# Patient Record
Sex: FEMALE | Race: WHITE | NOT HISPANIC OR LATINO | Employment: UNEMPLOYED | URBAN - METROPOLITAN AREA
[De-identification: names, ages, dates, MRNs, and addresses within clinical notes are randomized per-mention and may not be internally consistent; named-entity substitution may affect disease eponyms.]

---

## 2018-05-29 ENCOUNTER — HOSPITAL ENCOUNTER (EMERGENCY)
Facility: HOSPITAL | Age: 38
Discharge: HOME | End: 2018-05-29
Payer: MEDICARE

## 2018-05-29 VITALS
HEIGHT: 66 IN | OXYGEN SATURATION: 91 % | DIASTOLIC BLOOD PRESSURE: 62 MMHG | BODY MASS INDEX: 19.61 KG/M2 | RESPIRATION RATE: 20 BRPM | HEART RATE: 68 BPM | WEIGHT: 122 LBS | SYSTOLIC BLOOD PRESSURE: 91 MMHG | TEMPERATURE: 95.9 F

## 2018-05-29 DIAGNOSIS — R63.0 ANOREXIA: Primary | ICD-10-CM

## 2018-05-29 LAB
ALBUMIN SERPL BCP-MCNC: 3.7 G/DL (ref 3.5–5)
ALP SERPL-CCNC: 306 U/L (ref 46–116)
ALT SERPL W P-5'-P-CCNC: 16 U/L (ref 12–78)
AMPHETAMINES SERPL QL SCN: NEGATIVE
ANION GAP SERPL CALCULATED.3IONS-SCNC: 14 MMOL/L (ref 4–13)
AST SERPL W P-5'-P-CCNC: 17 U/L (ref 5–45)
BARBITURATES UR QL: NEGATIVE
BASOPHILS # BLD AUTO: 0.01 THOUSANDS/ΜL (ref 0–0.1)
BASOPHILS NFR BLD AUTO: 0 % (ref 0–1)
BENZODIAZ UR QL: NEGATIVE
BILIRUB SERPL-MCNC: 0.9 MG/DL (ref 0.2–1)
BUN SERPL-MCNC: 14 MG/DL (ref 5–25)
CALCIUM SERPL-MCNC: 8.7 MG/DL (ref 8.3–10.1)
CHLORIDE SERPL-SCNC: 106 MMOL/L (ref 100–108)
CO2 SERPL-SCNC: 19 MMOL/L (ref 21–32)
COCAINE UR QL: NEGATIVE
CREAT SERPL-MCNC: 0.65 MG/DL (ref 0.6–1.3)
EOSINOPHIL # BLD AUTO: 0.16 THOUSAND/ΜL (ref 0–0.61)
EOSINOPHIL NFR BLD AUTO: 3 % (ref 0–6)
ERYTHROCYTE [DISTWIDTH] IN BLOOD BY AUTOMATED COUNT: 19 % (ref 11.6–15.1)
ETHANOL EXG-MCNC: 0 MG/DL
GFR SERPL CREATININE-BSD FRML MDRD: 115 ML/MIN/1.73SQ M
GLUCOSE SERPL-MCNC: 84 MG/DL (ref 65–140)
HCT VFR BLD AUTO: 36.7 % (ref 34.8–46.1)
HGB BLD-MCNC: 12 G/DL (ref 11.5–15.4)
LYMPHOCYTES # BLD AUTO: 1.17 THOUSANDS/ΜL (ref 0.6–4.47)
LYMPHOCYTES NFR BLD AUTO: 22 % (ref 14–44)
MAGNESIUM SERPL-MCNC: 2 MG/DL (ref 1.6–2.6)
MCH RBC QN AUTO: 37.4 PG (ref 26.8–34.3)
MCHC RBC AUTO-ENTMCNC: 32.7 G/DL (ref 31.4–37.4)
MCV RBC AUTO: 114 FL (ref 82–98)
METHADONE UR QL: NEGATIVE
MONOCYTES # BLD AUTO: 0.25 THOUSAND/ΜL (ref 0.17–1.22)
MONOCYTES NFR BLD AUTO: 5 % (ref 4–12)
NEUTROPHILS # BLD AUTO: 3.8 THOUSANDS/ΜL (ref 1.85–7.62)
NEUTS SEG NFR BLD AUTO: 70 % (ref 43–75)
OPIATES UR QL SCN: NEGATIVE
PCP UR QL: NEGATIVE
PLATELET # BLD AUTO: 233 THOUSANDS/UL (ref 149–390)
PMV BLD AUTO: 11.2 FL (ref 8.9–12.7)
POTASSIUM SERPL-SCNC: 3.8 MMOL/L (ref 3.5–5.3)
PROT SERPL-MCNC: 6.5 G/DL (ref 6.4–8.2)
RBC # BLD AUTO: 3.21 MILLION/UL (ref 3.81–5.12)
SODIUM SERPL-SCNC: 139 MMOL/L (ref 136–145)
THC UR QL: NEGATIVE
VIT B12 SERPL-MCNC: 353 PG/ML (ref 100–900)
WBC # BLD AUTO: 5.39 THOUSAND/UL (ref 4.31–10.16)

## 2018-05-29 PROCEDURE — 80053 COMPREHEN METABOLIC PANEL: CPT | Performed by: PHYSICIAN ASSISTANT

## 2018-05-29 PROCEDURE — 36415 COLL VENOUS BLD VENIPUNCTURE: CPT | Performed by: PHYSICIAN ASSISTANT

## 2018-05-29 PROCEDURE — 99284 EMERGENCY DEPT VISIT MOD MDM: CPT

## 2018-05-29 PROCEDURE — 93005 ELECTROCARDIOGRAM TRACING: CPT

## 2018-05-29 PROCEDURE — 82607 VITAMIN B-12: CPT | Performed by: PHYSICIAN ASSISTANT

## 2018-05-29 PROCEDURE — 83735 ASSAY OF MAGNESIUM: CPT | Performed by: PHYSICIAN ASSISTANT

## 2018-05-29 PROCEDURE — 80307 DRUG TEST PRSMV CHEM ANLYZR: CPT | Performed by: PHYSICIAN ASSISTANT

## 2018-05-29 PROCEDURE — 85025 COMPLETE CBC W/AUTO DIFF WBC: CPT | Performed by: PHYSICIAN ASSISTANT

## 2018-05-29 PROCEDURE — 82075 ASSAY OF BREATH ETHANOL: CPT | Performed by: PHYSICIAN ASSISTANT

## 2018-05-29 RX ORDER — DOCUSATE SODIUM 100 MG/1
100 CAPSULE, LIQUID FILLED ORAL 2 TIMES DAILY
COMMUNITY
End: 2021-09-30 | Stop reason: HOSPADM

## 2018-05-29 RX ORDER — ATOMOXETINE 40 MG/1
40 CAPSULE ORAL DAILY
COMMUNITY
End: 2018-09-04 | Stop reason: DRUGHIGH

## 2018-05-29 RX ORDER — VARENICLINE TARTRATE 1 MG/1
1 TABLET, FILM COATED ORAL 2 TIMES DAILY
COMMUNITY
End: 2021-04-26 | Stop reason: HOSPADM

## 2018-05-29 RX ORDER — DULOXETIN HYDROCHLORIDE 60 MG/1
120 CAPSULE, DELAYED RELEASE ORAL DAILY
COMMUNITY
End: 2020-12-17

## 2018-05-29 RX ORDER — BUPROPION HYDROCHLORIDE 100 MG/1
100 TABLET, EXTENDED RELEASE ORAL 3 TIMES DAILY
COMMUNITY
End: 2021-04-26 | Stop reason: HOSPADM

## 2018-05-29 NOTE — ED NOTES
Called [facility] spoke with the nursing supervisor Demetrio Blackwell, faxed over consent for to [phone]  05/29/18 4809

## 2018-05-29 NOTE — ED PROVIDER NOTES
History  Chief Complaint   Patient presents with    Eating Disorder     Pt presents with a history of a eating disorder has not had an appetitie for over a month  Pt has lost sixteen pounds over the last month  Pt denies vomiting  Chelseanelsyamaury just seen at [facility] for same  They medically cleared her and there were no beds available for a psych admission  Staff from home state they want her admitted     57-year-old female presents the ER for evaluation due to an eating disorder  Patient states that she lives at housing facility through [facility] and has had decreased appetite for over a month  Patient states that she has had decreased food intake and has lost approximately 16 lb over this last month that she has been living at this new housing  Patient denies nausea and vomiting and is currently waiting for a bed to open up at a [facility] psych facility that specializes in eating disorders  Patient was seen yesterday at [facility] for similar symptoms and caregiver states that she was medically cleared and sent home without any further evaluation by psych  Caregiver also states that they were not told any lab result except that she has decreased potassium  Caregiver also states that nothing was given to patient while she was there and no recommendations were given  Caregiver states that she thinks patient should be admitted for psych and they would like her to be admitted at this time if necessary  Patient has history of gastric bypass surgery and states that she stopped eating after her surgery where she lost the majority of her weight eventually she had stomach issues due to lack of eating and states that she had a 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 and patient was told that this would be the last time she could get any kind of surgery on her stomach  Patient denies any abdominal pain, nausea, vomiting, headaches, chest pain, chest tightness, shortness of breath    Patient does admit to some dizziness and lightheadedness intermittently  Patient also states that she has not been eating much or drinking much fluids and states that in 1 weeks time she can eat 1 yogurt  Caregiver states that at the housing where she is located they are not supposed to keep track of food intake or manage any food needs  Caregiver states that concern was noted because of drastic weight loss during this month but she has been there  Prior to Admission Medications   Prescriptions Last Dose Informant Patient Reported? Taking? DULoxetine (CYMBALTA) 60 mg delayed release capsule   Yes Yes   Sig: Take 120 mg by mouth daily   atoMOXetine (STRATTERA) 40 mg capsule   Yes Yes   Sig: Take 40 mg by mouth daily   buPROPion (WELLBUTRIN SR) 150 mg 12 hr tablet   Yes Yes   Sig: Take 150 mg by mouth daily   docusate sodium (COLACE) 100 mg capsule   Yes Yes   Sig: Take 100 mg by mouth 2 (two) times a day   topiramate (TOPAMAX) 200 MG tablet   Yes Yes   Sig: Take 400 mg by mouth 2 (two) times a day   varenicline (CHANTIX) 1 mg tablet   Yes Yes   Sig: Take 1 mg by mouth 2 (two) times a day      Facility-Administered Medications: None       Past Medical History:   Diagnosis Date    Psychiatric disorder     depression anxiety    Seizures ([address] )        Past Surgical History:   Procedure Laterality Date    GASTRIC BYPASS         History reviewed  No pertinent family history  I have reviewed and agree with the history as documented  Social History   Substance Use Topics    Smoking status: Former Smoker    Smokeless tobacco: Never Used    Alcohol use No        Review of Systems   Constitutional: Positive for appetite change  Negative for chills and fever  HENT: Negative for congestion, rhinorrhea, sore throat and trouble swallowing  Eyes: Negative for photophobia, pain and visual disturbance  Respiratory: Negative for chest tightness, shortness of breath and wheezing      Cardiovascular: Negative for chest pain and palpitations  Gastrointestinal: Negative for abdominal pain, constipation, diarrhea, nausea and vomiting  Musculoskeletal: Negative for arthralgias, back pain, myalgias, neck pain and neck stiffness  Neurological: Positive for dizziness and light-headedness  Negative for syncope, weakness, numbness and headaches  All other systems reviewed and are negative  Physical Exam  Physical Exam   Constitutional: She is oriented to person, place, and time  Vital signs are normal  She appears cachectic  HENT:   Head: Normocephalic and atraumatic  Right Ear: Tympanic membrane normal    Left Ear: Tympanic membrane normal    Nose: Nose normal    Mouth/Throat: Uvula is midline, oropharynx is clear and moist and mucous membranes are normal    Eyes: Conjunctivae, EOM and lids are normal  Pupils are equal, round, and reactive to light  Neck: Normal range of motion and full passive range of motion without pain  Neck supple  Cardiovascular: Normal rate, regular rhythm, normal heart sounds and normal pulses  Pulmonary/Chest: Effort normal and breath sounds normal    Abdominal: Soft  Bowel sounds are normal  There is no tenderness  There is no CVA tenderness  Musculoskeletal: Normal range of motion  Neurological: She is alert and oriented to person, place, and time  She has normal strength  No cranial nerve deficit or sensory deficit  GCS eye subscore is 4  GCS verbal subscore is 5  GCS motor subscore is 6  Skin: Skin is warm, dry and intact  Capillary refill takes less than 2 seconds  Psychiatric: She has a normal mood and affect  Her speech is normal and behavior is normal  Judgment and thought content normal  Cognition and memory are normal    Nursing note and vitals reviewed        Vital Signs  ED Triage Vitals [05/29/18 1729]   Temperature Pulse Respirations Blood Pressure SpO2   (!) 95 9 °F (35 5 °C) 85 18 95/55 100 %      Temp Source Heart Rate Source Patient Position - Orthostatic VS BP Location FiO2 (%)   Tympanic Monitor Lying Right arm --      Pain Score       No Pain           Vitals:    05/29/18 2015 05/29/18 2030 05/29/18 2045 05/29/18 2100   BP:  90/61 91/61 91/62   Pulse: 68 70 69 68   Patient Position - Orthostatic VS:           Visual Acuity  Visual Acuity      Most Recent Value   L Pupil Size (mm)  3   R Pupil Size (mm)  3   L Pupil Shape  Round   R Pupil Shape  Round          ED Medications  Medications - No data to display    Diagnostic Studies  Results Reviewed     Procedure Component Value Units Date/Time    Vitamin B12 [[id number]]  (Normal) Collected:  05/29/18 1828    Lab Status:  Final result Specimen:  Blood from Arm, Right Updated:  05/29/18 2107     Vitamin B-12 353 pg/mL     Rapid drug screen, urine [[id number]]  (Normal) Collected:  05/29/18 1941    Lab Status:  Final result Specimen:  Urine from Urine, Clean Catch Updated:  05/29/18 2003     Amph/Meth UR Negative     Barbiturate Ur Negative     Benzodiazepine Urine Negative     Cocaine Urine Negative     Methadone Urine Negative     Opiate Urine Negative     PCP Ur Negative     THC Urine Negative    Narrative:         FOR MEDICAL PURPOSES ONLY  IF CONFIRMATION NEEDED PLEASE CONTACT THE LAB WITHIN 5 DAYS      Drug Screen Cutoff Levels:  AMPHETAMINE/METHAMPHETAMINES  1000 ng/mL  BARBITURATES     200 ng/mL  BENZODIAZEPINES     200 ng/mL  COCAINE      300 ng/mL  METHADONE      300 ng/mL  OPIATES      300 ng/mL  PHENCYCLIDINE     25 ng/mL  THC       50 ng/mL    Comprehensive metabolic panel [[id number]]  (Abnormal) Collected:  05/29/18 1827    Lab Status:  Final result Specimen:  Blood from Arm, Right Updated:  05/29/18 1908     Sodium 139 mmol/L      Potassium 3 8 mmol/L      Chloride 106 mmol/L      CO2 19 (L) mmol/L      Anion Gap 14 (H) mmol/L      BUN 14 mg/dL      Creatinine 0 65 mg/dL      Glucose 84 mg/dL      Calcium 8 7 mg/dL      AST 17 U/L      ALT 16 U/L      Alkaline Phosphatase 306 (H) U/L      Total Protein 6 5 g/dL      Albumin 3 7 g/dL      Total Bilirubin 0 90 mg/dL      eGFR 115 ml/min/1 73sq m     Narrative:         National Kidney Disease Education Program recommendations are as follows:  GFR calculation is accurate only with a steady state creatinine  Chronic Kidney disease less than 60 ml/min/1 73 sq  meters  Kidney failure less than 15 ml/min/1 73 sq  meters  Magnesium [[id number]]  (Normal) Collected:  05/29/18 1827    Lab Status:  Final result Specimen:  Blood from Arm, Right Updated:  05/29/18 1908     Magnesium 2 0 mg/dL     POCT alcohol breath test [[id number]]  (Normal) Resulted:  05/29/18 1800    Lab Status:  Final result Updated:  05/29/18 1903     EXTBreath Alcohol 0 00    CBC and differential [[id number]]  (Abnormal) Collected:  05/29/18 1827    Lab Status:  Final result Specimen:  Blood from Arm, Right Updated:  05/29/18 1845     WBC 5 39 Thousand/uL      RBC 3 21 (L) Million/uL      Hemoglobin 12 0 g/dL      Hematocrit 36 7 %       (H) fL      MCH 37 4 (H) pg      MCHC 32 7 g/dL      RDW 19 0 (H) %      MPV 11 2 fL      Platelets 802 Thousands/uL      Neutrophils Relative 70 %      Lymphocytes Relative 22 %      Monocytes Relative 5 %      Eosinophils Relative 3 %      Basophils Relative 0 %      Neutrophils Absolute 3 80 Thousands/µL      Lymphocytes Absolute 1 17 Thousands/µL      Monocytes Absolute 0 25 Thousand/µL      Eosinophils Absolute 0 16 Thousand/µL      Basophils Absolute 0 01 Thousands/µL                  No orders to display              Procedures  Procedures       Phone Contacts  ED Phone Contact    ED Course  ED Course as of Laz 09 0401   Tue May 29, 2018   2032 Spoke with Shree Aquino from Crisis and he states that there is no criteria for admission and pt should call her insurance company to find a place that specializes in eating disorders                                  MDM  Number of Diagnoses or Management Options  Anorexia: established and worsening     Amount and/or Complexity of Data Reviewed  Clinical lab tests: ordered and reviewed    Patient Progress  Patient progress: stable    CritCare Time    Disposition  Final diagnoses:   Anorexia     Time reflects when diagnosis was documented in both MDM as applicable and the Disposition within this note     Time User Action Codes Description Comment    5/29/2018  9:09 PM Rebecca Ladd Add [R63 0] Anorexia       ED Disposition     ED Disposition Condition Comment    Discharge  Tadeo Murillo discharge to home/self care  Condition at discharge: Stable        Follow-up Information     Follow up With Specialties Details Why Contact Info    PCP  Call For Recheck, If symptoms worsen           Discharge Medication List as of 5/29/2018  9:14 PM      CONTINUE these medications which have NOT CHANGED    Details   atoMOXetine (STRATTERA) 40 mg capsule Take 40 mg by mouth daily, Historical Med      buPROPion (WELLBUTRIN SR) 150 mg 12 hr tablet Take 150 mg by mouth daily, Historical Med      docusate sodium (COLACE) 100 mg capsule Take 100 mg by mouth 2 (two) times a day, Historical Med      DULoxetine (CYMBALTA) 60 mg delayed release capsule Take 120 mg by mouth daily, Historical Med      topiramate (TOPAMAX) 200 MG tablet Take 400 mg by mouth 2 (two) times a day, Historical Med      varenicline (CHANTIX) 1 mg tablet Take 1 mg by mouth 2 (two) times a day, Historical Med           No discharge procedures on file      ED Provider  Electronically Signed by           Girish Stokes PA-C  06/09/18 5622

## 2018-05-30 LAB
ATRIAL RATE: 76 BPM
P AXIS: 78 DEGREES
PR INTERVAL: 126 MS
QRS AXIS: 46 DEGREES
QRSD INTERVAL: 84 MS
QT INTERVAL: 380 MS
QTC INTERVAL: 427 MS
T WAVE AXIS: 108 DEGREES
VENTRICULAR RATE: 76 BPM

## 2018-05-30 PROCEDURE — 93010 ELECTROCARDIOGRAM REPORT: CPT | Performed by: INTERNAL MEDICINE

## 2018-05-30 NOTE — DISCHARGE INSTRUCTIONS
Anorexia Nervosa   WHAT YOU NEED TO KNOW:   Anorexia nervosa is an eating disorder  You weigh much less than your normal body weight should be  You lose weight by eating very little food, or by bingeing and purging  This means eating large amounts quickly and then vomiting or using laxatives to prevent weight gain  You worry about weight gain, and you  your weight and shape  The weight loss is not related to another medical condition  DISCHARGE INSTRUCTIONS:   Call 911 for any of the following:   · You want to harm or kill yourself  · You have pain when you swallow, or severe pain in your chest or abdomen  · Your heart is beating fast or fluttering, or you feel dizzy or faint  Return to the emergency department if:   · Your muscles feel weak, and you have pain and stiffness  Contact your healthcare provider if:   · You have tingling in your hands or feet  · Your monthly period is light or has stopped completely (females)  · You are planning to get pregnant and need to develop a safe eating plan  · You have questions or concerns about your condition or care  Medicines: You may need any of the following:  · Antianxiety medicine  decreases anxiety and helps you feel calm and relaxed  · Anticonvulsants  control seizures and decrease violent behavior, aggression, or irritability  This medicine may help control your mood swings  · Antidepressants  help decrease or prevent the symptoms of depression  · Mood stabilizers  help control mood changes  · Vitamin and mineral supplements  may be needed if you are malnourished  You may need to take a mineral supplement, such as potassium  You may also need to take multivitamins to replace what your body has lost      · Take your medicine as directed  Contact your healthcare provider if you think your medicine is not helping or if you have side effects  Tell him of her if you are allergic to any medicine   Keep a list of the medicines, vitamins, and herbs you take  Include the amounts, and when and why you take them  Bring the list or the pill bottles to follow-up visits  Carry your medicine list with you in case of an emergency  Therapy:  After you leave the hospital, it is important that you continue with therapy to help control anorexia:  · Cognitive behavioral therapy (CBT)  is used to help you learn the reasons you are unhappy with your body  A therapist will work with you to change your behaviors and decrease your negative feelings about food and your weight  · Group or family therapy  is a meeting you have with other people who also have anorexia nervosa  Family therapy is a meeting you have with healthcare providers and your family members  Group and family meetings are a time when you talk with others about ways to cope with anorexia nervosa  · Nutritional therapy  means healthcare providers will help you create a plan to reach a healthy weight for your height  The plan includes appropriate exercise and nutrition  You may also need extra fluids if you are dehydrated  What can I do to help myself? · Be patient  Recovery from anorexia is a process that takes time  You may have times when you go back to not eating, or eating few calories, especially during stressful times  This is common  Work with family members and healthcare providers to get back on track with healthy eating and healthy exercise  Try not to be angry with yourself for the episode  It might help to talk about your feelings with someone you trust      · Focus on a healthy self-esteem  Think about everything you like about yourself  For example, you may be a talented artist, or you may write well  Focus on those skills or talents instead of on appearance  Ask others not to comment on your weight or shape  Your healthcare provider can tell you healthy weight ranges for your age and height   It may take time before you are comfortable knowing your weight or seeing your weight as healthy  Remember your goals to build a healthy self-esteem  Be patient with yourself as you change your thinking  For support and more information:   · [employer]  [address] , [address] [facility] [address]  Phone: 7- 196 - 844-4524  Phone: 8- 964 - 044-6250  Web Address: http://www  [employer]  org  · [address] [facility], Public Information & Communication Branch  [address], [address], [address]  [place] [address]   Phone: 8- [phone]  Phone: 8- 226 - [phone]  Web Address: Shaye smith  Follow up with your healthcare provider as directed: You may need blood tests once you start taking medicine for anorexia  These tests are used to check how much medicine is in your blood  Your healthcare provider will use the results of these tests to decide the right amount of medicine for you  You may need to have these blood tests more than once  Write down your questions so you remember to ask them during your visits  © 2017 [facility] Information is for End User's use only and may not be sold, redistributed or otherwise used for commercial purposes  All illustrations and images included in CareNotes® are the copyrighted property of A D A M , Inc  or Dileep Gomez  The above information is an  only  It is not intended as medical advice for individual conditions or treatments  Talk to your doctor, nurse or pharmacist before following any medical regimen to see if it is safe and effective for you

## 2018-09-04 ENCOUNTER — OFFICE VISIT (OUTPATIENT)
Dept: ENDOCRINOLOGY | Facility: HOSPITAL | Age: 38
End: 2018-09-04
Payer: MEDICARE

## 2018-09-04 VITALS
WEIGHT: 151.8 LBS | HEIGHT: 66 IN | BODY MASS INDEX: 24.4 KG/M2 | DIASTOLIC BLOOD PRESSURE: 64 MMHG | HEART RATE: 87 BPM | SYSTOLIC BLOOD PRESSURE: 106 MMHG

## 2018-09-04 DIAGNOSIS — Z98.84 HISTORY OF GASTRIC BYPASS: ICD-10-CM

## 2018-09-04 DIAGNOSIS — E03.9 HYPOTHYROIDISM, UNSPECIFIED TYPE: Primary | ICD-10-CM

## 2018-09-04 DIAGNOSIS — R74.8 ELEVATED ALKALINE PHOSPHATASE LEVEL: ICD-10-CM

## 2018-09-04 DIAGNOSIS — Z86.018 HISTORY OF PITUITARY ADENOMA: ICD-10-CM

## 2018-09-04 DIAGNOSIS — E16.2 HYPOGLYCEMIA: ICD-10-CM

## 2018-09-04 PROCEDURE — 99204 OFFICE O/P NEW MOD 45 MIN: CPT | Performed by: INTERNAL MEDICINE

## 2018-09-04 RX ORDER — LEVOTHYROXINE SODIUM 0.03 MG/1
25 TABLET ORAL DAILY
COMMUNITY
End: 2019-05-07 | Stop reason: SDUPTHER

## 2018-09-04 RX ORDER — THIAMINE MONONITRATE (VIT B1) 100 MG
100 TABLET ORAL DAILY
Status: ON HOLD | COMMUNITY
End: 2021-09-30 | Stop reason: SDUPTHER

## 2018-09-04 RX ORDER — DIPHENOXYLATE HYDROCHLORIDE AND ATROPINE SULFATE 2.5; .025 MG/1; MG/1
1 TABLET ORAL DAILY
COMMUNITY
End: 2021-04-26 | Stop reason: HOSPADM

## 2018-09-04 RX ORDER — FOLIC ACID 1 MG/1
TABLET ORAL DAILY
Status: ON HOLD | COMMUNITY
End: 2021-09-30 | Stop reason: SDUPTHER

## 2018-09-04 RX ORDER — ACARBOSE 25 MG/1
25 TABLET ORAL
Refills: 0
Start: 2018-09-04 | End: 2018-10-03 | Stop reason: SDUPTHER

## 2018-09-04 RX ORDER — DOXEPIN HYDROCHLORIDE 25 MG/1
CAPSULE ORAL AS NEEDED
COMMUNITY
End: 2020-12-17

## 2018-09-04 RX ORDER — FAMOTIDINE 20 MG/1
20 TABLET, FILM COATED ORAL DAILY
COMMUNITY
End: 2021-04-26 | Stop reason: HOSPADM

## 2018-09-04 RX ORDER — POLYETHYLENE GLYCOL 3350 17 G/17G
17 POWDER, FOR SOLUTION ORAL DAILY
COMMUNITY
End: 2021-05-13 | Stop reason: HOSPADM

## 2018-09-04 RX ORDER — ATOMOXETINE 100 MG/1
100 CAPSULE ORAL DAILY
COMMUNITY
End: 2020-12-17

## 2018-09-04 RX ORDER — MIRTAZAPINE 15 MG/1
7.5 TABLET, FILM COATED ORAL
COMMUNITY
End: 2021-04-26 | Stop reason: HOSPADM

## 2018-09-04 RX ORDER — PRAZOSIN HYDROCHLORIDE 5 MG/1
CAPSULE ORAL
COMMUNITY
End: 2021-04-26 | Stop reason: HOSPADM

## 2018-09-04 RX ORDER — POTASSIUM CHLORIDE 750 MG/1
10 TABLET, EXTENDED RELEASE ORAL DAILY
COMMUNITY
End: 2021-04-26 | Stop reason: HOSPADM

## 2018-09-04 NOTE — PROGRESS NOTES
9/4/2018    Assessment/Plan      Diagnoses and all orders for this visit:    Hypothyroidism, unspecified type  -     TSH, 3rd generation Lab Collect; Future  -     T4, free Lab Collect; Future    History of gastric bypass  -     Ambulatory referral to medical nutrition therapy for diabetes; Future    Elevated alkaline phosphatase level  -     Comprehensive metabolic panel Lab Collect; Future  -     Gamma GT; Future    Hypoglycemia  -     Ambulatory referral to medical nutrition therapy for diabetes; Future  -     Cortisol Level, AM Specimen Lab Collect; Future    History of pituitary adenoma  -     MRI brain pituitary wo and w contrast; Future    Other orders  -     atomoxetine (STRATTERA) 100 MG capsule; Take 100 mg by mouth daily  -     topiramate (TOPAMAX) 50 MG tablet; Take 50 mg by mouth every 12 (twelve) hours 3 times daily  -     folic acid (FOLVITE) 1 mg tablet; Take by mouth daily  -     thiamine (VITAMIN B1) 100 mg tablet; Take 100 mg by mouth daily  -     famotidine (PEPCID) 20 mg tablet; Take 20 mg by mouth daily  -     polyethylene glycol (MIRALAX) 17 g packet; Take 17 g by mouth daily  -     Cholecalciferol (VITAMIN D3) 61417 units TABS; Take 50,000 Units by mouth once a week  -     potassium chloride (K-DUR,KLOR-CON) 10 mEq tablet; Take 10 mEq by mouth daily  -     multivitamin (THERAGRAN) TABS; Take 1 tablet by mouth daily  -     levothyroxine 25 mcg tablet; Take 25 mcg by mouth daily  -     prazosin (MINIPRESS) 2 mg capsule; Take 2 mg by mouth daily at bedtime  -     mirtazapine (REMERON) 15 mg tablet; Take 15 mg by mouth daily at bedtime  -     doxepin (SINEquan) 50 mg capsule; Take 10 mg by mouth daily at bedtime        Assessment/Plan:  1  Hypoglycemia:  Suspected due to post gastric bypass hypoglycemia given timing after her 2nd bypass as well as timing related to carb rich foods  She is on acarbose 25 mg daily at this time    I told her that there is room to increase this dose, however the most important management in the setting of her cause of hypoglycemia is dietary  I have placed a referral for a dietitian  Discussed briefly the most important aspects of diet including low carb, high-protein, and fat diet  Discussed as best she can in maintaining a consistent meal pattern on day-to-day basis  Discussed the same composition of snacks should hold true in terms of low carb, etc   Discussed that when her blood sugar is low, unlike the patient 1 diabetes, her low blood sugar should be treated with sugar plus a protein or fat source to maintain the blood sugar higher  Asked her to send in blood sugar logs the 2 weeks after she meets with the dietitian to monitor progress  Discussed the other options in terms of other medications  Will see her back in about 5 months  Will also check an a m  cortisol to rule out adrenal insufficiency  2   Hypothyroidism:  Will check a TSH and free T4 now  Suspect we will need to increase her levothyroxine dose  We will call her and recheck as needed  3   Elevated alk-phos:  Check CMP in [facility]  Patient had her gallbladder removed  4   History of pituitary adenoma:  I do not have any records of this at this time  I suggested we do an MRI of the pituitary  If an adenoma is present, we will need to do an appropriate hormonal workup  CC: Hypoglycemia    History of Present Illness     HPI: Ronda Abdi is a 41y o  year old female with history of anorexia, seizures, pituitary adenoma, gastric bypass surgery presents for consultation for a number of reasons  She states that in the hospital in June she was found have hypoglycemia  It was thought to be due to gastric bypass and she is referred here for evaluation  She states she had her 1st Pebbles-en-Y gastric bypass in 2007  She then developed ulcerations and needed a revision and in new pouch around 20 16-20 17    She reports her hypoglycemia is occurring after high carb meals or snacks as well as carb rich beverages  She was started acarbose 25 mg daily at the hospital   She has not met with a dietitian yet  She has not had any neuroglycopenia hypoglycemia  Her symptoms are more autonomic in resolved with treatment  Reviewing her blood sugars provided by the home she lives at, she does have occasional blood sugars in the 50s  There was 1 episode in the 30s in the patient states this was after a lot today  She reports having inconsistent meals and is look if she has 2 meals a day  She states 1 meal may be peanut butter toast   She does drink some [employer] Jolley which is sweetened  She notes chronic abdominal discomfort and no changes in that on acarbose  She does report some weight loss recently  She does report chronic lightheadedness and dizziness as well  She was also found out hypothyroidism was started on levothyroxine 25 mcg daily  She is not have repeat blood work either  Alk-phos was found to be elevated as well  She states she had her gallbladder removed years ago  She also notes a history of pituitary adenoma discovered on MRI imaging when she was 16  She does not recall if she has a hormonal workup done for this  I do not have these records at this time  Review of Systems   Constitutional: Positive for appetite change (poor appetite)  HENT: Negative for trouble swallowing and voice change  Eyes: Negative for visual disturbance  Respiratory: Negative for shortness of breath  Cardiovascular: Positive for leg swelling  Negative for palpitations  Gastrointestinal: Positive for abdominal pain and nausea  Endocrine: Negative for polydipsia and polyuria  Musculoskeletal: Negative for arthralgias and myalgias  Skin: Negative for rash  Neurological: Positive for dizziness, seizures and light-headedness  Hematological: Negative for adenopathy  Psychiatric/Behavioral: Negative for agitation and confusion         Historical Information   Past Medical History:   Diagnosis Date  Psychiatric disorder     depression anxiety    Seizures ([facility] [address] )      Past Surgical History:   Procedure Laterality Date    GASTRIC BYPASS       Social History   History   Alcohol Use No     History   Drug Use No     Comment: in revovery for benzos     History   Smoking Status    Former Smoker   Smokeless Tobacco    Never Used     Family History:   Family History   Problem Relation Age of Onset    Hypertension Mother     Heart attack Mother     No Known Problems Father     Lung cancer Maternal Grandmother     Hypothyroidism Maternal Grandmother     Diabetes type II Paternal Grandmother     Diabetes type II Paternal Grandfather        Meds/Allergies   Current Outpatient Prescriptions   Medication Sig Dispense Refill    atomoxetine (STRATTERA) 100 MG capsule Take 100 mg by mouth daily      buPROPion (WELLBUTRIN SR) 150 mg 12 hr tablet Take 150 mg by mouth 2 (two) times a day        Cholecalciferol (VITAMIN D3) 33421 units TABS Take 50,000 Units by mouth once a week      docusate sodium (COLACE) 100 mg capsule Take 100 mg by mouth 2 (two) times a day      doxepin (SINEquan) 50 mg capsule Take 10 mg by mouth daily at bedtime      DULoxetine (CYMBALTA) 60 mg delayed release capsule Take 120 mg by mouth daily      famotidine (PEPCID) 20 mg tablet Take 20 mg by mouth daily      folic acid (FOLVITE) 1 mg tablet Take by mouth daily      levothyroxine 25 mcg tablet Take 25 mcg by mouth daily      mirtazapine (REMERON) 15 mg tablet Take 15 mg by mouth daily at bedtime      multivitamin (THERAGRAN) TABS Take 1 tablet by mouth daily      polyethylene glycol (MIRALAX) 17 g packet Take 17 g by mouth daily      potassium chloride (K-DUR,KLOR-CON) 10 mEq tablet Take 10 mEq by mouth daily      prazosin (MINIPRESS) 2 mg capsule Take 2 mg by mouth daily at bedtime      thiamine (VITAMIN B1) 100 mg tablet Take 100 mg by mouth daily      topiramate (TOPAMAX) 50 MG tablet Take 50 mg by mouth every 12 (twelve) hours 3 times daily      varenicline (CHANTIX) 1 mg tablet Take 1 mg by mouth 2 (two) times a day       No current facility-administered medications for this visit  Allergies   Allergen Reactions    Pennsaid [Diclofenac Sodium]        Objective   Vitals: Blood pressure 106/64, pulse 87, height 5' 6" (1 676 m), weight 68 9 kg (151 lb 12 8 oz)  Invasive Devices          No matching active lines, drains, or airways          Physical Exam   Constitutional: She is oriented to person, place, and time  She appears well-developed and well-nourished  No distress  HENT:   Head: Normocephalic and atraumatic  Mouth/Throat: No oropharyngeal exudate  Eyes: Conjunctivae and EOM are normal  Pupils are equal, round, and reactive to light  No scleral icterus  Neck: Normal range of motion  Neck supple  No thyromegaly present  Cardiovascular: Normal rate and regular rhythm  No murmur heard  Pulmonary/Chest: Effort normal and breath sounds normal  No respiratory distress  Abdominal: Soft  Bowel sounds are normal    Musculoskeletal: Normal range of motion  She exhibits no edema  Neurological: She is alert and oriented to person, place, and time  She exhibits normal muscle tone  Skin: Skin is warm and dry  No rash noted  She is not diaphoretic  Psychiatric: She has a normal mood and affect  Her behavior is normal    Vitals reviewed  The history was obtained from the review of the chart and from the patient      Lab Results:      Recent Results (from the past 72593 hour(s))   ECG 12 lead    Collection Time: 05/29/18  5:39 PM   Result Value Ref Range    Ventricular Rate 76 BPM    Atrial Rate 76 BPM    SC Interval 126 ms    QRSD Interval 84 ms    QT Interval 380 ms    QTC Interval 427 ms    P Axis 78 degrees    QRS Axis 46 degrees    T Wave Axis 108 degrees   POCT alcohol breath test    Collection Time: 05/29/18  6:00 PM   Result Value Ref Range    EXTBreath Alcohol 0 00    CBC and differential    Collection Time: 05/29/18  6:27 PM   Result Value Ref Range    WBC 5 39 4 31 - 10 16 Thousand/uL    RBC 3 21 (L) 3 81 - 5 12 Million/uL    Hemoglobin 12 0 11 5 - 15 4 g/dL    Hematocrit 36 7 34 8 - 46 1 %     (H) 82 - 98 fL    MCH 37 4 (H) 26 8 - 34 3 pg    MCHC 32 7 31 4 - 37 4 g/dL    RDW 19 0 (H) 11 6 - 15 1 %    MPV 11 2 8 9 - 12 7 fL    Platelets 182 120 - 521 Thousands/uL    Neutrophils Relative 70 43 - 75 %    Lymphocytes Relative 22 14 - 44 %    Monocytes Relative 5 4 - 12 %    Eosinophils Relative 3 0 - 6 %    Basophils Relative 0 0 - 1 %    Neutrophils Absolute 3 80 1 85 - 7 62 Thousands/µL    Lymphocytes Absolute 1 17 0 60 - 4 47 Thousands/µL    Monocytes Absolute 0 25 0 17 - 1 22 Thousand/µL    Eosinophils Absolute 0 16 0 00 - 0 61 Thousand/µL    Basophils Absolute 0 01 0 00 - 0 10 Thousands/µL   Comprehensive metabolic panel    Collection Time: 05/29/18  6:27 PM   Result Value Ref Range    Sodium 139 136 - 145 mmol/L    Potassium 3 8 3 5 - 5 3 mmol/L    Chloride 106 100 - 108 mmol/L    CO2 19 (L) 21 - 32 mmol/L    ANION GAP 14 (H) 4 - 13 mmol/L    BUN 14 5 - 25 mg/dL    Creatinine 0 65 0 60 - 1 30 mg/dL    Glucose 84 65 - 140 mg/dL    Calcium 8 7 8 3 - 10 1 mg/dL    AST 17 5 - 45 U/L    ALT 16 12 - 78 U/L    Alkaline Phosphatase 306 (H) 46 - 116 U/L    Total Protein 6 5 6 4 - 8 2 g/dL    Albumin 3 7 3 5 - 5 0 g/dL    Total Bilirubin 0 90 0 20 - 1 00 mg/dL    eGFR 115 ml/min/1 73sq m   Magnesium    Collection Time: 05/29/18  6:27 PM   Result Value Ref Range    Magnesium 2 0 1 6 - 2 6 mg/dL   Vitamin B12    Collection Time: 05/29/18  6:28 PM   Result Value Ref Range    Vitamin B-12 353 100 - 900 pg/mL   Rapid drug screen, urine    Collection Time: 05/29/18  7:41 PM   Result Value Ref Range    Amph/Meth UR Negative Negative    Barbiturate Ur Negative Negative    Benzodiazepine Urine Negative Negative    Cocaine Urine Negative Negative    Methadone Urine Negative Negative    Opiate Urine Negative Negative PCP Ur Negative Negative    THC Urine Negative Negative         No future appointments

## 2018-09-04 NOTE — LETTER
September 4, 2018     Hima Torres MD  [address] [facility] [address]  [address] [place] [address]    Patient: Pat Rico   YOB: 1980   Date of Visit: 9/4/2018       Dear Dr Mere Jeffery: Thank you for referring Pat Rico to me for evaluation  Below are my notes for this consultation  If you have questions, please do not hesitate to call me  I look forward to following your patient along with you  Sincerely,        John Thomas DO        CC: No Recipients  John Thomas DO  9/4/2018  1:51 PM  Sign at close encounter  9/4/2018    Assessment/Plan      Diagnoses and all orders for this visit:    Hypothyroidism, unspecified type  -     TSH, 3rd generation Lab Collect; Future  -     T4, free Lab Collect; Future    History of gastric bypass  -     Ambulatory referral to medical nutrition therapy for diabetes; Future    Elevated alkaline phosphatase level  -     Comprehensive metabolic panel Lab Collect; Future  -     Gamma GT; Future    Hypoglycemia  -     Ambulatory referral to medical nutrition therapy for diabetes; Future  -     Cortisol Level, AM Specimen Lab Collect; Future    History of pituitary adenoma  -     MRI brain pituitary wo and w contrast; Future    Other orders  -     atomoxetine (STRATTERA) 100 MG capsule; Take 100 mg by mouth daily  -     topiramate (TOPAMAX) 50 MG tablet; Take 50 mg by mouth every 12 (twelve) hours 3 times daily  -     folic acid (FOLVITE) 1 mg tablet; Take by mouth daily  -     thiamine (VITAMIN B1) 100 mg tablet; Take 100 mg by mouth daily  -     famotidine (PEPCID) 20 mg tablet; Take 20 mg by mouth daily  -     polyethylene glycol (MIRALAX) 17 g packet; Take 17 g by mouth daily  -     Cholecalciferol (VITAMIN D3) 52155 units TABS; Take 50,000 Units by mouth once a week  -     potassium chloride (K-DUR,KLOR-CON) 10 mEq tablet;  Take 10 mEq by mouth daily  -     multivitamin (THERAGRAN) TABS; Take 1 tablet by mouth daily  -     levothyroxine 25 mcg tablet; Take 25 mcg by mouth daily  -     prazosin (MINIPRESS) 2 mg capsule; Take 2 mg by mouth daily at bedtime  -     mirtazapine (REMERON) 15 mg tablet; Take 15 mg by mouth daily at bedtime  -     doxepin (SINEquan) 50 mg capsule; Take 10 mg by mouth daily at bedtime        Assessment/Plan:  1  Hypoglycemia:  Suspected due to post gastric bypass hypoglycemia given timing after her 2nd bypass as well as timing related to carb rich foods  She is on acarbose 25 mg daily at this time  I told her that there is room to increase this dose, however the most important management in the setting of her cause of hypoglycemia is dietary  I have placed a referral for a dietitian  Discussed briefly the most important aspects of diet including low carb, high-protein, and fat diet  Discussed as best she can in maintaining a consistent meal pattern on day-to-day basis  Discussed the same composition of snacks should hold true in terms of low carb, etc   Discussed that when her blood sugar is low, unlike the patient 1 diabetes, her low blood sugar should be treated with sugar plus a protein or fat source to maintain the blood sugar higher  Asked her to send in blood sugar logs the 2 weeks after she meets with the dietitian to monitor progress  Discussed the other options in terms of other medications  Will see her back in about 5 months  Will also check an a m  cortisol to rule out adrenal insufficiency  2   Hypothyroidism:  Will check a TSH and free T4 now  Suspect we will need to increase her levothyroxine dose  We will call her and recheck as needed  3   Elevated alk-phos:  Check CMP in [facility]  Patient had her gallbladder removed  4   History of pituitary adenoma:  I do not have any records of this at this time  I suggested we do an MRI of the pituitary  If an adenoma is present, we will need to do an appropriate hormonal workup        CC: Hypoglycemia    History of Present Illness     HPI: Ofelia Min is a 41 y o  year old female with history of anorexia, seizures, pituitary adenoma, gastric bypass surgery presents for consultation for a number of reasons  She states that in the hospital in June she was found have hypoglycemia  It was thought to be due to gastric bypass and she is referred here for evaluation  She states she had her 1st Pebbles-en-Y gastric bypass in 2007  She then developed ulcerations and needed a revision and in new pouch around 20 16-20 17  She reports her hypoglycemia is occurring after high carb meals or snacks as well as carb rich beverages  She was started acarbose 25 mg daily at the hospital   She has not met with a dietitian yet  She has not had any neuroglycopenia hypoglycemia  Her symptoms are more autonomic in resolved with treatment  Reviewing her blood sugars provided by the home she lives at, she does have occasional blood sugars in the 50s  There was 1 episode in the 30s in the patient states this was after a lot today  She reports having inconsistent meals and is look if she has 2 meals a day  She states 1 meal may be peanut butter toast   She does drink some Marlee Jacy which is sweetened  She notes chronic abdominal discomfort and no changes in that on acarbose  She does report some weight loss recently  She does report chronic lightheadedness and dizziness as well  She was also found out hypothyroidism was started on levothyroxine 25 mcg daily  She is not have repeat blood work either  Alk-phos was found to be elevated as well  She states she had her gallbladder removed years ago  She also notes a history of pituitary adenoma discovered on MRI imaging when she was 16  She does not recall if she has a hormonal workup done for this  I do not have these records at this time  Review of Systems   Constitutional: Positive for appetite change (poor appetite)  HENT: Negative for trouble swallowing and voice change  Eyes: Negative for visual disturbance  Respiratory: Negative for shortness of breath  Cardiovascular: Positive for leg swelling  Negative for palpitations  Gastrointestinal: Positive for abdominal pain and nausea  Endocrine: Negative for polydipsia and polyuria  Musculoskeletal: Negative for arthralgias and myalgias  Skin: Negative for rash  Neurological: Positive for dizziness, seizures and light-headedness  Hematological: Negative for adenopathy  Psychiatric/Behavioral: Negative for agitation and confusion         Historical Information   Past Medical History:   Diagnosis Date    Psychiatric disorder     depression anxiety    Seizures ([address] )      Past Surgical History:   Procedure Laterality Date    GASTRIC BYPASS       Social History   History   Alcohol Use No     History   Drug Use No     Comment: in revovery for benzos     History   Smoking Status    Former Smoker   Smokeless Tobacco    Never Used     Family History:   Family History   Problem Relation Age of Onset    Hypertension Mother     Heart attack Mother     No Known Problems Father     Lung cancer Maternal Grandmother     Hypothyroidism Maternal Grandmother     Diabetes type II Paternal Grandmother     Diabetes type II Paternal Grandfather        Meds/Allergies   Current Outpatient Prescriptions   Medication Sig Dispense Refill    atomoxetine (STRATTERA) 100 MG capsule Take 100 mg by mouth daily      buPROPion (WELLBUTRIN SR) 150 mg 12 hr tablet Take 150 mg by mouth 2 (two) times a day        Cholecalciferol (VITAMIN D3) 16009 units TABS Take 50,000 Units by mouth once a week      docusate sodium (COLACE) 100 mg capsule Take 100 mg by mouth 2 (two) times a day      doxepin (SINEquan) 50 mg capsule Take 10 mg by mouth daily at bedtime      DULoxetine (CYMBALTA) 60 mg delayed release capsule Take 120 mg by mouth daily      famotidine (PEPCID) 20 mg tablet Take 20 mg by mouth daily      folic acid (FOLVITE) 1 mg tablet Take by mouth daily      levothyroxine 25 mcg tablet Take 25 mcg by mouth daily      mirtazapine (REMERON) 15 mg tablet Take 15 mg by mouth daily at bedtime      multivitamin (THERAGRAN) TABS Take 1 tablet by mouth daily      polyethylene glycol (MIRALAX) 17 g packet Take 17 g by mouth daily      potassium chloride (K-DUR,KLOR-CON) 10 mEq tablet Take 10 mEq by mouth daily      prazosin (MINIPRESS) 2 mg capsule Take 2 mg by mouth daily at bedtime      thiamine (VITAMIN B1) 100 mg tablet Take 100 mg by mouth daily      topiramate (TOPAMAX) 50 MG tablet Take 50 mg by mouth every 12 (twelve) hours 3 times daily      varenicline (CHANTIX) 1 mg tablet Take 1 mg by mouth 2 (two) times a day       No current facility-administered medications for this visit  Allergies   Allergen Reactions    Pennsaid [Diclofenac Sodium]        Objective   Vitals: Blood pressure 106/64, pulse 87, height 5' 6" (1 676 m), weight 68 9 kg (151 lb 12 8 oz)  Invasive Devices          No matching active lines, drains, or airways          Physical Exam   Constitutional: She is oriented to person, place, and time  She appears well-developed and well-nourished  No distress  HENT:   Head: Normocephalic and atraumatic  Mouth/Throat: No oropharyngeal exudate  Eyes: Conjunctivae and EOM are normal  Pupils are equal, round, and reactive to light  No scleral icterus  Neck: Normal range of motion  Neck supple  No thyromegaly present  Cardiovascular: Normal rate and regular rhythm  No murmur heard  Pulmonary/Chest: Effort normal and breath sounds normal  No respiratory distress  Abdominal: Soft  Bowel sounds are normal    Musculoskeletal: Normal range of motion  She exhibits no edema  Neurological: She is alert and oriented to person, place, and time  She exhibits normal muscle tone  Skin: Skin is warm and dry  No rash noted  She is not diaphoretic  Psychiatric: She has a normal mood and affect  Her behavior is normal    Vitals reviewed        The history was obtained from the review of the chart and from the patient      Lab Results:      Recent Results (from the past 56379 hour(s))   ECG 12 lead    Collection Time: 05/29/18  5:39 PM   Result Value Ref Range    Ventricular Rate 76 BPM    Atrial Rate 76 BPM    WI Interval 126 ms    QRSD Interval 84 ms    QT Interval 380 ms    QTC Interval 427 ms    P Axis 78 degrees    QRS Axis 46 degrees    T Wave Axis 108 degrees   POCT alcohol breath test    Collection Time: 05/29/18  6:00 PM   Result Value Ref Range    EXTBreath Alcohol 0 00    CBC and differential    Collection Time: 05/29/18  6:27 PM   Result Value Ref Range    WBC 5 39 4 31 - 10 16 Thousand/uL    RBC 3 21 (L) 3 81 - 5 12 Million/uL    Hemoglobin 12 0 11 5 - 15 4 g/dL    Hematocrit 36 7 34 8 - 46 1 %     (H) 82 - 98 fL    MCH 37 4 (H) 26 8 - 34 3 pg    MCHC 32 7 31 4 - 37 4 g/dL    RDW 19 0 (H) 11 6 - 15 1 %    MPV 11 2 8 9 - 12 7 fL    Platelets 988 095 - 747 Thousands/uL    Neutrophils Relative 70 43 - 75 %    Lymphocytes Relative 22 14 - 44 %    Monocytes Relative 5 4 - 12 %    Eosinophils Relative 3 0 - 6 %    Basophils Relative 0 0 - 1 %    Neutrophils Absolute 3 80 1 85 - 7 62 Thousands/µL    Lymphocytes Absolute 1 17 0 60 - 4 47 Thousands/µL    Monocytes Absolute 0 25 0 17 - 1 22 Thousand/µL    Eosinophils Absolute 0 16 0 00 - 0 61 Thousand/µL    Basophils Absolute 0 01 0 00 - 0 10 Thousands/µL   Comprehensive metabolic panel    Collection Time: 05/29/18  6:27 PM   Result Value Ref Range    Sodium 139 136 - 145 mmol/L    Potassium 3 8 3 5 - 5 3 mmol/L    Chloride 106 100 - 108 mmol/L    CO2 19 (L) 21 - 32 mmol/L    ANION GAP 14 (H) 4 - 13 mmol/L    BUN 14 5 - 25 mg/dL    Creatinine 0 65 0 60 - 1 30 mg/dL    Glucose 84 65 - 140 mg/dL    Calcium 8 7 8 3 - 10 1 mg/dL    AST 17 5 - 45 U/L    ALT 16 12 - 78 U/L    Alkaline Phosphatase 306 (H) 46 - 116 U/L    Total Protein 6 5 6 4 - 8 2 g/dL    Albumin 3 7 3 5 - 5 0 g/dL    Total Bilirubin 0 90 0 20 - 1 00 mg/dL    eGFR 115 ml/min/1 73sq m   Magnesium    Collection Time: 05/29/18  6:27 PM   Result Value Ref Range    Magnesium 2 0 1 6 - 2 6 mg/dL   Vitamin B12    Collection Time: 05/29/18  6:28 PM   Result Value Ref Range    Vitamin B-12 353 100 - 900 pg/mL   Rapid drug screen, urine    Collection Time: 05/29/18  7:41 PM   Result Value Ref Range    Amph/Meth UR Negative Negative    Barbiturate Ur Negative Negative    Benzodiazepine Urine Negative Negative    Cocaine Urine Negative Negative    Methadone Urine Negative Negative    Opiate Urine Negative Negative    PCP Ur Negative Negative    THC Urine Negative Negative         No future appointments

## 2018-09-17 ENCOUNTER — TELEPHONE (OUTPATIENT)
Dept: ENDOCRINOLOGY | Facility: HOSPITAL | Age: 38
End: 2018-09-17

## 2018-09-17 NOTE — TELEPHONE ENCOUNTER
I am waiting on the morning cortisol level I ordered as well  Did they draw this? Received labs from 09/14/2018 from [facility]:  Glucose 79, BUN 12, creatinine 0 71, , sodium 141, potassium 4 7, calcium 8 9, albumin 4 3, bilirubin 0 3, alk-phos 190 (), AST 19, ALT 24, GGT 29 (3-50), TSH 3 38, free T4 0 7 (0 8-1 8)

## 2018-09-17 NOTE — TELEPHONE ENCOUNTER
When pt calls back I will ask her if she plans on going another day or if she didn't realize she never gave them the slip

## 2018-09-17 NOTE — TELEPHONE ENCOUNTER
I talked to [facility] and they did not run the test  They said they never got the script for it  Also that has to be drawn between a specific time and she came after the time it needed to be done

## 2018-09-18 NOTE — TELEPHONE ENCOUNTER
Pt called back  She said she is not sure why it never got ran  She took all of the paperwork with her  I'm going to fax the lab slip to Jose in [facility] and she is going to get it done this week

## 2018-10-02 ENCOUNTER — OFFICE VISIT (OUTPATIENT)
Dept: DIABETES SERVICES | Facility: HOSPITAL | Age: 38
End: 2018-10-02
Payer: MEDICARE

## 2018-10-02 VITALS — BODY MASS INDEX: 27.28 KG/M2 | WEIGHT: 169 LBS

## 2018-10-02 DIAGNOSIS — E16.2 HYPOGLYCEMIA, UNSPECIFIED: Primary | ICD-10-CM

## 2018-10-02 PROCEDURE — 97802 MEDICAL NUTRITION INDIV IN: CPT | Performed by: DIETITIAN, REGISTERED

## 2018-10-02 NOTE — PROGRESS NOTES
Medical Nutrition Therapy        Assessment    Visit Type: Initial visit    Chief complaint E16 2 (ICD-10-CM) - Hypoglycemia    HPI: Marcelo diet history reveals excessive intake of sugar sweetened beverages, excessive caffeine intake, meal skipping, and inconsistent carbohydrate intake  Currently meals range from 0 to 70 grams of carbohydrate  Explained basic pathophysiology of diabetes and impact of diet on blood glucose levels  Together we discussed what foods contain CHO, reading a food label, and serving sizes  Used the portion booklet to teach Montez more about food groups and basic carbohydrate counting  Created an individualized meal plan for Montez with 3 meals and 3 snacks providing 30 g carb per meal and 15-30 g carb per snack  Put together sample meals for Tri's reference  Montez demonstrated good understanding, Montez will call if she has questions before follow up in four weeks  Ht Readings from Last 1 Encounters:   09/04/18 5' 6" (1 676 m)     Wt Readings from Last 2 Encounters:   10/02/18 76 7 kg (169 lb)   09/04/18 68 9 kg (151 lb 12 8 oz)     Weight Change: Yes 18 lb gain over 1 month    Medical Diagnosis/ICD10: E16 2 (ICD-10-CM) - Hypoglycemia    Barriers to Learning: no barriers    Do you follow any special diet presently?: No  Who shops: patient  Who cooks: patient    Food Log: Completed via the method of food recall    Breakfast:7 am  2 24 oz Lattes  No whipped cream   Morning Snack:n/a  Lunch:11:30 latte  Afternoon Snack: 1pop tart or hot pocket or banana or candy bar  Dinner:5 pm may or may not eat it   Meatloaf or   Evening Snack:2 peanut butter cups, 1 pop tart  Beverages: lattes   Eating out/Take out: not often except for lunchables and lattes from [employer] likes to walk in the community    Calorie needs 1700 kcals/day Carbs: 30 g/meal, 15-30 g/snack         Nutrition Diagnosis:  Disordered eating pattern  related to Weight regulation/preoccupation significantly influences self-esteem as evidenced by Fear of foods or dysfunctional thoughts regarding food or food experiences    Intervention: carbohydrate counting, meal timing and individualized meal plan     Treatment Goals: Patient will consume 3 meals a day and Patient will count carbohydrates    Monitoring and evaluation:    Term code indicator  FH 4 4 Mealtime Behavior Criteria: patient will eat 3 meals and 3 snacks per day  Term code indicator  FH 1 6 3 Carbohydrate Intake Criteria: patient willl consume 30 grams of cabohydrate at meals and 15 -30 grams of carbohydrates at snacks    Patients Response to Instruction:  Comprehensiongood  Motivationgood  Expected Compliancefair    Thank you for coming to the [facility] for education today  Please feel free to call with any questions or concerns      Seda Manzanares  [address] [facility] [phone]

## 2018-10-03 ENCOUNTER — TELEPHONE (OUTPATIENT)
Dept: ENDOCRINOLOGY | Facility: HOSPITAL | Age: 38
End: 2018-10-03

## 2018-10-03 DIAGNOSIS — E11.8 TYPE 2 DIABETES MELLITUS WITH COMPLICATION, UNSPECIFIED WHETHER LONG TERM INSULIN USE: Primary | ICD-10-CM

## 2018-10-03 DIAGNOSIS — E16.2 HYPOGLYCEMIA: ICD-10-CM

## 2018-10-03 RX ORDER — ACARBOSE 50 MG/1
TABLET ORAL
Start: 2018-10-03 | End: 2018-10-03 | Stop reason: SDUPTHER

## 2018-10-03 RX ORDER — ACARBOSE 50 MG/1
TABLET ORAL
Qty: 90 TABLET | Refills: 4
Start: 2018-10-03 | End: 2020-11-17

## 2018-10-03 NOTE — TELEPHONE ENCOUNTER
Sent in glucagon injection kit  This is to be used only in severe low blood sugars where patient is unable to take anything by mouth  If it is used outside of emergency situations, there can be rebound hypoglycemia/low blood sugars in her specific case  Please let patient know and let me know if she has any questions and I can talk to her

## 2018-10-03 NOTE — TELEPHONE ENCOUNTER
Reviewed blood sugars from 9/17 through 10/2 provided  There is still hypoglycemia in the 20s and 30s at times  Most important aspect will be continue working with dietary intervention as she is doing  In the meantime I would increase her acarbose to 50 mg 3 times a day with meals

## 2018-11-14 ENCOUNTER — OFFICE VISIT (OUTPATIENT)
Dept: DIABETES SERVICES | Facility: HOSPITAL | Age: 38
End: 2018-11-14
Payer: MEDICARE

## 2018-11-14 DIAGNOSIS — E16.2 HYPOGLYCEMIA: Primary | ICD-10-CM

## 2018-11-14 PROCEDURE — 97803 MED NUTRITION INDIV SUBSEQ: CPT | Performed by: DIETITIAN, REGISTERED

## 2018-11-14 NOTE — PROGRESS NOTES
Medical Nutrition Therapy      Assessment    Chief complaint Hypoglycemia    Visit Type: Follow-up visit    HPI: [facility] returned for follow-up today  She did not bring blood sugar log, however, reports still having hypoglycemic episodes, with readings in the 30's at times  Tris food record reveals meal skipping, intake of sugar sweetened beverages, and inadequate intake of protein, plant based foods, and whole grains   Overall, Tris meals provide 25 - 50 grams carbohydrate  Based on meal plan review and discussion with [facility] and her nurse who accompanied her today, reviewed education regarding post Pebbles -en-Y nutrition recommendations for patients experiencing hypoglycemia  Instructions provided for 6 small meals, ideally 3 hours apart with the following composition: 1/2 cup lean protein, 1/4 cup fruit/vegetable, 1/4 cup whole grain  Additional instruction that fluid intake be separate from food intake  Discussed sample meals that fit this composition for different times throughout the day based on Tri's food preferences and schedule  She reports recently transitioning out of a group home to living independently and will now be responsible for food shopping with some support  Lamontjohnathan Cleaves that continued intake of sugar sweetened beverages including juice, sweetened iced tea, and lattes could be contributing to low blood sugars  Although she reports that she has reduced intake of these beverages since last visit  [facility] and her nurse wish to follow up again and will return in approximately 6 weeks      Ht Readings from Last 1 Encounters:   09/04/18 5' 6" (1 676 m)     Wt Readings from Last 2 Encounters:   10/02/18 76 7 kg (169 lb)   09/04/18 68 9 kg (151 lb 12 8 oz)     Weight Change: No    Medical Diagnosis/reason for visit E16 2 Hypoglycemia    Food Log: Completed via the method of food recall                Breakfast: wakes around 6  skips most of the time, twice a week has a banana around 7am   Morning Snack:8:30-9 am sometimes will have a 24 oz  latte or 16 oz orange juice around   Lunch:varies between 1-4 pm: banana 3 x/ wk, sometimes hot pocket  Afternoon Snack: not usually  Dinner: 5 pm: maybe a jello pudding (chocolate or vanilla) OR banana with peanut butter OR Stouffers stuffed pepper from grocery store  Evening Snack:10 pm sometimes a banana and peanut butter Or a small bowl of cereal  Beverages: sweetened iced tea, latte, juice, water  Eating out/Take out:bought 3 times but only ate once  cheesesteak pizza  Exercise Walks and does circuit training at the [facility] 3x/week    Calorie needs 1500 kcals/day Carbs: 15-30 g/meal, 15 -30 g/snack         Nutrition Diagnosis:  Altered gastrointestinal function  related to Alteration in gastrointestinal tract structure and/or function as evidenced by  Surgical procedures (i e  gastrectomy, gastric bypass)    Intervention: increased protein intake, increased plant based foods and meal timing     Treatment Goals: Patient understands education and recommendations, Patient will monitor portion control and Patient will increase their intake of plant based foods    Monitoring and evaluation:    Term code indicator  FH 4 4 Mealtime Behavior Criteria: Patient will follow prescribed diet: 1/2 cup lean protein, 1/4 cup fruit/vegetables, 1/4 cup whole grains: 6 meals/snacks per day, with meals/snacks 3 hours apart and fluid intake separate from food intake    Patients Response to Instruction:  Gabriel Waddell  Expected Compliancefair    Thank you for coming to the [facility] for education today  Please feel free to call with any questions or concerns      Rubina Villafuerte, [address] [place] [phone]

## 2018-11-14 NOTE — PATIENT INSTRUCTIONS
1) 1 - 1 25 cup servings of food 6 times per day     1/2 cup lean protein   1/4 cup fruit/vegetable   1/4 cup whole grain  2) Reduce intake of sugar sweetened beverages  3) Keep intake of fluids and food separate

## 2019-05-07 ENCOUNTER — OFFICE VISIT (OUTPATIENT)
Dept: ENDOCRINOLOGY | Facility: HOSPITAL | Age: 39
End: 2019-05-07
Payer: MEDICARE

## 2019-05-07 VITALS
HEIGHT: 66 IN | DIASTOLIC BLOOD PRESSURE: 76 MMHG | BODY MASS INDEX: 35.84 KG/M2 | WEIGHT: 223 LBS | HEART RATE: 85 BPM | SYSTOLIC BLOOD PRESSURE: 100 MMHG

## 2019-05-07 DIAGNOSIS — E03.9 HYPOTHYROIDISM, UNSPECIFIED TYPE: Primary | ICD-10-CM

## 2019-05-07 DIAGNOSIS — Z98.84 HISTORY OF GASTRIC BYPASS: ICD-10-CM

## 2019-05-07 DIAGNOSIS — Z86.018 HISTORY OF PITUITARY ADENOMA: ICD-10-CM

## 2019-05-07 DIAGNOSIS — E55.9 VITAMIN D DEFICIENCY: ICD-10-CM

## 2019-05-07 DIAGNOSIS — R74.8 ELEVATED ALKALINE PHOSPHATASE LEVEL: ICD-10-CM

## 2019-05-07 DIAGNOSIS — E16.2 HYPOGLYCEMIA: ICD-10-CM

## 2019-05-07 PROCEDURE — 99214 OFFICE O/P EST MOD 30 MIN: CPT | Performed by: NURSE PRACTITIONER

## 2019-05-07 RX ORDER — FLASH GLUCOSE SCANNING READER
1 EACH MISCELLANEOUS
Qty: 1 DEVICE | Refills: 0 | Status: SHIPPED | OUTPATIENT
Start: 2019-05-07 | End: 2021-05-06 | Stop reason: ALTCHOICE

## 2019-05-07 RX ORDER — FLASH GLUCOSE SENSOR
1 KIT MISCELLANEOUS
Qty: 1 EACH | Refills: 6 | Status: SHIPPED | OUTPATIENT
Start: 2019-05-07 | End: 2022-08-02 | Stop reason: SDUPTHER

## 2019-05-07 RX ORDER — LEVOTHYROXINE SODIUM 0.03 MG/1
TABLET ORAL
Qty: 34 TABLET | Refills: 6 | Status: SHIPPED | OUTPATIENT
Start: 2019-05-07 | End: 2019-11-12 | Stop reason: SDUPTHER

## 2019-05-09 ENCOUNTER — TELEPHONE (OUTPATIENT)
Dept: ENDOCRINOLOGY | Facility: HOSPITAL | Age: 39
End: 2019-05-09

## 2019-06-20 ENCOUNTER — TELEPHONE (OUTPATIENT)
Dept: ENDOCRINOLOGY | Facility: HOSPITAL | Age: 39
End: 2019-06-20

## 2019-10-25 ENCOUNTER — HOSPITAL ENCOUNTER (OUTPATIENT)
Dept: MRI IMAGING | Facility: HOSPITAL | Age: 39
Discharge: HOME | End: 2019-10-25
Payer: MEDICARE

## 2019-10-25 DIAGNOSIS — Z86.018 HISTORY OF PITUITARY ADENOMA: ICD-10-CM

## 2019-10-25 PROCEDURE — 70553 MRI BRAIN STEM W/O & W/DYE: CPT

## 2019-10-25 PROCEDURE — A9585 GADOBUTROL INJECTION: HCPCS | Performed by: NURSE PRACTITIONER

## 2019-10-25 RX ADMIN — GADOBUTROL 10 ML: 604.72 INJECTION INTRAVENOUS at 12:40

## 2019-10-26 LAB
25(OH)D3 SERPL-MCNC: 8 NG/ML (ref 30–100)
ALBUMIN SERPL-MCNC: 4.1 G/DL (ref 3.6–5.1)
ALBUMIN/GLOB SERPL: 2.3 (CALC) (ref 1–2.5)
ALP SERPL-CCNC: 260 U/L (ref 33–115)
ALT SERPL-CCNC: 11 U/L (ref 6–29)
AST SERPL-CCNC: 11 U/L (ref 10–30)
BILIRUB SERPL-MCNC: 0.3 MG/DL (ref 0.2–1.2)
BUN SERPL-MCNC: 13 MG/DL (ref 7–25)
BUN/CREAT SERPL: ABNORMAL (CALC) (ref 6–22)
CALCIUM SERPL-MCNC: 8.8 MG/DL (ref 8.6–10.2)
CHLORIDE SERPL-SCNC: 115 MMOL/L (ref 98–110)
CO2 SERPL-SCNC: 20 MMOL/L (ref 20–32)
CREAT SERPL-MCNC: 0.8 MG/DL (ref 0.5–1.1)
EST. AVERAGE GLUCOSE BLD GHB EST-MCNC: 94 (CALC)
EST. AVERAGE GLUCOSE BLD GHB EST-SCNC: 5.2 (CALC)
GLOBULIN SER CALC-MCNC: 1.8 G/DL (CALC) (ref 1.9–3.7)
GLUCOSE SERPL-MCNC: 81 MG/DL (ref 65–99)
HBA1C MFR BLD: 4.9 % OF TOTAL HGB
POTASSIUM SERPL-SCNC: 4 MMOL/L (ref 3.5–5.3)
PROT SERPL-MCNC: 5.9 G/DL (ref 6.1–8.1)
SL AMB EGFR AFRICAN AMERICAN: 109 ML/MIN/1.73M2
SL AMB EGFR NON AFRICAN AMERICAN: 94 ML/MIN/1.73M2
SODIUM SERPL-SCNC: 141 MMOL/L (ref 135–146)
T4 FREE SERPL-MCNC: 0.9 NG/DL (ref 0.8–1.8)
TSH SERPL-ACNC: 2.63 MIU/L

## 2019-11-12 ENCOUNTER — OFFICE VISIT (OUTPATIENT)
Dept: ENDOCRINOLOGY | Facility: HOSPITAL | Age: 39
End: 2019-11-12
Payer: MEDICARE

## 2019-11-12 VITALS
HEIGHT: 66 IN | WEIGHT: 220.6 LBS | DIASTOLIC BLOOD PRESSURE: 70 MMHG | HEART RATE: 98 BPM | SYSTOLIC BLOOD PRESSURE: 100 MMHG | BODY MASS INDEX: 35.45 KG/M2

## 2019-11-12 DIAGNOSIS — Z98.84 HISTORY OF GASTRIC BYPASS: ICD-10-CM

## 2019-11-12 DIAGNOSIS — E16.2 HYPOGLYCEMIA: ICD-10-CM

## 2019-11-12 DIAGNOSIS — Z86.018 HISTORY OF PITUITARY ADENOMA: ICD-10-CM

## 2019-11-12 DIAGNOSIS — E55.9 VITAMIN D DEFICIENCY: ICD-10-CM

## 2019-11-12 DIAGNOSIS — E03.9 HYPOTHYROIDISM, UNSPECIFIED TYPE: Primary | ICD-10-CM

## 2019-11-12 PROCEDURE — 99214 OFFICE O/P EST MOD 30 MIN: CPT | Performed by: NURSE PRACTITIONER

## 2019-11-12 RX ORDER — CLONIDINE HYDROCHLORIDE 0.2 MG/1
0.2 TABLET ORAL DAILY PRN
COMMUNITY
End: 2021-04-26 | Stop reason: HOSPADM

## 2019-11-12 RX ORDER — ERGOCALCIFEROL (VITAMIN D2) 1250 MCG
CAPSULE ORAL
Qty: 36 CAPSULE | Refills: 0 | Status: SHIPPED | OUTPATIENT
Start: 2019-11-12 | End: 2020-11-17

## 2019-11-12 RX ORDER — ESZOPICLONE 3 MG/1
3 TABLET, FILM COATED ORAL
COMMUNITY
End: 2020-12-17

## 2019-11-12 RX ORDER — LEVOTHYROXINE SODIUM 0.03 MG/1
TABLET ORAL
Qty: 34 TABLET | Refills: 6 | Status: SHIPPED | OUTPATIENT
Start: 2019-11-12 | End: 2020-05-13 | Stop reason: SDUPTHER

## 2019-11-12 RX ORDER — CYCLOBENZAPRINE HCL 10 MG
10 TABLET ORAL 3 TIMES DAILY PRN
COMMUNITY
End: 2021-05-13 | Stop reason: HOSPADM

## 2019-11-12 NOTE — PATIENT INSTRUCTIONS
Be mindful of diet      Stay active and stay hydrated  Check your blood sugars whenever you have hypoglycemic symptoms and write them down on the papers provided  Please send them in for review  Continue to follow up with the nutritionist and Ophthalmology      For your hypothyroidism, please continue levothyroxine 25 mcg daily Monday through Saturday and take 2 pills on Sunday      Take ergocalciferol 73716 units 3 times weekly for a period of 12 weeks then supplement with over-the-counter vitamin D3 5000 units daily  Obtain pituitary lab work when possible  We will contact you with results      Obtain lab work prior to next visit for your vitamin-D level and thyroid

## 2019-11-12 NOTE — PROGRESS NOTES
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  Plan:  1    Hypoglycemia: Acarbose discontinued for the past several weeks by her primary care physician  Discussed the importance of proper dietary management in the setting of her cause of hypoglycemia  Encouraged her to follow up with medical nutrition therapy as discussed at her last office visit    Discussed aspects of diet including low carb, high-protein, and fat diet   Discussed as best she can in maintaining a consistent meal pattern on day-to-day basis  Discussed that when her blood sugar is low, unlike type 1 diabetes, her low blood sugar should be treated with sugar and a protein or fat source to maintain the blood sugar higher  I have asked her to check her blood sugars regularly and send in blood sugar logs the 2 weeks to gain some insight into her glycemic control throughout the day  Check hemoglobin A1c and comprehensive metabolic panel prior to next office visit      2   Hypothyroidism:   Her most recent TSH was slightly elevated with a low-normal free T4  I have asked her to continue levothyroxine 25 mcg Monday through Saturday and take 2 pills on Sunday  Check TSH and free T4 prior to next office visit        3   History of pituitary adenoma:  Recent MRI was negative for adenoma  Will check for hormonal lab work for completeness       4   Vitamin-D deficiency:  Her most recent 25 hydroxy vitamin-D level was low at 8 0  I have asked her to take ergocalciferol 89871 units 3 times weekly times for 12 weeks then supplement with 5000 units of vitamin D3 daily  Check vitamin-D level prior to next office visit      CC:  Hypothyroidism/pituitary adenoma follow-up    History of Present Illness     HPI:  41y o  year old female with history of anorexia, seizures, pituitary adenoma, gastric bypass surgery presents for consultation for a number of reasons  Vilma Keenan states that in the hospital in June she was found have hypoglycemia   It was thought to be due to gastric bypass and she is referred here for evaluation  Vilma Keenan states she had her 1st Pebbles-en-Y gastric bypass in 2007   She then developed ulcerations and needed a revision and in new pouch around 1874-1904   She reports her hypoglycemia is occurring after high carb meals or snacks as well as carb rich beverages   She was started acarbose 25 mg daily at the hospital  Vilma Keenan has not met with a dietitian yet  Vilma Keenan has not had any neuroglycopenia hypoglycemia   Her symptoms are more autonomic in resolved with treatment   Reviewing her blood sugars provided by the home she lives at, she does have occasional blood sugars in the 50s   There was 1 episode in the 30s in the patient states this was after a lot today  Acarbose was discontinued approximately 2 weeks ago by PCP  She does report some weight loss recently  Vilma Keenan does report chronic lightheadedness and dizziness as well      She was also found out hypothyroidism was started on levothyroxine 25 mcg daily  Her most recent TSH from October 25, 2019 is 2 63 with a free T4 of 0 90   She also notes a history of pituitary adenoma discovered on MRI imaging when she was 16  Most recent MRI brain/pituitary from October 25, 2019 is without a discrete focus of differential enhancement due to find and adenoma      Her most recent 25 hydroxy vitamin-D level was exceptionally low at 8 0    she was prescribed ergocalciferol at her last office visit  Review of Systems   Constitutional: Negative  Negative for chills, fatigue and fever  HENT: Negative  Negative for trouble swallowing and voice change  Eyes: Negative  Negative for photophobia, pain, discharge, redness, itching and visual disturbance  Respiratory: Negative  Negative for chest tightness and shortness of breath  Cardiovascular: Negative  Negative for chest pain  Gastrointestinal: Negative  Negative for abdominal pain, constipation, diarrhea and vomiting  Endocrine: Negative for cold intolerance, heat intolerance, polydipsia, polyphagia and polyuria  Genitourinary: Negative  Musculoskeletal: Negative  Skin: Negative  Allergic/Immunologic: Negative  Neurological: Negative  Negative for dizziness, syncope, light-headedness and headaches  Hematological: Negative  Psychiatric/Behavioral: Negative  All other systems reviewed and are negative        Historical Information   Past Medical History:   Diagnosis Date    Psychiatric disorder     depression anxiety    Seizures ([address] )      Past Surgical History:   Procedure Laterality Date    GASTRIC BYPASS       Social History   Social History     Substance and Sexual Activity   Alcohol Use No     Social History     Substance and Sexual Activity   Drug Use No    Comment: in revovery for benzos     Social History     Tobacco Use   Smoking Status Former Smoker   Smokeless Tobacco Never Used     Family History:   Family History   Problem Relation Age of Onset    Hypertension Mother     Heart attack Mother     No Known Problems Father     Lung cancer Maternal Grandmother     Hypothyroidism Maternal Grandmother     Diabetes type II Paternal Grandmother     Diabetes type II Paternal Grandfather        Meds/Allergies   Current Outpatient Medications   Medication Sig Dispense Refill    acarbose (PRECOSE) 50 mg tablet 50 mg tid ac (Patient not taking: Reported on 5/7/2019) 90 tablet 4    atomoxetine (STRATTERA) 100 MG capsule Take 100 mg by mouth daily      buPROPion (WELLBUTRIN SR) 150 mg 12 hr tablet Take 150 mg by mouth 2 (two) times a day        Cholecalciferol (VITAMIN D3) 34852 units TABS Take 1 tablet 3 times a week until completed 12 tablet 0    Continuous Blood Gluc  ([employer] LtdYLE CAITY 14 DAY READER) DINESH 1 Device by Does not apply route 4 (four) times a day (before meals and at bedtime) 1 Device 0    Continuous Blood Gluc Sensor (FREESTYLE CAITY 14 DAY SENSOR) MISC 1 application by Does not apply route every 14 (fourteen) days 1 each 6    docusate sodium (COLACE) 100 mg capsule Take 100 mg by mouth 2 (two) times a day      doxepin (SINEquan) 50 mg capsule Take 50 mg by mouth 3 (three) times a day       DULoxetine (CYMBALTA) 60 mg delayed release capsule Take 120 mg by mouth daily      famotidine (PEPCID) 20 mg tablet Take 20 mg by mouth daily      folic acid (FOLVITE) 1 mg tablet Take by mouth daily      glucagon (GLUCAGON EMERGENCY) 1 MG injection Inject 1 mg under the skin once as needed for low blood sugar for up to 1 dose 1 each 0    levothyroxine 25 mcg tablet Please take 1 tablet daily Monday through Saturday and 2 tablets on Sunday  34 tablet 6    mirtazapine (REMERON) 15 mg tablet Take 15 mg by mouth daily at bedtime      multivitamin (THERAGRAN) TABS Take 1 tablet by mouth daily      polyethylene glycol (MIRALAX) 17 g packet Take 17 g by mouth daily      potassium chloride (K-DUR,KLOR-CON) 10 mEq tablet Take 10 mEq by mouth daily      prazosin (MINIPRESS) 5 mg capsule Take by mouth daily at bedtime       thiamine (VITAMIN B1) 100 mg tablet Take 100 mg by mouth daily      topiramate (TOPAMAX) 50 MG tablet Take 50 mg by mouth every 12 (twelve) hours 3 times daily      varenicline (CHANTIX) 1 mg tablet Take 1 mg by mouth 2 (two) times a day       No current facility-administered medications for this visit  Allergies   Allergen Reactions    Pennsaid [Diclofenac Sodium]        Objective     Physical Exam   Constitutional: She is oriented to person, place, and time  She appears well-developed and well-nourished  Obese   HENT:   Head: Normocephalic and atraumatic  Mouth/Throat: Oropharynx is clear and moist    Eyes: Pupils are equal, round, and reactive to light   Conjunctivae and EOM are normal    Neck: Normal range of motion  Neck supple  Cardiovascular: Normal rate, regular rhythm, normal heart sounds and intact distal pulses  Pulmonary/Chest: Effort normal and breath sounds normal    Abdominal: Soft  Bowel sounds are normal    Musculoskeletal: Normal range of motion  Neurological: She is alert and oriented to person, place, and time  Skin: Skin is warm and dry  Dry skin   Psychiatric: She has a normal mood and affect  Her behavior is normal  Judgment and thought content normal    Vitals reviewed  Lab Results:   Lab Results   Component Value Date/Time    Potassium 4 0 10/25/2019 09:38 AM    Chloride 115 (H) 10/25/2019 09:38 AM    CO2 20 10/25/2019 09:38 AM    BUN 13 10/25/2019 09:38 AM    Creatinine 0 80 10/25/2019 09:38 AM    SL AMB CALCIUM 8 8 10/25/2019 09:38 AM    Free t4 0 9 10/25/2019 09:38 AM       Imaging Studies:  Results for orders placed during the hospital encounter of 10/25/19   MRI brain pituitary wo and w contrast    Impression Enlarged pituitary gland without a discrete focus of differential enhancement to define an adenoma  Correlation with pituitary endocrine function studies and comparison with pituitary imaging studies is recommended to ensure stability  Workstation performed: [id number]          Portions of the record may have been created with voice recognition software  Occasional wrong word or "sound a like" substitutions may have occurred due to the inherent limitations of voice recognition software  Read the chart carefully and recognize, using context, where substitutions have occurred

## 2019-12-01 LAB
FSH SERPL-ACNC: 4.1 MIU/ML
IGF-I SERPL-MCNC: 92 NG/ML (ref 53–331)
IGF-I Z-SCORE SERPL: -0.9 SD
LH SERPL-ACNC: 7.4 MIU/ML
PROLACTIN SERPL-MCNC: 7.7 NG/ML
T4 FREE SERPL-MCNC: 1 NG/DL (ref 0.8–1.8)
TSH SERPL-ACNC: 3.01 MIU/L

## 2019-12-03 NOTE — RESULT ENCOUNTER NOTE
Please call the patient regarding result    Thyroid function is normal   Prolactin, IGF-1 and FSH with LH are also normal

## 2020-01-16 ENCOUNTER — DOCUMENTATION (OUTPATIENT)
Dept: ENDOCRINOLOGY | Facility: HOSPITAL | Age: 40
End: 2020-01-16

## 2020-03-03 ENCOUNTER — DOCUMENTATION (OUTPATIENT)
Dept: ENDOCRINOLOGY | Facility: HOSPITAL | Age: 40
End: 2020-03-03

## 2020-04-30 LAB
25(OH)D3+25(OH)D2 SERPL-MCNC: 13.6 NG/ML (ref 30–100)
ALBUMIN SERPL-MCNC: 4.4 G/DL (ref 3.8–4.8)
ALBUMIN/GLOB SERPL: 2.4 {RATIO} (ref 1.2–2.2)
ALP SERPL-CCNC: 227 IU/L (ref 39–117)
ALT SERPL-CCNC: 10 IU/L (ref 0–32)
AST SERPL-CCNC: 11 IU/L (ref 0–40)
BILIRUB SERPL-MCNC: <0.2 MG/DL (ref 0–1.2)
BUN SERPL-MCNC: 11 MG/DL (ref 6–20)
BUN/CREAT SERPL: 13 (ref 9–23)
CALCIUM SERPL-MCNC: 8.8 MG/DL (ref 8.7–10.2)
CHLORIDE SERPL-SCNC: 108 MMOL/L (ref 96–106)
CO2 SERPL-SCNC: 17 MMOL/L (ref 20–29)
CREAT SERPL-MCNC: 0.82 MG/DL (ref 0.57–1)
EST. AVERAGE GLUCOSE BLD GHB EST-MCNC: 100 MG/DL
GLOBULIN SER-MCNC: 1.8 G/DL (ref 1.5–4.5)
GLUCOSE SERPL-MCNC: 85 MG/DL (ref 65–99)
HBA1C MFR BLD: 5.1 % (ref 4.8–5.6)
POTASSIUM SERPL-SCNC: 4.2 MMOL/L (ref 3.5–5.2)
PROT SERPL-MCNC: 6.2 G/DL (ref 6–8.5)
SL AMB EGFR AFRICAN AMERICAN: 106 ML/MIN/1.73
SL AMB EGFR NON AFRICAN AMERICAN: 92 ML/MIN/1.73
SODIUM SERPL-SCNC: 138 MMOL/L (ref 134–144)
T4 FREE SERPL-MCNC: 0.94 NG/DL (ref 0.82–1.77)
TSH SERPL DL<=0.005 MIU/L-ACNC: 4.08 UIU/ML (ref 0.45–4.5)

## 2020-05-13 ENCOUNTER — TELEMEDICINE (OUTPATIENT)
Dept: ENDOCRINOLOGY | Facility: HOSPITAL | Age: 40
End: 2020-05-13
Payer: COMMERCIAL

## 2020-05-13 DIAGNOSIS — E03.9 HYPOTHYROIDISM, UNSPECIFIED TYPE: Primary | ICD-10-CM

## 2020-05-13 DIAGNOSIS — Z86.018 HISTORY OF PITUITARY ADENOMA: ICD-10-CM

## 2020-05-13 DIAGNOSIS — Z98.84 HISTORY OF GASTRIC BYPASS: ICD-10-CM

## 2020-05-13 DIAGNOSIS — E55.9 VITAMIN D DEFICIENCY: ICD-10-CM

## 2020-05-13 DIAGNOSIS — E16.2 HYPOGLYCEMIA: ICD-10-CM

## 2020-05-13 PROCEDURE — 99443 PR PHYS/QHP TELEPHONE EVALUATION 21-30 MIN: CPT | Performed by: NURSE PRACTITIONER

## 2020-05-13 RX ORDER — FLUOXETINE 20 MG/1
20 TABLET, FILM COATED ORAL DAILY
COMMUNITY
End: 2020-12-17

## 2020-05-13 RX ORDER — ZOLPIDEM TARTRATE 10 MG/1
10 TABLET ORAL
Status: ON HOLD | COMMUNITY
End: 2021-04-26 | Stop reason: SDUPTHER

## 2020-05-13 RX ORDER — LEVOTHYROXINE SODIUM 0.03 MG/1
TABLET ORAL
Qty: 38 TABLET | Refills: 6 | Status: SHIPPED | OUTPATIENT
Start: 2020-05-13 | End: 2020-09-01 | Stop reason: SDUPTHER

## 2020-06-12 NOTE — PATIENT INSTRUCTIONS
1) Eat 3 meals containing 30 grams of carbohydrates and 3 snacks containing 15 grams at first 2 snacks and 30 grams at evening snack  2) Continue to monitor blood sugars and follow rule of 15 if blood sugar is below 70  3) Fill prescription for glucagon kit
Home

## 2020-09-01 DIAGNOSIS — E03.9 HYPOTHYROIDISM, UNSPECIFIED TYPE: Primary | ICD-10-CM

## 2020-09-01 RX ORDER — LEVOTHYROXINE SODIUM 0.03 MG/1
TABLET ORAL
Qty: 38 TABLET | Refills: 6 | Status: SHIPPED | OUTPATIENT
Start: 2020-09-01 | End: 2020-11-17 | Stop reason: SDUPTHER

## 2020-09-01 NOTE — TELEPHONE ENCOUNTER
Pt has switched pharmacies so she needs her Levothyroxine sent to a different pharmacy please  She saw you in May and has a follow up in November

## 2020-10-08 ENCOUNTER — TELEPHONE (OUTPATIENT)
Dept: ENDOCRINOLOGY | Facility: HOSPITAL | Age: 40
End: 2020-10-08

## 2020-11-17 ENCOUNTER — OFFICE VISIT (OUTPATIENT)
Dept: ENDOCRINOLOGY | Facility: HOSPITAL | Age: 40
End: 2020-11-17
Payer: COMMERCIAL

## 2020-11-17 VITALS
WEIGHT: 195.6 LBS | BODY MASS INDEX: 32.59 KG/M2 | HEART RATE: 72 BPM | DIASTOLIC BLOOD PRESSURE: 60 MMHG | TEMPERATURE: 97.6 F | SYSTOLIC BLOOD PRESSURE: 94 MMHG | HEIGHT: 65 IN

## 2020-11-17 DIAGNOSIS — E55.9 VITAMIN D DEFICIENCY: ICD-10-CM

## 2020-11-17 DIAGNOSIS — E16.2 HYPOGLYCEMIA: ICD-10-CM

## 2020-11-17 DIAGNOSIS — Z86.018 HISTORY OF PITUITARY ADENOMA: ICD-10-CM

## 2020-11-17 DIAGNOSIS — E03.9 HYPOTHYROIDISM, UNSPECIFIED TYPE: Primary | ICD-10-CM

## 2020-11-17 LAB
25(OH)D3 SERPL-MCNC: 16 NG/ML (ref 30–100)
ALBUMIN SERPL-MCNC: 4.1 G/DL (ref 3.6–5.1)
ALBUMIN/GLOB SERPL: 2.3 (CALC) (ref 1–2.5)
ALP SERPL-CCNC: 130 U/L (ref 31–125)
ALT SERPL-CCNC: 9 U/L (ref 6–29)
AST SERPL-CCNC: 11 U/L (ref 10–30)
BILIRUB SERPL-MCNC: 0.2 MG/DL (ref 0.2–1.2)
BUN SERPL-MCNC: 9 MG/DL (ref 7–25)
BUN/CREAT SERPL: ABNORMAL (CALC) (ref 6–22)
CALCIUM SERPL-MCNC: 9.1 MG/DL (ref 8.6–10.2)
CHLORIDE SERPL-SCNC: 111 MMOL/L (ref 98–110)
CO2 SERPL-SCNC: 23 MMOL/L (ref 20–32)
CREAT SERPL-MCNC: 0.77 MG/DL (ref 0.5–1.1)
EST. AVERAGE GLUCOSE BLD GHB EST-MCNC: 88 (CALC)
EST. AVERAGE GLUCOSE BLD GHB EST-SCNC: 4.9 (CALC)
GLOBULIN SER CALC-MCNC: 1.8 G/DL (CALC) (ref 1.9–3.7)
GLUCOSE SERPL-MCNC: 89 MG/DL (ref 65–99)
HBA1C MFR BLD: 4.7 % OF TOTAL HGB
IGF-I SERPL-MCNC: 58 NG/ML (ref 53–331)
IGF-I Z-SCORE SERPL: -1.8 SD
POTASSIUM SERPL-SCNC: 4 MMOL/L (ref 3.5–5.3)
PROLACTIN SERPL-MCNC: 5.4 NG/ML
PROT SERPL-MCNC: 5.9 G/DL (ref 6.1–8.1)
SL AMB EGFR AFRICAN AMERICAN: 114 ML/MIN/1.73M2
SL AMB EGFR NON AFRICAN AMERICAN: 98 ML/MIN/1.73M2
SODIUM SERPL-SCNC: 141 MMOL/L (ref 135–146)
T4 FREE SERPL-MCNC: 0.8 NG/DL (ref 0.8–1.8)
TSH SERPL-ACNC: 2.94 MIU/L

## 2020-11-17 PROCEDURE — 99215 OFFICE O/P EST HI 40 MIN: CPT | Performed by: INTERNAL MEDICINE

## 2020-11-17 RX ORDER — CHLORPROMAZINE HYDROCHLORIDE 25 MG/1
TABLET, FILM COATED ORAL
COMMUNITY
Start: 2020-11-10 | End: 2020-12-17

## 2020-11-17 RX ORDER — LEVOTHYROXINE SODIUM 0.03 MG/1
TABLET ORAL
Qty: 38 TABLET | Refills: 6 | Status: SHIPPED | OUTPATIENT
Start: 2020-11-17 | End: 2021-04-26 | Stop reason: HOSPADM

## 2020-11-17 RX ORDER — BLOOD SUGAR DIAGNOSTIC
STRIP MISCELLANEOUS
COMMUNITY
Start: 2020-10-29 | End: 2021-04-26 | Stop reason: HOSPADM

## 2020-11-17 RX ORDER — CYANOCOBALAMIN 1000 UG/ML
INJECTION INTRAMUSCULAR; SUBCUTANEOUS
COMMUNITY
Start: 2020-10-18

## 2020-11-17 RX ORDER — CLONIDINE HYDROCHLORIDE 0.1 MG/1
TABLET ORAL
COMMUNITY
Start: 2020-11-10 | End: 2020-12-17

## 2020-11-17 RX ORDER — VITAMIN B COMPLEX
5000 TABLET ORAL DAILY
COMMUNITY
End: 2020-11-17

## 2020-11-17 RX ORDER — LANCETS 30 GAUGE
EACH MISCELLANEOUS
COMMUNITY
Start: 2020-10-29

## 2020-11-17 RX ORDER — VENLAFAXINE HYDROCHLORIDE 75 MG/1
CAPSULE, EXTENDED RELEASE ORAL
COMMUNITY
Start: 2020-11-06 | End: 2021-09-30 | Stop reason: HOSPADM

## 2020-11-24 ENCOUNTER — CLINICAL SUPPORT (OUTPATIENT)
Dept: ENDOCRINOLOGY | Facility: HOSPITAL | Age: 40
End: 2020-11-24
Payer: COMMERCIAL

## 2020-11-24 ENCOUNTER — OFFICE VISIT (OUTPATIENT)
Dept: DIABETES SERVICES | Facility: HOSPITAL | Age: 40
End: 2020-11-24

## 2020-11-24 VITALS — BODY MASS INDEX: 32.49 KG/M2 | HEIGHT: 65 IN | WEIGHT: 195 LBS

## 2020-11-24 DIAGNOSIS — E16.2 HYPOGLYCEMIA: ICD-10-CM

## 2020-11-24 PROCEDURE — 95250 CONT GLUC MNTR PHYS/QHP EQP: CPT | Performed by: INTERNAL MEDICINE

## 2020-12-09 ENCOUNTER — CLINICAL SUPPORT (OUTPATIENT)
Dept: ENDOCRINOLOGY | Facility: HOSPITAL | Age: 40
End: 2020-12-09
Payer: COMMERCIAL

## 2020-12-09 ENCOUNTER — OFFICE VISIT (OUTPATIENT)
Dept: DIABETES SERVICES | Facility: HOSPITAL | Age: 40
End: 2020-12-09

## 2020-12-09 DIAGNOSIS — E16.2 HYPOGLYCEMIA: Primary | ICD-10-CM

## 2020-12-09 DIAGNOSIS — E16.2 HYPOGLYCEMIA: ICD-10-CM

## 2020-12-09 PROCEDURE — 95251 CONT GLUC MNTR ANALYSIS I&R: CPT | Performed by: INTERNAL MEDICINE

## 2020-12-10 ENCOUNTER — TELEPHONE (OUTPATIENT)
Dept: ENDOCRINOLOGY | Facility: HOSPITAL | Age: 40
End: 2020-12-10

## 2020-12-10 DIAGNOSIS — E16.2 HYPOGLYCEMIA: ICD-10-CM

## 2020-12-10 DIAGNOSIS — E16.2 HYPOGLYCEMIA: Primary | ICD-10-CM

## 2020-12-10 RX ORDER — ACARBOSE 25 MG/1
TABLET ORAL
Start: 2020-12-10 | End: 2020-12-10 | Stop reason: SDUPTHER

## 2020-12-11 RX ORDER — ACARBOSE 25 MG/1
TABLET ORAL
Qty: 30 TABLET | Refills: 5 | Status: SHIPPED | OUTPATIENT
Start: 2020-12-11 | End: 2021-04-26 | Stop reason: HOSPADM

## 2020-12-14 ENCOUNTER — DOCUMENTATION (OUTPATIENT)
Dept: ENDOCRINOLOGY | Facility: HOSPITAL | Age: 40
End: 2020-12-14

## 2020-12-14 ENCOUNTER — TELEPHONE (OUTPATIENT)
Dept: ENDOCRINOLOGY | Facility: HOSPITAL | Age: 40
End: 2020-12-14

## 2020-12-16 ENCOUNTER — DOCUMENTATION (OUTPATIENT)
Dept: PSYCHOLOGY | Facility: CLINIC | Age: 40
End: 2020-12-16

## 2020-12-17 ENCOUNTER — OFFICE VISIT (OUTPATIENT)
Dept: PSYCHOLOGY | Facility: CLINIC | Age: 40
End: 2020-12-17
Payer: COMMERCIAL

## 2020-12-17 DIAGNOSIS — F50.01 ANOREXIA NERVOSA, RESTRICTING TYPE: Primary | ICD-10-CM

## 2020-12-17 DIAGNOSIS — F43.10 PTSD (POST-TRAUMATIC STRESS DISORDER): ICD-10-CM

## 2020-12-17 PROBLEM — F33.1 MODERATE EPISODE OF RECURRENT MAJOR DEPRESSIVE DISORDER (HCC): Status: ACTIVE | Noted: 2020-12-17

## 2020-12-17 PROCEDURE — 90791 PSYCH DIAGNOSTIC EVALUATION: CPT

## 2020-12-18 ENCOUNTER — OFFICE VISIT (OUTPATIENT)
Dept: PSYCHOLOGY | Facility: CLINIC | Age: 40
End: 2020-12-18
Payer: COMMERCIAL

## 2020-12-18 DIAGNOSIS — F33.1 MODERATE EPISODE OF RECURRENT MAJOR DEPRESSIVE DISORDER (HCC): Primary | ICD-10-CM

## 2020-12-18 DIAGNOSIS — F43.10 PTSD (POST-TRAUMATIC STRESS DISORDER): ICD-10-CM

## 2020-12-18 PROCEDURE — G0177 OPPS/PHP; TRAIN & EDUC SERV: HCPCS

## 2020-12-18 PROCEDURE — 90832 PSYTX W PT 30 MINUTES: CPT

## 2020-12-18 PROCEDURE — G0176 OPPS/PHP;ACTIVITY THERAPY: HCPCS

## 2020-12-18 PROCEDURE — G0410 GRP PSYCH PARTIAL HOSP 45-50: HCPCS

## 2020-12-21 ENCOUNTER — OFFICE VISIT (OUTPATIENT)
Dept: PSYCHOLOGY | Facility: CLINIC | Age: 40
End: 2020-12-21
Payer: COMMERCIAL

## 2020-12-21 ENCOUNTER — TELEPHONE (OUTPATIENT)
Dept: PSYCHIATRY | Facility: CLINIC | Age: 40
End: 2020-12-21

## 2020-12-21 DIAGNOSIS — F43.10 PTSD (POST-TRAUMATIC STRESS DISORDER): ICD-10-CM

## 2020-12-21 DIAGNOSIS — F33.1 MODERATE EPISODE OF RECURRENT MAJOR DEPRESSIVE DISORDER (HCC): Primary | ICD-10-CM

## 2020-12-21 PROCEDURE — G0177 OPPS/PHP; TRAIN & EDUC SERV: HCPCS

## 2020-12-21 PROCEDURE — G0410 GRP PSYCH PARTIAL HOSP 45-50: HCPCS

## 2020-12-22 ENCOUNTER — OFFICE VISIT (OUTPATIENT)
Dept: PSYCHOLOGY | Facility: CLINIC | Age: 40
End: 2020-12-22
Payer: COMMERCIAL

## 2020-12-22 DIAGNOSIS — F43.10 PTSD (POST-TRAUMATIC STRESS DISORDER): ICD-10-CM

## 2020-12-22 DIAGNOSIS — F50.01 ANOREXIA NERVOSA, RESTRICTING TYPE: Primary | ICD-10-CM

## 2020-12-22 PROCEDURE — G0410 GRP PSYCH PARTIAL HOSP 45-50: HCPCS

## 2020-12-22 PROCEDURE — 90832 PSYTX W PT 30 MINUTES: CPT

## 2020-12-22 PROCEDURE — G0177 OPPS/PHP; TRAIN & EDUC SERV: HCPCS

## 2020-12-23 ENCOUNTER — OFFICE VISIT (OUTPATIENT)
Dept: PSYCHOLOGY | Facility: CLINIC | Age: 40
End: 2020-12-23
Payer: COMMERCIAL

## 2020-12-23 ENCOUNTER — TELEMEDICINE (OUTPATIENT)
Dept: PSYCHIATRY | Facility: CLINIC | Age: 40
End: 2020-12-23
Payer: COMMERCIAL

## 2020-12-23 DIAGNOSIS — F50.01 ANOREXIA NERVOSA, RESTRICTING TYPE: ICD-10-CM

## 2020-12-23 DIAGNOSIS — F43.10 PTSD (POST-TRAUMATIC STRESS DISORDER): Primary | ICD-10-CM

## 2020-12-23 DIAGNOSIS — F55.2 LAXATIVE ABUSE: ICD-10-CM

## 2020-12-23 DIAGNOSIS — F43.10 PTSD (POST-TRAUMATIC STRESS DISORDER): ICD-10-CM

## 2020-12-23 DIAGNOSIS — Z79.899 ENCOUNTER FOR LONG-TERM (CURRENT) USE OF OTHER MEDICATIONS: ICD-10-CM

## 2020-12-23 DIAGNOSIS — F33.1 MODERATE EPISODE OF RECURRENT MAJOR DEPRESSIVE DISORDER (HCC): Primary | ICD-10-CM

## 2020-12-23 PROCEDURE — G0177 OPPS/PHP; TRAIN & EDUC SERV: HCPCS

## 2020-12-23 PROCEDURE — G0410 GRP PSYCH PARTIAL HOSP 45-50: HCPCS

## 2020-12-23 PROCEDURE — 99213 OFFICE O/P EST LOW 20 MIN: CPT | Performed by: PHYSICIAN ASSISTANT

## 2020-12-24 ENCOUNTER — OFFICE VISIT (OUTPATIENT)
Dept: PSYCHOLOGY | Facility: CLINIC | Age: 40
End: 2020-12-24
Payer: COMMERCIAL

## 2020-12-24 DIAGNOSIS — F33.1 MODERATE EPISODE OF RECURRENT MAJOR DEPRESSIVE DISORDER (HCC): ICD-10-CM

## 2020-12-24 DIAGNOSIS — F43.10 PTSD (POST-TRAUMATIC STRESS DISORDER): Primary | ICD-10-CM

## 2020-12-24 PROCEDURE — G0176 OPPS/PHP;ACTIVITY THERAPY: HCPCS

## 2020-12-24 PROCEDURE — G0410 GRP PSYCH PARTIAL HOSP 45-50: HCPCS

## 2020-12-24 PROCEDURE — G0177 OPPS/PHP; TRAIN & EDUC SERV: HCPCS

## 2020-12-28 ENCOUNTER — TELEMEDICINE (OUTPATIENT)
Dept: PSYCHIATRY | Facility: CLINIC | Age: 40
End: 2020-12-28
Payer: COMMERCIAL

## 2020-12-28 ENCOUNTER — OFFICE VISIT (OUTPATIENT)
Dept: PSYCHOLOGY | Facility: CLINIC | Age: 40
End: 2020-12-28
Payer: COMMERCIAL

## 2020-12-28 DIAGNOSIS — F33.1 MODERATE EPISODE OF RECURRENT MAJOR DEPRESSIVE DISORDER (HCC): Primary | ICD-10-CM

## 2020-12-28 DIAGNOSIS — F55.2 LAXATIVE ABUSE: ICD-10-CM

## 2020-12-28 DIAGNOSIS — F50.01 ANOREXIA NERVOSA, RESTRICTING TYPE: ICD-10-CM

## 2020-12-28 DIAGNOSIS — F43.10 PTSD (POST-TRAUMATIC STRESS DISORDER): ICD-10-CM

## 2020-12-28 PROCEDURE — 99213 OFFICE O/P EST LOW 20 MIN: CPT | Performed by: PHYSICIAN ASSISTANT

## 2020-12-28 PROCEDURE — G0177 OPPS/PHP; TRAIN & EDUC SERV: HCPCS

## 2020-12-28 PROCEDURE — G0410 GRP PSYCH PARTIAL HOSP 45-50: HCPCS

## 2020-12-28 PROCEDURE — G0176 OPPS/PHP;ACTIVITY THERAPY: HCPCS

## 2020-12-29 ENCOUNTER — OFFICE VISIT (OUTPATIENT)
Dept: PSYCHOLOGY | Facility: CLINIC | Age: 40
End: 2020-12-29
Payer: COMMERCIAL

## 2020-12-29 DIAGNOSIS — F43.10 PTSD (POST-TRAUMATIC STRESS DISORDER): ICD-10-CM

## 2020-12-29 DIAGNOSIS — F33.1 MODERATE EPISODE OF RECURRENT MAJOR DEPRESSIVE DISORDER (HCC): Primary | ICD-10-CM

## 2020-12-29 DIAGNOSIS — F50.01 ANOREXIA NERVOSA, RESTRICTING TYPE: ICD-10-CM

## 2020-12-29 PROCEDURE — G0177 OPPS/PHP; TRAIN & EDUC SERV: HCPCS

## 2020-12-29 PROCEDURE — G0410 GRP PSYCH PARTIAL HOSP 45-50: HCPCS

## 2020-12-29 PROCEDURE — 90832 PSYTX W PT 30 MINUTES: CPT

## 2020-12-30 ENCOUNTER — OFFICE VISIT (OUTPATIENT)
Dept: PSYCHOLOGY | Facility: CLINIC | Age: 40
End: 2020-12-30
Payer: COMMERCIAL

## 2020-12-30 DIAGNOSIS — F50.01 ANOREXIA NERVOSA, RESTRICTING TYPE: ICD-10-CM

## 2020-12-30 DIAGNOSIS — F33.1 MODERATE EPISODE OF RECURRENT MAJOR DEPRESSIVE DISORDER (HCC): Primary | ICD-10-CM

## 2020-12-30 DIAGNOSIS — F43.10 PTSD (POST-TRAUMATIC STRESS DISORDER): ICD-10-CM

## 2020-12-30 PROCEDURE — G0410 GRP PSYCH PARTIAL HOSP 45-50: HCPCS

## 2020-12-30 PROCEDURE — G0177 OPPS/PHP; TRAIN & EDUC SERV: HCPCS

## 2020-12-31 ENCOUNTER — TRANSCRIBE ORDERS (OUTPATIENT)
Dept: ADMINISTRATIVE | Facility: HOSPITAL | Age: 40
End: 2020-12-31

## 2020-12-31 ENCOUNTER — OFFICE VISIT (OUTPATIENT)
Dept: LAB | Facility: HOSPITAL | Age: 40
End: 2020-12-31
Payer: COMMERCIAL

## 2020-12-31 ENCOUNTER — DOCUMENTATION (OUTPATIENT)
Dept: PSYCHOLOGY | Facility: CLINIC | Age: 40
End: 2020-12-31

## 2020-12-31 ENCOUNTER — APPOINTMENT (OUTPATIENT)
Dept: PSYCHOLOGY | Facility: CLINIC | Age: 40
End: 2020-12-31
Payer: COMMERCIAL

## 2020-12-31 ENCOUNTER — LAB (OUTPATIENT)
Dept: LAB | Facility: HOSPITAL | Age: 40
End: 2020-12-31
Payer: COMMERCIAL

## 2020-12-31 DIAGNOSIS — E55.9 VITAMIN D DEFICIENCY: ICD-10-CM

## 2020-12-31 DIAGNOSIS — Z86.018 HISTORY OF PITUITARY ADENOMA: ICD-10-CM

## 2020-12-31 DIAGNOSIS — F55.2 LAXATIVE ABUSE: Primary | ICD-10-CM

## 2020-12-31 DIAGNOSIS — F55.2 LAXATIVE ABUSE: ICD-10-CM

## 2020-12-31 DIAGNOSIS — E03.9 HYPOTHYROIDISM, UNSPECIFIED TYPE: ICD-10-CM

## 2020-12-31 DIAGNOSIS — Z98.84 HISTORY OF GASTRIC BYPASS: ICD-10-CM

## 2020-12-31 DIAGNOSIS — E16.2 HYPOGLYCEMIA: ICD-10-CM

## 2020-12-31 DIAGNOSIS — F50.01 ANOREXIA NERVOSA, RESTRICTING TYPE: ICD-10-CM

## 2020-12-31 DIAGNOSIS — Z79.899 ENCOUNTER FOR LONG-TERM (CURRENT) USE OF OTHER MEDICATIONS: ICD-10-CM

## 2020-12-31 LAB
25(OH)D3 SERPL-MCNC: 9.6 NG/ML (ref 30–100)
ALBUMIN SERPL BCP-MCNC: 3.7 G/DL (ref 3.5–5)
ALP SERPL-CCNC: 147 U/L (ref 46–116)
ALT SERPL W P-5'-P-CCNC: 16 U/L (ref 12–78)
ANION GAP SERPL CALCULATED.3IONS-SCNC: 7 MMOL/L (ref 4–13)
AST SERPL W P-5'-P-CCNC: 9 U/L (ref 5–45)
BASOPHILS # BLD AUTO: 0.03 THOUSANDS/ΜL (ref 0–0.1)
BASOPHILS NFR BLD AUTO: 0 % (ref 0–1)
BILIRUB SERPL-MCNC: 0.42 MG/DL (ref 0.2–1)
BUN SERPL-MCNC: 8 MG/DL (ref 5–25)
CALCIUM SERPL-MCNC: 9.7 MG/DL (ref 8.3–10.1)
CHLORIDE SERPL-SCNC: 113 MMOL/L (ref 100–108)
CO2 SERPL-SCNC: 21 MMOL/L (ref 21–32)
CREAT SERPL-MCNC: 0.84 MG/DL (ref 0.6–1.3)
EOSINOPHIL # BLD AUTO: 0.2 THOUSAND/ΜL (ref 0–0.61)
EOSINOPHIL NFR BLD AUTO: 3 % (ref 0–6)
ERYTHROCYTE [DISTWIDTH] IN BLOOD BY AUTOMATED COUNT: 15.2 % (ref 11.6–15.1)
EST. AVERAGE GLUCOSE BLD GHB EST-MCNC: 103 MG/DL
GFR SERPL CREATININE-BSD FRML MDRD: 88 ML/MIN/1.73SQ M
GLUCOSE P FAST SERPL-MCNC: 80 MG/DL (ref 65–99)
HBA1C MFR BLD: 5.2 %
HCT VFR BLD AUTO: 35.7 % (ref 34.8–46.1)
HGB BLD-MCNC: 11 G/DL (ref 11.5–15.4)
IMM GRANULOCYTES # BLD AUTO: 0.02 THOUSAND/UL (ref 0–0.2)
IMM GRANULOCYTES NFR BLD AUTO: 0 % (ref 0–2)
LYMPHOCYTES # BLD AUTO: 1.14 THOUSANDS/ΜL (ref 0.6–4.47)
LYMPHOCYTES NFR BLD AUTO: 16 % (ref 14–44)
MCH RBC QN AUTO: 29.7 PG (ref 26.8–34.3)
MCHC RBC AUTO-ENTMCNC: 30.8 G/DL (ref 31.4–37.4)
MCV RBC AUTO: 97 FL (ref 82–98)
MONOCYTES # BLD AUTO: 0.51 THOUSAND/ΜL (ref 0.17–1.22)
MONOCYTES NFR BLD AUTO: 7 % (ref 4–12)
NEUTROPHILS # BLD AUTO: 5.43 THOUSANDS/ΜL (ref 1.85–7.62)
NEUTS SEG NFR BLD AUTO: 74 % (ref 43–75)
NRBC BLD AUTO-RTO: 0 /100 WBCS
PLATELET # BLD AUTO: 285 THOUSANDS/UL (ref 149–390)
PMV BLD AUTO: 11.6 FL (ref 8.9–12.7)
POTASSIUM SERPL-SCNC: 3.8 MMOL/L (ref 3.5–5.3)
PROLACTIN SERPL-MCNC: 4.3 NG/ML
PROT SERPL-MCNC: 6.9 G/DL (ref 6.4–8.2)
PTH-INTACT SERPL-MCNC: 106.7 PG/ML (ref 18.4–80.1)
RBC # BLD AUTO: 3.7 MILLION/UL (ref 3.81–5.12)
SODIUM SERPL-SCNC: 141 MMOL/L (ref 136–145)
T4 FREE SERPL-MCNC: 1.16 NG/DL (ref 0.76–1.46)
TSH SERPL DL<=0.05 MIU/L-ACNC: 0.68 UIU/ML (ref 0.36–3.74)
WBC # BLD AUTO: 7.33 THOUSAND/UL (ref 4.31–10.16)

## 2020-12-31 PROCEDURE — 93005 ELECTROCARDIOGRAM TRACING: CPT

## 2020-12-31 PROCEDURE — 84439 ASSAY OF FREE THYROXINE: CPT

## 2020-12-31 PROCEDURE — 84443 ASSAY THYROID STIM HORMONE: CPT

## 2020-12-31 PROCEDURE — 84305 ASSAY OF SOMATOMEDIN: CPT

## 2020-12-31 PROCEDURE — 83036 HEMOGLOBIN GLYCOSYLATED A1C: CPT

## 2020-12-31 PROCEDURE — 82306 VITAMIN D 25 HYDROXY: CPT

## 2020-12-31 PROCEDURE — 83970 ASSAY OF PARATHORMONE: CPT

## 2020-12-31 PROCEDURE — 36415 COLL VENOUS BLD VENIPUNCTURE: CPT

## 2020-12-31 PROCEDURE — 80053 COMPREHEN METABOLIC PANEL: CPT

## 2020-12-31 PROCEDURE — 85025 COMPLETE CBC W/AUTO DIFF WBC: CPT

## 2020-12-31 PROCEDURE — 84146 ASSAY OF PROLACTIN: CPT

## 2021-01-02 LAB — IGF-I SERPL-MCNC: 41 NG/ML (ref 79–259)

## 2021-01-04 ENCOUNTER — OFFICE VISIT (OUTPATIENT)
Dept: PSYCHOLOGY | Facility: CLINIC | Age: 41
End: 2021-01-04
Payer: COMMERCIAL

## 2021-01-04 ENCOUNTER — TELEMEDICINE (OUTPATIENT)
Dept: PSYCHIATRY | Facility: CLINIC | Age: 41
End: 2021-01-04
Payer: COMMERCIAL

## 2021-01-04 ENCOUNTER — TELEPHONE (OUTPATIENT)
Dept: ENDOCRINOLOGY | Facility: HOSPITAL | Age: 41
End: 2021-01-04

## 2021-01-04 DIAGNOSIS — F55.2 LAXATIVE ABUSE: ICD-10-CM

## 2021-01-04 DIAGNOSIS — F43.10 PTSD (POST-TRAUMATIC STRESS DISORDER): ICD-10-CM

## 2021-01-04 DIAGNOSIS — F33.1 MODERATE EPISODE OF RECURRENT MAJOR DEPRESSIVE DISORDER (HCC): ICD-10-CM

## 2021-01-04 DIAGNOSIS — F33.1 MODERATE EPISODE OF RECURRENT MAJOR DEPRESSIVE DISORDER (HCC): Primary | ICD-10-CM

## 2021-01-04 DIAGNOSIS — E21.3 HYPERPARATHYROIDISM (HCC): Primary | ICD-10-CM

## 2021-01-04 DIAGNOSIS — F43.10 PTSD (POST-TRAUMATIC STRESS DISORDER): Primary | ICD-10-CM

## 2021-01-04 PROCEDURE — G0177 OPPS/PHP; TRAIN & EDUC SERV: HCPCS

## 2021-01-04 PROCEDURE — G0410 GRP PSYCH PARTIAL HOSP 45-50: HCPCS

## 2021-01-04 PROCEDURE — 99214 OFFICE O/P EST MOD 30 MIN: CPT | Performed by: PHYSICIAN ASSISTANT

## 2021-01-04 PROCEDURE — 90832 PSYTX W PT 30 MINUTES: CPT

## 2021-01-04 RX ORDER — ERGOCALCIFEROL (VITAMIN D2) 1250 MCG
CAPSULE ORAL
Qty: 12 CAPSULE | Refills: 1 | Status: SHIPPED | OUTPATIENT
Start: 2021-01-04 | End: 2021-04-26 | Stop reason: HOSPADM

## 2021-01-04 NOTE — PSYCH
Virtual Regular Visit         Encounter provider Deb Smith PA-C    Provider located at [address] [address] [phone]      Recent Visits  Date Type Provider Dept   12/28/20 Telemedicine Deb Smith PA-C [facility]   Showing recent visits within past 7 days and meeting all other requirements     Future Appointments  No visits were found meeting these conditions  Showing future appointments within next 150 days and meeting all other requirements        The patient was identified by name and date of birth  Jonetta Buerger was informed that this is a telemedicine visit and that the visit is being conducted through [employer] and patient was informed that this is a secure, HIPAA-compliant platform  She agrees to proceed     My office door was closed  No one else was in the room  She acknowledged consent and understanding of privacy and security of the video platform  The patient has agreed to participate and understands they can discontinue the visit at any time  Patient is aware this is a billable service  VIRTUAL VISIT DISCLAIMER    Jonetta Buerger acknowledges that she has consented to an online visit or consultation  She understands that the online visit is based solely on information provided by her, and that, in the absence of a face-to-face physical evaluation by the physician, the diagnosis she receives is both limited and provisional in terms of accuracy and completeness  This is not intended to replace a full medical face-to-face evaluation by the physician  Jonetta Buerger understands and accepts these terms  Progress Note - Behavioral Health   [facility]  Jonetta Buerger 47 y o  female MRN: [id number]     Progress at partial program: some improvement        Sulema Hatchet seen by Lilliam last seek at which time Clementina Patel was encouraged to get labs and EKG done  We reviewed lab results and Lilliam encouraged Duane Sandoval to f/u with endo for some abnormal results in the labs ordered by them  EKG is not resulted in Epic  Duane Sandoval reports no change in her eating /laxative use  Identifies some things which precipitate eating d/o behavior: says she has a smaller than normal stomach secondary to 2 gastric surgeries; needs a sense of control; avoidance of feelings; "nothing to do with my time"; and feelings/thoughts about her body which she would not elaborate on  Denies SI    Says she has appt scheduled with OP psychiatrist          ROS:   As above otherwise negative    Active Ambulatory Problems     Diagnosis Date Noted    History of pituitary adenoma 09/04/2018    Elevated alkaline phosphatase level 09/04/2018    Hypothyroidism 09/04/2018    Hypoglycemia 09/04/2018    History of gastric bypass 09/04/2018    Vitamin D deficiency 11/17/2020    Anorexia nervosa, restricting type 12/17/2020    PTSD (post-traumatic stress disorder) 12/17/2020    Moderate episode of recurrent major depressive disorder ([facility] [address] ) 12/17/2020    Laxative abuse 12/23/2020     Resolved Ambulatory Problems     Diagnosis Date Noted    No Resolved Ambulatory Problems     Past Medical History:   Diagnosis Date    Psychiatric disorder     Seizures (HCC)      Allergies   Allergen Reactions    Anti-Hist [Diphenhydramine]     Buspar [Buspirone]     Haldol [Haloperidol]     Penicillins Throat Swelling    Pennsaid [Diclofenac Sodium]     Zoloft [Sertraline]           Mental Status Evaluation:    Appearance:  age appropriate, casually dressed, adequate grooming   Behavior:  cooperative   Speech:  normal rate and volume   Mood:  anxious   Affect:  constricted   Thought Process:  goal directed   Associations: intact associations   Thought Content:  no overt delusions   Perceptual Disturbances: none   Risk Potential: Suicidal ideation - None  Homicidal ideation - None   Sensorium:  oriented to person, place and time/date   Memory:  recent and remote memory grossly intact   Consciousness:  alert and awake   Attention: attention span and concentration are age appropriate   Insight:  fair   Judgment: fair   Gait/Station: unable to assess   Motor Activity: unable to assess today due to virtual visit       Laboratory results: I have personally reviewed all pertinent laboratory/tests results        Assessment   Diagnoses and all orders for this visit:    Moderate episode of recurrent major depressive disorder (HCC)    PTSD (post-traumatic stress disorder)    Laxative abuse       Current Outpatient Medications   Medication Sig Dispense Refill    acarbose (PRECOSE) 25 mg tablet 1 tab daily with dinner 30 tablet 5    AquaLance Lancets 30G MISC USE TO TEST BLOOD GLUCOSE FOUR TIMES A DAY      buPROPion (WELLBUTRIN SR) 100 mg 12 hr tablet Take 100 mg by mouth 3 (three) times a day       Cholecalciferol 125 MCG (5000 UT) TABS 2 tabs daily      cloNIDine (CATAPRES) 0 2 mg tablet Take 0 2 mg by mouth daily as needed for high blood pressure      Continuous Blood Gluc  (FREESTYLE CAITY 14 DAY READER) DINESH 1 Device by Does not apply route 4 (four) times a day (before meals and at bedtime) (Patient not taking: Reported on 11/17/2020) 1 Device 0    Continuous Blood Gluc Sensor (FREESTYLE CAITY 14 DAY SENSOR) MISC 1 application by Does not apply route every 14 (fourteen) days (Patient not taking: Reported on 11/12/2019) 1 each 6    cyanocobalamin 1,000 mcg/mL inject 1 milliliter ( 1000 MCG ) intramuscularly Every Month      cyclobenzaprine (FLEXERIL) 10 mg tablet Take 10 mg by mouth 3 (three) times a day as needed for muscle spasms      docusate sodium (COLACE) 100 mg capsule Take 100 mg by mouth 2 (two) times a day      famotidine (PEPCID) 20 mg tablet Take 20 mg by mouth daily      folic acid (FOLVITE) 1 mg tablet Take by mouth daily      glucagon (GLUCAGON EMERGENCY) 1 MG injection Inject 1 mg under the skin once as needed for low blood sugar for up to 1 dose 1 each 0    levothyroxine 25 mcg tablet Please take 1 tablet daily Monday through Friday and 2 tablets on Saturday and Sunday  38 tablet 6    mirtazapine (REMERON) 15 mg tablet Take 7 5 mg by mouth daily at bedtime       multivitamin (THERAGRAN) TABS Take 1 tablet by mouth daily      OneTouch Ultra test strip USE TO TEST BLOOD GLUCOSE FOUR TIMES A DAY      polyethylene glycol (MIRALAX) 17 g packet Take 17 g by mouth daily      potassium chloride (K-DUR,KLOR-CON) 10 mEq tablet Take 10 mEq by mouth daily      prazosin (MINIPRESS) 5 mg capsule Take by mouth daily at bedtime       thiamine (VITAMIN B1) 100 mg tablet Take 100 mg by mouth daily      topiramate (TOPAMAX) 200 MG tablet Take 400 mg by mouth 3 times daily       varenicline (CHANTIX) 1 mg tablet Take 1 mg by mouth 2 (two) times a day      venlafaxine (EFFEXOR-XR) 75 mg 24 hr capsule       zolpidem (AMBIEN) 10 mg tablet Take 10 mg by mouth daily at bedtime as needed for sleep       No current facility-administered medications for this visit  Plan    Planned medication and treatment changes: Continue present meds  No change to OP meds was made during program   F/u endocrinology for abnormal labs  All current active medications have been reviewed  Continue treatment with group therapy and partial program  Discharge planning      Risks / Benefits of Treatment:    Risks, benefits, and possible side effects of medications explained to patient and patient verbalizes understanding and agrees to medications  Counseling / Coordination of Care:    Patient's progress discussed with staff in treatment team meeting  Medications, treatment progress and treatment plan reviewed with patient      Alexa Coughlin PA-C 01/04/21

## 2021-01-04 NOTE — PSYCH
This note was not shared with the patient due to this is a psychotherapy note    Subjective:     Patient ID: Ryanne Woodard is a 47 y o  female  [facility] Clinical Progress Notes      Specialized Services Documentation  Therapist must complete separate progress note for each specific clinical activity in which the individual participated during the day  Group Psychotherapy     ([id number]) Ryanne Woodard was present in psychoeducational group which focused on exercise to improve physical and mental wellbeing  Topics included the benefits of exercise to improve physical and mental wellbeing  The group then participated in 20 minutes of low intensity chair yoga  They then discussed ideas on how to improve stamina and ways to incorporate exercise into their lives  Good progress toward goal noted  Continue psychoeducation to educate on the importance of making physical fitness a priority       Tx Plan Objective:  1 3 Therapist:  Jf Llanes RN

## 2021-01-04 NOTE — PSYCH
This note was not shared with the patient due to this is a psychotherapy note    Subjective:     Patient ID: Jonetta Buerger is a 47 y o  female  [facility] Clinical Progress Notes      Specialized Services Documentation  Therapist must complete separate progress note for each specific clinical activity in which the individual participated during the day  Group Psychotherapy [id number]    This group was facilitated virtually in a private office using [employer] ([employer]) and Approved [employer] Teams  Jonetta Buerger consented to the use of tele-video modality of treatment  Administrative issue resulting in group not occurring as planned  Group psychotherapy was not facilitated during this time  Group members were informed of the administrative issue        Tx Plan Objective: na Therapist:  CARMEN Sharma

## 2021-01-04 NOTE — PSYCH
Subjective:     Patient ID: Julius Younger is a 47 y o  female  [facility] Clinical Progress Notes      Specialized Services Documentation  Therapist must complete separate progress note for each specific clinical activity in which the individual participated during the day  This was not shared due to this is a psychotherapy note  Group Psychotherapy ([id number]) Group was facilitated virtually in a private office using HIPAA Compliant and Approved [employer] Teams  Montez consented to the use of tele-video modality of treatment and was virtually present for group psychotherapy today  The group was educated on the different types of communication, which include passive, aggressive, and assertive  The group shared one example of how they have engaged in either passive or aggressive communication and then discussed one way to reframe the example into an assertive statement  Montez was excused from this group, as she was scheduled to meet with Choco Garcia PA-C for medication review  Tx plan objective: 1 1, 1 2   Therapist: Alissa Khan MA    Education Therapy   [id number] Julius Pro with prompts shared in morning assessment and goal review  Presented as Receptive related to readiness to learn  Julius Younger did complete goal from last treatment day identifying gaining a sense of relaxation  did not present with any barriers to learning  [address] engaged throughout the treatment day  Was engaged in learning related to Aftercare and Wellness Tools  Staff utilized Verbal and A/V teaching methods  Julius Pro shared area of learning and set a goal for outside of program to make phone calls for doctors' appointments  Tx Plan Objective: 1 4, Therapist:  Alissa Khan MA        Case Management Note    Alissa Khan MA    Current suicide risk : Low     ([id number]) Spoke with Montez today, she reported things were going well   Per Madhu Jacobson (assigned ), [facility] was to schedule an appointment with Dr Rosalinda Brandon and she reported completing this  No concerns were reported at this time and [facility] was instructed to follow up with [facility] regarding referrals and insurance questions  She met with Choco Garcia PA-C this morning and reported having no concerns or questions regarding medications at this time  Medications changes/added/denied? No    Treatment session number: 10    Individual Case Management Visit provided today? Yes     [facility] follow up physician's orders: see Choco Garcia PA-C's note

## 2021-01-04 NOTE — PSYCH
Virtual Regular Visit      Assessment/Plan:    Problem List Items Addressed This Visit        Other    PTSD (post-traumatic stress disorder) - Primary    Moderate episode of recurrent major depressive disorder ([facility] [address] )               Reason for visit is No chief complaint on file  Encounter provider [address] PARTIAL PSYCH PROGRAM    Provider located at [address] [place] [address]  [address] [facility] [address]      Recent Visits  Date Type Provider Dept   12/30/20 Office Visit [address] PARTIAL PSYCH GROUP THERAPY [facility]   12/29/20 Office Visit [address] PARTIAL PSYCH GROUP THERAPY [facility]   12/28/20 Office Visit [address] PARTIAL PSYCH GROUP THERAPY [facility]   Showing recent visits within past 7 days and meeting all other requirements     Today's Visits  Date Type Provider Dept   01/04/21 Office Visit [address] PARTIAL PSYCH GROUP THERAPY [facility]   Showing today's visits and meeting all other requirements     Future Appointments  No visits were found meeting these conditions  Showing future appointments within next 150 days and meeting all other requirements        The patient was identified by name and date of birth  Jonetta Buerger was informed that this is a telemedicine visit and that the visit is being conducted through [employer] and patient was informed that this is a secure, HIPAA-compliant platform  She agrees to proceed     My office door was closed  No one else was in the room  She acknowledged consent and understanding of privacy and security of the video platform  The patient has agreed to participate and understands they can discontinue the visit at any time  Patient is aware this is a billable service  Subjective  Jonetta Buerger is a 47 y o  female          HPI     Past Medical History:   Diagnosis Date    Psychiatric disorder     depression anxiety    Seizures ([facility] [address] )        Past Surgical History:   Procedure Laterality Date    GASTRIC BYPASS         Current Outpatient Medications   Medication Sig Dispense Refill    acarbose (PRECOSE) 25 mg tablet 1 tab daily with dinner 30 tablet 5    AquaLance Lancets 30G MISC USE TO TEST BLOOD GLUCOSE FOUR TIMES A DAY      buPROPion (WELLBUTRIN SR) 100 mg 12 hr tablet Take 100 mg by mouth 3 (three) times a day       Cholecalciferol 125 MCG (5000 UT) TABS 2 tabs daily      cloNIDine (CATAPRES) 0 2 mg tablet Take 0 2 mg by mouth daily as needed for high blood pressure      Continuous Blood Gluc  (FREESTYLE CAITY 14 DAY READER) DINESH 1 Device by Does not apply route 4 (four) times a day (before meals and at bedtime) (Patient not taking: Reported on 11/17/2020) 1 Device 0    Continuous Blood Gluc Sensor (FREESTYLE CAITY 14 DAY SENSOR) MISC 1 application by Does not apply route every 14 (fourteen) days (Patient not taking: Reported on 11/12/2019) 1 each 6    cyanocobalamin 1,000 mcg/mL inject 1 milliliter ( 1000 MCG ) intramuscularly Every Month      cyclobenzaprine (FLEXERIL) 10 mg tablet Take 10 mg by mouth 3 (three) times a day as needed for muscle spasms      docusate sodium (COLACE) 100 mg capsule Take 100 mg by mouth 2 (two) times a day      famotidine (PEPCID) 20 mg tablet Take 20 mg by mouth daily      folic acid (FOLVITE) 1 mg tablet Take by mouth daily      glucagon (GLUCAGON EMERGENCY) 1 MG injection Inject 1 mg under the skin once as needed for low blood sugar for up to 1 dose 1 each 0    levothyroxine 25 mcg tablet Please take 1 tablet daily Monday through Friday and 2 tablets on Saturday and Sunday   38 tablet 6    mirtazapine (REMERON) 15 mg tablet Take 7 5 mg by mouth daily at bedtime       multivitamin (THERAGRAN) TABS Take 1 tablet by mouth daily      OneTouch Ultra test strip USE TO TEST BLOOD GLUCOSE FOUR TIMES A DAY      polyethylene glycol (MIRALAX) 17 g packet Take 17 g by mouth daily      potassium chloride (K-DUR,KLOR-CON) 10 mEq tablet Take 10 mEq by mouth daily      prazosin (MINIPRESS) 5 mg capsule Take by mouth daily at bedtime       thiamine (VITAMIN B1) 100 mg tablet Take 100 mg by mouth daily      topiramate (TOPAMAX) 200 MG tablet Take 400 mg by mouth 3 times daily       varenicline (CHANTIX) 1 mg tablet Take 1 mg by mouth 2 (two) times a day      venlafaxine (EFFEXOR-XR) 75 mg 24 hr capsule       zolpidem (AMBIEN) 10 mg tablet Take 10 mg by mouth daily at bedtime as needed for sleep       No current facility-administered medications for this visit  Allergies   Allergen Reactions    Anti-Hist [Diphenhydramine]     Buspar [Buspirone]     Haldol [Haloperidol]     Penicillins Throat Swelling    Pennsaid [Diclofenac Sodium]     Zoloft [Sertraline]        Review of Systems    Video Exam    There were no vitals filed for this visit  Physical Exam     I spent FOUR GROUP HOURS PLUS CASE MANAGEMENT minutes with patient today in which greater than 50% of the time was spent in counseling/coordination of care regarding PHP - SEE NOTES  VIRTUAL VISIT DISCLAIMER    Chon Caglereyna acknowledges that she has consented to an online visit or consultation  She understands that the online visit is based solely on information provided by her, and that, in the absence of a face-to-face physical evaluation by the physician, the diagnosis she receives is both limited and provisional in terms of accuracy and completeness  This is not intended to replace a full medical face-to-face evaluation by the physician  Chon Espinal understands and accepts these terms

## 2021-01-04 NOTE — TELEPHONE ENCOUNTER
Pt was told by another doctor that her PTH and Vitamin D level were off  Pt is concerned  Can you review since Clark Gill is off?

## 2021-01-05 ENCOUNTER — OFFICE VISIT (OUTPATIENT)
Dept: PSYCHOLOGY | Facility: CLINIC | Age: 41
End: 2021-01-05
Payer: COMMERCIAL

## 2021-01-05 DIAGNOSIS — F43.10 PTSD (POST-TRAUMATIC STRESS DISORDER): Primary | ICD-10-CM

## 2021-01-05 DIAGNOSIS — F33.1 MODERATE EPISODE OF RECURRENT MAJOR DEPRESSIVE DISORDER (HCC): ICD-10-CM

## 2021-01-05 PROCEDURE — G0177 OPPS/PHP; TRAIN & EDUC SERV: HCPCS

## 2021-01-05 PROCEDURE — 90832 PSYTX W PT 30 MINUTES: CPT

## 2021-01-05 PROCEDURE — G0410 GRP PSYCH PARTIAL HOSP 45-50: HCPCS
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
## 2021-01-05 NOTE — PSYCH
Virtual Regular Visit      Assessment/Plan:    Problem List Items Addressed This Visit        Other    PTSD (post-traumatic stress disorder) - Primary    Moderate episode of recurrent major depressive disorder ([facility] [address] [address] )               Reason for visit is No chief complaint on file  Encounter provider [address] PARTIAL PSYCH PROGRAM    Provider located at [address] [place] [address]   [address] [facility] [id number]      Recent Visits  Date Type Provider Dept   01/04/21 Office Visit [address] PARTIAL PSYCH GROUP THERAPY [facility]   12/30/20 Office Visit [address] PARTIAL PSYCH GROUP THERAPY [facility]   12/29/20 Office Visit [address] PARTIAL PSYCH GROUP THERAPY [facility]   Showing recent visits within past 7 days and meeting all other requirements     Today's Visits  Date Type Provider Dept   01/05/21 Office Visit [address] PARTIAL PSYCH GROUP THERAPY [facility]   Showing today's visits and meeting all other requirements     Future Appointments  No visits were found meeting these conditions  Showing future appointments within next 150 days and meeting all other requirements        The patient was identified by name and date of birth  Jayme Tomas was informed that this is a telemedicine visit and that the visit is being conducted through [employer] and patient was informed that this is a secure, HIPAA-compliant platform  She agrees to proceed     My office door was closed  No one else was in the room  She acknowledged consent and understanding of privacy and security of the video platform  The patient has agreed to participate and understands they can discontinue the visit at any time  Patient is aware this is a billable service  Subjective  Jayme Tomas is a 47 y o  female         HPI     Past Medical History:   Diagnosis Date    Psychiatric disorder     depression anxiety    Seizures ([facility] [address] )        Past Surgical History:   Procedure Laterality Date    GASTRIC BYPASS         Current Outpatient Medications   Medication Sig Dispense Refill    acarbose (PRECOSE) 25 mg tablet 1 tab daily with dinner 30 tablet 5    AquaLance Lancets 30G MISC USE TO TEST BLOOD GLUCOSE FOUR TIMES A DAY      buPROPion (WELLBUTRIN SR) 100 mg 12 hr tablet Take 100 mg by mouth 3 (three) times a day       Cholecalciferol 125 MCG (5000 UT) TABS 2 tabs daily      cloNIDine (CATAPRES) 0 2 mg tablet Take 0 2 mg by mouth daily as needed for high blood pressure      Continuous Blood Gluc  (FREESTYLE CAITY 14 DAY READER) DINESH 1 Device by Does not apply route 4 (four) times a day (before meals and at bedtime) (Patient not taking: Reported on 11/17/2020) 1 Device 0    Continuous Blood Gluc Sensor (FREESTYLE CAITY 14 DAY SENSOR) MISC 1 application by Does not apply route every 14 (fourteen) days (Patient not taking: Reported on 11/12/2019) 1 each 6    cyanocobalamin 1,000 mcg/mL inject 1 milliliter ( 1000 MCG ) intramuscularly Every Month      cyclobenzaprine (FLEXERIL) 10 mg tablet Take 10 mg by mouth 3 (three) times a day as needed for muscle spasms      docusate sodium (COLACE) 100 mg capsule Take 100 mg by mouth 2 (two) times a day      ergocalciferol (ERGOCALCIFEROL) 1 25 MG (12710 UT) capsule 1 cap weekly 12 capsule 1    famotidine (PEPCID) 20 mg tablet Take 20 mg by mouth daily      folic acid (FOLVITE) 1 mg tablet Take by mouth daily      glucagon (GLUCAGON EMERGENCY) 1 MG injection Inject 1 mg under the skin once as needed for low blood sugar for up to 1 dose 1 each 0    levothyroxine 25 mcg tablet Please take 1 tablet daily Monday through Friday and 2 tablets on Saturday and Sunday   38 tablet 6    mirtazapine (REMERON) 15 mg tablet Take 7 5 mg by mouth daily at bedtime       multivitamin (THERAGRAN) TABS Take 1 tablet by mouth daily      OneTouch Ultra test strip USE TO TEST BLOOD GLUCOSE FOUR TIMES A DAY      polyethylene glycol (MIRALAX) 17 g packet Take 17 g by mouth daily      potassium chloride (K-DUR,KLOR-CON) 10 mEq tablet Take 10 mEq by mouth daily      prazosin (MINIPRESS) 5 mg capsule Take by mouth daily at bedtime       thiamine (VITAMIN B1) 100 mg tablet Take 100 mg by mouth daily      topiramate (TOPAMAX) 200 MG tablet Take 400 mg by mouth 3 times daily       varenicline (CHANTIX) 1 mg tablet Take 1 mg by mouth 2 (two) times a day      venlafaxine (EFFEXOR-XR) 75 mg 24 hr capsule       zolpidem (AMBIEN) 10 mg tablet Take 10 mg by mouth daily at bedtime as needed for sleep       No current facility-administered medications for this visit  Allergies   Allergen Reactions    Anti-Hist [Diphenhydramine]     Buspar [Buspirone]     Haldol [Haloperidol]     Penicillins Throat Swelling    Pennsaid [Diclofenac Sodium]     Zoloft [Sertraline]        Review of Systems    Video Exam    There were no vitals filed for this visit  Physical Exam     I spent FOUR GROUP HOURS PLUS CASE MANAGEMENT minutes with patient today in which greater than 50% of the time was spent in counseling/coordination of care regarding PHP - SEE NOTES  VIRTUAL VISIT DISCLAIMER    Nickistacey Dial acknowledges that she has consented to an online visit or consultation  She understands that the online visit is based solely on information provided by her, and that, in the absence of a face-to-face physical evaluation by the physician, the diagnosis she receives is both limited and provisional in terms of accuracy and completeness  This is not intended to replace a full medical face-to-face evaluation by the physician  Rosa Dial understands and accepts these terms

## 2021-01-05 NOTE — PSYCH
This note was not shared with the patient due to this is a psychotherapy note  Subjective:     Patient ID: Ryanne Woodard is a 47 y o  female  [facility] Clinical Progress Notes      Specialized Services Documentation  Therapist must complete separate progress note for each specific clinical activity in which the individual participated during the day  Group Psychotherapy ([id number])   This group was facilitated virtually in a private office using [employer] and Approved [employer] Teams  Ryanne Woodard consented to the use of tele-video modality of treatment  Ryanne Woodard actively participated in nurse education group today which focused on increasing awareness of facts related to mental illness and dispelling the myths  Group discussed how stigma, social climate, and fear factor in how myths of mental illness are perpetuated  Group members were presented with fifteen statements and challenged to determine if each statement was a myth or a fact as well as to explain why  Good progress toward goals observed  Discharge at the end of treatment day  Tx Plan Objective: 1 3, 1 4, 1 5 Therapist:  Juliane Hdz RN    Education Therapy   This group was facilitated virtually in a private office using [employer] and Approved [employer] Teams  Ryanne Woodard consented to the use of tele-video modality of treatment  [facility] Susan actively shared in morning assessment and goal review  Presented as Receptive related to readiness to learn  Ryanne Woodard did complete goal from last treatment day identifying gaining responsibility  did not present with any barriers to learning  [address] engaged throughout the treatment day  Was engaged in learning related to Illness, Medication, Aftercare and Wellness Tools  Staff utilized Verbal, Written, A/V and Demonstration teaching methods  Ryanne Woodard shared thanks and encouragement on her last treatment day  Tx Plan Objective: 1 5 Therapist:  Rosa Herrmann RN    Other ([phone]) Left discharge clinical information on Tessie MANE 's voicemail (from [facility]) [phone]  [phone]  Rosa Herrmann RN    Case Management Note  This case management session was facilitated virtually in a private office using HIPPA Compliant and Approved [employer] Teams  Amado Milner consented to the use of tele-video modality of treatment  Rosa Herrmann RN    Current suicide risk : Low     ([id number]) Spoke with Amado De Jesusmine this afternoon  Reviewed readiness for discharge  She completed relapse prevention plan, aftercare plan, and medication list   Montez shared that she is feeling calmer, more hopeful about the future, and understanding her needs better as signs of improvement  OP providers to receive discharge summary  Montez denied SI, HI, and psychosis  She denied issues with medication  Medications changes/added/denied? No    Treatment session number: 10    Individual Case Management Visit provided today?  Yes     [facility] follow up physician's orders: See discharge note
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
## 2021-01-05 NOTE — PSYCH
Subjective:     Patient ID: Rosy Dawson is a 47 y o  female  [facility] Clinical Progress Notes      Specialized Services Documentation  Therapist must complete separate progress note for each specific clinical activity in which the individual participated during the day  This was not shared due to this is a psychotherapy note  GROUP PSYCHOTHERAPY ([id number]) Group was facilitated virtually in a private office using HIPAA Compliant and Approved [employer] Teams  Hany De La Rosa consented to the use of tele-video modality of treatment and was virtually present for group psychotherapy today  The group received engaged in [address] and received education on the REST (relax, evaluate, set an intention, take action) strategy  We specifically focused on the Relax part of the strategy  The group practiced this strategy by answering the following questions:  1  What happened in the distressing situation? 2  How did you feel? 3  What did you do? 4  Did you engage in any self-destructive behaviors? What were they? 5  How could you have relaxed (R) in this situation? Hany De La Rosa was not present for this group  Hany De La Rosa will be discharged at the end of treatment day      Tx plan objective: 1 1, 1 2   Therapist: Binta Oglesby MA

## 2021-01-05 NOTE — PSYCH
This note was not shared with the patient due to this is a psychotherapy note    Subjective:     Patient ID: Azael Wang is a 47 y o  female  [facility] Clinical Progress Notes      Specialized Services Documentation  Therapist must complete separate progress note for each specific clinical activity in which the individual participated during the day  Group Psychotherapy [id number]    This group was facilitated virtually in a private office using [employer] and Approved [employer] Teams  Azael Wang consented to the use of tele-video modality of treatment  Psychotherapy focused on effective communication strategies and using I statements  Discussion topics included       -importance of being mindful with our words when speaking to others and avoiding blame to reduce defensiveness     -Taking responsibility by owning feelings and emotions to better communicate them to others     Practice scenarios were provided and members helped to identify appropriate I statements  Azael Wang  Appeared to be unengaged at times and also shared innappropriate comments, one of which indicated that some of the other members were not being grateful for what they have, when they shared their scenarios and responses  Azael Wang needed to be redirected at that time  Minimum progress toward goals  Azael Wang  is encouraged to make progress towards goals and objectives through outpatient therapy following discharge         Tx Plan Objective:1 1, 1 4, Therapist:  CARMEN Moore

## 2021-01-05 NOTE — PSYCH
[facility] Discharge Instructions:     Disposition: Home  [facility] [address]    Diagnosis:  Anorexia nervosa, restricting type  PTSD (post-traumatic stress disorder)    Allergies (Drug/Food): Allergies   Allergen Reactions    Anti-Hist [Diphenhydramine]     Buspar [Buspirone]     Haldol [Haloperidol]     Penicillins Throat Swelling    Pennsaid [Diclofenac Sodium]     Zoloft [Sertraline]      Activity: No changes in activity  Diet: No recommendations  Smoking Cessation: The best thing you can do to improve your health is to stop using tobacco  Diagnostic/Laboratory Orders: None ordered - please follow up with your endocrinologist regarding abnormal lab results  Vaccines: If you received a vaccine, please notify your family physician on your next visit  Follow-up appointments/Referrals:   Dr Kim Nguyen - medication management - [phone] (Office) - Tuesday January 12th, 2021 at 9 am  [address] [place] [address] Ave [phone] (Fax)    Mark Hernández - OP therapy - [phone] (Office) - Thursday January 7th, 2021 at 2:30 pm   [address] [place] [address] Ave [phone] (Fax)    SMITHA Black - PCP - [phone] (Office) - Appointment as determined by patient   Javier Jung  [facility], [address] [place] [address] Road [phone] (Fax)    Continue receiving services and support from [facility] F/ACT team [facility]) - [phone] (Office)    Utilize [employer], support groups, keep appointments, take medications as prescribed    [facility]: [facility] [phone] and [address] [place] [address] [phone]    Crisis Intervention (Emergency) [address]: [address]: [phone]  [place] Crisis Intervention Hotline: 0-706.665.9413  National Suicide Crisis Hotline: 9-130.986.2315    I, the undersigned, have received and understand the above instructions  Patient/Rep Signature: __________________________________       Date/Time: ______________         Physician Signature: ____________________________________      Date/Time: ______________               Signature: ________________________________       Date/Time: ______________

## 2021-01-06 ENCOUNTER — APPOINTMENT (OUTPATIENT)
Dept: PSYCHOLOGY | Facility: CLINIC | Age: 41
End: 2021-01-06
Payer: COMMERCIAL

## 2021-01-06 ENCOUNTER — DOCUMENTATION (OUTPATIENT)
Dept: PSYCHOLOGY | Facility: CLINIC | Age: 41
End: 2021-01-06

## 2021-01-07 ENCOUNTER — APPOINTMENT (OUTPATIENT)
Dept: PSYCHOLOGY | Facility: CLINIC | Age: 41
End: 2021-01-07
Payer: COMMERCIAL

## 2021-01-07 LAB
ATRIAL RATE: 76 BPM
ATRIAL RATE: 77 BPM
P AXIS: 46 DEGREES
PR INTERVAL: 142 MS
QRS AXIS: 156 DEGREES
QRS AXIS: 7 DEGREES
QRSD INTERVAL: 72 MS
QRSD INTERVAL: 74 MS
QT INTERVAL: 354 MS
QT INTERVAL: 360 MS
QTC INTERVAL: 398 MS
QTC INTERVAL: 407 MS
T WAVE AXIS: 155 DEGREES
T WAVE AXIS: 39 DEGREES
VENTRICULAR RATE: 76 BPM
VENTRICULAR RATE: 77 BPM

## 2021-01-07 PROCEDURE — 93010 ELECTROCARDIOGRAM REPORT: CPT | Performed by: INTERNAL MEDICINE

## 2021-01-08 ENCOUNTER — APPOINTMENT (OUTPATIENT)
Dept: PSYCHOLOGY | Facility: CLINIC | Age: 41
End: 2021-01-08
Payer: COMMERCIAL

## 2021-01-12 ENCOUNTER — TELEPHONE (OUTPATIENT)
Dept: ENDOCRINOLOGY | Facility: HOSPITAL | Age: 41
End: 2021-01-12

## 2021-01-12 NOTE — TELEPHONE ENCOUNTER
Spoke with Marielena Boogie  There is no letter of medical necessity needed  Will call patient's insurance, will need to ask for for authorization   Specifically ask for them to "review for medical necessity and a pre determination request"

## 2021-01-12 NOTE — TELEPHONE ENCOUNTER
Patient called  She notes that the Dexcom was denied because she is not injecting insulin  She spoke to her insurance and they are requesting a letter of medical necessity for this

## 2021-01-13 NOTE — TELEPHONE ENCOUNTER
Spoke with patient's insurance  In order to submit a pre determination and review for medical necessity; this can only be done on the plan's portal, [email or web address]      I cannot create an account to submit this information without  approval

## 2021-01-20 NOTE — TELEPHONE ENCOUNTER
The review for medical necessity and pre determination could not be done through the [employer] portal     Called provider services at [phone]  Request was submitted  Reference [phone]    Patient informed we will be in contact once I have further information

## 2021-01-26 NOTE — TELEPHONE ENCOUNTER
Received request from insurance to provide documentation to complete the clinical review    Letter and chart notes faxed to [phone]

## 2021-02-08 NOTE — TELEPHONE ENCOUNTER
Received approval valid 1/20/21-1/19/22  Left message for [place] to find out if anything additional is needed to fill the order

## 2021-02-09 ENCOUNTER — TELEPHONE (OUTPATIENT)
Dept: ENDOCRINOLOGY | Facility: CLINIC | Age: 41
End: 2021-02-09

## 2021-02-09 NOTE — TELEPHONE ENCOUNTER
Patient left a message  She said she spoke with someone yesterday  She needs a new prescription in order for them to send something out? I'm not sure who she spoke with since there is no note

## 2021-03-10 DIAGNOSIS — Z23 ENCOUNTER FOR IMMUNIZATION: ICD-10-CM

## 2021-03-12 ENCOUNTER — DOCUMENTATION (OUTPATIENT)
Dept: ENDOCRINOLOGY | Facility: HOSPITAL | Age: 41
End: 2021-03-12

## 2021-03-26 LAB — EXT SARS-COV-2: NEGATIVE

## 2021-04-05 ENCOUNTER — HOSPITAL ENCOUNTER (INPATIENT)
Facility: HOSPITAL | Age: 41
LOS: 21 days | Discharge: NURSING, REHAB OR LONG-TERM CARE | DRG: 883 | End: 2021-04-26
Attending: EMERGENCY MEDICINE | Admitting: INTERNAL MEDICINE
Payer: COMMERCIAL

## 2021-04-05 ENCOUNTER — APPOINTMENT (EMERGENCY)
Dept: CT IMAGING | Facility: HOSPITAL | Age: 41
DRG: 883 | End: 2021-04-05
Payer: COMMERCIAL

## 2021-04-05 DIAGNOSIS — R33.9 URINARY RETENTION: ICD-10-CM

## 2021-04-05 DIAGNOSIS — Z86.018 HISTORY OF PITUITARY ADENOMA: ICD-10-CM

## 2021-04-05 DIAGNOSIS — F33.1 MODERATE EPISODE OF RECURRENT MAJOR DEPRESSIVE DISORDER (HCC): ICD-10-CM

## 2021-04-05 DIAGNOSIS — F50.01 ANOREXIA NERVOSA, RESTRICTING TYPE: ICD-10-CM

## 2021-04-05 DIAGNOSIS — Z98.84 HISTORY OF GASTRIC BYPASS: ICD-10-CM

## 2021-04-05 DIAGNOSIS — F34.1 DYSTHYMIA: ICD-10-CM

## 2021-04-05 DIAGNOSIS — E43 SEVERE PROTEIN-CALORIE MALNUTRITION (HCC): ICD-10-CM

## 2021-04-05 DIAGNOSIS — R29.898 UPPER EXTREMITY WEAKNESS: ICD-10-CM

## 2021-04-05 DIAGNOSIS — E03.9 ACQUIRED HYPOTHYROIDISM: ICD-10-CM

## 2021-04-05 DIAGNOSIS — R53.1 GENERALIZED WEAKNESS: ICD-10-CM

## 2021-04-05 DIAGNOSIS — E55.9 VITAMIN D DEFICIENCY: ICD-10-CM

## 2021-04-05 DIAGNOSIS — F43.10 PTSD (POST-TRAUMATIC STRESS DISORDER): ICD-10-CM

## 2021-04-05 DIAGNOSIS — F55.2 LAXATIVE ABUSE: ICD-10-CM

## 2021-04-05 DIAGNOSIS — N17.9 ACUTE KIDNEY INJURY (HCC): Primary | ICD-10-CM

## 2021-04-05 DIAGNOSIS — R29.898 WEAKNESS OF BOTH LOWER EXTREMITIES: ICD-10-CM

## 2021-04-05 PROBLEM — F32.A DEPRESSION: Status: ACTIVE | Noted: 2021-04-05

## 2021-04-05 PROBLEM — E87.1 HYPONATREMIA: Status: ACTIVE | Noted: 2021-04-05

## 2021-04-05 PROBLEM — E87.5 HYPERKALEMIA: Status: ACTIVE | Noted: 2021-04-05

## 2021-04-05 PROBLEM — D64.9 ANEMIA: Status: ACTIVE | Noted: 2021-04-05

## 2021-04-05 LAB
ALBUMIN SERPL BCP-MCNC: 3.3 G/DL (ref 3.5–5)
ALP SERPL-CCNC: 194 U/L (ref 46–116)
ALT SERPL W P-5'-P-CCNC: 22 U/L (ref 12–78)
ANION GAP SERPL CALCULATED.3IONS-SCNC: 15 MMOL/L (ref 4–13)
APPEARANCE CSF: CLEAR
AST SERPL W P-5'-P-CCNC: 18 U/L (ref 5–45)
BACTERIA UR QL AUTO: NORMAL /HPF
BASE EXCESS BLDA CALC-SCNC: -8 MMOL/L (ref -2–3)
BASOPHILS # BLD AUTO: 0.03 THOUSANDS/ΜL (ref 0–0.1)
BASOPHILS NFR BLD AUTO: 0 % (ref 0–1)
BILIRUB SERPL-MCNC: 0.4 MG/DL (ref 0.2–1)
BILIRUB UR QL STRIP: NEGATIVE
BUN SERPL-MCNC: 47 MG/DL (ref 5–25)
CA-I BLD-SCNC: 1.2 MMOL/L (ref 1.12–1.32)
CALCIUM ALBUM COR SERPL-MCNC: 9.8 MG/DL (ref 8.3–10.1)
CALCIUM SERPL-MCNC: 9.2 MG/DL (ref 8.3–10.1)
CHLORIDE SERPL-SCNC: 101 MMOL/L (ref 100–108)
CK SERPL-CCNC: 47 U/L (ref 26–192)
CLARITY UR: CLEAR
CO2 SERPL-SCNC: 18 MMOL/L (ref 21–32)
COLOR UR: YELLOW
CREAT SERPL-MCNC: 5.15 MG/DL (ref 0.6–1.3)
EOSINOPHIL # BLD AUTO: 0.31 THOUSAND/ΜL (ref 0–0.61)
EOSINOPHIL NFR BLD AUTO: 4 % (ref 0–6)
ERYTHROCYTE [DISTWIDTH] IN BLOOD BY AUTOMATED COUNT: 20.2 % (ref 11.6–15.1)
GFR SERPL CREATININE-BSD FRML MDRD: 10 ML/MIN/1.73SQ M
GLUCOSE CSF-MCNC: 57 MG/DL (ref 50–80)
GLUCOSE SERPL-MCNC: 80 MG/DL (ref 65–140)
GLUCOSE SERPL-MCNC: 88 MG/DL (ref 65–140)
GLUCOSE UR STRIP-MCNC: NEGATIVE MG/DL
GRAM STN SPEC: NORMAL
GRAM STN SPEC: NORMAL
HCO3 BLDA-SCNC: 18.6 MMOL/L (ref 24–30)
HCT VFR BLD AUTO: 31.5 % (ref 34.8–46.1)
HCT VFR BLD CALC: 31 % (ref 34.8–46.1)
HGB BLD-MCNC: 10 G/DL (ref 11.5–15.4)
HGB BLDA-MCNC: 10.5 G/DL (ref 11.5–15.4)
HGB UR QL STRIP.AUTO: ABNORMAL
IMM GRANULOCYTES # BLD AUTO: 0.03 THOUSAND/UL (ref 0–0.2)
IMM GRANULOCYTES NFR BLD AUTO: 0 % (ref 0–2)
KETONES UR STRIP-MCNC: NEGATIVE MG/DL
LACTATE SERPL-SCNC: 0.4 MMOL/L (ref 0.5–2)
LEUKOCYTE ESTERASE UR QL STRIP: NEGATIVE
LYMPHOCYTES # BLD AUTO: 1.21 THOUSANDS/ΜL (ref 0.6–4.47)
LYMPHOCYTES NFR BLD AUTO: 16 % (ref 14–44)
MAGNESIUM SERPL-MCNC: 3.4 MG/DL (ref 1.6–2.6)
MCH RBC QN AUTO: 32.8 PG (ref 26.8–34.3)
MCHC RBC AUTO-ENTMCNC: 31.7 G/DL (ref 31.4–37.4)
MCV RBC AUTO: 103 FL (ref 82–98)
MONOCYTES # BLD AUTO: 0.56 THOUSAND/ΜL (ref 0.17–1.22)
MONOCYTES NFR BLD AUTO: 7 % (ref 4–12)
NEUTROPHILS # BLD AUTO: 5.43 THOUSANDS/ΜL (ref 1.85–7.62)
NEUTS SEG NFR BLD AUTO: 73 % (ref 43–75)
NITRITE UR QL STRIP: NEGATIVE
NON-SQ EPI CELLS URNS QL MICRO: NORMAL /HPF
NRBC BLD AUTO-RTO: 0 /100 WBCS
PCO2 BLD: 20 MMOL/L (ref 21–32)
PCO2 BLD: 42.6 MM HG (ref 42–50)
PH BLD: 7.25 [PH] (ref 7.3–7.4)
PH UR STRIP.AUTO: 6.5 [PH]
PLATELET # BLD AUTO: 293 THOUSANDS/UL (ref 149–390)
PMV BLD AUTO: 11.9 FL (ref 8.9–12.7)
PO2 BLD: 32 MM HG (ref 35–45)
POTASSIUM BLD-SCNC: 5.9 MMOL/L (ref 3.5–5.3)
POTASSIUM SERPL-SCNC: 5.6 MMOL/L (ref 3.5–5.3)
PROT CSF-MCNC: 49 MG/DL (ref 15–45)
PROT SERPL-MCNC: 6.7 G/DL (ref 6.4–8.2)
PROT UR STRIP-MCNC: NEGATIVE MG/DL
RBC # BLD AUTO: 3.05 MILLION/UL (ref 3.81–5.12)
RBC # CSF MANUAL: 31 UL (ref 0–10)
RBC #/AREA URNS AUTO: NORMAL /HPF
SALICYLATES SERPL-MCNC: 3.2 MG/DL (ref 3–20)
SAO2 % BLD FROM PO2: 53 % (ref 60–85)
SODIUM BLD-SCNC: 132 MMOL/L (ref 136–145)
SODIUM SERPL-SCNC: 134 MMOL/L (ref 136–145)
SP GR UR STRIP.AUTO: <=1.005 (ref 1–1.03)
SPECIMEN SOURCE: ABNORMAL
TOTAL CELLS COUNTED BLD: NO
TUBE # CSF: 4
UROBILINOGEN UR QL STRIP.AUTO: 0.2 E.U./DL
WBC # BLD AUTO: 7.57 THOUSAND/UL (ref 4.31–10.16)
WBC # CSF AUTO: 2 /UL (ref 0–5)
WBC #/AREA URNS AUTO: NORMAL /HPF

## 2021-04-05 PROCEDURE — 80053 COMPREHEN METABOLIC PANEL: CPT | Performed by: PHYSICIAN ASSISTANT

## 2021-04-05 PROCEDURE — 84132 ASSAY OF SERUM POTASSIUM: CPT

## 2021-04-05 PROCEDURE — 86592 SYPHILIS TEST NON-TREP QUAL: CPT | Performed by: PHYSICIAN ASSISTANT

## 2021-04-05 PROCEDURE — 99223 1ST HOSP IP/OBS HIGH 75: CPT | Performed by: INTERNAL MEDICINE

## 2021-04-05 PROCEDURE — 82040 ASSAY OF SERUM ALBUMIN: CPT | Performed by: PHYSICIAN ASSISTANT

## 2021-04-05 PROCEDURE — 009U3ZX DRAINAGE OF SPINAL CANAL, PERCUTANEOUS APPROACH, DIAGNOSTIC: ICD-10-PCS | Performed by: EMERGENCY MEDICINE

## 2021-04-05 PROCEDURE — 81001 URINALYSIS AUTO W/SCOPE: CPT | Performed by: PHYSICIAN ASSISTANT

## 2021-04-05 PROCEDURE — 85014 HEMATOCRIT: CPT

## 2021-04-05 PROCEDURE — 82164 ANGIOTENSIN I ENZYME TEST: CPT | Performed by: PHYSICIAN ASSISTANT

## 2021-04-05 PROCEDURE — 82784 ASSAY IGA/IGD/IGG/IGM EACH: CPT | Performed by: PHYSICIAN ASSISTANT

## 2021-04-05 PROCEDURE — 93005 ELECTROCARDIOGRAM TRACING: CPT

## 2021-04-05 PROCEDURE — 62270 DX LMBR SPI PNXR: CPT | Performed by: PHYSICIAN ASSISTANT

## 2021-04-05 PROCEDURE — 89050 BODY FLUID CELL COUNT: CPT | Performed by: PHYSICIAN ASSISTANT

## 2021-04-05 PROCEDURE — 70450 CT HEAD/BRAIN W/O DYE: CPT

## 2021-04-05 PROCEDURE — 74176 CT ABD & PELVIS W/O CONTRAST: CPT

## 2021-04-05 PROCEDURE — 0T9B70Z DRAINAGE OF BLADDER WITH DRAINAGE DEVICE, VIA NATURAL OR ARTIFICIAL OPENING: ICD-10-PCS | Performed by: EMERGENCY MEDICINE

## 2021-04-05 PROCEDURE — 88184 FLOWCYTOMETRY/ TC 1 MARKER: CPT | Performed by: PHYSICIAN ASSISTANT

## 2021-04-05 PROCEDURE — 86618 LYME DISEASE ANTIBODY: CPT | Performed by: PHYSICIAN ASSISTANT

## 2021-04-05 PROCEDURE — G1004 CDSM NDSC: HCPCS

## 2021-04-05 PROCEDURE — 82306 VITAMIN D 25 HYDROXY: CPT | Performed by: PHYSICIAN ASSISTANT

## 2021-04-05 PROCEDURE — 36415 COLL VENOUS BLD VENIPUNCTURE: CPT | Performed by: PHYSICIAN ASSISTANT

## 2021-04-05 PROCEDURE — 84157 ASSAY OF PROTEIN OTHER: CPT | Performed by: PHYSICIAN ASSISTANT

## 2021-04-05 PROCEDURE — 82803 BLOOD GASES ANY COMBINATION: CPT

## 2021-04-05 PROCEDURE — 83916 OLIGOCLONAL BANDS: CPT | Performed by: PHYSICIAN ASSISTANT

## 2021-04-05 PROCEDURE — 82330 ASSAY OF CALCIUM: CPT

## 2021-04-05 PROCEDURE — 83605 ASSAY OF LACTIC ACID: CPT | Performed by: PHYSICIAN ASSISTANT

## 2021-04-05 PROCEDURE — 82947 ASSAY GLUCOSE BLOOD QUANT: CPT

## 2021-04-05 PROCEDURE — 82945 GLUCOSE OTHER FLUID: CPT | Performed by: PHYSICIAN ASSISTANT

## 2021-04-05 PROCEDURE — 82042 OTHER SOURCE ALBUMIN QUAN EA: CPT | Performed by: PHYSICIAN ASSISTANT

## 2021-04-05 PROCEDURE — 84295 ASSAY OF SERUM SODIUM: CPT

## 2021-04-05 PROCEDURE — 80179 DRUG ASSAY SALICYLATE: CPT | Performed by: PHYSICIAN ASSISTANT

## 2021-04-05 PROCEDURE — 83873 ASSAY OF CSF PROTEIN: CPT | Performed by: PHYSICIAN ASSISTANT

## 2021-04-05 PROCEDURE — 87070 CULTURE OTHR SPECIMN AEROBIC: CPT | Performed by: PHYSICIAN ASSISTANT

## 2021-04-05 PROCEDURE — 88185 FLOWCYTOMETRY/TC ADD-ON: CPT

## 2021-04-05 PROCEDURE — 82550 ASSAY OF CK (CPK): CPT | Performed by: PHYSICIAN ASSISTANT

## 2021-04-05 PROCEDURE — 83735 ASSAY OF MAGNESIUM: CPT | Performed by: PHYSICIAN ASSISTANT

## 2021-04-05 PROCEDURE — 99285 EMERGENCY DEPT VISIT HI MDM: CPT

## 2021-04-05 PROCEDURE — 89051 BODY FLUID CELL COUNT: CPT | Performed by: PHYSICIAN ASSISTANT

## 2021-04-05 PROCEDURE — 96365 THER/PROPH/DIAG IV INF INIT: CPT

## 2021-04-05 PROCEDURE — 99285 EMERGENCY DEPT VISIT HI MDM: CPT | Performed by: PHYSICIAN ASSISTANT

## 2021-04-05 PROCEDURE — 96366 THER/PROPH/DIAG IV INF ADDON: CPT

## 2021-04-05 PROCEDURE — 85025 COMPLETE CBC W/AUTO DIFF WBC: CPT | Performed by: PHYSICIAN ASSISTANT

## 2021-04-05 RX ORDER — FOLIC ACID 1 MG/1
1 TABLET ORAL DAILY
Status: DISCONTINUED | OUTPATIENT
Start: 2021-04-06 | End: 2021-04-26 | Stop reason: HOSPADM

## 2021-04-05 RX ORDER — FAMOTIDINE 20 MG/1
20 TABLET, FILM COATED ORAL DAILY
Status: DISCONTINUED | OUTPATIENT
Start: 2021-04-06 | End: 2021-04-05 | Stop reason: DRUGHIGH

## 2021-04-05 RX ORDER — LEVOTHYROXINE SODIUM 0.03 MG/1
25 TABLET ORAL
Status: DISCONTINUED | OUTPATIENT
Start: 2021-04-06 | End: 2021-04-10

## 2021-04-05 RX ORDER — DOCUSATE SODIUM 100 MG/1
100 CAPSULE, LIQUID FILLED ORAL 2 TIMES DAILY
Status: DISCONTINUED | OUTPATIENT
Start: 2021-04-06 | End: 2021-04-26 | Stop reason: HOSPADM

## 2021-04-05 RX ORDER — SODIUM CHLORIDE, SODIUM GLUCONATE, SODIUM ACETATE, POTASSIUM CHLORIDE, MAGNESIUM CHLORIDE, SODIUM PHOSPHATE, DIBASIC, AND POTASSIUM PHOSPHATE .53; .5; .37; .037; .03; .012; .00082 G/100ML; G/100ML; G/100ML; G/100ML; G/100ML; G/100ML; G/100ML
1000 INJECTION, SOLUTION INTRAVENOUS ONCE
Status: COMPLETED | OUTPATIENT
Start: 2021-04-05 | End: 2021-04-05

## 2021-04-05 RX ORDER — ZOLPIDEM TARTRATE 5 MG/1
10 TABLET ORAL
Status: DISCONTINUED | OUTPATIENT
Start: 2021-04-05 | End: 2021-04-10

## 2021-04-05 RX ORDER — TOPIRAMATE 100 MG/1
200 TABLET, FILM COATED ORAL 3 TIMES DAILY
Status: DISCONTINUED | OUTPATIENT
Start: 2021-04-05 | End: 2021-04-06

## 2021-04-05 RX ORDER — BUPROPION HYDROCHLORIDE 300 MG/1
300 TABLET ORAL DAILY
Status: DISCONTINUED | OUTPATIENT
Start: 2021-04-06 | End: 2021-04-06

## 2021-04-05 RX ORDER — MIRTAZAPINE 15 MG/1
7.5 TABLET, FILM COATED ORAL
Status: DISCONTINUED | OUTPATIENT
Start: 2021-04-05 | End: 2021-04-13

## 2021-04-05 RX ORDER — POLYETHYLENE GLYCOL 3350 17 G/17G
17 POWDER, FOR SOLUTION ORAL DAILY
Status: DISCONTINUED | OUTPATIENT
Start: 2021-04-06 | End: 2021-04-26 | Stop reason: HOSPADM

## 2021-04-05 RX ORDER — ACETAMINOPHEN 325 MG/1
650 TABLET ORAL EVERY 6 HOURS PRN
Status: DISCONTINUED | OUTPATIENT
Start: 2021-04-05 | End: 2021-04-26 | Stop reason: HOSPADM

## 2021-04-05 RX ORDER — CYCLOBENZAPRINE HCL 10 MG
10 TABLET ORAL 3 TIMES DAILY PRN
Status: DISCONTINUED | OUTPATIENT
Start: 2021-04-05 | End: 2021-04-26 | Stop reason: HOSPADM

## 2021-04-05 RX ORDER — LANOLIN ALCOHOL/MO/W.PET/CERES
100 CREAM (GRAM) TOPICAL DAILY
Status: DISCONTINUED | OUTPATIENT
Start: 2021-04-06 | End: 2021-04-06

## 2021-04-05 RX ORDER — LEVOTHYROXINE SODIUM 0.03 MG/1
25 TABLET ORAL 2 TIMES WEEKLY
Status: DISCONTINUED | OUTPATIENT
Start: 2021-04-10 | End: 2021-04-10

## 2021-04-05 RX ORDER — HEPARIN SODIUM 5000 [USP'U]/ML
5000 INJECTION, SOLUTION INTRAVENOUS; SUBCUTANEOUS EVERY 8 HOURS SCHEDULED
Status: DISCONTINUED | OUTPATIENT
Start: 2021-04-05 | End: 2021-04-26 | Stop reason: HOSPADM

## 2021-04-05 RX ADMIN — MIRTAZAPINE 7.5 MG: 15 TABLET, FILM COATED ORAL at 23:39

## 2021-04-05 RX ADMIN — CYCLOBENZAPRINE HYDROCHLORIDE 10 MG: 10 TABLET, FILM COATED ORAL at 23:47

## 2021-04-05 RX ADMIN — HEPARIN SODIUM 5000 UNITS: 5000 INJECTION INTRAVENOUS; SUBCUTANEOUS at 23:46

## 2021-04-05 RX ADMIN — TOPIRAMATE 200 MG: 100 TABLET, FILM COATED ORAL at 23:39

## 2021-04-05 RX ADMIN — SODIUM CHLORIDE 1000 ML: 0.9 INJECTION, SOLUTION INTRAVENOUS at 21:00

## 2021-04-05 RX ADMIN — ZOLPIDEM TARTRATE 10 MG: 5 TABLET, COATED ORAL at 23:34

## 2021-04-05 RX ADMIN — SODIUM CHLORIDE, SODIUM GLUCONATE, SODIUM ACETATE, POTASSIUM CHLORIDE, MAGNESIUM CHLORIDE, SODIUM PHOSPHATE, DIBASIC, AND POTASSIUM PHOSPHATE 1000 ML: .53; .5; .37; .037; .03; .012; .00082 INJECTION, SOLUTION INTRAVENOUS at 18:27

## 2021-04-05 NOTE — ED PROVIDER NOTES
History  Chief Complaint   Patient presents with    Abnormal Lab     EMS staes that patietn's creat and BUN are elevated  Patient complaining of L calf pain     46 yo female w/ hx of seizure disorder, depression, anxiety and anorexia presents to the Emergency Department for evaluation of abnormal labs  Pt is currently a resident at [facility], had routine outpatient labs sent today revealing a Creat >4 0  Baseline is <1 0  She reports decreased urination  She does not eat or drink much  Apparently is placed [facility] due to extremity weakness, this has been ongoing x 1 month  States she was admitted to [facility] 3/11-3/12 for this and ultimately d/c to SNF due to persistent weakness  She reports having onset of BLE weakness 1 month ago, now involving the arms  She is quite clumsy and has difficulty with fine motor activities  Prior to Admission Medications   Prescriptions Last Dose Informant Patient Reported? Taking?    AquaLance Lancets 30G MISC  Self Yes No   Sig: USE TO TEST BLOOD GLUCOSE FOUR TIMES A DAY   Cholecalciferol 125 MCG (5000 UT) TABS   No No   Si tabs daily   Continuous Blood Gluc  (FREESTYLE CAITY 14 DAY READER) DINESH  Self No No   Si Device by Does not apply route 4 (four) times a day (before meals and at bedtime)   Patient not taking: Reported on 2020   Continuous Blood Gluc Sensor (FREESTYLE CAITY 14 DAY SENSOR) MIS  Self No No   Si application by Does not apply route every 14 (fourteen) days   Patient not taking: Reported on 2019   OneTouch Ultra test strip  Self Yes No   Sig: USE TO TEST BLOOD GLUCOSE FOUR TIMES A DAY   acarbose (PRECOSE) 25 mg tablet   No No   Si tab daily with dinner   buPROPion (WELLBUTRIN SR) 100 mg 12 hr tablet  Self Yes No   Sig: Take 100 mg by mouth 3 (three) times a day    cloNIDine (CATAPRES) 0 2 mg tablet  Self Yes No   Sig: Take 0 2 mg by mouth daily as needed for high blood pressure   cyanocobalamin 1,000 mcg/mL  Self Yes No   Sig: inject 1 milliliter ( 1000 MCG ) intramuscularly Every Month   cyclobenzaprine (FLEXERIL) 10 mg tablet  Self Yes No   Sig: Take 10 mg by mouth 3 (three) times a day as needed for muscle spasms   docusate sodium (COLACE) 100 mg capsule  Self Yes No   Sig: Take 100 mg by mouth 2 (two) times a day   ergocalciferol (ERGOCALCIFEROL) 1 25 MG (19901 UT) capsule   No No   Si cap weekly   famotidine (PEPCID) 20 mg tablet  Self Yes No   Sig: Take 20 mg by mouth daily   folic acid (FOLVITE) 1 mg tablet  Self Yes No   Sig: Take by mouth daily   glucagon (GLUCAGON EMERGENCY) 1 MG injection  Self No No   Sig: Inject 1 mg under the skin once as needed for low blood sugar for up to 1 dose   levothyroxine 25 mcg tablet   No No   Sig: Please take 1 tablet daily Monday through Friday and 2 tablets on Saturday and      mirtazapine (REMERON) 15 mg tablet  Self Yes No   Sig: Take 7 5 mg by mouth daily at bedtime    multivitamin (THERAGRAN) TABS  Self Yes No   Sig: Take 1 tablet by mouth daily   polyethylene glycol (MIRALAX) 17 g packet  Self Yes No   Sig: Take 17 g by mouth daily   potassium chloride (K-DUR,KLOR-CON) 10 mEq tablet  Self Yes No   Sig: Take 10 mEq by mouth daily   prazosin (MINIPRESS) 5 mg capsule  Self Yes No   Sig: Take by mouth daily at bedtime    thiamine (VITAMIN B1) 100 mg tablet  Self Yes No   Sig: Take 100 mg by mouth daily   topiramate (TOPAMAX) 200 MG tablet  Self Yes No   Sig: Take 400 mg by mouth 3 times daily    varenicline (CHANTIX) 1 mg tablet  Self Yes No   Sig: Take 1 mg by mouth 2 (two) times a day   venlafaxine (EFFEXOR-XR) 75 mg 24 hr capsule  Self Yes No   zolpidem (AMBIEN) 10 mg tablet  Self Yes No   Sig: Take 10 mg by mouth daily at bedtime as needed for sleep      Facility-Administered Medications: None       Past Medical History:   Diagnosis Date    Anemia     Psychiatric disorder     depression anxiety    Seizures (HCC)        Past Surgical History:   Procedure Laterality Date    GASTRIC BYPASS         Family History   Problem Relation Age of Onset    Hypertension Mother     Heart attack Mother     No Known Problems Father     Lung cancer Maternal Grandmother     Hypothyroidism Maternal Grandmother     Diabetes type II Paternal Grandmother     Diabetes type II Paternal Grandfather      I have reviewed and agree with the history as documented  E-Cigarette/Vaping     E-Cigarette/Vaping Substances     Social History     Tobacco Use    Smoking status: Current Every Day Smoker    Smokeless tobacco: Never Used   Substance Use Topics    Alcohol use: No    Drug use: No     Comment: in revovery for benzos       Review of Systems   Constitutional: Positive for fatigue  Negative for chills, diaphoresis and fever  Eyes: Negative for visual disturbance  Respiratory: Negative for cough and shortness of breath  Cardiovascular: Negative for chest pain and palpitations  Gastrointestinal: Negative for abdominal pain, diarrhea, nausea and vomiting  Genitourinary: Positive for difficulty urinating and flank pain  Negative for dysuria and frequency  Musculoskeletal: Negative for arthralgias and myalgias  Skin: Negative for color change, rash and wound  Allergic/Immunologic: Negative for immunocompromised state  Neurological: Positive for weakness  Negative for dizziness, light-headedness and numbness  Hematological: Does not bruise/bleed easily  Psychiatric/Behavioral: Negative for confusion  The patient is not nervous/anxious  Physical Exam  Physical Exam  Vitals signs reviewed  Constitutional:       General: She is not in acute distress  Appearance: She is well-developed  She is not diaphoretic  HENT:      Head: Normocephalic and atraumatic  Mouth/Throat:      Mouth: Mucous membranes are dry  Eyes:      General: No visual field deficit or scleral icterus  Pupils: Pupils are equal, round, and reactive to light     Neck:      Vascular: No JVD    Cardiovascular:      Rate and Rhythm: Normal rate and regular rhythm  Heart sounds: No murmur  No friction rub  No gallop  Pulmonary:      Effort: No respiratory distress  Breath sounds: No wheezing or rales  Abdominal:      General: Bowel sounds are normal  There is distension  Palpations: Abdomen is soft  There is no mass  Tenderness: There is no abdominal tenderness  There is no guarding or rebound  Skin:     General: Skin is warm and dry  Capillary Refill: Capillary refill takes less than 2 seconds  Coloration: Skin is not pale  Neurological:      Mental Status: She is alert and oriented to person, place, and time  GCS: GCS eye subscore is 4  GCS verbal subscore is 5  GCS motor subscore is 6  Cranial Nerves: No dysarthria  Motor: Weakness present  Coordination: Coordination abnormal  Heel to Shin Test abnormal (unable to perform due to weakness)  Finger-Nose-Finger Test normal       Deep Tendon Reflexes: Babinski sign absent on the right side  Babinski sign absent on the left side  Reflex Scores:       Bicep reflexes are 2+ on the right side and 2+ on the left side  Brachioradialis reflexes are 2+ on the right side and 2+ on the left side  Patellar reflexes are 0 on the right side and 0 on the left side  Achilles reflexes are 0 on the right side and 0 on the left side       Comments: BLE strength 2/5 bilaterally  BUE strength 4/5 bilaterally  + dysdiadochokinesia bilat   Psychiatric:         Behavior: Behavior normal          Vital Signs  ED Triage Vitals   Temp Pulse Respirations Blood Pressure SpO2   -- 04/05/21 1800 04/05/21 1800 04/05/21 1800 04/05/21 1800    82 22 125/73 98 %      Temp src Heart Rate Source Patient Position - Orthostatic VS BP Location FiO2 (%)   -- 04/05/21 1930 04/05/21 1930 04/05/21 1930 --    Monitor Lying Right arm       Pain Score       --                  Vitals:    04/05/21 1800 04/05/21 1900 04/05/21 1930 04/05/21 2000   BP: 125/73 127/83 128/84 131/86   Pulse: 82 77 80    Patient Position - Orthostatic VS:   Lying          Visual Acuity      ED Medications  Medications   sodium chloride 0 9 % bolus 1,000 mL (has no administration in time range)   multi-electrolyte (PLASMALYTE-A/ISOLYTE-S PH 7 4) IV solution 1,000 mL (0 mL Intravenous Stopped 4/5/21 2025)       Diagnostic Studies  Results Reviewed     Procedure Component Value Units Date/Time    CSF, RBC count (Tube 1) [[id number]]     Lab Status: No result Specimen: Cerebrospinal Fluid from Lumbar Puncture     CSF, Glucose (Tube 2) [[id number]]     Lab Status: No result Specimen: Cerebrospinal Fluid from Lumbar Puncture     CSF, Total Protein (Tube 2) [[id number]]     Lab Status: No result Specimen: Cerebrospinal Fluid from Lumbar Puncture     CSF, Gram Stain (Tube 3) [[id number]]     Lab Status: No result Specimen: Cerebrospinal Fluid from Lumbar Puncture     CSF, Culture and Gram stain (Tube 3) [[id number]]     Lab Status: No result Specimen: Cerebrospinal Fluid from Lumbar Puncture     CSF, White cell count with differential (Tube 4) [[id number]]     Lab Status: No result Specimen: Cerebrospinal Fluid from Lumbar Puncture     CSF VDRL [[id number]]     Lab Status: No result Specimen: Cerebrospinal Fluid     Angiotensin converting enzyme, CSF [[id number]]     Lab Status: No result Specimen: Cerebrospinal Fluid     LYME INDEX IGG/IGM, CSF [[id number]]     Lab Status: No result Specimen: Cerebrospinal Fluid     MS Panel, CSF [[id number]]     Lab Status: No result Specimen: Cerebrospinal Fluid     Leukemia/Lymphoma flow cytometry [[id number]]     Lab Status: No result Specimen: Cerebrospinal Fluid from Spinal Cord     Urine Microscopic [[id number]]  (Normal) Collected: 04/05/21 2007    Lab Status: Final result Specimen: Urine, Indwelling Zhong Catheter Updated: 04/05/21 2050     RBC, UA 0-1 /hpf      WBC, UA 0-1 /hpf      Epithelial Cells Occasional /hpf Bacteria, UA Occasional /hpf     Salicylate level [[id number]]  (Normal) Collected: 04/05/21 2006    Lab Status: Final result Specimen: Blood from Arm, Right Updated: 40/29/19 6926     Salicylate Lvl 3 2 mg/dL     UA w Reflex to Microscopic w Reflex to Culture [[id number]]  (Abnormal) Collected: 04/05/21 2007    Lab Status: Final result Specimen: Urine, Indwelling Zhong Catheter Updated: 04/05/21 2017     Color, UA Yellow     Clarity, UA Clear     Specific Gravity, UA <=1 005     pH, UA 6 5     Leukocytes, UA Negative     Nitrite, UA Negative     Protein, UA Negative mg/dl      Glucose, UA Negative mg/dl      Ketones, UA Negative mg/dl      Urobilinogen, UA 0 2 E U /dl      Bilirubin, UA Negative     Blood, UA Small    POCT Blood Gas (CG8+) [[id number]]  (Abnormal) Collected: 04/05/21 1912    Lab Status: Final result Specimen: Venous Updated: 04/05/21 1921     ph, Monster ISTAT 7 247     pCO2, Monster i-STAT 42 6 mm HG      pO2, Monster i-STAT 32 0 mm HG      BE, i-STAT -8 mmol/L      HCO3, Monster i-STAT 18 6 mmol/L      CO2, i-STAT 20 mmol/L      O2 Sat, i-STAT 53 %      SODIUM, I-STAT 132 mmol/l      Potassium, i-STAT 5 9 mmol/L      Calcium, Ionized i-STAT 1 20 mmol/L      Hct, i-STAT 31 %      Hgb, i-STAT 10 5 g/dl      Glucose, i-STAT 80 mg/dl      Specimen Type VENOUS    CK Total with Reflex CKMB [[id number]]  (Normal) Collected: 04/05/21 1824    Lab Status: Final result Specimen: Blood from Arm, Right Updated: 04/05/21 1854     Total CK 47 U/L     Comprehensive metabolic panel [[id number]]  (Abnormal) Collected: 04/05/21 1824    Lab Status: Final result Specimen: Blood from Arm, Right Updated: 04/05/21 1854     Sodium 134 mmol/L      Potassium 5 6 mmol/L      Chloride 101 mmol/L      CO2 18 mmol/L      ANION GAP 15 mmol/L      BUN 47 mg/dL      Creatinine 5 15 mg/dL      Glucose 88 mg/dL      Calcium 9 2 mg/dL      Corrected Calcium 9 8 mg/dL      AST 18 U/L      ALT 22 U/L      Alkaline Phosphatase 194 U/L      Total Protein 6 7 g/dL      Albumin 3 3 g/dL      Total Bilirubin 0 40 mg/dL      eGFR 10 ml/min/1 73sq m     Narrative:      [place] guidelines for Chronic Kidney Disease (CKD):     Stage 1 with normal or high GFR (GFR > 90 mL/min/1 73 square meters)    Stage 2 Mild CKD (GFR = 60-89 mL/min/1 73 square meters)    Stage 3A Moderate CKD (GFR = 45-59 mL/min/1 73 square meters)    Stage 3B Moderate CKD (GFR = 30-44 mL/min/1 73 square meters)    Stage 4 Severe CKD (GFR = 15-29 mL/min/1 73 square meters)    Stage 5 End Stage CKD (GFR <15 mL/min/1 73 square meters)  Note: GFR calculation is accurate only with a steady state creatinine    Magnesium [[id number]]  (Abnormal) Collected: 04/05/21 1824    Lab Status: Final result Specimen: Blood from Arm, Right Updated: 04/05/21 1854     Magnesium 3 4 mg/dL     Lactic acid [[id number]]  (Abnormal) Collected: 04/05/21 1824    Lab Status: Final result Specimen: Blood from Arm, Right Updated: 04/05/21 1852     LACTIC ACID 0 4 mmol/L     Narrative:      Result may be elevated if tourniquet was used during collection      CBC and differential [[id number]]  (Abnormal) Collected: 04/05/21 1824    Lab Status: Final result Specimen: Blood from Arm, Right Updated: 04/05/21 1833     WBC 7 57 Thousand/uL      RBC 3 05 Million/uL      Hemoglobin 10 0 g/dL      Hematocrit 31 5 %       fL      MCH 32 8 pg      MCHC 31 7 g/dL      RDW 20 2 %      MPV 11 9 fL      Platelets 813 Thousands/uL      nRBC 0 /100 WBCs      Neutrophils Relative 73 %      Immat GRANS % 0 %      Lymphocytes Relative 16 %      Monocytes Relative 7 %      Eosinophils Relative 4 %      Basophils Relative 0 %      Neutrophils Absolute 5 43 Thousands/µL      Immature Grans Absolute 0 03 Thousand/uL      Lymphocytes Absolute 1 21 Thousands/µL      Monocytes Absolute 0 56 Thousand/µL      Eosinophils Absolute 0 31 Thousand/µL      Basophils Absolute 0 03 Thousands/µL                  CT abdomen pelvis wo contrast   Final Result by Makayla Engel MD (04/05 2000)      1  Severely distended bladder and mild bilateral obstructive uropathy, suggesting distal outlet obstruction or neurogenic bladder  No evidence of cystitis, bladder mass or hematoma  2   Findings compatible with constipation  Workstation performed: [id number]         CT head without contrast   Final Result by Jesus Sargent MD (04/05 1920)      No acute intracranial abnormality  Workstation performed: [id number]                    Procedures  ECG 12 Lead Documentation Only    Date/Time: 4/5/2021 6:00 PM  Performed by: Mayda Hewitt PA-C  Authorized by: Mayda Hewitt PA-C     Indications / Diagnosis:  Hyperk  ECG reviewed by me, the ED Provider: yes    Patient location:  ED  Previous ECG:     Previous ECG:  Compared to current    Similarity:  No change  Interpretation:     Interpretation: normal    Rate:     ECG rate:  78    ECG rate assessment: normal    Rhythm:     Rhythm: sinus rhythm    Ectopy:     Ectopy: none    QRS:     QRS axis:  Normal    QRS intervals:  Normal  Conduction:     Conduction: normal    ST segments:     ST segments:  Normal  T waves:     T waves: normal    Comments:      QTc 399    Lumbar puncture    Date/Time: 4/5/2021 8:55 PM  Performed by: Mayda Hewitt PA-C  Authorized by: Mayda Hewitt PA-C   Universal Protocol:  Consent: Written consent obtained  Risks and benefits: risks, benefits and alternatives were discussed  Consent given by: patient  Time out: Immediately prior to procedure a "time out" was called to verify the correct patient, procedure, equipment, support staff and site/side marked as required    Timeout called at: 4/5/2021 8:55 AM   Patient understanding: patient states understanding of the procedure being performed  Patient consent: the patient's understanding of the procedure matches consent given  Procedure consent: procedure consent matches procedure scheduled  Relevant documents: relevant documents present and verified  Test results: test results available and properly labeled  Site marked: the operative site was marked  Required items: required blood products, implants, devices, and special equipment available  Patient identity confirmed: verbally with patient      Patient location:  ED  Pre-procedure details:     Preparation: Patient was prepped and draped in usual sterile fashion    Indications:     Indications: diagnostic intervention    Anesthesia (see MAR for exact dosages): Anesthesia method:  Local infiltration    Local anesthetic:  Lidocaine 1% w/o epi  Procedure details:     Lumbar space:  L4-L5 interspace    Patient position:  Sitting    Equipment: Lumbar puncture kit used      Needle gauge:  20G x 3 5in    Needle type:  Spinal needle - Quincke tip    Ultrasound guidance: no      Number of attempts:  2    Fluid appearance:  Clear    Tubes of fluid:  4    Total volume (ml):  8  Post-procedure:     Puncture site:  Adhesive bandage applied and direct pressure applied    Patient tolerance of procedure: Tolerated well, no immediate complications    Patient instructed to lie flat for one(1) hour post lumbar puncture : Yes               ED Course  ED Course as of Apr 05 2121   Mon Apr 05, 2021   1802 BUN 6 Cr 0 57 at [facility] 3/12      2004 D/W nephro, give 1L NS bolus and recheck labs in the AM                                SBIRT 20yo+      Most Recent Value   SBIRT (25 yo +)   In order to provide better care to our patients, we are screening all of our patients for alcohol and drug use  Would it be okay to ask you these screening questions? Yes Filed at: 04/05/2021 1919   Initial Alcohol Screen: US AUDIT-C    1  How often do you have a drink containing alcohol?  0 Filed at: 04/05/2021 1919   2  How many drinks containing alcohol do you have on a typical day you are drinking? 0 Filed at: 04/05/2021 1919   3a  Male UNDER 65: How often do you have five or more drinks on one occasion? 0 Filed at: 04/05/2021 1919   3b  FEMALE Any Age, or MALE 65+: How often do you have 4 or more drinks on one occassion? 0 Filed at: 04/05/2021 1919   Audit-C Score  0 Filed at: 04/05/2021 1919   JOAVN: How many times in the past year have you    Used an illegal drug or used a prescription medication for non-medical reasons? Never Filed at: 04/05/2021 1919                    MDM  Number of Diagnoses or Management Options  Acute kidney injury [facility]): new and requires workup  Upper extremity weakness: new and requires workup  Urinary retention: new and requires workup  Weakness of both lower extremities: new and requires workup  Diagnosis management comments: Progressive, ascending extremity weakness with urinary retention resulting in marked RENE  LP performed in the ED for diagnostic purposes upon neurology recommendation   Will admit to medicine pending further workup       Amount and/or Complexity of Data Reviewed  Clinical lab tests: ordered and reviewed  Tests in the radiology section of CPT®: ordered and reviewed  Tests in the medicine section of CPT®: ordered and reviewed  Decide to obtain previous medical records or to obtain history from someone other than the patient: yes  Obtain history from someone other than the patient: yes  Review and summarize past medical records: yes  Discuss the patient with other providers: yes  Independent visualization of images, tracings, or specimens: yes        Disposition  Final diagnoses:   Acute kidney injury ([address] )   Weakness of both lower extremities   Upper extremity weakness   Urinary retention     Time reflects when diagnosis was documented in both MDM as applicable and the Disposition within this note     Time User Action Codes Description Comment    4/5/2021  9:18 PM Jimenez Lien Add [N17 9] Acute kidney injury ([address] )     4/5/2021  9:18 PM Jimenez Lien Add [R29 898] Weakness of both lower extremities     4/5/2021  9:19 PM Jimenez Line Add [R29 898] Upper extremity weakness     4/5/2021  9:19 PM Pastor Bower Add [R33 9] Urinary retention       ED Disposition     ED Disposition Condition Date/Time Comment    Admit Stable Mon Apr 5, 2021  9:18 PM Case was discussed with HERNÁN Sal and the patient's admission status was agreed to be Admission Status: inpatient status to the service of Dr Srinivas Cuevas   Follow-up Information    None         Patient's Medications   Discharge Prescriptions    No medications on file     No discharge procedures on file      PDMP Review     None          ED Provider  Electronically Signed by           Adi Mcdermott PA-C  04/05/21 2121

## 2021-04-06 ENCOUNTER — APPOINTMENT (INPATIENT)
Dept: MRI IMAGING | Facility: HOSPITAL | Age: 41
DRG: 883 | End: 2021-04-06
Payer: COMMERCIAL

## 2021-04-06 PROBLEM — Z74.09 DECREASED AMBULATION STATUS: Status: ACTIVE | Noted: 2021-04-06

## 2021-04-06 PROBLEM — M79.9 SOFT TISSUE LESION OF PELVIC REGION: Status: ACTIVE | Noted: 2021-04-06

## 2021-04-06 LAB
25(OH)D3 SERPL-MCNC: 17.9 NG/ML (ref 30–100)
ANION GAP SERPL CALCULATED.3IONS-SCNC: 13 MMOL/L (ref 4–13)
ANION GAP SERPL CALCULATED.3IONS-SCNC: 16 MMOL/L (ref 4–13)
ANION GAP SERPL CALCULATED.3IONS-SCNC: 17 MMOL/L (ref 4–13)
ATRIAL RATE: 78 BPM
BASOPHILS # BLD AUTO: 0.04 THOUSANDS/ΜL (ref 0–0.1)
BASOPHILS NFR BLD AUTO: 1 % (ref 0–1)
BUN SERPL-MCNC: 26 MG/DL (ref 5–25)
BUN SERPL-MCNC: 33 MG/DL (ref 5–25)
BUN SERPL-MCNC: 39 MG/DL (ref 5–25)
C3 SERPL-MCNC: 120 MG/DL (ref 90–180)
C4 SERPL-MCNC: 37 MG/DL (ref 10–40)
CALCIUM SERPL-MCNC: 8.4 MG/DL (ref 8.3–10.1)
CALCIUM SERPL-MCNC: 8.6 MG/DL (ref 8.3–10.1)
CALCIUM SERPL-MCNC: 9.2 MG/DL (ref 8.3–10.1)
CHLORIDE SERPL-SCNC: 110 MMOL/L (ref 100–108)
CHLORIDE SERPL-SCNC: 110 MMOL/L (ref 100–108)
CHLORIDE SERPL-SCNC: 112 MMOL/L (ref 100–108)
CO2 SERPL-SCNC: 17 MMOL/L (ref 21–32)
CO2 SERPL-SCNC: 19 MMOL/L (ref 21–32)
CO2 SERPL-SCNC: 22 MMOL/L (ref 21–32)
CREAT SERPL-MCNC: 3.09 MG/DL (ref 0.6–1.3)
CREAT SERPL-MCNC: 3.56 MG/DL (ref 0.6–1.3)
CREAT SERPL-MCNC: 4.53 MG/DL (ref 0.6–1.3)
CRP SERPL QL: 15.2 MG/L
EOSINOPHIL # BLD AUTO: 0.2 THOUSAND/ΜL (ref 0–0.61)
EOSINOPHIL NFR BLD AUTO: 3 % (ref 0–6)
ERYTHROCYTE [DISTWIDTH] IN BLOOD BY AUTOMATED COUNT: 20.5 % (ref 11.6–15.1)
ERYTHROCYTE [SEDIMENTATION RATE] IN BLOOD: 17 MM/HOUR (ref 0–19)
FERRITIN SERPL-MCNC: 183 NG/ML (ref 8–388)
FOLATE SERPL-MCNC: 18.3 NG/ML (ref 3.1–17.5)
GFR SERPL CREATININE-BSD FRML MDRD: 12 ML/MIN/1.73SQ M
GFR SERPL CREATININE-BSD FRML MDRD: 15 ML/MIN/1.73SQ M
GFR SERPL CREATININE-BSD FRML MDRD: 18 ML/MIN/1.73SQ M
GLUCOSE SERPL-MCNC: 104 MG/DL (ref 65–140)
GLUCOSE SERPL-MCNC: 105 MG/DL (ref 65–140)
GLUCOSE SERPL-MCNC: 88 MG/DL (ref 65–140)
GLUCOSE SERPL-MCNC: 89 MG/DL (ref 65–140)
HCT VFR BLD AUTO: 33.2 % (ref 34.8–46.1)
HGB BLD-MCNC: 10.5 G/DL (ref 11.5–15.4)
IMM GRANULOCYTES # BLD AUTO: 0.04 THOUSAND/UL (ref 0–0.2)
IMM GRANULOCYTES NFR BLD AUTO: 1 % (ref 0–2)
IRON SATN MFR SERPL: 23 %
IRON SERPL-MCNC: 59 UG/DL (ref 50–170)
LYMPHOCYTES # BLD AUTO: 0.58 THOUSANDS/ΜL (ref 0.6–4.47)
LYMPHOCYTES NFR BLD AUTO: 9 % (ref 14–44)
LYMPHOCYTES NFR CSF MANUAL: 60 %
MCH RBC QN AUTO: 32.9 PG (ref 26.8–34.3)
MCHC RBC AUTO-ENTMCNC: 31.6 G/DL (ref 31.4–37.4)
MCV RBC AUTO: 104 FL (ref 82–98)
MONOCYTES # BLD AUTO: 0.43 THOUSAND/ΜL (ref 0.17–1.22)
MONOCYTES NFR BLD AUTO: 7 % (ref 4–12)
MONOS+MACROS CSF MANUAL: 40 %
NEUTROPHILS # BLD AUTO: 5.37 THOUSANDS/ΜL (ref 1.85–7.62)
NEUTS SEG NFR BLD AUTO: 79 % (ref 43–75)
NRBC BLD AUTO-RTO: 0 /100 WBCS
P AXIS: 45 DEGREES
PLATELET # BLD AUTO: 260 THOUSANDS/UL (ref 149–390)
PMV BLD AUTO: 11.6 FL (ref 8.9–12.7)
POTASSIUM SERPL-SCNC: 4.2 MMOL/L (ref 3.5–5.3)
POTASSIUM SERPL-SCNC: 4.4 MMOL/L (ref 3.5–5.3)
POTASSIUM SERPL-SCNC: 4.6 MMOL/L (ref 3.5–5.3)
PR INTERVAL: 138 MS
QRS AXIS: 39 DEGREES
QRSD INTERVAL: 70 MS
QT INTERVAL: 350 MS
QTC INTERVAL: 399 MS
RBC # BLD AUTO: 3.19 MILLION/UL (ref 3.81–5.12)
RPR SER QL: NORMAL
SODIUM SERPL-SCNC: 143 MMOL/L (ref 136–145)
SODIUM SERPL-SCNC: 145 MMOL/L (ref 136–145)
SODIUM SERPL-SCNC: 148 MMOL/L (ref 136–145)
T WAVE AXIS: 73 DEGREES
T4 FREE SERPL-MCNC: 0.91 NG/DL (ref 0.76–1.46)
TIBC SERPL-MCNC: 259 UG/DL (ref 250–450)
TOTAL CELLS COUNTED SPEC: 47
TSH SERPL DL<=0.05 MIU/L-ACNC: 3.77 UIU/ML (ref 0.36–3.74)
VENTRICULAR RATE: 78 BPM
VIT B12 SERPL-MCNC: 511 PG/ML (ref 100–900)
WBC # BLD AUTO: 6.66 THOUSAND/UL (ref 4.31–10.16)

## 2021-04-06 PROCEDURE — 99223 1ST HOSP IP/OBS HIGH 75: CPT | Performed by: INTERNAL MEDICINE

## 2021-04-06 PROCEDURE — 87389 HIV-1 AG W/HIV-1&-2 AB AG IA: CPT | Performed by: PHYSICIAN ASSISTANT

## 2021-04-06 PROCEDURE — 82175 ASSAY OF ARSENIC: CPT | Performed by: PHYSICIAN ASSISTANT

## 2021-04-06 PROCEDURE — 83825 ASSAY OF MERCURY: CPT | Performed by: PHYSICIAN ASSISTANT

## 2021-04-06 PROCEDURE — 82948 REAGENT STRIP/BLOOD GLUCOSE: CPT

## 2021-04-06 PROCEDURE — 70551 MRI BRAIN STEM W/O DYE: CPT

## 2021-04-06 PROCEDURE — 82595 ASSAY OF CRYOGLOBULIN: CPT | Performed by: PHYSICIAN ASSISTANT

## 2021-04-06 PROCEDURE — 84443 ASSAY THYROID STIM HORMONE: CPT | Performed by: PHYSICIAN ASSISTANT

## 2021-04-06 PROCEDURE — 80048 BASIC METABOLIC PNL TOTAL CA: CPT | Performed by: INTERNAL MEDICINE

## 2021-04-06 PROCEDURE — 88108 CYTOPATH CONCENTRATE TECH: CPT | Performed by: PATHOLOGY

## 2021-04-06 PROCEDURE — 86235 NUCLEAR ANTIGEN ANTIBODY: CPT | Performed by: PHYSICIAN ASSISTANT

## 2021-04-06 PROCEDURE — 84166 PROTEIN E-PHORESIS/URINE/CSF: CPT | Performed by: PATHOLOGY

## 2021-04-06 PROCEDURE — 84439 ASSAY OF FREE THYROXINE: CPT | Performed by: PHYSICIAN ASSISTANT

## 2021-04-06 PROCEDURE — 72148 MRI LUMBAR SPINE W/O DYE: CPT

## 2021-04-06 PROCEDURE — 83550 IRON BINDING TEST: CPT | Performed by: INTERNAL MEDICINE

## 2021-04-06 PROCEDURE — 86160 COMPLEMENT ANTIGEN: CPT | Performed by: PHYSICIAN ASSISTANT

## 2021-04-06 PROCEDURE — 86430 RHEUMATOID FACTOR TEST QUAL: CPT | Performed by: PHYSICIAN ASSISTANT

## 2021-04-06 PROCEDURE — 80201 ASSAY OF TOPIRAMATE: CPT | Performed by: PHYSICIAN ASSISTANT

## 2021-04-06 PROCEDURE — 86255 FLUORESCENT ANTIBODY SCREEN: CPT | Performed by: PHYSICIAN ASSISTANT

## 2021-04-06 PROCEDURE — 86140 C-REACTIVE PROTEIN: CPT | Performed by: PHYSICIAN ASSISTANT

## 2021-04-06 PROCEDURE — 85025 COMPLETE CBC W/AUTO DIFF WBC: CPT | Performed by: INTERNAL MEDICINE

## 2021-04-06 PROCEDURE — 99223 1ST HOSP IP/OBS HIGH 75: CPT | Performed by: PSYCHIATRY & NEUROLOGY

## 2021-04-06 PROCEDURE — 84166 PROTEIN E-PHORESIS/URINE/CSF: CPT | Performed by: PHYSICIAN ASSISTANT

## 2021-04-06 PROCEDURE — 83918 ORGANIC ACIDS TOTAL QUANT: CPT | Performed by: PHYSICIAN ASSISTANT

## 2021-04-06 PROCEDURE — 84165 PROTEIN E-PHORESIS SERUM: CPT | Performed by: PATHOLOGY

## 2021-04-06 PROCEDURE — 86592 SYPHILIS TEST NON-TREP QUAL: CPT | Performed by: PHYSICIAN ASSISTANT

## 2021-04-06 PROCEDURE — 84425 ASSAY OF VITAMIN B-1: CPT | Performed by: PHYSICIAN ASSISTANT

## 2021-04-06 PROCEDURE — 86038 ANTINUCLEAR ANTIBODIES: CPT | Performed by: PHYSICIAN ASSISTANT

## 2021-04-06 PROCEDURE — 83655 ASSAY OF LEAD: CPT | Performed by: PHYSICIAN ASSISTANT

## 2021-04-06 PROCEDURE — 84165 PROTEIN E-PHORESIS SERUM: CPT | Performed by: PHYSICIAN ASSISTANT

## 2021-04-06 PROCEDURE — 82607 VITAMIN B-12: CPT | Performed by: PHYSICIAN ASSISTANT

## 2021-04-06 PROCEDURE — 83540 ASSAY OF IRON: CPT | Performed by: INTERNAL MEDICINE

## 2021-04-06 PROCEDURE — 82728 ASSAY OF FERRITIN: CPT | Performed by: INTERNAL MEDICINE

## 2021-04-06 PROCEDURE — 82164 ANGIOTENSIN I ENZYME TEST: CPT | Performed by: PHYSICIAN ASSISTANT

## 2021-04-06 PROCEDURE — 85652 RBC SED RATE AUTOMATED: CPT | Performed by: PHYSICIAN ASSISTANT

## 2021-04-06 PROCEDURE — 36415 COLL VENOUS BLD VENIPUNCTURE: CPT | Performed by: INTERNAL MEDICINE

## 2021-04-06 PROCEDURE — G1004 CDSM NDSC: HCPCS

## 2021-04-06 PROCEDURE — 93010 ELECTROCARDIOGRAM REPORT: CPT | Performed by: INTERNAL MEDICINE

## 2021-04-06 PROCEDURE — 99233 SBSQ HOSP IP/OBS HIGH 50: CPT | Performed by: INTERNAL MEDICINE

## 2021-04-06 PROCEDURE — 86225 DNA ANTIBODY NATIVE: CPT | Performed by: PHYSICIAN ASSISTANT

## 2021-04-06 PROCEDURE — 82746 ASSAY OF FOLIC ACID SERUM: CPT | Performed by: PHYSICIAN ASSISTANT

## 2021-04-06 PROCEDURE — 84207 ASSAY OF VITAMIN B-6: CPT | Performed by: PHYSICIAN ASSISTANT

## 2021-04-06 RX ORDER — LEVETIRACETAM 500 MG/1
500 TABLET ORAL EVERY 12 HOURS SCHEDULED
Status: DISCONTINUED | OUTPATIENT
Start: 2021-04-06 | End: 2021-04-26 | Stop reason: HOSPADM

## 2021-04-06 RX ORDER — GABAPENTIN 100 MG/1
100 CAPSULE ORAL 3 TIMES DAILY PRN
Status: DISCONTINUED | OUTPATIENT
Start: 2021-04-06 | End: 2021-04-26 | Stop reason: HOSPADM

## 2021-04-06 RX ORDER — LORAZEPAM 2 MG/ML
1 INJECTION INTRAMUSCULAR ONCE
Status: COMPLETED | OUTPATIENT
Start: 2021-04-06 | End: 2021-04-06

## 2021-04-06 RX ORDER — DEXTROSE MONOHYDRATE 50 MG/ML
100 INJECTION, SOLUTION INTRAVENOUS CONTINUOUS
Status: DISCONTINUED | OUTPATIENT
Start: 2021-04-06 | End: 2021-04-06

## 2021-04-06 RX ORDER — BUTALBITAL, ACETAMINOPHEN AND CAFFEINE 50; 325; 40 MG/1; MG/1; MG/1
1 TABLET ORAL EVERY 6 HOURS PRN
Status: DISCONTINUED | OUTPATIENT
Start: 2021-04-06 | End: 2021-04-26 | Stop reason: HOSPADM

## 2021-04-06 RX ADMIN — LORAZEPAM 1 MG: 2 INJECTION INTRAMUSCULAR; INTRAVENOUS at 15:47

## 2021-04-06 RX ADMIN — LEVETIRACETAM 500 MG: 500 TABLET, FILM COATED ORAL at 08:47

## 2021-04-06 RX ADMIN — DOCUSATE SODIUM 100 MG: 100 CAPSULE, LIQUID FILLED ORAL at 08:46

## 2021-04-06 RX ADMIN — ZOLPIDEM TARTRATE 10 MG: 5 TABLET, COATED ORAL at 21:39

## 2021-04-06 RX ADMIN — FOLIC ACID 1 MG: 1 TABLET ORAL at 08:46

## 2021-04-06 RX ADMIN — SODIUM BICARBONATE 125 ML/HR: 84 INJECTION INTRAVENOUS at 14:08

## 2021-04-06 RX ADMIN — BUTALBITAL, ACETAMINOPHEN AND CAFFEINE 1 TABLET: 50; 325; 40 TABLET ORAL at 08:57

## 2021-04-06 RX ADMIN — THIAMINE HYDROCHLORIDE 500 MG: 100 INJECTION, SOLUTION INTRAMUSCULAR; INTRAVENOUS at 17:19

## 2021-04-06 RX ADMIN — SODIUM BICARBONATE: 84 INJECTION, SOLUTION INTRAVENOUS at 17:21

## 2021-04-06 RX ADMIN — GABAPENTIN 100 MG: 100 CAPSULE ORAL at 03:30

## 2021-04-06 RX ADMIN — POLYETHYLENE GLYCOL 3350 17 G: 17 POWDER, FOR SOLUTION ORAL at 08:48

## 2021-04-06 RX ADMIN — CYCLOBENZAPRINE HYDROCHLORIDE 10 MG: 10 TABLET, FILM COATED ORAL at 08:47

## 2021-04-06 RX ADMIN — LEVOTHYROXINE SODIUM 25 MCG: 25 TABLET ORAL at 06:14

## 2021-04-06 RX ADMIN — BUTALBITAL, ACETAMINOPHEN AND CAFFEINE 1 TABLET: 50; 325; 40 TABLET ORAL at 17:32

## 2021-04-06 RX ADMIN — THIAMINE HYDROCHLORIDE 500 MG: 100 INJECTION, SOLUTION INTRAMUSCULAR; INTRAVENOUS at 10:12

## 2021-04-06 RX ADMIN — DEXTROSE 100 ML/HR: 5 SOLUTION INTRAVENOUS at 10:12

## 2021-04-06 RX ADMIN — HEPARIN SODIUM 5000 UNITS: 5000 INJECTION INTRAVENOUS; SUBCUTANEOUS at 21:39

## 2021-04-06 RX ADMIN — CYCLOBENZAPRINE HYDROCHLORIDE 10 MG: 10 TABLET, FILM COATED ORAL at 23:37

## 2021-04-06 RX ADMIN — MIRTAZAPINE 7.5 MG: 15 TABLET, FILM COATED ORAL at 21:39

## 2021-04-06 RX ADMIN — HEPARIN SODIUM 5000 UNITS: 5000 INJECTION INTRAVENOUS; SUBCUTANEOUS at 14:13

## 2021-04-06 RX ADMIN — THIAMINE HYDROCHLORIDE 500 MG: 100 INJECTION, SOLUTION INTRAMUSCULAR; INTRAVENOUS at 21:35

## 2021-04-06 RX ADMIN — LEVETIRACETAM 500 MG: 500 TABLET, FILM COATED ORAL at 21:39

## 2021-04-06 NOTE — ASSESSMENT & PLAN NOTE
· Potassium 5 6 on admission with creatinine at 5 15  · Patient received 2 L bolus of fluids in the ED   · Continue to monitor on BMP   · Patient placed on telemetry

## 2021-04-06 NOTE — CONSULTS
Consultation - Neurology   Isaias Hoffman 47 y o  female MRN: [id number]  Unit/Bed#: OVR 03 Encounter: [id number]      Assessment/Plan     * Generalized weakness  Assessment & Plan  61-year-old female with restrictive eating disorder (anorexia nervosa), laxative abuse, seizure disorder, pituitary enlargement, history of benzodiazepine abuse, vitamin-D deficiency, depression, history of gastric bypass, migraine headaches, presents with progressive bilateral upper and lower extremity weakness and sensory abnormality  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, possibly related to nutritional deficiency; cannot rule out vasculitic neuropathy  Lumbar puncture performed in the ED thus far with relatively bland results  Protein 49, WBC 2, glucose 57, RBC 31, no xanthochromia, gram stain negative  Laboratory studies consistent with hyperchloremic acidosis, likely related to significant doses of topiramate  Plan:  -will check MRI brain and L-spine  Consideration for additional neuro imaging pending results   -check B12, folate, B1, B6, TSH, RPR, HIV, heavy metals screen, Lyme serology, UPEP, SPEP, ACE, MMA, ESR, CRP, RF, MAYI, ANCA, cryoglobulins, C3 and C4, ds DNA, Sjogren's antibodies, ENS-2, vitamin-D, admission topiramate level   -will start IV thiamine repletion 500 mg t i d  after drawing thiamine level   -will stop topiramate and instead start Keppra 500 mg b i d  as topiramate is likely causing her hyperchloremic acidosis and also can cause appetite suppression  Seizures are described as staring episodes and patient is currently nonambulatory; if patient has breakthrough seizures, can adjust medications accordingly   -will stop Wellbutrin as it can lower seizure threshold and is also not recommended for use in patients with eating disorders  Recommend Psychiatry consult for medication adjustment for patient's depression    -patient will likely require outpatient EMG/NCS for further evaluation of polyneuropathy  -PT/OT  Anorexia nervosa, restricting type  Assessment & Plan  Patient admits she has not been eating much at all, particularly since her admission to [facility]  She has been drinking fluids  She also has documented history of laxative abuse  Pituitary enlargement  Assessment & Plan  MRI brain from October 2019 demonstrated pituitary enlargement without evidence of adenoma  Endocrine evaluation as deemed necessary by primary team         Recommendations for outpatient neurological follow up have yet to be determined  History of Present Illness     Reason for Consult / Principal Problem:  Bilateral upper and lower extremity weakness  Hx and PE limited by:  N/A  HPI: Jamaica Kidd is a 47 y o  right handed female with restrictive eating disorder (anorexia nervosa), laxative abuse, seizure disorder, pituitary enlargement, history of benzodiazepine abuse, depression, history of gastric bypass, vitamin-D deficiency, migraine headaches, presents with progressive bilateral upper and lower extremity weakness and sensory abnormality  The patient states that approximately 1 month ago, she started having difficulty ambulating as her lower extremities felt weak  She presented to the emergency department at [facility] on 03/09/2021 and was given potassium infusion and discharged  She continued to have increasing lower extremity weakness and then noticed the weakness progressing to her upper extremities, causing difficulty with coordination and holding onto things  She then became so weak that she was unable to ambulate and was basically bed bound and again presented to the emergency department at [facility] and was admitted  She was given blood transfusions and vitamin supplementation and discharged to [facility] for rehab  Per review of [facility] notes, patient was reportedly deficient in B12, folate and was anemic      Patient presented to the emergency department at [address] [place] [address] [place] for evaluation of abnormal labs  She was found to have RENE  Neurology was consulted due to patient's progressive weakness and a lumbar puncture was performed while in the emergency department  She also had CT head which was unremarkable  CT abdomen and pelvis demonstrated severely distended bladder with bilateral obstructive uropathy and evidence of constipation  Patient also complains of numbness throughout the 4 extremities, shooting pains in her distal upper extremities, pain in the dorsum of her feet and feeling that there is a "cusion" on the soles of her feet, urinary retention, and saddle anesthesia  She has had no incontinence  She reports blurred vision but no diplopia or dysarthria  No difficulty swallowing  She admits she has been drinking fluids but has not had significant food intake in quite some time (unclear duration)  As for her seizure disorder, she says she had was diagnosed with seizures in childhood and has been well controlled as long she takes her topiramate 200 mg t i d , which also helps with her migraines  She describes her seizures as staring episodes and losing track of time, but no generalized tonic-clonic movements  She says she follows with a neurologist at [facility]  Neurologic exam significant for weakness as detailed below, hypo/areflexia, and length-dependent sensory abnormality  Inpatient consult to Neurology  Consult performed by: Phuong Ryan PA-C  Consult ordered by: Walter Gibson DO          Review of Systems   Constitutional: Negative  HENT: Negative  Eyes: Negative  Respiratory: Negative  Cardiovascular: Negative  Gastrointestinal: Positive for constipation  Endocrine: Negative  Genitourinary:        Urinary retention   Musculoskeletal: Positive for neck pain (Chronic left-sided)  Skin: Negative for rash  Allergic/Immunologic: Negative  Neurological: Positive for weakness and numbness  As above  Hematological: Negative  Psychiatric/Behavioral: Negative  Historical Information   Past Medical History:   Diagnosis Date    Anemia     Psychiatric disorder     depression anxiety    Seizures (HCC)      Past Surgical History:   Procedure Laterality Date    GASTRIC BYPASS       Social History   Social History     Substance and Sexual Activity   Alcohol Use No     Social History     Substance and Sexual Activity   Drug Use No    Comment: in revovery for benzos     E-Cigarette/Vaping     E-Cigarette/Vaping Substances     Social History     Tobacco Use   Smoking Status Current Every Day Smoker   Smokeless Tobacco Never Used     Family History:   Family History   Problem Relation Age of Onset    Hypertension Mother     Heart attack Mother     No Known Problems Father     Lung cancer Maternal Grandmother     Hypothyroidism Maternal Grandmother     Diabetes type II Paternal Grandmother     Diabetes type II Paternal Grandfather        Review of previous medical records was completed  Meds/Allergies   PTA meds:   Prior to Admission Medications   Prescriptions Last Dose Informant Patient Reported? Taking?    AquaLance Lancets 30G MISC  Self Yes No   Sig: USE TO TEST BLOOD GLUCOSE FOUR TIMES A DAY   Cholecalciferol 125 MCG (5000 UT) TABS   No No   Si tabs daily   Continuous Blood Gluc  (FREESTYLE CAITY 14 DAY READER) DINESH  Self No No   Si Device by Does not apply route 4 (four) times a day (before meals and at bedtime)   Patient not taking: Reported on 2020   Continuous Blood Gluc Sensor (FREESTYLE CAITY 14 DAY SENSOR) MISC  Self No No   Si application by Does not apply route every 14 (fourteen) days   Patient not taking: Reported on 2019   OneTouch Ultra test strip  Self Yes No   Sig: USE TO TEST BLOOD GLUCOSE FOUR TIMES A DAY   acarbose (PRECOSE) 25 mg tablet Not Taking at Unknown time  No No   Si tab daily with dinner   Patient not taking: Reported on 2021 buPROPion (WELLBUTRIN SR) 100 mg 12 hr tablet  Self Yes No   Sig: Take 100 mg by mouth 3 (three) times a day    cloNIDine (CATAPRES) 0 2 mg tablet  Self Yes No   Sig: Take 0 2 mg by mouth daily as needed for high blood pressure   cyanocobalamin 1,000 mcg/mL  Self Yes No   Sig: inject 1 milliliter ( 1000 MCG ) intramuscularly Every Month   cyclobenzaprine (FLEXERIL) 10 mg tablet  Self Yes No   Sig: Take 10 mg by mouth 3 (three) times a day as needed for muscle spasms   docusate sodium (COLACE) 100 mg capsule  Self Yes No   Sig: Take 100 mg by mouth 2 (two) times a day   ergocalciferol (ERGOCALCIFEROL) 1 25 MG (77155 UT) capsule   No No   Si cap weekly   famotidine (PEPCID) 20 mg tablet  Self Yes No   Sig: Take 20 mg by mouth daily   folic acid (FOLVITE) 1 mg tablet  Self Yes No   Sig: Take by mouth daily   glucagon (GLUCAGON EMERGENCY) 1 MG injection  Self No No   Sig: Inject 1 mg under the skin once as needed for low blood sugar for up to 1 dose   levothyroxine 25 mcg tablet   No No   Sig: Please take 1 tablet daily Monday through Friday and 2 tablets on Saturday and      mirtazapine (REMERON) 15 mg tablet  Self Yes No   Sig: Take 7 5 mg by mouth daily at bedtime    multivitamin (THERAGRAN) TABS  Self Yes No   Sig: Take 1 tablet by mouth daily   polyethylene glycol (MIRALAX) 17 g packet  Self Yes No   Sig: Take 17 g by mouth daily   potassium chloride (K-DUR,KLOR-CON) 10 mEq tablet  Self Yes No   Sig: Take 10 mEq by mouth daily   prazosin (MINIPRESS) 5 mg capsule  Self Yes No   Sig: Take by mouth daily at bedtime    thiamine (VITAMIN B1) 100 mg tablet  Self Yes No   Sig: Take 100 mg by mouth daily   topiramate (TOPAMAX) 200 MG tablet  Self Yes No   Sig: Take 200 mg by mouth 3 (three) times a day 3 times daily    varenicline (CHANTIX) 1 mg tablet  Self Yes No   Sig: Take 1 mg by mouth 2 (two) times a day   venlafaxine (EFFEXOR-XR) 75 mg 24 hr capsule Not Taking at Unknown time Self Yes No   zolpidem (AMBIEN) 10 mg tablet  Self Yes No   Sig: Take 10 mg by mouth daily at bedtime as needed for sleep      Facility-Administered Medications: None       Allergies   Allergen Reactions    Anti-Hist [Diphenhydramine]     Buspar [Buspirone]     Haldol [Haloperidol]     Penicillins Throat Swelling    Pennsaid [Diclofenac Sodium]     Zoloft [Sertraline]        Objective   Vitals:Blood pressure 127/84, pulse 92, resp  rate 22, weight 90 7 kg (200 lb), SpO2 98 %  ,Body mass index is 33 28 kg/m²  Intake/Output Summary (Last 24 hours) at 4/6/2021 1412  Last data filed at 4/6/2021 1406  Gross per 24 hour   Intake 4790 ml   Output 9500 ml   Net -4710 ml       Invasive Devices: Invasive Devices     Peripheral Intravenous Line            Peripheral IV 04/05/21 Right Antecubital less than 1 day    Peripheral IV 04/05/21 Right Antecubital less than 1 day          Drain            Urethral Catheter less than 1 day                Physical Exam   General:  Patient is well-developed, obese BMI 33 28, and in no acute distress  Appears older than stated age  HEENT:  Head normocephalic  Eyes anicteric  Dry mucous membranes  Edentate  Cardiovascular:  With regular rhythm  Lungs:  Normal effort  Nonlabored breathing  Extremities:  With no significant edema  Skin: No rashes  Neurologic Exam  Mental Status:  Patient is alert, pleasantly interactive, and appropriately conversational   No obvious symbolic language difficulty or dysarthria, and the patient is fully oriented  Gait deferred for safety  Cranial Nerves:   II: Visual fields full to confrontation  Pupils equal, round, reactive to light with normal accomodation  Cannot visualize optic fundi  III,IV,VI: extraocular movements intact with end gaze nystagmus noted with leftward gaze  V: Sensation in the V1 through V3 distributions intact to light touch and pin bilaterally  VII: Face is symmetric with no weakness noted     VIII: Audition intact to finger rub bilaterally  IX/X: Uvula midline  Soft palate elevation symmetric  XII: Tongue midline with no atrophy or fasciculations with appropriate movement  Coordination:  Accurate with finger-to-nose maneuvers bilaterally within the confines of her weakness  Motor testing: At rest, hands held in a claw-like position with extended metacarpals and flexed fingers bilaterally  RUE:  4-/5 shoulder abduction, 4-/5 triceps extension, 4/5 biceps flexion, 1/5 wrist extension, 2+/5 wrist flexion, 2/5 finger abduction, 2+/5  strength  LUE:  4/5 shoulder abduction, 4/5 triceps extension, 4+/5 biceps flexion, 3/5 wrist extension, 3/5 wrist flexion, 2+/5 finger abduction, 3/5  strength  RLE:  2/5 hip flexion, 4-/5 hip abduction, 3/5 hip adduction, 2+/5 knee flexion and extension, 0/5 ankle dorsiflexion, 3/5 great toe flexion, 4-/5 ankle plantar flexion  LLE:  2+/5 hip flexion, 4/5 hip abduction, 4-/5 hip adduction, 4-/5 knee flexion and extension, 5/5 ankle dorsiflexion, great toe flexion, and ankle plantar flexion  Sensory testing with length dependent temperature and pin loss in the bilateral upper and lower extremities in a glove/stocking distribution (greater appreciation proximally than distally) but preserved vibratory sense throughout  Significantly impaired proprioception incongruent with initial exam with accurate finger-to-nose testing (patient has some degree of preserved proprioception given ability to perform finger-to-nose)  Diffusely hyporeflexic throughout the upper extremities but does have a 1+ left brachioradialis  Absent bilateral knees and ankles  Jeanette sign bilaterally negative  No ankle clonus  No exaggerated jaw jerk  Toe responses were mute bilaterally      Lab Results:   CBC:   Results from last 7 days   Lab Units 04/06/21  0613 04/05/21  1912 04/05/21  1824   WBC Thousand/uL 6 66  --  7 57   RBC Million/uL 3 19*  --  3 05*   HEMOGLOBIN g/dL 10 5*  --  10 0*   I STAT HEMOGLOBIN g/dl  --  10 5*  --    HEMATOCRIT % 33 2*  --  31 5*   HEMATOCRIT, ISTAT %  --  31*  --    MCV fL 104*  --  103*   PLATELETS Thousands/uL 260  --  293   , BMP/CMP:   Results from last 7 days   Lab Units 04/06/21  1215 04/06/21  0613 04/05/21  1912 04/05/21  1824   SODIUM mmol/L 143 148*  --  134*   POTASSIUM mmol/L 4 6 4 4  --  5 6*   CHLORIDE mmol/L 110* 112*  --  101   CO2 mmol/L 17* 19*  --  18*   CO2, I-STAT mmol/L  --   --  20*  --    BUN mg/dL 33* 39*  --  47*   CREATININE mg/dL 3 56* 4 53*  --  5 15*   GLUCOSE, ISTAT mg/dl  --   --  80  --    CALCIUM mg/dL 8 4 9 2  --  9 2   AST U/L  --   --   --  18   ALT U/L  --   --   --  22   ALK PHOS U/L  --   --   --  194*   EGFR ml/min/1 73sq m 15 12  --  10     Imaging Studies: I have personally reviewed pertinent reports  and I have personally reviewed pertinent films in PACS MRI brain, CT head  EKG, Pathology, and Other Studies: I have personally reviewed pertinent reports      VTE Prophylaxis: Sequential compression device Chito Cummins     Code Status: Level 1 - Full Code  Advance Directive and Living Will:      Power of :    POLST:

## 2021-04-06 NOTE — ASSESSMENT & PLAN NOTE
· Glucose 88 on admission   · Patient with history of hypoglycemia due to gastric bypass surgery and anorexia  · Patient prescribed acarbose 25 mg daily  Patient reports she is not taking   · Monitor blood sugar every 6 hours

## 2021-04-06 NOTE — ASSESSMENT & PLAN NOTE
· Patient with sodium of 134 on admission   · In the ED given 1 L bolus normal saline and 1 L bolus isolyte  · Patient reports poor p o  intake   · Continue to monitor

## 2021-04-06 NOTE — ASSESSMENT & PLAN NOTE
Incidental finding on MRI of the lumbar spine with right cul-de-sac lesion-  will consult gyn and order pelvic ultrasound

## 2021-04-06 NOTE — H&P
Jair Jensen 89 1980, 47 y o  female MRN: [id number]  Unit/Bed#: OVR 03 Encounter: [id number]  Primary Care Provider: Savana Farley DO   Date and time admitted to hospital: 4/5/2021  5:54 PM    * Generalized weakness  Assessment & Plan  · Patient presents from [facility] due to elevated creatinine on routine outpatient labs  · Patient reports that over the last month she has been having ongoing extremity weakness that is ascending  Due to weakness that started 1 month ago patient was admitted at [facility] from 03/11-3/12 in was discharged to SNF due to persistent weakness  · Patient reports that weakness has become worse since getting to [facility] now involving the arms and has become quite clumsy  · ED spoke to Neurology who recommended LP, successfully performed in the ED  · CSF labs ordered  · Order MRI of thoracic and lumbar spine  · Consult neurology    RENE (acute kidney injury) ([facility] [address] )  Assessment & Plan  · Creatinine 5 15 on admission and potassium of 5 6  · Baseline creatinine around 0 6-0 8  · Patient reports decreased urine output  · ED spoke to Nephrology who recommended a 1 L normal saline bolus after receiving isolyte bolus  · Continue to monitor BMP  · Consult nephrology    Hyperkalemia  Assessment & Plan  · Potassium 5 6 on admission with creatinine at 5 15  · Patient received 2 L bolus of fluids in the ED   · Continue to monitor on BMP   · Patient placed on telemetry    Hyponatremia  Assessment & Plan  · Patient with sodium of 134 on admission   · In the ED given 1 L bolus normal saline and 1 L bolus isolyte  · Patient reports poor p o  intake   · Continue to monitor    Hypoglycemia  Assessment & Plan  · Glucose 88 on admission   · Patient with history of hypoglycemia due to gastric bypass surgery and anorexia  · Patient prescribed acarbose 25 mg daily  Patient reports she is not taking   · Monitor blood sugar every 6 hours      Anemia  Assessment & Plan  · Hemoglobin 10 0 on admission  · No signs of active bleeding  · Continue to monitor on CBC    Depression  Assessment & Plan  · Continue home medications Wellbutrin, Remeron, and Ambien    Anorexia nervosa, restricting type  Assessment & Plan  · Patient with history of anorexia  · Based off previous notes from endocrinology patient reports only about 1 meal per day   · History of hypoglycemia and gastric bypass surgery  · Consult nutrition    History of gastric bypass  Assessment & Plan  · Bypass surgery performed in 2007 and 2010         VTE Prophylaxis: Heparin  / sequential compression device   Code Status: Level 1 Full Code   POLST: There is no POLST form on file for this patient (pre-hospital)  Discussion with family: No    Anticipated Length of Stay:  Patient will be admitted on an Inpatient basis with an anticipated length of stay of  > 2 midnights  Justification for Hospital Stay: Generalized weakness    Total Time for Visit, including Counseling / Coordination of Care: 1 hour  Greater than 50% of this total time spent on direct patient counseling and coordination of care  Chief Complaint:   Abnormal outpatient labs     History of Present Illness:    Yevgeniy Apodaca is a 47 y o  female with PMH of gastric bypass, anorexia, hypoglycemia, depression,  who presents due to an elevated creatinine of over 5 with a baseline of 0 6 through 0 8 found on routine outpatient labs  Patient reports that she has had decreased urine output over the last couple of days  Patient denies fevers or chills, discoloration in urine or burning  Patient also reports that she has been having generalized weakness over the past month  Weakness started in her feet and has ascended now affecting her hands  Patient was seen at [facility] in beginning of March  Patient was sent to [facility] due to continued weakness to become stronger  Patient reports that she has become weaker while there       Patient also with history of hypoglycemia and anorexia  Patient reports that she had a gastric bypass surgery done in 2007 and 2010  Since these procedures she has had issues with her blood sugar  According to patient when she it causes her blood sugar to drop  Patient denies lack of appetite  Patient reports that she likes to limit what she eats to help control her weight  Patient reports that she typically only eats 1 meal per day and sometimes does not eat at all  Due to this she has had periods of her blood sugar being in the 50s sometimes lower  Patient is not concerned about her low blood sugar or not eating  Review of Systems:    Review of Systems   Constitutional: Positive for fatigue  Negative for fever  HENT: Negative for sore throat  Respiratory: Negative for cough, chest tightness and shortness of breath  Cardiovascular: Negative for chest pain  Gastrointestinal: Negative for abdominal distention, abdominal pain, diarrhea, nausea and vomiting  Genitourinary: Positive for frequency (Decreased)  Negative for difficulty urinating  Musculoskeletal: Negative for arthralgias  Neurological: Positive for weakness (Ascending )  Negative for speech difficulty and headaches  Psychiatric/Behavioral: Negative for agitation and behavioral problems  All other systems reviewed and are negative  Past Medical and Surgical History:     Past Medical History:   Diagnosis Date    Anemia     Psychiatric disorder     depression anxiety    Seizures ([facility] [address] )        Past Surgical History:   Procedure Laterality Date    GASTRIC BYPASS         Meds/Allergies:    Prior to Admission medications    Medication Sig Start Date End Date Taking?  Authorizing Provider   acarbose (PRECOSE) 25 mg tablet 1 tab daily with dinner 12/11/20   DO Jorje TayloraLance Lancets 30G MISC USE TO TEST BLOOD GLUCOSE FOUR TIMES A DAY 10/29/20   Historical Provider, MD   buPROPion [facility]) 100 mg 12 hr tablet Take 100 mg by mouth 3 (three) times a day     Historical Provider, MD   Cholecalciferol 125 MCG (5000 UT) TABS 2 tabs daily 11/17/20   Raymond Troncoso, DO   cloNIDine (CATAPRES) 0 2 mg tablet Take 0 2 mg by mouth daily as needed for high blood pressure    Historical Provider, MD   Continuous Blood Gluc  (FREESTYLE CAITY 14 DAY READER) DINESH 1 Device by Does not apply route 4 (four) times a day (before meals and at bedtime)  Patient not taking: Reported on 11/17/2020 5/7/19   SMITHA Moon   Continuous Blood Gluc Sensor (FREESTYLE CAITY 14 DAY SENSOR) MISC 1 application by Does not apply route every 14 (fourteen) days  Patient not taking: Reported on 11/12/2019 5/7/19   SMITHA Moon   cyanocobalamin 1,000 mcg/mL inject 1 milliliter ( 1000 MCG ) intramuscularly Every Month 10/18/20   Historical Provider, MD   cyclobenzaprine (FLEXERIL) 10 mg tablet Take 10 mg by mouth 3 (three) times a day as needed for muscle spasms    Historical Provider, MD   docusate sodium (COLACE) 100 mg capsule Take 100 mg by mouth 2 (two) times a day    Historical Provider, MD   ergocalciferol (ERGOCALCIFEROL) 1 25 MG (06478 UT) capsule 1 cap weekly 1/4/21   Raymond Troncoso, DO   famotidine (PEPCID) 20 mg tablet Take 20 mg by mouth daily    Historical Provider, MD   folic acid (FOLVITE) 1 mg tablet Take by mouth daily    Historical Provider, MD   glucagon (GLUCAGON EMERGENCY) 1 MG injection Inject 1 mg under the skin once as needed for low blood sugar for up to 1 dose 10/3/18   Andrade Aba, DO   levothyroxine 25 mcg tablet Please take 1 tablet daily Monday through Friday and 2 tablets on Saturday and Sunday 11/17/20   Andrade Aba, DO   mirtazapine (REMERON) 15 mg tablet Take 7 5 mg by mouth daily at bedtime     Historical Provider, MD   multivitamin (THERAGRAN) TABS Take 1 tablet by mouth daily    Historical Provider, MD   OneTouch Ultra test strip USE TO TEST BLOOD GLUCOSE FOUR TIMES A DAY 10/29/20   Historical Provider, MD   polyethylene glycol (MIRALAX) 17 g packet Take 17 g by mouth daily    Historical Provider, MD   potassium chloride (K-DUR,KLOR-CON) 10 mEq tablet Take 10 mEq by mouth daily    Historical Provider, MD   prazosin (MINIPRESS) 5 mg capsule Take by mouth daily at bedtime     Historical Provider, MD   thiamine (VITAMIN B1) 100 mg tablet Take 100 mg by mouth daily    Historical Provider, MD   topiramate (TOPAMAX) 200 MG tablet Take 400 mg by mouth 3 times daily     Historical Provider, MD   varenicline (CHANTIX) 1 mg tablet Take 1 mg by mouth 2 (two) times a day    Historical Provider, MD   venlafaxine (EFFEXOR-XR) 75 mg 24 hr capsule  11/6/20   Historical Provider, MD   zolpidem (AMBIEN) 10 mg tablet Take 10 mg by mouth daily at bedtime as needed for sleep    Historical Provider, MD     I have reviewed home medications with patient personally  Allergies:    Allergies   Allergen Reactions    Anti-Hist [Diphenhydramine]     Buspar [Buspirone]     Haldol [Haloperidol]     Penicillins Throat Swelling    Pennsaid [Diclofenac Sodium]     Zoloft [Sertraline]        Social History:     Marital Status: Single   Occupation:  Unknown  Patient Pre-hospital Living Situation:  [facility]  Patient Pre-hospital Level of Mobility:  Limited unable to walk  Patient Pre-hospital Diet Restrictions:  Regular  Substance Use History:   Social History     Substance and Sexual Activity   Alcohol Use No     Social History     Tobacco Use   Smoking Status Current Every Day Smoker   Smokeless Tobacco Never Used     Social History     Substance and Sexual Activity   Drug Use No    Comment: in revovery for benzos       Family History:    Family History   Problem Relation Age of Onset    Hypertension Mother     Heart attack Mother     No Known Problems Father     Lung cancer Maternal Grandmother     Hypothyroidism Maternal Grandmother     Diabetes type II Paternal Grandmother     Diabetes type II Paternal Grandfather Physical Exam:     Vitals:   Blood Pressure: 152/95 (04/05/21 2200)  Pulse: 101 (04/05/21 2200)  Respirations: 16 (04/05/21 2200)  Weight - Scale: 90 7 kg (200 lb) (04/05/21 1752)  SpO2: 93 % (04/05/21 2200)    Physical Exam  Vitals signs and nursing note reviewed  Constitutional:       Appearance: Normal appearance  She is obese  HENT:      Head: Normocephalic  Eyes:      Extraocular Movements: Extraocular movements intact  Pupils: Pupils are equal, round, and reactive to light  Neck:      Musculoskeletal: Normal range of motion  Cardiovascular:      Rate and Rhythm: Normal rate and regular rhythm  Heart sounds: No murmur  Pulmonary:      Effort: Pulmonary effort is normal  No respiratory distress  Breath sounds: Normal breath sounds  No wheezing  Abdominal:      General: Bowel sounds are normal  There is no distension  Tenderness: There is no abdominal tenderness  There is no guarding  Musculoskeletal: Normal range of motion  Right lower leg: No edema  Left lower leg: No edema  Comments: 1/5 lower extremity strength   3/5 upper extremity strength    Skin:     General: Skin is warm  Neurological:      General: No focal deficit present  Mental Status: She is alert and oriented to person, place, and time  Cranial Nerves: No dysarthria or facial asymmetry  Motor: Weakness and abnormal muscle tone present  No seizure activity  Psychiatric:         Mood and Affect: Mood is anxious and depressed  Affect is tearful  Behavior: Behavior normal          Thought Content: Thought content normal          Additional Data:     Lab Results: I have personally reviewed pertinent reports        Results from last 7 days   Lab Units 04/05/21 1912 04/05/21  1824   WBC Thousand/uL  --  7 57   HEMOGLOBIN g/dL  --  10 0*   I STAT HEMOGLOBIN g/dl 10 5*  --    HEMATOCRIT %  --  31 5*   HEMATOCRIT, ISTAT % 31*  --    PLATELETS Thousands/uL  --  293   NEUTROS PCT %  --  73   LYMPHS PCT %  --  16   MONOS PCT %  --  7   EOS PCT %  --  4     Results from last 7 days   Lab Units 04/05/21  1912 04/05/21  1824   SODIUM mmol/L  --  134*   POTASSIUM mmol/L  --  5 6*   CHLORIDE mmol/L  --  101   CO2 mmol/L  --  18*   CO2, I-STAT mmol/L 20*  --    BUN mg/dL  --  47*   CREATININE mg/dL  --  5 15*   ANION GAP mmol/L  --  15*   CALCIUM mg/dL  --  9 2   ALBUMIN g/dL  --  3 3*   TOTAL BILIRUBIN mg/dL  --  0 40   ALK PHOS U/L  --  194*   ALT U/L  --  22   AST U/L  --  18   GLUCOSE RANDOM mg/dL  --  88                 Results from last 7 days   Lab Units 04/05/21  1824   LACTIC ACID mmol/L 0 4*       Imaging: I have personally reviewed pertinent reports  CT abdomen pelvis wo contrast   Final Result by Nils Agrawal MD (04/05 2000)      1  Severely distended bladder and mild bilateral obstructive uropathy, suggesting distal outlet obstruction or neurogenic bladder  No evidence of cystitis, bladder mass or hematoma  2   Findings compatible with constipation  Workstation performed: [id number]         CT head without contrast   Final Result by Lindsay Montoya MD (04/05 1920)      No acute intracranial abnormality  Workstation performed: [id number]         MRI inpatient order    (Results Pending)       [facility] Reviewed: Yes     ** Please Note: This note has been constructed using a voice recognition system   **

## 2021-04-06 NOTE — PROGRESS NOTES
[place]  Progress Note - Umesh Fam 1980, 47 y o  female MRN: [id number]  Unit/Bed#: OVR 03 Encounter: [id number]  Primary Care Provider: Carmen Lackey DO   Date and time admitted to hospital: 4/5/2021  5:54 PM    Decreased ambulation status  Assessment & Plan  PT/Ot to see patient-? Of neuro muscular issue with progression ? Guillain -Bareein differential    Hyponatremia  Assessment & Plan  · Patient with sodium of 134 on admission now 148- will change to d5w  · In the ED given 1 L bolus normal saline and 1 L bolus isolyte  · Patient reports poor p o  intake   · Continue to monitor    Hyperkalemia  Assessment & Plan  · Potassium 5 6 on admission  Patient received 2 L bolus of fluids in the ED down to 4 4 this am  · Continue to monitor on BMP   · Patient placed on telemetry    RENE (acute kidney injury) ([facility] [address] )  Assessment & Plan  · Creatinine 5 15 on admission and potassium of 5 6- now 4 53 after iv bolus- continue close monitoring  · Baseline creatinine around 0 6-0 8  · Patient reports decreased urine output  · ED spoke to Nephrology who recommended a 1 L normal saline bolus after receiving isolyte bolus  · Continue to monitor BMP  · Consult nephrology    * Generalized weakness  Assessment & Plan  · Patient presents from [facility] due to elevated creatinine on routine outpatient labs  · Patient reports that over the last month she has been having ongoing extremity weakness that is ascending  Due to weakness that started 1 month ago patient was admitted at [facility] from 03/11-3/12 in was discharged to SNF due to persistent weakness  · Patient reports that weakness has become worse since getting to [facility] now involving the arms and has become quite clumsy  · ED spoke to Neurology who recommended LP, successfully performed in the ED  CSF  With protein Results for Blanca Peck (MRN [id number]) as of 4/6/2021 08:43   Ref   Range 4/5/2021 21:20   APPEARANCE CSF Unknown clear   TUBE NUMBER CSF Unknown 4   XANTHOCHROMIA Latest Ref Range: No  No   WBC CSF Latest Ref Range: 0 - 5 /uL 2   Lymphs % CSF Latest Units: % 60   Monocytes % (CSF) Latest Units: % 40   GLUCOSE CSF Latest Ref Range: 50 - 80 mg/dL 57   PROTEIN CSF Latest Ref Range: 15 - 45 mg/dL 49 (H)   RBC CSF Latest Ref Range: 0 - 10 uL 31 (H)   ·   · Order MRI of thoracic and lumbar spine  ·  neurology consulted    Anemia  Assessment & Plan  · Hemoglobin 10 0 on admission- check nutritional studies due to hx of gastric bypass  · No signs of active bleeding  · Continue to monitor on CBC    Depression  Assessment & Plan  · Continue home medications Wellbutrin, Remeron, and Ambien    Hypoglycemia  Assessment & Plan  · Glucose 88 on admission   · Patient with history of hypoglycemia due to gastric bypass surgery and anorexia  · Patient prescribed acarbose 25 mg daily  Patient reports she is not taking   · Stable glucose readings    Hypothyroidism  Assessment & Plan  tsh in dec 2020 was low normal    History of pituitary adenoma  Assessment & Plan  Mri ordered  Last study in 2019        VTE Prophylaxis: in place    Patient Centered Rounds: I rounded with patient's nurse    Current Length of Stay: 1 day(s)    Current Patient Status: Inpatient    Certification Statement: Pt requires additional inpatient hospital stay due to: see assessment and plan        Subjective:   Patient reports feeling weak in her legs and has difficulty lifting them today  Still has clumsiness in her hands which has developed recently  No complaints of chest pain or shortness of breath no lightheadedness or dizziness    All other ROS are negative    Objective:     Vitals:   No data recorded  HR:  [] 100  Resp:  [12-69] 16  BP: (125-152)/(71-95) 144/88  SpO2:  [93 %-100 %] 99 %  Body mass index is 33 28 kg/m²  Input and Output Summary (last 24 hours):        Intake/Output Summary (Last 24 hours) at 4/6/2021 0853  Last data filed at 4/6/2021 0744  Gross per 24 hour   Intake 3440 ml   Output 7500 ml   Net -4060 ml       Physical Exam:     Physical Exam  Vitals signs and nursing note reviewed  Constitutional:       General: She is not in acute distress  HENT:      Head: Normocephalic and atraumatic  Eyes:      General: No scleral icterus  Right eye: No discharge  Left eye: No discharge  Extraocular Movements: Extraocular movements intact  Conjunctiva/sclera: Conjunctivae normal    Neck:      Musculoskeletal: No neck rigidity  Cardiovascular:      Rate and Rhythm: Normal rate and regular rhythm  Heart sounds: No murmur  No gallop  Pulmonary:      Effort: Pulmonary effort is normal  No respiratory distress  Breath sounds: Normal breath sounds  No wheezing  Abdominal:      General: Abdomen is flat  Palpations: Abdomen is soft  Tenderness: There is no abdominal tenderness  Hernia: No hernia is present  Musculoskeletal:         General: No swelling or tenderness  Lymphadenopathy:      Cervical: No cervical adenopathy  Neurological:      Mental Status: She is alert  Comments: Weakness of the lower extremities quadriceps slightly right 2+ than left 3+  Mild weakness in the hands no sensory loss distally  Psychiatric:         Behavior: Behavior normal          Thought Content: Thought content normal       Comments: Mildly blunted             I personally reviewed labs and imaging reports for today        Last 24 Hours Medication List:   Current Facility-Administered Medications   Medication Dose Route Frequency Provider Last Rate    acetaminophen  650 mg Oral Q6H PRN Tika Pope PA-C      butalbital-acetaminophen-caffeine  1 tablet Oral Q6H PRN Deborah Potter PA-C      cyclobenzaprine  10 mg Oral TID PRN Tika Pope PA-C      docusate sodium  100 mg Oral BID Tika Pope PA-C      folic acid  1 mg Oral Daily Tika Pope PA-C      gabapentin  100 mg Oral TID PRN Lois Lunsford LUIS Us      heparin (porcine)  5,000 Units Subcutaneous [facility] Summer Gramajo PA-C      levETIRAcetam  500 mg Oral Q12H [facility] Heydi Enoc Cedeño PA-C      levothyroxine  25 mcg Oral Once per day on Mon Tue Wed Thu Fri Summer Gramajo PA-C      [START ON 4/10/2021] levothyroxine  25 mcg Oral Once per day on Sun Sat Summer Gramajo PA-C      mirtazapine  7 5 mg Oral HS Summer Gramajo PA-C      polyethylene glycol  17 g Oral Daily Summer Gramajo PA-C      thiamine  500 mg Intravenous BID Deb Miller PA-C      zolpidem  10 mg Oral HS Summer Gramajo PA-C            Today, Patient Was Seen By: Cheyenne Wills DO    ** Please Note: Dictation voice to text software may have been used in the creation of this document   **

## 2021-04-06 NOTE — ASSESSMENT & PLAN NOTE
· Creatinine 5 15 on admission and potassium of 5 6- now 4 53 after iv bolus- continue close monitoring  · Baseline creatinine around 0 6-0 8  · Patient reports decreased urine output  · ED spoke to Nephrology who recommended a 1 L normal saline bolus after receiving isolyte bolus  · Continue to monitor BMP  · Consult nephrology

## 2021-04-06 NOTE — ASSESSMENT & PLAN NOTE
· Patient with sodium of 134 on admission now 148- will change to d5w  · In the ED given 1 L bolus normal saline and 1 L bolus isolyte  · Patient reports poor p o  intake   · Continue to monitor

## 2021-04-06 NOTE — ASSESSMENT & PLAN NOTE
· Glucose 88 on admission   · Patient with history of hypoglycemia due to gastric bypass surgery and anorexia  · Patient prescribed acarbose 25 mg daily   Patient reports she is not taking   · Stable glucose readings

## 2021-04-06 NOTE — ASSESSMENT & PLAN NOTE
· Patient presents from [facility] due to elevated creatinine on routine outpatient labs  · Patient reports that over the last month she has been having ongoing extremity weakness that is ascending  Due to weakness that started 1 month ago patient was admitted at [facility] from 03/11-3/12 in was discharged to SNF due to persistent weakness  · Patient reports that weakness has become worse since getting to [facility] now involving the arms and has become quite clumsy  · ED spoke to Neurology who recommended LP, successfully performed in the ED  CSF  With protein Results for Doris Wood (MRN [id number]) as of 4/6/2021 08:43   Ref   Range 4/5/2021 21:20   APPEARANCE CSF Unknown clear   TUBE NUMBER CSF Unknown 4   XANTHOCHROMIA Latest Ref Range: No  No   WBC CSF Latest Ref Range: 0 - 5 /uL 2   Lymphs % CSF Latest Units: % 60   Monocytes % (CSF) Latest Units: % 40   GLUCOSE CSF Latest Ref Range: 50 - 80 mg/dL 57   PROTEIN CSF Latest Ref Range: 15 - 45 mg/dL 49 (H)   RBC CSF Latest Ref Range: 0 - 10 uL 31 (H)   ·   · Order MRI of thoracic and lumbar spine  ·  neurology consulted

## 2021-04-06 NOTE — ASSESSMENT & PLAN NOTE
Patient admits she has not been eating much at all, particularly since her admission to [facility]  She has been drinking fluids  She also has documented history of laxative abuse

## 2021-04-06 NOTE — ASSESSMENT & PLAN NOTE
· Patient presents from [facility] due to elevated creatinine on routine outpatient labs  · Patient reports that over the last month she has been having ongoing extremity weakness that is ascending    Due to weakness that started 1 month ago patient was admitted at [facility] from 03/11-3/12 in was discharged to SNF due to persistent weakness  · Patient reports that weakness has become worse since getting to [facility] now involving the arms and has become quite clumsy  · ED spoke to Neurology who recommended LP, successfully performed in the ED  · CSF labs ordered  · Order MRI of thoracic and lumbar spine  · Consult neurology

## 2021-04-06 NOTE — ASSESSMENT & PLAN NOTE
· Creatinine 5 15 on admission and potassium of 5 6  · Baseline creatinine around 0 6-0 8  · Patient reports decreased urine output  · ED spoke to Nephrology who recommended a 1 L normal saline bolus after receiving isolyte bolus  · Continue to monitor BMP  · Consult nephrology

## 2021-04-06 NOTE — UTILIZATION REVIEW
Initial Clinical Review    Admission: Date/Time/Statement:   Admission Orders (From admission, onward)     Ordered        04/05/21 2118  Inpatient Admission  Once                   Orders Placed This Encounter   Procedures    Inpatient Admission     Standing Status:   Standing     Number of Occurrences:   1     Order Specific Question:   Level of Care     Answer:   Med Surg [16]     Order Specific Question:   Estimated length of stay     Answer:   More than 2 Midnights     Order Specific Question:   Certification     Answer:   I certify that inpatient services are medically necessary for this patient for a duration of greater than two midnights  See H&P and MD Progress Notes for additional information about the patient's course of treatment  ED Arrival Information     Expected Arrival Acuity Means of Arrival Escorted By Service Admission Type    - 4/5/2021 17:51 [facility] [address] [place] [address] Hospitalist Urgent    Arrival Complaint    abnormal labs        Chief Complaint   Patient presents with    Abnormal Lab     EMS staes that patietn's creat and BUN are elevated  Patient complaining of L calf pain     Assessment/Plan: this is a 47year old female from SNF to ED via ems admitted inpatient due to Weakness/RENE/Hypoglycemia  History of anorexia nervosa, gastric bypass, seizures  Presented due to abnormal labs and admits to not eating or drinking much starting month prior to arrival   For last month increasing weakness, initially BLE and now involves upper extremities  On exam mucous membranes dry  Abdominal distention  Weakness  Unable to do heel to shin test due to weakness  BLE 2/5, BUE strength 4/5   + dysdiadochokinesia bilaterally  Na 134  K 5 6  Anion gap 15  Bun 47  Creatinine 5 15 with baseline of 0 6 - 0 8   Magnesium 3 4   H&H 10 0/31 5  Glucose 88  Ct abdomen showed constipation and severely distended bladder  In the ED bates placed and drained 2000 ml  Lumbar puncture done  Given 2 liters of IVF  Neurology and nephrology consulted  To monitor BMP and continue IVF  Telemetry in progress  Monitor intake    4/6/2021- weakness with differential of Krystal Jones  Has poor intake  Creatinine improved to 4 53 and IVF to continue  Bicarb 17 and trending down , IVF with Bicarb added  Urology consulted  4/6/2021 per neurology - patient has generalized weakness  And  Differential includes nutritional deficiency vs vasculitic neuropathy  Recommend MRI brain and L spine  check B12, folate, B1, B6, TSH, RPR, HIV, heavy metals screen, Lyme serology, UPEP, SPEP, ACE, MMA, ESR, CRP, RF, MAYI, ANCA, cryoglobulins, C3 and C4, ds DNA, Sjogren's antibodies, ENS-2, vitamin-D, admission topiramate level  Start  IV thiamine  Stop topiramate and start [address]  Stop wellbutrin  4/6/2021 per nephrology Patient has RENE/low bicarbonate level  On exam looks hypovolemic  Recommend to continue IVF, bates, change bicarb drip and trend BMP    4/7/2021 per urology - Patient  Has urinary retention with potential neurologic cause,  To maintain bates about 7 - 10 days       ED Triage Vitals   Temperature Pulse Respirations Blood Pressure SpO2   04/06/21 1115 04/05/21 1800 04/05/21 1800 04/05/21 1800 04/05/21 1800   97 9 °F (36 6 °C) 82 22 125/73 98 %      Temp Source Heart Rate Source Patient Position - Orthostatic VS BP Location FiO2 (%)   04/06/21 1115 04/05/21 1930 04/05/21 1930 04/05/21 1930 --   Temporal Monitor Lying Right arm       Pain Score       04/06/21 0742       8          Wt Readings from Last 1 Encounters:   04/05/21 90 7 kg (200 lb)     Additional Vital Signs:   04/06/21 1115  97 9 °F (36 6 °C)  --  --  --  --  --  --  --   04/06/21 0938  --  92  22  127/84  --  98 %  None (Room air)  Lying   04/06/21 0200  --  100  16  144/88  --  99 %  --  Lying   04/05/21 2200  --  101  16  152/95  110  93 %  --  --   04/05/21 2130  --  85  12  129/71  94  98 %  --  --   04/05/21 2100  --  98 69Abnormal   125/76  84  96 %  --  --   04/05/21 2000  --  --  14  131/86  104  100 %  --  --   04/05/21 1930  --  80  14  128/84  102  100 %  None (Room air)         Pertinent Labs/Diagnostic Test Results:   4/5/2021 ct abdomen 1  Severely distended bladder and mild bilateral obstructive uropathy, suggesting distal outlet obstruction or neurogenic bladder  No evidence of cystitis, bladder mass or hematoma  2   Findings compatible with constipation  4/5/2021 ct head No acute intracranial abnormality  4/5/2021 ECG Normal sinus rhythm  Low voltage QRS  Borderline ECG  When compared with ECG of 31-DEC-2020 11:04,  No significant change was found    4/6/2021 MRI brain No acute intracranial pathology  4/6/2021 Mri lumbar spine Mild spondylosis on this motion degraded study  2   Indeterminant 4 cm pelvic lesion posteriorly in the cul-de-sac on the right, possibly related to the ovary  Differential considerations would include hemorrhagic cyst versus perhaps endometrioma  Other mass lesions not excluded     Results from last 7 days   Lab Units 04/06/21  0613 04/05/21 1912 04/05/21  1824   WBC Thousand/uL 6 66  --  7 57   HEMOGLOBIN g/dL 10 5*  --  10 0*   I STAT HEMOGLOBIN g/dl  --  10 5*  --    HEMATOCRIT % 33 2*  --  31 5*   HEMATOCRIT, ISTAT %  --  31*  --    PLATELETS Thousands/uL 260  --  293   NEUTROS ABS Thousands/µL 5 37  --  5 43     Results from last 7 days   Lab Units 04/06/21  1215 04/06/21  0613 04/05/21 1912 04/05/21  1824   SODIUM mmol/L 143 148*  --  134*   POTASSIUM mmol/L 4 6 4 4  --  5 6*   CHLORIDE mmol/L 110* 112*  --  101   CO2 mmol/L 17* 19*  --  18*   CO2, I-STAT mmol/L  --   --  20*  --    ANION GAP mmol/L 16* 17*  --  15*   BUN mg/dL 33* 39*  --  47*   CREATININE mg/dL 3 56* 4 53*  --  5 15*   EGFR ml/min/1 73sq m 15 12  --  10   CALCIUM mg/dL 8 4 9 2  --  9 2   CALCIUM, IONIZED, ISTAT mmol/L  --   --  1 20  --    MAGNESIUM mg/dL  --   --   --  3 4*     Results from last 7 days Lab Units 04/05/21  1824   AST U/L 18   ALT U/L 22   ALK PHOS U/L 194*   TOTAL PROTEIN g/dL 6 7   ALBUMIN g/dL 3 3*   TOTAL BILIRUBIN mg/dL 0 40     Results from last 7 days   Lab Units 04/06/21  0344   POC GLUCOSE mg/dl 104     Results from last 7 days   Lab Units 04/06/21  1215 04/06/21  0613 04/05/21  1824   GLUCOSE RANDOM mg/dL 89 105 88     Results from last 7 days   Lab Units 04/05/21  1912   PH, VICKY I-STAT  7 247*   PCO2, VICKY ISTAT mm HG 42 6   PO2, VICKY ISTAT mm HG 32 0*   HCO3, VICKY ISTAT mmol/L 18 6*   I STAT BASE EXC mmol/L -8*   I STAT O2 SAT % 53*     Results from last 7 days   Lab Units 04/05/21  1824   CK TOTAL U/L 47     Results from last 7 days   Lab Units 04/06/21  0938   TSH 3RD GENERATON uIU/mL 3 774*     Results from last 7 days   Lab Units 04/05/21  1824   LACTIC ACID mmol/L 0 4*     Results from last 7 days   Lab Units 04/06/21  0938   FERRITIN ng/mL 183     Results from last 7 days   Lab Units 04/06/21  1216   CRP mg/L 15 2*   SED RATE mm/hour 17     Results from last 7 days   Lab Units 04/05/21  2007   CLARITY UA  Clear   COLOR UA  Yellow   SPEC GRAV UA  <=1 005   PH UA  6 5   GLUCOSE UA mg/dl Negative   KETONES UA mg/dl Negative   BLOOD UA  Small*   PROTEIN UA mg/dl Negative   NITRITE UA  Negative   BILIRUBIN UA  Negative   UROBILINOGEN UA E U /dl 0 2   LEUKOCYTES UA  Negative   WBC UA /hpf 0-1   RBC UA /hpf 0-1   BACTERIA UA /hpf Occasional   EPITHELIAL CELLS WET PREP /hpf Occasional     Results from last 7 days   Lab Units 48/04/59  3064   SALICYLATE LVL mg/dL 3 2     Results from last 7 days   Lab Units 04/05/21 2120   GRAM STAIN RESULT  No Polys or Bacteria seen  Rare Mononuclear Cells     Results from last 7 days   Lab Units 04/05/21 2120   TOTAL COUNTED  47     Results from last 7 days   Lab Units 04/05/21 2120   APPEARANCE CSF  clear   TUBE NUM CSF  4   WBC CSF /uL 2   XANTHOCHROMIA  No   LYMPHS % (CSF) % 60   MONOCYTES % (CSF) % 40   GLUCOSE CSF mg/dL 57   PROTEIN CSF mg/dL 49* RBC CSF uL 31*       ED Treatment:   Medication Administration from 04/05/2021 1751 to 04/06/2021 1513       Date/Time Order Dose Route Action Comments     04/05/2021 1827 multi-electrolyte (PLASMALYTE-A/ISOLYTE-S PH 7 4) IV solution 1,000 mL 1,000 mL Intravenous New Bag      04/05/2021 2100 sodium chloride 0 9 % bolus 1,000 mL 1,000 mL Intravenous New Bag      04/06/2021 0847 cyclobenzaprine (FLEXERIL) tablet 10 mg 10 mg Oral Given      04/05/2021 2347 cyclobenzaprine (FLEXERIL) tablet 10 mg 10 mg Oral Given      04/06/2021 0846 docusate sodium (COLACE) capsule 100 mg 100 mg Oral Given      34/82/1190 0241 folic acid (FOLVITE) tablet 1 mg 1 mg Oral Given      04/06/2021 2148 levothyroxine tablet 25 mcg 25 mcg Oral Given      04/05/2021 2339 mirtazapine (REMERON) tablet 7 5 mg 7 5 mg Oral Given      04/06/2021 0848 polyethylene glycol (MIRALAX) packet 17 g 17 g Oral Given      04/05/2021 2339 topiramate (TOPAMAX) tablet 200 mg 200 mg Oral Given      04/05/2021 2334 zolpidem (AMBIEN) tablet 10 mg 10 mg Oral Given      04/06/2021 1413 heparin (porcine) subcutaneous injection 5,000 Units 5,000 Units Subcutaneous Given      04/05/2021 2346 heparin (porcine) subcutaneous injection 5,000 Units 5,000 Units Subcutaneous Given      04/06/2021 0330 gabapentin (NEURONTIN) capsule 100 mg 100 mg Oral Given      04/06/2021 1012 thiamine (VITAMIN B1) 500 mg in sodium chloride 0 9 % 50 mL IVPB 500 mg Intravenous New Bag      04/06/2021 0847 levETIRAcetam (KEPPRA) tablet 500 mg 500 mg Oral Given      04/06/2021 0857 butalbital-acetaminophen-caffeine (FIORICET,ESGIC) -40 mg per tablet 1 tablet 1 tablet Oral Given      04/06/2021 1012 dextrose 5 % infusion 100 mL/hr Intravenous New Bag      04/06/2021 1408 sodium bicarbonate 150 mEq in dextrose 5 % 1,000 mL infusion 125 mL/hr Intravenous New Bag         Past Medical History:   Diagnosis Date    Anemia     Psychiatric disorder     depression anxiety    Seizures ([facility] [address] ) Present on Admission:   Anorexia nervosa, restricting type   Depression   Hypoglycemia   Generalized weakness   RENE (acute kidney injury) ([facility] [address] )   Hyperkalemia   Hyponatremia   Anemia   Pituitary enlargement   Hypothyroidism   Decreased ambulation status      Admitting Diagnosis: Abnormal laboratory test [R89 9]  Age/Sex: 47 y o  female  Admission Orders:  Scheduled Medications:  docusate sodium, 100 mg, Oral, BID  folic acid, 1 mg, Oral, Daily  heparin (porcine), 5,000 Units, Subcutaneous, Q8H ISH  levETIRAcetam, 500 mg, Oral, Q12H ISH  levothyroxine, 25 mcg, Oral, Once per day on Mon Tue Wed Thu Fri  [START ON 4/10/2021] levothyroxine, 25 mcg, Oral, Once per day on Sun Sat    mirtazapine, 7 5 mg, Oral, HS  polyethylene glycol, 17 g, Oral, Daily  thiamine, 500 mg, Intravenous, TID  zolpidem, 10 mg, Oral, HS      Continuous IV Infusions:  IV infusion builder, , Intravenous, Continuous (sodium bicarbonate 75 mEq in sodium chloride 0 45 % 1,000 mL infusion   Rate: 100 mL/hr Freq: Continuous Route: IV  Start: 04/06/21 1515)    dextrose 5 % infusion   Rate: 100 mL/hr Dose: 100 mL/hr  Freq: Continuous Route: IV  Indications of Use: IV Hydration  Last Dose: Stopped (04/06/21 1406)  Start: 04/06/21 0900 End: 04/06/21 1308    sodium bicarbonate 150 mEq in dextrose 5 % 1,000 mL infusion   Rate: 125 mL/hr Dose: 125 mL/hr  Freq: Continuous Route: IV  Last Dose: 125 mL/hr (04/06/21 1408)  Start: 04/06/21 1330 End: 04/06/21 1500      PRN Meds:  acetaminophen, 650 mg, Oral, Q6H PRN  butalbital-acetaminophen-caffeine, 1 tablet, Oral, Q6H PRN  cyclobenzaprine, 10 mg, Oral, TID PRN  gabapentin, 100 mg, Oral, TID PRN    Telemetry    IP CONSULT TO NEUROLOGY  IP CONSULT TO NEPHROLOGY  IP CONSULT TO UROLOGY    Network Utilization Review Department  ATTENTION: Please call with any questions or concerns to [phone] and carefully listen to the prompts so that you are directed to the right person   All voicemails are babs Vaughan all requests for admission clinical reviews, approved or denied determinations and any other requests to dedicated fax number below belonging to the campus where the patient is receiving treatment   List of dedicated fax numbers for the Facilities:  [address] DENIALS (Administrative/Medical Necessity) [phone]   [address] (Maternity/NICU/Pediatrics) [phone]   [address] Sy[facility] [place] Spainel Brittany 0770 ([facility]) [address] [place] [address] Raúl Carroll 1481 [address] [place] [address] 951 [phone]

## 2021-04-06 NOTE — ASSESSMENT & PLAN NOTE
48 yo female with restrictive eating disorder (anorexia nervosa), laxative abuse, seizure disorder, pituitary enlargement, history of benzodiazepine abuse, vitamin-D deficiency, depression, history of gastric bypass, migraine headaches, presented to ED on 4/5/21  with progressive bilateral upper and lower extremity weakness and sensory abnormality  On  exam patient  has distal weakness in B/L UE extremities and more proximal weakness in B/L LE  She has atrophy in thenar and hypothenar in B/L hands  She has diffusely diminished and length-dependent pattern with light touch, PP, and vibratory sense along with loss of proprioception  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, possibly related to nutritional deficiency  · Lumbar puncture performed in the ED thus far with relatively bland results  Protein 49, WBC 2, glucose 57, RBC 31, no xanthochromia, gram stain negative  · Vitamin D level 17 9  · Laboratory studies consistent with hyperchloremic acidosis, likely related to significant doses of topiramate  Topiramate also can cause appetite suppression, therefore topiramate was discontinued and Keppra initiated  · MRI brain with no acute intracranial pathology     Plan:  - labs pending:  · CSF labs: culture, non Gyn cytology, leukemia lymphoma flow cytometry, MS panel, Ace, VDRL  · serum labs: topiramate, B1, heavy metals,  HIV, ENS-2 [facility], Ace, Anca, Sjogren's, MAYI, anti DNA antibody, RF, methylmalonic acid, B6, cryoglobulin, SPEP, UPEP  - continue IV thiamine repletion 500 mg TID  - continue Keppra 500 mg BID  Seizures are described as staring episodes and patient is currently nonambulatory; if patient has breakthrough seizures, can adjust medications accordingly    - recommend Vitamin D repletion, level 17 9  - recommend consult to Nutrition  - Recommend Psychiatry consult for medication adjustment for patient's depression   - discontinued Wellbutrin as it can lower seizure threshold and is also not recommended for use in patients with eating disorders  - patient will likely require outpatient EMG/NCS for further evaluation of polyneuropathy   - PT/OT  - monitor neuro exam and notify with changes    Results reviewed:    - CSF labs:  Protein 49, WBC 2, glucose 57, RBC 31, no xanthochromia, Gram stain negative   - serum labs:  Vitamin D 17 9, B12 511, folate 18 3, TSH 3 774, T4 0 91, RPR nonreactive, CR 3 56, 3 09, 2 5 BUN 33, 26,22, ESR 17, CRP 15 2, C3-1 20, C4 37,  - 4/6/21 MRI Brain wo contrast:    · no acute intracranial pathology  · Minimally decreased prominence of the pituitary gland that still has a convex superior border  Size is upper limits of normal  There is midline T1 hyperintense structure in the posterior sella measuring 5 mm  Although this may represent increased conspicuity of the posterior pituitary gland, in retrospect, there is a similar sized T1 hypointense hypoenhancing structure at this location and 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   - 4/6/21 MRI Lumbar spine:   · Mild spondylosis on this motion degraded study  · Indeterminant 4 cm pelvic lesion posteriorly in the cul-de-sac on the right, possibly related to the ovary  Differential considerations would include hemorrhagic cyst versus perhaps endometrioma  Other mass lesions not excluded  Nonemergent pelvic sonography is recommended for further assessment  - 4/5/21 CT abdomen/pelvis:   · Severely distended bladder and mild bilateral obstructive uropathy, suggesting distal outlet obstruction or neurogenic bladder  No evidence of cystitis, bladder mass or hematoma  · Findings compatible with constipation    - 4/5/21 CT head:No acute intracranial abnormality

## 2021-04-06 NOTE — CONSULTS
Consultation - Nephrology   Dilan Mohan 47 y o  female MRN: [id number]  Unit/Bed#: OVR 03 Encounter: [id number]    PLAN:  -acute kidney injury, pre and post renal etiology, status post hydration and Zhong catheter insertion  Creatinine continues to improve   -acute urinary retention, status post Zhong catheter insertion  Monitoring for postobstructive diuresis  Consider Urology consult  -hyperkalemia, resolved with hydration  On potassium supplements per med rec at home daily  Would hold   -acidosis, will place on D5 with bicarb  Continue to monitor  Rate increased to 125 cc/hour   -hyponatremia, corrected rapidly, placed on D5 to slow rate of correction  Repeat BMP later tonight with calling parameters   -generalized weakness, neurology consulted, concern for muscular disorder verses GBS  MRI pending  Complements/serologies pending  Status post lumbar puncture  ASSESSMENT:  RENE (POA):  Suspected prerenal etiology as well as post renal with acute urinary retention in the setting of obstructive uropathy   -creatinine 5 15 on admission   -baseline creatinine 0 6-0 8 per record review    -not following outpatient with Nephrology   -creatinine improving with hydration and inserting Zhong catheter  -CT abdomen pelvis 4/5/21: distention of the bilateral renal collecting system consistent with mild obstructive uropathy  Severe bladder distention, extending well into the mid abdomen   -UA: small amount of blood, otherwise bland   -checking compliments and serologies  -status post Zhong catheter placement with output of 7500 mL recorded in last 24 h   -currently on D5 infusion at 100 mL/hr   -no urgent indication for RRT  -not on Ace/Arb/diuretic   -continue to avoid nephrotoxins, hypotension, IV contrast   -I/O, daily weights  Acute urinary retention:  Reports difficulty with urination and inability to urinate   -status post Zhong catheter placement with 7 5 L of urinary output    -CT scan shows distention of the bilateral renal collected system consistent with mild obstructive uropathy   -monitoring for postobstructive diuresis  -urine output yesterday greater than 7 L    -consider Urology consult  Hyperkalemia: Potassium on admission 5 6 suspected secondary to dehydration  Repeat K 4 4 this AM  DId not receive medical management  Resolved with hydration  -OP medication list shows potassium supplements 10 mEq's daily  -will hold home potassium  Hyponatremia:   -Sodium on admission 134   -Received 1 L bolus of NS and 1 L bolus of isolyte in ED  Repeat sodium is 148  -corrected rapidly  Goal to correct 6-8 mEQ's in 24 hours  -placed on D5 infusion with bicarb, rate increased to 125 cc/hr  -will repeat BMP at 1800 tonight with calling parameters    -noted ployuria with UOP > 7 L  Anion gap acidosis  -bicarb 17, trending down   -will change fluids from D5W to D5 W with bicarbonate added  Generalized weakness: presents to ER with 1 month of progressive bilateral upper and lower extremity weakness   -Neurology/PT/OT consulted  Concern for muscular issue versus GBS  -checking complements and serologies  -status post lumbar puncture, awaiting cytology  Anemia  -Hemoglobin 10 0 on admission  -Iron panel pending   -Continue to monitor and transfuse as needed for hemoglobin <7  Hypothyroidism:  Maintained on Levothyroxine 25 mcg daily  History of pituitary enlargement  -MRI brain pituitary 10/25/19: enlarged pituitary gland without a discrete focus of differential enhancement to define an adenoma  -repeat MRI pending  History of gastric bypass:  in 2007 and 2010  Anorexia Nervosa, restricting type:   -reports decreased PO intake over the last month   -patient states after her last hospitalization, it was recommended she follow with [facility] for a nutritional plan  Other: depression, history of benzodiazepine abuse, migraines, seizure disorder      HISTORY OF PRESENT ILLNESS:  Requesting Physician: Walter Gibson DO  Reason for Consult: RENE    Keeley Momin is a 47y o  year old female who was admitted to [facility] after presenting with generalized ascending weakness  A renal consultation is requested today for assistance in the management of RENE, hyperkalemia, and hyponatremia  Patient reporting progressive ascending weakness over the last month  She currently resides at [facility] after prior hospitalization for lower extremity weakness and ambulatory dysfunction  Her creatinine of admission was 5 15  She received 1 L isolyte bolus and 1 L NS bolus  Her creatinine came down to 4 53  Her baseline creatinine is 0 6-0 8 per chart review  She currently denies chest pain, SOB, N/V/D, and abdominal pain  Endorses weakness in upper and lower extremities with numbness/tingling in all 4 extremities  Reports feeling of "pads" on her feet  Patient reports no history of kidney problems that she is aware of but does report history of multiple UTIs  She is unsure when her urinary retention started but reports having to void and not being able too  She states she assumed she had another UTI  The patient states her normal weight is around 160-165 lbs and that she weighs herself daily at home  Her weight on admission is 200 lb  When asked about NSAID use, the patient reports taking Ibuprofen/Advil 400 mg three times a day for "years " In reviewing family history, the patient states her mother had a nephrectomy when she was 14years old from an infection  She states she  4 years ago and never required dialysis         PAST MEDICAL HISTORY:  Past Medical History:   Diagnosis Date    Anemia     Psychiatric disorder     depression anxiety    Seizures ([facility] [address] )        PAST SURGICAL HISTORY:  Past Surgical History:   Procedure Laterality Date    GASTRIC BYPASS         ALLERGIES:  Allergies   Allergen Reactions    Anti-Hist [Diphenhydramine]     Buspar [Buspirone]     Haldol [Haloperidol]     Penicillins Throat Swelling    Pennsaid [Diclofenac Sodium]     Zoloft [Sertraline]        SOCIAL HISTORY:  Social History     Substance and Sexual Activity   Alcohol Use No     Social History     Substance and Sexual Activity   Drug Use No    Comment: in revovery for benzos     Social History     Tobacco Use   Smoking Status Current Every Day Smoker   Smokeless Tobacco Never Used       FAMILY HISTORY:  Family History   Problem Relation Age of Onset    Hypertension Mother     Heart attack Mother     No Known Problems Father     Lung cancer Maternal Grandmother     Hypothyroidism Maternal Grandmother     Diabetes type II Paternal Grandmother     Diabetes type II Paternal Grandfather        MEDICATIONS:    Current Facility-Administered Medications:     acetaminophen (TYLENOL) tablet 650 mg, 650 mg, Oral, Q6H PRN, HERNÁN Ruff-VERONICA    butalbital-acetaminophen-caffeine (FIORICET,ESGIC) -40 mg per tablet 1 tablet, 1 tablet, Oral, Q6H PRN, HERNÁN Jensen-C, 1 tablet at 04/06/21 0857    cyclobenzaprine (FLEXERIL) tablet 10 mg, 10 mg, Oral, TID PRN, HERNÁN Ruff-C, 10 mg at 04/06/21 0847    dextrose 5 % infusion, 100 mL/hr, Intravenous, Continuous, Elizabeth Johnson,     docusate sodium (COLACE) capsule 100 mg, 100 mg, Oral, BID, Joellen Us PA-C, 100 mg at 47/00/02 6976    folic acid (FOLVITE) tablet 1 mg, 1 mg, Oral, Daily, Joellen Us PA-C, 1 mg at 04/06/21 0846    gabapentin (NEURONTIN) capsule 100 mg, 100 mg, Oral, TID PRN, HERNÁN Ruff-VERONICA, 100 mg at 04/06/21 0330    heparin (porcine) subcutaneous injection 5,000 Units, 5,000 Units, Subcutaneous, Q8H [address], 5,000 Units at 04/05/21 2346 **AND** [CANCELED] Platelet count, , , Once, Scott Farrell PA-C    levETIRAcetam (KEPPRA) tablet 500 mg, 500 mg, Oral, Q12H [address], Heydi Y Shravan, PA-C, 500 mg at 04/06/21 0847    levothyroxine tablet 25 mcg, 25 mcg, Oral, Once per day on Mon Tue Wed Thu Fri, Joellen Us PA-C, 25 mcg at 04/06/21 4356    [START ON 4/10/2021] levothyroxine tablet 25 mcg, 25 mcg, Oral, Once per day on Sun Sat, Joellen Us PA-C    LORazepam (ATIVAN) injection 1 mg, 1 mg, Intravenous, Once, [employer], DO    mirtazapine (REMERON) tablet 7 5 mg, 7 5 mg, Oral, HS, Joellen Us PA-C, 7 5 mg at 04/05/21 2339    polyethylene glycol (MIRALAX) packet 17 g, 17 g, Oral, Daily, Joellen Us PA-C, 17 g at 04/06/21 0848    thiamine (VITAMIN B1) 500 mg in sodium chloride 0 9 % 50 mL IVPB, 500 mg, Intravenous, TID, Heydi Cantrell PA-C    zolpidem (AMBIEN) tablet 10 mg, 10 mg, Oral, HS, Joellen Us PA-C, 10 mg at 04/05/21 2334    Current Outpatient Medications:     acarbose (PRECOSE) 25 mg tablet, 1 tab daily with dinner (Patient not taking: Reported on 4/5/2021), Disp: 30 tablet, Rfl: 5    AquaLance Lancets 30G MISC, USE TO TEST BLOOD GLUCOSE FOUR TIMES A DAY, Disp: , Rfl:     buPROPion (WELLBUTRIN SR) 100 mg 12 hr tablet, Take 100 mg by mouth 3 (three) times a day , Disp: , Rfl:     Cholecalciferol 125 MCG (5000 UT) TABS, 2 tabs daily, Disp:  , Rfl:     cloNIDine (CATAPRES) 0 2 mg tablet, Take 0 2 mg by mouth daily as needed for high blood pressure, Disp: , Rfl:     Continuous Blood Gluc  (FREESTYLE CAITY 14 DAY READER) DINESH, 1 Device by Does not apply route 4 (four) times a day (before meals and at bedtime) (Patient not taking: Reported on 11/17/2020), Disp: 1 Device, Rfl: 0    Continuous Blood Gluc Sensor (FREESTYLE CAITY 14 DAY SENSOR) MISC, 1 application by Does not apply route every 14 (fourteen) days (Patient not taking: Reported on 11/12/2019), Disp: 1 each, Rfl: 6    cyanocobalamin 1,000 mcg/mL, inject 1 milliliter ( 1000 MCG ) intramuscularly Every Month, Disp: , Rfl:     cyclobenzaprine (FLEXERIL) 10 mg tablet, Take 10 mg by mouth 3 (three) times a day as needed for muscle spasms, Disp: , Rfl:     docusate sodium (COLACE) 100 mg capsule, Take 100 mg by mouth 2 (two) times a day, Disp: , Rfl:    ergocalciferol (ERGOCALCIFEROL) 1 25 MG (47105 UT) capsule, 1 cap weekly, Disp: 12 capsule, Rfl: 1    famotidine (PEPCID) 20 mg tablet, Take 20 mg by mouth daily, Disp: , Rfl:     folic acid (FOLVITE) 1 mg tablet, Take by mouth daily, Disp: , Rfl:     glucagon (GLUCAGON EMERGENCY) 1 MG injection, Inject 1 mg under the skin once as needed for low blood sugar for up to 1 dose, Disp: 1 each, Rfl: 0    levothyroxine 25 mcg tablet, Please take 1 tablet daily Monday through Friday and 2 tablets on Saturday and Sunday  , Disp: 38 tablet, Rfl: 6    mirtazapine (REMERON) 15 mg tablet, Take 7 5 mg by mouth daily at bedtime , Disp: , Rfl:     multivitamin (THERAGRAN) TABS, Take 1 tablet by mouth daily, Disp: , Rfl:     OneTouch Ultra test strip, USE TO TEST BLOOD GLUCOSE FOUR TIMES A DAY, Disp: , Rfl:     polyethylene glycol (MIRALAX) 17 g packet, Take 17 g by mouth daily, Disp: , Rfl:     potassium chloride (K-DUR,KLOR-CON) 10 mEq tablet, Take 10 mEq by mouth daily, Disp: , Rfl:     prazosin (MINIPRESS) 5 mg capsule, Take by mouth daily at bedtime , Disp: , Rfl:     thiamine (VITAMIN B1) 100 mg tablet, Take 100 mg by mouth daily, Disp: , Rfl:     topiramate (TOPAMAX) 200 MG tablet, Take 200 mg by mouth 3 (three) times a day 3 times daily , Disp: , Rfl:     varenicline (CHANTIX) 1 mg tablet, Take 1 mg by mouth 2 (two) times a day, Disp: , Rfl:     venlafaxine (EFFEXOR-XR) 75 mg 24 hr capsule, , Disp: , Rfl:     zolpidem (AMBIEN) 10 mg tablet, Take 10 mg by mouth daily at bedtime as needed for sleep, Disp: , Rfl:     REVIEW OF SYSTEMS:  A complete review of systems was performed and found to be negative unless otherwise noted in the history of present illness  General: No fevers, chills  Cardiovascular:  - chest pain, - leg edema  Respiratory: No cough, sputum production,  - shortness of breath  Gastrointestinal:  - nausea/vomiting,  - diarrhea,  - abdominal pain  Genitourinary: No hematuria   No foamy urine  No dysuria    PHYSICAL EXAM:  Current Weight: Weight - Scale: 90 7 kg (200 lb)  First Weight: Weight - Scale: 90 7 kg (200 lb)  Vitals:    04/05/21 2130 04/05/21 2200 04/06/21 0200 04/06/21 0938   BP: 129/71 152/95 144/88 127/84   BP Location:   Right arm Left arm   Pulse: 85 101 100 92   Resp: 12 16 16 22   SpO2: 98% 93% 99% 98%   Weight:           Intake/Output Summary (Last 24 hours) at 4/6/2021 0958  Last data filed at 4/6/2021 0744  Gross per 24 hour   Intake 3440 ml   Output 7500 ml   Net -4060 ml     General: NAD; appears older than stated age  Skin: warm, dry, intact, pale  HEENT: Moist mucous membranes, sclera anicteric, normocephalic, atraumatic  Neck: No apparent JVD appreciated  Chest: lung sounds clear B/L, on RA   CVS: Regular rate and rhythm, no murmer   Abdomen: Soft, round, non-tender, +BS  Extremities: No B/L LE edema present; weakness present BL UE/LE  Neuro: alert and oriented x4  Psych: appropriate mood and affect, conversational    Invasive Devices:      Lab Results:   Results from last 7 days   Lab Units 04/06/21  0613 04/05/21  1912 04/05/21  1824   WBC Thousand/uL 6 66  --  7 57   HEMOGLOBIN g/dL 10 5*  --  10 0*   I STAT HEMOGLOBIN g/dl  --  10 5*  --    HEMATOCRIT % 33 2*  --  31 5*   HEMATOCRIT, ISTAT %  --  31*  --    PLATELETS Thousands/uL 260  --  293   SODIUM mmol/L 148*  --  134*   POTASSIUM mmol/L 4 4  --  5 6*   CHLORIDE mmol/L 112*  --  101   CO2 mmol/L 19*  --  18*   CO2, I-STAT mmol/L  --  20*  --    BUN mg/dL 39*  --  47*   CREATININE mg/dL 4 53*  --  5 15*   CALCIUM mg/dL 9 2  --  9 2   MAGNESIUM mg/dL  --   --  3 4*   ALK PHOS U/L  --   --  194*   ALT U/L  --   --  22   AST U/L  --   --  18   GLUCOSE, ISTAT mg/dl  --  80  --

## 2021-04-06 NOTE — ASSESSMENT & PLAN NOTE
MRI brain from October 2019 demonstrated pituitary enlargement without evidence of adenoma    Endocrine evaluation as deemed necessary by primary team   - please see MRI brain result above

## 2021-04-06 NOTE — ASSESSMENT & PLAN NOTE
· Hemoglobin 10 0 on admission- check nutritional studies due to hx of gastric bypass  · No signs of active bleeding  · Continue to monitor on CBC

## 2021-04-06 NOTE — ASSESSMENT & PLAN NOTE
· Patient with history of anorexia  · Based off previous notes from endocrinology patient reports only about 1 meal per day   · History of hypoglycemia and gastric bypass surgery  · Consult nutrition

## 2021-04-06 NOTE — ASSESSMENT & PLAN NOTE
· Potassium 5 6 on admission  Patient received 2 L bolus of fluids in the ED down to 4 4 this am  · Continue to monitor on BMP   · Patient placed on telemetry

## 2021-04-07 ENCOUNTER — TELEPHONE (OUTPATIENT)
Dept: OTHER | Facility: HOSPITAL | Age: 41
End: 2021-04-07

## 2021-04-07 ENCOUNTER — APPOINTMENT (INPATIENT)
Dept: ULTRASOUND IMAGING | Facility: HOSPITAL | Age: 41
DRG: 883 | End: 2021-04-07
Payer: COMMERCIAL

## 2021-04-07 LAB
ACE SERPL-CCNC: 25 U/L (ref 14–82)
ALBUMIN SERPL BCP-MCNC: 2.8 G/DL (ref 3.5–5)
ALBUMIN UR ELPH-MCNC: 57.4 %
ALP SERPL-CCNC: 152 U/L (ref 46–116)
ALPHA1 GLOB MFR UR ELPH: 7.1 %
ALPHA2 GLOB MFR UR ELPH: 12.1 %
ALT SERPL W P-5'-P-CCNC: 21 U/L (ref 12–78)
ANION GAP SERPL CALCULATED.3IONS-SCNC: 13 MMOL/L (ref 4–13)
AST SERPL W P-5'-P-CCNC: 20 U/L (ref 5–45)
B-GLOBULIN MFR UR ELPH: 8.9 %
BILIRUB SERPL-MCNC: 0.2 MG/DL (ref 0.2–1)
BUN SERPL-MCNC: 22 MG/DL (ref 5–25)
CALCIUM ALBUM COR SERPL-MCNC: 9.3 MG/DL (ref 8.3–10.1)
CALCIUM SERPL-MCNC: 8.3 MG/DL (ref 8.3–10.1)
CHLORIDE SERPL-SCNC: 110 MMOL/L (ref 100–108)
CO2 SERPL-SCNC: 21 MMOL/L (ref 21–32)
CREAT SERPL-MCNC: 2.5 MG/DL (ref 0.6–1.3)
DSDNA AB SER-ACNC: <1 IU/ML (ref 0–9)
ENA SS-A AB SER-ACNC: <0.2 AI (ref 0–0.9)
ENA SS-B AB SER-ACNC: <0.2 AI (ref 0–0.9)
ERYTHROCYTE [DISTWIDTH] IN BLOOD BY AUTOMATED COUNT: 20 % (ref 11.6–15.1)
GAMMA GLOB MFR UR ELPH: 14.5 %
GFR SERPL CREATININE-BSD FRML MDRD: 24 ML/MIN/1.73SQ M
GLUCOSE SERPL-MCNC: 89 MG/DL (ref 65–140)
HCT VFR BLD AUTO: 27.7 % (ref 34.8–46.1)
HGB BLD-MCNC: 8.7 G/DL (ref 11.5–15.4)
HIV 1+2 AB+HIV1 P24 AG SERPL QL IA: NORMAL
MCH RBC QN AUTO: 32 PG (ref 26.8–34.3)
MCHC RBC AUTO-ENTMCNC: 31.4 G/DL (ref 31.4–37.4)
MCV RBC AUTO: 102 FL (ref 82–98)
PLATELET # BLD AUTO: 244 THOUSANDS/UL (ref 149–390)
PMV BLD AUTO: 12.3 FL (ref 8.9–12.7)
POTASSIUM SERPL-SCNC: 3.8 MMOL/L (ref 3.5–5.3)
PROT PATTERN UR ELPH-IMP: ABNORMAL
PROT SERPL-MCNC: 5.5 G/DL (ref 6.4–8.2)
PROT UR-MCNC: 122 MG/DL
RBC # BLD AUTO: 2.72 MILLION/UL (ref 3.81–5.12)
RHEUMATOID FACT SER QL LA: NEGATIVE
RYE IGE QN: NEGATIVE
SODIUM SERPL-SCNC: 144 MMOL/L (ref 136–145)
WBC # BLD AUTO: 7.51 THOUSAND/UL (ref 4.31–10.16)

## 2021-04-07 PROCEDURE — 84132 ASSAY OF SERUM POTASSIUM: CPT

## 2021-04-07 PROCEDURE — 99233 SBSQ HOSP IP/OBS HIGH 50: CPT | Performed by: INTERNAL MEDICINE

## 2021-04-07 PROCEDURE — 99222 1ST HOSP IP/OBS MODERATE 55: CPT | Performed by: PHYSICIAN ASSISTANT

## 2021-04-07 PROCEDURE — 80053 COMPREHEN METABOLIC PANEL: CPT | Performed by: INTERNAL MEDICINE

## 2021-04-07 PROCEDURE — 76830 TRANSVAGINAL US NON-OB: CPT

## 2021-04-07 PROCEDURE — 82947 ASSAY GLUCOSE BLOOD QUANT: CPT

## 2021-04-07 PROCEDURE — 97110 THERAPEUTIC EXERCISES: CPT

## 2021-04-07 PROCEDURE — 76856 US EXAM PELVIC COMPLETE: CPT

## 2021-04-07 PROCEDURE — 82803 BLOOD GASES ANY COMBINATION: CPT

## 2021-04-07 PROCEDURE — 85027 COMPLETE CBC AUTOMATED: CPT | Performed by: INTERNAL MEDICINE

## 2021-04-07 PROCEDURE — 85014 HEMATOCRIT: CPT

## 2021-04-07 PROCEDURE — 97163 PT EVAL HIGH COMPLEX 45 MIN: CPT

## 2021-04-07 PROCEDURE — 97167 OT EVAL HIGH COMPLEX 60 MIN: CPT

## 2021-04-07 PROCEDURE — 86255 FLUORESCENT ANTIBODY SCREEN: CPT

## 2021-04-07 PROCEDURE — 84295 ASSAY OF SERUM SODIUM: CPT

## 2021-04-07 PROCEDURE — 86341 ISLET CELL ANTIBODY: CPT

## 2021-04-07 PROCEDURE — 99233 SBSQ HOSP IP/OBS HIGH 50: CPT | Performed by: PSYCHIATRY & NEUROLOGY

## 2021-04-07 PROCEDURE — 83519 RIA NONANTIBODY: CPT | Performed by: NURSE PRACTITIONER

## 2021-04-07 RX ORDER — SODIUM CHLORIDE, SODIUM GLUCONATE, SODIUM ACETATE, POTASSIUM CHLORIDE, MAGNESIUM CHLORIDE, SODIUM PHOSPHATE, DIBASIC, AND POTASSIUM PHOSPHATE .53; .5; .37; .037; .03; .012; .00082 G/100ML; G/100ML; G/100ML; G/100ML; G/100ML; G/100ML; G/100ML
75 INJECTION, SOLUTION INTRAVENOUS CONTINUOUS
Status: DISCONTINUED | OUTPATIENT
Start: 2021-04-07 | End: 2021-04-10

## 2021-04-07 RX ORDER — HYDROXYZINE HYDROCHLORIDE 25 MG/1
25 TABLET, FILM COATED ORAL ONCE
Status: COMPLETED | OUTPATIENT
Start: 2021-04-07 | End: 2021-04-07

## 2021-04-07 RX ORDER — LORAZEPAM 1 MG/1
1 TABLET ORAL EVERY 8 HOURS PRN
Status: DISCONTINUED | OUTPATIENT
Start: 2021-04-07 | End: 2021-04-10

## 2021-04-07 RX ORDER — SODIUM CHLORIDE 450 MG/100ML
100 INJECTION, SOLUTION INTRAVENOUS CONTINUOUS
Status: DISCONTINUED | OUTPATIENT
Start: 2021-04-07 | End: 2021-04-07

## 2021-04-07 RX ADMIN — LEVOTHYROXINE SODIUM 25 MCG: 25 TABLET ORAL at 05:25

## 2021-04-07 RX ADMIN — LEVETIRACETAM 500 MG: 500 TABLET, FILM COATED ORAL at 09:06

## 2021-04-07 RX ADMIN — BUTALBITAL, ACETAMINOPHEN AND CAFFEINE 1 TABLET: 50; 325; 40 TABLET ORAL at 13:55

## 2021-04-07 RX ADMIN — SODIUM BICARBONATE: 84 INJECTION, SOLUTION INTRAVENOUS at 05:25

## 2021-04-07 RX ADMIN — LORAZEPAM 1 MG: 1 TABLET ORAL at 21:16

## 2021-04-07 RX ADMIN — LEVETIRACETAM 500 MG: 500 TABLET, FILM COATED ORAL at 20:23

## 2021-04-07 RX ADMIN — HYDROXYZINE HYDROCHLORIDE 25 MG: 25 TABLET ORAL at 00:52

## 2021-04-07 RX ADMIN — THIAMINE HYDROCHLORIDE 500 MG: 100 INJECTION, SOLUTION INTRAMUSCULAR; INTRAVENOUS at 20:23

## 2021-04-07 RX ADMIN — ZOLPIDEM TARTRATE 10 MG: 5 TABLET, COATED ORAL at 21:16

## 2021-04-07 RX ADMIN — CYCLOBENZAPRINE HYDROCHLORIDE 10 MG: 10 TABLET, FILM COATED ORAL at 18:02

## 2021-04-07 RX ADMIN — HEPARIN SODIUM 5000 UNITS: 5000 INJECTION INTRAVENOUS; SUBCUTANEOUS at 13:00

## 2021-04-07 RX ADMIN — BUTALBITAL, ACETAMINOPHEN AND CAFFEINE 1 TABLET: 50; 325; 40 TABLET ORAL at 04:24

## 2021-04-07 RX ADMIN — CYCLOBENZAPRINE HYDROCHLORIDE 10 MG: 10 TABLET, FILM COATED ORAL at 05:25

## 2021-04-07 RX ADMIN — HEPARIN SODIUM 5000 UNITS: 5000 INJECTION INTRAVENOUS; SUBCUTANEOUS at 21:16

## 2021-04-07 RX ADMIN — DOCUSATE SODIUM 100 MG: 100 CAPSULE, LIQUID FILLED ORAL at 17:55

## 2021-04-07 RX ADMIN — SODIUM CHLORIDE, SODIUM GLUCONATE, SODIUM ACETATE, POTASSIUM CHLORIDE, MAGNESIUM CHLORIDE, SODIUM PHOSPHATE, DIBASIC, AND POTASSIUM PHOSPHATE 100 ML/HR: .53; .5; .37; .037; .03; .012; .00082 INJECTION, SOLUTION INTRAVENOUS at 13:56

## 2021-04-07 RX ADMIN — BUTALBITAL, ACETAMINOPHEN AND CAFFEINE 1 TABLET: 50; 325; 40 TABLET ORAL at 20:22

## 2021-04-07 RX ADMIN — DOCUSATE SODIUM 100 MG: 100 CAPSULE, LIQUID FILLED ORAL at 09:06

## 2021-04-07 RX ADMIN — MIRTAZAPINE 7.5 MG: 15 TABLET, FILM COATED ORAL at 21:16

## 2021-04-07 RX ADMIN — THIAMINE HYDROCHLORIDE 500 MG: 100 INJECTION, SOLUTION INTRAMUSCULAR; INTRAVENOUS at 17:56

## 2021-04-07 RX ADMIN — HEPARIN SODIUM 5000 UNITS: 5000 INJECTION INTRAVENOUS; SUBCUTANEOUS at 05:25

## 2021-04-07 RX ADMIN — ACETAMINOPHEN 650 MG: 325 TABLET, FILM COATED ORAL at 05:34

## 2021-04-07 RX ADMIN — LORAZEPAM 1 MG: 1 TABLET ORAL at 12:58

## 2021-04-07 RX ADMIN — THIAMINE HYDROCHLORIDE 500 MG: 100 INJECTION, SOLUTION INTRAMUSCULAR; INTRAVENOUS at 09:09

## 2021-04-07 RX ADMIN — FOLIC ACID 1 MG: 1 TABLET ORAL at 09:06

## 2021-04-07 RX ADMIN — POLYETHYLENE GLYCOL 3350 17 G: 17 POWDER, FOR SOLUTION ORAL at 09:06

## 2021-04-07 NOTE — PROGRESS NOTES
Progress Note - Rosita Chavez 47 y o  female MRN: [id number]    Unit/Bed#: -01 Encounter: [id number]      Assessment:  Principal Problem:    Generalized weakness  Active Problems:    RENE (acute kidney injury) ([facility] [address] )    Hyperkalemia    Hyponatremia    Decreased ambulation status    Pituitary enlargement    Hypothyroidism    Hypoglycemia    Depression    Anemia    Soft tissue lesion of pelvic region    History of gastric bypass    Vitamin D deficiency    Anorexia nervosa, restricting type  Resolved Problems:    * No resolved hospital problems  *        Plan:  · Weakness-neurology is following and feels it may have a nutritional or psychiatric component given her anorexia nervosa-will have nutrition consult for additional supplements-I have placed a consult to Psychiatry-PT and OT to continue assessment  · Acute kidney injury-creatinine 4 53 on arrival-down to 2 50 today nephrology is following-repeat in the a m   · Incidental finding of right pelvic lesion on MRI-pelvic ultrasound was performed and I have been in contact with gyn oncology Dr Coralie Spatz who did review the study-he reported that he repeat pelvic ultrasound should be performed in 2-3 months to reassess  · Hyperkalemia-improved down 3 8 today  · Hypothyroidism-TSH is elevated at 3 774-previous reading in December 2020 was 0 68-concerned about compliance issues  · Vitamin deficiency-has listed history of vitamin-D deficiency-was 9 6 in December now at 17 9-will increase supplement    Subjective:   Patient complains of continued weakness in her hands and difficulty lifting cup to drink  PT and OT are evaluating her  Complains of inability to walk due to leg weakness  ROS  Comprehensive system review negative other than noted above    Objective:     Vitals: Blood pressure 129/86, pulse 83, temperature 98 3 °F (36 8 °C), resp  rate 18, weight 80 2 kg (176 lb 12 9 oz), SpO2 99 %  ,Body mass index is 30 35 kg/m²    Current Facility-Administered Medications Medication Dose Route Frequency Provider Last Rate Last Admin    acetaminophen (TYLENOL) tablet 650 mg  650 mg Oral Q6H PRN Armand Collet, PA-C   650 mg at 04/07/21 0534    butalbital-acetaminophen-caffeine (FIORICET,ESGIC) -40 mg per tablet 1 tablet  1 tablet Oral Q6H PRN Glori Libman, PA-C   1 tablet at 04/07/21 1355    cyclobenzaprine (FLEXERIL) tablet 10 mg  10 mg Oral TID PRN Armand Collet, PA-C   10 mg at 04/07/21 0525    docusate sodium (COLACE) capsule 100 mg  100 mg Oral BID Armand Collet, PA-C   100 mg at 27/11/77 2125    folic acid (FOLVITE) tablet 1 mg  1 mg Oral Daily Joellen Us PA-C   1 mg at 04/07/21 0906    gabapentin (NEURONTIN) capsule 100 mg  100 mg Oral TID PRN Armand Collet, PA-C   100 mg at 04/06/21 0330    heparin (porcine) subcutaneous injection 5,000 Units  5,000 Units Subcutaneous Q8H [facility] Joellen Us PA-C   5,000 Units at 04/07/21 1300    levETIRAcetam (KEPPRA) tablet 500 mg  500 mg Oral Q12H [facility] Heydi Cheney PA-C   500 mg at 04/07/21 5282    levothyroxine tablet 25 mcg  25 mcg Oral Once per day on Mon Tue Wed Thu Fri Armand Collet, PA-C   25 mcg at 04/07/21 0525    [START ON 4/10/2021] levothyroxine tablet 25 mcg  25 mcg Oral Once per day on Sun Sat Armand Collet, PA-C        LORazepam (ATIVAN) tablet 1 mg  1 mg Oral Q8H PRN [place] Clear, DO   1 mg at 04/07/21 1258    mirtazapine (REMERON) tablet 7 5 mg  7 5 mg Oral HS Joellen Us PA-C   7 5 mg at 04/06/21 2139    multi-electrolyte (PLASMALYTE-A/ISOLYTE-S PH 7 4) IV solution  100 mL/hr Intravenous Continuous Mary Ann Romero  mL/hr at 04/07/21 1356 100 mL/hr at 04/07/21 1356    polyethylene glycol (MIRALAX) packet 17 g  17 g Oral Daily Joellen Us PA-C   17 g at 04/07/21 0906    thiamine (VITAMIN B1) 500 mg in sodium chloride 0 9 % 50 mL IVPB  500 mg Intravenous TID Glori Libman, PA-C 100 mL/hr at 04/07/21 0909 500 mg at 04/07/21 0909    zolpidem (AMBIEN) tablet 10 mg  10 mg Oral HS Joellen Us PA-C   10 mg at 04/06/21 2139     Medications Prior to Admission   Medication    acarbose (PRECOSE) 25 mg tablet    AquaLance Lancets 30G MISC    buPROPion (WELLBUTRIN SR) 100 mg 12 hr tablet    Cholecalciferol 125 MCG (5000 UT) TABS    cloNIDine (CATAPRES) 0 2 mg tablet    Continuous Blood Gluc  (FREESTYLE CAITY 14 DAY READER) DINESH    Continuous Blood Gluc Sensor (FREESTYLE CAITY 14 DAY SENSOR) MISC    cyanocobalamin 1,000 mcg/mL    cyclobenzaprine (FLEXERIL) 10 mg tablet    docusate sodium (COLACE) 100 mg capsule    ergocalciferol (ERGOCALCIFEROL) 1 25 MG (25117 UT) capsule    famotidine (PEPCID) 20 mg tablet    folic acid (FOLVITE) 1 mg tablet    glucagon (GLUCAGON EMERGENCY) 1 MG injection    levothyroxine 25 mcg tablet    mirtazapine (REMERON) 15 mg tablet    multivitamin (THERAGRAN) TABS    OneTouch Ultra test strip    polyethylene glycol (MIRALAX) 17 g packet    potassium chloride (K-DUR,KLOR-CON) 10 mEq tablet    prazosin (MINIPRESS) 5 mg capsule    thiamine (VITAMIN B1) 100 mg tablet    topiramate (TOPAMAX) 200 MG tablet    varenicline (CHANTIX) 1 mg tablet    venlafaxine (EFFEXOR-XR) 75 mg 24 hr capsule    zolpidem (AMBIEN) 10 mg tablet         Intake/Output Summary (Last 24 hours) at 4/7/2021 1543  Last data filed at 4/7/2021 1001  Gross per 24 hour   Intake 204 17 ml   Output 2400 ml   Net -2195 83 ml       Physical Exam:  General appearance: alert and cooperative  Neck: no adenopathy, no JVD and thyroid not enlarged, symmetric, no tenderness/mass/nodules  Lungs: clear to auscultation bilaterally  Heart: regular rate and rhythm, S1, S2 normal, no murmur, click, rub or gallop  Abdomen: soft, non-tender; bowel sounds normal; no masses,  no organomegaly  Extremities:  No rashes or eruptions  Skin: Skin color, texture, turgor normal  No rashes or lesions  Neurologic:  Weakness in lfiting legs- hands with some weakness interosseous muscles      Lab, Imaging and other studies: I have personally reviewed pertinent reports  Results from last 7 days   Lab Units 04/07/21 0457 04/06/21  0613 04/05/21  1912 04/05/21  1824   WBC Thousand/uL 7 51 6 66  --  7 57   HEMOGLOBIN g/dL 8 7* 10 5*  --  10 0*   I STAT HEMOGLOBIN g/dl  --   --  10 5*  --    HEMATOCRIT % 27 7* 33 2*  --  31 5*   HEMATOCRIT, ISTAT %  --   --  31*  --    PLATELETS Thousands/uL 244 260  --  293   NEUTROS PCT %  --  79*  --  73   LYMPHS PCT %  --  9*  --  16   MONOS PCT %  --  7  --  7   EOS PCT %  --  3  --  4     Results from last 7 days   Lab Units 04/07/21 0457 04/06/21  2105 04/06/21  1215  04/05/21  1912 04/05/21  1824   POTASSIUM mmol/L 3 8 4 2 4 6   < >  --  5 6*   CHLORIDE mmol/L 110* 110* 110*   < >  --  101   CO2 mmol/L 21 22 17*   < >  --  18*   CO2, I-STAT mmol/L  --   --   --   --  20*  --    BUN mg/dL 22 26* 33*   < >  --  47*   CREATININE mg/dL 2 50* 3 09* 3 56*   < >  --  5 15*   CALCIUM mg/dL 8 3 8 6 8 4   < >  --  9 2   ALK PHOS U/L 152*  --   --   --   --  194*   ALT U/L 21  --   --   --   --  22   AST U/L 20  --   --   --   --  18   GLUCOSE, ISTAT mg/dl  --   --   --   --  80  --     < > = values in this interval not displayed  Lab Results   Component Value Date    CKTOTAL 47 04/05/2021         No results found for: Boby Pitts, SPUTUMCULTUR    Imaging:  No results found for this or any previous visit  No results found for this or any previous visit  PATIENT CENTERED ROUNDS: I have performed rounds with the nursing staff            Shira Andrade DO

## 2021-04-07 NOTE — OCCUPATIONAL THERAPY NOTE
Occupational Therapy Evaluation     Patient Name: [facility]  SAQJW'A Date: 4/7/2021  Problem List  Principal Problem:    Generalized weakness  Active Problems:    Pituitary enlargement    Hypothyroidism    Hypoglycemia    History of gastric bypass    Vitamin D deficiency    Anorexia nervosa, restricting type    Depression    RENE (acute kidney injury) ([address] )    Hyperkalemia    Hyponatremia    Anemia    Decreased ambulation status    Soft tissue lesion of pelvic region    Past Medical History  Past Medical History:   Diagnosis Date    Anemia     Psychiatric disorder     depression anxiety    Seizures ([address] )      Past Surgical History  Past Surgical History:   Procedure Laterality Date    GASTRIC BYPASS               04/07/21 1156   OT Last Visit   OT Visit Date 04/07/21   Note Type   Note type Evaluation   Restrictions/Precautions   Weight Bearing Precautions Per Order No   Other Precautions Pain; Fall Risk;Multiple lines; Seizure; Bed Alarm   Pain Assessment   Pain Assessment Tool 0-10   Pain Location/Orientation Location: Back  (Not rated )   Patient's Stated Pain Goal No pain   Hospital Pain Intervention(s) Repositioned; Emotional support; Ambulation/increased activity   Home Living   Type of Home House  (House divided in 4 apts; 6 PRIYANKA w/ HR + 2 PRIYANKA without HR)   Home Layout One level   Bathroom Shower/Tub Tub/shower unit   Bathroom Toilet Standard   Bathroom Equipment   (None per pt )   [address]   (None per pt )   Additional Comments Pt has been at [facility] since March 2021 due to weakness and inability to care for herself  Long term goal is to return to her own apt  Prior Function   Lives With Alone   Receives Help From   (Limited support; 1 friend )   ADL Assistance Needs assistance   IADLs Needs assistance   Falls in the last 6 months   (None reported)   Comments Prior to March 2021 pt was (I) w/ all tasks   Since being in [place], (D) for all tasks including feeding  Hoyered in/out of bed  Pt has not walked in a month  Lifestyle   Autonomy Pt states prior to March 2021 she was (I)  Most recently she's been at SNF and total assist for all care and joseph OOB due to UE/LE weakness  Reciprocal Relationships 1 friend    Intrinsic Gratification None stated    Psychosocial   Psychosocial (WDL) X   Patient Behaviors/Mood Anxious   Subjective   Subjective "I'll do anything to walk again"    ADL   Eating Assistance 2  Maximal Assistance   Grooming Assistance 2  Maximal Assistance   UB Bathing Assistance 2  Maximal Assistance   LB Bathing Assistance 1  Total Assistance   UB Dressing Assistance 2  Maximal [address]  [address]  1  Total Assistance   Additional Comments Pt w/ decreased shld ROM, decreased coordination, and weakness  Bed Mobility   Rolling R 3  Moderate assistance   Additional items Assist x 1;Bedrails; Increased time required   Rolling L 4  Minimal assistance   Additional items Assist x 1; Increased time required; Bedrails;Verbal cues   Supine to Sit 2  Maximal assistance   Additional items Assist x 2; Increased time required;LE management;Verbal cues   Sit to Supine 2  Maximal assistance   Additional items Assist x 2; Increased time required;LE management;Verbal cues   Additional Comments Sat EOB w/ P- balance and heavy posterior lean  Transfers   Sit to Stand Unable to assess   Additional Comments Pt not appropriate      Balance   Static Sitting Poor   Dynamic Sitting Poor -   Activity Tolerance   Activity Tolerance Patient tolerated treatment well   Medical Staff Made Aware PT, Rosalynd Schwab   Nurse Made Aware RNEden Assessment   RUE Assessment WFL  (Shld 90*; Distal 4-/5; Fingers in claw formation )   LUE Assessment   LUE Assessment WFL  (Shld 75*; Distal 3+/5; Finger in claw formation )   Hand Function   Gross Motor Coordination Impaired   Fine Motor Coordination Impaired   Sensation Light Touch Partial deficits in the RUE;Partial deficits in the LUE   Vision-Basic Assessment   Current Vision Wears glasses all the time   Cognition   Overall Cognitive Status [facility]   Arousal/Participation Cooperative   Attention Attends with cues to redirect   Orientation Level Oriented X4   Memory Within functional limits   Following Commands Follows all commands and directions without difficulty   Assessment   Limitation Decreased ADL status; Decreased UE ROM; Decreased UE strength;Decreased endurance;Decreased self-care trans;Decreased high-level ADLs   Prognosis Fair   Assessment Pt admit 4/5/21 with generalized weakness  Elevated creatinine on routine lab work  Recent DC from [facility] 3/11-3/12  Transferred to SNF for rehab  OT completed extensive review of pt's medical and social history  Pt with h/o restrictive eating disorder (anorexia nervosa), laxative abuse, seizure disorder, pituitary enlargement, history of benzodiazepine abuse, vitamin-D deficiency, depression, history of gastric bypass, and migraine headaches  Prior to admit was living at [facility] and required assist w/ all ADLS and IADLS  Pt was hoyered OOB  However, prior to March 2021, pt was fully (I) and living alone in an apt  Reports having assist and support from 1 friend  Pt presents to OT below baseline due to the following performance deficits: ROM; strength; Coordination; sensation; edema; pain; balance; functional transfers; coping; self care; and IADLs  Therefore, pt would benefit from OT services to achieve optimal level of performance  Occupational performance areas to be addressed include: eating, grooming, bathing, toileting, dressing, activity tolerance, functional mobility, and clothing management  Pt w/ B/L UE and LE weakness  Decreased coordination, which affects self care tasks  Currently (D)/Max for all ADLS including feeding  Required Max x 2 for bed mobility and unable to sit on EOB without full support from therapy; P- balance   Based on findings, pt is of high complexity  The patient's raw score on the AM-PAC Daily Activity inpatient short form is 9 , less than 39 4  Patients at this level are likely to benefit from discharge to post-acute rehabilitation services  Pt's goal is to walk again and return home alone to her apt  Therefore, would benefit from acute rehab to achieve optimal performance  Remains in bed w/ all needs and alarm engaged at end of session  Goals   Patient Goals To walk again    Plan   Treatment Interventions ADL retraining;Functional transfer training;UE strengthening/ROM; Endurance training;Patient/family training;Equipment evaluation/education; Neuromuscular reeducation; Activityengagement; Energy conservation   Goal Expiration Date 04/21/21   OT Treatment Day 0   OT Frequency 3-5x/wk   Recommendation   OT Discharge Recommendation Post-Acute Rehabilitation Services  (Acute Rehab )   [facility] Daily Activity Inpatient   Lower Body Dressing 1   Bathing 1   Toileting 1   Upper Body Dressing 2   Grooming 2   Eating 2   Daily Activity Raw Score 9   Turning Head Towards Sound 4   Follow Simple Instructions 3   Low Function Daily Activity Raw Score 16   Low Function Daily Activity Standardized Score 27 65   AM-PAC Applied Cognition Inpatient   Following a Speech/Presentation 4   Understanding Ordinary Conversation 4   Taking Medications 4   Remembering Where Things Are Placed or Put Away 4   Remembering List of 4-5   Errands 3   Taking Care of Complicated Tasks 3   Applied Cognition Raw Score 22   Applied Cognition Standardized Score 47 83      GOALS:     · Pt will feed self w/ Min and AE PRN     · Pt will complete grooming tasks w/ Min after s/u     Pt will complete UB ADL's w/ Min     Pt will complete LB ADL's w/ Min using AE PRN     Pt will complete toileting w/ Min     Pt will complete functional transfers w/ Mod for ADL tasks      Pt will sit on EOB x 10-15 mins w/ F/F+ balance for ADL tasks      OT to assess functional standing and mobility and set appropriate goals     Pt will improve activity tolerance to F for 20- 25 min tx session for increased engagement in functional tasks      Pt will participate in UE ROM/strengthening with less then 4/10 pain to increase independence w/ ADL tasks and feeding    Yoel Manuel MS, OTR/L

## 2021-04-07 NOTE — CASE MANAGEMENT
LOS: 2 days  Pt is not a documented bundle  Pt is not a 30 day readmission  Unplanned readmission score is 20 and green  Acknowledge Pt is from [facility] since 3/19/2021  Per Colonel Medellin in admissions at [facility], [address] is 30 day exceptional admission, still requiring joseph lift due to bi-lateral weakness  OT working with Pt for adls  Colonel Medellin in admissions informed CM that facility can accept Pt back pending bed availability and will need new insurance auth  Met with Pt  Pt presents AA&Ox3  Discussed role of  and discharge planning  Pt reports that she has been at [facility] for the past 2 weeks  Pt reports she still needs joseph lift to get out of bed to chair due to her weakness  Pt reports prior to [facility], she was living alone in 1st floor apartment, 6 steps to enter into building and 2 steps down to her apartment  Pt reports she was independent with adls and ambulation at that time  Pt reports she does not drive and her friend provides transportation for grocery shopping  Pt reports she has [facility] through [facility] who delivers her meds once a week and her  is Esteban Fountain  Pt reports [facility] and [facility] delivers her meds  Pt reports she has prescription plan and is able to afford medications  Pt reports she uses [facility] for her doctor appointments and her PCP is Dr Nehal Galvez  Pt reports her friend(Maria Del Rosario) takes her to the grocery store  Pt reports she does not have family support  Pt denies hx of VNA and drug and alcohol treatment  Pt reports she has had multiple mental tara hospitalizations and can recall her last mental health hospitalization was at [facility] in 2018 or 2019  Pt reports she plans to return to [facility] upon discharge  Will need to confirm bed availability at [facility] upon discharge and will need to obtain insurance auth for SNF prior to discharge  CM to follow

## 2021-04-07 NOTE — PLAN OF CARE
Problem: Potential for Falls  Goal: Patient will remain free of falls  Description: INTERVENTIONS:  - Assess patient frequently for physical needs  -  Identify cognitive and physical deficits and behaviors that affect risk of falls    -  [place] fall precautions as indicated by assessment   - Educate patient/family on patient safety including physical limitations  - Instruct patient to call for assistance with activity based on assessment  - Modify environment to reduce risk of injury  - Consider OT/PT consult to assist with strengthening/mobility  4/7/2021 0738 by Jacques Matos RN  Outcome: Progressing  4/6/2021 1834 by Jacques Matos RN  Outcome: Progressing     Problem: Prexisting or High Potential for Compromised Skin Integrity  Goal: Skin integrity is maintained or improved  Description: INTERVENTIONS:  - Identify patients at risk for skin breakdown  - Assess and monitor skin integrity  - Assess and monitor nutrition and hydration status  - Monitor labs   - Assess for incontinence   - Turn and reposition patient  - Assist with mobility/ambulation  - Relieve pressure over bony prominences  - Avoid friction and shearing  - Provide appropriate hygiene as needed including keeping skin clean and dry  - Evaluate need for skin moisturizer/barrier cream  - Collaborate with interdisciplinary team   - Patient/family teaching  - Consider wound care consult   4/7/2021 0738 by Jacques Matos RN  Outcome: Progressing  4/6/2021 1834 by Jacques Matos RN  Outcome: Progressing

## 2021-04-07 NOTE — PLAN OF CARE
Problem: OCCUPATIONAL THERAPY ADULT  Goal: Performs self-care activities at highest level of function for planned discharge setting  See evaluation for individualized goals  Description: Treatment Interventions: ADL retraining, Functional transfer training, UE strengthening/ROM, Endurance training, Patient/family training, Equipment evaluation/education, Neuromuscular reeducation, Activityengagement, Energy conservation          See flowsheet documentation for full assessment, interventions and recommendations  Note: Limitation: Decreased ADL status, Decreased UE ROM, Decreased UE strength, Decreased endurance, Decreased self-care trans, Decreased high-level ADLs  Prognosis: Fair  Assessment: Pt admit 4/5/21 with generalized weakness  Elevated creatinine on routine lab work  Recent DC from [facility] 3/11-3/12  Transferred to SNF for rehab  OT completed extensive review of pt's medical and social history  Pt with h/o restrictive eating disorder (anorexia nervosa), laxative abuse, seizure disorder, pituitary enlargement, history of benzodiazepine abuse, vitamin-D deficiency, depression, history of gastric bypass, and migraine headaches  Prior to admit was living at [facility] and required assist w/ all ADLS and IADLS  Pt was hoyered OOB  However, prior to March 2021, pt was fully (I) and living alone in an apt  Reports having assist and support from 1 friend  Pt presents to OT below baseline due to the following performance deficits: ROM; strength; Coordination; sensation; edema; pain; balance; functional transfers; coping; self care; and IADLs  Therefore, pt would benefit from OT services to achieve optimal level of performance  Occupational performance areas to be addressed include: eating, grooming, bathing, toileting, dressing, activity tolerance, functional mobility, and clothing management  Pt w/ B/L UE and LE weakness  Decreased coordination, which affects self care tasks   Currently (D)/Max for all ADLS including feeding  Required Max x 2 for bed mobility and unable to sit on EOB without full support from therapy; P- balance  Based on findings, pt is of high complexity  The patient's raw score on the AM-PAC Daily Activity inpatient short form is 9 , less than 39 4  Patients at this level are likely to benefit from discharge to post-acute rehabilitation services  Pt's goal is to walk again and return home alone to her apt  Therefore, would benefit from acute rehab to achieve optimal performance  Remains in bed w/ all needs and alarm engaged at end of session        OT Discharge Recommendation: Post-Acute Rehabilitation Services(Acute Rehab )

## 2021-04-07 NOTE — TELEPHONE ENCOUNTER
Earl Salcido is a 62-year-old female seen in consultation at [facility] regarding high value urinary retention and acute kidney injury  She will be discharged by the primary service when ready with Zhong catheter in place, transfer to rehab and proceed with trial of void at rehab facility  Please contact patient for non urgent hospital follow-up in approximately 8 weeks with PVR  Thank you
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
## 2021-04-07 NOTE — PLAN OF CARE
Problem: PHYSICAL THERAPY ADULT  Goal: Performs mobility at highest level of function for planned discharge setting  See evaluation for individualized goals  Description: Treatment/Interventions: Functional transfer training, LE strengthening/ROM, Therapeutic exercise, Endurance training, Patient/family training, Equipment eval/education, Bed mobility, Compensatory technique education, Spoke to nursing, OT  Equipment Recommended: (TBD in rehab)       See flowsheet documentation for full assessment, interventions and recommendations  Outcome: Progressing  Note: Prognosis: Fair  Problem List: Decreased strength, Decreased endurance, Impaired balance, Decreased mobility, Decreased coordination, Impaired judgement, Decreased safety awareness, Obesity, Pain  Assessment: Pt is 47 y o  female seen for PT evaluation s/p admit to [facility] on 4/5/2021 w/ Generalized weakness  Per EMR patient with restrictive eating disorder (anorexia nervosa), laxative abuse, seizure disorder, pituitary enlargement, history of benzodiazepine abuse, vitamin-D deficiency, depression, history of gastric bypass, migraine headaches, presents with progressive bilateral upper and lower extremity weakness and sensory abnormality  Pt also presents due to an elevated creatinine of over 5 with a baseline of 0 6 through 0 8 found on routine outpatient labs  Patient reports that she has had decreased urine output over the last couple of days  Patient also reports that she has been having generalized weakness over the past month  Weakness started in her feet and has ascended now affecting her hands  PT consulted to assess Pt's functional mobility and d/c needs  Please see above for past medical history and comorbidities  On evaluation patient in bed on arrival, IV infusing and bates catheter in place   Pt presents with quadriparesis, decreased sensation, decreased bed mobility function, poor sitting balance with VC to right to midline position and inability to maintain for more than few seconds  Pt reports sensation of lightheadedness however stable with systolic /-  Pt will benefit from continued PT during hospital stay to facilitate increase in strength, functional mobility and balance  Prior to recent hospitalization in March patient reports she was fully independent without AD  Standing not appropriate at this time due to weakness and poor sitting balance  Recommend joseph lift for OOB to chair at this time  Barriers to Discharge: Decreased caregiver support, Inaccessible home environment     PT Discharge Recommendation: [id number]     PT - OK to Discharge: (when medically cleared and going to rehab)    See flowsheet documentation for full assessment

## 2021-04-07 NOTE — PROGRESS NOTES
NEPHROLOGY PROGRESS NOTE   Millie Hughes 47 y o  female MRN: [id number]  Unit/Bed#: -01 Encounter: [id number]  Reason for Consult:  Acute kidney injury    Plan:  -acute kidney injury, improving with Zhong insertion and IV hydration  -currently on half-normal with bicarbonate  Likely change fluids later today to 0 45% NSS  Will defer to Dr Jack Garcia    -no urgent indication for RRT  -acute urinary retention, consider Urology consult  Continues with Zhong catheter   -hyperkalemia, resolved  -low bicarbonate level:  Continues on bicarbonate containing fluids  -hypo/hypernatremia:  Resolved  Sodium level currently stable   -polyneuropathy, unclear etiology  Workup pending  Neurology following  Receiving supplements due to concern for nutritional deficiency  Work up so far negative  ASSESSMENT/PLAN:  RENE (POA):  Suspected prerenal etiology as well as post renal with acute urinary retention in the setting of obstructive uropathy   -creatinine 5 15 on admission   -baseline creatinine 0 6-0 8 per record review    -not following outpatient with Nephrology   -creatinine improving with hydration and inserting Zhong catheter  -CT abdomen pelvis 4/5/21: distention of the bilateral renal collecting system consistent with mild obstructive uropathy  Severe bladder distention, extending well into the mid abdomen   -UA: small amount of blood, otherwise bland   -checking compliments and serologies  -status post Zhong catheter placement with output of 7500 mL recorded in last 24 h   -continues on IV fluids   -no urgent indication for RRT  -not on Ace/Arb/diuretic   -continue to avoid nephrotoxins, hypotension, IV contrast   -I/O, daily weights      Acute urinary retention:  Reports difficulty with urination and inability to urinate   -status post Zhong catheter placement with initially 7 5 L of urinary output    -CT scan shows distention of the bilateral renal collected system consistent with mild obstructive uropathy   -monitoring for postobstructive diuresis  -continues on IV hydration   -consider Urology consult      Hyperkalemia: Potassium on admission 5 6 suspected secondary to dehydration  Repeat K 4 4 this AM  DId not receive medical management  Resolved with hydration  -OP medication list shows potassium supplements 10 mEq's daily  -will hold home potassium       Hyponatremia: (resovled)  -Sodium on admission 134   -Received 1 L bolus of NS and 1 L bolus of isolyte in ED  Repeat sodium is 148  -corrected rapidly  Goal to correct 6-8 mEQ's in 24 hours  -placed on 0 45& NSS with bicarbonate       Anion gap acidosis  -bicarb 17, trending down   -continues on bicarbonate containing fluids       Generalized weakness: presents to ER with 1 month of progressive bilateral upper and lower extremity weakness   -Neurology/PT/OT consulted  Concern for muscular issue versus GBS   -C3/C4/sed rate/RF normal    -c-reactive protein elevated  -other work up pending    -status post lumbar puncture, awaiting cytology      Anemia  -Hemoglobin 10 0 on admission  -Iron panel show low iron sat, normal TIBC, normal iron    -Continue to monitor and transfuse as needed for hemoglobin <7      Hypothyroidism:  Maintained on Levothyroxine 25 mcg daily      History of pituitary enlargement  -MRI brain pituitary 10/25/19: enlarged pituitary gland without a discrete focus of differential enhancement to define an adenoma  -repeat MRI pending      History of gastric bypass:  in 2007 and 2010      Anorexia Nervosa, restricting type:   -reports decreased PO intake over the last month   -patient states after her last hospitalization, it was recommended she follow with [facility] for a nutritional plan        Disposition:  Requiring additional stay due to medical needs  SUBJECTIVE:  The patient is resting in bed  She appears to be comfortable  She is answering yes or no to my questions  She is not engaging in conversation today    She denies chest pain or shortness of breath  She denies nausea, vomiting, diarrhea  She reports not eating but drinking juice  She currently has Zhong catheter      OBJECTIVE:  Current Weight: Weight - Scale: 80 2 kg (176 lb 12 9 oz)  Vitals:    04/07/21 0501 04/07/21 0647 04/07/21 0730 04/07/21 0900   BP:  128/85 128/85    BP Location:       Pulse:  85 83    Resp:  18 19    Temp:  (!) 96 5 °F (35 8 °C) (!) 97 4 °F (36 3 °C)    TempSrc:       SpO2:  100% 99% 99%   Weight: 80 2 kg (176 lb 12 9 oz)          Intake/Output Summary (Last 24 hours) at 4/7/2021 1135  Last data filed at 4/7/2021 1001  Gross per 24 hour   Intake 1074 17 ml   Output 3250 ml   Net -2175 83 ml     General: NAD  Skin: warm, dry, intact, no rash  HEENT: Moist mucous membranes, sclera anicteric, normocephalic, atraumatic  Neck: No apparent JVD appreciated  Chest: lung sounds clear B/L, on RA   CVS:Regular rate and rhythm, no murmer   Abdomen: Soft, round, non-tender, +BS  Extremities: No B/L LE edema present  Neuro: alert and oriented  Psych: appropriate mood and affect     Medications:    Current Facility-Administered Medications:     acetaminophen (TYLENOL) tablet 650 mg, 650 mg, Oral, Q6H PRN, Chastity Sandoval PA-C, 650 mg at 04/07/21 0534    butalbital-acetaminophen-caffeine (FIORICET,ESGIC) -40 mg per tablet 1 tablet, 1 tablet, Oral, Q6H PRN, Jamar Khan PA-C, 1 tablet at 04/07/21 0424    cyclobenzaprine (FLEXERIL) tablet 10 mg, 10 mg, Oral, TID PRN, Chastity Sandoval PA-C, 10 mg at 04/07/21 0525    docusate sodium (COLACE) capsule 100 mg, 100 mg, Oral, BID, Joellen Us PA-C, 100 mg at 17/00/67 1911    folic acid (FOLVITE) tablet 1 mg, 1 mg, Oral, Daily, Joellen Us PA-C, 1 mg at 04/07/21 0906    gabapentin (NEURONTIN) capsule 100 mg, 100 mg, Oral, TID PRN, Chastity Sandoval PA-C, 100 mg at 04/06/21 0330    heparin (porcine) subcutaneous injection 5,000 Units, 5,000 Units, Subcutaneous, Q8H [facility], 5,000 Units at 04/07/21 0525 **AND** [CANCELED] Platelet count, , , Once, Maykel Shanks PA-C    levETIRAcetam (KEPPRA) tablet 500 mg, 500 mg, Oral, Q12H [facility], Heydi Cheney PA-C, 500 mg at 04/07/21 3082    levothyroxine tablet 25 mcg, 25 mcg, Oral, Once per day on Mon Tue Wed Thu Fri, Joellen Us PA-C, 25 mcg at 04/07/21 4612    [START ON 4/10/2021] levothyroxine tablet 25 mcg, 25 mcg, Oral, Once per day on Sun Sat, Joellen Us PA-C    mirtazapine (REMERON) tablet 7 5 mg, 7 5 mg, Oral, HS, Joellen Us PA-C, 7 5 mg at 04/06/21 2139    polyethylene glycol (MIRALAX) packet 17 g, 17 g, Oral, Daily, Joellen Us PA-C, 17 g at 04/07/21 0906    sodium bicarbonate 75 mEq in sodium chloride 0 45 % 1,000 mL infusion, , Intravenous, Continuous, Bill Dominguez MD, Last Rate: 100 mL/hr at 04/07/21 0525, New Bag at 04/07/21 0525    thiamine (VITAMIN B1) 500 mg in sodium chloride 0 9 % 50 mL IVPB, 500 mg, Intravenous, TID, Huong Wood PA-C, Last Rate: 100 mL/hr at 04/07/21 0909, 500 mg at 04/07/21 0909    zolpidem (AMBIEN) tablet 10 mg, 10 mg, Oral, HS, Joellen Us PA-C, 10 mg at 04/06/21 2139    Laboratory Results:  Results from last 7 days   Lab Units 04/07/21  0457 04/06/21  2105 04/06/21  1215 04/06/21  0613 04/05/21  1912 04/05/21  1824   WBC Thousand/uL 7 51  --   --  6 66  --  7 57   HEMOGLOBIN g/dL 8 7*  --   --  10 5*  --  10 0*   I STAT HEMOGLOBIN g/dl  --   --   --   --  10 5*  --    HEMATOCRIT % 27 7*  --   --  33 2*  --  31 5*   HEMATOCRIT, ISTAT %  --   --   --   --  31*  --    PLATELETS Thousands/uL 244  --   --  260  --  293   SODIUM mmol/L 144 145 143 148*  --  134*   POTASSIUM mmol/L 3 8 4 2 4 6 4 4  --  5 6*   CHLORIDE mmol/L 110* 110* 110* 112*  --  101   CO2 mmol/L 21 22 17* 19*  --  18*   CO2, I-STAT mmol/L  --   --   --   --  20*  --    BUN mg/dL 22 26* 33* 39*  --  47*   CREATININE mg/dL 2 50* 3 09* 3 56* 4 53*  --  5 15*   CALCIUM mg/dL 8 3 8 6 8 4 9 2  --  9 2   MAGNESIUM mg/dL  --   --   --   --   --  3 4*   ALK PHOS U/L 152* --   --   --   --  194*   ALT U/L 21  --   --   --   --  22   AST U/L 20  --   --   --   --  18   GLUCOSE, ISTAT mg/dl  --   --   --   --  80  --

## 2021-04-07 NOTE — ASSESSMENT & PLAN NOTE
- Patient ordered cholecalciferol 125 mcg (5000UT), 2 tabs daily  - current Vitamin D level 17 9  - management per Primary team

## 2021-04-07 NOTE — CONSULTS
Consultation - Urological  Surgery   Harriet Murphy 47 y o  female MRN: [id number]  Unit/Bed#: -01 Encounter: [id number]      Assessment/Plan      Urinary Retention with Acute Kidney Injury  - Pt with urinary retention with elevated creatinine  UA neg  Bates was inserted yesterday and so far, she has had 7 5 UOP since admit  Roseann Pathak Her Cr has improved to 2 5  Nephrology following  There is evidence of bladder distention with reflux hydronephrosis  There is not any obvious obstruction noted on the CT  There is evidence of fecal retention  Recommend bates insertion and maintenance for at least ten days  Pt came from rehab and most likely, will be discharged back to rehab  At the rehab, they may do a voiding trial  If not, pt needs to follow up in clinic   -Cont IVF  -She would also benefit form a bowel regimen   -She has significant weakness from her eating disorders and would benefit from optimizing her nutrition status and possibly a psych consult as well  Neurology is following her from her weakness  Will follow closely since certain neurologic etiologies can give neurogenic bladder as well  History of Present Illness   Physician Requesting Consult: Bill Treviño, DO    History, ROS and PFSH limited due to patient's weakness  She does provide history but gets tired and will not answer appropriately  HPI: Harriet Murphy is a 47y o  year old female who presents with generalized weakness and urinary retention  Patient has pmhx significant for eating disorders, gastric bypass surgeries*2, pituitary adenoma, depression came into the ER from rehab facility for decreased urine output  Patient reports she has been having generalized weakness all over for the past month  The weakness started early in March and see was seen at [facility]  She was admitted there and after her second ER visit and after treating her with blood transfusions, vitamin supplementations, she was discharged to [facility]   She presented to [facility] and was noted to have RENE and retention  Zhong was placed and she had had 7 5L urine output  Ct of abdomen and pelvis noted distended bladder without evidence of any bladder wall thickening or mass  She does seem to have some evidence of obstructive uropathy either-distal outlet obstruction or neurogenic bladder  +constipation  She also complains of numbness throughout her 4 extremities  Last BM was 3 days ago  No abdominal pain, no back/flank pain, no fevers or chills  Inpatient consult to Urology  Consult performed by: Pierce Gatica PA-C  Consult ordered by: Corie Ohara MD         Neurology  Nephrology      Review of Systems   Constitutional: Positive for fatigue  Negative for fever  HENT: Negative for sore throat  Respiratory: Negative for cough, chest tightness and shortness of breath  Cardiovascular: Negative for chest pain  Gastrointestinal: Negative for abdominal distention, abdominal pain, diarrhea, nausea and vomiting  Genitourinary: Positive for frequency (Decreased)  Negative for difficulty urinating  Musculoskeletal: Negative for arthralgias  Neurological: Positive for weakness (Ascending )  Negative for speech difficulty and headaches  Psychiatric/Behavioral: Negative for agitation and behavioral problems     All other systems reviewed and are negative        Historical Information   Past Medical History:   Diagnosis Date    Anemia     Psychiatric disorder     depression anxiety    Seizures (HCC)      Past Surgical History:   Procedure Laterality Date    GASTRIC BYPASS       Social History   Social History     Substance and Sexual Activity   Alcohol Use Never    Frequency: Never     Social History     Substance and Sexual Activity   Drug Use No    Comment: in revovery for benzos     E-Cigarette/Vaping     E-Cigarette/Vaping Substances     Social History     Tobacco Use   Smoking Status Current Every Day Smoker   Smokeless Tobacco Never Used     Family History: Family History   Problem Relation Age of Onset    Hypertension Mother     Heart attack Mother     No Known Problems Father     Lung cancer Maternal Grandmother     Hypothyroidism Maternal Grandmother     Diabetes type II Paternal Grandmother     Diabetes type II Paternal Grandfather    }    Meds/Allergies   current meds:   Current Facility-Administered Medications   Medication Dose Route Frequency    acetaminophen (TYLENOL) tablet 650 mg  650 mg Oral Q6H PRN    butalbital-acetaminophen-caffeine (FIORICET,ESGIC) -40 mg per tablet 1 tablet  1 tablet Oral Q6H PRN    cyclobenzaprine (FLEXERIL) tablet 10 mg  10 mg Oral TID PRN    docusate sodium (COLACE) capsule 100 mg  100 mg Oral BID    folic acid (FOLVITE) tablet 1 mg  1 mg Oral Daily    gabapentin (NEURONTIN) capsule 100 mg  100 mg Oral TID PRN    heparin (porcine) subcutaneous injection 5,000 Units  5,000 Units Subcutaneous Q8H [address]    levETIRAcetam (KEPPRA) tablet 500 mg  500 mg Oral Q12H [address]    levothyroxine tablet 25 mcg  25 mcg Oral Once per day on Mon Tue Wed Thu Fri    [START ON 4/10/2021] levothyroxine tablet 25 mcg  25 mcg Oral Once per day on Sun Sat    LORazepam (ATIVAN) tablet 1 mg  1 mg Oral Q8H PRN    mirtazapine (REMERON) tablet 7 5 mg  7 5 mg Oral HS    polyethylene glycol (MIRALAX) packet 17 g  17 g Oral Daily    sodium bicarbonate 75 mEq in sodium chloride 0 45 % 1,000 mL infusion   Intravenous Continuous    thiamine (VITAMIN B1) 500 mg in sodium chloride 0 9 % 50 mL IVPB  500 mg Intravenous TID    zolpidem (AMBIEN) tablet 10 mg  10 mg Oral HS     Allergies   Allergen Reactions    Anti-Hist [Diphenhydramine]     Buspar [Buspirone]     Haldol [Haloperidol]     Penicillins Throat Swelling    Pennsaid [Diclofenac Sodium]     Zoloft [Sertraline]        Objective   Vitals: Blood pressure 128/85, pulse 83, temperature (!) 97 4 °F (36 3 °C), resp  rate 19, weight 80 2 kg (176 lb 12 9 oz), SpO2 99 %  ,Body mass index is 30 35 kg/m²  General Appearance: NAD, cooperative, alert  Appears older than stated age  Eyes: Anicteric, PERRLA, EOMI  ENT:  Normocephalic, atraumatic, normal mucosa     Neck:    Supple, symmetrical, trachea midline  Resp:  Clear to auscultation bilaterally; no rales, rhonchi or wheezing; respirations unlabored   CV:  S1 S2, Regular rate and rhythm; no murmur, rub, or gallop  GI:  Soft, non-tender, non-distended; normal bowel sounds; no masses, no organomegaly,  Ileostomy in right lower quadrant  +surgical scars noted  : bates in place  No pelvic or flank tenderness  Musculoskeletal: No cyanosis, clubbing or edema  Flaccid muscle tone  Skin:     No jaundice, rashes, or lesions   Psych: flat affect, avoids eye contact  Neuro: awake,and oriented but at rest, face symmetric, speech nl  her hands are are contracted in claw position and strength is weak- varies from 2/5-4/5  See attached neuro note      Intake/Output Summary (Last 24 hours) at 4/7/2021 1202  Last data filed at 4/7/2021 1001  Gross per 24 hour   Intake 1074 17 ml   Output 3250 ml   Net -2175 83 ml     Invasive Devices     Peripheral Intravenous Line            Peripheral IV 04/07/21 Right Antecubital less than 1 day          Drain            Urethral Catheter 1 day                Counseling / Coordination of Care  Counseling/Coordination of Care: Total floor / unit time spent today 15 minutes  Greater than 50% of total time was spent with the patient and / or family counseling and / or coordination of care   A description of the counseling / coordination of care: 30 mins

## 2021-04-07 NOTE — PROGRESS NOTES
I was presented with a blood sample in a green/black topped vacutainer to run on the [employer] as a VBG  The color and consistency of the sample made me suspect that the sample was actually arterial  This was confirmed by the results when I ran the sample  Results are as follows: pH 7 407/ PCO2 34 5/ P02 154/ BE -3/ HC03 21 7/ TCO2 23/ sO2 99%  Na 141/ k 3 7/ Glu 89/ Hct 26/Hb8 8

## 2021-04-07 NOTE — PHYSICAL THERAPY NOTE
Physical Therapy Evaluation and Treatment Note    Patient's Name: Vlad George    Admitting Diagnosis  Anorexia nervosa, restricting type [F50 01]  Urinary retention [R33 9]  Abnormal laboratory test [R89 9]  Upper extremity weakness [R29 898]  Acute kidney injury ([address] ) [N17 9]  Generalized weakness [R53 1]  Weakness of both lower extremities [R29 898]    Problem List  Patient Active Problem List   Diagnosis    Pituitary enlargement    Elevated alkaline phosphatase level    Hypothyroidism    Hypoglycemia    History of gastric bypass    Vitamin D deficiency    Anorexia nervosa, restricting type    PTSD (post-traumatic stress disorder)    Moderate episode of recurrent major depressive disorder (HCC)    Laxative abuse    Depression    Generalized weakness    RENE (acute kidney injury) ([address] )    Hyperkalemia    Hyponatremia    Anemia    Decreased ambulation status    Soft tissue lesion of pelvic region       Past Medical History  Past Medical History:   Diagnosis Date    Anemia     Psychiatric disorder     depression anxiety    Seizures (HCC)        Past Surgical History  Past Surgical History:   Procedure Laterality Date    GASTRIC BYPASS          04/07/21 1155   PT Last Visit   PT Visit Date 04/07/21   Note Type   Note type Evaluation  (treatment)   Pain Assessment   Pain Location/Orientation Location: Back  (not rated)   Home Living   Type of Home House  (divided into 4 apt  with 6 with rail +2 without rail PRIYANKA)   Home Equipment   (none)   Additional Comments -    Prior Function   Level of [place] Independent with ADLs and functional mobility   Lives With Alone   Receives Help From   (reports limited support- one friend)   Falls in the last 6 months   (none reported)   Restrictions/Precautions   Weight Bearing Precautions Per Order No   Braces or Orthoses   (none reported)   Other Precautions Pain; Fall Risk;Multiple lines; Seizure; Bed Alarm   Cognition   Overall Cognitive Status [facility] Arousal/Participation Cooperative   Orientation Level Oriented X4   Following Commands Follows all commands and directions without difficulty   RUE Assessment   RUE Assessment   (see OT)   LUE Assessment   LUE Assessment   (see OT)   RLE Assessment   RLE Assessment WFL  (hip flex 2-/5, abd 2-/5, add 1+/5, knee ext 2-/5, ankle df3-)   LLE Assessment   LLE Assessment WFL  (hipflex2-/5, abd 2/5, add 2-/5,knee ext 3-/5, hip ext2-/5)   Light Touch   RLE Light Touch Impaired   LLE Light Touch Impaired   Bed Mobility   Rolling R 3  Moderate assistance   Additional items Assist x 1;Verbal cues; Increased time required; Other;Bedrails   Rolling L 4  Minimal assistance   Additional items Assist x 1; Increased time required;Verbal cues;LE management; Bedrails   Supine to Sit 2  Maximal assistance   Additional items Assist x 2; Increased time required;Verbal cues;LE management; Bedrails   Sit to Supine 2  Maximal assistance   Additional items Assist x 2; Increased time required;Verbal cues;LE management   Transfers   Sit to Stand Unable to assess   Balance   Static Sitting Poor   Dynamic Sitting Poor   Activity Tolerance   Activity Tolerance Patient tolerated treatment well   Medical Staff Made Aware OT [facility]   Nurse Made Aware RN clears to see   Assessment   Prognosis Fair   Problem List Decreased strength;Decreased endurance; Impaired balance;Decreased mobility; Decreased coordination; Impaired judgement;Decreased safety awareness; Obesity;Pain   Assessment Pt is 47 y o  female seen for PT evaluation s/p admit to [address] on 4/5/2021 w/ Generalized weakness  Per EMR patient with restrictive eating disorder (anorexia nervosa), laxative abuse, seizure disorder, pituitary enlargement, history of benzodiazepine abuse, vitamin-D deficiency, depression, history of gastric bypass, migraine headaches, presents with progressive bilateral upper and lower extremity weakness and sensory abnormality  Pt also presents due to an elevated creatinine of over 5 with a baseline of 0 6 through 0 8 found on routine outpatient labs  Patient reports that she has had decreased urine output over the last couple of days  Patient also reports that she has been having generalized weakness over the past month  Weakness started in her feet and has ascended now affecting her hands  PT consulted to assess Pt's functional mobility and d/c needs  Please see above for past medical history and comorbidities  On evaluation patient in bed on arrival, IV infusing and bates catheter in place  Pt presents with quadriparesis, decreased sensation, decreased bed mobility function, poor sitting balance with VC to right to midline position and inability to maintain for more than few seconds  Pt reports sensation of lightheadedness however stable with systolic /-  Pt will benefit from continued PT during hospital stay to facilitate increase in strength, functional mobility and balance  Prior to recent hospitalization in March patient reports she was fully independent without AD  Standing not appropriate at this time due to weakness and poor sitting balance  Recommend joseph lift for OOB to chair at this time  Barriers to Discharge Decreased caregiver support; Inaccessible home environment   Goals   Patient Goals " i would do anything to walk"   [facility] Expiration Date 04/17/21   Short Term Goal #1 1  Pt will roll to right and left with CS /VC of 1 to decrease risk for skin breakdown  2  Pt will Perform supine<-> sitting with mod assist of 1  3  Pt will demonstrate increase in BLE strength to increase ease with functional mobility  4  Pt will demonstrate fair balance in sitting  5  Assess and establish transfer goal when appropriate  PT Treatment Day 1   Plan   Treatment/Interventions Functional transfer training;LE strengthening/ROM; Therapeutic exercise; Endurance training;Patient/family training;Equipment eval/education; Bed mobility; Compensatory technique education;Spoke to nursing;OT PT Frequency 5x/wk   Recommendation   PT Discharge Recommendation Post-Acute Rehabilitation Services   Equipment Recommended   (TBD in rehab)   PT - OK to Discharge   (when medically cleared and going to rehab)   Isid[place] 8 in Bed Without Bedrails 2   Lying on Back to Sitting on Edge of Flat Bed 2   Moving Bed to Chair 1   Standing Up From Chair 1   Walk in Room 1   Climb 3-5 Stairs 1   Basic Mobility Inpatient Raw Score 8   Turning Head Towards Sound 4   Follow Simple Instructions 4   Low Function Basic Mobility Raw Score 16   Low Function Basic Mobility Standardized Score 25 72       ________________________________________________________    PT Treatment Note    Time In/Out: 15 minutes  Pt treatment consisted of bed mobility training, instruction in self initiated TE BLE consisting of ankle DF/PF, ankle circles due to RLE with tendency to posture in inversion, QS/GS, hip ab/add within partial range, hip IR/ER  Pt performs 5 reps of above during session  Education in [address] during hospital stay  Education in food choices/drink choices  Education in importance of nutrition for healing/strengthening  Instruction in use of call bell for needs  Pt in bed at end of session with all needs in reach      Jeanna Palacios, PT

## 2021-04-08 ENCOUNTER — APPOINTMENT (INPATIENT)
Dept: INTERVENTIONAL RADIOLOGY/VASCULAR | Facility: HOSPITAL | Age: 41
DRG: 883 | End: 2021-04-08
Payer: COMMERCIAL

## 2021-04-08 PROBLEM — E43 SEVERE PROTEIN-CALORIE MALNUTRITION (HCC): Status: ACTIVE | Noted: 2021-04-08

## 2021-04-08 LAB
ACE CSF-CCNC: <1.5 U/L (ref 0–2.5)
ALBUMIN SERPL ELPH-MCNC: 3.68 G/DL (ref 3.5–5)
ALBUMIN SERPL ELPH-MCNC: 59.3 % (ref 52–65)
ALPHA1 GLOB SERPL ELPH-MCNC: 0.53 G/DL (ref 0.1–0.4)
ALPHA1 GLOB SERPL ELPH-MCNC: 8.5 % (ref 2.5–5)
ALPHA2 GLOB SERPL ELPH-MCNC: 1.03 G/DL (ref 0.4–1.2)
ALPHA2 GLOB SERPL ELPH-MCNC: 16.6 % (ref 7–13)
ANION GAP SERPL CALCULATED.3IONS-SCNC: 13 MMOL/L (ref 4–13)
BETA GLOB ABNORMAL SERPL ELPH-MCNC: 0.38 G/DL (ref 0.4–0.8)
BETA1 GLOB SERPL ELPH-MCNC: 6.2 % (ref 5–13)
BETA2 GLOB SERPL ELPH-MCNC: 4.9 % (ref 2–8)
BETA2+GAMMA GLOB SERPL ELPH-MCNC: 0.3 G/DL (ref 0.2–0.5)
BUN SERPL-MCNC: 9 MG/DL (ref 5–25)
C-ANCA TITR SER IF: NORMAL TITER
CALCIUM SERPL-MCNC: 8.7 MG/DL (ref 8.3–10.1)
CHLORIDE SERPL-SCNC: 109 MMOL/L (ref 100–108)
CO2 SERPL-SCNC: 24 MMOL/L (ref 21–32)
CREAT SERPL-MCNC: 1.43 MG/DL (ref 0.6–1.3)
GAMMA GLOB ABNORMAL SERPL ELPH-MCNC: 0.28 G/DL (ref 0.5–1.6)
GAMMA GLOB SERPL ELPH-MCNC: 4.5 % (ref 12–22)
GFR SERPL CREATININE-BSD FRML MDRD: 46 ML/MIN/1.73SQ M
GLUCOSE SERPL-MCNC: 105 MG/DL (ref 65–140)
IGG/ALB SER: 1.46 {RATIO} (ref 1.1–1.8)
MYELOPEROXIDASE AB SER IA-ACNC: <9 U/ML (ref 0–9)
P-ANCA ATYPICAL TITR SER IF: NORMAL TITER
P-ANCA TITR SER IF: NORMAL TITER
POTASSIUM SERPL-SCNC: 3.4 MMOL/L (ref 3.5–5.3)
PROT PATTERN SERPL ELPH-IMP: ABNORMAL
PROT SERPL-MCNC: 6.2 G/DL (ref 6.4–8.2)
PROTEINASE3 AB SER IA-ACNC: <3.5 U/ML (ref 0–3.5)
REAGIN AB CSF QL: NON REACTIVE
SODIUM SERPL-SCNC: 146 MMOL/L (ref 136–145)
TOPIRAMATE SERPL-MCNC: 19.1 UG/ML (ref 2–25)

## 2021-04-08 PROCEDURE — 36556 INSERT NON-TUNNEL CV CATH: CPT

## 2021-04-08 PROCEDURE — 05HM33Z INSERTION OF INFUSION DEVICE INTO RIGHT INTERNAL JUGULAR VEIN, PERCUTANEOUS APPROACH: ICD-10-PCS | Performed by: RADIOLOGY

## 2021-04-08 PROCEDURE — 77001 FLUOROGUIDE FOR VEIN DEVICE: CPT

## 2021-04-08 PROCEDURE — 99232 SBSQ HOSP IP/OBS MODERATE 35: CPT | Performed by: INTERNAL MEDICINE

## 2021-04-08 PROCEDURE — C1894 INTRO/SHEATH, NON-LASER: HCPCS

## 2021-04-08 PROCEDURE — 80048 BASIC METABOLIC PNL TOTAL CA: CPT | Performed by: INTERNAL MEDICINE

## 2021-04-08 PROCEDURE — 76937 US GUIDE VASCULAR ACCESS: CPT

## 2021-04-08 PROCEDURE — 99232 SBSQ HOSP IP/OBS MODERATE 35: CPT | Performed by: PSYCHIATRY & NEUROLOGY

## 2021-04-08 RX ORDER — HEPARIN SODIUM,PORCINE 10 UNIT/ML
10 VIAL (ML) INTRAVENOUS EVERY OTHER DAY
Status: DISCONTINUED | OUTPATIENT
Start: 2021-04-09 | End: 2021-04-13 | Stop reason: SDUPTHER

## 2021-04-08 RX ORDER — ALBUMIN, HUMAN INJ 5% 5 %
150 SOLUTION INTRAVENOUS EVERY OTHER DAY
Status: DISPENSED | OUTPATIENT
Start: 2021-04-09 | End: 2021-04-19

## 2021-04-08 RX ORDER — ERGOCALCIFEROL 1.25 MG/1
50000 CAPSULE ORAL ONCE
Status: COMPLETED | OUTPATIENT
Start: 2021-04-08 | End: 2021-04-08

## 2021-04-08 RX ORDER — CALCIUM GLUCONATE 20 MG/ML
1 INJECTION, SOLUTION INTRAVENOUS EVERY OTHER DAY
Status: COMPLETED | OUTPATIENT
Start: 2021-04-09 | End: 2021-04-15

## 2021-04-08 RX ORDER — GABAPENTIN 300 MG/1
300 CAPSULE ORAL
Status: DISCONTINUED | OUTPATIENT
Start: 2021-04-08 | End: 2021-04-26 | Stop reason: HOSPADM

## 2021-04-08 RX ORDER — HEPARIN SODIUM,PORCINE 10 UNIT/ML
10 VIAL (ML) INTRAVENOUS EVERY OTHER DAY
Status: DISCONTINUED | OUTPATIENT
Start: 2021-04-08 | End: 2021-04-08 | Stop reason: SDUPTHER

## 2021-04-08 RX ADMIN — LORAZEPAM 1 MG: 1 TABLET ORAL at 15:14

## 2021-04-08 RX ADMIN — GABAPENTIN 300 MG: 300 CAPSULE ORAL at 22:32

## 2021-04-08 RX ADMIN — MIRTAZAPINE 7.5 MG: 15 TABLET, FILM COATED ORAL at 22:33

## 2021-04-08 RX ADMIN — ZOLPIDEM TARTRATE 10 MG: 5 TABLET, COATED ORAL at 22:32

## 2021-04-08 RX ADMIN — GABAPENTIN 100 MG: 100 CAPSULE ORAL at 17:57

## 2021-04-08 RX ADMIN — BUTALBITAL, ACETAMINOPHEN AND CAFFEINE 1 TABLET: 50; 325; 40 TABLET ORAL at 18:19

## 2021-04-08 RX ADMIN — HEPARIN SODIUM 5000 UNITS: 5000 INJECTION INTRAVENOUS; SUBCUTANEOUS at 13:13

## 2021-04-08 RX ADMIN — BUTALBITAL, ACETAMINOPHEN AND CAFFEINE 1 TABLET: 50; 325; 40 TABLET ORAL at 05:45

## 2021-04-08 RX ADMIN — HEPARIN SODIUM 5000 UNITS: 5000 INJECTION INTRAVENOUS; SUBCUTANEOUS at 05:41

## 2021-04-08 RX ADMIN — LORAZEPAM 1 MG: 1 TABLET ORAL at 05:45

## 2021-04-08 RX ADMIN — HEPARIN SODIUM 5000 UNITS: 5000 INJECTION INTRAVENOUS; SUBCUTANEOUS at 22:39

## 2021-04-08 RX ADMIN — DOCUSATE SODIUM 100 MG: 100 CAPSULE, LIQUID FILLED ORAL at 09:48

## 2021-04-08 RX ADMIN — LEVETIRACETAM 500 MG: 500 TABLET, FILM COATED ORAL at 22:32

## 2021-04-08 RX ADMIN — LORAZEPAM 1 MG: 1 TABLET ORAL at 23:24

## 2021-04-08 RX ADMIN — BUTALBITAL, ACETAMINOPHEN AND CAFFEINE 1 TABLET: 50; 325; 40 TABLET ORAL at 12:50

## 2021-04-08 RX ADMIN — SODIUM CHLORIDE, SODIUM GLUCONATE, SODIUM ACETATE, POTASSIUM CHLORIDE, MAGNESIUM CHLORIDE, SODIUM PHOSPHATE, DIBASIC, AND POTASSIUM PHOSPHATE 100 ML/HR: .53; .5; .37; .037; .03; .012; .00082 INJECTION, SOLUTION INTRAVENOUS at 13:13

## 2021-04-08 RX ADMIN — THIAMINE HYDROCHLORIDE 500 MG: 100 INJECTION, SOLUTION INTRAMUSCULAR; INTRAVENOUS at 09:48

## 2021-04-08 RX ADMIN — DOCUSATE SODIUM 100 MG: 100 CAPSULE, LIQUID FILLED ORAL at 17:57

## 2021-04-08 RX ADMIN — ERGOCALCIFEROL 50000 UNITS: 1.25 CAPSULE ORAL at 15:13

## 2021-04-08 RX ADMIN — LEVOTHYROXINE SODIUM 25 MCG: 25 TABLET ORAL at 05:41

## 2021-04-08 RX ADMIN — SODIUM CHLORIDE, SODIUM GLUCONATE, SODIUM ACETATE, POTASSIUM CHLORIDE, MAGNESIUM CHLORIDE, SODIUM PHOSPHATE, DIBASIC, AND POTASSIUM PHOSPHATE 100 ML/HR: .53; .5; .37; .037; .03; .012; .00082 INJECTION, SOLUTION INTRAVENOUS at 01:36

## 2021-04-08 RX ADMIN — LEVETIRACETAM 500 MG: 500 TABLET, FILM COATED ORAL at 09:48

## 2021-04-08 RX ADMIN — FOLIC ACID 1 MG: 1 TABLET ORAL at 09:48

## 2021-04-08 NOTE — PROGRESS NOTES
[facility]  Neurology Progress Note    Malena Julien 1980, 47 y o  female MRN: [id number]  Unit/Bed#: -01 Encounter: [id number]  Primary Care Provider: Rosy Ewing DO   Date and time admitted to hospital: 4/5/2021  5:54 PM    * Generalized weakness  Assessment & Plan  46 yo female with restrictive eating disorder (anorexia nervosa), laxative abuse, seizure disorder, pituitary enlargement, history of benzodiazepine abuse, vitamin-D deficiency, depression, history of gastric bypass, migraine headaches, presented to ED on 4/5/21  with progressive bilateral upper and lower extremity weakness and sensory abnormality  On  exam patient  has distal weakness in B/L UE extremities and more proximal weakness in B/L LE  She has atrophy in thenar and hypothenar in B/L hands  She has diffusely diminished and length-dependent pattern with light touch, PP, and vibratory sense along with loss of proprioception  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, possibly related to nutritional deficiency  · Lumbar puncture performed in the ED thus far with relatively bland results  Protein 49, WBC 2, glucose 57, RBC 31, no xanthochromia, gram stain negative  · Vitamin D level 17 9  · Laboratory studies consistent with hyperchloremic acidosis, likely related to significant doses of topiramate  Topiramate also can cause appetite suppression, therefore topiramate was discontinued and Keppra initiated  · MRI brain with no acute intracranial pathology     Plan:  - recommend Plasmapheresis x 5 doses   Will need catheter placement with IR    - labs pending:  · CSF labs: culture, non Gyn cytology, leukemia lymphoma flow cytometry, MS panel, Ace, VDRL  · serum labs: topiramate, B1, heavy metals,  HIV, ENS-2 [facility], Ace, Anca, Sjogren's, MAYI, anti DNA antibody, RF, methylmalonic acid, B6, cryoglobulin, SPEP, UPEP  - continue IV thiamine repletion 500 mg TID completed, then continue 250 mg Daily x 5 days  - continue Keppra 500 mg BID  Seizures are described as staring episodes and patient is currently nonambulatory; if patient has breakthrough seizures, can adjust medications accordingly  - recommend Vitamin D repletion, level 17 9  - recommend consult to Nutrition  - Recommend Psychiatry consult for medication adjustment for patient's depression   - discontinued Wellbutrin as it can lower seizure threshold and is also not recommended for use in patients with eating disorders  - patient will likely require outpatient EMG/NCS for further evaluation of polyneuropathy   - PT/OT  - monitor neuro exam and notify with changes    Results reviewed:    - CSF labs:  Protein 49, WBC 2, glucose 57, RBC 31, no xanthochromia, Gram stain negative   - serum labs:  Vitamin D 17 9, B12 511, folate 18 3, TSH 3 774, T4 0 91, RPR nonreactive, CR 3 56, 3 09, 2 5 BUN 33, 26,22, ESR 17, CRP 15 2, C3-1 20, C4 37,  - 4/6/21 MRI Brain wo contrast:    · no acute intracranial pathology  · Minimally decreased prominence of the pituitary gland that still has a convex superior border  Size is upper limits of normal  There is midline T1 hyperintense structure in the posterior sella measuring 5 mm  Although this may represent increased conspicuity of the posterior pituitary gland, in retrospect, there is a similar sized T1 hypointense hypoenhancing structure at this location and 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   - 4/6/21 MRI Lumbar spine:   · Mild spondylosis on this motion degraded study  · Indeterminant 4 cm pelvic lesion posteriorly in the cul-de-sac on the right, possibly related to the ovary  Differential considerations would include hemorrhagic cyst versus perhaps endometrioma  Other mass lesions not excluded  Nonemergent pelvic sonography is recommended for further assessment    - 4/5/21 CT abdomen/pelvis:   · Severely distended bladder and mild bilateral obstructive uropathy, suggesting distal outlet obstruction or neurogenic bladder  No evidence of cystitis, bladder mass or hematoma  · Findings compatible with constipation  - 4/5/21 CT head:No acute intracranial abnormality    Vitamin D deficiency  Assessment & Plan  - Patient prescribed cholecalciferol 125 mcg (5000UT), 2 tabs daily at home, states she doesn't take it  - current Vitamin D level 17 9  - ordered one time dose     Pituitary enlargement  Assessment & Plan  MRI brain from October 2019 demonstrated pituitary enlargement without evidence of adenoma  Endocrine evaluation as deemed necessary by primary team   - please see MRI brain result above     Recommendations for outpatient neurological follow up have yet to be determined  Subjective:   Patient continues with burning in her hands, and pain in her head, back and neck  She also notes some abdominal pain and has no appetite    Review of Systems   Constitutional: Positive for fatigue  No appetite   HENT: Negative  Eyes: Negative  Respiratory: Negative  Cardiovascular: Negative  Gastrointestinal: Negative  Genitourinary: Negative  Musculoskeletal:        Head, neck and back pain, burning in hands   Neurological: Positive for weakness  Vitals: Blood pressure 129/80, pulse 83, temperature 97 5 °F (36 4 °C), resp  rate 20, weight 76 3 kg (168 lb 3 4 oz), SpO2 100 %  ,Body mass index is 28 87 kg/m²      MEDS:  Current Facility-Administered Medications   Medication Dose Route Frequency Provider Last Rate    acetaminophen  650 mg Oral Q6H PRN Cliffton Ped, PA-C      butalbital-acetaminophen-caffeine  1 tablet Oral Q6H PRN Lendon Litten, PA-C      cyclobenzaprine  10 mg Oral TID PRN Cliffton Ped, PA-C      docusate sodium  100 mg Oral BID Cliffton Ped, PA-C      ergocalciferol  50,000 Units Oral Once SMITHA Shah      folic acid  1 mg Oral Daily Cliffton Ped, PA-C      gabapentin  100 mg Oral TID PRN Cliffton Ped, PA-C     [facility] heparin (porcine)  5,000 Units Subcutaneous [facility] Rob Land PA-C      levETIRAcetam  500 mg Oral Q12H [facility] Heydi Dajuan Garrett PA-C      levothyroxine  25 mcg Oral Once per day on Mon Tue Wed Thu Fri Rob Land PA-C      [START ON 4/10/2021] levothyroxine  25 mcg Oral Once per day on Sun Sat Rob Land PA-C      LORazepam  1 mg Oral Q8H PRN Jimmie Bedoya DO      mirtazapine  7 5 mg Oral HS Rob Land PA-C      multi-electrolyte  100 mL/hr Intravenous Continuous May Velazquez  mL/hr (04/08/21 1313)    polyethylene glycol  17 g Oral Daily Rob Land PA-C      [START ON 4/9/2021] thiamine  250 mg Intravenous Daily Reillyas July, DO      zolpidem  10 mg Oral HS Rob Land PA-C     :      Physical Exam  Vitals signs reviewed  Constitutional:       Appearance: She is ill-appearing  HENT:      Head: Normocephalic and atraumatic  Mouth/Throat:      Mouth: Mucous membranes are dry  Eyes:      Extraocular Movements: Extraocular movements intact  Conjunctiva/sclera: Conjunctivae normal       Pupils: Pupils are equal, round, and reactive to light  Cardiovascular:      Rate and Rhythm: Normal rate and regular rhythm  Heart sounds: Normal heart sounds  No murmur  Pulmonary:      Effort: Pulmonary effort is normal  No respiratory distress  Breath sounds: Normal breath sounds  Abdominal:      General: There is no distension  Palpations: Abdomen is soft  Comments: hypoactive   Skin:     General: Skin is warm and dry  Coloration: Skin is pale  Neurological:      Mental Status: She is oriented to person, place, and time  Psychiatric:         Speech: Speech normal          Neurologic Exam     Mental Status   Oriented to person, place, and time  Speech: speech is normal   Level of consciousness: alert    Cranial Nerves     CN III, IV, VI   Pupils are equal, round, and reactive to light    Face symmetric at rest and with movement     Motor Exam Bilateral upper extremities with distal weakness: deltoids 4+/4+, biceps 4+/4+, triceps 4/4,  2/3  Bilateral lower extremities with more proximal weakness:  Hip flexor 2/2, knee flex 3/3, knee extend 4/4, plantar/dorsi 5/5     Sensory Exam   Bilateral upper extremities decreased sensation to touch       Lab Results:   I have personally reviewed pertinent reports  CBC:   Results from last 7 days   Lab Units 04/07/21  0457 04/06/21  0613 04/05/21 1912 04/05/21  1824   WBC Thousand/uL 7 51 6 66  --  7 57   RBC Million/uL 2 72* 3 19*  --  3 05*   HEMOGLOBIN g/dL 8 7* 10 5*  --  10 0*   I STAT HEMOGLOBIN g/dl  --   --  10 5*  --    HEMATOCRIT % 27 7* 33 2*  --  31 5*   HEMATOCRIT, ISTAT %  --   --  31*  --    MCV fL 102* 104*  --  103*   PLATELETS Thousands/uL 244 260  --  293   BMP/CMP:   Results from last 7 days   Lab Units 04/07/21  0457 04/06/21  2105 04/06/21  1215  04/05/21 1912 04/05/21  1824   SODIUM mmol/L 144 145 143   < >  --  134*   POTASSIUM mmol/L 3 8 4 2 4 6   < >  --  5 6*   CHLORIDE mmol/L 110* 110* 110*   < >  --  101   CO2 mmol/L 21 22 17*   < >  --  18*   CO2, I-STAT mmol/L  --   --   --   --  20*  --    BUN mg/dL 22 26* 33*   < >  --  47*   CREATININE mg/dL 2 50* 3 09* 3 56*   < >  --  5 15*   GLUCOSE, ISTAT mg/dl  --   --   --   --  80  --    CALCIUM mg/dL 8 3 8 6 8 4   < >  --  9 2   AST U/L 20  --   --   --   --  18   ALT U/L 21  --   --   --   --  22   ALK PHOS U/L 152*  --   --   --   --  194*   EGFR ml/min/1 73sq m 24 18 15   < >  --  10    < > = values in this interval not displayed     Vitamin B12:   Results from last 7 days   Lab Units 04/06/21  0938   VITAMIN B 12 pg/mL 511    TSH:   Results from last 7 days   Lab Units 04/06/21  0938   TSH 3RD GENERATON uIU/mL 3 774*   Urinalysis:   Results from last 7 days   Lab Units 04/05/21 2007   COLOR UA  Yellow   CLARITY UA  Clear   SPEC GRAV UA  <=1 005   PH UA  6 5   LEUKOCYTES UA  Negative   NITRITE UA  Negative   GLUCOSE UA mg/dl Negative   KETONES UA mg/dl Negative   BILIRUBIN UA  Negative   BLOOD UA  Small*     Imaging Studies: I have personally reviewed pertinent reports  and I have personally reviewed pertinent films in PACS     EEG, Pathology, and Other Studies: I have personally reviewed pertinent reports  and I have personally reviewed pertinent films in PACS    VTE Prophylaxis: Sequential compression device (Venodyne)      Counseling / Coordination of Care  Total time spent today 25 minutes  Greater than 50% of total time was spent with the patient and / or family counseling and / or coordination of care  A description of the counseling / coordination of care: Coordination of care with SLIM Attending and RN  Discussion of plan of care with patient including plan for catheter placement and plasmaphoresis

## 2021-04-08 NOTE — BRIEF OP NOTE (RAD/CATH)
INTERVENTIONAL RADIOLOGY PROCEDURE NOTE    Date: 4/8/2021    Procedure:  Temporary dialysis catheter placement  Preoperative diagnosis:   1  Acute kidney injury ([address] )    2  Weakness of both lower extremities    3  Upper extremity weakness    4  Urinary retention    5  Generalized weakness    6  Anorexia nervosa, restricting type    7  PTSD (post-traumatic stress disorder)         Postoperative diagnosis: Same  Surgeon: Chau Singh MD     Assistant: None  No qualified resident was available  Blood loss:  None    Specimens:  None     Findings:  Right internal jugular 15 cm temporary dialysis catheter placed  Complications: None immediate      Anesthesia: local

## 2021-04-08 NOTE — ASSESSMENT & PLAN NOTE
Had been taking 89831 units weekly previously but ran out of her meds at least several months ago-will restart 51226 units twice weekly for now

## 2021-04-08 NOTE — ASSESSMENT & PLAN NOTE
· Creatinine 5 15 on admission and potassium of 5 6- now gradually improving in down to 2 50 today-continue on IV fluids  ·  Baseline creatinine around 0 6-0 8  · Patient reports decreased urine output  · ED spoke to Nephrology who recommended a 1 L normal saline bolus after receiving isolyte bolus  · Continue to monitor BMP  · Nephrology following

## 2021-04-08 NOTE — PROGRESS NOTES
NEPHROLOGY PROGRESS NOTE   Kg Ochoa 47 y o  female MRN: [id number]  Unit/Bed#: -01 Encounter: [id number]  Reason for Consult:  RENE        ASSESSMENT and PLAN:    Acute kidney injury  --present on admission, severe stage III, admission creatinine 5 1 mg/dL  --renal function continues to dramatically improve with Zhong catheter placement and intravenous fluids  --presumed to be secondary to primarily obstructive uropathy/urinary retention along with some component of prerenal azotemia  --noncontrast CT scan showed a severely distended bladder suggesting neurogenic bladder or distal outlet obstruction  --urology on board  --maintain Zhong catheter  --baseline creatinine 0 6-0 8 mg/dL  --urinalysis shows a specific gravity less than 1 005 small blood, 0-1 RBC 0-1 WBC, negative protein  --she is having a postobstructive diuresis  --no lab work today to comment on  --I will order BMP for today and tomorrow  --her oral intake remains poor will continue intravenous fluid     Low bicarbonate level  --improved  --currently on isolate     Blood pressure/volume status  --looks closer to euvolemic at this point, with postobstructive diuresis which can easily lead to hypovolemia status  --will change intravenous fluids but maintain at this time her oral intake remains poor  --currently normotensive      SUBJECTIVE / INTERVAL HISTORY:    No overnight events    OBJECTIVE:  Current Weight: Weight - Scale: 76 3 kg (168 lb 3 4 oz)  Vitals:    04/08/21 0550 04/08/21 0659 04/08/21 0713 04/08/21 0900   BP:  128/80 129/80    BP Location:  Left arm     Pulse:  86 83    Resp:  18 20    Temp:  97 5 °F (36 4 °C) 97 5 °F (36 4 °C)    TempSrc:  Axillary     SpO2:  100% 100% 100%   Weight: 76 3 kg (168 lb 3 4 oz)          Intake/Output Summary (Last 24 hours) at 4/8/2021 1448  Last data filed at 4/8/2021 1413  Gross per 24 hour   Intake --   Output 4250 ml   Net -4250 ml       Review of Systems:    12 point ROS has been reviewed  Physical Exam  Vitals signs and nursing note reviewed  Constitutional:       General: She is not in acute distress  Appearance: She is well-developed  HENT:      Head: Normocephalic and atraumatic  Eyes:      General: No scleral icterus  Conjunctiva/sclera: Conjunctivae normal       Pupils: Pupils are equal, round, and reactive to light  Neck:      Musculoskeletal: Normal range of motion and neck supple  Cardiovascular:      Rate and Rhythm: Normal rate and regular rhythm  Heart sounds: S1 normal and S2 normal  No murmur  No friction rub  No gallop  Pulmonary:      Effort: Pulmonary effort is normal  No respiratory distress  Breath sounds: Normal breath sounds  No wheezing or rales  Abdominal:      General: Bowel sounds are normal       Palpations: Abdomen is soft  Tenderness: There is no abdominal tenderness  There is no rebound  Musculoskeletal: Normal range of motion  Skin:     Findings: No rash  Neurological:      Mental Status: She is alert and oriented to person, place, and time     Psychiatric:         Behavior: Behavior normal          Medications:    Current Facility-Administered Medications:     acetaminophen (TYLENOL) tablet 650 mg, 650 mg, Oral, Q6H PRN, Storm Oliveira PA-C, 650 mg at 04/07/21 0534    butalbital-acetaminophen-caffeine (FIORICET,ESGIC) -40 mg per tablet 1 tablet, 1 tablet, Oral, Q6H PRN, Dago García PA-C, 1 tablet at 04/08/21 1250    cyclobenzaprine (FLEXERIL) tablet 10 mg, 10 mg, Oral, TID PRN, Storm Oliveira PA-C, 10 mg at 04/07/21 1802    docusate sodium (COLACE) capsule 100 mg, 100 mg, Oral, BID, Joellen Us PA-C, 100 mg at 04/08/21 8306    ergocalciferol (VITAMIN D2) capsule 50,000 Units, 50,000 Units, Oral, Once, SMITHA Suarez    folic acid (FOLVITE) tablet 1 mg, 1 mg, Oral, Daily, Joellen Us PA-C, 1 mg at 04/08/21 0948    gabapentin (NEURONTIN) capsule 100 mg, 100 mg, Oral, TID PRN, Storm Oliveira PA-C 100 mg at 04/06/21 0330    gabapentin (NEURONTIN) capsule 300 mg, 300 mg, Oral, HS, SMITHA Bach    heparin (porcine) subcutaneous injection 5,000 Units, 5,000 Units, Subcutaneous, Q8H [facility], 5,000 Units at 04/08/21 1313 **AND** [CANCELED] Platelet count, , , Once, Neha Odell PA-C    levETIRAcetam (KEPPRA) tablet 500 mg, 500 mg, Oral, Q12H [facility], Heydi Cheney PA-C, 500 mg at 04/08/21 8615    levothyroxine tablet 25 mcg, 25 mcg, Oral, Once per day on Mon Tue Wed Thu Fri, Joellen Us PA-C, 25 mcg at 04/08/21 0541    [START ON 4/10/2021] levothyroxine tablet 25 mcg, 25 mcg, Oral, Once per day on Sun Sat, Joellen Us PA-C    LORazepam (ATIVAN) tablet 1 mg, 1 mg, Oral, Q8H PRN, Ladan Orozco DO, 1 mg at 04/08/21 0545    mirtazapine (REMERON) tablet 7 5 mg, 7 5 mg, Oral, HS, Joellen Us PA-C, 7 5 mg at 04/07/21 2116    multi-electrolyte (PLASMALYTE-A/ISOLYTE-S PH 7 4) IV solution, 100 mL/hr, Intravenous, Continuous, Chaim Velasquez MD, Last Rate: 100 mL/hr at 04/08/21 1313, 100 mL/hr at 04/08/21 1313    polyethylene glycol (MIRALAX) packet 17 g, 17 g, Oral, Daily, Joellen Us PA-C, 17 g at 04/07/21 0906    [START ON 4/9/2021] thiamine (VITAMIN B1) 250 mg in sodium chloride 0 9 % 50 mL IVPB, 250 mg, Intravenous, Daily, Mirlande Garcia DO    zolpidem (AMBIEN) tablet 10 mg, 10 mg, Oral, HS, Joellen Us PA-C, 10 mg at 04/07/21 2116    Laboratory Results:  Results from last 7 days   Lab Units 04/07/21  0457 04/06/21  2105 04/06/21  1215 04/06/21  0613 04/05/21  1912 04/05/21  1824   WBC Thousand/uL 7 51  --   --  6 66  --  7 57   HEMOGLOBIN g/dL 8 7*  --   --  10 5*  --  10 0*   I STAT HEMOGLOBIN g/dl  --   --   --   --  10 5*  --    HEMATOCRIT % 27 7*  --   --  33 2*  --  31 5*   HEMATOCRIT, ISTAT %  --   --   --   --  31*  --    PLATELETS Thousands/uL 244  --   --  260  --  293   POTASSIUM mmol/L 3 8 4 2 4 6 4 4  --  5 6*   CHLORIDE mmol/L 110* 110* 110* 112*  --  101   CO2 mmol/L 21 22 17* 19*  --  18* CO2, I-STAT mmol/L  --   --   --   --  20*  --    BUN mg/dL 22 26* 33* 39*  --  47*   CREATININE mg/dL 2 50* 3 09* 3 56* 4 53*  --  5 15*   CALCIUM mg/dL 8 3 8 6 8 4 9 2  --  9 2   MAGNESIUM mg/dL  --   --   --   --   --  3 4*   GLUCOSE, ISTAT mg/dl  --   --   --   --  80  --

## 2021-04-08 NOTE — ASSESSMENT & PLAN NOTE
· Potassium 5 6 on admission  Patient received 2 L bolus of fluids i would IV fluids-now resolved  · Continue to monitor on BMP   · Patient placed on telemetry

## 2021-04-08 NOTE — ASSESSMENT & PLAN NOTE
· Patient presents from [facility] due to elevated creatinine on routine outpatient labs  · Patient reports that over the last month she has been having ongoing extremity weakness that is having the hands were she reports that had just been more in legs  Due to weakness that started 1 month ago patient was admitted at [facility] from 03/11-3/12 in was discharged to SNF due to persistent weakness  · Patient reports that weakness has become worse since getting to [facility] now involving the arms and has become quite clumsy  · ED spoke to Neurology who recommended LP, successfully performed in the ED  CSF  With protein Results for Jesus Manuel Pat (MRN [id number]) as of 4/6/2021 08:43   Ref   Range 4/5/2021 21:20   APPEARANCE CSF Unknown clear   TUBE NUMBER CSF Unknown 4   XANTHOCHROMIA Latest Ref Range: No  No   WBC CSF Latest Ref Range: 0 - 5 /uL 2   Lymphs % CSF Latest Units: % 60   Monocytes % (CSF) Latest Units: % 40   GLUCOSE CSF Latest Ref Range: 50 - 80 mg/dL 57   PROTEIN CSF Latest Ref Range: 15 - 45 mg/dL 49 (H)   RBC CSF Latest Ref Range: 0 - 10 uL 31 (H)   ·   · Neurology input appreciated-now recommending plasmapheresis with IR catheter placement-to assess for possible autoimmune related issues

## 2021-04-08 NOTE — ASSESSMENT & PLAN NOTE
Malnutrition Findings:   Adult Malnutrition type: Chronic illness  Adult Degree of Malnutrition: Other severe protein calorie malnutrition    BMI Findings: Body mass index is 28 87 kg/m²     Nutrition team is following-complicated with history of previous gastric bypass surgery and sensitivities-had been at [facility] program several years ago but would need to be able to care for herself to be in a similar program in the future

## 2021-04-08 NOTE — PROGRESS NOTES
Patient had a flat affect and was despondent  When asked if she would like a chaplaincy visit and/or a prayer, she said "I don't know "  She shared that she had been at different health facilities in an effort to regain strength   offered to say a prayer a second time and patient accepted         04/08/21 1400   Clinical Encounter Type   Visited With Patient   Routine Visit Introduction   Referral From Other (Comment)  (daily patient census)   [facility] Encounters   [facility] Needs Prayer

## 2021-04-08 NOTE — CONSULTS
Interventional Radiology  Consultation 4/8/2021     Consults  Annabelle Means   1980   [id number]      Assessment/Plan:  Acute renal failure    Problem List     * (Principal) Generalized weakness    Pituitary enlargement    Elevated alkaline phosphatase level    Hypothyroidism    Hypoglycemia    History of gastric bypass    Vitamin D deficiency    Anorexia nervosa, restricting type    PTSD (post-traumatic stress disorder)    Moderate episode of recurrent major depressive disorder (HCC)    Laxative abuse    Depression    RENE (acute kidney injury) ([facility] [address] )    Hyperkalemia    Hyponatremia    Anemia    Decreased ambulation status    Soft tissue lesion of pelvic region    Severe protein-calorie malnutrition (HCC)    Malnutrition Findings:   Adult Malnutrition type: Chronic illness  Adult Degree of Malnutrition: Other severe protein calorie malnutrition    BMI Findings: Body mass index is 28 87 kg/m²  Subjective:     Patient ID: Annabelle Means is a 47 y o  female  History of Present Illness  Acute renal failure for plasmapheresis  Temporary dialysis catheter requested      Review of Systems      Past Medical History:   Diagnosis Date    Anemia     Psychiatric disorder     depression anxiety    Seizures ([facility] )         Past Surgical History:   Procedure Laterality Date    GASTRIC BYPASS          Social History     Tobacco Use   Smoking Status Current Every Day Smoker   Smokeless Tobacco Never Used        Social History     Substance and Sexual Activity   Alcohol Use Never    Frequency: Never        Social History     Substance and Sexual Activity   Drug Use No    Comment: in revovery for benzos        Allergies   Allergen Reactions    Anti-Hist [Diphenhydramine]     Buspar [Buspirone]     Haldol [Haloperidol]     Penicillins Throat Swelling    Pennsaid [Diclofenac Sodium]     Zoloft [Sertraline]        Current Facility-Administered Medications   Medication Dose Route Frequency Provider Last Rate Last Admin    acetaminophen (TYLENOL) tablet 650 mg  650 mg Oral Q6H PRN Stephan Hall PA-C   650 mg at 04/07/21 0534    butalbital-acetaminophen-caffeine (FIORICET,ESGIC) -40 mg per tablet 1 tablet  1 tablet Oral Q6H PRN Susan Minor PA-C   1 tablet at 04/08/21 1250    cyclobenzaprine (FLEXERIL) tablet 10 mg  10 mg Oral TID PRN Stephan Hall PA-C   10 mg at 04/07/21 1802    docusate sodium (COLACE) capsule 100 mg  100 mg Oral BID Stephan Hall PA-C   100 mg at 84/29/27 9522    folic acid (FOLVITE) tablet 1 mg  1 mg Oral Daily Joellen Us PA-C   1 mg at 04/08/21 0948    gabapentin (NEURONTIN) capsule 100 mg  100 mg Oral TID PRN Stephan Hall PA-C   100 mg at 04/06/21 0330    gabapentin (NEURONTIN) capsule 300 mg  300 mg Oral HS SMITHA Pitts        heparin (porcine) subcutaneous injection 5,000 Units  5,000 Units Subcutaneous Q8H [facility] & long-term Joellen Us PA-C   5,000 Units at 04/08/21 1313    levETIRAcetam (KEPPRA) tablet 500 mg  500 mg Oral Q12H [facility] Susan Minor PA-C   500 mg at 04/08/21 7370    levothyroxine tablet 25 mcg  25 mcg Oral Once per day on Mon Tue Wed Thu Fri Stephan Hall PA-C   25 mcg at 04/08/21 0541    [START ON 4/10/2021] levothyroxine tablet 25 mcg  25 mcg Oral Once per day on Sun Sat Stephan Hall PA-C        LORazepam (ATIVAN) tablet 1 mg  1 mg Oral Q8H PRN Frias Sand, DO   1 mg at 04/08/21 1514    mirtazapine (REMERON) tablet 7 5 mg  7 5 mg Oral HS Joellen Us PA-C   7 5 mg at 04/07/21 2116    multi-electrolyte (PLASMALYTE-A/ISOLYTE-S PH 7 4) IV solution  100 mL/hr Intravenous Continuous Ari Cristina  mL/hr at 04/08/21 1313 100 mL/hr at 04/08/21 1313    polyethylene glycol (MIRALAX) packet 17 g  17 g Oral Daily Stephan Hall PA-C   17 g at 04/07/21 0906    [START ON 4/9/2021] thiamine (VITAMIN B1) 250 mg in sodium chloride 0 9 % 50 mL IVPB  250 mg Intravenous Daily Nicolas Freitas,         zolpidem (AMBIEN) tablet 10 mg  10 mg Oral HS Stephan Hall PA-C 10 mg at 04/07/21 2116          Objective:    Vitals:    04/08/21 0659 04/08/21 0713 04/08/21 0900 04/08/21 1512   BP: 128/80 129/80  129/84   BP Location: Left arm      Pulse: 86 83  81   Resp: 18 20  16   Temp: 97 5 °F (36 4 °C) 97 5 °F (36 4 °C)  97 6 °F (36 4 °C)   TempSrc: Axillary      SpO2: 100% 100% 100% 92%   Weight:            Physical Exam      No results found for: BNP   Lab Results   Component Value Date    WBC 7 51 04/07/2021    HGB 8 7 (L) 04/07/2021    HCT 27 7 (L) 04/07/2021     (H) 04/07/2021     04/07/2021     No results found for: INR, PROTIME  No results found for: PTT      I have personally reviewed pertinent imaging and laboratory results  Code Status: Level 1 - Full Code  Advance Directive and Living Will:      Power of :    POLST:      IR has been consulted to evaluate the patient, determine the appropriate procedure, and whether or not a procedure can and should be performed  Thank you for allowing me to participate in the care of Prakash Robb  Please don't hesitate to call, text, email, or TigerText with any questions  This text is generated with voice recognition software  There may be translation, syntax,  or grammatical errors  If you have any questions, please contact the dictating provider

## 2021-04-08 NOTE — ASSESSMENT & PLAN NOTE
- Patient prescribed cholecalciferol 125 mcg (5000UT), 2 tabs daily at home, states she doesn't take it  - current Vitamin D level 17 9  - ordered one time dose ergocalciferol 50,000 units

## 2021-04-08 NOTE — PLAN OF CARE
Problem: Potential for Falls  Goal: Patient will remain free of falls  Description: INTERVENTIONS:  - Assess patient frequently for physical needs  -  Identify cognitive and physical deficits and behaviors that affect risk of falls  -  [place] fall precautions as indicated by assessment   - Educate patient/family on patient safety including physical limitations  - Instruct patient to call for assistance with activity based on assessment  - Modify environment to reduce risk of injury  - Consider OT/PT consult to assist with strengthening/mobility  Outcome: Progressing     Problem: Prexisting or High Potential for Compromised Skin Integrity  Goal: Skin integrity is maintained or improved  Description: INTERVENTIONS:  - Identify patients at risk for skin breakdown  - Assess and monitor skin integrity  - Assess and monitor nutrition and hydration status  - Monitor labs   - Assess for incontinence   - Turn and reposition patient  - Assist with mobility/ambulation  - Relieve pressure over bony prominences  - Avoid friction and shearing  - Provide appropriate hygiene as needed including keeping skin clean and dry  - Evaluate need for skin moisturizer/barrier cream  - Collaborate with interdisciplinary team   - Patient/family teaching  - Consider wound care consult   Outcome: Progressing     Problem: Nutrition/Hydration-ADULT  Goal: Nutrient/Hydration intake appropriate for improving, restoring or maintaining nutritional needs  Description: Monitor and assess patient's nutrition/hydration status for malnutrition  Collaborate with interdisciplinary team and initiate plan and interventions as ordered  Monitor patient's weight and dietary intake as ordered or per policy  Utilize nutrition screening tool and intervene as necessary  Determine patient's food preferences and provide high-protein, high-caloric foods as appropriate       INTERVENTIONS:  - Monitor oral intake, urinary output, labs, and treatment plans  - Assess nutrition and hydration status and recommend course of action  - Evaluate amount of meals eaten  - Assist patient with eating if necessary   - Allow adequate time for meals  - Recommend/ encourage appropriate diets, oral nutritional supplements, and vitamin/mineral supplements  - Order, calculate, and assess calorie counts as needed  - Recommend, monitor, and adjust tube feedings and TPN/PPN based on assessed needs  - Assess need for intravenous fluids  - Provide specific nutrition/hydration education as appropriate  - Include patient/family/caregiver in decisions related to nutrition  Outcome: Progressing

## 2021-04-08 NOTE — CASE MANAGEMENT
Continue to follow  Met with Pt re: past inpt treatment for eating disorder  Pt reports that she was not at [facility] but was at [facility] [place] eating [facility]  Pt reports she was ambulatory at that time and was able to participate in treatment sessions  Pt inquired about [facility] and if facility would be able to accept her  Call placed to [facility], spoke with Sedrick Davis) in admissions, reviewed Pt's current status here at hospital  Leyla also informed CM that Pt would need to be able to do adls and get in and out of a chair on her own  CM informed Pt of CM conversation with Leyla at [facility]  CM to follow

## 2021-04-08 NOTE — NUTRITION
04/07/21 0907   Assessment   Timepoint Initial  (conuslt anorexia)   Labs & Meds   Labs/Meds Review Lab values reviewed; Meds reviewed  (4/7/21 C-Raactive Protein 15 2, Creat 2 5 Meds: colace, pepcid, folvite, ativan, remeron, miralax, ativan, 100 ml/hr IVF)   Feeding Route   PO With assistance   Adequacy of Intake   Nutrition Modality PO   Current Meal Intake Inadequate;25-50%   Estimated Calorie Intake 25-50%   Estimated Protein Intake  25-50%   Estimated Fluid Intake 50-75%   Estimated calorie intake compared to estimated need Appears to be taking alot of liquids currently  Dinner 4/6/21 pepsi only  Brk 4/7/21: OJ and milk  Nutrition Prognosis   Nutrition Concerns RN skin assessment reviewed; No edema documented   Comorbid Concerns   (per chart review: decreased ambulation status, hyponatremia resolved, hyperkalemia resolved, RENE, generalized weakness, anemia, depression)   Nutrition Precautions & Barriers to Adequate Nutrition   (PMH: anorexia nervosa restricting type, gastric bypass 2007 and revision in 2016/2017  h/o pituitary adenoma, UTI, PTSD, depression, recent trial of Dex Com with Dr Aiyana Renteria, laxative abuse,)   Nutrition Considerations Nutrition Educ not indicated at this time   PES Statement   Problem Intake   Energy Balance (1) Inadequate energy intake NI-1 2   Related to Decreased appetite   As evidenced by: Intake < estimated needs   Patient Nutrition Goals   Goal Meet PO needs; Improve to healthful diet   Goal Status initiated   Timeframe to complete goal by next f/u   Recommendations/Interventions   Summary PT agreeable to Ensure Clears supplement  Pt appears to be only taking liquids at meals  Will be difficult to optimize nutrition if lack of po intake is due to pt's h/o and/or current anorexia nervosa, typically requires intensive behavioral and nutritional therapy at an inpatient eating disorder unit     Malnutrition/BMI Present Yes   Adult Malnutrition type Chronic illness   Adult Degree of Malnutrition Other severe protein calorie malnutrition   Malnutrition Characteristics Inadequate energy;Weight loss  (Pt presents with sever protein calorie malnutriton as evidenced by 10% wt loss (19 lbs) in 5 months (11/17/20 88 7 kg, 4/7/20 80 2 kg ) and < 75% po intake > 1 month )   Interventions Supplement continue;Diet: continued as ordered   Nutrition Recommendations Continue diet as ordered   Nutrition Complexity Risk   Nutrition complexity level Moderate risk   Nutrition review: 04/08/21  (po intake,  diet hx, po supplement intake)   Follow up date 04/14/21

## 2021-04-08 NOTE — ASSESSMENT & PLAN NOTE
46 yo female with restrictive eating disorder (anorexia nervosa), laxative abuse, seizure disorder, pituitary enlargement, history of benzodiazepine abuse, vitamin-D deficiency, depression, history of gastric bypass, migraine headaches, presented to ED on 4/5/21  with progressive bilateral upper and lower extremity weakness and sensory abnormality  On  exam patient  has distal weakness in B/L UE extremities and more proximal weakness in B/L LE  She has atrophy in thenar and hypothenar in B/L hands  She has diffusely diminished and length-dependent pattern with light touch, PP, and vibratory sense along with loss of proprioception  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, possibly related to nutritional deficiency  · Lumbar puncture performed in the ED thus far with relatively bland results  Protein 49, WBC 2, glucose 57, RBC 31, no xanthochromia, gram stain negative  · Vitamin D level 17 9  · Laboratory studies consistent with hyperchloremic acidosis, likely related to significant doses of topiramate  Topiramate also can cause appetite suppression, therefore topiramate was discontinued and Keppra initiated  · MRI brain with no acute intracranial pathology     Plan:  - recommend Plasmapheresis x 5 doses  Will need catheter placement with IR    - labs pending:  · CSF labs: culture, non Gyn cytology, leukemia lymphoma flow cytometry, MS panel, Ace, VDRL  · serum labs: topiramate, B1, heavy metals,  HIV, ENS-2 [facility], Ace, Anca, Sjogren's, MAYI, anti DNA antibody, RF, methylmalonic acid, B6, cryoglobulin, SPEP, UPEP  - continue IV thiamine repletion 500 mg TID completed, then continue 250 mg Daily x 5 days  - continue Keppra 500 mg BID  Seizures are described as staring episodes and patient is currently nonambulatory; if patient has breakthrough seizures, can adjust medications accordingly    - recommend Vitamin D repletion, level 17 9  - recommend consult to Nutrition  - Recommend Psychiatry consult for medication adjustment for patient's depression   - discontinued Wellbutrin as it can lower seizure threshold and is also not recommended for use in patients with eating disorders  - patient will likely require outpatient EMG/NCS for further evaluation of polyneuropathy   - PT/OT  - monitor neuro exam and notify with changes    Results reviewed:    - CSF labs:  Protein 49, WBC 2, glucose 57, RBC 31, no xanthochromia, Gram stain negative   - serum labs:  Vitamin D 17 9, B12 511, folate 18 3, TSH 3 774, T4 0 91, RPR nonreactive, CR 3 56, 3 09, 2 5 BUN 33, 26,22, ESR 17, CRP 15 2, C3-1 20, C4 37,  - 4/6/21 MRI Brain wo contrast:    · no acute intracranial pathology  · Minimally decreased prominence of the pituitary gland that still has a convex superior border  Size is upper limits of normal  There is midline T1 hyperintense structure in the posterior sella measuring 5 mm  Although this may represent increased conspicuity of the posterior pituitary gland, in retrospect, there is a similar sized T1 hypointense hypoenhancing structure at this location and 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   - 4/6/21 MRI Lumbar spine:   · Mild spondylosis on this motion degraded study  · Indeterminant 4 cm pelvic lesion posteriorly in the cul-de-sac on the right, possibly related to the ovary  Differential considerations would include hemorrhagic cyst versus perhaps endometrioma  Other mass lesions not excluded  Nonemergent pelvic sonography is recommended for further assessment  - 4/5/21 CT abdomen/pelvis:   · Severely distended bladder and mild bilateral obstructive uropathy, suggesting distal outlet obstruction or neurogenic bladder  No evidence of cystitis, bladder mass or hematoma  · Findings compatible with constipation    - 4/5/21 CT head:No acute intracranial abnormality

## 2021-04-08 NOTE — PROGRESS NOTES
[place]  Progress Note - Yevgeniy Apodaca 1980, 47 y o  female MRN: [id number]  Unit/Bed#: -01 Encounter: [id number]  Primary Care Provider: Vy Strong DO   Date and time admitted to hospital: 4/5/2021  5:54 PM    Decreased ambulation status  Assessment & Plan  PT/Ot to see patient-will need to be in continued rehab after discharge        Hyperkalemia  Assessment & Plan  · Potassium 5 6 on admission  Patient received 2 L bolus of fluids i would IV fluids-now resolved  · Continue to monitor on BMP   · Patient placed on telemetry    RENE (acute kidney injury) ([facility] [address] )  Assessment & Plan  · Creatinine 5 15 on admission and potassium of 5 6- now gradually improving in down to 2 50 today-continue on IV fluids  ·  Baseline creatinine around 0 6-0 8  · Patient reports decreased urine output  · ED spoke to Nephrology who recommended a 1 L normal saline bolus after receiving isolyte bolus  · Continue to monitor BMP  · Nephrology following    * Generalized weakness  Assessment & Plan  · Patient presents from [facility] SN[facility] due to elevated creatinine on routine outpatient labs  · Patient reports that over the last month she has been having ongoing extremity weakness that is having the hands were she reports that had just been more in legs  Due to weakness that started 1 month ago patient was admitted at [facility] from 03/11-3/12 in was discharged to SNF due to persistent weakness  · Patient reports that weakness has become worse since getting to [facility] now involving the arms and has become quite clumsy  · ED spoke to Neurology who recommended LP, successfully performed in the ED  CSF  With protein Results for Quintin Huang (MRN [id number]) as of 4/6/2021 08:43   Ref   Range 4/5/2021 21:20   APPEARANCE CSF Unknown clear   TUBE NUMBER CSF Unknown 4   XANTHOCHROMIA Latest Ref Range: No  No   WBC CSF Latest Ref Range: 0 - 5 /uL 2   Lymphs % CSF Latest Units: % 60   Monocytes % (CSF) Latest Units: % 40   GLUCOSE CSF Latest Ref Range: 50 - 80 mg/dL 57   PROTEIN CSF Latest Ref Range: 15 - 45 mg/dL 49 (H)   RBC CSF Latest Ref Range: 0 - 10 uL 31 (H)   ·   · Neurology input appreciated-now recommending plasmapheresis with IR catheter placement-to assess for possible autoimmune related issues    Severe protein-calorie malnutrition (HCC)  Assessment & Plan  Malnutrition Findings:   Adult Malnutrition type: Chronic illness  Adult Degree of Malnutrition: Other severe protein calorie malnutrition    BMI Findings: Body mass index is 28 87 kg/m²  Nutrition team is following-complicated with history of previous gastric bypass surgery and sensitivities-had been at [facility] program several years ago but would need to be able to care for herself to be in a similar program in the future    Soft tissue lesion of pelvic region  Assessment & Plan   Incidental finding on MRI of the lumbar spine with right cul-de-sac lesion-  will consult gyn and order pelvic ultrasound    Vitamin D deficiency  Assessment & Plan  Had been taking 16456 units weekly previously but ran out of her meds at least several months ago-will restart 95529 units twice weekly for now      VTE Prophylaxis: in place    Patient Centered Rounds: I rounded with patient's nurse    Current Length of Stay: 3 day(s)    Current Patient Status: Inpatient    Certification Statement: Pt requires additional inpatient hospital stay due to: see assessment and plan        Subjective:   Patient complains of continued weakness in her hands  She also is concerned about the александр system causing irritation on her right great toe  She reports that Neurology was in to talk to her about and intravenous treatment-on review of their notes appears they wish to start her on plasmapheresis    Patient reports that she was at [facility] program several years ago but she would not qualify to be there now as she would need to be able to but care for herself for ADLs and currently is nonambulatory    All other ROS are negative    Objective:     Vitals:   Temp (24hrs), Av 8 °F (36 6 °C), Min:97 5 °F (36 4 °C), Max:98 5 °F (36 9 °C)    Temp:  [97 5 °F (36 4 °C)-98 5 °F (36 9 °C)] 97 6 °F (36 4 °C)  HR:  [81-87] 81  Resp:  [16-20] 16  BP: (128-131)/(80-84) 129/84  SpO2:  [92 %-100 %] 92 %  Body mass index is 28 87 kg/m²  Input and Output Summary (last 24 hours): Intake/Output Summary (Last 24 hours) at 2021 1518  Last data filed at 2021 1413  Gross per 24 hour   Intake --   Output 4250 ml   Net -4250 ml       Physical Exam:     Physical Exam  Vitals signs and nursing note reviewed  Constitutional:       General: She is not in acute distress  Eyes:      General: No scleral icterus  Right eye: No discharge  Left eye: No discharge  Conjunctiva/sclera: Conjunctivae normal    Cardiovascular:      Rate and Rhythm: Normal rate and regular rhythm  Heart sounds: No murmur  No gallop  Pulmonary:      Effort: Pulmonary effort is normal  No respiratory distress  Breath sounds: No stridor  No rhonchi  Abdominal:      General: Abdomen is flat  Palpations: Abdomen is soft  Tenderness: There is no abdominal tenderness  Hernia: No hernia is present  Musculoskeletal:         General: Tenderness present  Comments: Weakness of the hands and interosseous muscles  Weakness of the legs and greater distally in the feet   Neurological:      Mental Status: She is alert  Motor: Weakness present  I personally reviewed labs and imaging reports for today        Last 24 Hours Medication List:   Current Facility-Administered Medications   Medication Dose Route Frequency Provider Last Rate    acetaminophen  650 mg Oral Q6H PRN Jeffersonbhavna Mcintyre PARONDA      butalbital-acetaminophen-caffeine  1 tablet Oral Q6H PRN Lula Neal PA-C      cyclobenzaprine  10 mg Oral TID PRN Jefferson Mcintyre PA-C      docusate sodium 100 mg Oral BID Scott Farrell PA-C      folic acid  1 mg Oral Daily Scott Farrell PA-C      gabapentin  100 mg Oral TID PRN Scott Farrell PA-C      gabapentin  300 mg Oral HS SMITHA Valencia      heparin (porcine)  5,000 Units Subcutaneous [facility] Scott Farrell PA-C      levETIRAcetam  500 mg Oral Q12H [address] Heydi Lu PA-C      levothyroxine  25 mcg Oral Once per day on Mon Tue Wed Thu Fri Scott Farrell PA-C      [START ON 4/10/2021] levothyroxine  25 mcg Oral Once per day on Sun Sat Scott Farrell PA-C      LORazepam  1 mg Oral Q8H PRN Shantelle Torres DO      mirtazapine  7 5 mg Oral HS Scott Farrell PA-C      multi-electrolyte  100 mL/hr Intravenous Continuous Nino Rossi  mL/hr (04/08/21 1313)    polyethylene glycol  17 g Oral Daily Scott Farrell PA-C      [START ON 4/9/2021] thiamine  250 mg Intravenous Daily Juanito Sutton DO      zolpidem  10 mg Oral HS Scott Farrell PA-C            Today, Patient Was Seen By: Shantelle Torres DO    ** Please Note: Dictation voice to text software may have been used in the creation of this document   **

## 2021-04-08 NOTE — NUTRITION
04/08/21 1258   Assessment   Timepoint Reassess   Labs & Meds   Labs/Meds Review Lab values reviewed; Meds reviewed  (no new labs 4/8/21 meds: 100 ml/hr IVF, ativan, ambien, remeron, thiamine, moralax (refused), VIt B12, folvite )   Feeding Route   PO With assistance   Adequacy of Intake   Nutrition Modality PO   Current Meal Intake 0-25%; Inadequate   Estimated Calorie Intake 0-25%   Estimated Protein Intake  0-25%   Estimated Fluid Intake %   Estimated calorie intake compared to estimated need Pt took no breakfast today  Brk tray consisted of 2 milks, 1 Ensure Berry Clears, 1 OJ, 1 Pepsi, 1 sausage gideon and 1 pancake  ( ml/hr )   Nutrition Prognosis   Nutrition Concerns RN skin assessment reviewed; No skin issues documented   Comorbid Concerns   (per chart review: decreased ambulation status, RENE, genralized weakness, anemia, depression, gastric bypass 2007 and revision 2016/2017, anorexia nervosa restricting type )   Nutrition Considerations   (diet education: discussed at length current eating issues )   PES Statement   Problem Continue previous diagnosis   Patient Nutrition Goals   Goal Meet PO needs; Improve to healthful diet   Goal Status not met;extended   Timeframe to complete goal by next f/u   Recommendations/Interventions   Summary Pt appears very weak and lethargic lying in bed  Pt says she tried a sip of Ensure Geller Clears last night  Admits to eating disorder history and says she has control issues where she does not like to eat or use bathroom with other people around, Does not like regular Ensure of Boost products as she has bad memories of that being used at [facility] via tube for her if she didn't eat enough  Difficult to get a diet recall from pt, appears to be only taking liquids really   Pt reports can't eat sweets because too high in sugar, can not eats solids that require chewing because of lack of denition, salty fried foods cause her to have diarrhea, can't eat some meats due to gastric bypass  Says she will eats soft solids, but can not name me one item in that category she has eaten recently  Likes Pepsi and Iced Coffee  Admits prior to Count includes the [facility] placement would have Iced Coffee and then self restrict calorie for rest of the day  Encouraged pt to contract with me to eat any soft solid item or liquid of her choosing today, reports can not force herself to eat  Encouraged her to reflect on what she learned at [facility] in the past that would help move her in a more healthy nutritional direction  Pt is Level 1 Code, minimal po intake, h/o anorexia nervosa restricting type superimposed on gastric bypass history  Nutritional improvement will likely require intensive inpatient eating disorder placement and/or use of TF to help optinmize pt's nutritional status in light of current medical conditions  Discussed at multidisciplanry rounds  Psych consult pending     Interventions Supplement continue   Nutrition Recommendations Continue diet as ordered   Nutrition Complexity Risk   Nutrition complexity level High risk   Nutrition review: 04/09/21  (po intake, pysch consult)   Follow up date 04/12/21

## 2021-04-09 ENCOUNTER — APPOINTMENT (INPATIENT)
Dept: CT IMAGING | Facility: HOSPITAL | Age: 41
DRG: 883 | End: 2021-04-09
Payer: COMMERCIAL

## 2021-04-09 LAB
ALB CSF/SERPL: 9 {RATIO} (ref 0–8)
ALBUMIN CSF-MCNC: 34 MG/DL (ref 11–48)
ALBUMIN SERPL BCP-MCNC: 2.7 G/DL (ref 3.5–5)
ALBUMIN SERPL-MCNC: 3.9 G/DL (ref 3.8–4.8)
ALP SERPL-CCNC: 145 U/L (ref 46–116)
ALT SERPL W P-5'-P-CCNC: 22 U/L (ref 12–78)
ANION GAP SERPL CALCULATED.3IONS-SCNC: 10 MMOL/L (ref 4–13)
ANION GAP SERPL CALCULATED.3IONS-SCNC: 9 MMOL/L (ref 4–13)
ARSENIC BLD-MCNC: 7 UG/L (ref 2–23)
AST SERPL W P-5'-P-CCNC: 25 U/L (ref 5–45)
BACTERIA CSF CULT: NO GROWTH
BASOPHILS # BLD AUTO: 0.03 THOUSANDS/ΜL (ref 0–0.1)
BASOPHILS NFR BLD AUTO: 0 % (ref 0–1)
BILIRUB SERPL-MCNC: 0.3 MG/DL (ref 0.2–1)
BUN SERPL-MCNC: 7 MG/DL (ref 5–25)
BUN SERPL-MCNC: 9 MG/DL (ref 5–25)
CA-I BLD-SCNC: 1.17 MMOL/L (ref 1.12–1.32)
CALCIUM ALBUM COR SERPL-MCNC: 9.6 MG/DL (ref 8.3–10.1)
CALCIUM SERPL-MCNC: 8.6 MG/DL (ref 8.3–10.1)
CALCIUM SERPL-MCNC: 9.2 MG/DL (ref 8.3–10.1)
CHLORIDE SERPL-SCNC: 108 MMOL/L (ref 100–108)
CHLORIDE SERPL-SCNC: 109 MMOL/L (ref 100–108)
CO2 SERPL-SCNC: 25 MMOL/L (ref 21–32)
CO2 SERPL-SCNC: 27 MMOL/L (ref 21–32)
CREAT SERPL-MCNC: 1.21 MG/DL (ref 0.6–1.3)
CREAT SERPL-MCNC: 1.49 MG/DL (ref 0.6–1.3)
EOSINOPHIL # BLD AUTO: 0.24 THOUSAND/ΜL (ref 0–0.61)
EOSINOPHIL NFR BLD AUTO: 3 % (ref 0–6)
ERYTHROCYTE [DISTWIDTH] IN BLOOD BY AUTOMATED COUNT: 19.1 % (ref 11.6–15.1)
FIBRINOGEN PPP-MCNC: 363 MG/DL (ref 227–495)
GFR SERPL CREATININE-BSD FRML MDRD: 44 ML/MIN/1.73SQ M
GFR SERPL CREATININE-BSD FRML MDRD: 57 ML/MIN/1.73SQ M
GLUCOSE SERPL-MCNC: 87 MG/DL (ref 65–140)
GLUCOSE SERPL-MCNC: 94 MG/DL (ref 65–140)
GLUCOSE SERPL-MCNC: 95 MG/DL (ref 65–140)
HCT VFR BLD AUTO: 27 % (ref 34.8–46.1)
HGB BLD-MCNC: 8.5 G/DL (ref 11.5–15.4)
IGG CSF-MCNC: 0.8 MG/DL (ref 0–8.6)
IGG SERPL-MCNC: 263 MG/DL (ref 586–1602)
IGG SYNTH RATE SER+CSF CALC-MRATE: -2 MG/DAY
IGG/ALB CLEAR SER+CSF-RTO: 0.3 (ref 0–0.7)
IGG/ALB CSF: 0.02 {RATIO} (ref 0–0.25)
IMM GRANULOCYTES # BLD AUTO: 0.08 THOUSAND/UL (ref 0–0.2)
IMM GRANULOCYTES NFR BLD AUTO: 1 % (ref 0–2)
INR PPP: 1.01 (ref 0.84–1.19)
LEAD BLD-MCNC: <1 UG/DL (ref 0–4)
LYMPHOCYTES # BLD AUTO: 1.37 THOUSANDS/ΜL (ref 0.6–4.47)
LYMPHOCYTES NFR BLD AUTO: 19 % (ref 14–44)
MAGNESIUM SERPL-MCNC: 1.9 MG/DL (ref 1.6–2.6)
MBP CSF-MCNC: 6.7 NG/ML (ref 0–3.7)
MCH RBC QN AUTO: 32.3 PG (ref 26.8–34.3)
MCHC RBC AUTO-ENTMCNC: 31.5 G/DL (ref 31.4–37.4)
MCV RBC AUTO: 103 FL (ref 82–98)
MERCURY BLD-MCNC: <1 UG/L (ref 0–14.9)
MONOCYTES # BLD AUTO: 0.46 THOUSAND/ΜL (ref 0.17–1.22)
MONOCYTES NFR BLD AUTO: 7 % (ref 4–12)
NEUTROPHILS # BLD AUTO: 4.91 THOUSANDS/ΜL (ref 1.85–7.62)
NEUTS SEG NFR BLD AUTO: 70 % (ref 43–75)
NRBC BLD AUTO-RTO: 0 /100 WBCS
OLIGOCLONAL BANDS.IT SER+CSF QL: ABNORMAL
PLATELET # BLD AUTO: 258 THOUSANDS/UL (ref 149–390)
PMV BLD AUTO: 11.3 FL (ref 8.9–12.7)
POTASSIUM SERPL-SCNC: 3.5 MMOL/L (ref 3.5–5.3)
POTASSIUM SERPL-SCNC: 3.5 MMOL/L (ref 3.5–5.3)
PROT SERPL-MCNC: 5.2 G/DL (ref 6.4–8.2)
PROTHROMBIN TIME: 13.3 SECONDS (ref 11.6–14.5)
RBC # BLD AUTO: 2.63 MILLION/UL (ref 3.81–5.12)
SODIUM SERPL-SCNC: 143 MMOL/L (ref 136–145)
SODIUM SERPL-SCNC: 145 MMOL/L (ref 136–145)
VIT B12 SERPL-MCNC: 491 PG/ML (ref 100–900)
WBC # BLD AUTO: 7.09 THOUSAND/UL (ref 4.31–10.16)

## 2021-04-09 PROCEDURE — 70450 CT HEAD/BRAIN W/O DYE: CPT

## 2021-04-09 PROCEDURE — 83735 ASSAY OF MAGNESIUM: CPT | Performed by: PHYSICIAN ASSISTANT

## 2021-04-09 PROCEDURE — 85610 PROTHROMBIN TIME: CPT | Performed by: NURSE PRACTITIONER

## 2021-04-09 PROCEDURE — 99222 1ST HOSP IP/OBS MODERATE 55: CPT | Performed by: PHYSICIAN ASSISTANT

## 2021-04-09 PROCEDURE — 80053 COMPREHEN METABOLIC PANEL: CPT | Performed by: INTERNAL MEDICINE

## 2021-04-09 PROCEDURE — 99232 SBSQ HOSP IP/OBS MODERATE 35: CPT | Performed by: INTERNAL MEDICINE

## 2021-04-09 PROCEDURE — 85025 COMPLETE CBC W/AUTO DIFF WBC: CPT | Performed by: NURSE PRACTITIONER

## 2021-04-09 PROCEDURE — 99232 SBSQ HOSP IP/OBS MODERATE 35: CPT | Performed by: PHYSICIAN ASSISTANT

## 2021-04-09 PROCEDURE — 72125 CT NECK SPINE W/O DYE: CPT

## 2021-04-09 PROCEDURE — 82607 VITAMIN B-12: CPT | Performed by: INTERNAL MEDICINE

## 2021-04-09 PROCEDURE — 82330 ASSAY OF CALCIUM: CPT | Performed by: NURSE PRACTITIONER

## 2021-04-09 PROCEDURE — 6A551Z3 PHERESIS OF PLASMA, MULTIPLE: ICD-10-PCS | Performed by: PSYCHIATRY & NEUROLOGY

## 2021-04-09 PROCEDURE — 80048 BASIC METABOLIC PNL TOTAL CA: CPT | Performed by: PHYSICIAN ASSISTANT

## 2021-04-09 PROCEDURE — 85384 FIBRINOGEN ACTIVITY: CPT | Performed by: NURSE PRACTITIONER

## 2021-04-09 PROCEDURE — 82948 REAGENT STRIP/BLOOD GLUCOSE: CPT

## 2021-04-09 RX ORDER — POTASSIUM CHLORIDE 20 MEQ/1
20 TABLET, EXTENDED RELEASE ORAL ONCE
Status: COMPLETED | OUTPATIENT
Start: 2021-04-09 | End: 2021-04-09

## 2021-04-09 RX ORDER — MAGNESIUM SULFATE HEPTAHYDRATE 40 MG/ML
2 INJECTION, SOLUTION INTRAVENOUS ONCE
Status: COMPLETED | OUTPATIENT
Start: 2021-04-09 | End: 2021-04-09

## 2021-04-09 RX ADMIN — POLYETHYLENE GLYCOL 3350 17 G: 17 POWDER, FOR SOLUTION ORAL at 09:54

## 2021-04-09 RX ADMIN — DOCUSATE SODIUM 100 MG: 100 CAPSULE, LIQUID FILLED ORAL at 09:54

## 2021-04-09 RX ADMIN — FOLIC ACID 1 MG: 1 TABLET ORAL at 09:54

## 2021-04-09 RX ADMIN — MIRTAZAPINE 7.5 MG: 15 TABLET, FILM COATED ORAL at 22:17

## 2021-04-09 RX ADMIN — DOCUSATE SODIUM 100 MG: 100 CAPSULE, LIQUID FILLED ORAL at 17:44

## 2021-04-09 RX ADMIN — GABAPENTIN 300 MG: 300 CAPSULE ORAL at 22:19

## 2021-04-09 RX ADMIN — CALCIUM GLUCONATE 1 G: 20 INJECTION, SOLUTION INTRAVENOUS at 14:46

## 2021-04-09 RX ADMIN — BUTALBITAL, ACETAMINOPHEN AND CAFFEINE 1 TABLET: 50; 325; 40 TABLET ORAL at 16:29

## 2021-04-09 RX ADMIN — POTASSIUM CHLORIDE 20 MEQ: 1500 TABLET, EXTENDED RELEASE ORAL at 02:47

## 2021-04-09 RX ADMIN — SODIUM CHLORIDE, PRESERVATIVE FREE 100 UNITS: 5 INJECTION INTRAVENOUS at 14:48

## 2021-04-09 RX ADMIN — LORAZEPAM 1 MG: 1 TABLET ORAL at 17:44

## 2021-04-09 RX ADMIN — ZOLPIDEM TARTRATE 10 MG: 5 TABLET, COATED ORAL at 22:17

## 2021-04-09 RX ADMIN — BUTALBITAL, ACETAMINOPHEN AND CAFFEINE 1 TABLET: 50; 325; 40 TABLET ORAL at 10:15

## 2021-04-09 RX ADMIN — ACETAMINOPHEN 650 MG: 325 TABLET, FILM COATED ORAL at 14:17

## 2021-04-09 RX ADMIN — SODIUM CHLORIDE, SODIUM GLUCONATE, SODIUM ACETATE, POTASSIUM CHLORIDE, MAGNESIUM CHLORIDE, SODIUM PHOSPHATE, DIBASIC, AND POTASSIUM PHOSPHATE 100 ML/HR: .53; .5; .37; .037; .03; .012; .00082 INJECTION, SOLUTION INTRAVENOUS at 01:37

## 2021-04-09 RX ADMIN — MAGNESIUM SULFATE HEPTAHYDRATE 2 G: 40 INJECTION, SOLUTION INTRAVENOUS at 02:44

## 2021-04-09 RX ADMIN — THIAMINE HYDROCHLORIDE 250 MG: 100 INJECTION, SOLUTION INTRAMUSCULAR; INTRAVENOUS at 09:59

## 2021-04-09 RX ADMIN — LEVETIRACETAM 500 MG: 500 TABLET, FILM COATED ORAL at 09:54

## 2021-04-09 RX ADMIN — LEVETIRACETAM 500 MG: 500 TABLET, FILM COATED ORAL at 22:17

## 2021-04-09 RX ADMIN — HEPARIN SODIUM 5000 UNITS: 5000 INJECTION INTRAVENOUS; SUBCUTANEOUS at 14:14

## 2021-04-09 RX ADMIN — HEPARIN SODIUM 5000 UNITS: 5000 INJECTION INTRAVENOUS; SUBCUTANEOUS at 22:16

## 2021-04-09 RX ADMIN — LORAZEPAM 1 MG: 1 TABLET ORAL at 10:06

## 2021-04-09 RX ADMIN — ALBUMIN (HUMAN) 150 G: 12.5 INJECTION, SOLUTION INTRAVENOUS at 14:46

## 2021-04-09 NOTE — NUTRITION
04/09/21 1243   Assessment   Timepoint Nutrition Review   Feeding Route   PO With assistance   Adequacy of Intake   Nutrition Modality PO  (Regular, Ensure Clears TID)   Current Meal Intake 0-25%; Inadequate   Intake Supplements 0-25%   Estimated Calorie Intake 0-25%   Estimated Protein Intake  0-25%   Estimated Fluid Intake %  (IVF)   Estimated calorie intake compared to estimated need Brk today: pt took 100% cranberry juice with miralax, 2 bites jello, 25% of Ensure Clears (100 kcal/1 gm protein)  Lunch: 25% of grill cheese, no crust and asked for 24 oz pepsi  PES Statement   Problem Continue previous diagnosis   Patient Nutrition Goals   Goal Meet PO needs   Goal Status not met;extended   Timeframe to complete goal by next f/u   Recommendations/Interventions   Summary Pt lethargic at am, minimal po intake at brk  Pt to start plasmaphoresis for possible autoimmune source  Pt is at high nutritional risk with anorexia nervosa restricting type, h/o of gatric bypass with revisions and currently with inadequate intake since admit and from h/o at least 2 weeks PTA  Pt is not meeting estimated nutritional need or unwilling to meet nutritional needs currently  Recommend GI consult and start tube feeds via dobbhoff to meet estimated nutritonal needs while getting plasmaphoresis over next 10 days  Noted pt will not be accepted for inpatient eating diorder placement unless she is independent with ADL's  Pysch consult pending  IF TF started use Jevity 1 2 at trickle feed 20 ml /hr and monitor electrolytes daily before increasing towards goal rate with reconsult to RD over the weekend  Calorie Count posted in room to assess actural po intake, RN and CNA aware  Discussed at interdiscplinary rounds     Nutrition Recommendations Continue diet as ordered   Nutrition Complexity Risk   Nutrition complexity level High risk   Nutrition review: 04/11/21   Follow up date 04/11/21  (calorie count results, Gi consult, ? TF, Psych consult)

## 2021-04-09 NOTE — RAPID RESPONSE
Rapid Response Note  Marshall Garza 47 y o  female MRN: [id number]  Unit/Bed#: -01 Encounter: [id number]    Rapid Response Notification(s):   Response called date/time:  4/9/2021 12:56 PM  Response team arrival date/time:  4/9/2021 1:09 AM  Response end date/time:  4/9/2021 1:09 AM  Rapid response location:  [facility]  Primary reason for rapid response call: Other (comment)    Rapid Response Intervention(s):   Airway:  None  Breathing:  None  Circulation:  None  Fluids administered:  None  Medications administered:  None       Background/Situation:   Marshall Garza is a 47 y o  female has been at [facility] for 4 days on with decreased ambulate patient status Adi I generalized weakness being worked up by Neurology, patient complains of a regular back pain but denies any new injuries is awake alert and oriented appears a little bit lethargic  Patient stated she just rolled out of bed    Review of Systems   Constitutional: Negative for chills and fatigue  Neurological: Positive for dizziness, syncope, weakness and light-headedness  Negative for numbness  Objective:   Vitals:    04/08/21 0900 04/08/21 1512 04/08/21 2329 04/09/21 0012   BP:  129/84 128/82    BP Location:       Pulse:  81 84    Resp:  16 18    Temp:  97 6 °F (36 4 °C) 98 3 °F (36 8 °C)    TempSrc:       SpO2: 100% 92% 96% 98%   Weight:         Physical Exam  Vitals signs and nursing note reviewed  Constitutional:       General: She is not in acute distress  Appearance: She is normal weight  She is ill-appearing  She is not diaphoretic  HENT:      Head: Normocephalic and atraumatic  Mouth/Throat:      Pharynx: No oropharyngeal exudate  Eyes:      Conjunctiva/sclera: Conjunctivae normal       Pupils: Pupils are equal, round, and reactive to light  Neck:      Musculoskeletal: Normal range of motion and neck supple  Vascular: No JVD  Trachea: No tracheal deviation     Cardiovascular:      Rate and Rhythm: Normal rate and regular rhythm  Heart sounds: Normal heart sounds  No murmur  No friction rub  No gallop  Pulmonary:      Effort: Pulmonary effort is normal  No respiratory distress  Breath sounds: Normal breath sounds  No wheezing or rales  Chest:      Chest wall: No tenderness  Abdominal:      General: Bowel sounds are normal  There is no distension  Palpations: Abdomen is soft  Tenderness: There is no abdominal tenderness  There is no guarding  Musculoskeletal: Normal range of motion  General: No tenderness or deformity  Skin:     General: Skin is warm and dry  Capillary Refill: Capillary refill takes less than 2 seconds  Coloration: Skin is pale  Findings: No erythema  Neurological:      Mental Status: She is oriented to person, place, and time  She is lethargic  Assessment:   · Generalized weakness being worked up by Neurology  · Status post fall out of bed  · Normal mental status    Plan:   CT head and neck  Repeat labs to check for electrolyte abnormality  Remains in meds     Rapid Response Outcome:   Condition:  In stable condition  Transfer:  Remain on floor  Primary service notified of transfer: No    Code Status: Level 1 (Full Code)      Family notified of transfer: no  Family member contacted: per slim       Portions of the record may have been created with voice recognition software  Occasional wrong word or "sound a like" substitutions may have occurred due to the inherent limitations of voice recognition software  Read the chart carefully and recognize, using context, where substitutions have occurred      Jack Bunch PA-C

## 2021-04-09 NOTE — ASSESSMENT & PLAN NOTE
- Patient admits she has not been eating much at all, particularly since her admission to [facility]  She has been drinking fluids  She also has documented history of laxative abuse  - Discussed with nutrition team today  Patient continues to take minimal PO    - May need to consider alternative methods of nutrition if patient continues to take minimal PO but would defer to medicine team regarding this

## 2021-04-09 NOTE — OCCUPATIONAL THERAPY NOTE
OCCUPATIONAL THERAPY CANCELLATION     OT reviewed chart  Pt receiving HD  Will attempt OT tx when able and appropriate       Juwan Luciano MS, OTR/L

## 2021-04-09 NOTE — PROGRESS NOTES
Progress Note - Annabelle Means 47 y o  female MRN: [id number]    Unit/Bed#: -01 Encounter: [id number]      Assessment:  Principal Problem:    Generalized weakness  Active Problems:    RENE (acute kidney injury) ([address] )    Hyperkalemia    Hyponatremia    Decreased ambulation status    Pituitary enlargement    Hypothyroidism    Hypoglycemia    Depression    Anemia    Soft tissue lesion of pelvic region    Severe protein-calorie malnutrition (HCC)    History of gastric bypass    Vitamin D deficiency    Anorexia nervosa, restricting type  Resolved Problems:    * No resolved hospital problems  *        Plan:  · Progressive weakness- in legs and now involving hands- concern over possible  Autoimmune mediated process- to start plasmaphoresis as per neurology recommendations  · Depression/ anorexia issues- trying clear supplements and encouraging soft diet - has hx of gastric bypass with revisions  · rene- continued improvement- crt now stable at 1 49- was 3 56 on admit   ·  anemia- with macrocytic indices- will check b12 and folate  · [place] - [place] [address] unti will give twice weekly for now-   ·  fall- notes from night team reviewed- no apparent l injury    Subjective:    encouraged to try drinking fluids and supplements for breakfast  Falls back to sleep quickly  No complaints of leg pain  ROS  Comprehensive system review negative other than noted above    Objective:     Vitals: Blood pressure 130/82, pulse 87, temperature 97 6 °F (36 4 °C), resp  rate 20, weight 75 5 kg (166 lb 7 2 oz), SpO2 98 %  ,Body mass index is 28 57 kg/m²    Current Facility-Administered Medications   Medication Dose Route Frequency Provider Last Rate Last Admin    acetaminophen (TYLENOL) tablet 650 mg  650 mg Oral Q6H PRN Armand Collet, PA-C   650 mg at 04/07/21 0534    albumin human (FLEXBUMIN) 5 % injection 150 g  150 g Intravenous Every Other Day SMITHA Felipe        butalbital-acetaminophen-caffeine ([facility]) -40 mg per tablet 1 tablet  1 tablet Oral Q6H PRN Tanja Alcantara PA-C   1 tablet at 04/09/21 1015    calcium gluconate 1 g in sodium chloride 0 9% 50 mL (premix)  1 g Intravenous Every Other Day SMITHA Palacios        cyclobenzaprine (FLEXERIL) tablet 10 mg  10 mg Oral TID PRN Bernardino Sheriff PA-C   10 mg at 04/07/21 1802    docusate sodium (COLACE) capsule 100 mg  100 mg Oral BID Bernardino Sheriff PA-C   100 mg at 60/07/83 6493    folic acid (FOLVITE) tablet 1 mg  1 mg Oral Daily Joellen Us PA-C   1 mg at 04/09/21 0954    gabapentin (NEURONTIN) capsule 100 mg  100 mg Oral TID PRN Bernardino Sheriff PA-C   100 mg at 04/08/21 1757    gabapentin (NEURONTIN) capsule 300 mg  300 mg Oral HS SMITHA Palacios   300 mg at 04/08/21 2232    heparin (porcine) subcutaneous injection 5,000 Units  5,000 Units Subcutaneous [facility] Joellen Us PA-C   5,000 Units at 04/08/21 2239    heparin lock flush injection 100 Units  10 mL Intracatheter Every Other Day SMITHA Palacios        levETIRAcetam (KEPPRA) tablet 500 mg  500 mg Oral Q12H [facility] Tanja Alcantara PA-C   500 mg at 04/09/21 3147    levothyroxine tablet 25 mcg  25 mcg Oral Once per day on Mon Tue Wed Thu Fri Bernardino Sheriff PA-C   25 mcg at 04/08/21 0541    [START ON 4/10/2021] levothyroxine tablet 25 mcg  25 mcg Oral Once per day on Sun Sat Bernardino Sheriff PA-C        LORazepam (ATIVAN) tablet 1 mg  1 mg Oral Q8H PRN [place], DO   1 mg at 04/09/21 1006    mirtazapine (REMERON) tablet 7 5 mg  7 5 mg Oral HS Joellen Us PA-C   7 5 mg at 04/08/21 2233    multi-electrolyte (PLASMALYTE-A/ISOLYTE-S PH 7 4) IV solution  100 mL/hr Intravenous Continuous Corie Ohara  mL/hr at 04/09/21 0137 100 mL/hr at 04/09/21 0137    polyethylene glycol (MIRALAX) packet 17 g  17 g Oral Daily Bernardino Sheriff PA-C   17 g at 04/09/21 2287    thiamine (VITAMIN B1) 250 mg in sodium chloride 0 9 % 50 mL IVPB  250 mg Intravenous Daily Blair Hernandez,  mL/hr at 04/09/21 0959 250 mg at 04/09/21 0959    zolpidem (AMBIEN) tablet 10 mg  10 mg Oral HS Morgan Gilbert PA-C   10 mg at 04/08/21 2232     Medications Prior to Admission   Medication    acarbose (PRECOSE) 25 mg tablet    AquaLance Lancets 30G MISC    buPROPion (WELLBUTRIN SR) 100 mg 12 hr tablet    Cholecalciferol 125 MCG (5000 UT) TABS    cloNIDine (CATAPRES) 0 2 mg tablet    Continuous Blood Gluc  (FREESTYLE CAITY 14 DAY READER) DINESH    Continuous Blood Gluc Sensor (FREESTYLE CAITY 14 DAY SENSOR) MISC    cyanocobalamin 1,000 mcg/mL    cyclobenzaprine (FLEXERIL) 10 mg tablet    docusate sodium (COLACE) 100 mg capsule    ergocalciferol (ERGOCALCIFEROL) 1 25 MG (63829 UT) capsule    famotidine (PEPCID) 20 mg tablet    folic acid (FOLVITE) 1 mg tablet    glucagon (GLUCAGON EMERGENCY) 1 MG injection    levothyroxine 25 mcg tablet    mirtazapine (REMERON) 15 mg tablet    multivitamin (THERAGRAN) TABS    OneTouch Ultra test strip    polyethylene glycol (MIRALAX) 17 g packet    potassium chloride (K-DUR,KLOR-CON) 10 mEq tablet    prazosin (MINIPRESS) 5 mg capsule    thiamine (VITAMIN B1) 100 mg tablet    topiramate (TOPAMAX) 200 MG tablet    varenicline (CHANTIX) 1 mg tablet    venlafaxine (EFFEXOR-XR) 75 mg 24 hr capsule    zolpidem (AMBIEN) 10 mg tablet         Intake/Output Summary (Last 24 hours) at 4/9/2021 1058  Last data filed at 4/9/2021 0541  Gross per 24 hour   Intake 1155 ml   Output 3125 ml   Net -1970 ml       Physical Exam:  General appearance: no distress  Neck: no adenopathy and no JVD  Lungs: clear to auscultation bilaterally  Heart: regular rate and rhythm, S1, S2 normal, no murmur, click, rub or gallop  Abdomen: soft, non-tender; bowel sounds normal; no masses,  no organomegaly  Extremities:  Weakness in hands and legs- difficulty raising leg  from bed  Skin: no rashes noted         Lab, Imaging and other studies: I have personally reviewed pertinent reports            Results from last 7 days   Lab Units 04/07/21  0457 04/06/21  0613 04/05/21 1912 04/05/21  1824   WBC Thousand/uL 7 51 6 66  --  7 57   HEMOGLOBIN g/dL 8 7* 10 5*  --  10 0*   I STAT HEMOGLOBIN g/dl  --   --  10 5*  --    HEMATOCRIT % 27 7* 33 2*  --  31 5*   HEMATOCRIT, ISTAT %  --   --  31*  --    PLATELETS Thousands/uL 244 260  --  293   NEUTROS PCT %  --  79*  --  73   LYMPHS PCT %  --  9*  --  16   MONOS PCT %  --  7  --  7   EOS PCT %  --  3  --  4     Results from last 7 days   Lab Units 04/09/21  0112 04/08/21  1534 04/07/21 0457  04/05/21 1912 04/05/21  1824   POTASSIUM mmol/L 3 5 3 4* 3 8   < >  --  5 6*   CHLORIDE mmol/L 108 109* 110*   < >  --  101   CO2 mmol/L 25 24 21   < >  --  18*   CO2, I-STAT mmol/L  --   --   --   --  20*  --    BUN mg/dL 9 9 22   < >  --  47*   CREATININE mg/dL 1 49* 1 43* 2 50*   < >  --  5 15*   CALCIUM mg/dL 9 2 8 7 8 3   < >  --  9 2   ALK PHOS U/L  --   --  152*  --   --  194*   ALT U/L  --   --  21  --   --  22   AST U/L  --   --  20  --   --  18   GLUCOSE, ISTAT mg/dl  --   --   --   --  80  --     < > = values in this interval not displayed  Lab Results   Component Value Date    CKTOTAL 47 04/05/2021         No results found for: Lorene Mena SPUTUMCULTUR    Imaging:  No results found for this or any previous visit  No results found for this or any previous visit  PATIENT CENTERED ROUNDS: I have performed rounds with the nursing staff            Carolyn Reyes DO

## 2021-04-09 NOTE — NURSING NOTE
Pt found on the floor by PCA and nurse notified  Quick assessment done and rapid response initiated  Pt stated she rolled out of bed  Reports chronic neck pain  Minor Boyd and Marcell JIM at bedside  CTA of neck and bmp ordered  Patient continues to be alert and oriented but confused at times   Dl Marin RN

## 2021-04-09 NOTE — PROGRESS NOTES
NEPHROLOGY PROGRESS NOTE   Vlad George 47 y o  female MRN: [id number]  Unit/Bed#: -01 Encounter: [id number]  Reason for Consult: RENE (POA)    PLAN:   -acute kidney injury, suspected pre and post renal etiology     -Creatinine improved with hydration and Bates insertion  Improved to 1 4 in the last 48 hours, appears to have plateaued  -continue with isolyte at 100 mL/hr due to poor oral intake   -maintain bates catheter per urology recommendations   -blood pressure remains acceptable  -neurology recommending PLEX x 5 doses, temp cath placed by IR   -vitamin-D deficiency, started on vitamin-D supplementation  -hyperkalemia resolved  Holding further supplements  ASSESSMENT/PLAN:  RENE (POA):  Suspected prerenal etiology as well as post renal with acute urinary retention in the setting of obstructive uropathy   -creatinine 5 15 on admission   -baseline creatinine 0 6-0 8 per record review    -not following outpatient with Nephrology   -creatinine greatly improved with hydration and inserting Bates catheter  -CT abdomen pelvis 4/5/21: distention of the bilateral renal collecting system consistent with mild obstructive uropathy  Severe bladder distention, extending well into the mid abdomen   -UA: small amount of blood, otherwise bland   -compliments normal  UPEP with proteinuria at 122 0  UPEP/SPEP negative for monoclonal banding   -continue bates catheter    -continues with poor oral intake   -continues on isolyte at 100 mL/hr   -no urgent indication for RRT  -not on Ace/Arb/diuretic   -continue to avoid nephrotoxins, hypotension, IV contrast   -I/O, daily weights      Acute urinary retention:  Reports difficulty with urination and inability to urinate   -status post Bates catheter placement with initially 7 5 L of urinary output  -CT scan shows distention of the bilateral renal collected system consistent with mild obstructive uropathy   -monitoring for postobstructive diuresis    -continues on isolyte at 100 mL/hr   -Urology following  Recommend maintaining bates catheter for 7-10 days      Hyperkalemia: Potassium on admission 5 6 suspected secondary to dehydration  Did not receive medical management  Resolved with hydration  -OP medication list shows potassium supplements 10 mEqs daily  -will hold home potassium       Hyponatremia: (resolved)  -Sodium on admission 134    -Received 1 L bolus of NS and 1 L bolus of isolyte in ED  Repeat sodium is 148   -initially corrected rapidly  Goal to correct 6-8 mEQ's in 24 hours    -continues on isolate  Monitoring for postobstructive diuresis which may contribute to hypovolemia  Vitamin-D deficiency:   -started on a vitamin-D 08346 units 2 times per week  -vitamin-D level 17  9      Anion gap acidosis: (resolved)    Generalized weakness: presents to ER with 1 month of progressive bilateral upper and lower extremity weakness   -Neurology/PT/OT consulted  -concern for polyneuropathy, possibly related to nutritional deficiency  -Autoimmune component remains in differential   -LP showed slightly elevated protein and RBC's    -C-reactive protein elevated    -Neurology recommending Plasmapheresis x 5 doses  Cathter placed in IR      Anemia  -Hemoglobin 10 0 on admission  -Iron panel show low iron sat, normal TIBC, normal iron    -Continue to monitor and transfuse as needed for hemoglobin <7      Hypothyroidism:  Maintained on Levothyroxine 25 mcg daily      History of pituitary enlargement  -MRI brain pituitary 10/25/19: enlarged pituitary gland without a discrete focus of differential enhancement to define an adenoma  -repeat MRI on 4/6/21 showing minimally decreased prominence of the pituitary gland that still has a convex superior border   Hyperintense structure representing possible increased conspicuity of the posterior pituitary gland or small proteinaceous Rathke's cleft cyst      History of gastric bypass: in 2007 and 2010      Anorexia Nervosa, restricting type:   -reports decreased PO intake over the last month   -continues with low PO intake, remains on intravenous fluids  -patient states after her last hospitalization, it was recommended she follow with [facility] for a nutritional plan  Disposition: Requiring additional stay due to medical needs  SUBJECTIVE:  Patient seen Jason Flores in bed  States she rolled out of bed overnight and is in a lot of pain  States she has no desire to eat   Minimally conversational     OBJECTIVE:  Current Weight: Weight - Scale: 75 5 kg (166 lb 7 2 oz)  Vitals:    04/08/21 2329 04/09/21 0012 04/09/21 0600 04/09/21 0744   BP: 128/82   130/82   BP Location:       Pulse: 84   87   Resp: 18   20   Temp: 98 3 °F (36 8 °C)   97 6 °F (36 4 °C)   TempSrc:       SpO2: 96% 98%  98%   Weight:   75 5 kg (166 lb 7 2 oz)        Intake/Output Summary (Last 24 hours) at 4/9/2021 0912  Last data filed at 4/9/2021 0541  Gross per 24 hour   Intake 1155 ml   Output 3125 ml   Net -1970 ml     General: NAD, appears older than stated age  Skin: warm, dry, intact, no rash  HEENT: Moist mucous membranes, sclera anicteric, normocephalic, atraumatic  Neck: No apparent JVD appreciated  Chest: lung sounds clear B/L, on RA   CVS:Regular rate and rhythm, no murmer   Abdomen: Soft, round, non-tender, hypoactive BS  Extremities: No B/L LE edema present  Neuro: alert and oriented, upper and lower extremity weakness  Psych: minimally conversational/interactive    Medications:    Current Facility-Administered Medications:     acetaminophen (TYLENOL) tablet 650 mg, 650 mg, Oral, Q6H PRN, Joellen Us PA-C, 650 mg at 04/07/21 0534    albumin human (FLEXBUMIN) 5 % injection 150 g, 150 g, Intravenous, Every Other Day, SMITHA Escudero    butalbital-acetaminophen-caffeine (FIORICET,ESGIC) -40 mg per tablet 1 tablet, 1 tablet, Oral, Q6H PRN, Darryl Palomino PA-C, 1 tablet at 04/08/21 1819    calcium gluconate 1 g in sodium chloride 0 9% 50 mL (premix), 1 g, Intravenous, Every Other Day, SMITHA Shaffer    cyclobenzaprine (FLEXERIL) tablet 10 mg, 10 mg, Oral, TID PRN, Rosalino Sebastian PA-C, 10 mg at 04/07/21 1802    docusate sodium (COLACE) capsule 100 mg, 100 mg, Oral, BID, Joellen Us PA-C, 100 mg at 87/40/71 3028    folic acid (FOLVITE) tablet 1 mg, 1 mg, Oral, Daily, Joellen Us PA-C, 1 mg at 04/08/21 0948    gabapentin (NEURONTIN) capsule 100 mg, 100 mg, Oral, TID PRN, Rosalino Sebastian PA-C, 100 mg at 04/08/21 1757    gabapentin (NEURONTIN) capsule 300 mg, 300 mg, Oral, HS, SMITHA Shaffer, 300 mg at 04/08/21 2232    heparin (porcine) subcutaneous injection 5,000 Units, 5,000 Units, Subcutaneous, Q8H [address], 5,000 Units at 04/08/21 2239 **AND** [CANCELED] Platelet count, , , Once, Rosalino Sebastian PA-C    heparin lock flush injection 100 Units, 10 mL, Intracatheter, Every Other Day, SMITHA Shaffer    levETIRAcetam (KEPPRA) tablet 500 mg, 500 mg, Oral, Q12H [address], Heydi Y HERNÁN Cheney-VERONICA, 500 mg at 04/08/21 2232    levothyroxine tablet 25 mcg, 25 mcg, Oral, Once per day on Mon Tue Wed Thu Fri, Joellen Us PA-C, 25 mcg at 04/08/21 0541    [START ON 4/10/2021] levothyroxine tablet 25 mcg, 25 mcg, Oral, Once per day on Sun Sat, Joellen Us PA-C    LORazepam (ATIVAN) tablet 1 mg, 1 mg, Oral, Q8H PRN, Elizabeth Fly, DO, 1 mg at 04/08/21 2324    mirtazapine (REMERON) tablet 7 5 mg, 7 5 mg, Oral, HS, Joellen Us PA-C, 7 5 mg at 04/08/21 2233    multi-electrolyte (PLASMALYTE-A/ISOLYTE-S PH 7 4) IV solution, 100 mL/hr, Intravenous, Continuous, Ninfa Cruz MD, Last Rate: 100 mL/hr at 04/09/21 0137, 100 mL/hr at 04/09/21 0137    polyethylene glycol (MIRALAX) packet 17 g, 17 g, Oral, Daily, Joellen Us PA-C, 17 g at 04/07/21 0906    thiamine (VITAMIN B1) 250 mg in sodium chloride 0 9 % 50 mL IVPB, 250 mg, Intravenous, Daily, Kun Khan DO    zolpidem (AMBIEN) tablet 10 mg, 10 mg, Oral, HS, Joellen Us PA-C, 10 mg at 04/08/21 2233    Laboratory Results:  Results from last 7 days   Lab Units 04/09/21  0112 04/08/21  1534 04/07/21  0457  04/06/21  0613 04/05/21  1912 04/05/21  1824   WBC Thousand/uL  --   --  7 51  --  6 66  --  7 57   HEMOGLOBIN g/dL  --   --  8 7*  --  10 5*  --  10 0*   I STAT HEMOGLOBIN g/dl  --   --   --   --   --  10 5*  --    HEMATOCRIT %  --   --  27 7*  --  33 2*  --  31 5*   HEMATOCRIT, ISTAT %  --   --   --   --   --  31*  --    PLATELETS Thousands/uL  --   --  244  --  260  --  293   SODIUM mmol/L 143 146* 144   < > 148*  --  134*   POTASSIUM mmol/L 3 5 3 4* 3 8   < > 4 4  --  5 6*   CHLORIDE mmol/L 108 109* 110*   < > 112*  --  101   CO2 mmol/L 25 24 21   < > 19*  --  18*   CO2, I-STAT mmol/L  --   --   --   --   --  20*  --    BUN mg/dL 9 9 22   < > 39*  --  47*   CREATININE mg/dL 1 49* 1 43* 2 50*   < > 4 53*  --  5 15*   CALCIUM mg/dL 9 2 8 7 8 3   < > 9 2  --  9 2   MAGNESIUM mg/dL 1 9  --   --   --   --   --  3 4*   ALK PHOS U/L  --   --  152*  --   --   --  194*   ALT U/L  --   --  21  --   --   --  22   AST U/L  --   --  20  --   --   --  18   GLUCOSE, ISTAT mg/dl  --   --   --   --   --  80  --     < > = values in this interval not displayed

## 2021-04-09 NOTE — ASSESSMENT & PLAN NOTE
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, possibly related to nutritional deficiency  · Lumbar puncture performed in the ED thus far with relatively bland results  Protein 49, WBC 2, glucose 57, RBC 31, no xanthochromia, gram stain negative  · Laboratory studies consistent with hyperchloremic acidosis, likely related to significant doses of topiramate  Topiramate also can cause appetite suppression, therefore topiramate was discontinued and Keppra initiated  · Recommended Wellbutrin also be discontinued, as it can lower seizure threshold and is also not recommended for use in patients with eating disorders  · Completed IV thiamine repletion  500 mg TID followed by 250 mg QD x 5 days on 4/12/21   · Vitamin D level 17 9  Given ergocalciferol 50,000 units on 4/8/21  · MRI brain with no acute intracranial pathology     Plan:  - plasma exchange ongoing, session 3/5 today (monitoring blood counts, fibrinogen, ionized calcium, platelets)  - Labs pending (resulted serum and CSF labs are listed below): · Serum labs: B1, ENS-2 [facility], methylmalonic acid, B6, cryoglobulin, Copper level (as copper deficiency has been noted in patients with history of gastric bypass)  · CSF labs: Flow cytometry  - Continue Keppra 500 mg BID   Seizures are described as staring episodes and patient is currently nonambulatory; if patient has breakthrough seizures, can adjust medications accordingly   - Patient will likely require outpatient EMG/NCS for further evaluation of polyneuropathy   - Nutrition following, May need to consider alternative method of nutrition due to patient not taking much PO   - PT/OT  - Monitor neuro exam and notify with changes    Results reviewed:    - CSF labs:  Protein 49, WBC 2, glucose 57, RBC 31, no xanthochromia, Gram stain negative, culture with no growth, cytology negative for malignancy, ACE <1 5, VDRL non-reactive, MS panel negative for oligoclonal bands  - Serum labs: Vitamin D 17 9, B12 511, folate 18 3, TSH 3 774, T4 0 91, RPR nonreactive, CR 3 56, 3 09, 2 5 BUN 33, 26,22, ESR 17, CRP 15 2, C3-1 20, C4 37, topiramate level 19 1, heavy metals screen negative, HIV non-reactive, ACE 25, ANCA negative, Sjogrens <0 2, MAYI negative, anti-DNA double stranded <1, RF negative, SPEP no monoclonal bands, UPEP nonselective proteinuria  - 4/9/21 CTH- No acute intracranial abnormality  - 4/9/21 CT cervical spine without contrast- No cervical spine fracture or traumatic malalignment  - 4/6/21 MRI Brain wo contrast:    · no acute intracranial pathology  · Minimally decreased prominence of the pituitary gland that still has a convex superior border  Size is upper limits of normal  There is midline T1 hyperintense structure in the posterior sella measuring 5 mm  Although this may represent increased conspicuity of the posterior pituitary gland, in retrospect, there is a similar sized T1 hypointense hypoenhancing structure at this location and 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   - 4/6/21 MRI Lumbar spine:   · Mild spondylosis on this motion degraded study  · Indeterminant 4 cm pelvic lesion posteriorly in the cul-de-sac on the right, possibly related to the ovary  Differential considerations would include hemorrhagic cyst versus perhaps endometrioma  Other mass lesions not excluded  Nonemergent pelvic sonography is recommended for further assessment  - 4/5/21 CT abdomen/pelvis:   · Severely distended bladder and mild bilateral obstructive uropathy, suggesting distal outlet obstruction or neurogenic bladder  No evidence of cystitis, bladder mass or hematoma  · Findings compatible with constipation

## 2021-04-09 NOTE — ASSESSMENT & PLAN NOTE
- Patient prescribed cholecalciferol 125 mcg (5000UT), 2 tabs daily at home, states she doesn't take it  - current Vitamin D level 17 9  - received ergocalciferol 50,000 units on 4/8/21  - Management as per medicine team

## 2021-04-09 NOTE — NURSING NOTE
Pt adamantly refused morning meds despite being educated on the importance of both medications   Lin Combs, RN

## 2021-04-09 NOTE — PROGRESS NOTES
Progress Note - Neurology   Vlad George 47 y o  female MRN: [id number]  Unit/Bed#: -01 Encounter: [id number]      Assessment/Plan     * Generalized weakness  Assessment & Plan  48 yo female with restrictive eating disorder (anorexia nervosa), laxative abuse, seizure disorder, pituitary enlargement, history of benzodiazepine abuse, vitamin-D deficiency, depression, history of gastric bypass, migraine headaches, presented to ED on 4/5/21  with progressive bilateral upper and lower extremity weakness and sensory abnormality  On  exam patient  has distal weakness in B/L UE extremities and more proximal weakness in B/L LE  She has atrophy in thenar and hypothenar in B/L hands  She has diffusely diminished sensation and length-dependent pattern with light touch, PP, and vibratory sense along with loss of proprioception  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, possibly related to nutritional deficiency  · Lumbar puncture performed in the ED thus far with relatively bland results  Protein 49, WBC 2, glucose 57, RBC 31, no xanthochromia, gram stain negative  · Vitamin D level 17 9  · Laboratory studies consistent with hyperchloremic acidosis, likely related to significant doses of topiramate  Topiramate also can cause appetite suppression, therefore topiramate was discontinued and Keppra initiated  · MRI brain with no acute intracranial pathology     Plan:  - Recommend Plasmapheresis x 5 doses  Catheter placed in IR yesterday and plan to begin treatment today  Patient notes she is nervous about treatment but expressed understanding of reason for treatment and is agreeable  - Labs pending:  · Serum labs: B1, ENS-2 [facility], methylmalonic acid, B6, cryoglobulin  · CSF labs: Flow cytometry, MS panel  - Continue IV thiamine repletion  500 mg TID completed, now on 250 mg QD x 5 days  - Continue Keppra 500 mg BID   Seizures are described as staring episodes and patient is currently nonambulatory; if patient has breakthrough seizures, can adjust medications accordingly   - Recommend Vitamin D repletion, level 17 9  - Recommend consult to Nutrition  May need to consider alternative method of nutrition due to patient not taking much PO   - Recommend Psychiatry consult for medication adjustment for patient's depression   - Discontinued Wellbutrin as it can lower seizure threshold and is also not recommended for use in patients with eating disorders     - Patient will likely require outpatient EMG/NCS for further evaluation of polyneuropathy   - PT/OT  - Monitor neuro exam and notify with changes    Results reviewed:    - CSF labs:  Protein 49, WBC 2, glucose 57, RBC 31, no xanthochromia, Gram stain negative, culture with no growth, cytology negative for malignancy, ACE <1 5, VDRL non-reactive  - serum labs:  Vitamin D 17 9, B12 511, folate 18 3, TSH 3 774, T4 0 91, RPR nonreactive, CR 3 56, 3 09, 2 5 BUN 33, 26,22, ESR 17, CRP 15 2, C3-1 20, C4 37, topiramate level 19 1, heavy metals screen negative, HIV non-reactive, ACE 25, ANCA negative, Sjogrens <0 2, MAYI negative, anti-DNA double stranded <1, RF negative, SPEP no monoclonal bands, UPEP nonselective proteinuria  - 4/9/21 CTH- No acute intracranial abnormality  - 4/9/21 CT cervical spine without contrast- No cervical spine fracture or traumatic malalignment  - 4/6/21 MRI Brain wo contrast:    · no acute intracranial pathology  · Minimally decreased prominence of the pituitary gland that still has a convex superior border  Size is upper limits of normal  There is midline T1 hyperintense structure in the posterior sella measuring 5 mm   Although this may represent increased conspicuity of the posterior pituitary gland, in retrospect, there is a similar sized T1 hypointense hypoenhancing structure at this location and 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   - 4/6/21 MRI Lumbar spine:   · Mild spondylosis on this motion degraded study  · Indeterminant 4 cm pelvic lesion posteriorly in the cul-de-sac on the right, possibly related to the ovary  Differential considerations would include hemorrhagic cyst versus perhaps endometrioma  Other mass lesions not excluded  Nonemergent pelvic sonography is recommended for further assessment  - 4/5/21 CT abdomen/pelvis:   · Severely distended bladder and mild bilateral obstructive uropathy, suggesting distal outlet obstruction or neurogenic bladder  No evidence of cystitis, bladder mass or hematoma  · Findings compatible with constipation  - 4/5/21 CT head:No acute intracranial abnormality    Anorexia nervosa, restricting type  Assessment & Plan  - Patient admits she has not been eating much at all, particularly since her admission to [facility]  She has been drinking fluids  She also has documented history of laxative abuse  - Discussed with nutrition team today  Patient continues to take minimal PO    - May need to consider alternative methods of nutrition if patient continues to take minimal PO but would defer to medicine team regarding this  Vitamin D deficiency  Assessment & Plan  - Patient prescribed cholecalciferol 125 mcg (5000UT), 2 tabs daily at home, states she doesn't take it  - current Vitamin D level 17 9  - Management as per medicine team        Pituitary enlargement  Assessment & Plan  MRI brain from October 2019 demonstrated pituitary enlargement without evidence of adenoma  Endocrine evaluation as deemed necessary by primary team   - please see MRI brain result above     RENE (acute kidney injury) [facility])  Assessment & Plan  - Nephrology following    - Creatinine improved since admission  Recommendations for outpatient neurological follow up have yet to be determined  Subjective:   Patient notes she was having bad dreams overnight due to fear of plasmapheresis   She notes that she is afraid she is going to have pain and she notes that she already has significant pain      ROS:  History obtained from the patient  General ROS: positive for  - fatigue  Ophthalmic ROS: negative for - blurry vision, decreased vision or double vision  Respiratory ROS: negative for - shortness of breath  Cardiovascular ROS: negative for - chest pain  Gastrointestinal ROS: positive for - appetite loss  negative for - nausea/vomiting  Musculoskeletal ROS: positive for - gait disturbance, muscle pain and muscular weakness  Neurological ROS: positive for - gait disturbance, headaches, numbness/tingling and weakness  negative for - confusion, dizziness or speech problems    Medications  Scheduled Meds:  Current Facility-Administered Medications   Medication Dose Route Frequency Provider Last Rate    acetaminophen  650 mg Oral Q6H PRN Tami Bean PA-C      albumin human  150 g Intravenous Every Other Day SMITHA Patterson      butalbital-acetaminophen-caffeine  1 tablet Oral Q6H PRN Michell Nye PA-C      calcium gluconate  1 g Intravenous Every Other Day SMITHA Patterson      cyclobenzaprine  10 mg Oral TID PRN Tami Bean PA-C      docusate sodium  100 mg Oral BID Tami Bean PA-C      folic acid  1 mg Oral Daily Tami Bean PA-C      gabapentin  100 mg Oral TID PRN Tami Bean PA-C      gabapentin  300 mg Oral HS SMITHA Patterson      heparin (porcine)  5,000 Units Subcutaneous [facility] Tami Bean PA-C      heparin lock flush  10 mL Intracatheter Every Other Day SMITHA Patterson      levETIRAcetam  500 mg Oral Q12H [facility] Heydi Kemp PA-C      levothyroxine  25 mcg Oral Once per day on Mon Tue Wed Thu Fri Tami Bean PA-C      [START ON 4/10/2021] levothyroxine  25 mcg Oral Once per day on Sun Sat Tami Bean PA-C      LORazepam  1 mg Oral Q8H PRN DO Mara      mirtazapine  7 5 mg Oral HS Tami Bean PA-C      multi-electrolyte  100 mL/hr Intravenous Continuous [place] MD Familia 100 mL/hr (04/09/21 0137)    polyethylene glycol 17 g Oral Daily Luma Raza PA-C      thiamine  250 mg Intravenous Daily Ton Liu,       zolpidem  10 mg Oral HS Joellen Us PA-C       Continuous Infusions:multi-electrolyte, 100 mL/hr, Last Rate: 100 mL/hr (04/09/21 0137)      PRN Meds:   acetaminophen    butalbital-acetaminophen-caffeine    cyclobenzaprine    gabapentin    LORazepam    Vitals: Blood pressure 130/82, pulse 87, temperature 97 6 °F (36 4 °C), resp  rate 20, weight 75 5 kg (166 lb 7 2 oz), SpO2 98 %  ,Body mass index is 28 57 kg/m²  Physical Exam: /82   Pulse 87   Temp 97 6 °F (36 4 °C)   Resp 20   Wt 75 5 kg (166 lb 7 2 oz)   SpO2 98%   BMI 28 57 kg/m²   General appearance: Ill-appearing  Head: Normocephalic, without obvious abnormality, atraumatic  Eyes: negative findings: lids and lashes normal, conjunctivae and sclerae normal and pupils equal, round, reactive to light and accomodation  Lungs: clear to auscultation bilaterally  Heart: regular rate and rhythm  Abdomen: Soft, not distended  Extremities: extremities normal, warm and well-perfused; no cyanosis, clubbing, or edema  Skin: Skin is pale, dry  Neurologic: Mental status: Awake and alert, oriented to person and place  Not oriented to year but able to state correct month  Speech is hypophonic  Cranial nerves: II: pupils equal, round, reactive to light and accommodation, III,VII: ptosis no ptosis noted, III,IV,VI: extraocular muscles extra-ocular motions intact, VII: upper facial muscle function normal bilaterally, VII: lower facial muscle function normal bilaterally, XII: tongue strength normal  Sensory: Diminished sensation to crude touch B/L UE  Motor: Motor strength B/L UE 4/5 except  and intrinsics 2/5   B/L LE HF 2/5, KF/KF 3/5, PF/DF 4/5    Labs  Recent Results (from the past 24 hour(s))   Basic metabolic panel    Collection Time: 04/08/21  3:34 PM   Result Value Ref Range    Sodium 146 (H) 136 - 145 mmol/L    Potassium 3 4 (L) 3 5 - 5 3 mmol/L Chloride 109 (H) 100 - 108 mmol/L    CO2 24 21 - 32 mmol/L    ANION GAP 13 4 - 13 mmol/L    BUN 9 5 - 25 mg/dL    Creatinine 1 43 (H) 0 60 - 1 30 mg/dL    Glucose 105 65 - 140 mg/dL    Calcium 8 7 8 3 - 10 1 mg/dL    eGFR 46 ml/min/1 73sq m   Fingerstick Glucose (POCT)    Collection Time: 04/09/21  1:03 AM   Result Value Ref Range    POC Glucose 95 65 - 140 mg/dl   Basic metabolic panel    Collection Time: 04/09/21  1:12 AM   Result Value Ref Range    Sodium 143 136 - 145 mmol/L    Potassium 3 5 3 5 - 5 3 mmol/L    Chloride 108 100 - 108 mmol/L    CO2 25 21 - 32 mmol/L    ANION GAP 10 4 - 13 mmol/L    BUN 9 5 - 25 mg/dL    Creatinine 1 49 (H) 0 60 - 1 30 mg/dL    Glucose 94 65 - 140 mg/dL    Calcium 9 2 8 3 - 10 1 mg/dL    eGFR 44 ml/min/1 73sq m   Magnesium    Collection Time: 04/09/21  1:12 AM   Result Value Ref Range    Magnesium 1 9 1 6 - 2 6 mg/dL     Imaging   Personally reviewed images and reports in PACs  CTH- No acute intracranial abnormality  CT cervical spine without contrast- No cervical spine fracture of traumatic malalignment  VTE Prophylaxis: Heparin    Counseling / Coordination of Care  Total time spent today 26 minutes  Greater than 50% of total time was spent with the patient and / or family counseling and / or coordination of care   A description of the counseling / coordination of care: Time spent reviewing case, labs completed thus far and also discussed with nutrition team

## 2021-04-09 NOTE — CONSULTS
Consultation - [address] 47 y o  female MRN: [id number]  Unit/Bed#: -01 Encounter: [id number]    Assessment/Plan     Assessment:  Eating disorder  Generalized weakness    Plan:   1  At this time I would not recommend adding or increasing psychiatric medications due to current medical condition  Medical conditions may be underlying cause of current weakness in addition to chronic eating disorder  2   If anything consider increasing Remeron to 15mg HS when medically cleared for appetite stimulation and depression  Medications were appropriate adjusted by Neurology  3   Recommend eating disorder clinic for current issue  Not appropriate for [facility]  4  Would not recommend inpatient psychiatric hospitalization at this time  5  Psychiatric signing off or follow up PRN    Risks, benefits and possible side effects of Medications:   Risks, benefits, and possible side effects of medications explained to patient and patient verbalizes understanding  Chief Complaint:     History of Present Illness   Physician Requesting Consult: Chantal Juarez DO  Reason for Consult / Principal Problem: PTSD and eating disorder    Patient sedated and unable to be awakened  Are no reports of suicidal, psychotic, manic, or aggressive behavior while here in the hospital   Has underlying eating disorder and numerous medical comorbidities  Does not display any behavior that warrants an involuntary hospitalization  Psychosocial Stressors: health      Inpatient consult to Psychiatry  Consult performed by: Meera Graff PA-C  Consult ordered by: Chantal Juarez DO          Psychiatric Review Of Systems:  Would not answer    ROS:  Unable to perform, all others negative    Historical Information   Past Psychiatric History:   Previous inpatient admissions  Currently in treatment with ACT team   Past Suicide attempts: unsure  Past Violent behavior: unsure  Past Psychiatric medication trial: multiple    Substance Abuse History:  Unsure    Family Psychiatric History:   Unsure    Social History  Unsure    Traumatic History:   Abuse: unsure    Past Medical History:   Diagnosis Date    Anemia     Psychiatric disorder     depression anxiety    Seizures ([facility] [address] )        Medical Review Of Systems:  Review of Systems    Meds/Allergies   all current active meds have been reviewed  Allergies   Allergen Reactions    Anti-Hist [Diphenhydramine]     Buspar [Buspirone]     Haldol [Haloperidol]     Penicillins Throat Swelling    Pennsaid [Diclofenac Sodium]     Zoloft [Sertraline]        Objective   Vital signs in last 24 hours:  Temp:  [97 6 °F (36 4 °C)-98 3 °F (36 8 °C)] 97 6 °F (36 4 °C)  HR:  [81-87] 87  Resp:  [16-20] 20  BP: (128-130)/(82-84) 130/82      Intake/Output Summary (Last 24 hours) at 4/9/2021 1303  Last data filed at 4/9/2021 0541  Gross per 24 hour   Intake 1155 ml   Output 3125 ml   Net -1970 ml       Mental Status Evaluation:  Unable to perform    Imaging Studies: reviewed  EKG, Pathology, and Other Studies: reviewed    Code Status: Level 1 - Full Code

## 2021-04-10 PROBLEM — E87.1 HYPONATREMIA: Status: RESOLVED | Noted: 2021-04-05 | Resolved: 2021-04-10

## 2021-04-10 PROBLEM — Z74.09 DECREASED AMBULATION STATUS: Status: RESOLVED | Noted: 2021-04-06 | Resolved: 2021-04-10

## 2021-04-10 LAB
ALBUMIN SERPL BCP-MCNC: 3.7 G/DL (ref 3.5–5)
ALP SERPL-CCNC: 83 U/L (ref 46–116)
ALT SERPL W P-5'-P-CCNC: 19 U/L (ref 12–78)
ANION GAP SERPL CALCULATED.3IONS-SCNC: 17 MMOL/L (ref 4–13)
AST SERPL W P-5'-P-CCNC: 20 U/L (ref 5–45)
BACTERIA UR QL AUTO: ABNORMAL /HPF
BILIRUB SERPL-MCNC: 0.5 MG/DL (ref 0.2–1)
BILIRUB UR QL STRIP: NEGATIVE
BUN SERPL-MCNC: 5 MG/DL (ref 5–25)
CALCIUM SERPL-MCNC: 8.5 MG/DL (ref 8.3–10.1)
CHLORIDE SERPL-SCNC: 111 MMOL/L (ref 100–108)
CLARITY UR: ABNORMAL
CO2 SERPL-SCNC: 19 MMOL/L (ref 21–32)
COLOR UR: YELLOW
CREAT SERPL-MCNC: 0.88 MG/DL (ref 0.6–1.3)
ERYTHROCYTE [DISTWIDTH] IN BLOOD BY AUTOMATED COUNT: 18.8 % (ref 11.6–15.1)
FOLATE SERPL-MCNC: 19.3 NG/ML (ref 3.1–17.5)
GFR SERPL CREATININE-BSD FRML MDRD: 84 ML/MIN/1.73SQ M
GLUCOSE SERPL-MCNC: 79 MG/DL (ref 65–140)
GLUCOSE UR STRIP-MCNC: NEGATIVE MG/DL
HCT VFR BLD AUTO: 29 % (ref 34.8–46.1)
HGB BLD-MCNC: 9 G/DL (ref 11.5–15.4)
HGB UR QL STRIP.AUTO: ABNORMAL
KETONES UR STRIP-MCNC: NEGATIVE MG/DL
LACTATE SERPL-SCNC: 0.4 MMOL/L (ref 0.5–2)
LEUKOCYTE ESTERASE UR QL STRIP: ABNORMAL
MCH RBC QN AUTO: 32 PG (ref 26.8–34.3)
MCHC RBC AUTO-ENTMCNC: 31 G/DL (ref 31.4–37.4)
MCV RBC AUTO: 103 FL (ref 82–98)
MUCOUS THREADS UR QL AUTO: ABNORMAL
NITRITE UR QL STRIP: NEGATIVE
NON-SQ EPI CELLS URNS QL MICRO: ABNORMAL /HPF
PH UR STRIP.AUTO: 5.5 [PH]
PLATELET # BLD AUTO: 242 THOUSANDS/UL (ref 149–390)
PMV BLD AUTO: 11.6 FL (ref 8.9–12.7)
POTASSIUM SERPL-SCNC: 3.3 MMOL/L (ref 3.5–5.3)
PROT SERPL-MCNC: 5.1 G/DL (ref 6.4–8.2)
PROT UR STRIP-MCNC: ABNORMAL MG/DL
RBC # BLD AUTO: 2.81 MILLION/UL (ref 3.81–5.12)
RBC #/AREA URNS AUTO: ABNORMAL /HPF
SODIUM SERPL-SCNC: 147 MMOL/L (ref 136–145)
SP GR UR STRIP.AUTO: 1.01 (ref 1–1.03)
UROBILINOGEN UR QL STRIP.AUTO: 1 E.U./DL
WBC # BLD AUTO: 7.82 THOUSAND/UL (ref 4.31–10.16)
WBC #/AREA URNS AUTO: ABNORMAL /HPF

## 2021-04-10 PROCEDURE — 85027 COMPLETE CBC AUTOMATED: CPT | Performed by: INTERNAL MEDICINE

## 2021-04-10 PROCEDURE — 87086 URINE CULTURE/COLONY COUNT: CPT | Performed by: INTERNAL MEDICINE

## 2021-04-10 PROCEDURE — 99233 SBSQ HOSP IP/OBS HIGH 50: CPT | Performed by: INTERNAL MEDICINE

## 2021-04-10 PROCEDURE — 87186 SC STD MICRODIL/AGAR DIL: CPT | Performed by: INTERNAL MEDICINE

## 2021-04-10 PROCEDURE — 99222 1ST HOSP IP/OBS MODERATE 55: CPT | Performed by: INTERNAL MEDICINE

## 2021-04-10 PROCEDURE — 99232 SBSQ HOSP IP/OBS MODERATE 35: CPT | Performed by: INTERNAL MEDICINE

## 2021-04-10 PROCEDURE — 87077 CULTURE AEROBIC IDENTIFY: CPT | Performed by: INTERNAL MEDICINE

## 2021-04-10 PROCEDURE — 80053 COMPREHEN METABOLIC PANEL: CPT | Performed by: INTERNAL MEDICINE

## 2021-04-10 PROCEDURE — 82746 ASSAY OF FOLIC ACID SERUM: CPT | Performed by: INTERNAL MEDICINE

## 2021-04-10 PROCEDURE — 81001 URINALYSIS AUTO W/SCOPE: CPT | Performed by: INTERNAL MEDICINE

## 2021-04-10 PROCEDURE — 83605 ASSAY OF LACTIC ACID: CPT | Performed by: INTERNAL MEDICINE

## 2021-04-10 RX ORDER — SODIUM CHLORIDE 450 MG/100ML
50 INJECTION, SOLUTION INTRAVENOUS CONTINUOUS
Status: DISCONTINUED | OUTPATIENT
Start: 2021-04-10 | End: 2021-04-12

## 2021-04-10 RX ORDER — POTASSIUM CHLORIDE 20 MEQ/1
40 TABLET, EXTENDED RELEASE ORAL ONCE
Status: COMPLETED | OUTPATIENT
Start: 2021-04-10 | End: 2021-04-10

## 2021-04-10 RX ORDER — ERGOCALCIFEROL 1.25 MG/1
50000 CAPSULE ORAL 2 TIMES WEEKLY
Status: DISCONTINUED | OUTPATIENT
Start: 2021-04-12 | End: 2021-04-26 | Stop reason: HOSPADM

## 2021-04-10 RX ORDER — LORAZEPAM 0.5 MG/1
0.5 TABLET ORAL EVERY 8 HOURS PRN
Status: DISCONTINUED | OUTPATIENT
Start: 2021-04-10 | End: 2021-04-12

## 2021-04-10 RX ORDER — LEVOTHYROXINE SODIUM 0.03 MG/1
50 TABLET ORAL
Status: DISCONTINUED | OUTPATIENT
Start: 2021-04-10 | End: 2021-04-26 | Stop reason: HOSPADM

## 2021-04-10 RX ORDER — BUPROPION HYDROCHLORIDE 100 MG/1
100 TABLET ORAL 2 TIMES DAILY
Status: DISCONTINUED | OUTPATIENT
Start: 2021-04-10 | End: 2021-04-13

## 2021-04-10 RX ADMIN — FOLIC ACID 1 MG: 1 TABLET ORAL at 09:50

## 2021-04-10 RX ADMIN — BUTALBITAL, ACETAMINOPHEN AND CAFFEINE 1 TABLET: 50; 325; 40 TABLET ORAL at 12:45

## 2021-04-10 RX ADMIN — MIRTAZAPINE 7.5 MG: 15 TABLET, FILM COATED ORAL at 22:39

## 2021-04-10 RX ADMIN — LORAZEPAM 0.5 MG: 0.5 TABLET ORAL at 11:21

## 2021-04-10 RX ADMIN — HEPARIN SODIUM 5000 UNITS: 5000 INJECTION INTRAVENOUS; SUBCUTANEOUS at 14:06

## 2021-04-10 RX ADMIN — HEPARIN SODIUM 5000 UNITS: 5000 INJECTION INTRAVENOUS; SUBCUTANEOUS at 22:44

## 2021-04-10 RX ADMIN — BUTALBITAL, ACETAMINOPHEN AND CAFFEINE 1 TABLET: 50; 325; 40 TABLET ORAL at 18:53

## 2021-04-10 RX ADMIN — DOCUSATE SODIUM 100 MG: 100 CAPSULE, LIQUID FILLED ORAL at 09:51

## 2021-04-10 RX ADMIN — HEPARIN SODIUM 5000 UNITS: 5000 INJECTION INTRAVENOUS; SUBCUTANEOUS at 05:26

## 2021-04-10 RX ADMIN — BUPROPION HYDROCHLORIDE 100 MG: 100 TABLET, FILM COATED ORAL at 18:53

## 2021-04-10 RX ADMIN — THIAMINE HYDROCHLORIDE 250 MG: 100 INJECTION, SOLUTION INTRAMUSCULAR; INTRAVENOUS at 09:51

## 2021-04-10 RX ADMIN — BUTALBITAL, ACETAMINOPHEN AND CAFFEINE 1 TABLET: 50; 325; 40 TABLET ORAL at 00:37

## 2021-04-10 RX ADMIN — POTASSIUM CHLORIDE 40 MEQ: 1500 TABLET, EXTENDED RELEASE ORAL at 09:50

## 2021-04-10 RX ADMIN — SODIUM CHLORIDE 50 ML/HR: 0.45 INJECTION, SOLUTION INTRAVENOUS at 08:50

## 2021-04-10 RX ADMIN — LORAZEPAM 1 MG: 1 TABLET ORAL at 02:25

## 2021-04-10 RX ADMIN — LEVOTHYROXINE SODIUM 50 MCG: 25 TABLET ORAL at 09:50

## 2021-04-10 RX ADMIN — LORAZEPAM 0.5 MG: 0.5 TABLET ORAL at 22:39

## 2021-04-10 RX ADMIN — CYCLOBENZAPRINE HYDROCHLORIDE 10 MG: 10 TABLET, FILM COATED ORAL at 22:39

## 2021-04-10 RX ADMIN — POLYETHYLENE GLYCOL 3350 17 G: 17 POWDER, FOR SOLUTION ORAL at 09:51

## 2021-04-10 RX ADMIN — LEVETIRACETAM 500 MG: 500 TABLET, FILM COATED ORAL at 22:44

## 2021-04-10 RX ADMIN — GABAPENTIN 300 MG: 300 CAPSULE ORAL at 22:39

## 2021-04-10 NOTE — PROGRESS NOTES
Pt  Was difficult to arouse even with sternal rub in AM  Dr Nurys Mckinney aware and came to assess at bedside  Vital signs stable with B/P of 124/84 HR-78  Once pt  Became alert to take oral medications she refused to take her Keppra

## 2021-04-10 NOTE — ASSESSMENT & PLAN NOTE
Malnutrition Findings:   Adult Malnutrition type: Chronic illness  Adult Degree of Malnutrition: Other severe protein calorie malnutrition    BMI Findings: Body mass index is 28 65 kg/m²     Nutrition team is following-complicated with history of previous gastric bypass surgery and sensitivities-had been at [facility] program several years ago but would need to be able to care for herself to be in a similar program in the future   wll ask gi to evaluate-? dobhoff tube short term to increase nutritonal intake

## 2021-04-10 NOTE — ASSESSMENT & PLAN NOTE
· Continue home medications Wellbutrin, Remeron,   · Was difficult to arouse this am- had ativan at 2 am- will cut doses on lorazepam andhold on the [employer]  ·  psych consulted

## 2021-04-10 NOTE — ASSESSMENT & PLAN NOTE
· Hemoglobin 10 0 on admission- then dropped to 8 5 - now 9 0   · hx of gastric bypass  · No signs of active bleeding  · Continue to monitor on CBC

## 2021-04-10 NOTE — PROGRESS NOTES
[address] [facility] NOTE   Gregoria Duenas 47 y o  female MRN: [id number]  Unit/Bed#: -01 Encounter: [id number]  Reason for Consult: RENE    ASSESSMENT and PLAN:    46 yo female with prior mult urinary tract infections, at rehab due to progression of LE weakness, NSAID use, with presentation for generalized weakness and fatigue  Nephrology on board for RENE  Creat 5 1 on admission with baseline 0 6-0 8 mg/dL  1) RENE    - baseline creat 0 6-0 8 mg/dL  - etiology - obstructive uropathy, urinary retention with possible prerenal etiology  - CT scan with distended bladder  Concern for neurogenic bladder or outlet obstruction  - UA with 0-1 RBC, 0-1 WBC, small blood, spec gravity 1 005  - 4/9 - Creat 1 2  -4/10-creatinine improving to 0 9 mg/dL  Sodium rising  Bicarbonate lower with rising anion gap  Plan:  - bates per Urology team  - monitor for post obstructive diuresis  - IVF - changed to half-normal saline  - BMP in AM  - recheck UA  - check lactic acid    2) urinary obstruction    - per Primary team    3) progressive weakness with concern for autoimmune process    - Neurology on board  - started on plasma exchange    4) acid/base    -serum bicarbonate lower and anion gap rising for unclear etiology   -change fluids to half-normal saline for now due to hypernatremia  -renal function is improving and therefore would expect or anticipate acid-base level to improve but need to monitor anion gap closely  Blood sugars are normal   -repeat BMP in a m   -check lactic acid  - check UA    5) electrolytes-hypokalemia-replete  Hyponatremia-changed to half-normal saline    6) SBP - stable        SUBJECTIVE / 24H INTERVAL HISTORY:    Blood pressure is 553-825 systolic  Afebrile  Patient is arousable only to sternal rub this morning      OBJECTIVE:  Current Weight: Weight - Scale: 75 7 kg (166 lb 14 2 oz)  Vitals:    04/09/21 1642 04/09/21 2310 04/10/21 0341 04/10/21 0553   BP: 117/80 125/83     Pulse: 86 87     Resp:       Temp: 98 2 °F (36 8 °C) 98 8 °F (37 1 °C)     TempSrc:       SpO2: 100% 98% 95%    Weight:    75 7 kg (166 lb 14 2 oz)       Intake/Output Summary (Last 24 hours) at 4/10/2021 5026  Last data filed at 4/9/2021 2111  Gross per 24 hour   Intake 180 ml   Output 2350 ml   Net -2170 ml     General: NAD  Skin: no rash  Eyes:  Could not properly assess  ENT:  Could not properly assess  Neck: supple  Chest: CTA b/l, no ronchii, no wheeze, no rubs, no significant rales but limited exam  CVS: s1s2, no murmur, no gallop, no rub  Abdomen: soft, nontender, nl sounds  Extremities: no edema LE b/l  : no bates  Neuro:  Difficult to assess  Psych:  Difficult to assess    Medications:    Current Facility-Administered Medications:     acetaminophen (TYLENOL) tablet 650 mg, 650 mg, Oral, Q6H PRN, Joellen Us PA-C, 650 mg at 04/09/21 1417    albumin human (FLEXBUMIN) 5 % injection 150 g, 150 g, Intravenous, Every Other Day, SMITHA Rutledge, 150 g at 04/09/21 1446    butalbital-acetaminophen-caffeine (FIORICET,ESGIC) -40 mg per tablet 1 tablet, 1 tablet, Oral, Q6H PRN, Henna Kelley PA-C, 1 tablet at 04/10/21 0037    calcium gluconate 1 g in sodium chloride 0 9% 50 mL (premix), 1 g, Intravenous, Every Other Day, SMITHA Rutledge, Last Rate: 100 mL/hr at 04/09/21 1446, 1 g at 04/09/21 1446    cyclobenzaprine (FLEXERIL) tablet 10 mg, 10 mg, Oral, TID PRN, Christal Fountain PA-C, 10 mg at 04/07/21 1802    docusate sodium (COLACE) capsule 100 mg, 100 mg, Oral, BID, Joellen Us PA-C, 100 mg at 23/24/17 5047    folic acid (FOLVITE) tablet 1 mg, 1 mg, Oral, Daily, Joellen Us PA-C, 1 mg at 04/09/21 0954    gabapentin (NEURONTIN) capsule 100 mg, 100 mg, Oral, TID PRN, Christal Fountain PA-C, 100 mg at 04/08/21 1757    gabapentin (NEURONTIN) capsule 300 mg, 300 mg, Oral, HS, SMITHA Rutledge, 300 mg at 04/09/21 2219    heparin (porcine) subcutaneous injection 5,000 Units, 5,000 Units, Subcutaneous, Q8H [address], 5,000 Units at 04/10/21 0526 **AND** [CANCELED] Platelet count, , , Once, Rosalino Sebastian PA-C    heparin lock flush injection 100 Units, 10 mL, Intracatheter, Every Other Day, SMITHA Shaffer, 100 Units at 04/09/21 1448    levETIRAcetam (KEPPRA) tablet 500 mg, 500 mg, Oral, Q12H [address], Heydi Y Shravan, LUIS, 500 mg at 04/09/21 2217    levothyroxine tablet 25 mcg, 25 mcg, Oral, Once per day on Mon Tue Wed Thu Fri, Joellen Us PA-C, 25 mcg at 04/08/21 0541    levothyroxine tablet 25 mcg, 25 mcg, Oral, Once per day on Sun Sat, Joellen Us PA-C    LORazepam (ATIVAN) tablet 1 mg, 1 mg, Oral, Q8H PRN, Skip Shelton DO, 1 mg at 04/10/21 0225    mirtazapine (REMERON) tablet 7 5 mg, 7 5 mg, Oral, HS, Joellen Us PA-C, 7 5 mg at 04/09/21 2217    multi-electrolyte (PLASMALYTE-A/ISOLYTE-S PH 7 4) IV solution, 100 mL/hr, Intravenous, Continuous, Ninfa Cruz MD, Last Rate: 100 mL/hr at 04/09/21 0137, 100 mL/hr at 04/09/21 0137    polyethylene glycol (MIRALAX) packet 17 g, 17 g, Oral, Daily, Joellen Us PA-C, 17 g at 04/09/21 0954    thiamine (VITAMIN B1) 250 mg in sodium chloride 0 9 % 50 mL IVPB, 250 mg, Intravenous, Daily, Kun Khan DO, Last Rate: 100 mL/hr at 04/09/21 0959, 250 mg at 04/09/21 0959    zolpidem (AMBIEN) tablet 10 mg, 10 mg, Oral, HS, Joellen Us PA-C, 10 mg at 04/09/21 2217    Laboratory Results:  Results from last 7 days   Lab Units 04/10/21  0527 04/09/21  1449 04/09/21  0112 04/08/21  1534 04/07/21  0457 04/06/21  2105 04/06/21  1215 04/06/21  0613 04/05/21  1912 04/05/21  1824   WBC Thousand/uL 7 82 7 09  --   --  7 51  --   --  6 66  --  7 57   HEMOGLOBIN g/dL 9 0* 8 5*  --   --  8 7*  --   --  10 5*  --  10 0*   I STAT HEMOGLOBIN g/dl  --   --   --   --   --   --   --   --  10 5*  --    HEMATOCRIT % 29 0* 27 0*  --   --  27 7*  --   --  33 2*  --  31 5*   HEMATOCRIT, ISTAT %  --   --   --   --   --   --   --   --  31*  --    PLATELETS Thousands/uL 242 258  --   -- 244  --   --  260  --  293   POTASSIUM mmol/L  --  3 5 3 5 3 4* 3 8 4 2 4 6 4 4  --  5 6*   CHLORIDE mmol/L  --  109* 108 109* 110* 110* 110* 112*  --  101   CO2 mmol/L  --  27 25 24 21 22 17* 19*  --  18*   CO2, I-STAT mmol/L  --   --   --   --   --   --   --   --  20*  --    BUN mg/dL  --  7 9 9 22 26* 33* 39*  --  47*   CREATININE mg/dL  --  1 21 1 49* 1 43* 2 50* 3 09* 3 56* 4 53*  --  5 15*   CALCIUM mg/dL  --  8 6 9 2 8 7 8 3 8 6 8 4 9 2  --  9 2   MAGNESIUM mg/dL  --   --  1 9  --   --   --   --   --   --  3 4*   GLUCOSE, ISTAT mg/dl  --   --   --   --   --   --   --   --  80  --

## 2021-04-10 NOTE — ASSESSMENT & PLAN NOTE
· Creatinine 5 15 on admission and potassium of 5 6- now gradually improving in down to 0,88 this a m    ·  Patient reports decreased urine output prior to admission with decreased oral intake  · Continue to follow serially  · Continue IV fluids as well as encouraging oral intake which is a challenge  ·  Nephrology following

## 2021-04-10 NOTE — ASSESSMENT & PLAN NOTE
· Patient presents from [facility] due to elevated creatinine on routine outpatient labs  · Patient reports that over the last month she has been having ongoing extremity weakness that is having the hands were she reports that had just been more in legs  Due to weakness that started 1 month ago patient was admitted at [facility] from 03/11-3/12 in was discharged to SNF due to persistent weakness  · Patient reports that weakness has become worse since getting to [facility] now involving the arms and has become quite clumsy  LP, successfully performed in the ED  CSF  With protein Results for Nancy Waldrop (MRN [id number]) as of 4/6/2021 08:43   Ref   Range 4/5/2021 21:20   APPEARANCE CSF Unknown clear   TUBE NUMBER CSF Unknown 4   XANTHOCHROMIA Latest Ref Range: No  No   WBC CSF Latest Ref Range: 0 - 5 /uL 2   Lymphs % CSF Latest Units: % 60   Monocytes % (CSF) Latest Units: % 40   GLUCOSE CSF Latest Ref Range: 50 - 80 mg/dL 57   PROTEIN CSF Latest Ref Range: 15 - 45 mg/dL 49 (H)   RBC CSF Latest Ref Range: 0 - 10 uL 31 (H)   ·   · Neurology input appreciated-  · Had 1st of 5 plasmapheresis treatments yesterday-for every other day treatment -presumed autoimmune related issues

## 2021-04-10 NOTE — PROGRESS NOTES
[place]  Progress Note - [facility] 1980, 47 y o  female MRN: [id number]  Unit/Bed#: -01 Encounter: [id number]  Primary Care Provider: Alta Dallas DO   Date and time admitted to hospital: 4/5/2021  5:54 PM    RENE (acute kidney injury) [facility])  Assessment & Plan  · Creatinine 5 15 on admission and potassium of 5 6- now gradually improving in down to 0,88 this a m  ·  Patient reports decreased urine output prior to admission with decreased oral intake  · Continue to follow serially  · Continue IV fluids as well as encouraging oral intake which is a challenge  ·  Nephrology following    * Generalized weakness  Assessment & Plan  · Patient presents from [facility] due to elevated creatinine on routine outpatient labs  · Patient reports that over the last month she has been having ongoing extremity weakness that is having the hands were she reports that had just been more in legs  Due to weakness that started 1 month ago patient was admitted at [facility] from 03/11-3/12 in was discharged to SNF due to persistent weakness  · Patient reports that weakness has become worse since getting to [facility] now involving the arms and has become quite clumsy  LP, successfully performed in the ED  CSF  With protein Results for Kameron Treviño (MRN [id number]) as of 4/6/2021 08:43   Ref   Range 4/5/2021 21:20   APPEARANCE CSF Unknown clear   TUBE NUMBER CSF Unknown 4   XANTHOCHROMIA Latest Ref Range: No  No   WBC CSF Latest Ref Range: 0 - 5 /uL 2   Lymphs % CSF Latest Units: % 60   Monocytes % (CSF) Latest Units: % 40   GLUCOSE CSF Latest Ref Range: 50 - 80 mg/dL 57   PROTEIN CSF Latest Ref Range: 15 - 45 mg/dL 49 (H)   RBC CSF Latest Ref Range: 0 - 10 uL 31 (H)   ·   · Neurology input appreciated-  · Had 1st of 5 plasmapheresis treatments yesterday-for every other day treatment -presumed autoimmune related issues    Severe protein-calorie malnutrition (HCC)  Assessment & Plan  Malnutrition Findings:   Adult Malnutrition type: Chronic illness  Adult Degree of Malnutrition: Other severe protein calorie malnutrition    BMI Findings: Body mass index is 28 65 kg/m²     Nutrition team is following-complicated with history of previous gastric bypass surgery and sensitivities-had been at [facility] program several years ago but would need to be able to care for herself to be in a similar program in the future   wll ask gi to evaluate-? dobhoff tube short term to increase nutritonal intake    Anemia  Assessment & Plan  · Hemoglobin 10 0 on admission- then dropped to 8 5 - now 9 0   · hx of gastric bypass  · No signs of active bleeding  · Continue to monitor on CBC      Depression  Assessment & Plan  · Continue home medications Wellbutrin, Remeron,   · Was difficult to arouse this am- had ativan at 2 am- will cut doses on lorazepam andhold on the Cathlyn Newcomer  ·  psych consulted    Hypothyroidism  Assessment & Plan  tsh was slighlty elevated on presentation- was on 25 mcg m-f and 50 mcg on sat and Sunday- will increase to 50 mcg daily    Hyperkalemia  Assessment & Plan  · Potassium 5 6 on admission  Patient received 2 L bolus of fluids i would IV fluids-now  Low this am 3 3  · Fluids adjusted by Nephrology team  · Continue to monitor on BMP       Anorexia nervosa, restricting type  Assessment & Plan  · Patient with history of anorexia  · Based off previous notes from endocrinology patient reports only about 1 meal per day prior to entering into [facility]  · History of hypoglycemia and gastric bypass surgery  ·      Vitamin D deficiency  Assessment & Plan  Had been taking 94671 units weekly previously but ran out of her meds at least several months ago-will restart 14391 units twice weekly for now      VTE Prophylaxis: in place    Patient Centered Rounds: I rounded with patient's nurse    Current Length of Stay: 5 day(s)    Current Patient Status: Inpatient    Certification Statement: Pt requires additional inpatient hospital stay due to: see assessment and plan        Subjective:   Nursing staff had difficulty arousing patient this a m  After having Ambien 10 mg at bedtime in addition to her usual Remeron and then lorazepam 1 mg at 2:25 a m -on my arrival with stroking the bottom of her feet she did awaken  She denies any complaints of pain  She reports that she did not sleep well earlier in the evening  With encouragement to ox several sips of clear Ensure then was falling back to sleep  All other ROS are negative    Objective:     Vitals:   Temp (24hrs), Av 2 °F (36 8 °C), Min:98 °F (36 7 °C), Max:98 8 °F (37 1 °C)    Temp:  [98 °F (36 7 °C)-98 8 °F (37 1 °C)] 98 °F (36 7 °C)  HR:  [77-88] 77  Resp:  [16] 16  BP: (117-125)/(80-84) 125/81  SpO2:  [94 %-100 %] 98 %  Body mass index is 28 65 kg/m²  Input and Output Summary (last 24 hours): Intake/Output Summary (Last 24 hours) at 4/10/2021 0916  Last data filed at 4/10/2021 0850  Gross per 24 hour   Intake 1180 ml   Output 2350 ml   Net -1170 ml       Physical Exam:     Physical Exam  Vitals signs and nursing note reviewed  Constitutional:       Comments: Lethargic   Eyes:      General: No scleral icterus  Right eye: No discharge  Left eye: No discharge  Conjunctiva/sclera: Conjunctivae normal    Neck:      Musculoskeletal: No neck rigidity or muscular tenderness  Cardiovascular:      Rate and Rhythm: Normal rate and regular rhythm  Pulmonary:      Effort: Pulmonary effort is normal       Breath sounds: Normal breath sounds  No wheezing or rhonchi  Abdominal:      General: There is no distension  Palpations: Abdomen is soft  Tenderness: There is no abdominal tenderness  Musculoskeletal:         General: No swelling  Right lower leg: No edema  Left lower leg: No edema  Lymphadenopathy:      Cervical: No cervical adenopathy     Neurological:      Comments: Generalized weakness of the legs and hands             I personally reviewed labs and imaging reports for today  Last 24 Hours Medication List:   Current Facility-Administered Medications   Medication Dose Route Frequency Provider Last Rate    acetaminophen  650 mg Oral Q6H PRN Rob Land PA-C      albumin human  150 g Intravenous Every Other Day SMITHA Billingsley      butalbital-acetaminophen-caffeine  1 tablet Oral Q6H PRN Andrea Mcneill PA-C      calcium gluconate  1 g Intravenous Every Other Day SMITHA Billingsley 1 g (04/09/21 1446)    cyclobenzaprine  10 mg Oral TID PRN Rob Land PA-C      docusate sodium  100 mg Oral BID Rob Land PA-C      folic acid  1 mg Oral Daily Rob Land PA-C      gabapentin  100 mg Oral TID PRN Rob Land PA-C      gabapentin  300 mg Oral HS SMITHA Billingsley      heparin (porcine)  5,000 Units Subcutaneous [facility] Rob Land PA-C      heparin lock flush  10 mL Intracatheter Every Other Day SMITHA Billingsley      levETIRAcetam  500 mg Oral Q12H [facility] Heydi Garrett PA-C      levothyroxine  50 mcg Oral Early Morning Elizabeth FlyDO      LORazepam  0 5 mg Oral Q8H PRN Jimmie Bedoya DO      mirtazapine  7 5 mg Oral HS Rob Land PA-C      polyethylene glycol  17 g Oral Daily Rob Land PA-C      potassium chloride  40 mEq Oral Once Yanelis Freedman MD      sodium chloride  50 mL/hr Intravenous Continuous Yanelis Freedman MD 50 mL/hr (04/10/21 0850)    thiamine  250 mg Intravenous Daily Ganesh July  mg (04/09/21 0434)          Today, Patient Was Seen By: Jimmie Bedoya DO    ** Please Note: Dictation voice to text software may have been used in the creation of this document   **

## 2021-04-10 NOTE — ASSESSMENT & PLAN NOTE
Had been taking 17261 units weekly previously but ran out of her meds at least several months ago-will restart 17147 units twice weekly for now

## 2021-04-10 NOTE — CONSULTS
Consultation - [address] [facility]     Alonzo Pinzon 47 y o  female MRN: [id number]  Unit/Bed#: -01 Encounter: [id number]    Consults    ASSESSMENT and PLAN    Malnutrition/poor p o  Intake - patient denies any specific GI symptoms  She has had issues with what sounds like either peptic ulcer or anastomotic ulcers in the past but is having no postprandial type pains or symptoms presently  She denies any self induce vomiting  Discussed the importance of of hydration and nutrition to help her recover with respect to her kidneys in her weakness  Explained the option of placing a nasogastric tube for enteral tube feeds and she states that she does not want to have that done  Discussed that she had element taking 25% of her meals through yesterday, she did state and was confirmed by the aide that she did eat more for lunch today including the whole protein shake and half of her macaroni and cheese  · Continue to encourage p o  Intake  · Continue with nutritional shakes  · If inadequate p o  Intake persists can place Dobbhoff tube but will hold for present as the patient does seem to be taking in a bit more today and does not want to have the tube placed  Chief Complaint   Patient presents with    Abnormal Lab     EMS staes that patietn's creat and BUN are elevated  Patient complaining of L calf pain       Physician Requesting Consult: DO ZOILA Solis  Alonzo Pinzon is a 47y o  year old female with a history of anorexia and gastric bypass surgery who presents with generalized weakness acute kidney injury  For consulted to evaluate the patient because she is not eating  Patient denies any dysphagia or odynophagia  She has no abdominal pain nausea or vomiting  She states she just does not feel like eating  She is having regular bowel movements  There are no particular foods that she likes or dislikes  There are no specific foods that trigger any symptoms      Historical Information   Past Medical History:   Diagnosis Date    Anemia     Psychiatric disorder     depression anxiety    Seizures (HCC)      Past Surgical History:   Procedure Laterality Date    GASTRIC BYPASS      IR TEMPORARY DIALYSIS CATHETER PLACEMENT  4/8/2021     Social History   Social History     Substance and Sexual Activity   Alcohol Use Never    Frequency: Never     Social History     Substance and Sexual Activity   Drug Use No    Comment: in revovery for benzos     Social History     Tobacco Use   Smoking Status Current Every Day Smoker   Smokeless Tobacco Never Used     Family History   Problem Relation Age of Onset    Hypertension Mother     Heart attack Mother     No Known Problems Father     Lung cancer Maternal Grandmother     Hypothyroidism Maternal Grandmother     Diabetes type II Paternal Grandmother     Diabetes type II Paternal Grandfather        Meds/Allergies     Current Facility-Administered Medications   Medication Dose Route Frequency    acetaminophen (TYLENOL) tablet 650 mg  650 mg Oral Q6H PRN    albumin human (FLEXBUMIN) 5 % injection 150 g  150 g Intravenous Every Other Day    butalbital-acetaminophen-caffeine (FIORICET,ESGIC) -40 mg per tablet 1 tablet  1 tablet Oral Q6H PRN    calcium gluconate 1 g in sodium chloride 0 9% 50 mL (premix)  1 g Intravenous Every Other Day    cyclobenzaprine (FLEXERIL) tablet 10 mg  10 mg Oral TID PRN    docusate sodium (COLACE) capsule 100 mg  100 mg Oral BID    [START ON 4/12/2021] ergocalciferol (VITAMIN D2) capsule 50,000 Units  50,000 Units Oral Once per day on Mon Thu    folic acid (FOLVITE) tablet 1 mg  1 mg Oral Daily    gabapentin (NEURONTIN) capsule 100 mg  100 mg Oral TID PRN    gabapentin (NEURONTIN) capsule 300 mg  300 mg Oral HS    heparin (porcine) subcutaneous injection 5,000 Units  5,000 Units Subcutaneous Q8H [address]    heparin lock flush injection 100 Units  10 mL Intracatheter Every Other Day    levETIRAcetam (KEPPRA) tablet 500 mg  500 mg Oral Q12H [facility]    levothyroxine tablet 50 mcg  50 mcg Oral Early Morning    LORazepam (ATIVAN) tablet 0 5 mg  0 5 mg Oral Q8H PRN    mirtazapine (REMERON) tablet 7 5 mg  7 5 mg Oral HS    polyethylene glycol (MIRALAX) packet 17 g  17 g Oral Daily    sodium chloride infusion 0 45 %  50 mL/hr Intravenous Continuous    thiamine (VITAMIN B1) 250 mg in sodium chloride 0 9 % 50 mL IVPB  250 mg Intravenous Daily     Medications Prior to Admission   Medication    acarbose (PRECOSE) 25 mg tablet    AquaLance Lancets 30G MISC    buPROPion (WELLBUTRIN SR) 100 mg 12 hr tablet    Cholecalciferol 125 MCG (5000 UT) TABS    cloNIDine (CATAPRES) 0 2 mg tablet    Continuous Blood Gluc  (FREESTYLE CAITY 14 DAY READER) DINESH    Continuous Blood Gluc Sensor ([employer] GroupSTYLE CAITY 14 DAY SENSOR) MISC    cyanocobalamin 1,000 mcg/mL    cyclobenzaprine (FLEXERIL) 10 mg tablet    docusate sodium (COLACE) 100 mg capsule    ergocalciferol (ERGOCALCIFEROL) 1 25 MG (54914 UT) capsule    famotidine (PEPCID) 20 mg tablet    folic acid (FOLVITE) 1 mg tablet    glucagon (GLUCAGON EMERGENCY) 1 MG injection    levothyroxine 25 mcg tablet    mirtazapine (REMERON) 15 mg tablet    multivitamin (THERAGRAN) TABS    OneTouch Ultra test strip    polyethylene glycol (MIRALAX) 17 g packet    potassium chloride (K-DUR,KLOR-CON) 10 mEq tablet    prazosin (MINIPRESS) 5 mg capsule    thiamine (VITAMIN B1) 100 mg tablet    topiramate (TOPAMAX) 200 MG tablet    varenicline (CHANTIX) 1 mg tablet    venlafaxine (EFFEXOR-XR) 75 mg 24 hr capsule    zolpidem (AMBIEN) 10 mg tablet       Allergies   Allergen Reactions    Anti-Hist [Diphenhydramine]     Buspar [Buspirone]     Haldol [Haloperidol]     Penicillins Throat Swelling    Pennsaid [Diclofenac Sodium]     Zoloft [Sertraline]        PHYSICAL EXAM    Blood pressure 125/81, pulse 77, temperature 98 °F (36 7 °C), temperature source Oral, resp   rate 16, weight 75 7 kg (166 lb 14 2 oz), SpO2 98 %  Body mass index is 28 65 kg/m²  General Appearance: NAD, cooperative, lethargic but easily aroused and answering questions appropriately  Eyes: Anicteric, PERRLA, EOMI  ENT:  Normocephalic, atraumatic, normal mucosa  Neck:  Supple, symmetrical, trachea midline  Resp:  Clear to auscultation bilaterally; no rales, rhonchi or wheezing; respirations unlabored   CV:  S1 S2, Regular rate and rhythm; no murmur, rub, or gallop  GI:  Soft, non-tender, non-distended; normal bowel sounds; no masses, no organomegaly   Rectal: Deferred  Musculoskeletal: No cyanosis, clubbing or edema  Normal ROM  Skin:  No jaundice, rashes, or lesions   Heme/Lymph: No palpable cervical lymphadenopathy  Psych:  Sad affect  Neuro: No gross deficits, AAOx3    Lab Results   Component Value Date    GLUCOSE 80 04/05/2021    CALCIUM 8 5 04/10/2021    K 3 3 (L) 04/10/2021    CO2 19 (L) 04/10/2021     (H) 04/10/2021    BUN 5 04/10/2021    CREATININE 0 88 04/10/2021     Lab Results   Component Value Date    WBC 7 82 04/10/2021    HGB 9 0 (L) 04/10/2021    HCT 29 0 (L) 04/10/2021     (H) 04/10/2021     04/10/2021     Lab Results   Component Value Date    ALT 19 04/10/2021    AST 20 04/10/2021    ALKPHOS 83 04/10/2021     No results found for: AMYLASE  No results found for: LIPASE  Lab Results   Component Value Date    IRON 59 04/06/2021    TIBC 259 04/06/2021    FERRITIN 183 04/06/2021     Lab Results   Component Value Date    INR 1 01 04/09/2021       Imaging Studies: I have personally reviewed pertinent reports  EKG, Pathology, and Other Studies: I have personally reviewed pertinent reports  REVIEW OF SYSTEMS:    CONSTITUTIONAL: Denies any fever, chills, rigors, and weight loss  HEENT: No earache or tinnitus  Denies hearing loss or visual disturbances  CARDIOVASCULAR: No chest pain or palpitations     RESPIRATORY: Denies any cough, hemoptysis, shortness of breath or dyspnea on exertion  GASTROINTESTINAL: As noted in the History of Present Illness  GENITOURINARY: No problems with urination  Denies any hematuria or dysuria  NEUROLOGIC: No dizziness or vertigo, denies headaches  MUSCULOSKELETAL: Denies any muscle or joint pain  SKIN: Denies skin rashes or itching  ENDOCRINE: Denies excessive thirst  Denies intolerance to heat or cold  PSYCHOSOCIAL: Denies depression or anxiety  Denies any recent memory loss

## 2021-04-10 NOTE — ASSESSMENT & PLAN NOTE
· Patient with history of anorexia  · Based off previous notes from endocrinology patient reports only about 1 meal per day prior to entering into [facility]  · History of hypoglycemia and gastric bypass surgery  ·

## 2021-04-10 NOTE — ASSESSMENT & PLAN NOTE
tsh was slighlty elevated on presentation- was on 25 mcg m-f and 50 mcg on sat and Sunday- will increase to 50 mcg daily

## 2021-04-10 NOTE — ASSESSMENT & PLAN NOTE
· Potassium 5 6 on admission  Patient received 2 L bolus of fluids i would IV fluids-now  Low this am 3 3  · Fluids adjusted by Nephrology team  · Continue to monitor on BMP

## 2021-04-11 PROBLEM — E87.5 HYPERKALEMIA: Status: RESOLVED | Noted: 2021-04-05 | Resolved: 2021-04-11

## 2021-04-11 LAB
ANION GAP SERPL CALCULATED.3IONS-SCNC: 11 MMOL/L (ref 4–13)
BASE EXCESS BLDA CALC-SCNC: -3 MMOL/L (ref -2–3)
BUN SERPL-MCNC: 3 MG/DL (ref 5–25)
CA-I BLD-SCNC: 1.21 MMOL/L (ref 1.12–1.32)
CALCIUM SERPL-MCNC: 8.7 MG/DL (ref 8.3–10.1)
CHLORIDE SERPL-SCNC: 110 MMOL/L (ref 100–108)
CO2 SERPL-SCNC: 23 MMOL/L (ref 21–32)
CREAT SERPL-MCNC: 0.98 MG/DL (ref 0.6–1.3)
DS:DELIVERY SYSTEM: ABNORMAL
ERYTHROCYTE [DISTWIDTH] IN BLOOD BY AUTOMATED COUNT: 19 % (ref 11.6–15.1)
FIBRINOGEN PPP-MCNC: 244 MG/DL (ref 227–495)
FIO2 GAS DIL.REBREATH: 21 L
GFR SERPL CREATININE-BSD FRML MDRD: 73 ML/MIN/1.73SQ M
GLUCOSE SERPL-MCNC: 89 MG/DL (ref 65–140)
GLUCOSE SERPL-MCNC: 92 MG/DL (ref 65–140)
HCO3 BLDA-SCNC: 21.7 MMOL/L (ref 22–28)
HCT VFR BLD AUTO: 30 % (ref 34.8–46.1)
HCT VFR BLD CALC: 26 % (ref 34.8–46.1)
HGB BLD-MCNC: 9.5 G/DL (ref 11.5–15.4)
HGB BLDA-MCNC: 8.8 G/DL (ref 11.5–15.4)
MCH RBC QN AUTO: 32.8 PG (ref 26.8–34.3)
MCHC RBC AUTO-ENTMCNC: 31.7 G/DL (ref 31.4–37.4)
MCV RBC AUTO: 103 FL (ref 82–98)
PCO2 BLD: 23 MMOL/L (ref 21–32)
PCO2 BLD: 34.5 MM HG (ref 36–44)
PH BLD: 7.41 [PH] (ref 7.35–7.45)
PLATELET # BLD AUTO: 253 THOUSANDS/UL (ref 149–390)
PMV BLD AUTO: 11.3 FL (ref 8.9–12.7)
PO2 BLD: 154 MM HG (ref 75–129)
POTASSIUM BLD-SCNC: 3.7 MMOL/L (ref 3.5–5.3)
POTASSIUM SERPL-SCNC: 3.3 MMOL/L (ref 3.5–5.3)
RBC # BLD AUTO: 2.9 MILLION/UL (ref 3.81–5.12)
SAO2 % BLD FROM PO2: 99 % (ref 60–85)
SODIUM BLD-SCNC: 141 MMOL/L (ref 136–145)
SODIUM SERPL-SCNC: 144 MMOL/L (ref 136–145)
SPECIMEN SOURCE: ABNORMAL
WBC # BLD AUTO: 11.12 THOUSAND/UL (ref 4.31–10.16)

## 2021-04-11 PROCEDURE — 99232 SBSQ HOSP IP/OBS MODERATE 35: CPT | Performed by: INTERNAL MEDICINE

## 2021-04-11 PROCEDURE — 99231 SBSQ HOSP IP/OBS SF/LOW 25: CPT | Performed by: INTERNAL MEDICINE

## 2021-04-11 PROCEDURE — 82330 ASSAY OF CALCIUM: CPT | Performed by: NURSE PRACTITIONER

## 2021-04-11 PROCEDURE — 85027 COMPLETE CBC AUTOMATED: CPT | Performed by: INTERNAL MEDICINE

## 2021-04-11 PROCEDURE — 80048 BASIC METABOLIC PNL TOTAL CA: CPT | Performed by: INTERNAL MEDICINE

## 2021-04-11 PROCEDURE — 85384 FIBRINOGEN ACTIVITY: CPT | Performed by: NURSE PRACTITIONER

## 2021-04-11 PROCEDURE — 80201 ASSAY OF TOPIRAMATE: CPT | Performed by: INTERNAL MEDICINE

## 2021-04-11 RX ORDER — ALBUMIN, HUMAN INJ 5% 5 %
12.5 SOLUTION INTRAVENOUS ONCE
Status: DISCONTINUED | OUTPATIENT
Start: 2021-04-11 | End: 2021-04-11 | Stop reason: SDUPTHER

## 2021-04-11 RX ORDER — PRAZOSIN HYDROCHLORIDE 1 MG/1
2 CAPSULE ORAL
Status: DISCONTINUED | OUTPATIENT
Start: 2021-04-11 | End: 2021-04-26 | Stop reason: HOSPADM

## 2021-04-11 RX ORDER — POTASSIUM CHLORIDE 20 MEQ/1
40 TABLET, EXTENDED RELEASE ORAL ONCE
Status: COMPLETED | OUTPATIENT
Start: 2021-04-11 | End: 2021-04-11

## 2021-04-11 RX ORDER — VENLAFAXINE HYDROCHLORIDE 37.5 MG/1
37.5 CAPSULE, EXTENDED RELEASE ORAL DAILY
Status: DISCONTINUED | OUTPATIENT
Start: 2021-04-11 | End: 2021-04-26 | Stop reason: HOSPADM

## 2021-04-11 RX ORDER — TOPIRAMATE 100 MG/1
100 TABLET, FILM COATED ORAL 2 TIMES DAILY
Status: DISCONTINUED | OUTPATIENT
Start: 2021-04-11 | End: 2021-04-13

## 2021-04-11 RX ADMIN — LEVETIRACETAM 500 MG: 500 TABLET, FILM COATED ORAL at 22:25

## 2021-04-11 RX ADMIN — BUPROPION HYDROCHLORIDE 100 MG: 100 TABLET, FILM COATED ORAL at 09:11

## 2021-04-11 RX ADMIN — LORAZEPAM 0.5 MG: 0.5 TABLET ORAL at 09:11

## 2021-04-11 RX ADMIN — CALCIUM GLUCONATE 1 G: 20 INJECTION, SOLUTION INTRAVENOUS at 10:57

## 2021-04-11 RX ADMIN — DOCUSATE SODIUM 100 MG: 100 CAPSULE, LIQUID FILLED ORAL at 18:12

## 2021-04-11 RX ADMIN — PRAZOSIN HYDROCHLORIDE 2 MG: 1 CAPSULE ORAL at 22:23

## 2021-04-11 RX ADMIN — POLYETHYLENE GLYCOL 3350 17 G: 17 POWDER, FOR SOLUTION ORAL at 09:10

## 2021-04-11 RX ADMIN — BUTALBITAL, ACETAMINOPHEN AND CAFFEINE 1 TABLET: 50; 325; 40 TABLET ORAL at 13:45

## 2021-04-11 RX ADMIN — BUPROPION HYDROCHLORIDE 100 MG: 100 TABLET, FILM COATED ORAL at 18:12

## 2021-04-11 RX ADMIN — HEPARIN SODIUM 5000 UNITS: 5000 INJECTION INTRAVENOUS; SUBCUTANEOUS at 13:45

## 2021-04-11 RX ADMIN — BUTALBITAL, ACETAMINOPHEN AND CAFFEINE 1 TABLET: 50; 325; 40 TABLET ORAL at 19:57

## 2021-04-11 RX ADMIN — HEPARIN SODIUM 5000 UNITS: 5000 INJECTION INTRAVENOUS; SUBCUTANEOUS at 06:45

## 2021-04-11 RX ADMIN — LORAZEPAM 0.5 MG: 0.5 TABLET ORAL at 22:26

## 2021-04-11 RX ADMIN — DOCUSATE SODIUM 100 MG: 100 CAPSULE, LIQUID FILLED ORAL at 09:11

## 2021-04-11 RX ADMIN — TOPIRAMATE 100 MG: 100 TABLET, FILM COATED ORAL at 18:12

## 2021-04-11 RX ADMIN — LEVOTHYROXINE SODIUM 50 MCG: 25 TABLET ORAL at 06:43

## 2021-04-11 RX ADMIN — ALBUMIN (HUMAN) 150 G: 12.5 INJECTION, SOLUTION INTRAVENOUS at 10:56

## 2021-04-11 RX ADMIN — GABAPENTIN 300 MG: 300 CAPSULE ORAL at 22:26

## 2021-04-11 RX ADMIN — HEPARIN SODIUM 5000 UNITS: 5000 INJECTION INTRAVENOUS; SUBCUTANEOUS at 22:23

## 2021-04-11 RX ADMIN — LEVETIRACETAM 500 MG: 500 TABLET, FILM COATED ORAL at 09:11

## 2021-04-11 RX ADMIN — CYCLOBENZAPRINE HYDROCHLORIDE 10 MG: 10 TABLET, FILM COATED ORAL at 22:53

## 2021-04-11 RX ADMIN — POTASSIUM CHLORIDE 40 MEQ: 1500 TABLET, EXTENDED RELEASE ORAL at 09:35

## 2021-04-11 RX ADMIN — THIAMINE HYDROCHLORIDE 250 MG: 100 INJECTION, SOLUTION INTRAMUSCULAR; INTRAVENOUS at 09:11

## 2021-04-11 RX ADMIN — SODIUM CHLORIDE, PRESERVATIVE FREE 100 UNITS: 5 INJECTION INTRAVENOUS at 10:57

## 2021-04-11 RX ADMIN — FOLIC ACID 1 MG: 1 TABLET ORAL at 09:10

## 2021-04-11 RX ADMIN — MIRTAZAPINE 7.5 MG: 15 TABLET, FILM COATED ORAL at 22:25

## 2021-04-11 RX ADMIN — VENLAFAXINE HYDROCHLORIDE 37.5 MG: 37.5 CAPSULE, EXTENDED RELEASE ORAL at 18:12

## 2021-04-11 NOTE — PROGRESS NOTES
[address] [facility] NOTE   Keeley Momin 47 y o  female MRN: [id number]  Unit/Bed#: -01 Encounter: [id number]  Reason for Consult: RENE    ASSESSMENT and PLAN:    46 yo female with prior mult urinary tract infections, at rehab due to progression of LE weakness, NSAID use, with presentation for generalized weakness and fatigue  Nephrology on board for RENE  Creat 5 1 on admission with baseline 0 6-0 8 mg/dL     1) RENE     - baseline creat 0 6-0 8 mg/dL  - etiology - obstructive uropathy, urinary retention with possible prerenal etiology  - CT scan with distended bladder  Concern for neurogenic bladder or outlet obstruction  - UA with 0-1 RBC, 0-1 WBC, small blood, spec gravity 1 005  -repeat urinalysis with significant WBC, 4-10 RBC but Bates specimen  - 4/9 - Creat 1 2  -4/10-creatinine improving to 0 9 mg/dL  Sodium rising  Bicarbonate lower with rising anion gap      Plan:  - bates per Urology team-urinalysis with Bates specimen  Follow-up urine culture from 04/10   - monitor for post obstructive diuresis  - IVF - changed to half-normal saline-continue for today  -replete potassium  - BMP in AM  -check magnesium in a m      2) urinary obstruction     - per Primary team     3) progressive weakness with concern for autoimmune process     - Neurology on board  - LP performed in ER  - started on plasma exchange     4) acid/base     -serum bicarbonate lower and anion gap rising for unclear etiology on 04/10  Lactic acid unrevealing  Urine with ketone negative   -anion gap has improved on 04/11      5) electrolytes-hypokalemia-replete 4/11  Hyponatremia-changed to half-normal saline on 04/10 in sodium level is improving on 04/11     6) SBP - stable       SUBJECTIVE / 24H INTERVAL HISTORY:    Blood pressure is 982-804 systolic  Afebrile  Urine output 2 6 L  Patient is slightly more arousable today  Easily falls back asleep  Does not answer all the questions being asked      OBJECTIVE:  Current Weight: Weight - Scale: 75 4 kg (166 lb 3 6 oz)  Vitals:    04/10/21 1506 04/10/21 2000 04/10/21 2335 04/11/21 0600   BP: 130/82  126/81    Pulse: 88  92    Resp: 20  18    Temp: 98 2 °F (36 8 °C)  98 4 °F (36 9 °C)    TempSrc:       SpO2: 100% 99% 99%    Weight:    75 4 kg (166 lb 3 6 oz)       Intake/Output Summary (Last 24 hours) at 4/11/2021 0701  Last data filed at 4/11/2021 7765  Gross per 24 hour   Intake 1000 ml   Output 2600 ml   Net -1600 ml     General: NAD  Skin: no rash  Eyes: anicteric sclera  ENT: moist mucous membrane  Neck: supple  Chest: CTA b/l, no ronchii, no wheeze, no rubs, no rales  CVS: s1s2, no murmur, no gallop, no rub  Abdomen: soft, nontender, nl sounds  Extremities: no edema LE b/l  : no bates  Neuro: AAOX2  Psych: normal affect    Medications:    Current Facility-Administered Medications:     acetaminophen (TYLENOL) tablet 650 mg, 650 mg, Oral, Q6H PRN, Joellen Us PA-C, 650 mg at 04/09/21 1417    albumin human (FLEXBUMIN) 5 % injection 150 g, 150 g, Intravenous, Every Other Day, Teofilo Foots, CRNP, 150 g at 04/09/21 1446    buPROPion (WELLBUTRIN) tablet 100 mg, 100 mg, Oral, BID, Elizabeth Fly, DO, 100 mg at 04/10/21 1853    butalbital-acetaminophen-caffeine (FIORICET,ESGIC) -40 mg per tablet 1 tablet, 1 tablet, Oral, Q6H PRN, Shirlene Guillen PA-C, 1 tablet at 04/10/21 1853    calcium gluconate 1 g in sodium chloride 0 9% 50 mL (premix), 1 g, Intravenous, Every Other Day, Teofilo Foots, CRNP, Last Rate: 100 mL/hr at 04/09/21 1446, 1 g at 04/09/21 1446    cyclobenzaprine (FLEXERIL) tablet 10 mg, 10 mg, Oral, TID PRN, Amna Cazares PA-C, 10 mg at 04/10/21 2239    docusate sodium (COLACE) capsule 100 mg, 100 mg, Oral, BID, Joellen Us PA-C, 100 mg at 04/10/21 0951    [START ON 4/12/2021] ergocalciferol (VITAMIN D2) capsule 50,000 Units, 50,000 Units, Oral, Once per day on Mon Thu, Elizabeth Johnson DO    folic acid (FOLVITE) tablet 1 mg, 1 mg, Oral, Daily, Amna Cazares, LUIS, 1 mg at 04/10/21 0950    gabapentin (NEURONTIN) capsule 100 mg, 100 mg, Oral, TID PRN, Solomon Walter PA-C, 100 mg at 04/08/21 1757    gabapentin (NEURONTIN) capsule 300 mg, 300 mg, Oral, HS, SMITHA Oviedo, 300 mg at 04/10/21 2239    heparin (porcine) subcutaneous injection 5,000 Units, 5,000 Units, Subcutaneous, Q8H [address], 5,000 Units at 04/11/21 0645 **AND** [CANCELED] Platelet count, , , Once, Solomon Walter PA-C    heparin lock flush injection 100 Units, 10 mL, Intracatheter, Every Other Day, SMITHA Oviedo, 100 Units at 04/09/21 1448    levETIRAcetam (KEPPRA) tablet 500 mg, 500 mg, Oral, Q12H [address], Heydi Y Shravan, LUIS, 500 mg at 04/10/21 2244    levothyroxine tablet 50 mcg, 50 mcg, Oral, Early Morning, Elizabeth Fly, DO, 50 mcg at 04/11/21 0643    LORazepam (ATIVAN) tablet 0 5 mg, 0 5 mg, Oral, Q8H PRN, Elizabeth Fly, DO, 0 5 mg at 04/10/21 2239    mirtazapine (REMERON) tablet 7 5 mg, 7 5 mg, Oral, HS, Joellen Us PA-C, 7 5 mg at 04/10/21 2239    polyethylene glycol (MIRALAX) packet 17 g, 17 g, Oral, Daily, Joellen Us PA-C, 17 g at 04/10/21 0951    potassium chloride (K-DUR,KLOR-CON) CR tablet 40 mEq, 40 mEq, Oral, Once, Stew Kiser MD    sodium chloride infusion 0 45 %, 50 mL/hr, Intravenous, Continuous, Stew Kiser MD, Last Rate: 50 mL/hr at 04/10/21 0850, 50 mL/hr at 04/10/21 0850    thiamine (VITAMIN B1) 250 mg in sodium chloride 0 9 % 50 mL IVPB, 250 mg, Intravenous, Daily, Trinh Rede, DO, Last Rate: 100 mL/hr at 04/10/21 0951, 250 mg at 04/10/21 0951    Laboratory Results:  Results from last 7 days   Lab Units 04/11/21  0600 04/10/21  0527 04/09/21  1449 04/09/21  0112 04/08/21  1534 04/07/21  0457 04/06/21  2105  04/06/21  0613 04/05/21  1912 04/05/21  1824   WBC Thousand/uL 11 12* 7 82 7 09  --   --  7 51  --   --  6 66  --  7 57   HEMOGLOBIN g/dL 9 5* 9 0* 8 5*  --   --  8 7*  --   --  10 5*  --  10 0*   I STAT HEMOGLOBIN g/dl  --   --   --   --   --   -- --   --   --  10 5*  --    HEMATOCRIT % 30 0* 29 0* 27 0*  --   --  27 7*  --   --  33 2*  --  31 5*   HEMATOCRIT, ISTAT %  --   --   --   --   --   --   --   --   --  31*  --    PLATELETS Thousands/uL 253 242 258  --   --  244  --   --  260  --  293   POTASSIUM mmol/L 3 3* 3 3* 3 5 3 5 3 4* 3 8 4 2   < > 4 4  --  5 6*   CHLORIDE mmol/L 110* 111* 109* 108 109* 110* 110*   < > 112*  --  101   CO2 mmol/L 23 19* 27 25 24 21 22   < > 19*  --  18*   CO2, I-STAT mmol/L  --   --   --   --   --   --   --   --   --  20*  --    BUN mg/dL 3* 5 7 9 9 22 26*   < > 39*  --  47*   CREATININE mg/dL 0 98 0 88 1 21 1 49* 1 43* 2 50* 3 09*   < > 4 53*  --  5 15*   CALCIUM mg/dL 8 7 8 5 8 6 9 2 8 7 8 3 8 6   < > 9 2  --  9 2   MAGNESIUM mg/dL  --   --   --  1 9  --   --   --   --   --   --  3 4*   GLUCOSE, ISTAT mg/dl  --   --   --   --   --   --   --   --   --  80  --     < > = values in this interval not displayed

## 2021-04-11 NOTE — ASSESSMENT & PLAN NOTE
· Patient presents from [facility] due to elevated creatinine on routine outpatient labs  · Patient reports that over the last month she has been having ongoing extremity weakness that is having the hands were she reports that had just been more in legs  Due to weakness that started 1 month ago patient was admitted at [facility] from 03/11-3/12 in was discharged to SNF due to persistent weakness  · Patient reports that weakness has become worse since getting to [facility] now involving the arms and has become quite clumsy  LP, successfully performed in the ED  CSF  With protein Results for Rizwana Garvin (MRN [id number]) as of 4/6/2021 08:43   Ref   Range 4/5/2021 21:20   APPEARANCE CSF Unknown clear   TUBE NUMBER CSF Unknown 4   XANTHOCHROMIA Latest Ref Range: No  No   WBC CSF Latest Ref Range: 0 - 5 /uL 2   Lymphs % CSF Latest Units: % 60   Monocytes % (CSF) Latest Units: % 40   GLUCOSE CSF Latest Ref Range: 50 - 80 mg/dL 57   PROTEIN CSF Latest Ref Range: 15 - 45 mg/dL 49 (H)   RBC CSF Latest Ref Range: 0 - 10 uL 31 (H)   ·   · Neurology input appreciated-  · Had 2nd of 5 plasmapheresis treatments today -for every other day treatment -presumed autoimmune related issues

## 2021-04-11 NOTE — PROGRESS NOTES
[address]  Progress Note - Terrill Severin 1980, 47 y o  female MRN: [id number]  Unit/Bed#: -01 Encounter: [id number]  Primary Care Provider: Jovon Sanchez DO   Date and time admitted to hospital: 4/5/2021  5:54 PM    RENE (acute kidney injury) ([facility] [address] )  Assessment & Plan  · Creatinine 5 15 on admission and potassium of 5 6- now gradually I- back to baseline  ·  Patient reports decreased urine output prior to admission with decreased oral intake  · Continue to follow serially  ·   encouraging oral intake which is a challenge  ·  Nephrology following    * Generalized weakness  Assessment & Plan  · Patient presents from [facility] SNF due to elevated creatinine on routine outpatient labs  · Patient reports that over the last month she has been having ongoing extremity weakness that is having the hands were she reports that had just been more in legs  Due to weakness that started 1 month ago patient was admitted at [facility] from 03/11-3/12 in was discharged to SNF due to persistent weakness  · Patient reports that weakness has become worse since getting to [facility] now involving the arms and has become quite clumsy  LP, successfully performed in the ED  CSF  With protein Results for Doris Wood (MRN [id number]) as of 4/6/2021 08:43   Ref   Range 4/5/2021 21:20   APPEARANCE CSF Unknown clear   TUBE NUMBER CSF Unknown 4   XANTHOCHROMIA Latest Ref Range: No  No   WBC CSF Latest Ref Range: 0 - 5 /uL 2   Lymphs % CSF Latest Units: % 60   Monocytes % (CSF) Latest Units: % 40   GLUCOSE CSF Latest Ref Range: 50 - 80 mg/dL 57   PROTEIN CSF Latest Ref Range: 15 - 45 mg/dL 49 (H)   RBC CSF Latest Ref Range: 0 - 10 uL 31 (H)   ·   · Neurology input appreciated-  · Had 2nd of 5 plasmapheresis treatments today -for every other day treatment -presumed autoimmune related issues    Depression  Assessment & Plan  · Continue home medications at decreased doses of Wellbutrin, Remeron, and Venelex if and-  · Was difficult to arouse this am- had ativan at 2 am- will cut doses on lorazepam andhold on the Kye Bustle  · Patient also reports PTSD and nightmares for which she has been on Minipress-will restart that  · Patient reports poor sleep without using admitted present Ambien-will restart the Ambien at 5 mg  ·  psych consulted    Hypothyroidism  Assessment & Plan  tsh was slighlty elevated on presentation- was on 25 mcg - and 50 mcg on sat and - will increase to 50 mcg daily    Anorexia nervosa, restricting type  Assessment & Plan  · Patient with history of anorexia-had very poor intake but now is drinking at least the supplements presented with meals-calorie counts are being checked but she seems more   motivated to eat-GI is following if this is not improving may need a short-term Dobbhoff tube placed  · Based off previous notes from endocrinology patient reports only about 1 meal per day prior to entering into [facility]  · History of hypoglycemia and gastric bypass surgery  ·      History of gastric bypass  Assessment & Plan  · Bypass surgery performed in  and              VTE Prophylaxis: in place    Patient Centered Rounds: I rounded with patient's nurse    Current Length of Stay: 6 day(s)    Current Patient Status: Inpatient    Certification Statement: Pt requires additional inpatient hospital stay due to: see assessment and plan        Subjective:   More alert cooperative with exam today  Has better eye contact and denies having any abdominal pain at present    She does complain of some headache which she relates to difficulty with sleeping since we cut back on a lot of her meds-she does follow closely with [address] [place] [address] with a counselor regarding her PTSD    All other ROS are negative    Objective:     Vitals:   Temp (24hrs), Av 3 °F (36 8 °C), Min:98 2 °F (36 8 °C), Max:98 4 °F (36 9 °C)    Temp:  [98 2 °F (36 8 °C)-98 4 °F (36 9 °C)] 98 2 °F (36 8 °C)  HR: [90-92] 90  Resp:  [18-20] 20  BP: (125-126)/(81-83) 125/83  SpO2:  [94 %-99 %] 94 %  Body mass index is 28 53 kg/m²  Input and Output Summary (last 24 hours): Intake/Output Summary (Last 24 hours) at 4/11/2021 1542  Last data filed at 4/11/2021 4919  Gross per 24 hour   Intake --   Output 1600 ml   Net -1600 ml       Physical Exam:     Physical Exam  Vitals signs and nursing note reviewed  Constitutional:       General: She is not in acute distress  HENT:      Mouth/Throat:      Pharynx: No oropharyngeal exudate or posterior oropharyngeal erythema  Eyes:      General:         Right eye: No discharge  Left eye: No discharge  Cardiovascular:      Rate and Rhythm: Normal rate and regular rhythm  Heart sounds: No murmur  Abdominal:      Tenderness: There is no abdominal tenderness  Comments: No guarding or rigidity   Neurological:      Mental Status: She is alert  Comments:   slightly improved  on the right hand today  Lower extremities a +2 strength bilaterally   Psychiatric:         Thought Content: Thought content normal              I personally reviewed labs and imaging reports for today        Last 24 Hours Medication List:   Current Facility-Administered Medications   Medication Dose Route Frequency Provider Last Rate    acetaminophen  650 mg Oral Q6H PRN Orestes Miranda PA-C      albumin human  150 g Intravenous Every Other Day SMITHA Shelby      buPROPion  100 mg Oral BID Bill Treviño DO      butalbital-acetaminophen-caffeine  1 tablet Oral Q6H PRN Gildardo Dejesus PA-C      calcium gluconate  1 g Intravenous Every Other Day SMITHA Shelby 1 g (04/11/21 1057)    cyclobenzaprine  10 mg Oral TID PRN Orestes Miranda PA-C      docusate sodium  100 mg Oral BID Orestes Miranda PA-C      [START ON 4/12/2021] ergocalciferol  50,000 Units Oral Once per day on Mon Thu Elizabeth Johnson DO      folic acid  1 mg Oral Daily Orestes Miranda PA-C      gabapentin  100 mg Oral TID PRN Lilliam Fischer PA-C      gabapentin  300 mg Oral HS Harold Carrel, CRNP      heparin (porcine)  5,000 Units Subcutaneous [facility] Lilliam Fischer PA-C      heparin lock flush  10 mL Intracatheter Every Other Day Harold Carrel, CRNP      levETIRAcetam  500 mg Oral Q12H [facility] Heydi Lawana Gottron, PA-C      levothyroxine  50 mcg Oral Early Morning Elizabeth Fly, DO      LORazepam  0 5 mg Oral Q8H PRN David Huizar DO      mirtazapine  7 5 mg Oral HS Lilliam Fischer PA-C      polyethylene glycol  17 g Oral Daily Lilliam Fischer PA-C      prazosin  2 mg Oral HS Elizabeth Fly, DO      sodium chloride  50 mL/hr Intravenous Continuous Stanrodolfo Doll MD 50 mL/hr (04/10/21 0850)    thiamine  250 mg Intravenous Daily Mary Ann Early,  mg (04/11/21 0911)    topiramate  100 mg Oral BID Elizabeth Fly, DO      venlafaxine  37 5 mg Oral Daily David Huizar DO            Today, Patient Was Seen By: David Huizar DO    ** Please Note: Dictation voice to text software may have been used in the creation of this document   **

## 2021-04-11 NOTE — PROGRESS NOTES
Joe Antony  [id number]    47 y o   female      ASSESSMENT and PLAN    Malnutrition/poor p o  Intake - patient denies specific GI symptoms  She has had issues with what sounds like either peptic ulcer or anastomotic ulcers in the past but is having no postprandial type pains or symptoms presently  She denies any self induce vomiting  Discussed the importance of of hydration and nutrition to help her recover with respect to her kidneys in her weakness  Explained the option of placing a nasogastric tube for enteral tube feeds and she states that she does not want to have that done  · Continue to encourage p o  Intake  · Continue with nutritional shakes  · If inadequate p o  Intake persists can place Dobbhoff tube but will hold for present as the patient does not want to have the tube and will continue to try to eat more  Chief Complaint   Patient presents with    Abnormal Lab     EMS staes that patietn's creat and BUN are elevated  Patient complaining of L calf pain       SUBJECTIVE/HPI   denies abdominal pain nausea or vomiting  States she ate about half of her breakfast   Drinking protein shakes  Discussed importance of p o  Intake      /83   Pulse 90   Temp 98 2 °F (36 8 °C)   Resp 20   Wt 75 4 kg (166 lb 3 6 oz)   SpO2 94%   BMI 28 53 kg/m²     PHYSICALEXAM  General appearance: alert, appears stated age and cooperative, sad affect  Head: Normocephalic, without obvious abnormality, atraumatic  Lungs: clear to auscultation bilaterally  Heart: regular rate and rhythm, S1, S2 normal, no murmur, click, rub or gallop  Abdomen: soft, non-tender; bowel sounds normal; no masses,  no organomegaly  Extremities: extremities normal, atraumatic, no cyanosis or edema  Neurologic: Grossly normal    Lab Results   Component Value Date    GLUCOSE 89 04/07/2021    CALCIUM 8 7 04/11/2021    K 3 3 (L) 04/11/2021    CO2 23 04/11/2021     (H) 04/11/2021    BUN 3 (L) 04/11/2021    CREATININE 0 98 04/11/2021     Lab Results   Component Value Date    WBC 11 12 (H) 04/11/2021    HGB 9 5 (L) 04/11/2021    HCT 30 0 (L) 04/11/2021     (H) 04/11/2021     04/11/2021     Lab Results   Component Value Date    ALT 19 04/10/2021    AST 20 04/10/2021    ALKPHOS 83 04/10/2021     No results found for: AMYLASE  No results found for: LIPASE  Lab Results   Component Value Date    IRON 59 04/06/2021    TIBC 259 04/06/2021    FERRITIN 183 04/06/2021     Lab Results   Component Value Date    INR 1 01 04/09/2021       Counseling / Coordination of Care  Total floor / unit time spent today 20 minutes

## 2021-04-11 NOTE — ASSESSMENT & PLAN NOTE
· Creatinine 5 15 on admission and potassium of 5 6- now gradually I- back to baseline  ·  Patient reports decreased urine output prior to admission with decreased oral intake  · Continue to follow serially  ·   encouraging oral intake which is a challenge  ·  Nephrology following

## 2021-04-11 NOTE — ASSESSMENT & PLAN NOTE
· Patient with history of anorexia-had very poor intake but now is drinking at least the supplements presented with meals-calorie counts are being checked but she seems more   motivated to eat-GI is following if this is not improving may need a short-term Dobbhoff tube placed  · Based off previous notes from endocrinology patient reports only about 1 meal per day prior to entering into [facility]  · History of hypoglycemia and gastric bypass surgery  ·

## 2021-04-11 NOTE — ASSESSMENT & PLAN NOTE
· Continue home medications at decreased doses of Wellbutrin, Remeron, and Venelex if and-  · Was difficult to arouse this am- had ativan at 2 am- will cut doses on lorazepam andhold on the Kye Bustle  · Patient also reports PTSD and nightmares for which she has been on Minipress-will restart that  · Patient reports poor sleep without using admitted present Ambien-will restart the Ambien at 5 mg  ·  psych consulted

## 2021-04-12 PROBLEM — N30.00 ACUTE CYSTITIS WITHOUT HEMATURIA: Status: ACTIVE | Noted: 2021-04-12

## 2021-04-12 PROBLEM — E16.2 HYPOGLYCEMIA: Status: RESOLVED | Noted: 2018-09-04 | Resolved: 2021-04-12

## 2021-04-12 LAB
ANION GAP SERPL CALCULATED.3IONS-SCNC: 13 MMOL/L (ref 4–13)
BACTERIA UR CULT: ABNORMAL
BACTERIA UR CULT: ABNORMAL
BUN SERPL-MCNC: 5 MG/DL (ref 5–25)
CALCIUM SERPL-MCNC: 8.5 MG/DL (ref 8.3–10.1)
CHLORIDE SERPL-SCNC: 110 MMOL/L (ref 100–108)
CO2 SERPL-SCNC: 19 MMOL/L (ref 21–32)
CREAT SERPL-MCNC: 0.73 MG/DL (ref 0.6–1.3)
GFR SERPL CREATININE-BSD FRML MDRD: 105 ML/MIN/1.73SQ M
GLUCOSE SERPL-MCNC: 99 MG/DL (ref 65–140)
MAGNESIUM SERPL-MCNC: 1.7 MG/DL (ref 1.6–2.6)
PHOSPHATE SERPL-MCNC: 3.8 MG/DL (ref 2.7–4.5)
POTASSIUM SERPL-SCNC: 3.6 MMOL/L (ref 3.5–5.3)
SODIUM SERPL-SCNC: 142 MMOL/L (ref 136–145)

## 2021-04-12 PROCEDURE — 99231 SBSQ HOSP IP/OBS SF/LOW 25: CPT | Performed by: INTERNAL MEDICINE

## 2021-04-12 PROCEDURE — 99232 SBSQ HOSP IP/OBS MODERATE 35: CPT | Performed by: INTERNAL MEDICINE

## 2021-04-12 PROCEDURE — 80048 BASIC METABOLIC PNL TOTAL CA: CPT | Performed by: INTERNAL MEDICINE

## 2021-04-12 PROCEDURE — 84100 ASSAY OF PHOSPHORUS: CPT | Performed by: INTERNAL MEDICINE

## 2021-04-12 PROCEDURE — 99232 SBSQ HOSP IP/OBS MODERATE 35: CPT | Performed by: PSYCHIATRY & NEUROLOGY

## 2021-04-12 PROCEDURE — 83735 ASSAY OF MAGNESIUM: CPT | Performed by: INTERNAL MEDICINE

## 2021-04-12 RX ORDER — AZTREONAM 1 G/1
1000 INJECTION, POWDER, LYOPHILIZED, FOR SOLUTION INTRAMUSCULAR; INTRAVENOUS EVERY 8 HOURS
Status: DISCONTINUED | OUTPATIENT
Start: 2021-04-12 | End: 2021-04-12

## 2021-04-12 RX ORDER — ZOLPIDEM TARTRATE 5 MG/1
10 TABLET ORAL
Status: DISCONTINUED | OUTPATIENT
Start: 2021-04-12 | End: 2021-04-13

## 2021-04-12 RX ADMIN — TOPIRAMATE 100 MG: 100 TABLET, FILM COATED ORAL at 17:36

## 2021-04-12 RX ADMIN — ZOLPIDEM TARTRATE 10 MG: 5 TABLET ORAL at 21:47

## 2021-04-12 RX ADMIN — HEPARIN SODIUM 5000 UNITS: 5000 INJECTION INTRAVENOUS; SUBCUTANEOUS at 06:23

## 2021-04-12 RX ADMIN — LEVETIRACETAM 500 MG: 500 TABLET, FILM COATED ORAL at 20:37

## 2021-04-12 RX ADMIN — CYCLOBENZAPRINE HYDROCHLORIDE 10 MG: 10 TABLET, FILM COATED ORAL at 20:37

## 2021-04-12 RX ADMIN — TOPIRAMATE 100 MG: 100 TABLET, FILM COATED ORAL at 10:22

## 2021-04-12 RX ADMIN — GABAPENTIN 300 MG: 300 CAPSULE ORAL at 21:46

## 2021-04-12 RX ADMIN — VENLAFAXINE HYDROCHLORIDE 37.5 MG: 37.5 CAPSULE, EXTENDED RELEASE ORAL at 10:15

## 2021-04-12 RX ADMIN — POLYETHYLENE GLYCOL 3350 17 G: 17 POWDER, FOR SOLUTION ORAL at 10:22

## 2021-04-12 RX ADMIN — LEVOTHYROXINE SODIUM 50 MCG: 25 TABLET ORAL at 06:23

## 2021-04-12 RX ADMIN — FOLIC ACID 1 MG: 1 TABLET ORAL at 10:16

## 2021-04-12 RX ADMIN — DOCUSATE SODIUM 100 MG: 100 CAPSULE, LIQUID FILLED ORAL at 17:36

## 2021-04-12 RX ADMIN — PRAZOSIN HYDROCHLORIDE 2 MG: 1 CAPSULE ORAL at 21:46

## 2021-04-12 RX ADMIN — ERGOCALCIFEROL 50000 UNITS: 1.25 CAPSULE ORAL at 10:21

## 2021-04-12 RX ADMIN — MIRTAZAPINE 7.5 MG: 15 TABLET, FILM COATED ORAL at 21:47

## 2021-04-12 RX ADMIN — HEPARIN SODIUM 5000 UNITS: 5000 INJECTION INTRAVENOUS; SUBCUTANEOUS at 15:19

## 2021-04-12 RX ADMIN — DOCUSATE SODIUM 100 MG: 100 CAPSULE, LIQUID FILLED ORAL at 10:16

## 2021-04-12 RX ADMIN — LEVETIRACETAM 500 MG: 500 TABLET, FILM COATED ORAL at 10:21

## 2021-04-12 RX ADMIN — BUTALBITAL, ACETAMINOPHEN AND CAFFEINE 1 TABLET: 50; 325; 40 TABLET ORAL at 15:17

## 2021-04-12 RX ADMIN — LORAZEPAM 0.5 MG: 0.5 TABLET ORAL at 10:16

## 2021-04-12 RX ADMIN — AZTREONAM 1000 MG: 1 INJECTION, POWDER, LYOPHILIZED, FOR SOLUTION INTRAMUSCULAR; INTRAVENOUS at 23:07

## 2021-04-12 RX ADMIN — BUPROPION HYDROCHLORIDE 100 MG: 100 TABLET, FILM COATED ORAL at 17:36

## 2021-04-12 RX ADMIN — THIAMINE HYDROCHLORIDE 250 MG: 100 INJECTION, SOLUTION INTRAMUSCULAR; INTRAVENOUS at 10:22

## 2021-04-12 RX ADMIN — ACETAMINOPHEN 650 MG: 325 TABLET, FILM COATED ORAL at 10:15

## 2021-04-12 RX ADMIN — HEPARIN SODIUM 5000 UNITS: 5000 INJECTION INTRAVENOUS; SUBCUTANEOUS at 21:48

## 2021-04-12 RX ADMIN — BUPROPION HYDROCHLORIDE 100 MG: 100 TABLET, FILM COATED ORAL at 10:16

## 2021-04-12 NOTE — PROGRESS NOTES
NEPHROLOGY PROGRESS NOTE   Prakash Robb 47 y o  female MRN: [id number]  Unit/Bed#: -Shira Encounter: [id number]      ASSESSMENT/PLAN:  1  RENE, POA:  Due to obstructive uropathy with urinary retention and possible pre renal component as well  · Baseline creatinine 0 6-0 8  · Admitted with a creatinine of 5 1 which improved with Zhong catheter placement and IV fluids  · Creatinine today down to 0 7  · CT:  Distended bladder  · UA:  0-1 rbc's, 0-1 WBCs, small blood, specific gravity 1 005  · Will stop IV fluids since creatinine and electrolytes are acceptable and monitor with oral intake  2  Urinary retention:  Possible neurogenic bladder and Zhong catheter in place  3  Progressive weakness:  Concern for autoimmune process versus nutritional deficiency  Neurology on board  · Status post LP in the ER  · Currently getting plasma exchange x5 treatments  4  Hypokalemia: resolved  Encourage po intake and replace as needed   · Mag 1 7  5  Hypernatremia: resolved with hypotonic fluids  Will d/c IVF today and encourage oral fluid intake  6  Low bicarb: bicarb down to 19 today  Gap now normal but did have high gap a few days ago  Trend off IVF for now   · Lactic acid 0 4  · Repeat UA without ketones     Plan Summary:    Stop 1/2 NS and encourage oral intake    Check am BMP     SUBJECTIVE:  Complains of constant pain in R hip and flank   Reports drinking well but still with poor food intake      OBJECTIVE:  Current Weight: Weight - Scale: 72 9 kg (160 lb 11 5 oz)  Vitals:    04/12/21 0750   BP: 115/70   Pulse: 85   Resp: 18   Temp: 97 6 °F (36 4 °C)   SpO2: 99%       Intake/Output Summary (Last 24 hours) at 4/12/2021 0947  Last data filed at 4/12/2021 0556  Gross per 24 hour   Intake 332 ml   Output 1225 ml   Net -893 ml       General:  No acute distress, Zhong in place   Skin:  No rash  Eyes:  Sclerae anicteric  ENT:  Moist mucous membranes  Neck:  Trachea midline with no JVD  Chest:  Clear to auscultation bilaterally  CVS:  Regular rate and rhythm  Abdomen:  Soft, nontender, nondistended  Extremities:  No edema  Neuro:  Awake and alert  Psych:  Appropriate affect      Medications:  Scheduled Meds:  Current Facility-Administered Medications   Medication Dose Route Frequency Provider Last Rate    acetaminophen  650 mg Oral Q6H PRN Maykel Shanks PA-C      albumin human  150 g Intravenous Every Other Day SMITHA Garcia      buPROPion  100 mg Oral BID Adeel Norcross, DO      butalbital-acetaminophen-caffeine  1 tablet Oral Q6H PRN Huong Wood PA-C      calcium gluconate  1 g Intravenous Every Other Day SMITHA Garcia 1 g (04/11/21 1057)    cyclobenzaprine  10 mg Oral TID PRN Maykel Shanks PA-C      docusate sodium  100 mg Oral BID Maykel Shanks PA-C      ergocalciferol  50,000 Units Oral Once per day on Mon Thu Elizabeth Fly, DO      folic acid  1 mg Oral Daily Maykel Shanks PA-C      gabapentin  100 mg Oral TID PRN Maykel Shanks PA-C      gabapentin  300 mg Oral HS SMITHA Garcia      heparin (porcine)  5,000 Units Subcutaneous [facility] Maykel Shanks PA-C      heparin lock flush  10 mL Intracatheter Every Other Day SMITHA Garcia      levETIRAcetam  500 mg Oral Q12H [address] Heydi Strickland PA-C      levothyroxine  50 mcg Oral Early Morning Elizabeth Fly, DO      LORazepam  0 5 mg Oral Q8H PRN Adeel Norcross, DO      mirtazapine  7 5 mg Oral HS Maykel Shanks PA-C      polyethylene glycol  17 g Oral Daily Maykel Shanks PA-C      prazosin  2 mg Oral HS Elizabeth Fly, DO      sodium chloride  50 mL/hr Intravenous Continuous Sosa Strong MD 50 mL/hr (04/10/21 0850)    thiamine  250 mg Intravenous Daily Marisela Bulla,  mg (04/11/21 0911)    topiramate  100 mg Oral BID Adeel Tyler, DO      venlafaxine  37 5 mg Oral Daily Elizabeth Fly, DO         PRN Meds:   acetaminophen    butalbital-acetaminophen-caffeine    cyclobenzaprine    gabapentin    LORazepam    Continuous Infusions:sodium chloride, 50 mL/hr, Last Rate: 50 mL/hr (04/10/21 0850)        Laboratory Results:  Results from last 7 days   Lab Units 04/12/21  0549 04/11/21  0600 04/10/21  0527 04/09/21  1449 04/09/21  0112 04/08/21  1534 04/07/21  0520 04/07/21  0457  04/06/21  0613 04/05/21  1912  04/05/21  1824   WBC Thousand/uL  --  11 12* 7 82 7 09  --   --   --  7 51  --  6 66  --   --  7 57   HEMOGLOBIN g/dL  --  9 5* 9 0* 8 5*  --   --   --  8 7*  --  10 5*  --   --  10 0*   I STAT HEMOGLOBIN g/dl  --   --   --   --   --   --  8 8*  --   --   --  10 5*  --   --    HEMATOCRIT %  --  30 0* 29 0* 27 0*  --   --   --  27 7*  --  33 2*  --   --  31 5*   HEMATOCRIT, ISTAT %  --   --   --   --   --   --  26*  --   --   --  31*  --   --    PLATELETS Thousands/uL  --  253 242 258  --   --   --  244  --  260  --   --  293   SODIUM mmol/L 142 144 147* 145 143 146*  --  144   < > 148*  --   --  134*   POTASSIUM mmol/L 3 6 3 3* 3 3* 3 5 3 5 3 4*  --  3 8   < > 4 4  --   --  5 6*   CHLORIDE mmol/L 110* 110* 111* 109* 108 109*  --  110*   < > 112*  --   --  101   CO2 mmol/L 19* 23 19* 27 25 24  --  21   < > 19*  --   --  18*   CO2, I-STAT mmol/L  --   --   --   --   --   --  23  --   --   --  20*   < >  --    BUN mg/dL 5 3* 5 7 9 9  --  22   < > 39*  --   --  47*   CREATININE mg/dL 0 73 0 98 0 88 1 21 1 49* 1 43*  --  2 50*   < > 4 53*  --   --  5 15*   CALCIUM mg/dL 8 5 8 7 8 5 8 6 9 2 8 7  --  8 3   < > 9 2  --   --  9 2   MAGNESIUM mg/dL 1 7  --   --   --  1 9  --   --   --   --   --   --   --  3 4*   PHOSPHORUS mg/dL 3 8  --   --   --   --   --   --   --   --   --   --   --   --    GLUCOSE, ISTAT mg/dl  --   --   --   --   --   --  89  --   --   --  80  --   --     < > = values in this interval not displayed

## 2021-04-12 NOTE — PLAN OF CARE
Problem: Potential for Falls  Goal: Patient will remain free of falls  Description: INTERVENTIONS:  - Assess patient frequently for physical needs  -  Identify cognitive and physical deficits and behaviors that affect risk of falls    -  [place] fall precautions as indicated by assessment   - Educate patient/family on patient safety including physical limitations  - Instruct patient to call for assistance with activity based on assessment  - Modify environment to reduce risk of injury  - Consider OT/PT consult to assist with strengthening/mobility  Outcome: Progressing

## 2021-04-12 NOTE — PROGRESS NOTES
Progress Note - Neurology   Phillip Prost 47 y o  female MRN: [id number]  Unit/Bed#: -01 Encounter: [id number]      Assessment/Plan   47year old female with history of restrictive eating disorder (anorexia nervosa), laxative abuse, seizure disorder, pituitary enlargement, history of benzodiazepine abuse, vitamin-D deficiency, depression, history of gastric bypass, migraine headaches, presented to ED on 4/5/21 with progressive bilateral upper and lower extremity weakness and sensory abnormality  On exam patient has distal weakness in B/L UE extremities and more proximal weakness in B/L LE  She has atrophy in thenar and hypothenar in B/L hands  She has diffusely diminished sensation and length-dependent pattern with light touch, PP, and vibratory sense along with loss of proprioception  * Generalized weakness  Assessment & Plan    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, possibly related to nutritional deficiency  · Lumbar puncture performed in the ED thus far with relatively bland results  Protein 49, WBC 2, glucose 57, RBC 31, no xanthochromia, gram stain negative  · Vitamin D level 17 9  · Laboratory studies consistent with hyperchloremic acidosis, likely related to significant doses of topiramate  Topiramate also can cause appetite suppression, therefore topiramate was discontinued and Keppra initiated  · MRI brain with no acute intracranial pathology     Plan:  - plasma exchange ongoing, session 2/5 performed yesterday (monitoring blood counts, fibrinogen, ionized calcium, platelets)  - Labs pending (resulted serum and CSF labs are listed below): · Serum labs: B1, ENS-2 [facility], methylmalonic acid, B6, cryoglobulin  · CSF labs: Flow cytometry  · Will check serum copper level as well (as copper deficiency has been noted in patients with history of gastric bypass)  - Continue IV thiamine repletion   500 mg TID completed, now on 250 mg QD x 5 days (to be completed tomorrow)  - Continue Keppra 500 mg BID  Seizures are described as staring episodes and patient is currently nonambulatory; if patient has breakthrough seizures, can adjust medications accordingly   - Recommend Vitamin D repletion, level 17 9  - Recommend consult to Nutrition  May need to consider alternative method of nutrition due to patient not taking much PO   - Patient will likely require outpatient EMG/NCS for further evaluation of polyneuropathy   - PT/OT  - Monitor neuro exam and notify with changes    Results reviewed:    - CSF labs:  Protein 49, WBC 2, glucose 57, RBC 31, no xanthochromia, Gram stain negative, culture with no growth, cytology negative for malignancy, ACE <1 5, VDRL non-reactive, MS panel negative for oligoclonal bands  - serum labs:  Vitamin D 17 9, B12 511, folate 18 3, TSH 3 774, T4 0 91, RPR nonreactive, CR 3 56, 3 09, 2 5 BUN 33, 26,22, ESR 17, CRP 15 2, C3-1 20, C4 37, topiramate level 19 1, heavy metals screen negative, HIV non-reactive, ACE 25, ANCA negative, Sjogrens <0 2, MAYI negative, anti-DNA double stranded <1, RF negative, SPEP no monoclonal bands, UPEP nonselective proteinuria  - 4/9/21 CTH- No acute intracranial abnormality  - 4/9/21 CT cervical spine without contrast- No cervical spine fracture or traumatic malalignment  - 4/6/21 MRI Brain wo contrast:    · no acute intracranial pathology  · Minimally decreased prominence of the pituitary gland that still has a convex superior border  Size is upper limits of normal  There is midline T1 hyperintense structure in the posterior sella measuring 5 mm  Although this may represent increased conspicuity of the posterior pituitary gland, in retrospect, there is a similar sized T1 hypointense hypoenhancing structure at this location and 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   - 4/6/21 MRI Lumbar spine:   · Mild spondylosis on this motion degraded study     · Indeterminant 4 cm pelvic lesion posteriorly in the cul-de-sac on the right, possibly related to the ovary  Differential considerations would include hemorrhagic cyst versus perhaps endometrioma  Other mass lesions not excluded  Nonemergent pelvic sonography is recommended for further assessment  - 4/5/21 CT abdomen/pelvis:   · Severely distended bladder and mild bilateral obstructive uropathy, suggesting distal outlet obstruction or neurogenic bladder  No evidence of cystitis, bladder mass or hematoma  · Findings compatible with constipation  RENE (acute kidney injury) [facility])  Assessment & Plan  - Nephrology following    - Creatinine improved since admission  Anorexia nervosa, restricting type  Assessment & Plan  - Patient admits she has not been eating much at all, particularly since her admission to [facility]  She has been drinking fluids  She also has documented history of laxative abuse  - Discussed with nutrition team today  Patient continues to take minimal PO    - May need to consider alternative methods of nutrition if patient continues to take minimal PO but would defer to medicine team regarding this  Vitamin D deficiency  Assessment & Plan  - Patient prescribed cholecalciferol 125 mcg (5000UT), 2 tabs daily at home, states she doesn't take it  - current Vitamin D level 17 9  - Management as per medicine team        Pituitary enlargement  Assessment & Plan  MRI brain from October 2019 demonstrated pituitary enlargement without evidence of adenoma  Endocrine evaluation as deemed necessary by primary team   - please see MRI brain result above     Will further discuss plan of care with attending neurologist      Ema Martino will need follow up in in 6 weeks with neuromuscular attending or advance practitioner  She will require a EMG/NCS within 4-5 weeks  Subjective:   Patient resting in bed, doing okay today    Notes significant right hip/groin pain, notes ongoing bilateral hand and foot burning as well, which has been several days in duration  Overall since initiation of plasma exchange several days ago, she notes no significant change to the degree of strength/weakness in her extremities  Has continued to be essentially on bed rest   Notes in ongoing headache, but this is typical and not out of proportion to her usual headache severity  Denies any other acute issue such as speech or vision abnormalities  Quite diminished appetite remains unchanged  Vitals: Blood pressure 115/70, pulse 85, temperature 97 6 °F (36 4 °C), resp  rate 18, weight 72 9 kg (160 lb 11 5 oz), SpO2 99 %  ,Body mass index is 27 59 kg/m²  Physical Exam:   Physical Exam  Constitutional:       Appearance: Normal appearance  HENT:      Head: Normocephalic and atraumatic  Mouth/Throat:      Mouth: Mucous membranes are dry  Eyes:      Extraocular Movements: Extraocular movements intact and EOM normal       Conjunctiva/sclera: Conjunctivae normal       Pupils: Pupils are equal, round, and reactive to light  Neck:      Musculoskeletal: Normal range of motion and neck supple  Cardiovascular:      Rate and Rhythm: Normal rate and regular rhythm  Pulmonary:      Effort: Pulmonary effort is normal       Breath sounds: Normal breath sounds  Musculoskeletal:      Comments: Posterior right hip tenderness to palpation   Neurological:      Mental Status: She is alert  Neurologic Exam     Mental Status     Awake and alert, oriented, flat affect persists, poor eye contact at times  Following commands  Not dysarthric nor aphasic  Cranial Nerves     CN II   Visual fields full to confrontation  CN III, IV, VI   Pupils are equal, round, and reactive to light  Extraocular motions are normal      CN V   Facial sensation intact  CN VII   Facial expression full, symmetric       CN VIII   CN VIII normal      CN IX, X   CN IX normal    CN X normal      CN XI   CN XI normal      CN XII   CN XII normal      Motor Exam   Still approximately 4/5 throughout deltoids and biceps/triceps, more notably weak with  strength as well as intrinsic/interosseous hand and finger muscles (approximately 2/5)    2-3/5 or so with bilateral hip flexion and knee range of motion (slightly stronger with hip flexion than knees today) 4/5 or so strength with dorsi and plantar flexion  Sensory Exam     Length-dependent sensory loss to multiple modalities in the lower extremities, including light touch/tactile stimulus, vibratory loss, proprioception also clearly diminished  Gait, Coordination, and Reflexes   Appears areflexic in the uppers and lowers with DTRs  Deferred plantar response nor ankle clonus given hypersensitivity to the bottoms of her feet  Clumsy and ataxic with finger-to-nose and coordination testing  Observed moderate difficulty attempting to hold her phone when she received phone call mid-exam         Lab, Imaging and other studies:   No new neuroimaging at time of evaluation     CBC:   Results from last 7 days   Lab Units 04/11/21  0600 04/10/21  0527 04/09/21  1449   WBC Thousand/uL 11 12* 7 82 7 09   RBC Million/uL 2 90* 2 81* 2 63*   HEMOGLOBIN g/dL 9 5* 9 0* 8 5*   HEMATOCRIT % 30 0* 29 0* 27 0*   MCV fL 103* 103* 103*   PLATELETS Thousands/uL 253 242 258   , BMP/CMP:   Results from last 7 days   Lab Units 04/12/21  0549 04/11/21  0600 04/10/21  0527 04/09/21  1449  04/07/21  0520 04/07/21  0457  04/05/21  1912   SODIUM mmol/L 142 144 147* 145   < >  --  144   < >  --    POTASSIUM mmol/L 3 6 3 3* 3 3* 3 5   < >  --  3 8   < >  --    CHLORIDE mmol/L 110* 110* 111* 109*   < >  --  110*   < >  --    CO2 mmol/L 19* 23 19* 27   < >  --  21   < >  --    CO2, I-STAT mmol/L  --   --   --   --   --  23  --   --  20*   BUN mg/dL 5 3* 5 7   < >  --  22   < >  --    CREATININE mg/dL 0 73 0 98 0 88 1 21   < >  --  2 50*   < >  --    GLUCOSE, ISTAT mg/dl  --   --   --   --   --  89  --   --  80   CALCIUM mg/dL 8 5 8 7 8 5 8 6   < >  --  8 3   < >  --    AST U/L  --   --  20 25  --   --  20  --   --    ALT U/L  --   --  19 22  --   --  21  --   --    ALK PHOS U/L  --   --  83 145*  --   --  152*  --   --    EGFR ml/min/1 73sq m 105 73 84 57   < >  --  24   < >  --     < > = values in this interval not displayed  , Vitamin B12:   Results from last 7 days   Lab Units 04/09/21  0112 04/06/21  0938   VITAMIN B 12 pg/mL 491 511   , HgBA1C:   , TSH:   Results from last 7 days   Lab Units 04/06/21  0938   TSH 3RD GENERATON uIU/mL 3 774*   , Coagulation:   Results from last 7 days   Lab Units 04/09/21  1449   INR  1 01   , Lipid Profile:   , Ammonia:   , Urinalysis:   Results from last 7 days   Lab Units 04/10/21  1111 04/05/21 2007   COLOR UA  Yellow Yellow   CLARITY UA  Cloudy Clear   SPEC GRAV UA  1 015 <=1 005   PH UA  5 5 6 5   LEUKOCYTES UA  Large* Negative   NITRITE UA  Negative Negative   GLUCOSE UA mg/dl Negative Negative   KETONES UA mg/dl Negative Negative   BILIRUBIN UA  Negative Negative   BLOOD UA  Moderate* Small*   , Drug Screen:   , Medication Drug Levels:   Results from last 7 days   Lab Units 04/06/21  0750   TOPIRAMATE LVL ug/mL 19 1     VTE Prophylaxis: Sequential compression device (Venodyne)  and Heparin    Total time spent today 25 minutes  Discussed plan of care with patient and primary team:  Continued exam monitoring, PLEX ongoing, will follow pending serum and CSF results as they come back, outpatient EMG with neuromuscular team follow-up, therapies encouraged

## 2021-04-12 NOTE — NUTRITION
04/12/21 1435   Assessment   Timepoint Nutrition Review  (Visited at [facility] )   Feeding Route   PO Independent   Adequacy of Intake   Nutrition Modality PO  (Regular, Ensure Clears TID)   Current Meal Intake %; Inadequate   Estimated Calorie Intake 50-75%   Estimated Protein Intake  25-50%   Estimated Fluid Intake %   Estimated calorie intake compared to estimated need Brk today: 1 pancake, 1 Ensure Clears 255 kcal, 8 gm protein  PES Statement   Problem Continue previous diagnosis   Patient Nutrition Goals   Goal Increase kcal/PRO intake;Meet PO needs   Goal Status not met;extended   Timeframe to complete goal by next f/u   Recommendations/Interventions   Summary Pt watching [employer] via IPAD, sseems more alert and oriented  Reports eating brk, but + abd pain  Posted 2 more days of Calorie counts  Noted pt refused NGT for nutrition via TF  Plasmaphoresis continues     Interventions Supplement continue   Nutrition Recommendations Continue diet as ordered   Nutrition Complexity Risk   Nutrition complexity level High risk   Nutrition review: 04/19/21   Follow up date 04/15/21

## 2021-04-12 NOTE — PROGRESS NOTES
[facility]  [id number]    47 y o   female      ASSESSMENT and PLAN    Malnutrition/poor p o  Intake - patient denies specific GI symptoms  Carrie Nix has had issues with what sounds like either peptic ulcer or anastomotic ulcers in the past but is having no postprandial type pains or symptoms presently  Carrie Nix denies any self induce vomiting   Discussed the importance of of hydration and nutrition to help her recover with respect to her kidneys in her weakness   Explained the option of placing a nasogastric tube for enteral tube feeds and she states that she does not want to have that done     · Continue to encourage p o  Intake  · Continue with nutritional shakes  · If inadequate p o  Intake persists can place Dobbhoff tube but will hold for present as the patient does not want to have the tube and will continue to try to eat more  Will sign off  Please call if GI is needed for assistance  Chief Complaint   Patient presents with    Abnormal Lab     EMS staes that patietn's creat and BUN are elevated  Patient complaining of L calf pain       SUBJECTIVE/HPI   patient self reports that she is trying to eat more  Drinking all nutritional shakes  Trying to eat at least half the meal severe  Denies any GI symptoms presently      /70   Pulse 85   Temp 97 6 °F (36 4 °C)   Resp 18   Wt 72 9 kg (160 lb 11 5 oz)   SpO2 99%   BMI 27 59 kg/m²     PHYSICALEXAM  General appearance: alert, appears stated age and cooperative  Head: Normocephalic, without obvious abnormality, atraumatic  Abdomen: soft, non-tender; bowel sounds normal; no masses,  no organomegaly  Neurologic: Grossly normal    Lab Results   Component Value Date    GLUCOSE 89 04/07/2021    CALCIUM 8 5 04/12/2021    K 3 6 04/12/2021    CO2 19 (L) 04/12/2021     (H) 04/12/2021    BUN 5 04/12/2021    CREATININE 0 73 04/12/2021     Lab Results   Component Value Date    WBC 11 12 (H) 04/11/2021    HGB 9 5 (L) 04/11/2021    HCT 30 0 (L) 04/11/2021  (H) 04/11/2021     04/11/2021     Lab Results   Component Value Date    ALT 19 04/10/2021    AST 20 04/10/2021    ALKPHOS 83 04/10/2021     No results found for: AMYLASE  No results found for: LIPASE  Lab Results   Component Value Date    IRON 59 04/06/2021    TIBC 259 04/06/2021    FERRITIN 183 04/06/2021     Lab Results   Component Value Date    INR 1 01 04/09/2021       Counseling / Coordination of Care  Total floor / unit time spent today 15 minutes

## 2021-04-13 PROBLEM — R33.9 URINARY RETENTION: Status: ACTIVE | Noted: 2021-04-13

## 2021-04-13 LAB
ANION GAP SERPL CALCULATED.3IONS-SCNC: 11 MMOL/L (ref 4–13)
B BURGDOR IGG CSF IA-ACNC: <0.08 INDEX (ref 0–0.09)
B BURGDOR IGM CSF IA-ACNC: <0.06 INDEX (ref 0–0.06)
BASOPHILS # BLD AUTO: 0.04 THOUSANDS/ΜL (ref 0–0.1)
BASOPHILS NFR BLD AUTO: 0 % (ref 0–1)
BUN SERPL-MCNC: 3 MG/DL (ref 5–25)
CA-I BLD-SCNC: 1.27 MMOL/L (ref 1.12–1.32)
CALCIUM SERPL-MCNC: 8.8 MG/DL (ref 8.3–10.1)
CHLORIDE SERPL-SCNC: 109 MMOL/L (ref 100–108)
CO2 SERPL-SCNC: 24 MMOL/L (ref 21–32)
CREAT SERPL-MCNC: 0.8 MG/DL (ref 0.6–1.3)
CRYOGLOB SER QL 1D COLD INC: NORMAL
EOSINOPHIL # BLD AUTO: 0.38 THOUSAND/ΜL (ref 0–0.61)
EOSINOPHIL NFR BLD AUTO: 4 % (ref 0–6)
ERYTHROCYTE [DISTWIDTH] IN BLOOD BY AUTOMATED COUNT: 18.6 % (ref 11.6–15.1)
FIBRINOGEN PPP-MCNC: 187 MG/DL (ref 227–495)
GFR SERPL CREATININE-BSD FRML MDRD: 94 ML/MIN/1.73SQ M
GLUCOSE SERPL-MCNC: 110 MG/DL (ref 65–140)
HCT VFR BLD AUTO: 30.9 % (ref 34.8–46.1)
HGB BLD-MCNC: 9.7 G/DL (ref 11.5–15.4)
IMM GRANULOCYTES # BLD AUTO: 0.07 THOUSAND/UL (ref 0–0.2)
IMM GRANULOCYTES NFR BLD AUTO: 1 % (ref 0–2)
LYMPHOCYTES # BLD AUTO: 1.78 THOUSANDS/ΜL (ref 0.6–4.47)
LYMPHOCYTES NFR BLD AUTO: 18 % (ref 14–44)
MCH RBC QN AUTO: 33 PG (ref 26.8–34.3)
MCHC RBC AUTO-ENTMCNC: 31.4 G/DL (ref 31.4–37.4)
MCV RBC AUTO: 105 FL (ref 82–98)
MONOCYTES # BLD AUTO: 0.48 THOUSAND/ΜL (ref 0.17–1.22)
MONOCYTES NFR BLD AUTO: 5 % (ref 4–12)
NEUTROPHILS # BLD AUTO: 7.32 THOUSANDS/ΜL (ref 1.85–7.62)
NEUTS SEG NFR BLD AUTO: 72 % (ref 43–75)
NRBC BLD AUTO-RTO: 0 /100 WBCS
PLATELET # BLD AUTO: 212 THOUSANDS/UL (ref 149–390)
PMV BLD AUTO: 11.4 FL (ref 8.9–12.7)
POTASSIUM SERPL-SCNC: 3.6 MMOL/L (ref 3.5–5.3)
RBC # BLD AUTO: 2.94 MILLION/UL (ref 3.81–5.12)
SCAN RESULT: NORMAL
SODIUM SERPL-SCNC: 144 MMOL/L (ref 136–145)
WBC # BLD AUTO: 10.07 THOUSAND/UL (ref 4.31–10.16)

## 2021-04-13 PROCEDURE — 82525 ASSAY OF COPPER: CPT | Performed by: PHYSICIAN ASSISTANT

## 2021-04-13 PROCEDURE — 99232 SBSQ HOSP IP/OBS MODERATE 35: CPT | Performed by: NURSE PRACTITIONER

## 2021-04-13 PROCEDURE — 99232 SBSQ HOSP IP/OBS MODERATE 35: CPT | Performed by: INTERNAL MEDICINE

## 2021-04-13 PROCEDURE — 85384 FIBRINOGEN ACTIVITY: CPT | Performed by: NURSE PRACTITIONER

## 2021-04-13 PROCEDURE — 85025 COMPLETE CBC W/AUTO DIFF WBC: CPT | Performed by: INTERNAL MEDICINE

## 2021-04-13 PROCEDURE — 82330 ASSAY OF CALCIUM: CPT | Performed by: NURSE PRACTITIONER

## 2021-04-13 PROCEDURE — 80048 BASIC METABOLIC PNL TOTAL CA: CPT | Performed by: PHYSICIAN ASSISTANT

## 2021-04-13 RX ORDER — MIRTAZAPINE 15 MG/1
15 TABLET, FILM COATED ORAL
Status: DISCONTINUED | OUTPATIENT
Start: 2021-04-13 | End: 2021-04-26 | Stop reason: HOSPADM

## 2021-04-13 RX ORDER — LORAZEPAM 0.5 MG/1
0.5 TABLET ORAL EVERY 8 HOURS PRN
Status: DISCONTINUED | OUTPATIENT
Start: 2021-04-13 | End: 2021-04-26 | Stop reason: HOSPADM

## 2021-04-13 RX ORDER — HEPARIN SODIUM (PORCINE) LOCK FLUSH IV SOLN 100 UNIT/ML 100 UNIT/ML
300 SOLUTION INTRAVENOUS ONCE
Status: DISCONTINUED | OUTPATIENT
Start: 2021-04-13 | End: 2021-04-13

## 2021-04-13 RX ORDER — ZOLPIDEM TARTRATE 5 MG/1
5 TABLET ORAL
Status: DISCONTINUED | OUTPATIENT
Start: 2021-04-13 | End: 2021-04-19

## 2021-04-13 RX ADMIN — MIRTAZAPINE 7.5 MG: 15 TABLET, FILM COATED ORAL at 21:31

## 2021-04-13 RX ADMIN — GABAPENTIN 300 MG: 300 CAPSULE ORAL at 21:32

## 2021-04-13 RX ADMIN — HEPARIN SODIUM 5000 UNITS: 5000 INJECTION INTRAVENOUS; SUBCUTANEOUS at 21:31

## 2021-04-13 RX ADMIN — THIAMINE HYDROCHLORIDE 250 MG: 100 INJECTION, SOLUTION INTRAMUSCULAR; INTRAVENOUS at 15:00

## 2021-04-13 RX ADMIN — CALCIUM GLUCONATE 1 G: 20 INJECTION, SOLUTION INTRAVENOUS at 15:59

## 2021-04-13 RX ADMIN — CYCLOBENZAPRINE HYDROCHLORIDE 10 MG: 10 TABLET, FILM COATED ORAL at 06:31

## 2021-04-13 RX ADMIN — DOCUSATE SODIUM 100 MG: 100 CAPSULE, LIQUID FILLED ORAL at 10:24

## 2021-04-13 RX ADMIN — HEPARIN SODIUM 5000 UNITS: 5000 INJECTION INTRAVENOUS; SUBCUTANEOUS at 14:14

## 2021-04-13 RX ADMIN — POLYETHYLENE GLYCOL 3350 17 G: 17 POWDER, FOR SOLUTION ORAL at 10:23

## 2021-04-13 RX ADMIN — AZTREONAM 1000 MG: 1 INJECTION, POWDER, LYOPHILIZED, FOR SOLUTION INTRAMUSCULAR; INTRAVENOUS at 17:28

## 2021-04-13 RX ADMIN — PRAZOSIN HYDROCHLORIDE 2 MG: 1 CAPSULE ORAL at 21:31

## 2021-04-13 RX ADMIN — VENLAFAXINE HYDROCHLORIDE 37.5 MG: 37.5 CAPSULE, EXTENDED RELEASE ORAL at 10:24

## 2021-04-13 RX ADMIN — TOPIRAMATE 100 MG: 100 TABLET, FILM COATED ORAL at 10:24

## 2021-04-13 RX ADMIN — AZTREONAM 1000 MG: 1 INJECTION, POWDER, LYOPHILIZED, FOR SOLUTION INTRAMUSCULAR; INTRAVENOUS at 21:31

## 2021-04-13 RX ADMIN — ACETAMINOPHEN 650 MG: 325 TABLET, FILM COATED ORAL at 10:32

## 2021-04-13 RX ADMIN — FOLIC ACID 1 MG: 1 TABLET ORAL at 10:24

## 2021-04-13 RX ADMIN — AZTREONAM 1000 MG: 1 INJECTION, POWDER, LYOPHILIZED, FOR SOLUTION INTRAMUSCULAR; INTRAVENOUS at 06:35

## 2021-04-13 RX ADMIN — LORAZEPAM 0.5 MG: 0.5 TABLET ORAL at 22:59

## 2021-04-13 RX ADMIN — CYCLOBENZAPRINE HYDROCHLORIDE 10 MG: 10 TABLET, FILM COATED ORAL at 18:19

## 2021-04-13 RX ADMIN — LEVOTHYROXINE SODIUM 50 MCG: 25 TABLET ORAL at 06:16

## 2021-04-13 RX ADMIN — BUTALBITAL, ACETAMINOPHEN AND CAFFEINE 1 TABLET: 50; 325; 40 TABLET ORAL at 20:19

## 2021-04-13 RX ADMIN — HEPARIN SODIUM 5000 UNITS: 5000 INJECTION INTRAVENOUS; SUBCUTANEOUS at 06:16

## 2021-04-13 RX ADMIN — LEVETIRACETAM 500 MG: 500 TABLET, FILM COATED ORAL at 10:24

## 2021-04-13 RX ADMIN — ALBUMIN (HUMAN) 150 G: 12.5 INJECTION, SOLUTION INTRAVENOUS at 15:49

## 2021-04-13 RX ADMIN — BUPROPION HYDROCHLORIDE 100 MG: 100 TABLET, FILM COATED ORAL at 10:24

## 2021-04-13 RX ADMIN — ACETAMINOPHEN 650 MG: 325 TABLET, FILM COATED ORAL at 18:11

## 2021-04-13 RX ADMIN — LEVETIRACETAM 500 MG: 500 TABLET, FILM COATED ORAL at 21:32

## 2021-04-13 RX ADMIN — BUTALBITAL, ACETAMINOPHEN AND CAFFEINE 1 TABLET: 50; 325; 40 TABLET ORAL at 14:14

## 2021-04-13 RX ADMIN — CALCIUM GLUCONATE 1 G: 20 INJECTION, SOLUTION INTRAVENOUS at 14:14

## 2021-04-13 RX ADMIN — ZOLPIDEM TARTRATE 5 MG: 5 TABLET, COATED ORAL at 21:31

## 2021-04-13 RX ADMIN — LORAZEPAM 0.5 MG: 0.5 TABLET ORAL at 14:26

## 2021-04-13 NOTE — ASSESSMENT & PLAN NOTE
Likely due to nutritional deficiencies, polyneuropathy   LP bland  Neurology input appreciated c/w Plasmapharesis #3/5 on 04/13  Will f/u pending labs  Continue thiamine repletion, Keppra 500 mg bid     outpatient EMG/NCS for further evaluation of polyneuropathy on d/c   PT/OT

## 2021-04-13 NOTE — PROGRESS NOTES
[place]  Progress Note - Estela Sorto 1980, 47 y o  female MRN: [id number]  Unit/Bed#: -Shira Encounter: [id number]  Primary Care Provider: Lenny Cook DO   Date and time admitted to hospital: 4/5/2021  5:54 PM    * Generalized weakness  Assessment & Plan  Likely due to nutritional deficiencies, polyneuropathy   LP bland  Neurology input appreciated c/w Plasmapharesis #3/5 on 04/13  Will f/u pending labs  Continue thiamine repletion, Keppra 500 mg bid  outpatient EMG/NCS for further evaluation of polyneuropathy on d/c   PT/OT      Severe protein-calorie malnutrition ([facility] )  Assessment & Plan  Malnutrition Findings:   Adult Malnutrition type: Chronic illness  Adult Degree of Malnutrition: Other severe protein calorie malnutrition    BMI Findings: Body mass index is 27 32 kg/m²  Nutrition team is following, patient refusing to eat but drinks  Continue with nutrition supplementation   Patient not interested in a feeding tube  RENE (acute kidney injury) ([facility] )  Assessment & Plan  · RENE POA a/e/b creatinine of 5 15 on admission, due to obstructive uropathy with possible prerenal component, treated with IVF, Zhong catheter and nephrology consult and resolved  · Creatinine is within baseline today    Acute cystitis without hematuria  Assessment & Plan  With urinary retention, abnormal urinalysis, urine cx with Ecoli  Treat with Aztreonam IV given penicillin allergy #2  F/u suseptibilities    Urinary retention  Assessment & Plan  ? Neurogenic bladder, UTI  S/p Zhong placement with clear urine   Appreciate urology recommendations, trial of void tomorrow, if > 250 cc residual, will replace Zhong      Soft tissue lesion of pelvic region  Assessment & Plan  Incidental finding on MRI of the lumbar spine with right cul-de-sac lesion  US Pelvis:uterus is anteverted in position, measuring 6 3 x 2 8 x 4 6 cm   Normal cervix, endometrium, normal left ovary, R ovary with uniform low-level internal echoes is identified, No suspicious adnexal mass or loculated collections              Left ovary:  2 0 x 1 3 x 1 0 cm      follow-up US in 8-12 weeks is recommended  Needs Gyn f/u outpatient     Anemia  Assessment & Plan  · Hemoglobin 10 0 on admission  · Stable at 9 7  · No signs of active bleeding  · Continue to monitor on CBC      Depression  Assessment & Plan  Home medication Bupropion 100 mg bid, Rameron 7 5 mg HS, Effexor 37 5 mg daily, Topamax and Ambien 10 mg HS  Hold Bupropion and Topamax given propensity to worsen anorexia  Increase Rameron dose to 15 mg HS  Will continue to encourage PO intake       Anorexia nervosa, restricting type  Assessment & Plan  · Patient with history of anorexia  · Nutrition being addressed as above  · Medication changes as above     Vitamin D deficiency  Assessment & Plan  Continue 04713 units twice weekly for now    History of gastric bypass  Assessment & Plan  · Bypass surgery performed in 2007 and 2010 with nutritional deficiency         Hypothyroidism  Assessment & Plan  increased to 50 mcg daily, continue    Pituitary enlargement  Assessment & Plan  Noted on MRI reji: Size is upper limits of normal  There is midline T1 hyperintense structure in the posterior sella measuring 5 mm        Decreased ambulation status-resolved as of 4/10/2021  Assessment & Plan  PT/Ot to see patient-will need to be in continued rehab after discharge        Hyponatremia-resolved as of 4/10/2021  Assessment & Plan  · Patient with sodium of 134 on admission now 148- will change to d5w  · In the ED given 1 L bolus normal saline and 1 L bolus isolyte  · Patient reports poor p o  intake   · Continue to monitor    Hyperkalemia-resolved as of 4/11/2021  Assessment & Plan  · Potassium 5 6 on admission  Patient received 2 L bolus of fluids i would IV fluids-now  Low this am 3 3  · Fluids adjusted by Nephrology team  · Continue to monitor on BMP       Hypoglycemia-resolved as of 2021  Assessment & Plan  · Glucose 88 on admission   · Patient with history of hypoglycemia due to gastric bypass surgery and anorexia  · Patient prescribed acarbose 25 mg daily  Patient reports she is not taking   · Stable glucose readings      VTE Pharmacologic Prophylaxis:   Pharmacologic: Heparin  Mechanical VTE Prophylaxis in Place: Yes    Patient Centered Rounds: I have performed bedside rounds with nursing staff today  Discussions with Specialists or Other Care Team Provider: Neurology    Education and Discussions with Family / Patient: patient    Time Spent for Care: 30 minutes  More than 50% of total time spent on counseling and coordination of care as described above  Current Length of Stay: 8 day(s)    Current Patient Status: Inpatient   Certification Statement: The patient will continue to require additional inpatient hospital stay due to   Discharge Plan:     Code Status: Level 1 - Full Code      Subjective:   Seen this morning  Sleepy  Not answering questions stating she just wanted to sleep  Denies complaints    Objective:     Vitals:   Temp (24hrs), Av °F (36 7 °C), Min:97 5 °F (36 4 °C), Max:98 6 °F (37 °C)    Temp:  [97 5 °F (36 4 °C)-98 6 °F (37 °C)] 97 5 °F (36 4 °C)  HR:  [84-87] 84  Resp:  [18-19] 18  BP: (116-133)/(73-81) 116/73  SpO2:  [97 %-99 %] 97 %  Body mass index is 27 32 kg/m²  Input and Output Summary (last 24 hours): Intake/Output Summary (Last 24 hours) at 2021 1208  Last data filed at 2021 1047  Gross per 24 hour   Intake 1010 ml   Output 1600 ml   Net -590 ml       Physical Exam:     Physical Exam  Vitals signs reviewed  Constitutional:       General: She is not in acute distress  Appearance: Normal appearance  She is not ill-appearing, toxic-appearing or diaphoretic  HENT:      Head: Normocephalic  Eyes:      Extraocular Movements: Extraocular movements intact  Pupils: Pupils are equal, round, and reactive to light  Neck:      Musculoskeletal: Normal range of motion and neck supple  No neck rigidity or muscular tenderness  Vascular: No carotid bruit  Cardiovascular:      Rate and Rhythm: Normal rate and regular rhythm  Pulses: Normal pulses  Heart sounds: Normal heart sounds  No murmur  No friction rub  Pulmonary:      Effort: Pulmonary effort is normal       Breath sounds: Normal breath sounds  No stridor  No wheezing, rhonchi or rales  Chest:      Chest wall: No tenderness  Abdominal:      General: Abdomen is flat  Bowel sounds are normal  There is no distension  Palpations: Abdomen is soft  Tenderness: There is no abdominal tenderness  There is no guarding or rebound  Musculoskeletal: Normal range of motion  General: No swelling or tenderness  Right lower leg: No edema  Left lower leg: No edema  Skin:     General: Skin is warm and dry  Coloration: Skin is not jaundiced  Neurological:      General: No focal deficit present  Mental Status: She is alert and oriented to person, place, and time  Cranial Nerves: No cranial nerve deficit  Sensory: Sensory deficit present  Motor: Weakness present        Gait: Gait abnormal            Additional Data:     Labs:    Results from last 7 days   Lab Units 04/13/21  0625   WBC Thousand/uL 10 07   HEMOGLOBIN g/dL 9 7*   HEMATOCRIT % 30 9*   PLATELETS Thousands/uL 212   NEUTROS PCT % 72   LYMPHS PCT % 18   MONOS PCT % 5   EOS PCT % 4     Results from last 7 days   Lab Units 04/13/21  0625  04/10/21  0527   SODIUM mmol/L 144   < > 147*   POTASSIUM mmol/L 3 6   < > 3 3*   CHLORIDE mmol/L 109*   < > 111*   CO2 mmol/L 24   < > 19*   BUN mg/dL 3*   < > 5   CREATININE mg/dL 0 80   < > 0 88   ANION GAP mmol/L 11   < > 17*   CALCIUM mg/dL 8 8   < > 8 5   ALBUMIN g/dL  --   --  3 7   TOTAL BILIRUBIN mg/dL  --   --  0 50   ALK PHOS U/L  --   --  83   ALT U/L  --   --  19   AST U/L  --   --  20   GLUCOSE RANDOM mg/dL 110 < > 79    < > = values in this interval not displayed  Results from last 7 days   Lab Units 04/09/21  1449   INR  1 01     Results from last 7 days   Lab Units 04/09/21  0103   POC GLUCOSE mg/dl 95         Results from last 7 days   Lab Units 04/10/21  1126   LACTIC ACID mmol/L 0 4*           * I Have Reviewed All Lab Data Listed Above  * Additional Pertinent Lab Tests Reviewed:  All Labs Within Last 24 Hours Reviewed    Imaging:    Imaging Reports Reviewed Today Include:   Imaging Personally Reviewed by Myself Includes:      Recent Cultures (last 7 days):     Results from last 7 days   Lab Units 04/10/21  1111   URINE CULTURE  60,000-69,000 cfu/ml Escherichia coli*  50,000-59,000 cfu/ml        Last 24 Hours Medication List:   Current Facility-Administered Medications   Medication Dose Route Frequency Provider Last Rate    acetaminophen  650 mg Oral Q6H PRN Rob Land PA-C      albumin human  150 g Intravenous Every Other Day SMITHA Billingsley      aztreonam  1,000 mg Intravenous Q8H Taran Pérez MD 1,000 mg (04/13/21 8998)    butalbital-acetaminophen-caffeine  1 tablet Oral Q6H PRN Andrea Mcneill PA-C      calcium gluconate  1 g Intravenous Every Other Day SMITHA Billingsley 1 g (04/11/21 7347)    cyclobenzaprine  10 mg Oral TID PRN Rob Land PA-C      docusate sodium  100 mg Oral BID Rob Land PA-C      ergocalciferol  50,000 Units Oral Once per day on Mon Thu Elizabeth Fly, DO      folic acid  1 mg Oral Daily Rob Land PA-C      gabapentin  100 mg Oral TID PRN Rob Land PA-C      gabapentin  300 mg Oral HS SMITHA Billingsley      heparin (porcine)  5,000 Units Subcutaneous Q8H [address] Rob Land PA-C      heparin lock flush  10 mL Intracatheter Every Other Day SMITHA Billingsley      levETIRAcetam  500 mg Oral Q12H [address] Heydi Garrett PA-C      levothyroxine  50 mcg Oral Early Morning Elizabeth Fly, DO      mirtazapine  15 mg Oral HS Adelso TAFOYA Ghanshyam Foster MD      polyethylene glycol  17 g Oral Daily Morgan Gilbert PA-C      prazosin  2 mg Oral HS Elizabeth Fly, DO      thiamine  250 mg Intravenous Daily Loretha Susanna,  mg (04/12/21 1022)    venlafaxine  37 5 mg Oral Daily Elizabeth Fly, DO      zolpidem  10 mg Oral HS Amita Peterson MD          Today, Patient Was Seen By: Amita Peterson MD    ** Please Note: Dictation voice to text software may have been used in the creation of this document   **

## 2021-04-13 NOTE — ASSESSMENT & PLAN NOTE
· RENE POA a/e/b creatinine of 5 15 on admission, due to obstructive uropathy with possible prerenal component, treated with IVF, Zhong catheter and nephrology consult and resolved  · Creatinine is 0 7 today

## 2021-04-13 NOTE — ASSESSMENT & PLAN NOTE
With urinary retention, abnormal urinalysis, urine cx with Ecoli  Treat with Aztreonam IV given penicillin allergy  F/u suseptibilities

## 2021-04-13 NOTE — PROGRESS NOTES
[place]  Progress Note - Malena Julien 1980, 47 y o  female MRN: [id number]  Unit/Bed#: -Shira Encounter: [id number]  Primary Care Provider: Rosy Ewing DO   Date and time admitted to hospital: 4/5/2021  5:54 PM    * Generalized weakness  Assessment & Plan  Likely due to nutritional deficiencies   LP bland  Neurology input appreciated c/w Plasmapharesis #2/5 on 04/11  Will f/u pending labs  Continue thiamine repletion, Keppra 500 mg bid  outpatient EMG/NCS for further evaluation of polyneuropathy on d/c   PT/OT      RENE (acute kidney injury) ([facility] )  Assessment & Plan  · RENE POA a/e/b creatinine of 5 15 on admission, due to obstructive uropathy with possible prerenal component, treated with IVF, Zhong catheter and nephrology consult and resolved  · Creatinine is 0 7 today    Acute cystitis without hematuria  Assessment & Plan  With urinary retention, abnormal urinalysis, urine cx with Ecoli  Treat with Aztreonam IV given penicillin allergy  F/u suseptibilities    Severe protein-calorie malnutrition ([facility] )  Assessment & Plan  Malnutrition Findings:   Adult Malnutrition type: Chronic illness  Adult Degree of Malnutrition: Other severe protein calorie malnutrition    BMI Findings: Body mass index is 27 59 kg/m²  Nutrition team is following  Continue with nutrition supplementation   Patient not interested in a feeding tube      Soft tissue lesion of pelvic region  Assessment & Plan   Incidental finding on MRI of the lumbar spine with right cul-de-sac lesion-  will consult gyn and order pelvic ultrasound    Anemia  Assessment & Plan  · Hemoglobin 10 0 on admission  · Stable at 9 5  · No signs of active bleeding  · Continue to monitor on CBC      Depression  Assessment & Plan  Continue Bupropion 100 mg bid, Rameron 7 5 mg HS, Effexor 37 5 mg daily and Ambien 10 mg HS    Anorexia nervosa, restricting type  Assessment & Plan  · Patient with history of anorexia  · Nutrition being addressed as above    Vitamin D deficiency  Assessment & Plan  Continue 82170 units twice weekly for now    History of gastric bypass  Assessment & Plan  · Bypass surgery performed in 2007 and 2010 with nutritional deficiency         Hypothyroidism  Assessment & Plan  increased to 50 mcg daily, continue    Pituitary enlargement  Assessment & Plan  Noted on MRI reji: Size is upper limits of normal  There is midline T1 hyperintense structure in the posterior sella measuring 5 mm  Decreased ambulation status-resolved as of 4/10/2021  Assessment & Plan  PT/Ot to see patient-will need to be in continued rehab after discharge        Hyponatremia-resolved as of 4/10/2021  Assessment & Plan  · Patient with sodium of 134 on admission now 148- will change to d5w  · In the ED given 1 L bolus normal saline and 1 L bolus isolyte  · Patient reports poor p o  intake   · Continue to monitor    Hypoglycemia-resolved as of 4/12/2021  Assessment & Plan  · Glucose 88 on admission   · Patient with history of hypoglycemia due to gastric bypass surgery and anorexia  · Patient prescribed acarbose 25 mg daily  Patient reports she is not taking   · Stable glucose readings        VTE Pharmacologic Prophylaxis:   Pharmacologic: Heparin  Mechanical VTE Prophylaxis in Place: Yes    Patient Centered Rounds: I have performed bedside rounds with nursing staff today  Discussions with Specialists or Other Care Team Provider:     Education and Discussions with Family / Patient: patient     Time Spent for Care: 30 minutes  More than 50% of total time spent on counseling and coordination of care as described above  Current Length of Stay: 7 day(s)    Current Patient Status: Inpatient   Certification Statement: The patient will continue to require additional inpatient hospital stay due to as above    Discharge Plan:     Code Status: Level 1 - Full Code      Subjective:   Seen this morning   Denied complaints, no GO complaints including nausea/vomiting, dysphagia ir abdominal pain  Discussed UA, states she has been treated for a UTI 2ce this year  Did not complete treatment for UTI as per patient  Denies LUT symptoms including dysuria, frequency, urgency but does have retention  Objective:     Vitals:   Temp (24hrs), Av 2 °F (36 8 °C), Min:97 6 °F (36 4 °C), Max:98 6 °F (37 °C)    Temp:  [97 6 °F (36 4 °C)-98 6 °F (37 °C)] 98 6 °F (37 °C)  HR:  [82-90] 87  Resp:  [18-19] 18  BP: (115-133)/(70-84) 132/81  SpO2:  [97 %-99 %] 97 %  Body mass index is 27 59 kg/m²  Input and Output Summary (last 24 hours): Intake/Output Summary (Last 24 hours) at 2021  Last data filed at 2021 2150  Gross per 24 hour   Intake 1020 ml   Output 1225 ml   Net -205 ml       Physical Exam:     Physical Exam  Vitals signs reviewed  Constitutional:       General: She is not in acute distress  Appearance: Normal appearance  She is not ill-appearing, toxic-appearing or diaphoretic  HENT:      Head: Normocephalic  Eyes:      Extraocular Movements: Extraocular movements intact  Pupils: Pupils are equal, round, and reactive to light  Neck:      Musculoskeletal: Normal range of motion and neck supple  No neck rigidity or muscular tenderness  Vascular: No carotid bruit  Cardiovascular:      Rate and Rhythm: Normal rate and regular rhythm  Pulses: Normal pulses  Heart sounds: Normal heart sounds  No murmur  No friction rub  Pulmonary:      Effort: Pulmonary effort is normal       Breath sounds: Normal breath sounds  No stridor  No wheezing, rhonchi or rales  Chest:      Chest wall: No tenderness  Abdominal:      General: Abdomen is flat  Bowel sounds are normal  There is no distension  Palpations: Abdomen is soft  Tenderness: There is no abdominal tenderness  There is no guarding or rebound     Genitourinary:     Comments: Zhong noted draining clear urine   Musculoskeletal: Normal range of motion  General: No swelling or tenderness  Right lower leg: No edema  Left lower leg: No edema  Skin:     General: Skin is warm and dry  Coloration: Skin is not jaundiced  Neurological:      General: No focal deficit present  Mental Status: She is alert and oriented to person, place, and time  Cranial Nerves: No cranial nerve deficit  Sensory: Sensory deficit present  Motor: Weakness present  (    Additional Data:     Labs:    Results from last 7 days   Lab Units 04/11/21  0600  04/09/21  1449   WBC Thousand/uL 11 12*   < > 7 09   HEMOGLOBIN g/dL 9 5*   < > 8 5*   HEMATOCRIT % 30 0*   < > 27 0*   PLATELETS Thousands/uL 253   < > 258   NEUTROS PCT %  --   --  70   LYMPHS PCT %  --   --  19   MONOS PCT %  --   --  7   EOS PCT %  --   --  3    < > = values in this interval not displayed  Results from last 7 days   Lab Units 04/12/21  0549  04/10/21  0527   SODIUM mmol/L 142   < > 147*   POTASSIUM mmol/L 3 6   < > 3 3*   CHLORIDE mmol/L 110*   < > 111*   CO2 mmol/L 19*   < > 19*   BUN mg/dL 5   < > 5   CREATININE mg/dL 0 73   < > 0 88   ANION GAP mmol/L 13   < > 17*   CALCIUM mg/dL 8 5   < > 8 5   ALBUMIN g/dL  --   --  3 7   TOTAL BILIRUBIN mg/dL  --   --  0 50   ALK PHOS U/L  --   --  83   ALT U/L  --   --  19   AST U/L  --   --  20   GLUCOSE RANDOM mg/dL 99   < > 79    < > = values in this interval not displayed  Results from last 7 days   Lab Units 04/09/21  1449   INR  1 01     Results from last 7 days   Lab Units 04/09/21  0103 04/06/21  0344   POC GLUCOSE mg/dl 95 104         Results from last 7 days   Lab Units 04/10/21  1126   LACTIC ACID mmol/L 0 4*           * I Have Reviewed All Lab Data Listed Above  * Additional Pertinent Lab Tests Reviewed:  All Labs Within Last 24 Hours Reviewed    Imaging:    Imaging Reports Reviewed Today Include:   Imaging Personally Reviewed by Myself Includes:      Recent Cultures (last 7 days):     Results from last 7 days   Lab Units 04/10/21  1111   URINE CULTURE  60,000-69,000 cfu/ml Escherichia coli*  50,000-59,000 cfu/ml        Last 24 Hours Medication List:   Current Facility-Administered Medications   Medication Dose Route Frequency Provider Last Rate    acetaminophen  650 mg Oral Q6H PRN Chastity Sandoval PA-C      albumin human  150 g Intravenous Every Other Day SMITHA Luis      aztreonam  1,000 mg Intravenous Q8H Joaquín Almendarez MD      buPROPion  100 mg Oral BID Cheikh Sharif DO      butalbital-acetaminophen-caffeine  1 tablet Oral Q6H PRN Jamar Khan PA-C      calcium gluconate  1 g Intravenous Every Other Day SMITHA Luis 1 g (04/11/21 1057)    cyclobenzaprine  10 mg Oral TID PRN Chastity Sandoval PA-C      docusate sodium  100 mg Oral BID Chastity Sandoval PA-C      ergocalciferol  50,000 Units Oral Once per day on Mon Thu Elizabeth Fly,       folic acid  1 mg Oral Daily Chastity Sandoval PA-C      gabapentin  100 mg Oral TID PRN Chastity Sandoval PA-C      gabapentin  300 mg Oral HS SMITHA Luis      heparin (porcine)  5,000 Units Subcutaneous [facility] Chastity Sandoval PA-C      heparin lock flush  10 mL Intracatheter Every Other Day SMITHA Luis      levETIRAcetam  500 mg Oral Q12H [address] Heydi Graham PA-C      levothyroxine  50 mcg Oral Early Morning Elizabeth Fly, DO      mirtazapine  7 5 mg Oral HS Chastity Sandoval PA-C      polyethylene glycol  17 g Oral Daily Chastity Sandoval PA-C      prazosin  2 mg Oral HS Elizabeth Fly, DO      thiamine  250 mg Intravenous Daily Skyler Goodness,  mg (04/12/21 1022)    topiramate  100 mg Oral BID Elizabeth Fly, DO      venlafaxine  37 5 mg Oral Daily Elizabeth Fly, DO      zolpidem  10 mg Oral HS Joaquín Almendarez MD          Today, Patient Was Seen By: Joaquín Almendarez MD    ** Please Note: Dictation voice to text software may have been used in the creation of this document   **

## 2021-04-13 NOTE — ASSESSMENT & PLAN NOTE
· RENE POA a/e/b creatinine of 5 15 on admission, due to obstructive uropathy with possible prerenal component, treated with IVF, Zhong catheter and nephrology consult and resolved  · Creatinine is within baseline today

## 2021-04-13 NOTE — ASSESSMENT & PLAN NOTE
Incidental finding on MRI of the lumbar spine with right cul-de-sac lesion  US Pelvis:uterus is anteverted in position, measuring 6 3 x 2 8 x 4 6 cm  Normal cervix, endometrium, normal left ovary, R ovary with uniform low-level internal echoes is identified, No suspicious adnexal mass or loculated collections              Left ovary:  2 0 x 1 3 x 1 0 cm      follow-up US in 8-12 weeks is recommended    Needs Gyn f/u outpatient

## 2021-04-13 NOTE — NURSING NOTE
Patient noted AOX4 during this shift  Patient observed using bed controls, personal phone, and personal laptop without showing any weakness to her upper extremities  Patient also observed removing her gown independently and repositioning from supine to prone position when patient was unaware of the staff's presence  Psychiatry consulted would be beneficial  Patient also hyperfocused on getting Ativan and Ambien together despite being told by day team about risks associated with oversedation  Same information reiterated during this shift  Call bell and bedside table within reach  Notified Shayna Mays PA-C of the same  Will continue to monitor  No new orders obtained   SD 4/13/2021 0056

## 2021-04-13 NOTE — ASSESSMENT & PLAN NOTE
· Hemoglobin 10 0 on admission  · Stable at 9 7  · No signs of active bleeding  · Continue to monitor on CBC
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
## 2021-04-13 NOTE — ASSESSMENT & PLAN NOTE
· Patient with history of anorexia  · Nutrition being addressed as above  · Medication changes as above

## 2021-04-13 NOTE — ASSESSMENT & PLAN NOTE
With urinary retention, abnormal urinalysis, urine cx with Ecoli  Treat with Aztreonam IV given penicillin allergy #2  F/u suseptibilities

## 2021-04-13 NOTE — PROGRESS NOTES
NEPHROLOGY PROGRESS NOTE   Estela Sorto 47 y o  female MRN: [id number]  Unit/Bed#: -[facility] Encounter: [id number]      ASSESSMENT & PLAN    1  RENE (POA) due to obstructive uropathy with retention and possible prerenal component  o BL 0 6-0 8  o Admission 5 1  o Now improved to baseline with bates catheter placement and IV fluids  o CT showed distended bladder  o IV fluids discontinued and creatinine electrolytes stable  o Abx for UTI per SLIM    2  Electrolytes/Acid Base  o Hypernatremia-resolved  o Hypokalemia-improved  o NAGMA-improved now    3  Blood Pressure  o Current BP are at goal without medications    4   Clinical Course/CV Risk Reduction/Health maintenance/communication  o Progressive weakness Neuro following and getting plasma exchange    DISCUSSION    No active issues being managed by Nephrology  Urology on board with bates catheter  Will see as needed please call with any questions    SUBJECTIVE:    Patient was seen today  No cp, sob, fevers, chills, nausea, vomiting    OBJECTIVE:  Current Weight: Weight - Scale: 72 2 kg (159 lb 2 8 oz)  @  Vitals:    04/12/21 1530 04/12/21 2145 04/13/21 0555 04/13/21 0710   BP: 133/78 132/81  116/73   Pulse: 86 87  84   Resp: 19 18  18   Temp: 97 8 °F (36 6 °C) 98 6 °F (37 °C)  97 5 °F (36 4 °C)   TempSrc:  Oral     SpO2: 99% 97%  97%   Weight:   72 2 kg (159 lb 2 8 oz)        Intake/Output Summary (Last 24 hours) at 4/13/2021 1211  Last data filed at 4/13/2021 1047  Gross per 24 hour   Intake 1010 ml   Output 1600 ml   Net -590 ml     Weight (last 2 days)     Date/Time   Weight    04/13/21 0555   72 2 (159 17)    04/12/21 0547   72 9 (160 72)    04/11/21 0600   75 4 (166 23)              General: conscious, cooperative, in no acute distress  Eyes: conjunctivae pink, anicteric sclerae  ENT: lips and mucous membranes moist  Neck: supple, no JVD  Chest: no respiratory distress, no accessory muscle use, normal respiratory effort  CVS: normal heart rate, no friction rub  Abdomen: soft, non-tender, non-distended, normoactive bowel sounds  Extremities: no edema of both legs  Skin: no rash  Neuro: awake, alert, oriented      Medications:    Current Facility-Administered Medications:     acetaminophen (TYLENOL) tablet 650 mg, 650 mg, Oral, Q6H PRN, Joellen Us PA-C, 650 mg at 04/13/21 1032    albumin human (FLEXBUMIN) 5 % injection 150 g, 150 g, Intravenous, Every Other Day, SMITHA Valencia, 150 g at 04/11/21 1056    aztreonam (AZACTAM) 1,000 mg in sodium chloride 0 9 % 50 mL IVPB, 1,000 mg, Intravenous, Q8H, Adelso Low MD, Last Rate: 100 mL/hr at 04/13/21 0635, 1,000 mg at 04/13/21 1770    butalbital-acetaminophen-caffeine (FIORICET,ESGIC) -40 mg per tablet 1 tablet, 1 tablet, Oral, Q6H PRN, Nessa Boyer PA-C, 1 tablet at 04/12/21 1517    calcium gluconate 1 g in sodium chloride 0 9% 50 mL (premix), 1 g, Intravenous, Every Other Day, SMITHA Valencia, Last Rate: 100 mL/hr at 04/11/21 1057, 1 g at 04/11/21 1057    cyclobenzaprine (FLEXERIL) tablet 10 mg, 10 mg, Oral, TID PRN, Scott Farrell PA-C, 10 mg at 04/13/21 0631    docusate sodium (COLACE) capsule 100 mg, 100 mg, Oral, BID, Joellen Us PA-C, 100 mg at 04/13/21 1024    ergocalciferol (VITAMIN D2) capsule 50,000 Units, 50,000 Units, Oral, Once per day on Mon Thu, Elizabeth Johnson DO, 50,000 Units at 31/03/28 9832    folic acid (FOLVITE) tablet 1 mg, 1 mg, Oral, Daily, Joellen Us PA-C, 1 mg at 04/13/21 1024    gabapentin (NEURONTIN) capsule 100 mg, 100 mg, Oral, TID PRN, Scott Farrell PA-C, 100 mg at 04/08/21 1757    gabapentin (NEURONTIN) capsule 300 mg, 300 mg, Oral, HS, SMITHA Valencia, 300 mg at 04/12/21 2146    heparin (porcine) subcutaneous injection 5,000 Units, 5,000 Units, Subcutaneous, Q8H [facility], 5,000 Units at 04/13/21 0616 **AND** [CANCELED] Platelet count, , , Once, Joellen Us PA-C    heparin lock flush injection 100 Units, 10 mL, Intracatheter, Every Other Day, SMITHA Lorenzo, 100 Units at 04/11/21 1057    levETIRAcetam (KEPPRA) tablet 500 mg, 500 mg, Oral, Q12H [address], Heydieugene Cheney PA-C, 500 mg at 04/13/21 1024    levothyroxine tablet 50 mcg, 50 mcg, Oral, Early Morning, Elizabeth Fly, DO, 50 mcg at 04/13/21 0616    mirtazapine (REMERON) tablet 15 mg, 15 mg, Oral, HS, Kiel Sparks MD    polyethylene glycol (MIRALAX) packet 17 g, 17 g, Oral, Daily, Joellen Us PA-C, 17 g at 04/13/21 1023    prazosin (MINIPRESS) capsule 2 mg, 2 mg, Oral, HS, Elizabeth Fly, DO, 2 mg at 04/12/21 2146    thiamine (VITAMIN B1) 250 mg in sodium chloride 0 9 % 50 mL IVPB, 250 mg, Intravenous, Daily, James Guerrero DO, Last Rate: 100 mL/hr at 04/12/21 1022, 250 mg at 04/12/21 1022    venlafaxine (EFFEXOR-XR) 24 hr capsule 37 5 mg, 37 5 mg, Oral, Daily, Elizabeth Fly, DO, 37 5 mg at 04/13/21 1024    zolpidem (AMBIEN) tablet 10 mg, 10 mg, Oral, HS, Adelso Low MD, 10 mg at 04/12/21 2147    Invasive Devices:   Urethral Catheter (Active)   Reasons to continue Urinary Catheter  Acute urinary retention/obstruction failing urinary retention protocol 04/13/21 1047   Goal for Removal N/A- discharging with bates 04/13/21 1047   Site Assessment Clean;Skin intact 04/13/21 1047   Collection Container Standard drainage bag 04/13/21 1047   Securement Method Securing device (Describe) 04/13/21 1047   Output (mL) 1000 mL 04/13/21 0222     Lab Results:   Results from last 7 days   Lab Units 04/13/21  0625 04/12/21  0549 04/11/21  0600 04/10/21  1111 04/10/21  0527 04/09/21  1449 04/09/21  0112  04/07/21  0520 04/07/21  0457   WBC Thousand/uL 10 07  --  11 12*  --  7 82 7 09  --   --   --  7 51   HEMOGLOBIN g/dL 9 7*  --  9 5*  --  9 0* 8 5*  --   --   --  8 7*   I STAT HEMOGLOBIN g/dl  --   --   --   --   --   --   --   --  8 8*  --    HEMATOCRIT % 30 9*  --  30 0*  --  29 0* 27 0*  --   --   --  27 7*   HEMATOCRIT, ISTAT %  --   --   --   --   --   --   --   --  26*  --    PLATELETS Thousands/uL 212  --  253  --  242 258  --   --   --  244   POTASSIUM mmol/L 3 6 3 6 3 3*  --  3 3* 3 5 3 5   < >  --  3 8   CHLORIDE mmol/L 109* 110* 110*  --  111* 109* 108   < >  --  110*   CO2 mmol/L 24 19* 23  --  19* 27 25   < >  --  21   CO2, I-STAT mmol/L  --   --   --   --   --   --   --   --  23  --    BUN mg/dL 3* 5 3*  --  5 7 9   < >  --  22   CREATININE mg/dL 0 80 0 73 0 98  --  0 88 1 21 1 49*   < >  --  2 50*   CALCIUM mg/dL 8 8 8 5 8 7  --  8 5 8 6 9 2   < >  --  8 3   MAGNESIUM mg/dL  --  1 7  --   --   --   --  1 9  --   --   --    PHOSPHORUS mg/dL  --  3 8  --   --   --   --   --   --   --   --    ALK PHOS U/L  --   --   --   --  83 145*  --   --   --  152*   ALT U/L  --   --   --   --  19 22  --   --   --  21   AST U/L  --   --   --   --  20 25  --   --   --  20   GLUCOSE, ISTAT mg/dl  --   --   --   --   --   --   --   --  89  --    LEUKOCYTES UA   --   --   --  Large*  --   --   --   --   --   --    BLOOD UA   --   --   --  Moderate*  --   --   --   --   --   --     < > = values in this interval not displayed  Portions of the record may have been created with voice recognition software  Occasional wrong word or "sound a like" substitutions may have occurred due to the inherent limitations of voice recognition software  Read the chart carefully and recognize, using context, where substitutions have occurred  If you have any questions, please contact the dictating provider

## 2021-04-13 NOTE — ASSESSMENT & PLAN NOTE
Malnutrition Findings:   Adult Malnutrition type: Chronic illness  Adult Degree of Malnutrition: Other severe protein calorie malnutrition    BMI Findings: Body mass index is 27 59 kg/m²  Nutrition team is following  Continue with nutrition supplementation   Patient not interested in a feeding tube

## 2021-04-13 NOTE — ASSESSMENT & PLAN NOTE
Likely due to nutritional deficiencies   LP bland  Neurology input appreciated c/w Plasmapharesis #2/5 on 04/11  Will f/u pending labs  Continue thiamine repletion, Keppra 500 mg bid     outpatient EMG/NCS for further evaluation of polyneuropathy on d/c   PT/OT

## 2021-04-13 NOTE — PLAN OF CARE
Problem: Potential for Falls  Goal: Patient will remain free of falls  Description: INTERVENTIONS:  - Assess patient frequently for physical needs  -  Identify cognitive and physical deficits and behaviors that affect risk of falls    -  [place] fall precautions as indicated by assessment   - Educate patient/family on patient safety including physical limitations  - Instruct patient to call for assistance with activity based on assessment  - Modify environment to reduce risk of injury  - Consider OT/PT consult to assist with strengthening/mobility  Outcome: Progressing

## 2021-04-13 NOTE — ASSESSMENT & PLAN NOTE
· Hemoglobin 10 0 on admission  · Stable at 9 5  · No signs of active bleeding  · Continue to monitor on CBC

## 2021-04-13 NOTE — ASSESSMENT & PLAN NOTE
? Neurogenic bladder, UTI  S/p Zhong placement with clear urine   Appreciate urology recommendations, trial of void tomorrow, if > 250 cc residual, will replace Zhong

## 2021-04-13 NOTE — ASSESSMENT & PLAN NOTE
Noted on MRI reji: Size is upper limits of normal  There is midline T1 hyperintense structure in the posterior sella measuring 5 mm

## 2021-04-13 NOTE — ASSESSMENT & PLAN NOTE
Malnutrition Findings:   Adult Malnutrition type: Chronic illness  Adult Degree of Malnutrition: Other severe protein calorie malnutrition    BMI Findings: Body mass index is 27 32 kg/m²  Nutrition team is following, patient refusing to eat but drinks  Continue with nutrition supplementation   Patient not interested in a feeding tube

## 2021-04-13 NOTE — ASSESSMENT & PLAN NOTE
Home medication Bupropion 100 mg bid, Rameron 7 5 mg HS, Effexor 37 5 mg daily, Topamax and Ambien 10 mg HS  Hold Bupropion and Topamax given propensity to worsen anorexia  Increase Rameron dose to 15 mg HS  Will continue to encourage PO intake

## 2021-04-14 ENCOUNTER — APPOINTMENT (INPATIENT)
Dept: MRI IMAGING | Facility: HOSPITAL | Age: 41
DRG: 883 | End: 2021-04-14
Payer: COMMERCIAL

## 2021-04-14 LAB
ANION GAP SERPL CALCULATED.3IONS-SCNC: 14 MMOL/L (ref 4–13)
BUN SERPL-MCNC: 3 MG/DL (ref 5–25)
CALCIUM SERPL-MCNC: 8.3 MG/DL (ref 8.3–10.1)
CHLORIDE SERPL-SCNC: 111 MMOL/L (ref 100–108)
CO2 SERPL-SCNC: 20 MMOL/L (ref 21–32)
CREAT SERPL-MCNC: 0.8 MG/DL (ref 0.6–1.3)
GFR SERPL CREATININE-BSD FRML MDRD: 94 ML/MIN/1.73SQ M
GLUCOSE SERPL-MCNC: 103 MG/DL (ref 65–140)
POTASSIUM SERPL-SCNC: 3.5 MMOL/L (ref 3.5–5.3)
SODIUM SERPL-SCNC: 145 MMOL/L (ref 136–145)
TOPIRAMATE SERPL-MCNC: <1 UG/ML (ref 2–25)
VIT B1 BLD-SCNC: 158.1 NMOL/L (ref 66.5–200)

## 2021-04-14 PROCEDURE — 99232 SBSQ HOSP IP/OBS MODERATE 35: CPT | Performed by: INTERNAL MEDICINE

## 2021-04-14 PROCEDURE — 99232 SBSQ HOSP IP/OBS MODERATE 35: CPT | Performed by: PSYCHIATRY & NEUROLOGY

## 2021-04-14 PROCEDURE — A9585 GADOBUTROL INJECTION: HCPCS | Performed by: PSYCHIATRY & NEUROLOGY

## 2021-04-14 PROCEDURE — 80048 BASIC METABOLIC PNL TOTAL CA: CPT | Performed by: INTERNAL MEDICINE

## 2021-04-14 PROCEDURE — G1004 CDSM NDSC: HCPCS

## 2021-04-14 PROCEDURE — 72156 MRI NECK SPINE W/O & W/DYE: CPT

## 2021-04-14 RX ORDER — LORAZEPAM 2 MG/ML
1 INJECTION INTRAMUSCULAR ONCE
Status: COMPLETED | OUTPATIENT
Start: 2021-04-14 | End: 2021-04-14

## 2021-04-14 RX ADMIN — LEVETIRACETAM 500 MG: 500 TABLET, FILM COATED ORAL at 09:28

## 2021-04-14 RX ADMIN — HEPARIN SODIUM 5000 UNITS: 5000 INJECTION INTRAVENOUS; SUBCUTANEOUS at 22:04

## 2021-04-14 RX ADMIN — GABAPENTIN 300 MG: 300 CAPSULE ORAL at 22:04

## 2021-04-14 RX ADMIN — LEVOTHYROXINE SODIUM 50 MCG: 25 TABLET ORAL at 06:24

## 2021-04-14 RX ADMIN — LORAZEPAM 0.5 MG: 0.5 TABLET ORAL at 09:39

## 2021-04-14 RX ADMIN — LORAZEPAM 0.5 MG: 0.5 TABLET ORAL at 18:04

## 2021-04-14 RX ADMIN — ACETAMINOPHEN 650 MG: 325 TABLET, FILM COATED ORAL at 13:36

## 2021-04-14 RX ADMIN — AZTREONAM 1000 MG: 1 INJECTION, POWDER, LYOPHILIZED, FOR SOLUTION INTRAMUSCULAR; INTRAVENOUS at 06:24

## 2021-04-14 RX ADMIN — DOCUSATE SODIUM 100 MG: 100 CAPSULE, LIQUID FILLED ORAL at 09:28

## 2021-04-14 RX ADMIN — HEPARIN SODIUM 5000 UNITS: 5000 INJECTION INTRAVENOUS; SUBCUTANEOUS at 06:24

## 2021-04-14 RX ADMIN — MIRTAZAPINE 7.5 MG: 15 TABLET, FILM COATED ORAL at 22:03

## 2021-04-14 RX ADMIN — FOLIC ACID 1 MG: 1 TABLET ORAL at 09:28

## 2021-04-14 RX ADMIN — ZOLPIDEM TARTRATE 5 MG: 5 TABLET, COATED ORAL at 22:03

## 2021-04-14 RX ADMIN — AZTREONAM 1000 MG: 1 INJECTION, POWDER, LYOPHILIZED, FOR SOLUTION INTRAMUSCULAR; INTRAVENOUS at 22:03

## 2021-04-14 RX ADMIN — VENLAFAXINE HYDROCHLORIDE 37.5 MG: 37.5 CAPSULE, EXTENDED RELEASE ORAL at 09:28

## 2021-04-14 RX ADMIN — PRAZOSIN HYDROCHLORIDE 2 MG: 1 CAPSULE ORAL at 22:03

## 2021-04-14 RX ADMIN — CYCLOBENZAPRINE HYDROCHLORIDE 10 MG: 10 TABLET, FILM COATED ORAL at 22:03

## 2021-04-14 RX ADMIN — DOCUSATE SODIUM 100 MG: 100 CAPSULE, LIQUID FILLED ORAL at 18:03

## 2021-04-14 RX ADMIN — GADOBUTROL 7 ML: 604.72 INJECTION INTRAVENOUS at 15:27

## 2021-04-14 RX ADMIN — AZTREONAM 1000 MG: 1 INJECTION, POWDER, LYOPHILIZED, FOR SOLUTION INTRAMUSCULAR; INTRAVENOUS at 14:25

## 2021-04-14 RX ADMIN — LORAZEPAM 1 MG: 2 INJECTION INTRAMUSCULAR; INTRAVENOUS at 14:21

## 2021-04-14 RX ADMIN — BUTALBITAL, ACETAMINOPHEN AND CAFFEINE 1 TABLET: 50; 325; 40 TABLET ORAL at 11:40

## 2021-04-14 RX ADMIN — BUTALBITAL, ACETAMINOPHEN AND CAFFEINE 1 TABLET: 50; 325; 40 TABLET ORAL at 19:28

## 2021-04-14 RX ADMIN — POLYETHYLENE GLYCOL 3350 17 G: 17 POWDER, FOR SOLUTION ORAL at 09:28

## 2021-04-14 RX ADMIN — HEPARIN SODIUM 5000 UNITS: 5000 INJECTION INTRAVENOUS; SUBCUTANEOUS at 13:36

## 2021-04-14 RX ADMIN — CYCLOBENZAPRINE HYDROCHLORIDE 10 MG: 10 TABLET, FILM COATED ORAL at 13:36

## 2021-04-14 NOTE — ASSESSMENT & PLAN NOTE
With urinary retention, abnormal urinalysis, urine cx with Ecoli  c/w Aztreonam IV given penicillin allergy #3

## 2021-04-14 NOTE — ASSESSMENT & PLAN NOTE
Incidental finding on MRI of the lumbar spine with right cul-de-sac lesion  US Pelvis:uterus is anteverted in position, measuring 6 3 x 2 8 x 4 6 cm  Normal cervix, endometrium, normal left ovary, R ovary with uniform low-level internal echoes is identified, No suspicious adnexal mass or loculated collections  follow-up US in 8-12 weeks is recommended    Needs Gyn f/u outpatient

## 2021-04-14 NOTE — OCCUPATIONAL THERAPY NOTE
Occupational Therapy Hold Note     Patient Name: Isaias Hoffman  HKKPT'V Date: 4/14/2021  Problem List  Principal Problem:    Generalized weakness  Active Problems:    Pituitary enlargement    Hypothyroidism    History of gastric bypass    Vitamin D deficiency    Anorexia nervosa, restricting type    Depression    RENE (acute kidney injury) ([facility] [address] )    Anemia    Soft tissue lesion of pelvic region    Severe protein-calorie malnutrition ([facility] [address] )    Acute cystitis without hematuria    Urinary retention    Past Medical History  Past Medical History:   Diagnosis Date    Anemia     Psychiatric disorder     depression anxiety    Seizures ([facility] )      Past Surgical History  Past Surgical History:   Procedure Laterality Date    GASTRIC BYPASS      IR TEMPORARY DIALYSIS CATHETER PLACEMENT  4/8/2021 04/14/21 1514   OT Last Visit   OT Visit Date 04/14/21   Note Type   Cancel Reasons Patient off floor/test   Assessment   Assessment Attempted to see pt for OT treatment session  Pt currently at MRI  Will follow          TERESA Mcghee/LISA

## 2021-04-14 NOTE — PROGRESS NOTES
Progress Note - Neurology   Isaias Hoffman 47 y o  female MRN: [id number]  Unit/Bed#: -01 Encounter: [id number]    Assessment/Plan    47year old female with history of restrictive eating disorder (anorexia nervosa), laxative abuse, seizure disorder, pituitary enlargement, history of benzodiazepine abuse, vitamin-D deficiency, depression, history of gastric bypass, migraine headaches, presented to ED on 4/5/21 with progressive bilateral upper and lower extremity weakness and sensory abnormality  Currently receiving PLEX, (3/5)  * Generalized weakness  Assessment & Plan  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, possibly related to nutritional deficiency  · Lumbar puncture performed in the ED thus far with relatively bland results  Protein 49, WBC 2, glucose 57, RBC 31, no xanthochromia, gram stain negative  · Laboratory studies consistent with hyperchloremic acidosis, likely related to significant doses of topiramate  Topiramate also can cause appetite suppression, therefore topiramate was discontinued and Keppra initiated  · Recommended Wellbutrin also be discontinued, as it can lower seizure threshold and is also not recommended for use in patients with eating disorders  · Completed IV thiamine repletion  500 mg TID followed by 250 mg QD x 5 days, once completed continue 100 mg daily  · Vitamin D level 17 9  On Vitamin D Replacement  · MRI brain with no acute intracranial pathology     Plan:  - plasma exchange ongoing, session 3/5 on 4/13 (monitoring blood counts, fibrinogen, ionized calcium, platelets)  - Labs pending (resulted serum and CSF labs are listed below): · Serum labs: ENS-2 [facility], methylmalonic acid, B6 Copper level (as copper deficiency has been noted in patients with history of gastric bypass)  · CSF labs: Flow cytometry  - Continue Keppra 500 mg BID   Seizures are described as staring episodes and patient is currently nonambulatory; if patient has breakthrough seizures, can adjust medications accordingly   - Patient will likely require outpatient EMG/NCS for further evaluation of polyneuropathy   - Nutrition following  - PT/OT - therapies  - Monitor neuro exam and notify with changes    Results reviewed:    - CSF labs:  Protein 49, WBC 2, glucose 57, RBC 31, no xanthochromia, Gram stain negative, culture with no growth, cytology negative for malignancy, ACE <1 5, VDRL non-reactive, MS panel negative for oligoclonal bands  - Serum labs: Vitamin D 17 9, B12 511, folate 18 3, TSH 3 774, T4 0 91, RPR nonreactive, CR 3 56, 3 09, 2 5 BUN 33, 26,22, ESR 17, CRP 15 2, C3-1 20, C4 37, topiramate level 19 1, heavy metals screen negative, HIV non-reactive, ACE 25, ANCA negative, Sjogrens <0 2, MAYI negative, anti-DNA double stranded <1, RF negative, SPEP no monoclonal bands, UPEP nonselective proteinuria  Thiamine wnl, cryoglobulin non detected  - 4/9/21 CTH- No acute intracranial abnormality  - 4/9/21 CT cervical spine without contrast- No cervical spine fracture or traumatic malalignment  - 4/6/21 MRI Brain wo contrast:    · no acute intracranial pathology  · Minimally decreased prominence of the pituitary gland that still has a convex superior border  Size is upper limits of normal  There is midline T1 hyperintense structure in the posterior sella measuring 5 mm  Although this may represent increased conspicuity of the posterior pituitary gland, in retrospect, there is a similar sized T1 hypointense hypoenhancing structure at this location and 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   - 4/6/21 MRI Lumbar spine:   · Mild spondylosis on this motion degraded study  · Indeterminant 4 cm pelvic lesion posteriorly in the cul-de-sac on the right, possibly related to the ovary  Differential considerations would include hemorrhagic cyst versus perhaps endometrioma  Other mass lesions not excluded    Nonemergent pelvic sonography is recommended for further assessment  - 4/5/21 CT abdomen/pelvis:   · Severely distended bladder and mild bilateral obstructive uropathy, suggesting distal outlet obstruction or neurogenic bladder  No evidence of cystitis, bladder mass or hematoma  · Findings compatible with constipation  Pituitary enlargement  Assessment & Plan  MRI brain from October 2019 demonstrated pituitary enlargement without evidence of adenoma  Endocrine evaluation as deemed necessary by primary team   - please see MRI brain result above     Subjective:   Reviewed prior notes, reviewed labs completed  New to examiner  Fibrinogen yesterday was 187  Calcium was normal    Received Plex yesterday on 4/13, next PLEX is 4/15  The patient is sleeping on arrival, will awaking to voice, she reports she is tried, she was not 100% cooperative with the examination, when coached she was able to maintain wakefulness through out the exam      She reports no new complaints, other than tired this a m , she reports she is tolerating PLEX, no overall improvement in her symptoms reported  ROS:  As per HPI, otherwise negative  She does report weakness generalized  Vitals: Blood pressure 118/80, pulse 95, temperature 97 7 °F (36 5 °C), temperature source Oral, resp  rate 17, weight 71 6 kg (157 lb 13 6 oz), SpO2 94 %  ,Body mass index is 27 09 kg/m²       Current Facility-Administered Medications   Medication Dose Route Frequency Provider Last Rate    acetaminophen  650 mg Oral Q6H PRN Scott Farrell PA-C      albumin human  150 g Intravenous Every Other Day SMITHA Valencia      aztreonam  1,000 mg Intravenous Q8H Brenden Elizabeth MD 1,000 mg (04/14/21 0624)    butalbital-acetaminophen-caffeine  1 tablet Oral Q6H PRN Nessa Boyer PA-C      calcium gluconate  1 g Intravenous Every Other Day SMITHA Valencia 1 g (04/13/21 9570)    cyclobenzaprine  10 mg Oral TID PRN Scott Farrell PA-C      docusate sodium  100 mg Oral BID Cassius Amato PA-C      ergocalciferol  50,000 Units Oral Once per day on Mon Thu Elizabeth Fly, DO      folic acid  1 mg Oral Daily Cassius Amato PA-C      gabapentin  100 mg Oral TID PRN Matthews LUIS Amato      gabapentin  300 mg Oral HS Alejandra SMITHA Olvera      heparin (porcine)  5,000 Units Subcutaneous [facility] Matthews LUIS Amato      levETIRAcetam  500 mg Oral Q12H [address] Heydi Marquez PA-C      levothyroxine  50 mcg Oral Early Morning Elizabeth Fly, DO      LORazepam  0 5 mg Oral Q8H PRN Emiliano Nesbitt MD      mirtazapine  15 mg Oral HS Emiliano Nesbitt MD      polyethylene glycol  17 g Oral Daily Cassius Amato PA-C      prazosin  2 mg Oral HS Elizabeth Fly, DO      venlafaxine  37 5 mg Oral Daily Elizabeth Fly, DO      zolpidem  5 mg Oral HS Emiliano Nesbitt MD       Physical Exam:   Neurological    Mental status - The patient is sleeping upon arrival, needs extra cueing to awaken, but once awakened able to maintain awake fullness through out the examination  She is oriented x 3, with no aphasia or dysarthria, hypophonic speech this a m  Cranial nerves 2 through 12 are intact - EOMI, no facial droop, tongue midline, VF intact to confrontation  Motor - Bilateral uppers - deltoid 3-4/5, biceps 3-4/5, wrist flexion and extension 3-4/5,  2-3/5, she appears slight weaker on the left compared to the right today, but effort dependant  Bilateral lowers, she did lift her legs off the bed today, when manually lifting this was a quick drift to the bed, she did not fully cooperate with formal testing, she had 2-3/5 dorsi flexion noted  No tremor or fixed deficit noted    Coordination -  Ataxia noted in the uppers, difficult to complete    Reflexes - areflexia  Toes withdrew slightly bilaterally - equivocal      No evidence  myoclonus or tremor  No evidence of seizure activity    Lab, Imaging and other studies:   I have personally reviewed pertinent reports    , CBC:   Results from last 7 days   Lab Units 04/13/21  0625 04/11/21  0600 04/10/21  0527   WBC Thousand/uL 10 07 11 12* 7 82   RBC Million/uL 2 94* 2 90* 2 81*   HEMOGLOBIN g/dL 9 7* 9 5* 9 0*   HEMATOCRIT % 30 9* 30 0* 29 0*   MCV fL 105* 103* 103*   PLATELETS Thousands/uL 212 253 242   , BMP/CMP:   Results from last 7 days   Lab Units 04/14/21  0457 04/13/21  0625 04/12/21  0549  04/10/21  0527 04/09/21  1449   SODIUM mmol/L 145 144 142   < > 147* 145   POTASSIUM mmol/L 3 5 3 6 3 6   < > 3 3* 3 5   CHLORIDE mmol/L 111* 109* 110*   < > 111* 109*   CO2 mmol/L 20* 24 19*   < > 19* 27   BUN mg/dL 3* 3* 5   < > 5 7   CREATININE mg/dL 0 80 0 80 0 73   < > 0 88 1 21   CALCIUM mg/dL 8 3 8 8 8 5   < > 8 5 8 6   AST U/L  --   --   --   --  20 25   ALT U/L  --   --   --   --  19 22   ALK PHOS U/L  --   --   --   --  83 145*   EGFR ml/min/1 73sq m 94 94 105   < > 84 57    < > = values in this interval not displayed  , Vitamin B12:   Results from last 7 days   Lab Units 04/09/21  0112   VITAMIN B 12 pg/mL 491   , HgBA1C:   , TSH:   , Coagulation:   Results from last 7 days   Lab Units 04/09/21  1449   INR  1 01   , Lipid Profile:   , Ammonia:   , Urinalysis:   Results from last 7 days   Lab Units 04/10/21  1111   COLOR UA  Yellow   CLARITY UA  Cloudy   SPEC GRAV UA  1 015   PH UA  5 5   LEUKOCYTES UA  Large*   NITRITE UA  Negative   GLUCOSE UA mg/dl Negative   KETONES UA mg/dl Negative   BILIRUBIN UA  Negative   BLOOD UA  Moderate*   , Drug Screen:   , Medication Drug Levels:       Invalid input(s): CARBAMAZEPINE,  PHENOBARB, LACOSAMIDE, OXCARBAZEPINE     No results found      Fibrinogen - 187 L yesterday  Calcium - 1 27  Platelets -128

## 2021-04-14 NOTE — CASE MANAGEMENT
Continue to follow  Spoke with Becca Quiroga in admissions at [facility], updated on Pt's status  Becca Quiroga confirmed that new insurance Bhavin Bose will need to be obtained to return to facility  CM to follow

## 2021-04-14 NOTE — NUTRITION
04/14/21 0945   Assessment   Timepoint Nutrition Review   Labs & Meds   Labs/Meds Review Lab values reviewed; Meds reviewed   Feeding Route   PO Independent   Adequacy of Intake   Nutrition Modality PO  (Ensure Clears TID)   Current Meal Intake 25-50%   Intake Supplements   (Ensure Clears TID)   Estimated calorie intake compared to estimated need Tuesday 4/13/21 Brk: Refused, Lunch: applesauce and peaches, Ensure Enlive,  Dinner: refused  Calorie Ct: 300 kcal/8 gm protein  Wed 4/14/21 Brk: refused   Nutrition Prognosis   Nutrition Considerations Motivation   PES Statement   Problem Continue previous diagnosis   Patient Nutrition Goals   Goal Status not met;extended   Timeframe to complete goal by next f/u   Recommendations/Interventions   Summary GI signed off, pt refused NGT and alternate nutrition  Refused Brk this am as is her pattern, c/o abd pain  Continues to take mostly liquids or super soft foods like applesause or canned fruits  IVF d/c'd 4/12  VIT D supplementation and IV thiamine being given  Pt is a Level 1 Code noted     Interventions Diet: continued as ordered   Nutrition Recommendations Continue diet as ordered   Nutrition Complexity Risk   Nutrition complexity level High risk   Nutrition review: 04/15/21   Follow up date 04/15/21

## 2021-04-14 NOTE — PLAN OF CARE
Problem: Potential for Falls  Goal: Patient will remain free of falls  Description: INTERVENTIONS:  - Assess patient frequently for physical needs  -  Identify cognitive and physical deficits and behaviors that affect risk of falls  -  [place] fall precautions as indicated by assessment   - Educate patient/family on patient safety including physical limitations  - Instruct patient to call for assistance with activity based on assessment  - Modify environment to reduce risk of injury  - Consider OT/PT consult to assist with strengthening/mobility  Outcome: Progressing     Problem: Prexisting or High Potential for Compromised Skin Integrity  Goal: Skin integrity is maintained or improved  Description: INTERVENTIONS:  - Identify patients at risk for skin breakdown  - Assess and monitor skin integrity  - Assess and monitor nutrition and hydration status  - Monitor labs   - Assess for incontinence   - Turn and reposition patient  - Assist with mobility/ambulation  - Relieve pressure over bony prominences  - Avoid friction and shearing  - Provide appropriate hygiene as needed including keeping skin clean and dry  - Evaluate need for skin moisturizer/barrier cream  - Collaborate with interdisciplinary team   - Patient/family teaching  - Consider wound care consult   Outcome: Progressing     Problem: Nutrition/Hydration-ADULT  Goal: Nutrient/Hydration intake appropriate for improving, restoring or maintaining nutritional needs  Description: Monitor and assess patient's nutrition/hydration status for malnutrition  Collaborate with interdisciplinary team and initiate plan and interventions as ordered  Monitor patient's weight and dietary intake as ordered or per policy  Utilize nutrition screening tool and intervene as necessary  Determine patient's food preferences and provide high-protein, high-caloric foods as appropriate       INTERVENTIONS:  - Monitor oral intake, urinary output, labs, and treatment plans  - Assess nutrition and hydration status and recommend course of action  - Evaluate amount of meals eaten  - Assist patient with eating if necessary   - Allow adequate time for meals  - Recommend/ encourage appropriate diets, oral nutritional supplements, and vitamin/mineral supplements  - Order, calculate, and assess calorie counts as needed  - Recommend, monitor, and adjust tube feedings and TPN/PPN based on assessed needs  - Assess need for intravenous fluids  - Provide specific nutrition/hydration education as appropriate  - Include patient/family/caregiver in decisions related to nutrition  Outcome: Progressing

## 2021-04-14 NOTE — ASSESSMENT & PLAN NOTE
Likely due to nutritional deficiencies, polyneuropathy   LP bland  Neurology input appreciated c/w Plasmapharesis #3/5 on 04/13  Will f/u pending labs  Continue thiamine repletion, Keppra 500 mg bid     For MRI C-spine today  outpatient EMG/NCS for further evaluation of polyneuropathy on d/c   PT/OT

## 2021-04-14 NOTE — PROGRESS NOTES
[place]  Progress Note - Vlad George 1980, 47 y o  female MRN: [id number]  Unit/Bed#: -Shira Encounter: [id number]  Primary Care Provider: Jorge Bañuelos DO   Date and time admitted to hospital: 4/5/2021  5:54 PM    * Generalized weakness  Assessment & Plan  Likely due to nutritional deficiencies, polyneuropathy   LP bland  Neurology input appreciated c/w Plasmapharesis #3/5 on 04/13  Will f/u pending labs  Continue thiamine repletion, Keppra 500 mg bid  For MRI C-spine today  outpatient EMG/NCS for further evaluation of polyneuropathy on d/c   PT/OT      [facility]  Assessment & Plan  Malnutrition Findings:   Adult Malnutrition type: Chronic illness  Adult Degree of Malnutrition: Other severe protein calorie malnutrition    BMI Findings: Body mass index is 27 09 kg/m²  Nutrition team is following, patient states she is begining to eat better   Continue with nutrition supplementation   Patient not interested in a feeding tube  RENE (acute kidney injury) ([facility] )  Assessment & Plan  · RENE POA a/e/b creatinine of 5 15 on admission, due to obstructive uropathy with possible prerenal component, treated with IVF, Zhong catheter and nephrology consult and resolved  · Creatinine is within baseline     Acute cystitis without hematuria  Assessment & Plan  With urinary retention, abnormal urinalysis, urine cx with Ecoli  c/w Aztreonam IV given penicillin allergy #3      Urinary retention  Assessment & Plan  ? Neurogenic bladder, UTI  S/p Zhong placement with clear urine   Appreciate urology recommendations, trial of void tomorrow, if > 250 cc residual, will replace Zhong      Soft tissue lesion of pelvic region  Assessment & Plan  Incidental finding on MRI of the lumbar spine with right cul-de-sac lesion  US Pelvis:uterus is anteverted in position, measuring 6 3 x 2 8 x 4 6 cm   Normal cervix, endometrium, normal left ovary, R ovary with uniform low-level internal echoes is identified, No suspicious adnexal mass or loculated collections  follow-up US in 8-12 weeks is recommended  Needs Gyn f/u outpatient     Anemia  Assessment & Plan  · Hemoglobin 10 0 on admission  · Stable at 9 7  · No signs of active bleeding  · Continue to monitor on CBC      Depression  Assessment & Plan  Home medication Bupropion 100 mg bid, Rameron 7 5 mg HS, Effexor 37 5 mg daily, Topamax and Ambien 10 mg HS  Hold Bupropion and Topamax given propensity to worsen anorexia  Increase Rameron dose to 15 mg HS  Will continue to encourage PO intake       Anorexia nervosa, restricting type  Assessment & Plan  · Patient with history of anorexia  · Nutrition being addressed as above  · Medication changes as above     Vitamin D deficiency  Assessment & Plan  Continue 14845 units twice weekly for now    History of gastric bypass  Assessment & Plan  · Bypass surgery performed in 2007 and 2010 with nutritional deficiency   NAGMA noted, low LA, likely starvation     Hypothyroidism  Assessment & Plan  increased to 50 mcg daily, continue    Pituitary enlargement  Assessment & Plan  Noted on MRI reji: Size is upper limits of normal  There is midline T1 hyperintense structure in the posterior sella measuring 5 mm  Decreased ambulation status-resolved as of 4/10/2021  Assessment & Plan  PT/Ot to see patient-will need to be in continued rehab after discharge        Hyponatremia-resolved as of 4/10/2021  Assessment & Plan  · Patient with sodium of 134 on admission now 148- will change to d5w  · In the ED given 1 L bolus normal saline and 1 L bolus isolyte  · Patient reports poor p o  intake   · Continue to monitor    Hypoglycemia-resolved as of 4/12/2021  Assessment & Plan  · Glucose 88 on admission   · Patient with history of hypoglycemia due to gastric bypass surgery and anorexia  · Patient prescribed acarbose 25 mg daily   Patient reports she is not taking   · Stable glucose readings    VTE Pharmacologic Prophylaxis:   Pharmacologic: Heparin  Mechanical VTE Prophylaxis in Place: Yes    Patient Centered Rounds: I have performed bedside rounds with nursing staff today  Discussions with Specialists or Other Care Team Provider:     Education and Discussions with Family / Patient: patient     Time Spent for Care: 30 minutes  More than 50% of total time spent on counseling and coordination of care as described above  Current Length of Stay: 9 day(s)    Current Patient Status: Inpatient   Certification Statement: The patient will continue to require additional inpatient hospital stay due to   Discharge Plan:     Code Status: Level 1 - Full Code      Subjective:   Seen this morning  Appears comfortable has no new complaints  Tells me she is eating much better  Has not attempted to get out of bed due to weakness  However states she is now able to move her legs much better  Objective:     Vitals:   Temp (24hrs), Av °F (36 7 °C), Min:97 7 °F (36 5 °C), Max:98 2 °F (36 8 °C)    Temp:  [97 7 °F (36 5 °C)-98 2 °F (36 8 °C)] 97 7 °F (36 5 °C)  HR:  [] 95  Resp:  [17] 17  BP: (118-128)/(80-82) 118/80  SpO2:  [94 %-99 %] 94 %  Body mass index is 27 09 kg/m²  Input and Output Summary (last 24 hours): Intake/Output Summary (Last 24 hours) at 2021 1216  Last data filed at 2021 0927  Gross per 24 hour   Intake 0 ml   Output 1400 ml   Net -1400 ml       Physical Exam:     Physical Exam  Vitals signs reviewed  Constitutional:       General: She is not in acute distress  Appearance: Normal appearance  She is not ill-appearing, toxic-appearing or diaphoretic  HENT:      Head: Normocephalic  Eyes:      Extraocular Movements: Extraocular movements intact  Pupils: Pupils are equal, round, and reactive to light  Neck:      Musculoskeletal: Normal range of motion and neck supple  No neck rigidity or muscular tenderness  Vascular: No carotid bruit  Cardiovascular:      Rate and Rhythm: Normal rate and regular rhythm  Pulses: Normal pulses  Heart sounds: Normal heart sounds  No murmur  No friction rub  Pulmonary:      Effort: Pulmonary effort is normal       Breath sounds: Normal breath sounds  No stridor  No wheezing, rhonchi or rales  Chest:      Chest wall: No tenderness  Abdominal:      General: Abdomen is flat  Bowel sounds are normal  There is no distension  Palpations: Abdomen is soft  Tenderness: There is no abdominal tenderness  There is no guarding or rebound  Musculoskeletal: Normal range of motion  General: No swelling or tenderness  Right lower leg: No edema  Left lower leg: No edema  Skin:     General: Skin is warm and dry  Coloration: Skin is not jaundiced  Neurological:      General: No focal deficit present  Mental Status: She is alert and oriented to person, place, and time  Cranial Nerves: No cranial nerve deficit  Sensory: No sensory deficit  Motor: Weakness present  Gait: Gait abnormal            Additional Data:     Labs:    Results from last 7 days   Lab Units 04/13/21  0625   WBC Thousand/uL 10 07   HEMOGLOBIN g/dL 9 7*   HEMATOCRIT % 30 9*   PLATELETS Thousands/uL 212   NEUTROS PCT % 72   LYMPHS PCT % 18   MONOS PCT % 5   EOS PCT % 4     Results from last 7 days   Lab Units 04/14/21  0457  04/10/21  0527   SODIUM mmol/L 145   < > 147*   POTASSIUM mmol/L 3 5   < > 3 3*   CHLORIDE mmol/L 111*   < > 111*   CO2 mmol/L 20*   < > 19*   BUN mg/dL 3*   < > 5   CREATININE mg/dL 0 80   < > 0 88   ANION GAP mmol/L 14*   < > 17*   CALCIUM mg/dL 8 3   < > 8 5   ALBUMIN g/dL  --   --  3 7   TOTAL BILIRUBIN mg/dL  --   --  0 50   ALK PHOS U/L  --   --  83   ALT U/L  --   --  19   AST U/L  --   --  20   GLUCOSE RANDOM mg/dL 103   < > 79    < > = values in this interval not displayed       Results from last 7 days   Lab Units 04/09/21  1449   INR  1 01     Results from last 7 days   Lab Units 04/09/21  0103   POC GLUCOSE mg/dl 95         Results from last 7 days   Lab Units 04/10/21  1126   LACTIC ACID mmol/L 0 4*           * I Have Reviewed All Lab Data Listed Above  * Additional Pertinent Lab Tests Reviewed:  All Labs Within Last 24 Hours Reviewed    Imaging:    Imaging Reports Reviewed Today Include:   Imaging Personally Reviewed by Myself Includes:      Recent Cultures (last 7 days):     Results from last 7 days   Lab Units 04/10/21  1111   URINE CULTURE  60,000-69,000 cfu/ml Escherichia coli*  50,000-59,000 cfu/ml        Last 24 Hours Medication List:   Current Facility-Administered Medications   Medication Dose Route Frequency Provider Last Rate    acetaminophen  650 mg Oral Q6H PRN Ally Mancera PA-C      albumin human  150 g Intravenous Every Other Day SMITHA Grimaldo      aztreonam  1,000 mg Intravenous Q8H Jadon Burrows MD 1,000 mg (04/14/21 0624)    butalbital-acetaminophen-caffeine  1 tablet Oral Q6H PRN Stefanie Diehl PA-C      calcium gluconate  1 g Intravenous Every Other Day SMITHA Grimaldo 1 g (04/13/21 1639)    cyclobenzaprine  10 mg Oral TID PRN Ally Mancera PA-C      docusate sodium  100 mg Oral BID Ally Mancera PA-C      ergocalciferol  50,000 Units Oral Once per day on Mon Thu Elizabeth Fly, DO      folic acid  1 mg Oral Daily Ally Mancera PA-C      gabapentin  100 mg Oral TID PRN Ally Mancera PA-C      gabapentin  300 mg Oral HS SMITHA Grimaldo      heparin (porcine)  5,000 Units Subcutaneous Q8H [address] Ally Mancera PA-C      levETIRAcetam  500 mg Oral Q12H [address] Heydi Persaud PA-C      levothyroxine  50 mcg Oral Early Morning Elizabeth Fly, DO      LORazepam  1 mg Intravenous Once Jadon Burrows MD      LORazepam  0 5 mg Oral Q8H PRN Jadon Burrows MD      mirtazapine  15 mg Oral HS Adelso Phillips MD      polyethylene glycol  17 g Oral Daily Ally Mancera PA-C      prazosin  2 mg Oral HS Norris Avila, DO      venlafaxine  37 5 mg Oral Daily Elizabeth Fly, DO      zolpidem  5 mg Oral HS Aron Aase, MD          Today, Patient Was Seen By: Aron Aase, MD    ** Please Note: Dictation voice to text software may have been used in the creation of this document   **

## 2021-04-14 NOTE — ASSESSMENT & PLAN NOTE
· Bypass surgery performed in 2007 and 2010 with nutritional deficiency   LINETTE noted, low LA, likely starvation

## 2021-04-14 NOTE — PHYSICAL THERAPY NOTE
Physical Therapy Cancellation Note       04/14/21 1514   PT Last Visit   PT Visit Date 04/14/21   Note Type   Cancel Reasons Patient off floor/test   Pt off floor at MRI  Unable to see for PT today       Ej Frank, PT

## 2021-04-14 NOTE — ASSESSMENT & PLAN NOTE
Melissa Lombardo is a 45yo female with a history of anorexia, laxative abuse, seizure disorder, pituitary enlargement, history of benzodiazepine abuse, vitamin-D deficiency, depression, history of gastric bypass, and migraine headaches, who presented on 4/5 with progressive bilateral upper and lower extremity weakness and sensory abnormality  Concern for length dependent polyneuropathy, possibly related to nutritional deficiency  · LP results bland thus far  · Lab studies were initially consistent with hyperchloremic acidosis, likely related to significant doses of topiramate  Topiramate also can cause appetite suppression, therefore topiramate was discontinued and Keppra initiated  · Recommended Wellbutrin also be discontinued, as it can lower seizure threshold and is also not recommended for use in patients with eating disorders  Effexor XR was started  · Completed IV thiamine repletion  500 mg TID followed by 250 mg QD x 5 days, once completed continue 100 mg daily  · Vitamin D level 17 9  On Vitamin D Replacement  · MRI brain with no acute intracranial pathology     Plan:  - Plasma exchange ongoing, session 4 of 5 on 4/15 (monitoring blood counts, fibrinogen, ionized calcium, platelets), final plasma exchange scheduled for tomorrow 4/16   - Labs pending (resulted serum and CSF labs are listed below): · Serum labs: ENS-2 [facility], B6, Copper level (as copper deficiency has been noted in patients with history of gastric bypass)  - Continue Keppra 500 mg BID   Seizures are described as staring episodes and patient is currently nonambulatory; if patient has breakthrough seizures, can adjust medications accordingly   - Patient will ultimately require outpatient EMG/NCS for further evaluation of polyneuropathy   - Nutrition following  - Palliative following  - PT/OT - therapies  - Monitor neuro exam and notify with changes    Results reviewed:    - CSF labs:  Protein 49, WBC 2, glucose 57, RBC 31, no xanthochromia, Gram stain negative, culture with no growth, cytology negative for malignancy, ACE <1 5, VDRL non-reactive, MS panel negative for oligoclonal bands, Low viability and low cell yield, though no phenotypic evidence of lymphoma    - Serum labs: Vitamin D 17 9, B12 511, folate 18 3, TSH 3 774, T4 0 91, RPR nonreactive, CR 3 56, 3 09, 2 5 BUN 33, 26,22, ESR 17, CRP 15 2, C3-1 20, C4 37, topiramate level 19 1, heavy metals screen negative, HIV non-reactive, ACE 25, ANCA negative, Sjogrens <0 2, MAYI negative, anti-DNA double stranded <1, RF negative, SPEP no monoclonal bands, UPEP nonselective proteinuria  Thiamine wnl, cryoglobulin non detected  MMA elevated  -Imaging:  - 4/14/21 - MRI of cervical spine - completed as below  - 4/9/21 CTH- No acute intracranial abnormality  - 4/9/21 CT cervical spine without contrast- No cervical spine fracture or traumatic malalignment  - 4/6/21 MRI Brain wo contrast:    · no acute intracranial pathology  · Minimally decreased prominence of the pituitary gland that still has a convex superior border  Size is upper limits of normal  There is midline T1 hyperintense structure in the posterior sella measuring 5 mm  Although this may represent increased conspicuity of the posterior pituitary gland, in retrospect, there is a similar sized T1 hypointense hypoenhancing structure at this location and 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   - 4/6/21 MRI Lumbar spine:   · Mild spondylosis on this motion degraded study  · Indeterminant 4 cm pelvic lesion posteriorly in the cul-de-sac on the right, possibly related to the ovary  Differential considerations would include hemorrhagic cyst versus perhaps endometrioma  Other mass lesions not excluded  Nonemergent pelvic sonography is recommended for further assessment    - 4/5/21 CT abdomen/pelvis:   · Severely distended bladder and mild bilateral obstructive uropathy, suggesting distal outlet obstruction or neurogenic bladder  No evidence of cystitis, bladder mass or hematoma  · Findings compatible with constipation

## 2021-04-14 NOTE — ASSESSMENT & PLAN NOTE
Malnutrition Findings:   Adult Malnutrition type: Chronic illness  Adult Degree of Malnutrition: Other severe protein calorie malnutrition    BMI Findings: Body mass index is 27 09 kg/m²  Nutrition team is following, patient states she is begining to eat better   Continue with nutrition supplementation   Patient not interested in a feeding tube

## 2021-04-14 NOTE — ASSESSMENT & PLAN NOTE
· RENE POA a/e/b creatinine of 5 15 on admission, due to obstructive uropathy with possible prerenal component, treated with IVF, Zhong catheter and nephrology consult and resolved  · Creatinine is within baseline

## 2021-04-15 LAB
ANION GAP SERPL CALCULATED.3IONS-SCNC: 12 MMOL/L (ref 4–13)
BASOPHILS # BLD AUTO: 0.03 THOUSANDS/ΜL (ref 0–0.1)
BASOPHILS NFR BLD AUTO: 0 % (ref 0–1)
BUN SERPL-MCNC: 4 MG/DL (ref 5–25)
CA-I BLD-SCNC: 1.24 MMOL/L (ref 1.12–1.32)
CALCIUM SERPL-MCNC: 8.9 MG/DL (ref 8.3–10.1)
CHLORIDE SERPL-SCNC: 111 MMOL/L (ref 100–108)
CO2 SERPL-SCNC: 21 MMOL/L (ref 21–32)
CREAT SERPL-MCNC: 0.6 MG/DL (ref 0.6–1.3)
EOSINOPHIL # BLD AUTO: 0.37 THOUSAND/ΜL (ref 0–0.61)
EOSINOPHIL NFR BLD AUTO: 5 % (ref 0–6)
ERYTHROCYTE [DISTWIDTH] IN BLOOD BY AUTOMATED COUNT: 18.6 % (ref 11.6–15.1)
FIBRINOGEN PPP-MCNC: 133 MG/DL (ref 227–495)
GFR SERPL CREATININE-BSD FRML MDRD: 116 ML/MIN/1.73SQ M
GLUCOSE SERPL-MCNC: 96 MG/DL (ref 65–140)
HCT VFR BLD AUTO: 31 % (ref 34.8–46.1)
HGB BLD-MCNC: 9.7 G/DL (ref 11.5–15.4)
IMM GRANULOCYTES # BLD AUTO: 0.05 THOUSAND/UL (ref 0–0.2)
IMM GRANULOCYTES NFR BLD AUTO: 1 % (ref 0–2)
LYMPHOCYTES # BLD AUTO: 1.35 THOUSANDS/ΜL (ref 0.6–4.47)
LYMPHOCYTES NFR BLD AUTO: 18 % (ref 14–44)
MCH RBC QN AUTO: 33.1 PG (ref 26.8–34.3)
MCHC RBC AUTO-ENTMCNC: 31.3 G/DL (ref 31.4–37.4)
MCV RBC AUTO: 106 FL (ref 82–98)
METHYLMALONATE SERPL-SCNC: 899 NMOL/L (ref 0–378)
MONOCYTES # BLD AUTO: 0.46 THOUSAND/ΜL (ref 0.17–1.22)
MONOCYTES NFR BLD AUTO: 6 % (ref 4–12)
NEUTROPHILS # BLD AUTO: 5.11 THOUSANDS/ΜL (ref 1.85–7.62)
NEUTS SEG NFR BLD AUTO: 70 % (ref 43–75)
NRBC BLD AUTO-RTO: 0 /100 WBCS
PLATELET # BLD AUTO: 215 THOUSANDS/UL (ref 149–390)
PMV BLD AUTO: 11.2 FL (ref 8.9–12.7)
POTASSIUM SERPL-SCNC: 4.5 MMOL/L (ref 3.5–5.3)
RBC # BLD AUTO: 2.93 MILLION/UL (ref 3.81–5.12)
SL AMB DISCLAIMER: ABNORMAL
SODIUM SERPL-SCNC: 144 MMOL/L (ref 136–145)
VIT B6 SERPL-MCNC: 1.9 UG/L (ref 2–32.8)
WBC # BLD AUTO: 7.37 THOUSAND/UL (ref 4.31–10.16)

## 2021-04-15 PROCEDURE — 85384 FIBRINOGEN ACTIVITY: CPT | Performed by: NURSE PRACTITIONER

## 2021-04-15 PROCEDURE — 82330 ASSAY OF CALCIUM: CPT | Performed by: NURSE PRACTITIONER

## 2021-04-15 PROCEDURE — 99232 SBSQ HOSP IP/OBS MODERATE 35: CPT | Performed by: INTERNAL MEDICINE

## 2021-04-15 PROCEDURE — 99223 1ST HOSP IP/OBS HIGH 75: CPT | Performed by: FAMILY MEDICINE

## 2021-04-15 PROCEDURE — 80048 BASIC METABOLIC PNL TOTAL CA: CPT | Performed by: INTERNAL MEDICINE

## 2021-04-15 PROCEDURE — 99232 SBSQ HOSP IP/OBS MODERATE 35: CPT | Performed by: PSYCHIATRY & NEUROLOGY

## 2021-04-15 PROCEDURE — 85025 COMPLETE CBC W/AUTO DIFF WBC: CPT | Performed by: INTERNAL MEDICINE

## 2021-04-15 RX ORDER — SODIUM CHLORIDE, SODIUM GLUCONATE, SODIUM ACETATE, POTASSIUM CHLORIDE, MAGNESIUM CHLORIDE, SODIUM PHOSPHATE, DIBASIC, AND POTASSIUM PHOSPHATE .53; .5; .37; .037; .03; .012; .00082 G/100ML; G/100ML; G/100ML; G/100ML; G/100ML; G/100ML; G/100ML
100 INJECTION, SOLUTION INTRAVENOUS CONTINUOUS
Status: DISPENSED | OUTPATIENT
Start: 2021-04-15 | End: 2021-04-18

## 2021-04-15 RX ORDER — LORAZEPAM 1 MG/1
1 TABLET ORAL ONCE
Status: COMPLETED | OUTPATIENT
Start: 2021-04-15 | End: 2021-04-16

## 2021-04-15 RX ORDER — LANOLIN ALCOHOL/MO/W.PET/CERES
100 CREAM (GRAM) TOPICAL DAILY
Status: DISCONTINUED | OUTPATIENT
Start: 2021-04-15 | End: 2021-04-26 | Stop reason: HOSPADM

## 2021-04-15 RX ADMIN — POLYETHYLENE GLYCOL 3350 17 G: 17 POWDER, FOR SOLUTION ORAL at 08:42

## 2021-04-15 RX ADMIN — DOCUSATE SODIUM 100 MG: 100 CAPSULE, LIQUID FILLED ORAL at 17:17

## 2021-04-15 RX ADMIN — GABAPENTIN 300 MG: 300 CAPSULE ORAL at 22:14

## 2021-04-15 RX ADMIN — ERGOCALCIFEROL 50000 UNITS: 1.25 CAPSULE ORAL at 08:44

## 2021-04-15 RX ADMIN — PRAZOSIN HYDROCHLORIDE 2 MG: 1 CAPSULE ORAL at 22:15

## 2021-04-15 RX ADMIN — LORAZEPAM 0.5 MG: 0.5 TABLET ORAL at 17:17

## 2021-04-15 RX ADMIN — DOCUSATE SODIUM 100 MG: 100 CAPSULE, LIQUID FILLED ORAL at 08:42

## 2021-04-15 RX ADMIN — HEPARIN SODIUM 5000 UNITS: 5000 INJECTION INTRAVENOUS; SUBCUTANEOUS at 22:16

## 2021-04-15 RX ADMIN — LEVOTHYROXINE SODIUM 50 MCG: 25 TABLET ORAL at 06:13

## 2021-04-15 RX ADMIN — CYCLOBENZAPRINE HYDROCHLORIDE 10 MG: 10 TABLET, FILM COATED ORAL at 22:15

## 2021-04-15 RX ADMIN — THIAMINE HCL TAB 100 MG 100 MG: 100 TAB at 08:42

## 2021-04-15 RX ADMIN — FOLIC ACID 1 MG: 1 TABLET ORAL at 08:42

## 2021-04-15 RX ADMIN — HEPARIN SODIUM 5000 UNITS: 5000 INJECTION INTRAVENOUS; SUBCUTANEOUS at 06:13

## 2021-04-15 RX ADMIN — BUTALBITAL, ACETAMINOPHEN AND CAFFEINE 1 TABLET: 50; 325; 40 TABLET ORAL at 14:47

## 2021-04-15 RX ADMIN — VENLAFAXINE HYDROCHLORIDE 37.5 MG: 37.5 CAPSULE, EXTENDED RELEASE ORAL at 08:42

## 2021-04-15 RX ADMIN — ZOLPIDEM TARTRATE 5 MG: 5 TABLET, COATED ORAL at 22:14

## 2021-04-15 RX ADMIN — MIRTAZAPINE 15 MG: 15 TABLET, FILM COATED ORAL at 22:16

## 2021-04-15 RX ADMIN — HEPARIN SODIUM 5000 UNITS: 5000 INJECTION INTRAVENOUS; SUBCUTANEOUS at 14:47

## 2021-04-15 RX ADMIN — ALBUMIN (HUMAN) 150 G: 12.5 INJECTION, SOLUTION INTRAVENOUS at 14:47

## 2021-04-15 RX ADMIN — LEVETIRACETAM 500 MG: 500 TABLET, FILM COATED ORAL at 08:42

## 2021-04-15 RX ADMIN — CALCIUM GLUCONATE 1 G: 20 INJECTION, SOLUTION INTRAVENOUS at 09:43

## 2021-04-15 RX ADMIN — LORAZEPAM 0.5 MG: 0.5 TABLET ORAL at 08:50

## 2021-04-15 NOTE — OCCUPATIONAL THERAPY NOTE
Occupational Therapy Cx Note     Patient Name: Marshall Garza  [id number] Date: 4/15/2021  Problem List  Principal Problem:    Generalized weakness  Active Problems:    Pituitary enlargement    Hypothyroidism    History of gastric bypass    Vitamin D deficiency    Anorexia nervosa, restricting type    Depression    RENE (acute kidney injury) ([facility] )    Anemia    Soft tissue lesion of pelvic region    Severe protein-calorie malnutrition ([facility] [address] )    Acute cystitis without hematuria    Urinary retention            04/15/21 1052   OT Last Visit   OT Visit Date 04/15/21   Note Type   Cancel Reasons Other   Assessment   Assessment Attempted to see pt for OT tx session, however, pt currently undergoing plasmapharesis  Will follow at later time/date as ayah Day, OTR/L

## 2021-04-15 NOTE — NUTRITION
04/15/21 1144   Assessment   Timepoint Reassess   Labs & Meds   Labs/Meds Review Lab values reviewed; Meds reviewed  (4/15/21 BUN 4, NA+ 144 meds: albumin, folvite, remeron, thiamine, ativan, 100 ml/hr IVF)   Feeding Route   PO Independent   Adequacy of Intake   Nutrition Modality PO  (Regualr, Ensure Clears TID, Magic Cup 1x daily )   Current Meal Intake 0-25%  (Brk: refused tray of peaches, pears and Ensure Clears )   Intake Supplements 25-50%  (takes 2 daily: lunch and dinner)   Estimated Calorie Intake 50-75%   Estimated Protein Intake  50-75%   Estimated Fluid Intake 50-75%   Estimated calorie intake compared to estimated need Pt appears to refuse brk every day, takes 2 Ensure Clears daily with minimal other po intake  Calories in 300-600 kcal range daily  Nutrition Prognosis   Nutrition Concerns No skin issues documented   Comorbid Concerns   (current medical: genralized weakness, getting plasmaphoresis 4/5 today, anorexia nervosa restricting type, RENE, urinary retention)   Nutrition Considerations Motivation   PES Statement   Problem Continue previous diagnosis   Patient Nutrition Goals   Goal Avoid weight loss; Improve to healthful diet; Increase kcal/PRO intake   Goal Status not met;extended;revised   Timeframe to complete goal by next f/u   Recommendations/Interventions   Summary Discussed at rounds  Pt refused brk this am (peaches, pears and Ensure Clears ordered)  Continues to basically eat 2 Ensure Clears daily and minimal other po intake  Estimated kcal 300-600 kcal range daily  Pt frustrated with lab sticks this am and refusing IVF  Refused NGT for nutrition last week and GI signed off  VIT D and Thiamine supplementation ongoing  Pt remains Level 1 Code  Pt with 10 lb wt loss in 1 week time frame, represents a 6% wt loss  Interventions Supplement adjust   Nutrition Recommendations Continue diet as ordered  (Will d/c per RD protocol Magic Cup as pt will not consume   Refusing offers of any supplement other than Ensure Clears )   Nutrition Complexity Risk   Nutrition complexity level High risk   Nutrition review: 04/19/21   Follow up date 04/19/21

## 2021-04-15 NOTE — ASSESSMENT & PLAN NOTE
Malnutrition Findings:   Adult Malnutrition type: Chronic illness  Adult Degree of Malnutrition: Other severe protein calorie malnutrition    BMI Findings: Body mass index is 27 28 kg/m²  Nutrition team is following   Continue with nutrition supplementation   IVF  Patient now agreeable to a feeding tube  Will discuss with GI about placement tomorrow

## 2021-04-15 NOTE — PROGRESS NOTES
[place]  Progress Note - Pepper Shelley 1980, 47 y o  female MRN: [id number]  Unit/Bed#: -01 Encounter: [id number]  Primary Care Provider: Dianne Zuleta DO   Date and time admitted to hospital: 4/5/2021  5:54 PM    * Generalized weakness  Assessment & Plan  Likely due to nutritional deficiencies, polyneuropathy   LP bland  Neurology input appreciated c/w Plasmapharesis #4/5 on 04/15  IVF with plasmalyte, no IV line in place, PICC line ordered, consent gotten   Continue thiamine repletion, Keppra 500 mg bid  MRI C-spine w/ no abnormality besides mild degenerative changes  outpatient EMG/NCS for further evaluation of polyneuropathy on d/c   PT/OT      Severe protein-calorie malnutrition ([facility] [address] )  Assessment & Plan  Malnutrition Findings:   Adult Malnutrition type: Chronic illness  Adult Degree of Malnutrition: Other severe protein calorie malnutrition    BMI Findings: Body mass index is 27 28 kg/m²  Nutrition team is following   Continue with nutrition supplementation   IVF  Patient now agreeable to a feeding tube  Will discuss with GI about placement tomorrow  RENE (acute kidney injury) ([facility] [address] )  Assessment & Plan  · RENE POA a/e/b creatinine of 5 15 on admission, due to obstructive uropathy with possible prerenal component, treated with IVF, Zhong catheter and nephrology consult and resolved  · Creatinine is within baseline     Acute cystitis without hematuria  Assessment & Plan  With urinary retention, abnormal urinalysis, urine cx with Ecoli  c/w Aztreonam IV given penicillin allergy #3, unable to get today as she had no IV line       Urinary retention  Assessment & Plan  ?  Neurogenic bladder, UTI  S/p Zhong placement with clear urine   Appreciate urology recommendations, trial of void tomorrow, if > 250 cc residual, will replace Zhong      Soft tissue lesion of pelvic region  Assessment & Plan  Incidental finding on MRI of the lumbar spine with right cul-de-sac lesion  US Pelvis:uterus is anteverted in position, measuring 6 3 x 2 8 x 4 6 cm  Normal cervix, endometrium, normal left ovary, R ovary with uniform low-level internal echoes is identified, No suspicious adnexal mass or loculated collections  follow-up US in 8-12 weeks is recommended  Needs Gyn f/u outpatient     Anemia  Assessment & Plan  · Hemoglobin 10 0 on admission  · Stable at 9 7  · No signs of active bleeding  · Continue to monitor on CBC      Depression  Assessment & Plan  Home medication Bupropion 100 mg bid, Rameron 7 5 mg HS, Effexor 37 5 mg daily, Topamax and Ambien 10 mg HS  Hold Bupropion and Topamax given propensity to worsen anorexia  Increase Rameron dose to 15 mg HS  Will continue to encourage PO intake       Anorexia nervosa, restricting type  Assessment & Plan  · Patient with history of anorexia  · Nutrition being addressed as above  · Medication changes as above     Vitamin D deficiency  Assessment & Plan  Continue 08489 units twice weekly for now    History of gastric bypass  Assessment & Plan  · Bypass surgery performed in 2007 and 2010 with nutritional deficiency   NAGMA noted, low LA, likely starvation     Hypothyroidism  Assessment & Plan  increased to 50 mcg daily, continue    Pituitary enlargement  Assessment & Plan  Noted on MRI reji: Size is upper limits of normal  There is midline T1 hyperintense structure in the posterior sella measuring 5 mm        Decreased ambulation status-resolved as of 4/10/2021  Assessment & Plan  PT/Ot to see patient-will need to be in continued rehab after discharge        Hyponatremia-resolved as of 4/10/2021  Assessment & Plan  · Patient with sodium of 134 on admission now 148- will change to d5w  · In the ED given 1 L bolus normal saline and 1 L bolus isolyte  · Patient reports poor p o  intake   · Continue to monitor    Hyperkalemia-resolved as of 4/11/2021  Assessment & Plan  · Potassium 5 6 on admission  Patient received 2 L bolus of fluids i would IV fluids-now  Low this am 3 3  · Fluids adjusted by Nephrology team  · Continue to monitor on BMP       Hypoglycemia-resolved as of 2021  Assessment & Plan  · Glucose 88 on admission   · Patient with history of hypoglycemia due to gastric bypass surgery and anorexia  · Patient prescribed acarbose 25 mg daily  Patient reports she is not taking   · Stable glucose readings      VTE Pharmacologic Prophylaxis:   Pharmacologic: Heparin  Mechanical VTE Prophylaxis in Place: Yes    Patient Centered Rounds: I have performed bedside rounds with nursing staff today  Discussions with Specialists or Other Care Team Provider: palliative care    Education and Discussions with Family / Patient: patient     Time Spent for Care: 30 minutes  More than 50% of total time spent on counseling and coordination of care as described above  Current Length of Stay: 10 day(s)    Current Patient Status: Inpatient   Certification Statement: The patient will continue to require additional inpatient hospital stay due to   Discharge Plan:     Code Status: Level 1 - Full Code      Subjective:   Seen  No new complaints  Refusing placement of a new IV line  Agreeable to a palliative care consult     Objective:     Vitals:   Temp (24hrs), Av 9 °F (36 6 °C), Min:97 7 °F (36 5 °C), Max:98 1 °F (36 7 °C)    Temp:  [97 7 °F (36 5 °C)-98 1 °F (36 7 °C)] 97 7 °F (36 5 °C)  HR:  [87-93] 93  Resp:  [17] 17  BP: (120-131)/(76-83) 120/76  SpO2:  [97 %-99 %] 98 %  Body mass index is 27 28 kg/m²  Input and Output Summary (last 24 hours): Intake/Output Summary (Last 24 hours) at 4/15/2021 1817  Last data filed at 4/15/2021 1701  Gross per 24 hour   Intake --   Output 1050 ml   Net -1050 ml       Physical Exam:     Physical Exam  Vitals signs reviewed  Constitutional:       General: She is not in acute distress  Appearance: Normal appearance  She is not ill-appearing, toxic-appearing or diaphoretic     HENT: Head: Normocephalic  Eyes:      Extraocular Movements: Extraocular movements intact  Pupils: Pupils are equal, round, and reactive to light  Neck:      Musculoskeletal: Normal range of motion and neck supple  No neck rigidity or muscular tenderness  Vascular: No carotid bruit  Cardiovascular:      Rate and Rhythm: Normal rate and regular rhythm  Pulses: Normal pulses  Heart sounds: Normal heart sounds  No murmur  No friction rub  Pulmonary:      Effort: Pulmonary effort is normal       Breath sounds: Normal breath sounds  No stridor  No wheezing, rhonchi or rales  Chest:      Chest wall: No tenderness  Abdominal:      General: Abdomen is flat  Bowel sounds are normal  There is no distension  Palpations: Abdomen is soft  Tenderness: There is no abdominal tenderness  There is no rebound  Musculoskeletal: Normal range of motion  General: No swelling or tenderness  Right lower leg: No edema  Left lower leg: No edema  Skin:     General: Skin is warm and dry  Coloration: Skin is not jaundiced  Neurological:      General: No focal deficit present  Mental Status: She is alert and oriented to person, place, and time  Cranial Nerves: No cranial nerve deficit  Sensory: No sensory deficit  Motor: Weakness present        Coordination: Coordination normal            Additional Data:     Labs:    Results from last 7 days   Lab Units 04/15/21  1025   WBC Thousand/uL 7 37   HEMOGLOBIN g/dL 9 7*   HEMATOCRIT % 31 0*   PLATELETS Thousands/uL 215   NEUTROS PCT % 70   LYMPHS PCT % 18   MONOS PCT % 6   EOS PCT % 5     Results from last 7 days   Lab Units 04/15/21  0703  04/10/21  0527   SODIUM mmol/L 144   < > 147*   POTASSIUM mmol/L 4 5   < > 3 3*   CHLORIDE mmol/L 111*   < > 111*   CO2 mmol/L 21   < > 19*   BUN mg/dL 4*   < > 5   CREATININE mg/dL 0 60   < > 0 88   ANION GAP mmol/L 12   < > 17*   CALCIUM mg/dL 8 9   < > 8 5   ALBUMIN g/dL  --   -- 3  7   TOTAL BILIRUBIN mg/dL  --   --  0 50   ALK PHOS U/L  --   --  83   ALT U/L  --   --  19   AST U/L  --   --  20   GLUCOSE RANDOM mg/dL 96   < > 79    < > = values in this interval not displayed  Results from last 7 days   Lab Units 04/09/21  1449   INR  1 01     Results from last 7 days   Lab Units 04/09/21  0103   POC GLUCOSE mg/dl 95         Results from last 7 days   Lab Units 04/10/21  1126   LACTIC ACID mmol/L 0 4*           * I Have Reviewed All Lab Data Listed Above  * Additional Pertinent Lab Tests Reviewed:  All Labs Within Last 24 Hours Reviewed    Imaging:    Imaging Reports Reviewed Today Include:   Imaging Personally Reviewed by Myself Includes:      Recent Cultures (last 7 days):     Results from last 7 days   Lab Units 04/10/21  1111   URINE CULTURE  60,000-69,000 cfu/ml Escherichia coli*  50,000-59,000 cfu/ml        Last 24 Hours Medication List:   Current Facility-Administered Medications   Medication Dose Route Frequency Provider Last Rate    acetaminophen  650 mg Oral Q6H PRN Rob Land PA-C      albumin human  150 g Intravenous Every Other Day SMITHA Billingsley      aztreonam  1,000 mg Intravenous Courtney Mederos MD 1,000 mg (04/14/21 2203)    butalbital-acetaminophen-caffeine  1 tablet Oral Q6H PRN Andrea Mcneill PA-C      cyclobenzaprine  10 mg Oral TID PRN Rob Land PA-C      docusate sodium  100 mg Oral BID Rob Land PA-C      ergocalciferol  50,000 Units Oral Once per day on Mon Thu Elizabeth Fly, DO      folic acid  1 mg Oral Daily Rob Land PA-C      gabapentin  100 mg Oral TID PRN Rob Land PA-C      gabapentin  300 mg Oral HS SMITHA Billingsley      heparin (porcine)  5,000 Units Subcutaneous Q8H [facility] Rob Land PA-C      levETIRAcetam  500 mg Oral Q12H [facility] Heydi Garrett PA-C      levothyroxine  50 mcg Oral Early Morning Elizabeth Fly, DO      LORazepam  0 5 mg Oral Q8H PRN Taran Pérez MD      LORazepam  1 mg Oral Once Catie Jiménez MD      mirtazapine  15 mg Oral HS Catie Jiménez MD      multi-electrolyte  100 mL/hr Intravenous Continuous Catie Jiménez MD      polyethylene glycol  17 g Oral Daily Tika Pope PA-C      prazosin  2 mg Oral HS Elizabeth Fly, DO      thiamine  100 mg Oral Daily Catie Jiménez MD      venlafaxine  37 5 mg Oral Daily Elizabeth Fly, DO      zolpidem  5 mg Oral HS Catie Jiménez MD          Today, Patient Was Seen By: Catie Jiménez MD    ** Please Note: Dictation voice to text software may have been used in the creation of this document   **

## 2021-04-15 NOTE — PLAN OF CARE
Problem: Potential for Falls  Goal: Patient will remain free of falls  Description: INTERVENTIONS:  - Assess patient frequently for physical needs  -  Identify cognitive and physical deficits and behaviors that affect risk of falls  -  [place] fall precautions as indicated by assessment   - Educate patient/family on patient safety including physical limitations  - Instruct patient to call for assistance with activity based on assessment  - Modify environment to reduce risk of injury  - Consider OT/PT consult to assist with strengthening/mobility  Outcome: Progressing     Problem: Prexisting or High Potential for Compromised Skin Integrity  Goal: Skin integrity is maintained or improved  Description: INTERVENTIONS:  - Identify patients at risk for skin breakdown  - Assess and monitor skin integrity  - Assess and monitor nutrition and hydration status  - Monitor labs   - Assess for incontinence   - Turn and reposition patient  - Assist with mobility/ambulation  - Relieve pressure over bony prominences  - Avoid friction and shearing  - Provide appropriate hygiene as needed including keeping skin clean and dry  - Evaluate need for skin moisturizer/barrier cream  - Collaborate with interdisciplinary team   - Patient/family teaching  - Consider wound care consult   Outcome: Progressing     Problem: Nutrition/Hydration-ADULT  Goal: Nutrient/Hydration intake appropriate for improving, restoring or maintaining nutritional needs  Description: Monitor and assess patient's nutrition/hydration status for malnutrition  Collaborate with interdisciplinary team and initiate plan and interventions as ordered  Monitor patient's weight and dietary intake as ordered or per policy  Utilize nutrition screening tool and intervene as necessary  Determine patient's food preferences and provide high-protein, high-caloric foods as appropriate       INTERVENTIONS:  - Monitor oral intake, urinary output, labs, and treatment plans  - Assess nutrition and hydration status and recommend course of action  - Evaluate amount of meals eaten  - Assist patient with eating if necessary   - Allow adequate time for meals  - Recommend/ encourage appropriate diets, oral nutritional supplements, and vitamin/mineral supplements  - Order, calculate, and assess calorie counts as needed  - Recommend, monitor, and adjust tube feedings and TPN/PPN based on assessed needs  - Assess need for intravenous fluids  - Provide specific nutrition/hydration education as appropriate  - Include patient/family/caregiver in decisions related to nutrition  Outcome: Progressing

## 2021-04-15 NOTE — ASSESSMENT & PLAN NOTE
With urinary retention, abnormal urinalysis, urine cx with Ecoli  c/w Aztreonam IV given penicillin allergy #3, unable to get today as she had no IV line

## 2021-04-15 NOTE — PHYSICAL THERAPY NOTE
Physical Therapy Cancellation Note     04/15/21 1000   PT Last Visit   PT Visit Date 04/15/21   Note Type   Cancel Reasons Other  (unavailable, getting plasmapheresis)   Leyla Munson, PT

## 2021-04-15 NOTE — ASSESSMENT & PLAN NOTE
Likely due to nutritional deficiencies, polyneuropathy   LP bland  Neurology input appreciated c/w Plasmapharesis #4/5 on 04/15  IVF with plasmalyte, no IV line in place, PICC line ordered, consent gotten   Continue thiamine repletion, Keppra 500 mg bid     MRI C-spine w/ no abnormality besides mild degenerative changes  outpatient EMG/NCS for further evaluation of polyneuropathy on d/c   PT/OT
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
## 2021-04-16 ENCOUNTER — APPOINTMENT (INPATIENT)
Dept: INTERVENTIONAL RADIOLOGY/VASCULAR | Facility: HOSPITAL | Age: 41
DRG: 883 | End: 2021-04-16
Payer: COMMERCIAL

## 2021-04-16 PROBLEM — E53.1 VITAMIN B6 DEFICIENCY: Status: ACTIVE | Noted: 2021-04-16

## 2021-04-16 PROCEDURE — 76937 US GUIDE VASCULAR ACCESS: CPT

## 2021-04-16 PROCEDURE — 36573 INSJ PICC RS&I 5 YR+: CPT

## 2021-04-16 PROCEDURE — 99232 SBSQ HOSP IP/OBS MODERATE 35: CPT | Performed by: FAMILY MEDICINE

## 2021-04-16 PROCEDURE — 02HV33Z INSERTION OF INFUSION DEVICE INTO SUPERIOR VENA CAVA, PERCUTANEOUS APPROACH: ICD-10-PCS | Performed by: RADIOLOGY

## 2021-04-16 PROCEDURE — C1751 CATH, INF, PER/CENT/MIDLINE: HCPCS

## 2021-04-16 PROCEDURE — 77001 FLUOROGUIDE FOR VEIN DEVICE: CPT

## 2021-04-16 PROCEDURE — 99232 SBSQ HOSP IP/OBS MODERATE 35: CPT | Performed by: PSYCHIATRY & NEUROLOGY

## 2021-04-16 PROCEDURE — 99232 SBSQ HOSP IP/OBS MODERATE 35: CPT | Performed by: INTERNAL MEDICINE

## 2021-04-16 PROCEDURE — NC001 PR NO CHARGE: Performed by: RADIOLOGY

## 2021-04-16 RX ORDER — SULFAMETHOXAZOLE AND TRIMETHOPRIM 800; 160 MG/1; MG/1
1 TABLET ORAL EVERY 12 HOURS SCHEDULED
Status: COMPLETED | OUTPATIENT
Start: 2021-04-16 | End: 2021-04-17

## 2021-04-16 RX ORDER — LORAZEPAM 2 MG/ML
0.5 INJECTION INTRAMUSCULAR ONCE
Status: COMPLETED | OUTPATIENT
Start: 2021-04-16 | End: 2021-04-20

## 2021-04-16 RX ORDER — LORAZEPAM 1 MG/1
1 TABLET ORAL ONCE
Status: DISCONTINUED | OUTPATIENT
Start: 2021-04-16 | End: 2021-04-16

## 2021-04-16 RX ADMIN — LORAZEPAM 1 MG: 1 TABLET ORAL at 09:36

## 2021-04-16 RX ADMIN — LORAZEPAM 0.5 MG: 0.5 TABLET ORAL at 17:38

## 2021-04-16 RX ADMIN — CYCLOBENZAPRINE HYDROCHLORIDE 10 MG: 10 TABLET, FILM COATED ORAL at 21:11

## 2021-04-16 RX ADMIN — LEVETIRACETAM 500 MG: 500 TABLET, FILM COATED ORAL at 21:11

## 2021-04-16 RX ADMIN — FOLIC ACID 1 MG: 1 TABLET ORAL at 08:53

## 2021-04-16 RX ADMIN — LORAZEPAM 0.5 MG: 0.5 TABLET ORAL at 08:53

## 2021-04-16 RX ADMIN — VENLAFAXINE HYDROCHLORIDE 37.5 MG: 37.5 CAPSULE, EXTENDED RELEASE ORAL at 08:53

## 2021-04-16 RX ADMIN — AZTREONAM 1000 MG: 1 INJECTION, POWDER, LYOPHILIZED, FOR SOLUTION INTRAMUSCULAR; INTRAVENOUS at 13:41

## 2021-04-16 RX ADMIN — MIRTAZAPINE 15 MG: 15 TABLET, FILM COATED ORAL at 21:11

## 2021-04-16 RX ADMIN — CYCLOBENZAPRINE HYDROCHLORIDE 10 MG: 10 TABLET, FILM COATED ORAL at 10:30

## 2021-04-16 RX ADMIN — AZTREONAM 1000 MG: 1 INJECTION, POWDER, LYOPHILIZED, FOR SOLUTION INTRAMUSCULAR; INTRAVENOUS at 21:30

## 2021-04-16 RX ADMIN — ZOLPIDEM TARTRATE 5 MG: 5 TABLET, COATED ORAL at 21:11

## 2021-04-16 RX ADMIN — LEVOTHYROXINE SODIUM 50 MCG: 25 TABLET ORAL at 06:23

## 2021-04-16 RX ADMIN — DOCUSATE SODIUM 100 MG: 100 CAPSULE, LIQUID FILLED ORAL at 08:53

## 2021-04-16 RX ADMIN — POLYETHYLENE GLYCOL 3350 17 G: 17 POWDER, FOR SOLUTION ORAL at 08:54

## 2021-04-16 RX ADMIN — BUTALBITAL, ACETAMINOPHEN AND CAFFEINE 1 TABLET: 50; 325; 40 TABLET ORAL at 19:41

## 2021-04-16 RX ADMIN — SODIUM CHLORIDE, SODIUM GLUCONATE, SODIUM ACETATE, POTASSIUM CHLORIDE, MAGNESIUM CHLORIDE, SODIUM PHOSPHATE, DIBASIC, AND POTASSIUM PHOSPHATE 100 ML/HR: .53; .5; .37; .037; .03; .012; .00082 INJECTION, SOLUTION INTRAVENOUS at 12:25

## 2021-04-16 RX ADMIN — HEPARIN SODIUM 5000 UNITS: 5000 INJECTION INTRAVENOUS; SUBCUTANEOUS at 06:23

## 2021-04-16 RX ADMIN — GABAPENTIN 300 MG: 300 CAPSULE ORAL at 21:11

## 2021-04-16 RX ADMIN — SULFAMETHOXAZOLE AND TRIMETHOPRIM 1 TABLET: 800; 160 TABLET ORAL at 21:11

## 2021-04-16 RX ADMIN — HEPARIN SODIUM 5000 UNITS: 5000 INJECTION INTRAVENOUS; SUBCUTANEOUS at 21:11

## 2021-04-16 RX ADMIN — HEPARIN SODIUM 5000 UNITS: 5000 INJECTION INTRAVENOUS; SUBCUTANEOUS at 13:15

## 2021-04-16 RX ADMIN — PRAZOSIN HYDROCHLORIDE 2 MG: 1 CAPSULE ORAL at 21:17

## 2021-04-16 RX ADMIN — LEVETIRACETAM 500 MG: 500 TABLET, FILM COATED ORAL at 08:53

## 2021-04-16 RX ADMIN — THIAMINE HCL TAB 100 MG 100 MG: 100 TAB at 08:54

## 2021-04-16 NOTE — ASSESSMENT & PLAN NOTE
Noted with level of 1 9  Also a cause of neuropathy   Replete with 20 mg/day ideally for 3 weeks, followed by daily oral therapy for several weeks    Unfortunately formulary contains diclofenac sodium, discussing with pharmacy

## 2021-04-16 NOTE — ASSESSMENT & PLAN NOTE
Malnutrition Findings:   Adult Malnutrition type: Chronic illness  Adult Degree of Malnutrition: Other severe protein calorie malnutrition    BMI Findings: Body mass index is 27 28 kg/m²  Nutrition team is following   Continue with nutrition supplementation   IVF  Patient now agreeable to a feeding tube when she discussed with palliative however I do not think this is a good idea as she is able to eat she just chooses not to  Molly Setting discussion with patient who Is now agreeable to eating  Had discussion with Bariatric surgeon Dr Anabella Sparks who also believes her symptoms are all nutritional deficiency related  He recommends she be transferred to center where her bariatric surgery was performed  She states her surgery was at [facility], records requested  Will attempt to initiate transfer

## 2021-04-16 NOTE — UTILIZATION REVIEW
Continued Stay Review    Date: 4/16/2021                          Current Patient Class: inpatient   Current Level of Care:  Med surg    HPI:40 y o  female initially admitted on 4/5/2021 inpatient due to to Weakness/RENE/Hypoglycemia  History of anorexia nervosa, gastric bypass, seizures  Presented due to abnormal labs and admits to not eating or drinking much starting month prior to arrival     Weakness suspected due to nutritional deficiencies and polyneuropathy  Neurology has significant weakness and PLEX started every other day for 5 days  Has completed hih dose thiamine  Procedure 4/8/2021 IR - temporary dialysis cathter placement    Assessment/Plan: 4/16/2021:  Patient with persistent weakness, tingling in extremities and poor fine motor function     Unable to tell if improvement  On 4/15/2021 completed 4/5 of plasma exchange, next one due  4/17  On exam decreased attention and concentration  Follows simple commands  Motor - bilateral deltoids 3 to 4/5, biceps 4/5, wrist flexion extension 3 to 4/5,  2 to 3/5, bilateral lower she was able to lift her legs off the bed today at least 3 to 4/5 strength, she was able to wiggle her toes bilaterally  Ataxia in the uppers  For Picc today and very anxious, IVF to continue  Patient now agrees to feeding tube and to discus with GI timing  continue aztreonam for urine infection of Ecoli  Vital Signs:   04/16/21 10:30:11  97 7 °F (36 5 °C)  --  --  118/87  97  --  --  --   04/16/21 07:33:55  97 4 °F (36 3 °C)Abnormal   99  21  117/79  92  97 %  --  --   04/15/21 22:15:44  98 °F (36 7 °C)  100  18  117/84  95  99 %  --  --   04/15/21 2214  --  --  --  117/84  --  --  --  --   04/15/21 16:23:53  97 7 °F (36 5 °C)  93  --  120/76  91  98 %  --         Pertinent Labs/Diagnostic Results:   4/16/2021 IR - PICC line placement single lumen     4/14/2021 MRI cervical spine Mild degenerative changes are noted    Image quality is degraded by patient motion artifact  No spinal cord signal abnormality within the limits of this motion degraded study      Results from last 7 days   Lab Units 04/15/21  1025 04/13/21  0625 04/11/21  0600 04/10/21  0527 04/09/21  1449   WBC Thousand/uL 7 37 10 07 11 12* 7 82 7 09   HEMOGLOBIN g/dL 9 7* 9 7* 9 5* 9 0* 8 5*   HEMATOCRIT % 31 0* 30 9* 30 0* 29 0* 27 0*   PLATELETS Thousands/uL 215 212 253 242 258   NEUTROS ABS Thousands/µL 5 11 7 32  --   --  4 91     Results from last 7 days   Lab Units 04/15/21  0703 04/15/21  0550 04/14/21  0457 04/13/21  0625 04/12/21  0549 04/11/21  0600  04/09/21  1449   SODIUM mmol/L 144  --  145 144 142 144   < > 145   POTASSIUM mmol/L 4 5  --  3 5 3 6 3 6 3 3*   < > 3 5   CHLORIDE mmol/L 111*  --  111* 109* 110* 110*   < > 109*   CO2 mmol/L 21  --  20* 24 19* 23   < > 27   ANION GAP mmol/L 12  --  14* 11 13 11   < > 9   BUN mg/dL 4*  --  3* 3* 5 3*   < > 7   CREATININE mg/dL 0 60  --  0 80 0 80 0 73 0 98   < > 1 21   EGFR ml/min/1 73sq m 116  --  94 94 105 73   < > 57   CALCIUM mg/dL 8 9  --  8 3 8 8 8 5 8 7   < > 8 6   CALCIUM, IONIZED mmol/L  --  1 24  --  1 27  --  1 21  --  1 17   MAGNESIUM mg/dL  --   --   --   --  1 7  --   --   --    PHOSPHORUS mg/dL  --   --   --   --  3 8  --   --   --     < > = values in this interval not displayed       Results from last 7 days   Lab Units 04/10/21  0527 04/09/21  1449   AST U/L 20 25   ALT U/L 19 22   ALK PHOS U/L 83 145*   TOTAL PROTEIN g/dL 5 1* 5 2*   ALBUMIN g/dL 3 7 2 7*   TOTAL BILIRUBIN mg/dL 0 50 0 30     Results from last 7 days   Lab Units 04/15/21  0703 04/14/21  0457 04/13/21  0625 04/12/21  0549 04/11/21  0600 04/10/21  0527 04/09/21  1449   GLUCOSE RANDOM mg/dL 96 103 110 99 92 79 87     Results from last 7 days   Lab Units 04/09/21  1449   PROTIME seconds 13 3   INR  1 01     Results from last 7 days   Lab Units 04/10/21  1126   LACTIC ACID mmol/L 0 4*     Results from last 7 days   Lab Units 04/10/21  1111   CLARITY UA  Cloudy   COLOR UA Yellow   SPEC GRAV UA  1 015   PH UA  5 5   GLUCOSE UA mg/dl Negative   KETONES UA mg/dl Negative   BLOOD UA  Moderate*   PROTEIN UA mg/dl 30 (1+)*   NITRITE UA  Negative   BILIRUBIN UA  Negative   UROBILINOGEN UA E U /dl 1 0   LEUKOCYTES UA  Large*   WBC UA /hpf Innumerable*   RBC UA /hpf 4-10*   BACTERIA UA /hpf Moderate*   EPITHELIAL CELLS WET PREP /hpf Moderate*   MUCUS THREADS  None Seen     Results from last 7 days   Lab Units 04/10/21  1111   URINE CULTURE  60,000-69,000 cfu/ml Escherichia coli*  50,000-59,000 cfu/ml        Medications:   Scheduled Medications:  albumin human, 150 g, Intravenous, Every Other Day  aztreonam, 1,000 mg, Intravenous, Q8H  docusate sodium, 100 mg, Oral, BID  ergocalciferol, 50,000 Units, Oral, Once per day on Mon Thu  folic acid, 1 mg, Oral, Daily  gabapentin, 300 mg, Oral, HS  heparin (porcine), 5,000 Units, Subcutaneous, Q8H ISH  levETIRAcetam, 500 mg, Oral, Q12H [address]  levothyroxine, 50 mcg, Oral, Early Morning  mirtazapine, 15 mg, Oral, HS  polyethylene glycol, 17 g, Oral, Daily  prazosin, 2 mg, Oral, HS  thiamine, 100 mg, Oral, Daily  venlafaxine, 37 5 mg, Oral, Daily  zolpidem, 5 mg, Oral, HS    LORazepam (ATIVAN) tablet 1 mg   Dose: 1 mg  Freq: Once Route: PO  Indications Comment: PIC line placement  Start: 04/15/21 1545 End: 04/16/21 0936    Continuous IV Infusions:  multi-electrolyte, 100 mL/hr, Intravenous, Continuous      PRN Meds:  acetaminophen, 650 mg, Oral, Q6H PRN  butalbital-acetaminophen-caffeine, 1 tablet, Oral, Q6H PRN  cyclobenzaprine, 10 mg, Oral, TID PRN - used x 1   gabapentin, 100 mg, Oral, TID PRN  LORazepam, 0 5 mg, Oral, Q8H PRN - used x 1         Discharge Plan: to be determined  Network Utilization Review Department  ATTENTION: Please call with any questions or concerns to [phone] and carefully listen to the prompts so that you are directed to the right person   All voicemails are confidential   Mo Ocasio all requests for admission clinical reviews, approved or denied determinations and any other requests to dedicated fax number below belonging to the campus where the patient is receiving treatment   List of dedicated fax numbers for the Facilities:  [address] [place] [address] DENIALS (Administrative/Medical Necessity) [phone]   [address] (Maternity/NICU/Pediatrics) [phone] [address] Onel Soto [address] (  Karolina Mcrae [facility]) [address] [place] [address] [address] [place] [id number] [phone]

## 2021-04-16 NOTE — PROGRESS NOTES
[place]  Progress Note - Umesh Fam 1980, 47 y o  female MRN: [id number]  Unit/Bed#: -01 Encounter: [id number]  Primary Care Provider: Carmen Lackey DO   Date and time admitted to hospital: 4/5/2021  5:54 PM    * Generalized weakness  Assessment & Plan  Likely due to nutritional deficiencies causing polyneuropathy with superimposed anorexia nervosa  LP bland, Lyme negative, anti tense in converting enzyme negative, VDRL nonreactive, vitamin B12 899, vitamin B6 1 9,  Neurology input appreciated c/w Plasmapharesis #4/5 on 04/15  IVF with plasmalyte, via PICC line, placed 04/16  Continue thiamine repletion, Keppra 500 mg bid  MRI C-spine w/ no abnormality besides mild degenerative changes  outpatient EMG/NCS for further evaluation of polyneuropathy on d/c   PT/OT      Severe protein-calorie malnutrition ([facility] [address] )  Assessment & Plan  Malnutrition Findings:   Adult Malnutrition type: Chronic illness  Adult Degree of Malnutrition: Other severe protein calorie malnutrition    BMI Findings: Body mass index is 27 28 kg/m²  Nutrition team is following   Continue with nutrition supplementation   IVF  Patient now agreeable to a feeding tube when she discussed with palliative however I do not think this is a good idea as she is able to eat she just chooses not to  [facility] discussion with patient who Is now agreeable to eating  Had discussion with Bariatric surgeon Dr Jeremy Lopez who also believes her symptoms are all nutritional deficiency related  He recommends she be transferred to center where her bariatric surgery was performed  She states her surgery was at [facility], records requested  Will attempt to initiate transfer       RENE (acute kidney injury) ([facility] [address] )  Assessment & Plan  · RENE POA a/e/b creatinine of 5 15 on admission, due to obstructive uropathy with possible prerenal component, treated with IVF, Zhong catheter and nephrology consult and resolved  · Creatinine is within baseline     Acute cystitis without hematuria  Assessment & Plan  With urinary retention, abnormal urinalysis, urine cx with Ecoli  D/c Aztreonam, complete with DS Bactrim bid x 1day    Vitamin B6 deficiency  Assessment & Plan  Noted with level of 1 9  Also a cause of neuropathy   Replete with 20 mg/day ideally for 3 weeks, followed by daily oral therapy for several weeks  Unfortunately formulary contains diclofenac sodium, discussing with pharmacy    Urinary retention  Assessment & Plan  ? Neurogenic bladder, UTI  S/p Zhong placement with clear urine   Appreciate urology recommendations, trial of void tomorrow, if > 250 cc residual, will replace Zhong      Soft tissue lesion of pelvic region  Assessment & Plan  Incidental finding on MRI of the lumbar spine with right cul-de-sac lesion  US Pelvis:uterus is anteverted in position, measuring 6 3 x 2 8 x 4 6 cm  Normal cervix, endometrium, normal left ovary, R ovary with uniform low-level internal echoes is identified, No suspicious adnexal mass or loculated collections  follow-up US in 8-12 weeks is recommended    Needs Gyn f/u outpatient     Anemia  Assessment & Plan  · Hemoglobin 10 0 on admission  · Stable at 9 7  · No signs of active bleeding  · Continue to monitor on CBC      Depression  Assessment & Plan  Home medication Bupropion 100 mg bid, Rameron 7 5 mg HS, Effexor 37 5 mg daily, Topamax and Ambien 10 mg HS  Hold Bupropion and Topamax given propensity to worsen anorexia  Increase Rameron dose to 15 mg HS  Will continue to encourage PO intake       Anorexia nervosa, restricting type  Assessment & Plan  · Patient with history of anorexia  · Nutrition being addressed as above  · Medication changes as above     Vitamin D deficiency  Assessment & Plan  Continue 64528 units twice weekly for now    History of gastric bypass  Assessment & Plan  · Bypass surgery performed in 2007 and 2010 with nutritional deficiency   LINETTE noted, low LA, likely starvation     Hypothyroidism  Assessment & Plan  increased to 50 mcg daily, continue    Pituitary enlargement  Assessment & Plan  Noted on MRI reji: Size is upper limits of normal  There is midline T1 hyperintense structure in the posterior sella measuring 5 mm  Hyperkalemia-resolved as of 2021  Assessment & Plan  · Potassium 5 6 on admission  Patient received 2 L bolus of fluids i would IV fluids-now  Low this am 3 3  · Fluids adjusted by Nephrology team  · Continue to monitor on BMP       Hypoglycemia-resolved as of 2021  Assessment & Plan  · Glucose 88 on admission   · Patient with history of hypoglycemia due to gastric bypass surgery and anorexia  · Patient prescribed acarbose 25 mg daily  Patient reports she is not taking   · Stable glucose readings    VTE Pharmacologic Prophylaxis:   Pharmacologic: Heparin  Mechanical VTE Prophylaxis in Place: Yes    Patient Centered Rounds: I have performed bedside rounds with nursing staff today  Discussions with Specialists or Other Care Team Provider:  Neurology, nutrition, palliative care    Education and Discussions with Family / Patient:  Patient, friend Leonor Cason at bedside    Time Spent for Care: 30 minutes  More than 50% of total time spent on counseling and coordination of care as described above  Current Length of Stay: 11 day(s)    Current Patient Status: Inpatient   Certification Statement: The patient will continue to require additional inpatient hospital stay due to   Discharge Plan:     Code Status: Level 1 - Full Code      Subjective:   Seen  Has no new complaints to offer  States she is scared to eat  Addressed issue of nutritional deficiency and a needs to eat    She is willing to try    Objective:     Vitals:   Temp (24hrs), Av 9 °F (36 6 °C), Min:97 4 °F (36 3 °C), Max:98 6 °F (37 °C)    Temp:  [97 4 °F (36 3 °C)-98 6 °F (37 °C)] 98 6 °F (37 °C)  HR:  [] 105  Resp:  [18-21] 20  BP: (117-119)/(79-87) 119/87  SpO2:  [97 %-99 %] 97 %  Body mass index is 27 28 kg/m²  Input and Output Summary (last 24 hours): Intake/Output Summary (Last 24 hours) at 4/16/2021 1714  Last data filed at 4/16/2021 1300  Gross per 24 hour   Intake --   Output 1385 ml   Net -1385 ml       Physical Exam:     Physical Exam  Vitals signs reviewed  Constitutional:       General: She is not in acute distress  Appearance: Normal appearance  She is not ill-appearing, toxic-appearing or diaphoretic  HENT:      Head: Normocephalic  Eyes:      Extraocular Movements: Extraocular movements intact  Pupils: Pupils are equal, round, and reactive to light  Neck:      Musculoskeletal: Normal range of motion and neck supple  No neck rigidity or muscular tenderness  Vascular: No carotid bruit  Cardiovascular:      Rate and Rhythm: Normal rate and regular rhythm  Pulses: Normal pulses  Heart sounds: Normal heart sounds  No murmur  No friction rub  Pulmonary:      Effort: Pulmonary effort is normal       Breath sounds: Normal breath sounds  No stridor  No wheezing, rhonchi or rales  Chest:      Chest wall: No tenderness  Abdominal:      General: Abdomen is flat  Bowel sounds are normal  There is no distension  Palpations: Abdomen is soft  Tenderness: There is no abdominal tenderness  There is no guarding or rebound  Musculoskeletal: Normal range of motion  General: No swelling or tenderness  Right lower leg: No edema  Left lower leg: No edema  Skin:     General: Skin is warm and dry  Coloration: Skin is not jaundiced  Neurological:      General: No focal deficit present  Mental Status: She is alert and oriented to person, place, and time  Cranial Nerves: No cranial nerve deficit  Sensory: No sensory deficit  Motor: Weakness present             Additional Data:     Labs:    Results from last 7 days   Lab Units 04/15/21  1025   WBC Thousand/uL 7 37 HEMOGLOBIN g/dL 9 7*   HEMATOCRIT % 31 0*   PLATELETS Thousands/uL 215   NEUTROS PCT % 70   LYMPHS PCT % 18   MONOS PCT % 6   EOS PCT % 5     Results from last 7 days   Lab Units 04/15/21  0703  04/10/21  0527   SODIUM mmol/L 144   < > 147*   POTASSIUM mmol/L 4 5   < > 3 3*   CHLORIDE mmol/L 111*   < > 111*   CO2 mmol/L 21   < > 19*   BUN mg/dL 4*   < > 5   CREATININE mg/dL 0 60   < > 0 88   ANION GAP mmol/L 12   < > 17*   CALCIUM mg/dL 8 9   < > 8 5   ALBUMIN g/dL  --   --  3 7   TOTAL BILIRUBIN mg/dL  --   --  0 50   ALK PHOS U/L  --   --  83   ALT U/L  --   --  19   AST U/L  --   --  20   GLUCOSE RANDOM mg/dL 96   < > 79    < > = values in this interval not displayed  Results from last 7 days   Lab Units 04/10/21  1126   LACTIC ACID mmol/L 0 4*           * I Have Reviewed All Lab Data Listed Above  * Additional Pertinent Lab Tests Reviewed:  All Labs Within Last 24 Hours Reviewed    Imaging:    Imaging Reports Reviewed Today Include:   Imaging Personally Reviewed by Myself Includes:      Recent Cultures (last 7 days):     Results from last 7 days   Lab Units 04/10/21  1111   URINE CULTURE  60,000-69,000 cfu/ml Escherichia coli*  50,000-59,000 cfu/ml        Last 24 Hours Medication List:   Current Facility-Administered Medications   Medication Dose Route Frequency Provider Last Rate    acetaminophen  650 mg Oral Q6H PRN Ángela HERNÁN Jesus-C      albumin human  150 g Intravenous Every Other Day SMITHA Hutchins      aztreonam  1,000 mg Intravenous Sukhjinder Gama MD 1,000 mg (04/16/21 1341)    butalbital-acetaminophen-caffeine  1 tablet Oral Q6H PRN Esme Mcgregor PA-C      cyclobenzaprine  10 mg Oral TID PRN Ángela Ann MarieLUIS middleton      docusate sodium  100 mg Oral BID Ángela Ann MarieHERNÁN middleton-VERONICA      ergocalciferol  50,000 Units Oral Once per day on Mon Thu Elizabeth Fly, DO      folic acid  1 mg Oral Daily Ángela Ann MarieLUIS middleton      gabapentin  100 mg Oral TID PRN Ángela LUIS Jesus      gabapentin  300 mg Oral HS SMITHA Matthews      heparin (porcine)  5,000 Units Subcutaneous [facility] Joelle Mensah PA-C      levETIRAcetam  500 mg Oral Q12H [facility] Heydi Talboteugene Aburto PA-C      levothyroxine  50 mcg Oral Early Morning Elizabeth Fly, DO      LORazepam  0 5 mg Intravenous Once [employer] CRNP      LORazepam  0 5 mg Oral Q8H PRN Kerry Yost MD      mirtazapine  15 mg Oral HS Kerry Yost MD      multi-electrolyte  100 mL/hr Intravenous Continuous Kerry Yost  mL/hr (04/16/21 1225)    polyethylene glycol  17 g Oral Daily Joelle Mensah PA-C      prazosin  2 mg Oral HS Elizabeth Fly, DO      [START ON 4/17/2021] pyridoxine  20 mg Intravenous Daily Kerry Yost MD      thiamine  100 mg Oral Daily Kerry Yost MD      venlafaxine  37 5 mg Oral Daily Elizabeth Fly, DO      zolpidem  5 mg Oral HS Kerry Yost MD          Today, Patient Was Seen By: Kerry Yost MD    ** Please Note: Dictation voice to text software may have been used in the creation of this document   **

## 2021-04-16 NOTE — OCCUPATIONAL THERAPY NOTE
Occupational Therapy Hold Note     Patient Name: Umesh ESQUEDA'S Date: 4/16/2021  Problem List  Principal Problem:    Generalized weakness  Active Problems:    Pituitary enlargement    Hypothyroidism    History of gastric bypass    Vitamin D deficiency    Anorexia nervosa, restricting type    Depression    RENE (acute kidney injury) ([facility])    Anemia    Soft tissue lesion of pelvic region    Severe protein-calorie malnutrition ([facility])    Acute cystitis without hematuria    Urinary retention    Vitamin B6 deficiency            04/16/21 1030   OT Last Visit   OT Visit Date 04/16/21   Note Type   Cancel Reasons Other   Assessment   Assessment Attempted to see pt for OT tx session  Pt getting PICC line placed  Will follow            Edgar Madison OTR/L

## 2021-04-16 NOTE — ASSESSMENT & PLAN NOTE
With urinary retention, abnormal urinalysis, urine cx with Ecoli  D/c Aztreonam, complete with DS Bactrim bid x 1day

## 2021-04-16 NOTE — PROGRESS NOTES
Progress Note - Neurology   Yuri Ibrahim 47 y o  female [id number]  Unit/Bed#: /-01    Assessment/Plan:    * Generalized weakness  Assessment & Plan  Jose F King is a 45yo female with a history of anorexia, laxative abuse, seizure disorder, pituitary enlargement, history of benzodiazepine abuse, vitamin-D deficiency, depression, history of gastric bypass, and migraine headaches, who presented on 4/5 with progressive bilateral upper and lower extremity weakness and sensory abnormality  Concern for length dependent polyneuropathy, possibly related to nutritional deficiency  · LP results bland thus far  · Lab studies were initially consistent with hyperchloremic acidosis, likely related to significant doses of topiramate  Topiramate also can cause appetite suppression, therefore topiramate was discontinued and Keppra initiated  · Recommended Wellbutrin also be discontinued, as it can lower seizure threshold and is also not recommended for use in patients with eating disorders  Effexor XR was started  · Completed IV thiamine repletion  500 mg TID followed by 250 mg QD x 5 days, once completed continue 100 mg daily  · Vitamin D level 17 9  On Vitamin D Replacement  · MRI brain with no acute intracranial pathology     Plan:  - Plasma exchange ongoing, session 4 of 5 on 4/15 (monitoring blood counts, fibrinogen, ionized calcium, platelets), final plasma exchange scheduled for tomorrow 4/16   - Labs pending (resulted serum and CSF labs are listed below): · Serum labs: ENS-2 [facility], B6, Copper level (as copper deficiency has been noted in patients with history of gastric bypass)  - Continue Keppra 500 mg BID   Seizures are described as staring episodes and patient is currently nonambulatory; if patient has breakthrough seizures, can adjust medications accordingly   - Patient will ultimately require outpatient EMG/NCS for further evaluation of polyneuropathy   - Nutrition following  - Palliative following  - PT/OT - therapies  - Monitor neuro exam and notify with changes    Results reviewed:    - CSF labs:  Protein 49, WBC 2, glucose 57, RBC 31, no xanthochromia, Gram stain negative, culture with no growth, cytology negative for malignancy, ACE <1 5, VDRL non-reactive, MS panel negative for oligoclonal bands, Low viability and low cell yield, though no phenotypic evidence of lymphoma    - Serum labs: Vitamin D 17 9, B12 511, folate 18 3, TSH 3 774, T4 0 91, RPR nonreactive, CR 3 56, 3 09, 2 5 BUN 33, 26,22, ESR 17, CRP 15 2, C3-1 20, C4 37, topiramate level 19 1, heavy metals screen negative, HIV non-reactive, ACE 25, ANCA negative, Sjogrens <0 2, MAYI negative, anti-DNA double stranded <1, RF negative, SPEP no monoclonal bands, UPEP nonselective proteinuria  Thiamine wnl, cryoglobulin non detected  MMA elevated  -Imaging:  - 4/14/21 - MRI of cervical spine - completed as below  - 4/9/21 CTH- No acute intracranial abnormality  - 4/9/21 CT cervical spine without contrast- No cervical spine fracture or traumatic malalignment  - 4/6/21 MRI Brain wo contrast:    · no acute intracranial pathology  · Minimally decreased prominence of the pituitary gland that still has a convex superior border  Size is upper limits of normal  There is midline T1 hyperintense structure in the posterior sella measuring 5 mm  Although this may represent increased conspicuity of the posterior pituitary gland, in retrospect, there is a similar sized T1 hypointense hypoenhancing structure at this location and 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   - 4/6/21 MRI Lumbar spine:   · Mild spondylosis on this motion degraded study  · Indeterminant 4 cm pelvic lesion posteriorly in the cul-de-sac on the right, possibly related to the ovary  Differential considerations would include hemorrhagic cyst versus perhaps endometrioma  Other mass lesions not excluded    Nonemergent pelvic sonography is recommended for further assessment  - 4/5/21 CT abdomen/pelvis:   · Severely distended bladder and mild bilateral obstructive uropathy, suggesting distal outlet obstruction or neurogenic bladder  No evidence of cystitis, bladder mass or hematoma  · Findings compatible with constipation  Pituitary enlargement  Assessment & Plan  MRI brain from October 2019 demonstrated pituitary enlargement without evidence of adenoma  Endocrine evaluation as deemed necessary by primary team   - please see MRI brain result above         Isaias Hoffman will need follow up in in 4 weeks with neuromuscular attending or advance practitioner  She will not require outpatient neurological testing  Subjective:   My hands were freezing yesterday and very sensitive  They still are tingling  Yesterday I asked for hot packs but it ended up making things feel worse  Today it's better  My lower legs and feet are still numb  Patient requesting IV Anxiolytic prior to NG  She will be getting IV access via PICC this morning  Later, I spoke with primary team who reported that they are going to hold off on NG placement and are working to get the patient back to the institution that did her gastric bypass surgery       Past Medical History:   Diagnosis Date    Anemia     Psychiatric disorder     depression anxiety    Seizures (HCC)      Past Surgical History:   Procedure Laterality Date    GASTRIC BYPASS      IR PICC PLACEMENT SINGLE LUMEN  4/16/2021    IR TEMPORARY DIALYSIS CATHETER PLACEMENT  4/8/2021     Family History   Problem Relation Age of Onset    Hypertension Mother     Heart attack Mother     No Known Problems Father     Lung cancer Maternal Grandmother     Hypothyroidism Maternal Grandmother     Diabetes type II Paternal Grandmother     Diabetes type II Paternal Grandfather      Social History     Socioeconomic History    Marital status: Single     Spouse name: None    Number of children: None    Years of education: None    Highest education level: None   Occupational History    None   Social Needs    Financial resource strain: None    Food insecurity     Worry: None     Inability: None    Transportation needs     Medical: None     Non-medical: None   Tobacco Use    Smoking status: Current Every Day Smoker    Smokeless tobacco: Never Used   Substance and Sexual Activity    Alcohol use: Never     Frequency: Never    Drug use: No     Comment: in revovery for benzos    Sexual activity: None   Lifestyle    Physical activity     Days per week: None     Minutes per session: None    Stress: None   Relationships    Social connections     Talks on phone: None     Gets together: None     Attends Mandaen service: None     Active member of club or organization: None     Attends meetings of clubs or organizations: None     Relationship status: None    Intimate partner violence     Fear of current or ex partner: None     Emotionally abused: None     Physically abused: None     Forced sexual activity: None   Other Topics Concern    None   Social History Narrative    None       Medications: Reviewed in detail by me  ROS: Review of Systems   Neurological: Positive for weakness, numbness and headaches (minor)  Psychiatric/Behavioral: Positive for agitation  All other systems reviewed and are negative  Vitals: /87 (BP Location: Left arm)   Pulse 105   Temp 98 6 °F (37 °C) (Oral)   Resp 20   Ht 5' 4" (1 626 m)   Wt 72 1 kg (158 lb 15 2 oz)   SpO2 97%   BMI 27 28 kg/m²     Physical Exam: Exam completed by Dr Eliza Cortez in my presence  Physical Exam  Constitutional:       General: She is not in acute distress  Appearance: She is well-developed  She is ill-appearing  HENT:      Head: Normocephalic and atraumatic  Eyes:      General: No scleral icterus  Right eye: No discharge  Left eye: No discharge        Extraocular Movements: Extraocular movements intact and EOM normal  Conjunctiva/sclera: Conjunctivae normal       Pupils: Pupils are equal, round, and reactive to light  Neck:      Musculoskeletal: Normal range of motion and neck supple  Cardiovascular:      Rate and Rhythm: Normal rate and regular rhythm  Pulmonary:      Effort: Pulmonary effort is normal  No respiratory distress  Breath sounds: No stridor  Musculoskeletal: Normal range of motion  General: No tenderness or deformity  Lymphadenopathy:      Cervical: No cervical adenopathy  Skin:     General: Skin is warm and dry  Findings: No erythema or rash  Neurological:      Mental Status: She is alert and oriented to person, place, and time  Psychiatric:         Speech: Speech normal        Neurologic Exam     Mental Status   Oriented to person, place, and time  Follows 1 step commands  Attention: normal    Speech: speech is normal   Level of consciousness: alert  Knowledge: good  Normal comprehension  Cranial Nerves     CN II   Visual acuity: normal (grossly)    CN III, IV, VI   Pupils are equal, round, and reactive to light  Extraocular motions are normal    Conjugate gaze: present    CN VII   Facial expression full, symmetric  CN VIII   Hearing: intact (grossly)    CN XII   Tongue deviation: none    Motor Exam   Muscle bulk: normal  Overall muscle tone: normal  BUEs:  Finger flexion 2   2  Wrist extension R 3+ L 3  Triceps R4+ L 4  Biceps 4  Deltoids 5    BLEs:  Hip flexion R 2  L1  Quadricep nearly R 3 L3  Hamstring R3+  L4  B Dosiflexion 5  B Plantarflexion 5     Sensory Exam   Vibration:   LLE: cessation in 5 seconds after max strength  RLE: cessation in 3 seconds after max strength  Better preserved in the uppers up to the PIPs     Gait, Coordination, and Reflexes   DTRs absent throughout       Labs: Reviewed in detail by me  Imaging: I have personally reviewed pertinent imaging and PACS reports  VTE Prophylaxis: Heparin    Total time spent today 25 minutes  Greater than 50% of total time was spent with the patient and / or family counseling and / or coordination of care   A description of the counseling / coordination of care: speaking with patient and palliative care about overall plan of care

## 2021-04-16 NOTE — ASSESSMENT & PLAN NOTE
Likely due to nutritional deficiencies causing polyneuropathy with superimposed anorexia nervosa  LP bland, Lyme negative, anti tense in converting enzyme negative, VDRL nonreactive, vitamin B12 899, vitamin B6 1 9,  Neurology input appreciated c/w Plasmapharesis #4/5 on 04/15  IVF with plasmalyte, via PICC line, placed 04/16  Continue thiamine repletion, Keppra 500 mg bid     MRI C-spine w/ no abnormality besides mild degenerative changes  outpatient EMG/NCS for further evaluation of polyneuropathy on d/c   PT/OT

## 2021-04-16 NOTE — PROCEDURES
PICC Line Insertion    Date/Time: 4/16/2021 10:08 AM  Performed by: Zuleika Montesinos by: Sariah Griffith DO     Patient location:  [facility] protocol:     Patient identity confirmed:  Verbally with patient and arm band  Pre-procedure details:     Hand hygiene: Hand hygiene performed prior to insertion      Sterile barrier technique: All elements of maximal sterile technique followed      Skin preparation:  ChloraPrep    Skin preparation agent: Skin preparation agent completely dried prior to procedure    Indications:     PICC line indications: no peripheral vascular access    Anesthesia (see MAR for exact dosages): Anesthesia method:  Local infiltration    Local anesthetic:  Lidocaine 1% w/o epi  Procedure details:     Location:  Basilic    Vessel type: vein      Laterality:  Right    Approach: percutaneous technique used      Patient position:  Flat    Procedural supplies:  Double lumen and single lumen    Catheter size:  4 Fr    Landmarks identified: yes      Ultrasound guidance: yes      Sterile ultrasound techniques: Sterile gel and sterile probe covers were used      Number of attempts:  1    Successful placement: yes      Cath access vessel: Placement verified under fluoro  Total catheter length (cm):  35    Catheter out on skin (cm):  0    Max flow rate:  999    Arm circumference:  28  Post-procedure details:     Post-procedure:  Securement device placed    Assessment:  Blood return through all ports and free fluid flow    Post-procedure complications: none      Patient tolerance of procedure:   Tolerated well, no immediate complications

## 2021-04-16 NOTE — PLAN OF CARE
Problem: Potential for Falls  Goal: Patient will remain free of falls  Description: INTERVENTIONS:  - Assess patient frequently for physical needs  -  Identify cognitive and physical deficits and behaviors that affect risk of falls  -  [place] fall precautions as indicated by assessment   - Educate patient/family on patient safety including physical limitations  - Instruct patient to call for assistance with activity based on assessment  - Modify environment to reduce risk of injury  - Consider OT/PT consult to assist with strengthening/mobility  Outcome: Progressing     Problem: Prexisting or High Potential for Compromised Skin Integrity  Goal: Skin integrity is maintained or improved  Description: INTERVENTIONS:  - Identify patients at risk for skin breakdown  - Assess and monitor skin integrity  - Assess and monitor nutrition and hydration status  - Monitor labs   - Assess for incontinence   - Turn and reposition patient  - Assist with mobility/ambulation  - Relieve pressure over bony prominences  - Avoid friction and shearing  - Provide appropriate hygiene as needed including keeping skin clean and dry  - Evaluate need for skin moisturizer/barrier cream  - Collaborate with interdisciplinary team   - Patient/family teaching  - Consider wound care consult   Outcome: Progressing     Problem: Nutrition/Hydration-ADULT  Goal: Nutrient/Hydration intake appropriate for improving, restoring or maintaining nutritional needs  Description: Monitor and assess patient's nutrition/hydration status for malnutrition  Collaborate with interdisciplinary team and initiate plan and interventions as ordered  Monitor patient's weight and dietary intake as ordered or per policy  Utilize nutrition screening tool and intervene as necessary  Determine patient's food preferences and provide high-protein, high-caloric foods as appropriate       INTERVENTIONS:  - Monitor oral intake, urinary output, labs, and treatment plans  - Assess nutrition and hydration status and recommend course of action  - Evaluate amount of meals eaten  - Assist patient with eating if necessary   - Allow adequate time for meals  - Recommend/ encourage appropriate diets, oral nutritional supplements, and vitamin/mineral supplements  - Order, calculate, and assess calorie counts as needed  - Recommend, monitor, and adjust tube feedings and TPN/PPN based on assessed needs  - Assess need for intravenous fluids  - Provide specific nutrition/hydration education as appropriate  - Include patient/family/caregiver in decisions related to nutrition  Outcome: Progressing     Problem: PAIN - ADULT  Goal: Verbalizes/displays adequate comfort level or baseline comfort level  Description: Interventions:  - Encourage patient to monitor pain and request assistance  - Assess pain using appropriate pain scale  - Administer analgesics based on type and severity of pain and evaluate response  - Implement non-pharmacological measures as appropriate and evaluate response  - Consider cultural and social influences on pain and pain management  - Notify physician/advanced practitioner if interventions unsuccessful or patient reports new pain  Outcome: Progressing     Problem: INFECTION - ADULT  Goal: Absence or prevention of progression during hospitalization  Description: INTERVENTIONS:  - Assess and monitor for signs and symptoms of infection  - Monitor lab/diagnostic results  - Monitor all insertion sites, i e  indwelling lines, tubes, and drains  - Monitor endotracheal if appropriate and nasal secretions for changes in amount and color  - [place] appropriate cooling/warming therapies per order  - Administer medications as ordered  - Instruct and encourage patient and family to use good hand hygiene technique  - Identify and instruct in appropriate isolation precautions for identified infection/condition  Outcome: Progressing     Problem: SAFETY ADULT  Goal: Patient will remain free of falls  Description: INTERVENTIONS:  - Assess patient frequently for physical needs  -  Identify cognitive and physical deficits and behaviors that affect risk of falls  -  [place] fall precautions as indicated by assessment   - Educate patient/family on patient safety including physical limitations  - Instruct patient to call for assistance with activity based on assessment  - Modify environment to reduce risk of injury  - Consider OT/PT consult to assist with strengthening/mobility  Outcome: Progressing     Problem: DISCHARGE PLANNING  Goal: Discharge to home or other facility with appropriate resources  Description: INTERVENTIONS:  - Identify barriers to discharge w/patient and caregiver  - Arrange for needed discharge resources and transportation as appropriate  - Identify discharge learning needs (meds, wound care, etc )  - Arrange for interpretive services to assist at discharge as needed  - Refer to Case Management Department for coordinating discharge planning if the patient needs post-hospital services based on physician/advanced practitioner order or complex needs related to functional status, cognitive ability, or social support system  Outcome: Progressing     Problem: Knowledge Deficit  Goal: Patient/family/caregiver demonstrates understanding of disease process, treatment plan, medications, and discharge instructions  Description: Complete learning assessment and assess knowledge base    Interventions:  - Provide teaching at level of understanding  - Provide teaching via preferred learning methods  Outcome: Progressing     Problem: NEUROSENSORY - ADULT  Goal: Achieves stable or improved neurological status  Description: INTERVENTIONS  - Monitor and report changes in neurological status  - Monitor vital signs such as temperature, blood pressure, glucose, and any other labs ordered   - Initiate measures to prevent increased intracranial pressure  - Monitor for seizure activity and implement precautions if appropriate      Outcome: Progressing     Problem: GASTROINTESTINAL - ADULT  Goal: Maintains adequate nutritional intake  Description: INTERVENTIONS:  - Monitor percentage of each meal consumed  - Identify factors contributing to decreased intake, treat as appropriate  - Assist with meals as needed  - Monitor I&O, weight, and lab values if indicated  - Obtain nutrition services referral as needed  Outcome: Progressing     Problem: GENITOURINARY - ADULT  Goal: Urinary catheter remains patent  Description: INTERVENTIONS:  - Assess patency of urinary catheter  - If patient has a chronic bates, consider changing catheter if non-functioning  - Follow guidelines for intermittent irrigation of non-functioning urinary catheter  Outcome: Progressing     Problem: METABOLIC, FLUID AND ELECTROLYTES - ADULT  Goal: Electrolytes maintained within normal limits  Description: INTERVENTIONS:  - Monitor labs and assess patient for signs and symptoms of electrolyte imbalances  - Administer electrolyte replacement as ordered  - Monitor response to electrolyte replacements, including repeat lab results as appropriate  - Instruct patient on fluid and nutrition as appropriate  Outcome: Progressing  Goal: Fluid balance maintained  Description: INTERVENTIONS:  - Monitor labs   - Monitor I/O and WT  - Instruct patient on fluid and nutrition as appropriate  - Assess for signs & symptoms of volume excess or deficit  Outcome: Progressing     Problem: HEMATOLOGIC - ADULT  Goal: Maintains hematologic stability  Description: INTERVENTIONS  - Assess for signs and symptoms of bleeding or hemorrhage  - Monitor labs  - Administer supportive blood products/factors as ordered and appropriate  Outcome: Progressing     Problem: MUSCULOSKELETAL - ADULT  Goal: Maintain or return mobility to safest level of function  Description: INTERVENTIONS:  - Assess patient's ability to carry out ADLs; assess patient's baseline for ADL function and identify physical deficits which impact ability to perform ADLs (bathing, care of mouth/teeth, toileting, grooming, dressing, etc )  - Assess/evaluate cause of self-care deficits   - Assess range of motion  - Assess patient's mobility  - Assess patient's need for assistive devices and provide as appropriate  - Encourage maximum independence but intervene and supervise when necessary  - Involve family in performance of ADLs  - Assess for home care needs following discharge   - Consider OT consult to assist with ADL evaluation and planning for discharge  - Provide patient education as appropriate  Outcome: Progressing

## 2021-04-16 NOTE — NUTRITION
04/16/21 1518   Assessment   Timepoint Nutrition Review   Feeding Route   PO Independent   Adequacy of Intake   Nutrition Modality PO  (Ensure Clears)   Current Meal Intake 0-25%   Estimated Calorie Intake 0-25%   Estimated Protein Intake  0-25%   Estimated Fluid Intake %   Estimated calorie intake compared to estimated need Pt reports 0% intake today  Says she will try for dinner  Patient Nutrition Goals   Goal Increase kcal/PRO intake; Avoid weight loss; Improve to healthful diet   Goal Status not met;extended   Timeframe to complete goal by next f/u   Recommendations/Interventions   Summary Pt now has PICC line and IVF running 100 ml/hr  Discussed at length at rounds with Dr Laureen Elmore and  Johnathon Pinedo, Palliative Care present  Pt continues to not eat, Last plasma phoresis today  Possiblity of inserting feeding tube for nutrition, however pt with previous [place] [employer] gastric bypass surgery in 2008 and revision for ulcers and possible pouch collapse in 2016/2018 makes feeding tube complex  Carolynn Quesada Surgeon Dr Farida Silva and Dr Laureen Elmore have discussed case  Have placed request via fax to [facility] for patients records  Will continue to work with team to [employer] plan of care  Tiger Text for questions     Interventions Supplement continue   Nutrition Recommendations Continue diet as ordered   Nutrition Complexity Risk   Nutrition complexity level High risk   Nutrition review: 04/19/21   Follow up date 04/19/21  (po intake, nutrition plan of care)

## 2021-04-16 NOTE — PROGRESS NOTES
Progress Note - Palliative & Supportive Care  Giovanny Crain  47 y o   female  /-01   MRN: [id number]  Encounter: [id number]     Assessment  Patient Active Problem List   Diagnosis    Pituitary enlargement    Elevated alkaline phosphatase level    Hypothyroidism    History of gastric bypass    Vitamin D deficiency    Anorexia nervosa, restricting type    PTSD (post-traumatic stress disorder)    Moderate episode of recurrent major depressive disorder (HCC)    Laxative abuse    Depression    Generalized weakness    RENE [facility]acute kidney injury) ([facility] [address] )    Anemia    Soft tissue lesion of pelvic region    Severe protein-calorie malnutrition (HCC)    Acute cystitis without hematuria    Urinary retention     Goals of Care  Level 1 code status  Pending PICC and Feeding tube, patient wishes to get strong enough to transition to an eating disorder clinic    Plan  Symptom Management  Per primary team with the addition of:    Anxiety  Ativan 1mg PO once prior to PICC placement  Ativan 1mg IV once prior to feeding tube placement    24 History  Chart reviewed before visit  No overnight events  Rekha Sutherland is scheduled to receive her PICC this morning  She has severe anxiety regarding the procedure but remains willing to have it placed  She is also fearful of her feeding tube and asks several questions about it which I have answered to her satisfaction  I provided lengthy emotional support  Rekha Sutherland reports that she is terrified of male staff being in her room  She has a male nurse today and while she finds that he has been appropriate, she is unable to overcome her fear of men  I have collaborated with the charge nurse to ensure that if female staff are available, only female staff will enter her room  If she must have male staff members, it is preferable that they always be accompanied by a female staff member in her presence  She denies new pain, nausea, constipation, itching, and dizziness  She continues to have tingling pain in her extremities, generalized weakness, and poor fine motor function  She is tearful during our conversation today  Should Lilliam Her remain inpatient on Monday, I will continue to follow with her for continued emotional support and evolving goals of care conversations  Review of Systems: Pertinent items are noted in HPI      Medications    Current Facility-Administered Medications:     acetaminophen (TYLENOL) tablet 650 mg, 650 mg, Oral, Q6H PRN, Joellen Us PA-C, 650 mg at 04/14/21 1336    albumin human (FLEXBUMIN) 5 % injection 150 g, 150 g, Intravenous, Every Other Day, SMITHA Suarez, 150 g at 04/15/21 1447    aztreonam (AZACTAM) 1,000 mg in sodium chloride 0 9 % 50 mL IVPB, 1,000 mg, Intravenous, Q8H, Adelso Low MD, Last Rate: 100 mL/hr at 04/14/21 2203, 1,000 mg at 04/14/21 2203    butalbital-acetaminophen-caffeine (FIORICET,ESGIC) -40 mg per tablet 1 tablet, 1 tablet, Oral, Q6H PRN, Dago García PA-C, 1 tablet at 04/15/21 1447    cyclobenzaprine (FLEXERIL) tablet 10 mg, 10 mg, Oral, TID PRN, Storm Oliveira PA-C, 10 mg at 04/15/21 2215    docusate sodium (COLACE) capsule 100 mg, 100 mg, Oral, BID, Joellen Us PA-C, 100 mg at 04/16/21 0853    ergocalciferol (VITAMIN D2) capsule 50,000 Units, 50,000 Units, Oral, Once per day on Mon Thu, Elizabeth Johnson DO, 50,000 Units at 97/39/04 4592    folic acid (FOLVITE) tablet 1 mg, 1 mg, Oral, Daily, Joellen Us PA-C, 1 mg at 04/16/21 0853    gabapentin (NEURONTIN) capsule 100 mg, 100 mg, Oral, TID PRN, Storm Oliveira PA-C, 100 mg at 04/08/21 1757    gabapentin (NEURONTIN) capsule 300 mg, 300 mg, Oral, HS, SMITHA Suarez, 300 mg at 04/15/21 2214    heparin (porcine) subcutaneous injection 5,000 Units, 5,000 Units, Subcutaneous, Q8H [address], 5,000 Units at 04/16/21 0951 **AND** [CANCELED] Platelet count, , , Once, Joellen Us PA-C    levETIRAcetam (KEPPRA) tablet 500 mg, 500 mg, Oral, Q12H [facility] Landau, PA-C, 500 mg at 04/16/21 0853    levothyroxine tablet 50 mcg, 50 mcg, Oral, Early Morning, Elizabeth Fly, DO, 50 mcg at 04/16/21 2451    LORazepam (ATIVAN) tablet 0 5 mg, 0 5 mg, Oral, Q8H PRN, Kiel Sparks MD, 0 5 mg at 04/16/21 0853    LORazepam (ATIVAN) tablet 1 mg, 1 mg, Oral, Once, SMITHA Fitzgerald    mirtazapine (REMERON) tablet 15 mg, 15 mg, Oral, HS, Adelso Low MD, 15 mg at 04/15/21 2216    multi-electrolyte (PLASMALYTE-A/ISOLYTE-S PH 7 4) IV solution, 100 mL/hr, Intravenous, Continuous, Kiel Sparks MD    polyethylene glycol (MIRALAX) packet 17 g, 17 g, Oral, Daily, Joellen Us PA-C, 17 g at 04/16/21 0854    prazosin (MINIPRESS) capsule 2 mg, 2 mg, Oral, HS, Elizabeth Fly, DO, 2 mg at 04/15/21 2215    thiamine tablet 100 mg, 100 mg, Oral, Daily, Kiel Sparks MD, 100 mg at 04/16/21 0854    venlafaxine (EFFEXOR-XR) 24 hr capsule 37 5 mg, 37 5 mg, Oral, Daily, Elizabeth Fly, DO, 37 5 mg at 04/16/21 0853    zolpidem (AMBIEN) tablet 5 mg, 5 mg, Oral, HS, Adelso Low MD, 5 mg at 04/15/21 2214    Objective  /79   Pulse 99   Temp (!) 97 4 °F (36 3 °C)   Resp 21   Ht 5' 4" (1 626 m)   Wt 72 1 kg (158 lb 15 2 oz)   SpO2 97%   BMI 27 28 kg/m²   Physical Exam:   Constitutional: Appears chronically ill, frail, malnourished  Head: Atraumatic  Temporal wasting is present  Eyes: EOM are normal  No ocular discharge  No scleral icterus  Neck: no visible adenopathy or masses  Respiratory: Effort normal  No stridor  No respiratory distress  Gastrointestinal: No abdominal distension  Loose skin folds present  Musculoskeletal: No edema  Profound weakness of lower extremities, moderate weakness of upper extremities, poor fine motor function  Neurological: Alert, oriented and appropriately conversant  Skin: Dry, pale, no diaphoresis  Psychiatric: Displays depressed mood  Good insight, limited judgement      Counseling / Coordination of Care  Total floor / unit time spent today 45 minutes  Greater than 50% of total time was spent with the patient and / or family counseling and / or coordinating of care  A description of the counseling / coordination of care: I provided medical updates, discussed palliative care, determined competency, determined goals of care,  determined social/family support, discussed plans of care, discussed symptom management, provided psychosocial support, collaborated with nursing, adjustment of parenteral controlled substances for advanced pain and symptoms, and review of the patient's controlled substance dispensing history in the Prescription Drug Monitoring Program in compliance with the [facility] regulations before prescribing said controlled substances        Kevin Jimenez, [facility]

## 2021-04-17 PROBLEM — N17.9 AKI (ACUTE KIDNEY INJURY) (HCC): Status: RESOLVED | Noted: 2021-04-05 | Resolved: 2021-04-17

## 2021-04-17 PROBLEM — E61.0 COPPER DEFICIENCY: Status: ACTIVE | Noted: 2021-04-17

## 2021-04-17 LAB
CA-I BLD-SCNC: 1.24 MMOL/L (ref 1.12–1.32)
COPPER SERPL-MCNC: 70 UG/DL (ref 80–158)
FIBRINOGEN PPP-MCNC: 159 MG/DL (ref 227–495)

## 2021-04-17 PROCEDURE — 82330 ASSAY OF CALCIUM: CPT | Performed by: NURSE PRACTITIONER

## 2021-04-17 PROCEDURE — 99232 SBSQ HOSP IP/OBS MODERATE 35: CPT | Performed by: INTERNAL MEDICINE

## 2021-04-17 PROCEDURE — 85384 FIBRINOGEN ACTIVITY: CPT | Performed by: NURSE PRACTITIONER

## 2021-04-17 RX ORDER — MULTIVITAMIN/IRON/FOLIC ACID 18MG-0.4MG
1 TABLET ORAL 3 TIMES DAILY
Status: DISCONTINUED | OUTPATIENT
Start: 2021-04-17 | End: 2021-04-26 | Stop reason: HOSPADM

## 2021-04-17 RX ORDER — CALCIUM GLUCONATE 20 MG/ML
1 INJECTION, SOLUTION INTRAVENOUS ONCE
Status: COMPLETED | OUTPATIENT
Start: 2021-04-17 | End: 2021-04-17

## 2021-04-17 RX ADMIN — DOCUSATE SODIUM 100 MG: 100 CAPSULE, LIQUID FILLED ORAL at 17:06

## 2021-04-17 RX ADMIN — LEVETIRACETAM 500 MG: 500 TABLET, FILM COATED ORAL at 20:14

## 2021-04-17 RX ADMIN — AZTREONAM 1000 MG: 1 INJECTION, POWDER, LYOPHILIZED, FOR SOLUTION INTRAMUSCULAR; INTRAVENOUS at 05:40

## 2021-04-17 RX ADMIN — PYRIDOXINE HYDROCHLORIDE 20 MG: 100 INJECTION, SOLUTION INTRAMUSCULAR; INTRAVENOUS at 09:32

## 2021-04-17 RX ADMIN — SODIUM CHLORIDE, SODIUM GLUCONATE, SODIUM ACETATE, POTASSIUM CHLORIDE, MAGNESIUM CHLORIDE, SODIUM PHOSPHATE, DIBASIC, AND POTASSIUM PHOSPHATE 100 ML/HR: .53; .5; .37; .037; .03; .012; .00082 INJECTION, SOLUTION INTRAVENOUS at 23:33

## 2021-04-17 RX ADMIN — ZOLPIDEM TARTRATE 5 MG: 5 TABLET, COATED ORAL at 21:29

## 2021-04-17 RX ADMIN — LORAZEPAM 0.5 MG: 0.5 TABLET ORAL at 12:37

## 2021-04-17 RX ADMIN — CALCIUM GLUCONATE 1 G: 20 INJECTION, SOLUTION INTRAVENOUS at 09:44

## 2021-04-17 RX ADMIN — PRAZOSIN HYDROCHLORIDE 2 MG: 1 CAPSULE ORAL at 21:34

## 2021-04-17 RX ADMIN — AZTREONAM 1000 MG: 1 INJECTION, POWDER, LYOPHILIZED, FOR SOLUTION INTRAMUSCULAR; INTRAVENOUS at 14:27

## 2021-04-17 RX ADMIN — GABAPENTIN 300 MG: 300 CAPSULE ORAL at 21:30

## 2021-04-17 RX ADMIN — Medication 1 TABLET: at 20:14

## 2021-04-17 RX ADMIN — BUTALBITAL, ACETAMINOPHEN AND CAFFEINE 1 TABLET: 50; 325; 40 TABLET ORAL at 15:32

## 2021-04-17 RX ADMIN — Medication 1 TABLET: at 18:15

## 2021-04-17 RX ADMIN — DOCUSATE SODIUM 100 MG: 100 CAPSULE, LIQUID FILLED ORAL at 09:16

## 2021-04-17 RX ADMIN — SODIUM CHLORIDE, SODIUM GLUCONATE, SODIUM ACETATE, POTASSIUM CHLORIDE, MAGNESIUM CHLORIDE, SODIUM PHOSPHATE, DIBASIC, AND POTASSIUM PHOSPHATE 100 ML/HR: .53; .5; .37; .037; .03; .012; .00082 INJECTION, SOLUTION INTRAVENOUS at 11:48

## 2021-04-17 RX ADMIN — HEPARIN SODIUM 5000 UNITS: 5000 INJECTION INTRAVENOUS; SUBCUTANEOUS at 14:26

## 2021-04-17 RX ADMIN — HEPARIN SODIUM 5000 UNITS: 5000 INJECTION INTRAVENOUS; SUBCUTANEOUS at 22:18

## 2021-04-17 RX ADMIN — THIAMINE HCL TAB 100 MG 100 MG: 100 TAB at 09:16

## 2021-04-17 RX ADMIN — LORAZEPAM 0.5 MG: 0.5 TABLET ORAL at 20:14

## 2021-04-17 RX ADMIN — LEVOTHYROXINE SODIUM 50 MCG: 25 TABLET ORAL at 05:43

## 2021-04-17 RX ADMIN — VENLAFAXINE HYDROCHLORIDE 37.5 MG: 37.5 CAPSULE, EXTENDED RELEASE ORAL at 09:16

## 2021-04-17 RX ADMIN — AZTREONAM 1000 MG: 1 INJECTION, POWDER, LYOPHILIZED, FOR SOLUTION INTRAMUSCULAR; INTRAVENOUS at 21:30

## 2021-04-17 RX ADMIN — LEVETIRACETAM 500 MG: 500 TABLET, FILM COATED ORAL at 09:16

## 2021-04-17 RX ADMIN — HEPARIN SODIUM 5000 UNITS: 5000 INJECTION INTRAVENOUS; SUBCUTANEOUS at 05:40

## 2021-04-17 RX ADMIN — CYCLOBENZAPRINE HYDROCHLORIDE 10 MG: 10 TABLET, FILM COATED ORAL at 22:17

## 2021-04-17 RX ADMIN — SULFAMETHOXAZOLE AND TRIMETHOPRIM 1 TABLET: 800; 160 TABLET ORAL at 09:16

## 2021-04-17 RX ADMIN — FOLIC ACID 1 MG: 1 TABLET ORAL at 09:16

## 2021-04-17 RX ADMIN — MIRTAZAPINE 15 MG: 15 TABLET, FILM COATED ORAL at 21:30

## 2021-04-17 NOTE — ASSESSMENT & PLAN NOTE
Noted with level of 1 9  Also a cause of neuropathy   Replete with 20 mg/day ideally for 3 weeks, followed by daily oral therapy for several weeks

## 2021-04-17 NOTE — ASSESSMENT & PLAN NOTE
Copper deficiency likely due to bariatric surgery noted with levels of 79, could explain symptoms including malunion neuropathy and gait disturbance  Needs repletion however copper does not appear to be on formulary    Will discuss with pharmacy

## 2021-04-17 NOTE — ASSESSMENT & PLAN NOTE
With urinary retention, abnormal urinalysis, urine cx with Ecoli  Completed treatment with antibiotics

## 2021-04-17 NOTE — PLAN OF CARE
Problem: Potential for Falls  Goal: Patient will remain free of falls  Description: INTERVENTIONS:  - Assess patient frequently for physical needs  -  Identify cognitive and physical deficits and behaviors that affect risk of falls  -  [place] fall precautions as indicated by assessment   - Educate patient/family on patient safety including physical limitations  - Instruct patient to call for assistance with activity based on assessment  - Modify environment to reduce risk of injury  - Consider OT/PT consult to assist with strengthening/mobility  Outcome: Progressing     Problem: Prexisting or High Potential for Compromised Skin Integrity  Goal: Skin integrity is maintained or improved  Description: INTERVENTIONS:  - Identify patients at risk for skin breakdown  - Assess and monitor skin integrity  - Assess and monitor nutrition and hydration status  - Monitor labs   - Assess for incontinence   - Turn and reposition patient  - Assist with mobility/ambulation  - Relieve pressure over bony prominences  - Avoid friction and shearing  - Provide appropriate hygiene as needed including keeping skin clean and dry  - Evaluate need for skin moisturizer/barrier cream  - Collaborate with interdisciplinary team   - Patient/family teaching  - Consider wound care consult   Outcome: Progressing     Problem: Nutrition/Hydration-ADULT  Goal: Nutrient/Hydration intake appropriate for improving, restoring or maintaining nutritional needs  Description: Monitor and assess patient's nutrition/hydration status for malnutrition  Collaborate with interdisciplinary team and initiate plan and interventions as ordered  Monitor patient's weight and dietary intake as ordered or per policy  Utilize nutrition screening tool and intervene as necessary  Determine patient's food preferences and provide high-protein, high-caloric foods as appropriate       INTERVENTIONS:  - Monitor oral intake, urinary output, labs, and treatment plans  - Assess nutrition and hydration status and recommend course of action  - Evaluate amount of meals eaten  - Assist patient with eating if necessary   - Allow adequate time for meals  - Recommend/ encourage appropriate diets, oral nutritional supplements, and vitamin/mineral supplements  - Order, calculate, and assess calorie counts as needed  - Recommend, monitor, and adjust tube feedings and TPN/PPN based on assessed needs  - Assess need for intravenous fluids  - Provide specific nutrition/hydration education as appropriate  - Include patient/family/caregiver in decisions related to nutrition  Outcome: Progressing     Problem: PAIN - ADULT  Goal: Verbalizes/displays adequate comfort level or baseline comfort level  Description: Interventions:  - Encourage patient to monitor pain and request assistance  - Assess pain using appropriate pain scale  - Administer analgesics based on type and severity of pain and evaluate response  - Implement non-pharmacological measures as appropriate and evaluate response  - Consider cultural and social influences on pain and pain management  - Notify physician/advanced practitioner if interventions unsuccessful or patient reports new pain  Outcome: Progressing     Problem: INFECTION - ADULT  Goal: Absence or prevention of progression during hospitalization  Description: INTERVENTIONS:  - Assess and monitor for signs and symptoms of infection  - Monitor lab/diagnostic results  - Monitor all insertion sites, i e  indwelling lines, tubes, and drains  - Monitor endotracheal if appropriate and nasal secretions for changes in amount and color  - [place] appropriate cooling/warming therapies per order  - Administer medications as ordered  - Instruct and encourage patient and family to use good hand hygiene technique  - Identify and instruct in appropriate isolation precautions for identified infection/condition  Outcome: Progressing     Problem: SAFETY ADULT  Goal: Patient will remain free of falls  Description: INTERVENTIONS:  - Assess patient frequently for physical needs  -  Identify cognitive and physical deficits and behaviors that affect risk of falls  -  [place] fall precautions as indicated by assessment   - Educate patient/family on patient safety including physical limitations  - Instruct patient to call for assistance with activity based on assessment  - Modify environment to reduce risk of injury  - Consider OT/PT consult to assist with strengthening/mobility  Outcome: Progressing     Problem: DISCHARGE PLANNING  Goal: Discharge to home or other facility with appropriate resources  Description: INTERVENTIONS:  - Identify barriers to discharge w/patient and caregiver  - Arrange for needed discharge resources and transportation as appropriate  - Identify discharge learning needs (meds, wound care, etc )  - Arrange for interpretive services to assist at discharge as needed  - Refer to Case Management Department for coordinating discharge planning if the patient needs post-hospital services based on physician/advanced practitioner order or complex needs related to functional status, cognitive ability, or social support system  Outcome: Progressing     Problem: Knowledge Deficit  Goal: Patient/family/caregiver demonstrates understanding of disease process, treatment plan, medications, and discharge instructions  Description: Complete learning assessment and assess knowledge base    Interventions:  - Provide teaching at level of understanding  - Provide teaching via preferred learning methods  Outcome: Progressing

## 2021-04-17 NOTE — ASSESSMENT & PLAN NOTE
Likely due to nutritional deficiencies causing polyneuropathy with superimposed anorexia nervosa  LP bland, Lyme negative, anti tense in converting enzyme negative, VDRL nonreactive, vitamin B12 899, vitamin B6 1 9, copper 70  Neurology input appreciated completed Plasmapharesis #5/5 completed on 04/17  IVF with plasmalyte, via PICC line, placed 04/16, continue  Continue thiamine repletion, vitamin B6 repletion, Keppra 500 mg bid     MRI C-spine w/ no abnormality besides mild degenerative changes  outpatient EMG/NCS for further evaluation of polyneuropathy on d/c   PT/OT

## 2021-04-17 NOTE — ASSESSMENT & PLAN NOTE
Malnutrition Findings:   Adult Malnutrition type: Chronic illness  Adult Degree of Malnutrition: Other severe protein calorie malnutrition    BMI Findings: Body mass index is 27 09 kg/m²  Nutrition team is following   Continue with nutrition supplementation   IVF  Patient now agreeable to a feeding tube when she discussed with palliative however I do not think this is a good idea as she is able to eat she just chooses not to  Lin Constant discussion with patient who Is now agreeable to eating  Had discussion with Bariatric surgeon Dr Hien Berkowitz who also believes her symptoms are all nutritional deficiency related  He recommends she be transferred to center where her bariatric surgery was performed  She states her surgery was at [facility], records requested  Will attempt to initiate transfer

## 2021-04-17 NOTE — ASSESSMENT & PLAN NOTE
· Bypass surgery performed in 2007 and 2016 with nutritional deficiency at [facility]  -patient will benefit from being transferred to [facility] for continuation of treatment

## 2021-04-17 NOTE — PROGRESS NOTES
[place]  Progress Note - Isaias Hoffman 1980, 47 y o  female MRN: [id number]  Unit/Bed#: -Shira Encounter: [id number]  Primary Care Provider: Alma Laguerre DO   Date and time admitted to hospital: 4/5/2021  5:54 PM    * Generalized weakness  Assessment & Plan  Likely due to nutritional deficiencies causing polyneuropathy with superimposed anorexia nervosa  LP bland, Lyme negative, anti tense in converting enzyme negative, VDRL nonreactive, vitamin B12 899, vitamin B6 1 9, copper 70  Neurology input appreciated completed Plasmapharesis #5/5 completed on 04/17  IVF with plasmalyte, via PICC line, placed 04/16, continue  Continue thiamine repletion, vitamin B6 repletion, Keppra 500 mg bid  MRI C-spine w/ no abnormality besides mild degenerative changes  outpatient EMG/NCS for further evaluation of polyneuropathy on d/c   PT/OT      Severe protein-calorie malnutrition ([address] )  Assessment & Plan  Malnutrition Findings:   Adult Malnutrition type: Chronic illness  Adult Degree of Malnutrition: Other severe protein calorie malnutrition    BMI Findings: Body mass index is 27 09 kg/m²  Nutrition team is following   Continue with nutrition supplementation   IVF  Patient now agreeable to a feeding tube when she discussed with palliative however I do not think this is a good idea as she is able to eat she just chooses not to  Roshan Salas discussion with patient who Is now agreeable to eating  Had discussion with Bariatric surgeon Dr Farida Silva who also believes her symptoms are all nutritional deficiency related  He recommends she be transferred to center where her bariatric surgery was performed  She states her surgery was at [facility], records requested  Will attempt to initiate transfer       RENE (acute kidney injury) (HCC)-resolved as of 4/17/2021  Assessment & Plan  · RENE POA a/e/b creatinine of 5 15 on admission, due to obstructive uropathy with possible prerenal component, treated with IVF, Zhong catheter and nephrology consult and resolved  · Creatinine is within baseline     Acute cystitis without hematuria  Assessment & Plan  With urinary retention, abnormal urinalysis, urine cx with Ecoli  Completed treatment with antibiotics    Copper deficiency  Assessment & Plan  Copper deficiency likely due to bariatric surgery noted with levels of 79, could explain symptoms including malunion neuropathy and gait disturbance  Needs repletion however copper does not appear to be on formulary  Will discuss with pharmacy    Vitamin B6 deficiency  Assessment & Plan  Noted with level of 1 9  Also a cause of neuropathy   Replete with 20 mg/day ideally for 3 weeks, followed by daily oral therapy for several weeks  Urinary retention  Assessment & Plan  ? Neurogenic bladder, UTI  S/p Zhong placement with clear urine   Appreciate urology recommendations, trial of void tomorrow, if > 250 cc residual, will replace Zhong      Soft tissue lesion of pelvic region  Assessment & Plan  Incidental finding on MRI of the lumbar spine with right cul-de-sac lesion  US Pelvis:uterus is anteverted in position, measuring 6 3 x 2 8 x 4 6 cm  Normal cervix, endometrium, normal left ovary, R ovary with uniform low-level internal echoes is identified, No suspicious adnexal mass or loculated collections  follow-up US in 8-12 weeks is recommended    Needs Gyn f/u outpatient     Anemia  Assessment & Plan  · Hemoglobin 10 0 on admission  · Stable at 9 7  · No signs of active bleeding  · Continue to monitor on CBC      Depression  Assessment & Plan  Home medication Bupropion 100 mg bid, Rameron 7 5 mg HS, Effexor 37 5 mg daily, Topamax and Ambien 10 mg HS  Hold Bupropion and Topamax given propensity to worsen anorexia  Increase Rameron dose to 15 mg HS  Will continue to encourage PO intake       Anorexia nervosa, restricting type  Assessment & Plan  · Patient with history of anorexia  · Nutrition being addressed as above  · Medication changes as above     Vitamin D deficiency  Assessment & Plan  Continue 17535 units twice weekly for now    History of gastric bypass  Assessment & Plan  · Bypass surgery performed in 2007 and 2016 with nutritional deficiency at [facility]  -patient will benefit from being transferred to [facility] for continuation of treatment     Hypothyroidism  Assessment & Plan  increased to 50 mcg daily, continue    Pituitary enlargement  Assessment & Plan  Noted on MRI reji: Size is upper limits of normal  There is midline T1 hyperintense structure in the posterior sella measuring 5 mm  Decreased ambulation status-resolved as of 4/10/2021  Assessment & Plan  PT/OT-will need to be in continued rehab after discharge        Hyponatremia-resolved as of 4/10/2021  Assessment & Plan  · Patient with sodium of 134 on admission now 148- will change to d5w  · In the ED given 1 L bolus normal saline and 1 L bolus isolyte  · Patient reports poor p o  intake   · Continue to monitor    Hyperkalemia-resolved as of 4/11/2021  Assessment & Plan  · Potassium 5 6 on admission  Patient received 2 L bolus of fluids i would IV fluids-now  Low this am 3 3  · Fluids adjusted by Nephrology team  · Continue to monitor on BMP       Hypoglycemia-resolved as of 4/12/2021  Assessment & Plan  · Glucose 88 on admission   · Patient with history of hypoglycemia due to gastric bypass surgery and anorexia  · Patient prescribed acarbose 25 mg daily  Patient reports she is not taking   · Stable glucose readings        VTE Pharmacologic Prophylaxis:   Pharmacologic: Heparin  Mechanical VTE Prophylaxis in Place: Yes    Patient Centered Rounds: I have performed bedside rounds with nursing staff today  Discussions with Specialists or Other Care Team Provider:     Education and Discussions with Family / Patient:  Patient    Time Spent for Care: 30 minutes    More than 50% of total time spent on counseling and coordination of care as described above  Current Length of Stay: 12 day(s)    Current Patient Status: Inpatient   Certification Statement: The patient will continue to require additional inpatient hospital stay due to   Discharge Plan:     Code Status: Level 1 - Full Code      Subjective:   Seen this morning, tolerated her last session of plasmapheresis  Has no new complaints to offer  Has no appetite, encouraged to eat    Objective:     Vitals:   Temp (24hrs), Av 7 °F (37 1 °C), Min:98 4 °F (36 9 °C), Max:98 9 °F (37 2 °C)    Temp:  [98 4 °F (36 9 °C)-98 9 °F (37 2 °C)] 98 9 °F (37 2 °C)  HR:  [] 101  Resp:  [17-18] 17  BP: (120-127)/(76-87) 120/76  SpO2:  [95 %-97 %] 97 %  Body mass index is 27 09 kg/m²  Input and Output Summary (last 24 hours): Intake/Output Summary (Last 24 hours) at 2021 1750  Last data filed at 2021 1428  Gross per 24 hour   Intake 2000 ml   Output 2700 ml   Net -700 ml       Physical Exam:     Physical Exam  Vitals signs reviewed  Constitutional:       General: She is not in acute distress  Appearance: Normal appearance  She is not ill-appearing, toxic-appearing or diaphoretic  HENT:      Head: Normocephalic  Eyes:      Extraocular Movements: Extraocular movements intact  Pupils: Pupils are equal, round, and reactive to light  Neck:      Musculoskeletal: Normal range of motion and neck supple  No neck rigidity or muscular tenderness  Vascular: No carotid bruit  Cardiovascular:      Rate and Rhythm: Normal rate and regular rhythm  Pulses: Normal pulses  Heart sounds: Normal heart sounds  No murmur  No friction rub  Pulmonary:      Effort: Pulmonary effort is normal       Breath sounds: Normal breath sounds  No stridor  No wheezing, rhonchi or rales  Chest:      Chest wall: No tenderness  Abdominal:      General: Abdomen is flat  Bowel sounds are normal  There is no distension  Palpations: Abdomen is soft  Tenderness:  There is no abdominal tenderness  There is no guarding or rebound  Musculoskeletal: Normal range of motion  General: No swelling or tenderness  Right lower leg: No edema  Left lower leg: No edema  Skin:     General: Skin is warm and dry  Coloration: Skin is not jaundiced  Neurological:      General: No focal deficit present  Mental Status: She is alert and oriented to person, place, and time  Cranial Nerves: No cranial nerve deficit  Sensory: No sensory deficit  Motor: Weakness present  Gait: Gait normal            Additional Data:     Labs:    Results from last 7 days   Lab Units 04/15/21  1025   WBC Thousand/uL 7 37   HEMOGLOBIN g/dL 9 7*   HEMATOCRIT % 31 0*   PLATELETS Thousands/uL 215   NEUTROS PCT % 70   LYMPHS PCT % 18   MONOS PCT % 6   EOS PCT % 5     Results from last 7 days   Lab Units 04/15/21  0703   SODIUM mmol/L 144   POTASSIUM mmol/L 4 5   CHLORIDE mmol/L 111*   CO2 mmol/L 21   BUN mg/dL 4*   CREATININE mg/dL 0 60   ANION GAP mmol/L 12   CALCIUM mg/dL 8 9   GLUCOSE RANDOM mg/dL 96                           * I Have Reviewed All Lab Data Listed Above  * Additional Pertinent Lab Tests Reviewed:  All Labs Within Last 24 Hours Reviewed    Imaging:    Imaging Reports Reviewed Today Include:   Imaging Personally Reviewed by Myself Includes:      Recent Cultures (last 7 days):           Last 24 Hours Medication List:   Current Facility-Administered Medications   Medication Dose Route Frequency Provider Last Rate    acetaminophen  650 mg Oral Q6H PRN Orestes Miranda PA-C      albumin human  150 g Intravenous Every Other Day SMITHA Shelby      aztreonam  1,000 mg Intravenous Q8H Fredo Jensen MD 1,000 mg (04/17/21 0407)    butalbital-acetaminophen-caffeine  1 tablet Oral Q6H PRN Gildardo Dejesus PA-C      cyclobenzaprine  10 mg Oral TID PRN Orestes Miranda PA-C      docusate sodium  100 mg Oral BID Orestes Miranda PA-C      ergocalciferol  50,000 Units Oral Once per day on Mon Thu Elizabeth Fly, DO      folic acid  1 mg Oral Daily Joelle Mensah PA-C      gabapentin  100 mg Oral TID PRN Joelle Mensah PA-C      gabapentin  300 mg Oral HS SMITHA Matthews      heparin (porcine)  5,000 Units Subcutaneous [facility] Joelle Mensah PA-C      levETIRAcetam  500 mg Oral Q12H [address] Heydi Aburto PA-C      levothyroxine  50 mcg Oral Early Morning Elizabeth Fly, DO      LORazepam  0 5 mg Intravenous Once SMITHA Brooks      LORazepam  0 5 mg Oral Q8H PRN Kerry Yost MD      mirtazapine  15 mg Oral HS Kerry Yost MD      multi-electrolyte  100 mL/hr Intravenous Continuous Kerry Yost  mL/hr (04/17/21 1148)    multivitamin-minerals  1 tablet Oral TID Boston Tanlbutt, DO      polyethylene glycol  17 g Oral Daily Joelle Mensah PA-C      prazosin  2 mg Oral HS Elizabeth Fly, DO      pyridoxine  20 mg Intravenous Daily Kerry Yost MD      thiamine  100 mg Oral Daily Kerry Yost MD      venlafaxine  37 5 mg Oral Daily Elizabeth Fly, DO      zolpidem  5 mg Oral HS Kerry Yost MD          Today, Patient Was Seen By: Kerry Yost MD    ** Please Note: Dictation voice to text software may have been used in the creation of this document   **

## 2021-04-18 LAB
ANION GAP SERPL CALCULATED.3IONS-SCNC: 11 MMOL/L (ref 4–13)
BASOPHILS # BLD AUTO: 0.03 THOUSANDS/ΜL (ref 0–0.1)
BASOPHILS NFR BLD AUTO: 0 % (ref 0–1)
BUN SERPL-MCNC: 2 MG/DL (ref 5–25)
CALCIUM SERPL-MCNC: 8.4 MG/DL (ref 8.3–10.1)
CHLORIDE SERPL-SCNC: 110 MMOL/L (ref 100–108)
CO2 SERPL-SCNC: 24 MMOL/L (ref 21–32)
CREAT SERPL-MCNC: 0.54 MG/DL (ref 0.6–1.3)
EOSINOPHIL # BLD AUTO: 0.27 THOUSAND/ΜL (ref 0–0.61)
EOSINOPHIL NFR BLD AUTO: 4 % (ref 0–6)
ERYTHROCYTE [DISTWIDTH] IN BLOOD BY AUTOMATED COUNT: 16.7 % (ref 11.6–15.1)
GFR SERPL CREATININE-BSD FRML MDRD: 120 ML/MIN/1.73SQ M
GLUCOSE SERPL-MCNC: 90 MG/DL (ref 65–140)
HCT VFR BLD AUTO: 29.8 % (ref 34.8–46.1)
HGB BLD-MCNC: 8.8 G/DL (ref 11.5–15.4)
IMM GRANULOCYTES # BLD AUTO: 0.03 THOUSAND/UL (ref 0–0.2)
IMM GRANULOCYTES NFR BLD AUTO: 0 % (ref 0–2)
LYMPHOCYTES # BLD AUTO: 1.49 THOUSANDS/ΜL (ref 0.6–4.47)
LYMPHOCYTES NFR BLD AUTO: 22 % (ref 14–44)
MCH RBC QN AUTO: 31.8 PG (ref 26.8–34.3)
MCHC RBC AUTO-ENTMCNC: 29.5 G/DL (ref 31.4–37.4)
MCV RBC AUTO: 108 FL (ref 82–98)
MONOCYTES # BLD AUTO: 0.4 THOUSAND/ΜL (ref 0.17–1.22)
MONOCYTES NFR BLD AUTO: 6 % (ref 4–12)
NEUTROPHILS # BLD AUTO: 4.53 THOUSANDS/ΜL (ref 1.85–7.62)
NEUTS SEG NFR BLD AUTO: 68 % (ref 43–75)
NRBC BLD AUTO-RTO: 0 /100 WBCS
PLATELET # BLD AUTO: 181 THOUSANDS/UL (ref 149–390)
PMV BLD AUTO: 11.6 FL (ref 8.9–12.7)
POTASSIUM SERPL-SCNC: 3.5 MMOL/L (ref 3.5–5.3)
RBC # BLD AUTO: 2.77 MILLION/UL (ref 3.81–5.12)
SODIUM SERPL-SCNC: 145 MMOL/L (ref 136–145)
WBC # BLD AUTO: 6.75 THOUSAND/UL (ref 4.31–10.16)

## 2021-04-18 PROCEDURE — 80048 BASIC METABOLIC PNL TOTAL CA: CPT | Performed by: INTERNAL MEDICINE

## 2021-04-18 PROCEDURE — 85025 COMPLETE CBC W/AUTO DIFF WBC: CPT | Performed by: INTERNAL MEDICINE

## 2021-04-18 PROCEDURE — 99232 SBSQ HOSP IP/OBS MODERATE 35: CPT | Performed by: INTERNAL MEDICINE

## 2021-04-18 RX ORDER — SODIUM CHLORIDE, SODIUM GLUCONATE, SODIUM ACETATE, POTASSIUM CHLORIDE, MAGNESIUM CHLORIDE, SODIUM PHOSPHATE, DIBASIC, AND POTASSIUM PHOSPHATE .53; .5; .37; .037; .03; .012; .00082 G/100ML; G/100ML; G/100ML; G/100ML; G/100ML; G/100ML; G/100ML
100 INJECTION, SOLUTION INTRAVENOUS CONTINUOUS
Status: DISPENSED | OUTPATIENT
Start: 2021-04-18 | End: 2021-04-20

## 2021-04-18 RX ADMIN — LORAZEPAM 0.5 MG: 0.5 TABLET ORAL at 16:00

## 2021-04-18 RX ADMIN — PRAZOSIN HYDROCHLORIDE 2 MG: 1 CAPSULE ORAL at 22:03

## 2021-04-18 RX ADMIN — AZTREONAM 1000 MG: 1 INJECTION, POWDER, LYOPHILIZED, FOR SOLUTION INTRAMUSCULAR; INTRAVENOUS at 14:19

## 2021-04-18 RX ADMIN — CYCLOBENZAPRINE HYDROCHLORIDE 10 MG: 10 TABLET, FILM COATED ORAL at 14:25

## 2021-04-18 RX ADMIN — SODIUM CHLORIDE, SODIUM GLUCONATE, SODIUM ACETATE, POTASSIUM CHLORIDE, MAGNESIUM CHLORIDE, SODIUM PHOSPHATE, DIBASIC, AND POTASSIUM PHOSPHATE 100 ML/HR: .53; .5; .37; .037; .03; .012; .00082 INJECTION, SOLUTION INTRAVENOUS at 12:20

## 2021-04-18 RX ADMIN — CYCLOBENZAPRINE HYDROCHLORIDE 10 MG: 10 TABLET, FILM COATED ORAL at 22:03

## 2021-04-18 RX ADMIN — VENLAFAXINE HYDROCHLORIDE 37.5 MG: 37.5 CAPSULE, EXTENDED RELEASE ORAL at 09:05

## 2021-04-18 RX ADMIN — HEPARIN SODIUM 5000 UNITS: 5000 INJECTION INTRAVENOUS; SUBCUTANEOUS at 14:19

## 2021-04-18 RX ADMIN — CYANOCOBALAMIN TAB 500 MCG 1000 MCG: 500 TAB at 16:00

## 2021-04-18 RX ADMIN — DOCUSATE SODIUM 100 MG: 100 CAPSULE, LIQUID FILLED ORAL at 17:31

## 2021-04-18 RX ADMIN — SODIUM CHLORIDE, SODIUM GLUCONATE, SODIUM ACETATE, POTASSIUM CHLORIDE, MAGNESIUM CHLORIDE, SODIUM PHOSPHATE, DIBASIC, AND POTASSIUM PHOSPHATE 100 ML/HR: .53; .5; .37; .037; .03; .012; .00082 INJECTION, SOLUTION INTRAVENOUS at 19:37

## 2021-04-18 RX ADMIN — MIRTAZAPINE 15 MG: 15 TABLET, FILM COATED ORAL at 21:47

## 2021-04-18 RX ADMIN — HEPARIN SODIUM 5000 UNITS: 5000 INJECTION INTRAVENOUS; SUBCUTANEOUS at 21:47

## 2021-04-18 RX ADMIN — Medication 1 TABLET: at 21:48

## 2021-04-18 RX ADMIN — THIAMINE HCL TAB 100 MG 100 MG: 100 TAB at 09:05

## 2021-04-18 RX ADMIN — LEVETIRACETAM 500 MG: 500 TABLET, FILM COATED ORAL at 21:47

## 2021-04-18 RX ADMIN — AZTREONAM 1000 MG: 1 INJECTION, POWDER, LYOPHILIZED, FOR SOLUTION INTRAMUSCULAR; INTRAVENOUS at 05:30

## 2021-04-18 RX ADMIN — DOCUSATE SODIUM 100 MG: 100 CAPSULE, LIQUID FILLED ORAL at 09:05

## 2021-04-18 RX ADMIN — LEVETIRACETAM 500 MG: 500 TABLET, FILM COATED ORAL at 09:05

## 2021-04-18 RX ADMIN — PYRIDOXINE HYDROCHLORIDE 20 MG: 100 INJECTION, SOLUTION INTRAMUSCULAR; INTRAVENOUS at 09:10

## 2021-04-18 RX ADMIN — LORAZEPAM 0.5 MG: 0.5 TABLET ORAL at 07:13

## 2021-04-18 RX ADMIN — LEVOTHYROXINE SODIUM 50 MCG: 25 TABLET ORAL at 05:00

## 2021-04-18 RX ADMIN — GABAPENTIN 300 MG: 300 CAPSULE ORAL at 21:48

## 2021-04-18 RX ADMIN — Medication 1 TABLET: at 16:00

## 2021-04-18 RX ADMIN — HEPARIN SODIUM 5000 UNITS: 5000 INJECTION INTRAVENOUS; SUBCUTANEOUS at 05:00

## 2021-04-18 RX ADMIN — BUTALBITAL, ACETAMINOPHEN AND CAFFEINE 1 TABLET: 50; 325; 40 TABLET ORAL at 19:36

## 2021-04-18 RX ADMIN — BUTALBITAL, ACETAMINOPHEN AND CAFFEINE 1 TABLET: 50; 325; 40 TABLET ORAL at 10:40

## 2021-04-18 RX ADMIN — ZOLPIDEM TARTRATE 5 MG: 5 TABLET, COATED ORAL at 21:47

## 2021-04-18 RX ADMIN — Medication 1 TABLET: at 09:05

## 2021-04-18 RX ADMIN — FOLIC ACID 1 MG: 1 TABLET ORAL at 09:05

## 2021-04-18 NOTE — ASSESSMENT & PLAN NOTE
Likely due to nutritional deficiencies causing polyneuropathy with superimposed anorexia nervosa  LP bland, Lyme negative, anti tense in converting enzyme negative, VDRL nonreactive, vitamin B12 899, vitamin B6 1 9, copper 70  Neurology input appreciated completed Plasmapharesis #5/5 completed on 04/17  IVF with plasmalyte, via PICC line, placed 04/16, continue  Continue thiamine repletion, vitamin B6 repletion, Keppra 500 mg bid     MRI C-spine w/ no abnormality besides mild degenerative changes  outpatient EMG/NCS for further evaluation of polyneuropathy on d/c   PT/OT    Remove temp dialysis catheter tomorrow (placed by IR)

## 2021-04-18 NOTE — ASSESSMENT & PLAN NOTE
Malnutrition Findings:   Adult Malnutrition type: Chronic illness  Adult Degree of Malnutrition: Other severe protein calorie malnutrition    BMI Findings: Body mass index is 28 23 kg/m²  Nutrition team is following   Continue with nutrition supplementation   IVF  Patient now agreeable to a feeding tube when she discussed with palliative however I do not think this is a good idea as she is able to eat she just chooses not to  Lin Constant discussion with patient who Is now agreeable to eating  Had discussion with Bariatric surgeon Dr Hien Berkowitz who also believes her symptoms are all nutritional deficiency related  He recommends she be transferred to center where her bariatric surgery was performed  She states her surgery was at [facility], records requested  Will attempt to initiate transfer

## 2021-04-18 NOTE — ASSESSMENT & PLAN NOTE
· Hemoglobin 10 0 on admission  · Hb 8 8  · No signs of active bleeding  · Continue to monitor on CBC

## 2021-04-18 NOTE — QUICK NOTE
Per recommendations of Bariatric surgery at [facility], transfer was discussed with [facility] where here bariatric surgeries were performed  I spoke to Dr Laurent Walls with hospital medicine who has refused the transfer   They recommend the patient be [facility] from [facility] and then follow up with their bariatric surgery team

## 2021-04-18 NOTE — PROGRESS NOTES
[place]  Progress Note - Javier Esquivel 1980, 47 y o  female MRN: [id number]  Unit/Bed#: -Shira Encounter: [id number]  Primary Care Provider: Yeni Winn DO   Date and time admitted to hospital: 4/5/2021  5:54 PM    * Generalized weakness  Assessment & Plan  Likely due to nutritional deficiencies causing polyneuropathy with superimposed anorexia nervosa  LP bland, Lyme negative, anti tense in converting enzyme negative, VDRL nonreactive, vitamin B12 899, vitamin B6 1 9, copper 70  Neurology input appreciated completed Plasmapharesis #5/5 completed on 04/17  IVF with plasmalyte, via PICC line, placed 04/16, continue  Continue thiamine repletion, vitamin B6 repletion, Keppra 500 mg bid  MRI C-spine w/ no abnormality besides mild degenerative changes  outpatient EMG/NCS for further evaluation of polyneuropathy on d/c   PT/OT    Remove temp dialysis catheter tomorrow (placed by IR)    Severe protein-calorie malnutrition ([address] )  Assessment & Plan  Malnutrition Findings:   Adult Malnutrition type: Chronic illness  Adult Degree of Malnutrition: Other severe protein calorie malnutrition    BMI Findings: Body mass index is 28 23 kg/m²  Nutrition team is following   Continue with nutrition supplementation   IVF  Patient now agreeable to a feeding tube when she discussed with palliative however I do not think this is a good idea as she is able to eat she just chooses not to  Lonita Smart discussion with patient who Is now agreeable to eating  Had discussion with Bariatric surgeon Dr Abbey Salazar who also believes her symptoms are all nutritional deficiency related  He recommends she be transferred to center where her bariatric surgery was performed  She states her surgery was at [facility], records requested  Will attempt to initiate transfer       RENE (acute kidney injury) (HCC)-resolved as of 4/17/2021  Assessment & Plan  · RENE POA a/e/b creatinine of 5 15 on admission, due to obstructive uropathy with possible prerenal component, treated with IVF, Zhong catheter and nephrology consult and resolved  · Creatinine is within baseline     Acute cystitis without hematuria  Assessment & Plan  With urinary retention, abnormal urinalysis, urine cx with Ecoli  Completed treatment with antibiotics    Copper deficiency  Assessment & Plan  Copper deficiency likely due to bariatric surgery noted with levels of 79, could explain symptoms including malunion neuropathy and gait disturbance  Needs repletion however copper does not appear to be on formulary  discussed with pharmacy, continue multivitamins    Vitamin B6 deficiency  Assessment & Plan  Noted with level of 1 9  Also a cause of neuropathy   Replete with 20 mg/day ideally for 3 weeks, followed by daily oral therapy for several weeks  Urinary retention  Assessment & Plan  ? Neurogenic bladder, UTI  S/p Zhong placement with clear urine   Appreciate urology recommendations, trial of void tomorrow, if > 250 cc residual, will replace Zhong      Soft tissue lesion of pelvic region  Assessment & Plan  Incidental finding on MRI of the lumbar spine with right cul-de-sac lesion  US Pelvis:uterus is anteverted in position, measuring 6 3 x 2 8 x 4 6 cm  Normal cervix, endometrium, normal left ovary, R ovary with uniform low-level internal echoes is identified, No suspicious adnexal mass or loculated collections  follow-up US in 8-12 weeks is recommended    Needs Gyn f/u outpatient     Anemia  Assessment & Plan  · Hemoglobin 10 0 on admission  · Hb 8 8  · No signs of active bleeding  · Continue to monitor on CBC      Depression  Assessment & Plan  Home medication Bupropion 100 mg bid, Rameron 7 5 mg HS, Effexor 37 5 mg daily, Topamax and Ambien 10 mg HS  Hold Bupropion and Topamax given propensity to worsen anorexia  Increase Rameron dose to 15 mg HS  Will continue to encourage PO intake       Anorexia nervosa, restricting type  Assessment & Plan  · Patient with history of anorexia  · Nutrition being addressed as above  · Medication changes as above     Vitamin D deficiency  Assessment & Plan  Continue 08395 units twice weekly for now    History of gastric bypass  Assessment & Plan  · Bypass surgery performed in 2007 and 2016 with nutritional deficiency at [facility]  -patient will benefit from being transferred to [facility] for continuation of treatment     Hypothyroidism  Assessment & Plan  increased to 50 mcg daily, continue    Pituitary enlargement  Assessment & Plan  Noted on MRI reji: Size is upper limits of normal  There is midline T1 hyperintense structure in the posterior sella measuring 5 mm  Decreased ambulation status-resolved as of 4/10/2021  Assessment & Plan  PT/OT-will need to be in continued rehab after discharge        Hyperkalemia-resolved as of 4/11/2021  Assessment & Plan  · Potassium 5 6 on admission  Patient received 2 L bolus of fluids i would IV fluids-now  Low this am 3 3  · Fluids adjusted by Nephrology team  · Continue to monitor on BMP       Hypoglycemia-resolved as of 4/12/2021  Assessment & Plan  · Glucose 88 on admission   · Patient with history of hypoglycemia due to gastric bypass surgery and anorexia  · Patient prescribed acarbose 25 mg daily  Patient reports she is not taking   · Stable glucose readings      VTE Pharmacologic Prophylaxis:   Pharmacologic: Heparin  Mechanical VTE Prophylaxis in Place: Yes    Patient Centered Rounds: I have performed bedside rounds with nursing staff today  Discussions with Specialists or Other Care Team Provider:     Education and Discussions with Family / Patient:  Patient    Time Spent for Care: 30 minutes  More than 50% of total time spent on counseling and coordination of care as described above      Current Length of Stay: 13 day(s)    Current Patient Status: Inpatient   Certification Statement: The patient will continue to require additional inpatient hospital stay due to      Discharge Plan:  Likely transfer to [facility]    Code Status: Level 1 - Full Code      Subjective:   Seen this morning without complaints  Ate 2 slices of Pizza last night     Objective:     Vitals:   Temp (24hrs), Av 3 °F (36 8 °C), Min:97 7 °F (36 5 °C), Max:98 9 °F (37 2 °C)    Temp:  [97 7 °F (36 5 °C)-98 9 °F (37 2 °C)] 97 7 °F (36 5 °C)  HR:  [] 99  Resp:  [17-19] 17  BP: (117-121)/(74-78) 117/74  SpO2:  [97 %-100 %] 100 %  Body mass index is 28 23 kg/m²  Input and Output Summary (last 24 hours): Intake/Output Summary (Last 24 hours) at 2021 1525  Last data filed at 2021 1300  Gross per 24 hour   Intake 2718 33 ml   Output  ml   Net 693 33 ml       Physical Exam:     Physical Exam  Vitals signs reviewed  Constitutional:       General: She is not in acute distress  Appearance: Normal appearance  She is not ill-appearing, toxic-appearing or diaphoretic  HENT:      Head: Normocephalic  Eyes:      Extraocular Movements: Extraocular movements intact  Pupils: Pupils are equal, round, and reactive to light  Neck:      Musculoskeletal: Normal range of motion and neck supple  No neck rigidity or muscular tenderness  Vascular: No carotid bruit  Cardiovascular:      Rate and Rhythm: Normal rate and regular rhythm  Pulses: Normal pulses  Heart sounds: Normal heart sounds  No murmur  No friction rub  Pulmonary:      Effort: Pulmonary effort is normal       Breath sounds: Normal breath sounds  No stridor  No wheezing, rhonchi or rales  Chest:      Chest wall: No tenderness  Abdominal:      General: Abdomen is flat  Bowel sounds are normal  There is no distension  Palpations: Abdomen is soft  Tenderness: There is no abdominal tenderness  There is no guarding or rebound  Musculoskeletal: Normal range of motion  General: No swelling or tenderness  Right lower leg: No edema  Left lower leg: No edema     Skin: General: Skin is warm and dry  Coloration: Skin is not jaundiced  Neurological:      Mental Status: She is alert and oriented to person, place, and time  Cranial Nerves: No cranial nerve deficit  Sensory: No sensory deficit  Motor: Weakness present  Additional Data:     Labs:    Results from last 7 days   Lab Units 04/18/21  0455   WBC Thousand/uL 6 75   HEMOGLOBIN g/dL 8 8*   HEMATOCRIT % 29 8*   PLATELETS Thousands/uL 181   NEUTROS PCT % 68   LYMPHS PCT % 22   MONOS PCT % 6   EOS PCT % 4     Results from last 7 days   Lab Units 04/18/21  0455   SODIUM mmol/L 145   POTASSIUM mmol/L 3 5   CHLORIDE mmol/L 110*   CO2 mmol/L 24   BUN mg/dL 2*   CREATININE mg/dL 0 54*   ANION GAP mmol/L 11   CALCIUM mg/dL 8 4   GLUCOSE RANDOM mg/dL 90                           * I Have Reviewed All Lab Data Listed Above  * Additional Pertinent Lab Tests Reviewed:  All Labs Within Last 24 Hours Reviewed    Imaging:    Imaging Reports Reviewed Today Include:   Imaging Personally Reviewed by Myself Includes:      Recent Cultures (last 7 days):           Last 24 Hours Medication List:   Current Facility-Administered Medications   Medication Dose Route Frequency Provider Last Rate    acetaminophen  650 mg Oral Q6H PRN Summer Gramajo PA-C      albumin human  150 g Intravenous Every Other Day SMITHA Hines      butalbital-acetaminophen-caffeine  1 tablet Oral Q6H PRN Deb Miller PA-C      cyclobenzaprine  10 mg Oral TID PRN Summer Gramajo PA-C      docusate sodium  100 mg Oral BID Summer Gramajo PA-C      ergocalciferol  50,000 Units Oral Once per day on Mon Thu Elizabeth Johnson DO      folic acid  1 mg Oral Daily Summer Gramajo PA-C      gabapentin  100 mg Oral TID PRN Summer Gramajo PA-C      gabapentin  300 mg Oral HS SMITHA Hines      heparin (porcine)  5,000 Units Subcutaneous Q8H [facility] Summer Gramajo PA-C      levETIRAcetam  500 mg Oral Q12H [facility] Heydi Cedeño PA-C      levothyroxine  50 mcg Oral Early Morning Elizabeth Fly, DO      LORazepam  0 5 mg Intravenous Once Mayte Erika, CRNP      LORazepam  0 5 mg Oral Q8H PRN Jose J Isbell MD      mirtazapine  15 mg Oral HS Jose J Isbell MD      multivitamin-minerals  1 tablet Oral TID Boston Tirado, DO      polyethylene glycol  17 g Oral Daily Ángela Ann MarieLUIS middleton      prazosin  2 mg Oral HS Elizabeth Fly, DO      pyridoxine  20 mg Intravenous Daily Jose J Isbell MD      thiamine  100 mg Oral Daily Jose J Isbell MD      venlafaxine  37 5 mg Oral Daily Elizabeth Fly, DO      zolpidem  5 mg Oral HS Jose J Isbell MD          Today, Patient Was Seen By: Jose J Isbell MD    ** Please Note: Dictation voice to text software may have been used in the creation of this document   **

## 2021-04-18 NOTE — ASSESSMENT & PLAN NOTE
Copper deficiency likely due to bariatric surgery noted with levels of 79, could explain symptoms including malunion neuropathy and gait disturbance  Needs repletion however copper does not appear to be on formulary    discussed with pharmacy, continue multivitamins

## 2021-04-18 NOTE — QUICK NOTE
Patient's serum copper level is mildly reduced at 70 mcg  (Lower limit of normal 80)  Discussed with pharmacy, they were unable to locate any elemental copper for IV infusion, but started on multivitamin t i d , each tablet containing 0 5 mg copper  Unclear if the copper deficiency is ultimately responsible, but given just minor deficit, hopefully oral supplementation will be sufficient    Also continues on B6 supplementation (also slightly low at 1 9, lower limit of normal 2 0)  Methylmalonic acid is elevated, suggesting potential need for B12 supplementation as well though her serum B12 level is normal

## 2021-04-18 NOTE — PLAN OF CARE
Problem: Potential for Falls  Goal: Patient will remain free of falls  Description: INTERVENTIONS:  - Assess patient frequently for physical needs  -  Identify cognitive and physical deficits and behaviors that affect risk of falls  -  [place] fall precautions as indicated by assessment   - Educate patient/family on patient safety including physical limitations  - Instruct patient to call for assistance with activity based on assessment  - Modify environment to reduce risk of injury  - Consider OT/PT consult to assist with strengthening/mobility  Outcome: Progressing     Problem: Prexisting or High Potential for Compromised Skin Integrity  Goal: Skin integrity is maintained or improved  Description: INTERVENTIONS:  - Identify patients at risk for skin breakdown  - Assess and monitor skin integrity  - Assess and monitor nutrition and hydration status  - Monitor labs   - Assess for incontinence   - Turn and reposition patient  - Assist with mobility/ambulation  - Relieve pressure over bony prominences  - Avoid friction and shearing  - Provide appropriate hygiene as needed including keeping skin clean and dry  - Evaluate need for skin moisturizer/barrier cream  - Collaborate with interdisciplinary team   - Patient/family teaching  - Consider wound care consult   Outcome: Progressing     Problem: Nutrition/Hydration-ADULT  Goal: Nutrient/Hydration intake appropriate for improving, restoring or maintaining nutritional needs  Description: Monitor and assess patient's nutrition/hydration status for malnutrition  Collaborate with interdisciplinary team and initiate plan and interventions as ordered  Monitor patient's weight and dietary intake as ordered or per policy  Utilize nutrition screening tool and intervene as necessary  Determine patient's food preferences and provide high-protein, high-caloric foods as appropriate       INTERVENTIONS:  - Monitor oral intake, urinary output, labs, and treatment plans  - Assess nutrition and hydration status and recommend course of action  - Evaluate amount of meals eaten  - Assist patient with eating if necessary   - Allow adequate time for meals  - Recommend/ encourage appropriate diets, oral nutritional supplements, and vitamin/mineral supplements  - Order, calculate, and assess calorie counts as needed  - Recommend, monitor, and adjust tube feedings and TPN/PPN based on assessed needs  - Assess need for intravenous fluids  - Provide specific nutrition/hydration education as appropriate  - Include patient/family/caregiver in decisions related to nutrition  Outcome: Progressing     Problem: PAIN - ADULT  Goal: Verbalizes/displays adequate comfort level or baseline comfort level  Description: Interventions:  - Encourage patient to monitor pain and request assistance  - Assess pain using appropriate pain scale  - Administer analgesics based on type and severity of pain and evaluate response  - Implement non-pharmacological measures as appropriate and evaluate response  - Consider cultural and social influences on pain and pain management  - Notify physician/advanced practitioner if interventions unsuccessful or patient reports new pain  Outcome: Progressing     Problem: INFECTION - ADULT  Goal: Absence or prevention of progression during hospitalization  Description: INTERVENTIONS:  - Assess and monitor for signs and symptoms of infection  - Monitor lab/diagnostic results  - Monitor all insertion sites, i e  indwelling lines, tubes, and drains  - Monitor endotracheal if appropriate and nasal secretions for changes in amount and color  - [place] appropriate cooling/warming therapies per order  - Administer medications as ordered  - Instruct and encourage patient and family to use good hand hygiene technique  - Identify and instruct in appropriate isolation precautions for identified infection/condition  Outcome: Progressing     Problem: Knowledge Deficit  Goal: Patient/family/caregiver demonstrates understanding of disease process, treatment plan, medications, and discharge instructions  Description: Complete learning assessment and assess knowledge base    Interventions:  - Provide teaching at level of understanding  - Provide teaching via preferred learning methods  Outcome: Progressing     Problem: NEUROSENSORY - ADULT  Goal: Achieves stable or improved neurological status  Description: INTERVENTIONS  - Monitor and report changes in neurological status  - Monitor vital signs such as temperature, blood pressure, glucose, and any other labs ordered   - Initiate measures to prevent increased intracranial pressure  - Monitor for seizure activity and implement precautions if appropriate      Outcome: Progressing     Problem: GASTROINTESTINAL - ADULT  Goal: Maintains adequate nutritional intake  Description: INTERVENTIONS:  - Monitor percentage of each meal consumed  - Identify factors contributing to decreased intake, treat as appropriate  - Assist with meals as needed  - Monitor I&O, weight, and lab values if indicated  - Obtain nutrition services referral as needed  Outcome: Progressing     Problem: GENITOURINARY - ADULT  Goal: Urinary catheter remains patent  Description: INTERVENTIONS:  - Assess patency of urinary catheter  - If patient has a chronic bates, consider changing catheter if non-functioning  - Follow guidelines for intermittent irrigation of non-functioning urinary catheter  Outcome: Progressing

## 2021-04-19 PROCEDURE — 99232 SBSQ HOSP IP/OBS MODERATE 35: CPT | Performed by: INTERNAL MEDICINE

## 2021-04-19 PROCEDURE — NC001 PR NO CHARGE: Performed by: RADIOLOGY

## 2021-04-19 PROCEDURE — 99232 SBSQ HOSP IP/OBS MODERATE 35: CPT | Performed by: FAMILY MEDICINE

## 2021-04-19 RX ORDER — LORAZEPAM 1 MG/1
1 TABLET ORAL DAILY PRN
Status: DISCONTINUED | OUTPATIENT
Start: 2021-04-19 | End: 2021-04-26 | Stop reason: HOSPADM

## 2021-04-19 RX ORDER — ZOLPIDEM TARTRATE 5 MG/1
10 TABLET ORAL
Status: DISCONTINUED | OUTPATIENT
Start: 2021-04-19 | End: 2021-04-26 | Stop reason: HOSPADM

## 2021-04-19 RX ADMIN — LEVETIRACETAM 500 MG: 500 TABLET, FILM COATED ORAL at 09:00

## 2021-04-19 RX ADMIN — VENLAFAXINE HYDROCHLORIDE 37.5 MG: 37.5 CAPSULE, EXTENDED RELEASE ORAL at 09:00

## 2021-04-19 RX ADMIN — CYCLOBENZAPRINE HYDROCHLORIDE 10 MG: 10 TABLET, FILM COATED ORAL at 21:59

## 2021-04-19 RX ADMIN — GABAPENTIN 300 MG: 300 CAPSULE ORAL at 22:00

## 2021-04-19 RX ADMIN — PRAZOSIN HYDROCHLORIDE 2 MG: 1 CAPSULE ORAL at 22:09

## 2021-04-19 RX ADMIN — PYRIDOXINE HYDROCHLORIDE 20 MG: 100 INJECTION, SOLUTION INTRAMUSCULAR; INTRAVENOUS at 12:19

## 2021-04-19 RX ADMIN — BUTALBITAL, ACETAMINOPHEN AND CAFFEINE 1 TABLET: 50; 325; 40 TABLET ORAL at 14:09

## 2021-04-19 RX ADMIN — HEPARIN SODIUM 5000 UNITS: 5000 INJECTION INTRAVENOUS; SUBCUTANEOUS at 21:58

## 2021-04-19 RX ADMIN — SODIUM CHLORIDE, SODIUM GLUCONATE, SODIUM ACETATE, POTASSIUM CHLORIDE, MAGNESIUM CHLORIDE, SODIUM PHOSPHATE, DIBASIC, AND POTASSIUM PHOSPHATE 100 ML/HR: .53; .5; .37; .037; .03; .012; .00082 INJECTION, SOLUTION INTRAVENOUS at 17:35

## 2021-04-19 RX ADMIN — Medication 1 TABLET: at 15:30

## 2021-04-19 RX ADMIN — LORAZEPAM 0.5 MG: 0.5 TABLET ORAL at 20:54

## 2021-04-19 RX ADMIN — HEPARIN SODIUM 5000 UNITS: 5000 INJECTION INTRAVENOUS; SUBCUTANEOUS at 14:09

## 2021-04-19 RX ADMIN — LEVOTHYROXINE SODIUM 50 MCG: 25 TABLET ORAL at 05:47

## 2021-04-19 RX ADMIN — THIAMINE HCL TAB 100 MG 100 MG: 100 TAB at 09:00

## 2021-04-19 RX ADMIN — DOCUSATE SODIUM 100 MG: 100 CAPSULE, LIQUID FILLED ORAL at 17:37

## 2021-04-19 RX ADMIN — SODIUM CHLORIDE, SODIUM GLUCONATE, SODIUM ACETATE, POTASSIUM CHLORIDE, MAGNESIUM CHLORIDE, SODIUM PHOSPHATE, DIBASIC, AND POTASSIUM PHOSPHATE 100 ML/HR: .53; .5; .37; .037; .03; .012; .00082 INJECTION, SOLUTION INTRAVENOUS at 07:28

## 2021-04-19 RX ADMIN — LORAZEPAM 0.5 MG: 0.5 TABLET ORAL at 12:15

## 2021-04-19 RX ADMIN — ERGOCALCIFEROL 50000 UNITS: 1.25 CAPSULE ORAL at 09:00

## 2021-04-19 RX ADMIN — CYANOCOBALAMIN TAB 500 MCG 1000 MCG: 500 TAB at 09:00

## 2021-04-19 RX ADMIN — MIRTAZAPINE 15 MG: 15 TABLET, FILM COATED ORAL at 22:00

## 2021-04-19 RX ADMIN — Medication 1 TABLET: at 20:54

## 2021-04-19 RX ADMIN — Medication 1 TABLET: at 09:00

## 2021-04-19 RX ADMIN — HEPARIN SODIUM 5000 UNITS: 5000 INJECTION INTRAVENOUS; SUBCUTANEOUS at 05:47

## 2021-04-19 RX ADMIN — LEVETIRACETAM 500 MG: 500 TABLET, FILM COATED ORAL at 20:54

## 2021-04-19 RX ADMIN — DOCUSATE SODIUM 100 MG: 100 CAPSULE, LIQUID FILLED ORAL at 09:00

## 2021-04-19 RX ADMIN — ZOLPIDEM TARTRATE 10 MG: 5 TABLET, COATED ORAL at 21:59

## 2021-04-19 RX ADMIN — CYCLOBENZAPRINE HYDROCHLORIDE 10 MG: 10 TABLET, FILM COATED ORAL at 15:30

## 2021-04-19 RX ADMIN — FOLIC ACID 1 MG: 1 TABLET ORAL at 09:00

## 2021-04-19 NOTE — ASSESSMENT & PLAN NOTE
Likely due to nutritional deficiencies causing polyneuropathy with superimposed anorexia nervosa  LP bland, Lyme negative, anti tense in converting enzyme negative, VDRL nonreactive, vitamin B12 899, vitamin B6 1 9, copper 70  Neurology input appreciated completed Plasmapharesis #5/5 completed on 04/17  IVF with plasmalyte, via PICC line, placed 04/16, continue  Continue thiamine repletion, vitamin B6 repletion, Keppra 500 mg bid  MRI C-spine w/ no abnormality besides mild degenerative changes  outpatient EMG/NCS for further evaluation of polyneuropathy on d/c   PT/OT  Will remove hemodialysis catheter tomorrow  Hospitalist at [facility] did not accept patient in transfer  I stressed importance of taking p o   By mouth

## 2021-04-19 NOTE — PROGRESS NOTES
[place]  Progress Note - Ematrey Martino 1980, 47 y o  female MRN: [id number]  Unit/Bed#: -Shira Encounter: [id number]  Primary Care Provider: Meena Dotson DO   Date and time admitted to hospital: 4/5/2021  5:54 PM    * Generalized weakness  Assessment & Plan  Likely due to nutritional deficiencies causing polyneuropathy with superimposed anorexia nervosa  LP bland, Lyme negative, anti tense in converting enzyme negative, VDRL nonreactive, vitamin B12 899, vitamin B6 1 9, copper 70  Neurology input appreciated completed Plasmapharesis #5/5 completed on 04/17  IVF with plasmalyte, via PICC line, placed 04/16, continue  Continue thiamine repletion, vitamin B6 repletion, Keppra 500 mg bid  MRI C-spine w/ no abnormality besides mild degenerative changes  outpatient EMG/NCS for further evaluation of polyneuropathy on d/c   PT/OT  Will remove hemodialysis catheter tomorrow  Hospitalist at [facility] did not accept patient in transfer  I stressed importance of taking p o  By mouth      RENE (acute kidney injury) [facility])  Assessment & Plan  Patient acute kidney injury on admission due to dehydration from decreased p o  Intake, obstruction  Nephrology was following the patient  Will remove hemodialysis catheter tomorrow as patient's renal function is back to normal    Acute cystitis without hematuria  Assessment & Plan  Patient had uterine admission due to E coli and completed treatment with antibiotics      VTE Prophylaxis: in place    Patient Centered Rounds: I rounded with patient's nurse    Current Length of Stay: 14 day(s)    Current Patient Status: Inpatient    Certification Statement: Pt requires additional inpatient hospital stay due to: see assessment and plan        Subjective:   Patient denies dysphagia, odynophagia abdominal pain, chest pain, shortness of breath, dysuria    All other ROS are negative    Objective:     Vitals:   No data recorded      HR:  [] 94  Resp:  [17] 17  BP: (127-128)/(82-83) 128/83  SpO2:  [98 %] 98 %  Body mass index is 27 06 kg/m²  Input and Output Summary (last 24 hours): Intake/Output Summary (Last 24 hours) at 4/19/2021 1458  Last data filed at 4/19/2021 1401  Gross per 24 hour   Intake 1665 ml   Output 3500 ml   Net -1835 ml       Physical Exam:     Physical Exam  Eyes:      Conjunctiva/sclera: Conjunctivae normal    Cardiovascular:      Rate and Rhythm: Normal rate and regular rhythm  Heart sounds: No murmur  Pulmonary:      Effort: No respiratory distress  Breath sounds: No wheezing or rales  Abdominal:      Palpations: Abdomen is soft  Tenderness: There is no abdominal tenderness  There is no guarding  Neurological:      Mental Status: She is alert and oriented to person, place, and time  Cranial Nerves: No cranial nerve deficit  Motor: No weakness  Psychiatric:         Mood and Affect: Mood normal              I personally reviewed labs and imaging reports for today        Last 24 Hours Medication List:   Current Facility-Administered Medications   Medication Dose Route Frequency Provider Last Rate    acetaminophen  650 mg Oral Q6H PRN Nia Fuchs PA-C      albumin human  150 g Intravenous Every Other Day SMITHA Kendall      butalbital-acetaminophen-caffeine  1 tablet Oral Q6H PRN Charlee Fan PA-C      cyanocobalamin  1,000 mcg Oral Daily Xiang Morales MD      cyclobenzaprine  10 mg Oral TID PRN Nia Fuchs PA-C      docusate sodium  100 mg Oral BID Nia Fuchs PA-C      ergocalciferol  50,000 Units Oral Once per day on Mon Thu Elizabeth Johnson DO      folic acid  1 mg Oral Daily Nia Fuchs PA-C      gabapentin  100 mg Oral TID PRN Nia Fuchs PA-C      gabapentin  300 mg Oral HS SMITHA Kendall      heparin (porcine)  5,000 Units Subcutaneous [facility] Nia Fuchs PA-C      levETIRAcetam  500 mg Oral Q12H [facility] Heydi Jacobs PA-C      levothyroxine 50 mcg Oral Early Morning Elizabeth Fly, DO      LORazepam  0 5 mg Intravenous Once Pennelope Anchors, CRNP      LORazepam  0 5 mg Oral Q8H PRN Caesar Connors MD      LORazepam  1 mg Oral Daily PRN Xochilt Davison MD      mirtazapine  15 mg Oral HS Caesar Connors MD      multi-electrolyte  100 mL/hr Intravenous Continuous Luizandrew Harris PA-C 100 mL/hr (04/19/21 0728)    multivitamin-minerals  1 tablet Oral TID Nino Lary MARTIN Hurlbutt, DO      polyethylene glycol  17 g Oral Daily Luizandrew Harris PA-C      prazosin  2 mg Oral HS Elizabeth Fly, DO      pyridoxine  20 mg Intravenous Daily Caesar Connors MD      thiamine  100 mg Oral Daily Caesar Connors MD      venlafaxine  37 5 mg Oral Daily Elizabeth Fly, DO      zolpidem  5 mg Oral HS Caesar Connors MD            Today, Patient Was Seen By: Xochilt Davison MD    ** Please Note: Dictation voice to text software may have been used in the creation of this document   **

## 2021-04-19 NOTE — NUTRITION
04/19/21 1050   Assessment   Timepoint Reassess   Labs & Meds   Labs/Meds Review Lab values reviewed; Meds reviewed  (4/19/21 BUN 2, Creat 0 54 meds extensive and reviewed )   Feeding Route   PO Independent   Adequacy of Intake   Nutrition Modality PO  (Regular, with Ensure Clears TID)   Current Meal Intake 0-25%   Intake Supplements %   Estimated Calorie Intake 25-50%   Estimated Protein Intake  25-50%   Estimated Fluid Intake %  ( ml/hr)   Estimated calorie intake compared to estimated need Pt reports took 2 pieces [place] pizza Saturday (589 kcal, 24 gm protein)  Sunday 4/18 took no food but 3 Ensure Clears (720 kcal, 24 gram protein)  So far per pt reports, today Monday 4/19 took 1 Ensure Clear (240 kcal/8 gm protein)   Nutrition Prognosis   Nutrition Concerns No skin issues documented   Comorbid Concerns   (current medical: generalized wekaness, RENE, acute cystitis, copper deficiency, Vit B6 deficiency, urinary retention, soft tissue lesion pelvic area, anemai, hypokalemia, pituary enlargement, anorexia restricting type, VIt D deficiency, h/o gastric bypass)   Nutrition Considerations Motivation   PES Statement   Problem Continue previous diagnosis   Patient Nutrition Goals   Goal Increase kcal/PRO intake; Avoid weight loss; Improve to healthful diet   Goal Status not met;extended   Timeframe to complete goal by next f/u   Recommendations/Interventions   Summary Discussed at [facility] has refused transfer of pt  PO intake appears with noticable improvement over last 2-3 days, appears to be consuming 500-750 kcal range daily  Weight last 4 days: 159 lbs, 158 lbs, 165 lbs, 158 lbs  Pt has IVF running at 100 ml/hr  Of note pt has REFUSED the following medications today: Effexor, Colace, Miralax, Vit B12, VIt D2, Vit B6, Folvite, Thiamine  Can consider IV MVI or banana bag   Reached out to Imelda [address] and typical MAINTENANCE/baseline supplementation for post Bariatric patients is: MVI w/ iron daily, 500 mcg B12, 2000 IU Vit D3, 1500 mg Calcium Citrate  Interventions Supplement continue   Nutrition Recommendations Continue diet as ordered   Nutrition Complexity Risk   Nutrition complexity level High risk   Nutrition review: 04/23/21   Follow up date 04/23/21  (po intake   supplement intake, weights )

## 2021-04-19 NOTE — ASSESSMENT & PLAN NOTE
Patient acute kidney injury on admission due to dehydration from decreased p o  Intake, obstruction  Nephrology was following the patient    Will remove hemodialysis catheter tomorrow as patient's renal function is back to normal Received refill request for allopurinol  Last refill: 01/210/2020 #30 R-3  Last office visit: 02/21/2020    Order prepared and set to E-prescribe upon approval/denial.   Please sign if appropriate.

## 2021-04-19 NOTE — TELEMEDICINE
INTERPROFESSIONAL (PHONE) CONSULTATION - Interventional Radiology  Lucas Sandoval 47 y o  female MRN: [id number]  Unit/Bed#: -01 Encounter: [id number]    IR has been consulted to evaluate the patient, determine the appropriate procedure, and whether or not a procedure can and should be performed regarding the care of Lucas Sandoval  We were consulted by Dr Cristy Parks concerning catheter, and to possibly perform a removal if medically appropriate for the patient  IP Consult to IR  Consult performed by: Lisa Bojorquez DO  Consult ordered by: Kyra Reyes MD        04/19/21      Assessment/Recommendation:   47 y F with renal recovery  Request for catheter removal      Plan for removal tomorrow using local anesthesia  Does not need to be NPO  Total time spent in review of data, discussion with requesting provider and rendering advice was 10 minutes  Patient or appropriate family member was verbally informed by SLIM of this consultative service on their behalf to provide more timely access to specialty care in lieu of an in person consultation  Verbal consent was obtained  Thank you for allowing Interventional Radiology to participate in the care of Lucas Sandoval  Please don't hesitate to call or TigerText us with any questions       Lisa Bojorquez DO

## 2021-04-19 NOTE — CASE MANAGEMENT
Continue to follow  Met with Pt re: discharge planning  Currently plan is for Pt to return to [facility] if bed available upon discharge  Updated Pt's (Latha: [phone]) from [facility] re: Pt's status and need to return to rehab  Await updated PT/OT notes  Await determination from [facility]  CM to follow

## 2021-04-19 NOTE — OCCUPATIONAL THERAPY NOTE
OCCUPATIONAL THERAPY CANCELLATION     OT reviewed chart  RN cleared pt for OT tx  Upon entry pt speaking w/ neuropsychology  Will f/u as able and appropriate      Anahy Nevarez MS, OTR/L

## 2021-04-19 NOTE — PROGRESS NOTES
Progress Note - Palliative & Supportive Care  Joe Antony  47 y o   female  /-01   MRN: [id number]  Encounter: [id number]     Assessment  Patient Active Problem List   Diagnosis    Pituitary enlargement    Elevated alkaline phosphatase level    Hypothyroidism    History of gastric bypass    Vitamin D deficiency    Anorexia nervosa, restricting type    PTSD (post-traumatic stress disorder)    Moderate episode of recurrent major depressive disorder (HCC)    Laxative abuse    Depression    Generalized weakness    Anemia    Soft tissue lesion of pelvic region    Severe protein-calorie malnutrition (HCC)    Acute cystitis without hematuria    Urinary retention    Vitamin B6 deficiency    Copper deficiency     Goals of care counseling    Goals of Care  Level 1 code status    Plan  Symptom Management  Per primary team    24 History  Chart reviewed before visit  Patient reported increased oral intake over weekend however has had minimal intake since Saturday  She is hesitant to state that she at [facility] this weekend  She is agreeable to trying a regular ensure shake today  I have contacted the dietician regarding this  Bhupinder Garzon remains focused on getting better  She is disappointed that she is unable to go to an eating disorder center until she is physically stronger  She expresses anger at staff for "trying to make me eat "  I provided emotional support and attempted to encourage Bhupinder Garzon to continue focusing on getting better  Bhupinder Garzon has not yet been able to discuss her wishes with her friend, Orlando Sun  Awaiting neuropsych consult to evaluate capacity  She continues to have pain in her feet but is unable to qualify or quantify these today  She denies nausea, vomiting, constipation, and diarrhea  She denies itching and rash   She has significant anxiety, depression, and occasional dizziness when turning her head too fast     I will continue to follow with Bhupinder Garzon during her stay for emotional support and evolving goals of care  Review of Systems: Pertinent items are noted in HPI      Medications    Current Facility-Administered Medications:     acetaminophen (TYLENOL) tablet 650 mg, 650 mg, Oral, Q6H PRN, Joellen Us PA-C, 650 mg at 04/14/21 1336    albumin human (FLEXBUMIN) 5 % injection 150 g, 150 g, Intravenous, Every Other Day, SMITHA Billingsley, 150 g at 04/15/21 1447    butalbital-acetaminophen-caffeine (FIORICET,ESGIC) -40 mg per tablet 1 tablet, 1 tablet, Oral, Q6H PRN, Andrea Mcneill PA-C, 1 tablet at 04/19/21 1409    cyanocobalamin (VITAMIN B-12) tablet 1,000 mcg, 1,000 mcg, Oral, Daily, Adelso Low MD, 1,000 mcg at 04/19/21 0900    cyclobenzaprine (FLEXERIL) tablet 10 mg, 10 mg, Oral, TID PRN, Rob Land PA-C, 10 mg at 04/18/21 2203    docusate sodium (COLACE) capsule 100 mg, 100 mg, Oral, BID, Joellen Us PA-C, 100 mg at 04/19/21 0900    ergocalciferol (VITAMIN D2) capsule 50,000 Units, 50,000 Units, Oral, Once per day on Mon Thu, Elizabeth Johnson, DO, 50,000 Units at 93/76/95 5991    folic acid (FOLVITE) tablet 1 mg, 1 mg, Oral, Daily, Joellen Us PA-C, 1 mg at 04/19/21 0900    gabapentin (NEURONTIN) capsule 100 mg, 100 mg, Oral, TID PRN, Rob Land PA-C, 100 mg at 04/08/21 1757    gabapentin (NEURONTIN) capsule 300 mg, 300 mg, Oral, HS, SMITHA Billingsley, 300 mg at 04/18/21 2148    heparin (porcine) subcutaneous injection 5,000 Units, 5,000 Units, Subcutaneous, Q8H [facility], 5,000 Units at 04/19/21 1409 **AND** [CANCELED] Platelet count, , , Once, Rob Land PA-C    levETIRAcetam (KEPPRA) tablet 500 mg, 500 mg, Oral, Q12H [facility], Heydi Cheney PA-C, 500 mg at 04/19/21 0900    levothyroxine tablet 50 mcg, 50 mcg, Oral, Early Morning, Elizabeth Johnson, DO, 50 mcg at 04/19/21 0547    LORazepam (ATIVAN) injection 0 5 mg, 0 5 mg, Intravenous, Once, SMITHA Bailey    LORazepam (ATIVAN) tablet 0 5 mg, 0 5 mg, Oral, Q8H PRN, Adelso TAFOYA Mellisa Grande MD, 0 5 mg at 04/19/21 1215    LORazepam (ATIVAN) tablet 1 mg, 1 mg, Oral, Daily PRN, Angel Givens MD    mirtazapine (REMERON) tablet 15 mg, 15 mg, Oral, HS, Jo Hong MD, 15 mg at 04/18/21 2147    multi-electrolyte (PLASMALYTE-A/ISOLYTE-S PH 7 4) IV solution, 100 mL/hr, Intravenous, Continuous, Joellen Us PA-C, Last Rate: 100 mL/hr at 04/19/21 0728, 100 mL/hr at 04/19/21 0728    multivitamin-minerals (CENTRUM ADULTS) tablet 1 tablet, 1 tablet, Oral, TID, Boston Tirado DO, 1 tablet at 04/19/21 0900    polyethylene glycol (MIRALAX) packet 17 g, 17 g, Oral, Daily, Joellen Us PA-C, 17 g at 04/16/21 0854    prazosin (MINIPRESS) capsule 2 mg, 2 mg, Oral, HS, Elizabeth Alex DO, 2 mg at 04/18/21 2203    pyridoxine (VITAMIN B6) injection 20 mg, 20 mg, Intravenous, Daily, Jo Hong MD, 20 mg at 04/19/21 1219    thiamine tablet 100 mg, 100 mg, Oral, Daily, Adelso Low MD, 100 mg at 04/19/21 0900    venlafaxine (EFFEXOR-XR) 24 hr capsule 37 5 mg, 37 5 mg, Oral, Daily, Elizabethjaylin Johnson DO, 37 5 mg at 04/19/21 0900    zolpidem (AMBIEN) tablet 5 mg, 5 mg, Oral, HS, Adelso Low MD, 5 mg at 04/18/21 2147    Objective  /83   Pulse 94   Temp 97 7 °F (36 5 °C)   Resp 17   Ht 5' 4" (1 626 m)   Wt 71 5 kg (157 lb 10 1 oz)   SpO2 98%   BMI 27 06 kg/m²   Physical Exam:   Constitutional: Appears chronically ill, malnourished, frail  Eyes: EOM are normal  No ocular discharge  No scleral icterus  Neck: no visible adenopathy or masses  Respiratory: Effort normal  No stridor  No respiratory distress  Gastrointestinal: No abdominal distension  Musculoskeletal: No edema  Neurological: Alert, oriented and appropriately conversant  Skin: Dry, no diaphoresis  Psychiatric: Displays depressed mood, flat affect, frequently tearful  Anxious  Lab Results: I have personally reviewed pertinent labs  , CBC: No results found for: WBC, HGB, HCT, MCV, PLT, ADJUSTEDWBC, MCH, MCHC, RDW, MPV, NRBC, CMP: No results found for: SODIUM, K, CL, CO2, ANIONGAP, BUN, CREATININE, GLUCOSE, CALCIUM, AST, ALT, ALKPHOS, PROT, BILITOT, EGFR    Counseling / Coordination of Care  Total floor / unit time spent today 25 minutes  Greater than 50% of total time was spent with the patient and / or family counseling and / or coordinating of care  A description of the counseling / coordination of care: I  provided medical updates, discussed palliative care, determined competency, determined goals of care, determined social/family support, discussed plans of care, discussed symptom management, provided psychosocial support, collaborated with dietician and case management        Mell Nunez, [address] [place] [address]

## 2021-04-19 NOTE — OCCUPATIONAL THERAPY NOTE
OCCUPATIONAL THERAPY CANCELLATION     Attempted OT tx for 2nd time today  Pt getting washed followed by MD entering room to assess  Will continue to f/u and see as able and appropriate       Herbert Wolf MS, OTR/L

## 2021-04-20 LAB
ANION GAP SERPL CALCULATED.3IONS-SCNC: 9 MMOL/L (ref 4–13)
BASOPHILS # BLD AUTO: 0.02 THOUSANDS/ΜL (ref 0–0.1)
BASOPHILS NFR BLD AUTO: 1 % (ref 0–1)
BUN SERPL-MCNC: 3 MG/DL (ref 5–25)
CALCIUM SERPL-MCNC: 8.3 MG/DL (ref 8.3–10.1)
CHLORIDE SERPL-SCNC: 109 MMOL/L (ref 100–108)
CO2 SERPL-SCNC: 28 MMOL/L (ref 21–32)
CREAT SERPL-MCNC: 0.49 MG/DL (ref 0.6–1.3)
EOSINOPHIL # BLD AUTO: 0.19 THOUSAND/ΜL (ref 0–0.61)
EOSINOPHIL NFR BLD AUTO: 5 % (ref 0–6)
ERYTHROCYTE [DISTWIDTH] IN BLOOD BY AUTOMATED COUNT: 16.5 % (ref 11.6–15.1)
GFR SERPL CREATININE-BSD FRML MDRD: 124 ML/MIN/1.73SQ M
GLUCOSE SERPL-MCNC: 91 MG/DL (ref 65–140)
HCT VFR BLD AUTO: 27.6 % (ref 34.8–46.1)
HGB BLD-MCNC: 8.4 G/DL (ref 11.5–15.4)
IMM GRANULOCYTES # BLD AUTO: 0.01 THOUSAND/UL (ref 0–0.2)
IMM GRANULOCYTES NFR BLD AUTO: 0 % (ref 0–2)
LYMPHOCYTES # BLD AUTO: 1.06 THOUSANDS/ΜL (ref 0.6–4.47)
LYMPHOCYTES NFR BLD AUTO: 26 % (ref 14–44)
MCH RBC QN AUTO: 32.7 PG (ref 26.8–34.3)
MCHC RBC AUTO-ENTMCNC: 30.4 G/DL (ref 31.4–37.4)
MCV RBC AUTO: 107 FL (ref 82–98)
MONOCYTES # BLD AUTO: 0.3 THOUSAND/ΜL (ref 0.17–1.22)
MONOCYTES NFR BLD AUTO: 7 % (ref 4–12)
NEUTROPHILS # BLD AUTO: 2.46 THOUSANDS/ΜL (ref 1.85–7.62)
NEUTS SEG NFR BLD AUTO: 61 % (ref 43–75)
NRBC BLD AUTO-RTO: 0 /100 WBCS
PLATELET # BLD AUTO: 159 THOUSANDS/UL (ref 149–390)
PMV BLD AUTO: 11.7 FL (ref 8.9–12.7)
POTASSIUM SERPL-SCNC: 3.3 MMOL/L (ref 3.5–5.3)
RBC # BLD AUTO: 2.57 MILLION/UL (ref 3.81–5.12)
SODIUM SERPL-SCNC: 146 MMOL/L (ref 136–145)
WBC # BLD AUTO: 4.04 THOUSAND/UL (ref 4.31–10.16)

## 2021-04-20 PROCEDURE — 97530 THERAPEUTIC ACTIVITIES: CPT

## 2021-04-20 PROCEDURE — 80048 BASIC METABOLIC PNL TOTAL CA: CPT | Performed by: INTERNAL MEDICINE

## 2021-04-20 PROCEDURE — 85025 COMPLETE CBC W/AUTO DIFF WBC: CPT | Performed by: INTERNAL MEDICINE

## 2021-04-20 PROCEDURE — 99232 SBSQ HOSP IP/OBS MODERATE 35: CPT | Performed by: INTERNAL MEDICINE

## 2021-04-20 RX ADMIN — ZOLPIDEM TARTRATE 10 MG: 5 TABLET, COATED ORAL at 22:10

## 2021-04-20 RX ADMIN — THIAMINE HCL TAB 100 MG 100 MG: 100 TAB at 08:27

## 2021-04-20 RX ADMIN — VENLAFAXINE HYDROCHLORIDE 37.5 MG: 37.5 CAPSULE, EXTENDED RELEASE ORAL at 08:27

## 2021-04-20 RX ADMIN — MIRTAZAPINE 15 MG: 15 TABLET, FILM COATED ORAL at 22:02

## 2021-04-20 RX ADMIN — SODIUM CHLORIDE, SODIUM GLUCONATE, SODIUM ACETATE, POTASSIUM CHLORIDE, MAGNESIUM CHLORIDE, SODIUM PHOSPHATE, DIBASIC, AND POTASSIUM PHOSPHATE 100 ML/HR: .53; .5; .37; .037; .03; .012; .00082 INJECTION, SOLUTION INTRAVENOUS at 05:24

## 2021-04-20 RX ADMIN — BUTALBITAL, ACETAMINOPHEN AND CAFFEINE 1 TABLET: 50; 325; 40 TABLET ORAL at 14:28

## 2021-04-20 RX ADMIN — DOCUSATE SODIUM 100 MG: 100 CAPSULE, LIQUID FILLED ORAL at 08:27

## 2021-04-20 RX ADMIN — CYCLOBENZAPRINE HYDROCHLORIDE 10 MG: 10 TABLET, FILM COATED ORAL at 17:20

## 2021-04-20 RX ADMIN — FOLIC ACID 1 MG: 1 TABLET ORAL at 08:27

## 2021-04-20 RX ADMIN — POLYETHYLENE GLYCOL 3350 17 G: 17 POWDER, FOR SOLUTION ORAL at 08:26

## 2021-04-20 RX ADMIN — SODIUM CHLORIDE, SODIUM GLUCONATE, SODIUM ACETATE, POTASSIUM CHLORIDE, MAGNESIUM CHLORIDE, SODIUM PHOSPHATE, DIBASIC, AND POTASSIUM PHOSPHATE 100 ML/HR: .53; .5; .37; .037; .03; .012; .00082 INJECTION, SOLUTION INTRAVENOUS at 14:34

## 2021-04-20 RX ADMIN — HEPARIN SODIUM 5000 UNITS: 5000 INJECTION INTRAVENOUS; SUBCUTANEOUS at 05:11

## 2021-04-20 RX ADMIN — LORAZEPAM 0.5 MG: 0.5 TABLET ORAL at 19:30

## 2021-04-20 RX ADMIN — Medication 1 TABLET: at 22:02

## 2021-04-20 RX ADMIN — PYRIDOXINE HYDROCHLORIDE 20 MG: 100 INJECTION, SOLUTION INTRAMUSCULAR; INTRAVENOUS at 08:31

## 2021-04-20 RX ADMIN — LEVETIRACETAM 500 MG: 500 TABLET, FILM COATED ORAL at 22:02

## 2021-04-20 RX ADMIN — GABAPENTIN 300 MG: 300 CAPSULE ORAL at 22:02

## 2021-04-20 RX ADMIN — CYCLOBENZAPRINE HYDROCHLORIDE 10 MG: 10 TABLET, FILM COATED ORAL at 22:02

## 2021-04-20 RX ADMIN — Medication 1 TABLET: at 08:27

## 2021-04-20 RX ADMIN — PRAZOSIN HYDROCHLORIDE 2 MG: 1 CAPSULE ORAL at 22:10

## 2021-04-20 RX ADMIN — Medication 1 TABLET: at 15:20

## 2021-04-20 RX ADMIN — DOCUSATE SODIUM 100 MG: 100 CAPSULE, LIQUID FILLED ORAL at 17:17

## 2021-04-20 RX ADMIN — CYANOCOBALAMIN TAB 500 MCG 1000 MCG: 500 TAB at 08:27

## 2021-04-20 RX ADMIN — HEPARIN SODIUM 5000 UNITS: 5000 INJECTION INTRAVENOUS; SUBCUTANEOUS at 22:01

## 2021-04-20 RX ADMIN — LORAZEPAM 0.5 MG: 2 INJECTION INTRAMUSCULAR; INTRAVENOUS at 08:24

## 2021-04-20 RX ADMIN — LEVOTHYROXINE SODIUM 50 MCG: 25 TABLET ORAL at 05:11

## 2021-04-20 RX ADMIN — LORAZEPAM 0.5 MG: 0.5 TABLET ORAL at 10:47

## 2021-04-20 RX ADMIN — HEPARIN SODIUM 5000 UNITS: 5000 INJECTION INTRAVENOUS; SUBCUTANEOUS at 14:28

## 2021-04-20 RX ADMIN — LEVETIRACETAM 500 MG: 500 TABLET, FILM COATED ORAL at 08:27

## 2021-04-20 NOTE — ASSESSMENT & PLAN NOTE
Patient acute kidney injury on admission due to dehydration from decreased p o  Intake and urinary obstruction  Nephrology was following the patient  Patient had acute hemodialysis due to acute kidney injury    Acute kidney injury resolved:  Creatinine 0 4 today

## 2021-04-20 NOTE — ASSESSMENT & PLAN NOTE
Malnutrition Findings:   Adult Malnutrition type: Chronic illness  Adult Degree of Malnutrition: Other severe protein calorie malnutrition    BMI Findings: Body mass index is 27 55 kg/m²     Nutrition team is following   Continue with nutrition supplementation

## 2021-04-20 NOTE — CASE MANAGEMENT
Continue to follow  Met with Pt  Informed Pt that [facility] has bed available and waiting on insurance auth from SHAHEEN Encarnacion  Pt in agreement  Reviewed IMM rights with Pt  Pt declined copy of IMM rights  Updated Sancho Lanier in admissions at [facility] of [address] [place] [address] status and pending reference # is [id number]  CM to follow

## 2021-04-20 NOTE — ASSESSMENT & PLAN NOTE
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 as per neurology's recommendation  In the end it was felt that her complaints are due to nutritional deficiency due to anorexia nervosa  Psychiatry was consult felt the patient should continue Remeron and there was no role for inpatient psychiatric therapy  Nutritional services saw the patient who continue to have poor p o  Intake because of not wanting to eat  Reached out to bariatric surgery at [facility] as well as at [facility] where patient's gastric bypass surgeries had been done  Patient was not accepted at [facility] for transfer  Outpatient rehab and eventual outpatient follow-up with her bariatric surgeon at [facility] was recommended

## 2021-04-20 NOTE — PHYSICAL THERAPY NOTE
PT tx     04/20/21 1000   PT Last Visit   PT Visit Date 04/20/21   Note Type   Note Type Treatment   Pain Assessment   Pain Assessment Tool Pain Assessment not indicated - pt denies pain   Pain Score No Pain   Restrictions/Precautions   Weight Bearing Precautions Per Order No   Other Precautions   (picc line removed, bates removed pt reports not able to void)   General   Chart Reviewed Yes   Additional Pertinent History picc and bates removed   Cognition   Arousal/Participation Alert   Attention Attends with cues to redirect   Orientation Level Oriented X4   Following Commands Follows one step commands with increased time or repetition   Comments less that full participation   Subjective   Subjective glad picc is out   Bed Mobility   Rolling L 4  Minimal assistance   Additional items Assist x 1;Bedrails; Increased time required;Verbal cues;LE management   Supine to Sit 3  Moderate assistance   Additional items Assist x 2; Increased time required;Verbal cues;LE management; Bedrails   Sit to Supine 5  Supervision   Additional items Bedrails; Increased time required;Verbal cues   Transfers   Sit to Stand Unable to assess   Balance   Static Sitting Fair -   Dynamic Sitting Poor +   Endurance Deficit   Endurance Deficit No   Activity Tolerance   Activity Tolerance Patient tolerated treatment well   Medical Staff Made Aware OT Nasir   Nurse Made Aware RN Tammy   Exercises   Knee AROM Long Arc Quad 10 reps;Right;Left;AROM  (less than full motion arc)   Ankle Pumps Supine;10 reps;Right;Left;AROM   Balance training  sat EOB x 7 min self support with rails or unsupported, unwilling to scoot to EOB for feet to touch floor   Assessment   Prognosis Good   Problem List Decreased strength;Decreased range of motion;Decreased endurance; Impaired balance;Decreased mobility; Decreased coordination;Decreased safety awareness   Assessment Pt able to mobilize to EOB and perform light ther ex   Siting balance improved   Fearful of falling and unwilling to scoot to EOB and have feet touch the floor  Returns to bed with  V cues  Appears to reposition self as desired  Less than full effort at times   Con't to recommend in-pt rehab at d/c to regain safe functional mobility Cont PT see goals   Barriers to Discharge   (medical clearance)   Goals   Patient Goals not stated   STG Expiration Date 04/30/21   Short Term Goal #1 1) increase strength B les to 3+/5 2) safe ind transfers supine<>sit 3) safe transfers sit to stand with Ax1 and rw  4) standing butch x 1 min with rw and 1 assist   Plan   Treatment/Interventions ADL retraining;Functional transfer training;LE strengthening/ROM; Therapeutic exercise; Endurance training;Cognitive reorientation;Patient/family training;Equipment eval/education; Bed mobility;Gait training;Spoke to nursing;Spoke to case management;OT   Progress Improving as expected   PT Frequency 5x/wk   Recommendation   PT Discharge Recommendation Post acute rehabilitation services   PT - OK to Discharge Yes   Additional Comments   (to STR with medical clearance)   AM-PAC Basic Mobility Inpatient   Turning in Bed Without Bedrails 4   Lying on Back to Sitting on Edge of Flat Bed 2   Moving Bed to Chair 1   Standing Up From Chair 1   Walk in Room 1   Climb 3-5 Stairs 1   Basic Mobility Inpatient Raw Score 10   Turning Head Towards Sound 4   Follow Simple Instructions 3   Low Function Basic Mobility Raw Score 17   Low Function Basic Mobility Standardized Score 27 46   Cristal Saleem, PT

## 2021-04-20 NOTE — CASE MANAGEMENT
[facility] through [employer] primary  email sent to CM on status

## 2021-04-20 NOTE — PROGRESS NOTES
Pastoral Care Progress Note    2021  Patient: Anthony Kurtz : 1980  Admission Date & Time: 2021 1754  MRN: [id number] [facility]: [id number]                     Chaplaincy Interventions Utilized:   Relationship Building: Cultivated a relationship of care and support  Patient said she was doing "okay "  She seemed stronger and was more communicative than when I saw her 12 days ago  Ritual: Provided prayer  Patient accepted her offer of prayer, which she seemed to appreciate  Chaplaincy Outcomes Achieved:  Expressed gratitude    Patient seemed grateful for visit         21 1400   Clinical Encounter Type   Visited With Patient   Routine Visit Follow-up   Referral To    Consult   (patient census)   Tenriism Encounters   Tenriism Needs Prayer        21 1400   Clinical Encounter Type   Visited With Patient   Routine Visit Follow-up   Referral To    Consult   (patient census)   Tenriism Encounters   Tenriism Needs Prayer

## 2021-04-20 NOTE — ASSESSMENT & PLAN NOTE
Patient has had to gastric bypass surgeries in 2007 and 2016 at [facility]  She is discharged on vitamin D, folic acid, thiamine, vitamin-B6    Patient's abdomen is soft and nontender on day of discharge

## 2021-04-20 NOTE — PLAN OF CARE
Problem: Potential for Falls  Goal: Patient will remain free of falls  Description: INTERVENTIONS:  - Assess patient frequently for physical needs  -  Identify cognitive and physical deficits and behaviors that affect risk of falls  -  [place] fall precautions as indicated by assessment   - Educate patient/family on patient safety including physical limitations  - Instruct patient to call for assistance with activity based on assessment  - Modify environment to reduce risk of injury  - Consider OT/PT consult to assist with strengthening/mobility  Outcome: Progressing     Problem: Prexisting or High Potential for Compromised Skin Integrity  Goal: Skin integrity is maintained or improved  Description: INTERVENTIONS:  - Identify patients at risk for skin breakdown  - Assess and monitor skin integrity  - Assess and monitor nutrition and hydration status  - Monitor labs   - Assess for incontinence   - Turn and reposition patient  - Assist with mobility/ambulation  - Relieve pressure over bony prominences  - Avoid friction and shearing  - Provide appropriate hygiene as needed including keeping skin clean and dry  - Evaluate need for skin moisturizer/barrier cream  - Collaborate with interdisciplinary team   - Patient/family teaching  - Consider wound care consult   Outcome: Progressing     Problem: Nutrition/Hydration-ADULT  Goal: Nutrient/Hydration intake appropriate for improving, restoring or maintaining nutritional needs  Description: Monitor and assess patient's nutrition/hydration status for malnutrition  Collaborate with interdisciplinary team and initiate plan and interventions as ordered  Monitor patient's weight and dietary intake as ordered or per policy  Utilize nutrition screening tool and intervene as necessary  Determine patient's food preferences and provide high-protein, high-caloric foods as appropriate       INTERVENTIONS:  - Monitor oral intake, urinary output, labs, and treatment plans  - Assess nutrition and hydration status and recommend course of action  - Evaluate amount of meals eaten  - Assist patient with eating if necessary   - Allow adequate time for meals  - Recommend/ encourage appropriate diets, oral nutritional supplements, and vitamin/mineral supplements  - Order, calculate, and assess calorie counts as needed  - Recommend, monitor, and adjust tube feedings and TPN/PPN based on assessed needs  - Assess need for intravenous fluids  - Provide specific nutrition/hydration education as appropriate  - Include patient/family/caregiver in decisions related to nutrition  Outcome: Progressing     Problem: PAIN - ADULT  Goal: Verbalizes/displays adequate comfort level or baseline comfort level  Description: Interventions:  - Encourage patient to monitor pain and request assistance  - Assess pain using appropriate pain scale  - Administer analgesics based on type and severity of pain and evaluate response  - Implement non-pharmacological measures as appropriate and evaluate response  - Consider cultural and social influences on pain and pain management  - Notify physician/advanced practitioner if interventions unsuccessful or patient reports new pain  Outcome: Progressing     Problem: INFECTION - ADULT  Goal: Absence or prevention of progression during hospitalization  Description: INTERVENTIONS:  - Assess and monitor for signs and symptoms of infection  - Monitor lab/diagnostic results  - Monitor all insertion sites, i e  indwelling lines, tubes, and drains  - Monitor endotracheal if appropriate and nasal secretions for changes in amount and color  - [place] appropriate cooling/warming therapies per order  - Administer medications as ordered  - Instruct and encourage patient and family to use good hand hygiene technique  - Identify and instruct in appropriate isolation precautions for identified infection/condition  Outcome: Progressing     Problem: SAFETY ADULT  Goal: Patient will remain free of falls  Description: INTERVENTIONS:  - Assess patient frequently for physical needs  -  Identify cognitive and physical deficits and behaviors that affect risk of falls  -  [place] fall precautions as indicated by assessment   - Educate patient/family on patient safety including physical limitations  - Instruct patient to call for assistance with activity based on assessment  - Modify environment to reduce risk of injury  - Consider OT/PT consult to assist with strengthening/mobility  Outcome: Progressing     Problem: DISCHARGE PLANNING  Goal: Discharge to home or other facility with appropriate resources  Description: INTERVENTIONS:  - Identify barriers to discharge w/patient and caregiver  - Arrange for needed discharge resources and transportation as appropriate  - Identify discharge learning needs (meds, wound care, etc )  - Arrange for interpretive services to assist at discharge as needed  - Refer to Case Management Department for coordinating discharge planning if the patient needs post-hospital services based on physician/advanced practitioner order or complex needs related to functional status, cognitive ability, or social support system  Outcome: Progressing     Problem: Knowledge Deficit  Goal: Patient/family/caregiver demonstrates understanding of disease process, treatment plan, medications, and discharge instructions  Description: Complete learning assessment and assess knowledge base    Interventions:  - Provide teaching at level of understanding  - Provide teaching via preferred learning methods  Outcome: Progressing     Problem: NEUROSENSORY - ADULT  Goal: Achieves stable or improved neurological status  Description: INTERVENTIONS  - Monitor and report changes in neurological status  - Monitor vital signs such as temperature, blood pressure, glucose, and any other labs ordered   - Initiate measures to prevent increased intracranial pressure  - Monitor for seizure activity and implement precautions if appropriate      Outcome: Progressing     Problem: GASTROINTESTINAL - ADULT  Goal: Maintains adequate nutritional intake  Description: INTERVENTIONS:  - Monitor percentage of each meal consumed  - Identify factors contributing to decreased intake, treat as appropriate  - Assist with meals as needed  - Monitor I&O, weight, and lab values if indicated  - Obtain nutrition services referral as needed  Outcome: Progressing     Problem: GENITOURINARY - ADULT  Goal: Urinary catheter remains patent  Description: INTERVENTIONS:  - Assess patency of urinary catheter  - If patient has a chronic bates, consider changing catheter if non-functioning  - Follow guidelines for intermittent irrigation of non-functioning urinary catheter  Outcome: Progressing     Problem: METABOLIC, FLUID AND ELECTROLYTES - ADULT  Goal: Electrolytes maintained within normal limits  Description: INTERVENTIONS:  - Monitor labs and assess patient for signs and symptoms of electrolyte imbalances  - Administer electrolyte replacement as ordered  - Monitor response to electrolyte replacements, including repeat lab results as appropriate  - Instruct patient on fluid and nutrition as appropriate  Outcome: Progressing  Goal: Fluid balance maintained  Description: INTERVENTIONS:  - Monitor labs   - Monitor I/O and WT  - Instruct patient on fluid and nutrition as appropriate  - Assess for signs & symptoms of volume excess or deficit  Outcome: Progressing     Problem: HEMATOLOGIC - ADULT  Goal: Maintains hematologic stability  Description: INTERVENTIONS  - Assess for signs and symptoms of bleeding or hemorrhage  - Monitor labs  - Administer supportive blood products/factors as ordered and appropriate  Outcome: Progressing     Problem: MUSCULOSKELETAL - ADULT  Goal: Maintain or return mobility to safest level of function  Description: INTERVENTIONS:  - Assess patient's ability to carry out ADLs; assess patient's baseline for ADL function and identify physical deficits which impact ability to perform ADLs (bathing, care of mouth/teeth, toileting, grooming, dressing, etc )  - Assess/evaluate cause of self-care deficits   - Assess range of motion  - Assess patient's mobility  - Assess patient's need for assistive devices and provide as appropriate  - Encourage maximum independence but intervene and supervise when necessary  - Involve family in performance of ADLs  - Assess for home care needs following discharge   - Consider OT consult to assist with ADL evaluation and planning for discharge  - Provide patient education as appropriate  Outcome: Progressing

## 2021-04-20 NOTE — PLAN OF CARE
Problem: OCCUPATIONAL THERAPY ADULT  Goal: Performs self-care activities at highest level of function for planned discharge setting  See evaluation for individualized goals  Description: Treatment Interventions: ADL retraining, Functional transfer training, UE strengthening/ROM, Endurance training, Patient/family training, Equipment evaluation/education, Neuromuscular reeducation, Activityengagement, Energy conservation          See flowsheet documentation for full assessment, interventions and recommendations  Outcome: Progressing  Note: Limitation: Decreased ADL status, Decreased UE ROM, Decreased UE strength, Decreased endurance, Decreased self-care trans, Decreased high-level ADLs  Prognosis: Fair  Assessment: Pt seen for OT tx session w/ focus on bed mobility, balance, UE ROM, and activity tolerance  No c/o pain during session  Overall flat affect during session  Required Mod x 2 to sit EOB  Refused to get feet closer to floor to increase muscle activity  Sat EOB x 7 mins w/ F balance  Refused and dynamic motions  Refused Ensure clears or need to toilet  Zhong removed yesterday  Participated in UE/LE exercises w/ submaximal effort  Unable to attempt STS due to not being in proper position  Pt laid self back into bed w/ (S) and increased time  HOB remains slightly elevated due to catheter removed from IR  Pt w/ improvement in bed mobility and balance compared to initial eval  However, appears to self limit during tasks  Recommendation for rehab remains appropriate to decrease caregiver burden        OT Discharge Recommendation: Post acute rehabilitation services

## 2021-04-20 NOTE — DISCHARGE INSTRUCTIONS
Cath Removal   WHAT YOU NEED TO KNOW:   A Cath is a catheter placed through a vein into or near your right atrium  Your right atrium is the right upper chamber of your heart  A perma-cath is used for pheresis  DISCHARGE INSTRUCTIONS:   Call 911 for any of the following:   · You feel lightheaded, short of breath, and have chest pain  · You start bleeding    Contact Interventional Radiology at [phone] [place] PATIENTS: Contact Interventional Radiology at [phone]) Makenna Puga PATIENTS: Contact Interventional Radiology at [phone]) if:  · Blood soaks through your bandage  · You have new swelling in your arm, neck, face, or chest on your right side  · Your bruises or pain get worse  · You have a fever or chills  · Persistent nausea or vomiting  · Your incision is red, swollen, or draining pus  · You have questions or concerns about your condition or care  Self-care:   · Resume your normal diet  Small sips of flat soda will help with nausea  · Keep your dressings dry  Do not get your perma-cath site wet until the incision closes  You may take a tub bath, but do not get your dressings wet  Water in your wound can cause bacteria to grow and cause an infection  If your dressing gets wet, remove the wet dressing and apply a dry bandaid  Keep it covered until the incision closes  This should only take a few days to heal  Do not use soaps or ointments  · Immediately after your catheter is removed, no strenuous activity for twenty four hours and stay in an upright sitting position for two hours  Follow up with your healthcare provider as directed: Write down your questions so you remember to ask them during your visits          Bates Cath Care instructions---Maintain bates catheter to straight drainage  May use leg bag and shower  May flush TID prn using Valentine syringe and 120 ml NSS  May use more saline ad claudia to prevent/treat cath obstruction  Remove catheter on 10th day rehab by 0600 hrs   Bladder scan if no void or less than 200ml in 6hrs  Straight cath for bladder scan >350ml and repeat process  If patient requires straight cath x3 , re-insert bates and call for Urologist follow-up  Information above  Bates can remain in place for up to 4 weeks at a time  Bates [facility] at [facility] on 4/5/2021     Peripherally Inserted Central Catheter     WHAT YOU NEED TO KNOW:   A PICC is an IV placed into a large blood vessel near your heart  It is usually inserted through a blood vessel in your arm  Your PICC may have multiple ports  Ports are tubes where you can inject medicine  A PICC can stay in place for several weeks or months  You may need a PICC to get nutrition, medicine, or fluids  Blood samples can be removed from your PICC and sent to the lab for tests  DISCHARGE INSTRUCTIONS:    [facility] and [facility] patients,    Contact Interventional Radiology at [phone] PATIENTS: Contact Interventional Radiology at [phone]   [facility] PATIENTS: Contact Interventional Radiology at [phone] if:  · Blood soaks through your bandage  · Your arm or leg feels warm, tender, and painful  It may look swollen and red  · You have trouble moving your arm  · Your catheter falls out  · You have a fever or swelling, redness, pain, or pus where the catheter was inserted  · Persistent nausea or vomiting  · You cannot flush your catheter, or you feel pain when you flush your catheter  · You see a hole or crack in the tubing of your catheter  · You see fluid leaking from the insertion site  · You run out of supplies to care for your catheter  · You have questions or concerns about your condition or care

## 2021-04-20 NOTE — PLAN OF CARE
Problem: PHYSICAL THERAPY ADULT  Goal: Performs mobility at highest level of function for planned discharge setting  See evaluation for individualized goals  Description: Treatment/Interventions: Functional transfer training, LE strengthening/ROM, Therapeutic exercise, Endurance training, Patient/family training, Equipment eval/education, Bed mobility, Compensatory technique education, Spoke to nursing, OT  Equipment Recommended: (TBD in rehab)       See flowsheet documentation for full assessment, interventions and recommendations  Outcome: Progressing  Note: Prognosis: Good  Problem List: Decreased strength, Decreased range of motion, Decreased endurance, Impaired balance, Decreased mobility, Decreased coordination, Decreased safety awareness  Assessment: Pt able to mobilize to EOB and perform light ther ex   Siting balance improved  Fearful of falling and unwilling to scoot to EOB and have feet touch the floor  Returns to bed with  V cues  Appears to reposition self as desired  Less than full effort at times   Con't to recommend in-pt rehab at d/c to regain safe functional mobility Cont PT see goals  Barriers to Discharge: (medical clearance)     PT Discharge Recommendation: Post-Acute Rehabilitation Services  PT Discharge Recommendation: Post acute rehabilitation services     PT - OK to Discharge: Yes    See flowsheet documentation for full assessment

## 2021-04-20 NOTE — ASSESSMENT & PLAN NOTE
Patient had uterine admission due to E coli and completed antibiotic therapy during the hospital stay      Zhong catheter had to be reinserted because patient developed recurrent urinary retention after removal of Zhong catheter

## 2021-04-20 NOTE — PROGRESS NOTES
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 as per neurology's recommendation  In the end it was felt that her complaints are due to nutritional deficiency due to anorexia nervosa  Psychiatry was consult felt the patient should continue Remeron and there was no role for inpatient psychiatric therapy  Nutritional services saw the patient who continue to have poor p o  Intake because of not wanting to eat  Reached out to bariatric surgery at [facility] as well as at [facility] where patient's gastric bypass surgeries had been done  Patient was not accepted at [facility] for transfer  Outpatient rehab and eventual outpatient follow-up with her bariatric surgeon at [facility] was recommended  RENE (acute kidney injury) [facility])  Assessment & Plan  Patient acute kidney injury on admission due to dehydration from decreased p o  Intake and urinary obstruction  Nephrology was following the patient  Patient had acute hemodialysis due to acute kidney injury  Acute kidney injury resolved:  Creatinine 0 4 today        Acute cystitis without hematuria  Assessment & Plan  Patient had uterine admission due to E coli and completed antibiotic therapy during the hospital stay      Zhong catheter had to be reinserted because patient developed recurrent urinary retention after removal of Zhong catheter    Anorexia nervosa, restricting type  Assessment & Plan  Patient has anorexia nervosa  Nutritional supplements were encouraged  Continue Remeron      History of gastric bypass  Assessment & Plan  Patient has had to gastric bypass surgeries in  and 2016 at [address]  She is discharged on vitamin D, folic acid, thiamine, vitamin-B6    Patient's abdomen is soft and nontender on day of discharge    Severe protein-calorie malnutrition ([address] )  Assessment & Plan  Malnutrition Findings:   Adult Malnutrition type: Chronic illness  Adult Degree of Malnutrition: Other severe protein calorie malnutrition    BMI Findings: Body mass index is 27 55 kg/m²  Nutrition team is following   Continue with nutrition supplementation           VTE Prophylaxis: in place    Patient Centered Rounds: I rounded with patient's nurse    Current Length of Stay: 15 day(s)    Current Patient Status: Inpatient    Certification Statement: Pt requires additional inpatient hospital stay due to: see assessment and plan        Subjective:   Patient denies chest pain, shortness of breath, abdominal pain  Patient's nurse reports that patient failed voiding trials after removal of Zhong    All other ROS are negative    Objective:     Vitals:   Temp (24hrs), Av 1 °F (36 7 °C), Min:97 6 °F (36 4 °C), Max:98 8 °F (37 1 °C)    Temp:  [97 6 °F (36 4 °C)-98 8 °F (37 1 °C)] 98 °F (36 7 °C)  HR:  [95-99] 99  Resp:  [16-18] 18  BP: (125-127)/(83) 125/83  SpO2:  [97 %-100 %] 97 %  Body mass index is 27 55 kg/m²  Input and Output Summary (last 24 hours): Intake/Output Summary (Last 24 hours) at 2021 1702  Last data filed at 2021 1434  Gross per 24 hour   Intake 3980 ml   Output 883 ml   Net 3097 ml       Physical Exam:     Physical Exam  HENT:      Head: Normocephalic  Cardiovascular:      Rate and Rhythm: Normal rate and regular rhythm  Heart sounds: No murmur  Pulmonary:      Effort: No respiratory distress  Breath sounds: No wheezing or rales  Abdominal:      General: Bowel sounds are normal       Palpations: Abdomen is soft  Tenderness: There is no abdominal tenderness  Neurological:      Mental Status: She is alert and oriented to person, place, and time  Mental status is at baseline  I personally reviewed labs and imaging reports for today        Last 24 Hours Medication List:   Current Facility-Administered Medications   Medication Dose Route Frequency Provider Last Rate    acetaminophen  650 mg Oral Q6H PRN Amna Cazares PA-C      butalbital-acetaminophen-caffeine  1 tablet Oral Q6H PRN Shirlene Guillen PA-C      cyanocobalamin  1,000 mcg Oral Daily Bradford Garcia MD      cyclobenzaprine  10 mg Oral TID PRN Amna Cazares PA-C      docusate sodium  100 mg Oral BID Amna Cazares PA-C      ergocalciferol  50,000 Units Oral Once per day on Mon Thu Elizabeth Fly, DO      folic acid  1 mg Oral Daily Amna Cazares PA-C      gabapentin  100 mg Oral TID PRN Amna Cazares PA-C      gabapentin  300 mg Oral HS SMITHA Vazquez      heparin (porcine)  5,000 Units Subcutaneous [facility] Amna Cazares PA-C      levETIRAcetam  500 mg Oral Q12H [address] Heydi Morse PA-C      levothyroxine  50 mcg Oral Early Morning Elizabeth Fly, DO      LORazepam  0 5 mg Oral Q8H PRN Bradford Garcia MD      LORazepam  1 mg Oral Daily PRN Cecelia Miller MD      mirtazapine  15 mg Oral HS Bradford Garcia MD      multi-electrolyte  100 mL/hr Intravenous Continuous Amna Cazares PA-C 100 mL/hr (04/20/21 1434)    multivitamin-minerals  1 tablet Oral TID Boston Tirado, DO      polyethylene glycol  17 g Oral Daily Amna Cazares PA-C      prazosin  2 mg Oral HS Elizabeth Fly, DO      pyridoxine  20 mg Intravenous Daily Bradford Garcia MD      thiamine  100 mg Oral Daily Bradford Garcia MD      venlafaxine  37 5 mg Oral Daily Elizabeth Fly, DO      zolpidem  10 mg Oral HS Cecelia Miller MD Today, Patient Was Seen By: Lino Redding MD    ** Please Note: Dictation voice to text software may have been used in the creation of this document   **
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
## 2021-04-20 NOTE — OCCUPATIONAL THERAPY NOTE
[address] Progress Note     Patient Name: Leisa Velasquez  FZQVN'V Date: 4/20/2021  Problem List  Principal Problem:    Generalized weakness  Active Problems:    Pituitary enlargement    Hypothyroidism    History of gastric bypass    Vitamin D deficiency    Anorexia nervosa, restricting type    Depression    RENE (acute kidney injury) ([facility] [address] )    Anemia    Soft tissue lesion of pelvic region    Severe protein-calorie malnutrition ([facility] [address] )    Acute cystitis without hematuria    Urinary retention    Vitamin B6 deficiency    Copper deficiency          04/20/21 1001   OT Last Visit   OT Visit Date 04/20/21   Note Type   Note Type Treatment   Restrictions/Precautions   Weight Bearing Precautions Per Order No   Other Precautions Pain; Fall Risk;Seizure; Bed Alarm   Pain Assessment   Pain Assessment Tool Pain Assessment not indicated - pt denies pain   ADL   Eating Comments OT encouraged Ensure clear or something off her tray while seated on EOB but pt refused all and asked OT to place tray across the room  Toileting Comments Zhong removed yesterday and pt states she has not voided  Inquired if she needed to use bathroom and she states no  Bed Mobility   Supine to Sit 3  Moderate assistance   Additional items Assist x 2;Bedrails; Increased time required;Verbal cues;LE management;HOB elevated  (OT placed hands on rail for assist )   Sit to Supine 5  Supervision   Additional items Increased time required   Additional Comments Sat EOB x 7 mins w/ F balance  Refused to place feet closer to floor  Did not demonstrate dynamic sit balance      Transfers   Sit to Stand Unable to assess   Additional Comments Refused to get into position to attempt standing    Therapeutic Exercise - ROM   UE-ROM   (Shld and hand flexion x 5 reps; submaximal effort )   Cognition   Arousal/Participation Alert   Attention Attends with cues to redirect   Orientation Level Oriented X4   Memory Within functional limits   Following Commands Follows multistep commands with increased time or repetition   Comments Primarily flat affect t/o session    Activity Tolerance   Activity Tolerance Patient limited by fatigue   Medical Staff Made Aware Zachariah Guillen RN, Tammy    Assessment   Assessment Pt seen for OT tx session w/ focus on bed mobility, balance, UE ROM, and activity tolerance  No c/o pain during session  Overall flat affect during session  Required Mod x 2 to sit EOB  Refused to get feet closer to floor to increase muscle activity  Sat EOB x 7 mins w/ F balance  Refused and dynamic motions  Refused Ensure clears or need to toilet  Zhong removed yesterday  Participated in UE/LE exercises w/ submaximal effort  Unable to attempt STS due to not being in proper position  Pt laid self back into bed w/ (S) and increased time  HOB remains slightly elevated due to catheter removed from IR  Pt w/ improvement in bed mobility and balance compared to initial eval  However, appears to self limit during tasks  Recommendation for rehab remains appropriate to decrease caregiver burden  Plan   Treatment Interventions Functional transfer training;UE strengthening/ROM; Endurance training;Patient/family training; Activityengagement   Goal Expiration Date 04/21/21   OT Treatment Day 1   OT Frequency 3-5x/wk   Recommendation   OT Discharge Recommendation Post acute rehabilitation services   AM-PAC Daily Activity Inpatient   Lower Body Dressing 2   Bathing 2   Toileting 1   Upper Body Dressing 2   Grooming 2   Eating 3   Daily Activity Raw Score 12   Daily Activity Standardized Score (Calc for Raw Score >=11) 30 6   Turning Head Towards Sound 4   Follow Simple Instructions 4   Low Function Daily Activity Raw Score 20   Low Function Daily Activity Standardized Score 32 48   AM-PAC Applied Cognition Inpatient   Following a Speech/Presentation 4   Understanding Ordinary Conversation 4   Taking Medications 4   Remembering Where Things Are Placed or Put Away 4   Remembering List of 4-5 Errands 3   Taking Care of Complicated Tasks 3   Applied Cognition Raw Score 22   Applied Cognition Standardized Score 47 83     [facility] MS, OTR/L

## 2021-04-21 PROCEDURE — 99232 SBSQ HOSP IP/OBS MODERATE 35: CPT | Performed by: NURSE PRACTITIONER

## 2021-04-21 PROCEDURE — 99232 SBSQ HOSP IP/OBS MODERATE 35: CPT | Performed by: INTERNAL MEDICINE

## 2021-04-21 RX ORDER — SODIUM CHLORIDE, SODIUM GLUCONATE, SODIUM ACETATE, POTASSIUM CHLORIDE, MAGNESIUM CHLORIDE, SODIUM PHOSPHATE, DIBASIC, AND POTASSIUM PHOSPHATE .53; .5; .37; .037; .03; .012; .00082 G/100ML; G/100ML; G/100ML; G/100ML; G/100ML; G/100ML; G/100ML
100 INJECTION, SOLUTION INTRAVENOUS CONTINUOUS
Status: DISCONTINUED | OUTPATIENT
Start: 2021-04-21 | End: 2021-04-22

## 2021-04-21 RX ADMIN — PYRIDOXINE HYDROCHLORIDE 20 MG: 100 INJECTION, SOLUTION INTRAMUSCULAR; INTRAVENOUS at 10:56

## 2021-04-21 RX ADMIN — ZOLPIDEM TARTRATE 10 MG: 5 TABLET, COATED ORAL at 21:18

## 2021-04-21 RX ADMIN — PRAZOSIN HYDROCHLORIDE 2 MG: 1 CAPSULE ORAL at 21:23

## 2021-04-21 RX ADMIN — FOLIC ACID 1 MG: 1 TABLET ORAL at 09:37

## 2021-04-21 RX ADMIN — DOCUSATE SODIUM 100 MG: 100 CAPSULE, LIQUID FILLED ORAL at 09:38

## 2021-04-21 RX ADMIN — LORAZEPAM 0.5 MG: 0.5 TABLET ORAL at 10:00

## 2021-04-21 RX ADMIN — CYCLOBENZAPRINE HYDROCHLORIDE 10 MG: 10 TABLET, FILM COATED ORAL at 13:18

## 2021-04-21 RX ADMIN — VENLAFAXINE HYDROCHLORIDE 37.5 MG: 37.5 CAPSULE, EXTENDED RELEASE ORAL at 09:37

## 2021-04-21 RX ADMIN — LEVOTHYROXINE SODIUM 50 MCG: 25 TABLET ORAL at 05:50

## 2021-04-21 RX ADMIN — MIRTAZAPINE 15 MG: 15 TABLET, FILM COATED ORAL at 21:18

## 2021-04-21 RX ADMIN — LEVETIRACETAM 500 MG: 500 TABLET, FILM COATED ORAL at 09:38

## 2021-04-21 RX ADMIN — LEVETIRACETAM 500 MG: 500 TABLET, FILM COATED ORAL at 21:17

## 2021-04-21 RX ADMIN — SODIUM CHLORIDE, SODIUM GLUCONATE, SODIUM ACETATE, POTASSIUM CHLORIDE, MAGNESIUM CHLORIDE, SODIUM PHOSPHATE, DIBASIC, AND POTASSIUM PHOSPHATE 100 ML/HR: .53; .5; .37; .037; .03; .012; .00082 INJECTION, SOLUTION INTRAVENOUS at 11:53

## 2021-04-21 RX ADMIN — BUTALBITAL, ACETAMINOPHEN AND CAFFEINE 1 TABLET: 50; 325; 40 TABLET ORAL at 10:59

## 2021-04-21 RX ADMIN — HEPARIN SODIUM 5000 UNITS: 5000 INJECTION INTRAVENOUS; SUBCUTANEOUS at 13:15

## 2021-04-21 RX ADMIN — SODIUM CHLORIDE, SODIUM GLUCONATE, SODIUM ACETATE, POTASSIUM CHLORIDE, MAGNESIUM CHLORIDE, SODIUM PHOSPHATE, DIBASIC, AND POTASSIUM PHOSPHATE 100 ML/HR: .53; .5; .37; .037; .03; .012; .00082 INJECTION, SOLUTION INTRAVENOUS at 21:17

## 2021-04-21 RX ADMIN — GABAPENTIN 300 MG: 300 CAPSULE ORAL at 21:18

## 2021-04-21 RX ADMIN — Medication 1 TABLET: at 15:52

## 2021-04-21 RX ADMIN — Medication 1 TABLET: at 09:38

## 2021-04-21 RX ADMIN — THIAMINE HCL TAB 100 MG 100 MG: 100 TAB at 09:38

## 2021-04-21 RX ADMIN — Medication 1 TABLET: at 21:17

## 2021-04-21 RX ADMIN — HEPARIN SODIUM 5000 UNITS: 5000 INJECTION INTRAVENOUS; SUBCUTANEOUS at 05:50

## 2021-04-21 RX ADMIN — CYANOCOBALAMIN TAB 500 MCG 1000 MCG: 500 TAB at 09:39

## 2021-04-21 RX ADMIN — CYCLOBENZAPRINE HYDROCHLORIDE 10 MG: 10 TABLET, FILM COATED ORAL at 21:19

## 2021-04-21 RX ADMIN — SODIUM CHLORIDE, SODIUM GLUCONATE, SODIUM ACETATE, POTASSIUM CHLORIDE, MAGNESIUM CHLORIDE, SODIUM PHOSPHATE, DIBASIC, AND POTASSIUM PHOSPHATE 100 ML/HR: .53; .5; .37; .037; .03; .012; .00082 INJECTION, SOLUTION INTRAVENOUS at 02:49

## 2021-04-21 RX ADMIN — LORAZEPAM 0.5 MG: 0.5 TABLET ORAL at 19:40

## 2021-04-21 RX ADMIN — HEPARIN SODIUM 5000 UNITS: 5000 INJECTION INTRAVENOUS; SUBCUTANEOUS at 21:17

## 2021-04-21 RX ADMIN — DOCUSATE SODIUM 100 MG: 100 CAPSULE, LIQUID FILLED ORAL at 17:20

## 2021-04-21 NOTE — PROGRESS NOTES
Progress Note - Palliative & Supportive Care  Yevgeniy Apodaca  47 y o   female  /-01   MRN: [id number]  Encounter: [id number]     Assessment  Patient Active Problem List   Diagnosis    Pituitary enlargement    Elevated alkaline phosphatase level    Hypothyroidism    History of gastric bypass    Vitamin D deficiency    Anorexia nervosa, restricting type    PTSD (post-traumatic stress disorder)    Moderate episode of recurrent major depressive disorder (HCC)    Laxative abuse    Depression    Generalized weakness    RENE (acute kidney injury) ([address]    Anemia    Soft tissue lesion of pelvic region    Severe protein-calorie malnutrition (HCC)    Acute cystitis without hematuria    Urinary retention    Vitamin B6 deficiency    Copper deficiency         Goals of Care  Level 1 code status    Plan  Symptom Management  Per primary team    24 History  Chart reviewed before visit  No overnight events  Patient intends to be discharged to [facility] for rehab  She reports that she does not think she will get better at [facility]  She has been able to take PO Ensure clear in small amounts only for the past 2 days  She recognizes the need to eat more but finds the feeling of food in her stomach too uncomfortable  On exam, she declines to answer questions regarding her intake  She declines to answer questions about her physical and emotional symptoms  She declines to answer questions about her preferred treatment plan  She does state that she does not wish to live in a nursing home  [facility] [place] is not receptive to emotional support at this time  She looks forward to leaving the hospital as soon as possible  We will sign off of her care at this time  Review of Systems: Review of systems not obtained due to patient factors   Emotional/Psychiatric state    Medications    Current Facility-Administered Medications:     acetaminophen (TYLENOL) tablet 650 mg, 650 mg, Oral, Q6H PRN, Nia Fuchs PA-C, 650 mg at 04/14/21 1336    butalbital-acetaminophen-caffeine (FIORICET,ESGIC) -40 mg per tablet 1 tablet, 1 tablet, Oral, Q6H PRN, Charlee Fan PA-C, 1 tablet at 04/20/21 1428    cyanocobalamin (VITAMIN B-12) tablet 1,000 mcg, 1,000 mcg, Oral, Daily, Adelso Low MD, 1,000 mcg at 04/21/21 0939    cyclobenzaprine (FLEXERIL) tablet 10 mg, 10 mg, Oral, TID PRN, Nia Fuchs PA-C, 10 mg at 04/20/21 2202    docusate sodium (COLACE) capsule 100 mg, 100 mg, Oral, BID, Joellen Us PA-C, 100 mg at 04/21/21 9838    ergocalciferol (VITAMIN D2) capsule 50,000 Units, 50,000 Units, Oral, Once per day on Mon Thu, Elizabeth Johnson DO, 50,000 Units at 25/70/43 1145    folic acid (FOLVITE) tablet 1 mg, 1 mg, Oral, Daily, Joellen Us PA-C, 1 mg at 04/21/21 0874    gabapentin (NEURONTIN) capsule 100 mg, 100 mg, Oral, TID PRN, Nia Fuchs PA-C, 100 mg at 04/08/21 1757    gabapentin (NEURONTIN) capsule 300 mg, 300 mg, Oral, HS, SMITHA Kendall, 300 mg at 04/20/21 2202    heparin (porcine) subcutaneous injection 5,000 Units, 5,000 Units, Subcutaneous, Q8H [address], 5,000 Units at 04/21/21 0550 **AND** [CANCELED] Platelet count, , , Once, Nia Fuchs PA-C    levETIRAcetam (KEPPRA) tablet 500 mg, 500 mg, Oral, Q12H [address], Ehydi Y Shravan, PA-C, 500 mg at 04/21/21 7064    levothyroxine tablet 50 mcg, 50 mcg, Oral, Early Morning, Elizabeth Johnson DO, 50 mcg at 04/21/21 0550    LORazepam (ATIVAN) tablet 0 5 mg, 0 5 mg, Oral, Q8H PRN, Xiang Morales MD, 0 5 mg at 04/21/21 1000    LORazepam (ATIVAN) tablet 1 mg, 1 mg, Oral, Daily PRN, Walker Juan MD    mirtazapine (REMERON) tablet 15 mg, 15 mg, Oral, HS, Adelso Low MD, 15 mg at 04/20/21 2202    multi-electrolyte (PLASMALYTE-A/ISOLYTE-S PH 7 4) IV solution, 100 mL/hr, Intravenous, Continuous, Joellen Us PA-C, Last Rate: 100 mL/hr at 04/21/21 0249, 100 mL/hr at 04/21/21 0249    multivitamin-minerals (CENTRUM ADULTS) tablet 1 tablet, 1 tablet, Oral, TID, [place] MARTIN Hurcollinsbutt, DO, 1 tablet at 04/21/21 0938    polyethylene glycol (MIRALAX) packet 17 g, 17 g, Oral, Daily, Joellen Us PA-C, 17 g at 04/20/21 0826    prazosin (MINIPRESS) capsule 2 mg, 2 mg, Oral, HS, Elizabeth Fly, DO, 2 mg at 04/20/21 2210    pyridoxine (VITAMIN B6) injection 20 mg, 20 mg, Intravenous, Daily, Kerry Yost MD, 20 mg at 04/20/21 0831    thiamine tablet 100 mg, 100 mg, Oral, Daily, Kerry Yost MD, 100 mg at 04/21/21 0938    venlafaxine (EFFEXOR-XR) 24 hr capsule 37 5 mg, 37 5 mg, Oral, Daily, Elizabeth Fly, DO, 37 5 mg at 04/21/21 6486    zolpidem (AMBIEN) tablet 10 mg, 10 mg, Oral, HS, Leonarda Giraldo MD, 10 mg at 04/20/21 2210    Objective  /83 (BP Location: Right leg)   Pulse 99   Temp (!) 97 4 °F (36 3 °C) (Axillary)   Resp 18   Ht 5' 4" (1 626 m)   Wt 71 5 kg (157 lb 10 1 oz)   SpO2 97%   BMI 27 06 kg/m²   Physical Exam:   Constitutional: Appears chronically ill, malnourished  Head: Temporal wasting is present, atraumatic  Eyes: EOM are normal  No ocular discharge  No scleral icterus  Neck: no visible adenopathy or masses  Respiratory: Effort normal  No stridor  No respiratory distress  Gastrointestinal: No abdominal distension  Musculoskeletal: No edema  Neurological: Alert, oriented x4  Skin: Dry, pale, flaky, no diaphoresis  Psychiatric: Displays depressed mood, flat affect, otherwise unable to participate in exam     Lab Results: I have personally reviewed pertinent labs  , CBC: No results found for: WBC, HGB, HCT, MCV, PLT, ADJUSTEDWBC, MCH, MCHC, RDW, MPV, NRBC, CMP: No results found for: SODIUM, K, CL, CO2, ANIONGAP, BUN, CREATININE, GLUCOSE, CALCIUM, AST, ALT, ALKPHOS, PROT, BILITOT, EGFR    Counseling / Coordination of Care  Total floor / unit time spent today 20 minutes  Greater than 50% of total time was spent with the patient and / or family counseling and / or coordinating of care   A description of the counseling / coordination of care: I  provided medical updates, discussed palliative care, determined goals of care, determined social/family support, discussed plans of care, discussed symptom management, provided psychosocial support       Catalina Walls, [facility]

## 2021-04-21 NOTE — PROGRESS NOTES
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 as per neurology's recommendation  In the end it was felt that her complaints are due to nutritional deficiency due to anorexia nervosa  Psychiatry was consult felt the patient should continue Remeron and there was no role for inpatient psychiatric therapy  Nutritional services saw the patient who continue to have poor p o  Intake because of not wanting to eat  Reached out to bariatric surgery at [facility] as well as at [facility] where patient's gastric bypass surgeries had been done  Patient was not accepted at [facility] for transfer  Outpatient rehab and eventual outpatient follow-up with her bariatric surgeon at [facility] was recommended  RENE (acute kidney injury) [facility])  Assessment & Plan  Patient acute kidney injury on admission due to dehydration from decreased p o  Intake and urinary obstruction  Nephrology was following the patient  Patient had acute hemodialysis due to acute kidney injury  Acute kidney injury resolved:  Creatinine 0 49 today        Severe protein-calorie malnutrition (HCC)  Assessment & Plan  Malnutrition Findings:   Adult Malnutrition type: Chronic illness  Adult Degree of Malnutrition: Other severe protein calorie malnutrition    BMI Findings: Body mass index is 27 06 kg/m²     Nutrition team is following I encouraged patient to consume 4 cans of Ensure today          VTE Prophylaxis: in place    Patient Centered Rounds: I rounded with patient's nurse    Current Length of Stay: 16 day(s)    Current Patient Status: Inpatient    Certification Statement: Pt requires additional inpatient hospital stay due to: see assessment and plan        Subjective:   Patient denies chest pain, shortness breath abdominal pain, nausea, diarrhea    All other ROS are negative    Objective:     Vitals:   Temp (24hrs), Av °F (36 7 °C), Min:97 4 °F (36 3 °C), Max:98 5 °F (36 9 °C)    Temp:  [97 4 °F (36 3 °C)-98 5 °F (36 9 °C)] 97 4 °F (36 3 °C)  BP: (126-129)/(83-84) 129/83  Body mass index is 27 06 kg/m²  Input and Output Summary (last 24 hours): Intake/Output Summary (Last 24 hours) at 2021 1652  Last data filed at 2021 1230  Gross per 24 hour   Intake 477 ml   Output 1000 ml   Net -523 ml       Physical Exam:     Physical Exam  HENT:      Head: Normocephalic  Eyes:      Conjunctiva/sclera: Conjunctivae normal    Cardiovascular:      Rate and Rhythm: Normal rate and regular rhythm  Heart sounds: No murmur  Pulmonary:      Effort: No respiratory distress  Breath sounds: No wheezing or rales  Abdominal:      Palpations: Abdomen is soft  Tenderness: There is no abdominal tenderness  Neurological:      Mental Status: She is alert  Mental status is at baseline  Comments: Moves all extremities on command             I personally reviewed labs and imaging reports for today        Last 24 Hours Medication List:   Current Facility-Administered Medications   Medication Dose Route Frequency Provider Last Rate    acetaminophen  650 mg Oral Q6H PRN Luiz Harris PA-C      butalbital-acetaminophen-caffeine  1 tablet Oral Q6H PRN Molly Krishnamurthy PA-C      cyanocobalamin  1,000 mcg Oral Daily Caesar Connors MD      cyclobenzaprine  10 mg Oral TID PRN Luiz Harris PA-C      docusate sodium 100 mg Oral BID Armand Collet, PA-C      ergocalciferol  50,000 Units Oral Once per day on Mon Thu Elizabeth Fly, DO      folic acid  1 mg Oral Daily Armand Collet, PA-C      gabapentin  100 mg Oral TID PRN Armand Collet, PA-C      gabapentin  300 mg Oral HS SMITHA Felipe      heparin (porcine)  5,000 Units Subcutaneous [facility] Armand Collet, PA-C      levETIRAcetam  500 mg Oral Q12H [facility] Heydi Hawk LUIS Cueto      levothyroxine  50 mcg Oral Early Morning Elizabeth Fly, DO      LORazepam  0 5 mg Oral Q8H PRN Severo Michelle MD      LORazepam  1 mg Oral Daily PRN Merritt De La Rosa MD      mirtazapine  15 mg Oral HS Severo Michelle MD      multi-electrolyte  100 mL/hr Intravenous Continuous Armand Collet, PA-C 100 mL/hr (04/21/21 1153)    multivitamin-minerals  1 tablet Oral TID Boston Tirado,       polyethylene glycol  17 g Oral Daily Armand Collet, PA-C      prazosin  2 mg Oral HS Elizabeth Fly, DO      pyridoxine  20 mg Intravenous Daily Severo Michelle MD      thiamine  100 mg Oral Daily Severo Michelle MD      venlafaxine  37 5 mg Oral Daily Elizabeth Fly, DO      zolpidem  10 mg Oral HS Merritt De La Rosa MD            Today, Patient Was Seen By: Merritt De La Rosa MD    ** Please Note: Dictation voice to text software may have been used in the creation of this document   **

## 2021-04-21 NOTE — CASE MANAGEMENT
Continue to follow  Still awaiting insurance auth from [employer]HE[employer]acion  [facility] via [facility] to follow

## 2021-04-21 NOTE — PLAN OF CARE
Problem: Potential for Falls  Goal: Patient will remain free of falls  Description: INTERVENTIONS:  - Assess patient frequently for physical needs  -  Identify cognitive and physical deficits and behaviors that affect risk of falls  -  [place] fall precautions as indicated by assessment   - Educate patient/family on patient safety including physical limitations  - Instruct patient to call for assistance with activity based on assessment  - Modify environment to reduce risk of injury  - Consider OT/PT consult to assist with strengthening/mobility  Outcome: Progressing     Problem: Prexisting or High Potential for Compromised Skin Integrity  Goal: Skin integrity is maintained or improved  Description: INTERVENTIONS:  - Identify patients at risk for skin breakdown  - Assess and monitor skin integrity  - Assess and monitor nutrition and hydration status  - Monitor labs   - Assess for incontinence   - Turn and reposition patient  - Assist with mobility/ambulation  - Relieve pressure over bony prominences  - Avoid friction and shearing  - Provide appropriate hygiene as needed including keeping skin clean and dry  - Evaluate need for skin moisturizer/barrier cream  - Collaborate with interdisciplinary team   - Patient/family teaching  - Consider wound care consult   Outcome: Progressing     Problem: Nutrition/Hydration-ADULT  Goal: Nutrient/Hydration intake appropriate for improving, restoring or maintaining nutritional needs  Description: Monitor and assess patient's nutrition/hydration status for malnutrition  Collaborate with interdisciplinary team and initiate plan and interventions as ordered  Monitor patient's weight and dietary intake as ordered or per policy  Utilize nutrition screening tool and intervene as necessary  Determine patient's food preferences and provide high-protein, high-caloric foods as appropriate       INTERVENTIONS:  - Monitor oral intake, urinary output, labs, and treatment plans  - Assess nutrition and hydration status and recommend course of action  - Evaluate amount of meals eaten  - Assist patient with eating if necessary   - Allow adequate time for meals  - Recommend/ encourage appropriate diets, oral nutritional supplements, and vitamin/mineral supplements  - Order, calculate, and assess calorie counts as needed  - Recommend, monitor, and adjust tube feedings and TPN/PPN based on assessed needs  - Assess need for intravenous fluids  - Provide specific nutrition/hydration education as appropriate  - Include patient/family/caregiver in decisions related to nutrition  Outcome: Progressing     Problem: PAIN - ADULT  Goal: Verbalizes/displays adequate comfort level or baseline comfort level  Description: Interventions:  - Encourage patient to monitor pain and request assistance  - Assess pain using appropriate pain scale  - Administer analgesics based on type and severity of pain and evaluate response  - Implement non-pharmacological measures as appropriate and evaluate response  - Consider cultural and social influences on pain and pain management  - Notify physician/advanced practitioner if interventions unsuccessful or patient reports new pain  Outcome: Progressing     Problem: INFECTION - ADULT  Goal: Absence or prevention of progression during hospitalization  Description: INTERVENTIONS:  - Assess and monitor for signs and symptoms of infection  - Monitor lab/diagnostic results  - Monitor all insertion sites, i e  indwelling lines, tubes, and drains  - Monitor endotracheal if appropriate and nasal secretions for changes in amount and color  - [place] appropriate cooling/warming therapies per order  - Administer medications as ordered  - Instruct and encourage patient and family to use good hand hygiene technique  - Identify and instruct in appropriate isolation precautions for identified infection/condition  Outcome: Progressing     Problem: SAFETY ADULT  Goal: Patient will remain free of falls  Description: INTERVENTIONS:  - Assess patient frequently for physical needs  -  Identify cognitive and physical deficits and behaviors that affect risk of falls  -  [place] fall precautions as indicated by assessment   - Educate patient/family on patient safety including physical limitations  - Instruct patient to call for assistance with activity based on assessment  - Modify environment to reduce risk of injury  - Consider OT/PT consult to assist with strengthening/mobility  Outcome: Progressing     Problem: DISCHARGE PLANNING  Goal: Discharge to home or other facility with appropriate resources  Description: INTERVENTIONS:  - Identify barriers to discharge w/patient and caregiver  - Arrange for needed discharge resources and transportation as appropriate  - Identify discharge learning needs (meds, wound care, etc )  - Arrange for interpretive services to assist at discharge as needed  - Refer to Case Management Department for coordinating discharge planning if the patient needs post-hospital services based on physician/advanced practitioner order or complex needs related to functional status, cognitive ability, or social support system  Outcome: Progressing     Problem: Knowledge Deficit  Goal: Patient/family/caregiver demonstrates understanding of disease process, treatment plan, medications, and discharge instructions  Description: Complete learning assessment and assess knowledge base    Interventions:  - Provide teaching at level of understanding  - Provide teaching via preferred learning methods  Outcome: Progressing     Problem: NEUROSENSORY - ADULT  Goal: Achieves stable or improved neurological status  Description: INTERVENTIONS  - Monitor and report changes in neurological status  - Monitor vital signs such as temperature, blood pressure, glucose, and any other labs ordered   - Initiate measures to prevent increased intracranial pressure  - Monitor for seizure activity and implement precautions if appropriate      Outcome: Progressing     Problem: GASTROINTESTINAL - ADULT  Goal: Maintains adequate nutritional intake  Description: INTERVENTIONS:  - Monitor percentage of each meal consumed  - Identify factors contributing to decreased intake, treat as appropriate  - Assist with meals as needed  - Monitor I&O, weight, and lab values if indicated  - Obtain nutrition services referral as needed  Outcome: Progressing     Problem: GENITOURINARY - ADULT  Goal: Urinary catheter remains patent  Description: INTERVENTIONS:  - Assess patency of urinary catheter  - If patient has a chronic bates, consider changing catheter if non-functioning  - Follow guidelines for intermittent irrigation of non-functioning urinary catheter  Outcome: Progressing     Problem: METABOLIC, FLUID AND ELECTROLYTES - ADULT  Goal: Electrolytes maintained within normal limits  Description: INTERVENTIONS:  - Monitor labs and assess patient for signs and symptoms of electrolyte imbalances  - Administer electrolyte replacement as ordered  - Monitor response to electrolyte replacements, including repeat lab results as appropriate  - Instruct patient on fluid and nutrition as appropriate  Outcome: Progressing  Goal: Fluid balance maintained  Description: INTERVENTIONS:  - Monitor labs   - Monitor I/O and WT  - Instruct patient on fluid and nutrition as appropriate  - Assess for signs & symptoms of volume excess or deficit  Outcome: Progressing     Problem: HEMATOLOGIC - ADULT  Goal: Maintains hematologic stability  Description: INTERVENTIONS  - Assess for signs and symptoms of bleeding or hemorrhage  - Monitor labs  - Administer supportive blood products/factors as ordered and appropriate  Outcome: Progressing     Problem: MUSCULOSKELETAL - ADULT  Goal: Maintain or return mobility to safest level of function  Description: INTERVENTIONS:  - Assess patient's ability to carry out ADLs; assess patient's baseline for ADL function and identify physical deficits which impact ability to perform ADLs (bathing, care of mouth/teeth, toileting, grooming, dressing, etc )  - Assess/evaluate cause of self-care deficits   - Assess range of motion  - Assess patient's mobility  - Assess patient's need for assistive devices and provide as appropriate  - Encourage maximum independence but intervene and supervise when necessary  - Involve family in performance of ADLs  - Assess for home care needs following discharge   - Consider OT consult to assist with ADL evaluation and planning for discharge  - Provide patient education as appropriate  Outcome: Progressing

## 2021-04-21 NOTE — ASSESSMENT & PLAN NOTE
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 as per neurology's recommendation  In the end it was felt that her complaints are due to nutritional deficiency due to anorexia nervosa  Psychiatry was consult felt the patient should continue Remeron and there was no role for inpatient psychiatric therapy  Nutritional services saw the patient who continue to have poor p o  Intake because of not wanting to eat  Reached out to bariatric surgery at [facility] as well as at [address] [place] [address] where patient's gastric bypass surgeries had been done  Patient was not accepted at [address] [place] [address] for transfer  Outpatient rehab and eventual outpatient follow-up with her bariatric surgeon at [address] [place] [address] was recommended

## 2021-04-21 NOTE — ASSESSMENT & PLAN NOTE
Patient acute kidney injury on admission due to dehydration from decreased p o  Intake and urinary obstruction  Nephrology was following the patient  Patient had acute hemodialysis due to acute kidney injury    Acute kidney injury resolved:  Creatinine 0 49 today

## 2021-04-21 NOTE — ASSESSMENT & PLAN NOTE
Malnutrition Findings:   Adult Malnutrition type: Chronic illness  Adult Degree of Malnutrition: Other severe protein calorie malnutrition    BMI Findings: Body mass index is 27 06 kg/m²     Nutrition team is following   I encouraged patient to consume 4 cans of Ensure today

## 2021-04-22 PROCEDURE — 99232 SBSQ HOSP IP/OBS MODERATE 35: CPT | Performed by: INTERNAL MEDICINE

## 2021-04-22 RX ORDER — DEXTROSE, SODIUM CHLORIDE, AND POTASSIUM CHLORIDE 5; .45; .15 G/100ML; G/100ML; G/100ML
75 INJECTION INTRAVENOUS CONTINUOUS
Status: DISCONTINUED | OUTPATIENT
Start: 2021-04-22 | End: 2021-04-26 | Stop reason: HOSPADM

## 2021-04-22 RX ADMIN — LORAZEPAM 0.5 MG: 0.5 TABLET ORAL at 11:18

## 2021-04-22 RX ADMIN — LORAZEPAM 0.5 MG: 0.5 TABLET ORAL at 22:16

## 2021-04-22 RX ADMIN — THIAMINE HCL TAB 100 MG 100 MG: 100 TAB at 11:18

## 2021-04-22 RX ADMIN — LEVETIRACETAM 500 MG: 500 TABLET, FILM COATED ORAL at 11:18

## 2021-04-22 RX ADMIN — Medication 1 TABLET: at 15:35

## 2021-04-22 RX ADMIN — HEPARIN SODIUM 5000 UNITS: 5000 INJECTION INTRAVENOUS; SUBCUTANEOUS at 13:38

## 2021-04-22 RX ADMIN — GABAPENTIN 300 MG: 300 CAPSULE ORAL at 22:17

## 2021-04-22 RX ADMIN — Medication 1 TABLET: at 11:18

## 2021-04-22 RX ADMIN — LEVETIRACETAM 500 MG: 500 TABLET, FILM COATED ORAL at 22:17

## 2021-04-22 RX ADMIN — DOCUSATE SODIUM 100 MG: 100 CAPSULE, LIQUID FILLED ORAL at 17:41

## 2021-04-22 RX ADMIN — MIRTAZAPINE 15 MG: 15 TABLET, FILM COATED ORAL at 22:16

## 2021-04-22 RX ADMIN — CYANOCOBALAMIN TAB 500 MCG 1000 MCG: 500 TAB at 11:18

## 2021-04-22 RX ADMIN — ZOLPIDEM TARTRATE 10 MG: 5 TABLET, COATED ORAL at 22:16

## 2021-04-22 RX ADMIN — HEPARIN SODIUM 5000 UNITS: 5000 INJECTION INTRAVENOUS; SUBCUTANEOUS at 05:57

## 2021-04-22 RX ADMIN — SODIUM CHLORIDE, SODIUM GLUCONATE, SODIUM ACETATE, POTASSIUM CHLORIDE, MAGNESIUM CHLORIDE, SODIUM PHOSPHATE, DIBASIC, AND POTASSIUM PHOSPHATE 100 ML/HR: .53; .5; .37; .037; .03; .012; .00082 INJECTION, SOLUTION INTRAVENOUS at 07:39

## 2021-04-22 RX ADMIN — VENLAFAXINE HYDROCHLORIDE 37.5 MG: 37.5 CAPSULE, EXTENDED RELEASE ORAL at 11:19

## 2021-04-22 RX ADMIN — BUTALBITAL, ACETAMINOPHEN AND CAFFEINE 1 TABLET: 50; 325; 40 TABLET ORAL at 13:38

## 2021-04-22 RX ADMIN — ERGOCALCIFEROL 50000 UNITS: 1.25 CAPSULE ORAL at 11:20

## 2021-04-22 RX ADMIN — FOLIC ACID 1 MG: 1 TABLET ORAL at 11:18

## 2021-04-22 RX ADMIN — Medication 1 TABLET: at 22:16

## 2021-04-22 RX ADMIN — LEVOTHYROXINE SODIUM 50 MCG: 25 TABLET ORAL at 05:52

## 2021-04-22 RX ADMIN — CYCLOBENZAPRINE HYDROCHLORIDE 10 MG: 10 TABLET, FILM COATED ORAL at 15:35

## 2021-04-22 RX ADMIN — PRAZOSIN HYDROCHLORIDE 2 MG: 1 CAPSULE ORAL at 22:27

## 2021-04-22 RX ADMIN — DEXTROSE, SODIUM CHLORIDE, AND POTASSIUM CHLORIDE 75 ML/HR: 5; .45; .15 INJECTION INTRAVENOUS at 13:33

## 2021-04-22 RX ADMIN — DOCUSATE SODIUM 100 MG: 100 CAPSULE, LIQUID FILLED ORAL at 11:18

## 2021-04-22 RX ADMIN — HEPARIN SODIUM 5000 UNITS: 5000 INJECTION INTRAVENOUS; SUBCUTANEOUS at 22:17

## 2021-04-22 RX ADMIN — PYRIDOXINE HYDROCHLORIDE 20 MG: 100 INJECTION, SOLUTION INTRAMUSCULAR; INTRAVENOUS at 11:20

## 2021-04-22 RX ADMIN — POLYETHYLENE GLYCOL 3350 17 G: 17 POWDER, FOR SOLUTION ORAL at 11:18

## 2021-04-22 NOTE — CASE MANAGEMENT
Continue to follow  Aleksandra had requested a peer to peer to be completed by Attending Doctor  Dr Liliam Frias aware of peer to peer and phone number for peer to peer of [phone] option 5 given to Dr Liliam Frias informed CM that [facility] doctor for peer to peer denied SNF time and feels Pt will need long term placement  Cassondra Severin in admissions at [facility] informed CM that they no longer have bed available for Pt  Met with Pt  Informed Pt that Aleksandra has denied SNF for Pt and Pt will now need to be MA pending for facility  CM informed Pt that [facility] no longer has bed available and CM will have to send referrals to additional SNFs who will also accept MA pending  Pt in agreement for SNF referral within 20 mile radius  Reiterated with Pt that it is imperative that she works with PT/OT in order to get stronger and work with PT/OT at rehab in order to eventually get to a eating disorder treatment center  CM informed Pt that [facility] will need to complete evaluation for level of care for rehab/nursing home  Call placed to Pt's  with [facility]([place]: [phone]), informed Ephraim Viramontes of Pt's status and insurance denial for SNF  CM informed Ephraim Viramontes that [facility] will need to do level of determination for rehab/nursing home and Pt will be MA pending for facility  CM informed Pt's  that additional referrals for SNF will be sent within 20 mile radius  Pt's (Kenny Wesley) expressed understanding and requested CM to keep her posted on status of SNF facilities  Faxed requested information to [facility] at [phone]  CM to follow

## 2021-04-22 NOTE — ASSESSMENT & PLAN NOTE
Patient had uterine admission due to E coli and completed antibiotic therapy during the hospital stay  Zhong catheter had to be reinserted because patient failed voiding trial after removal of Zhong catheter!

## 2021-04-22 NOTE — ASSESSMENT & PLAN NOTE
Patient acute kidney injury on admission due to dehydration from decreased p o  Intake and urinary obstruction  Nephrology was following the patient  Patient had acute hemodialysis due to acute kidney injury    Acute kidney injury resolved:  Creatinine 0 49

## 2021-04-22 NOTE — CASE MANAGEMENT
Continue to follow  [facility] level of determination eval tomorrow at 1:30pm for nursing home  Await determination  CM to follow

## 2021-04-22 NOTE — ASSESSMENT & PLAN NOTE
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 as well as at [facility] where patient's gastric bypass surgeries had been done  Patient was not accepted at [facility] for transfer  Outpatient rehab and eventual outpatient follow-up with her bariatric surgeon at [facility] was recommended  I discussed the case with patient's the insurance company, medical director Dr Berkley Galvan on April 22nd:  Due to the fact that patient refused therapy and was not progressing over and 18 day course of SNF therapy before admission to this hospital he denied SNF placement for patient and recommended long-term care placement!

## 2021-04-22 NOTE — PROGRESS NOTES
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 as per neurology's recommendation  In the end it was felt that her complaints are due to nutritional deficiency due to anorexia nervosa  Psychiatry was consult felt the patient should continue Remeron and there was no role for inpatient psychiatric therapy  Nutritional services saw the patient who continue to have poor p o  Intake because of not wanting to eat  Reached out to bariatric surgery at [facility] as well as at [facility] where patient's gastric bypass surgeries had been done  Patient was not accepted at [facility] for transfer  Outpatient rehab and eventual outpatient follow-up with her bariatric surgeon at [facility] was recommended  I discussed the case with patient's the insurance company, medical director Dr Adithya Lea on April 22nd:  Due to the fact that patient refused therapy and was not progressing over and 18 day course of SNF therapy before admission to this hospital he denied SNF placement for patient and recommended long-term care placement! RENE (acute kidney injury) [facility])  Assessment & Plan  Patient acute kidney injury on admission due to dehydration from decreased p o  Intake and urinary obstruction  Nephrology was following the patient  Patient had acute hemodialysis due to acute kidney injury    Acute kidney injury resolved:  Creatinine 0 49        Acute cystitis without hematuria  Assessment & Plan  Patient had uterine admission due to E coli and completed antibiotic therapy during the hospital stay  Zhong catheter had to be reinserted because patient failed voiding trial after removal of Zhong catheter! Severe protein-calorie malnutrition ([address] )  Assessment & Plan  Malnutrition Findings:   Adult Malnutrition type: Chronic illness  Adult Degree of Malnutrition: Other severe protein calorie malnutrition    BMI Findings: Body mass index is 27 78 kg/m²  Nutrition team is following   I encouraged patient to consume 4 cans of Ensure a day          VTE Prophylaxis: in place    Patient Centered Rounds: I rounded with patient's nurse    Current Length of Stay: 17 day(s)    Current Patient Status: Inpatient    Certification Statement: Pt requires additional inpatient hospital stay due to: see assessment and plan        Subjective:   Patient denies any chest pain, headache, shortness breath, abdominal pain, diarrhea    All other ROS are negative    Objective:     Vitals:   Temp (24hrs), Av °F (36 1 °C), Min:96 5 °F (35 8 °C), Max:98 1 °F (36 7 °C)    Temp:  [96 5 °F (35 8 °C)-98 1 °F (36 7 °C)] 96 6 °F (35 9 °C)  HR:  [83-98] 87  Resp:  [16-18] 16  BP: (123-125)/(82-86) 125/84  SpO2:  [95 %-98 %] 95 %  Body mass index is 27 78 kg/m²  Input and Output Summary (last 24 hours): Intake/Output Summary (Last 24 hours) at 2021 1211  Last data filed at 2021 1115  Gross per 24 hour   Intake 3526 34 ml   Output 2625 ml   Net 901 34 ml       Physical Exam:     Physical Exam  HENT:      Head: Normocephalic  Cardiovascular:      Rate and Rhythm: Normal rate and regular rhythm  Heart sounds: No murmur  Pulmonary:      Effort: No respiratory distress  Breath sounds: No wheezing  Abdominal:      General: Bowel sounds are normal       Palpations: Abdomen is soft  Tenderness:  There is no abdominal tenderness  Skin:     General: Skin is warm and dry  Neurological:      Mental Status: She is alert and oriented to person, place, and time  Comments: Patient has no facial droop, moves all extremities on command             I personally reviewed labs and imaging reports for today        Last 24 Hours Medication List:   Current Facility-Administered Medications   Medication Dose Route Frequency Provider Last Rate    acetaminophen  650 mg Oral Q6H PRN [facility], [facility]      butalbital-acetaminophen-caffeine  1 tablet Oral Q6H PRN Constancia Matter, LU[place]      cyanocobalamin  1,000 mcg Oral Daily Elroy Wilson MD      cyclobenzaprine  10 mg Oral TID PRN [facility], [place]      dextrose 5 % and sodium chloride 0 45 % with KCl 20 mEq/L  75 mL/hr Intravenous Continuous Shannon Tejada MD      docusate sodium  100 mg Oral BID [facility], [place]      ergocalciferol  50,000 Units Oral Once per day on Mon Thu Elizabeth Fly, DO      folic acid  1 mg Oral Daily [facility], [place]      gabapentin  100 mg Oral TID PRN [facility], [place]      gabapentin  300 mg Oral HS SMITHA Boles      heparin (porcine)  5,000 Units Subcutaneous [facility], [place]      levETIRAcetam  500 mg Oral Q12H [facility] Heydi Rivera PA-C      levothyroxine  50 mcg Oral Early Morning Elizabeth Fly, DO      LORazepam  0 5 mg Oral Q8H PRN Elroy Wilson MD      LORazepam  1 mg Oral Daily PRN Shannon Tejada MD      mirtazapine  15 mg Oral HS Elroy Wilson MD      multivitamin-minerals  1 tablet Oral TID Boston Tirado, DO      polyethylene glycol  17 g Oral Daily [facility], [place]      prazosin  2 mg Oral HS Elizabeth Fly, DO      pyridoxine  20 mg Intravenous Daily Elroy Wilson MD      thiamine  100 mg Oral Daily Elroy Wilson MD      venlafaxine  37 5 mg Oral Daily Elizabeth Fly, DO      zolpidem  10 mg Oral HS Shannon Tejada MD            Today, Patient Was Seen By: Shannon Tejada MD    ** Please Note: Dictation voice to text software may have been used in the creation of this document   **

## 2021-04-22 NOTE — PLAN OF CARE
Problem: Potential for Falls  Goal: Patient will remain free of falls  Description: INTERVENTIONS:  - Assess patient frequently for physical needs  -  Identify cognitive and physical deficits and behaviors that affect risk of falls  -  [place] fall precautions as indicated by assessment   - Educate patient/family on patient safety including physical limitations  - Instruct patient to call for assistance with activity based on assessment  - Modify environment to reduce risk of injury  - Consider OT/PT consult to assist with strengthening/mobility  Outcome: Progressing     Problem: Prexisting or High Potential for Compromised Skin Integrity  Goal: Skin integrity is maintained or improved  Description: INTERVENTIONS:  - Identify patients at risk for skin breakdown  - Assess and monitor skin integrity  - Assess and monitor nutrition and hydration status  - Monitor labs   - Assess for incontinence   - Turn and reposition patient  - Assist with mobility/ambulation  - Relieve pressure over bony prominences  - Avoid friction and shearing  - Provide appropriate hygiene as needed including keeping skin clean and dry  - Evaluate need for skin moisturizer/barrier cream  - Collaborate with interdisciplinary team   - Patient/family teaching  - Consider wound care consult   Outcome: Progressing     Problem: Nutrition/Hydration-ADULT  Goal: Nutrient/Hydration intake appropriate for improving, restoring or maintaining nutritional needs  Description: Monitor and assess patient's nutrition/hydration status for malnutrition  Collaborate with interdisciplinary team and initiate plan and interventions as ordered  Monitor patient's weight and dietary intake as ordered or per policy  Utilize nutrition screening tool and intervene as necessary  Determine patient's food preferences and provide high-protein, high-caloric foods as appropriate       INTERVENTIONS:  - Monitor oral intake, urinary output, labs, and treatment plans  - Assess nutrition and hydration status and recommend course of action  - Evaluate amount of meals eaten  - Assist patient with eating if necessary   - Allow adequate time for meals  - Recommend/ encourage appropriate diets, oral nutritional supplements, and vitamin/mineral supplements  - Order, calculate, and assess calorie counts as needed  - Recommend, monitor, and adjust tube feedings and TPN/PPN based on assessed needs  - Assess need for intravenous fluids  - Provide specific nutrition/hydration education as appropriate  - Include patient/family/caregiver in decisions related to nutrition  Outcome: Progressing     Problem: PAIN - ADULT  Goal: Verbalizes/displays adequate comfort level or baseline comfort level  Description: Interventions:  - Encourage patient to monitor pain and request assistance  - Assess pain using appropriate pain scale  - Administer analgesics based on type and severity of pain and evaluate response  - Implement non-pharmacological measures as appropriate and evaluate response  - Consider cultural and social influences on pain and pain management  - Notify physician/advanced practitioner if interventions unsuccessful or patient reports new pain  Outcome: Progressing     Problem: INFECTION - ADULT  Goal: Absence or prevention of progression during hospitalization  Description: INTERVENTIONS:  - Assess and monitor for signs and symptoms of infection  - Monitor lab/diagnostic results  - Monitor all insertion sites, i e  indwelling lines, tubes, and drains  - Monitor endotracheal if appropriate and nasal secretions for changes in amount and color  - [place] appropriate cooling/warming therapies per order  - Administer medications as ordered  - Instruct and encourage patient and family to use good hand hygiene technique  - Identify and instruct in appropriate isolation precautions for identified infection/condition  Outcome: Progressing     Problem: SAFETY ADULT  Goal: Patient will remain free of falls  Description: INTERVENTIONS:  - Assess patient frequently for physical needs  -  Identify cognitive and physical deficits and behaviors that affect risk of falls  -  [place] fall precautions as indicated by assessment   - Educate patient/family on patient safety including physical limitations  - Instruct patient to call for assistance with activity based on assessment  - Modify environment to reduce risk of injury  - Consider OT/PT consult to assist with strengthening/mobility  Outcome: Progressing     Problem: DISCHARGE PLANNING  Goal: Discharge to home or other facility with appropriate resources  Description: INTERVENTIONS:  - Identify barriers to discharge w/patient and caregiver  - Arrange for needed discharge resources and transportation as appropriate  - Identify discharge learning needs (meds, wound care, etc )  - Arrange for interpretive services to assist at discharge as needed  - Refer to Case Management Department for coordinating discharge planning if the patient needs post-hospital services based on physician/advanced practitioner order or complex needs related to functional status, cognitive ability, or social support system  Outcome: Progressing     Problem: Knowledge Deficit  Goal: Patient/family/caregiver demonstrates understanding of disease process, treatment plan, medications, and discharge instructions  Description: Complete learning assessment and assess knowledge base    Interventions:  - Provide teaching at level of understanding  - Provide teaching via preferred learning methods  Outcome: Progressing     Problem: NEUROSENSORY - ADULT  Goal: Achieves stable or improved neurological status  Description: INTERVENTIONS  - Monitor and report changes in neurological status  - Monitor vital signs such as temperature, blood pressure, glucose, and any other labs ordered   - Initiate measures to prevent increased intracranial pressure  - Monitor for seizure activity and implement precautions if appropriate      Outcome: Progressing     Problem: GASTROINTESTINAL - ADULT  Goal: Maintains adequate nutritional intake  Description: INTERVENTIONS:  - Monitor percentage of each meal consumed  - Identify factors contributing to decreased intake, treat as appropriate  - Assist with meals as needed  - Monitor I&O, weight, and lab values if indicated  - Obtain nutrition services referral as needed  Outcome: Progressing     Problem: GENITOURINARY - ADULT  Goal: Urinary catheter remains patent  Description: INTERVENTIONS:  - Assess patency of urinary catheter  - If patient has a chronic bates, consider changing catheter if non-functioning  - Follow guidelines for intermittent irrigation of non-functioning urinary catheter  Outcome: Progressing     Problem: METABOLIC, FLUID AND ELECTROLYTES - ADULT  Goal: Electrolytes maintained within normal limits  Description: INTERVENTIONS:  - Monitor labs and assess patient for signs and symptoms of electrolyte imbalances  - Administer electrolyte replacement as ordered  - Monitor response to electrolyte replacements, including repeat lab results as appropriate  - Instruct patient on fluid and nutrition as appropriate  Outcome: Progressing  Goal: Fluid balance maintained  Description: INTERVENTIONS:  - Monitor labs   - Monitor I/O and WT  - Instruct patient on fluid and nutrition as appropriate  - Assess for signs & symptoms of volume excess or deficit  Outcome: Progressing     Problem: HEMATOLOGIC - ADULT  Goal: Maintains hematologic stability  Description: INTERVENTIONS  - Assess for signs and symptoms of bleeding or hemorrhage  - Monitor labs  - Administer supportive blood products/factors as ordered and appropriate  Outcome: Progressing     Problem: MUSCULOSKELETAL - ADULT  Goal: Maintain or return mobility to safest level of function  Description: INTERVENTIONS:  - Assess patient's ability to carry out ADLs; assess patient's baseline for ADL function and identify physical deficits which impact ability to perform ADLs (bathing, care of mouth/teeth, toileting, grooming, dressing, etc )  - Assess/evaluate cause of self-care deficits   - Assess range of motion  - Assess patient's mobility  - Assess patient's need for assistive devices and provide as appropriate  - Encourage maximum independence but intervene and supervise when necessary  - Involve family in performance of ADLs  - Assess for home care needs following discharge   - Consider OT consult to assist with ADL evaluation and planning for discharge  - Provide patient education as appropriate  Outcome: Progressing

## 2021-04-22 NOTE — ASSESSMENT & PLAN NOTE
Malnutrition Findings:   Adult Malnutrition type: Chronic illness  Adult Degree of Malnutrition: Other severe protein calorie malnutrition    BMI Findings: Body mass index is 27 78 kg/m²     Nutrition team is following   I encouraged patient to consume 4 cans of Ensure a day

## 2021-04-23 LAB
ANION GAP SERPL CALCULATED.3IONS-SCNC: 7 MMOL/L (ref 4–13)
BUN SERPL-MCNC: 3 MG/DL (ref 5–25)
CALCIUM SERPL-MCNC: 8.5 MG/DL (ref 8.3–10.1)
CHLORIDE SERPL-SCNC: 108 MMOL/L (ref 100–108)
CO2 SERPL-SCNC: 30 MMOL/L (ref 21–32)
CREAT SERPL-MCNC: 0.56 MG/DL (ref 0.6–1.3)
GFR SERPL CREATININE-BSD FRML MDRD: 119 ML/MIN/1.73SQ M
GLUCOSE SERPL-MCNC: 103 MG/DL (ref 65–140)
POTASSIUM SERPL-SCNC: 3.8 MMOL/L (ref 3.5–5.3)
SODIUM SERPL-SCNC: 145 MMOL/L (ref 136–145)

## 2021-04-23 PROCEDURE — 80048 BASIC METABOLIC PNL TOTAL CA: CPT | Performed by: INTERNAL MEDICINE

## 2021-04-23 PROCEDURE — 99232 SBSQ HOSP IP/OBS MODERATE 35: CPT | Performed by: INTERNAL MEDICINE

## 2021-04-23 RX ADMIN — PYRIDOXINE HYDROCHLORIDE 20 MG: 100 INJECTION, SOLUTION INTRAMUSCULAR; INTRAVENOUS at 09:27

## 2021-04-23 RX ADMIN — LORAZEPAM 0.5 MG: 0.5 TABLET ORAL at 18:58

## 2021-04-23 RX ADMIN — HEPARIN SODIUM 5000 UNITS: 5000 INJECTION INTRAVENOUS; SUBCUTANEOUS at 13:55

## 2021-04-23 RX ADMIN — DOCUSATE SODIUM 100 MG: 100 CAPSULE, LIQUID FILLED ORAL at 09:27

## 2021-04-23 RX ADMIN — HEPARIN SODIUM 5000 UNITS: 5000 INJECTION INTRAVENOUS; SUBCUTANEOUS at 21:52

## 2021-04-23 RX ADMIN — Medication 1 TABLET: at 09:28

## 2021-04-23 RX ADMIN — DOCUSATE SODIUM 100 MG: 100 CAPSULE, LIQUID FILLED ORAL at 17:02

## 2021-04-23 RX ADMIN — LEVETIRACETAM 500 MG: 500 TABLET, FILM COATED ORAL at 09:27

## 2021-04-23 RX ADMIN — LEVOTHYROXINE SODIUM 50 MCG: 25 TABLET ORAL at 05:27

## 2021-04-23 RX ADMIN — HEPARIN SODIUM 5000 UNITS: 5000 INJECTION INTRAVENOUS; SUBCUTANEOUS at 05:27

## 2021-04-23 RX ADMIN — PRAZOSIN HYDROCHLORIDE 2 MG: 1 CAPSULE ORAL at 21:57

## 2021-04-23 RX ADMIN — MIRTAZAPINE 15 MG: 15 TABLET, FILM COATED ORAL at 21:54

## 2021-04-23 RX ADMIN — LEVETIRACETAM 500 MG: 500 TABLET, FILM COATED ORAL at 21:54

## 2021-04-23 RX ADMIN — DEXTROSE, SODIUM CHLORIDE, AND POTASSIUM CHLORIDE 75 ML/HR: 5; .45; .15 INJECTION INTRAVENOUS at 17:58

## 2021-04-23 RX ADMIN — FOLIC ACID 1 MG: 1 TABLET ORAL at 09:27

## 2021-04-23 RX ADMIN — CYCLOBENZAPRINE HYDROCHLORIDE 10 MG: 10 TABLET, FILM COATED ORAL at 21:58

## 2021-04-23 RX ADMIN — DEXTROSE, SODIUM CHLORIDE, AND POTASSIUM CHLORIDE 75 ML/HR: 5; .45; .15 INJECTION INTRAVENOUS at 04:05

## 2021-04-23 RX ADMIN — VENLAFAXINE HYDROCHLORIDE 37.5 MG: 37.5 CAPSULE, EXTENDED RELEASE ORAL at 09:27

## 2021-04-23 RX ADMIN — GABAPENTIN 300 MG: 300 CAPSULE ORAL at 21:53

## 2021-04-23 RX ADMIN — LORAZEPAM 0.5 MG: 0.5 TABLET ORAL at 09:27

## 2021-04-23 RX ADMIN — Medication 1 TABLET: at 21:54

## 2021-04-23 RX ADMIN — POLYETHYLENE GLYCOL 3350 17 G: 17 POWDER, FOR SOLUTION ORAL at 09:27

## 2021-04-23 RX ADMIN — CYANOCOBALAMIN TAB 500 MCG 1000 MCG: 500 TAB at 09:27

## 2021-04-23 RX ADMIN — BUTALBITAL, ACETAMINOPHEN AND CAFFEINE 1 TABLET: 50; 325; 40 TABLET ORAL at 11:35

## 2021-04-23 RX ADMIN — THIAMINE HCL TAB 100 MG 100 MG: 100 TAB at 09:27

## 2021-04-23 RX ADMIN — CYCLOBENZAPRINE HYDROCHLORIDE 10 MG: 10 TABLET, FILM COATED ORAL at 13:55

## 2021-04-23 RX ADMIN — Medication 1 TABLET: at 16:02

## 2021-04-23 RX ADMIN — ZOLPIDEM TARTRATE 10 MG: 5 TABLET, COATED ORAL at 21:54

## 2021-04-23 NOTE — NUTRITION
04/23/21 1445   Assessment   Timepoint Reassess   Labs & Meds   Labs/Meds Review Lab values reviewed; Meds reviewed  (4/23/21 BUN 3, Creat 0 53 meds: Colace, VitD2, Folvite, Remeron, Centrum, Miralax, Vit B6, Thiamine, D5 @ 75 ml/hr)   Feeding Route   PO Independent   Adequacy of Intake   Nutrition Modality PO  (Regular, Ensure Clears and Ensure Enlive Brenda TID)   Intake Supplements %   Estimated Calorie Intake 50-75%   Estimated Protein Intake  50-75%   Estimated Fluid Intake %  (D5@ 75 ml/hr)   Estimated calorie intake compared to estimated need Pt has contracted to try to drink 4 Ensure daily  Reports she is alternating Ensure Clears and Ensure Enlive Brenda  Nutrition Prognosis   Nutrition Concerns No edema documented;RN skin assessment reviewed   Comorbid Concerns   (current medical:genrealized weakness, RENE resolved, acute cystitis, bates catheter, anorexia nervosa restricting type)   Nutrition Considerations Motivation   PES Statement   Problem Continue previous diagnosis   Patient Nutrition Goals   Goal Increase kcal/PRO intake; Avoid weight loss; Improve to healthful diet   Goal Status met;extended   Timeframe to complete goal by next f/u   Recommendations/Interventions   Summary Awaiting NH placement  Wt up 1 4 kg in 1 week  Has contracted to attempt drinking 4 Ensure daily, alternating Ensure Clears and Ensure Enlive Brenda  IF she drinks all 4, will consume 1190 kcal/56 gm protein  Took 2 total today so far, overall calorie consumption improved  Not eating solids per pt at this time  Interventions Diet: continued as ordered; Supplement continue   Nutrition Recommendations Continue diet as ordered   Nutrition Complexity Risk   Nutrition complexity level Moderate risk   Nutrition review: 04/28/21   Follow up date 04/28/21  (po intake, supplements and weights )

## 2021-04-23 NOTE — UTILIZATION REVIEW
Continued Stay Review    Date: 4/23/21                        Current Patient Class:IP Current Level of Care: MS    HPI:40 y o  female  With hh gastric bypass, anorexiainitially admitted on 4/5/21 with weakness-suspected due to nutritional deficiencies and polyneuropathy/RENE/Hypoglycemia  4/8 placement dialysis catheter with 4/17 last dose of plasma exchange with removal of catheter 4/20 under local anesthesia  RENE resolved with creat 0 49  PICC placed 4/16/21- RUE  In the end it was felt that her complaints are due to nutritional deficiency due to anorexia nervosa  4/19 Behavioral Health consulted-pt deemed to have capacity to make informed medical decisions within normal limits  felt pt should continue Remeron  4/20 Reached out to [facility]ariatric [facility] at [facility] as well as [place] [facility] [place] had been [place]  Patient was not accepted at [facility] [place]  Outpatient rehab and eventual outpatient follow-up with her bariatric surgeon at [facility] was recommended  Assessment/Plan: Pt alert, oriented, no facial droop , moves all extremities on command  Bates catheter trialed removal, pt failed , had urinary retentionand bates catheter reinserted   Pt encouraged to have 4 cans of Ensure q day for severe protein-calorie malnutrition  Pt continues to receive IVF , changed from plasmalyte to D51/2NS with KCL 4/22  Pt has refused therapy , insurance company medical director denied SNF placement for pt and recommended long term care placement as of 4/22  IPCM -Pt will be MA pending for facility- reiterated with pt that it is imperative that she works with PT/OT in order to get stronger and work with PT/OT at rehab in order to eventually get to a eating disorder treatment center  [facility] on Aging level of determination eval 4/23 for nursing home  Await determination     Vital Signs:/85   Pulse 97   Temp 98 1 °F (36 7 °C)   Resp 16   Ht 5' 4" (1 626 m)   Wt 73 5 kg (162 lb 0 6 oz)   SpO2 97%   BMI 27 81 kg/m²       Pertinent Labs/Diagnostic Results:       Results from last 7 days   Lab Units 04/20/21  0805 04/18/21  0455   WBC Thousand/uL 4 04* 6 75   HEMOGLOBIN g/dL 8 4* 8 8*   HEMATOCRIT % 27 6* 29 8*   PLATELETS Thousands/uL 159 181   NEUTROS ABS Thousands/µL 2 46 4 53         Results from last 7 days   Lab Units 04/23/21  0526 04/20/21  0805 04/18/21  0455 04/17/21  0539   SODIUM mmol/L 145 146* 145  --    POTASSIUM mmol/L 3 8 3 3* 3 5  --    CHLORIDE mmol/L 108 109* 110*  --    CO2 mmol/L 30 28 24  --    ANION GAP mmol/L 7 9 11  --    BUN mg/dL 3* 3* 2*  --    CREATININE mg/dL 0 56* 0 49* 0 54*  --    EGFR ml/min/1 73sq m 119 124 120  --    CALCIUM mg/dL 8 5 8 3 8 4  --    CALCIUM, IONIZED mmol/L  --   --   --  1 24             Results from last 7 days   Lab Units 04/23/21  0526 04/20/21  0805 04/18/21  0455   GLUCOSE RANDOM mg/dL 103 91 90             Medications:   Scheduled Medications:  cyanocobalamin, 1,000 mcg, Oral, Daily  docusate sodium, 100 mg, Oral, BID  ergocalciferol, 50,000 Units, Oral, Once per day on Mon Thu  folic acid, 1 mg, Oral, Daily  gabapentin, 300 mg, Oral, HS  heparin (porcine), 5,000 Units, Subcutaneous, Q8H ISH  levETIRAcetam, 500 mg, Oral, Q12H [address]  levothyroxine, 50 mcg, Oral, Early Morning  mirtazapine, 15 mg, Oral, HS  multivitamin-minerals, 1 tablet, Oral, TID  polyethylene glycol, 17 g, Oral, Daily  prazosin, 2 mg, Oral, HS  pyridoxine, 20 mg, Intravenous, Daily  thiamine, 100 mg, Oral, Daily  venlafaxine, 37 5 mg, Oral, Daily  zolpidem, 10 mg, Oral, HS      Continuous IV Infusions:  dextrose 5 % and sodium chloride 0 45 % with KCl 20 mEq/L, 75 mL/hr, Intravenous, Continuous  multi-electrolyte (PLASMALYTE-A/ISOLYTE-S PH 7 4) IV solution   Rate: 100 mL/hr Dose: 100 mL/hr  Freq: Continuous Route: IV  Indications of Use: IV Hydration  Last Dose: 100 mL/hr (04/22/21 0739)  Start: 04/21/21 0245 End: 04/22/21 1208    PRN Meds:  acetaminophen, 650 mg, Oral, Q6H PRN  butalbital-acetaminophen-caffeine, 1 tablet, Oral, Q6H PRN , x1 4/19, x1 4/20,, x1 4/21, x1 4/22, x1 4/23  cyclobenzaprine, 10 mg, Oral, TID PRN x 1 4/22, x1 4/23, 2x/day 4/18 to 4/21  gabapentin, 100 mg, Oral, TID PRN  LORazepam, 0 5 mg, Oral, Q8H PRN x1 4/23, 2x/day 4/14 to 4/22  LORazepam, 1 mg, Oral, Daily PRN        Discharge Plan: D    Network Utilization Review Department  ATTENTION: Please call with any questions or concerns to [phone] and carefully listen to the prompts so that you are directed to the right person  All voicemails are confidential   Lozano Allison all requests for admission clinical reviews, approved or denied determinations and any other requests to dedicated fax number below belonging to the campus where the patient is receiving treatment   List of dedicated fax numbers for the Facilities:  [address] DENIALS (Administrative/Medical Necessity) [phone]   [address] (Maternity/NICU/Pediatrics) [phone]   [address] [address] [place] [address] [address] Jordana [address] [facility] [facility] [phone]

## 2021-04-23 NOTE — CASE MANAGEMENT
Continue to follow  [facility]  Await level of care determination from [facility]  [place] in admissions at [facility] informed CM that MA asset review was completed and Pt was approved  [facility] can accept Pt once letter for level of care determination is received  Met with Pt, Pt informed CM that she spoke with [facility] and [facility] this afternoon  CM informed Pt that [facility] should have a bed available for Pt once letter for level of determination is obtained from [facility]  CM to follow

## 2021-04-23 NOTE — PLAN OF CARE
Problem: Potential for Falls  Goal: Patient will remain free of falls  Description: INTERVENTIONS:  - Assess patient frequently for physical needs  -  Identify cognitive and physical deficits and behaviors that affect risk of falls  -  [place] fall precautions as indicated by assessment   - Educate patient/family on patient safety including physical limitations  - Instruct patient to call for assistance with activity based on assessment  - Modify environment to reduce risk of injury  - Consider OT/PT consult to assist with strengthening/mobility  Outcome: Progressing     Problem: Prexisting or High Potential for Compromised Skin Integrity  Goal: Skin integrity is maintained or improved  Description: INTERVENTIONS:  - Identify patients at risk for skin breakdown  - Assess and monitor skin integrity  - Assess and monitor nutrition and hydration status  - Monitor labs   - Assess for incontinence   - Turn and reposition patient  - Assist with mobility/ambulation  - Relieve pressure over bony prominences  - Avoid friction and shearing  - Provide appropriate hygiene as needed including keeping skin clean and dry  - Evaluate need for skin moisturizer/barrier cream  - Collaborate with interdisciplinary team   - Patient/family teaching  - Consider wound care consult   Outcome: Progressing     Problem: Nutrition/Hydration-ADULT  Goal: Nutrient/Hydration intake appropriate for improving, restoring or maintaining nutritional needs  Description: Monitor and assess patient's nutrition/hydration status for malnutrition  Collaborate with interdisciplinary team and initiate plan and interventions as ordered  Monitor patient's weight and dietary intake as ordered or per policy  Utilize nutrition screening tool and intervene as necessary  Determine patient's food preferences and provide high-protein, high-caloric foods as appropriate       INTERVENTIONS:  - Monitor oral intake, urinary output, labs, and treatment plans  - Assess nutrition and hydration status and recommend course of action  - Evaluate amount of meals eaten  - Assist patient with eating if necessary   - Allow adequate time for meals  - Recommend/ encourage appropriate diets, oral nutritional supplements, and vitamin/mineral supplements  - Order, calculate, and assess calorie counts as needed  - Recommend, monitor, and adjust tube feedings and TPN/PPN based on assessed needs  - Assess need for intravenous fluids  - Provide specific nutrition/hydration education as appropriate  - Include patient/family/caregiver in decisions related to nutrition  Outcome: Progressing     Problem: PAIN - ADULT  Goal: Verbalizes/displays adequate comfort level or baseline comfort level  Description: Interventions:  - Encourage patient to monitor pain and request assistance  - Assess pain using appropriate pain scale  - Administer analgesics based on type and severity of pain and evaluate response  - Implement non-pharmacological measures as appropriate and evaluate response  - Consider cultural and social influences on pain and pain management  - Notify physician/advanced practitioner if interventions unsuccessful or patient reports new pain  Outcome: Progressing     Problem: INFECTION - ADULT  Goal: Absence or prevention of progression during hospitalization  Description: INTERVENTIONS:  - Assess and monitor for signs and symptoms of infection  - Monitor lab/diagnostic results  - Monitor all insertion sites, i e  indwelling lines, tubes, and drains  - Monitor endotracheal if appropriate and nasal secretions for changes in amount and color  - [place] appropriate cooling/warming therapies per order  - Administer medications as ordered  - Instruct and encourage patient and family to use good hand hygiene technique  - Identify and instruct in appropriate isolation precautions for identified infection/condition  Outcome: Progressing     Problem: SAFETY ADULT  Goal: Patient will remain free of falls  Description: INTERVENTIONS:  - Assess patient frequently for physical needs  -  Identify cognitive and physical deficits and behaviors that affect risk of falls  -  [place] fall precautions as indicated by assessment   - Educate patient/family on patient safety including physical limitations  - Instruct patient to call for assistance with activity based on assessment  - Modify environment to reduce risk of injury  - Consider OT/PT consult to assist with strengthening/mobility  Outcome: Progressing     Problem: DISCHARGE PLANNING  Goal: Discharge to home or other facility with appropriate resources  Description: INTERVENTIONS:  - Identify barriers to discharge w/patient and caregiver  - Arrange for needed discharge resources and transportation as appropriate  - Identify discharge learning needs (meds, wound care, etc )  - Arrange for interpretive services to assist at discharge as needed  - Refer to Case Management Department for coordinating discharge planning if the patient needs post-hospital services based on physician/advanced practitioner order or complex needs related to functional status, cognitive ability, or social support system  Outcome: Progressing     Problem: Knowledge Deficit  Goal: Patient/family/caregiver demonstrates understanding of disease process, treatment plan, medications, and discharge instructions  Description: Complete learning assessment and assess knowledge base    Interventions:  - Provide teaching at level of understanding  - Provide teaching via preferred learning methods  Outcome: Progressing     Problem: NEUROSENSORY - ADULT  Goal: Achieves stable or improved neurological status  Description: INTERVENTIONS  - Monitor and report changes in neurological status  - Monitor vital signs such as temperature, blood pressure, glucose, and any other labs ordered   - Initiate measures to prevent increased intracranial pressure  - Monitor for seizure activity and implement precautions if appropriate      Outcome: Progressing     Problem: GASTROINTESTINAL - ADULT  Goal: Maintains adequate nutritional intake  Description: INTERVENTIONS:  - Monitor percentage of each meal consumed  - Identify factors contributing to decreased intake, treat as appropriate  - Assist with meals as needed  - Monitor I&O, weight, and lab values if indicated  - Obtain nutrition services referral as needed  Outcome: Progressing     Problem: GENITOURINARY - ADULT  Goal: Urinary catheter remains patent  Description: INTERVENTIONS:  - Assess patency of urinary catheter  - If patient has a chronic bates, consider changing catheter if non-functioning  - Follow guidelines for intermittent irrigation of non-functioning urinary catheter  Outcome: Progressing     Problem: METABOLIC, FLUID AND ELECTROLYTES - ADULT  Goal: Electrolytes maintained within normal limits  Description: INTERVENTIONS:  - Monitor labs and assess patient for signs and symptoms of electrolyte imbalances  - Administer electrolyte replacement as ordered  - Monitor response to electrolyte replacements, including repeat lab results as appropriate  - Instruct patient on fluid and nutrition as appropriate  Outcome: Progressing  Goal: Fluid balance maintained  Description: INTERVENTIONS:  - Monitor labs   - Monitor I/O and WT  - Instruct patient on fluid and nutrition as appropriate  - Assess for signs & symptoms of volume excess or deficit  Outcome: Progressing     Problem: HEMATOLOGIC - ADULT  Goal: Maintains hematologic stability  Description: INTERVENTIONS  - Assess for signs and symptoms of bleeding or hemorrhage  - Monitor labs  - Administer supportive blood products/factors as ordered and appropriate  Outcome: Progressing     Problem: MUSCULOSKELETAL - ADULT  Goal: Maintain or return mobility to safest level of function  Description: INTERVENTIONS:  - Assess patient's ability to carry out ADLs; assess patient's baseline for ADL function and identify physical deficits which impact ability to perform ADLs (bathing, care of mouth/teeth, toileting, grooming, dressing, etc )  - Assess/evaluate cause of self-care deficits   - Assess range of motion  - Assess patient's mobility  - Assess patient's need for assistive devices and provide as appropriate  - Encourage maximum independence but intervene and supervise when necessary  - Involve family in performance of ADLs  - Assess for home care needs following discharge   - Consider OT consult to assist with ADL evaluation and planning for discharge  - Provide patient education as appropriate  Outcome: Progressing

## 2021-04-24 PROCEDURE — 99232 SBSQ HOSP IP/OBS MODERATE 35: CPT | Performed by: INTERNAL MEDICINE

## 2021-04-24 RX ADMIN — DOCUSATE SODIUM 100 MG: 100 CAPSULE, LIQUID FILLED ORAL at 17:33

## 2021-04-24 RX ADMIN — LEVETIRACETAM 500 MG: 500 TABLET, FILM COATED ORAL at 21:29

## 2021-04-24 RX ADMIN — MIRTAZAPINE 15 MG: 15 TABLET, FILM COATED ORAL at 21:30

## 2021-04-24 RX ADMIN — GABAPENTIN 300 MG: 300 CAPSULE ORAL at 21:29

## 2021-04-24 RX ADMIN — HEPARIN SODIUM 5000 UNITS: 5000 INJECTION INTRAVENOUS; SUBCUTANEOUS at 05:14

## 2021-04-24 RX ADMIN — PYRIDOXINE HYDROCHLORIDE 20 MG: 100 INJECTION, SOLUTION INTRAMUSCULAR; INTRAVENOUS at 09:43

## 2021-04-24 RX ADMIN — Medication 1 TABLET: at 08:55

## 2021-04-24 RX ADMIN — DEXTROSE, SODIUM CHLORIDE, AND POTASSIUM CHLORIDE 75 ML/HR: 5; .45; .15 INJECTION INTRAVENOUS at 21:39

## 2021-04-24 RX ADMIN — CYCLOBENZAPRINE HYDROCHLORIDE 10 MG: 10 TABLET, FILM COATED ORAL at 21:30

## 2021-04-24 RX ADMIN — DEXTROSE, SODIUM CHLORIDE, AND POTASSIUM CHLORIDE 75 ML/HR: 5; .45; .15 INJECTION INTRAVENOUS at 07:21

## 2021-04-24 RX ADMIN — CYANOCOBALAMIN TAB 500 MCG 1000 MCG: 500 TAB at 08:55

## 2021-04-24 RX ADMIN — Medication 1 TABLET: at 21:29

## 2021-04-24 RX ADMIN — DOCUSATE SODIUM 100 MG: 100 CAPSULE, LIQUID FILLED ORAL at 08:55

## 2021-04-24 RX ADMIN — HEPARIN SODIUM 5000 UNITS: 5000 INJECTION INTRAVENOUS; SUBCUTANEOUS at 13:40

## 2021-04-24 RX ADMIN — BUTALBITAL, ACETAMINOPHEN AND CAFFEINE 1 TABLET: 50; 325; 40 TABLET ORAL at 10:26

## 2021-04-24 RX ADMIN — POLYETHYLENE GLYCOL 3350 17 G: 17 POWDER, FOR SOLUTION ORAL at 08:55

## 2021-04-24 RX ADMIN — THIAMINE HCL TAB 100 MG 100 MG: 100 TAB at 08:55

## 2021-04-24 RX ADMIN — HEPARIN SODIUM 5000 UNITS: 5000 INJECTION INTRAVENOUS; SUBCUTANEOUS at 21:31

## 2021-04-24 RX ADMIN — LORAZEPAM 0.5 MG: 0.5 TABLET ORAL at 17:37

## 2021-04-24 RX ADMIN — VENLAFAXINE HYDROCHLORIDE 37.5 MG: 37.5 CAPSULE, EXTENDED RELEASE ORAL at 08:55

## 2021-04-24 RX ADMIN — LEVOTHYROXINE SODIUM 50 MCG: 25 TABLET ORAL at 05:19

## 2021-04-24 RX ADMIN — ZOLPIDEM TARTRATE 10 MG: 5 TABLET, COATED ORAL at 21:30

## 2021-04-24 RX ADMIN — PRAZOSIN HYDROCHLORIDE 2 MG: 1 CAPSULE ORAL at 21:27

## 2021-04-24 RX ADMIN — LEVETIRACETAM 500 MG: 500 TABLET, FILM COATED ORAL at 08:55

## 2021-04-24 RX ADMIN — CYCLOBENZAPRINE HYDROCHLORIDE 10 MG: 10 TABLET, FILM COATED ORAL at 13:43

## 2021-04-24 RX ADMIN — LORAZEPAM 0.5 MG: 0.5 TABLET ORAL at 08:55

## 2021-04-24 RX ADMIN — Medication 1 TABLET: at 17:33

## 2021-04-24 RX ADMIN — FOLIC ACID 1 MG: 1 TABLET ORAL at 08:55

## 2021-04-24 NOTE — ASSESSMENT & PLAN NOTE
Patient acute kidney injury on admission due to dehydration from decreased p o  Intake and urinary obstruction  Nephrology was following the patient  Patient had acute hemodialysis due to acute kidney injury  Acute kidney injury resolved:   Most recent creatinine was Creatinine 0 56  Continue IV fluids at 75 cc an hour

## 2021-04-24 NOTE — PROGRESS NOTES
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 as per neurology's recommendation  In the end it was felt that her complaints are due to nutritional deficiency due to anorexia nervosa  Psychiatry was consult felt the patient should continue Remeron and there was no role for inpatient psychiatric therapy  Nutritional services saw the patient who continue to have poor p o  Intake because of not wanting to eat  Reached out to bariatric surgery at [facility] as well as at [facility] where patient's gastric bypass surgeries had been done  Patient was not accepted at [facility] for transfer  Outpatient rehab and eventual outpatient follow-up with her bariatric surgeon at [facility] was recommended  I discussed the case with patient's the insurance company, medical director Dr Veronique Mohan on April 22nd:  Due to the fact that patient refused therapy and was not progressing over and 18 day course of SNF therapy before admission to this hospital he denied SNF placement for patient and recommended long-term care placement! Patient was seen by the [facility] and interview for long-term care placement with local nursing home  Will await decision    RENE (acute kidney injury) [facility])  Assessment & Plan  Patient acute kidney injury on admission due to dehydration from decreased p o  Intake and urinary obstruction  Nephrology was following the patient  Patient had acute hemodialysis due to acute kidney injury  Acute kidney injury resolved:  Creatinine 0 56  Continue IV fluids at 75 cc an hour        Severe protein-calorie malnutrition (HCC)  Assessment & Plan  Malnutrition Findings:   Adult Malnutrition type: Chronic illness  Adult Degree of Malnutrition: Other severe protein calorie malnutrition    BMI Findings: Body mass index is 27 81 kg/m²  Nutrition team is following   Patient consumed 4 cans of Ensure today, I encouraged her to continue this! VTE Prophylaxis: in place    Patient Centered Rounds: I rounded with patient's nurse    Current Length of Stay: 18 day(s)    Current Patient Status: Inpatient    Certification Statement: Pt requires additional inpatient hospital stay due to: see assessment and plan        Subjective:   Patient denies chest pain, shortness with abdominal pain, diarrhea  She drank 4 cans of Ensure today    All other ROS are negative    Objective:     Vitals:   Temp (24hrs), Av 9 °F (36 6 °C), Min:97 7 °F (36 5 °C), Max:98 1 °F (36 7 °C)    Temp:  [97 7 °F (36 5 °C)-98 1 °F (36 7 °C)] 98 1 °F (36 7 °C)  HR:  [90-98] 97  Resp:  [16] 16  BP: (128-137)/(82-86) 133/85  SpO2:  [96 %-97 %] 97 %  Body mass index is 27 81 kg/m²  Input and Output Summary (last 24 hours): Intake/Output Summary (Last 24 hours) at 2021 2114  Last data filed at 2021 1801  Gross per 24 hour   Intake 2200 ml   Output 2200 ml   Net 0 ml       Physical Exam:     Physical Exam  HENT:      Head: Normocephalic  Eyes:      General: No scleral icterus  Cardiovascular:      Rate and Rhythm: Normal rate and regular rhythm  Pulmonary:      Effort: No respiratory distress  Breath sounds: No wheezing or rales  Abdominal:      General: Bowel sounds are normal       Palpations: Abdomen is soft  Tenderness: There is no abdominal tenderness     Neurological:      Mental Status: She is alert and oriented to person, place, and time  Mental status is at baseline  I personally reviewed labs and imaging reports for today  Last 24 Hours Medication List:   Current Facility-Administered Medications   Medication Dose Route Frequency Provider Last Rate    acetaminophen  650 mg Oral Q6H PRN Christal Fountain PA-C      butalbital-acetaminophen-caffeine  1 tablet Oral Q6H PRN Henna Kelley PA-C      cyanocobalamin  1,000 mcg Oral Daily Jo Hong MD      cyclobenzaprine  10 mg Oral TID PRN Christal Fountain PA-C      dextrose 5 % and sodium chloride 0 45 % with KCl 20 mEq/L  75 mL/hr Intravenous Continuous Angel Givens MD 75 mL/hr (04/23/21 3571)    docusate sodium  100 mg Oral BID Christal Fountain PA-C      ergocalciferol  50,000 Units Oral Once per day on Mon Thu Elizabeth Fly, DO      folic acid  1 mg Oral Daily Christal Fountain PA-C      gabapentin  100 mg Oral TID PRN Christal Fountain PA-C      gabapentin  300 mg Oral HS SMITHA Rutledge      heparin (porcine)  5,000 Units Subcutaneous [facility] Christal Fountain PA-C      levETIRAcetam  500 mg Oral Q12H [facility] Heydi Barriga PA-C      levothyroxine  50 mcg Oral Early Morning Elizabeth Fly, DO      LORazepam  0 5 mg Oral Q8H PRN Jo Hong MD      LORazepam  1 mg Oral Daily PRN Angel Givens MD      mirtazapine  15 mg Oral HS Jo Hong MD      multivitamin-minerals  1 tablet Oral TID Boston Frostt, DO      polyethylene glycol  17 g Oral Daily Christal Fountain PA-C      prazosin  2 mg Oral HS Elizabeth Fly, DO      pyridoxine  20 mg Intravenous Daily Jo Hong MD      thiamine  100 mg Oral Daily Jo Hong MD      venlafaxine  37 5 mg Oral Daily Elizabeth Fly, DO      zolpidem  10 mg Oral HS Angel Givens MD            Today, Patient Was Seen By: Angel Givens MD    ** Please Note: Dictation voice to text software may have been used in the creation of this document   **

## 2021-04-24 NOTE — ASSESSMENT & PLAN NOTE
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 as well as at [facility] where patient's gastric bypass surgeries had been done  Patient was not accepted at [facility] for transfer  Outpatient rehab and eventual outpatient follow-up with her bariatric surgeon at [facility] was recommended  I discussed the case with patient's the insurance company, medical director Dr Francy Bee on April 22nd:  Due to the fact that patient refused therapy and was not progressing over and 18 day course of SNF therapy before admission to this hospital he denied SNF placement for patient and recommended long-term care placement!     Patient was accepted at [facility] for long-term nursing home placement and will be discharged there on Monday

## 2021-04-24 NOTE — ASSESSMENT & PLAN NOTE
Malnutrition Findings:   Adult Malnutrition type: Chronic illness  Adult Degree of Malnutrition: Other severe protein calorie malnutrition    BMI Findings: Body mass index is 27 81 kg/m²  Nutrition team is following   Patient consumed 4 cans of Ensure today, I encouraged her to continue this!

## 2021-04-24 NOTE — PROGRESS NOTES
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 as per neurology's recommendation  In the end it was felt that her complaints are due to nutritional deficiency due to anorexia nervosa  Psychiatry was consult felt the patient should continue Remeron and there was no role for inpatient psychiatric therapy  Nutritional services saw the patient who continue to have poor p o  Intake because of not wanting to eat  Reached out to bariatric surgery at [facility] as well as at [facility] where patient's gastric bypass surgeries had been done  Patient was not accepted at [facility] for transfer  Outpatient rehab and eventual outpatient follow-up with her bariatric surgeon at [facility] was recommended  I discussed the case with patient's the insurance company, medical director Dr Philomena Perez on April 22nd:  Due to the fact that patient refused therapy and was not progressing over and 18 day course of SNF therapy before admission to this hospital he denied SNF placement for patient and recommended long-term care placement! Patient was accepted at [facility] for long-term nursing home placement and will be discharged there on Monday    RENE (acute kidney injury) [facility])  Assessment & Plan  Patient acute kidney injury on admission due to dehydration from decreased p o  Intake and urinary obstruction  Nephrology was following the patient  Patient had acute hemodialysis due to acute kidney injury  Acute kidney injury resolved: Most recent creatinine was Creatinine 0 56  Continue IV fluids at 75 cc an hour        Severe protein-calorie malnutrition (HCC)  Assessment & Plan  Malnutrition Findings:   Adult Malnutrition type: Chronic illness  Adult Degree of Malnutrition: Other severe protein calorie malnutrition    BMI Findings: Body mass index is 27 93 kg/m²  Nutrition team is following    I encouraged patient to consume 4 cans of Ensure today          VTE Prophylaxis: in place    Patient Centered Rounds: I rounded with patient's nurse    Current Length of Stay: 19 day(s)    Current Patient Status: Inpatient    Certification Statement: Pt requires additional inpatient hospital stay due to: see assessment and plan        Subjective:   Patient had uneventful night, she denies any headache, chest pain, shortness breath, nausea this morning  She was able to drink 4 cans of Ensure yesterday    All other ROS are negative    Objective:     Vitals:   Temp (24hrs), Av 6 °F (36 4 °C), Min:97 3 °F (36 3 °C), Max:98 1 °F (36 7 °C)    Temp:  [97 3 °F (36 3 °C)-98 1 °F (36 7 °C)] 97 3 °F (36 3 °C)  HR:  [] 89  Resp:  [16-17] 17  BP: (122-133)/(80-85) 122/80  SpO2:  [97 %-99 %] 98 %  Body mass index is 27 93 kg/m²  Input and Output Summary (last 24 hours): Intake/Output Summary (Last 24 hours) at 2021 0849  Last data filed at 2021 0720  Gross per 24 hour   Intake 2150 ml   Output 2675 ml   Net -525 ml       Physical Exam:     Physical Exam  Constitutional:       General: She is not in acute distress  HENT:      Head: Normocephalic  Eyes:      Conjunctiva/sclera: Conjunctivae normal    Cardiovascular:      Rate and Rhythm: Normal rate and regular rhythm  Heart sounds: No murmur  Pulmonary:      Breath sounds: Normal breath sounds  No wheezing or rales     Abdominal:      General: Bowel sounds are normal  There is no distension  Palpations: Abdomen is soft  Tenderness: There is no abdominal tenderness  Skin:     General: Skin is warm and dry  Comments: Patient has no lower extremity edema   Neurological:      Mental Status: She is alert and oriented to person, place, and time  Comments: She moves all extremities on command             I personally reviewed labs and imaging reports for today        Last 24 Hours Medication List:   Current Facility-Administered Medications   Medication Dose Route Frequency Provider Last Rate    acetaminophen  650 mg Oral Q6H PRN Tika Pope PA-C      butalbital-acetaminophen-caffeine  1 tablet Oral Q6H PRN Deborah Potter PA-C      cyanocobalamin  1,000 mcg Oral Daily Catie Jiménez MD      cyclobenzaprine  10 mg Oral TID PRN Tika Pope PA-C      dextrose 5 % and sodium chloride 0 45 % with KCl 20 mEq/L  75 mL/hr Intravenous Continuous Lenox Alpers, MD 75 mL/hr (04/24/21 4434)    docusate sodium  100 mg Oral BID Tika Pope PA-C      ergocalciferol  50,000 Units Oral Once per day on Mon Thu Elizabeth Fly, DO      folic acid  1 mg Oral Daily Tika Pope PA-C      gabapentin  100 mg Oral TID PRN Tika Pope PA-C      gabapentin  300 mg Oral HS SMITHA Méndez      heparin (porcine)  5,000 Units Subcutaneous [facility] Tika Pope PA-C      levETIRAcetam  500 mg Oral Q12H [facility] Heydi Caballero PA-C      levothyroxine  50 mcg Oral Early Morning Elizabeth Fly, DO      LORazepam  0 5 mg Oral Q8H PRN Catie Jiménez MD      LORazepam  1 mg Oral Daily PRN Lenox Alpers, MD      mirtazapine  15 mg Oral HS Catie Jiménez MD      multivitamin-minerals  1 tablet Oral TID Boston Tirado, DO      polyethylene glycol  17 g Oral Daily Tika Pope PA-C      prazosin  2 mg Oral HS Elizabeth Fly, DO      pyridoxine  20 mg Intravenous Daily Catie Jiménez MD      thiamine  100 mg Oral Daily Adelso A Rodger Begum MD      venlafaxine  37 5 mg Oral Daily Jacqueline Ospina DO      zolpidem  10 mg Oral HS Narciso Pacheco MD            Today, Patient Was Seen By: Narciso Pacheco MD    ** Please Note: Dictation voice to text software may have been used in the creation of this document   **

## 2021-04-24 NOTE — ASSESSMENT & PLAN NOTE
Malnutrition Findings:   Adult Malnutrition type: Chronic illness  Adult Degree of Malnutrition: Other severe protein calorie malnutrition    BMI Findings: Body mass index is 27 93 kg/m²     Nutrition team is following    I encouraged patient to consume 4 cans of Ensure today

## 2021-04-24 NOTE — PLAN OF CARE
Problem: Potential for Falls  Goal: Patient will remain free of falls  Description: INTERVENTIONS:  - Assess patient frequently for physical needs  -  Identify cognitive and physical deficits and behaviors that affect risk of falls  -  [place] fall precautions as indicated by assessment   - Educate patient/family on patient safety including physical limitations  - Instruct patient to call for assistance with activity based on assessment  - Modify environment to reduce risk of injury  - Consider OT/PT consult to assist with strengthening/mobility  Outcome: Progressing     Problem: Prexisting or High Potential for Compromised Skin Integrity  Goal: Skin integrity is maintained or improved  Description: INTERVENTIONS:  - Identify patients at risk for skin breakdown  - Assess and monitor skin integrity  - Assess and monitor nutrition and hydration status  - Monitor labs   - Assess for incontinence   - Turn and reposition patient  - Assist with mobility/ambulation  - Relieve pressure over bony prominences  - Avoid friction and shearing  - Provide appropriate hygiene as needed including keeping skin clean and dry  - Evaluate need for skin moisturizer/barrier cream  - Collaborate with interdisciplinary team   - Patient/family teaching  - Consider wound care consult   Outcome: Progressing     Problem: Nutrition/Hydration-ADULT  Goal: Nutrient/Hydration intake appropriate for improving, restoring or maintaining nutritional needs  Description: Monitor and assess patient's nutrition/hydration status for malnutrition  Collaborate with interdisciplinary team and initiate plan and interventions as ordered  Monitor patient's weight and dietary intake as ordered or per policy  Utilize nutrition screening tool and intervene as necessary  Determine patient's food preferences and provide high-protein, high-caloric foods as appropriate       INTERVENTIONS:  - Monitor oral intake, urinary output, labs, and treatment plans  - Assess nutrition and hydration status and recommend course of action  - Evaluate amount of meals eaten  - Assist patient with eating if necessary   - Allow adequate time for meals  - Recommend/ encourage appropriate diets, oral nutritional supplements, and vitamin/mineral supplements  - Order, calculate, and assess calorie counts as needed  - Recommend, monitor, and adjust tube feedings and TPN/PPN based on assessed needs  - Assess need for intravenous fluids  - Provide specific nutrition/hydration education as appropriate  - Include patient/family/caregiver in decisions related to nutrition  Outcome: Progressing     Problem: PAIN - ADULT  Goal: Verbalizes/displays adequate comfort level or baseline comfort level  Description: Interventions:  - Encourage patient to monitor pain and request assistance  - Assess pain using appropriate pain scale  - Administer analgesics based on type and severity of pain and evaluate response  - Implement non-pharmacological measures as appropriate and evaluate response  - Consider cultural and social influences on pain and pain management  - Notify physician/advanced practitioner if interventions unsuccessful or patient reports new pain  Outcome: Progressing     Problem: INFECTION - ADULT  Goal: Absence or prevention of progression during hospitalization  Description: INTERVENTIONS:  - Assess and monitor for signs and symptoms of infection  - Monitor lab/diagnostic results  - Monitor all insertion sites, i e  indwelling lines, tubes, and drains  - Monitor endotracheal if appropriate and nasal secretions for changes in amount and color  - [place] appropriate cooling/warming therapies per order  - Administer medications as ordered  - Instruct and encourage patient and family to use good hand hygiene technique  - Identify and instruct in appropriate isolation precautions for identified infection/condition  Outcome: Progressing     Problem: SAFETY ADULT  Goal: Patient will remain free of falls  Description: INTERVENTIONS:  - Assess patient frequently for physical needs  -  Identify cognitive and physical deficits and behaviors that affect risk of falls  -  [place] fall precautions as indicated by assessment   - Educate patient/family on patient safety including physical limitations  - Instruct patient to call for assistance with activity based on assessment  - Modify environment to reduce risk of injury  - Consider OT/PT consult to assist with strengthening/mobility  Outcome: Progressing     Problem: DISCHARGE PLANNING  Goal: Discharge to home or other facility with appropriate resources  Description: INTERVENTIONS:  - Identify barriers to discharge w/patient and caregiver  - Arrange for needed discharge resources and transportation as appropriate  - Identify discharge learning needs (meds, wound care, etc )  - Arrange for interpretive services to assist at discharge as needed  - Refer to Case Management Department for coordinating discharge planning if the patient needs post-hospital services based on physician/advanced practitioner order or complex needs related to functional status, cognitive ability, or social support system  Outcome: Progressing     Problem: Knowledge Deficit  Goal: Patient/family/caregiver demonstrates understanding of disease process, treatment plan, medications, and discharge instructions  Description: Complete learning assessment and assess knowledge base    Interventions:  - Provide teaching at level of understanding  - Provide teaching via preferred learning methods  Outcome: Progressing     Problem: NEUROSENSORY - ADULT  Goal: Achieves stable or improved neurological status  Description: INTERVENTIONS  - Monitor and report changes in neurological status  - Monitor vital signs such as temperature, blood pressure, glucose, and any other labs ordered   - Initiate measures to prevent increased intracranial pressure  - Monitor for seizure activity and implement precautions if appropriate      Outcome: Progressing     Problem: GASTROINTESTINAL - ADULT  Goal: Maintains adequate nutritional intake  Description: INTERVENTIONS:  - Monitor percentage of each meal consumed  - Identify factors contributing to decreased intake, treat as appropriate  - Assist with meals as needed  - Monitor I&O, weight, and lab values if indicated  - Obtain nutrition services referral as needed  Outcome: Progressing     Problem: GENITOURINARY - ADULT  Goal: Urinary catheter remains patent  Description: INTERVENTIONS:  - Assess patency of urinary catheter  - If patient has a chronic bates, consider changing catheter if non-functioning  - Follow guidelines for intermittent irrigation of non-functioning urinary catheter  Outcome: Progressing     Problem: METABOLIC, FLUID AND ELECTROLYTES - ADULT  Goal: Electrolytes maintained within normal limits  Description: INTERVENTIONS:  - Monitor labs and assess patient for signs and symptoms of electrolyte imbalances  - Administer electrolyte replacement as ordered  - Monitor response to electrolyte replacements, including repeat lab results as appropriate  - Instruct patient on fluid and nutrition as appropriate  Outcome: Progressing  Goal: Fluid balance maintained  Description: INTERVENTIONS:  - Monitor labs   - Monitor I/O and WT  - Instruct patient on fluid and nutrition as appropriate  - Assess for signs & symptoms of volume excess or deficit  Outcome: Progressing     Problem: HEMATOLOGIC - ADULT  Goal: Maintains hematologic stability  Description: INTERVENTIONS  - Assess for signs and symptoms of bleeding or hemorrhage  - Monitor labs  - Administer supportive blood products/factors as ordered and appropriate  Outcome: Progressing     Problem: MUSCULOSKELETAL - ADULT  Goal: Maintain or return mobility to safest level of function  Description: INTERVENTIONS:  - Assess patient's ability to carry out ADLs; assess patient's baseline for ADL function and identify physical deficits which impact ability to perform ADLs (bathing, care of mouth/teeth, toileting, grooming, dressing, etc )  - Assess/evaluate cause of self-care deficits   - Assess range of motion  - Assess patient's mobility  - Assess patient's need for assistive devices and provide as appropriate  - Encourage maximum independence but intervene and supervise when necessary  - Involve family in performance of ADLs  - Assess for home care needs following discharge   - Consider OT consult to assist with ADL evaluation and planning for discharge  - Provide patient education as appropriate  Outcome: Progressing

## 2021-04-24 NOTE — ASSESSMENT & PLAN NOTE
Patient acute kidney injury on admission due to dehydration from decreased p o  Intake and urinary obstruction  Nephrology was following the patient  Patient had acute hemodialysis due to acute kidney injury    Acute kidney injury resolved:  Creatinine 0 56  Continue IV fluids at 75 cc an hour

## 2021-04-24 NOTE — ASSESSMENT & PLAN NOTE
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 as per neurology's recommendation  In the end it was felt that her complaints are due to nutritional deficiency due to anorexia nervosa  Psychiatry was consult felt the patient should continue Remeron and there was no role for inpatient psychiatric therapy  Nutritional services saw the patient who continue to have poor p o  Intake because of not wanting to eat  Reached out to bariatric surgery at [facility] as well as at [facility] where patient's gastric bypass surgeries had been done  Patient was not accepted at [facility] for transfer  Outpatient rehab and eventual outpatient follow-up with her bariatric surgeon at [facility] was recommended  I discussed the case with patient's the insurance company, medical director Dr Bonilla Garvin on April 22nd:  Due to the fact that patient refused therapy and was not progressing over and 18 day course of SNF therapy before admission to this hospital he denied SNF placement for patient and recommended long-term care placement! Patient was seen by the county and interview for long-term care placement with local nursing home    Will await decision

## 2021-04-25 PROBLEM — N17.9 AKI (ACUTE KIDNEY INJURY) (HCC): Status: RESOLVED | Noted: 2021-04-05 | Resolved: 2021-04-25

## 2021-04-25 PROCEDURE — 99232 SBSQ HOSP IP/OBS MODERATE 35: CPT | Performed by: INTERNAL MEDICINE

## 2021-04-25 RX ADMIN — LEVETIRACETAM 500 MG: 500 TABLET, FILM COATED ORAL at 09:06

## 2021-04-25 RX ADMIN — Medication 1 TABLET: at 09:06

## 2021-04-25 RX ADMIN — PYRIDOXINE HYDROCHLORIDE 20 MG: 100 INJECTION, SOLUTION INTRAMUSCULAR; INTRAVENOUS at 09:10

## 2021-04-25 RX ADMIN — ZOLPIDEM TARTRATE 10 MG: 5 TABLET, COATED ORAL at 21:17

## 2021-04-25 RX ADMIN — LORAZEPAM 0.5 MG: 0.5 TABLET ORAL at 18:09

## 2021-04-25 RX ADMIN — DEXTROSE, SODIUM CHLORIDE, AND POTASSIUM CHLORIDE 75 ML/HR: 5; .45; .15 INJECTION INTRAVENOUS at 13:50

## 2021-04-25 RX ADMIN — LEVOTHYROXINE SODIUM 50 MCG: 25 TABLET ORAL at 05:58

## 2021-04-25 RX ADMIN — HEPARIN SODIUM 5000 UNITS: 5000 INJECTION INTRAVENOUS; SUBCUTANEOUS at 13:50

## 2021-04-25 RX ADMIN — POLYETHYLENE GLYCOL 3350 17 G: 17 POWDER, FOR SOLUTION ORAL at 09:06

## 2021-04-25 RX ADMIN — FOLIC ACID 1 MG: 1 TABLET ORAL at 09:06

## 2021-04-25 RX ADMIN — CYANOCOBALAMIN TAB 500 MCG 1000 MCG: 500 TAB at 09:06

## 2021-04-25 RX ADMIN — HEPARIN SODIUM 5000 UNITS: 5000 INJECTION INTRAVENOUS; SUBCUTANEOUS at 05:56

## 2021-04-25 RX ADMIN — Medication 1 TABLET: at 21:15

## 2021-04-25 RX ADMIN — LORAZEPAM 0.5 MG: 0.5 TABLET ORAL at 09:06

## 2021-04-25 RX ADMIN — GABAPENTIN 300 MG: 300 CAPSULE ORAL at 21:14

## 2021-04-25 RX ADMIN — LEVETIRACETAM 500 MG: 500 TABLET, FILM COATED ORAL at 21:14

## 2021-04-25 RX ADMIN — HEPARIN SODIUM 5000 UNITS: 5000 INJECTION INTRAVENOUS; SUBCUTANEOUS at 21:14

## 2021-04-25 RX ADMIN — DOCUSATE SODIUM 100 MG: 100 CAPSULE, LIQUID FILLED ORAL at 17:13

## 2021-04-25 RX ADMIN — MIRTAZAPINE 15 MG: 15 TABLET, FILM COATED ORAL at 21:14

## 2021-04-25 RX ADMIN — DOCUSATE SODIUM 100 MG: 100 CAPSULE, LIQUID FILLED ORAL at 09:06

## 2021-04-25 RX ADMIN — Medication 1 TABLET: at 17:13

## 2021-04-25 RX ADMIN — THIAMINE HCL TAB 100 MG 100 MG: 100 TAB at 09:06

## 2021-04-25 RX ADMIN — PRAZOSIN HYDROCHLORIDE 2 MG: 1 CAPSULE ORAL at 21:27

## 2021-04-25 RX ADMIN — BUTALBITAL, ACETAMINOPHEN AND CAFFEINE 1 TABLET: 50; 325; 40 TABLET ORAL at 12:16

## 2021-04-25 RX ADMIN — CYCLOBENZAPRINE HYDROCHLORIDE 10 MG: 10 TABLET, FILM COATED ORAL at 14:54

## 2021-04-25 RX ADMIN — VENLAFAXINE HYDROCHLORIDE 37.5 MG: 37.5 CAPSULE, EXTENDED RELEASE ORAL at 09:06

## 2021-04-25 NOTE — ASSESSMENT & PLAN NOTE
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 as per neurology's recommendation  · In the end it was felt that her complaints are due to nutritional deficiency due to anorexia nervosa  · Psychiatry was consulted and felt the patient should continue Remeron and there was no role for inpatient psychiatric therapy  · Nutritional services saw the patient who continue to have poor p o  Intake because of not wanting to eat  · Attending had reached out to bariatric surgery at [place] [id number] as well as at [address] [place] [address] where patient's gastric bypass surgeries had been done  · Patient was not accepted at [address] [place] [address] for transfer  · Outpatient rehab and eventual outpatient follow-up with her bariatric surgeon at [address] [place] [address] was recommended  · Attending discussed the case with patient's insurance company, medical director Dr Joel Mendosa on April 22nd:  Due to the fact that patient refused therapy and was not progressing over and 18 day course of SNF therapy before admission to this hospital, he denied SNF placement for patient and recommended long-term care placement    · Patient was accepted at [facility] for long-term nursing home placement, anticipate will be discharged there tomorrow 4/26

## 2021-04-25 NOTE — PLAN OF CARE
Problem: Potential for Falls  Goal: Patient will remain free of falls  Description: INTERVENTIONS:  - Assess patient frequently for physical needs  -  Identify cognitive and physical deficits and behaviors that affect risk of falls  -  [place] fall precautions as indicated by assessment   - Educate patient/family on patient safety including physical limitations  - Instruct patient to call for assistance with activity based on assessment  - Modify environment to reduce risk of injury  - Consider OT/PT consult to assist with strengthening/mobility  Outcome: Progressing     Problem: Prexisting or High Potential for Compromised Skin Integrity  Goal: Skin integrity is maintained or improved  Description: INTERVENTIONS:  - Identify patients at risk for skin breakdown  - Assess and monitor skin integrity  - Assess and monitor nutrition and hydration status  - Monitor labs   - Assess for incontinence   - Turn and reposition patient  - Assist with mobility/ambulation  - Relieve pressure over bony prominences  - Avoid friction and shearing  - Provide appropriate hygiene as needed including keeping skin clean and dry  - Evaluate need for skin moisturizer/barrier cream  - Collaborate with interdisciplinary team   - Patient/family teaching  - Consider wound care consult   Outcome: Progressing     Problem: Nutrition/Hydration-ADULT  Goal: Nutrient/Hydration intake appropriate for improving, restoring or maintaining nutritional needs  Description: Monitor and assess patient's nutrition/hydration status for malnutrition  Collaborate with interdisciplinary team and initiate plan and interventions as ordered  Monitor patient's weight and dietary intake as ordered or per policy  Utilize nutrition screening tool and intervene as necessary  Determine patient's food preferences and provide high-protein, high-caloric foods as appropriate       INTERVENTIONS:  - Monitor oral intake, urinary output, labs, and treatment plans  - Assess nutrition and hydration status and recommend course of action  - Evaluate amount of meals eaten  - Assist patient with eating if necessary   - Allow adequate time for meals  - Recommend/ encourage appropriate diets, oral nutritional supplements, and vitamin/mineral supplements  - Order, calculate, and assess calorie counts as needed  - Recommend, monitor, and adjust tube feedings and TPN/PPN based on assessed needs  - Assess need for intravenous fluids  - Provide specific nutrition/hydration education as appropriate  - Include patient/family/caregiver in decisions related to nutrition  Outcome: Progressing     Problem: PAIN - ADULT  Goal: Verbalizes/displays adequate comfort level or baseline comfort level  Description: Interventions:  - Encourage patient to monitor pain and request assistance  - Assess pain using appropriate pain scale  - Administer analgesics based on type and severity of pain and evaluate response  - Implement non-pharmacological measures as appropriate and evaluate response  - Consider cultural and social influences on pain and pain management  - Notify physician/advanced practitioner if interventions unsuccessful or patient reports new pain  Outcome: Progressing     Problem: INFECTION - ADULT  Goal: Absence or prevention of progression during hospitalization  Description: INTERVENTIONS:  - Assess and monitor for signs and symptoms of infection  - Monitor lab/diagnostic results  - Monitor all insertion sites, i e  indwelling lines, tubes, and drains  - Monitor endotracheal if appropriate and nasal secretions for changes in amount and color  - [place] appropriate cooling/warming therapies per order  - Administer medications as ordered  - Instruct and encourage patient and family to use good hand hygiene technique  - Identify and instruct in appropriate isolation precautions for identified infection/condition  Outcome: Progressing     Problem: SAFETY ADULT  Goal: Patient will remain free of falls  Description: INTERVENTIONS:  - Assess patient frequently for physical needs  -  Identify cognitive and physical deficits and behaviors that affect risk of falls  -  [place] fall precautions as indicated by assessment   - Educate patient/family on patient safety including physical limitations  - Instruct patient to call for assistance with activity based on assessment  - Modify environment to reduce risk of injury  - Consider OT/PT consult to assist with strengthening/mobility  Outcome: Progressing     Problem: DISCHARGE PLANNING  Goal: Discharge to home or other facility with appropriate resources  Description: INTERVENTIONS:  - Identify barriers to discharge w/patient and caregiver  - Arrange for needed discharge resources and transportation as appropriate  - Identify discharge learning needs (meds, wound care, etc )  - Arrange for interpretive services to assist at discharge as needed  - Refer to Case Management Department for coordinating discharge planning if the patient needs post-hospital services based on physician/advanced practitioner order or complex needs related to functional status, cognitive ability, or social support system  Outcome: Progressing     Problem: Knowledge Deficit  Goal: Patient/family/caregiver demonstrates understanding of disease process, treatment plan, medications, and discharge instructions  Description: Complete learning assessment and assess knowledge base    Interventions:  - Provide teaching at level of understanding  - Provide teaching via preferred learning methods  Outcome: Progressing     Problem: NEUROSENSORY - ADULT  Goal: Achieves stable or improved neurological status  Description: INTERVENTIONS  - Monitor and report changes in neurological status  - Monitor vital signs such as temperature, blood pressure, glucose, and any other labs ordered   - Initiate measures to prevent increased intracranial pressure  - Monitor for seizure activity and implement precautions if appropriate      Outcome: Progressing     Problem: GASTROINTESTINAL - ADULT  Goal: Maintains adequate nutritional intake  Description: INTERVENTIONS:  - Monitor percentage of each meal consumed  - Identify factors contributing to decreased intake, treat as appropriate  - Assist with meals as needed  - Monitor I&O, weight, and lab values if indicated  - Obtain nutrition services referral as needed  Outcome: Progressing     Problem: GENITOURINARY - ADULT  Goal: Urinary catheter remains patent  Description: INTERVENTIONS:  - Assess patency of urinary catheter  - If patient has a chronic bates, consider changing catheter if non-functioning  - Follow guidelines for intermittent irrigation of non-functioning urinary catheter  Outcome: Progressing     Problem: METABOLIC, FLUID AND ELECTROLYTES - ADULT  Goal: Electrolytes maintained within normal limits  Description: INTERVENTIONS:  - Monitor labs and assess patient for signs and symptoms of electrolyte imbalances  - Administer electrolyte replacement as ordered  - Monitor response to electrolyte replacements, including repeat lab results as appropriate  - Instruct patient on fluid and nutrition as appropriate  Outcome: Progressing  Goal: Fluid balance maintained  Description: INTERVENTIONS:  - Monitor labs   - Monitor I/O and WT  - Instruct patient on fluid and nutrition as appropriate  - Assess for signs & symptoms of volume excess or deficit  Outcome: Progressing     Problem: HEMATOLOGIC - ADULT  Goal: Maintains hematologic stability  Description: INTERVENTIONS  - Assess for signs and symptoms of bleeding or hemorrhage  - Monitor labs  - Administer supportive blood products/factors as ordered and appropriate  Outcome: Progressing     Problem: MUSCULOSKELETAL - ADULT  Goal: Maintain or return mobility to safest level of function  Description: INTERVENTIONS:  - Assess patient's ability to carry out ADLs; assess patient's baseline for ADL function and identify physical deficits which impact ability to perform ADLs (bathing, care of mouth/teeth, toileting, grooming, dressing, etc )  - Assess/evaluate cause of self-care deficits   - Assess range of motion  - Assess patient's mobility  - Assess patient's need for assistive devices and provide as appropriate  - Encourage maximum independence but intervene and supervise when necessary  - Involve family in performance of ADLs  - Assess for home care needs following discharge   - Consider OT consult to assist with ADL evaluation and planning for discharge  - Provide patient education as appropriate  Outcome: Progressing

## 2021-04-25 NOTE — ASSESSMENT & PLAN NOTE
· ? Neurogenic bladder, UTI  · S/p Zhong placement  · Appreciate urology recommendations - patient failed void trial and Zhong catheter was reinserted

## 2021-04-25 NOTE — PROGRESS NOTES
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 as per neurology's recommendation  · In the end it was felt that her complaints are due to nutritional deficiency due to anorexia nervosa  · Psychiatry was consulted and felt the patient should continue Remeron and there was no role for inpatient psychiatric therapy  · Nutritional services saw the patient who continue to have poor p o  Intake because of not wanting to eat  · Attending had reached out to bariatric surgery at [place] [address] as well as at [address] [place] [address] where patient's gastric bypass surgeries had been done  · Patient was not accepted at [address] [place] [address] for transfer  · Outpatient rehab and eventual outpatient follow-up with her bariatric surgeon at [address] [place] [address] was recommended  · Attending discussed the case with patient's insurance company, medical director Dr Daniel Sadler on April 22nd:  Due to the fact that patient refused therapy and was not progressing over and 18 day course of SNF therapy before admission to this hospital, he denied SNF placement for patient and recommended long-term care placement  · Patient was accepted at [facility] for long-term nursing home placement, anticipate will be discharged there tomorrow 4/26    Urinary retention  Assessment & Plan  · ?  Neurogenic bladder, UTI  · S/p Zhong placement  · Appreciate urology recommendations - patient failed void trial and Zhong catheter was reinserted    Acute cystitis without hematuria  Assessment & Plan  · Present on admission  · Urine culture growing E coli  · Patient completed antibiotic course while admitted    Severe protein-calorie malnutrition ([address] )  Assessment & Plan  Malnutrition Findings:   Adult Malnutrition type: Chronic illness  Adult Degree of Malnutrition: Other severe protein calorie malnutrition    BMI Findings: Body mass index is 27 28 kg/m²  · Nutrition team is following   · Continue Ensure supplementation      RENE (acute kidney injury) (HCC)-resolved as of 4/25/2021  Assessment & Plan  · Patient acute kidney injury on admission due to dehydration from decreased p o  Intake and urinary obstruction  Nephrology was following the patient  Patient had acute hemodialysis due to acute kidney injury  · Acute kidney injury resolved: Most recent creatinine was Creatinine 0 56  · Continue IV fluids at 75 cc an hour  · Encourage oral intake  · Resolved    VTE Pharmacologic Prophylaxis:   Pharmacologic: Heparin  Mechanical VTE Prophylaxis in Place: Yes    Patient Centered Rounds: I have performed bedside rounds with nursing staff today  Discussions with Specialists or Other Care Team Provider:  Nursing    Education and Discussions with Family / Patient:  Discussed with patient directly at bedside  Declined family update  Time Spent for Care: 20 minutes  More than 50% of total time spent on counseling and coordination of care as described above  Current Length of Stay: 20 day(s)    Current Patient Status: Inpatient   Certification Statement: The patient will continue to require additional inpatient hospital stay due to Likely discharge to [facility] tomorrow    Discharge Plan:  Discharge to [facility] tomorrow    Code Status: Level 1 - Full Code      Subjective:   Patient requesting an Ativan, reports that she feels anxious    Denies chest pain/palpitations, shortness of breath, nausea/vomiting, abdominal pain  Objective:     Vitals:   Temp (24hrs), Av 1 °F (36 7 °C), Min:97 4 °F (36 3 °C), Max:98 9 °F (37 2 °C)    Temp:  [97 4 °F (36 3 °C)-98 9 °F (37 2 °C)] 97 4 °F (36 3 °C)  HR:  [] 96  Resp:  [16-18] 17  BP: (116-117)/(79-80) 117/80  SpO2:  [96 %-98 %] 96 %  Body mass index is 27 28 kg/m²  Input and Output Summary (last 24 hours): Intake/Output Summary (Last 24 hours) at 2021 0914  Last data filed at 2021 0601  Gross per 24 hour   Intake 1661 ml   Output 3150 ml   Net -1489 ml       Physical Exam:     Physical Exam  Vitals signs and nursing note reviewed  Constitutional:       Appearance: Normal appearance  She is underweight  Comments: Appears comfortable, no acute distress   HENT:      Head: Normocephalic  Eyes:      General: No scleral icterus  Extraocular Movements: Extraocular movements intact  Conjunctiva/sclera: Conjunctivae normal    Neck:      Musculoskeletal: Normal range of motion  Cardiovascular:      Rate and Rhythm: Normal rate and regular rhythm  Heart sounds: S1 normal and S2 normal    Pulmonary:      Effort: Pulmonary effort is normal       Breath sounds: Normal breath sounds  No wheezing, rhonchi or rales  Abdominal:      General: Bowel sounds are normal       Palpations: Abdomen is soft  Tenderness: There is no abdominal tenderness  There is no guarding or rebound  Musculoskeletal:         General: No swelling, tenderness or deformity  Comments: Moves upper/lower extremities bilaterally  No edema   Skin:     General: Skin is warm and dry  Neurological:      Mental Status: She is alert and oriented to person, place, and time  Psychiatric:         Mood and Affect: Affect is flat  Speech: Speech is delayed  Behavior: Behavior is withdrawn             Additional Data:     Labs:    Results from last 7 days   Lab Units 21  0805 WBC Thousand/uL 4 04*   HEMOGLOBIN g/dL 8 4*   HEMATOCRIT % 27 6*   PLATELETS Thousands/uL 159   NEUTROS PCT % 61   LYMPHS PCT % 26   MONOS PCT % 7   EOS PCT % 5     Results from last 7 days   Lab Units 04/23/21  0526   SODIUM mmol/L 145   POTASSIUM mmol/L 3 8   CHLORIDE mmol/L 108   CO2 mmol/L 30   BUN mg/dL 3*   CREATININE mg/dL 0 56*   ANION GAP mmol/L 7   CALCIUM mg/dL 8 5   GLUCOSE RANDOM mg/dL 103                           * I Have Reviewed All Lab Data Listed Above  * Additional Pertinent Lab Tests Reviewed:  All Labs Within Last 24 Hours Reviewed    Imaging:    Imaging Reports Reviewed Today Include:   Imaging Personally Reviewed by Myself Includes:      Recent Cultures (last 7 days):           Last 24 Hours Medication List:   Current Facility-Administered Medications   Medication Dose Route Frequency Provider Last Rate    acetaminophen  650 mg Oral Q6H PRN Luma Raza PA-C      butalbital-acetaminophen-caffeine  1 tablet Oral Q6H PRN Donita Kearns PA-C      cyanocobalamin  1,000 mcg Oral Daily Tory Villafana MD      cyclobenzaprine  10 mg Oral TID PRN Luma Raza PA-C      dextrose 5 % and sodium chloride 0 45 % with KCl 20 mEq/L  75 mL/hr Intravenous Continuous Aury Rowley MD 75 mL/hr (04/24/21 2139)    docusate sodium  100 mg Oral BID Luma Raza PA-C      ergocalciferol  50,000 Units Oral Once per day on Mon Thu Elizabeth Fly, DO      folic acid  1 mg Oral Daily Luma Raza PA-C      gabapentin  100 mg Oral TID PRN Luma Raza PA-C      gabapentin  300 mg Oral HS SMITHA Mcduffie      heparin (porcine)  5,000 Units Subcutaneous Q8H [facility]      levETIRAcetam  500 mg Oral Q12H [facility] Heydi Leon PA-C      levothyroxine  50 mcg Oral Early Morning Elizabeth Fly, DO      LORazepam  0 5 mg Oral Q8H PRN Tory Villafana MD      LORazepam  1 mg Oral Daily PRN Aury Rowley MD      mirtazapine  15 mg Oral HS Tory Villafana MD     [facility] multivitamin-minerals  1 tablet Oral TID Boston Tirado, DO      polyethylene glycol  17 g Oral Daily Tika Pope PA-C      prazosin  2 mg Oral HS Elizabethjaylin Johnson, DO      pyridoxine  20 mg Intravenous Daily Catie Jiménez MD      thiamine  100 mg Oral Daily Catie Jiménez MD      venlafaxine  37 5 mg Oral Daily Ferny Velazquez, DO      zolpidem  10 mg Oral HS Lenox Alpers, MD          Today, Patient Was Seen By: Jarod Brand PA-C    ** Please Note: Dictation voice to text software may have been used in the creation of this document   **

## 2021-04-25 NOTE — ASSESSMENT & PLAN NOTE
Malnutrition Findings:   Adult Malnutrition type: Chronic illness  Adult Degree of Malnutrition: Other severe protein calorie malnutrition    BMI Findings: Body mass index is 27 28 kg/m²     · Nutrition team is following   · Continue Ensure supplementation

## 2021-04-25 NOTE — ASSESSMENT & PLAN NOTE
· Present on admission  · Urine culture growing E coli  · Patient completed antibiotic course while admitted

## 2021-04-25 NOTE — ASSESSMENT & PLAN NOTE
· Patient acute kidney injury on admission due to dehydration from decreased p o  Intake and urinary obstruction  Nephrology was following the patient  Patient had acute hemodialysis due to acute kidney injury  · Acute kidney injury resolved:   Most recent creatinine was Creatinine 0 56  · Continue IV fluids at 75 cc an hour  · Encourage oral intake  · Resolved

## 2021-04-26 VITALS
DIASTOLIC BLOOD PRESSURE: 64 MMHG | TEMPERATURE: 97.6 F | OXYGEN SATURATION: 99 % | HEIGHT: 64 IN | WEIGHT: 154.54 LBS | BODY MASS INDEX: 26.38 KG/M2 | SYSTOLIC BLOOD PRESSURE: 100 MMHG | RESPIRATION RATE: 16 BRPM | HEART RATE: 96 BPM

## 2021-04-26 PROBLEM — N30.00 ACUTE CYSTITIS WITHOUT HEMATURIA: Status: RESOLVED | Noted: 2021-04-12 | Resolved: 2021-04-26

## 2021-04-26 LAB — SARS-COV-2 RNA RESP QL NAA+PROBE: NEGATIVE

## 2021-04-26 PROCEDURE — U0005 INFEC AGEN DETEC AMPLI PROBE: HCPCS | Performed by: INTERNAL MEDICINE

## 2021-04-26 PROCEDURE — U0003 INFECTIOUS AGENT DETECTION BY NUCLEIC ACID (DNA OR RNA); SEVERE ACUTE RESPIRATORY SYNDROME CORONAVIRUS 2 (SARS-COV-2) (CORONAVIRUS DISEASE [COVID-19]), AMPLIFIED PROBE TECHNIQUE, MAKING USE OF HIGH THROUGHPUT TECHNOLOGIES AS DESCRIBED BY CMS-2020-01-R: HCPCS | Performed by: INTERNAL MEDICINE

## 2021-04-26 PROCEDURE — 99239 HOSP IP/OBS DSCHRG MGMT >30: CPT | Performed by: PHYSICIAN ASSISTANT

## 2021-04-26 RX ORDER — LEVETIRACETAM 500 MG/1
500 TABLET ORAL EVERY 12 HOURS SCHEDULED
Qty: 60 TABLET | Refills: 0 | Status: ON HOLD
Start: 2021-04-26 | End: 2021-09-30 | Stop reason: SDUPTHER

## 2021-04-26 RX ORDER — LEVOTHYROXINE SODIUM 0.05 MG/1
50 TABLET ORAL
Qty: 30 TABLET | Refills: 0 | Status: ON HOLD
Start: 2021-04-26 | End: 2021-09-30 | Stop reason: SDUPTHER

## 2021-04-26 RX ORDER — GABAPENTIN 300 MG/1
300 CAPSULE ORAL
Qty: 30 CAPSULE | Refills: 0
Start: 2021-04-26 | End: 2021-09-30 | Stop reason: HOSPADM

## 2021-04-26 RX ORDER — ZOLPIDEM TARTRATE 10 MG/1
10 TABLET ORAL
Qty: 10 TABLET | Refills: 0 | Status: SHIPPED | OUTPATIENT
Start: 2021-04-26 | End: 2021-05-13 | Stop reason: HOSPADM

## 2021-04-26 RX ORDER — PRAZOSIN HYDROCHLORIDE 2 MG/1
2 CAPSULE ORAL
Qty: 30 CAPSULE | Refills: 0 | Status: ON HOLD
Start: 2021-04-26 | End: 2021-09-29 | Stop reason: SDUPTHER

## 2021-04-26 RX ORDER — GABAPENTIN 100 MG/1
100 CAPSULE ORAL 3 TIMES DAILY PRN
Qty: 90 CAPSULE | Refills: 0
Start: 2021-04-26 | End: 2021-09-30 | Stop reason: HOSPADM

## 2021-04-26 RX ORDER — ERGOCALCIFEROL 1.25 MG/1
50000 CAPSULE ORAL 2 TIMES WEEKLY
Qty: 30 CAPSULE | Refills: 0 | Status: ON HOLD | OUTPATIENT
Start: 2021-04-26 | End: 2021-09-30 | Stop reason: SDUPTHER

## 2021-04-26 RX ORDER — MIRTAZAPINE 15 MG/1
15 TABLET, FILM COATED ORAL
Qty: 30 TABLET | Refills: 0
Start: 2021-04-26 | End: 2021-09-30 | Stop reason: HOSPADM

## 2021-04-26 RX ORDER — LORAZEPAM 0.5 MG/1
0.5 TABLET ORAL EVERY 8 HOURS PRN
Qty: 30 TABLET | Refills: 0 | Status: SHIPPED | OUTPATIENT
Start: 2021-04-26 | End: 2021-05-13 | Stop reason: HOSPADM

## 2021-04-26 RX ADMIN — LEVOTHYROXINE SODIUM 50 MCG: 25 TABLET ORAL at 06:31

## 2021-04-26 RX ADMIN — LEVETIRACETAM 500 MG: 500 TABLET, FILM COATED ORAL at 09:40

## 2021-04-26 RX ADMIN — THIAMINE HCL TAB 100 MG 100 MG: 100 TAB at 09:40

## 2021-04-26 RX ADMIN — DEXTROSE, SODIUM CHLORIDE, AND POTASSIUM CHLORIDE 75 ML/HR: 5; .45; .15 INJECTION INTRAVENOUS at 04:00

## 2021-04-26 RX ADMIN — VENLAFAXINE HYDROCHLORIDE 37.5 MG: 37.5 CAPSULE, EXTENDED RELEASE ORAL at 09:40

## 2021-04-26 RX ADMIN — LORAZEPAM 0.5 MG: 0.5 TABLET ORAL at 09:40

## 2021-04-26 RX ADMIN — CYANOCOBALAMIN TAB 500 MCG 1000 MCG: 500 TAB at 09:40

## 2021-04-26 RX ADMIN — FOLIC ACID 1 MG: 1 TABLET ORAL at 09:40

## 2021-04-26 RX ADMIN — Medication 1 TABLET: at 09:40

## 2021-04-26 RX ADMIN — BUTALBITAL, ACETAMINOPHEN AND CAFFEINE 1 TABLET: 50; 325; 40 TABLET ORAL at 11:16

## 2021-04-26 RX ADMIN — CYCLOBENZAPRINE HYDROCHLORIDE 10 MG: 10 TABLET, FILM COATED ORAL at 14:15

## 2021-04-26 RX ADMIN — HEPARIN SODIUM 5000 UNITS: 5000 INJECTION INTRAVENOUS; SUBCUTANEOUS at 06:31

## 2021-04-26 RX ADMIN — DOCUSATE SODIUM 100 MG: 100 CAPSULE, LIQUID FILLED ORAL at 09:40

## 2021-04-26 RX ADMIN — POLYETHYLENE GLYCOL 3350 17 G: 17 POWDER, FOR SOLUTION ORAL at 09:40

## 2021-04-26 RX ADMIN — PYRIDOXINE HYDROCHLORIDE 20 MG: 100 INJECTION, SOLUTION INTRAMUSCULAR; INTRAVENOUS at 09:43

## 2021-04-26 RX ADMIN — ERGOCALCIFEROL 50000 UNITS: 1.25 CAPSULE ORAL at 09:42

## 2021-04-26 NOTE — CASE MANAGEMENT
Continue to follow  Spoke with Cate Drew in admissions at [facility], [facility] can accept Pt today  Level of determination for nursing home obtained from [facility] for nursing home level approval  Faxed [phone], Level of determination letter received from [facility]  Call placed to [facility], spoke with [facility] ambulance arranged to [facility]  Pickup is 3:30pm  Pt's nurse(Tammy) informed of transport time  Pt informed of transport time to [facility]  Reviewed IMM rights with Pt  Cate Drew in admissions in [facility] informed of transport time  Call placed to Pt's (Latha: [phone]) with [facility] Team at [facility], informed of Pt's discharge and transport to [facility]

## 2021-04-26 NOTE — ASSESSMENT & PLAN NOTE
· Home medication Bupropion 100 mg bid, Rameron 7 5 mg HS, Effexor 37 5 mg daily, Topamax and Ambien 10 mg HS  · Hold Bupropion and Topamax given propensity to worsen anorexia  · Continue dose to 15 mg HS No

## 2021-04-26 NOTE — TRANSPORTATION MEDICAL NECESSITY
Section I - General Information    Name of Patient: Earl Salcido                 : 1980    Medicare #: [id number]  Transport Date: 21 (PCS is valid for round trips on this date and for all repetitive trips in the 60-day range as noted below )  Origin: [facility]: [facility]  Is the pt's stay covered under Medicare Part A (PPS/DRG)   []     Closest appropriate facility? If no, why is transport to more distant facility required? Yes  If hospice pt, is this transport related to pt's terminal illness? NA       Section II - Medical Necessity Questionnaire  Ambulance transportation is medically necessary only if other means of transport are contraindicated or would be potentially harmful to the patient  To meet this requirement, the patient must either be "bed confined" or suffer from a condition such that transport by means other than ambulance is contraindicated by the patient's condition  The following questions must be answered by the medical professional signing below for this form to be valid:    1)  Describe the MEDICAL CONDITION (physical and/or mental) of this patient AT [address] [place] [address] that requires the patient to be transported in an ambulance and why transport by other means is contraindicated by the patient's condition: non ambulatory, generalized weakness,    2) Is the patient "bed confined" as defined below? Yes  To be "be confined" the patient must satisfy all three of the following conditions: (1) unable to get up from bed without Assistance; AND (2) unable to ambulate; AND (3) unable to sit in a chair or wheelchair  3) Can this patient safely be transported by car or wheelchair van (i e , seated during transport without a medical attendant or monitoring)?    No    4) In addition to completing questions 1-3 above, please check any of the following conditions that apply*:   *Note: supporting documentation for any boxes checked must be maintained in the patient's medical records  If hosp-hosp transfer, describe services needed at 2nd facility not available at 1st facility? Unable to tolerate seated position for time needed to transport   Other(specify) fall risk      Section III - Signature of Physician or Healthcare Professional  I certify that the above information is true and correct based on my evaluation of this patient, and represent that the patient requires transport by ambulance and that other forms of transport are contraindicated  I understand that this information will be used by the Centers for Medicare and Medicaid Services (CMS) to support the determination of medical necessity for ambulance services, and I represent that I have personal knowledge of the patient's condition at time of transport  []  If this box is checked, I also certify that the patient is physically or mentally incapable of signing the ambulance service's claim and that the institution with which I am affiliated has furnished care, services, or assistance to the patient  My signature below is made on behalf of the patient pursuant to 42 CFR §424 36(b)(4)  In accordance with 42 CFR §424 37, the specific reason(s) that the patient is physically or mentally incapable of signing the claim form is as follows:       Signature of Physician* or Healthcare Professional______________________________________________________________  Signature Date 04/26/21 (For scheduled repetitive transports, this form is not valid for transports performed more than 60 days after this date)    Printed Name & Credentials of Physician or Healthcare Professional (MD, DO, RN, etc )________________________________  *Form must be signed by patient's attending physician for scheduled, repetitive transports   For non-repetitive, unscheduled ambulance transports, if unable to obtain the signature of the attending physician, any of the following may sign (choose appropriate option below)  [] Physician Assistant []  Clinical Nurse Specialist []  Registered Nurse  []  Nurse Practitioner  [x] Discharge Planner

## 2021-04-26 NOTE — CASE MANAGEMENT
Continue to follow  Faxed discharge instructions to Ghanshyam Cardozo at [address] Team at [phone]

## 2021-04-26 NOTE — INCIDENTAL FINDINGS
The following findings require follow up:  Radiographic finding   Finding: "3 7 x 3 4 x 3 7 cm right ovarian lesion with uniform low-level internal echoes, differential diagnosis would include hemorrhagic cyst versus endometrioma  Initial ultrasound follow-up in 8-12 weeks is recommended "   Follow up required: Repeat US 8-12 weeks   Follow up should be done within 8-12 week(s)    Please follow up with PCP for repeat imaging

## 2021-04-26 NOTE — ASSESSMENT & PLAN NOTE
· ? Neurogenic bladder, UTI  · S/p Zhong placement  · Appreciate urology recommendations - patient failed void trial and Zhong catheter was reinserted  · Outpatient follow up with urology

## 2021-04-26 NOTE — ASSESSMENT & PLAN NOTE
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 as per neurology's recommendation  · In the end it was felt that her complaints are due to nutritional deficiency due to anorexia nervosa  · Psychiatry was consulted and felt the patient should continue Remeron and there was no role for inpatient psychiatric therapy  · Nutritional services saw the patient who continue to have poor p o  Intake because of not wanting to eat  · Attending had reached out to bariatric surgery at [place] [address] as well as at [facility] where patient's gastric bypass surgeries had been done  · Patient was not accepted at [facility] for transfer  · Outpatient rehab and eventual outpatient follow-up with her bariatric surgeon at [facility] was recommended  · Attending discussed the case with patient's insurance company, medical director Dr Juan Francisco Smith on April 22nd:  Due to the fact that patient refused therapy and was not progressing over and 18 day course of SNF therapy before admission to this hospital, he denied SNF placement for patient and recommended long-term care placement    · Medically stable for discharge to [facility] today at 3:30 PM

## 2021-04-26 NOTE — DISCHARGE SUMMARY
[facility]     Discharge- Lucas Sandoval 1980, 47 y o  female MRN: [id number]  Unit/Bed#: -01 Encounter: [id number]  Primary Care Provider: Lakisha Hagan DO   Date and time admitted to hospital: 4/5/2021  5:54 PM    * Generalized weakness  Assessment & Plan  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 as per neurology's recommendation  · In the end it was felt that her complaints are due to nutritional deficiency due to anorexia nervosa  · Psychiatry was consulted and felt the patient should continue Remeron and there was no role for inpatient psychiatric therapy  · Nutritional services saw the patient who continue to have poor p o  Intake because of not wanting to eat  · Attending had reached out to bariatric surgery at [facility] as well as at [facility] where patient's gastric bypass surgeries had been done  · Patient was not accepted at [facility] for transfer  · Outpatient rehab and eventual outpatient follow-up with her bariatric surgeon at [facility] was recommended  · Attending discussed the case with patient's insurance company, medical director Dr Misty Camejo on April 22nd:  Due to the fact that patient refused therapy and was not progressing over and 18 day course of SNF therapy before admission to this hospital, he denied SNF placement for patient and recommended long-term care placement  · Medically stable for discharge to [facility] today at 3:30 PM    Urinary retention  Assessment & Plan  · ?  Neurogenic bladder, UTI  · S/p Zhong placement  · Appreciate urology recommendations - patient failed void trial and Zhong catheter was reinserted  · Outpatient follow up with urology    Severe protein-calorie malnutrition [facility])  Assessment & Plan  Malnutrition Findings:   Adult Malnutrition type: Chronic illness  Adult Degree of Malnutrition: Other severe protein calorie malnutrition    BMI Findings: Body mass index is 26 53 kg/m²  · Nutrition team is following   · Continue Ensure supplementation      Soft tissue lesion of pelvic region  Assessment & Plan  · Incidental finding on MRI of the lumbar spine with right cul-de-sac lesion  · US Pelvis:uterus is anteverted in position, measuring 6 3 x 2 8 x 4 6 cm  Normal cervix, endometrium, normal left ovary, R ovary with uniform low-level internal echoes is identified, No suspicious adnexal mass or loculated collections  · follow-up US in 8-12 weeks is recommended  · Needs Gyn f/u outpatient     Depression  Assessment & Plan  · Home medication Bupropion 100 mg bid, Rameron 7 5 mg HS, Effexor 37 5 mg daily, Topamax and Ambien 10 mg HS  · Hold Bupropion and Topamax given propensity to worsen anorexia  · Continue dose to 15 mg HS    Anorexia nervosa, restricting type  Assessment & Plan  · Patient has anorexia nervosa  · Nutritional supplements were encouraged  · Continue Remeron      Hypothyroidism  Assessment & Plan  · increased to 50 mcg daily, continue    Acute cystitis without hematuria-resolved as of 4/26/2021  Assessment & Plan  · Present on admission  · Urine culture growing E coli  · Patient completed antibiotic course while admitted    RENE (acute kidney injury) (HCC)-resolved as of 4/25/2021  Assessment & Plan  · Patient acute kidney injury on admission due to dehydration from decreased p o  Intake and urinary obstruction  Nephrology was following the patient  Patient had acute hemodialysis due to acute kidney injury  · Acute kidney injury resolved:   Most recent creatinine was Creatinine 0 56  · Discontinue IVF  · Encourage oral intake  · Resolved    Discharging Physician / Practitioner: Alayna Henderson PA-C  PCP: Charlene Quevedo DO  Admission Date:   Admission Orders (From admission, onward)     Ordered        04/05/21 2118  Inpatient Admission  Once                   Discharge Date: 04/26/21    Resolved Problems  Date Reviewed: 4/26/2021          Resolved    Hypoglycemia 4/12/2021     Resolved by  Tory Villafana MD    RENE (acute kidney injury) ([facility] [address] ) 4/25/2021     Resolved by  Alayna Henderson PA-C    Hyperkalemia 4/11/2021     Resolved by  Mickey Gilbert DO    Hyponatremia 4/10/2021     Resolved by  Mickey Gilbert DO    Decreased ambulation status 4/10/2021     Resolved by  Mickey Gilbert DO    Acute cystitis without hematuria 4/26/2021     Resolved by  Alayna Henderson PA-C          Consultations During Hospital Stay:  · Neuropsychology  · Palliative care  · Gastroenterology  · Psychiatry  · Urology  · Nephrology  · Neurology    Procedures Performed:   · Lumbar puncture    Significant Findings / Test Results:   · CT head:  No acute intracranial abnormality  · CT abdomen/pelvis:  Severely distended bladder and mild bilateral obstructive uropathy suggestive distal obstruction or neurogenic bladder  No evidence of cystitis, bladder mass or hematoma  Findings compatible with constipation  · MRI brain:  No acute intracranial abnormality  · MRI lumbar spine: "Mild spondylosis on this motion degraded study  2   Indeterminant 4 cm pelvic lesion posteriorly in the cul-de-sac on the right, possibly related to the ovary  Differential considerations would include hemorrhagic cyst versus perhaps endometrioma  Other mass lesions not excluded  Nonemergent pelvic   sonography is recommended for further assessment "  · Ultrasound pelvis:  "3 7 x 3 4 x 3 7 cm right ovarian lesion with uniform low-level internal echoes, differential diagnosis would include hemorrhagic cyst versus endometrioma    Initial ultrasound follow-up in 8-12 weeks is recommended "  · CT cervical spine:  No cervical spine fracture or traumatic malalignment  · CT head:  No acute intracranial abnormality  · MRI cervical spine: "Mild degenerative changes are noted  Image quality is degraded by patient motion artifact  No spinal cord signal abnormality within the limits of this motion degraded study "      Incidental Findings:   · As above     Test Results Pending at Discharge (will require follow up): · None     Outpatient Tests Requested:  · Repeat pelvic ultrasound in 0-39 weeks    Complications:  Please refer to individual progress notes    Reason for Admission:  Generalized weakness    Hospital Course:     Joe Antony is a 47 y o  female patient who originally presented to the hospital on 4/5/2021 due to generalized weakness  Past medical history significant for anorexia nervosa, gastric bypass surgery, depression/anxiety  Patient presented to the emergency department on 04/05/2021 due to to generalized weakness and acute kidney injury  Nephrology and Neurology were consulted  Patient was started on IV hydration and Urology was consulted due to urinary retention requiring Zhong catheter placement  Patient had extensive workup completed including CT and MRI of the brain and cervical spine, lumbar puncture, plasmapheresis  Ultimately patient's presentation felt to be due to nutritional deficiencies due to her history of anorexia nervosa  Patient continued to clinically improve, neuropsychiatry felt that patient had capacity to make informed medical decisions  Medications were also adjusted per Psychiatry recommendations due to her history of PTSD/depression/anxiety  Ultimately patient continue to improve however still remained with generalized deconditioning  It was recommended to pursue long-term placement after rehab was denied  On day of discharge patient was hemodynamically stable and verbalized understanding for requested outpatient follow-up  Patient will be discharged to [facility]  Please refer to individual progress notes for specific details  Please see above list of diagnoses and related plan for additional information  Condition at Discharge: stable     Discharge Day Visit / Exam:     Subjective:  "I feel okay "  Vitals: Blood Pressure: 100/64 (04/26/21 0725)  Pulse: 96 (04/26/21 0725)  Temperature: 97 6 °F (36 4 °C) (04/26/21 0725)  Temp Source: Axillary (04/26/21 0710)  Respirations: 16 (04/26/21 0725)  Height: 5' 4" (162 6 cm) (04/15/21 0749)  Weight - Scale: 70 1 kg (154 lb 8 7 oz) (04/26/21 0600)  SpO2: 99 % (04/26/21 0725)  Exam:   Physical Exam  Vitals signs and nursing note reviewed  Constitutional:       Appearance: She is underweight  Comments: Appears comfortable   HENT:      Head: Normocephalic  Eyes:      General: No scleral icterus  Extraocular Movements: Extraocular movements intact  Conjunctiva/sclera: Conjunctivae normal    Neck:      Musculoskeletal: Normal range of motion  Cardiovascular:      Rate and Rhythm: Normal rate and regular rhythm  Heart sounds: S1 normal and S2 normal    Pulmonary:      Effort: Pulmonary effort is normal       Breath sounds: Normal breath sounds  No wheezing, rhonchi or rales  Abdominal:      General: Bowel sounds are normal       Palpations: Abdomen is soft  Tenderness: There is no abdominal tenderness  There is no guarding or rebound  Musculoskeletal:         General: No swelling, tenderness or deformity  Comments: Appears deconditioned  Able to move upper/lower extremities bilaterally  No edema   Skin:     General: Skin is warm and dry  Neurological:      Mental Status: She is alert and oriented to person, place, and time  Psychiatric:         Mood and Affect: Affect is flat  Speech: Speech is delayed  Behavior: Behavior normal          Discussion with Family:  Declined family or friend update      Discharge instructions/Information to patient and family:   See after visit summary for information provided to patient and family  Provisions for Follow-Up Care:  See after visit summary for information related to follow-up care and any pertinent home health orders  Disposition:     Assisted Living Facility at [address]:   · Not Applicable to this Patient - Not Applicable to this Patient    Planned Readmission:  None     Discharge Statement:  I spent 65 minutes discharging the patient  This time was spent on the day of discharge  I had direct contact with the patient on the day of discharge  Greater than 50% of the total time was spent examining patient, answering all patient questions, arranging and discussing plan of care with patient as well as directly providing post-discharge instructions  Additional time then spent on discharge activities  Discharge Medications:  See after visit summary for reconciled discharge medications provided to patient and family        ** Please Note: This note has been constructed using a voice recognition system **

## 2021-04-26 NOTE — PLAN OF CARE
Problem: Potential for Falls  Goal: Patient will remain free of falls  Description: INTERVENTIONS:  - Assess patient frequently for physical needs  -  Identify cognitive and physical deficits and behaviors that affect risk of falls  -  [place] fall precautions as indicated by assessment   - Educate patient/family on patient safety including physical limitations  - Instruct patient to call for assistance with activity based on assessment  - Modify environment to reduce risk of injury  - Consider OT/PT consult to assist with strengthening/mobility  4/26/2021 1029 by Kev Diaz RN  Outcome: Progressing  4/26/2021 1029 by Kev Diaz RN  Outcome: Progressing     Problem: Prexisting or High Potential for Compromised Skin Integrity  Goal: Skin integrity is maintained or improved  Description: INTERVENTIONS:  - Identify patients at risk for skin breakdown  - Assess and monitor skin integrity  - Assess and monitor nutrition and hydration status  - Monitor labs   - Assess for incontinence   - Turn and reposition patient  - Assist with mobility/ambulation  - Relieve pressure over bony prominences  - Avoid friction and shearing  - Provide appropriate hygiene as needed including keeping skin clean and dry  - Evaluate need for skin moisturizer/barrier cream  - Collaborate with interdisciplinary team   - Patient/family teaching  - Consider wound care consult   4/26/2021 1029 by Kev Diaz RN  Outcome: Progressing  4/26/2021 1029 by Kev Diaz RN  Outcome: Progressing     Problem: Nutrition/Hydration-ADULT  Goal: Nutrient/Hydration intake appropriate for improving, restoring or maintaining nutritional needs  Description: Monitor and assess patient's nutrition/hydration status for malnutrition  Collaborate with interdisciplinary team and initiate plan and interventions as ordered  Monitor patient's weight and dietary intake as ordered or per policy   Utilize nutrition screening tool and intervene as necessary  Determine patient's food preferences and provide high-protein, high-caloric foods as appropriate       INTERVENTIONS:  - Monitor oral intake, urinary output, labs, and treatment plans  - Assess nutrition and hydration status and recommend course of action  - Evaluate amount of meals eaten  - Assist patient with eating if necessary   - Allow adequate time for meals  - Recommend/ encourage appropriate diets, oral nutritional supplements, and vitamin/mineral supplements  - Order, calculate, and assess calorie counts as needed  - Recommend, monitor, and adjust tube feedings and TPN/PPN based on assessed needs  - Assess need for intravenous fluids  - Provide specific nutrition/hydration education as appropriate  - Include patient/family/caregiver in decisions related to nutrition  4/26/2021 1029 by Ludwin Temple RN  Outcome: Progressing  4/26/2021 1029 by Ludwin Temple RN  Outcome: Progressing     Problem: PAIN - ADULT  Goal: Verbalizes/displays adequate comfort level or baseline comfort level  Description: Interventions:  - Encourage patient to monitor pain and request assistance  - Assess pain using appropriate pain scale  - Administer analgesics based on type and severity of pain and evaluate response  - Implement non-pharmacological measures as appropriate and evaluate response  - Consider cultural and social influences on pain and pain management  - Notify physician/advanced practitioner if interventions unsuccessful or patient reports new pain  4/26/2021 1029 by Ludwin Temple RN  Outcome: Progressing  4/26/2021 1029 by Ludwin Temple RN  Outcome: Progressing     Problem: INFECTION - ADULT  Goal: Absence or prevention of progression during hospitalization  Description: INTERVENTIONS:  - Assess and monitor for signs and symptoms of infection  - Monitor lab/diagnostic results  - Monitor all insertion sites, i e  indwelling lines, tubes, and drains  - Monitor endotracheal if appropriate and nasal secretions for changes in amount and color  - [place] appropriate cooling/warming therapies per order  - Administer medications as ordered  - Instruct and encourage patient and family to use good hand hygiene technique  - Identify and instruct in appropriate isolation precautions for identified infection/condition  4/26/2021 1029 by Woodward Dancer, RN  Outcome: Progressing  4/26/2021 1029 by Woodward Dancer, RN  Outcome: Progressing     Problem: SAFETY ADULT  Goal: Patient will remain free of falls  Description: INTERVENTIONS:  - Assess patient frequently for physical needs  -  Identify cognitive and physical deficits and behaviors that affect risk of falls    -  [place] fall precautions as indicated by assessment   - Educate patient/family on patient safety including physical limitations  - Instruct patient to call for assistance with activity based on assessment  - Modify environment to reduce risk of injury  - Consider OT/PT consult to assist with strengthening/mobility  4/26/2021 1029 by Woodward Dancer, RN  Outcome: Progressing  4/26/2021 1029 by Woodward Dancer, RN  Outcome: Progressing     Problem: DISCHARGE PLANNING  Goal: Discharge to home or other facility with appropriate resources  Description: INTERVENTIONS:  - Identify barriers to discharge w/patient and caregiver  - Arrange for needed discharge resources and transportation as appropriate  - Identify discharge learning needs (meds, wound care, etc )  - Arrange for interpretive services to assist at discharge as needed  - Refer to Case Management Department for coordinating discharge planning if the patient needs post-hospital services based on physician/advanced practitioner order or complex needs related to functional status, cognitive ability, or social support system  4/26/2021 1029 by Woodward Dancer, RN  Outcome: Progressing  4/26/2021 1029 by Woodward Dancer, RN  Outcome: Progressing     Problem: Knowledge Deficit  Goal: Patient/family/caregiver demonstrates understanding of disease process, treatment plan, medications, and discharge instructions  Description: Complete learning assessment and assess knowledge base    Interventions:  - Provide teaching at level of understanding  - Provide teaching via preferred learning methods  4/26/2021 1029 by Roda Meckel, RN  Outcome: Progressing  4/26/2021 1029 by Roda Meckel, RN  Outcome: Progressing     Problem: NEUROSENSORY - ADULT  Goal: Achieves stable or improved neurological status  Description: INTERVENTIONS  - Monitor and report changes in neurological status  - Monitor vital signs such as temperature, blood pressure, glucose, and any other labs ordered   - Initiate measures to prevent increased intracranial pressure  - Monitor for seizure activity and implement precautions if appropriate      4/26/2021 1029 by Roda Meckel, RN  Outcome: Progressing  4/26/2021 1029 by Roda Meckel, RN  Outcome: Progressing     Problem: GASTROINTESTINAL - ADULT  Goal: Maintains adequate nutritional intake  Description: INTERVENTIONS:  - Monitor percentage of each meal consumed  - Identify factors contributing to decreased intake, treat as appropriate  - Assist with meals as needed  - Monitor I&O, weight, and lab values if indicated  - Obtain nutrition services referral as needed  4/26/2021 1029 by Roda Meckel, RN  Outcome: Progressing  4/26/2021 1029 by Roda Meckel, RN  Outcome: Progressing     Problem: GENITOURINARY - ADULT  Goal: Urinary catheter remains patent  Description: INTERVENTIONS:  - Assess patency of urinary catheter  - If patient has a chronic bates, consider changing catheter if non-functioning  - Follow guidelines for intermittent irrigation of non-functioning urinary catheter  4/26/2021 1029 by Roda Meckel, RN  Outcome: Progressing  4/26/2021 1029 by Roda Meckel, RN  Outcome: Progressing     Problem: METABOLIC, FLUID AND ELECTROLYTES - ADULT  Goal: Electrolytes maintained within normal limits  Description: INTERVENTIONS:  - Monitor labs and assess patient for signs and symptoms of electrolyte imbalances  - Administer electrolyte replacement as ordered  - Monitor response to electrolyte replacements, including repeat lab results as appropriate  - Instruct patient on fluid and nutrition as appropriate  4/26/2021 1029 by Pinky Zamora RN  Outcome: Progressing  4/26/2021 1029 by Pinky Zamora RN  Outcome: Progressing  Goal: Fluid balance maintained  Description: INTERVENTIONS:  - Monitor labs   - Monitor I/O and WT  - Instruct patient on fluid and nutrition as appropriate  - Assess for signs & symptoms of volume excess or deficit  4/26/2021 1029 by Pinky Zamora RN  Outcome: Progressing  4/26/2021 1029 by Pinky Zamora RN  Outcome: Progressing     Problem: HEMATOLOGIC - ADULT  Goal: Maintains hematologic stability  Description: INTERVENTIONS  - Assess for signs and symptoms of bleeding or hemorrhage  - Monitor labs  - Administer supportive blood products/factors as ordered and appropriate  4/26/2021 1029 by Pinky Zamora RN  Outcome: Progressing  4/26/2021 1029 by Pinky Zamora RN  Outcome: Progressing     Problem: MUSCULOSKELETAL - ADULT  Goal: Maintain or return mobility to safest level of function  Description: INTERVENTIONS:  - Assess patient's ability to carry out ADLs; assess patient's baseline for ADL function and identify physical deficits which impact ability to perform ADLs (bathing, care of mouth/teeth, toileting, grooming, dressing, etc )  - Assess/evaluate cause of self-care deficits   - Assess range of motion  - Assess patient's mobility  - Assess patient's need for assistive devices and provide as appropriate  - Encourage maximum independence but intervene and supervise when necessary  - Involve family in performance of ADLs  - Assess for home care needs following discharge   - Consider OT consult to assist with ADL evaluation and planning for discharge  - Provide patient education as appropriate  4/26/2021 1029 by Richie Gatica, RN  Outcome: Progressing  4/26/2021 1029 by Richie Gatica, RN  Outcome: Progressing

## 2021-04-26 NOTE — ASSESSMENT & PLAN NOTE
· Patient acute kidney injury on admission due to dehydration from decreased p o  Intake and urinary obstruction  Nephrology was following the patient  Patient had acute hemodialysis due to acute kidney injury  · Acute kidney injury resolved:   Most recent creatinine was Creatinine 0 56  · Discontinue IVF  · Encourage oral intake  · Resolved

## 2021-04-26 NOTE — ASSESSMENT & PLAN NOTE
Malnutrition Findings:   Adult Malnutrition type: Chronic illness  Adult Degree of Malnutrition: Other severe protein calorie malnutrition    BMI Findings: Body mass index is 26 53 kg/m²     · Nutrition team is following   · Continue Ensure supplementation

## 2021-04-26 NOTE — PLAN OF CARE
Problem: Potential for Falls  Goal: Patient will remain free of falls  Description: INTERVENTIONS:  - Assess patient frequently for physical needs  -  Identify cognitive and physical deficits and behaviors that affect risk of falls  -  [place] fall precautions as indicated by assessment   - Educate patient/family on patient safety including physical limitations  - Instruct patient to call for assistance with activity based on assessment  - Modify environment to reduce risk of injury  - Consider OT/PT consult to assist with strengthening/mobility  Outcome: Progressing     Problem: Prexisting or High Potential for Compromised Skin Integrity  Goal: Skin integrity is maintained or improved  Description: INTERVENTIONS:  - Identify patients at risk for skin breakdown  - Assess and monitor skin integrity  - Assess and monitor nutrition and hydration status  - Monitor labs   - Assess for incontinence   - Turn and reposition patient  - Assist with mobility/ambulation  - Relieve pressure over bony prominences  - Avoid friction and shearing  - Provide appropriate hygiene as needed including keeping skin clean and dry  - Evaluate need for skin moisturizer/barrier cream  - Collaborate with interdisciplinary team   - Patient/family teaching  - Consider wound care consult   Outcome: Progressing     Problem: Nutrition/Hydration-ADULT  Goal: Nutrient/Hydration intake appropriate for improving, restoring or maintaining nutritional needs  Description: Monitor and assess patient's nutrition/hydration status for malnutrition  Collaborate with interdisciplinary team and initiate plan and interventions as ordered  Monitor patient's weight and dietary intake as ordered or per policy  Utilize nutrition screening tool and intervene as necessary  Determine patient's food preferences and provide high-protein, high-caloric foods as appropriate       INTERVENTIONS:  - Monitor oral intake, urinary output, labs, and treatment plans  - Assess nutrition and hydration status and recommend course of action  - Evaluate amount of meals eaten  - Assist patient with eating if necessary   - Allow adequate time for meals  - Recommend/ encourage appropriate diets, oral nutritional supplements, and vitamin/mineral supplements  - Order, calculate, and assess calorie counts as needed  - Recommend, monitor, and adjust tube feedings and TPN/PPN based on assessed needs  - Assess need for intravenous fluids  - Provide specific nutrition/hydration education as appropriate  - Include patient/family/caregiver in decisions related to nutrition  Outcome: Progressing     Problem: PAIN - ADULT  Goal: Verbalizes/displays adequate comfort level or baseline comfort level  Description: Interventions:  - Encourage patient to monitor pain and request assistance  - Assess pain using appropriate pain scale  - Administer analgesics based on type and severity of pain and evaluate response  - Implement non-pharmacological measures as appropriate and evaluate response  - Consider cultural and social influences on pain and pain management  - Notify physician/advanced practitioner if interventions unsuccessful or patient reports new pain  Outcome: Progressing     Problem: INFECTION - ADULT  Goal: Absence or prevention of progression during hospitalization  Description: INTERVENTIONS:  - Assess and monitor for signs and symptoms of infection  - Monitor lab/diagnostic results  - Monitor all insertion sites, i e  indwelling lines, tubes, and drains  - Monitor endotracheal if appropriate and nasal secretions for changes in amount and color  - [place] appropriate cooling/warming therapies per order  - Administer medications as ordered  - Instruct and encourage patient and family to use good hand hygiene technique  - Identify and instruct in appropriate isolation precautions for identified infection/condition  Outcome: Progressing     Problem: SAFETY ADULT  Goal: Patient will remain free of falls  Description: INTERVENTIONS:  - Assess patient frequently for physical needs  -  Identify cognitive and physical deficits and behaviors that affect risk of falls  -  [place] fall precautions as indicated by assessment   - Educate patient/family on patient safety including physical limitations  - Instruct patient to call for assistance with activity based on assessment  - Modify environment to reduce risk of injury  - Consider OT/PT consult to assist with strengthening/mobility  Outcome: Progressing     Problem: DISCHARGE PLANNING  Goal: Discharge to home or other facility with appropriate resources  Description: INTERVENTIONS:  - Identify barriers to discharge w/patient and caregiver  - Arrange for needed discharge resources and transportation as appropriate  - Identify discharge learning needs (meds, wound care, etc )  - Arrange for interpretive services to assist at discharge as needed  - Refer to Case Management Department for coordinating discharge planning if the patient needs post-hospital services based on physician/advanced practitioner order or complex needs related to functional status, cognitive ability, or social support system  Outcome: Progressing     Problem: Knowledge Deficit  Goal: Patient/family/caregiver demonstrates understanding of disease process, treatment plan, medications, and discharge instructions  Description: Complete learning assessment and assess knowledge base    Interventions:  - Provide teaching at level of understanding  - Provide teaching via preferred learning methods  Outcome: Progressing     Problem: NEUROSENSORY - ADULT  Goal: Achieves stable or improved neurological status  Description: INTERVENTIONS  - Monitor and report changes in neurological status  - Monitor vital signs such as temperature, blood pressure, glucose, and any other labs ordered   - Initiate measures to prevent increased intracranial pressure  - Monitor for seizure activity and implement precautions if appropriate      Outcome: Progressing     Problem: GASTROINTESTINAL - ADULT  Goal: Maintains adequate nutritional intake  Description: INTERVENTIONS:  - Monitor percentage of each meal consumed  - Identify factors contributing to decreased intake, treat as appropriate  - Assist with meals as needed  - Monitor I&O, weight, and lab values if indicated  - Obtain nutrition services referral as needed  Outcome: Progressing     Problem: GENITOURINARY - ADULT  Goal: Urinary catheter remains patent  Description: INTERVENTIONS:  - Assess patency of urinary catheter  - If patient has a chronic bates, consider changing catheter if non-functioning  - Follow guidelines for intermittent irrigation of non-functioning urinary catheter  Outcome: Progressing     Problem: METABOLIC, FLUID AND ELECTROLYTES - ADULT  Goal: Electrolytes maintained within normal limits  Description: INTERVENTIONS:  - Monitor labs and assess patient for signs and symptoms of electrolyte imbalances  - Administer electrolyte replacement as ordered  - Monitor response to electrolyte replacements, including repeat lab results as appropriate  - Instruct patient on fluid and nutrition as appropriate  Outcome: Progressing  Goal: Fluid balance maintained  Description: INTERVENTIONS:  - Monitor labs   - Monitor I/O and WT  - Instruct patient on fluid and nutrition as appropriate  - Assess for signs & symptoms of volume excess or deficit  Outcome: Progressing     Problem: HEMATOLOGIC - ADULT  Goal: Maintains hematologic stability  Description: INTERVENTIONS  - Assess for signs and symptoms of bleeding or hemorrhage  - Monitor labs  - Administer supportive blood products/factors as ordered and appropriate  Outcome: Progressing     Problem: MUSCULOSKELETAL - ADULT  Goal: Maintain or return mobility to safest level of function  Description: INTERVENTIONS:  - Assess patient's ability to carry out ADLs; assess patient's baseline for ADL function and identify physical deficits which impact ability to perform ADLs (bathing, care of mouth/teeth, toileting, grooming, dressing, etc )  - Assess/evaluate cause of self-care deficits   - Assess range of motion  - Assess patient's mobility  - Assess patient's need for assistive devices and provide as appropriate  - Encourage maximum independence but intervene and supervise when necessary  - Involve family in performance of ADLs  - Assess for home care needs following discharge   - Consider OT consult to assist with ADL evaluation and planning for discharge  - Provide patient education as appropriate  Outcome: Progressing

## 2021-04-26 NOTE — ASSESSMENT & PLAN NOTE
· Incidental finding on MRI of the lumbar spine with right cul-de-sac lesion  · US Pelvis:uterus is anteverted in position, measuring 6 3 x 2 8 x 4 6 cm  Normal cervix, endometrium, normal left ovary, R ovary with uniform low-level internal echoes is identified, No suspicious adnexal mass or loculated collections  · follow-up US in 8-12 weeks is recommended    · Needs Gyn f/u outpatient

## 2021-04-27 ENCOUNTER — TELEPHONE (OUTPATIENT)
Dept: NEUROLOGY | Facility: CLINIC | Age: 41
End: 2021-04-27

## 2021-04-27 NOTE — TELEPHONE ENCOUNTER
Raúl Galvez 89 at [phone] and [facility] for Valenzuela to call in to sched HFU appt for pt  SLUB/[place] Upper and Lower Extremity Weakness/[employer]/[facility]    NOTE FROM CHART:  Reason for Consult / Principal Problem:  Bilateral upper and lower extremity weakness  Yevgeniy Augustinethorne will need follow up in in 4 weeks with neuromuscular attending or advance practitioner  She will not require outpatient neurological testing

## 2021-04-27 NOTE — UTILIZATION REVIEW
Notification of Discharge   This is a Notification of Discharge from our facility [address]  Please be advised that this patient has been discharge from our facility  Below you will find the admission and discharge date and time including the patients disposition  UTILIZATION REVIEW CONTACT:  Tarun Youssef  Utilization   Network Utilization Review Department  Phone: [phone] x carefully listen to the prompts  All voicemails are confidential   Email: [email or web address]     PHYSICIAN ADVISORY SERVICES:  FOR [id number] REVIEW - MEDICAL NECESSITY DENIAL  Phone: [phone]  Fax: [phone]  Email: [email or web address]     PRESENTATION DATE: 4/5/2021  5:54 PM  OBERVATION ADMISSION DATE:   INPATIENT ADMISSION DATE: 4/5/21 2118   DISCHARGE DATE: 4/26/2021  4:36 PM  DISPOSITION: Non [facility] SN[facility]/SNU Non [facility] SN[facility]/TC[facility]/SNU      IMPORTANT INFORMATION:  Send all requests for admission clinical reviews, approved or denied determinations and any other requests to dedicated fax number below belonging to the campus where the patient is receiving treatment   List of dedicated fax numbers:  [address] [place] [address] DENIALS (Administrative/Medical Necessity) [phone]   [address]  (Maternity/NICU/Pediatrics) [phone]   [facility] [phone]   [facility] [phone]   [facility] [phone]   Jennifer Horde [facility] [address] [facility] [phone]   [facility]  [phone]   [address] [facility] [address]  [address] [facility] [address] [facility] [phone]

## 2021-04-28 NOTE — TELEPHONE ENCOUNTER
4/7/2021    Isaias Hoffman  1980  [id number]    Diagnosis  Urinary retention      Plan  Void Trial     Procedure Zhong removal/voiding trial    Please remove catheter have patient hydrate with water if after 4 hours unable to urinate please reinsert catheter  If patient is urinating, perform PVR if greater than 300 ml reinsert catheter  Can attempt another void trial in one month    No results found for this or any previous visit (from the past 4 hour(s))      [place] [facility] [place] [phone]

## 2021-04-28 NOTE — TELEPHONE ENCOUNTER
[facility] called for appointment I asked if voiding trail was done she was unaware asking for orders faxed [phone],,,8 week follow up w/pvr scheduled 6/29 10:30am [facility] [place]

## 2021-04-29 LAB
Lab: NORMAL
MISCELLANEOUS LAB TEST RESULT: NORMAL

## 2021-05-05 ENCOUNTER — APPOINTMENT (EMERGENCY)
Dept: RADIOLOGY | Facility: HOSPITAL | Age: 41
DRG: 871 | End: 2021-05-05
Payer: COMMERCIAL

## 2021-05-05 ENCOUNTER — HOSPITAL ENCOUNTER (INPATIENT)
Facility: HOSPITAL | Age: 41
LOS: 7 days | Discharge: NURSING, REHAB OR LONG-TERM CARE | DRG: 871 | End: 2021-05-13
Attending: EMERGENCY MEDICINE | Admitting: INTERNAL MEDICINE
Payer: COMMERCIAL

## 2021-05-05 DIAGNOSIS — G47.00 INSOMNIA: ICD-10-CM

## 2021-05-05 DIAGNOSIS — R78.81 BACTEREMIA: ICD-10-CM

## 2021-05-05 DIAGNOSIS — N39.0 UTI (URINARY TRACT INFECTION): ICD-10-CM

## 2021-05-05 DIAGNOSIS — E87.6 HYPOKALEMIA: ICD-10-CM

## 2021-05-05 DIAGNOSIS — R41.82 AMS (ALTERED MENTAL STATUS): ICD-10-CM

## 2021-05-05 DIAGNOSIS — E83.42 HYPOMAGNESEMIA: ICD-10-CM

## 2021-05-05 DIAGNOSIS — N17.9 ACUTE KIDNEY INJURY (HCC): ICD-10-CM

## 2021-05-05 DIAGNOSIS — E87.1 HYPONATREMIA: Primary | ICD-10-CM

## 2021-05-05 DIAGNOSIS — N12 PYELONEPHRITIS: ICD-10-CM

## 2021-05-05 DIAGNOSIS — R33.9 URINARY RETENTION: ICD-10-CM

## 2021-05-05 LAB — GLUCOSE SERPL-MCNC: 121 MG/DL (ref 65–140)

## 2021-05-05 PROCEDURE — 84484 ASSAY OF TROPONIN QUANT: CPT | Performed by: EMERGENCY MEDICINE

## 2021-05-05 PROCEDURE — 71045 X-RAY EXAM CHEST 1 VIEW: CPT

## 2021-05-05 PROCEDURE — 87077 CULTURE AEROBIC IDENTIFY: CPT | Performed by: EMERGENCY MEDICINE

## 2021-05-05 PROCEDURE — 85025 COMPLETE CBC W/AUTO DIFF WBC: CPT | Performed by: EMERGENCY MEDICINE

## 2021-05-05 PROCEDURE — 85610 PROTHROMBIN TIME: CPT | Performed by: EMERGENCY MEDICINE

## 2021-05-05 PROCEDURE — 82948 REAGENT STRIP/BLOOD GLUCOSE: CPT

## 2021-05-05 PROCEDURE — U0005 INFEC AGEN DETEC AMPLI PROBE: HCPCS | Performed by: EMERGENCY MEDICINE

## 2021-05-05 PROCEDURE — 85730 THROMBOPLASTIN TIME PARTIAL: CPT | Performed by: EMERGENCY MEDICINE

## 2021-05-05 PROCEDURE — 80053 COMPREHEN METABOLIC PANEL: CPT | Performed by: EMERGENCY MEDICINE

## 2021-05-05 PROCEDURE — 87040 BLOOD CULTURE FOR BACTERIA: CPT | Performed by: EMERGENCY MEDICINE

## 2021-05-05 PROCEDURE — 36415 COLL VENOUS BLD VENIPUNCTURE: CPT | Performed by: EMERGENCY MEDICINE

## 2021-05-05 PROCEDURE — 99285 EMERGENCY DEPT VISIT HI MDM: CPT

## 2021-05-05 PROCEDURE — 83605 ASSAY OF LACTIC ACID: CPT | Performed by: EMERGENCY MEDICINE

## 2021-05-05 PROCEDURE — 87186 SC STD MICRODIL/AGAR DIL: CPT | Performed by: EMERGENCY MEDICINE

## 2021-05-05 PROCEDURE — 96361 HYDRATE IV INFUSION ADD-ON: CPT

## 2021-05-05 PROCEDURE — 93005 ELECTROCARDIOGRAM TRACING: CPT

## 2021-05-05 PROCEDURE — 99285 EMERGENCY DEPT VISIT HI MDM: CPT | Performed by: EMERGENCY MEDICINE

## 2021-05-05 PROCEDURE — U0003 INFECTIOUS AGENT DETECTION BY NUCLEIC ACID (DNA OR RNA); SEVERE ACUTE RESPIRATORY SYNDROME CORONAVIRUS 2 (SARS-COV-2) (CORONAVIRUS DISEASE [COVID-19]), AMPLIFIED PROBE TECHNIQUE, MAKING USE OF HIGH THROUGHPUT TECHNOLOGIES AS DESCRIBED BY CMS-2020-01-R: HCPCS | Performed by: EMERGENCY MEDICINE

## 2021-05-05 PROCEDURE — 84145 PROCALCITONIN (PCT): CPT | Performed by: EMERGENCY MEDICINE

## 2021-05-05 RX ORDER — SODIUM CHLORIDE 9 MG/ML
125 INJECTION, SOLUTION INTRAVENOUS CONTINUOUS
Status: DISCONTINUED | OUTPATIENT
Start: 2021-05-05 | End: 2021-05-06

## 2021-05-05 RX ADMIN — SODIUM CHLORIDE 125 ML/HR: 0.9 INJECTION, SOLUTION INTRAVENOUS at 23:49

## 2021-05-06 PROBLEM — N12 PYELONEPHRITIS: Status: ACTIVE | Noted: 2021-04-12

## 2021-05-06 PROBLEM — R33.8 ACUTE RETENTION OF URINE: Status: ACTIVE | Noted: 2021-04-13

## 2021-05-06 PROBLEM — A41.9 SEPSIS (HCC): Status: ACTIVE | Noted: 2021-05-06

## 2021-05-06 PROBLEM — R65.20 SEVERE SEPSIS (HCC): Status: ACTIVE | Noted: 2021-05-06

## 2021-05-06 PROBLEM — E87.6 HYPOKALEMIA: Status: ACTIVE | Noted: 2021-05-06

## 2021-05-06 PROBLEM — R35.89 POLYURIA: Status: ACTIVE | Noted: 2021-05-06

## 2021-05-06 PROBLEM — N30.90 CYSTITIS: Status: ACTIVE | Noted: 2021-04-12

## 2021-05-06 PROBLEM — J18.9 PNEUMONIA: Status: ACTIVE | Noted: 2021-05-06

## 2021-05-06 PROBLEM — R41.82 ALTERED MENTAL STATUS: Status: ACTIVE | Noted: 2021-05-06

## 2021-05-06 LAB
ALBUMIN SERPL BCP-MCNC: 2.5 G/DL (ref 3.5–5)
ALP SERPL-CCNC: 132 U/L (ref 46–116)
ALT SERPL W P-5'-P-CCNC: 19 U/L (ref 12–78)
ANION GAP SERPL CALCULATED.3IONS-SCNC: 10 MMOL/L (ref 4–13)
ANION GAP SERPL CALCULATED.3IONS-SCNC: 10 MMOL/L (ref 4–13)
ANION GAP SERPL CALCULATED.3IONS-SCNC: 11 MMOL/L (ref 4–13)
ANION GAP SERPL CALCULATED.3IONS-SCNC: 11 MMOL/L (ref 4–13)
ANION GAP SERPL CALCULATED.3IONS-SCNC: 16 MMOL/L (ref 4–13)
APTT PPP: 41 SECONDS (ref 23–37)
AST SERPL W P-5'-P-CCNC: 14 U/L (ref 5–45)
ATRIAL RATE: 119 BPM
BACTERIA UR QL AUTO: ABNORMAL /HPF
BASOPHILS # BLD AUTO: 0.02 THOUSANDS/ΜL (ref 0–0.1)
BASOPHILS NFR BLD AUTO: 0 % (ref 0–1)
BILIRUB SERPL-MCNC: 0.3 MG/DL (ref 0.2–1)
BILIRUB UR QL STRIP: NEGATIVE
BUN SERPL-MCNC: 19 MG/DL (ref 5–25)
BUN SERPL-MCNC: 20 MG/DL (ref 5–25)
BUN SERPL-MCNC: 20 MG/DL (ref 5–25)
BUN SERPL-MCNC: 21 MG/DL (ref 5–25)
BUN SERPL-MCNC: 21 MG/DL (ref 5–25)
CALCIUM ALBUM COR SERPL-MCNC: 9.7 MG/DL (ref 8.3–10.1)
CALCIUM SERPL-MCNC: 8.3 MG/DL (ref 8.3–10.1)
CALCIUM SERPL-MCNC: 8.3 MG/DL (ref 8.3–10.1)
CALCIUM SERPL-MCNC: 8.4 MG/DL (ref 8.3–10.1)
CALCIUM SERPL-MCNC: 8.5 MG/DL (ref 8.3–10.1)
CALCIUM SERPL-MCNC: 8.6 MG/DL (ref 8.3–10.1)
CHLORIDE SERPL-SCNC: 87 MMOL/L (ref 100–108)
CHLORIDE SERPL-SCNC: 90 MMOL/L (ref 100–108)
CHLORIDE SERPL-SCNC: 92 MMOL/L (ref 100–108)
CHLORIDE SERPL-SCNC: 96 MMOL/L (ref 100–108)
CHLORIDE SERPL-SCNC: 97 MMOL/L (ref 100–108)
CLARITY UR: ABNORMAL
CO2 SERPL-SCNC: 18 MMOL/L (ref 21–32)
CO2 SERPL-SCNC: 23 MMOL/L (ref 21–32)
COLOR UR: YELLOW
CREAT SERPL-MCNC: 1.35 MG/DL (ref 0.6–1.3)
CREAT SERPL-MCNC: 1.42 MG/DL (ref 0.6–1.3)
CREAT SERPL-MCNC: 1.58 MG/DL (ref 0.6–1.3)
CREAT SERPL-MCNC: 1.76 MG/DL (ref 0.6–1.3)
CREAT SERPL-MCNC: 1.79 MG/DL (ref 0.6–1.3)
EOSINOPHIL # BLD AUTO: 0 THOUSAND/ΜL (ref 0–0.61)
EOSINOPHIL NFR BLD AUTO: 0 % (ref 0–6)
ERYTHROCYTE [DISTWIDTH] IN BLOOD BY AUTOMATED COUNT: 14.5 % (ref 11.6–15.1)
ERYTHROCYTE [DISTWIDTH] IN BLOOD BY AUTOMATED COUNT: 14.6 % (ref 11.6–15.1)
GFR SERPL CREATININE-BSD FRML MDRD: 35 ML/MIN/1.73SQ M
GFR SERPL CREATININE-BSD FRML MDRD: 36 ML/MIN/1.73SQ M
GFR SERPL CREATININE-BSD FRML MDRD: 41 ML/MIN/1.73SQ M
GFR SERPL CREATININE-BSD FRML MDRD: 47 ML/MIN/1.73SQ M
GFR SERPL CREATININE-BSD FRML MDRD: 50 ML/MIN/1.73SQ M
GLUCOSE SERPL-MCNC: 101 MG/DL (ref 65–140)
GLUCOSE SERPL-MCNC: 103 MG/DL (ref 65–140)
GLUCOSE SERPL-MCNC: 105 MG/DL (ref 65–140)
GLUCOSE SERPL-MCNC: 119 MG/DL (ref 65–140)
GLUCOSE SERPL-MCNC: 96 MG/DL (ref 65–140)
GLUCOSE UR STRIP-MCNC: NEGATIVE MG/DL
HCT VFR BLD AUTO: 26.2 % (ref 34.8–46.1)
HCT VFR BLD AUTO: 28.3 % (ref 34.8–46.1)
HGB BLD-MCNC: 8.6 G/DL (ref 11.5–15.4)
HGB BLD-MCNC: 9.7 G/DL (ref 11.5–15.4)
HGB UR QL STRIP.AUTO: ABNORMAL
IMM GRANULOCYTES # BLD AUTO: 0.08 THOUSAND/UL (ref 0–0.2)
IMM GRANULOCYTES NFR BLD AUTO: 1 % (ref 0–2)
INR PPP: 1.59 (ref 0.84–1.19)
INR PPP: 1.93 (ref 0.84–1.19)
KETONES UR STRIP-MCNC: NEGATIVE MG/DL
LACTATE SERPL-SCNC: 0.4 MMOL/L (ref 0.5–2)
LACTATE SERPL-SCNC: 0.6 MMOL/L (ref 0.5–2)
LEUKOCYTE ESTERASE UR QL STRIP: ABNORMAL
LYMPHOCYTES # BLD AUTO: 0.56 THOUSANDS/ΜL (ref 0.6–4.47)
LYMPHOCYTES NFR BLD AUTO: 4 % (ref 14–44)
MCH RBC QN AUTO: 30 PG (ref 26.8–34.3)
MCH RBC QN AUTO: 30.9 PG (ref 26.8–34.3)
MCHC RBC AUTO-ENTMCNC: 32.8 G/DL (ref 31.4–37.4)
MCHC RBC AUTO-ENTMCNC: 34.3 G/DL (ref 31.4–37.4)
MCV RBC AUTO: 90 FL (ref 82–98)
MCV RBC AUTO: 91 FL (ref 82–98)
MONOCYTES # BLD AUTO: 1.31 THOUSAND/ΜL (ref 0.17–1.22)
MONOCYTES NFR BLD AUTO: 9 % (ref 4–12)
MUCOUS THREADS UR QL AUTO: ABNORMAL
NEUTROPHILS # BLD AUTO: 12.53 THOUSANDS/ΜL (ref 1.85–7.62)
NEUTS SEG NFR BLD AUTO: 86 % (ref 43–75)
NITRITE UR QL STRIP: NEGATIVE
NON-SQ EPI CELLS URNS QL MICRO: ABNORMAL /HPF
NRBC BLD AUTO-RTO: 0 /100 WBCS
OSMOLALITY UR: 111 MMOL/KG
P AXIS: 58 DEGREES
PH UR STRIP.AUTO: 5.5 [PH]
PLATELET # BLD AUTO: 198 THOUSANDS/UL (ref 149–390)
PLATELET # BLD AUTO: 249 THOUSANDS/UL (ref 149–390)
PMV BLD AUTO: 11.1 FL (ref 8.9–12.7)
PMV BLD AUTO: 11.6 FL (ref 8.9–12.7)
POTASSIUM SERPL-SCNC: 3.3 MMOL/L (ref 3.5–5.3)
POTASSIUM SERPL-SCNC: 3.8 MMOL/L (ref 3.5–5.3)
POTASSIUM SERPL-SCNC: 3.9 MMOL/L (ref 3.5–5.3)
POTASSIUM SERPL-SCNC: 4 MMOL/L (ref 3.5–5.3)
POTASSIUM SERPL-SCNC: 4.1 MMOL/L (ref 3.5–5.3)
PR INTERVAL: 126 MS
PROCALCITONIN SERPL-MCNC: 3.28 NG/ML
PROT SERPL-MCNC: 5.7 G/DL (ref 6.4–8.2)
PROT UR STRIP-MCNC: NEGATIVE MG/DL
PROTHROMBIN TIME: 18.9 SECONDS (ref 11.6–14.5)
PROTHROMBIN TIME: 22 SECONDS (ref 11.6–14.5)
QRS AXIS: 42 DEGREES
QRSD INTERVAL: 72 MS
QT INTERVAL: 300 MS
QTC INTERVAL: 422 MS
RBC # BLD AUTO: 2.87 MILLION/UL (ref 3.81–5.12)
RBC # BLD AUTO: 3.14 MILLION/UL (ref 3.81–5.12)
RBC #/AREA URNS AUTO: ABNORMAL /HPF
SARS-COV-2 RNA RESP QL NAA+PROBE: NEGATIVE
SODIUM 24H UR-SCNC: 13 MOL/L
SODIUM SERPL-SCNC: 121 MMOL/L (ref 136–145)
SODIUM SERPL-SCNC: 123 MMOL/L (ref 136–145)
SODIUM SERPL-SCNC: 126 MMOL/L (ref 136–145)
SODIUM SERPL-SCNC: 130 MMOL/L (ref 136–145)
SODIUM SERPL-SCNC: 130 MMOL/L (ref 136–145)
SP GR UR STRIP.AUTO: <=1.005 (ref 1–1.03)
T WAVE AXIS: 58 DEGREES
TROPONIN I SERPL-MCNC: <0.02 NG/ML
TSH SERPL DL<=0.05 MIU/L-ACNC: 1.38 UIU/ML (ref 0.36–3.74)
UROBILINOGEN UR QL STRIP.AUTO: 0.2 E.U./DL
VENTRICULAR RATE: 119 BPM
WBC # BLD AUTO: 13.32 THOUSAND/UL (ref 4.31–10.16)
WBC # BLD AUTO: 14.5 THOUSAND/UL (ref 4.31–10.16)
WBC #/AREA URNS AUTO: ABNORMAL /HPF

## 2021-05-06 PROCEDURE — 84443 ASSAY THYROID STIM HORMONE: CPT | Performed by: PHYSICIAN ASSISTANT

## 2021-05-06 PROCEDURE — 96365 THER/PROPH/DIAG IV INF INIT: CPT

## 2021-05-06 PROCEDURE — 94760 N-INVAS EAR/PLS OXIMETRY 1: CPT

## 2021-05-06 PROCEDURE — 84300 ASSAY OF URINE SODIUM: CPT | Performed by: NURSE PRACTITIONER

## 2021-05-06 PROCEDURE — 36415 COLL VENOUS BLD VENIPUNCTURE: CPT | Performed by: PHYSICIAN ASSISTANT

## 2021-05-06 PROCEDURE — 83935 ASSAY OF URINE OSMOLALITY: CPT | Performed by: NURSE PRACTITIONER

## 2021-05-06 PROCEDURE — 0T9B70Z DRAINAGE OF BLADDER WITH DRAINAGE DEVICE, VIA NATURAL OR ARTIFICIAL OPENING: ICD-10-PCS | Performed by: EMERGENCY MEDICINE

## 2021-05-06 PROCEDURE — 81001 URINALYSIS AUTO W/SCOPE: CPT | Performed by: EMERGENCY MEDICINE

## 2021-05-06 PROCEDURE — 80048 BASIC METABOLIC PNL TOTAL CA: CPT | Performed by: PHYSICIAN ASSISTANT

## 2021-05-06 PROCEDURE — 99223 1ST HOSP IP/OBS HIGH 75: CPT | Performed by: INTERNAL MEDICINE

## 2021-05-06 PROCEDURE — 85610 PROTHROMBIN TIME: CPT | Performed by: PHYSICIAN ASSISTANT

## 2021-05-06 PROCEDURE — 99222 1ST HOSP IP/OBS MODERATE 55: CPT | Performed by: PHYSICIAN ASSISTANT

## 2021-05-06 PROCEDURE — 83605 ASSAY OF LACTIC ACID: CPT | Performed by: INTERNAL MEDICINE

## 2021-05-06 PROCEDURE — 87086 URINE CULTURE/COLONY COUNT: CPT | Performed by: EMERGENCY MEDICINE

## 2021-05-06 PROCEDURE — 93010 ELECTROCARDIOGRAM REPORT: CPT | Performed by: INTERNAL MEDICINE

## 2021-05-06 PROCEDURE — 85027 COMPLETE CBC AUTOMATED: CPT | Performed by: PHYSICIAN ASSISTANT

## 2021-05-06 PROCEDURE — 87081 CULTURE SCREEN ONLY: CPT | Performed by: PHYSICIAN ASSISTANT

## 2021-05-06 RX ORDER — SODIUM CHLORIDE 9 MG/ML
75 INJECTION, SOLUTION INTRAVENOUS CONTINUOUS
Status: DISPENSED | OUTPATIENT
Start: 2021-05-06 | End: 2021-05-06

## 2021-05-06 RX ORDER — DOCUSATE SODIUM 100 MG/1
100 CAPSULE, LIQUID FILLED ORAL 2 TIMES DAILY
Status: DISCONTINUED | OUTPATIENT
Start: 2021-05-06 | End: 2021-05-10

## 2021-05-06 RX ORDER — MIRTAZAPINE 15 MG/1
15 TABLET, FILM COATED ORAL
Status: DISCONTINUED | OUTPATIENT
Start: 2021-05-06 | End: 2021-05-13 | Stop reason: HOSPADM

## 2021-05-06 RX ORDER — AZTREONAM 2 G/1
2000 INJECTION, POWDER, LYOPHILIZED, FOR SOLUTION INTRAMUSCULAR; INTRAVENOUS EVERY 8 HOURS
Status: DISCONTINUED | OUTPATIENT
Start: 2021-05-06 | End: 2021-05-06 | Stop reason: SDUPTHER

## 2021-05-06 RX ORDER — PRAZOSIN HYDROCHLORIDE 1 MG/1
2 CAPSULE ORAL
Status: DISCONTINUED | OUTPATIENT
Start: 2021-05-06 | End: 2021-05-13 | Stop reason: HOSPADM

## 2021-05-06 RX ORDER — ZOLPIDEM TARTRATE 5 MG/1
5 TABLET ORAL
Status: DISCONTINUED | OUTPATIENT
Start: 2021-05-06 | End: 2021-05-13 | Stop reason: HOSPADM

## 2021-05-06 RX ORDER — LEVOFLOXACIN 5 MG/ML
750 INJECTION, SOLUTION INTRAVENOUS ONCE
Status: COMPLETED | OUTPATIENT
Start: 2021-05-06 | End: 2021-05-06

## 2021-05-06 RX ORDER — POTASSIUM CHLORIDE 20 MEQ/1
20 TABLET, EXTENDED RELEASE ORAL ONCE
Status: COMPLETED | OUTPATIENT
Start: 2021-05-06 | End: 2021-05-06

## 2021-05-06 RX ORDER — ACETAMINOPHEN 325 MG/1
650 TABLET ORAL EVERY 6 HOURS PRN
COMMUNITY
End: 2021-09-30 | Stop reason: HOSPADM

## 2021-05-06 RX ORDER — GABAPENTIN 300 MG/1
300 CAPSULE ORAL
Status: DISCONTINUED | OUTPATIENT
Start: 2021-05-06 | End: 2021-05-13 | Stop reason: HOSPADM

## 2021-05-06 RX ORDER — GABAPENTIN 100 MG/1
100 CAPSULE ORAL 3 TIMES DAILY PRN
Status: DISCONTINUED | OUTPATIENT
Start: 2021-05-06 | End: 2021-05-13 | Stop reason: HOSPADM

## 2021-05-06 RX ORDER — ACETAMINOPHEN 650 MG/1
650 SUPPOSITORY RECTAL ONCE
Status: COMPLETED | OUTPATIENT
Start: 2021-05-06 | End: 2021-05-06

## 2021-05-06 RX ORDER — LEVETIRACETAM 500 MG/1
500 TABLET ORAL EVERY 12 HOURS SCHEDULED
Status: DISCONTINUED | OUTPATIENT
Start: 2021-05-06 | End: 2021-05-13 | Stop reason: HOSPADM

## 2021-05-06 RX ORDER — ERGOCALCIFEROL 1.25 MG/1
50000 CAPSULE ORAL 2 TIMES WEEKLY
Status: DISCONTINUED | OUTPATIENT
Start: 2021-05-06 | End: 2021-05-13 | Stop reason: HOSPADM

## 2021-05-06 RX ORDER — LORAZEPAM 0.5 MG/1
0.5 TABLET ORAL EVERY 8 HOURS PRN
Status: DISCONTINUED | OUTPATIENT
Start: 2021-05-06 | End: 2021-05-13 | Stop reason: HOSPADM

## 2021-05-06 RX ORDER — FOLIC ACID 1 MG/1
1 TABLET ORAL DAILY
Status: DISCONTINUED | OUTPATIENT
Start: 2021-05-06 | End: 2021-05-13 | Stop reason: HOSPADM

## 2021-05-06 RX ORDER — SODIUM CHLORIDE 9 MG/ML
50 INJECTION, SOLUTION INTRAVENOUS CONTINUOUS
Status: DISCONTINUED | OUTPATIENT
Start: 2021-05-06 | End: 2021-05-06

## 2021-05-06 RX ORDER — VENLAFAXINE HYDROCHLORIDE 37.5 MG/1
75 CAPSULE, EXTENDED RELEASE ORAL DAILY
Status: DISCONTINUED | OUTPATIENT
Start: 2021-05-06 | End: 2021-05-13 | Stop reason: HOSPADM

## 2021-05-06 RX ORDER — ONDANSETRON 2 MG/ML
4 INJECTION INTRAMUSCULAR; INTRAVENOUS EVERY 6 HOURS PRN
Status: DISCONTINUED | OUTPATIENT
Start: 2021-05-06 | End: 2021-05-13 | Stop reason: HOSPADM

## 2021-05-06 RX ORDER — BUTALBITAL, ACETAMINOPHEN AND CAFFEINE 50; 325; 40 MG/1; MG/1; MG/1
1 TABLET ORAL EVERY 4 HOURS PRN
Status: DISCONTINUED | OUTPATIENT
Start: 2021-05-06 | End: 2021-05-13 | Stop reason: HOSPADM

## 2021-05-06 RX ORDER — LEVOTHYROXINE SODIUM 0.03 MG/1
50 TABLET ORAL
Status: DISCONTINUED | OUTPATIENT
Start: 2021-05-06 | End: 2021-05-13 | Stop reason: HOSPADM

## 2021-05-06 RX ORDER — VANCOMYCIN HYDROCHLORIDE 1 G/200ML
15 INJECTION, SOLUTION INTRAVENOUS ONCE
Status: COMPLETED | OUTPATIENT
Start: 2021-05-06 | End: 2021-05-06

## 2021-05-06 RX ORDER — VANCOMYCIN HYDROCHLORIDE 1 G/200ML
15 INJECTION, SOLUTION INTRAVENOUS EVERY 12 HOURS
Status: DISCONTINUED | OUTPATIENT
Start: 2021-05-06 | End: 2021-05-06

## 2021-05-06 RX ORDER — CYCLOBENZAPRINE HCL 10 MG
10 TABLET ORAL 3 TIMES DAILY PRN
Status: DISCONTINUED | OUTPATIENT
Start: 2021-05-06 | End: 2021-05-13 | Stop reason: HOSPADM

## 2021-05-06 RX ORDER — ACETAMINOPHEN 325 MG/1
650 TABLET ORAL EVERY 6 HOURS PRN
Status: DISCONTINUED | OUTPATIENT
Start: 2021-05-06 | End: 2021-05-13 | Stop reason: HOSPADM

## 2021-05-06 RX ADMIN — FOLIC ACID 1 MG: 1 TABLET ORAL at 10:37

## 2021-05-06 RX ADMIN — PRAZOSIN HYDROCHLORIDE 2 MG: 1 CAPSULE ORAL at 23:29

## 2021-05-06 RX ADMIN — ZOLPIDEM TARTRATE 5 MG: 5 TABLET, COATED ORAL at 21:01

## 2021-05-06 RX ADMIN — LORAZEPAM 0.5 MG: 0.5 TABLET ORAL at 06:20

## 2021-05-06 RX ADMIN — POTASSIUM CHLORIDE 20 MEQ: 1500 TABLET, EXTENDED RELEASE ORAL at 15:35

## 2021-05-06 RX ADMIN — GABAPENTIN 300 MG: 300 CAPSULE ORAL at 21:01

## 2021-05-06 RX ADMIN — BUTALBITAL, ACETAMINOPHEN AND CAFFEINE 1 TABLET: 50; 325; 40 TABLET ORAL at 15:17

## 2021-05-06 RX ADMIN — VANCOMYCIN HYDROCHLORIDE 750 MG: 750 INJECTION, SOLUTION INTRAVENOUS at 11:13

## 2021-05-06 RX ADMIN — ACETAMINOPHEN 650 MG: 325 TABLET, FILM COATED ORAL at 06:20

## 2021-05-06 RX ADMIN — ACETAMINOPHEN 650 MG: 325 TABLET, FILM COATED ORAL at 12:35

## 2021-05-06 RX ADMIN — LORAZEPAM 0.5 MG: 0.5 TABLET ORAL at 15:41

## 2021-05-06 RX ADMIN — BUTALBITAL, ACETAMINOPHEN AND CAFFEINE 1 TABLET: 50; 325; 40 TABLET ORAL at 11:04

## 2021-05-06 RX ADMIN — ACETAMINOPHEN 650 MG: 325 TABLET, FILM COATED ORAL at 18:36

## 2021-05-06 RX ADMIN — GABAPENTIN 100 MG: 100 CAPSULE ORAL at 12:35

## 2021-05-06 RX ADMIN — ENOXAPARIN SODIUM 40 MG: 40 INJECTION SUBCUTANEOUS at 10:38

## 2021-05-06 RX ADMIN — VANCOMYCIN HYDROCHLORIDE 750 MG: 750 INJECTION, SOLUTION INTRAVENOUS at 23:38

## 2021-05-06 RX ADMIN — AZTREONAM 2000 MG: 2 INJECTION, POWDER, LYOPHILIZED, FOR SOLUTION INTRAMUSCULAR; INTRAVENOUS at 05:00

## 2021-05-06 RX ADMIN — CYCLOBENZAPRINE HYDROCHLORIDE 10 MG: 10 TABLET, FILM COATED ORAL at 21:01

## 2021-05-06 RX ADMIN — ACETAMINOPHEN 650 MG: 650 SUPPOSITORY RECTAL at 01:39

## 2021-05-06 RX ADMIN — CYCLOBENZAPRINE HYDROCHLORIDE 10 MG: 10 TABLET, FILM COATED ORAL at 11:04

## 2021-05-06 RX ADMIN — AZTREONAM 2000 MG: 2 INJECTION, POWDER, LYOPHILIZED, FOR SOLUTION INTRAMUSCULAR; INTRAVENOUS at 12:56

## 2021-05-06 RX ADMIN — LEVOFLOXACIN 750 MG: 5 INJECTION, SOLUTION INTRAVENOUS at 01:40

## 2021-05-06 RX ADMIN — AZTREONAM 2000 MG: 2 INJECTION, POWDER, LYOPHILIZED, FOR SOLUTION INTRAMUSCULAR; INTRAVENOUS at 21:01

## 2021-05-06 RX ADMIN — SODIUM CHLORIDE 75 ML/HR: 0.9 INJECTION, SOLUTION INTRAVENOUS at 02:35

## 2021-05-06 RX ADMIN — LEVETIRACETAM 500 MG: 500 TABLET, FILM COATED ORAL at 21:01

## 2021-05-06 RX ADMIN — DOCUSATE SODIUM 100 MG: 100 CAPSULE, LIQUID FILLED ORAL at 10:37

## 2021-05-06 RX ADMIN — VENLAFAXINE HYDROCHLORIDE 75 MG: 75 CAPSULE, EXTENDED RELEASE ORAL at 10:38

## 2021-05-06 RX ADMIN — ERGOCALCIFEROL 50000 UNITS: 1.25 CAPSULE ORAL at 10:38

## 2021-05-06 RX ADMIN — DOCUSATE SODIUM 100 MG: 100 CAPSULE, LIQUID FILLED ORAL at 18:17

## 2021-05-06 RX ADMIN — VANCOMYCIN HYDROCHLORIDE 1000 MG: 1 INJECTION, SOLUTION INTRAVENOUS at 00:32

## 2021-05-06 RX ADMIN — LEVETIRACETAM 500 MG: 500 TABLET, FILM COATED ORAL at 10:38

## 2021-05-06 RX ADMIN — LEVOTHYROXINE SODIUM 50 MCG: 25 TABLET ORAL at 05:43

## 2021-05-06 RX ADMIN — MIRTAZAPINE 15 MG: 15 TABLET, FILM COATED ORAL at 21:01

## 2021-05-06 NOTE — ASSESSMENT & PLAN NOTE
· SIRS criteria met on admission:  Leukocytosis at 14 5 K, tachycardia at 120 bpm, and febrile at 101°  · Suspected source is pneumonia vs acute pyelonephritis  · Severe sepsis criteria met with an INR >1 5 at 1 59   · Patient started on vancomycin and levofloxacin in the ED - will change to aztreonam and vanc  · COVID PCR negative   · CXR: "Mild left basilar opacity suggestive of atelectasis or a small developing infiltrate "  · UA consistent with infection   · Blood cultures x2, pending  · urine culture, pending   · Procalcitonin, pending  · BP stable

## 2021-05-06 NOTE — NUTRITION
05/06/21 1009   Assessment   Timepoint Initial  (anorexic pt)   Labs & Meds   Labs/Meds Review Meds reviewed;Lab values reviewed  (5/6/21 Crat 1 58  NA+ 126 meds: remeron, VitD12, colace, folvite, ativan)   Feeding Route   PO Independent   Adequacy of Intake   Nutrition Modality PO   Current Meal Intake 0-25%; Inadequate   Estimated Calorie Intake 0-25%   Estimated Protein Intake  0-25%   Estimated Fluid Intake 0-25%   Nutrition Prognosis   Nutrition Concerns RN skin assessment reviewed; No edema documented   Comorbid Concerns   (per chart review: lethargy, hyponatremia, RENE, sepsis, pyelonephritis, anorexia nervosa restricting type, h/o gastric bypass 2007 and revision 2015, h/o laxative abuse )   Nutrition Precautions & Barriers to Adequate Nutrition   (PMH: anorexia nervosa restricting type, generalized weakness, gastric bypass 2007 w/ revision 2015, PTSD, ambulatory dysfunction, extensive Nuerology w/u negative on last admit and s/p plasmaphoresis for possible autoimmune cause)   Nutrition Considerations Pt/Parents not appropriate for educ  at this time   PES Statement   Problem Intake   Energy Balance (1) Inadequate energy intake NI-1 2   Related to Disordered eating   As evidenced by: Intake < estimated needs   Patient Nutrition Goals   Goal Increase kcal/PRO intake   Goal Status initiated   Timeframe to complete goal by next f/u   Recommendations/Interventions   Summary Pt known to us from extensive stay 4/6-4/26/21, pt with h/o anorexia nervosa restricting type and gastric bypass 2007 with revision 2015  Pt admitted with NA+ 120  Reports improved eating at NH, denies supplement use there  Can not give me any specifics of what she is eating  Pt has lost 18 lbs in 1 month and 37 lbs in 6 months  Pt remains malnourished  Extremely difficult situation given pt is not independent of ADL's and therefore can not go to inpatient eating disorder clinic   TF option complicated by feasiblity of even using a dobhoff given gastric bypass and revision with unclear anatomy  Pt will contract today to try Ensure Clears TID  Malnutrition/BMI Present Yes   Adult Malnutrition type Chronic illness   Adult Degree of Malnutrition Other severe protein calorie malnutrition   Malnutrition Characteristics Inadequate energy;Weight loss  (Pt presents with severe protein calorie malnutrition as evidenced by 18# wt loss in 1 month, 37# wt loss in 6 months (11/17/20 195#, 4/7/21 176#, 5/6/21 158#), <75% po intake >1 month and h/o anorexia nervosa restricting type )   Interventions Diet: continued as ordered; Supplement initiate   Nutrition Recommendations Continue diet as ordered   Nutrition Complexity Risk   Nutrition complexity level High risk   Follow up date 05/10/21  (po intake, supplement intake)

## 2021-05-06 NOTE — PROGRESS NOTES
Vancomycin IV Pharmacy-to-Dose Consultation    Dee Mott is a 47 y o  female who is currently receiving Vancomycin IV with management by the Pharmacy Consult service  Assessment/Plan:  The patient was reviewed  Renal function is stable and no signs or symptoms of nephrotoxicity and/or infusion reactions were documented in the chart  Based on todays assessment, continue current vancomycin (day # 1) dosing of 750 mg IV q12h, with a plan for trough to be drawn at 1130 on 5/8/2021  We will continue to follow the patients culture results and clinical progress daily      Patti Bridges, Pharmacist

## 2021-05-06 NOTE — ASSESSMENT & PLAN NOTE
· Patient was at [facility] secondary to deconditioning from nutritional deficiency associated with anorexia nervosa  · During admission in 4/5 to 4/26 patient was evaluated by Psychiatry who felt that patient did not qualify for inpatient psych at that time

## 2021-05-06 NOTE — TELEPHONE ENCOUNTER
Patient is a 30-year-old female was [facility] for sepsis and hyponatremia  Patient currently experiencing urinary retention at this time  Patient had over 1 5 L of urine in her urinary bladder  She required Zhong catheter for 2 weeks  She will be discharged with Zhong catheter  Should she go to rehab will provide instructions  She is discharge please schedule a follow-up appointment for voiding trial in the office        Thank you

## 2021-05-06 NOTE — ASSESSMENT & PLAN NOTE
· Creatinine at 1 79 on admission   · Cr on 5/3 was 0 6   · Avoid nephrotoxic drugs   · Avoid hypotension  · Suspect that this is prerenal and postrenal in the setting of dehydration from anorexia nervosa and retention   · Pt with bladder scan of 850CC on arrival to ED - successful straight cath  · Urinary retention protocol  · I/Os

## 2021-05-06 NOTE — MALNUTRITION/BMI
This medical record reflects one or more clinical indicators suggestive of malnutrition and/or morbid obesity  Malnutrition Findings:   Adult Malnutrition type: Chronic illness  Adult Degree of Malnutrition: Other severe protein calorie malnutrition  Malnutrition Characteristics: Inadequate energy, Weight loss(Pt presents with severe protein calorie malnutrition as evidenced by 18# wt loss in 1 month, 37# wt loss in 6 months (11/17/20 195#, 4/7/21 176#, 5/6/21 158#), <75% po intake >1 month and h/o anorexia nervosa restricting type )   Treat with Regular diet and Ensure Clears TID  BMI Findings: Body mass index is 25 41 kg/m²  See Nutrition note dated 05/06/21 for additional details  Completed nutrition assessment is viewable in the nutrition documentation

## 2021-05-06 NOTE — H&P
Jair Jensen 90 1980, 47 y o  female MRN: [id number]  Unit/Bed#: ED 10 Encounter: [id number]  Primary Care Provider: Sb Powell MD   Date and time admitted to hospital: 5/5/2021 11:15 PM    * Hyponatremia  Assessment & Plan  · Sodium 121 on admission  · Chloride at 87  · Na on 5/3 was 132 per SNF labs   · Received 700 cc bolus of normal saline by [facility] prior to EMS arrival  · Recheck Na at 123 after receiving approximately 250CC NS   · Will start NS at 75mL/hour  · BMP q4h   · Neurochecks q4h  · Goal correction of 8-10mEq in the next 24 hours   · Suspect that this is secondary to decreased PO intake in the setting of patient's anorexia nervosa  · Pt admitted to [place] [id number] due to severity of hyponatremia     Severe sepsis ([facility] [address] )  Assessment & Plan  · SIRS criteria met on admission:  Leukocytosis at 14 5 K, tachycardia at 120 bpm, and febrile at 101°  · Suspected source is pneumonia vs acute pyelonephritis  · Severe sepsis criteria met with an INR >1 5 at 1 59   · Patient started on vancomycin and levofloxacin in the ED - will change to aztreonam and vanc  · COVID PCR negative   · CXR: "Mild left basilar opacity suggestive of atelectasis or a small developing infiltrate "  · UA consistent with infection   · Blood cultures x2, pending  · urine culture, pending   · Procalcitonin, pending  · BP stable     Altered mental status  Assessment & Plan  · Patient lethargic but easily arousable and able to converse on admission - was more lethargic on arrival to ED   · Patient did receive Ambien prior to EMS arrival  · Etiology: Hyponatremia vs sepsis vs Ambien intake (more likely Ambien intake with improvement in mental status)     Pneumonia  Assessment & Plan  · CXR: "Mild left basilar opacity suggestive of atelectasis or a small developing infiltrate " Also noted to have significant gaseous distention on imaging   · Denies cough or dyspnea     · Lungs CTA on admission   · Will continue on vanc and aztreonam for now     Pyelonephritis  Assessment & Plan  · UA consistent with infection:  30-50 wbc's and innumerable bacteria   · Fever and tachycardiac on arrival   · Patient started on levofloxacin and vancomycin in the ED due to anaphylactic reaction history to penicillin  · Urine culture, pending   · Prior urine culture from 04/10 grew E coli susceptible to Bactrim, tobramycin, tetracycline, nitrofurantoin and, gentamicin, cefazolin, aztreonam, and ampicillin  Resistant to Cipro and levofloxacin    · Cystitis during 4/10-4/26 admission was treated with aztreonam x3 days and then bactrim x1 day   · Will change pt to vanc and aztreonam - adjust per u/c     RENE (acute kidney injury) ([facility] [address] )  Assessment & Plan  · Creatinine at 1 79 on admission   · Cr on 5/3 was 0 6   · Avoid nephrotoxic drugs   · Avoid hypotension  · Suspect that this is prerenal and postrenal in the setting of dehydration from anorexia nervosa and retention   · Pt with bladder scan of 850CC on arrival to ED - successful straight cath  · Urinary retention protocol  · I/Os     Urinary retention  Assessment & Plan  · Pt required straight cath x2   · Urinary retention protocol   · If 3rd straight cath needed - will place Zhong catheter     Anemia  Assessment & Plan  · Hemoglobin at 9 7 on admission   · Baseline hemoglobin is between 8-10  · Suspect that this is secondary to nutritional deficiencies  · Repeat CBC     Anorexia nervosa, restricting type  Assessment & Plan  · Patient was at [facility] secondary to deconditioning from nutritional deficiency associated with anorexia nervosa  · During admission in 4/5 to 4/26 patient was evaluated by Psychiatry who felt that patient did not qualify for inpatient psych at that time     Generalized weakness  Assessment & Plan  · Recent admission 4/5 to 4/26 due to generalized weakness  · Patient was seen by Neurology with extensive workup including LP, MRI brain, C-spine, and L-spine that showed no meningitis or structural abnormalities that would account for patient's weakness  · patient did receive plasmapheresis due to possible concern for autoimmune neuropathy  · On discharge, it was felt by Neurology that the patient's complaints were due to nutritional deficiency due to anorexia nervosa  · Patient was denied acute rehab placement by her insurance, so she was sent to long-term care at [facility] on 04/26    Hypothyroidism  Assessment & Plan  · Home regimen: Synthroid 50 mcg daily   · will continue    Moderate episode of recurrent major depressive disorder (HCC)  Assessment & Plan  · Patient's medications were adjusted during prior admission   · Current regimen:  Remeron 15 mg HS, Effexor 37 5 mg daily, prazosin 2mg HS (for nightmares), and Ambien 10 mg HS  · will hold Ambien in the setting of altered mental status    VTE Prophylaxis: Enoxaparin (Lovenox)  / sequential compression device   Code Status:  Level 1 full code  POLST: POLST form is not discussed and not completed at this time  Discussion with family:  Family was not updated     Anticipated Length of Stay:  Patient will be admitted on an Inpatient basis with an anticipated length of stay of  > 2 midnights  Justification for Hospital Stay: severe sepsis, hyponatremia, pyelonephritis, PNA    Total Time for Visit, including Counseling / Coordination of Care: 60 minutes  Greater than 50% of this total time spent on direct patient counseling and coordination of care  Chief Complaint:   "I was told to come in"    History of Present Illness:    Earl Salcido is a 47 y o  female with past medical history of anorexia nervosa, generalized weakness, hypothyroidism, and MDD who presents from [facility] for abnormal lab value with sodium of 120 collected the morning of 5/5  On arrival to the ED, patient reportedly was more lethargic and only responded to basic commands    She received Ambien prior to leaving [facility]  On admission, patient sleeping but easily arousable and conversational   She does admit to some left flank pain  No other acute symptoms  Reported fever this AM of 102  Review of Systems:    Review of Systems   Constitutional: Positive for fever  Negative for chills  HENT: Negative for congestion and sore throat  Eyes: Negative for visual disturbance  Respiratory: Negative for cough and shortness of breath  Cardiovascular: Negative for chest pain, palpitations and leg swelling  Gastrointestinal: Negative for abdominal pain, constipation, diarrhea, nausea and vomiting  Genitourinary: Negative for decreased urine volume, difficulty urinating and dysuria  Musculoskeletal: Negative for gait problem  Neurological: Positive for weakness (Generalized)  Psychiatric/Behavioral: Positive for confusion  All other systems reviewed and are negative  Past Medical and Surgical History:     Past Medical History:   Diagnosis Date    Anemia     Anxiety     Hypothyroidism     Psychiatric disorder     depression anxiety    Seizures ([address] )     Vitamin D deficiency        Past Surgical History:   Procedure Laterality Date    GASTRIC BYPASS      IR PICC PLACEMENT SINGLE LUMEN  4/16/2021    IR TEMPORARY DIALYSIS CATHETER PLACEMENT  4/8/2021       Meds/Allergies:    Prior to Admission medications    Medication Sig Start Date End Date Taking?  Authorizing Provider   AquaLance Lancets 30G MISC USE TO TEST BLOOD GLUCOSE FOUR TIMES A DAY 10/29/20   Historical Provider, MD   Continuous Blood Gluc  (FREESTYLE CAITY 14 DAY READER) DINESH 1 Device by Does not apply route 4 (four) times a day (before meals and at bedtime)  Patient not taking: Reported on 11/17/2020 5/7/19   SMITHA Grant   Continuous Blood Gluc Sensor (FREESTYLE CAITY 14 DAY SENSOR) MISC 1 application by Does not apply route every 14 (fourteen) days  Patient not taking: Reported on 11/12/2019 5/7/19 SMITHA Yadav   cyanocobalamin 1,000 mcg/mL inject 1 milliliter ( 1000 MCG ) intramuscularly Every Month 10/18/20   Historical Provider, MD   cyclobenzaprine (FLEXERIL) 10 mg tablet Take 10 mg by mouth 3 (three) times a day as needed for muscle spasms    Historical Provider, MD   docusate sodium (COLACE) 100 mg capsule Take 100 mg by mouth 2 (two) times a day    Historical Provider, MD   ergocalciferol (VITAMIN D2) 50,000 units Take 1 capsule (50,000 Units total) by mouth 2 (two) times a week 4/26/21   Jaime Martínez PA-C   folic acid (FOLVITE) 1 mg tablet Take by mouth daily    Historical Provider, MD   gabapentin (NEURONTIN) 100 mg capsule Take 1 capsule (100 mg total) by mouth 3 (three) times a day as needed (leg pain) 4/26/21   Jaime Martínez PA-C   gabapentin (NEURONTIN) 300 mg capsule Take 1 capsule (300 mg total) by mouth daily at bedtime 4/26/21   Jaime Martínez PA-C   levETIRAcetam (KEPPRA) 500 mg tablet Take 1 tablet (500 mg total) by mouth every 12 (twelve) hours 4/26/21   Jaime Martínez PA-C   levothyroxine 50 mcg tablet Take 1 tablet (50 mcg total) by mouth daily in the early morning 4/26/21   Jaime Martínez PA-C   LORazepam (ATIVAN) 0 5 mg tablet Take 1 tablet (0 5 mg total) by mouth every 8 (eight) hours as needed for anxiety for up to 10 days 4/26/21 5/6/21  Jaime Martínez PA-C   mirtazapine (REMERON) 15 mg tablet Take 1 tablet (15 mg total) by mouth daily at bedtime 4/26/21   Jaime Martínez PA-C   polyethylene glycol (MIRALAX) 17 g packet Take 17 g by mouth daily    Historical Provider, MD   prazosin (MINIPRESS) 2 mg capsule Take 1 capsule (2 mg total) by mouth daily at bedtime 4/26/21   Jaime Martínez PA-C   thiamine (VITAMIN B1) 100 mg tablet Take 100 mg by mouth daily    Historical Provider, MD   venlafaxine (EFFEXOR-XR) 75 mg 24 hr capsule  11/6/20   Historical Provider, MD   zolpidem (AMBIEN) 10 mg tablet Take 1 tablet (10 mg total) by mouth daily at bedtime as needed for sleep for up to 10 days 4/26/21 5/6/21  Jocelynn López PA-C     I have reveiwed home medications using records provided by [facility]  Allergies: Allergies   Allergen Reactions    Anti-Hist [Diphenhydramine]     Buspar [Buspirone]     Haldol [Haloperidol]     Penicillins Throat Swelling    Pennsaid [Diclofenac Sodium]     Zoloft [Sertraline]        Social History:     Marital Status: Single   Occupation: unemployed  Patient Pre-hospital Living Situation: lives at [facility]   Patient Pre-hospital Level of Mobility: needs assistance   Patient Pre-hospital Diet Restrictions: none   Substance Use History:   Social History     Substance and Sexual Activity   Alcohol Use Never    Frequency: Never     Social History     Tobacco Use   Smoking Status Current Every Day Smoker   Smokeless Tobacco Never Used     Social History     Substance and Sexual Activity   Drug Use No    Comment: in revovery for benzos       Family History:    Family History   Problem Relation Age of Onset    Hypertension Mother     Heart attack Mother     No Known Problems Father     Lung cancer Maternal Grandmother     Hypothyroidism Maternal Grandmother     Diabetes type II Paternal Grandmother     Diabetes type II Paternal Grandfather        Physical Exam:     Vitals:   Blood Pressure: 94/57 (05/06/21 0400)  Pulse: (!) 116 (05/06/21 0415)  Temperature: (!) 100 7 °F (38 2 °C) (05/06/21 0237)  Temp Source: Oral (05/06/21 0237)  Respirations: 16 (05/06/21 0415)  Height: 5' 4" (162 6 cm) (05/05/21 2320)  Weight - Scale: 69 9 kg (154 lb) (05/05/21 2320)  SpO2: 95 % (05/06/21 0415)    Physical Exam  Vitals signs and nursing note reviewed  Constitutional:       Appearance: Normal appearance  HENT:      Head: Normocephalic  Nose: Nose normal       Mouth/Throat:      Mouth: Mucous membranes are moist    Eyes:      Extraocular Movements: Extraocular movements intact        Conjunctiva/sclera: Conjunctivae normal    Neck:      Musculoskeletal: Normal range of motion  Cardiovascular:      Rate and Rhythm: Regular rhythm  Tachycardia present  Pulses: Normal pulses  Heart sounds: No murmur  Pulmonary:      Effort: Pulmonary effort is normal       Breath sounds: Normal breath sounds  No wheezing, rhonchi or rales  Abdominal:      General: Abdomen is flat  Bowel sounds are normal       Palpations: Abdomen is soft  Tenderness: There is no abdominal tenderness  There is left CVA tenderness  Musculoskeletal: Normal range of motion  Right lower leg: No edema  Left lower leg: No edema  Skin:     General: Skin is warm and dry  Coloration: Skin is not pale  Neurological:      Mental Status: She is alert  Comments: Oriented to personal, place, month, and president  Thought it was 2022  Psychiatric:         Mood and Affect: Mood normal       Comments: Flat affect  Additional Data:     Lab Results: I have personally reviewed pertinent reports  Results from last 7 days   Lab Units 05/05/21  2346   WBC Thousand/uL 14 50*   HEMOGLOBIN g/dL 9 7*   HEMATOCRIT % 28 3*   PLATELETS Thousands/uL 249   NEUTROS PCT % 86*   LYMPHS PCT % 4*   MONOS PCT % 9   EOS PCT % 0     Results from last 7 days   Lab Units 05/06/21  0141 05/05/21  2346   SODIUM mmol/L 123* 121*   POTASSIUM mmol/L 4 0 4 1   CHLORIDE mmol/L 90* 87*   CO2 mmol/L 23 23   BUN mg/dL 21 21   CREATININE mg/dL 1 76* 1 79*   ANION GAP mmol/L 10 11   CALCIUM mg/dL 8 3 8 5   ALBUMIN g/dL  --  2 5*   TOTAL BILIRUBIN mg/dL  --  0 30   ALK PHOS U/L  --  132*   ALT U/L  --  19   AST U/L  --  14   GLUCOSE RANDOM mg/dL 119 105     Results from last 7 days   Lab Units 05/05/21  2346   INR  1 59*     Results from last 7 days   Lab Units 05/05/21  2348   POC GLUCOSE mg/dl 121         Results from last 7 days   Lab Units 05/05/21  2346   LACTIC ACID mmol/L 0 6       Imaging: I have personally reviewed pertinent reports     and I have personally reviewed pertinent films in PACS    XR chest portable   ED Interpretation by Bobby Crandall MD (05/06 0005)   Patchy infiltrates bilaterally      Final Result by Tiffany Ruggiero MD (05/06 0209)      Mild left basilar opacity suggestive of atelectasis or a small developing infiltrate  Follow-up is recommended  Significant gaseous distention of the stomach  Correlate clinically  Workstation performed: [id number]             EKG, Pathology, and Other Studies Reviewed on Admission:   · EKG: Sinus tachycardia at a rate of 119 bpm  No acute signs of ischemia  Allscripts / Epic Records Reviewed: Yes     ** Please Note: This note has been constructed using a voice recognition system   **

## 2021-05-06 NOTE — CONSULTS
Consultation - Nephrology   Earl Salcido 47 y o  female MRN: [id number]  Unit/Bed#: -01 Encounter: [id number]    ASSESSMENT and PLAN:  Acute kidney injury (POA):  Assessment and plan:  · Etiology Suspect secondary to obstructive uropathy with retention in the setting of recent AK I  · After review medical records through [facility] everywhere baseline creatinine 0 60 8  · Status post Zhong catheter placed while in ER  · Urinary output increased  · Status post IV fluids-currently off secondary to rapid rise of sodium  · Not on Ace/Arb/NSAIDs  · Admission creatinine 1 79 on 04/05/2021  · Current creatinine 1 42  · Will continue trend I/O, lab values volume statu   · Avoid NSAIDs, IV contrastor nephrotoxins    Acute Hyponatremia:  Assessment and plan:  · Suspect secondary to volume depletion in the setting of nausea vomiting, decreased appetite with known malnutrition  · May also be concerning for polydipsia in the setting of psychiatric disorder  · Will check urine sodium and urine osmolality  · Patient admitted with sodium of 121 at 2346  · Repeat sodium 123 after 2 hours  · Repeat sodium 126 after 6-1/2 hours  · Repeat sodium 130 in 11 hours  · IV fluids discontinued due to rapid rise in sodium and correction  · Will repeat sodium stat now  · Goal sodium rise 6-8 points within 24 hours  · May require D5W to slow the rate a rise  · Will continue his trend    Blood pressure/hemodynamic  Assessment and Plan:   Not on any antihypertensive medications   Blood pressure relatively on the lower side 90 to low 100s   Maximize hemodynamics to maintain MAP >65   Avoid hypotension or fluctuations in blood pressure   Will continue to trend    H&H/anemia:   In setting of critical illness  Assessment and Plan:   Current hemoglobin 8 6   Per primary team   Transfuse if hemoglobin less than 7 0    Acid-base/electrolytes:  Assessment and Plan:   Acid-base stable at 23 without anion gap   Lactic acid 0  4   Potassium stable at 3 8   Will continue trend    Urinary retention:    Assessment and plan:  · Concern for possible neurogenic bladder  · Zhong catheter in place  · With consider Urology consult for management    Elevated white cell count:  Assessment and plan:  · For primary team  · Concern for poly nephritis  · Urine culture pending  · Previous urine culture from 04/10/2021 revealed E coli and treated with Aztreonam x 3 days  · Also concern for pneumonia with chest x-ray revealing mild left basilar opacity suggestive of atelectasis or small developing infiltrate    Other medical issues:  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:  Evaluated during last hospitalization with neurology  Status post plasma phoresis  Psychiatric disorder with anorexia nervosa/anxiety depression-for primary team    HISTORY OF PRESENT ILLNESS:  Requesting Physician: Carolyn Reyes DO  Reason for Consult:  Hyponatremia    Umesh Fam is a 47 y o  female who has past medical history of recent admission with acute kidney injury secondary to obstructive uropathy and retention; resolved on discharge, psychiatric disorder with anorexia nervosa, malnutrition hypothyroidism, anxiety/depression who presents from [facility] with abnormal blood work revealing sodium 120 and fever of 102 9  A renal consultation is requested, today for hyponatremia  After review medical records patient was recently a nephrology was consulted for acute kidney injury which resolved on discharge with Zhong catheter likely secondary to obstructive uropathy  Patient does not have a history of hypo natremia noted; sodium on previous discharge was 145  On discussion, patient reports she feels that she eats and drinks okay, denies chest pain, shortness of breath, dizziness, lightheadedness, nausea, vomiting, known urinary issues, fevers or chills  She reports Zhong catheter was removed approximately three days ago at the facility    In ER she was found to be retaining urine and Zhong catheter placed           PAST MEDICAL HISTORY:  Past Medical History:   Diagnosis Date    Anemia     Anxiety     Hypothyroidism     Psychiatric disorder     depression anxiety    Seizures ([address] )     Vitamin D deficiency        PAST SURGICAL HISTORY:  Past Surgical History:   Procedure Laterality Date    GASTRIC BYPASS      IR PICC PLACEMENT SINGLE LUMEN  4/16/2021    IR TEMPORARY DIALYSIS CATHETER PLACEMENT  4/8/2021       ALLERGIES:  Allergies   Allergen Reactions    Anti-Hist [Diphenhydramine]     Buspar [Buspirone]     Haldol [Haloperidol]     Penicillins Throat Swelling    Pennsaid [Diclofenac Sodium]     Zoloft [Sertraline]        SOCIAL HISTORY:  Social History     Substance and Sexual Activity   Alcohol Use Never    Frequency: Never     Social History     Substance and Sexual Activity   Drug Use No    Comment: in revovery for benzos     Social History     Tobacco Use   Smoking Status Current Every Day Smoker   Smokeless Tobacco Never Used       FAMILY HISTORY:  Family History   Problem Relation Age of Onset    Hypertension Mother     Heart attack Mother     No Known Problems Father     Lung cancer Maternal Grandmother     Hypothyroidism Maternal Grandmother     Diabetes type II Paternal Grandmother     Diabetes type II Paternal Grandfather        MEDICATIONS:    Current Facility-Administered Medications:     acetaminophen (TYLENOL) tablet 650 mg, 650 mg, Oral, Q6H PRN, Shelby Bhatti PA-C, 650 mg at 05/06/21 1235    aztreonam (AZACTAM) 2,000 mg in sodium chloride 0 9 % 100 mL IVPB, 2,000 mg, Intravenous, Q8H, Elizabeth Fly, DO, Last Rate: 100 mL/hr at 05/06/21 1256, 2,000 mg at 05/06/21 1256    butalbital-acetaminophen-caffeine (FIORICET,ESGIC) -40 mg per tablet 1 tablet, 1 tablet, Oral, Q4H PRN, Cadence Clines, DO, 1 tablet at 05/06/21 1104    cyclobenzaprine (FLEXERIL) tablet 10 mg, 10 mg, Oral, TID PRN, Elizabeth Fly, DO, 10 mg at 05/06/21 1104    docusate sodium (COLACE) capsule 100 mg, 100 mg, Oral, BID, Donel Pu, PA-C, 100 mg at 05/06/21 1037    enoxaparin (LOVENOX) subcutaneous injection 40 mg, 40 mg, Subcutaneous, Daily, Donel Pu, PA-C, 40 mg at 05/06/21 1038    ergocalciferol (VITAMIN D2) capsule 50,000 Units, 50,000 Units, Oral, Once per day on Mon Thu, Donel Pu, PA-C, 50,000 Units at 36/79/43 1859    folic acid (FOLVITE) tablet 1 mg, 1 mg, Oral, Daily, Donel Pu, PA-C, 1 mg at 05/06/21 1037    gabapentin (NEURONTIN) capsule 100 mg, 100 mg, Oral, TID PRN, Donel Pu, PA-C, 100 mg at 05/06/21 1235    gabapentin (NEURONTIN) capsule 300 mg, 300 mg, Oral, HS, Donel Pu, PA-C    levETIRAcetam (KEPPRA) tablet 500 mg, 500 mg, Oral, Q12H [address], Donel Pu, PA-C, 500 mg at 05/06/21 1038    levothyroxine tablet 50 mcg, 50 mcg, Oral, Early Morning, Donel Pu, PA-C, 50 mcg at 05/06/21 0543    LORazepam (ATIVAN) tablet 0 5 mg, 0 5 mg, Oral, Q8H PRN, Donel Pu, PA-C, 0 5 mg at 05/06/21 0620    mirtazapine (REMERON) tablet 15 mg, 15 mg, Oral, HS, Donel Pu, PA-C    ondansetron [facility]) injection 4 mg, 4 mg, Intravenous, Q6H PRN, Donel Pu, PA-C    prazosin (MINIPRESS) capsule 2 mg, 2 mg, Oral, HS, Donel Pu, PA-C    vancomycin (VANCOCIN) IVPB (premix in dextrose) 750 mg 150 mL, 10 mg/kg, Intravenous, Q12H, Donel Pu, PA-C, Stopped at 05/06/21 1230    venlafaxine (EFFEXOR-XR) 24 hr capsule 75 mg, 75 mg, Oral, Daily, Donel Pu, PA-C, 75 mg at 05/06/21 1038    zolpidem (AMBIEN) tablet 5 mg, 5 mg, Oral, HS PRN, Cheikh Sharif, DO      REVIEW OF SYSTEMS:  A complete 10 point review of systems was performed and found to be negative unless otherwise noted below or in the HPI  General: No fevers, chills, weakness  Cardiovascular:  Denies chest pain, palpitations, or leg edema  Respiratory:  Denies cough, sputum production, shortness of breath    Gastrointestinal: Denies nausea, vomiting, abdominal pain, diarrhea or constipation  Genitourinary: No dysuria, burning, hematuria or increased frequency or difficulty with stream     PHYSICAL EXAM:  Current Weight: Weight - Scale: 71 4 kg (157 lb 6 5 oz)  First Weight: Weight - Scale: 69 9 kg (154 lb)  Vitals:    05/06/21 0415 05/06/21 0700 05/06/21 0712 05/06/21 1124   BP:  99/54  91/50   BP Location:  Right arm  Right arm   Pulse: (!) 116 (!) 109  101   Resp: 16 15  15   Temp:   99 6 °F (37 6 °C) 98 2 °F (36 8 °C)   TempSrc:   Oral Oral   SpO2: 95% 96%  98%   Weight:   71 4 kg (157 lb 6 5 oz)    Height:   5' 6" (1 676 m)        Intake/Output Summary (Last 24 hours) at 5/6/2021 1306  Last data filed at 5/6/2021 1256  Gross per 24 hour   Intake 1603 75 ml   Output 4475 ml   Net -2871 25 ml     General: conscious, cooperative, not in acute distress  Skin: no rash, warm and dry  Eyes: pale conjunctivae, anicteric sclerae  ENT:  Fairly dry lips and mucous membranes  Neck: supple, no JVD noted  Chest: clear breath sounds decreased bases without crackles, rhonchi, wheezes  CVS: distinct S1 & S2, normal rate, regular rhythm without rub  Abdomen: soft, non-tender, non-distended, normoactive bowel sounds  :  Zhong catheter patent for pre light yellow urine  Extremities: no edema of both legs noted  Neuro: awake, alert, oriented  Psych:  Flat affect      Invasive Devices:   Urethral Catheter Latex 16 Fr   (Active)   Amt returned on insertion(mL) 1300 mL 05/06/21 1230   Reasons to continue Urinary Catheter  Acute urinary retention/obstruction failing urinary retention protocol 05/06/21 1230   Goal for Removal Will consult urology 05/06/21 1230   Site Assessment Clean;Skin intact 05/06/21 1230   Collection Container Standard drainage bag 05/06/21 1230   Securement Method Securing device (Describe) 05/06/21 1230       Lab Results:   Results from last 7 days   Lab Units 05/06/21  1032 05/06/21  0612 05/06/21  0141 05/05/21  2346   WBC Thousand/uL  --  13 32*  --  14 50*   HEMOGLOBIN g/dL  --  8 6*  --  9 7*   HEMATOCRIT %  --  26 2*  --  28 3*   PLATELETS Thousands/uL  --  198  --  249   SODIUM mmol/L 130* 126* 123* 121*   POTASSIUM mmol/L 3 8 3 9 4 0 4 1   CHLORIDE mmol/L 96* 92* 90* 87*   CO2 mmol/L 23 18* 23 23   BUN mg/dL 20 20 21 21   CREATININE mg/dL 1 42* 1 58* 1 76* 1 79*   CALCIUM mg/dL 8 6 8 4 8 3 8 5   ALK PHOS U/L  --   --   --  132*   ALT U/L  --   --   --  19   AST U/L  --   --   --  14       Other Studies:

## 2021-05-06 NOTE — DISCHARGE INSTRUCTIONS
Bates Cath Care instructions---Maintain bates catheter to straight drainage  May use leg bag and shower  May flush TID prn using Valentine syringe and 120 ml NSS  May use more saline ad cluadia to prevent/treat cath obstruction  Remove catheter 05/20/2021 by 0600 hrs  Bladder scan if no void or less than 200ml in 6hrs  Straight cath for bladder scan >350ml and repeat process  If patient requires straight cath x3 , re-insert bates and call for Urologist follow-up  Information provided in discharge  Bates can remain in place for up to 4 weeks at a time   Bates placed at [facility] on 05/06/2021

## 2021-05-06 NOTE — ASSESSMENT & PLAN NOTE
· Sodium 121 on admission  · Chloride at 87  · Na on 5/3 was 132 per SNF labs   · Received 700 cc bolus of normal saline by [facility] prior to EMS arrival  · Recheck Na at 123 after receiving approximately 250CC NS   · Will start NS at 75mL/hour  · BMP q4h   · Neurochecks q4h  · Goal correction of 8-10mEq in the next 24 hours   · Suspect that this is secondary to decreased PO intake in the setting of patient's anorexia nervosa  · Pt admitted to SD2 due to severity of hyponatremia

## 2021-05-06 NOTE — CASE MANAGEMENT
LOS: 0  Pt is not a documented bundle  Pt is a 30 day readmission  Unplanned readmission score is 27 and yellow  Pt was at [facility] from 4/5 to 4/26  Pt was discharged to [facility] and returned to [facility] ED due to lethargy and fevers  Met with Pt  Pt presents AA&Ox3  Discussed role of , discharge planning  Pt confirms that she has been at [facility] since 4/26  Pt reports she is slowly working with PT/OT at [facility]  Pt reports that her (Latha: [phone]) from [address] [facility] Team has been in contact with her since she has been at [facility]  Pt reports her plan is to return to [facility] upon discharge  Prior to [facility], [address] was at [facility] from 3/19 to 4/5  Prior to [facility], Pt was living alone in 1st floor apartment, 6 steps to enter into building and 2 steps down to her apartment  Pt reports she had multiple mental health hospitalizations, last one being in 2018 or 2019  Per Citlali Frederick in admissions at [facility], Pt is mod to max assist for lower extemities, supervision for bed mobility and max assist for transfers, working on sliding board transfers and was getting into wheelchair  Barndie informed CM that facility requests auth for Pt to return and [facility] will obtain auth  Await PT/OT  CM to follow

## 2021-05-06 NOTE — ED NOTES
Pt arrived with two Subcu IVs infusing NS fluids, received 700ml of 1000ml bag  EMS stated this was present prior to them receiving pt        Maru Hui RN  05/05/21 0886

## 2021-05-06 NOTE — ASSESSMENT & PLAN NOTE
· Pt required straight cath x2   · Urinary retention protocol   · If 3rd straight cath needed - will place Zhong catheter

## 2021-05-06 NOTE — ASSESSMENT & PLAN NOTE
· Recent admission 4/5 to 4/26 due to generalized weakness  · Patient was seen by Neurology with extensive workup including LP, MRI brain, C-spine, and L-spine that showed no meningitis or structural abnormalities that would account for patient's weakness  · patient did receive plasmapheresis due to possible concern for autoimmune neuropathy  · On discharge, it was felt by Neurology that the patient's complaints were due to nutritional deficiency due to anorexia nervosa  · Patient was denied acute rehab placement by her insurance, so she was sent to long-term care at [facility] on 04/26

## 2021-05-06 NOTE — ASSESSMENT & PLAN NOTE
· UA consistent with infection:  30-50 wbc's and innumerable bacteria   · Fever and tachycardiac on arrival   · Patient started on levofloxacin and vancomycin in the ED due to anaphylactic reaction history to penicillin  · Urine culture, pending   · Prior urine culture from 04/10 grew E coli susceptible to Bactrim, tobramycin, tetracycline, nitrofurantoin and, gentamicin, cefazolin, aztreonam, and ampicillin  Resistant to Cipro and levofloxacin    · Cystitis during 4/10-4/26 admission was treated with aztreonam x3 days and then bactrim x1 day   · Will change pt to vanc and aztreonam - adjust per u/c

## 2021-05-06 NOTE — PROGRESS NOTES
Vancomycin Assessment    Yuri Ibrahim is a 47 y o  female who is currently receiving vancomycin 1000mg loading dose followed by 750mg q12 for Pneumonia     Relevant clinical data and objective history reviewed:  Creatinine   Date Value Ref Range Status   05/06/2021 1 76 (H) 0 60 - 1 30 mg/dL Final     Comment:     Standardized to IDMS reference method   05/05/2021 1 79 (H) 0 60 - 1 30 mg/dL Final     Comment:     Standardized to IDMS reference method   04/23/2021 0 56 (L) 0 60 - 1 30 mg/dL Final     Comment:     Standardized to IDMS reference method     BP 91/55 (BP Location: Right arm)   Pulse (!) 115   Temp (!) 100 7 °F (38 2 °C) (Oral)   Resp 14   Ht 5' 4" (1 626 m)   Wt 69 9 kg (154 lb)   LMP  (LMP Unknown)   SpO2 96%   BMI 26 43 kg/m²   No intake/output data recorded  Lab Results   Component Value Date/Time    BUN 21 05/06/2021 01:41 AM    BUN 9 11/13/2020 09:45 AM    WBC 14 50 (H) 05/05/2021 11:46 PM    HGB 9 7 (L) 05/05/2021 11:46 PM    HCT 28 3 (L) 05/05/2021 11:46 PM    MCV 90 05/05/2021 11:46 PM     05/05/2021 11:46 PM     Temp Readings from Last 3 Encounters:   05/06/21 (!) 100 7 °F (38 2 °C) (Oral)   04/26/21 97 6 °F (36 4 °C)   11/17/20 97 6 °F (36 4 °C)     Vancomycin Days of Therapy: 1    Assessment/Plan  The patient is currently on vancomycin utilizing scheduled dosing based on actual body weight  Baseline risks associated with therapy include: pre-existing renal impairment, concomitant nephrotoxic medications, and dehydration  The patient is currently receiving 1000mg loading dose followed by 750mg q12 and is clinically appropriate and dose will be continued  Pharmacy will also follow closely for s/sx of nephrotoxicity, infusion reactions, and appropriateness of therapy  BMP and CBC will be ordered per protocol  Plan for trough as patient approaches steady state, prior to the 5th  dose at approximately 1130 05/05/2021    Due to infection severity, will target a trough of 15-20 (appropriate for most indications)   Pharmacy will continue to follow the patients culture results and clinical progress daily      Rodriguez Harkins, Pharmacist

## 2021-05-06 NOTE — ASSESSMENT & PLAN NOTE
· Patient lethargic but easily arousable and able to converse on admission - was more lethargic on arrival to ED   · Patient did receive Ambien prior to EMS arrival  · Etiology: Hyponatremia vs sepsis vs Ambien intake (more likely Ambien intake with improvement in mental status)

## 2021-05-06 NOTE — QUICK NOTE
Urethral Zhong catheter should remain in place for 2 weeks given RENE and high volume retention of 1 5L  Will place orders for outpatient follow-up for Zhong catheter removal   If patient goes to rehab she may have voiding trial at rehab  Will place these orders in discharge  No additional  surgical intervention needed at this time  Urology will be signing off  We always available for additional questions or concerns regarding this patient      April Gifford PA-C

## 2021-05-06 NOTE — ED PROVIDER NOTES
History  Chief Complaint   Patient presents with    Abnormal Lab     Presents to ED via EMS from [facility]  Per lab work paper pt's Na level is 120, draw time of 11:15 am 2021  Pt reported to have temp of 102 9 this morning, given tylenol then temp of 98 per EMS  Pt has no other symptoms per EMS  Pt was given Ambien pta per EMS from [facility]  48 yo F with PMH of anorexia nervosa, severe malnutrition, anxiety, depression, seizures presents to ED from [facility] with hyponatremia and fever  Pt received ambien prior to arrival, and is a poor historian, lethargic, will arouse to loud voice, and follows basic commands  Denies complaints at this time  Recent hospitalization for RENE, UTI with retention requiring bates  History provided by:  Patient, medical records and EMS personnel  History limited by:  Mental status change   used: No    Fever - 9 weeks to 74 years  Max temp prior to arrival:  102  Temp source:  Unable to specify  Severity:  Unable to specify  Onset quality:  Unable to specify  Timing:  Constant  Progression:  Unchanged  Chronicity:  New  Worsened by:  Nothing  Ineffective treatments:  None tried      Prior to Admission Medications   Prescriptions Last Dose Informant Patient Reported? Taking?    AquaLance Lancets 30G MISC  Self Yes No   Sig: USE TO TEST BLOOD GLUCOSE FOUR TIMES A DAY   Continuous Blood Gluc  (FREESTYLE CAITY 14 DAY READER) DINESH  Self No No   Si Device by Does not apply route 4 (four) times a day (before meals and at bedtime)   Patient not taking: Reported on 2020   Continuous Blood Gluc Sensor (FREESTYLE CAITY 14 DAY SENSOR) MISC  Self No No   Si application by Does not apply route every 14 (fourteen) days   Patient not taking: Reported on 2019   LORazepam (ATIVAN) 0 5 mg tablet   No No   Sig: Take 1 tablet (0 5 mg total) by mouth every 8 (eight) hours as needed for anxiety for up to 10 days cyanocobalamin 1,000 mcg/mL  Self Yes No   Sig: inject 1 milliliter ( 1000 MCG ) intramuscularly Every Month   cyclobenzaprine (FLEXERIL) 10 mg tablet  Self Yes No   Sig: Take 10 mg by mouth 3 (three) times a day as needed for muscle spasms   docusate sodium (COLACE) 100 mg capsule  Self Yes No   Sig: Take 100 mg by mouth 2 (two) times a day   ergocalciferol (VITAMIN D2) 50,000 units   No No   Sig: Take 1 capsule (50,000 Units total) by mouth 2 (two) times a week   folic acid (FOLVITE) 1 mg tablet  Self Yes No   Sig: Take by mouth daily   gabapentin (NEURONTIN) 100 mg capsule   No No   Sig: Take 1 capsule (100 mg total) by mouth 3 (three) times a day as needed (leg pain)   gabapentin (NEURONTIN) 300 mg capsule   No No   Sig: Take 1 capsule (300 mg total) by mouth daily at bedtime   levETIRAcetam (KEPPRA) 500 mg tablet   No No   Sig: Take 1 tablet (500 mg total) by mouth every 12 (twelve) hours   levothyroxine 50 mcg tablet   No No   Sig: Take 1 tablet (50 mcg total) by mouth daily in the early morning   mirtazapine (REMERON) 15 mg tablet   No No   Sig: Take 1 tablet (15 mg total) by mouth daily at bedtime   polyethylene glycol (MIRALAX) 17 g packet  Self Yes No   Sig: Take 17 g by mouth daily   prazosin (MINIPRESS) 2 mg capsule   No No   Sig: Take 1 capsule (2 mg total) by mouth daily at bedtime   thiamine (VITAMIN B1) 100 mg tablet  Self Yes No   Sig: Take 100 mg by mouth daily   venlafaxine (EFFEXOR-XR) 75 mg 24 hr capsule  Self Yes No   zolpidem (AMBIEN) 10 mg tablet   No No   Sig: Take 1 tablet (10 mg total) by mouth daily at bedtime as needed for sleep for up to 10 days      Facility-Administered Medications: None       Past Medical History:   Diagnosis Date    Anemia     Anxiety     Hypothyroidism     Psychiatric disorder     depression anxiety    Seizures (HCC)     Vitamin D deficiency        Past Surgical History:   Procedure Laterality Date    GASTRIC BYPASS      IR PICC PLACEMENT SINGLE LUMEN 4/16/2021    IR TEMPORARY DIALYSIS CATHETER PLACEMENT  4/8/2021       Family History   Problem Relation Age of Onset    Hypertension Mother     Heart attack Mother     No Known Problems Father     Lung cancer Maternal Grandmother     Hypothyroidism Maternal Grandmother     Diabetes type II Paternal Grandmother     Diabetes type II Paternal Grandfather      I have reviewed and agree with the history as documented  E-Cigarette/Vaping    E-Cigarette Use Never User      E-Cigarette/Vaping Substances     Social History     Tobacco Use    Smoking status: Current Every Day Smoker    Smokeless tobacco: Never Used   Substance Use Topics    Alcohol use: Never     Frequency: Never    Drug use: No     Comment: in revovery for benzos       Review of Systems   Unable to perform ROS: Mental status change   Constitutional: Positive for fever  Physical Exam  Physical Exam  Vitals signs and nursing note reviewed  Constitutional:       General: She is not in acute distress  Appearance: She is well-developed  She is obese  She is not diaphoretic  HENT:      Head: Normocephalic and atraumatic  Right Ear: External ear normal       Left Ear: External ear normal       Nose: Nose normal    Eyes:      General: No scleral icterus  Right eye: No discharge  Left eye: No discharge  Conjunctiva/sclera: Conjunctivae normal       Pupils: Pupils are equal, round, and reactive to light  Neck:      Musculoskeletal: Normal range of motion and neck supple  Trachea: No tracheal deviation  Cardiovascular:      Rate and Rhythm: Normal rate and regular rhythm  Heart sounds: Normal heart sounds  No murmur  No friction rub  No gallop  Pulmonary:      Effort: Pulmonary effort is normal  No respiratory distress  Breath sounds: Normal breath sounds  No stridor  Chest:      Chest wall: No tenderness  Abdominal:      General: Bowel sounds are normal       Palpations: Abdomen is soft  Tenderness: There is no abdominal tenderness  There is no guarding or rebound  Musculoskeletal: Normal range of motion  General: No deformity  Lymphadenopathy:      Cervical: No cervical adenopathy  Skin:     General: Skin is warm and dry  Findings: No rash  Neurological:      General: No focal deficit present  Mental Status: She is lethargic  GCS: GCS eye subscore is 3  GCS verbal subscore is 4  GCS motor subscore is 6  Cranial Nerves: No cranial nerve deficit  Sensory: No sensory deficit        Coordination: Coordination normal       Comments: Global 4/5 strength   Psychiatric:         Behavior: Behavior normal          Vital Signs  ED Triage Vitals   Temperature Pulse Respirations Blood Pressure SpO2   05/05/21 2320 05/05/21 2320 05/05/21 2320 05/05/21 2320 05/05/21 2320   (!) 101 °F (38 3 °C) (!) 120 16 115/65 96 %      Temp Source Heart Rate Source Patient Position - Orthostatic VS BP Location FiO2 (%)   05/05/21 2320 05/05/21 2320 05/05/21 2320 05/05/21 2320 --   Oral Monitor Lying Left arm       Pain Score       05/06/21 0000       No Pain           Vitals:    05/05/21 2320 05/06/21 0000 05/06/21 0030 05/06/21 0100   BP: 115/65  102/61 99/61   Pulse: (!) 120 (!) 119 (!) 116 (!) 117   Patient Position - Orthostatic VS: Lying  Lying Lying         Visual Acuity  Visual Acuity      Most Recent Value   L Pupil Size (mm)  3   R Pupil Size (mm)  3   L Pupil Shape  Round   R Pupil Shape  Round          ED Medications  Medications   sodium chloride 0 9 % infusion (125 mL/hr Intravenous New Bag 5/5/21 2349)   vancomycin (VANCOCIN) IVPB (premix in dextrose) 1,000 mg 200 mL (1,000 mg Intravenous New Bag 5/6/21 0032)   levofloxacin (LEVAQUIN) IVPB (premix in dextrose) 750 mg 150 mL (has no administration in time range)   acetaminophen (TYLENOL) rectal suppository 650 mg (has no administration in time range)       Diagnostic Studies  Results Reviewed     Procedure Component Value Units Date/Time    Basic metabolic panel [[id number]]     Lab Status: No result Specimen: Blood     Novel Coronavirus (Covid-19),PCR [facility] - 2 Hour Stat [[id number]]  (Normal) Collected: 05/05/21 2346    Lab Status: Final result Specimen: Nares from Nasopharyngeal Swab Updated: 05/06/21 0109     SARS-CoV-2 Negative    Narrative: The specimen collection materials, transport medium, and/or testing methodology utilized in the production of these test results have been proven to be reliable in a limited validation with an abbreviated program under the Emergency Utilization Authorization provided by the FDA  Testing reported as "Presumptive positive" will be confirmed with secondary testing to ensure result accuracy  Clinical caution and judgement should be used with the interpretation of these results with consideration of the clinical impression and other laboratory testing  Testing reported as "Positive" or "Negative" has been proven to be accurate according to standard laboratory validation requirements  All testing is performed with control materials showing appropriate reactivity at standard intervals  Urine Microscopic [[id number]]  (Abnormal) Collected: 05/06/21 0011    Lab Status: Final result Specimen: Urine, Straight Cath Updated: 05/06/21 0101     RBC, UA 0-1 /hpf      WBC, UA 30-50 /hpf      Epithelial Cells Occasional /hpf      Bacteria, UA Innumerable /hpf      MUCUS THREADS None Seen    Urine culture [[id number]] Collected: 05/06/21 0011    Lab Status:  In process Specimen: Urine, Straight Cath Updated: 05/06/21 0100    Comprehensive metabolic panel [[id number]]  (Abnormal) Collected: 05/05/21 2346    Lab Status: Final result Specimen: Blood from Arm, Left Updated: 05/06/21 0058     Sodium 121 mmol/L      Potassium 4 1 mmol/L      Chloride 87 mmol/L      CO2 23 mmol/L      ANION GAP 11 mmol/L      BUN 21 mg/dL      Creatinine 1 79 mg/dL      Glucose 105 mg/dL      Calcium 8 5 mg/dL Corrected Calcium 9 7 mg/dL      AST 14 U/L      ALT 19 U/L      Alkaline Phosphatase 132 U/L      Total Protein 5 7 g/dL      Albumin 2 5 g/dL      Total Bilirubin 0 30 mg/dL      eGFR 35 ml/min/1 73sq m     Narrative:      [place] guidelines for Chronic Kidney Disease (CKD):     Stage 1 with normal or high GFR (GFR > 90 mL/min/1 73 square meters)    Stage 2 Mild CKD (GFR = 60-89 mL/min/1 73 square meters)    Stage 3A Moderate CKD (GFR = 45-59 mL/min/1 73 square meters)    Stage 3B Moderate CKD (GFR = 30-44 mL/min/1 73 square meters)    Stage 4 Severe CKD (GFR = 15-29 mL/min/1 73 square meters)    Stage 5 End Stage CKD (GFR <15 mL/min/1 73 square meters)  Note: GFR calculation is accurate only with a steady state creatinine    Protime-INR [[id number]]  (Abnormal) Collected: 05/05/21 2346    Lab Status: Final result Specimen: Blood from Arm, Left Updated: 05/06/21 0058     Protime 18 9 seconds      INR 1 59    APTT [[id number]]  (Abnormal) Collected: 05/05/21 2346    Lab Status: Final result Specimen: Blood from Arm, Left Updated: 05/06/21 0058     PTT 41 seconds     UA w Reflex to Microscopic w Reflex to Culture [[id number]]  (Abnormal) Collected: 05/06/21 0011    Lab Status: Final result Specimen: Urine, Straight Cath Updated: 05/06/21 0040     Color, UA Yellow     Clarity, UA Cloudy     Specific Gravity, UA <=1 005     pH, UA 5 5     Leukocytes, UA Large     Nitrite, UA Negative     Protein, UA Negative mg/dl      Glucose, UA Negative mg/dl      Ketones, UA Negative mg/dl      Urobilinogen, UA 0 2 E U /dl      Bilirubin, UA Negative     Blood, UA Trace-lysed    Lactic acid [[id number]]  (Normal) Collected: 05/05/21 2346    Lab Status: Final result Specimen: Blood from Arm, Left Updated: 05/06/21 0038     LACTIC ACID 0 6 mmol/L     Narrative:      Result may be elevated if tourniquet was used during collection      Troponin I [[id number]]  (Normal) Collected: 05/05/21 2346    Lab Status: Final result Specimen: Blood from Arm, Left Updated: 05/06/21 0037     Troponin I <0 02 ng/mL     CBC and differential [[id number]]  (Abnormal) Collected: 05/05/21 2346    Lab Status: Final result Specimen: Blood from Arm, Left Updated: 05/06/21 0009     WBC 14 50 Thousand/uL      RBC 3 14 Million/uL      Hemoglobin 9 7 g/dL      Hematocrit 28 3 %      MCV 90 fL      MCH 30 9 pg      MCHC 34 3 g/dL      RDW 14 6 %      MPV 11 6 fL      Platelets 092 Thousands/uL      nRBC 0 /100 WBCs      Neutrophils Relative 86 %      Immat GRANS % 1 %      Lymphocytes Relative 4 %      Monocytes Relative 9 %      Eosinophils Relative 0 %      Basophils Relative 0 %      Neutrophils Absolute 12 53 Thousands/µL      Immature Grans Absolute 0 08 Thousand/uL      Lymphocytes Absolute 0 56 Thousands/µL      Monocytes Absolute 1 31 Thousand/µL      Eosinophils Absolute 0 00 Thousand/µL      Basophils Absolute 0 02 Thousands/µL     Blood culture #1 [[id number]] Collected: 05/05/21 2346    Lab Status: In process Specimen: Blood from Arm, Left Updated: 05/06/21 0005    Blood culture #2 [[id number]] Collected: 05/05/21 2346    Lab Status: In process Specimen: Blood from Arm, Right Updated: 05/06/21 0005    Procalcitonin with AM Reflex [[id number]] Collected: 05/05/21 2346    Lab Status:  In process Specimen: Blood from Arm, Left Updated: 05/06/21 0005    Fingerstick Glucose (POCT) [[id number]]  (Normal) Collected: 05/05/21 2348    Lab Status: Final result Updated: 05/05/21 2356     POC Glucose 121 mg/dl                  XR chest portable   ED Interpretation by Liliam Jiang MD (05/06 0005)   Patchy infiltrates bilaterally                 Procedures  ECG 12 Lead Documentation Only    Date/Time: 5/5/2021 11:32 PM  Performed by: Liliam Jiang MD  Authorized by: Liliam Jiang MD     Indications / Diagnosis:  Sepsis  ECG reviewed by me, the ED Provider: yes    Patient location:  ED  Previous ECG:     Previous ECG:  Compared to current    Comparison ECG info:  Now sinus tachy otherwise no change    Similarity:  Changes noted    Comparison to cardiac monitor: Yes    Interpretation:     Interpretation: non-specific    Rate:     ECG rate:  119    ECG rate assessment: tachycardic    Rhythm:     Rhythm: sinus tachycardia    Ectopy:     Ectopy: none    QRS:     QRS axis:  Normal    QRS intervals:  Normal  Conduction:     Conduction: normal    ST segments:     ST segments:  Normal  T waves:     T waves: normal               ED Course  ED Course as of May 06 0130   Wed May 05, 2021   2335 Pt meets SIRS criteria, currently refusing IV      Thu May 06, 2021   [address] [facility] Will admit  Pt with hypoNa, RENE, UTI and AMS, suspect at least partly due to [place] given just prior to arrival  No focal deficits  Abx started  Initial Sepsis Screening     Row Name 05/06/21 0003                Is the patient's history suggestive of a new or worsening infection? (!) Yes (Proceed)  -SM        Suspected source of infection  suspect infection, source unknown  -SM        Are two or more of the following signs & symptoms of infection both present and new to the patient?  --        Indicate SIRS criteria  Hyperthemia > 38 3C (100 9F); Tachycardia > 90 bpm;Altered mental status  -SM        If the answer is yes to both questions, suspicion of sepsis is present  --        If severe sepsis is present AND tissue hypoperfusion perists in the hour after fluid resuscitation or lactate > 4, the patient meets criteria for SEPTIC SHOCK  --        Are any of the following organ dysfunction criteria present within 6 hours of suspected infection and SIRS criteria that are NOT considered to be chronic conditions?   --        Organ dysfunction  --        Date of presentation of severe sepsis  --        Time of presentation of severe sepsis  --        Tissue hypoperfusion persists in the hour after crystalloid fluid administration, evidenced, by either:  --        Was hypotension present within one hour of the conclusion of crystalloid fluid administration?  --        Date of presentation of septic shock  --        Time of presentation of septic shock  --          User Key  (r) = Recorded By, (t) = Taken By, (c) = Cosigned By    [address] Name Provider Lisbeth Crabtree MD Physician          SBIRT 20yo+      Most Recent Value   SBIRT (23 yo +)   In order to provide better care to our patients, we are screening all of our patients for alcohol and drug use  Would it be okay to ask you these screening questions?   No Filed at: 05/05/2021 2326                    MDM  Number of Diagnoses or Management Options  Acute kidney injury [facility]): new and requires workup  AMS (altered mental status): new and requires workup  Hyponatremia: new and requires workup  UTI (urinary tract infection): new and requires workup     Amount and/or Complexity of Data Reviewed  Clinical lab tests: ordered and reviewed  Tests in the radiology section of CPT®: reviewed and ordered  Tests in the medicine section of CPT®: reviewed and ordered  Review and summarize past medical records: yes  Discuss the patient with other providers: yes  Independent visualization of images, tracings, or specimens: yes    Risk of Complications, Morbidity, and/or Mortality  Presenting problems: moderate  Diagnostic procedures: low  Management options: moderate    Patient Progress  Patient progress: stable      Disposition  Final diagnoses:   Hyponatremia   Acute kidney injury ([address] )   UTI (urinary tract infection)   AMS (altered mental status)     Time reflects when diagnosis was documented in both MDM as applicable and the Disposition within this note     Time User Action Codes Description Comment    5/6/2021  1:02 AM Gilma Gemma S Add [E87 1] Hyponatremia     5/6/2021  1:02 AM Gilma Gemma S Add [N17 9] Acute kidney injury ([address] )     5/6/2021  1:02 AM Gilma Gemma S Add [N39 0] UTI (urinary tract infection)     5/6/2021  1:02 AM Homa Chaudhry Add [R41 82] AMS (altered mental status)       ED Disposition     ED Disposition Condition Date/Time Comment    Admit Stable Thu May 6, 2021  1:02 AM Case was discussed with Pernell Langston and the patient's admission status was agreed to be Admission Status: inpatient status to the service of Dr Maria Del Rosario Prakash    None         Patient's Medications   Discharge Prescriptions    No medications on file     No discharge procedures on file      PDMP Review       Value Time User    PDMP Reviewed  Yes 4/16/2021  9:52 AM Abdirahman Campbell, [address] [place] [address]          ED Provider  Electronically Signed by           Deon Leonardo MD  05/06/21 8962

## 2021-05-06 NOTE — ASSESSMENT & PLAN NOTE
· Hemoglobin at 9 7 on admission   · Baseline hemoglobin is between 8-10  · Suspect that this is secondary to nutritional deficiencies  · Repeat CBC

## 2021-05-06 NOTE — ASSESSMENT & PLAN NOTE
· Patient's medications were adjusted during prior admission   · Current regimen:  Remeron 15 mg HS, Effexor 37 5 mg daily, prazosin 2mg HS (for nightmares), and Ambien 10 mg HS  · will hold Ambien in the setting of altered mental status

## 2021-05-06 NOTE — APP STUDENT NOTE
WILLY STUDENT  Inpatient Progress Note for TRAINING ONLY  Not Part of Legal Medical Record       Progress Note - Nichelle Platt 47 y o  female MRN: [id number]    Unit/Bed#: -01 Encounter: [id number]    Past 24 hours:       Assessment:    1  Hyponatremia  2  Severe Sepsis   3  Altered Mental Status  4  PNA   5  Pyelonephritis   6  RENE    7  Urinary Rentention   8  Anemia  9  Anorexia  10  Weakness  11  Hypothyroidism   12  MDD      Plan:    Neurological:   Altered mental status  · Patient lethargic but easily arousable and able to converse on admission - was more lethargic on arrival to ED   · Patient did receive Ambien prior to EMS arrival  · Etiology: Hyponatremia vs sepsis vs Ambien intake  · Lethargic on exam   · Received 0 5mg Ativan PO in early AM  · Consider holding Benzos in the setting of decreased LOC     Moderate episode of recurrent major depressive disorder:  · Patient's medications were adjusted during prior admission   · Current regimen:  Remeron 15 mg HS, Effexor 37 5 mg daily, prazosin 2mg HS (for nightmares), and Ambien 10 mg HS  · will hold Ambien in the setting of altered mental status    Anorexia nervosa, restricting type  · Patient was at [facility] secondary to deconditioning from nutritional deficiency associated with anorexia nervosa  · During admission in 4/5 to 4/26 patient was evaluated by Psychiatry who felt that patient did not qualify for inpatient psych at that time      Respiratory:   Pneumonia  · CXR: "Mild left basilar opacity suggestive of atelectasis or a small developing infiltrate " Also noted to have significant gaseous distention on imaging   · Procalcitonin: 3 28   · Trend Daily  · LA - 0 4   · WBC downtrending from 14 5 to 13 3  · MRSA cx pending  · If negative descalate Vanco    · Will continue on vanc and aztreonam for now      Cardiology:     Anemia  · Hemoglobin at 9 7 on admission   · Baseline hemoglobin is between 8-10  · Remains at baseline  · Suspect that this is secondary to nutritional deficiencies  · Trend CBC daily     Renal:   Hyponatremia  · Sodium 121 on admission > improved to 130 this AM and Chloride: 96  · Chloride at 87  · Na on 5/3 was 132 per SNF labs  · Received 700 cc bolus of normal saline by [facility] prior to EMS arrival  · Recheck Na at 123 after receiving approximately 250CC NS   · NSS at 75mL/hour  · BMP q4h   · Neurochecks q4h  · Goal correction of 8-10mEq in the next 24 hours   · Suspect that this is secondary to decreased PO intake in the setting of patient's anorexia nervosa  · Pt admitted to SD2 due to severity of hyponatremia      Pyelonephritis  · UA consistent with infection:  30-50 wbc's and innumerable bacteria   · Fever and tachycardiac on arrival   · Patient started on levofloxacin and vancomycin in the ED due to anaphylactic reaction history to penicillin  · Urine culture, pending   · Prior urine culture from 04/10 grew E coli susceptible to Bactrim, tobramycin, tetracycline, nitrofurantoin and, gentamicin, cefazolin, aztreonam, and ampicillin  ·  Resistant to Cipro and levofloxacin    · Cystitis during 4/10-4/26 admission was treated with aztreonam x3 days and then bactrim x1 day   · Will change pt to vanc and aztreonam - adjust per u/c   · Pending culture   · Straight Cath x 3 due to retention   · Consider Renal ultrasound and CTA for obstruction concern   · Insert bates if needed for retention       RENE (acute kidney injury):  · Creatinine at 1 79 on admission   · Cr on 5/3 was 0 6   · Avoid nephrotoxic drugs   · Avoid hypotension  · Suspect that this is prerenal and postrenal in the setting of dehydration from anorexia nervosa and retention   · Pt with bladder scan of 850CC on arrival to ED - successful straight cath  · Urinary retention protocol  · I/Os      Urinary retention:   · Given retention and concern for pyelo - Renal bladder ultrasound and CT Abdomen for concern of obstruction   · Pt required straight cath x2   · Urinary retention protocol   · If 3rd straight cath needed - will place Zhong catheter      Infectious Disease:    Severe sepsis:  · SIRS criteria met on admission:  Leukocytosis at 14 5 K, tachycardia at 120 bpm, and febrile at 101°  · Suspected source is pneumonia vs acute pyelonephritis  · Severe sepsis criteria met with an INR >1 5 at 1 59   · Patient started on vancomycin and levofloxacin in the  · Changed to aztreonam and vanc  · COVID PCR negative   · CXR: "Mild left basilar opacity suggestive of atelectasis or a small developing infiltrate "  · Low grade fevers   · UA consistent with infection   · Blood cultures x2, pending  · urine culture, pending   · Procalcitonin, pending  · SBP 90s     MSK:   Generalized weakness  · Recent admission 4/5 to 4/26 due to generalized weakness  · Patient was seen by Neurology with extensive workup including LP, MRI brain, C-spine, and L-spine that showed no meningitis or structural abnormalities that would account for patient's weakness  · patient did receive plasmapheresis due to possible concern for autoimmune neuropathy  · On discharge, it was felt by Neurology that the patient's complaints were due to nutritional deficiency due to anorexia nervosa  · Patient was denied acute rehab placement by her insurance, so she was sent to long-term care at [facility] on 04/26       Subjective:   Unable to assess due to MS     Objective:     Vitals: Blood pressure 91/50, pulse 101, temperature 98 2 °F (36 8 °C), temperature source Oral, resp  rate 15, height 5' 6" (1 676 m), weight 71 4 kg (157 lb 6 5 oz), SpO2 98 %  ,Body mass index is 25 41 kg/m²        Intake/Output Summary (Last 24 hours) at 5/6/2021 1159  Last data filed at 5/6/2021 0701  Gross per 24 hour   Intake 682 5 ml   Output 3175 ml   Net -2492 5 ml       Physical Exam: General appearance: Lethargic, ill-appearing  Lungs: clear to auscultation bilaterally  Heart: regular rate and rhythm, S1, S2 normal, no murmur, click, rub or gallop  Abdomen: soft, non-tender; bowel sounds normal; no masses,  no organomegaly  Extremities: extremities normal, warm and well-perfused; no cyanosis, clubbing, or edema  Pulses: 2+ and symmetric  Skin: Skin color, texture, turgor normal  No rashes or lesions  Neurologic: Grossly normal   Generalized non-pitting edema  Invasive Devices     Peripheral Intravenous Line            Peripheral IV 05/05/21 Left Antecubital less than 1 day                Lab, Imaging and other studies: I have personally reviewed pertinent reports      VTE Pharmacologic Prophylaxis: Enoxaparin (Lovenox)  VTE Mechanical Prophylaxis: sequential compression device

## 2021-05-06 NOTE — CONSULTS
Consultation - General Surgery   Marshall Garza 47 y o  female MRN: [id number]  Unit/Bed#: -01 Encounter: [id number]    History of Present Illness     HPI:  Marshall Garza is a 47 y o  female with history of eating disorder, generalized weakness, and recent admission last month, was admitted with abnormal lab values and was meeting sepsis criteria on admission  Patient has elevated WBCs, CXR showing mild left basilar opacity and a UA consistent with infection  Patient is difficult to arouse and a poor historian  Information obtained from the chart  She was recently admitted in April with weakness and hyponatremia  She has been in SNF since that admission  She was found to have potential a neurogenic bladder last admission and required an indwelling catheter to treat bladder distension with reflux hydronephrosis  It appears voiding trial was done at the facility as she was admitted without a catheter  Since admission she has experienced urinary retention, requiring multiple straight catheterizations with retaining volumes greater than 1L  Zhong should be placed to stay for x 14 days given large volumes and void trail done at that time  Inpatient consult to Urology  Consult performed by: Angie Ren PA-C  Consult ordered by: Junaid Morse DO          Review of Systems   Constitutional: Positive for appetite change and fever  Negative for activity change and chills  HENT: Negative for congestion, ear pain, hearing loss, postnasal drip, sore throat, trouble swallowing and voice change  Eyes: Negative for photophobia, pain, redness and visual disturbance  Respiratory: Negative for apnea, cough, chest tightness, shortness of breath and wheezing  Cardiovascular: Negative for chest pain, palpitations and leg swelling  Gastrointestinal: Negative for abdominal distention, abdominal pain, constipation, diarrhea, nausea and vomiting     Endocrine: Negative for cold intolerance and heat intolerance  Genitourinary: Negative for dysuria, hematuria and urgency  No urge to urinate on her own   Musculoskeletal: Negative for arthralgias, back pain, joint swelling, myalgias and neck pain  Skin: Negative for rash and wound  Allergic/Immunologic: Negative for immunocompromised state  Neurological: Positive for weakness  Negative for dizziness, facial asymmetry, light-headedness, numbness and headaches  Hematological: Negative for adenopathy  Psychiatric/Behavioral: Negative for behavioral problems and dysphoric mood  The patient is not nervous/anxious            Historical Information   Past Medical History:   Diagnosis Date    Anemia     Anxiety     Hypothyroidism     Psychiatric disorder     depression anxiety    Seizures ([address] )     Vitamin D deficiency      Past Surgical History:   Procedure Laterality Date    GASTRIC BYPASS      IR PICC PLACEMENT SINGLE LUMEN  4/16/2021    IR TEMPORARY DIALYSIS CATHETER PLACEMENT  4/8/2021     Social History   Social History     Substance and Sexual Activity   Alcohol Use Never    Frequency: Never     Social History     Substance and Sexual Activity   Drug Use No    Comment: in revovery for benzos     Social History     Tobacco Use   Smoking Status Current Every Day Smoker   Smokeless Tobacco Never Used     Family History:   Family History   Problem Relation Age of Onset    Hypertension Mother     Heart attack Mother     No Known Problems Father     Lung cancer Maternal Grandmother     Hypothyroidism Maternal Grandmother     Diabetes type II Paternal Grandmother     Diabetes type II Paternal Grandfather        Meds/Allergies   Current Facility-Administered Medications   Medication Dose Route Frequency    acetaminophen (TYLENOL) tablet 650 mg  650 mg Oral Q6H PRN    aztreonam (AZACTAM) 2,000 mg in sodium chloride 0 9 % 100 mL IVPB  2,000 mg Intravenous Q8H    butalbital-acetaminophen-caffeine (FIORICET,ESGIC) -40 mg per tablet 1 tablet  1 tablet Oral Q4H PRN    cyclobenzaprine (FLEXERIL) tablet 10 mg  10 mg Oral TID PRN    docusate sodium (COLACE) capsule 100 mg  100 mg Oral BID    enoxaparin (LOVENOX) subcutaneous injection 40 mg  40 mg Subcutaneous Daily    ergocalciferol (VITAMIN D2) capsule 50,000 Units  50,000 Units Oral Once per day on Mon Thu    folic acid (FOLVITE) tablet 1 mg  1 mg Oral Daily    gabapentin (NEURONTIN) capsule 100 mg  100 mg Oral TID PRN    gabapentin (NEURONTIN) capsule 300 mg  300 mg Oral HS    levETIRAcetam (KEPPRA) tablet 500 mg  500 mg Oral Q12H ISH    levothyroxine tablet 50 mcg  50 mcg Oral Early Morning    LORazepam (ATIVAN) tablet 0 5 mg  0 5 mg Oral Q8H PRN    mirtazapine (REMERON) tablet 15 mg  15 mg Oral HS    ondansetron (ZOFRAN) injection 4 mg  4 mg Intravenous Q6H PRN    prazosin (MINIPRESS) capsule 2 mg  2 mg Oral HS    vancomycin (VANCOCIN) IVPB (premix in dextrose) 750 mg 150 mL  10 mg/kg Intravenous Q12H    venlafaxine (EFFEXOR-XR) 24 hr capsule 75 mg  75 mg Oral Daily     Medications Prior to Admission   Medication    acetaminophen (Tylenol) 325 mg tablet    cyanocobalamin 1,000 mcg/mL    cyclobenzaprine (FLEXERIL) 10 mg tablet    docusate sodium (COLACE) 100 mg capsule    ergocalciferol (VITAMIN D2) 74,219 units    folic acid (FOLVITE) 1 mg tablet    gabapentin (NEURONTIN) 100 mg capsule    gabapentin (NEURONTIN) 300 mg capsule    levETIRAcetam (KEPPRA) 500 mg tablet    levothyroxine 50 mcg tablet    LORazepam (ATIVAN) 0 5 mg tablet    mirtazapine (REMERON) 15 mg tablet    polyethylene glycol (MIRALAX) 17 g packet    prazosin (MINIPRESS) 2 mg capsule    thiamine (VITAMIN B1) 100 mg tablet    venlafaxine (EFFEXOR-XR) 75 mg 24 hr capsule    zolpidem (AMBIEN) 10 mg tablet    AquaLance Lancets 30G MISC    Continuous Blood Gluc Sensor (FREESTYLE CAITY 14 DAY SENSOR) MISC     Allergies   Allergen Reactions    Anti-Hist [Diphenhydramine]     Buspar [Buspirone]     Haldol [Haloperidol]     Penicillins Throat Swelling    Pennsaid [Diclofenac Sodium]     Zoloft [Sertraline]        Objective   First Vitals:   Blood Pressure: 115/65 (05/05/21 2320)  Pulse: (!) 120 (05/05/21 2320)  Temperature: (!) 101 °F (38 3 °C) (05/05/21 2320)  Temp Source: Oral (05/05/21 2320)  Respirations: 16 (05/05/21 2320)  Height: 5' 4" (162 6 cm) (05/05/21 2320)  Weight - Scale: 69 9 kg (154 lb) (05/05/21 2320)  SpO2: 96 % (05/05/21 2320)    Current Vitals:   Blood Pressure: 91/50 (05/06/21 1124)  Pulse: 101 (05/06/21 1124)  Temperature: 98 2 °F (36 8 °C) (05/06/21 1124)  Temp Source: Oral (05/06/21 1124)  Respirations: 15 (05/06/21 1124)  Height: 5' 6" (167 6 cm) (05/06/21 8155)  Weight - Scale: 71 4 kg (157 lb 6 5 oz) (05/06/21 0712)  SpO2: 98 % (05/06/21 1124)      Intake/Output Summary (Last 24 hours) at 5/6/2021 1127  Last data filed at 5/6/2021 0701  Gross per 24 hour   Intake 682 5 ml   Output 3175 ml   Net -2492 5 ml       Invasive Devices     Peripheral Intravenous Line            Peripheral IV 05/05/21 Left Antecubital less than 1 day                Physical Exam  Constitutional:       Appearance: She is well-developed  Comments: Lethargic, not answering many questions oriented to person and place   HENT:      Head: Normocephalic and atraumatic  Eyes:      Pupils: Pupils are equal, round, and reactive to light  Neck:      Musculoskeletal: Neck supple  Cardiovascular:      Rate and Rhythm: Normal rate and regular rhythm  Heart sounds: No murmur  Pulmonary:      Breath sounds: Normal breath sounds  No wheezing or rales  Abdominal:      General: Bowel sounds are normal  There is no distension  Palpations: Abdomen is soft  Tenderness: There is abdominal tenderness  There is no guarding or rebound  Comments: Suprapubic tender to palpation   Genitourinary:     Comments: Deferred, no bates at this time  Musculoskeletal: Normal range of motion        Comments: Generalized weakness   Lymphadenopathy:      Cervical: No cervical adenopathy  Skin:     General: Skin is warm  Findings: No erythema or rash     Neurological:      Comments: Lethargic, oriented to person and place   Psychiatric:      Comments: Unable to assess           Lab Results:   Admission on 05/05/2021   Component Date Value    WBC 05/05/2021 14 50*    RBC 05/05/2021 3 14*    Hemoglobin 05/05/2021 9 7*    Hematocrit 05/05/2021 28 3*    MCV 05/05/2021 90     MCH 05/05/2021 30 9     MCHC 05/05/2021 34 3     RDW 05/05/2021 14 6     MPV 05/05/2021 11 6     Platelets 73/36/7280 249     nRBC 05/05/2021 0     Neutrophils Relative 05/05/2021 86*    Immat GRANS % 05/05/2021 1     Lymphocytes Relative 05/05/2021 4*    Monocytes Relative 05/05/2021 9     Eosinophils Relative 05/05/2021 0     Basophils Relative 05/05/2021 0     Neutrophils Absolute 05/05/2021 12 53*    Immature Grans Absolute 05/05/2021 0 08     Lymphocytes Absolute 05/05/2021 0 56*    Monocytes Absolute 05/05/2021 1 31*    Eosinophils Absolute 05/05/2021 0 00     Basophils Absolute 05/05/2021 0 02     Protime 05/05/2021 18 9*    INR 05/05/2021 1 59*    PTT 05/05/2021 41*    Sodium 05/05/2021 121*    Potassium 05/05/2021 4 1     Chloride 05/05/2021 87*    CO2 05/05/2021 23     ANION GAP 05/05/2021 11     BUN 05/05/2021 21     Creatinine 05/05/2021 1 79*    Glucose 05/05/2021 105     Calcium 05/05/2021 8 5     Corrected Calcium 05/05/2021 9 7     AST 05/05/2021 14     ALT 05/05/2021 19     Alkaline Phosphatase 05/05/2021 132*    Total Protein 05/05/2021 5 7*    Albumin 05/05/2021 2 5*    Total Bilirubin 05/05/2021 0 30     eGFR 05/05/2021 35     LACTIC ACID 05/05/2021 0 6     Troponin I 05/05/2021 <0 02     Color, UA 05/06/2021 Yellow     Clarity, UA 05/06/2021 Cloudy     Specific Gravity, UA 05/06/2021 <=1 005     pH, UA 05/06/2021 5 5     Leukocytes, UA 05/06/2021 Large*    Nitrite, UA 05/06/2021 Negative     Protein, UA 05/06/2021 Negative     Glucose, UA 05/06/2021 Negative     Ketones, UA 05/06/2021 Negative     Urobilinogen, UA 05/06/2021 0 2     Bilirubin, UA 05/06/2021 Negative     Blood, UA 05/06/2021 Trace-lysed*    SARS-CoV-2 05/05/2021 Negative     Blood Culture 05/05/2021 Received in Microbiology Lab  Culture in Progress   Blood Culture 05/05/2021 Received in Microbiology Lab  Culture in Progress       Procalcitonin 05/05/2021 3 28*    POC Glucose 05/05/2021 121     RBC, UA 05/06/2021 0-1     WBC, UA 05/06/2021 30-50*    Epithelial Cells 05/06/2021 Occasional     Bacteria, UA 05/06/2021 Innumerable*    MUCUS THREADS 05/06/2021 None Seen     Sodium 05/06/2021 123*    Potassium 05/06/2021 4 0     Chloride 05/06/2021 90*    CO2 05/06/2021 23     ANION GAP 05/06/2021 10     BUN 05/06/2021 21     Creatinine 05/06/2021 1 76*    Glucose 05/06/2021 119     Calcium 05/06/2021 8 3     eGFR 05/06/2021 36     Sodium 05/06/2021 126*    Potassium 05/06/2021 3 9     Chloride 05/06/2021 92*    CO2 05/06/2021 18*    ANION GAP 05/06/2021 16*    BUN 05/06/2021 20     Creatinine 05/06/2021 1 58*    Glucose 05/06/2021 101     Calcium 05/06/2021 8 4     eGFR 05/06/2021 41     WBC 05/06/2021 13 32*    RBC 05/06/2021 2 87*    Hemoglobin 05/06/2021 8 6*    Hematocrit 05/06/2021 26 2*    MCV 05/06/2021 91     MCH 05/06/2021 30 0     MCHC 05/06/2021 32 8     RDW 05/06/2021 14 5     Platelets 73/24/6005 198     MPV 05/06/2021 11 1     Protime 05/06/2021 22 0*    INR 05/06/2021 1 93*    TSH 3RD GENERATON 05/06/2021 1 377     Sodium 05/06/2021 130*    Potassium 05/06/2021 3 8     Chloride 05/06/2021 96*    CO2 05/06/2021 23     ANION GAP 05/06/2021 11     BUN 05/06/2021 20     Creatinine 05/06/2021 1 42*    Glucose 05/06/2021 96     Calcium 05/06/2021 8 6     eGFR 05/06/2021 47     LACTIC ACID 05/06/2021 0 4*    Ventricular Rate 05/05/2021 119     Atrial Rate 05/05/2021 119     KS Interval 05/05/2021 126     QRSD Interval 05/05/2021 72     QT Interval 05/05/2021 300     QTC Interval 05/05/2021 422     P Axis 05/05/2021 58     QRS Axis 05/05/2021 42     T Wave Axis 05/05/2021 58      Imaging: I have personally reviewed pertinent reports  CT from last admission 4/5/21 had shown IMPRESSION:     1  Severely distended bladder and mild bilateral obstructive uropathy, suggesting distal outlet obstruction or neurogenic bladder  No evidence of cystitis, bladder mass or hematoma  2   Findings compatible with constipation           Assessment/Plan     46 y/o female admitted with sepsis and hyponatremia  Sepsis could be due to UTI vs pneumonia  Blood and urine cultures pending, will follow  Antibiotics per primary service  Patient may have neurogenic bladder and may benefit from CIC in the future  She will need indwelling catheter placed and catheter should remain in place for 1-2 weeks due to large volume and history of similar episode last admission that resulted in relfux hydro  Would recommend a bowel regimen and optimizing her nutrition status  Counseling / Coordination of Care  Total floor / unit time spent today 30 minutes  Greater than 50% of total time was spent with the patient and / or family counseling and / or coordination of care

## 2021-05-06 NOTE — ASSESSMENT & PLAN NOTE
· Recent admission 4/5 to 4/26 due to generalized weakness  · Patient was seen by Neurology with extensive workup including LP, MRI brain, C-spine, and L-spine that showed no meningitis or structural abnormalities that would account for patient's weakness  · patient did receive plasmapheresis due to possible concern for autoimmune neuropathy  · On discharge, it was felt by Neurology that the patient's complaints were due to nutritional deficiency due to anorexia nervosa

## 2021-05-06 NOTE — ASSESSMENT & PLAN NOTE
· CXR: "Mild left basilar opacity suggestive of atelectasis or a small developing infiltrate " Also noted to have significant gaseous distention on imaging   · Denies cough or dyspnea     · Lungs CTA on admission   · Will continue on vanc and aztreonam for now

## 2021-05-07 PROBLEM — E87.1 HYPONATREMIA: Status: RESOLVED | Noted: 2021-04-05 | Resolved: 2021-05-07

## 2021-05-07 PROBLEM — R65.20 SEVERE SEPSIS (HCC): Status: RESOLVED | Noted: 2021-05-06 | Resolved: 2021-05-07

## 2021-05-07 PROBLEM — A41.9 SEVERE SEPSIS (HCC): Status: RESOLVED | Noted: 2021-05-06 | Resolved: 2021-05-07

## 2021-05-07 PROBLEM — E83.52 HYPERCALCEMIA: Status: ACTIVE | Noted: 2021-05-07

## 2021-05-07 LAB
ALBUMIN SERPL BCP-MCNC: 2.2 G/DL (ref 3.5–5)
ALP SERPL-CCNC: 145 U/L (ref 46–116)
ALT SERPL W P-5'-P-CCNC: 21 U/L (ref 12–78)
ANION GAP SERPL CALCULATED.3IONS-SCNC: 12 MMOL/L (ref 4–13)
ANION GAP SERPL CALCULATED.3IONS-SCNC: 14 MMOL/L (ref 4–13)
AST SERPL W P-5'-P-CCNC: 23 U/L (ref 5–45)
BACTERIA UR CULT: ABNORMAL
BILIRUB SERPL-MCNC: 0.3 MG/DL (ref 0.2–1)
BUN SERPL-MCNC: 10 MG/DL (ref 5–25)
BUN SERPL-MCNC: 14 MG/DL (ref 5–25)
CALCIUM ALBUM COR SERPL-MCNC: 10.2 MG/DL (ref 8.3–10.1)
CALCIUM SERPL-MCNC: 8.7 MG/DL (ref 8.3–10.1)
CALCIUM SERPL-MCNC: 8.8 MG/DL (ref 8.3–10.1)
CHLORIDE SERPL-SCNC: 102 MMOL/L (ref 100–108)
CHLORIDE SERPL-SCNC: 102 MMOL/L (ref 100–108)
CO2 SERPL-SCNC: 20 MMOL/L (ref 21–32)
CO2 SERPL-SCNC: 22 MMOL/L (ref 21–32)
CORTIS SERPL-MCNC: 16.6 UG/DL
CREAT SERPL-MCNC: 0.87 MG/DL (ref 0.6–1.3)
CREAT SERPL-MCNC: 1.11 MG/DL (ref 0.6–1.3)
ERYTHROCYTE [DISTWIDTH] IN BLOOD BY AUTOMATED COUNT: 14.7 % (ref 11.6–15.1)
GFR SERPL CREATININE-BSD FRML MDRD: 63 ML/MIN/1.73SQ M
GFR SERPL CREATININE-BSD FRML MDRD: 85 ML/MIN/1.73SQ M
GLUCOSE SERPL-MCNC: 95 MG/DL (ref 65–140)
GLUCOSE SERPL-MCNC: 99 MG/DL (ref 65–140)
HCT VFR BLD AUTO: 26 % (ref 34.8–46.1)
HGB BLD-MCNC: 8.4 G/DL (ref 11.5–15.4)
MAGNESIUM SERPL-MCNC: 2 MG/DL (ref 1.6–2.6)
MCH RBC QN AUTO: 30 PG (ref 26.8–34.3)
MCHC RBC AUTO-ENTMCNC: 32.3 G/DL (ref 31.4–37.4)
MCV RBC AUTO: 93 FL (ref 82–98)
MRSA NOSE QL CULT: ABNORMAL
MRSA NOSE QL CULT: ABNORMAL
PLATELET # BLD AUTO: 186 THOUSANDS/UL (ref 149–390)
PMV BLD AUTO: 10.6 FL (ref 8.9–12.7)
POTASSIUM SERPL-SCNC: 3.3 MMOL/L (ref 3.5–5.3)
POTASSIUM SERPL-SCNC: 3.4 MMOL/L (ref 3.5–5.3)
PROCALCITONIN SERPL-MCNC: 1.77 NG/ML
PROT SERPL-MCNC: 5.3 G/DL (ref 6.4–8.2)
RBC # BLD AUTO: 2.8 MILLION/UL (ref 3.81–5.12)
SODIUM SERPL-SCNC: 136 MMOL/L (ref 136–145)
SODIUM SERPL-SCNC: 136 MMOL/L (ref 136–145)
WBC # BLD AUTO: 9.52 THOUSAND/UL (ref 4.31–10.16)

## 2021-05-07 PROCEDURE — 85027 COMPLETE CBC AUTOMATED: CPT | Performed by: INTERNAL MEDICINE

## 2021-05-07 PROCEDURE — 99233 SBSQ HOSP IP/OBS HIGH 50: CPT | Performed by: INTERNAL MEDICINE

## 2021-05-07 PROCEDURE — 80048 BASIC METABOLIC PNL TOTAL CA: CPT | Performed by: INTERNAL MEDICINE

## 2021-05-07 PROCEDURE — 99232 SBSQ HOSP IP/OBS MODERATE 35: CPT | Performed by: INTERNAL MEDICINE

## 2021-05-07 PROCEDURE — 82533 TOTAL CORTISOL: CPT | Performed by: PHYSICIAN ASSISTANT

## 2021-05-07 PROCEDURE — 84145 PROCALCITONIN (PCT): CPT | Performed by: EMERGENCY MEDICINE

## 2021-05-07 PROCEDURE — 80053 COMPREHEN METABOLIC PANEL: CPT | Performed by: INTERNAL MEDICINE

## 2021-05-07 PROCEDURE — 83735 ASSAY OF MAGNESIUM: CPT | Performed by: INTERNAL MEDICINE

## 2021-05-07 RX ORDER — POTASSIUM CHLORIDE 20 MEQ/1
20 TABLET, EXTENDED RELEASE ORAL ONCE
Status: COMPLETED | OUTPATIENT
Start: 2021-05-07 | End: 2021-05-07

## 2021-05-07 RX ORDER — ALBUMIN, HUMAN INJ 5% 5 %
12.5 SOLUTION INTRAVENOUS ONCE
Status: COMPLETED | OUTPATIENT
Start: 2021-05-07 | End: 2021-05-07

## 2021-05-07 RX ORDER — POTASSIUM CHLORIDE 20 MEQ/1
40 TABLET, EXTENDED RELEASE ORAL ONCE
Status: DISCONTINUED | OUTPATIENT
Start: 2021-05-07 | End: 2021-05-10

## 2021-05-07 RX ORDER — POTASSIUM CHLORIDE 20 MEQ/1
20 TABLET, EXTENDED RELEASE ORAL ONCE
Status: DISCONTINUED | OUTPATIENT
Start: 2021-05-07 | End: 2021-05-07

## 2021-05-07 RX ADMIN — AZTREONAM 2000 MG: 2 INJECTION, POWDER, LYOPHILIZED, FOR SOLUTION INTRAMUSCULAR; INTRAVENOUS at 04:28

## 2021-05-07 RX ADMIN — MIRTAZAPINE 15 MG: 15 TABLET, FILM COATED ORAL at 21:15

## 2021-05-07 RX ADMIN — LEVETIRACETAM 500 MG: 500 TABLET, FILM COATED ORAL at 07:44

## 2021-05-07 RX ADMIN — VANCOMYCIN HYDROCHLORIDE 750 MG: 750 INJECTION, SOLUTION INTRAVENOUS at 23:55

## 2021-05-07 RX ADMIN — DOCUSATE SODIUM 100 MG: 100 CAPSULE, LIQUID FILLED ORAL at 07:44

## 2021-05-07 RX ADMIN — ACETAMINOPHEN 650 MG: 325 TABLET, FILM COATED ORAL at 00:22

## 2021-05-07 RX ADMIN — GABAPENTIN 300 MG: 300 CAPSULE ORAL at 21:15

## 2021-05-07 RX ADMIN — CYCLOBENZAPRINE HYDROCHLORIDE 10 MG: 10 TABLET, FILM COATED ORAL at 21:21

## 2021-05-07 RX ADMIN — ACETAMINOPHEN 650 MG: 325 TABLET, FILM COATED ORAL at 17:26

## 2021-05-07 RX ADMIN — LORAZEPAM 0.5 MG: 0.5 TABLET ORAL at 11:09

## 2021-05-07 RX ADMIN — ALBUMIN (HUMAN) 12.5 G: 12.5 INJECTION, SOLUTION INTRAVENOUS at 04:42

## 2021-05-07 RX ADMIN — LEVETIRACETAM 500 MG: 500 TABLET, FILM COATED ORAL at 21:15

## 2021-05-07 RX ADMIN — LORAZEPAM 0.5 MG: 0.5 TABLET ORAL at 19:16

## 2021-05-07 RX ADMIN — BUTALBITAL, ACETAMINOPHEN AND CAFFEINE 1 TABLET: 50; 325; 40 TABLET ORAL at 07:45

## 2021-05-07 RX ADMIN — FOLIC ACID 1 MG: 1 TABLET ORAL at 07:44

## 2021-05-07 RX ADMIN — LORAZEPAM 0.5 MG: 0.5 TABLET ORAL at 02:13

## 2021-05-07 RX ADMIN — BUTALBITAL, ACETAMINOPHEN AND CAFFEINE 1 TABLET: 50; 325; 40 TABLET ORAL at 12:38

## 2021-05-07 RX ADMIN — AZTREONAM 2000 MG: 2 INJECTION, POWDER, LYOPHILIZED, FOR SOLUTION INTRAMUSCULAR; INTRAVENOUS at 21:11

## 2021-05-07 RX ADMIN — POTASSIUM CHLORIDE 20 MEQ: 1500 TABLET, EXTENDED RELEASE ORAL at 07:45

## 2021-05-07 RX ADMIN — DOCUSATE SODIUM 100 MG: 100 CAPSULE, LIQUID FILLED ORAL at 17:27

## 2021-05-07 RX ADMIN — ACETAMINOPHEN 650 MG: 325 TABLET, FILM COATED ORAL at 11:09

## 2021-05-07 RX ADMIN — DEXTROSE 500 ML: 5 SOLUTION INTRAVENOUS at 08:43

## 2021-05-07 RX ADMIN — ENOXAPARIN SODIUM 40 MG: 40 INJECTION SUBCUTANEOUS at 07:44

## 2021-05-07 RX ADMIN — AZTREONAM 2000 MG: 2 INJECTION, POWDER, LYOPHILIZED, FOR SOLUTION INTRAMUSCULAR; INTRAVENOUS at 12:33

## 2021-05-07 RX ADMIN — ZOLPIDEM TARTRATE 5 MG: 5 TABLET, COATED ORAL at 21:25

## 2021-05-07 RX ADMIN — VENLAFAXINE HYDROCHLORIDE 75 MG: 75 CAPSULE, EXTENDED RELEASE ORAL at 07:45

## 2021-05-07 RX ADMIN — LEVOTHYROXINE SODIUM 50 MCG: 25 TABLET ORAL at 04:45

## 2021-05-07 RX ADMIN — VANCOMYCIN HYDROCHLORIDE 750 MG: 750 INJECTION, SOLUTION INTRAVENOUS at 11:07

## 2021-05-07 NOTE — ASSESSMENT & PLAN NOTE
· Patient was at [facility] secondary to deconditioning from nutritional deficiency associated with anorexia nervosa  · During admission in 4/5 to 4/26 patient was evaluated by Psychiatry who felt that patient did not qualify for inpatient psych at that time   · Patient on clear Ensure which she appears to be tolerating and encouraged her with additional intake during the day

## 2021-05-07 NOTE — PROGRESS NOTES
Report given to [facility]  Patient resting comfortably in bed   Valarie Guerrero RN

## 2021-05-07 NOTE — ASSESSMENT & PLAN NOTE
· Now improving with decreasing doses of Ambien and lorazepam  ·  Patient more alert  · Patient did receive Ambien prior to EMS arrival  · Etiology: Hyponatremia vs sepsis vs Ambien intake (more likely Ambien intake with improvement in mental status)

## 2021-05-07 NOTE — ASSESSMENT & PLAN NOTE
· UA consistent with infection:  30-50 wbc's and innumerable bacteria   · Fever and tachycardiac on arrival -now improved  · Urology is following  · Patient started on levofloxacin and vancomycin in the ED due to anaphylactic reaction history to penicillin  · Urine culture, pending   · Prior urine culture from 04/10 grew E coli susceptible to Bactrim, tobramycin, tetracycline, nitrofurantoin and, gentamicin, cefazolin, aztreonam, and ampicillin  Resistant to Cipro and levofloxacin    · Cystitis during 4/10-4/26 admission was treated with aztreonam x3 days and then bactrim x1 day   · Will change pt to vanc and aztreonam - adjust per u/c

## 2021-05-07 NOTE — ASSESSMENT & PLAN NOTE
· Hemoglobin at 9 7 on admission now dropped to 8 4/26-repeat in the a m    · Baseline hemoglobin is between 8-10  · Suspect that this is secondary to nutritional deficiencies  · Repeat CBC

## 2021-05-07 NOTE — PROGRESS NOTES
Progress Note - Nephrology   Estela Sorto 47 y o  female MRN: [id number]  Unit/Bed#: -01 Encounter: [id number]    ASSESSMENT and PLAN:  Acute kidney injury (POA):  Assessment and plan:  § Etiology Suspect prerenal, UTI and obstructive uropathy with retention in the setting of recent AK I  § After review medical records through [facility] everywhere baseline creatinine 0 6-0 8  § Status post Zhong catheter for urinary retention  § Now with polyuria  § Previously on IV fluids-off since yesterday  § Not on Ace/Arb/NSAIDs  § Admission creatinine 1 79 on 04/05/2021  § Current creatinine 1 11  § Will continue trend I/O, lab values volume status      § Avoid NSAIDs, IV contrastor nephrotoxins     Acute Hyponatremia:  Assessment and plan:  § Suspect secondary to volume depletion in the setting of nausea vomiting, decreased appetite with known malnutrition  § May also be concerning for polydipsia in the setting of psychiatric disorder  § Urine osmolality 111, urine sodium 13-suggestive of increased fluid intake  § Will place on 1500 fluid restriction  § Admission sodium 121-sodium settled at 130 After 12 hours off IV fluids  § A m  Sodium 136  § Will start hypertonic fluids at 50 cc an hour times 10 hours  § Patient with polyuria 5 3 L overnight-will continue monitor  § Goal sodium 130-134  by 2:00 p m  § Will continue his trend     Blood pressure/hemodynamic  Assessment and Plan:  · Not on any antihypertensive medications  · Actually hypotensive overnight 88/51 currently 98/58  · Maximize hemodynamics to maintain MAP >65  · Avoid hypotension or fluctuations in blood pressure  · Will continue to trend     H&H/anemia:   In setting of critical illness  Assessment and Plan:  · Current hemoglobin 8 4  · Per primary team  · Transfuse if hemoglobin less than 7 0     Acid-base/electrolytes:  Assessment and Plan:  · Acid-base stable at 22 without anion gap  · Lactic acid 0 4 5/6/2021  · Potassium low at 3 4-will give K-Dur 20 mEq b i d  Today  · Hypercalcemia:  Corrected calcium 11 6  Will monitor with D5 water  · May require IV normal saline-will continue to trend  · BMP to be checked at 2:00 p m  Today  · Will continue trend     Urinary retention:    Assessment and plan:  § Concern for possible neurogenic bladder  § Zhong catheter in place after retaining  § Urology following     Concern for poly nephritis  Assessment and plan:  § For primary team  § Concern for poly nephritis  § Urine culture- pending  § Previous urine culture from 04/10/2021 revealed E coli and treated with Aztreonam x 3 days  §    Other medical issues:  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:  Evaluated during last hospitalization with neurology  Status post plasma phoresis  Psychiatric disorder with anorexia nervosa/anxiety depression-for primary team      SUBJECTIVE:  Patient seen and examined at bedside  Patient having lower back pain  Denies chest pain or shortness of breath    Denies nausea vomiting or headaches    OBJECTIVE:  Current Weight: Weight - Scale: 71 4 kg (157 lb 6 5 oz)  Vitals:    05/07/21 0530 05/07/21 0545 05/07/21 0700 05/07/21 0718   BP: (!) 88/51 92/50 91/52 98/58   Pulse: (!) 109 (!) 109 105 (!) 109   Resp:   16 14   Temp:    97 9 °F (36 6 °C)   TempSrc:    Oral   SpO2: 96% 96% 95% 96%   Weight:       Height:           Intake/Output Summary (Last 24 hours) at 5/7/2021 1026  Last data filed at 5/7/2021 0964  Gross per 24 hour   Intake 3161 25 ml   Output 4332 ml   Net -1170 75 ml     General:  No acute distress, cooperative, out of bed  Skin:  Warm and dry without rash  ENMT:  Mucous membranes moist, sclera anicteric  Neck:  Supple without JVD  Respiratory:  Essentially clear on auscultation without crackles, rhonchi, wheezes  Cardiac:  Regular rate and rhythm without rub, or murmur  GI:  Soft, nontender, no distention, active bowel sounds  :  Zhong catheter patent for clear yellow urine  Extremities:  No significant edema noted bilaterally  Neuro:  Alert oriented and awake  Psych:  Flat affect    Medications:    Current Facility-Administered Medications:     acetaminophen (TYLENOL) tablet 650 mg, 650 mg, Oral, Q6H PRN, Elena Burk PA-C, 650 mg at 05/07/21 0022    aztreonam (AZACTAM) 2,000 mg in sodium chloride 0 9 % 100 mL IVPB, 2,000 mg, Intravenous, Q8H, Elizabeth Fly, DO, Stopped at 05/07/21 0547    butalbital-acetaminophen-caffeine (FIORICET,ESGIC) -40 mg per tablet 1 tablet, 1 tablet, Oral, Q4H PRN, [place] Horseman, DO, 1 tablet at 05/07/21 0745    cyclobenzaprine (FLEXERIL) tablet 10 mg, 10 mg, Oral, TID PRN, [place] Horsekrysten, DO, 10 mg at 05/06/21 2101    dextrose 5 % bolus 500 mL, 500 mL, Intravenous, Once, Ronda Berger CRNP, 500 mL at 05/07/21 0843    docusate sodium (COLACE) capsule 100 mg, 100 mg, Oral, BID, Elena Burk PA-C, 100 mg at 05/07/21 0744    enoxaparin (LOVENOX) subcutaneous injection 40 mg, 40 mg, Subcutaneous, Daily, Elena Burk PA-C, 40 mg at 05/07/21 0744    ergocalciferol (VITAMIN D2) capsule 50,000 Units, 50,000 Units, Oral, Once per day on Mon Thu, Elena Burk PA-C, 50,000 Units at 14/94/83 9653    folic acid (FOLVITE) tablet 1 mg, 1 mg, Oral, Daily, Elena Burk PA-C, 1 mg at 05/07/21 0744    gabapentin (NEURONTIN) capsule 100 mg, 100 mg, Oral, TID PRN, Elena Burk PA-C, 100 mg at 05/06/21 1235    gabapentin (NEURONTIN) capsule 300 mg, 300 mg, Oral, HS, Elena Burk PA-C, 300 mg at 05/06/21 2101    levETIRAcetam (KEPPRA) tablet 500 mg, 500 mg, Oral, Q12H [address], Elena Burk PA-C, 500 mg at 05/07/21 0744    levothyroxine tablet 50 mcg, 50 mcg, Oral, Early Morning, Elena Burk PA-C, 50 mcg at 05/07/21 0445    LORazepam (ATIVAN) tablet 0 5 mg, 0 5 mg, Oral, Q8H PRN, Elena Burk PA-C, 0 5 mg at 05/07/21 2825    mirtazapine (REMERON) tablet 15 mg, 15 mg, Oral, HS, Elena Burk PA-C, 15 mg at 05/06/21 2101    ondansetron (ZOFRAN) injection 4 mg, 4 mg, Intravenous, Q6H PRN, Hunter Collier PA-C    prazosin (MINIPRESS) capsule 2 mg, 2 mg, Oral, HS, HERNÁN Wilhelm-C, 2 mg at 05/06/21 2329    vancomycin (VANCOCIN) IVPB (premix in dextrose) 750 mg 150 mL, 10 mg/kg, Intravenous, Q12H, HERNÁN Wilhelm-VERONICA, Stopped at 05/07/21 0200    venlafaxine (EFFEXOR-XR) 24 hr capsule 75 mg, 75 mg, Oral, Daily, HERNÁN Wilhelm-C, 75 mg at 05/07/21 0745    zolpidem (AMBIEN) tablet 5 mg, 5 mg, Oral, HS PRN, Pearl Valentin DO, 5 mg at 05/06/21 2101    Laboratory Results:  Results from last 7 days   Lab Units 05/07/21  0432 05/06/21  1316 05/06/21  1032 05/06/21  0612 05/06/21  0141 05/05/21  2346   WBC Thousand/uL 9 52  --   --  13 32*  --  14 50*   HEMOGLOBIN g/dL 8 4*  --   --  8 6*  --  9 7*   HEMATOCRIT % 26 0*  --   --  26 2*  --  28 3*   PLATELETS Thousands/uL 186  --   --  198  --  249   SODIUM mmol/L 136 130* 130* 126* 123* 121*   POTASSIUM mmol/L 3 4* 3 3* 3 8 3 9 4 0 4 1   CHLORIDE mmol/L 102 97* 96* 92* 90* 87*   CO2 mmol/L 22 23 23 18* 23 23   BUN mg/dL 14 19 20 20 21 21   CREATININE mg/dL 1 11 1 35* 1 42* 1 58* 1 76* 1 79*   CALCIUM mg/dL 8 8 8 3 8 6 8 4 8 3 8 5

## 2021-05-07 NOTE — ASSESSMENT & PLAN NOTE
· Creatinine at 1 79 on admission now with hydration creatinine this morning is 1 11  · Cr on 5/3 was 0 6   · Avoid nephrotoxic drugs   · Avoid hypotension  · Suspect that this is prerenal and postrenal in the setting of dehydration from anorexia nervosa and retention   · Pt with bladder scan of 850CC on arrival to ED - successful straight cath  · Zhong reinserted as per Urology needs to be kept in 2 weeks-patient reports her Zhong was just removed a week ago at the SNF  · I/Os

## 2021-05-07 NOTE — PROGRESS NOTES
Spoke with jared pace PA-C  Made aware of consistent hypotension- albumin ordered  Decrease in UO to 5/hr last 2H

## 2021-05-07 NOTE — UTILIZATION REVIEW
Initial Clinical Review    Admission: Date/Time/Statement:   Admission Orders (From admission, onward)     Ordered        05/06/21 0320  Inpatient Admission  Once                   Orders Placed This Encounter   Procedures    Inpatient Admission     Standing Status:   Standing     Number of Occurrences:   1     Order Specific Question:   Level of Care     Answer:   Level 2 Stepdown / HOT [14]     Order Specific Question:   Estimated length of stay     Answer:   More than 2 Midnights     Order Specific Question:   Certification     Answer:   I certify that inpatient services are medically necessary for this patient for a duration of greater than two midnights  See H&P and MD Progress Notes for additional information about the patient's course of treatment  ED Arrival Information     Expected Arrival Acuity Means of Arrival Escorted By Service Admission Type    - 5/5/2021 23:15 Emergent Ambulance [facility]) General Medicine Emergency    Arrival Complaint    Abnormal Lab        Chief Complaint   Patient presents with    Abnormal Lab     Presents to ED via EMS from [facility]  Per lab work paper pt's Na level is 120, draw time of 11:15 am 5/5/2021  Pt reported to have temp of 102 9 this morning, given tylenol then temp of 98 per EMS  Pt has no other symptoms per EMS  Pt was given Ambien pta per EMS from [address] [place] [address]  Initial Presentation:  this is a 47year old female from SNF to ED via ems  on 5/5/2021 and inpatient  Order placed 5/6/2021 due to hyponatremia/sepsis/altered mental status/pneumonia/puelonephritis/RENE  History of anorexia nervosa  Recent admit 4/5/2021 - 4/26/2021 due to weakness  Presented due to hyponatremia and fever to 102, at SNF got bolus of 700 cc of normal saline  On exam lethargic and took [place] prior to arrival    Global strength 4/5  Bladder scan 850  Blood cultures done, show gram stain 1/2 gram positive cocci in chains  Procalcitonin 3 28  Na 121  Bun 21, creatinine 1 79, baseline of 0 6    UA large leukocytes  Wbc 14 50  INR 1 59  CxR showed developing infiltrate  In the ED started on NSS  IVF , vancomycin, Levaquin  Plan is monitor neuro status  Continue vancomycin and add aztreonam, continue NS IVF, bmp every 4 hours with goal correction of na of  8-10 meq in next 24 hours  Place bates if straight cath x 3  Consult urology and nephrology  5/6/2021 per Urology -  Patient septic and hyponatremic,  Blood and urine cultures pending  Has possible neurogenic bladder and recommend bates for one to 2 weeks  5/6/2021 per nephrology:  Patient has acute hyponatremia/RENE in setting of obstructive uropathy with +/UTI vs pyelo/possible neurogenic hypotension  Recommend to stop NS and fluid restriction and patient overcorrected NA, continue bates, albumin for symptomatic hypotension, replete K  Monitor BMP  Date: 5/7/2021   Day 2:  More alert today  Weak  On exam right flank tenderness  Blunted affect  Overnight hypotensive requiring albumin  Creatinine 1 11  Na 136  Blood culture 1/2 positive  Plan is cap IVF, bates inserted on 5/6 and to continue  Continue BMP and neuro check every 4 hours  Continue antibiotics        ED Triage Vitals   Temperature Pulse Respirations Blood Pressure SpO2   05/05/21 2320 05/05/21 2320 05/05/21 2320 05/05/21 2320 05/05/21 2320   (!) 101 °F (38 3 °C) (!) 120 16 115/65 96 %      Temp Source Heart Rate Source Patient Position - Orthostatic VS BP Location FiO2 (%)   05/05/21 2320 05/05/21 2320 05/05/21 2320 05/05/21 2320 --   Oral Monitor Lying Left arm       Pain Score       05/06/21 0000       No Pain          Wt Readings from Last 1 Encounters:   05/06/21 71 4 kg (157 lb 6 5 oz)     Additional Vital Signs:   /5/07/21 0718  97 9 °F (36 6 °C)  109Abnormal   14  98/58  73  96 %  --  --   05/07/21 0700  --  105  16  91/52  65  95 %  --  --   05/07/21 0545  --  109Abnormal   --  92/50  63  96 %  --  -- 05/07/21 0530  --  109Abnormal   --  88/51Abnormal   63  96 %  --  --   05/07/21 0516  --  107Abnormal   --  86/50Abnormal   64  96 %  --  --   05/07/21 0500  --  108Abnormal   --  87/50Abnormal   63  96 %  --  --   05/07/21 0445  --  109Abnormal   18  81/47Abnormal   59  96 %  --  --   05/07/21 0428  --  108Abnormal   20  80/41Abnormal     55  96 %  --  --   BP: albumin ordered at 05/07/21 0428 05/07/21 0312  97 7 °F (36 5 °C)  122Abnormal   29Abnormal   88/49Abnormal     63  98 %  --  --   BP: provider Johnathon Lynch, felicity  Will recheck  Pt asymptomatic at 05/07/21 0312   05/06/21 2316  98 3 °F (36 8 °C)  98  19  113/59  80  96 %  None (Room air)  Lying   05/06/21 2103  --  --  --  92/55  --  --  --  --   05/06/21 1901  98 7 °F (37 1 °C)  109Abnormal   18  89/53Abnormal   66  99 %  None (Room air)  Lying   05/06/21 1502  97 3 °F (36 3 °C)Abnormal   106Abnormal   16  89/52Abnormal    66  98 %  None (Room air)  Lying      05/06/21 1421  --  102  17  --  --  98 %  None (Room air)  --   05/06/21 1124  98 2 °F (36 8 °C)  101  15  91/50  66  98 %  None (Room air)  Lying   05/06/21 0712  99 6 °F (37 6 °C)  --  --  --  --  --  --  --   05/06/21 0700  --  109Abnormal   15  99/54  70  96 %  None (Room air)  Lying   05/06/21 0400  --  114Abnormal   14  94/57  69  93 %  None (Room air)  Lying   05/06/21 0330  --  113Abnormal   16  91/56  68  95 %  None (Room air)  Lying   05/06/21 0300  --  115Abnormal   14  91/55  67  96 %  None (Room air)  Lying   05/06/21 0237  100 7 °F (38 2 °C)Abnormal   --  --  --  --  --  --         Pertinent Labs/Diagnostic Test Results:   5/5/2021 CxR Mild left basilar opacity suggestive of atelectasis or a small developing infiltrate  Significant gaseous distention of the stomach    5/5/2021 ECG - Sinus tachycardia  Otherwise normal ECG  When compared with ECG of 05-APR-2021 17:54,  Vent   rate has increased BY  41 BPM  Results from last 7 days   Lab Units 05/05/21  0541   SARS-COV-2  Negative Results from last 7 days   Lab Units 05/07/21  0432 05/06/21  0612 05/05/21  2346   WBC Thousand/uL 9 52 13 32* 14 50*   HEMOGLOBIN g/dL 8 4* 8 6* 9 7*   HEMATOCRIT % 26 0* 26 2* 28 3*   PLATELETS Thousands/uL 186 198 249   NEUTROS ABS Thousands/µL  --   --  12 53*     Results from last 7 days   Lab Units 05/07/21  0432 05/06/21  1316 05/06/21  1032 05/06/21  0612 05/06/21  0141   SODIUM mmol/L 136 130* 130* 126* 123*   POTASSIUM mmol/L 3 4* 3 3* 3 8 3 9 4 0   CHLORIDE mmol/L 102 97* 96* 92* 90*   CO2 mmol/L 22 23 23 18* 23   ANION GAP mmol/L 12 10 11 16* 10   BUN mg/dL 14 19 20 20 21   CREATININE mg/dL 1 11 1 35* 1 42* 1 58* 1 76*   EGFR ml/min/1 73sq m 63 50 47 41 36   CALCIUM mg/dL 8 8 8 3 8 6 8 4 8 3     Results from last 7 days   Lab Units 05/07/21  0432 05/05/21  2346   AST U/L 23 14   ALT U/L 21 19   ALK PHOS U/L 145* 132*   TOTAL PROTEIN g/dL 5 3* 5 7*   ALBUMIN g/dL 2 2* 2 5*   TOTAL BILIRUBIN mg/dL 0 30 0 30     Results from last 7 days   Lab Units 05/05/21  2348   POC GLUCOSE mg/dl 121     Results from last 7 days   Lab Units 05/07/21  0432 05/06/21  1316 05/06/21  1032 05/06/21  0612 05/06/21  0141 05/05/21  2346   GLUCOSE RANDOM mg/dL 95 103 96 101 119 105     Results from last 7 days   Lab Units 05/05/21  2346   TROPONIN I ng/mL <0 02     Results from last 7 days   Lab Units 05/06/21  0612 05/05/21  2346   PROTIME seconds 22 0* 18 9*   INR  1 93* 1 59*   PTT seconds  --  41*     Results from last 7 days   Lab Units 05/06/21  0612   TSH 3RD GENERATON uIU/mL 1 377     Results from last 7 days   Lab Units 05/07/21  0432 05/05/21  2346   PROCALCITONIN ng/ml 1 77* 3 28*     Results from last 7 days   Lab Units 05/06/21  1032 05/05/21  2346   LACTIC ACID mmol/L 0 4* 0 6     Results from last 7 days   Lab Units 05/06/21  1309 05/06/21  0011   CLARITY UA   --  Cloudy   COLOR UA   --  Yellow   SPEC GRAV UA   --  <=1 005   PH UA   --  5 5   GLUCOSE UA mg/dl  --  Negative   KETONES UA mg/dl  --  Negative BLOOD UA   --  Trace-lysed*   PROTEIN UA mg/dl  --  Negative   NITRITE UA   --  Negative   BILIRUBIN UA   --  Negative   UROBILINOGEN UA E U /dl  --  0 2   LEUKOCYTES UA   --  Large*   WBC UA /hpf  --  30-50*   RBC UA /hpf  --  0-1   BACTERIA UA /hpf  --  Innumerable*   EPITHELIAL CELLS WET PREP /hpf  --  Occasional   MUCUS THREADS   --  None Seen   SODIUM UR  13  --      Results from last 7 days   Lab Units 05/06/21  0011 05/05/21  2346   BLOOD CULTURE   --  No Growth at 24 hrs  GRAM STAIN RESULT   --  Gram positive cocci in chains*   URINE CULTURE  50,000-59,000 cfu/ml Lactobacillus species*  --      ED Treatment:   Medication Administration from 05/05/2021 2315 to 05/06/2021 0453       Date/Time Order Dose Route Action Comments     05/05/2021 2349 sodium chloride 0 9 % infusion 125 mL/hr Intravenous New Bag      05/06/2021 0032 vancomycin (VANCOCIN) IVPB (premix in dextrose) 1,000 mg 200 mL 1,000 mg Intravenous New Bag      05/06/2021 0140 levofloxacin (LEVAQUIN) IVPB (premix in dextrose) 750 mg 150 mL 750 mg Intravenous New Bag Awaited for other antibiotic to complete       05/06/2021 0139 acetaminophen (TYLENOL) rectal suppository 650 mg 650 mg Rectal Given      05/06/2021 0235 sodium chloride 0 9 % infusion 75 mL/hr Intravenous New Bag         Past Medical History:   Diagnosis Date    Anemia     Anxiety     Hypothyroidism     Psychiatric disorder     depression anxiety    Seizures ([place] [address] )     Vitamin D deficiency      Present on Admission:   Hypothyroidism   Anorexia nervosa, restricting type   Moderate episode of recurrent major depressive disorder ([facility] )   Hyponatremia   Pyelonephritis   (Resolved) [facility] sepsis ([facility] )   RENE (acute kidney injury) ([facility] )   Generalized weakness   Altered mental status   Anemia   Urinary retention   Hypokalemia      Admitting Diagnosis: Hyponatremia [E87 1]  UTI (urinary tract infection) [N39 0]  Abnormal laboratory test [R89 9]  Acute kidney injury ([facility] ) [N17 9]  AMS (altered mental status) [R41 82]  Age/Sex: 47 y o  female  Admission Orders:  5/6/2021 0320 inpatient   Scheduled Medications:  aztreonam, 2,000 mg, Intravenous, Q8H  dextrose, 500 mL, Intravenous, Once - 0843 on 5/7/2021   docusate sodium, 100 mg, Oral, BID  enoxaparin, 40 mg, Subcutaneous, Daily  ergocalciferol, 50,000 Units, Oral, Once per day on Mon Thu  folic acid, 1 mg, Oral, Daily  gabapentin, 300 mg, Oral, HS  levETIRAcetam, 500 mg, Oral, Q12H [facility]  levothyroxine, 50 mcg, Oral, Early Morning  mirtazapine, 15 mg, Oral, HS  prazosin, 2 mg, Oral, HS  vancomycin, 10 mg/kg, Intravenous, Q12H  venlafaxine, 75 mg, Oral, Daily    albumin human (FLEXBUMIN) 5 % injection 12 5 g   Dose: 12 5 g  Freq: Once Route: IV  Last Dose: Stopped (05/07/21 0546)  Start: 05/07/21 0445 End: 05/07/21 0546    Continuous IV Infusions:sodium chloride 0 9 % infusion   Rate: 125 mL/hr Dose: 125 mL/hr  Freq: Continuous Route: IV  Indications of Use: IV Hydration  Last Dose: Stopped (05/06/21 0147)  Start: 05/05/21 2330 End: 05/06/21 0143    sodium chloride 0 9 % infusion   Rate: 75 mL/hr Dose: 75 mL/hr  Freq: Continuous Route: IV  Indications of Use: IV Hydration  Last Dose: Stopped (05/06/21 1235)  Start: 05/06/21 0230 End: 05/06/21 0834     PRN Meds:  acetaminophen, 650 mg, Oral, Q6H PRN - used x 3 5/6, x 1 5/7  butalbital-acetaminophen-caffeine, 1 tablet, Oral, Q4H PRN - used x 2 5/6, x 1 5/7  cyclobenzaprine, 10 mg, Oral, TID PRN - used x 2 5/6  gabapentin, 100 mg, Oral, TID PRN - used x 1 5/6  LORazepam, 0 5 mg, Oral, Q8H PRN used x  2 5/6, x 1   ondansetron, 4 mg, Intravenous, Q6H PRN  zolpidem, 5 mg, Oral, HS PRN - used x 1 5/6        IP CONSULT TO UROLOGY  IP CONSULT TO NEPHROLOGY    Network Utilization Review Department  ATTENTION: Please call with any questions or concerns to [phone] and carefully listen to the prompts so that you are directed to the right person   All voicemails are confidential   Porter dennison requests for admission clinical reviews, approved or denied determinations and any other requests to dedicated fax number below belonging to the campus where the patient is receiving treatment   List of dedicated fax numbers for the Facilities:  [address] [place] [address] DENIALS (Administrative/Medical Necessity) [phone]   [address] (Maternity/NICU/Pediatrics) [phone]   [address] Onel Spainel Brittany [address] [id number] [address] Jordana Nguyen [address] [facility] [place] [address] [phone]

## 2021-05-07 NOTE — PROGRESS NOTES
Vancomycin IV Pharmacy-to-Dose Consultation    Fiordaliza Estrada is a 47 y o  female who is currently receiving Vancomycin IV with management by the Pharmacy Consult service  Assessment/Plan:  The patient was reviewed  Renal function is stable and no signs or symptoms of nephrotoxicity and/or infusion reactions were documented in the chart  Based on todays assessment, continue current vancomycin (day # 2) dosing of 750 mg IV q12h, with a plan for trough to be drawn at 1130 on 5/8/21  We will continue to follow the patients culture results and clinical progress daily      Frances Ceja, Pharmacist

## 2021-05-07 NOTE — ASSESSMENT & PLAN NOTE
· Patient out of bed in chair with much encouragement by nursing staff to have her mobilize continue with PT OT  ·  Recent admission 4/5 to 4/26 due to generalized weakness  · Patient was seen by Neurology with extensive workup including LP, MRI brain, C-spine, and L-spine that showed no meningitis or structural abnormalities that would account for patient's weakness  · patient did receive plasmapheresis due to possible concern for autoimmune neuropathy  · On discharge, it was felt by Neurology that the patient's complaints were due to nutritional deficiency due to anorexia nervosa  · Patient was denied acute rehab placement by her insurance, so she was sent to long-term care at [facility] on 04/26

## 2021-05-07 NOTE — PROGRESS NOTES
[place]  Progress Note - Fiordaliza Estrada 1980, 47 y o  female MRN: [id number]  Unit/Bed#: -01 Encounter: [id number]  Primary Care Provider: Kermit Miranda MD   Date and time admitted to hospital: 5/5/2021 11:15 PM    Altered mental status  Assessment & Plan  · Now improving with decreasing doses of Ambien and lorazepam  ·  Patient more alert  · Patient did receive Ambien prior to EMS arrival  · Etiology: Hyponatremia vs sepsis vs Ambien intake (more likely Ambien intake with improvement in mental status)     Pyelonephritis  Assessment & Plan  · UA consistent with infection:  30-50 wbc's and innumerable bacteria   · Fever and tachycardiac on arrival -now improved  · Urology is following  · Patient started on levofloxacin and vancomycin in the ED due to anaphylactic reaction history to penicillin  · Urine culture, pending   · Prior urine culture from 04/10 grew E coli susceptible to Bactrim, tobramycin, tetracycline, nitrofurantoin and, gentamicin, cefazolin, aztreonam, and ampicillin  Resistant to Cipro and levofloxacin    · Cystitis during 4/10-4/26 admission was treated with aztreonam x3 days and then bactrim x1 day   · Will change pt to vanc and aztreonam - adjust per u/c     RENE (acute kidney injury) ([facility] [address] )  Assessment & Plan  · Creatinine at 1 79 on admission now with hydration creatinine this morning is 1 11  · Cr on 5/3 was 0 6   · Avoid nephrotoxic drugs   · Avoid hypotension  · Suspect that this is prerenal and postrenal in the setting of dehydration from anorexia nervosa and retention   · Pt with bladder scan of 850CC on arrival to ED - successful straight cath  · Zhong reinserted as per Urology needs to be kept in 2 weeks-patient reports her Zhong was just removed a week ago at the SNF  · I/Os     Generalized weakness  Assessment & Plan  · Patient out of bed in chair with much encouragement by nursing staff to have her mobilize continue with PT OT  ·  Recent admission 4/5 to 4/26 due to generalized weakness  · Patient was seen by Neurology with extensive workup including LP, MRI brain, C-spine, and L-spine that showed no meningitis or structural abnormalities that would account for patient's weakness  · patient did receive plasmapheresis due to possible concern for autoimmune neuropathy  · On discharge, it was felt by Neurology that the patient's complaints were due to nutritional deficiency due to anorexia nervosa  · Patient was denied acute rehab placement by her insurance, so she was sent to long-term care at [facility] on 04/26    * Hyponatremia  Assessment & Plan  · Sodium 121 on admission now gradually improving 136-cap IV fluids  · Chloride at 87  · Na on 5/3 was 132 per SNF labs   · Received 700 cc bolus of normal saline by [facility] prior to EMS arrival  ·  nephrology input appreciated  ·  BMP q4h   · Neurochecks q4h  · Goal correction of 8-10mEq in the next 24 hours   · Suspect that this is secondary to decreased PO intake in the setting of patient's anorexia nervosa  · Pt admitted to [place] [address] due to severity of hyponatremia -will transfer to [facility]    Urinary retention  Assessment & Plan  · Pt required straight cath x2 and Zhong placed  · Urinary retention protocol   ·      Anemia  Assessment & Plan  · Hemoglobin at 9 7 on admission now dropped to 8 4/26-repeat in the a m    · Baseline hemoglobin is between 8-10  · Suspect that this is secondary to nutritional deficiencies  · Repeat CBC     Moderate episode of recurrent major depressive disorder (HCC)  Assessment & Plan  · Patient's medications were adjusted during prior admission   · Current regimen:  Remeron 15 mg HS, Effexor 37 5 mg daily, prazosin 2mg HS (for nightmares), and Ambien 10 mg HS  · will hold Ambien in the setting of altered mental status    Hypothyroidism  Assessment & Plan  · Home regimen: Synthroid 50 mcg daily   · Will check TSH    Anorexia nervosa, restricting type  Assessment & Plan  · Patient was at [facility] secondary to deconditioning from nutritional deficiency associated with anorexia nervosa  · During admission in  to  patient was evaluated by Psychiatry who felt that patient did not qualify for inpatient psych at that time   · Patient on clear Ensure which she appears to be tolerating and encouraged her with additional intake during the day        VTE Prophylaxis: in place    Patient Centered Rounds: I rounded with patient's nurse    Current Length of Stay: 1 day(s)    Current Patient Status: Inpatient    Certification Statement: Pt requires additional inpatient hospital stay due to: see assessment and plan        Subjective:   Patiently currently out of bed and more alert today  She denies cough for shortness of breath  She denies abdominal pain but feels generally weak    All other ROS are negative    Objective:     Vitals:   Temp (24hrs), Av °F (36 7 °C), Min:97 3 °F (36 3 °C), Max:98 7 °F (37 1 °C)    Temp:  [97 3 °F (36 3 °C)-98 7 °F (37 1 °C)] 97 9 °F (36 6 °C)  HR:  [] 109  Resp:  [14-29] 14  BP: ()/(41-59) 98/58  SpO2:  [95 %-99 %] 96 %  Body mass index is 25 41 kg/m²  Input and Output Summary (last 24 hours): Intake/Output Summary (Last 24 hours) at 2021 1003  Last data filed at 2021 0843  Gross per 24 hour   Intake 3161 25 ml   Output 4332 ml   Net -1170 75 ml       Physical Exam:     Physical Exam  Vitals signs and nursing note reviewed  Constitutional:       Appearance: She is ill-appearing  Eyes:      General: No scleral icterus  Right eye: No discharge  Left eye: No discharge  Neck:      Musculoskeletal: No neck rigidity  Cardiovascular:      Rate and Rhythm: Normal rate and regular rhythm  Heart sounds: No murmur  Pulmonary:      Breath sounds: No wheezing or rhonchi  Abdominal:      General: There is no distension        Comments: Some right flank tenderness   Musculoskeletal: General: No swelling or tenderness  Neurological:      Mental Status: She is alert  Motor: Weakness present  Psychiatric:      Comments: Blunted affect             I personally reviewed labs and imaging reports for today  Last 24 Hours Medication List:   Current Facility-Administered Medications   Medication Dose Route Frequency Provider Last Rate    acetaminophen  650 mg Oral Q6H PRN Celestia Fanny, PA-C      aztreonam  2,000 mg Intravenous Q8H Elizabethjaylin Johnson DO Stopped (05/07/21 0547)    butalbital-acetaminophen-caffeine  1 tablet Oral Q4H PRN Ferny Velazquez DO      cyclobenzaprine  10 mg Oral TID PRN Ferny Velazquez DO      dextrose  500 mL Intravenous Once Thurmon Frederick, CRNP      docusate sodium  100 mg Oral BID Celestia Fanny, PA-C      enoxaparin  40 mg Subcutaneous Daily Celestia Fanny, PA-C      ergocalciferol  50,000 Units Oral Once per day on Mon Thu Celestia Fanny, PA-C      folic acid  1 mg Oral Daily Celestia Fanny, PA-C      gabapentin  100 mg Oral TID PRN Celestia Fanny, PA-C      gabapentin  300 mg Oral HS Celestia Fanny, PA-C      levETIRAcetam  500 mg Oral Q12H [address] Celestia Fanny, PA-C      levothyroxine  50 mcg Oral Early Morning Celestia Fanny, PA-C      LORazepam  0 5 mg Oral Q8H PRN Celestia Fanny, PA-C      mirtazapine  15 mg Oral HS Celestia Fanny, PA-C      ondansetron  4 mg Intravenous Q6H PRN Celestia Fanny, PA-C      prazosin  2 mg Oral HS Celestia Fanny, PA-C      vancomycin  10 mg/kg Intravenous Q12H Celestia Fanny, PA-C Stopped (05/07/21 0200)    venlafaxine  75 mg Oral Daily Celestia Fanny, PA-C      zolpidem  5 mg Oral HS PRN Ferny Velazquez DO            Today, Patient Was Seen By: Ferny Velazquez DO    ** Please Note: Dictation voice to text software may have been used in the creation of this document   **

## 2021-05-07 NOTE — ASSESSMENT & PLAN NOTE
· Sodium 121 on admission now gradually improving 136-cap IV fluids  · Chloride at 87  · Na on 5/3 was 132 per SNF labs   · Received 700 cc bolus of normal saline by [facility] prior to EMS arrival  ·  nephrology input appreciated  ·  BMP q4h   · Neurochecks q4h  · Goal correction of 8-10mEq in the next 24 hours   · Suspect that this is secondary to decreased PO intake in the setting of patient's anorexia nervosa  · Pt admitted to [facility] due to severity of hyponatremia -will transfer to med surg

## 2021-05-07 NOTE — NUTRITION
05/07/21 0802   Assessment   Timepoint Nutrition Review  (Calorie Count Consult )   Adequacy of Intake   Nutrition Modality PO  (Regular, Ensure TID)   Intake Supplements %   Estimated Calorie Intake 50-75%   Estimated Protein Intake  50-75%   Estimated Fluid Intake %   Estimated calorie intake compared to estimated need Pt took 1 Ensure Clears and 3 cranberry juice for brk today  360 kcal/8 gm protein total  No solids or hayley solids at brk  PES Statement   Problem Continue previous diagnosis   Patient Nutrition Goals   Goal Increase kcal/PRO intake   Goal Status not met;extended   Timeframe to complete goal by next f/u   Recommendations/Interventions   Summary Full assessment completed 5/7/21, see notes for details  Reports neighbor brought in [facility] last evening and she had some General 's and P[employer]  Brk today: 1 Ensure Clears and 3 Cranberry Juice (360 kcal/8 gm protein)  No solid or semi solids eaten today  Posted 48 hrs Calorie Counts in pt's room  Discussed calorie count posting with CNA and RN  Pt refusing any Ensure product other than Ensure Clears     Interventions Supplement continue;Diet: continued as ordered   Nutrition Recommendations Continue diet as ordered   Nutrition Complexity Risk   Nutrition complexity level High risk   Follow up date 05/09/21  (calorie count results)

## 2021-05-08 ENCOUNTER — TELEPHONE (OUTPATIENT)
Dept: CT IMAGING | Facility: HOSPITAL | Age: 41
End: 2021-05-08

## 2021-05-08 ENCOUNTER — APPOINTMENT (INPATIENT)
Dept: CT IMAGING | Facility: HOSPITAL | Age: 41
DRG: 871 | End: 2021-05-08
Payer: COMMERCIAL

## 2021-05-08 PROBLEM — E83.42 HYPOMAGNESEMIA: Status: ACTIVE | Noted: 2021-05-08

## 2021-05-08 LAB
ANION GAP SERPL CALCULATED.3IONS-SCNC: 11 MMOL/L (ref 4–13)
ANION GAP SERPL CALCULATED.3IONS-SCNC: 14 MMOL/L (ref 4–13)
BUN SERPL-MCNC: 7 MG/DL (ref 5–25)
BUN SERPL-MCNC: 7 MG/DL (ref 5–25)
CALCIUM SERPL-MCNC: 9 MG/DL (ref 8.3–10.1)
CALCIUM SERPL-MCNC: 9 MG/DL (ref 8.3–10.1)
CHLORIDE SERPL-SCNC: 103 MMOL/L (ref 100–108)
CHLORIDE SERPL-SCNC: 106 MMOL/L (ref 100–108)
CO2 SERPL-SCNC: 22 MMOL/L (ref 21–32)
CO2 SERPL-SCNC: 23 MMOL/L (ref 21–32)
CREAT SERPL-MCNC: 0.77 MG/DL (ref 0.6–1.3)
CREAT SERPL-MCNC: 0.91 MG/DL (ref 0.6–1.3)
GFR SERPL CREATININE-BSD FRML MDRD: 80 ML/MIN/1.73SQ M
GFR SERPL CREATININE-BSD FRML MDRD: 98 ML/MIN/1.73SQ M
GLUCOSE SERPL-MCNC: 88 MG/DL (ref 65–140)
GLUCOSE SERPL-MCNC: 90 MG/DL (ref 65–140)
MAGNESIUM SERPL-MCNC: 1.4 MG/DL (ref 1.6–2.6)
MAGNESIUM SERPL-MCNC: 2.3 MG/DL (ref 1.6–2.6)
POTASSIUM SERPL-SCNC: 2.9 MMOL/L (ref 3.5–5.3)
POTASSIUM SERPL-SCNC: 4.1 MMOL/L (ref 3.5–5.3)
SODIUM SERPL-SCNC: 139 MMOL/L (ref 136–145)
SODIUM SERPL-SCNC: 140 MMOL/L (ref 136–145)
VANCOMYCIN TROUGH SERPL-MCNC: 16.7 UG/ML (ref 10–20)

## 2021-05-08 PROCEDURE — 80048 BASIC METABOLIC PNL TOTAL CA: CPT | Performed by: INTERNAL MEDICINE

## 2021-05-08 PROCEDURE — 80202 ASSAY OF VANCOMYCIN: CPT | Performed by: INTERNAL MEDICINE

## 2021-05-08 PROCEDURE — 80048 BASIC METABOLIC PNL TOTAL CA: CPT | Performed by: PHYSICIAN ASSISTANT

## 2021-05-08 PROCEDURE — 99232 SBSQ HOSP IP/OBS MODERATE 35: CPT | Performed by: INTERNAL MEDICINE

## 2021-05-08 PROCEDURE — 83735 ASSAY OF MAGNESIUM: CPT | Performed by: INTERNAL MEDICINE

## 2021-05-08 PROCEDURE — 74176 CT ABD & PELVIS W/O CONTRAST: CPT

## 2021-05-08 PROCEDURE — 83735 ASSAY OF MAGNESIUM: CPT | Performed by: PHYSICIAN ASSISTANT

## 2021-05-08 PROCEDURE — 93005 ELECTROCARDIOGRAM TRACING: CPT

## 2021-05-08 PROCEDURE — G1004 CDSM NDSC: HCPCS

## 2021-05-08 RX ORDER — POTASSIUM CHLORIDE 14.9 MG/ML
20 INJECTION INTRAVENOUS ONCE
Status: COMPLETED | OUTPATIENT
Start: 2021-05-08 | End: 2021-05-08

## 2021-05-08 RX ORDER — MAGNESIUM SULFATE HEPTAHYDRATE 40 MG/ML
2 INJECTION, SOLUTION INTRAVENOUS ONCE
Status: COMPLETED | OUTPATIENT
Start: 2021-05-08 | End: 2021-05-08

## 2021-05-08 RX ORDER — POTASSIUM CHLORIDE 20 MEQ/1
40 TABLET, EXTENDED RELEASE ORAL EVERY 4 HOURS
Status: COMPLETED | OUTPATIENT
Start: 2021-05-08 | End: 2021-05-08

## 2021-05-08 RX ADMIN — ENOXAPARIN SODIUM 40 MG: 40 INJECTION SUBCUTANEOUS at 08:30

## 2021-05-08 RX ADMIN — AZTREONAM 2000 MG: 2 INJECTION, POWDER, LYOPHILIZED, FOR SOLUTION INTRAMUSCULAR; INTRAVENOUS at 05:01

## 2021-05-08 RX ADMIN — FOLIC ACID 1 MG: 1 TABLET ORAL at 08:30

## 2021-05-08 RX ADMIN — AZTREONAM 2000 MG: 2 INJECTION, POWDER, LYOPHILIZED, FOR SOLUTION INTRAMUSCULAR; INTRAVENOUS at 20:39

## 2021-05-08 RX ADMIN — GABAPENTIN 300 MG: 300 CAPSULE ORAL at 22:19

## 2021-05-08 RX ADMIN — LORAZEPAM 0.5 MG: 0.5 TABLET ORAL at 17:26

## 2021-05-08 RX ADMIN — LEVETIRACETAM 500 MG: 500 TABLET, FILM COATED ORAL at 20:39

## 2021-05-08 RX ADMIN — POTASSIUM CHLORIDE 40 MEQ: 1500 TABLET, EXTENDED RELEASE ORAL at 07:01

## 2021-05-08 RX ADMIN — LEVOTHYROXINE SODIUM 50 MCG: 25 TABLET ORAL at 05:04

## 2021-05-08 RX ADMIN — DOCUSATE SODIUM 100 MG: 100 CAPSULE, LIQUID FILLED ORAL at 08:30

## 2021-05-08 RX ADMIN — MIRTAZAPINE 15 MG: 15 TABLET, FILM COATED ORAL at 22:18

## 2021-05-08 RX ADMIN — ZOLPIDEM TARTRATE 5 MG: 5 TABLET, COATED ORAL at 22:18

## 2021-05-08 RX ADMIN — LORAZEPAM 0.5 MG: 0.5 TABLET ORAL at 08:30

## 2021-05-08 RX ADMIN — POTASSIUM CHLORIDE 20 MEQ: 14.9 INJECTION, SOLUTION INTRAVENOUS at 06:51

## 2021-05-08 RX ADMIN — DOCUSATE SODIUM 100 MG: 100 CAPSULE, LIQUID FILLED ORAL at 18:15

## 2021-05-08 RX ADMIN — CYCLOBENZAPRINE HYDROCHLORIDE 10 MG: 10 TABLET, FILM COATED ORAL at 22:18

## 2021-05-08 RX ADMIN — BUTALBITAL, ACETAMINOPHEN AND CAFFEINE 1 TABLET: 50; 325; 40 TABLET ORAL at 15:32

## 2021-05-08 RX ADMIN — BUTALBITAL, ACETAMINOPHEN AND CAFFEINE 1 TABLET: 50; 325; 40 TABLET ORAL at 19:42

## 2021-05-08 RX ADMIN — VANCOMYCIN HYDROCHLORIDE 750 MG: 750 INJECTION, SOLUTION INTRAVENOUS at 12:46

## 2021-05-08 RX ADMIN — VANCOMYCIN HYDROCHLORIDE 750 MG: 750 INJECTION, SOLUTION INTRAVENOUS at 23:08

## 2021-05-08 RX ADMIN — ACETAMINOPHEN 650 MG: 325 TABLET, FILM COATED ORAL at 08:29

## 2021-05-08 RX ADMIN — POTASSIUM CHLORIDE 40 MEQ: 1500 TABLET, EXTENDED RELEASE ORAL at 10:22

## 2021-05-08 RX ADMIN — LEVETIRACETAM 500 MG: 500 TABLET, FILM COATED ORAL at 08:30

## 2021-05-08 RX ADMIN — ACETAMINOPHEN 650 MG: 325 TABLET, FILM COATED ORAL at 22:19

## 2021-05-08 RX ADMIN — VENLAFAXINE HYDROCHLORIDE 75 MG: 75 CAPSULE, EXTENDED RELEASE ORAL at 08:30

## 2021-05-08 RX ADMIN — MAGNESIUM SULFATE HEPTAHYDRATE 2 G: 40 INJECTION, SOLUTION INTRAVENOUS at 10:19

## 2021-05-08 RX ADMIN — AZTREONAM 2000 MG: 2 INJECTION, POWDER, LYOPHILIZED, FOR SOLUTION INTRAMUSCULAR; INTRAVENOUS at 14:14

## 2021-05-08 NOTE — PROGRESS NOTES
Vancomycin IV Pharmacy-to-Dose Consultation    Nichelle Platt is a 47 y o  female who is currently receiving Vancomycin IV with management by the Pharmacy Consult service  Ondication: UTI, pneumonia    Assessment/Plan:  The patient was reviewed  Renal function is stable and no signs or symptoms of nephrotoxicity and/or infusion reactions were documented in the chart  Based on todays assessment,and noting vanco trough today is therapeutic at 16 7 continue current vancomycin (day # 3) dosing of 750 mg q12h, with a plan for trough to be drawn at 1130 on 5/13/21  We will continue to follow the patients culture results and clinical progress daily      Livier Ibrahim, Pharmacist

## 2021-05-08 NOTE — ASSESSMENT & PLAN NOTE
· Patient was at [facility] secondary to deconditioning from nutritional deficiency associated with anorexia nervosa  · During admission in 4/5 to 4/26 patient was evaluated by Psychiatry who felt that patient did not qualify for inpatient psych at that time   · Patient on clear Ensure which she appears to be tolerating and encouraged her with additional intake during the day  · Nutrition input appreciated and we are following calorie counts-difficult situation in this patient with previous gastric bypass surgery and revisions

## 2021-05-08 NOTE — PROGRESS NOTES
Progress Note - Nephrology   Malena Julien 47 y o  female MRN: [id number]  Unit/Bed#: -01 Encounter: [id number]    ASSESSMENT and PLAN:  Acute kidney injury (POA):  Now resolved  Assessment and plan:  § Etiology Suspect prerenal, UTI and obstructive uropathy with retention in the setting of recent AK I  § After review medical records through [facility] and Care everywhere baseline creatinine 0 6-0 8  § Status post Zhong catheter for urinary retention  § Now with polyuria  § Treated with IV fluids-currently off  § Not on Ace/Arb/NSAIDs  § Admission creatinine 1 79 on 04/05/2021  § Current creatinine 0 77 with stable electrolytes and acid-base balance  § Will continue trend I/O, lab values volume status      § Avoid NSAIDs, IV contrastor nephrotoxins     Acute Hyponatremia:  Assessment and plan:  § Suspect secondary to volume depletion in the setting of nausea vomiting, decreased appetite with known malnutrition  § May also be concerning for polydipsia in the setting of psychiatric disorder  § Urine osmolality 111, urine sodium 13-suggestive of increased fluid intake  § Admission sodium 121-sodium settled at 130 After 12 hours off IV fluids  Current sodium 140  § Status post D5 water times 10 hours  § Urine output slowly decreasing currently 3 9 L  § Will continue his trend     Blood pressure/hemodynamic  Assessment and Plan:  · Not on any antihypertensive medications  · Previously-hypotensive-now improved 99//62  · Maximize hemodynamics to maintain MAP >65  · Avoid hypotension or fluctuations in blood pressure  · Will continue to trend     H&H/anemia:  In setting of critical illness  Assessment and Plan:  · Current hemoglobin 8 4  · Per primary team  · Transfuse if hemoglobin less than 7 0     Acid-base/electrolytes:  Assessment and Plan:  · Acid-base stable at 23 without anion gap  · Lactic acid 0 4- 5/6/2021  · Hypokalemia-status post replacement-improved at 4 1 today  · Hypo magnesium-status post replacement-improved 2 3 today  · Hypercalcemia:   suspect secondary to postobstructive diuresis, polyuria  · Corrected calcium proved 10 44  · Will continue trend and check CMP in a m      Urinary retention:    Assessment and plan:  § Concern for possible neurogenic bladder  § Zhong catheter in place after retaining  § Urology following     Concern for angus nephritis  Assessment and plan:  § perprimary team  § Concern for polynephritis  § Urine culture-preliminary revealed lactobacillus-await sensitivity-for primary team  § Previous urine culture from 04/10/2021 revealed E coli and treated with Aztreonam x 3 days  §  Currently on aztreonam for primary team    Other medical issues:  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:  Evaluated during last hospitalization with neurology  Status post plasma phoresis  Psychiatric disorder with anorexia nervosa/anxiety depression-for primary       SUBJECTIVE:  Patient seen and examined at bedside  Patient overall feeling better however is having periodic headaches and nausea  Reports eating and drinking better    No acute events overnight    OBJECTIVE:  Current Weight: Weight - Scale: 71 4 kg (157 lb 6 5 oz)  Vitals:    05/07/21 2051 05/07/21 2100 05/07/21 2300 05/08/21 0744   BP:  101/62  99/61   BP Location:  Left arm  Left arm   Pulse: 104 (!) 111 (!) 107 (!) 110   Resp: 16   20   Temp:    99 2 °F (37 3 °C)   TempSrc:    Oral   SpO2: 95%  100% 100%   Weight:       Height:           Intake/Output Summary (Last 24 hours) at 5/8/2021 1518  Last data filed at 5/8/2021 0443  Gross per 24 hour   Intake 520 ml   Output 2400 ml   Net -1880 ml     General:  No acute distress, cooperative,   Skin:  Warm and dry without rash  ENMT:  Mucous membranes moist, sclera anicteric  Neck:  Supple without JVD  Respiratory:  Essentially clear on auscultation without crackles, rhonchi, wheezes  Cardiac:  Regular rate and rhythm without rub, or murmur  GI:  Soft, nontender, no distention, active bowel sounds  Extremities:  No significant edema noted bilaterally  Neuro:  Alert oriented and awake  Psych:  Appropriate affect    Medications:    Current Facility-Administered Medications:     acetaminophen (TYLENOL) tablet 650 mg, 650 mg, Oral, Q6H PRN, Elizabeth Fly, DO, 650 mg at 05/08/21 0829    aztreonam (AZACTAM) 2,000 mg in sodium chloride 0 9 % 100 mL IVPB, 2,000 mg, Intravenous, Q8H, Elizabeth Fly, DO, Last Rate: 100 mL/hr at 05/08/21 1414, 2,000 mg at 05/08/21 1414    butalbital-acetaminophen-caffeine (FIORICET,ESGIC) -40 mg per tablet 1 tablet, 1 tablet, Oral, Q4H PRN, Adeel Supreme, DO, 1 tablet at 05/07/21 1238    cyclobenzaprine (FLEXERIL) tablet 10 mg, 10 mg, Oral, TID PRN, Adeel Supreme, DO, 10 mg at 05/07/21 2121    docusate sodium (COLACE) capsule 100 mg, 100 mg, Oral, BID, Elizabeth Fly, DO, 100 mg at 05/08/21 0830    enoxaparin (LOVENOX) subcutaneous injection 40 mg, 40 mg, Subcutaneous, Daily, Elizabeth Fly, DO, 40 mg at 05/08/21 0830    ergocalciferol (VITAMIN D2) capsule 50,000 Units, 50,000 Units, Oral, Once per day on Mon Thu, Elizabeth Fly, DO, 50,000 Units at 63/19/05 0852    folic acid (FOLVITE) tablet 1 mg, 1 mg, Oral, Daily, Elizabeth Fly, DO, 1 mg at 05/08/21 0830    gabapentin (NEURONTIN) capsule 100 mg, 100 mg, Oral, TID PRN, Elizabeth Fly, DO, 100 mg at 05/06/21 1235    gabapentin (NEURONTIN) capsule 300 mg, 300 mg, Oral, HS, Elizabeth Fly, DO, 300 mg at 05/07/21 2115    levETIRAcetam (KEPPRA) tablet 500 mg, 500 mg, Oral, Q12H [address], Elizabeth Fly, DO, 500 mg at 05/08/21 0830    levothyroxine tablet 50 mcg, 50 mcg, Oral, Early Morning, Elizabeth Fly, DO, 50 mcg at 05/08/21 0504    LORazepam (ATIVAN) tablet 0 5 mg, 0 5 mg, Oral, Q8H PRN, Elizabeth Fly, DO, 0 5 mg at 05/08/21 0830    mirtazapine (REMERON) tablet 15 mg, 15 mg, Oral, HS, Elizabeth Fly, DO, 15 mg at 05/07/21 2115    ondansetron (ZOFRAN) injection 4 mg, 4 mg, Intravenous, Q6H PRN, Elizabeth Fly, DO    potassium chloride (K-DUR,KLOR-CON) CR tablet 40 mEq, 40 mEq, Oral, Once, Oliverio Hernandez Fly, DO    prazosin (MINIPRESS) capsule 2 mg, 2 mg, Oral, HS, Elizabeth Fly, DO, 2 mg at 05/06/21 2329    vancomycin (VANCOCIN) IVPB (premix in dextrose) 750 mg 150 mL, 10 mg/kg, Intravenous, Q12H, Elizabeth Fly, DO, Last Rate: 150 mL/hr at 05/08/21 1246, 750 mg at 05/08/21 1246    venlafaxine (EFFEXOR-XR) 24 hr capsule 75 mg, 75 mg, Oral, Daily, Elizabeth Fly, DO, 75 mg at 05/08/21 0830    zolpidem (AMBIEN) tablet 5 mg, 5 mg, Oral, HS PRN, Vallie November, DO, 5 mg at 05/07/21 2125    Laboratory Results:  Results from last 7 days   Lab Units 05/08/21  1241 05/08/21  0513 05/07/21  1445 05/07/21  0432 05/06/21  1316 05/06/21  1032 05/06/21  0612  05/05/21  2346   WBC Thousand/uL  --   --   --  9 52  --   --  13 32*  --  14 50*   HEMOGLOBIN g/dL  --   --   --  8 4*  --   --  8 6*  --  9 7*   HEMATOCRIT %  --   --   --  26 0*  --   --  26 2*  --  28 3*   PLATELETS Thousands/uL  --   --   --  186  --   --  198  --  249   SODIUM mmol/L 140 139 136 136 130* 130* 126*   < > 121*   POTASSIUM mmol/L 4 1 2 9* 3 3* 3 4* 3 3* 3 8 3 9   < > 4 1   CHLORIDE mmol/L 106 103 102 102 97* 96* 92*   < > 87*   CO2 mmol/L 23 22 20* 22 23 23 18*   < > 23   BUN mg/dL 7 7 10 14 19 20 20   < > 21   CREATININE mg/dL 0 77 0 91 0 87 1 11 1 35* 1 42* 1 58*   < > 1 79*   CALCIUM mg/dL 9 0 9 0 8 7 8 8 8 3 8 6 8 4   < > 8 5   MAGNESIUM mg/dL 2 3 1 4*  --  2 0  --   --   --   --   --     < > = values in this interval not displayed

## 2021-05-08 NOTE — ASSESSMENT & PLAN NOTE
· Hemoglobin at 9 7 on admission then  dropped to 8 4/26-repeat in the a m    · Baseline hemoglobin is between 8-10  · Suspect that this is secondary to nutritional deficiencies  · Repeat CBC

## 2021-05-08 NOTE — ASSESSMENT & PLAN NOTE
· Patient out of bed in chair with much encouragement by nursing staff to have her mobilize continue with PT OT  ·  Recent admission 4/5 to 4/26 due to generalized weakness  · Patient was seen by Neurology at the time of prior admission with extensive workup including LP, MRI brain, C-spine, and L-spine that showed no meningitis or structural abnormalities that would account for patient's weakness  · patient did receive plasmapheresis due to possible concern for autoimmune neuropathy  · On discharge, it was felt by Neurology that the patient's complaints were due to nutritional deficiency due to anorexia nervosa  · Patient was denied acute rehab placement by her insurance, so she was sent to long-term care at [facility] on 04/26  · PT OT to continue

## 2021-05-08 NOTE — ASSESSMENT & PLAN NOTE
· Patient's medications were adjusted during prior admission   · Current regimen:  Remeron 15 mg HS, Effexor 37 5 mg daily, prazosin 2mg HS (for nightmares), and Ambien decrease to 5 mg at HS due to excess sedation issues with that and lorazepam on use

## 2021-05-08 NOTE — ASSESSMENT & PLAN NOTE
· Creatinine at 1 79 on admission now with hydration creatinine improved on May 7, 2021 down to 1 11-now at 0 91  · Cr on 5/3 was 0 6   · Avoid nephrotoxic drugs   · Avoid hypotension  · Suspect that this is prerenal and postrenal in the setting of dehydration from anorexia nervosa and retention   · Pt with urinary retention with multiple straight caths with 800 to a 1000 mL  · Zhong reinserted as per Urology needs to be kept in 2 weeks-patient reports her Zhong was just removed a week ago at the [facility]  · I/Os

## 2021-05-08 NOTE — PROGRESS NOTES
[place]  Progress Note - Ema Martino 1980, 47 y o  female MRN: [id number]  Unit/Bed#: -01 Encounter: [id number]  Primary Care Provider: Pepper Cordon MD   Date and time admitted to hospital: 5/5/2021 11:15 PM    Altered mental status  Assessment & Plan  · Now improving with decreasing doses of Ambien and lorazepam  ·  Patient more alert  · Patient did receive Ambien prior to EMS arrival  · Etiology: Hyponatremia vs sepsis vs Ambien intake (more likely Ambien intake with improvement in mental status)     Pyelonephritis  Assessment & Plan  · UA consistent with infection:  30-50 wbc's and innumerable bacteria   · Fever and tachycardiac on arrival -now improved  · With flank pain issues will check CT of the abdomen pelvis  · Urology is following  · Patient started on levofloxacin and vancomycin in the ED due to anaphylactic reaction history to penicillin  · Urine culture, preliminary shows lactobacillus-await sensitivities  · Prior urine culture from 04/10 grew E coli susceptible to Bactrim, tobramycin, tetracycline, nitrofurantoin and, gentamicin, cefazolin, aztreonam, and ampicillin  Resistant to Cipro and levofloxacin    · Cystitis during 4/10-4/26 admission was treated with aztreonam x3 days and then bactrim x1 day   · Will change pt to vanc and aztreonam - adjust per u/c Day 3 abx    RENE (acute kidney injury) ([facility] [address] )  Assessment & Plan  · Creatinine at 1 79 on admission now with hydration creatinine improved on May 7, 2021 down to 1 11-now at 0 91  · Cr on 5/3 was 0 6   · Avoid nephrotoxic drugs   · Avoid hypotension  · Suspect that this is prerenal and postrenal in the setting of dehydration from anorexia nervosa and retention   · Pt with urinary retention with multiple straight caths with 800 to a 1000 mL  · Zhong reinserted as per Urology needs to be kept in 2 weeks-patient reports her Zhong [facility]  · I/Os     Generalized weakness  Assessment & Plan  · Patient out of bed in chair with much encouragement by nursing staff to have her mobilize continue with PT OT  ·  Recent admission 4/5 to 4/26 due to generalized weakness  · Patient was seen by Neurology at the time of prior admission with extensive workup including LP, MRI brain, C-spine, and L-spine that showed no meningitis or structural abnormalities that would account for patient's weakness  · patient did receive plasmapheresis due to possible concern for autoimmune neuropathy  · On discharge, it was felt by Neurology that the patient's complaints were due to nutritional deficiency due to anorexia nervosa  · Patient was denied acute rehab placement by her insurance, so she was sent to long-term care at [facility] on 04/26  · PT OT to continue    Hypokalemia  Assessment & Plan  Patient given p o supplement  And IV bolus by night team-for decreased level 2  4 repeat level in the a m  Urinary retention  Assessment & Plan  ·   ·  Pt required straight cath x2 and Zhong placed-urology input appreciated  ·      Anemia  Assessment & Plan  · Hemoglobin at 9 7 on admission then  dropped to 8 4/26-repeat in the a m    · Baseline hemoglobin is between 8-10  · Suspect that this is secondary to nutritional deficiencies  · Repeat CBC     Moderate episode of recurrent major depressive disorder (HCC)  Assessment & Plan  · Patient's medications were adjusted during prior admission   · Current regimen:  Remeron 15 mg HS, Effexor 37 5 mg daily, prazosin 2mg HS (for nightmares), and Ambien decrease to 5 mg at HS due to excess sedation issues with that and lorazepam on use    Hypothyroidism  Assessment & Plan  · Home regimen: Synthroid 50 mcg daily   · TSH is stable at 1 37    Anorexia nervosa, restricting type  Assessment & Plan  · Patient was at [facility] secondary to deconditioning from nutritional deficiency associated with anorexia nervosa  · During admission in 4/5 to 4/26 patient was evaluated by Psychiatry who felt that patient did not qualify for inpatient psych at that time   · Patient on clear Ensure which she appears to be tolerating and encouraged her with additional intake during the day  · Nutrition input appreciated and we are following calorie counts-difficult situation in this patient with previous gastric bypass surgery and revisions    Hypomagnesemia  Assessment & Plan  Decreased level to 1 point 7 this morning-will place on oral supplements    Hypercalcemia  Assessment & Plan  Calcium at 9 0 today-continue hydration and encourage p o  Intake of fluids        VTE Prophylaxis: in place    Patient Centered Rounds: I rounded with patient's nurse    Current Length of Stay: 2 day(s)    Current Patient Status: Inpatient    Certification Statement: Pt requires additional inpatient hospital stay due to: see assessment and plan        Subjective:   Patient with some continued abdominal pain today  She reports that she feels more nauseated as they were giving her the IV potassium supplementation this a m Gerhardt Sep She also reports that she did not get her Ensure supplement last evening  All other ROS are negative    Objective:     Vitals:   Temp (24hrs), Av 5 °F (36 9 °C), Min:97 8 °F (36 6 °C), Max:99 2 °F (37 3 °C)    Temp:  [97 8 °F (36 6 °C)-99 2 °F (37 3 °C)] 99 2 °F (37 3 °C)  HR:  [101-111] 110  Resp:  [15-20] 20  BP: ()/(61-62) 99/61  SpO2:  [95 %-100 %] 100 %  Body mass index is 25 41 kg/m²  Input and Output Summary (last 24 hours): Intake/Output Summary (Last 24 hours) at 2021 0832  Last data filed at 2021 0443  Gross per 24 hour   Intake 756 ml   Output 3700 ml   Net -2944 ml       Physical Exam:     Physical Exam  Vitals signs and nursing note reviewed  Constitutional:       General: She is not in acute distress  Appearance: She is not toxic-appearing  HENT:      Right Ear: There is no impacted cerumen  Left Ear:  There is no impacted cerumen  Nose: No congestion  Eyes:      General:         Right eye: No discharge  Left eye: No discharge  Cardiovascular:      Rate and Rhythm: Normal rate and regular rhythm  Heart sounds: No murmur  Pulmonary:      Breath sounds: No wheezing  Comments: Decreased after  Abdominal:      General: There is no distension  Palpations: Abdomen is soft  Tenderness: There is abdominal tenderness  There is no guarding or rebound  Comments: Left flank tenderness greater than right   Musculoskeletal:         General: No swelling  Neurological:      Motor: Weakness present  Comments: Generalized weakness has difficulty lifting her knees to brace for to help question bed   Psychiatric:      Comments: Depressed after             I personally reviewed labs and imaging reports for today        Last 24 Hours Medication List:   Current Facility-Administered Medications   Medication Dose Route Frequency Provider Last Rate    acetaminophen  650 mg Oral Q6H PRN Chantal Larry, DO      aztreonam  2,000 mg Intravenous Q8H Elizabeth Fly, DO 2,000 mg (05/08/21 0501)    butalbital-acetaminophen-caffeine  1 tablet Oral Q4H PRN Chantal Larry, DO      cyclobenzaprine  10 mg Oral TID PRN Chantal Larry, DO      docusate sodium  100 mg Oral BID Elizabeth Fly, DO      enoxaparin  40 mg Subcutaneous Daily Chantal Larry, DO      ergocalciferol  50,000 Units Oral Once per day on Mon Thu Elizabeth Fly, DO      folic acid  1 mg Oral Daily Chantal Larry, DO      gabapentin  100 mg Oral TID PRN Chantal Larry, DO      gabapentin  300 mg Oral HS Elizabeth Fly, DO      levETIRAcetam  500 mg Oral Q12H [address] Elizabeth Fly, DO      levothyroxine  50 mcg Oral Early Morning Elizabeth Fly, DO      LORazepam  0 5 mg Oral Q8H PRN Chantal Larry, DO      magnesium sulfate  2 g Intravenous Once Tania Dsouza PA-C      mirtazapine  15 mg Oral HS Elizabeth Fly, DO      ondansetron  4 mg Intravenous Q6H PRN Elizabeth Fly, DO      potassium chloride  40 mEq Oral Once Elizabeth Fly, DO      potassium chloride  40 mEq Oral Q4H HERNÁN Welch-VERONICA      potassium chloride  20 mEq Intravenous Once Sarah Evans PA-C 20 mEq (05/08/21 0651)    prazosin  2 mg Oral HS Elizabeth Fly, DO      vancomycin  10 mg/kg Intravenous Q12H Elizabeth Fly,  mg (05/07/21 5519)    venlafaxine  75 mg Oral Daily Elizabeth Fly, DO      zolpidem  5 mg Oral HS PRN Home Cassidy DO            Today, Patient Was Seen By: Home Cassidy DO    ** Please Note: Dictation voice to text software may have been used in the creation of this document   **

## 2021-05-08 NOTE — ASSESSMENT & PLAN NOTE
Patient given p o supplement  And IV bolus by night team-for decreased level 2  4 repeat level in the a m

## 2021-05-08 NOTE — ASSESSMENT & PLAN NOTE
· UA consistent with infection:  30-50 wbc's and innumerable bacteria   · Fever and tachycardiac on arrival -now improved  · With flank pain issues will check CT of the abdomen pelvis  · Urology is following  · Patient started on levofloxacin and vancomycin in the ED due to anaphylactic reaction history to penicillin  · Urine culture, preliminary shows lactobacillus-await sensitivities  · Prior urine culture from 04/10 grew E coli susceptible to Bactrim, tobramycin, tetracycline, nitrofurantoin and, gentamicin, cefazolin, aztreonam, and ampicillin  Resistant to Cipro and levofloxacin    · Cystitis during 4/10-4/26 admission was treated with aztreonam x3 days and then bactrim x1 day   · Will change pt to vanc and aztreonam - adjust per u/c Day 3 abx

## 2021-05-09 PROBLEM — N17.9 AKI (ACUTE KIDNEY INJURY) (HCC): Status: RESOLVED | Noted: 2021-04-05 | Resolved: 2021-05-09

## 2021-05-09 PROBLEM — R35.89 POLYURIA: Status: RESOLVED | Noted: 2021-05-06 | Resolved: 2021-05-09

## 2021-05-09 LAB
ALBUMIN SERPL BCP-MCNC: 2.2 G/DL (ref 3.5–5)
ALP SERPL-CCNC: 178 U/L (ref 46–116)
ALT SERPL W P-5'-P-CCNC: 26 U/L (ref 12–78)
ANION GAP SERPL CALCULATED.3IONS-SCNC: 13 MMOL/L (ref 4–13)
AST SERPL W P-5'-P-CCNC: 24 U/L (ref 5–45)
BILIRUB SERPL-MCNC: 0.2 MG/DL (ref 0.2–1)
BUN SERPL-MCNC: 6 MG/DL (ref 5–25)
CALCIUM ALBUM COR SERPL-MCNC: 9.9 MG/DL (ref 8.3–10.1)
CALCIUM SERPL-MCNC: 8.5 MG/DL (ref 8.3–10.1)
CHLORIDE SERPL-SCNC: 108 MMOL/L (ref 100–108)
CO2 SERPL-SCNC: 21 MMOL/L (ref 21–32)
CREAT SERPL-MCNC: 0.81 MG/DL (ref 0.6–1.3)
ERYTHROCYTE [DISTWIDTH] IN BLOOD BY AUTOMATED COUNT: 15.9 % (ref 11.6–15.1)
GFR SERPL CREATININE-BSD FRML MDRD: 92 ML/MIN/1.73SQ M
GLUCOSE SERPL-MCNC: 79 MG/DL (ref 65–140)
HCT VFR BLD AUTO: 27.8 % (ref 34.8–46.1)
HGB BLD-MCNC: 8.7 G/DL (ref 11.5–15.4)
MCH RBC QN AUTO: 29.5 PG (ref 26.8–34.3)
MCHC RBC AUTO-ENTMCNC: 31.3 G/DL (ref 31.4–37.4)
MCV RBC AUTO: 94 FL (ref 82–98)
PLATELET # BLD AUTO: 305 THOUSANDS/UL (ref 149–390)
PMV BLD AUTO: 10.5 FL (ref 8.9–12.7)
POTASSIUM SERPL-SCNC: 3.7 MMOL/L (ref 3.5–5.3)
PROT SERPL-MCNC: 5.5 G/DL (ref 6.4–8.2)
RBC # BLD AUTO: 2.95 MILLION/UL (ref 3.81–5.12)
SODIUM SERPL-SCNC: 142 MMOL/L (ref 136–145)
WBC # BLD AUTO: 7.56 THOUSAND/UL (ref 4.31–10.16)

## 2021-05-09 PROCEDURE — 99232 SBSQ HOSP IP/OBS MODERATE 35: CPT | Performed by: INTERNAL MEDICINE

## 2021-05-09 PROCEDURE — 85027 COMPLETE CBC AUTOMATED: CPT | Performed by: INTERNAL MEDICINE

## 2021-05-09 PROCEDURE — 80053 COMPREHEN METABOLIC PANEL: CPT | Performed by: INTERNAL MEDICINE

## 2021-05-09 RX ADMIN — ENOXAPARIN SODIUM 40 MG: 40 INJECTION SUBCUTANEOUS at 08:42

## 2021-05-09 RX ADMIN — LORAZEPAM 0.5 MG: 0.5 TABLET ORAL at 07:43

## 2021-05-09 RX ADMIN — ACETAMINOPHEN 650 MG: 325 TABLET, FILM COATED ORAL at 06:17

## 2021-05-09 RX ADMIN — MIRTAZAPINE 15 MG: 15 TABLET, FILM COATED ORAL at 21:45

## 2021-05-09 RX ADMIN — LEVETIRACETAM 500 MG: 500 TABLET, FILM COATED ORAL at 08:42

## 2021-05-09 RX ADMIN — BUTALBITAL, ACETAMINOPHEN AND CAFFEINE 1 TABLET: 50; 325; 40 TABLET ORAL at 11:01

## 2021-05-09 RX ADMIN — GABAPENTIN 300 MG: 300 CAPSULE ORAL at 21:45

## 2021-05-09 RX ADMIN — VANCOMYCIN HYDROCHLORIDE 750 MG: 750 INJECTION, SOLUTION INTRAVENOUS at 23:48

## 2021-05-09 RX ADMIN — AZTREONAM 2000 MG: 2 INJECTION, POWDER, LYOPHILIZED, FOR SOLUTION INTRAMUSCULAR; INTRAVENOUS at 20:03

## 2021-05-09 RX ADMIN — DOCUSATE SODIUM 100 MG: 100 CAPSULE, LIQUID FILLED ORAL at 08:42

## 2021-05-09 RX ADMIN — FOLIC ACID 1 MG: 1 TABLET ORAL at 08:42

## 2021-05-09 RX ADMIN — BUTALBITAL, ACETAMINOPHEN AND CAFFEINE 1 TABLET: 50; 325; 40 TABLET ORAL at 18:31

## 2021-05-09 RX ADMIN — LEVETIRACETAM 500 MG: 500 TABLET, FILM COATED ORAL at 20:02

## 2021-05-09 RX ADMIN — ACETAMINOPHEN 650 MG: 325 TABLET, FILM COATED ORAL at 20:02

## 2021-05-09 RX ADMIN — VENLAFAXINE HYDROCHLORIDE 75 MG: 75 CAPSULE, EXTENDED RELEASE ORAL at 08:42

## 2021-05-09 RX ADMIN — ACETAMINOPHEN 650 MG: 325 TABLET, FILM COATED ORAL at 13:13

## 2021-05-09 RX ADMIN — LEVOTHYROXINE SODIUM 50 MCG: 25 TABLET ORAL at 05:01

## 2021-05-09 RX ADMIN — ZOLPIDEM TARTRATE 5 MG: 5 TABLET, COATED ORAL at 21:45

## 2021-05-09 RX ADMIN — CYCLOBENZAPRINE HYDROCHLORIDE 10 MG: 10 TABLET, FILM COATED ORAL at 13:11

## 2021-05-09 RX ADMIN — AZTREONAM 2000 MG: 2 INJECTION, POWDER, LYOPHILIZED, FOR SOLUTION INTRAMUSCULAR; INTRAVENOUS at 12:33

## 2021-05-09 RX ADMIN — AZTREONAM 2000 MG: 2 INJECTION, POWDER, LYOPHILIZED, FOR SOLUTION INTRAMUSCULAR; INTRAVENOUS at 04:58

## 2021-05-09 RX ADMIN — CYCLOBENZAPRINE HYDROCHLORIDE 10 MG: 10 TABLET, FILM COATED ORAL at 06:18

## 2021-05-09 RX ADMIN — CYCLOBENZAPRINE HYDROCHLORIDE 10 MG: 10 TABLET, FILM COATED ORAL at 21:44

## 2021-05-09 RX ADMIN — VANCOMYCIN HYDROCHLORIDE 750 MG: 750 INJECTION, SOLUTION INTRAVENOUS at 11:02

## 2021-05-09 NOTE — ASSESSMENT & PLAN NOTE
· Creatinine at 1 79 on admission now with hydration creatinine improved on May 7, 2021 down to 1 11-now at 0 91  · Cr on 5/3 was 0 6   · Avoid nephrotoxic drugs   · Avoid hypotension  · Suspect that this is prerenal and postrenal in the setting of dehydration from anorexia nervosa and retention   · Pt with urinary retention with multiple straight caths with 800 to a 1000 mL  · Zhong reinserted as per Urology needs to be kept in 2 weeks-patient reports her Zhong was just removed a week ago at the [facility]  · I/Os

## 2021-05-09 NOTE — PROGRESS NOTES
NEPHROLOGY PROGRESS NOTE   [place] Kidd 47 y o  female MRN: [id number]  Unit/Bed#: -01 Encounter: [id number]      ASSESSMENT & PLAN:  1  Acute kidney injury, suspected prerenal and improved after intravenously fluid resuscitation as well as some component of urinary retention  Baseline creatinine 0 6- 0 8  Admitted with a creatinine 1 79 on 04/05, creatinine down to 0 8 today  Avoid relative hypotension  Follow daily labs  2  Hyponatremia, suspected multifactorial with in the setting of volume depletion, nausea, vomiting, decreased appetite, reported also concerning for polydipsia in the setting of psych disorder  Initially were correct requiring D5W  Currently off intravenously fluids  Serum sodium of 142 today, encourage oral intake  3  Hemodynamics, patient is not on antihypertensive medication, blood pressure is stable in the lower side    4  Anemia, hemoglobin low but stable, follow H&H    5  Pyelonephritis, management per primary team      SUBJECTIVE:  Patient seen and examined, watching TV, denies any chest pain, shortness of breath, abdominal pain, feeling nauseous but denies any vomiting        OBJECTIVE:  Current Weight: Weight - Scale: 71 4 kg (157 lb 6 5 oz)  Vitals:    05/08/21 2209   BP: 101/64   Pulse: 97   Resp: 18   Temp: 99 3 °F (37 4 °C)   SpO2: 98%       Intake/Output Summary (Last 24 hours) at 5/9/2021 1346  Last data filed at 5/8/2021 2209  Gross per 24 hour   Intake 420 ml   Output 1250 ml   Net -830 ml       General: conscious, cooperative, in not acute distress  Eyes: conjunctivae pink, anicteric sclerae  ENT: lips and mucous membranes moist  Neck: supple, no JVD  Chest: clear breath sounds bilateral, no crackles, ronchus or wheezings  CVS: distinct S1 & S2, normal rate, regular rhythm  Abdomen: soft, non-tender, non-distended, normoactive bowel sounds  Extremities: no significant edema of both legs  Skin: no rash  Neuro: awake, alert, interactive        Medications:    Current Facility-Administered Medications:     acetaminophen (TYLENOL) tablet 650 mg, 650 mg, Oral, Q6H PRN, Elizabeth Fly, DO, 650 mg at 05/09/21 1313    aztreonam (AZACTAM) 2,000 mg in sodium chloride 0 9 % 100 mL IVPB, 2,000 mg, Intravenous, Q8H, Elizabeth Fly, DO, Last Rate: 100 mL/hr at 05/09/21 1233, 2,000 mg at 05/09/21 1233    butalbital-acetaminophen-caffeine (FIORICET,ESGIC) -40 mg per tablet 1 tablet, 1 tablet, Oral, Q4H PRN, Frias Sand, DO, 1 tablet at 05/09/21 1101    cyclobenzaprine (FLEXERIL) tablet 10 mg, 10 mg, Oral, TID PRN, Frias Sand, DO, 10 mg at 05/09/21 1311    docusate sodium (COLACE) capsule 100 mg, 100 mg, Oral, BID, Elizabeth Fly, DO, 100 mg at 05/09/21 0842    enoxaparin (LOVENOX) subcutaneous injection 40 mg, 40 mg, Subcutaneous, Daily, Elizabeth Fly, DO, 40 mg at 05/09/21 6746    ergocalciferol (VITAMIN D2) capsule 50,000 Units, 50,000 Units, Oral, Once per day on Mon Thu, Elizabeth Fly, DO, 50,000 Units at 63/93/98 5662    folic acid (FOLVITE) tablet 1 mg, 1 mg, Oral, Daily, Elizabeth Fly, DO, 1 mg at 05/09/21 0842    gabapentin (NEURONTIN) capsule 100 mg, 100 mg, Oral, TID PRN, Eliazbeth Fly, DO, 100 mg at 05/06/21 1235    gabapentin (NEURONTIN) capsule 300 mg, 300 mg, Oral, HS, Elizabeth Fly, DO, 300 mg at 05/08/21 2219    levETIRAcetam (KEPPRA) tablet 500 mg, 500 mg, Oral, Q12H [facility], Elizabeth Fly, DO, 500 mg at 05/09/21 0842    levothyroxine tablet 50 mcg, 50 mcg, Oral, Early Morning, Elizabeth Fly, DO, 50 mcg at 05/09/21 0501    LORazepam (ATIVAN) tablet 0 5 mg, 0 5 mg, Oral, Q8H PRN, Elizabeth Fly, DO, 0 5 mg at 05/09/21 0743    mirtazapine (REMERON) tablet 15 mg, 15 mg, Oral, HS, Elizabeth Fly, DO, 15 mg at 05/08/21 2218    ondansetron (ZOFRAN) injection 4 mg, 4 mg, Intravenous, Q6H PRN, Elizabeth Fly, DO    potassium chloride (K-DUR,KLOR-CON) CR tablet 40 mEq, 40 mEq, Oral, Once, Frias Sand, DO    prazosin (MINIPRESS) capsule 2 mg, 2 mg, Oral, HS, Elizabeth Fly, DO, 2 mg at 05/06/21 6786   vancomycin (VANCOCIN) IVPB (premix in dextrose) 750 mg 150 mL, 10 mg/kg, Intravenous, Q12H, Elizabeth Fly, DO, Last Rate: 150 mL/hr at 05/09/21 1102, 750 mg at 05/09/21 1102    venlafaxine (EFFEXOR-XR) 24 hr capsule 75 mg, 75 mg, Oral, Daily, Elizabeth Fly, DO, 75 mg at 05/09/21 0842    zolpidem (AMBIEN) tablet 5 mg, 5 mg, Oral, HS PRN, Elizabeth Fly, DO, 5 mg at 05/08/21 2218    Invasive Devices:   Urethral Catheter Latex 16 Fr   (Active)   Amt returned on insertion(mL) 1300 mL 05/06/21 1230   Reasons to continue Urinary Catheter  Acute urinary retention/obstruction failing urinary retention protocol 05/09/21 1001   Goal for Removal N/A- discharging with bates 05/09/21 1001   Site Assessment Clean;Skin intact 05/09/21 1001   Collection Container Standard drainage bag 05/09/21 1001   Securement Method Securing device (Describe) 05/09/21 1001   Output (mL) 1250 mL 05/08/21 2209       Lab Results:   Results from last 7 days   Lab Units 05/09/21  0455 05/08/21  1241 05/08/21  0513  05/07/21  0432  05/06/21  0612  05/05/21  2346   WBC Thousand/uL 7 56  --   --   --  9 52  --  13 32*  --  14 50*   HEMOGLOBIN g/dL 8 7*  --   --   --  8 4*  --  8 6*  --  9 7*   HEMATOCRIT % 27 8*  --   --   --  26 0*  --  26 2*  --  28 3*   PLATELETS Thousands/uL 305  --   --   --  186  --  198  --  249   SODIUM mmol/L 142 140 139   < > 136   < > 126*   < > 121*   POTASSIUM mmol/L 3 7 4 1 2 9*   < > 3 4*   < > 3 9   < > 4 1   CHLORIDE mmol/L 108 106 103   < > 102   < > 92*   < > 87*   CO2 mmol/L 21 23 22   < > 22   < > 18*   < > 23   BUN mg/dL 6 7 7   < > 14   < > 20   < > 21   CREATININE mg/dL 0 81 0 77 0 91   < > 1 11   < > 1 58*   < > 1 79*   CALCIUM mg/dL 8 5 9 0 9 0   < > 8 8   < > 8 4   < > 8 5   MAGNESIUM mg/dL  --  2 3 1 4*  --  2 0  --   --   --   --    ALK PHOS U/L 178*  --   --   --  145*  --   --   --  132*   ALT U/L 26  --   --   --  21  --   --   --  19   AST U/L 24  --   --   --  23  --   --   --  14    < > = values in this interval not displayed  Previous work up:  See previous notes      Portions of the record may have been created with voice recognition software  Occasional wrong word or "sound a like" substitutions may have occurred due to the inherent limitations of voice recognition software  Read the chart carefully and recognize, using context, where substitutions have occurred  If you have any questions, please contact the dictating provider

## 2021-05-09 NOTE — ASSESSMENT & PLAN NOTE
· Hemoglobin at 9 7 on admission then  dropped to 8   4 but is slightly improved today at 8 7-  ·  Suspect that this is secondary to nutritional deficiencies  · Repeat CBC in rodolfo mo

## 2021-05-09 NOTE — ASSESSMENT & PLAN NOTE
· Now improving with decreasing doses of Ambien and lorazepam  ·  Continue to encourage out of bed time  · Patient did receive Ambien prior to EMS arrival  · Etiology: Hyponatremia vs sepsis vs Ambien intake (more likely Ambien intake with improvement in mental status)

## 2021-05-09 NOTE — ASSESSMENT & PLAN NOTE
· UA consistent with infection:  30-50 wbc's and innumerable bacteria   · With her symptomatology concerned about possible pyelonephritis-patient CT of the abdomen pelvis done yesterday showed resolution of the hydronephrosis with the Zhong in place  · Incidental finding   · Fever and tachycardiac on arrival -now improved  ·    · Urology is following  · Patient started on levofloxacin and vancomycin in the ED due to anaphylactic reaction history to penicillin-now day 5  Of treatment- then changed to aztreonam and vanco, hope to have stable for discharge in 24-48 hours   · Urine culture, preliminary shows lactobacillus-await sensitivities  · Prior urine culture from 04/10 grew E coli susceptible to Bactrim, tobramycin, tetracycline, nitrofurantoin and, gentamicin, cefazolin, aztreonam, and ampicillin  Resistant to Cipro and levofloxacin  · Cystitis during 4/10-4/26 admission was treated with aztreonam x3 days and then bactrim x1 day   · Patient on Vanco day 6  Of antibiotics  · Placed on p o   Vancomycin for C diff prophylaxis given loose stools

## 2021-05-09 NOTE — ASSESSMENT & PLAN NOTE
· Patient out of bed in chair with much encouragement by nursing staff  in the past 2 days-continue with PT OT  ·  Recent admission 4/5 to 4/26 due to generalized weakness  · Patient was seen by Neurology at the time of prior admission with extensive workup including LP, MRI brain, C-spine, and L-spine that showed no meningitis or structural abnormalities that would account for patient's weakness  · patient did receive plasmapheresis due to possible concern for autoimmune neuropathy  · On discharge, it was felt by Neurology that the patient's complaints were due to nutritional deficiency due to anorexia nervosa  · Patient was denied acute rehab placement by her insurance, so she was sent to long-term care at [facility] on 04/26  · PT OT to continue  · I have encouraged patient is to do exercise while sitting in the chair or when lying in bed

## 2021-05-09 NOTE — ASSESSMENT & PLAN NOTE
· CXR: "Mild left basilar opacity suggestive of atelectasis or a small developing infiltrate " Also noted to have significant gaseous distention on imaging   · Denies cough or dyspnea     · Encourage incentive spirometer

## 2021-05-09 NOTE — PROGRESS NOTES
[place]  Progress Note - Cliff Huerta 1980, 47 y o  female MRN: [id number]  Unit/Bed#: -01 Encounter: [id number]  Primary Care Provider: Shira Alex MD   Date and time admitted to hospital: 5/5/2021 11:15 PM    Altered mental status  Assessment & Plan  · Now improving with decreasing doses of Ambien and lorazepam  ·  Continue to encourage out of bed time  · Patient did receive Ambien prior to EMS arrival  · Etiology: Hyponatremia vs sepsis vs Ambien intake (more likely Ambien intake with improvement in mental status)     Pyelonephritis  Assessment & Plan  · UA consistent with infection:  30-50 wbc's and innumerable bacteria   · With her symptomatology concerned about possible pyelonephritis-patient CT of the abdomen pelvis done yesterday showed resolution of the hydronephrosis with the Zhong in place  · Incidental finding   · Fever and tachycardiac on arrival -now improved  ·    · Urology is following  · Patient started on levofloxacin and vancomycin in the ED due to anaphylactic reaction history to penicillin-now day 5  Of treatment-will switch to p o  Levaquin and DC the vancomycin-hope to have stable for discharge in 24-48 hours   · Urine culture, preliminary shows lactobacillus-await sensitivities  · Prior urine culture from 04/10 grew E coli susceptible to Bactrim, tobramycin, tetracycline, nitrofurantoin and, gentamicin, cefazolin, aztreonam, and ampicillin  Resistant to Cipro and levofloxacin    · Cystitis during 4/10-4/26 admission was treated with aztreonam x3 days and then bactrim x1 day   · Will change pt to vanc and aztreonam - adjust per u/c Day 3 abx    RENE (acute kidney injury) (HCC)-resolved as of 5/9/2021  Assessment & Plan  · Creatinine at 1 79 on admission now with hydration creatinine improved on May 7, 2021 down to 1 11-now at 0 91  · Cr on 5/3 was 0 6   · Avoid nephrotoxic drugs   · Avoid hypotension  · Suspect that this is prerenal and postrenal in the setting of dehydration from anorexia nervosa and retention   · Pt with urinary retention with multiple straight caths with 800 to a 1000 mL  · Zhong reinserted as per Urology needs to be kept in 2 weeks-patient reports her Zhong was just [facility] a week ago at the SN[facility]  · I/Os     Pneumonia  Assessment & Plan  · CXR: "Mild left basilar opacity suggestive of atelectasis or a small developing infiltrate " Also noted to have significant gaseous distention on imaging   · Denies cough or dyspnea  · Encourage incentive spirometer    Anemia  Assessment & Plan  · Hemoglobin at 9 7 on admission then  dropped to 8   4 but is slightly improved today at 8 7-  ·  Suspect that this is secondary to nutritional deficiencies  · Repeat CBC in a m      Generalized weakness  Assessment & Plan  · Patient out of bed in chair with much encouragement by nursing staff  in the past 2 days-continue with PT OT  ·  Recent admission 4/5 to 4/26 due to generalized weakness  · Patient was seen by Neurology at the time of prior admission with extensive workup including LP, MRI brain, C-spine, and L-spine that showed no meningitis or structural abnormalities that would account for patient's weakness  · patient did receive plasmapheresis due to possible concern for autoimmune neuropathy  · On discharge, it was felt by Neurology that the patient's complaints were due to nutritional deficiency due to anorexia nervosa  · Patient was denied acute rehab placement by her insurance, so she was sent to long-term care at [facility] on 04/26  · PT OT to continue  · I have encouraged patient is to do exercise while sitting in the chair or when lying in bed    Moderate episode of recurrent major depressive disorder ([facility] [address] )  Assessment & Plan  · Patient's medications were adjusted during prior admission   · Current regimen:  Remeron 15 mg HS, Effexor 37 5 mg daily, prazosin 2mg HS (for nightmares), and Ambien decrease to 5 mg at HS due to excess sedation issues with that and lorazepam on use    Hypothyroidism  Assessment & Plan  · Home regimen: Synthroid 50 mcg daily   · TSH is stable at 1 37    Anorexia nervosa, restricting type  Assessment & Plan  · Patient was at [facility] secondary to deconditioning from nutritional deficiency associated with anorexia nervosa  · During admission in  to  patient was evaluated by Psychiatry who felt that patient did not qualify for inpatient psych at that time   · Patient on clear Ensure which she appears to be tolerating and encouraged her with additional intake during the day  · Nutrition input appreciated and we are following calorie counts-difficult situation in this patient with previous gastric bypass surgery and revisions    Hypomagnesemia  Assessment & Plan  Decreased level to 1 7 -now  on oral supplements  Repeat in the a m  VTE Prophylaxis: in place    Patient Centered Rounds: I rounded with patient's nurse    Current Length of Stay: 3 day(s)    Current Patient Status: Inpatient    Certification Statement: Pt requires additional inpatient hospital stay due to: see assessment and plan        Subjective:   Patient without complaints of abdominal pain or headache this more    All other ROS are negative    Objective:     Vitals:   Temp (24hrs), Av °F (37 2 °C), Min:98 7 °F (37 1 °C), Max:99 3 °F (37 4 °C)    Temp:  [98 7 °F (37 1 °C)-99 3 °F (37 4 °C)] 99 3 °F (37 4 °C)  HR:  [92-97] 97  Resp:  [18-19] 18  BP: (101-105)/(64) 101/64  SpO2:  [98 %-100 %] 98 %  Body mass index is 25 41 kg/m²  Input and Output Summary (last 24 hours): Intake/Output Summary (Last 24 hours) at 2021 1206  Last data filed at 2021 2209  Gross per 24 hour   Intake 420 ml   Output 1250 ml   Net -830 ml       Physical Exam:     Physical Exam  Vitals signs and nursing note reviewed  Constitutional:       General: She is not in acute distress  Appearance: She is ill-appearing     HENT: Nose: No congestion  Eyes:      General: No scleral icterus  Right eye: No discharge  Left eye: No discharge  Neck:      Musculoskeletal: No neck rigidity  Cardiovascular:      Rate and Rhythm: Regular rhythm  Heart sounds: Gallop present  Pulmonary:      Effort: Pulmonary effort is normal       Breath sounds: Normal breath sounds  No wheezing  Abdominal:      General: Abdomen is flat  There is no distension  Palpations: Abdomen is soft  Comments: Still some mild left flank tenderness   Lymphadenopathy:      Cervical: No cervical adenopathy  Neurological:      Motor: Weakness present  I personally reviewed labs and imaging reports for today        Last 24 Hours Medication List:   Current Facility-Administered Medications   Medication Dose Route Frequency Provider Last Rate    acetaminophen  650 mg Oral Q6H PRN [place], DO      aztreonam  2,000 mg Intravenous Q8H Elizabeth Fly, DO 2,000 mg (05/09/21 7677)    butalbital-acetaminophen-caffeine  1 tablet Oral Q4H PRN [place], DO      cyclobenzaprine  10 mg Oral TID PRN [place], DO      docusate sodium  100 mg Oral BID Elizabeth Fly, DO      enoxaparin  40 mg Subcutaneous Daily [place], DO      ergocalciferol  50,000 Units Oral Once per day on Mon Thu Elizabeth Fly, DO      folic acid  1 mg Oral Daily [place], DO      gabapentin  100 mg Oral TID PRN [place], DO      gabapentin  300 mg Oral HS Elizabeth Fly, DO      levETIRAcetam  500 mg Oral Q12H [address] Elizabeth Fly, DO      levothyroxine  50 mcg Oral Early Morning Elizabeth Fly, DO      LORazepam  0 5 mg Oral Q8H PRN [place], DO      mirtazapine  15 mg Oral HS Elizabeth Fly, DO      ondansetron  4 mg Intravenous Q6H PRN [place], DO      potassium chloride  40 mEq Oral Once [place], DO      prazosin  2 mg Oral HS Elizabeth Fly, DO      vancomycin  10 mg/kg Intravenous Q12H Elizabeth Fly,  mg (05/09/21 1102)    venlafaxine  75 mg Oral Daily Elizabeth Fly, DO      zolpidem  5 mg Oral HS NAYAN Torres DO            Today, Patient Was Seen By: Shantelle Torres DO    ** Please Note: Dictation voice to text software may have been used in the creation of this document   **

## 2021-05-09 NOTE — ASSESSMENT & PLAN NOTE
· UA consistent with infection:  30-50 wbc's and innumerable bacteria   · With her symptomatology concerned about possible pyelonephritis-patient CT of the abdomen pelvis done yesterday showed resolution of the hydronephrosis with the Zhong in place  · Incidental finding   · Fever and tachycardiac on arrival -now improved  ·    · Urology is following  · Patient started on levofloxacin and vancomycin in the ED due to anaphylactic reaction history to penicillin-now day 5  Of treatment-will switch to p o  Levaquin and DC the vancomycin-hope to have stable for discharge in 24-48 hours   · Urine culture, preliminary shows lactobacillus-await sensitivities  · Prior urine culture from 04/10 grew E coli susceptible to Bactrim, tobramycin, tetracycline, nitrofurantoin and, gentamicin, cefazolin, aztreonam, and ampicillin  Resistant to Cipro and levofloxacin    · Cystitis during 4/10-4/26 admission was treated with aztreonam x3 days and then bactrim x1 day   · Will change pt to vanc and aztreonam - adjust per u/c Day 3 abx

## 2021-05-09 NOTE — PROGRESS NOTES
Pt  Has had poor oral intake through the weekend  No food was consumed on any meal trays only one ensure with 2/3 meals for the day  Pt  Encouraged to eat but she declined, education on the importance of her nutrition given

## 2021-05-09 NOTE — ASSESSMENT & PLAN NOTE
· Patient was at [place] [id number] secondary to deconditioning from nutritional deficiency associated with anorexia nervosa  · During admission in 4/5 to 4/26 patient was evaluated by Psychiatry who felt that patient did not qualify for inpatient psych at that time   · Patient on clear Ensure which she appears to be tolerating and encouraged her with additional intake during the day  · Nutrition input appreciated and we are following calorie counts-difficult situation in this patient with previous gastric bypass surgery and revisions

## 2021-05-09 NOTE — PROGRESS NOTES
Vancomycin IV Pharmacy-to-Dose Consultation    Isaias Hoffman is a 47 y o  female who is currently receiving Vancomycin IV with management by the Pharmacy Consult service  Indication: pneumonia, UTI    Assessment/Plan:  The patient was reviewed  Renal function is stable and no signs or symptoms of nephrotoxicity and/or infusion reactions were documented in the chart  Based on todays assessment, continue current vancomycin (day # 4) dosing of 750 mg Q12H, with a plan for trough to be drawn at 1130 hrs on 5/13/21  We will continue to follow the patients culture results and clinical progress daily      Fernanda Lorenz, Pharmacist

## 2021-05-10 LAB
ALBUMIN SERPL BCP-MCNC: 2.4 G/DL (ref 3.5–5)
ALP SERPL-CCNC: 208 U/L (ref 46–116)
ALT SERPL W P-5'-P-CCNC: 28 U/L (ref 12–78)
ANION GAP SERPL CALCULATED.3IONS-SCNC: 15 MMOL/L (ref 4–13)
AST SERPL W P-5'-P-CCNC: 17 U/L (ref 5–45)
BILIRUB SERPL-MCNC: 0.2 MG/DL (ref 0.2–1)
BUN SERPL-MCNC: 2 MG/DL (ref 5–25)
C DIFF TOX B TCDB STL QL NAA+PROBE: NEGATIVE
CALCIUM ALBUM COR SERPL-MCNC: 9.9 MG/DL (ref 8.3–10.1)
CALCIUM SERPL-MCNC: 8.6 MG/DL (ref 8.3–10.1)
CHLORIDE SERPL-SCNC: 109 MMOL/L (ref 100–108)
CO2 SERPL-SCNC: 20 MMOL/L (ref 21–32)
CREAT SERPL-MCNC: 0.78 MG/DL (ref 0.6–1.3)
ERYTHROCYTE [DISTWIDTH] IN BLOOD BY AUTOMATED COUNT: 15.8 % (ref 11.6–15.1)
GFR SERPL CREATININE-BSD FRML MDRD: 97 ML/MIN/1.73SQ M
GLUCOSE SERPL-MCNC: 85 MG/DL (ref 65–140)
HCT VFR BLD AUTO: 31.5 % (ref 34.8–46.1)
HGB BLD-MCNC: 9.8 G/DL (ref 11.5–15.4)
MAGNESIUM SERPL-MCNC: 1.5 MG/DL (ref 1.6–2.6)
MCH RBC QN AUTO: 29.7 PG (ref 26.8–34.3)
MCHC RBC AUTO-ENTMCNC: 31.1 G/DL (ref 31.4–37.4)
MCV RBC AUTO: 96 FL (ref 82–98)
PLATELET # BLD AUTO: 356 THOUSANDS/UL (ref 149–390)
PMV BLD AUTO: 9.9 FL (ref 8.9–12.7)
POTASSIUM SERPL-SCNC: 3.4 MMOL/L (ref 3.5–5.3)
PROT SERPL-MCNC: 5.8 G/DL (ref 6.4–8.2)
RBC # BLD AUTO: 3.3 MILLION/UL (ref 3.81–5.12)
SODIUM SERPL-SCNC: 144 MMOL/L (ref 136–145)
WBC # BLD AUTO: 8.23 THOUSAND/UL (ref 4.31–10.16)

## 2021-05-10 PROCEDURE — 87493 C DIFF AMPLIFIED PROBE: CPT | Performed by: INTERNAL MEDICINE

## 2021-05-10 PROCEDURE — 80053 COMPREHEN METABOLIC PANEL: CPT | Performed by: INTERNAL MEDICINE

## 2021-05-10 PROCEDURE — 87329 GIARDIA AG IA: CPT | Performed by: INTERNAL MEDICINE

## 2021-05-10 PROCEDURE — 99232 SBSQ HOSP IP/OBS MODERATE 35: CPT | Performed by: INTERNAL MEDICINE

## 2021-05-10 PROCEDURE — 85027 COMPLETE CBC AUTOMATED: CPT | Performed by: INTERNAL MEDICINE

## 2021-05-10 PROCEDURE — 87209 SMEAR COMPLEX STAIN: CPT | Performed by: INTERNAL MEDICINE

## 2021-05-10 PROCEDURE — 97167 OT EVAL HIGH COMPLEX 60 MIN: CPT

## 2021-05-10 PROCEDURE — 83735 ASSAY OF MAGNESIUM: CPT | Performed by: INTERNAL MEDICINE

## 2021-05-10 PROCEDURE — 87505 NFCT AGENT DETECTION GI: CPT | Performed by: INTERNAL MEDICINE

## 2021-05-10 PROCEDURE — 87177 OVA AND PARASITES SMEARS: CPT | Performed by: INTERNAL MEDICINE

## 2021-05-10 RX ORDER — POTASSIUM CHLORIDE 20 MEQ/1
40 TABLET, EXTENDED RELEASE ORAL ONCE
Status: COMPLETED | OUTPATIENT
Start: 2021-05-10 | End: 2021-05-10

## 2021-05-10 RX ORDER — SODIUM BICARBONATE 650 MG/1
650 TABLET ORAL DAILY
Status: DISCONTINUED | OUTPATIENT
Start: 2021-05-10 | End: 2021-05-13 | Stop reason: HOSPADM

## 2021-05-10 RX ORDER — LANOLIN ALCOHOL/MO/W.PET/CERES
100 CREAM (GRAM) TOPICAL DAILY
Status: DISCONTINUED | OUTPATIENT
Start: 2021-05-10 | End: 2021-05-13 | Stop reason: HOSPADM

## 2021-05-10 RX ORDER — MAGNESIUM SULFATE HEPTAHYDRATE 40 MG/ML
2 INJECTION, SOLUTION INTRAVENOUS ONCE
Status: COMPLETED | OUTPATIENT
Start: 2021-05-10 | End: 2021-05-10

## 2021-05-10 RX ADMIN — MIRTAZAPINE 15 MG: 15 TABLET, FILM COATED ORAL at 22:34

## 2021-05-10 RX ADMIN — VANCOMYCIN HYDROCHLORIDE 750 MG: 750 INJECTION, SOLUTION INTRAVENOUS at 11:11

## 2021-05-10 RX ADMIN — CYCLOBENZAPRINE HYDROCHLORIDE 10 MG: 10 TABLET, FILM COATED ORAL at 16:39

## 2021-05-10 RX ADMIN — GABAPENTIN 300 MG: 300 CAPSULE ORAL at 22:34

## 2021-05-10 RX ADMIN — BUTALBITAL, ACETAMINOPHEN AND CAFFEINE 1 TABLET: 50; 325; 40 TABLET ORAL at 13:39

## 2021-05-10 RX ADMIN — ZOLPIDEM TARTRATE 5 MG: 5 TABLET, COATED ORAL at 22:34

## 2021-05-10 RX ADMIN — ERGOCALCIFEROL 50000 UNITS: 1.25 CAPSULE ORAL at 09:17

## 2021-05-10 RX ADMIN — ENOXAPARIN SODIUM 40 MG: 40 INJECTION SUBCUTANEOUS at 09:17

## 2021-05-10 RX ADMIN — Medication 125 MG: at 22:05

## 2021-05-10 RX ADMIN — VENLAFAXINE HYDROCHLORIDE 75 MG: 75 CAPSULE, EXTENDED RELEASE ORAL at 09:17

## 2021-05-10 RX ADMIN — FOLIC ACID 1 MG: 1 TABLET ORAL at 09:17

## 2021-05-10 RX ADMIN — CYCLOBENZAPRINE HYDROCHLORIDE 10 MG: 10 TABLET, FILM COATED ORAL at 22:34

## 2021-05-10 RX ADMIN — AZTREONAM 2000 MG: 2 INJECTION, POWDER, LYOPHILIZED, FOR SOLUTION INTRAMUSCULAR; INTRAVENOUS at 20:50

## 2021-05-10 RX ADMIN — LEVOTHYROXINE SODIUM 50 MCG: 25 TABLET ORAL at 05:54

## 2021-05-10 RX ADMIN — THIAMINE HCL TAB 100 MG 100 MG: 100 TAB at 10:30

## 2021-05-10 RX ADMIN — SODIUM BICARBONATE 650 MG TABLET 650 MG: at 10:30

## 2021-05-10 RX ADMIN — ACETAMINOPHEN 650 MG: 325 TABLET, FILM COATED ORAL at 21:15

## 2021-05-10 RX ADMIN — MAGNESIUM SULFATE HEPTAHYDRATE 2 G: 40 INJECTION, SOLUTION INTRAVENOUS at 09:17

## 2021-05-10 RX ADMIN — BUTALBITAL, ACETAMINOPHEN AND CAFFEINE 1 TABLET: 50; 325; 40 TABLET ORAL at 19:54

## 2021-05-10 RX ADMIN — DOCUSATE SODIUM 100 MG: 100 CAPSULE, LIQUID FILLED ORAL at 09:17

## 2021-05-10 RX ADMIN — AZTREONAM 2000 MG: 2 INJECTION, POWDER, LYOPHILIZED, FOR SOLUTION INTRAMUSCULAR; INTRAVENOUS at 05:45

## 2021-05-10 RX ADMIN — LEVETIRACETAM 500 MG: 500 TABLET, FILM COATED ORAL at 09:17

## 2021-05-10 RX ADMIN — LORAZEPAM 0.5 MG: 0.5 TABLET ORAL at 18:41

## 2021-05-10 RX ADMIN — POTASSIUM CHLORIDE 40 MEQ: 1500 TABLET, EXTENDED RELEASE ORAL at 09:17

## 2021-05-10 RX ADMIN — LORAZEPAM 0.5 MG: 0.5 TABLET ORAL at 09:17

## 2021-05-10 RX ADMIN — Medication 125 MG: at 11:11

## 2021-05-10 RX ADMIN — VANCOMYCIN HYDROCHLORIDE 750 MG: 750 INJECTION, SOLUTION INTRAVENOUS at 23:58

## 2021-05-10 RX ADMIN — AZTREONAM 2000 MG: 2 INJECTION, POWDER, LYOPHILIZED, FOR SOLUTION INTRAMUSCULAR; INTRAVENOUS at 12:33

## 2021-05-10 RX ADMIN — ACETAMINOPHEN 650 MG: 325 TABLET, FILM COATED ORAL at 09:20

## 2021-05-10 RX ADMIN — LEVETIRACETAM 500 MG: 500 TABLET, FILM COATED ORAL at 20:50

## 2021-05-10 RX ADMIN — CYCLOBENZAPRINE HYDROCHLORIDE 10 MG: 10 TABLET, FILM COATED ORAL at 10:51

## 2021-05-10 NOTE — PROGRESS NOTES
Vancomycin IV Pharmacy-to-Dose Consultation    Fiordaliza Estrada is a 47 y o  female who is currently receiving Vancomycin IV with management by the Pharmacy Consult service  Assessment/Plan:  The patient was reviewed  Renal function is stable and no signs or symptoms of nephrotoxicity and/or infusion reactions were documented in the chart  Based on todays assessment, continue current vancomycin (day # 5) dosing of 750mg IV Q12H, with a plan for trough to be drawn at 1130 on 5/13/21  We will continue to follow the patients culture results and clinical progress daily      Vishal Almaraz, Pharmacist

## 2021-05-10 NOTE — PLAN OF CARE
Problem: Potential for Falls  Goal: Patient will remain free of falls  Description: INTERVENTIONS:  - Assess patient frequently for physical needs  -  Identify cognitive and physical deficits and behaviors that affect risk of falls    -  Coulter fall precautions as indicated by assessment   - Educate patient/family on patient safety including physical limitations  - Instruct patient to call for assistance with activity based on assessment  - Modify environment to reduce risk of injury  - Consider OT/PT consult to assist with strengthening/mobility  Outcome: Progressing     Problem: Prexisting or High Potential for Compromised Skin Integrity  Goal: Skin integrity is maintained or improved  Description: INTERVENTIONS:  - Identify patients at risk for skin breakdown  - Assess and monitor skin integrity  - Assess and monitor nutrition and hydration status  - Monitor labs   - Assess for incontinence   - Turn and reposition patient  - Assist with mobility/ambulation  - Relieve pressure over bony prominences  - Avoid friction and shearing  - Provide appropriate hygiene as needed including keeping skin clean and dry  - Evaluate need for skin moisturizer/barrier cream  - Collaborate with interdisciplinary team   - Patient/family teaching  - Consider wound care consult   Outcome: Progressing     Problem: NEUROSENSORY - ADULT  Goal: Achieves stable or improved neurological status  Description: INTERVENTIONS  - Monitor and report changes in neurological status  - Monitor vital signs such as temperature, blood pressure, glucose, and any other labs ordered   - Initiate measures to prevent increased intracranial pressure  - Monitor for seizure activity and implement precautions if appropriate      Outcome: Progressing  Goal: Remains free of injury related to seizures activity  Description: INTERVENTIONS  - Maintain airway, patient safety  and administer oxygen as ordered  - Monitor patient for seizure activity, document and report duration and description of seizure to physician/advanced practitioner  - If seizure occurs,  ensure patient safety during seizure  - Reorient patient post seizure  - Seizure pads on all 4 side rails  - Instruct patient/family to notify RN of any seizure activity including if an aura is experienced  - Instruct patient/family to call for assistance with activity based on nursing assessment  - Administer anti-seizure medications if ordered    Outcome: Progressing  Goal: Achieves maximal functionality and self care  Description: INTERVENTIONS  - Monitor swallowing and airway patency with patient fatigue and changes in neurological status  - Encourage and assist patient to increase activity and self care     - Encourage visually impaired, hearing impaired and aphasic patients to use assistive/communication devices  Outcome: Progressing     Problem: METABOLIC, FLUID AND ELECTROLYTES - ADULT  Goal: Electrolytes maintained within normal limits  Description: INTERVENTIONS:  - Monitor labs and assess patient for signs and symptoms of electrolyte imbalances  - Administer electrolyte replacement as ordered  - Monitor response to electrolyte replacements, including repeat lab results as appropriate  - Instruct patient on fluid and nutrition as appropriate  Outcome: Progressing  Goal: Fluid balance maintained  Description: INTERVENTIONS:  - Monitor labs   - Monitor I/O and WT  - Instruct patient on fluid and nutrition as appropriate  - Assess for signs & symptoms of volume excess or deficit  Outcome: Progressing  Goal: Glucose maintained within target range  Description: INTERVENTIONS:  - Monitor Blood Glucose as ordered  - Assess for signs and symptoms of hyperglycemia and hypoglycemia  - Administer ordered medications to maintain glucose within target range  - Assess nutritional intake and initiate nutrition service referral as needed  Outcome: Progressing     Problem: Nutrition/Hydration-ADULT  Goal: Nutrient/Hydration intake appropriate for improving, restoring or maintaining nutritional needs  Description: Monitor and assess patient's nutrition/hydration status for malnutrition  Collaborate with interdisciplinary team and initiate plan and interventions as ordered  Monitor patient's weight and dietary intake as ordered or per policy  Utilize nutrition screening tool and intervene as necessary  Determine patient's food preferences and provide high-protein, high-caloric foods as appropriate       INTERVENTIONS:  - Monitor oral intake, urinary output, labs, and treatment plans  - Assess nutrition and hydration status and recommend course of action  - Evaluate amount of meals eaten  - Assist patient with eating if necessary   - Allow adequate time for meals  - Recommend/ encourage appropriate diets, oral nutritional supplements, and vitamin/mineral supplements  - Order, calculate, and assess calorie counts as needed  - Recommend, monitor, and adjust tube feedings and TPN/PPN based on assessed needs  - Assess need for intravenous fluids  - Provide specific nutrition/hydration education as appropriate  - Include patient/family/caregiver in decisions related to nutrition  Outcome: Progressing

## 2021-05-10 NOTE — UTILIZATION REVIEW
Continued Stay Review  Sending additional information per request for this patient who was admitted with severe sepsis and bacteremia  Date: 5/8                          Current Patient Class: inpatient  Current Level of Care: med surg since 5/7    HPI:40 y o  female who needs assistance for mobility, resident of the [facility] SN[facility] since 4/26 w/hx anorexia nervosa, gastric bypass, major depression, weakness initially admitted on 5/6 as inpatient to ICU stepdown due to severe sepsis and bacteremia from pneumonia, pyelonephritis; also had RENE and hyponatremia  She was unable to urinate for 2 days pta  She was lethargic, difficult to arouse  Had double IV antbx, and IVF in progress  1 of 2 blood cultures and the urine culture came back positive  Calorie counts in progress with neuro checks  IV albumin and D5@ bolus required on 5/7  Urology evaluated and placed bates to remain in place for 1-2 weeks  Renal evaluated and noted RENE in setting of obstructive uropathy with UTI/pyelo  Bates to remain in place, na was overcorrected from 121 up to 130 rapidly  NS stopped and fluid restriction lifted  Became hypokalemic likely from NS administration and polyuria  Assessment/Plan:   5/8: c/o abd and flank pain along with nausea  abd is tender, L flank is more tender than R flank  Is weak and has difficulty lifting knees  more alert, k further drop to 2 9  PO and IV repletion given  c/o flank pain, checking CT a&p  Urine c&s showing lactobacillus  Creat improved to  91   Bates remains in place due to high volume urine output  Was having multiple straight caths per shift with 800-1000ml each time  Mg decreased to 1 4, IV mg given with improvement to 2  3  hgb dropped into 8's  Continues to use daily tylenol/fioricet/flexeril/ativan doses  Per renal: RENE improved after IVF: hyponatremia is suspected mulfifactorial with volume depletion, nausea, vomiting, decr appetite, and psychiatric disorder   Keeping off IVF, encourage PO intake  Intake/Output Summary (Last 24 hours) at 5/8/2021 0832  Last data filed at 5/8/2021 0443      Gross per 24 hour   Intake 756 ml   Output 3700 ml   Net -2944 ml     5/9: no abd pain or headache today  Does appear ill, still has L flank tenderness on exam  CT done yesterday showed resolution of hydronephrosis with bates in place  Remains on IV antbx  hgb 8 4, could be from nutritional deficiencies  She remains weak-needs to exercise while sitting in her chair or lying in bed  Plan to recheck labs in am  Continues to use tylenol/fioricet/flexeril/ativan doses  k had been improved, drop to 3 4 again today with further PO supplements given  Renal continues to follow: suspect polydipsia in setting of psych disorder  Na 142 today  C/o nausea  No vomiting  Creat   8  keep off IVF  Per RN: poor food intake throughout the weekend  No food was consumed from any meal tray, only 1 ensure twice daily  She refused to eat despite education and emphasis on importance of nutrition  Intake/Output Summary (Last 24 hours) at 5/9/2021 1206  Last data filed at 5/8/2021 2209      Gross per 24 hour   Intake 420 ml   Output 1250 ml   Net -830 ml        5/10: c/o diarrhea yesterday  Lungs clear, abd soft nontender  Flat affect  RENE and hyponatremia resolved  Remains hypokalemic with PO supplements given  Mg dropped to 1 5, giving additional IV mg today  Bicarb lower at 20, a gap 15, will give na bicarb supplements daily if tolerated  Continues to use PO tylenol, fioricet, flexeril, and ativan  Remains on double IV antbx       Intake/Output Summary (Last 24 hours) at 5/10/2021 0839  Last data filed at 5/10/2021 0545  Gross per 24 hour   Intake 480 ml   Output 3200 ml   Net -2720 ml       Vital Signs:   Date/Time  Temp  Pulse  Resp  BP  MAP (mmHg)  SpO2  O2 Device  Patient Position - Orthostatic VS   05/10/21 0917  98 3 °F (36 8 °C)  89  18  104/67  79  98 %  None (Room air)  Lying   05/10/21 0900  --  --  -- --  --  --  None (Room air)  --   05/09/21 2142  98 5 °F (36 9 °C)  93  18  99/62  75  99 %  None (Room air)  Lying   05/09/21 1528  98 2 °F (36 8 °C)  92  17  112/68  84  100 %  None (Room air)  Lying   05/09/21 0952  --  --  --  --  --  --  None (Room air)  --   05/08/21 2209  99 3 °F (37 4 °C)  97  18  101/64  78  98 %  None (Room air)  Lying   05/08/21 1720  98 7 °F (37 1 °C)  92  19  105/64  78  100 %  None (Room air)           Pertinent Labs/Diagnostic Results:   5/8 CT a&p: Since the prior exam, a Zhong is now present in the urinary bladder nondistended  Significant interval improvement of the prior bilateral hydronephrosis  Left kidney is remarkable for scarring throughout the lower pole    Cannot assess for evidence of pyelonephritis in the absence of intravenous contrast    Round well-defined lesion posterior to the uterus, presumably correlating with concave right ovarian cyst seen on recent ultrasound    Mild splenomegaly, increased compared to prior    Results from last 7 days   Lab Units 05/05/21  2346   SARS-COV-2  Negative     Results from last 7 days   Lab Units 05/10/21  0554 05/09/21  0455 05/07/21  0432 05/06/21  0612 05/05/21  2346   WBC Thousand/uL 8 23 7 56 9 52 13 32* 14 50*   HEMOGLOBIN g/dL 9 8* 8 7* 8 4* 8 6* 9 7*   HEMATOCRIT % 31 5* 27 8* 26 0* 26 2* 28 3*   PLATELETS Thousands/uL 356 305 186 198 249   NEUTROS ABS Thousands/µL  --   --   --   --  12 53*         Results from last 7 days   Lab Units 05/10/21  0554 05/09/21  0455 05/08/21  1241 05/08/21  0513 05/07/21  1445 05/07/21  0432   SODIUM mmol/L 144 142 140 139 136 136   POTASSIUM mmol/L 3 4* 3 7 4 1 2 9* 3 3* 3 4*   CHLORIDE mmol/L 109* 108 106 103 102 102   CO2 mmol/L 20* 21 23 22 20* 22   ANION GAP mmol/L 15* 13 11 14* 14* 12   BUN mg/dL 2* 6 7 7 10 14   CREATININE mg/dL 0 78 0 81 0 77 0 91 0 87 1 11   EGFR ml/min/1 73sq m 97 92 98 80 85 63   CALCIUM mg/dL 8 6 8 5 9 0 9 0 8 7 8 8   MAGNESIUM mg/dL 1 5*  --  2 3 1 4*  --  2 0 Results from last 7 days   Lab Units 05/10/21  0554 05/09/21  0455 05/07/21  0432 05/05/21  2346   AST U/L 17 24 23 14   ALT U/L 28 26 21 19   ALK PHOS U/L 208* 178* 145* 132*   TOTAL PROTEIN g/dL 5 8* 5 5* 5 3* 5 7*   ALBUMIN g/dL 2 4* 2 2* 2 2* 2 5*   TOTAL BILIRUBIN mg/dL 0 20 0 20 0 30 0 30     Results from last 7 days   Lab Units 05/05/21  2348   POC GLUCOSE mg/dl 121     Results from last 7 days   Lab Units 05/10/21  0554 05/09/21  0455 05/08/21  1241 05/08/21  0513 05/07/21  1445 05/07/21  0432 05/06/21  1316 05/06/21  1032 05/06/21  0612 05/06/21  0141 05/05/21  2346   GLUCOSE RANDOM mg/dL 85 79 90 88 99 95 103 96 101 119 105     Results from last 7 days   Lab Units 05/05/21  2346   TROPONIN I ng/mL <0 02         Results from last 7 days   Lab Units 05/06/21  0612 05/05/21  2346   PROTIME seconds 22 0* 18 9*   INR  1 93* 1 59*   PTT seconds  --  41*     Results from last 7 days   Lab Units 05/06/21  0612   TSH 3RD GENERATON uIU/mL 1 377     Results from last 7 days   Lab Units 05/07/21  0432 05/05/21  2346   PROCALCITONIN ng/ml 1 77* 3 28*     Results from last 7 days   Lab Units 05/06/21  1032 05/05/21  2346   LACTIC ACID mmol/L 0 4* 0 6       Results from last 7 days   Lab Units 05/06/21  1309 05/06/21  0011   CLARITY UA   --  Cloudy   COLOR UA   --  Yellow   SPEC GRAV UA   --  <=1 005   PH UA   --  5 5   GLUCOSE UA mg/dl  --  Negative   KETONES UA mg/dl  --  Negative   BLOOD UA   --  Trace-lysed*   PROTEIN UA mg/dl  --  Negative   NITRITE UA   --  Negative   BILIRUBIN UA   --  Negative   UROBILINOGEN UA E U /dl  --  0 2   LEUKOCYTES UA   --  Large*   WBC UA /hpf  --  30-50*   RBC UA /hpf  --  0-1   BACTERIA UA /hpf  --  Innumerable*   EPITHELIAL CELLS WET PREP /hpf  --  Occasional   MUCUS THREADS   --  None Seen   SODIUM UR  13  --      Results from last 7 days   Lab 05/06/21  0011 05/05/21  4034   BLOOD CULTURE  --  #1 No Growth After 4 Days     #2  Enterococcus faecalis*   GRAM STAIN RESULT  -- Gram positive cocci in chains*   URINE CULTURE 50,000-59,000 cfu/ml Lactobacillus species*  --        Medications:   Scheduled Medications:  aztreonam, 2,000 mg, Intravenous, Q8H  docusate sodium, 100 mg, Oral, BID  enoxaparin, 40 mg, Subcutaneous, Daily  ergocalciferol, 50,000 Units, Oral, Once per day on Mon Thu  folic acid, 1 mg, Oral, Daily  gabapentin, 300 mg, Oral, HS  levETIRAcetam, 500 mg, Oral, Q12H [address]  levothyroxine, 50 mcg, Oral, Early Morning  mirtazapine, 15 mg, Oral, HS  prazosin, 2 mg, Oral, HS  sodium bicarbonate, 650 mg, Oral, Daily start 5/10  thiamine, 100 mg, Oral, Daily  vancomycin, 10 mg/kg, Intravenous, Q12H  vancomycin (VANCOCIN) oral solution 125 mg, 125 mg, Oral, Q12H ISH  venlafaxine, 75 mg, Oral, Daily    IV albumin x 1 5/7    500ml D5W bolus on 5/7 @0843  IV mg Sally@[place], 3/64@7409  PO kdur 5/6 x 1, 5/7 x 1, 5/8 x 2, 5/10 x 1  IV kcl x 1 5/8    Continuous IV Infusions: dc on 5/6     PRN Meds:  acetaminophen, 650 mg, Oral, Q6H PRN using 1-3 daily doses  butalbital-acetaminophen-caffeine, 1 tablet, Oral, Q4H PRN using 2 daily doses  cyclobenzaprine, 10 mg, Oral, TID PRN using 1-3 daily doses  gabapentin, 100 mg, Oral, TID PRN  LORazepam, 0 5 mg, Oral, Q8H PRN using 1-3 daily doses  ondansetron, 4 mg, Intravenous, Q6H PRN  zolpidem, 5 mg, Oral, HS PRN using 1 daily dose        Discharge Plan: [facility]    Network Utilization Review Department  ATTENTION: Please call with any questions or concerns to [phone] and carefully listen to the prompts so that you are directed to the right person  All voicemails are confidential   Tam Calle all requests for admission clinical reviews, approved or denied determinations and any other requests to dedicated fax number below belonging to the campus where the patient is receiving treatment   List of dedicated fax numbers for the Facilities:  [address] DENIALS (Administrative/Medical Necessity) [phone]   [address] (Maternity/NICU/Pediatrics) 261 [facility],7[facility] Floor [facility] [address] [address] [place] [address] [id number] [place] [address] [address] [facility] [address] [phone]

## 2021-05-10 NOTE — OCCUPATIONAL THERAPY NOTE
Occupational Therapy Evaluation     Patient Name: Rosita Chavez  LSBHV'U Date: 5/10/2021  Problem List  Active Problems:    Hypothyroidism    Anorexia nervosa, restricting type    Moderate episode of recurrent major depressive disorder (HCC)    Generalized weakness    Anemia    Pyelonephritis    Urinary retention    Pneumonia    Altered mental status    Hypokalemia    Hypercalcemia    Hypomagnesemia    Past Medical History  Past Medical History:   Diagnosis Date    Anemia     Anxiety     Hypothyroidism     Psychiatric disorder     depression anxiety    Seizures ([address] )     Vitamin D deficiency      Past Surgical History  Past Surgical History:   Procedure Laterality Date    GASTRIC BYPASS      IR PICC PLACEMENT SINGLE LUMEN  4/16/2021    IR TEMPORARY DIALYSIS CATHETER PLACEMENT  4/8/2021         05/10/21 1510   OT Last Visit   OT Visit Date 05/10/21   Note Type   Note type Evaluation   Restrictions/Precautions   Other Precautions Fall Risk  (+ bates)   Pain Assessment   Pain Assessment Tool Pain Assessment not indicated - pt denies pain   Home Living   Type of Home SNF  [facility])   Prior Function   Level of [place] Needs assistance with ADLs and functional mobility   Lives With Facility staff   Receives Help From Personal care attendant   ADL Assistance Needs assistance   IADLs Needs assistance   Falls in the last 6 months 1 to 4   Lifestyle   Autonomy Pt reports that prior to recent hospitalizations, pt was living alone in a 1st floor apt with few steps to manage  Pt was independent at this time, however relied on neighbor to provide transportation   Reciprocal Relationships Pt endorses having a positive relationship with a female neighbor  Pt reports that she speaks to neighbor often  Neighbor has been assisting with managing bills while pt has been in hospital    Intrinsic Gratification Pt enjoys reading on her tablet  Subjective   Subjective Pt received in semisupine position   Pt pleasant  Pt freely spoke about past traumas with OT  OT provided emotional support to pt  Pt seems hopeful about future  Endorsed desire to get better and potentially one day become a peer counselor  ADL   Eating Assistance 5  Supervision/Setup   Grooming Assistance 4  Minimal Assistance   UB Bathing Assistance 3  Moderate Assistance   LB Bathing Assistance 2  Maximal Assistance   UB Dressing Assistance 3  Moderate Assistance   LB Dressing Assistance 2  Maximal [address]  2  Maximal Assistance   Bed Mobility   Rolling R 3  Moderate assistance   Additional items Verbal cues;LE management; Bedrails   Rolling L 3  Moderate assistance   Additional items Verbal cues; Bedrails;LE management   Supine to Sit 3  Moderate assistance   Additional items Assist x 1;Verbal cues   Sit to Supine 3  Moderate assistance   Additional Comments Pt sat at EOB for ~20 min performing various tasks as well as verbalizing previous traumas to OT  Transfers   Sit to Stand Unable to assess   Additional Comments Pt endorses fear of falling  Pt prefers to perform transfer with two people  + BLE weakness noted during spontaneous movement  Pt with decreased ability to raise BLE against gravity while seated at EOB      Balance   Static Sitting Good   Dynamic Sitting Fair +   Activity Tolerance   Activity Tolerance Patient tolerated treatment well   Medical Staff Made Aware BRYSON URIARTE Assessment   RUE Assessment WFL  (strength: shoulder/elbow 3+/5, wrist 3/5, hand 3-/5)   LUE Assessment   LUE Assessment WFL  (strength: shoulder/elbow 3+/5, wrist 3/5, hand 3-/5)   Hand Function   Fine Motor Coordination Impaired  (decreased dexterity with BUE; however per nursing staff observed to utilize tablet and cell phone without difficulty)   Cognition   Overall Cognitive Status WFL   Arousal/Participation Alert   Attention Within functional limits   Orientation Level Oriented X4   Memory Within functional limits   Following Commands Follows all commands and directions without difficulty   Assessment   Limitation Decreased ADL status; Decreased self-care trans;Decreased high-level ADLs; Decreased endurance;Decreased UE strength;Decreased UE ROM   Prognosis Fair   Assessment Pt is a 47 y o  female seen for OT evaluation at [facility], admitted 5/5/2021 w/ Hyponatremia  OT completed extensive review of pt's medical and social history  Comorbidities affecting pt's functional performance at time of assessment include: Anorexia nervosa, restricting type, Generalized weakness, PTSD, depression, vitamin b6 and copper defiency, etc (see chart for additional hx)  Personal factors affecting pt at time of IE include:difficulty performing ADLS, difficulty performing IADLS , decreased initiation and engagement , health management  and decreased functional mobilty  Pt with active OT orders  Prior to admission, pt was undergoing rehab at [facility]  Pt required assist with ADLs and mobility  Prior to multiple recent hospitalizations, pt was living in an apt with few steps to manage  Pt was I w/  ADLS and IADLS, and relied on neighbor for transportation  Upon evaluation: Pt requires mod Ax 1 for bed mobility, sup-mod A for UB ADLs and max A for LB ADLS 2* the following deficits impacting occupational performance: weakness, decreased balance and decreased coping skills  Pt to benefit from continued skilled OT tx while in the hospital to address deficits as defined above and maximize level of functional independence w ADL's and functional mobility  Occupational Performance areas to address include: grooming, bathing/shower, toilet hygiene, dressing, health maintenance, functional mobility and community mobility  Based on findings, pt is of high complexity  The patient's raw score on the AM-PAC Daily Activity inpatient short form is 16, standardized score is 35 96, less than 39 4   Patients at this level are likely to benefit from DC to post-acute rehabilitation services  Please refer to the recommendation of the Occupational Therapist for safe DC planning  At this time, OT recommendations at time of discharge are short term rehab  Goals   Patient Goals Pt wishes to get better   Plan   Treatment Interventions ADL retraining;UE strengthening/ROM; Functional transfer training;Patient/family training; Compensatory technique education; Activityengagement; Endurance training   Goal Expiration Date 05/20/21   OT Treatment Day 0   OT Frequency 2-3x/wk   Recommendation   OT Discharge Recommendation Post acute rehabilitation services   AM-PAC Daily Activity Inpatient   Lower Body Dressing 2   Bathing 2   Toileting 2   Upper Body Dressing 3   Grooming 3   Eating 4   Daily Activity Raw Score 16   Daily Activity Standardized Score (Calc for Raw Score >=11) 35 96   AM-PAC Applied Cognition Inpatient   Following a Speech/Presentation 4   Understanding Ordinary Conversation 4   Taking Medications 4   Remembering Where Things Are Placed or Put Away 4   Remembering List of 4-5 Errands 4   Taking Care of Complicated Tasks 3   Applied Cognition Raw Score 23   Applied Cognition Standardized Score 53 08         Pt will achieve the following goals within 10 days  *Pt will complete grooming with supervision with built-up handles as needed  *Pt will complete UB bathing and dressing with supervision  *Pt will complete LB bathing and dressing with min A      * Pt will complete toileting w/ min A w/ G hygiene/thoroughness using DME PRN    *Pt will complete bed mobility with supervision, with bed flat and no side rail to prep for purposeful tasks    *Pt will participate in UE therapeutic exercise in order to maximize strength for ADL transfers  *Pt will sit on EOB for 25 minutes for increased safety with seated activity tolerance during ADL tasks  *Pt will identify 3-5 fall risks during ADL routine to ensure home safety upon discharge            Les Thakkar OTR/L

## 2021-05-10 NOTE — PROGRESS NOTES
NEPHROLOGY PROGRESS NOTE   Anthony Kurtz 47 y o  female MRN: [id number]  Unit/Bed#: -01 Encounter: [id number]      ASSESSMENT/PLAN:  Acute kidney injury (POA): Now resolved  - etiology suspect prerenal, UTI and obstructive uropathy, recent acute kidney injury  - upon review medical records, baseline creatinine 0 6-0 8 mg/dL  - creatinine 1 79 mg/dL upon admission on 4/05   - most recent creatinine 0 78 mg/dL today  - avoid NSAIDs, nephrotoxic agents, IV contrast   - adjust medications to appropriate GFR  - monitor volume status with strict intake/output, daily weight  Acute hyponatremia: Now resolved  - etiology suspect secondary to volume depletion in the setting of nausea/vomiting/decreased appetite with known malnutrition, possibly polydipsia in the setting of psychiatric disorder as well  - sodium 121 upon admission, most recent sodium 144 today  - workup: Urine osmolality 111, urine sodium 13   - will continue to monitor with repeat lab studies  Other electrolytes, acid/base:  - hypokalemia with most recent potassium 3 4 today, will provide K-Dur 40 mEq now  - hypomagnesemia with most recent magnesium 1 5, will provide magnesium sulfate 2 g IVPB now  - most recent bicarbonate on the lower side at 20 with anion gap of 15; unsure if patient will be able to tolerate sodium bicarbonate supplements, will start sodium bicarbonate 650 mg once daily and adjust if tolerating    - will continue to monitor with repeat lab studies  Blood pressure, hemodynamics:  - blood pressure with fluctuations  - per review, patient is not on any antihypertensive medications  - optimize hemodynamics, avoid hypotension and fluctuations of blood pressure  H/H, anemia:  - most recent hemoglobin 9 8 grams/deciliter   - goal hemoglobin greater than 8 grams/deciliter  - iron 23%, ferritin 183 per most recent iron panel    - recommend PRBC transfusion for hemoglobin less than 7       Pyelonephritis:  - CT scan completed on 5/8 revealed significant interval improvement of the prior bilateral hydronephrosis  - currently on antibiotics per primary team     Other:  - generalized weakness  - recurrent major depressive disorder    SUBJECTIVE:  Resting in bed comfortably  Patient without acute shortness of breath or chest pain  Patient states she did have diarrhea yesterday      OBJECTIVE:  Current Weight: Weight - Scale: 71 4 kg (157 lb 6 5 oz)  Vitals:    05/09/21 2142   BP: 99/62   Pulse: 93   Resp: 18   Temp: 98 5 °F (36 9 °C)   SpO2: 99%       Intake/Output Summary (Last 24 hours) at 5/10/2021 6038  Last data filed at 5/10/2021 0545  Gross per 24 hour   Intake 480 ml   Output 3200 ml   Net -2720 ml       General:  No acute distress, ill-appearing  Skin:  Warm, no rash  Eyes:  Sclerae anicteric  ENT:  Dry lips, moist mucous membranes  Neck:  Supple trachea midline  Chest:  Clear breath sounds bilaterally   CVS:  Irregular rate, regular rhythm  Abdomen:  Soft, nontender  Extremities:  No overt edema   Neuro:  Awake and interactive  Psych:  Flat affect      Medications:  Scheduled Meds:  Current Facility-Administered Medications   Medication Dose Route Frequency Provider Last Rate    acetaminophen  650 mg Oral Q6H PRN [place] Hudson, DO      aztreonam  2,000 mg Intravenous Q8H Elizabeth Fly, DO 2,000 mg (05/10/21 0545)    butalbital-acetaminophen-caffeine  1 tablet Oral Q4H PRN Bill Hudson, DO      cyclobenzaprine  10 mg Oral TID PRN [place] Hudson, DO      docusate sodium  100 mg Oral BID Elizabeth Fly, DO      enoxaparin  40 mg Subcutaneous Daily Elizabeth Fly, DO      ergocalciferol  50,000 Units Oral Once per day on Mon Thu Elizabeth Fly, DO      folic acid  1 mg Oral Daily Bill Hudson, DO      gabapentin  100 mg Oral TID PRN Bill Hudson, DO      gabapentin  300 mg Oral HS Elizabeth Fly, DO      levETIRAcetam  500 mg Oral Q12H [address] Elizabeth Fly, DO      levothyroxine  50 mcg Oral Early Morning Elizabeth Fly, DO      LORazepam  0 5 mg Oral Q8H PRN Bill Hudson, DO      mirtazapine  15 mg Oral HS Elizabeth Fly, DO      ondansetron  4 mg Intravenous Q6H PRN Blanca Lacy, DO      potassium chloride  40 mEq Oral Once Blanca Lacy, DO      prazosin  2 mg Oral HS Elizabeth Fly, DO      vancomycin  10 mg/kg Intravenous Q12H Elizabeth Fly,  mg (05/09/21 2348)    venlafaxine  75 mg Oral Daily Elizabeth Fly, DO      zolpidem  5 mg Oral HS PRN Blanca Lacy, DO         PRN Meds:   acetaminophen    butalbital-acetaminophen-caffeine    cyclobenzaprine    gabapentin    LORazepam    ondansetron    zolpidem    Continuous Infusions:     Laboratory Results:  Results from last 7 days   Lab Units 05/10/21  0554 05/09/21  0455 05/08/21  1241 05/08/21  0513 05/07/21  1445 05/07/21  0432 05/06/21  1316  05/06/21  0612  05/05/21  2346   WBC Thousand/uL 8 23 7 56  --   --   --  9 52  --   --  13 32*  --  14 50*   HEMOGLOBIN g/dL 9 8* 8 7*  --   --   --  8 4*  --   --  8 6*  --  9 7*   HEMATOCRIT % 31 5* 27 8*  --   --   --  26 0*  --   --  26 2*  --  28 3*   PLATELETS Thousands/uL 356 305  --   --   --  186  --   --  198  --  249   SODIUM mmol/L 144 142 140 139 136 136 130*   < > 126*   < > 121*   POTASSIUM mmol/L 3 4* 3 7 4 1 2 9* 3 3* 3 4* 3 3*   < > 3 9   < > 4 1   CHLORIDE mmol/L 109* 108 106 103 102 102 97*   < > 92*   < > 87*   CO2 mmol/L 20* 21 23 22 20* 22 23   < > 18*   < > 23   BUN mg/dL 2* 6 7 7 10 14 19   < > 20   < > 21   CREATININE mg/dL 0 78 0 81 0 77 0 91 0 87 1 11 1 35*   < > 1 58*   < > 1 79*   CALCIUM mg/dL 8 6 8 5 9 0 9 0 8 7 8 8 8 3   < > 8 4   < > 8 5   MAGNESIUM mg/dL 1 5*  --  2 3 1 4*  --  2 0  --   --   --   --   --     < > = values in this interval not displayed

## 2021-05-10 NOTE — TELEPHONE ENCOUNTER
Patient is still inpatient - will follow up at discharge   Patient is tentativly scheduled with Mannie Malone 6/29 - may need a void trial with nurse prior

## 2021-05-10 NOTE — PLAN OF CARE
Problem: OCCUPATIONAL THERAPY ADULT  Goal: Performs self-care activities at highest level of function for planned discharge setting  See evaluation for individualized goals  Description: Treatment Interventions: ADL retraining, UE strengthening/ROM, Functional transfer training, Patient/family training, Compensatory technique education, Activityengagement, Endurance training          See flowsheet documentation for full assessment, interventions and recommendations  Note: Limitation: Decreased ADL status, Decreased self-care trans, Decreased high-level ADLs, Decreased endurance, Decreased UE strength, Decreased UE ROM  Prognosis: Fair  Assessment: Pt is a 47 y o  female seen for OT evaluation at [address] [place] [address], admitted 5/5/2021 w/ Hyponatremia  OT completed extensive review of pt's medical and social history  Comorbidities affecting pt's functional performance at time of assessment include: Anorexia nervosa, restricting type, Generalized weakness, PTSD, depression, vitamin b6 and copper defiency, etc (see chart for additional hx)  Personal factors affecting pt at time of IE include:difficulty performing ADLS, difficulty performing IADLS , decreased initiation and engagement , health management  and decreased functional mobilty  Pt with active OT orders  Prior to admission, pt was undergoing rehab at [facility]  Pt required assist with ADLs and mobility  Prior to multiple recent hospitalizations, pt was living in an apt with few steps to manage  Pt was I w/  ADLS and IADLS, and relied on neighbor for transportation  Upon evaluation: Pt requires mod Ax 1 for bed mobility, sup-mod A for UB ADLs and max A for LB ADLS 2* the following deficits impacting occupational performance: weakness, decreased balance and decreased coping skills   Pt to benefit from continued skilled OT tx while in the hospital to address deficits as defined above and maximize level of functional independence w ADL's and functional mobility  Occupational Performance areas to address include: grooming, bathing/shower, toilet hygiene, dressing, health maintenance, functional mobility and community mobility  Based on findings, pt is of high complexity  The patient's raw score on the AM-PAC Daily Activity inpatient short form is 16, standardized score is 35 96, less than 39 4  Patients at this level are likely to benefit from DC to post-acute rehabilitation services  Please refer to the recommendation of the Occupational Therapist for safe DC planning  At this time, OT recommendations at time of discharge are short term rehab       OT Discharge Recommendation: Post acute rehabilitation services

## 2021-05-11 PROBLEM — R78.81 BACTEREMIA: Status: ACTIVE | Noted: 2021-05-11

## 2021-05-11 LAB
ALBUMIN SERPL BCP-MCNC: 2.4 G/DL (ref 3.5–5)
ALP SERPL-CCNC: 356 U/L (ref 46–116)
ALT SERPL W P-5'-P-CCNC: 31 U/L (ref 12–78)
ANION GAP SERPL CALCULATED.3IONS-SCNC: 14 MMOL/L (ref 4–13)
ANISOCYTOSIS BLD QL SMEAR: PRESENT
AST SERPL W P-5'-P-CCNC: 21 U/L (ref 5–45)
ATRIAL RATE: 116 BPM
BACTERIA BLD CULT: NORMAL
BASOPHILS # BLD MANUAL: 0 THOUSAND/UL (ref 0–0.1)
BASOPHILS NFR MAR MANUAL: 0 % (ref 0–1)
BILIRUB SERPL-MCNC: 0.2 MG/DL (ref 0.2–1)
BUN SERPL-MCNC: 3 MG/DL (ref 5–25)
CALCIUM ALBUM COR SERPL-MCNC: 10.4 MG/DL (ref 8.3–10.1)
CALCIUM SERPL-MCNC: 9.1 MG/DL (ref 8.3–10.1)
CAMPYLOBACTER DNA SPEC NAA+PROBE: NORMAL
CHLORIDE SERPL-SCNC: 110 MMOL/L (ref 100–108)
CO2 SERPL-SCNC: 20 MMOL/L (ref 21–32)
CREAT SERPL-MCNC: 0.67 MG/DL (ref 0.6–1.3)
EOSINOPHIL # BLD MANUAL: 0.46 THOUSAND/UL (ref 0–0.4)
EOSINOPHIL NFR BLD MANUAL: 5 % (ref 0–6)
ERYTHROCYTE [DISTWIDTH] IN BLOOD BY AUTOMATED COUNT: 16.2 % (ref 11.6–15.1)
GFR SERPL CREATININE-BSD FRML MDRD: 112 ML/MIN/1.73SQ M
GLUCOSE SERPL-MCNC: 83 MG/DL (ref 65–140)
HCT VFR BLD AUTO: 32.8 % (ref 34.8–46.1)
HGB BLD-MCNC: 10.1 G/DL (ref 11.5–15.4)
HYPERCHROMIA BLD QL SMEAR: PRESENT
LYMPHOCYTES # BLD AUTO: 2.04 THOUSAND/UL (ref 0.6–4.47)
LYMPHOCYTES # BLD AUTO: 22 % (ref 14–44)
MAGNESIUM SERPL-MCNC: 1.7 MG/DL (ref 1.6–2.6)
MCH RBC QN AUTO: 29.5 PG (ref 26.8–34.3)
MCHC RBC AUTO-ENTMCNC: 30.8 G/DL (ref 31.4–37.4)
MCV RBC AUTO: 96 FL (ref 82–98)
MONOCYTES # BLD AUTO: 0.56 THOUSAND/UL (ref 0–1.22)
MONOCYTES NFR BLD: 6 % (ref 4–12)
NEUTROPHILS # BLD MANUAL: 6.21 THOUSAND/UL (ref 1.85–7.62)
NEUTS BAND NFR BLD MANUAL: 1 % (ref 0–8)
NEUTS SEG NFR BLD AUTO: 66 % (ref 43–75)
NRBC BLD AUTO-RTO: 0 /100 WBCS
P AXIS: 72 DEGREES
PHOSPHATE SERPL-MCNC: 4.6 MG/DL (ref 2.7–4.5)
PLATELET # BLD AUTO: 409 THOUSANDS/UL (ref 149–390)
PLATELET BLD QL SMEAR: ADEQUATE
PMV BLD AUTO: 10 FL (ref 8.9–12.7)
POLYCHROMASIA BLD QL SMEAR: PRESENT
POTASSIUM SERPL-SCNC: 3.1 MMOL/L (ref 3.5–5.3)
PR INTERVAL: 134 MS
PROT SERPL-MCNC: 6 G/DL (ref 6.4–8.2)
QRS AXIS: 35 DEGREES
QRSD INTERVAL: 78 MS
QT INTERVAL: 304 MS
QTC INTERVAL: 422 MS
RBC # BLD AUTO: 3.42 MILLION/UL (ref 3.81–5.12)
RBC MORPH BLD: PRESENT
SALMONELLA DNA SPEC QL NAA+PROBE: NORMAL
SHIGA TOXIN STX GENE SPEC NAA+PROBE: NORMAL
SHIGELLA DNA SPEC QL NAA+PROBE: NORMAL
SODIUM SERPL-SCNC: 144 MMOL/L (ref 136–145)
T WAVE AXIS: 66 DEGREES
TOTAL CELLS COUNTED SPEC: 100
VENTRICULAR RATE: 116 BPM
WBC # BLD AUTO: 9.27 THOUSAND/UL (ref 4.31–10.16)

## 2021-05-11 PROCEDURE — 93010 ELECTROCARDIOGRAM REPORT: CPT | Performed by: INTERNAL MEDICINE

## 2021-05-11 PROCEDURE — 97530 THERAPEUTIC ACTIVITIES: CPT

## 2021-05-11 PROCEDURE — 80053 COMPREHEN METABOLIC PANEL: CPT | Performed by: NURSE PRACTITIONER

## 2021-05-11 PROCEDURE — 97535 SELF CARE MNGMENT TRAINING: CPT

## 2021-05-11 PROCEDURE — 85007 BL SMEAR W/DIFF WBC COUNT: CPT | Performed by: INTERNAL MEDICINE

## 2021-05-11 PROCEDURE — 84100 ASSAY OF PHOSPHORUS: CPT | Performed by: INTERNAL MEDICINE

## 2021-05-11 PROCEDURE — 99232 SBSQ HOSP IP/OBS MODERATE 35: CPT | Performed by: INTERNAL MEDICINE

## 2021-05-11 PROCEDURE — 85027 COMPLETE CBC AUTOMATED: CPT | Performed by: INTERNAL MEDICINE

## 2021-05-11 PROCEDURE — 83735 ASSAY OF MAGNESIUM: CPT | Performed by: INTERNAL MEDICINE

## 2021-05-11 PROCEDURE — 97163 PT EVAL HIGH COMPLEX 45 MIN: CPT

## 2021-05-11 RX ORDER — POTASSIUM CHLORIDE 20 MEQ/1
40 TABLET, EXTENDED RELEASE ORAL DAILY
Status: DISCONTINUED | OUTPATIENT
Start: 2021-05-11 | End: 2021-05-13 | Stop reason: HOSPADM

## 2021-05-11 RX ORDER — POTASSIUM CHLORIDE 14.9 MG/ML
20 INJECTION INTRAVENOUS
Status: COMPLETED | OUTPATIENT
Start: 2021-05-11 | End: 2021-05-11

## 2021-05-11 RX ADMIN — Medication 125 MG: at 21:44

## 2021-05-11 RX ADMIN — LORAZEPAM 0.5 MG: 0.5 TABLET ORAL at 09:26

## 2021-05-11 RX ADMIN — CYCLOBENZAPRINE HYDROCHLORIDE 10 MG: 10 TABLET, FILM COATED ORAL at 21:44

## 2021-05-11 RX ADMIN — LORAZEPAM 0.5 MG: 0.5 TABLET ORAL at 18:06

## 2021-05-11 RX ADMIN — LEVETIRACETAM 500 MG: 500 TABLET, FILM COATED ORAL at 09:26

## 2021-05-11 RX ADMIN — ENOXAPARIN SODIUM 40 MG: 40 INJECTION SUBCUTANEOUS at 09:26

## 2021-05-11 RX ADMIN — AZTREONAM 2000 MG: 2 INJECTION, POWDER, LYOPHILIZED, FOR SOLUTION INTRAMUSCULAR; INTRAVENOUS at 12:24

## 2021-05-11 RX ADMIN — THIAMINE HCL TAB 100 MG 100 MG: 100 TAB at 09:26

## 2021-05-11 RX ADMIN — SODIUM BICARBONATE 650 MG TABLET 650 MG: at 09:26

## 2021-05-11 RX ADMIN — MIRTAZAPINE 15 MG: 15 TABLET, FILM COATED ORAL at 21:44

## 2021-05-11 RX ADMIN — ACETAMINOPHEN 650 MG: 325 TABLET, FILM COATED ORAL at 18:06

## 2021-05-11 RX ADMIN — POTASSIUM CHLORIDE 20 MEQ: 14.9 INJECTION, SOLUTION INTRAVENOUS at 20:09

## 2021-05-11 RX ADMIN — GABAPENTIN 300 MG: 300 CAPSULE ORAL at 21:44

## 2021-05-11 RX ADMIN — VENLAFAXINE HYDROCHLORIDE 75 MG: 75 CAPSULE, EXTENDED RELEASE ORAL at 09:26

## 2021-05-11 RX ADMIN — POTASSIUM CHLORIDE 40 MEQ: 1500 TABLET, EXTENDED RELEASE ORAL at 12:24

## 2021-05-11 RX ADMIN — CYCLOBENZAPRINE HYDROCHLORIDE 10 MG: 10 TABLET, FILM COATED ORAL at 16:34

## 2021-05-11 RX ADMIN — Medication 125 MG: at 09:26

## 2021-05-11 RX ADMIN — LEVETIRACETAM 500 MG: 500 TABLET, FILM COATED ORAL at 21:44

## 2021-05-11 RX ADMIN — VANCOMYCIN HYDROCHLORIDE 750 MG: 750 INJECTION, SOLUTION INTRAVENOUS at 11:02

## 2021-05-11 RX ADMIN — FOLIC ACID 1 MG: 1 TABLET ORAL at 09:26

## 2021-05-11 RX ADMIN — ZOLPIDEM TARTRATE 5 MG: 5 TABLET, COATED ORAL at 21:44

## 2021-05-11 RX ADMIN — POTASSIUM CHLORIDE 20 MEQ: 14.9 INJECTION, SOLUTION INTRAVENOUS at 16:34

## 2021-05-11 RX ADMIN — LEVOTHYROXINE SODIUM 50 MCG: 25 TABLET ORAL at 09:25

## 2021-05-11 RX ADMIN — BUTALBITAL, ACETAMINOPHEN AND CAFFEINE 1 TABLET: 50; 325; 40 TABLET ORAL at 13:48

## 2021-05-11 RX ADMIN — ACETAMINOPHEN 650 MG: 325 TABLET, FILM COATED ORAL at 09:30

## 2021-05-11 RX ADMIN — AZTREONAM 2000 MG: 2 INJECTION, POWDER, LYOPHILIZED, FOR SOLUTION INTRAMUSCULAR; INTRAVENOUS at 04:36

## 2021-05-11 NOTE — ASSESSMENT & PLAN NOTE
1/2 blood cultures from 05/05/2021 with Enterococcus faecalis  Patient has been on IV vancomycin and aztreonam  Discontinue aztreonam   Recheck blood cultures  Will curbside Infectious Disease

## 2021-05-11 NOTE — ASSESSMENT & PLAN NOTE
· Likely multifactorial due to infection, medication induced, improved with decreasing doses of Ambien and lorazepam  · Continue to encourage out of bed time

## 2021-05-11 NOTE — ASSESSMENT & PLAN NOTE
· Patient was at [facility] secondary to deconditioning from nutritional deficiency associated with anorexia nervosa  · During admission in 4/5 to 4/26 patient was evaluated by Psychiatry who felt that patient did not qualify for inpatient psych at that time   · Patient on clear Ensure which she appears to be tolerating and encouraged her with additional intake during the day  · Nutrition input appreciated and we are following calorie counts-difficult situation in this patient with previous gastric bypass surgery and revisions

## 2021-05-11 NOTE — ASSESSMENT & PLAN NOTE
Severe sepsis present on admission as evidenced by tachycardia with heart rate of 120 bpm, febrile at 101°, Leukocytosis at 14 5 K, INR 1 5 (end-organ dysfunction), suspected source is urinary tract infection, more in keeping with acute complicated UTI    Imaging findings not suggestive of pyelonephritis  Chest x-ray reviewed, doubt pneumonia  She was treated with intravenous antibiotics and has resolved

## 2021-05-11 NOTE — ASSESSMENT & PLAN NOTE
· Patient out of bed in chair with much encouragement by nursing staff  in the past 2 days-continue with PT OT  ·  Recent admission 4/5 to 4/26 due to generalized weakness  · Patient was seen by Neurology at the time of prior admission with extensive workup including LP, MRI brain, C-spine, and L-spine that showed no meningitis or structural abnormalities that would account for patient's weakness  · patient did receive plasmapheresis due to possible concern for autoimmune neuropathy  · On discharge, it was felt by Neurology that the patient's complaints were due to nutritional deficiency due to anorexia nervosa  · Patient was denied acute rehab placement by her insurance, so she was sent to long-term care at [facility] on 04/26  · PT OT to continue

## 2021-05-11 NOTE — PROGRESS NOTES
[place]  Progress Note - Pepper Shelley 1980, 47 y o  female MRN: [id number]  Unit/Bed#: -01 Encounter: [id number]  Primary Care Provider: Camacho Brand MD   Date and time admitted to hospital: 5/5/2021 11:15 PM    Generalized weakness  Assessment & Plan  · Patient out of bed in chair with much encouragement by nursing staff  in the past 2 days-continue with PT OT  ·  Recent admission 4/5 to 4/26 due to generalized weakness  · Patient was seen by Neurology at the time of prior admission with extensive workup including LP, MRI brain, C-spine, and L-spine that showed no meningitis or structural abnormalities that would account for patient's weakness  · patient did receive plasmapheresis due to possible concern for autoimmune neuropathy  · On discharge, it was felt by Neurology that the patient's complaints were due to nutritional deficiency due to anorexia nervosa  · Patient was denied acute rehab placement by her insurance, so she was sent to long-term care at [facility] on 04/26  · PT OT to continue      Severe protein-calorie malnutrition ([facility] [address] )  Assessment & Plan  Malnutrition Findings:   Adult Malnutrition type: Chronic illness  Adult Degree of Malnutrition: Other severe protein calorie malnutrition  Present, in the setting of chronic illness as evidenced by weight loss, less than 75% p o  Intake for greater than 1 month, history of anorexia nervosa, treated with liberal diet, supplements including Ensure  BMI Findings: Body mass index is 25 41 kg/m²         RENE (acute kidney injury) (HCC)-resolved as of 5/9/2021  Assessment & Plan  · Creatinine at 1 79 on admission now with hydration creatinine improved on May 7, 2021 down to 1 11-now at 0 91  · Cr on 5/3 was 0 6   · Avoid nephrotoxic drugs   · Avoid hypotension  · Suspect that this is prerenal and postrenal in the setting of dehydration from anorexia nervosa and retention   · Pt with urinary retention with multiple straight caths with 800 to a 1000 mL  · Zhong reinserted as per Urology needs to be kept in 2 weeks-patient reports her Zhong was just removed a week ago at the [facility]  · I/Os     Pyelonephritis  Assessment & Plan  · UA consistent with infection:  30-50 wbc's and innumerable bacteria   · With her symptomatology concerned about possible pyelonephritis-patient CT of the abdomen pelvis done yesterday showed resolution of the hydronephrosis with the Zhong in place  · Incidental finding   · Fever and tachycardiac on arrival -now improved  ·    · Urology is following  · Patient started on levofloxacin and vancomycin in the ED due to anaphylactic reaction history to penicillin-now day 5  Of treatment- then changed to aztreonam and vanco, hope to have stable for discharge in 24-48 hours   · Urine culture, preliminary shows lactobacillus-await sensitivities  · Prior urine culture from 04/10 grew E coli susceptible to Bactrim, tobramycin, tetracycline, nitrofurantoin and, gentamicin, cefazolin, aztreonam, and ampicillin  Resistant to Cipro and levofloxacin  · Cystitis during 4/10-4/26 admission was treated with aztreonam x3 days and then bactrim x1 day   · Patient on Vanco day 6  Of antibiotics  · Placed on p o  Vancomycin for C diff prophylaxis given loose stools    Hypomagnesemia  Assessment & Plan  1 5 this a m , replete p r n  Repeat in the a m  Hypercalcemia  Assessment & Plan  Calcium at 9 0 today-continue hydration and encourage p o  Intake of fluids    Hypokalemia  Assessment & Plan  3 4 this a m , replete p r n    Trend BMP    Altered mental status  Assessment & Plan  · Likely multifactorial due to infection, medication induced, improved with decreasing doses of Ambien and lorazepam  · Continue to encourage out of bed time      Pneumonia  Assessment & Plan  · CXR: "Mild left basilar opacity suggestive of atelectasis or a small developing infiltrate " Also noted to have significant gaseous distention on imaging   · Denies cough or dyspnea  · Encourage incentive spirometer    Urinary retention  Assessment & Plan  · Likely due to neurogenic bladder  ·  Pt required straight cath x2 and Zhong placed-urology input appreciated      Anemia  Assessment & Plan  · Hemoglobin at 9 7 on admission then  dropped to 8   4 but is slightly improved today at 8 7-  ·  Suspect that this is secondary to nutritional deficiencies  · Repeat CBC in a m      Moderate episode of recurrent major depressive disorder (HCC)  Assessment & Plan  · Patient's medications were adjusted during prior admission   · Current regimen:  Remeron 15 mg HS, Effexor 37 5 mg daily, prazosin 2mg HS (for nightmares), and Ambien decrease to 5 mg at HS due to excess sedation issues with that and lorazepam on use    Anorexia nervosa, restricting type  Assessment & Plan  · Patient was at [facility] secondary to deconditioning from nutritional deficiency associated with anorexia nervosa  · During admission in 4/5 to 4/26 patient was evaluated by Psychiatry who felt that patient did not qualify for inpatient psych at that time   · Patient on clear Ensure which she appears to be tolerating and encouraged her with additional intake during the day  · Nutrition input appreciated and we are following calorie counts-difficult situation in this patient with previous gastric bypass surgery and revisions    Hypothyroidism  Assessment & Plan  · Home regimen: Synthroid 50 mcg daily   · TSH is stable at 1 37    Severe sepsis (HCC)-resolved as of 5/7/2021  Assessment & Plan  · SIRS criteria met on admission:  Leukocytosis at 14 5 K, tachycardia at 120 bpm, and febrile at 101°  · Suspected source is pneumonia vs acute pyelonephritis  · Severe sepsis criteria met with an INR >1 5 at 1 59   · Patient started on vancomycin and levofloxacin in the ED - will change to aztreonam and vanc  · COVID PCR negative   · CXR: "Mild left basilar opacity suggestive of atelectasis or a small developing infiltrate "  · UA consistent with infection   · Blood cultures x2, pending  · urine culture, pending   · Procalcitonin, pending  · BP stable     * Hyponatremia-resolved as of 2021  Assessment & Plan  · Sodium 121 on admission now gradually improving 136-cap IV fluids  · Chloride at 87  · Na on 5/3 was 132 per SNF labs   · Received 700 cc bolus of normal saline by [facility] prior to EMS arrival  ·  nephrology input appreciated  ·  BMP q4h   · Neurochecks q4h  · Goal correction of 8-10mEq in the next 24 hours   · Suspect that this is secondary to decreased PO intake in the setting of patient's anorexia nervosa  · Pt admitted to [place] [address] due to severity of hyponatremia -will transfer to med surg      VTE Pharmacologic Prophylaxis:   Pharmacologic: Enoxaparin (Lovenox)  Mechanical VTE Prophylaxis in Place: No    Patient Centered Rounds: I have performed bedside rounds with nursing staff today  Discussions with Specialists or Other Care Team Provider:     Education and Discussions with Family / Patient:  Patient    Time Spent for Care: 30 minutes  More than 50% of total time spent on counseling and coordination of care as described above  Current Length of Stay: 4 day(s)    Current Patient Status: Inpatient   Certification Statement: The patient will continue to require additional inpatient hospital stay due to   Discharge Plan:  Ongoing    Code Status: Level 1 - Full Code      Subjective:   Seen this morning  No new complaints to offer  Has been having loose bowel stools  Denies abdominal pain    Objective:     Vitals:   Temp (24hrs), Av 2 °F (36 8 °C), Min:97 7 °F (36 5 °C), Max:98 5 °F (36 9 °C)    Temp:  [97 7 °F (36 5 °C)-98 5 °F (36 9 °C)] 97 7 °F (36 5 °C)  HR:  [89-93] 92  Resp:  [16-18] 16  BP: ()/(62-75) 103/75  SpO2:  [98 %-100 %] 100 %  Body mass index is 25 41 kg/m²  Input and Output Summary (last 24 hours):        Intake/Output Summary (Last 24 hours) at 5/10/2021 2018  Last data filed at 5/10/2021 1913  Gross per 24 hour   Intake 1451 ml   Output 1300 ml   Net 151 ml       Physical Exam:     Physical Exam  Vitals signs reviewed  Constitutional:       General: She is not in acute distress  Appearance: Normal appearance  She is not ill-appearing, toxic-appearing or diaphoretic  HENT:      Head: Normocephalic  Eyes:      Extraocular Movements: Extraocular movements intact  Pupils: Pupils are equal, round, and reactive to light  Neck:      Musculoskeletal: Normal range of motion and neck supple  No neck rigidity or muscular tenderness  Vascular: No carotid bruit  Cardiovascular:      Rate and Rhythm: Normal rate and regular rhythm  Pulses: Normal pulses  Heart sounds: Normal heart sounds  No murmur  No friction rub  Pulmonary:      Effort: Pulmonary effort is normal       Breath sounds: Normal breath sounds  No stridor  No wheezing, rhonchi or rales  Chest:      Chest wall: No tenderness  Abdominal:      General: Abdomen is flat  Bowel sounds are normal  There is no distension  Palpations: Abdomen is soft  Tenderness: There is no abdominal tenderness  There is no guarding or rebound  Musculoskeletal: Normal range of motion  General: No swelling or tenderness  Right lower leg: No edema  Left lower leg: No edema  Skin:     General: Skin is warm and dry  Coloration: Skin is not jaundiced  Neurological:      General: No focal deficit present  Mental Status: She is alert and oriented to person, place, and time  Cranial Nerves: No cranial nerve deficit  Sensory: No sensory deficit  Motor: Weakness present             Additional Data:     Labs:    Results from last 7 days   Lab Units 05/10/21  0554  05/05/21  2346   WBC Thousand/uL 8 23   < > 14 50*   HEMOGLOBIN g/dL 9 8*   < > 9 7*   HEMATOCRIT % 31 5*   < > 28 3*   PLATELETS Thousands/uL 356   < > 249 NEUTROS PCT %  --   --  86*   LYMPHS PCT %  --   --  4*   MONOS PCT %  --   --  9   EOS PCT %  --   --  0    < > = values in this interval not displayed  Results from last 7 days   Lab Units 05/10/21  0554   SODIUM mmol/L 144   POTASSIUM mmol/L 3 4*   CHLORIDE mmol/L 109*   CO2 mmol/L 20*   BUN mg/dL 2*   CREATININE mg/dL 0 78   ANION GAP mmol/L 15*   CALCIUM mg/dL 8 6   ALBUMIN g/dL 2 4*   TOTAL BILIRUBIN mg/dL 0 20   ALK PHOS U/L 208*   ALT U/L 28   AST U/L 17   GLUCOSE RANDOM mg/dL 85     Results from last 7 days   Lab Units 05/06/21  0612   INR  1 93*     Results from last 7 days   Lab Units 05/05/21  2348   POC GLUCOSE mg/dl 121         Results from last 7 days   Lab Units 05/07/21  0432 05/06/21  1032 05/05/21  2346   LACTIC ACID mmol/L  --  0 4* 0 6   PROCALCITONIN ng/ml 1 77*  --  3 28*           * I Have Reviewed All Lab Data Listed Above  * Additional Pertinent Lab Tests Reviewed: All Labs Within Last 24 Hours Reviewed    Imaging:    Imaging Reports Reviewed Today Include:   Imaging Personally Reviewed by Myself Includes:      Recent Cultures (last 7 days):     Results from last 7 days   Lab Units 05/10/21  1059 05/06/21  0011 05/05/21  2346   BLOOD CULTURE   --   --  No Growth After 4 Days    Enterococcus faecalis*   GRAM STAIN RESULT   --   --  Gram positive cocci in chains*   URINE CULTURE   --  50,000-59,000 cfu/ml Lactobacillus species*  --    C DIFF TOXIN B BY PCR  Negative  --   --        Last 24 Hours Medication List:   Current Facility-Administered Medications   Medication Dose Route Frequency Provider Last Rate    acetaminophen  650 mg Oral Q6H PRN York Land, DO      aztreonam  2,000 mg Intravenous Q8H Elizabeth Fly, DO 2,000 mg (05/10/21 1233)    butalbital-acetaminophen-caffeine  1 tablet Oral Q4H PRN Jacqueline Land, DO      cyclobenzaprine  10 mg Oral TID PRN Jacqueline Land, DO      enoxaparin  40 mg Subcutaneous Daily Elizabeth Fly, DO      ergocalciferol  50,000 Units Oral Once per day on Mon Thu Elizabeth Fly, DO      folic acid  1 mg Oral Daily Elizabeth Fly, DO      gabapentin  100 mg Oral TID PRN Ladan Richland, DO      gabapentin  300 mg Oral HS Elizabeth Fly, DO      levETIRAcetam  500 mg Oral Q12H [address] Elizabeth Fly, DO      levothyroxine  50 mcg Oral Early Morning Elizabeth Fly, DO      LORazepam  0 5 mg Oral Q8H PRN Ladan Richland, DO      mirtazapine  15 mg Oral HS Elizabeth Fly, DO      ondansetron  4 mg Intravenous Q6H PRN Ladan Richland, DO      prazosin  2 mg Oral HS Elizabeth Fly, DO      sodium bicarbonate  650 mg Oral Daily SMITHA Mccauley      thiamine  100 mg Oral Daily Caty Valenzuela MD      vancomycin  10 mg/kg Intravenous Q12H Elizabeth Fly,  mg (05/10/21 1111)    vancomycin (VANCOCIN) oral solution 125 mg  125 mg Oral Q12H [address] Adelso Cerrato MD      venlafaxine  75 mg Oral Daily Elizabeth Fly, DO      zolpidem  5 mg Oral HS PRN [facility], DO          Today, Patient Was Seen By: Caty Valenzuela MD    ** Please Note: Dictation voice to text software may have been used in the creation of this document   **

## 2021-05-11 NOTE — PHYSICAL THERAPY NOTE
PHYSICAL THERAPY EVAL    Physical Therapy Evaluation      Patient Active Problem List   Diagnosis    Pituitary enlargement    Elevated alkaline phosphatase level    Hypothyroidism    History of gastric bypass    Vitamin D deficiency    Anorexia nervosa, restricting type    PTSD (post-traumatic stress disorder)    Moderate episode of recurrent major depressive disorder (HCC)    Laxative abuse    Depression    Generalized weakness    Anemia    Soft tissue lesion of pelvic region    Severe protein-calorie malnutrition (HCC)    Pyelonephritis    Urinary retention    Vitamin B6 deficiency    Copper deficiency    Pneumonia    Altered mental status    Hypokalemia    Hypercalcemia    Hypomagnesemia       Past Medical History:   Diagnosis Date    Anemia     Anxiety     Hypothyroidism     Psychiatric disorder     depression anxiety    Seizures ([address] )     Vitamin D deficiency        Past Surgical History:   Procedure Laterality Date    GASTRIC BYPASS      IR PICC PLACEMENT SINGLE LUMEN  4/16/2021    IR TEMPORARY DIALYSIS CATHETER PLACEMENT  4/8/2021           Sofiya Moses, PT        Physical therapy TX:  S: "I'll try to stand again "  O: Performed additional sit<>stand transfer without use of RW  Max A x 2  Verbal cues for preparatory posture and hand placement  Patient able to get in to full upright position  Knees blocked by both therapists  Able to take 1 lateral side step towards head of bed with max A x 2  Decreased foot clearance  Patient sat edge of bed and was very close to edge  Max A X 2 to get back in to bed due to poor positioning  A: Patient with significant weakness in all extremities  High fall risk due to knee buckling  Fatigues easily and has significant deconditioning  P: recommending rehab  Marianne Moses, PT      Patient Name: Cooper Ahuja  [id number] Date: 5/11/2021

## 2021-05-11 NOTE — PROGRESS NOTES
NEPHROLOGY PROGRESS NOTE   Umesh Fam 47 y o  female MRN: [id number]  Unit/Bed#: -01 Encounter: [id number]      ASSESSMENT & PLAN    1  Acute kidney injury present on admission-now resolved  ? Peak creatinine was 1 79 and creatinine has improved to baseline  ? Monitor volume status  ? Thought to be secondary to pre renal azotemia, UTI obstructive uropathy  ? Zhong catheter is in place and Urology is following would like for catheter to remain in place for 1-2 weeks     2  Electrolytes  ? Hypokalemia-in the setting large urine output, diarrhea-repleted with 40 mEq of potassium chloride also was given 2 g of magnesium sulfate which could contribute to her diarrhea  ? Metabolic acidosis-now on sodium bicarbonate 650 mg orally daily, chloride is elevated will monitor for now     3  Blood Pressure/Volume  ? Volume depleted on admission now improved     4  Anemia in the setting of critical illness  ? Hemoglobins are now low  ? Platelet count is acceptable will continue to trend     5  Other  ?  Diarrhea-negative for C diff          DISCUSSION    Continue to treat replete serum potassium  Diarrhea has improved  If potassium persistently low in the setting of steady state off antibiotic would consider further workup for renal tubular acidosis    SUBJECTIVE:    Patient was seen today  No chest pain or shortness of breath no fevers or chills    OBJECTIVE:  Current Weight: Weight - Scale: 71 4 kg (157 lb 6 5 oz)  @  Vitals:    05/10/21 0917 05/10/21 1657 05/10/21 2115 05/10/21 2232   BP: 104/67 103/75  103/66   BP Location: Left arm Left arm  Left arm   Pulse: 89 92  92   Resp: 18 16  18   Temp: 98 3 °F (36 8 °C) 97 7 °F (36 5 °C)  99 °F (37 2 °C)   TempSrc: Oral Oral  Oral   SpO2: 98% 100% 100% 100%   Weight:       Height:           Intake/Output Summary (Last 24 hours) at 5/11/2021 1343  Last data filed at 5/11/2021 1157  Gross per 24 hour   Intake 2094 ml   Output 2000 ml   Net 94 ml     Weight (last 2 days) None          General: conscious, cooperative, in no acute distress  Eyes: conjunctivae pink, anicteric sclerae  ENT: lips and mucous membranes moist  Neck: supple, no JVD  Chest: no respiratory distress, no accessory muscle use, normal respiratory effort  CVS: normal heart rate, no friction rub  Abdomen: soft, non-tender, non-distended, normoactive bowel sounds  Extremities: no edema of both legs  Skin: no rash  Neuro: awake, alert, oriented      Medications:    Current Facility-Administered Medications:     acetaminophen (TYLENOL) tablet 650 mg, 650 mg, Oral, Q6H PRN, Elizabeth Fly, DO, 650 mg at 05/11/21 0930    aztreonam (AZACTAM) 2,000 mg in sodium chloride 0 9 % 100 mL IVPB, 2,000 mg, Intravenous, Q8H, Elizabeth Fly, DO, Last Rate: 100 mL/hr at 05/11/21 1224, 2,000 mg at 05/11/21 1224    butalbital-acetaminophen-caffeine (FIORICET,ESGIC) -40 mg per tablet 1 tablet, 1 tablet, Oral, Q4H PRN, Ladan [place] IRA, DO, 1 tablet at 05/10/21 1954    cyclobenzaprine (FLEXERIL) tablet 10 mg, 10 mg, Oral, TID PRN, Ladan [facility] [facility] IRA, DO, 10 mg at 05/10/21 2234    enoxaparin (LOVENOX) subcutaneous injection 40 mg, 40 mg, Subcutaneous, Daily, Elizabeth Fly, DO, 40 mg at 05/11/21 4089    ergocalciferol (VITAMIN D2) capsule 50,000 Units, 50,000 Units, Oral, Once per day on Mon Thu, Elizabeth Fly, DO, 50,000 Units at 46/89/90 0361    folic acid (FOLVITE) tablet 1 mg, 1 mg, Oral, Daily, Elizabeth Fly, DO, 1 mg at 05/11/21 6454    gabapentin (NEURONTIN) capsule 100 mg, 100 mg, Oral, TID PRN, Elizabeth Fly, DO, 100 mg at 05/06/21 1235    gabapentin (NEURONTIN) capsule 300 mg, 300 mg, Oral, HS, Elizabeth Fly, DO, 300 mg at 05/10/21 2234    levETIRAcetam (KEPPRA) tablet 500 mg, 500 mg, Oral, Q12H [address], Elizabeth Fly, DO, 500 mg at 05/11/21 0926    levothyroxine tablet 50 mcg, 50 mcg, Oral, Early Morning, Elizabeth Fly, DO, 50 mcg at 05/11/21 0925    LORazepam (ATIVAN) tablet 0 5 mg, 0 5 mg, Oral, Q8H PRN, Elizabeth Fly, DO, 0 5 mg at 05/11/21 0926    mirtazapine (REMERON) tablet 15 mg, 15 mg, Oral, HS, Elizabeth Fly, DO, 15 mg at 05/10/21 2234    ondansetron (ZOFRAN) injection 4 mg, 4 mg, Intravenous, Q6H PRN, Elizabeth Fly, DO    potassium chloride (K-DUR,KLOR-CON) CR tablet 40 mEq, 40 mEq, Oral, Daily, Adelso Low MD, 40 mEq at 05/11/21 1224    potassium chloride 20 mEq IVPB (premix), 20 mEq, Intravenous, Q2H, Babatunde A Armida Duverney, MD    prazosin (MINIPRESS) capsule 2 mg, 2 mg, Oral, HS, Elizabeth Fly, DO, 2 mg at 05/06/21 2329    sodium bicarbonate tablet 650 mg, 650 mg, Oral, Daily, SMITHA Mccauley, 650 mg at 05/11/21 0926    thiamine tablet 100 mg, 100 mg, Oral, Daily, Adelso Low MD, 100 mg at 05/11/21 0926    vancomycin (VANCOCIN) IVPB (premix in dextrose) 750 mg 150 mL, 10 mg/kg, Intravenous, Q12H, Elizabeth Fly, DO, Last Rate: 150 mL/hr at 05/11/21 1102, 750 mg at 05/11/21 1102    vancomycin (VANCOCIN) oral solution 125 mg, 125 mg, Oral, Q12H [address], Adelso Low MD, 125 mg at 05/11/21 0926    venlafaxine (EFFEXOR-XR) 24 hr capsule 75 mg, 75 mg, Oral, Daily, Elizabeth Fly, DO, 75 mg at 05/11/21 0926    zolpidem (AMBIEN) tablet 5 mg, 5 mg, Oral, HS PRN, Elizabeth Fly, DO, 5 mg at 05/10/21 2234    Invasive Devices:   Urethral Catheter Latex 16 Fr   (Active)   Amt returned on insertion(mL) 1300 mL 05/06/21 1230   Reasons to continue Urinary Catheter  Acute urinary retention/obstruction failing urinary retention protocol 05/09/21 2003   Goal for Removal N/A- discharging with bates 05/09/21 2003   Site Assessment Clean;Skin intact 05/09/21 2003   Collection Container Standard drainage bag 05/09/21 2003   Securement Method Securing device (Describe) 05/09/21 2003   Output (mL) 450 mL 05/11/21 0314     Lab Results:   Results from last 7 days   Lab Units 05/11/21  0444 05/10/21  0554 05/09/21  0455 05/08/21  1241 05/08/21  0513  05/07/21  0432  05/06/21  0612  05/06/21  0011 05/05/21  2346   WBC Thousand/uL 9 27 8 23 7 56  --   --   --  9 52  --  13 32*  --   --  14 50* HEMOGLOBIN g/dL 10 1* 9 8* 8 7*  --   --   --  8 4*  --  8 6*  --   --  9 7*   HEMATOCRIT % 32 8* 31 5* 27 8*  --   --   --  26 0*  --  26 2*  --   --  28 3*   PLATELETS Thousands/uL 409* 356 305  --   --   --  186  --  198  --   --  249   POTASSIUM mmol/L 3 1* 3 4* 3 7 4 1 2 9*   < > 3 4*   < > 3 9   < >  --  4 1   CHLORIDE mmol/L 110* 109* 108 106 103   < > 102   < > 92*   < >  --  87*   CO2 mmol/L 20* 20* 21 23 22   < > 22   < > 18*   < >  --  23   BUN mg/dL 3* 2* 6 7 7   < > 14   < > 20   < >  --  21   CREATININE mg/dL 0 67 0 78 0 81 0 77 0 91   < > 1 11   < > 1 58*   < >  --  1 79*   CALCIUM mg/dL 9 1 8 6 8 5 9 0 9 0   < > 8 8   < > 8 4   < >  --  8 5   MAGNESIUM mg/dL 1 7 1 5*  --  2 3 1 4*  --  2 0  --   --   --   --   --    PHOSPHORUS mg/dL 4 6*  --   --   --   --   --   --   --   --   --   --   --    ALK PHOS U/L 356* 208* 178*  --   --   --  145*  --   --   --   --  132*   ALT U/L 31 28 26  --   --   --  21  --   --   --   --  19   AST U/L 21 17 24  --   --   --  23  --   --   --   --  14   BLOOD CULTURE   --   --   --   --   --   --   --   --   --   --   --  No Growth After 5 Days  Enterococcus faecalis*   LEUKOCYTES UA   --   --   --   --   --   --   --   --   --   --  Large*  --    BLOOD UA   --   --   --   --   --   --   --   --   --   --  Trace-lysed*  --     < > = values in this interval not displayed  Portions of the record may have been created with voice recognition software  Occasional wrong word or "sound a like" substitutions may have occurred due to the inherent limitations of voice recognition software  Read the chart carefully and recognize, using context, where substitutions have occurred  If you have any questions, please contact the dictating provider

## 2021-05-11 NOTE — OCCUPATIONAL THERAPY NOTE
Occupational Therapy Tx Note     Patient Name: Phillip Narvaez  TFNEQ'M Date: 5/11/2021  Problem List  Active Problems:    Hypothyroidism    Anorexia nervosa, restricting type    Moderate episode of recurrent major depressive disorder ([facility] [address] )    Generalized weakness    Anemia    Severe protein-calorie malnutrition (HCC)    Acute cystitis    Urinary retention    Pneumonia    Altered mental status    Hypokalemia    Hypercalcemia    Hypomagnesemia    Bacteremia    Past Medical History  Past Medical History:   Diagnosis Date    Anemia     Anxiety     Hypothyroidism     Psychiatric disorder     depression anxiety    Seizures ([facility] [address] )     Vitamin D deficiency      Past Surgical History  Past Surgical History:   Procedure Laterality Date    GASTRIC BYPASS      IR PICC PLACEMENT SINGLE LUMEN  4/16/2021    IR TEMPORARY DIALYSIS CATHETER PLACEMENT  4/8/2021 05/11/21 0947   OT Last Visit   OT Visit Date 05/11/21   Restrictions/Precautions   Weight Bearing Precautions Per Order No   Other Precautions Fall Risk;Bed Alarm   Pain Assessment   Pain Assessment Tool Hardwick-Baker FACES   Hardwick-Baker FACES Pain Rating 4   Pain Location/Orientation Location: Generalized  (Pt agreeable to session)   ADL   Eating Assistance 3  Moderate Assistance   Eating Deficit Beverage management   Eating Comments Decreased dexterity and strength in BUE; difficulty managing ~8 ounce cup full of water  Bed Mobility   Supine to Sit 4  Minimal assistance   Additional items Assist x 2;Verbal cues   Sit to Supine 3  Moderate assistance   Additional items Assist x 2;Verbal cues   Additional Comments Noted difficutly grasping bed rail during supine>sit  Sat at EOB for 10-15 min while taking meds with RN  Able to sit with supervision  After performing stand>sit transfer, pt noted to be sliding off EOB  Required mod Ax 2 to safely return to supine position      Transfers   Sit to Stand 2  Maximal assistance   Additional items Assist x 2;Verbal cues   Stand to Sit 2  Maximal assistance   Additional items Assist x 2;Verbal cues   Additional Comments 1st attempt made with RW  However 2/2 LE weakness and decreased trunk control, pt provided with close armhold/trunk support to stand  During stand, BLE noted to buckle requiring max Ax 2 to safely return pt to seated position  Cognition   Overall Cognitive Status WFL   Arousal/Participation Arousable   Attention Within functional limits   Orientation Level Oriented X4   Memory Within functional limits   Following Commands Follows all commands and directions without difficulty   Activity Tolerance   Activity Tolerance Patient tolerated treatment well   Medical Staff Made Aware PT BRYSON Arriaza Elsy   Assessment   Assessment Pt seen for OT tx session with focus on functional balance, functional mobility, ADL status, and transfer safety  Patient agreeable to OT treatment session  Pt received supine in bed  Performed bed mobility with min-mod A x2  Required max Ax 2 to perform standing with close armhold assistance  Pt with BLE knee buckling despite guarding b/l knees  Pt does also demonstrate decreased FM dexterity which greatly impacts ADL performance   Patient continues to be functioning below baseline level, occupational performance remains limited secondary to factors listed above, and pt at increased risk for falls and injury  The patient's raw score on the AM-PAC Daily Activity inpatient short form is 15, standardized score is 34 69, less than 39 4  Patients at this level are likely to benefit from DC to post-acute rehabilitation services  Please refer to the recommendation of the Occupational Therapist for safe DC planning  From OT standpoint, recommendation at time of d/c would be Short Term Rehab  Patient to benefit from continued Occupational Therapy treatment while in the hospital to address deficits as defined above and maximize level of functional independence with ADLs and functional mobility   Pt left with call bell in reach, tray table in reach, needs met, bed alarm activated  Plan   Treatment Interventions ADL retraining;Functional transfer training;Cognitive reorientation;Patient/family training; Endurance training;UE strengthening/ROM; Compensatory technique education   Goal Expiration Date 05/20/21   OT Treatment Day 1   OT Frequency 2-3x/wk   Recommendation   OT Discharge Recommendation Post acute rehabilitation services   AM-PAC Daily Activity Inpatient   Lower Body Dressing 2   Bathing 2   Toileting 2   Upper Body Dressing 3   Grooming 3   Eating 3   Daily Activity Raw Score 15   Daily Activity Standardized Score (Calc for Raw Score >=11) 34 69   AM-PAC Applied Cognition Inpatient   Following a Speech/Presentation 4   Understanding Ordinary Conversation 4   Taking Medications 4   Remembering Where Things Are Placed or Put Away 4   Remembering List of 4-5 Errands 4   Taking Care of Complicated Tasks 3   Applied Cognition Raw Score 23   Applied Cognition Standardized Score 53 08       Tita Zimmer, OTR/L

## 2021-05-11 NOTE — ASSESSMENT & PLAN NOTE
· Likely due to neurogenic bladder  ·  Pt required multiple straight cath and Zhong placed  · urology input appreciated, to keep Zhong in place for 14 days till 05/20/2021 and then trial of void

## 2021-05-11 NOTE — ASSESSMENT & PLAN NOTE
· Likely due to neurogenic bladder  ·  Pt required straight cath x2 and Zhong placed-urology input appreciated

## 2021-05-11 NOTE — ASSESSMENT & PLAN NOTE
Present on admission as evidenced by UA consistent with infection:  30-50 wbc's and innumerable bacteria   concern for possible pyelonephritis-patient CT of the abdomen pelvis: showed resolution of the hydronephrosis with the Zhong in place  Today is day 6 of IV antibiotics including vancomycin and aztreonam, discontinue aztreonam  Check repeat blood cultures given 1/2 with Enterococcus faecalis  Placed on p o   Vancomycin for C diff prophylaxis given loose stools, despite C diff and stool studies been negative, will continue for an additional 72 hours post antibiotics

## 2021-05-11 NOTE — PLAN OF CARE
Problem: PHYSICAL THERAPY ADULT  Goal: Performs mobility at highest level of function for planned discharge setting  See evaluation for individualized goals  Description: Treatment/Interventions: Functional transfer training, Elevations, Therapeutic exercise, Endurance training, Equipment eval/education, Bed mobility, Gait training, Spoke to nursing, OT  Equipment Recommended: (TBD at rehab)       See flowsheet documentation for full assessment, interventions and recommendations  Note: Prognosis: Fair  Problem List: Decreased strength, Decreased endurance, Impaired balance, Decreased mobility, Impaired judgement, Decreased safety awareness, Pain  Assessment: Patient is a 39y/o F who presented to the ED with abnormal lab Na 120  Patient with generalized weakness, malnutrition, RENE, pyelonephritis  Recent admission and seen by neurology who reported that weakness was due to nutritional deficiency  Currently she resides at [facility] where she requires assistance with all mobility and ADL's  Current medical status includes lethargy, multiple lines, fall risk, pain, be alarm, significant weakness, decreased balance  Mobility and endurance  Patient was agreeable to session  She required assistance of 2 for all mobility  She was able to stand but has significant weakness in the LE's and B/L knee buckling  Patient was unable to use the RW for support  Patient is a high fall risk  Due to significant weakness and the fact that patient needs assistance for all mobility-recommending that she return to rehab  The patient's AM-PAC Basic Mobility Inpatient Short Form Low Function Raw Score 17 , Standardized Score is 27 46  A standardized score less 42 9 suggests the patient may benefit from discharge to post-acute rehab services  Please also refer to the recommendation of the Physical Therapist for safe discharge planning    Barriers to Discharge: None        PT Discharge Recommendation: Post acute rehabilitation services     PT - OK to Discharge: Yes    See flowsheet documentation for full assessment

## 2021-05-11 NOTE — PLAN OF CARE
Problem: Potential for Falls  Goal: Patient will remain free of falls  Description: INTERVENTIONS:  - Assess patient frequently for physical needs  -  Identify cognitive and physical deficits and behaviors that affect risk of falls    -  [place] fall precautions as indicated by assessment   - Educate patient/family on patient safety including physical limitations  - Instruct patient to call for assistance with activity based on assessment  - Modify environment to reduce risk of injury  - Consider OT/PT consult to assist with strengthening/mobility  Outcome: Progressing     Problem: Prexisting or High Potential for Compromised Skin Integrity  Goal: Skin integrity is maintained or improved  Description: INTERVENTIONS:  - Identify patients at risk for skin breakdown  - Assess and monitor skin integrity  - Assess and monitor nutrition and hydration status  - Monitor labs   - Assess for incontinence   - Turn and reposition patient  - Assist with mobility/ambulation  - Relieve pressure over bony prominences  - Avoid friction and shearing  - Provide appropriate hygiene as needed including keeping skin clean and dry  - Evaluate need for skin moisturizer/barrier cream  - Collaborate with interdisciplinary team   - Patient/family teaching  - Consider wound care consult   Outcome: Progressing     Problem: NEUROSENSORY - ADULT  Goal: Achieves stable or improved neurological status  Description: INTERVENTIONS  - Monitor and report changes in neurological status  - Monitor vital signs such as temperature, blood pressure, glucose, and any other labs ordered   - Initiate measures to prevent increased intracranial pressure  - Monitor for seizure activity and implement precautions if appropriate      Outcome: Progressing  Goal: Remains free of injury related to seizures activity  Description: INTERVENTIONS  - Maintain airway, patient safety  and administer oxygen as ordered  - Monitor patient for seizure activity, document and report duration and description of seizure to physician/advanced practitioner  - If seizure occurs,  ensure patient safety during seizure  - Reorient patient post seizure  - Seizure pads on all 4 side rails  - Instruct patient/family to notify RN of any seizure activity including if an aura is experienced  - Instruct patient/family to call for assistance with activity based on nursing assessment  - Administer anti-seizure medications if ordered    Outcome: Progressing  Goal: Achieves maximal functionality and self care  Description: INTERVENTIONS  - Monitor swallowing and airway patency with patient fatigue and changes in neurological status  - Encourage and assist patient to increase activity and self care     - Encourage visually impaired, hearing impaired and aphasic patients to use assistive/communication devices  Outcome: Progressing     Problem: METABOLIC, FLUID AND ELECTROLYTES - ADULT  Goal: Electrolytes maintained within normal limits  Description: INTERVENTIONS:  - Monitor labs and assess patient for signs and symptoms of electrolyte imbalances  - Administer electrolyte replacement as ordered  - Monitor response to electrolyte replacements, including repeat lab results as appropriate  - Instruct patient on fluid and nutrition as appropriate  Outcome: Progressing  Goal: Fluid balance maintained  Description: INTERVENTIONS:  - Monitor labs   - Monitor I/O and WT  - Instruct patient on fluid and nutrition as appropriate  - Assess for signs & symptoms of volume excess or deficit  Outcome: Progressing  Goal: Glucose maintained within target range  Description: INTERVENTIONS:  - Monitor Blood Glucose as ordered  - Assess for signs and symptoms of hyperglycemia and hypoglycemia  - Administer ordered medications to maintain glucose within target range  - Assess nutritional intake and initiate nutrition service referral as needed  Outcome: Progressing     Problem: Nutrition/Hydration-ADULT  Goal: Nutrient/Hydration intake appropriate for improving, restoring or maintaining nutritional needs  Description: Monitor and assess patient's nutrition/hydration status for malnutrition  Collaborate with interdisciplinary team and initiate plan and interventions as ordered  Monitor patient's weight and dietary intake as ordered or per policy  Utilize nutrition screening tool and intervene as necessary  Determine patient's food preferences and provide high-protein, high-caloric foods as appropriate       INTERVENTIONS:  - Monitor oral intake, urinary output, labs, and treatment plans  - Assess nutrition and hydration status and recommend course of action  - Evaluate amount of meals eaten  - Assist patient with eating if necessary   - Allow adequate time for meals  - Recommend/ encourage appropriate diets, oral nutritional supplements, and vitamin/mineral supplements  - Order, calculate, and assess calorie counts as needed  - Recommend, monitor, and adjust tube feedings and TPN/PPN based on assessed needs  - Assess need for intravenous fluids  - Provide specific nutrition/hydration education as appropriate  - Include patient/family/caregiver in decisions related to nutrition  Outcome: Progressing

## 2021-05-11 NOTE — ASSESSMENT & PLAN NOTE
Malnutrition Findings:   Adult Malnutrition type: Chronic illness  Adult Degree of Malnutrition: Other severe protein calorie malnutrition  Present, in the setting of chronic illness as evidenced by weight loss, less than 75% p o  Intake for greater than 1 month, history of anorexia nervosa, treated with liberal diet, supplements including Ensure  BMI Findings: Body mass index is 25 41 kg/m²

## 2021-05-11 NOTE — PLAN OF CARE
Problem: OCCUPATIONAL THERAPY ADULT  Goal: Performs self-care activities at highest level of function for planned discharge setting  See evaluation for individualized goals  Description: Treatment Interventions: ADL retraining, UE strengthening/ROM, Functional transfer training, Patient/family training, Compensatory technique education, Activityengagement, Endurance training          See flowsheet documentation for full assessment, interventions and recommendations  Note: Limitation: Decreased ADL status, Decreased self-care trans, Decreased high-level ADLs, Decreased endurance, Decreased UE strength, Decreased UE ROM  Prognosis: Fair  Assessment: Pt seen for OT tx session with focus on functional balance, functional mobility, ADL status, and transfer safety  Patient agreeable to OT treatment session  Pt received supine in bed  Performed bed mobility with min-mod A x2  Required max Ax 2 to perform standing with close armhold assistance  Pt with BLE knee buckling despite guarding b/l knees  Pt does also demonstrate decreased FM dexterity which greatly impacts ADL performance   Patient continues to be functioning below baseline level, occupational performance remains limited secondary to factors listed above, and pt at increased risk for falls and injury  The patient's raw score on the AM-PAC Daily Activity inpatient short form is 15, standardized score is 34 69, less than 39 4  Patients at this level are likely to benefit from DC to post-acute rehabilitation services  Please refer to the recommendation of the Occupational Therapist for safe DC planning  From OT standpoint, recommendation at time of d/c would be Short Term Rehab  Patient to benefit from continued Occupational Therapy treatment while in the hospital to address deficits as defined above and maximize level of functional independence with ADLs and functional mobility   Pt left with call bell in reach, tray table in reach, needs met, bed alarm activated       OT Discharge Recommendation: Post acute rehabilitation services

## 2021-05-11 NOTE — PROGRESS NOTES
[place]  Progress Note - Gregoria Mahangerard 1980, 47 y o  female MRN: [id number]  Unit/Bed#: -01 Encounter: [id number]  Primary Care Provider: Gia Chicas MD   Date and time admitted to hospital: 5/5/2021 11:15 PM    Severe protein-calorie malnutrition ([address] )  Assessment & Plan  Malnutrition Findings:   Adult Malnutrition type: Chronic illness  Adult Degree of Malnutrition: Other severe protein calorie malnutrition  Present, in the setting of chronic illness as evidenced by weight loss, less than 75% p o  Intake for greater than 1 month, history of anorexia nervosa, treated with liberal diet, supplements including Ensure  BMI Findings: Body mass index is 25 41 kg/m²  Bacteremia  Assessment & Plan  1/2 blood cultures from 05/05/2021 with Enterococcus faecalis  Patient has been on IV vancomycin and aztreonam  Discontinue aztreonam   Recheck blood cultures  Will curbside Infectious Disease  Acute cystitis  Assessment & Plan  Present on admission as evidenced by UA consistent with infection:  30-50 wbc's and innumerable bacteria   concern for possible pyelonephritis-patient CT of the abdomen pelvis: showed resolution of the hydronephrosis with the Zhong in place  Today is day 6 of IV antibiotics including vancomycin and aztreonam, discontinue aztreonam  Check repeat blood cultures given 1/2 with Enterococcus faecalis  Placed on p o   Vancomycin for C diff prophylaxis given loose stools, despite C diff and stool studies been negative, will continue for an additional 72 hours post antibiotics    Urinary retention  Assessment & Plan  · Likely due to neurogenic bladder  ·  Pt required multiple straight cath and Zhong placed  · urology input appreciated, to keep Zhong in place for 14 days till 05/20/2021 and then trial of void      Severe sepsis (HCC)-resolved as of 5/7/2021  Assessment & Plan  Severe sepsis present on admission as evidenced by tachycardia with heart rate of 120 bpm, febrile at 101°, Leukocytosis at 14 5 K, INR 1 5 (end-organ dysfunction), suspected source is urinary tract infection, more in keeping with acute complicated UTI  Imaging findings not suggestive of pyelonephritis  Chest x-ray reviewed, doubt pneumonia  She was treated with intravenous antibiotics and has resolved      VTE Pharmacologic Prophylaxis:   Pharmacologic: Enoxaparin (Lovenox)  Mechanical VTE Prophylaxis in Place: Yes    Patient Centered Rounds: I have performed bedside rounds with nursing staff today  Discussions with Specialists or Other Care Team Provider:     Education and Discussions with Family / Patient:  Patient    Time Spent for Care: 30 minutes  More than 50% of total time spent on counseling and coordination of care as described above  Current Length of Stay: 5 day(s)    Current Patient Status: Inpatient   Certification Statement: The patient will continue to require additional inpatient hospital stay due to IV antibiotics but bacteria    Discharge Plan:  Ongoing, pending culture data  Discussion with Infectious Disease    Code Status: Level 1 - Full Code      Subjective:   Seen  No new complaints to offer  Still lethargic  No abdominal pain or diarrhea today  Objective:     Vitals:   Temp (24hrs), Av 4 °F (36 9 °C), Min:97 7 °F (36 5 °C), Max:99 °F (37 2 °C)    Temp:  [97 7 °F (36 5 °C)-99 °F (37 2 °C)] 98 6 °F (37 °C)  HR:  [92-98] 98  Resp:  [15-18] 15  BP: (103-107)/(66-75) 107/68  SpO2:  [99 %-100 %] 99 %  Body mass index is 25 41 kg/m²  Input and Output Summary (last 24 hours): Intake/Output Summary (Last 24 hours) at 2021 1554  Last data filed at 2021 1157  Gross per 24 hour   Intake 1874 ml   Output 2000 ml   Net -126 ml       Physical Exam:     Physical Exam  Vitals signs reviewed  Constitutional:       General: She is not in acute distress  Appearance: Normal appearance   She is not ill-appearing, toxic-appearing or diaphoretic  HENT:      Head: Normocephalic  Eyes:      Extraocular Movements: Extraocular movements intact  Pupils: Pupils are equal, round, and reactive to light  Neck:      Musculoskeletal: Normal range of motion and neck supple  No neck rigidity or muscular tenderness  Vascular: No carotid bruit  Cardiovascular:      Rate and Rhythm: Normal rate and regular rhythm  Pulses: Normal pulses  Heart sounds: Normal heart sounds  No murmur  No friction rub  Pulmonary:      Effort: Pulmonary effort is normal       Breath sounds: Normal breath sounds  No stridor  No wheezing, rhonchi or rales  Chest:      Chest wall: No tenderness  Abdominal:      General: Abdomen is flat  Bowel sounds are normal  There is no distension  Palpations: Abdomen is soft  Tenderness: There is no abdominal tenderness  There is no guarding or rebound  Musculoskeletal: Normal range of motion  General: No swelling or tenderness  Right lower leg: No edema  Left lower leg: No edema  Skin:     General: Skin is warm and dry  Coloration: Skin is not jaundiced  Neurological:      General: No focal deficit present  Mental Status: She is alert and oriented to person, place, and time  Cranial Nerves: No cranial nerve deficit  Sensory: No sensory deficit  Motor: Weakness present  Gait: Gait abnormal            Additional Data:     Labs:    Results from last 7 days   Lab Units 05/11/21 0444  05/05/21  2346   WBC Thousand/uL 9 27   < > 14 50*   HEMOGLOBIN g/dL 10 1*   < > 9 7*   HEMATOCRIT % 32 8*   < > 28 3*   PLATELETS Thousands/uL 409*   < > 249   BANDS PCT % 1  --   --    NEUTROS PCT %  --   --  86*   LYMPHS PCT %  --   --  4*   LYMPHO PCT % 22  --   --    MONOS PCT %  --   --  9   MONO PCT % 6  --   --    EOS PCT % 5  --  0    < > = values in this interval not displayed       Results from last 7 days   Lab Units 05/11/21  0444   SODIUM mmol/L 144   POTASSIUM mmol/L 3 1*   CHLORIDE mmol/L 110*   CO2 mmol/L 20*   BUN mg/dL 3*   CREATININE mg/dL 0 67   ANION GAP mmol/L 14*   CALCIUM mg/dL 9 1   ALBUMIN g/dL 2 4*   TOTAL BILIRUBIN mg/dL 0 20   ALK PHOS U/L 356*   ALT U/L 31   AST U/L 21   GLUCOSE RANDOM mg/dL 83     Results from last 7 days   Lab Units 05/06/21  0612   INR  1 93*     Results from last 7 days   Lab Units 05/05/21  2348   POC GLUCOSE mg/dl 121         Results from last 7 days   Lab Units 05/07/21  0432 05/06/21  1032 05/05/21  2346   LACTIC ACID mmol/L  --  0 4* 0 6   PROCALCITONIN ng/ml 1 77*  --  3 28*           * I Have Reviewed All Lab Data Listed Above  * Additional Pertinent Lab Tests Reviewed: All Labs Within Last 24 Hours Reviewed    Imaging:    Imaging Reports Reviewed Today Include:   Imaging Personally Reviewed by Myself Includes:      Recent Cultures (last 7 days):     Results from last 7 days   Lab Units 05/10/21  1059 05/06/21  0011 05/05/21  2346   BLOOD CULTURE   --   --  No Growth After 5 Days    Enterococcus faecalis*   GRAM STAIN RESULT   --   --  Gram positive cocci in chains*   URINE CULTURE   --  50,000-59,000 cfu/ml Lactobacillus species*  --    C DIFF TOXIN B BY PCR  Negative  --   --        Last 24 Hours Medication List:   Current Facility-Administered Medications   Medication Dose Route Frequency Provider Last Rate    acetaminophen  650 mg Oral Q6H PRN Lady Kaplan, DO      butalbital-acetaminophen-caffeine  1 tablet Oral Q4H PRN Lady Kaplan DO      cyclobenzaprine  10 mg Oral TID PRN Lady Kaplan, DO      enoxaparin  40 mg Subcutaneous Daily Elizabeth Fly, DO      ergocalciferol  50,000 Units Oral Once per day on Mon Thu Elizabeth Fly, DO      folic acid  1 mg Oral Daily Lady Kaplan, DO      gabapentin  100 mg Oral TID PRN Lady Kaplan, DO      gabapentin  300 mg Oral HS Elizabeth Fly, DO      levETIRAcetam  500 mg Oral Q12H [address] Elizabeth Fly, DO      levothyroxine  50 mcg Oral Early Morning Elizabeth Fly, DO      LORazepam  0 5 mg Oral Q8H PRN Lady Kaplan, DO  mirtazapine  15 mg Oral HS Elizabeth Fly, DO      ondansetron  4 mg Intravenous Q6H PRN Clydene Guest, DO      potassium chloride  40 mEq Oral Daily Anette Wong MD      potassium chloride  20 mEq Intravenous Q2H Anette Wong MD      prazosin  2 mg Oral HS Elizabeth Fly, DO      sodium bicarbonate  650 mg Oral Daily SMITHA Mccauley      thiamine  100 mg Oral Daily Anette Wong MD      vancomycin  10 mg/kg Intravenous Q12H Elizabeth Fly,  mg (05/11/21 1102)    vancomycin (VANCOCIN) oral solution 125 mg  125 mg Oral Q12H [facility] Adelso Gibbs MD      venlafaxine  75 mg Oral Daily Elizabeth Fly, DO      zolpidem  5 mg Oral HS PRN Clydene Guest, DO          Today, Patient Was Seen By: Anette Wong MD    ** Please Note: Dictation voice to text software may have been used in the creation of this document   **

## 2021-05-11 NOTE — TELEPHONE ENCOUNTER
Patient is still inpatient - will follow up at discharge _ she maybe dischange to [facility]

## 2021-05-11 NOTE — ASSESSMENT & PLAN NOTE
· Sodium 121 on admission now gradually improving 136-cap IV fluids  · Chloride at 87  · Na on 5/3 was 132 per SNF labs   · Received 700 cc bolus of normal saline by [facility] prior to EMS arrival  ·  nephrology input appreciated  ·  BMP q4h   · Neurochecks q4h  · Goal correction of 8-10mEq in the next 24 hours   · Suspect that this is secondary to decreased PO intake in the setting of patient's anorexia nervosa  · Pt admitted to [facility] due to severity of hyponatremia -will transfer to med surg

## 2021-05-11 NOTE — PROGRESS NOTES
Vancomycin IV Pharmacy-to-Dose Consultation    Fiordaliza Estrada is a 47 y o  female who is currently receiving Vancomycin IV with management by the Pharmacy Consult service  Assessment/Plan:  The patient was reviewed  Renal function is stable and no signs or symptoms of nephrotoxicity and/or infusion reactions were documented in the chart  Based on todays assessment, continue current vancomycin (day # 6) dosing of 750 mg Q12H, with a plan for trough to be drawn at 11:30 on 05/13/21  We will continue to follow the patients culture results and clinical progress daily      Sarbjit Murdock, Pharmacist

## 2021-05-12 PROBLEM — J18.9 PNEUMONIA: Status: RESOLVED | Noted: 2021-05-06 | Resolved: 2021-05-12

## 2021-05-12 LAB
ANION GAP SERPL CALCULATED.3IONS-SCNC: 12 MMOL/L (ref 4–13)
BACTERIA BLD CULT: ABNORMAL
BUN SERPL-MCNC: 3 MG/DL (ref 5–25)
CALCIUM SERPL-MCNC: 8.7 MG/DL (ref 8.3–10.1)
CHLORIDE SERPL-SCNC: 112 MMOL/L (ref 100–108)
CO2 SERPL-SCNC: 21 MMOL/L (ref 21–32)
CREAT SERPL-MCNC: 0.63 MG/DL (ref 0.6–1.3)
G LAMBLIA AG STL QL IA: NEGATIVE
GFR SERPL CREATININE-BSD FRML MDRD: 114 ML/MIN/1.73SQ M
GLUCOSE SERPL-MCNC: 79 MG/DL (ref 65–140)
GRAM STN SPEC: ABNORMAL
MAGNESIUM SERPL-MCNC: 1.5 MG/DL (ref 1.6–2.6)
PHOSPHATE SERPL-MCNC: 4.7 MG/DL (ref 2.7–4.5)
POTASSIUM SERPL-SCNC: 3.6 MMOL/L (ref 3.5–5.3)
SODIUM SERPL-SCNC: 145 MMOL/L (ref 136–145)

## 2021-05-12 PROCEDURE — 84100 ASSAY OF PHOSPHORUS: CPT | Performed by: INTERNAL MEDICINE

## 2021-05-12 PROCEDURE — 99232 SBSQ HOSP IP/OBS MODERATE 35: CPT | Performed by: INTERNAL MEDICINE

## 2021-05-12 PROCEDURE — 80048 BASIC METABOLIC PNL TOTAL CA: CPT | Performed by: INTERNAL MEDICINE

## 2021-05-12 PROCEDURE — 83735 ASSAY OF MAGNESIUM: CPT | Performed by: INTERNAL MEDICINE

## 2021-05-12 RX ORDER — MAGNESIUM SULFATE HEPTAHYDRATE 40 MG/ML
2 INJECTION, SOLUTION INTRAVENOUS ONCE
Status: DISCONTINUED | OUTPATIENT
Start: 2021-05-12 | End: 2021-05-12

## 2021-05-12 RX ADMIN — LORAZEPAM 0.5 MG: 0.5 TABLET ORAL at 17:50

## 2021-05-12 RX ADMIN — Medication 125 MG: at 22:12

## 2021-05-12 RX ADMIN — Medication 800 MG: at 17:50

## 2021-05-12 RX ADMIN — POTASSIUM CHLORIDE 40 MEQ: 1500 TABLET, EXTENDED RELEASE ORAL at 08:31

## 2021-05-12 RX ADMIN — VANCOMYCIN HYDROCHLORIDE 750 MG: 750 INJECTION, SOLUTION INTRAVENOUS at 12:02

## 2021-05-12 RX ADMIN — BUTALBITAL, ACETAMINOPHEN AND CAFFEINE 1 TABLET: 50; 325; 40 TABLET ORAL at 19:28

## 2021-05-12 RX ADMIN — ACETAMINOPHEN 650 MG: 325 TABLET, FILM COATED ORAL at 08:30

## 2021-05-12 RX ADMIN — GABAPENTIN 300 MG: 300 CAPSULE ORAL at 22:11

## 2021-05-12 RX ADMIN — FOLIC ACID 1 MG: 1 TABLET ORAL at 08:28

## 2021-05-12 RX ADMIN — ACETAMINOPHEN 650 MG: 325 TABLET, FILM COATED ORAL at 13:15

## 2021-05-12 RX ADMIN — CYCLOBENZAPRINE HYDROCHLORIDE 10 MG: 10 TABLET, FILM COATED ORAL at 13:15

## 2021-05-12 RX ADMIN — LEVETIRACETAM 500 MG: 500 TABLET, FILM COATED ORAL at 08:31

## 2021-05-12 RX ADMIN — Medication 125 MG: at 08:37

## 2021-05-12 RX ADMIN — LORAZEPAM 0.5 MG: 0.5 TABLET ORAL at 08:32

## 2021-05-12 RX ADMIN — VANCOMYCIN HYDROCHLORIDE 750 MG: 750 INJECTION, SOLUTION INTRAVENOUS at 00:21

## 2021-05-12 RX ADMIN — BUTALBITAL, ACETAMINOPHEN AND CAFFEINE 1 TABLET: 50; 325; 40 TABLET ORAL at 10:29

## 2021-05-12 RX ADMIN — SODIUM BICARBONATE 650 MG TABLET 650 MG: at 08:29

## 2021-05-12 RX ADMIN — LEVOTHYROXINE SODIUM 50 MCG: 25 TABLET ORAL at 05:30

## 2021-05-12 RX ADMIN — VENLAFAXINE HYDROCHLORIDE 75 MG: 75 CAPSULE, EXTENDED RELEASE ORAL at 08:31

## 2021-05-12 RX ADMIN — ENOXAPARIN SODIUM 40 MG: 40 INJECTION SUBCUTANEOUS at 08:29

## 2021-05-12 RX ADMIN — THIAMINE HCL TAB 100 MG 100 MG: 100 TAB at 08:31

## 2021-05-12 RX ADMIN — ZOLPIDEM TARTRATE 5 MG: 5 TABLET, COATED ORAL at 22:14

## 2021-05-12 RX ADMIN — Medication 800 MG: at 10:34

## 2021-05-12 RX ADMIN — LEVETIRACETAM 500 MG: 500 TABLET, FILM COATED ORAL at 22:11

## 2021-05-12 RX ADMIN — MIRTAZAPINE 15 MG: 15 TABLET, FILM COATED ORAL at 22:11

## 2021-05-12 NOTE — CONSULTS
Consultation - Infectious Disease   Phillip Narvaez 47 y o  female MRN: [id number]  Unit/Bed#: -01 Encounter: [id number]      Assessment/Plan   1  Enterococcal bacteremia  Only grew in one bottle at 48 hours  Enterococcus can be a contaminant although patient febrile on admit, I suspect from urinary retention  Urine only grew lactobacillus  She has now had 8 days of vancomycin for this, which I suspect would be sufficient  She has a significant allergy to pcns with throat swelling       - repeat blood cultures  - continue vancomycin until discharge    D/w primary team    History of Present Illness   Physician Requesting Consult: Aron Aase, MD  Reason for Consult / Principal Problem: Enterococcal bacteremia    HPI: Phillip Narvaez is a 47y o  year old female with H/O gastric bypass, neurogenic bladder, extensive w/u for UE weakness last admit  Patient sent from rehab center with fever and hyponatremia  Patient found to have urinary retention  Patient febrile at that time  Blood cultures drawn and 1/2 were + enterococcus  Patient's urine grew a lactobacillus  Patient started on vancomycin and azactam since admit and has been afebrile since then  She had a CT of the Abd/pelvis which is unremarkable  Patient has no headaches, chest pains, abd pains, n/v/d  No orthopedic hardware, intra-vascular devices  P    Consults    ROS: 12 systems reviewed, remainder is neg      Historical Information   Past Medical History:   Diagnosis Date    Anemia     Anxiety     Hypothyroidism     Psychiatric disorder     depression anxiety    Seizures ([facility] [address] )     Vitamin D deficiency      Past Surgical History:   Procedure Laterality Date    GASTRIC BYPASS      IR PICC PLACEMENT SINGLE LUMEN  4/16/2021    IR TEMPORARY DIALYSIS CATHETER PLACEMENT  4/8/2021     Social History   Social History     Substance and Sexual Activity   Alcohol Use Never    Frequency: Never     Social History     Substance and Sexual Activity   Drug Use No    Comment: in revovery for benzos     Social History     Tobacco Use   Smoking Status Current Every Day Smoker   Smokeless Tobacco Never Used     Family History   Problem Relation Age of Onset    Hypertension Mother     Heart attack Mother     No Known Problems Father     Lung cancer Maternal Grandmother     Hypothyroidism Maternal Grandmother     Diabetes type II Paternal Grandmother     Diabetes type II Paternal Grandfather        Meds/Allergies   MEDS: reviewed      Current Facility-Administered Medications:     acetaminophen (TYLENOL) tablet 650 mg, 650 mg, Oral, Q6H PRN, Elizabeth Fly, DO, 650 mg at 05/12/21 0830    butalbital-acetaminophen-caffeine (FIORICET,ESGIC) -40 mg per tablet 1 tablet, 1 tablet, Oral, Q4H PRN, Debria Doyne, DO, 1 tablet at 05/12/21 1029    cyclobenzaprine (FLEXERIL) tablet 10 mg, 10 mg, Oral, TID PRN, Debria Doyne, DO, 10 mg at 05/11/21 2144    enoxaparin (LOVENOX) subcutaneous injection 40 mg, 40 mg, Subcutaneous, Daily, Elizabeth Fly, DO, 40 mg at 05/12/21 4943    ergocalciferol (VITAMIN D2) capsule 50,000 Units, 50,000 Units, Oral, Once per day on Mon Thu, Elizabeth Fly, DO, 50,000 Units at 01/58/01 9746    folic acid (FOLVITE) tablet 1 mg, 1 mg, Oral, Daily, Elizabeth Fly, DO, 1 mg at 05/12/21 3567    gabapentin (NEURONTIN) capsule 100 mg, 100 mg, Oral, TID PRN, Elizabeth Fly, DO, 100 mg at 05/06/21 1235    gabapentin (NEURONTIN) capsule 300 mg, 300 mg, Oral, HS, Elizabeth Fly, DO, 300 mg at 05/11/21 2144    levETIRAcetam (KEPPRA) tablet 500 mg, 500 mg, Oral, Q12H [facility], Elizabeth Fly, DO, 500 mg at 05/12/21 0831    levothyroxine tablet 50 mcg, 50 mcg, Oral, Early Morning, Elizabeth Fly, DO, 50 mcg at 05/12/21 0530    LORazepam (ATIVAN) tablet 0 5 mg, 0 5 mg, Oral, Q8H PRN, Elizabeth Fly, DO, 0 5 mg at 05/12/21 3163    magnesium oxide (MAG-OX) tablet 800 mg, 800 mg, Oral, BID, Adelso Low MD, 800 mg at 05/12/21 1034    mirtazapine (REMERON) tablet 15 mg, 15 mg, Oral, HS, Elizabeth Fly, DO, 15 mg at 05/11/21 2144    ondansetron (ZOFRAN) injection 4 mg, 4 mg, Intravenous, Q6H PRN, Elizabeth Fly, DO    potassium chloride (K-DUR,KLOR-CON) CR tablet 40 mEq, 40 mEq, Oral, Daily, Catie Jiménez MD, 40 mEq at 05/12/21 0831    prazosin (MINIPRESS) capsule 2 mg, 2 mg, Oral, HS, Elizabeth Fly, DO, 2 mg at 05/06/21 2329    sodium bicarbonate tablet 650 mg, 650 mg, Oral, Daily, SMITHA Mccauley, 650 mg at 05/12/21 3338    thiamine tablet 100 mg, 100 mg, Oral, Daily, Catie Jiménez MD, 100 mg at 05/12/21 0831    vancomycin (VANCOCIN) IVPB (premix in dextrose) 750 mg 150 mL, 10 mg/kg, Intravenous, Q12H, Elizabeth Fly, DO, Last Rate: 150 mL/hr at 05/12/21 1202, 750 mg at 05/12/21 1202    vancomycin (VANCOCIN) oral solution 125 mg, 125 mg, Oral, Q12H [facility], Catie Jiménez MD, 125 mg at 05/12/21 0837    venlafaxine (EFFEXOR-XR) 24 hr capsule 75 mg, 75 mg, Oral, Daily, Elizabeth Fly, DO, 75 mg at 05/12/21 0831    zolpidem (AMBIEN) tablet 5 mg, 5 mg, Oral, HS PRN, Ferny Paynee, DO, 5 mg at 05/11/21 2144    Allergies   Allergen Reactions    Anti-Hist [Diphenhydramine]     Buspar [Buspirone]     Haldol [Haloperidol]     Penicillins Throat Swelling    Pennsaid [Diclofenac Sodium]     Zoloft [Sertraline]          Intake/Output Summary (Last 24 hours) at 5/12/2021 1234  Last data filed at 5/12/2021 1233  Gross per 24 hour   Intake 1787 ml   Output 1950 ml   Net -163 ml       PE:  WD, WN, WF in NAD  VSS, Tmax: afebrile  HEENT: anicteric  NECK: supple   CARDIAC: rrr s1s2  LUNGS: decreased   ABDOMEN: soft, nt/nd + bs  EXTREMITIES: no edema  SKIN: no rashes  NEURO:  Generalized weakness  : + bates cath    Invasive Devices:   Peripheral IV 05/12/21 Right Forearm (Active)   Site Assessment WDL; Clean;Dry; Intact 05/12/21 1200   Dressing Type Transparent 05/12/21 1200   Line Status Flushed 05/12/21 1200   Dressing Status Clean;Dry; Intact 05/12/21 1200       Urethral Catheter Latex 16 Fr   (Active) Amt returned on insertion(mL) 1300 mL 05/06/21 1230   Reasons to continue Urinary Catheter  Acute urinary retention/obstruction failing urinary retention protocol 05/12/21 0101   Goal for Removal N/A- discharging with bates 05/12/21 0101   Site Assessment Clean;Skin intact 05/12/21 0101   Collection Container Standard drainage bag 05/12/21 0101   Securement Method Securing device (Describe) 05/12/21 0101   Output (mL) 500 mL 05/12/21 0648           Lab Results:   No results displayed because visit has over 200 results  Imaging Studies: I have personally reviewed pertinent reports  EKG, Pathology, and Other Studies: I have personally reviewed pertinent reports  Culture  Lab Results   Component Value Date    BLOODCX No Growth After 5 Days   05/05/2021    BLOODCX Enterococcus faecalis (A) 05/05/2021     No results found for: [facility]   Lab Results   Component Value Date    URINECX 50,000-59,000 cfu/ml Lactobacillus species (A) 05/06/2021    URINECX 60,000-69,000 cfu/ml Escherichia coli (A) 04/10/2021    URINECX 50,000-59,000 cfu/ml  04/10/2021     No results found for: SPUTUMCULTUR    Active Problems:    Hypothyroidism    Anorexia nervosa, restricting type    Moderate episode of recurrent major depressive disorder (HCC)    Generalized weakness    Anemia    Severe protein-calorie malnutrition (HCC)    Acute cystitis    Urinary retention    Pneumonia    Altered mental status    Hypokalemia    Hypercalcemia    Hypomagnesemia    Bacteremia

## 2021-05-12 NOTE — PROGRESS NOTES
NEPHROLOGY PROGRESS NOTE   Rosita Chavez 47 y o  female MRN: [id number]  Unit/Bed#: -01 Encounter: [id number]      ASSESSMENT/PLAN:  1  Acute kidney injury, POA:  Prerenal + obstructive uropathy  Creatinine peaked at 1 79 and improved with IV fluids and Zhong catheter  · Creatinine stable at 0 6 today  2  Urinary retention:  Likely due to neurogenic bladder  Zhong catheter in place per Urology until 5/20 and then will get void trial  3  Hypokalemia: K improved to 3 6 today and mag 1 5   · Getting magnesium 2 g IV x1 today  4  Metabolic acidosis:  Bicarb improved to 21 today  Continue sodium bicarb 650 mg b i d   5  Mild hyponatremia:  Sodium 145  Encouraged oral intake  6  Anemia:  Hemoglobin trending up to 10 1 today  7  Sepsis with UTI:  On vancomycin per primary team  · Prior blood cultures 1/2 positive for enterococcus, possible contaminant? · New blood cultures pending  8  Diarrhea:  C diff negative    Plan Summary:    Encourage oral intake    Mag replaced by primary team    Check am BMP, mag     SUBJECTIVE:  Still having some abdominal pain and diarrhea  Not eating or drinking very well      OBJECTIVE:  Current Weight: Weight - Scale: 71 4 kg (157 lb 6 5 oz)  Vitals:    05/12/21 0035   BP: 100/62   Pulse: 94   Resp: 20   Temp: 98 6 °F (37 °C)   SpO2: 98%       Intake/Output Summary (Last 24 hours) at 5/12/2021 1026  Last data filed at 5/12/2021 0700  Gross per 24 hour   Intake 1754 ml   Output 1950 ml   Net -196 ml       General:  No acute distress  Skin:  No rash  Eyes:  Sclerae anicteric  ENT:  Moist mucous membranes  Neck:  Trachea midline with no JVD  Chest:  Clear to auscultation bilaterally  CVS:  Regular rate and rhythm  Abdomen:  Soft, nontender, nondistended  Extremities:  No edema  Neuro:  Awake and alert  Psych:  Appropriate affect      Medications:  Scheduled Meds:  Current Facility-Administered Medications   Medication Dose Route Frequency Provider Last Rate    acetaminophen 650 mg Oral Q6H PRN Bernis Archer, DO      butalbital-acetaminophen-caffeine  1 tablet Oral Q4H PRN Bernis Archer, DO      cyclobenzaprine  10 mg Oral TID PRN Bernis Archer, DO      enoxaparin  40 mg Subcutaneous Daily Elizabeth Fly, DO      ergocalciferol  50,000 Units Oral Once per day on Mon Thu Elizabeth Fly, DO      folic acid  1 mg Oral Daily Bernis Archer, DO      gabapentin  100 mg Oral TID PRN Bernis Archer, DO      gabapentin  300 mg Oral HS Elizabeth Fly, DO      levETIRAcetam  500 mg Oral Q12H [address] Elizabeth Fly, DO      levothyroxine  50 mcg Oral Early Morning Elizabeth Fly, DO      LORazepam  0 5 mg Oral Q8H PRN Bernis Archer, DO      magnesium sulfate  2 g Intravenous Once Phylicia Castellanos MD      mirtazapine  15 mg Oral HS Elizabeth Fly, DO      ondansetron  4 mg Intravenous Q6H PRN Bernis Archer, DO      potassium chloride  40 mEq Oral Daily Phylicia Castellanos MD      prazosin  2 mg Oral HS Elizabeth Fly, DO      sodium bicarbonate  650 mg Oral Daily SMITHA Mccauley      thiamine  100 mg Oral Daily Phylicia Castellanos MD      vancomycin  10 mg/kg Intravenous Q12H Elizabeth Fly, DO Stopped (05/12/21 0043)    vancomycin (VANCOCIN) oral solution 125 mg  125 mg Oral Q12H [address] Phylicia Castellanos MD      venlafaxine  75 mg Oral Daily Elizabeth Fly, DO      zolpidem  5 mg Oral HS PRN Bernis Archer, DO         PRN Meds:   acetaminophen    butalbital-acetaminophen-caffeine    cyclobenzaprine    gabapentin    LORazepam    ondansetron    zolpidem    Laboratory Results:  Results from last 7 days   Lab Units 05/12/21  0652 05/11/21  0444 05/10/21  0554 05/09/21  0455 05/08/21  1241 05/08/21  0513 05/07/21  1445 05/07/21  0432  05/06/21  0612  05/05/21  2346   WBC Thousand/uL  --  9 27 8 23 7 56  --   --   --  9 52  --  13 32*  --  14 50*   HEMOGLOBIN g/dL  --  10 1* 9 8* 8 7*  --   --   --  8 4*  --  8 6*  --  9 7*   HEMATOCRIT %  --  32 8* 31 5* 27 8*  --   --   --  26 0*  --  26 2*  --  28 3*   PLATELETS Thousands/uL  --  409* 356 305  --   -- --  186  --  198  --  249   SODIUM mmol/L 145 144 144 142 140 139 136 136   < > 126*   < > 121*   POTASSIUM mmol/L 3 6 3 1* 3 4* 3 7 4 1 2 9* 3 3* 3 4*   < > 3 9   < > 4 1   CHLORIDE mmol/L 112* 110* 109* 108 106 103 102 102   < > 92*   < > 87*   CO2 mmol/L 21 20* 20* 21 23 22 20* 22   < > 18*   < > 23   BUN mg/dL 3* 3* 2* 6 7 7 10 14   < > 20   < > 21   CREATININE mg/dL 0 63 0 67 0 78 0 81 0 77 0 91 0 87 1 11   < > 1 58*   < > 1 79*   CALCIUM mg/dL 8 7 9 1 8 6 8 5 9 0 9 0 8 7 8 8   < > 8 4   < > 8 5   MAGNESIUM mg/dL 1 5* 1 7 1 5*  --  2 3 1 4*  --  2 0  --   --   --   --    PHOSPHORUS mg/dL 4 7* 4 6*  --   --   --   --   --   --   --   --   --   --     < > = values in this interval not displayed

## 2021-05-12 NOTE — ASSESSMENT & PLAN NOTE
Patient was treated for pneumonia based on chest x-ray findings  Doubt this is pneumonia  CXR: "Mild left basilar opacity suggestive of atelectasis or a small developing infiltrate " Also noted to have significant gaseous distention on imaging   · Denies cough or dyspnea     · Encourage incentive spirometer

## 2021-05-12 NOTE — ASSESSMENT & PLAN NOTE
· Patient was at [facility] secondary to deconditioning from nutritional deficiency associated with anorexia nervosa  · During admission in 4/5 to 4/26 patient was evaluated by Psychiatry who felt that patient did not qualify for inpatient psych at that time   · Patient on clear Ensure which she appears to be tolerating and encouraged her with additional intake during the day  · Nutrition input appreciated and we are following calorie counts-difficult situation in this patient with previous gastric bypass surgery and revisions

## 2021-05-12 NOTE — NURSING NOTE
Patient refused potassium IVPB half-way through administration due to constant burning  Rate was decreased to 25ml/hr with  normal saline running simultaneously at 125ml/hr  Patient still stated that it was burning and stated "I refuse the rest of the bag, I do not want it anymore " After further education on the importance of finishing the IVPB, patient still refused      Anna Masters RN

## 2021-05-12 NOTE — ASSESSMENT & PLAN NOTE
Present on admission as evidenced by UA consistent with infection:  30-50 wbc's and innumerable bacteria   concern for possible pyelonephritis-patient CT of the abdomen pelvis: showed resolution of the hydronephrosis with the Zhong in place  Today is day 7 of IV antibiotics, on IV vancomycin   Aztreonam discontinued on 05/11/2021  Check repeat blood cultures given 1/2 with Enterococcus faecalis  Placed on p o   Vancomycin for C diff prophylaxis given loose stools, despite C diff and stool studies been negative, will continue for an additional 72 hours post antibiotics

## 2021-05-12 NOTE — CASE MANAGEMENT
LOS: 6  Continuing to follow patient  Met with patient and was informed "they stuck me again"  patient with pending  repeat BC  Discussed her return to [facility] at discharge and she is agreeable  Cm to follow

## 2021-05-12 NOTE — PROGRESS NOTES
Vancomycin IV Pharmacy-to-Dose Consultation    Giovanny Crain is a 47 y o  female who is currently receiving Vancomycin IV with management by the Pharmacy Consult service  Assessment/Plan:  The patient was reviewed  Renal function is stable and no signs or symptoms of nephrotoxicity and/or infusion reactions were documented in the chart  Based on todays assessment, continue current vancomycin (day # 7) dosing of 750mg IV Q12H, with a plan for trough to be drawn at 1130 on 5/13/21  We will continue to follow the patients culture results and clinical progress daily      Veronique Long, Pharmacist

## 2021-05-12 NOTE — ASSESSMENT & PLAN NOTE
1/2 blood cultures from 05/05/2021 with Enterococcus faecalis  Patient has been on IV vancomycin and aztreonam  Discontinue aztreonam   Recheck blood cultures, not done yesterday as patient was refusing IV sticks  Seen by Infectious Disease, recommend follow blood cultures, continue IV vancomycin to discharge

## 2021-05-12 NOTE — PROGRESS NOTES
[place]  Progress Note - Pawel Dinero 1980, 47 y o  female MRN: [id number]  Unit/Bed#: -01 Encounter: [id number]  Primary Care Provider: Kaleb Brand MD   Date and time admitted to hospital: 5/5/2021 11:15 PM    Generalized weakness  Assessment & Plan  · Patient out of bed in chair with much encouragement by nursing staff  in the past 2 days-continue with PT OT  ·  Recent admission 4/5 to 4/26 due to generalized weakness  · Patient was seen by Neurology at the time of prior admission with extensive workup including LP, MRI brain, C-spine, and L-spine that showed no meningitis or structural abnormalities that would account for patient's weakness  · patient did receive plasmapheresis due to possible concern for autoimmune neuropathy  · On discharge, it was felt by Neurology that the patient's complaints were due to nutritional deficiency due to anorexia nervosa  · Patient was denied acute rehab placement by her insurance, so she was sent to long-term care at [facility] on 04/26  · PT OT to continue      Severe protein-calorie malnutrition ([facility] [address] )  Assessment & Plan  Malnutrition Findings:   Adult Malnutrition type: Chronic illness  Adult Degree of Malnutrition: Other severe protein calorie malnutrition  Present, in the setting of chronic illness as evidenced by weight loss, less than 75% p o  Intake for greater than 1 month, history of anorexia nervosa, treated with liberal diet, supplements including Ensure  BMI Findings: Body mass index is 25 41 kg/m²         RENE (acute kidney injury) (HCC)-resolved as of 5/9/2021  Assessment & Plan  · Creatinine at 1 79 on admission now with hydration creatinine improved on May 7, 2021 down to 1 11-now at 0 91  · Cr on 5/3 was 0 6   · Avoid nephrotoxic drugs   · Avoid hypotension  · Suspect that this is prerenal and postrenal in the setting of dehydration from anorexia nervosa and retention   · Pt with urinary retention with multiple straight caths with 800 to a 1000 mL  · Zhong reinserted as per Urology needs to be kept in 2 weeks-patient reports her Zhong was just removed a week ago at the SNF  · I/Os     Bacteremia  Assessment & Plan  1/2 blood cultures from 05/05/2021 with Enterococcus faecalis  Patient has been on IV vancomycin and aztreonam  Discontinue aztreonam   Recheck blood cultures, not done yesterday as patient was refusing IV sticks  Seen by Infectious Disease, recommend follow blood cultures, continue IV vancomycin to discharge  Acute cystitis  Assessment & Plan  Present on admission as evidenced by UA consistent with infection:  30-50 wbc's and innumerable bacteria   concern for possible pyelonephritis-patient CT of the abdomen pelvis: showed resolution of the hydronephrosis with the Zhong in place  Today is day 7 of IV antibiotics, on IV vancomycin   Aztreonam discontinued on 05/11/2021  Check repeat blood cultures given 1/2 with Enterococcus faecalis  Placed on p o  Vancomycin for C diff prophylaxis given loose stools, despite C diff and stool studies been negative, will continue for an additional 72 hours post antibiotics    Hypomagnesemia  Assessment & Plan  1 5 this a m , replete p r n  Repeat in the a m  Hypercalcemia  Assessment & Plan  Calcium at 9 0 today-continue hydration and encourage p o  Intake of fluids    Hypokalemia  Assessment & Plan   replete p r n    Continue potassium repletion with 40 mEq daily  Trend BMP    Altered mental status  Assessment & Plan  · Likely multifactorial due to infection, medication induced, improved with decreasing doses of Ambien and lorazepam  · Continue to encourage out of bed time      Urinary retention  Assessment & Plan  · Likely due to neurogenic bladder  ·  Pt required multiple straight cath and Zhong placed  · urology input appreciated, to keep Zhong in place for 14 days till 05/20/2021 and then trial of void      Anemia  Assessment & Plan  · Hemoglobin at 9 7 on admission then  dropped to 8   4 but is slightly improved today at 8 7-  ·  Suspect that this is secondary to nutritional deficiencies  · Repeat CBC in a m  Moderate episode of recurrent major depressive disorder (HCC)  Assessment & Plan  · Patient's medications were adjusted during prior admission   · Current regimen:  Remeron 15 mg HS, Effexor 37 5 mg daily, prazosin 2mg HS (for nightmares), and Ambien decrease to 5 mg at HS due to excess sedation issues with that and lorazepam on use    Anorexia nervosa, restricting type  Assessment & Plan  · Patient was at [facility] secondary to deconditioning from nutritional deficiency associated with anorexia nervosa  · During admission in 4/5 to 4/26 patient was evaluated by Psychiatry who felt that patient did not qualify for inpatient psych at that time   · Patient on clear Ensure which she appears to be tolerating and encouraged her with additional intake during the day  · Nutrition input appreciated and we are following calorie counts-difficult situation in this patient with previous gastric bypass surgery and revisions    Hypothyroidism  Assessment & Plan  · Home regimen: Synthroid 50 mcg daily   · TSH is stable at 1 37    Severe sepsis (HCC)-resolved as of 5/7/2021  Assessment & Plan  Severe sepsis present on admission as evidenced by tachycardia with heart rate of 120 bpm, febrile at 101°, Leukocytosis at 14 5 K, INR 1 5 (end-organ dysfunction), suspected source is urinary tract infection, more in keeping with acute complicated UTI  Imaging findings not suggestive of pyelonephritis  Chest x-ray reviewed, doubt pneumonia  She was treated with intravenous antibiotics and has resolved    Pneumonia-resolved as of 5/12/2021  Assessment & Plan  Patient was treated for pneumonia based on chest x-ray findings  Doubt this is pneumonia    CXR: "Mild left basilar opacity suggestive of atelectasis or a small developing infiltrate " Also noted to have significant gaseous distention on imaging   · Denies cough or dyspnea  · Encourage incentive spirometer    * Hyponatremia-resolved as of 2021  Assessment & Plan  · Sodium 121 on admission now gradually improving 136-cap IV fluids  · Chloride at 87  · Na on 5/3 was 132 per SNF labs   · Received 700 cc bolus of normal saline by [facility] prior to EMS arrival  ·  nephrology input appreciated  ·  BMP q4h   · Neurochecks q4h  · Goal correction of 8-10mEq in the next 24 hours   · Suspect that this is secondary to decreased PO intake in the setting of patient's anorexia nervosa  · Pt admitted to [place] [id number] due to severity of hyponatremia -will transfer to med surg      VTE Pharmacologic Prophylaxis:   Pharmacologic: Enoxaparin (Lovenox)  Mechanical VTE Prophylaxis in Place: Yes    Patient Centered Rounds: I have performed bedside rounds with nursing staff today  Discussions with Specialists or Other Care Team Provider:     Education and Discussions with Family / Patient:  Patient    Time Spent for Care: 30 minutes  More than 50% of total time spent on counseling and coordination of care as described above  Current Length of Stay: 6 day(s)    Current Patient Status: Inpatient   Certification Statement: The patient will continue to require additional inpatient hospital stay due to IV antibiotics    Discharge Plan:  Likely tomorrow    Code Status: Level 1 - Full Code      Subjective:   Seen  No new complaints to offer  Does not have an appetite, encouraged to eat    Objective:     Vitals:   Temp (24hrs), Av 1 °F (37 3 °C), Min:98 6 °F (37 °C), Max:99 6 °F (37 6 °C)    Temp:  [98 6 °F (37 °C)-99 6 °F (37 6 °C)] 99 6 °F (37 6 °C)  HR:  [92-94] 92  Resp:  [16-20] 18  BP: (100-108)/(62-69) 108/69  SpO2:  [98 %-100 %] 100 %  Body mass index is 25 41 kg/m²  Input and Output Summary (last 24 hours):        Intake/Output Summary (Last 24 hours) at 2021 1730  Last data filed at 2021 1716  Gross per 24 hour   Intake 1567 ml   Output 1500 ml   Net 67 ml       Physical Exam:     Physical Exam  Vitals signs reviewed  Constitutional:       General: She is not in acute distress  Appearance: Normal appearance  She is not ill-appearing, toxic-appearing or diaphoretic  HENT:      Head: Normocephalic  Eyes:      Extraocular Movements: Extraocular movements intact  Pupils: Pupils are equal, round, and reactive to light  Neck:      Musculoskeletal: Normal range of motion and neck supple  No neck rigidity or muscular tenderness  Vascular: No carotid bruit  Cardiovascular:      Rate and Rhythm: Normal rate and regular rhythm  Pulses: Normal pulses  Heart sounds: Normal heart sounds  No murmur  No friction rub  Pulmonary:      Effort: Pulmonary effort is normal       Breath sounds: Normal breath sounds  No stridor  No wheezing, rhonchi or rales  Chest:      Chest wall: No tenderness  Abdominal:      General: Abdomen is flat  Bowel sounds are normal  There is no distension  Palpations: Abdomen is soft  Tenderness: There is no abdominal tenderness  There is no guarding or rebound  Musculoskeletal: Normal range of motion  General: No swelling or tenderness  Right lower leg: No edema  Left lower leg: No edema  Skin:     General: Skin is warm and dry  Coloration: Skin is not jaundiced  Neurological:      General: No focal deficit present  Mental Status: She is alert and oriented to person, place, and time  Cranial Nerves: No cranial nerve deficit  Sensory: No sensory deficit           Additional Data:     Labs:    Results from last 7 days   Lab Units 05/11/21  0444  05/05/21  2346   WBC Thousand/uL 9 27   < > 14 50*   HEMOGLOBIN g/dL 10 1*   < > 9 7*   HEMATOCRIT % 32 8*   < > 28 3*   PLATELETS Thousands/uL 409*   < > 249   BANDS PCT % 1  --   --    NEUTROS PCT %  --   --  86*   LYMPHS PCT %  --   --  4*   LYMPHO PCT % 22 --   --    MONOS PCT %  --   --  9   MONO PCT % 6  --   --    EOS PCT % 5  --  0    < > = values in this interval not displayed  Results from last 7 days   Lab Units 05/12/21  0652 05/11/21  0444   SODIUM mmol/L 145 144   POTASSIUM mmol/L 3 6 3 1*   CHLORIDE mmol/L 112* 110*   CO2 mmol/L 21 20*   BUN mg/dL 3* 3*   CREATININE mg/dL 0 63 0 67   ANION GAP mmol/L 12 14*   CALCIUM mg/dL 8 7 9 1   ALBUMIN g/dL  --  2 4*   TOTAL BILIRUBIN mg/dL  --  0 20   ALK PHOS U/L  --  356*   ALT U/L  --  31   AST U/L  --  21   GLUCOSE RANDOM mg/dL 79 83     Results from last 7 days   Lab Units 05/06/21  0612   INR  1 93*     Results from last 7 days   Lab Units 05/05/21  2348   POC GLUCOSE mg/dl 121         Results from last 7 days   Lab Units 05/07/21  0432 05/06/21  1032 05/05/21  2346   LACTIC ACID mmol/L  --  0 4* 0 6   PROCALCITONIN ng/ml 1 77*  --  3 28*           * I Have Reviewed All Lab Data Listed Above  * Additional Pertinent Lab Tests Reviewed: All Labs Within Last 24 Hours Reviewed    Imaging:    Imaging Reports Reviewed Today Include:   Imaging Personally Reviewed by Myself Includes:      Recent Cultures (last 7 days):     Results from last 7 days   Lab Units 05/10/21  1059 05/06/21  0011 05/05/21  2346   BLOOD CULTURE   --   --  Enterococcus faecalis*  No Growth After 5 Days     GRAM STAIN RESULT   --   --  Gram positive cocci in chains*   URINE CULTURE   --  50,000-59,000 cfu/ml Lactobacillus species*  --    C DIFF TOXIN B BY PCR  Negative  --   --        Last 24 Hours Medication List:   Current Facility-Administered Medications   Medication Dose Route Frequency Provider Last Rate    acetaminophen  650 mg Oral Q6H PRN Clydene Guest, DO      butalbital-acetaminophen-caffeine  1 tablet Oral Q4H PRN Clydene Guest, DO      cyclobenzaprine  10 mg Oral TID PRN Clydene Guest, DO      enoxaparin  40 mg Subcutaneous Daily Elizabeth Fly, DO      ergocalciferol  50,000 Units Oral Once per day on Mon Thu Elizabeth Fly, DO      folic acid  1 mg Oral Daily Jacqualin Oddi, DO      gabapentin  100 mg Oral TID PRN Jacqualin Oddi, DO      gabapentin  300 mg Oral HS Elizabeth Fly, DO      levETIRAcetam  500 mg Oral Q12H [address] Elizabeth Fly, DO      levothyroxine  50 mcg Oral Early Morning Elizabeth Fly, DO      LORazepam  0 5 mg Oral Q8H PRN Elizabeth Fly, DO      magnesium oxide  800 mg Oral BID Jose J Isbell MD      mirtazapine  15 mg Oral HS Elizabeth Fly, DO      ondansetron  4 mg Intravenous Q6H PRN Jacqualin Oddi, DO      potassium chloride  40 mEq Oral Daily Jose J Isbell MD      prazosin  2 mg Oral HS Elizabeth Fly, DO      sodium bicarbonate  650 mg Oral Daily SMITHA Mccauley      thiamine  100 mg Oral Daily Jose J Isbell MD      vancomycin  10 mg/kg Intravenous Q12H Elizabeth Fly,  mg (05/12/21 1202)    vancomycin (VANCOCIN) oral solution 125 mg  125 mg Oral Q12H [address] Adelsotrey Koenig MD      venlafaxine  75 mg Oral Daily Elizabeth Fly, DO      zolpidem  5 mg Oral HS PRN Jacqualin Oddi, DO          Today, Patient Was Seen By: Jose J Isbell MD    ** Please Note: Dictation voice to text software may have been used in the creation of this document   **

## 2021-05-12 NOTE — NURSING NOTE
Patient's IV site infiltrated shortly after Vancomycin IVPB began  Patient is currently refusing new IV access at this time  HERNÁN Cuenca notified  Will wait until morning to try and obtain IV access when patient is less agitated     Sherrie Snyder RN

## 2021-05-12 NOTE — ASSESSMENT & PLAN NOTE
· Patient out of bed in chair with much encouragement by nursing staff  in the past 2 days-continue with PT OT  ·  Recent admission 4/5 to 4/26 due to generalized weakness  · Patient was seen by Neurology at the time of prior admission with extensive workup including LP, MRI brain, C-spine, and L-spine that showed no meningitis or structural abnormalities that would account for patient's weakness  · patient did receive plasmapheresis due to possible concern for autoimmune neuropathy  · On discharge, it was felt by Neurology that the patient's complaints were due to nutritional deficiency due to anorexia nervosa  · Patient was denied acute rehab placement by her insurance, so she was sent to long-term care at [facility] on 04/26  · PT OT to continue

## 2021-05-12 NOTE — PLAN OF CARE
Problem: Potential for Falls  Goal: Patient will remain free of falls  Description: INTERVENTIONS:  - Assess patient frequently for physical needs  -  Identify cognitive and physical deficits and behaviors that affect risk of falls    -  [place] fall precautions as indicated by assessment   - Educate patient/family on patient safety including physical limitations  - Instruct patient to call for assistance with activity based on assessment  - Modify environment to reduce risk of injury  - Consider OT/PT consult to assist with strengthening/mobility  Outcome: Progressing

## 2021-05-13 VITALS
RESPIRATION RATE: 18 BRPM | SYSTOLIC BLOOD PRESSURE: 103 MMHG | OXYGEN SATURATION: 100 % | BODY MASS INDEX: 25.3 KG/M2 | DIASTOLIC BLOOD PRESSURE: 65 MMHG | TEMPERATURE: 98.7 F | HEART RATE: 91 BPM | HEIGHT: 66 IN | WEIGHT: 157.41 LBS

## 2021-05-13 PROBLEM — E83.52 HYPERCALCEMIA: Status: RESOLVED | Noted: 2021-05-07 | Resolved: 2021-05-13

## 2021-05-13 LAB
ANION GAP SERPL CALCULATED.3IONS-SCNC: 11 MMOL/L (ref 4–13)
BUN SERPL-MCNC: 1 MG/DL (ref 5–25)
CALCIUM SERPL-MCNC: 8.6 MG/DL (ref 8.3–10.1)
CHLORIDE SERPL-SCNC: 111 MMOL/L (ref 100–108)
CO2 SERPL-SCNC: 24 MMOL/L (ref 21–32)
CREAT SERPL-MCNC: 0.61 MG/DL (ref 0.6–1.3)
GFR SERPL CREATININE-BSD FRML MDRD: 115 ML/MIN/1.73SQ M
GLUCOSE SERPL-MCNC: 88 MG/DL (ref 65–140)
MAGNESIUM SERPL-MCNC: 1.6 MG/DL (ref 1.6–2.6)
POTASSIUM SERPL-SCNC: 3.5 MMOL/L (ref 3.5–5.3)
SARS-COV-2 RNA RESP QL NAA+PROBE: NEGATIVE
SODIUM SERPL-SCNC: 146 MMOL/L (ref 136–145)
VANCOMYCIN TROUGH SERPL-MCNC: 14.3 UG/ML (ref 10–20)

## 2021-05-13 PROCEDURE — U0003 INFECTIOUS AGENT DETECTION BY NUCLEIC ACID (DNA OR RNA); SEVERE ACUTE RESPIRATORY SYNDROME CORONAVIRUS 2 (SARS-COV-2) (CORONAVIRUS DISEASE [COVID-19]), AMPLIFIED PROBE TECHNIQUE, MAKING USE OF HIGH THROUGHPUT TECHNOLOGIES AS DESCRIBED BY CMS-2020-01-R: HCPCS | Performed by: INTERNAL MEDICINE

## 2021-05-13 PROCEDURE — 97530 THERAPEUTIC ACTIVITIES: CPT

## 2021-05-13 PROCEDURE — 83735 ASSAY OF MAGNESIUM: CPT | Performed by: PHYSICIAN ASSISTANT

## 2021-05-13 PROCEDURE — 99239 HOSP IP/OBS DSCHRG MGMT >30: CPT | Performed by: INTERNAL MEDICINE

## 2021-05-13 PROCEDURE — 80048 BASIC METABOLIC PNL TOTAL CA: CPT | Performed by: PHYSICIAN ASSISTANT

## 2021-05-13 PROCEDURE — 80202 ASSAY OF VANCOMYCIN: CPT | Performed by: PHYSICIAN ASSISTANT

## 2021-05-13 PROCEDURE — U0005 INFEC AGEN DETEC AMPLI PROBE: HCPCS | Performed by: INTERNAL MEDICINE

## 2021-05-13 RX ORDER — ZOLPIDEM TARTRATE 5 MG/1
5 TABLET ORAL
Qty: 10 TABLET | Refills: 0 | Status: SHIPPED | OUTPATIENT
Start: 2021-05-13 | End: 2021-09-30 | Stop reason: HOSPADM

## 2021-05-13 RX ORDER — POTASSIUM CHLORIDE 750 MG/1
10 CAPSULE, EXTENDED RELEASE ORAL 2 TIMES DAILY
Qty: 10 CAPSULE | Refills: 0
Start: 2021-05-13 | End: 2022-06-22

## 2021-05-13 RX ADMIN — THIAMINE HCL TAB 100 MG 100 MG: 100 TAB at 09:08

## 2021-05-13 RX ADMIN — VENLAFAXINE HYDROCHLORIDE 75 MG: 75 CAPSULE, EXTENDED RELEASE ORAL at 09:06

## 2021-05-13 RX ADMIN — LORAZEPAM 0.5 MG: 0.5 TABLET ORAL at 09:08

## 2021-05-13 RX ADMIN — Medication 800 MG: at 09:07

## 2021-05-13 RX ADMIN — ERGOCALCIFEROL 50000 UNITS: 1.25 CAPSULE ORAL at 09:12

## 2021-05-13 RX ADMIN — FOLIC ACID 1 MG: 1 TABLET ORAL at 09:08

## 2021-05-13 RX ADMIN — LEVOTHYROXINE SODIUM 50 MCG: 25 TABLET ORAL at 05:57

## 2021-05-13 RX ADMIN — VANCOMYCIN HYDROCHLORIDE 750 MG: 750 INJECTION, SOLUTION INTRAVENOUS at 13:49

## 2021-05-13 RX ADMIN — BUTALBITAL, ACETAMINOPHEN AND CAFFEINE 1 TABLET: 50; 325; 40 TABLET ORAL at 11:39

## 2021-05-13 RX ADMIN — VANCOMYCIN HYDROCHLORIDE 750 MG: 750 INJECTION, SOLUTION INTRAVENOUS at 00:31

## 2021-05-13 RX ADMIN — ACETAMINOPHEN 650 MG: 325 TABLET, FILM COATED ORAL at 13:42

## 2021-05-13 RX ADMIN — ACETAMINOPHEN 650 MG: 325 TABLET, FILM COATED ORAL at 09:07

## 2021-05-13 RX ADMIN — POTASSIUM CHLORIDE 40 MEQ: 1500 TABLET, EXTENDED RELEASE ORAL at 09:07

## 2021-05-13 RX ADMIN — SODIUM BICARBONATE 650 MG TABLET 650 MG: at 09:06

## 2021-05-13 RX ADMIN — Medication 125 MG: at 09:06

## 2021-05-13 RX ADMIN — CYCLOBENZAPRINE HYDROCHLORIDE 10 MG: 10 TABLET, FILM COATED ORAL at 13:43

## 2021-05-13 RX ADMIN — ENOXAPARIN SODIUM 40 MG: 40 INJECTION SUBCUTANEOUS at 09:06

## 2021-05-13 RX ADMIN — LEVETIRACETAM 500 MG: 500 TABLET, FILM COATED ORAL at 09:08

## 2021-05-13 NOTE — ASSESSMENT & PLAN NOTE
· Patient was at [facility] secondary to deconditioning from nutritional deficiency associated with anorexia nervosa  · During admission in 4/5 to 4/26 patient was evaluated by Psychiatry who felt that patient did not qualify for inpatient psych at that time   · Patient on clear Ensure which she appears to be tolerating and encouraged her with additional intake during the day  · Nutrition input appreciated and we are following calorie counts-difficult situation in this patient with previous gastric bypass surgery and revisions

## 2021-05-13 NOTE — DISCHARGE SUMMARY
[facility]  [facility]- Yevgeniy Apodaca 1980, 47 y o  female MRN: [id number]  Unit/Bed#: -01 Encounter: [id number]  Primary Care Provider: Lexus Belcher MD   Date and time admitted to hospital: 5/5/2021 11:15 PM    Altered mental status  Assessment & Plan  · Likely multifactorial due to toxic metabolic encephalopathy from hyponatremia, infection, medication induced, improved with intravenous fluids, IV antibiotics, decreasing doses of Ambien and lorazepam, resolved        Generalized weakness  Assessment & Plan  · Patient out of bed in chair with much encouragement by nursing staff  in the past 2 days-continue with PT OT  ·  Recent admission 4/5 to 4/26 due to generalized weakness  · Patient was seen by Neurology at the time of prior admission with extensive workup including LP, MRI brain, C-spine, and L-spine that showed no meningitis or structural abnormalities that would account for patient's weakness  · patient did receive plasmapheresis due to possible concern for autoimmune neuropathy  · On discharge, it was felt by Neurology that the patient's complaints were due to nutritional deficiency due to anorexia nervosa  · Patient was denied acute rehab placement by her insurance, so she was sent to long-term care at [facility] on 04/26  PT OT  Stable for discharge to SNF    Severe protein-calorie malnutrition ([facility] [address] )  Assessment & Plan  Malnutrition Findings:   Adult Malnutrition type: Chronic illness  Adult Degree of Malnutrition: Other severe protein calorie malnutrition  Present, in the setting of chronic illness as evidenced by weight loss, less than 75% p o  Intake for greater than 1 month, history of anorexia nervosa, treated with liberal diet, supplements including Ensure  BMI Findings: Body mass index is 25 41 kg/m²         RENE (acute kidney injury) (HCC)-resolved as of 5/9/2021  Assessment & Plan  · Creatinine at 1 79 on admission now with hydration creatinine improved on May 7, 2021 down to 1 11-now at 0 91  · Cr on 5/3 was 0 6   · Avoid nephrotoxic drugs   · Avoid hypotension  · Suspect that this is prerenal and postrenal in the setting of dehydration from anorexia nervosa and retention   · Pt with urinary retention with multiple straight caths with 800 to a 1000 mL  · Zhong reinserted as per Urology needs to be kept in 2 weeks-patient reports her Zhong was just removed a week ago at the [facility]  · I/Os     Bacteremia  Assessment & Plan  1/2 blood cultures from 05/05/2021 with Enterococcus faecalis, likely contaminant  Treated with IV antibiotics including IV vancomycin and aztreonam, aztreonam discontinued on 05/11/2021  Discussed with Infectious Disease, continue IV vancomycin to discharge  Blood cultures were never recheck  Patient is stable for discharge    Acute cystitis  Assessment & Plan  Present on admission as evidenced by UA consistent with infection:  30-50 wbc's and innumerable bacteria   concern for possible pyelonephritis-patient CT of the abdomen pelvis: showed resolution of the hydronephrosis with the Zhong in place  Today is day 8 of IV antibiotics, on IV vancomycin   Aztreonam discontinued on 05/11/2021  Check repeat blood cultures given 1/2 with Enterococcus faecalis, not done      Hypomagnesemia  Assessment & Plan  1 6 this a m , replete p r n  Will discharge on 5 days of potassium and magnesium repletion  Needs BMP check within the next week    Hypokalemia  Assessment & Plan   replete p r n  Will send on potassium times  Check BMP in 1 week at nursing home    Urinary retention  Assessment & Plan  · Likely due to neurogenic bladder  · Pt required multiple straight cath and Zhong placed  · urology input appreciated, to keep Zhong in place for 14 days till 05/20/2021 and then trial of void  · Will follow-up with urology will be called by urology office      Anemia  Assessment & Plan  · Hemoglobin at 9 7 on admission then  dropped to 8   4 but is slightly improved today at 8 7-  ·  Suspect that this is secondary to nutritional deficiencies  · Repeat CBC in a m  Moderate episode of recurrent major depressive disorder (HCC)  Assessment & Plan  · Patient's medications were adjusted during prior admission   · Current regimen:  Remeron 15 mg HS, Effexor 37 5 mg daily, prazosin 2mg HS (for nightmares), and Ambien decrease to 5 mg at HS due to excess sedation issues with that and lorazepam on use    Anorexia nervosa, restricting type  Assessment & Plan  · Patient was at [facility] secondary to deconditioning from nutritional deficiency associated with anorexia nervosa  · During admission in 4/5 to 4/26 patient was evaluated by Psychiatry who felt that patient did not qualify for inpatient psych at that time   · Patient on clear Ensure which she appears to be tolerating and encouraged her with additional intake during the day  · Nutrition input appreciated and we are following calorie counts-difficult situation in this patient with previous gastric bypass surgery and revisions    Hypothyroidism  Assessment & Plan  · Home regimen: Synthroid 50 mcg daily   · TSH is stable at 1 37    Hypercalcemia-resolved as of 5/13/2021  Assessment & Plan  Calcium at 9 0 today-continue hydration and encourage p o  Intake of fluids    Severe sepsis (HCC)-resolved as of 5/7/2021  Assessment & Plan  Severe sepsis present on admission as evidenced by tachycardia with heart rate of 120 bpm, febrile at 101°, Leukocytosis at 14 5 K, INR 1 5 (end-organ dysfunction), suspected source is urinary tract infection, more in keeping with acute complicated UTI    Imaging findings not suggestive of pyelonephritis  Chest x-ray reviewed, doubt pneumonia  She was treated with intravenous antibiotics and has resolved    * Hyponatremia-resolved as of 5/7/2021  Assessment & Plan  · Sodium 121 on admission now gradually improving 136-cap IV fluids  · Chloride at 87  · Na on 5/3 was 132 per SNF labs   · Received 700 cc bolus of normal saline by [facility] prior to EMS arrival  ·  nephrology input appreciated  ·  BMP q4h   · Neurochecks q4h  · Goal correction of 8-10mEq in the next 24 hours   · Suspect that this is secondary to decreased PO intake in the setting of patient's anorexia nervosa  · Pt admitted to [place] [address] due to severity of hyponatremia -will transfer to [facility] surg      Discharging Physician / Practitioner: Kiel Sparks MD  PCP: Gia Chicas MD  Admission Date:   Admission Orders (From admission, onward)     Ordered        05/06/21 0320  Inpatient Admission  Once                   Discharge Date: 05/13/21    Resolved Problems  Date Reviewed: 5/9/2021          Resolved    [facility] 5/9/2021     Resolved by  Nicole Bender DO    * (Principal) Hyponatremia 5/7/2021     Resolved by  Tiago Vilchis DO    Pneumonia 5/12/2021     Resolved by  Kiel Sparks MD    Severe sepsis ([facility] ) 5/7/2021     Resolved by  Nicole Bender DO    Polyuria 5/9/2021     Resolved by  Nicole Bender DO    Hypercalcemia 5/13/2021     Resolved by  Kiel Sparks MD          Consultations During Hospital Stay:  · Infectious Disease, Urology, Nephrology    Procedures Performed:   ·     Significant Findings / Test Results:   ·     Incidental Findings:   ·      Test Results Pending at Discharge (will require follow up):   ·      Outpatient Tests Requested:  ·     Complications:      Reason for Admission:  Change in mental status, lethargy acute kidney injury, sepsis due to urinary tract infection    Hospital Course:     Gregoria Duenas is a 47 y o  female patient who originally presented to the hospital on 5/5/2021 due to  Change in mental status, lethargy acute kidney injury, sepsis due to urinary tract infection  Labs showed hyponatremia, treated with IV fluids and resolved    Acute kidney injury likely obstructive from urinary retention resolved with placement of Zhong catheter and IV fluids  She was also seen in consultation by Urology I would have the Zhong catheter remain on till 05/20/2021 when a trial of void be attempted  She would also follow up with Urology  She was treated for sepsis due to urinary tract infection with IV antibiotics, 1/2 blood cultures grew Enterococcus faecalis  She was seen by infectious disease who believe this is a contaminant  Blood repeat blood cultures requested but refused by the patient  IV antibiotics have since been discontinued  Change in mental status resolved with treatment of all of the above  She has been discharged in stable condition  She would need to follow-up with urology  Medication changes:  -Ativan was discontinued due to change in mental status   -Ambien dose was reduced to 5 mg as needed HS for insomnia due to change in mental status    Recommendations on discharge:  -follow-up with PCP within 2 weeks of discharge  -follow-up with urology  -please repeat BMP including magnesium within the next 48 hours, replete mariella Pabon Please see above list of diagnoses and related plan for additional information  Condition at Discharge: stable     Discharge Day Visit / Exam:     Subjective:  Seen  No new complaints  Appears stable  Vitals: Blood Pressure: 110/70 (05/13/21 0754)  Pulse: 105 (05/13/21 0754)  Temperature: 98 1 °F (36 7 °C) (05/13/21 0754)  Temp Source: Oral (05/13/21 0754)  Respirations: 18 (05/13/21 0754)  Height: 5' 6" (167 6 cm) (05/06/21 4441)  Weight - Scale: 71 4 kg (157 lb 6 5 oz) (05/06/21 0712)  SpO2: 100 % (05/12/21 1500)  Exam:   Physical Exam  Vitals signs reviewed  Constitutional:       General: She is not in acute distress  Appearance: Normal appearance  She is not ill-appearing, toxic-appearing or diaphoretic  HENT:      Head: Normocephalic  Eyes:      Extraocular Movements: Extraocular movements intact  Pupils: Pupils are equal, round, and reactive to light     Neck:      Musculoskeletal: Normal range of motion and neck supple  No neck rigidity or muscular tenderness  Vascular: No carotid bruit  Cardiovascular:      Rate and Rhythm: Normal rate and regular rhythm  Pulses: Normal pulses  Heart sounds: Normal heart sounds  No murmur  No friction rub  Pulmonary:      Effort: Pulmonary effort is normal       Breath sounds: Normal breath sounds  No stridor  No wheezing, rhonchi or rales  Chest:      Chest wall: No tenderness  Abdominal:      General: Abdomen is flat  Bowel sounds are normal  There is no distension  Palpations: Abdomen is soft  Tenderness: There is no abdominal tenderness  There is no guarding or rebound  Musculoskeletal: Normal range of motion  General: No swelling or tenderness  Right lower leg: No edema  Left lower leg: No edema  Skin:     General: Skin is warm and dry  Coloration: Skin is not jaundiced  Neurological:      General: No focal deficit present  Mental Status: She is alert and oriented to person, place, and time  Cranial Nerves: No cranial nerve deficit  Sensory: No sensory deficit  Discussion with Family:  No    Discharge instructions/Information to patient and family:   See after visit summary for information provided to patient and family  Provisions for Follow-Up Care:  See after visit summary for information related to follow-up care and any pertinent home health orders  Disposition:     Other [facility]   For Discharges to [facility] SNF:   · Not Applicable to this Patient - Not Applicable to this Patient    Planned Readmission:  No     Discharge Statement:  I spent 65 minutes discharging the patient  This time was spent on the day of discharge  I had direct contact with the patient on the day of discharge   Greater than 50% of the total time was spent examining patient, answering all patient questions, arranging and discussing plan of care with patient as well as directly providing post-discharge instructions  Additional time then spent on discharge activities  Discharge Medications:  See after visit summary for reconciled discharge medications provided to patient and family        ** Please Note: This note has been constructed using a voice recognition system **

## 2021-05-13 NOTE — ASSESSMENT & PLAN NOTE
· Likely due to neurogenic bladder  · Pt required multiple straight cath and Zhong placed  · urology input appreciated, to keep Zhong in place for 14 days till 05/20/2021 and then trial of void  · Will follow-up with urology will be called by urology office

## 2021-05-13 NOTE — CASE MANAGEMENT
Patient is medically cleared for discharge  Spoke with Shara at [facility] and bed remains available there  [facility] to transport patient today at 1600 via BLS  Notified Shara at [facility] and [facility] for [employer], patients friend and emergency contact

## 2021-05-13 NOTE — TRANSPORTATION MEDICAL NECESSITY
Section I - General Information    Name of Patient: Earl Salcido                 : 1980    Medicare #: [id number]  Transport Date: 21 (PCS is valid for round trips on this date and for all repetitive trips in the 60-day range as noted below )  Origin: [facility]: [facility]  Is the pt's stay covered under Medicare Part A (PPS/DRG)   []     Closest appropriate facility? If no, why is transport to more distant facility required? Yes  If hospice pt, is this transport related to pt's terminal illness? NA       Section II - Medical Necessity Questionnaire  Ambulance transportation is medically necessary only if other means of transport are contraindicated or would be potentially harmful to the patient  To meet this requirement, the patient must either be "bed confined" or suffer from a condition such that transport by means other than ambulance is contraindicated by the patient's condition  The following questions must be answered by the medical professional signing below for this form to be valid:    1)  Describe the MEDICAL CONDITION (physical and/or mental) of this patient AT [address] [place] [address] that requires the patient to be transported in an ambulance and why transport by other means is contraindicated by the patient's condition:  Max assist 2 for OOB, fall risk, poor safety awareness    2) Is the patient "bed confined" as defined below? NO  To be "be confined" the patient must satisfy all three of the following conditions: (1) unable to get up from bed without Assistance; AND (2) unable to ambulate; AND (3) unable to sit in a chair or wheelchair  3) Can this patient safely be transported by car or wheelchair van (i e , seated during transport without a medical attendant or monitoring)?    NO    4) In addition to completing questions 1-3 above, please check any of the following conditions that apply*:   *Note: supporting documentation for any boxes checked must be maintained in the patient's medical records  If hosp-hosp transfer, describe services needed at 2nd facility not available at 1st facility? Other(specify) max assist 2 for oob, fall risk      Section III - Signature of Physician or Healthcare Professional  I certify that the above information is true and correct based on my evaluation of this patient, and represent that the patient requires transport by ambulance and that other forms of transport are contraindicated  I understand that this information will be used by the Centers for Medicare and Medicaid Services (CMS) to support the determination of medical necessity for ambulance services, and I represent that I have personal knowledge of the patient's condition at time of transport  []  If this box is checked, I also certify that the patient is physically or mentally incapable of signing the ambulance service's claim and that the institution with which I am affiliated has furnished care, services, or assistance to the patient  My signature below is made on behalf of the patient pursuant to 42 CFR §424 36(b)(4)  In accordance with 42 CFR §424 37, the specific reason(s) that the patient is physically or mentally incapable of signing the claim form is as follows:       Signature of Physician* or Healthcare Professional______________________________________________________________  Signature Date 05/13/21 (For scheduled repetitive transports, this form is not valid for transports performed more than 60 days after this date)    Printed Name & Credentials of Physician or Healthcare Professional (MD, DO, RN, etc )___Toya Silverio_____________________________  *Form must be signed by patient's attending physician for scheduled, repetitive transports   For non-repetitive, unscheduled ambulance transports, if unable to obtain the signature of the attending physician, any of the following may sign (choose appropriate option below)  [] Physician Assistant []  Clinical Nurse Specialist [x]  Registered Nurse  []  Nurse Practitioner  [x] Discharge Planner

## 2021-05-13 NOTE — PLAN OF CARE
Problem: PHYSICAL THERAPY ADULT  Goal: Performs mobility at highest level of function for planned discharge setting  See evaluation for individualized goals  Description: Treatment/Interventions: Functional transfer training, Elevations, Therapeutic exercise, Endurance training, Equipment eval/education, Bed mobility, Gait training, Spoke to nursing, OT  Equipment Recommended: (TBD at rehab)       See flowsheet documentation for full assessment, interventions and recommendations  Outcome: Progressing  Note: Prognosis: Fair  Problem List: Decreased strength, Decreased endurance, Impaired balance, Decreased mobility, Impaired judgement, Impaired tone, Pain  Assessment: Patient was received supine in bed and agreeable to session  No report of pain at rest but reports pain in B/L feet which increased in stance  Patient required assistance of 2 for all mobility  Significant weakness in B/L LE's with knee buckling in stance  She is a high fall risk  Patient motivated and participate in LE there-ex  She is deconditioned and has decreased strength, balance, endurance and mobility  Due to the above limitations, rehab is recommended  The patient's AM-PAC Basic Mobility Inpatient Short Form Raw Score is 12, Standardized Score is 32 23  A standardized score less than 42 9 suggests the patient may benefit from discharge to post-acute rehabilitation services  Please also refer to the recommendation of the Physical Therapist for safe discharge planning  Barriers to Discharge: None        PT Discharge Recommendation: Post acute rehabilitation services     PT - OK to Discharge: Yes    See flowsheet documentation for full assessment

## 2021-05-13 NOTE — ASSESSMENT & PLAN NOTE
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

## 2021-05-13 NOTE — PHYSICAL THERAPY NOTE
PHYSICAL THERAPY NOTE       05/13/21 1050   PT Last Visit   PT Visit Date 05/13/21   Note Type   Note Type Treatment   Pain Assessment   Pain Assessment Tool Hardwick-Baker FACES   Hardwick-Baker FACES Pain Rating 6   Pain Location/Orientation   (B/L feet)   Restrictions/Precautions   Weight Bearing Precautions Per Order No   Other Precautions Fall Risk;Pain;Multiple lines; Bed Alarm   General   Chart Reviewed Yes   Additional Pertinent History Possible discharge to rehab today   Response to Previous Treatment Patient with no complaints from previous session  Family/Caregiver Present No   Cognition   Overall Cognitive Status WFL   Arousal/Participation Alert   Attention Within functional limits   Orientation Level Oriented X4   Memory Within functional limits   Following Commands Follows all commands and directions without difficulty   Comments Awake and alert upon arrival today   Subjective   Subjective " I can only get up with 2 people "   Bed Mobility   Supine to Sit 5  Supervision   Additional items HOB elevated; Bedrails; Increased time required;Verbal cues   Sit to Supine 4  Minimal assistance   Additional items Assist x 1;HOB elevated; Bedrails; Increased time required;Verbal cues;LE management   Additional Comments Sat edge of bed approx 3 minutes with supervision  Able to scoot forward to get feet flat on ground  Verbal cues for preparatory posture for transfers   Transfers   Sit to Stand 2  Maximal assistance   Additional items Assist x 2;Armrests; Increased time required;Verbal cues   Stand to Sit 2  Maximal assistance   Additional items Assist x 2;Armrests; Increased time required;Verbal cues   Additional Comments 2 sit<>stand transfers performed  First attempt patient with flexed posture and c/o foot pain  Seated rest brreak for approx 3 minutes   2nd attempt able to get into full upright standing with B/L feet and knees blocked by therapists  Lateral lean towards the L  Ambulation/Elevation   Gait pattern Decreased foot clearance  (B/L knee buckling  )   Gait Assistance 2  Maximal assist   Additional items Assist x 2;Verbal cues; Tactile cues   Assistive Device None   Distance   (2 lateral side steps towards head of bed)   Balance   Static Sitting Fair +   Dynamic Sitting Fair   Static Standing Poor -   Dynamic Standing Poor -   Ambulatory Poor -   Endurance Deficit   Endurance Deficit Yes   Endurance Deficit Description Fatigues quickly and limited by pain   Activity Tolerance   Activity Tolerance Patient limited by fatigue;Patient limited by pain   Medical Staff Made Aware Yash GOTTLIEB   Nurse Made Aware RN [facility]   Exercises   Quad Sets 10 reps; Supine;Bilateral   Heelslides Supine;10 reps;AAROM; Bilateral   Glute Sets Supine;10 reps;Bilateral   Hip Flexion 10 reps; Supine;AAROM   Hip Abduction Supine;10 reps;Bilateral   Ankle Pumps Supine;20 reps;Bilateral   Assessment   Prognosis Fair   Problem List Decreased strength;Decreased endurance; Impaired balance;Decreased mobility; Impaired judgement; Impaired tone;Pain   Assessment Patient was received supine in bed and agreeable to session  No report of pain at rest but reports pain in B/L feet which increased in stance  Patient required assistance of 2 for all mobility  Significant weakness in B/L LE's with knee buckling in stance  She is a high fall risk  Patient motivated and participate in LE there-ex  She is deconditioned and has decreased strength, balance, endurance and mobility  Due to the above limitations, rehab is recommended  The patient's AM-[facility] Basic Mobility Inpatient Short Form Raw Score is 12, Standardized Score is 32 23  A standardized score less than 42 9 suggests the patient may benefit from discharge to post-acute rehabilitation services  Please also refer to the recommendation of the Physical Therapist for safe discharge planning     Goals   Patient Goals To get stronger and go to rehab   STG Expiration Date 05/25/21   PT Treatment Day 1   Plan   Treatment/Interventions Functional transfer training;LE strengthening/ROM; Therapeutic exercise; Endurance training;Equipment eval/education; Bed mobility;Gait training;Spoke to nursing;OT   Progress Progressing toward goals   PT Frequency Other (Comment)  (3-5x/wk)   Recommendation   PT Discharge Recommendation Post acute rehabilitation services   PT - OK to Discharge Yes   Additional Comments To rehab when medically stable   AM-PAC Basic Mobility Inpatient   Turning in Bed Without Bedrails 3   Lying on Back to Sitting on Edge of Flat Bed 3   Moving Bed to Chair 2   Standing Up From Chair 2   Walk in Room 1   Climb 3-5 Stairs 1   Basic Mobility Inpatient Raw Score 12   Basic Mobility Standardized Score 32 23   Turning Head Towards Sound 4   Follow Simple Instructions 3   Low Function Basic Mobility Raw Score 19   Low Function Basic Mobility Standardized Score 31 06   Sofiya Moses, PT            Patient Name: Millie Hughes  EAEFU'C Date: 5/13/2021

## 2021-05-13 NOTE — PLAN OF CARE
Problem: OCCUPATIONAL THERAPY ADULT  Goal: Performs self-care activities at highest level of function for planned discharge setting  See evaluation for individualized goals  Description: Treatment Interventions: ADL retraining, UE strengthening/ROM, Functional transfer training, Patient/family training, Compensatory technique education, Activityengagement, Endurance training          See flowsheet documentation for full assessment, interventions and recommendations  Outcome: Progressing  Note: Limitation: Decreased ADL status, Decreased self-care trans, Decreased high-level ADLs, Decreased endurance, Decreased UE strength, Decreased UE ROM  Prognosis: Fair  Assessment: Pt seen for OT tx session with focus on functional balance, functional mobility, ADL status, and transfer safety  Patient agreeable to OT treatment session  Pt received supine in bed  Performed bed mobility with supervision to min A x 1  Performed sit<>stand transfer with max A x 2  Pt limited by c/o bilateral foot pain  Pt continues to demonstrate good motivation  However, patient continues to be functioning below baseline level, occupational performance remains limited secondary to factors listed above, and pt at increased risk for falls and injury  The patient's raw score on the AM-PAC Daily Activity inpatient short form is 15, standardized score is 34 69, less than 39 4  Patients at this level are likely to benefit from DC to post-acute rehabilitation services  Please refer to the recommendation of the Occupational Therapist for safe DC planning  From OT standpoint, recommendation at time of d/c would be Short Term Rehab  Patient to benefit from continued Occupational Therapy treatment while in the hospital to address deficits as defined above and maximize level of functional independence with ADLs and functional mobility   Pt left with call bell in reach, tray table in reach, needs met, bed alarm activated, SCDs on       OT Discharge Recommendation: Post acute rehabilitation services

## 2021-05-13 NOTE — DISCHARGE INSTR - AVS FIRST PAGE
Dear Keeley Momin,     It was our pleasure to care for you here at [facility], [address] [place] [address]  It is our hope that we were always able to exceed the expected standards for your care during your stay  You were hospitalized due to lethargy  You were treated for urinary tract infection with IV antibiotics  You also seen by infectious disease  You have improved and being discharged in stable condition  You were cared for on the medicine floor by Emiliano Nesbitt MD with the [facility] who covers for your primary care physician (PCP), Yifan Juarez MD, while you were hospitalized  If you have any questions or concerns related to this hospitalization, you may contact us at [phone]  For follow up as well as any medication refills, we recommend that you follow up with your primary care physician  A registered nurse will reach out to you by phone within a few days after your discharge to answer any additional questions that you may have after going home  However, at this time we provide for you here, the most important instructions / recommendations at discharge:     · Notable Medication Adjustments -   · Ambien was reduced to 5 mg as needed for insomnia  · Ativan was discontinued  · MiraLax was discontinued  · Testing Required after Discharge -   ·   · Important follow up information -   · Primary care physician within 2 weeks of discharge  · Other Instructions -   ·   · Please review this entire after visit summary as additional general instructions including medication list, appointments, activity, diet, any pertinent wound care, and other additional recommendations from your care team that may be provided for you        Sincerely,     Emiliano Nesbitt MD

## 2021-05-13 NOTE — ASSESSMENT & PLAN NOTE
· Creatinine at 1 79 on admission now with hydration creatinine improved on May 7, 2021 down to 1 11-now at 0 91  · Cr on 5/3 was 0 6   · Avoid nephrotoxic drugs   · Avoid hypotension  · Suspect that this is prerenal and postrenal in the setting of dehydration from anorexia nervosa and retention   · Pt with urinary retention with multiple straight caths with 800 to a 1000 mL  · Zhong reinserted as per Urology needs to be kept in 2 weeks-patient reports her Zhong was just removed a week ago at the [facility]  · I/Os

## 2021-05-13 NOTE — PLAN OF CARE
Problem: Potential for Falls  Goal: Patient will remain free of falls  Description: INTERVENTIONS:  - Assess patient frequently for physical needs  -  Identify cognitive and physical deficits and behaviors that affect risk of falls    -  [place] fall precautions as indicated by assessment   - Educate patient/family on patient safety including physical limitations  - Instruct patient to call for assistance with activity based on assessment  - Modify environment to reduce risk of injury  - Consider OT/PT consult to assist with strengthening/mobility  Outcome: Progressing

## 2021-05-13 NOTE — ASSESSMENT & PLAN NOTE
1/2 blood cultures from 05/05/2021 with Enterococcus faecalis, likely contaminant  Treated with IV antibiotics including IV vancomycin and aztreonam, aztreonam discontinued on 05/11/2021  Discussed with Infectious Disease, continue IV vancomycin to discharge  Blood cultures were never recheck    Patient is stable for discharge

## 2021-05-13 NOTE — OCCUPATIONAL THERAPY NOTE
Occupational Therapy Tx Note     Patient Name: Earl Salcido  ZHHVG'V Date: 5/13/2021  Problem List  Active Problems:    Hypothyroidism    Anorexia nervosa, restricting type    Moderate episode of recurrent major depressive disorder ([facility] [address] )    Generalized weakness    Anemia    Severe protein-calorie malnutrition ([facility] [address] )    Acute cystitis    Urinary retention    Altered mental status    Hypokalemia    Hypomagnesemia    Bacteremia          05/13/21 1035   OT Last Visit   OT Visit Date 05/13/21   Note Type   Note Type Treatment   Restrictions/Precautions   Weight Bearing Precautions Per Order No   Other Precautions Fall Risk  (+ COVID-19)   Pain Assessment   Pain Assessment Tool Hardwick-Baker FACES   Hardwick-Baker FACES Pain Rating 6   Pain Location/Orientation Orientation: Bilateral  (feet)   ADL   LB Dressing Assistance 2  Maximal Assistance   LB Dressing Deficit Don/doff R sock; Don/doff L sock   Bed Mobility   Supine to Sit 5  Supervision   Additional items Verbal cues;HOB elevated; Bedrails   Sit to Supine 4  Minimal assistance   Additional items Assist x 1;Verbal cues;HOB elevated   Transfers   Sit to Stand 2  Maximal assistance   Additional items Assist x 2;Verbal cues   Stand to Sit 2  Maximal assistance   Additional items Assist x 2;Verbal cues   Additional Comments Performed 2 sit<>stand transfers with close armold assistance and gait belt  Required bilateral knee blocking to prevent knee buckling  Pt able to take 1 side step alongside EOB     Cognition   Overall Cognitive Status WFL   Arousal/Participation Alert   Attention Within functional limits   Orientation Level Oriented X4   Memory Within functional limits   Following Commands Follows all commands and directions without difficulty   Activity Tolerance   Activity Tolerance Patient tolerated treatment well   Medical Staff Made Aware PT Sofiya   Assessment   Assessment Pt seen for OT tx session with focus on functional balance, functional mobility, ADL status, and transfer safety  Patient agreeable to OT treatment session  Pt received supine in bed  Performed bed mobility with supervision to min A x 1  Performed sit<>stand transfer with max A x 2  Pt limited by c/o bilateral foot pain  Pt continues to demonstrate good motivation  However, patient continues to be functioning below baseline level, occupational performance remains limited secondary to factors listed above, and pt at increased risk for falls and injury  The patient's raw score on the AM-PAC Daily Activity inpatient short form is 15, standardized score is 34 69, less than 39 4  Patients at this level are likely to benefit from DC to post-acute rehabilitation services  Please refer to the recommendation of the Occupational Therapist for safe DC planning  From OT standpoint, recommendation at time of d/c would be Short Term Rehab  Patient to benefit from continued Occupational Therapy treatment while in the hospital to address deficits as defined above and maximize level of functional independence with ADLs and functional mobility  Pt left with call bell in reach, tray table in reach, needs met, bed alarm activated, SCDs on  Plan   Treatment Interventions ADL retraining;UE strengthening/ROM; Functional transfer training; Endurance training;Patient/family training; Compensatory technique education; Neuromuscular reeducation   Goal Expiration Date 05/20/21   OT Treatment Day 2   OT Frequency 2-3x/wk   Recommendation   OT Discharge Recommendation Post acute rehabilitation services   AM-PAC Daily Activity Inpatient   Lower Body Dressing 2   Bathing 2   Toileting 2   Upper Body Dressing 3   Grooming 3   Eating 3   Daily Activity Raw Score 15   Daily Activity Standardized Score (Calc for Raw Score >=11) 34 69   AM-PAC Applied Cognition Inpatient   Following a Speech/Presentation 4   Understanding Ordinary Conversation 4   Taking Medications 4   Remembering Where Things Are Placed or Put Away 4   Remembering List of 4-5 Errands 4   Taking Care of Complicated Tasks 3   Applied Cognition Raw Score 23   Applied Cognition Standardized Score 53 08           Tita Zimmer OTR/L

## 2021-05-13 NOTE — TELEPHONE ENCOUNTER
Patient is still inpatient - will follow up at discharge   Patient is tentativly scheduled with Heriberto Ricks 6/29 - may need a void trial with nurse prior

## 2021-05-13 NOTE — PLAN OF CARE
Problem: Potential for Falls  Goal: Patient will remain free of falls  Description: INTERVENTIONS:  - Assess patient frequently for physical needs  -  Identify cognitive and physical deficits and behaviors that affect risk of falls    -  [place] fall precautions as indicated by assessment   - Educate patient/family on patient safety including physical limitations  - Instruct patient to call for assistance with activity based on assessment  - Modify environment to reduce risk of injury  - Consider OT/PT consult to assist with strengthening/mobility  Outcome: Progressing     Problem: Prexisting or High Potential for Compromised Skin Integrity  Goal: Skin integrity is maintained or improved  Description: INTERVENTIONS:  - Identify patients at risk for skin breakdown  - Assess and monitor skin integrity  - Assess and monitor nutrition and hydration status  - Monitor labs   - Assess for incontinence   - Turn and reposition patient  - Assist with mobility/ambulation  - Relieve pressure over bony prominences  - Avoid friction and shearing  - Provide appropriate hygiene as needed including keeping skin clean and dry  - Evaluate need for skin moisturizer/barrier cream  - Collaborate with interdisciplinary team   - Patient/family teaching  - Consider wound care consult   Outcome: Progressing     Problem: NEUROSENSORY - ADULT  Goal: Achieves stable or improved neurological status  Description: INTERVENTIONS  - Monitor and report changes in neurological status  - Monitor vital signs such as temperature, blood pressure, glucose, and any other labs ordered   - Initiate measures to prevent increased intracranial pressure  - Monitor for seizure activity and implement precautions if appropriate      Outcome: Progressing  Goal: Remains free of injury related to seizures activity  Description: INTERVENTIONS  - Maintain airway, patient safety  and administer oxygen as ordered  - Monitor patient for seizure activity, document and report duration and description of seizure to physician/advanced practitioner  - If seizure occurs,  ensure patient safety during seizure  - Reorient patient post seizure  - Seizure pads on all 4 side rails  - Instruct patient/family to notify RN of any seizure activity including if an aura is experienced  - Instruct patient/family to call for assistance with activity based on nursing assessment  - Administer anti-seizure medications if ordered    Outcome: Progressing  Goal: Achieves maximal functionality and self care  Description: INTERVENTIONS  - Monitor swallowing and airway patency with patient fatigue and changes in neurological status  - Encourage and assist patient to increase activity and self care     - Encourage visually impaired, hearing impaired and aphasic patients to use assistive/communication devices  Outcome: Progressing     Problem: METABOLIC, FLUID AND ELECTROLYTES - ADULT  Goal: Electrolytes maintained within normal limits  Description: INTERVENTIONS:  - Monitor labs and assess patient for signs and symptoms of electrolyte imbalances  - Administer electrolyte replacement as ordered  - Monitor response to electrolyte replacements, including repeat lab results as appropriate  - Instruct patient on fluid and nutrition as appropriate  Outcome: Progressing  Goal: Fluid balance maintained  Description: INTERVENTIONS:  - Monitor labs   - Monitor I/O and WT  - Instruct patient on fluid and nutrition as appropriate  - Assess for signs & symptoms of volume excess or deficit  Outcome: Progressing  Goal: Glucose maintained within target range  Description: INTERVENTIONS:  - Monitor Blood Glucose as ordered  - Assess for signs and symptoms of hyperglycemia and hypoglycemia  - Administer ordered medications to maintain glucose within target range  - Assess nutritional intake and initiate nutrition service referral as needed  Outcome: Progressing     Problem: Nutrition/Hydration-ADULT  Goal: Nutrient/Hydration intake appropriate for improving, restoring or maintaining nutritional needs  Description: Monitor and assess patient's nutrition/hydration status for malnutrition  Collaborate with interdisciplinary team and initiate plan and interventions as ordered  Monitor patient's weight and dietary intake as ordered or per policy  Utilize nutrition screening tool and intervene as necessary  Determine patient's food preferences and provide high-protein, high-caloric foods as appropriate       INTERVENTIONS:  - Monitor oral intake, urinary output, labs, and treatment plans  - Assess nutrition and hydration status and recommend course of action  - Evaluate amount of meals eaten  - Assist patient with eating if necessary   - Allow adequate time for meals  - Recommend/ encourage appropriate diets, oral nutritional supplements, and vitamin/mineral supplements  - Order, calculate, and assess calorie counts as needed  - Recommend, monitor, and adjust tube feedings and TPN/PPN based on assessed needs  - Assess need for intravenous fluids  - Provide specific nutrition/hydration education as appropriate  - Include patient/family/caregiver in decisions related to nutrition  Outcome: Progressing

## 2021-05-13 NOTE — ASSESSMENT & PLAN NOTE
· Sodium 121 on admission now gradually improving 136-cap IV fluids  · Chloride at 87  · Na on 5/3 was 132 per SNF labs   · Received 700 cc bolus of normal saline by [facility] prior to EMS arrival  ·  nephrology input appreciated  ·  BMP q4h   · Neurochecks q4h  · Goal correction of 8-10mEq in the next 24 hours   · Suspect that this is secondary to decreased PO intake in the setting of patient's anorexia nervosa  · Pt admitted to [facility] due to severity of hyponatremia -will transfer to med surg

## 2021-05-13 NOTE — ASSESSMENT & PLAN NOTE
· Likely multifactorial due to toxic metabolic encephalopathy from hyponatremia, infection, medication induced, improved with intravenous fluids, IV antibiotics, decreasing doses of Ambien and lorazepam, resolved

## 2021-05-13 NOTE — ASSESSMENT & PLAN NOTE
1 6 this a m , replete p r n    Will discharge on 5 days of potassium and magnesium repletion  Needs BMP check within the next week

## 2021-05-13 NOTE — ASSESSMENT & PLAN NOTE
Present on admission as evidenced by UA consistent with infection:  30-50 wbc's and innumerable bacteria   concern for possible pyelonephritis-patient CT of the abdomen pelvis: showed resolution of the hydronephrosis with the Zhong in place  Today is day 8 of IV antibiotics, on IV vancomycin   Aztreonam discontinued on 05/11/2021  Check repeat blood cultures given 1/2 with Enterococcus faecalis, not done  Placed on p o   Vancomycin for C diff prophylaxis given loose stools, despite C diff and stool studies been negative, will continue for an additional 72 hours post antibiotics

## 2021-05-13 NOTE — PLAN OF CARE
Problem: Potential for Falls  Goal: Patient will remain free of falls  Description: INTERVENTIONS:  - Assess patient frequently for physical needs  -  Identify cognitive and physical deficits and behaviors that affect risk of falls    -  [place] fall precautions as indicated by assessment   - Educate patient/family on patient safety including physical limitations  - Instruct patient to call for assistance with activity based on assessment  - Modify environment to reduce risk of injury  - Consider OT/PT consult to assist with strengthening/mobility  5/13/2021 1454 by Charan Watson RN  Outcome: Adequate for Discharge  5/13/2021 0711 by Charan Watson RN  Outcome: Progressing

## 2021-05-17 NOTE — TELEPHONE ENCOUNTER
2nd attempt  Spoke to Magda from [facility] and patient scheduled 07/16 with Muna in the [address] [place] [address] office

## 2021-05-17 NOTE — TELEPHONE ENCOUNTER
Yelena Garza from [facility] called stating patient was in hospital 05/05 and needs hospital f/u  Patient has a bates catheter  She has questions on bates removal at facility and then coming to office  They don't have a bladder scan at the facility      Yelena Garza can be reached at [phone]   Ext [phone]

## 2021-05-22 LAB
G LAMBLIA AG STL QL IA: NEGATIVE
Lab: NORMAL
O+P STL CONC: NEGATIVE

## 2021-06-02 ENCOUNTER — TELEPHONE (OUTPATIENT)
Dept: UROLOGY | Facility: HOSPITAL | Age: 41
End: 2021-06-02

## 2021-06-02 NOTE — TELEPHONE ENCOUNTER
Spoke with Anjana Maher and scheduled a void trial on 2/9 @ 3:00 - they will remove bates in the monrning at  9:00  Also removed appointmen on 6/29 Radha Villalba

## 2021-06-09 ENCOUNTER — PROCEDURE VISIT (OUTPATIENT)
Dept: UROLOGY | Facility: HOSPITAL | Age: 41
End: 2021-06-09
Payer: COMMERCIAL

## 2021-06-09 VITALS — SYSTOLIC BLOOD PRESSURE: 100 MMHG | HEART RATE: 64 BPM | DIASTOLIC BLOOD PRESSURE: 80 MMHG

## 2021-06-09 DIAGNOSIS — R33.9 URINARY RETENTION: Primary | ICD-10-CM

## 2021-06-09 LAB — POST-VOID RESIDUAL VOLUME, ML POC: 476 ML

## 2021-06-09 PROCEDURE — 51702 INSERT TEMP BLADDER CATH: CPT

## 2021-06-09 PROCEDURE — 51798 US URINE CAPACITY MEASURE: CPT

## 2021-06-09 PROCEDURE — 87086 URINE CULTURE/COLONY COUNT: CPT | Performed by: NURSE PRACTITIONER

## 2021-06-09 PROCEDURE — 87186 SC STD MICRODIL/AGAR DIL: CPT | Performed by: NURSE PRACTITIONER

## 2021-06-09 PROCEDURE — 81001 URINALYSIS AUTO W/SCOPE: CPT | Performed by: NURSE PRACTITIONER

## 2021-06-09 RX ORDER — LORAZEPAM 0.5 MG/1
TABLET ORAL EVERY 8 HOURS
COMMUNITY
End: 2021-12-01 | Stop reason: HOSPADM

## 2021-06-09 NOTE — PROGRESS NOTES
6/9/2021    Leisa Maillard  1980  [id number]    Diagnosis  Chief Complaint     Urinary Retention          Patient presents for  Void trial managed by our office    Plan  Patient     Procedure Zhong removal/voiding trial    Zhong catheter removed  At facility  Patientpresented this afternoon  Patient states able to void  Bladder ultrasound performed and PVR measured 475 ml     16 f straight catheter inserted 10 ml balloon  760 ml drained  Attached to an overnight bag  Will follow up in one month with another void trial   No results found for this or any previous visit (from the past 4 hour(s))          Vitals:    06/09/21 1457   BP: 100/80   BP Location: Right arm   Pulse: Λ  [address], RN

## 2021-06-10 ENCOUNTER — TELEPHONE (OUTPATIENT)
Dept: UROLOGY | Facility: AMBULATORY SURGERY CENTER | Age: 41
End: 2021-06-10

## 2021-06-10 LAB
BACTERIA UR QL AUTO: ABNORMAL /HPF
BILIRUB UR QL STRIP: NEGATIVE
CLARITY UR: ABNORMAL
COLOR UR: YELLOW
GLUCOSE UR STRIP-MCNC: NEGATIVE MG/DL
HGB UR QL STRIP.AUTO: ABNORMAL
KETONES UR STRIP-MCNC: NEGATIVE MG/DL
LEUKOCYTE ESTERASE UR QL STRIP: ABNORMAL
NITRITE UR QL STRIP: POSITIVE
NON-SQ EPI CELLS URNS QL MICRO: ABNORMAL /HPF
PH UR STRIP.AUTO: 6.5 [PH]
PROT UR STRIP-MCNC: NEGATIVE MG/DL
RBC #/AREA URNS AUTO: ABNORMAL /HPF
SP GR UR STRIP.AUTO: 1.01 (ref 1–1.03)
UROBILINOGEN UR QL STRIP.AUTO: 0.2 E.U./DL
WBC #/AREA URNS AUTO: ABNORMAL /HPF

## 2021-06-10 NOTE — TELEPHONE ENCOUNTER
Trice Prado from [facility] is calling to set up one month voiding trial  Can be reached at [phone] ext [phone]

## 2021-06-10 NOTE — TELEPHONE ENCOUNTER
Spoke with Jaimie Guadalupe and scheduled for 6/29 @ 3:00 instead of 10:30  She yeison have paulo removed @ 8:30    Patient will either arrive in wheelchair or strecter

## 2021-06-11 ENCOUNTER — TELEPHONE (OUTPATIENT)
Dept: UROLOGY | Facility: AMBULATORY SURGERY CENTER | Age: 41
End: 2021-06-11

## 2021-06-11 DIAGNOSIS — N39.0 URINARY TRACT INFECTION WITHOUT HEMATURIA, SITE UNSPECIFIED: Primary | ICD-10-CM

## 2021-06-11 LAB — BACTERIA UR CULT: ABNORMAL

## 2021-06-11 RX ORDER — CIPROFLOXACIN 500 MG/1
500 TABLET, FILM COATED ORAL EVERY 12 HOURS SCHEDULED
Qty: 10 TABLET | Refills: 0 | Status: SHIPPED | OUTPATIENT
Start: 2021-06-11 | End: 2021-06-16

## 2021-06-11 NOTE — TELEPHONE ENCOUNTER
Called and spoke with bryan at [facility] - she requested urine study report and script be faxed to [phone]

## 2021-06-11 NOTE — TELEPHONE ENCOUNTER
Urine culture is positive for infection  Cipro sent to pharmacy  Encourage hydration and completion of entire course of antibiotics   Thank you

## 2021-06-11 NOTE — TELEPHONE ENCOUNTER
Patient's urine culture is positive for infection  Keflex sent to pharmacy on file  Instruct patient to complete entire course of antibiotics and hydrate with water

## 2021-06-29 ENCOUNTER — OFFICE VISIT (OUTPATIENT)
Dept: UROLOGY | Facility: HOSPITAL | Age: 41
End: 2021-06-29
Payer: COMMERCIAL

## 2021-06-29 VITALS — HEART RATE: 82 BPM | SYSTOLIC BLOOD PRESSURE: 100 MMHG | DIASTOLIC BLOOD PRESSURE: 78 MMHG

## 2021-06-29 DIAGNOSIS — R33.9 URINARY RETENTION: Primary | ICD-10-CM

## 2021-06-29 LAB — POST-VOID RESIDUAL VOLUME, ML POC: 176 ML

## 2021-06-29 PROCEDURE — 99214 OFFICE O/P EST MOD 30 MIN: CPT | Performed by: NURSE PRACTITIONER

## 2021-06-29 PROCEDURE — 51798 US URINE CAPACITY MEASURE: CPT | Performed by: NURSE PRACTITIONER

## 2021-06-29 NOTE — PROGRESS NOTES
06/29/21    Anthony Drier   1980   [id number]     Assessment  1 Urinary retention     Discussion/Plan  1 Urinary retention       Encouraged hydration with water, ambulation, physical therapy, and bowel regimen to avoid constipation   Bladder scan Q shift per nursing at facility   We reviewed urinary tract infection signs and symptoms    Patient will follow up as needed  All questions answered at this time  Subjective  HPI   Sari Leyden is a 47year old female who presents in follow up for PVR  She was admitted to the hospital with hyponatremia and RENE and required bates catheter due to retention of 1 5 L of urine  She failed void trial 6/9/21 with 760 residual after replacement of catheter  Urine was positive for >100,000 cfu/ml Hafnia alvei  She was treated with Cipro  She presents in follow up for void trial  Bates was removed at facility at 8:30 am and she is voiding without difficulty  She reports her urine is clear yellow without sediment  Denies fever, chills, gross hematuria  PMH: anorexia, PTSD, depression, anxiety, weakness, hypothyroidism, malnutrition, B6 and copper deficiency, anemia    Review of Systems - History obtained from chart review and the patient  General ROS: negative  Psychological ROS: negative  Ophthalmic ROS: negative  ENT ROS: negative  Allergy and Immunology ROS: negative  Hematological and Lymphatic ROS: negative  Endocrine ROS: negative  Breast ROS: negative for breast lumps  Respiratory ROS: no cough, shortness of breath, or wheezing  Cardiovascular ROS: no chest pain or dyspnea on exertion  Gastrointestinal ROS: no abdominal pain, change in bowel habits, or black or bloody stools  Genito-Urinary ROS: no dysuria, trouble voiding, or hematuria  Musculoskeletal ROS: negative  Neurological ROS: no TIA or stroke symptoms  Dermatological ROS: negative     Objective  Physical Exam  Vitals and nursing note reviewed  Constitutional:       General: She is awake   She is not in acute distress  Appearance: Normal appearance  She is well-developed, well-groomed and overweight  She is not ill-appearing, toxic-appearing or diaphoretic  HENT:      Head: Normocephalic and atraumatic  Pulmonary:      Effort: Pulmonary effort is normal  No respiratory distress  Musculoskeletal:         General: Normal range of motion  Cervical back: Normal range of motion  Skin:     General: Skin is warm and dry  Neurological:      General: No focal deficit present  Mental Status: She is alert and oriented to person, place, and time  Mental status is at baseline  Motor: Weakness present  Gait: Gait abnormal    Psychiatric:         Attention and Perception: Attention and perception normal          Mood and Affect: Mood and affect normal          Speech: Speech normal          Behavior: Behavior normal  Behavior is cooperative  Thought Content:  Thought content normal          Cognition and Memory: Cognition and memory normal          Judgment: Judgment normal          [facility]

## 2021-06-29 NOTE — TELEPHONE ENCOUNTER
Called back and spoke with [facility]  And they do not have a bladder scanner  Had discussed with Evaristo Leone and she states that they if patient feels she is not urinating can insert a strait catheter and if amount os greater the 350 a catheter maybe inserted    She understood

## 2021-06-29 NOTE — TELEPHONE ENCOUNTER
[facility] stating they are not able to do bladder scan each shift and asked if there is an alternative   Rosendo Lui can be reached at [phone]

## 2021-06-30 NOTE — TELEPHONE ENCOUNTER
Lillian from [facility] would like a call back to clarify orders  Patient was in the office yesterday   [phone] x [phone]

## 2021-06-30 NOTE — TELEPHONE ENCOUNTER
Called and spoke with Lillian and reiterated what was discussed with [facility] yesterday - she understood it was just not documented  Called back and spoke with [facility]  And they do not have a bladder scanner  Had discussed with [facility] and she states that they if patient feels she is not urinating can insert a strait catheter and if amount os greater the 350 a catheter maybe inserted    She understood

## 2021-07-01 ENCOUNTER — TELEMEDICINE (OUTPATIENT)
Dept: ENDOCRINOLOGY | Facility: HOSPITAL | Age: 41
End: 2021-07-01
Payer: COMMERCIAL

## 2021-07-01 DIAGNOSIS — E55.9 VITAMIN D DEFICIENCY: ICD-10-CM

## 2021-07-01 DIAGNOSIS — Z98.84 HISTORY OF GASTRIC BYPASS: ICD-10-CM

## 2021-07-01 DIAGNOSIS — E21.3 HYPERPARATHYROIDISM (HCC): Primary | ICD-10-CM

## 2021-07-01 DIAGNOSIS — E03.9 HYPOTHYROIDISM, UNSPECIFIED TYPE: ICD-10-CM

## 2021-07-01 DIAGNOSIS — E16.2 HYPOGLYCEMIA: ICD-10-CM

## 2021-07-01 DIAGNOSIS — Z86.018 HISTORY OF PITUITARY ADENOMA: ICD-10-CM

## 2021-07-01 PROCEDURE — 99214 OFFICE O/P EST MOD 30 MIN: CPT | Performed by: NURSE PRACTITIONER

## 2021-07-01 NOTE — PATIENT INSTRUCTIONS
Be mindful of diet      Stay active and stay hydrated      Utilize the dex com continuous glucose monitor once your released from the rehabilitation facility      Continue to follow up with the nutritionist and Ophthalmology      For your hypothyroidism, please continue levothyroxine at current dose      Take ergocalciferol 81198 units - 2 times weekly      Obtain lab work when possible  We will contact you with results

## 2021-08-20 ENCOUNTER — OFFICE VISIT (OUTPATIENT)
Dept: NEUROLOGY | Facility: CLINIC | Age: 41
End: 2021-08-20
Payer: COMMERCIAL

## 2021-08-20 VITALS — HEART RATE: 82 BPM | DIASTOLIC BLOOD PRESSURE: 70 MMHG | TEMPERATURE: 96.7 F | SYSTOLIC BLOOD PRESSURE: 108 MMHG

## 2021-08-20 DIAGNOSIS — G40.009 PARTIAL IDIOPATHIC EPILEPSY WITH SEIZURES OF LOCALIZED ONSET, NOT INTRACTABLE, WITHOUT STATUS EPILEPTICUS (HCC): ICD-10-CM

## 2021-08-20 DIAGNOSIS — E53.1 VITAMIN B6 DEFICIENCY: ICD-10-CM

## 2021-08-20 DIAGNOSIS — Z98.84 HISTORY OF GASTRIC BYPASS: ICD-10-CM

## 2021-08-20 DIAGNOSIS — R53.1 WEAKNESS: ICD-10-CM

## 2021-08-20 DIAGNOSIS — E61.0 COPPER DEFICIENCY: ICD-10-CM

## 2021-08-20 DIAGNOSIS — G62.9 PERIPHERAL POLYNEUROPATHY: Primary | ICD-10-CM

## 2021-08-20 PROBLEM — E51.9 THIAMINE DEFICIENCY: Status: RESOLVED | Noted: 2021-08-20 | Resolved: 2021-08-20

## 2021-08-20 PROBLEM — E51.9 THIAMINE DEFICIENCY: Status: ACTIVE | Noted: 2021-08-20

## 2021-08-20 PROCEDURE — 99214 OFFICE O/P EST MOD 30 MIN: CPT | Performed by: PHYSICIAN ASSISTANT

## 2021-08-20 RX ORDER — ZOLPIDEM TARTRATE 10 MG/1
10 TABLET ORAL DAILY
COMMUNITY
Start: 2021-07-27 | End: 2021-12-01 | Stop reason: HOSPADM

## 2021-08-20 RX ORDER — METHOCARBAMOL 500 MG/1
500 TABLET, FILM COATED ORAL 3 TIMES DAILY
Status: ON HOLD | COMMUNITY
End: 2021-09-30 | Stop reason: SDUPTHER

## 2021-08-20 NOTE — PROGRESS NOTES
Patient ID: Tc North is a 46 y o  female  Assessment/Plan:  46 yo female with a history of anorexia, laxative abuse, seizure disorder, pituitary enlargement, history of benzodiazepine abuse, vitamin-D deficiency, depression, history of gastric bypass, and migraine headaches, presents today for a hospital follow up  She presented on 4/5/21 with progressive bilateral upper and lower extremity weakness and sensory abnormality  She had extensive workup including imaging (MRI brain, c-spine, l-spine with no acute abnormalities), LP with slightly elevated protein at 49, otherwise bland CSF results  Extensive serum studies showed low vit D, low B6, low copper, normal B12 but elevated MMA  She was started on vit D, thiamine replacement, B12, multivit with copper  Topamax was stopped due to labs showed hyperchloremic acidosis, likely related to significant doses of topiramate, and concern for topiramate causing decreased appetite  Her Wellbutrin was also discontinued as this is not recommended in patients with eating disorders  She was started on Keppra to replace the Topamax (which she was on for sz disorder and migraines)  She did receive 5 treatments of PLEX  Ultimately, concern was for length dependent polyneuropathy, possibly related to nutritional deficiency  She currently resides at [facility]  She denies any new symptoms, continues with numbness and tingling in the hands and legs  Peripheral polyneuropathy  On exam she has some weakness distally in the UEs, as well as weakness in the LEs  Her hands are contracted  She is on gabapentin but she tells me they are not giving it consistently (written for 300mg TID and 100mg PRN)  She has been on higher doses in the past   She is asking for Topamax back, which I declined, as I do not want to cause weight loss and further decrease in her appetite      Plan:  -EMG 1 upper/ 1 lower   -check nutritional labs now that on replacement -increase gabapentin to 400mg TID (currently ordered for 300mg TID and 100mg PRN  Please give both 100mg and 300mg TID scheduled together)  -continue PT and OT  -continue replacement of thiamine, P61, vit D, folic acid, as she has been getting        Partial idiopathic epilepsy with seizures of localized onset, not intractable, without status epilepticus ([facility] [address] )  No reports of breakthrough seizures since transitioning from Topamax to [place] April  No reported side effects to Keppra  Plan:  -continue Keppra 500mg BID     Patient will return in 6 months with neuromuscular physician or sooner if needed  Diagnoses and all orders for this visit:    Peripheral polyneuropathy  -     EMG 1 Upper/1 Lower Neuropathy; Future  -     Vitamin B1, whole blood; Future  -     Vitamin B6; Future  -     Vitamin B12; Future  -     Methylmalonic acid, serum; Future  -     Protein electrophoresis, serum; Future  -     Copper Level; Future    Weakness  -     Ambulatory referral to Neurology    History of gastric bypass  -     Vitamin B1, whole blood; Future  -     Vitamin B12; Future  -     Methylmalonic acid, serum; Future    Vitamin B6 deficiency  -     Vitamin B6; Future    Copper deficiency  -     Copper Level; Future    Partial idiopathic epilepsy with seizures of localized onset, not intractable, without status epilepticus ([facility] [address] )    Other orders           Subjective:    ZOILA Martinez is a 46 y o  right handed female with history of eating disorder (anorexia nervosa), laxative abuse, seizure disorder, pituitary enlargement, history of benzodiazepine abuse, depression, history of gastric bypass, vitamin-D deficiency, migraine headaches, who presents today for a hospital follow up  Patient presented to the ED on 4/5/21 with the complaint of abnormal labs  She was residing at [facility] at the time and was noted to have Cr <4, decreased urine output    She c/o progressive bilateral upper and lower extremity weakness and sensory abnormality  The patient reported that approximately 1 month prior she started having difficulty ambulating as her lower extremities felt weak  She presented to the emergency department at [facility] on 03/09/2021 and was given potassium infusion and discharged  She continued to have increasing lower extremity weakness and then noticed the weakness progressing to her upper extremities, causing difficulty with coordination and holding onto things  She then became so weak that she was unable to ambulate and was basically bed bound and again presented to the emergency department at [facility] and was admitted  She was given blood transfusions and vitamin supplementation and discharged to [facility] for rehab  Per review of [facility] notes, patient was reportedly deficient in B12, folate and was anemic  In the ED she was found to have RENE  Neurology was consulted due to patient's progressive weakness and a lumbar puncture was performed while in the emergency department  She also had CT head which was unremarkable  CT abdomen and pelvis demonstrated severely distended bladder with bilateral obstructive uropathy and evidence of constipation  Patient also complained of numbness throughout the 4 extremities, shooting pains in her distal upper extremities, pain in the dorsum of her feet and feeling that there is a "cushion" on the soles of her feet, urinary retention, and saddle anesthesia  She denied incontinence  She reported blurred vision but no diplopia or dysarthria  No difficulty swallowing  On  exam patient  has distal weakness in B/L UE extremities and more proximal weakness in B/L LE  She has atrophy in thenar and hypothenar in B/L hands  She has diffusely diminished and length-dependent pattern with light touch, PP, and vibratory sense along with loss of proprioception  Protein in the CSF was mildly elevated at 49, otherwise bland  Low suspicion for AIDP    Exam more consistent with length dependent neuropathy, likely due to nutritional deficiencies  MRI brain, c-spine and l-spine were unremarkable  Patient was on Topamax 200mg TID for her seizure disorder, which she reported started in childhood and was well controlled  Labs showed hyperchloremic acidosis, likely related to significant doses of topiramate  Also concern for Topiramate causing appetite suppression, therefore topiramate was discontinued and Keppra initiated  She was initiated on thiamine and vit D replacement  Wellbutrin also discontinued due to concern for lowering the seizure threshold and also not recommended for use in patients with an eating disorder  Psychiatry was consulted  She was given 5 treatments of PLEX  Her serum copper level was also mildly reduced (checked later on during the hospitalization), and she was started on a multivit with copper  Ultimately, it was felt her symptoms were related to peripheral neuropathy related to nutritional deficiencies  She was ultimately discharged to [facility] on 4/26 for long term placement after rehab was denied  Today, patient reports things are about the same  She continues with some weakness, sensory changes in the hands and feet, legs  She is getting gabapentin, but says they do not give it consistently  She feels the nurses are confused with the dosing  It is currently written for 300mg TID scheduled and 100mg up to TID PRN  She liked her Topamax (cannot give me an exact reason) and wants it added back  She is supposed to be getting PT and OT but says she does not get it consistently  She is mainly in a WC  The following portions of the patient's history were reviewed and updated as appropriate: current medications, past family history, past medical history, past social history, past surgical history and problem list          Objective:    Blood pressure 108/70, pulse 82, temperature (!) 96 7 °F (35 9 °C)      Physical Exam  Constitutional: Appearance: Normal appearance  HENT:      Head: Normocephalic and atraumatic  Eyes:      Extraocular Movements: EOM normal       Pupils: Pupils are equal, round, and reactive to light  Skin:     General: Skin is warm and dry  Neurological:      Mental Status: She is alert  Deep Tendon Reflexes:      Reflex Scores:       Bicep reflexes are 0 on the right side and 0 on the left side  Brachioradialis reflexes are 0 on the right side and 0 on the left side  Patellar reflexes are 0 on the right side and 0 on the left side  Achilles reflexes are 0 on the right side and 0 on the left side  Psychiatric:         Speech: Speech normal       Comments: Flat affect         Neurological Exam  Mental Status  Alert  Oriented to person, place, time and situation  Speech is normal  Language is fluent with no aphasia  Attention and concentration are normal     Cranial Nerves  CN II: Visual fields full to confrontation  CN III, IV, VI: Extraocular movements intact bilaterally  Pupils equal round and reactive to light bilaterally  CN V: Facial sensation is normal   CN VII: Full and symmetric facial movement  CN VIII: Hearing is normal   CN IX, X: Palate elevates symmetrically  CN XI: Shoulder shrug strength is normal   CN XII: Tongue midline without atrophy or fasciculations  Motor   No abnormal involuntary movements  Right                     Left   Shoulder abduction               5                          5  Elbow flexion                         5-                          5-  Elbow extension                    5-                          5-  Hip flexion                              4                          4  Dorsiflexion                            5-                          5-  Hands slightly contracted, decreased  strength bilaterally   Sensory  Light touch is normal in upper and lower extremities   Pinprick abnormality: Decreased in stocking-glove distribution  Vibration abnormality: Decreased vibratory sensation in lower extremities bilaterally   Reflexes                                           Right                      Left  Brachioradialis                    0                         0  Biceps                                 0                         0  Patellar                                0                         0  Achilles                                0                         0    Coordination  Right: Finger-to-nose normal   Left: Finger-to-nose normal     Gait  Not tested-in a WC         ROS:    Review of Systems   Constitutional: Negative  Negative for appetite change and fever  HENT: Negative  Negative for hearing loss, tinnitus, trouble swallowing and voice change  Eyes: Negative  Negative for photophobia and pain  Respiratory: Negative  Negative for shortness of breath  Cardiovascular: Negative  Negative for palpitations  Gastrointestinal: Negative  Negative for nausea and vomiting  Endocrine: Negative  Negative for cold intolerance  Genitourinary: Negative  Negative for dysuria, frequency and urgency  Musculoskeletal: Negative  Negative for myalgias and neck pain  Skin: Negative  Negative for rash  Neurological: Positive for numbness (Hands and legs) and headaches (Daily)  Negative for dizziness, tremors, seizures, syncope, facial asymmetry, speech difficulty, weakness (legs/Tingling) and light-headedness  Hematological: Negative  Does not bruise/bleed easily  Psychiatric/Behavioral: Negative  Negative for confusion, hallucinations and sleep disturbance       I personally reviewed and updated the ROS as appropriate

## 2021-08-20 NOTE — PATIENT INSTRUCTIONS
Peripheral neuropathy likely secondary to nutritional deficiencies  Seizure disorder history, previously on Topamax, now on Keppra and no reported seizures since discharge  Plan:  Neuropathy-  -EMG 1 upper/ 1 lower   -check nutritional labs now that on replacement (ordered)  -increase gabapentin to 400mg TID (currently ordered for 300mg TID and 100mg PRN    Please give both 100mg and 300mg TID scheduled together)  -continue PT and OT  -continue replacement of thiamine, O21, vit D, folic acid, as she has been getting    Seizure disorder-  -continue Keppra 500mg BID    Follow up with neuromuscular physician in 6 months

## 2021-08-23 NOTE — ASSESSMENT & PLAN NOTE
On exam she has some weakness distally in the UEs, as well as weakness in the LEs  Her hands are contracted  She is on gabapentin but she tells me they are not giving it consistently (written for 300mg TID and 100mg PRN)  She has been on higher doses in the past   She is asking for Topamax back, which I declined, as I do not want to cause weight loss and further decrease in her appetite  Plan:  -EMG 1 upper/ 1 lower   -check nutritional labs now that on replacement   -increase gabapentin to 400mg TID (currently ordered for 300mg TID and 100mg PRN    Please give both 100mg and 300mg TID scheduled together)  -continue PT and OT  -continue replacement of thiamine, W53, vit D, folic acid, as she has been getting

## 2021-08-23 NOTE — ASSESSMENT & PLAN NOTE
No reports of breakthrough seizures since transitioning from Topamax to Bridget Weaver  No reported side effects to Keppra      Plan:  -continue Keppra 500mg BID

## 2021-09-20 ENCOUNTER — HOSPITAL ENCOUNTER (EMERGENCY)
Facility: HOSPITAL | Age: 41
End: 2021-09-25
Attending: EMERGENCY MEDICINE | Admitting: EMERGENCY MEDICINE
Payer: COMMERCIAL

## 2021-09-20 DIAGNOSIS — G62.9 PERIPHERAL POLYNEUROPATHY: ICD-10-CM

## 2021-09-20 DIAGNOSIS — Z98.84 HISTORY OF GASTRIC BYPASS: ICD-10-CM

## 2021-09-20 DIAGNOSIS — E43 SEVERE PROTEIN-CALORIE MALNUTRITION (HCC): ICD-10-CM

## 2021-09-20 DIAGNOSIS — E83.42 HYPOMAGNESEMIA: ICD-10-CM

## 2021-09-20 DIAGNOSIS — Z86.018 HISTORY OF PITUITARY ADENOMA: ICD-10-CM

## 2021-09-20 DIAGNOSIS — E03.9 HYPOTHYROIDISM: ICD-10-CM

## 2021-09-20 DIAGNOSIS — R45.851 SUICIDAL IDEATION: ICD-10-CM

## 2021-09-20 DIAGNOSIS — D64.9 ANEMIA: ICD-10-CM

## 2021-09-20 DIAGNOSIS — F32.A DEPRESSION: Primary | ICD-10-CM

## 2021-09-20 DIAGNOSIS — G40.009 PARTIAL IDIOPATHIC EPILEPSY WITH SEIZURES OF LOCALIZED ONSET, NOT INTRACTABLE, WITHOUT STATUS EPILEPTICUS (HCC): ICD-10-CM

## 2021-09-20 LAB
ALBUMIN SERPL BCP-MCNC: 3.3 G/DL (ref 3.5–5)
ALP SERPL-CCNC: 125 U/L (ref 46–116)
ALT SERPL W P-5'-P-CCNC: 22 U/L (ref 12–78)
AMPHETAMINES SERPL QL SCN: NEGATIVE
ANION GAP SERPL CALCULATED.3IONS-SCNC: 7 MMOL/L (ref 4–13)
AST SERPL W P-5'-P-CCNC: 15 U/L (ref 5–45)
ATRIAL RATE: 85 BPM
BARBITURATES UR QL: NEGATIVE
BASOPHILS # BLD AUTO: 0.04 THOUSANDS/ΜL (ref 0–0.1)
BASOPHILS NFR BLD AUTO: 1 % (ref 0–1)
BENZODIAZ UR QL: NEGATIVE
BILIRUB SERPL-MCNC: 0.2 MG/DL (ref 0.2–1)
BUN SERPL-MCNC: 10 MG/DL (ref 5–25)
CALCIUM ALBUM COR SERPL-MCNC: 9.2 MG/DL (ref 8.3–10.1)
CALCIUM SERPL-MCNC: 8.6 MG/DL (ref 8.3–10.1)
CHLORIDE SERPL-SCNC: 104 MMOL/L (ref 100–108)
CO2 SERPL-SCNC: 30 MMOL/L (ref 21–32)
COCAINE UR QL: NEGATIVE
CREAT SERPL-MCNC: 0.72 MG/DL (ref 0.6–1.3)
EOSINOPHIL # BLD AUTO: 0.24 THOUSAND/ΜL (ref 0–0.61)
EOSINOPHIL NFR BLD AUTO: 3 % (ref 0–6)
ERYTHROCYTE [DISTWIDTH] IN BLOOD BY AUTOMATED COUNT: 14.4 % (ref 11.6–15.1)
ETHANOL SERPL-MCNC: <3 MG/DL (ref 0–3)
EXT PREG TEST URINE: NEGATIVE
EXT. CONTROL ED NAV: NORMAL
GFR SERPL CREATININE-BSD FRML MDRD: 106 ML/MIN/1.73SQ M
GLUCOSE SERPL-MCNC: 87 MG/DL (ref 65–140)
GLUCOSE SERPL-MCNC: 91 MG/DL (ref 65–140)
HCT VFR BLD AUTO: 32.3 % (ref 34.8–46.1)
HGB BLD-MCNC: 9.3 G/DL (ref 11.5–15.4)
IMM GRANULOCYTES # BLD AUTO: 0.02 THOUSAND/UL (ref 0–0.2)
IMM GRANULOCYTES NFR BLD AUTO: 0 % (ref 0–2)
LYMPHOCYTES # BLD AUTO: 1.4 THOUSANDS/ΜL (ref 0.6–4.47)
LYMPHOCYTES NFR BLD AUTO: 18 % (ref 14–44)
MCH RBC QN AUTO: 22.6 PG (ref 26.8–34.3)
MCHC RBC AUTO-ENTMCNC: 28.8 G/DL (ref 31.4–37.4)
MCV RBC AUTO: 79 FL (ref 82–98)
METHADONE UR QL: NEGATIVE
MONOCYTES # BLD AUTO: 0.4 THOUSAND/ΜL (ref 0.17–1.22)
MONOCYTES NFR BLD AUTO: 5 % (ref 4–12)
NEUTROPHILS # BLD AUTO: 5.65 THOUSANDS/ΜL (ref 1.85–7.62)
NEUTS SEG NFR BLD AUTO: 73 % (ref 43–75)
NRBC BLD AUTO-RTO: 0 /100 WBCS
OPIATES UR QL SCN: NEGATIVE
OXYCODONE+OXYMORPHONE UR QL SCN: NEGATIVE
P AXIS: 21 DEGREES
PCP UR QL: NEGATIVE
PLATELET # BLD AUTO: 273 THOUSANDS/UL (ref 149–390)
PMV BLD AUTO: 12.3 FL (ref 8.9–12.7)
POTASSIUM SERPL-SCNC: 4.9 MMOL/L (ref 3.5–5.3)
PR INTERVAL: 118 MS
PROT SERPL-MCNC: 6.1 G/DL (ref 6.4–8.2)
QRS AXIS: 26 DEGREES
QRSD INTERVAL: 70 MS
QT INTERVAL: 346 MS
QTC INTERVAL: 411 MS
RBC # BLD AUTO: 4.11 MILLION/UL (ref 3.81–5.12)
SARS-COV-2 RNA RESP QL NAA+PROBE: NEGATIVE
SODIUM SERPL-SCNC: 141 MMOL/L (ref 136–145)
T WAVE AXIS: 30 DEGREES
THC UR QL: NEGATIVE
VENTRICULAR RATE: 85 BPM
WBC # BLD AUTO: 7.75 THOUSAND/UL (ref 4.31–10.16)

## 2021-09-20 PROCEDURE — 82077 ASSAY SPEC XCP UR&BREATH IA: CPT | Performed by: EMERGENCY MEDICINE

## 2021-09-20 PROCEDURE — 85025 COMPLETE CBC W/AUTO DIFF WBC: CPT | Performed by: EMERGENCY MEDICINE

## 2021-09-20 PROCEDURE — 93010 ELECTROCARDIOGRAM REPORT: CPT | Performed by: INTERNAL MEDICINE

## 2021-09-20 PROCEDURE — 99285 EMERGENCY DEPT VISIT HI MDM: CPT

## 2021-09-20 PROCEDURE — U0005 INFEC AGEN DETEC AMPLI PROBE: HCPCS | Performed by: EMERGENCY MEDICINE

## 2021-09-20 PROCEDURE — 99285 EMERGENCY DEPT VISIT HI MDM: CPT | Performed by: EMERGENCY MEDICINE

## 2021-09-20 PROCEDURE — 80307 DRUG TEST PRSMV CHEM ANLYZR: CPT | Performed by: EMERGENCY MEDICINE

## 2021-09-20 PROCEDURE — U0003 INFECTIOUS AGENT DETECTION BY NUCLEIC ACID (DNA OR RNA); SEVERE ACUTE RESPIRATORY SYNDROME CORONAVIRUS 2 (SARS-COV-2) (CORONAVIRUS DISEASE [COVID-19]), AMPLIFIED PROBE TECHNIQUE, MAKING USE OF HIGH THROUGHPUT TECHNOLOGIES AS DESCRIBED BY CMS-2020-01-R: HCPCS | Performed by: EMERGENCY MEDICINE

## 2021-09-20 PROCEDURE — 80053 COMPREHEN METABOLIC PANEL: CPT | Performed by: EMERGENCY MEDICINE

## 2021-09-20 PROCEDURE — 82948 REAGENT STRIP/BLOOD GLUCOSE: CPT

## 2021-09-20 PROCEDURE — 93005 ELECTROCARDIOGRAM TRACING: CPT

## 2021-09-20 PROCEDURE — 81025 URINE PREGNANCY TEST: CPT | Performed by: EMERGENCY MEDICINE

## 2021-09-20 PROCEDURE — 36415 COLL VENOUS BLD VENIPUNCTURE: CPT | Performed by: EMERGENCY MEDICINE

## 2021-09-20 RX ORDER — ONDANSETRON 4 MG/1
4 TABLET, FILM COATED ORAL EVERY 8 HOURS PRN
Status: ON HOLD | COMMUNITY
End: 2021-09-30 | Stop reason: SDUPTHER

## 2021-09-20 RX ORDER — LEVETIRACETAM 250 MG/1
500 TABLET ORAL 2 TIMES DAILY
Status: DISCONTINUED | OUTPATIENT
Start: 2021-09-20 | End: 2021-09-25 | Stop reason: HOSPADM

## 2021-09-20 RX ORDER — FOLIC ACID 1 MG/1
1 TABLET ORAL DAILY
Status: DISCONTINUED | OUTPATIENT
Start: 2021-09-21 | End: 2021-09-25 | Stop reason: HOSPADM

## 2021-09-20 RX ORDER — MIRTAZAPINE 15 MG/1
7.5 TABLET, FILM COATED ORAL
Status: DISCONTINUED | OUTPATIENT
Start: 2021-09-20 | End: 2021-09-25 | Stop reason: HOSPADM

## 2021-09-20 RX ORDER — LANOLIN ALCOHOL/MO/W.PET/CERES
100 CREAM (GRAM) TOPICAL DAILY
Status: DISCONTINUED | OUTPATIENT
Start: 2021-09-21 | End: 2021-09-25 | Stop reason: HOSPADM

## 2021-09-20 RX ORDER — LORAZEPAM 0.5 MG/1
0.5 TABLET ORAL 3 TIMES DAILY PRN
Status: DISCONTINUED | OUTPATIENT
Start: 2021-09-20 | End: 2021-09-25 | Stop reason: HOSPADM

## 2021-09-20 RX ORDER — METHOCARBAMOL 500 MG/1
500 TABLET, FILM COATED ORAL 3 TIMES DAILY
Status: DISCONTINUED | OUTPATIENT
Start: 2021-09-20 | End: 2021-09-25 | Stop reason: HOSPADM

## 2021-09-20 RX ORDER — ERGOCALCIFEROL 1.25 MG/1
50000 CAPSULE ORAL 2 TIMES WEEKLY
Status: DISCONTINUED | OUTPATIENT
Start: 2021-09-20 | End: 2021-09-25 | Stop reason: HOSPADM

## 2021-09-20 RX ORDER — LEVOTHYROXINE SODIUM 0.03 MG/1
50 TABLET ORAL
Status: DISCONTINUED | OUTPATIENT
Start: 2021-09-21 | End: 2021-09-25 | Stop reason: HOSPADM

## 2021-09-20 RX ORDER — ZOLPIDEM TARTRATE 5 MG/1
10 TABLET ORAL
Status: DISCONTINUED | OUTPATIENT
Start: 2021-09-20 | End: 2021-09-25 | Stop reason: HOSPADM

## 2021-09-20 RX ORDER — DOCUSATE SODIUM 100 MG/1
100 CAPSULE, LIQUID FILLED ORAL 2 TIMES DAILY
Status: DISCONTINUED | OUTPATIENT
Start: 2021-09-20 | End: 2021-09-25 | Stop reason: HOSPADM

## 2021-09-20 RX ORDER — POLYETHYLENE GLYCOL 3350 17 G/17G
17 POWDER, FOR SOLUTION ORAL DAILY
Status: DISCONTINUED | OUTPATIENT
Start: 2021-09-21 | End: 2021-09-25 | Stop reason: HOSPADM

## 2021-09-20 RX ORDER — POLYETHYLENE GLYCOL 3350 17 G/17G
17 POWDER, FOR SOLUTION ORAL DAILY
Status: ON HOLD | COMMUNITY
End: 2021-09-30 | Stop reason: SDUPTHER

## 2021-09-20 RX ORDER — ACETAMINOPHEN 325 MG/1
650 TABLET ORAL EVERY 8 HOURS PRN
Status: DISCONTINUED | OUTPATIENT
Start: 2021-09-20 | End: 2021-09-25 | Stop reason: HOSPADM

## 2021-09-20 RX ORDER — VENLAFAXINE HYDROCHLORIDE 75 MG/1
75 CAPSULE, EXTENDED RELEASE ORAL DAILY
Status: DISCONTINUED | OUTPATIENT
Start: 2021-09-21 | End: 2021-09-25 | Stop reason: HOSPADM

## 2021-09-20 RX ORDER — ONDANSETRON 4 MG/1
4 TABLET, ORALLY DISINTEGRATING ORAL EVERY 8 HOURS PRN
Status: DISCONTINUED | OUTPATIENT
Start: 2021-09-20 | End: 2021-09-25 | Stop reason: HOSPADM

## 2021-09-20 RX ORDER — LORAZEPAM 1 MG/1
1 TABLET ORAL ONCE
Status: COMPLETED | OUTPATIENT
Start: 2021-09-20 | End: 2021-09-20

## 2021-09-20 RX ORDER — PRAZOSIN HYDROCHLORIDE 1 MG/1
2 CAPSULE ORAL
Status: DISCONTINUED | OUTPATIENT
Start: 2021-09-20 | End: 2021-09-25 | Stop reason: HOSPADM

## 2021-09-20 RX ORDER — POTASSIUM CHLORIDE 750 MG/1
10 TABLET, EXTENDED RELEASE ORAL 2 TIMES DAILY
Status: DISCONTINUED | OUTPATIENT
Start: 2021-09-20 | End: 2021-09-25 | Stop reason: HOSPADM

## 2021-09-20 RX ADMIN — METHOCARBAMOL 500 MG: 500 TABLET ORAL at 16:40

## 2021-09-20 RX ADMIN — PRAZOSIN HYDROCHLORIDE 2 MG: 1 CAPSULE ORAL at 21:49

## 2021-09-20 RX ADMIN — LORAZEPAM 0.5 MG: 0.5 TABLET ORAL at 21:54

## 2021-09-20 RX ADMIN — GABAPENTIN 400 MG: 300 CAPSULE ORAL at 21:53

## 2021-09-20 RX ADMIN — LEVETIRACETAM 500 MG: 250 TABLET, FILM COATED ORAL at 21:54

## 2021-09-20 RX ADMIN — METHOCARBAMOL 500 MG: 500 TABLET ORAL at 21:51

## 2021-09-20 RX ADMIN — MAGNESIUM OXIDE TAB 400 MG (241.3 MG ELEMENTAL MG) 400 MG: 400 (241.3 MG) TAB at 21:51

## 2021-09-20 RX ADMIN — DOCUSATE SODIUM 100 MG: 100 CAPSULE, LIQUID FILLED ORAL at 21:51

## 2021-09-20 RX ADMIN — MIRTAZAPINE 7.5 MG: 15 TABLET, FILM COATED ORAL at 21:49

## 2021-09-20 RX ADMIN — ZOLPIDEM TARTRATE 10 MG: 5 TABLET, COATED ORAL at 21:50

## 2021-09-20 RX ADMIN — GABAPENTIN 400 MG: 300 CAPSULE ORAL at 16:40

## 2021-09-20 RX ADMIN — LORAZEPAM 1 MG: 1 TABLET ORAL at 12:27

## 2021-09-20 RX ADMIN — ERGOCALCIFEROL 50000 UNITS: 1.25 CAPSULE ORAL at 21:49
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
## 2021-09-20 NOTE — ED NOTES
Attempted to order patient a tray at this time, no answer in the kitchen   Patient wants a grilled ham on cheese on white breat, 2 regular pepsi, lety slaw, mac & cheese, chocolate cake and milk     Naima Pérez, BRYSON  09/20/21 2124

## 2021-09-20 NOTE — ED NOTES
Patient refusing to change into [facility] attire, refusing to allow staff to touch her and will not give us her personal belongings  Screaming at this RN "stop talking to me, send me back or let me stay in my clothes, I have rights"   Ella Kumar with crisis is at the bedside explaining our policies      Warner Gu RN  09/20/21 0702

## 2021-09-20 NOTE — ED PROVIDER NOTES
History  Chief Complaint   Patient presents with    Psychiatric Evaluation     sent from [facility] for psych eval, states she wants to sign a 201, was reporting SI to staff at [facility] today, will not answer my questions during triage      This is a 72-year-old female who presents via ambulance from [facility] for crisis evaluation patient stating that she is depressed and having suicidal thoughts overdosing pills  Initially she was uncooperative with changing and giving up her possessions  She has a history of cutting with some old scars to both arms  Patient states that she is at [facility] because she had anorexia and  Is having trouble walking therefore rehabilitation      History provided by:  Patient  Medical Problem  Location:   generalized  Quality:   anxiety depression and suicidal ideation with plan to overdose on pills  Severity:  Unable to specify  Onset quality:  Gradual  Timing:  Constant  Progression:  Worsening  Chronicity:  Recurrent  Context:   anxiety depression and suicidal ideation to overdose on pills  Relieved by:   nothing  Worsened by:   stress      Prior to Admission Medications   Prescriptions Last Dose Informant Patient Reported? Taking?    AquaLance Lancets 30G MISC Unknown at Unknown time Self Yes No   Sig: USE TO TEST BLOOD GLUCOSE FOUR TIMES A DAY   Continuous Blood Gluc Sensor (FREESTYLE CAITY 14 DAY SENSOR) MISC Unknown at Unknown time Self No No   Si application by Does not apply route every 14 (fourteen) days   LORazepam (ATIVAN) 0 5 mg tablet 2021 at Unknown time Self Yes Yes   Sig: Take by mouth every 8 (eight) hours as needed for anxiety   acetaminophen (Tylenol) 325 mg tablet Unknown at Unknown time Self Yes No   Sig: Take 650 mg by mouth every 6 (six) hours as needed for mild pain, headaches or fever   cyanocobalamin 1,000 mcg/mL Past Month at Unknown time Self Yes Yes   Sig: inject 1 milliliter ( 1000 MCG ) intramuscularly Every Month docusate sodium (COLACE) 100 mg capsule 9/19/2021 at Unknown time Self Yes Yes   Sig: Take 100 mg by mouth 2 (two) times a day   ergocalciferol (VITAMIN D2) 50,000 units 9/19/2021 at Unknown time Self No Yes   Sig: Take 1 capsule (50,000 Units total) by mouth 2 (two) times a week   folic acid (FOLVITE) 1 mg tablet 9/19/2021 at Unknown time Self Yes Yes   Sig: Take by mouth daily   gabapentin (NEURONTIN) 100 mg capsule Past Week at Unknown time Self No Yes   Sig: Take 1 capsule (100 mg total) by mouth 3 (three) times a day as needed (leg pain)   gabapentin (NEURONTIN) 300 mg capsule 9/20/2021 at Unknown time Self No Yes   Sig: Take 1 capsule (300 mg total) by mouth daily at bedtime   Patient taking differently: Take 400 mg by mouth 3 (three) times a day    levETIRAcetam (KEPPRA) 500 mg tablet 9/20/2021 at Unknown time Self No Yes   Sig: Take 1 tablet (500 mg total) by mouth every 12 (twelve) hours   levothyroxine 50 mcg tablet 9/20/2021 at Unknown time Self No Yes   Sig: Take 1 tablet (50 mcg total) by mouth daily in the early morning   magnesium oxide (MAG-OX) 400 mg 9/20/2021 at Unknown time Self No Yes   Sig: Take 1 tablet (400 mg total) by mouth 2 (two) times a day   methocarbamol (ROBAXIN) 500 mg tablet 9/20/2021 at Unknown time Self Yes Yes   Sig: Take 500 mg by mouth 3 (three) times a day   mirtazapine (REMERON) 15 mg tablet 9/19/2021 at Unknown time Self No Yes   Sig: Take 1 tablet (15 mg total) by mouth daily at bedtime   Patient taking differently: Take 7 5 mg by mouth daily at bedtime    ondansetron (ZOFRAN) 4 mg tablet   Yes Yes   Sig: Take 4 mg by mouth every 8 (eight) hours as needed for nausea or vomiting   polyethylene glycol (MIRALAX) 17 g packet   Yes Yes   Sig: Take 17 g by mouth daily   potassium chloride (MICRO-K) 10 MEQ CR capsule  Self No No   Sig: Take 1 capsule (10 mEq total) by mouth 2 (two) times a day   prazosin (MINIPRESS) 2 mg capsule 9/19/2021 at Unknown time Self No Yes   Sig: Take 1 capsule (2 mg total) by mouth daily at bedtime   thiamine (VITAMIN B1) 100 mg tablet 2021 at Unknown time Self Yes Yes   Sig: Take 100 mg by mouth daily   venlafaxine (EFFEXOR-XR) 75 mg 24 hr capsule 2021 at Unknown time Self Yes Yes   zolpidem (AMBIEN) 10 mg tablet 2021 at Unknown time  Yes Yes   Sig: Take 10 mg by mouth daily   zolpidem (AMBIEN) 5 mg tablet  Self No No   Sig: Take 1 tablet (5 mg total) by mouth daily at bedtime as needed (insomnia) for up to 10 days      Facility-Administered Medications: None       Past Medical History:   Diagnosis Date    Anemia     Anxiety     Hypothyroidism     Psychiatric disorder     depression anxiety    Seizures (HCC)     Vitamin D deficiency        Past Surgical History:   Procedure Laterality Date    GASTRIC BYPASS      IR PICC PLACEMENT SINGLE LUMEN  2021    IR TEMPORARY DIALYSIS CATHETER PLACEMENT  2021       Family History   Problem Relation Age of Onset    Hypertension Mother     Heart attack Mother     No Known Problems Father     Lung cancer Maternal Grandmother     Hypothyroidism Maternal Grandmother     Diabetes type II Paternal Grandmother     Diabetes type II Paternal Grandfather      I have reviewed and agree with the history as documented  E-Cigarette/Vaping    E-Cigarette Use Never User      E-Cigarette/Vaping Substances     Social History     Tobacco Use    Smoking status: Former Smoker     Quit date: 3/1/2021     Years since quittin 5    Smokeless tobacco: Never Used   Vaping Use    Vaping Use: Never used   Substance Use Topics    Alcohol use: Never    Drug use: No     Comment: in revovery for benzos       Review of Systems   Neurological: Positive for weakness ( ambulatory dysfunction)  Psychiatric/Behavioral: Positive for agitation, dysphoric mood, self-injury and suicidal ideas  The patient is nervous/anxious  All other systems reviewed and are negative        Physical Exam  Physical Exam  Vitals and nursing note reviewed  Constitutional:       Appearance: She is not ill-appearing, toxic-appearing or diaphoretic  HENT:      Head: Normocephalic and atraumatic  Right Ear: External ear normal       Left Ear: External ear normal       Nose: Nose normal    Eyes:      General: No scleral icterus  Right eye: No discharge  Left eye: No discharge  Extraocular Movements: Extraocular movements intact  Pupils: Pupils are equal, round, and reactive to light  Cardiovascular:      Rate and Rhythm: Normal rate and regular rhythm  Pulses: Normal pulses  Heart sounds: Normal heart sounds  No murmur heard  No friction rub  No gallop  Pulmonary:      Effort: Pulmonary effort is normal  No respiratory distress  Breath sounds: Normal breath sounds  No stridor  No wheezing, rhonchi or rales  Abdominal:      General: There is no distension  Palpations: Abdomen is soft  Tenderness: There is no abdominal tenderness  There is no guarding or rebound  Musculoskeletal:         General: No swelling, tenderness, deformity or signs of injury  Cervical back: Normal range of motion and neck supple  No rigidity or tenderness  Right lower leg: No edema  Left lower leg: No edema  Skin:     General: Skin is warm and dry  Coloration: Skin is not jaundiced  Findings: No bruising, erythema or rash  Comments: Old cutting scars to the forearms without any acute wounds are infection   Neurological:      Mental Status: She is alert and oriented to person, place, and time  Sensory: No sensory deficit  Motor: Weakness present  Coordination: Coordination normal       Gait: Gait abnormal ( not tested in the ER)        Comments: Ambulatory dysfunction with difficulty walking   Psychiatric:      Comments: Anxious and depressed with suicidal ideation to overdose on pills         Vital Signs  ED Triage Vitals [09/20/21 1140]   Temperature Pulse Respirations Blood Pressure SpO2   (!) 97 4 °F (36 3 °C) 82 18 92/57 95 %      Temp Source Heart Rate Source Patient Position - Orthostatic VS BP Location FiO2 (%)   Temporal Monitor Sitting Right arm --      Pain Score       No Pain           Vitals:    09/20/21 1140 09/20/21 1330   BP: 92/57 100/60   Pulse: 82 84   Patient Position - Orthostatic VS: Sitting Lying         Visual Acuity      ED Medications  Medications   LORazepam (ATIVAN) tablet 0 5 mg (has no administration in time range)   magnesium oxide (MAG-OX) tablet 400 mg (has no administration in time range)   methocarbamol (ROBAXIN) tablet 500 mg (500 mg Oral Given 9/20/21 1640)   gabapentin (NEURONTIN) capsule 400 mg (400 mg Oral Given 9/20/21 1640)   levETIRAcetam (KEPPRA) tablet 500 mg (has no administration in time range)   levothyroxine tablet 50 mcg (has no administration in time range)   thiamine tablet 100 mg (has no administration in time range)   venlafaxine (EFFEXOR-XR) 24 hr capsule 75 mg (has no administration in time range)   zolpidem (AMBIEN) tablet 10 mg (has no administration in time range)   acetaminophen (TYLENOL) tablet 650 mg (has no administration in time range)   docusate sodium (COLACE) capsule 100 mg (has no administration in time range)   ergocalciferol (VITAMIN D2) capsule 50,000 Units (has no administration in time range)   folic acid (FOLVITE) tablet 1 mg (has no administration in time range)   mirtazapine (REMERON) tablet 7 5 mg (has no administration in time range)   ondansetron (ZOFRAN-ODT) dispersible tablet 4 mg (has no administration in time range)   polyethylene glycol (MIRALAX) packet 17 g (has no administration in time range)   potassium chloride (K-DUR,KLOR-CON) CR tablet 10 mEq (has no administration in time range)   prazosin (MINIPRESS) capsule 2 mg (has no administration in time range)   LORazepam (ATIVAN) tablet 1 mg (1 mg Oral Given 9/20/21 1227)       Diagnostic Studies  Results Reviewed     Procedure Component Value Units Date/Time    Novel Coronavirus (Covid-19),PCR [facility] - 2 Hour Stat [[id number]]  (Normal) Collected: 09/20/21 1227    Lab Status: Final result Specimen: Nares from Nose Updated: 09/20/21 1517     SARS-CoV-2 Negative    Narrative: The specimen collection materials, transport medium, and/or testing methodology utilized in the production of these test results have been proven to be reliable in a limited validation with an abbreviated program under the Emergency Utilization Authorization provided by the FDA  Testing reported as "Presumptive positive" will be confirmed with secondary testing to ensure result accuracy  Clinical caution and judgement should be used with the interpretation of these results with consideration of the clinical impression and other laboratory testing  Testing reported as "Positive" or "Negative" has been proven to be accurate according to standard laboratory validation requirements  All testing is performed with control materials showing appropriate reactivity at standard intervals        Ethanol [[id number]]  (Normal) Collected: 09/20/21 1225    Lab Status: Final result Specimen: Blood from Arm, Right Updated: 09/20/21 1434     Ethanol Lvl <3 mg/dL     Comprehensive metabolic panel [[id number]]  (Abnormal) Collected: 09/20/21 1225    Lab Status: Final result Specimen: Blood from Arm, Right Updated: 09/20/21 1348     Sodium 141 mmol/L      Potassium 4 9 mmol/L      Chloride 104 mmol/L      CO2 30 mmol/L      ANION GAP 7 mmol/L      BUN 10 mg/dL      Creatinine 0 72 mg/dL      Glucose 87 mg/dL      Calcium 8 6 mg/dL      Corrected Calcium 9 2 mg/dL      AST 15 U/L      ALT 22 U/L      Alkaline Phosphatase 125 U/L      Total Protein 6 1 g/dL      Albumin 3 3 g/dL      Total Bilirubin 0 20 mg/dL      eGFR 106 ml/min/1 73sq m     Narrative:      [place] guidelines for Chronic Kidney Disease (CKD):     Stage 1 with normal or high GFR (GFR > 90 mL/min/1 73 square meters)    Stage 2 Mild CKD (GFR = 60-89 mL/min/1 73 square meters)    Stage 3A Moderate CKD (GFR = 45-59 mL/min/1 73 square meters)    Stage 3B Moderate CKD (GFR = 30-44 mL/min/1 73 square meters)    Stage 4 Severe CKD (GFR = 15-29 mL/min/1 73 square meters)    Stage 5 End Stage CKD (GFR <15 mL/min/1 73 square meters)  Note: GFR calculation is accurate only with a steady state creatinine    CBC and differential [[id number]]  (Abnormal) Collected: 09/20/21 1225    Lab Status: Final result Specimen: Blood from Arm, Right Updated: 09/20/21 1330     WBC 7 75 Thousand/uL      RBC 4 11 Million/uL      Hemoglobin 9 3 g/dL      Hematocrit 32 3 %      MCV 79 fL      MCH 22 6 pg      MCHC 28 8 g/dL      RDW 14 4 %      MPV 12 3 fL      Platelets 518 Thousands/uL      nRBC 0 /100 WBCs      Neutrophils Relative 73 %      Immat GRANS % 0 %      Lymphocytes Relative 18 %      Monocytes Relative 5 %      Eosinophils Relative 3 %      Basophils Relative 1 %      Neutrophils Absolute 5 65 Thousands/µL      Immature Grans Absolute 0 02 Thousand/uL      Lymphocytes Absolute 1 40 Thousands/µL      Monocytes Absolute 0 40 Thousand/µL      Eosinophils Absolute 0 24 Thousand/µL      Basophils Absolute 0 04 Thousands/µL     POCT pregnancy, urine [[id number]]     Lab Status: No result     Rapid drug screen, urine [[id number]]     Lab Status: No result Specimen: Urine                  No orders to display              Procedures  ECG 12 Lead Documentation Only    Date/Time: 9/20/2021 12:50 PM  Performed by: Tom Sewell DO  Authorized by: Tom Sewell DO     ECG reviewed by me, the ED Provider: yes    Patient location:  ED  Rate:     ECG rate:  85    ECG rate assessment: normal    Rhythm:     Rhythm: sinus rhythm    Conduction:     Conduction: normal    T waves:     T waves: normal               ED Course  ED Course as of Sep 20 1705   Mon Sep 20, 2021   1429  Patient may be considered medically cleared for crisis evaluation and placement                                SBIRT 22yo+      Most Recent Value   SBIRT (24 yo +)   In order to provide better care to our patients, we are screening all of our patients for alcohol and drug use  Would it be okay to ask you these screening questions? Yes Filed at: 09/20/2021 1536   Initial Alcohol Screen: US AUDIT-C    1  How often do you have a drink containing alcohol?  0 Filed at: 09/20/2021 1536   2  How many drinks containing alcohol do you have on a typical day you are drinking? 0 Filed at: 09/20/2021 1536   3a  Male UNDER 65: How often do you have five or more drinks on one occasion? 0 Filed at: 09/20/2021 1536   3b  FEMALE Any Age, or MALE 65+: How often do you have 4 or more drinks on one occassion? 0 Filed at: 09/20/2021 1536   Audit-C Score  0 Filed at: 09/20/2021 1536   JOVAN: How many times in the past year have you    Used an illegal drug or used a prescription medication for non-medical reasons? Never Filed at: 09/20/2021 1536                    MDM  Number of Diagnoses or Management Options  Diagnosis management comments:  Crisis evaluation will  Clear medically and consult crisis       Amount and/or Complexity of Data Reviewed  Clinical lab tests: ordered        Disposition  Final diagnoses:   Depression   Suicidal ideation   Anemia     Time reflects when diagnosis was documented in both MDM as applicable and the Disposition within this note     Time User Action Codes Description Comment    9/20/2021 12:56 PM Geovany Hutchison [F32 9] Depression     9/20/2021 12:56 PM Key Davidson [H26 128] Suicidal ideation     9/20/2021  2:30 PM Key Saini Add [D64 9] Anemia       ED Disposition     ED Disposition Condition Date/Time Comment    Transfer to [facility] Sep 20, 2021  2:30 PM Carlos Alberto Lopez should be transferred out to ** crisis for bed search* and has been medically cleared           Follow-up Information    None         Patient's Medications   Discharge Prescriptions    No medications on file     No discharge procedures on file      PDMP Review       Value Time User    PDMP Reviewed  Yes 5/13/2021 12:31 PM Cristina Spatz, MD          ED Provider  Electronically Signed by           Laura Cheatham DO  09/20/21 1397

## 2021-09-20 NOTE — ED NOTES
Patient continues to refuse to allow staff to touch her and states "you have to just send me back"     Stephenie Rocha, RN  09/20/21 7583

## 2021-09-20 NOTE — ED NOTES
Patient is refusing to change out of her clothing or allow us to secure her belongings, specifically her tablet  Patient states "they always make an exception for me, they never make me change and I always get to keep my tablet"   Patient reports +SI at this time and states she wants to be admitted to a psychiatric hospital to have her medications adjusted     Bridgette Patel RN  09/20/21 4510

## 2021-09-20 NOTE — ED NOTES
Patient does not need an IV per Dr Debra Chopra  Patient is calm and cooperative, 1:1 at the bedside  Offered TV but patient declined, would prefer to read  Will attempt to get patient reading materials   Patient is aware that we need a urine sample and will notify us when she feels the need to void     Damien Kaufman RN  09/20/21 5967

## 2021-09-20 NOTE — ED NOTES
Crisis completed a bed search:    [facility]) no beds  [facility] ([facility]) not a good fit  [place] ([place]) no beds  [place] - No beds  [facility]) no beds  [facility])  No beds

## 2021-09-20 NOTE — ED NOTES
Medically cleared per Dr Hemal Betancourt, will remove monitor and clear room  1:1 supervision continues   Patient reports she cannot have a finger foods diet due to poor dentition, ok per Dr Hemal Betancourt for patient to have a regular diet as she is on a continual observation     Marisol Hinojosa RN  09/20/21 8122

## 2021-09-20 NOTE — ED NOTES
Crisis completed a bed search:    No Beds:   In network [place]) no beds  [facility]) no beds  [place]- left [facility]- no answer, unable to leave voicemail  [facility]) no beds  [facility]) no beds  [place]- left voicemail    Denied/unable to accommodate:  [facility])- unable to accommodate pt's need for a wheelchair    [facility])- unable to accomodate pt's need for a wheelchair

## 2021-09-20 NOTE — ED NOTES
Les Herndon from crisis is at the bedside speaking with the patient  Patient is now more agreeable,willing to answer questions  Provided patient with a blanket to maintain privacy as this is a stressor for her        Miller Yip, BRYSON  09/20/21 7350

## 2021-09-20 NOTE — ED NOTES
I had a long discussion with the patient about our hospital policies and procedures for voluntary [facility] admission  Patient agreeable to change into paper scrubs with my assistance, we made our best effort to keep the patient's body covered   Patient agreeable to allow her belongings to be locked up but does request that we provide her with PRN anxiety medication and attempt to get her some reading material  All belongings except underwear and a ring on the left 4th finger, which the patient reports is "stuck", were removed from the bedside     Stephenie Rocha RN  09/20/21 1030

## 2021-09-20 NOTE — ED CARE HANDOFF
Emergency Department Sign Out Note        Sign out and transfer of care from [facility]**  See Separate Emergency Department note  The patient, [facility], was evaluated by the previous provider for suicide plan to overdose on pills  Workup Completed:  Medically cleared for inpatient psych care    ED Course / Workup Pending (followup):  Crisis evaluating patient                                  ED Course as of Sep 21 0123   Tue Sep 21, 2021   0039 Sign out to [employer] - suicidal, plan to overdose on meds - medically cleared for inpatient psych - 201 signed, bed search in place  Procedures  MDM  Number of Diagnoses or Management Options  Anemia: minor  Depression: established and worsening  Suicidal ideation: new and requires workup     Amount and/or Complexity of Data Reviewed  Clinical lab tests: reviewed  Obtain history from someone other than the patient: yes  Discuss the patient with other providers: yes    Patient Progress  Patient progress: improved      Disposition  Final diagnoses:   Depression   Suicidal ideation   Anemia     Time reflects when diagnosis was documented in both MDM as applicable and the Disposition within this note     Time User Action Codes Description Comment    9/20/2021 12:56 PM Amelia Dai [F32 9] Depression     9/20/2021 12:56 PM Jose Mike  76 Suicidal ideation     9/20/2021  2:30 PM Yohannes Celestin, [address] [place] [address] [D64 9] Anemia       ED Disposition     ED Disposition Condition Date/Time Comment    Transfer to [facility] Sep 20, 2021  2:30 PM [facility] should be transferred out to ** crisis for bed search* and has been medically cleared  Follow-up Information    None       Patient's Medications   Discharge Prescriptions    No medications on file     No discharge procedures on file         ED Provider  Electronically Signed by     Dianne Cassidy DO  09/21/21 6720

## 2021-09-20 NOTE — ED NOTES
Patient remains on the monitor and medical equipment at the bedside until she is medically cleared, 1:1 supervision continues     Miller Yip [facility]  09/20/21 4931

## 2021-09-20 NOTE — Clinical Note
Jayjay Brochure should be transferred out to ** crisis for bed search* and has been medically cleared

## 2021-09-21 LAB
GLUCOSE SERPL-MCNC: 122 MG/DL (ref 65–140)
GLUCOSE SERPL-MCNC: 85 MG/DL (ref 65–140)
GLUCOSE SERPL-MCNC: 93 MG/DL (ref 65–140)
MAGNESIUM SERPL-MCNC: 1.7 MG/DL (ref 1.6–2.6)
TSH SERPL DL<=0.05 MIU/L-ACNC: 1.69 UIU/ML (ref 0.36–3.74)

## 2021-09-21 PROCEDURE — 80177 DRUG SCRN QUAN LEVETIRACETAM: CPT | Performed by: PHYSICIAN ASSISTANT

## 2021-09-21 PROCEDURE — 82948 REAGENT STRIP/BLOOD GLUCOSE: CPT

## 2021-09-21 PROCEDURE — 36415 COLL VENOUS BLD VENIPUNCTURE: CPT | Performed by: PHYSICIAN ASSISTANT

## 2021-09-21 PROCEDURE — 84443 ASSAY THYROID STIM HORMONE: CPT | Performed by: PHYSICIAN ASSISTANT

## 2021-09-21 PROCEDURE — 99284 EMERGENCY DEPT VISIT MOD MDM: CPT | Performed by: PHYSICIAN ASSISTANT

## 2021-09-21 PROCEDURE — 83735 ASSAY OF MAGNESIUM: CPT | Performed by: PHYSICIAN ASSISTANT

## 2021-09-21 RX ORDER — LORAZEPAM 1 MG/1
1 TABLET ORAL ONCE
Status: COMPLETED | OUTPATIENT
Start: 2021-09-21 | End: 2021-09-21

## 2021-09-21 RX ADMIN — METHOCARBAMOL 500 MG: 500 TABLET ORAL at 21:15

## 2021-09-21 RX ADMIN — MIRTAZAPINE 7.5 MG: 15 TABLET, FILM COATED ORAL at 21:42

## 2021-09-21 RX ADMIN — PRAZOSIN HYDROCHLORIDE 2 MG: 1 CAPSULE ORAL at 21:42

## 2021-09-21 RX ADMIN — LEVOTHYROXINE SODIUM 50 MCG: 25 TABLET ORAL at 10:47

## 2021-09-21 RX ADMIN — GABAPENTIN 400 MG: 300 CAPSULE ORAL at 16:01

## 2021-09-21 RX ADMIN — MAGNESIUM OXIDE TAB 400 MG (241.3 MG ELEMENTAL MG) 400 MG: 400 (241.3 MG) TAB at 10:50

## 2021-09-21 RX ADMIN — MAGNESIUM OXIDE TAB 400 MG (241.3 MG ELEMENTAL MG) 400 MG: 400 (241.3 MG) TAB at 17:43

## 2021-09-21 RX ADMIN — DOCUSATE SODIUM 100 MG: 100 CAPSULE, LIQUID FILLED ORAL at 10:47

## 2021-09-21 RX ADMIN — POTASSIUM CHLORIDE 10 MEQ: 750 TABLET, EXTENDED RELEASE ORAL at 17:43

## 2021-09-21 RX ADMIN — VENLAFAXINE HYDROCHLORIDE 75 MG: 75 CAPSULE, EXTENDED RELEASE ORAL at 10:50

## 2021-09-21 RX ADMIN — LORAZEPAM 1 MG: 1 TABLET ORAL at 19:13

## 2021-09-21 RX ADMIN — ACETAMINOPHEN 650 MG: 325 TABLET, FILM COATED ORAL at 16:06

## 2021-09-21 RX ADMIN — LORAZEPAM 0.5 MG: 0.5 TABLET ORAL at 10:51

## 2021-09-21 RX ADMIN — ZOLPIDEM TARTRATE 10 MG: 5 TABLET, COATED ORAL at 21:14

## 2021-09-21 RX ADMIN — GABAPENTIN 400 MG: 300 CAPSULE ORAL at 10:49

## 2021-09-21 RX ADMIN — METHOCARBAMOL 500 MG: 500 TABLET ORAL at 16:01

## 2021-09-21 RX ADMIN — ACETAMINOPHEN 650 MG: 325 TABLET, FILM COATED ORAL at 11:40

## 2021-09-21 RX ADMIN — LORAZEPAM 0.5 MG: 0.5 TABLET ORAL at 16:05

## 2021-09-21 RX ADMIN — GABAPENTIN 400 MG: 300 CAPSULE ORAL at 21:14

## 2021-09-21 RX ADMIN — POTASSIUM CHLORIDE 10 MEQ: 750 TABLET, EXTENDED RELEASE ORAL at 10:49

## 2021-09-21 RX ADMIN — THIAMINE HCL TAB 100 MG 100 MG: 100 TAB at 10:49

## 2021-09-21 RX ADMIN — LEVETIRACETAM 500 MG: 250 TABLET, FILM COATED ORAL at 10:50

## 2021-09-21 RX ADMIN — DOCUSATE SODIUM 100 MG: 100 CAPSULE, LIQUID FILLED ORAL at 17:44

## 2021-09-21 RX ADMIN — METHOCARBAMOL 500 MG: 500 TABLET ORAL at 10:46

## 2021-09-21 RX ADMIN — FOLIC ACID 1 MG: 1 TABLET ORAL at 10:50

## 2021-09-21 RX ADMIN — POLYETHYLENE GLYCOL 3350 17 G: 17 POWDER, FOR SOLUTION ORAL at 10:51

## 2021-09-21 RX ADMIN — LEVETIRACETAM 500 MG: 250 TABLET, FILM COATED ORAL at 21:15

## 2021-09-21 NOTE — CONSULTS
Consultation - Behavioral Health     Identification Data: Kaya Castellanos 46 y o  female MRN: [id number]  Unit/Bed#: ED 03 Encounter: [id number]    09/21/21  3:54 PM    Inpatient consult to Psychiatry  Consult performed by: Parisa Corral PA-C  Consult ordered by: Mir Cheung DO        Physician Requesting Consult: Flaco Henderson DO  Principal Problem:<principal problem not specified>    Reason for Consult:  "I don't want to go on living like this"    History of Present Illness     Kaya Castellanos is a 46 y o  female with a history of MDD, PTSD, anorexia nervosa restricting type, and history of laxative abuse who was admitted to the medical service on 9/20/2021 due to suicidal ideation with plan to OD  Psychiatric consultation was requested due to depression and suicidal ideation with plan to overdose on medications  Psychiatric symptoms prior to admission included worsening depression, suicidal ideation, passive death wish, self-abusive thoughts, hopelessness, poor appetite, difficulty sleeping, anxiety symptoms, nightmares and difficulty attending to activities of daily living  Onset of symptoms was gradual starting 1 month ago with progressively worsening course since that time  Stressors preceding admission included health issues, housing issues, limited support, everyday stressors, chronic mental illness and difficulty with maintaining healthy weight  Per chart review, patient has a number of prior suicide attempts and inpatient psychiatric stays  She is currently wheelchair bound because of a polyneuropathy in the context of severe caloric/nutritional restriction  On initial psychiatric evaluation Marisol Cárdenas is visibly dysphoric and despondent appearing  She endoreses the above-mentioned depressive symptoms and confirms suicidal ideation with plan to OD on her various prescription medications if she fails to get help   Reports this is a result of losing her apartment due to not being able to walk anymore and moving into the [facility] to focus on rehab; also has history of prior trauma  States that she sees a psychiatrist at the [facility] about once a month and was apparently told that she necessitates inpatient hospitalization for a fresh look at her psychotropic regimen  She is not homicidal or psychotic  She has been compliant with her medications  She does not drink, smoke or use drugs  She has tried to commit suicide 8 times previously through various means of cutting and intentional overdose  Last inpatient psych stay was reportedly March 2020 at [facility] for similar issues  Has history of seizures and severe anorexia to the point where she apparently developed a nutritional deficiency which caused a polyneuropathy and now has limited use of her legs and inability to ambulate without significant assistance  As such, she is wheelchair bound and going through rehab at the [facility]  She is planning on signing a 201        Psychiatric Review Of Systems:    sleep changes: yes, decreased  appetite changes: yes, decreased  weight changes: no  energy/anergy: yes, decreased  interest/pleasure/anhedonia: yes, decreased  somatic symptoms: yes  anxiety/panic: yes  theresa: no  guilty/hopeless: yes  self injurious behavior/risky behavior: not recently  Suicidal ideation: yes, plan to overdose on medications  Homicidal ideation: no  Auditory hallucinations: no  Visual hallucinations: no  Other hallucinations: no  Delusional thinking: no    Historical Information     Past Psychiatric History:     Past Inpatient Psychiatric Treatment:   multiple prior, most recently March 2020 at [facility]   Past Outpatient Psychiatric Treatment:    Past outpatient psychiatric treatment through visiting psychiatrist at [facility]; previously followed with [facility] team  Past Suicide Attempts: yes "at least 8"  Past Violent Behavior: no  Past Psychiatric Medication Trials: multiple psychiatric medication trials     Substance Abuse History:    Social History     Tobacco History     Smoking Status  Former Smoker Quit date  3/1/2021    Smokeless Tobacco Use  Never Used          Alcohol History     Alcohol Use Status  Never          Drug Use     Drug Use Status  No Comment  in revovery for benzos          Sexual Activity     Sexually Active  Not Asked          Activities of Daily Living    Not Asked                 I have assessed this patient for substance use within the past 12 months  And she denies everything  Social History:    Education: high school diploma/GED  Marital History: single  Children: none  Living Arrangement: The patient was staying in residential facility prior to admission [facility])  Occupational History: on permanent disability    Past Medical History:    History of Seizures: yes  History of Head injury with loss of consciousness: no    Past Medical History:   Diagnosis Date    Anemia     Anxiety     Hypothyroidism     Psychiatric disorder     depression anxiety    Seizures ([facility] [address] )     Vitamin D deficiency      Past Surgical History:   Procedure Laterality Date    GASTRIC BYPASS      IR PICC PLACEMENT SINGLE LUMEN  4/16/2021    IR TEMPORARY DIALYSIS CATHETER PLACEMENT  4/8/2021         Medical Review Of Systems:    Pertinent items are noted in HPI   all other systems are negative    Allergies: Allergies   Allergen Reactions    Anti-Hist [Diphenhydramine]     Buspar [Buspirone]     Haldol [Haloperidol]     Penicillins Throat Swelling    Pennsaid [Diclofenac Sodium]     Zoloft [Sertraline]        Medications: All current active medications have been reviewed  Medication prior to admission:   Prior to Admission Medications   Prescriptions Last Dose Informant Patient Reported? Taking?    AquaLance Lancets 30G MISC Unknown at Unknown time Self Yes No   Sig: USE TO TEST BLOOD GLUCOSE FOUR TIMES A DAY   Continuous Blood Gluc Sensor (FREESTYLE CAITY 14 DAY SENSOR) MISC Unknown at Unknown time Self No No   Si application by Does not apply route every 14 (fourteen) days   LORazepam (ATIVAN) 0 5 mg tablet 2021 at Unknown time Self Yes Yes   Sig: Take by mouth every 8 (eight) hours as needed for anxiety   acetaminophen (Tylenol) 325 mg tablet Unknown at Unknown time Self Yes No   Sig: Take 650 mg by mouth every 6 (six) hours as needed for mild pain, headaches or fever   cyanocobalamin 1,000 mcg/mL Past Month at Unknown time Self Yes Yes   Sig: inject 1 milliliter ( 1000 MCG ) intramuscularly Every Month   docusate sodium (COLACE) 100 mg capsule 2021 at Unknown time Self Yes Yes   Sig: Take 100 mg by mouth 2 (two) times a day   ergocalciferol (VITAMIN D2) 50,000 units 2021 at Unknown time Self No Yes   Sig: Take 1 capsule (50,000 Units total) by mouth 2 (two) times a week   folic acid (FOLVITE) 1 mg tablet 2021 at Unknown time Self Yes Yes   Sig: Take by mouth daily   gabapentin (NEURONTIN) 100 mg capsule Past Week at Unknown time Self No Yes   Sig: Take 1 capsule (100 mg total) by mouth 3 (three) times a day as needed (leg pain)   gabapentin (NEURONTIN) 300 mg capsule 2021 at Unknown time Self No Yes   Sig: Take 1 capsule (300 mg total) by mouth daily at bedtime   Patient taking differently: Take 400 mg by mouth 3 (three) times a day    levETIRAcetam (KEPPRA) 500 mg tablet 2021 at Unknown time Self No Yes   Sig: Take 1 tablet (500 mg total) by mouth every 12 (twelve) hours   levothyroxine 50 mcg tablet 2021 at Unknown time Self No Yes   Sig: Take 1 tablet (50 mcg total) by mouth daily in the early morning   magnesium oxide (MAG-OX) 400 mg 2021 at Unknown time Self No Yes   Sig: Take 1 tablet (400 mg total) by mouth 2 (two) times a day   methocarbamol (ROBAXIN) 500 mg tablet 2021 at Unknown time Self Yes Yes   Sig: Take 500 mg by mouth 3 (three) times a day   mirtazapine (REMERON) 15 mg tablet 2021 at Unknown time Self No Yes Sig: Take 1 tablet (15 mg total) by mouth daily at bedtime   Patient taking differently: Take 7 5 mg by mouth daily at bedtime    ondansetron (ZOFRAN) 4 mg tablet   Yes Yes   Sig: Take 4 mg by mouth every 8 (eight) hours as needed for nausea or vomiting   polyethylene glycol (MIRALAX) 17 g packet   Yes Yes   Sig: Take 17 g by mouth daily   potassium chloride (MICRO-K) 10 MEQ CR capsule  Self No No   Sig: Take 1 capsule (10 mEq total) by mouth 2 (two) times a day   prazosin (MINIPRESS) 2 mg capsule 9/19/2021 at Unknown time Self No Yes   Sig: Take 1 capsule (2 mg total) by mouth daily at bedtime   thiamine (VITAMIN B1) 100 mg tablet 9/20/2021 at Unknown time Self Yes Yes   Sig: Take 100 mg by mouth daily   venlafaxine (EFFEXOR-XR) 75 mg 24 hr capsule 9/19/2021 at Unknown time Self Yes Yes   zolpidem (AMBIEN) 10 mg tablet 9/19/2021 at Unknown time  Yes Yes   Sig: Take 10 mg by mouth daily   zolpidem (AMBIEN) 5 mg tablet  Self No No   Sig: Take 1 tablet (5 mg total) by mouth daily at bedtime as needed (insomnia) for up to 10 days      Facility-Administered Medications: None       Objective     Vital signs in last 24 hours:    HR:  [67] 67  Resp:  [14] 14  BP: (109)/(67) 109/67  No intake or output data in the 24 hours ending 09/21/21 1554    Mental Status Evaluation:    Appearance:  age appropriate, adequate grooming, dressed in hospital attire   Behavior:  cooperative, calm   Speech:  soft   Mood:  depressed   Affect:  blunted, tearful   Language: naming objects and repeating phrases   Thought Process:  organized, linear   Associations: circumstantial associations   Thought Content:  no overt delusions, negative thinking   Perceptual Disturbances: no auditory hallucinations, no visual hallucinations, does not appear responding to internal stimuli   Risk Potential: Suicidal ideation - Yes, with plan to overdose on medications, contracts for safety on the unit, would talk to staff if not feeling safe on the unit, would not feel safe if discharged  Homicidal ideation - None at present  Potential for aggression - No   Sensorium:  oriented to person, place and time/date   Memory:  recent and remote memory grossly intact   Consciousness:  alert and awake    Attention/Concentration: attention span and concentration appear shorter than expected for age   Intellect: within normal limits   Fund of Knowledge: awareness of current events: yes   Insight:  fair   Judgment: fair   Muscle Strength Muscle Tone: normal  normal   Gait/Station: in bed, also uses wheelchair   Motor Activity: no abnormal movements     Laboratory Results:   I have personally reviewed all pertinent laboratory/tests results  Most Recent Labs:   Lab Results   Component Value Date    WBC 7 75 09/20/2021    RBC 4 11 09/20/2021    HGB 9 3 (L) 09/20/2021    HCT 32 3 (L) 09/20/2021     09/20/2021    RDW 14 4 09/20/2021    NEUTROABS 5 65 09/20/2021    SODIUM 141 09/20/2021    K 4 9 09/20/2021     09/20/2021    CO2 30 09/20/2021    BUN 10 09/20/2021    CREATININE 0 72 09/20/2021    GLUC 87 09/20/2021    GLUF 80 12/31/2020    CALCIUM 8 6 09/20/2021    AST 15 09/20/2021    ALT 22 09/20/2021    ALKPHOS 125 (H) 09/20/2021    TP 6 1 (L) 09/20/2021    ALB 3 3 (L) 09/20/2021    TBILI 0 20 09/20/2021    PGW2RFYBLPFD 1 377 05/06/2021    FREET4 0 91 04/06/2021    PREGUR negative 09/20/2021    RPR Non-Reactive 04/06/2021    HGBA1C 5 2 12/31/2020     12/31/2020       Imaging Studies: No results found  Code Status: Prior  Advance Directive and Living Will:       Power of :      Assessment/Plan     Active Problems:    * No active hospital problems  *      Assessment:    In summary this is a 45 y/o female with a history of MDD, PTSD, anorexia, and currently wheelchair bound who presents for psychiatric consultation due to worsening depression and suicidal ideation with plan to overdose on medications   Numerous prior suicide attempts and inpatient psych stays  Compliant with meds but requesting med adjustments  Very hopeless currently and would be considered high risk for harm to self (no access to firearms)  Patient is willing to sign a 201  Will necessitate a 302 if she changes her mind  Bed search is ongoing through Crisis Worker from what I understand  Will obtain some additional labs (Keppra level, TSH and mag) but will defer any med adjustments to admitting team  Could certainly optimize dose of Effexor given low dose and severe depressive symptoms; could also consider augmentation with an atypical antipsychotic such as Abilify given patient's concerns about antipsychotic induced weight gain (Abilify is classically weight-neutral)  She is currently on a 1:1 observation status; I don't suspect she is an elopement risk and appears motivated to pursue inpatient treatment at this time  Will defer decision to remove 1:1 to primary team     Thank you very much for this consult  Psych to continue to follow until patient is transferred to an appropriate facility  Treatment Plan:     Planned Medication Changes: All current active medications have been reviewed    -Patient willing to sign 201  Bed search ongoing  Will require 302 if she changes her mind and attempts to leave AMA  -Defer med changes to admitting team  Would recommend optimization of Effexor dose vs  Augmentation with atypical antipsychotic such as Abilify given additional mood stabilizing properties  -Medical recs per primary team, greatly appreciated  -Keppra level, mag, and TSH ordered to be thorough      Current Facility-Administered Medications   Medication Dose Route Frequency Provider Last Rate    acetaminophen  650 mg Oral Q8H PRN Emile Arrieta, DO      docusate sodium  100 mg Oral BID Emile Arrieta, DO      ergocalciferol  50,000 Units Oral Once per day on Mon Thu Emile Arrieta, DO      folic acid  1 mg Oral Daily Emile Arrieta, DO      gabapentin  400 mg Oral TID Nolvia Gains, DO      levETIRAcetam  500 mg Oral BID Nolvia Gains, DO      levothyroxine  50 mcg Oral Early Morning [place] Gains, DO      LORazepam  0 5 mg Oral TID PRN Nolvia Gains, DO      magnesium oxide  400 mg Oral BID [place] Gains, DO      methocarbamol  500 mg Oral TID [place] Gains, DO      mirtazapine  7 5 mg Oral HS [place] Gains, DO      ondansetron  4 mg Oral Q8H PRN Nolvia Gains, DO      polyethylene glycol  17 g Oral Daily [place] Gains, DO      potassium chloride  10 mEq Oral BID Nolvia Gains, DO      prazosin  2 mg Oral HS Nolvia Gains, DO      Thiamine Mononitrate  100 mg Oral Daily [place] Gains, DO      venlafaxine  75 mg Oral Daily Nolvia Gains, DO      zolpidem  10 mg Oral HS [place] Gains, DO         Risks / Benefits of Treatment:    Risks, benefits, and possible side effects of medications explained to patient and patient verbalizes understanding and agreement for treatment  Counseling / Coordination of Care:    Patient's presentation on admission and proposed treatment plan discussed with treatment team   Diagnosis, medication changes and treatment plan reviewed with patient      Sarah Gramajo PA-C 09/21/21

## 2021-09-21 NOTE — ED NOTES
Crisis was informed that the network does not have any in network beds  Crisis will begin a bed search

## 2021-09-21 NOTE — ED NOTES
Patient resting in bed watching tv  1:1 in place  Complained of mild headache - tylenol given   Order placed to kitchen per patient's request     Michelle Collins RN  09/21/21 8497

## 2021-09-21 NOTE — ED NOTES
This tech resuming as 1:1 observer  Pt resting; no needs or complaints at this time       [place] [facility]  09/21/21 0385

## 2021-09-21 NOTE — ED NOTES
Reached out to Crisis for update  Still awaiting bed search  Patient updated on plan of care  Patient appreciative  She is currently eating dinner and tolerating it well  Still continues to have SI   Will continue to monitor     Katherin Shah RN  09/21/21 2801

## 2021-09-21 NOTE — ED NOTES
Bed Search: Willing to review/clinical faxed:  Rhonda Ferrari)     No Beds:   In [facility]) no beds  [facility] Lip[facility] Michael Serrano) no beds  [facility] Darryl Murillo)- no beds  Rosanne Been (Admissions)- no answer, left [facility]) no beds  [place]- left [facility]) no beds     Denied/unable to accommodate:  Abbe Owusu Client) - unable to accomodate pt's need for a wheelchair/ assistance with ADLs  [place] Mal[facility]h)- unable to accomodate pt's need for a wheelchair/ assistance with ADLs  Tg Garibay Deejay)- unable to accomodate pt's need for a wheelchair/ assistance with ADLs

## 2021-09-22 LAB
GLUCOSE SERPL-MCNC: 90 MG/DL (ref 65–140)
GLUCOSE SERPL-MCNC: 92 MG/DL (ref 65–140)
GLUCOSE SERPL-MCNC: 93 MG/DL (ref 65–140)

## 2021-09-22 PROCEDURE — 82948 REAGENT STRIP/BLOOD GLUCOSE: CPT

## 2021-09-22 RX ORDER — LORAZEPAM 1 MG/1
1 TABLET ORAL ONCE
Status: COMPLETED | OUTPATIENT
Start: 2021-09-22 | End: 2021-09-22

## 2021-09-22 RX ADMIN — PRAZOSIN HYDROCHLORIDE 2 MG: 1 CAPSULE ORAL at 22:27

## 2021-09-22 RX ADMIN — MIRTAZAPINE 7.5 MG: 15 TABLET, FILM COATED ORAL at 22:23

## 2021-09-22 RX ADMIN — DOCUSATE SODIUM 100 MG: 100 CAPSULE, LIQUID FILLED ORAL at 19:31

## 2021-09-22 RX ADMIN — ZOLPIDEM TARTRATE 10 MG: 5 TABLET, COATED ORAL at 22:21

## 2021-09-22 RX ADMIN — LEVETIRACETAM 500 MG: 250 TABLET, FILM COATED ORAL at 08:36

## 2021-09-22 RX ADMIN — POTASSIUM CHLORIDE 10 MEQ: 750 TABLET, EXTENDED RELEASE ORAL at 19:31

## 2021-09-22 RX ADMIN — DOCUSATE SODIUM 100 MG: 100 CAPSULE, LIQUID FILLED ORAL at 08:35

## 2021-09-22 RX ADMIN — POTASSIUM CHLORIDE 10 MEQ: 750 TABLET, EXTENDED RELEASE ORAL at 08:35

## 2021-09-22 RX ADMIN — MAGNESIUM OXIDE TAB 400 MG (241.3 MG ELEMENTAL MG) 400 MG: 400 (241.3 MG) TAB at 08:36

## 2021-09-22 RX ADMIN — LORAZEPAM 0.5 MG: 0.5 TABLET ORAL at 16:45

## 2021-09-22 RX ADMIN — POLYETHYLENE GLYCOL 3350 17 G: 17 POWDER, FOR SOLUTION ORAL at 08:33

## 2021-09-22 RX ADMIN — GABAPENTIN 400 MG: 300 CAPSULE ORAL at 16:35

## 2021-09-22 RX ADMIN — METHOCARBAMOL 500 MG: 500 TABLET ORAL at 22:22

## 2021-09-22 RX ADMIN — METHOCARBAMOL 500 MG: 500 TABLET ORAL at 08:36

## 2021-09-22 RX ADMIN — LORAZEPAM 1 MG: 1 TABLET ORAL at 20:36

## 2021-09-22 RX ADMIN — MAGNESIUM OXIDE TAB 400 MG (241.3 MG ELEMENTAL MG) 400 MG: 400 (241.3 MG) TAB at 19:31

## 2021-09-22 RX ADMIN — LEVOTHYROXINE SODIUM 50 MCG: 25 TABLET ORAL at 08:31

## 2021-09-22 RX ADMIN — METHOCARBAMOL 500 MG: 500 TABLET ORAL at 16:36

## 2021-09-22 RX ADMIN — FOLIC ACID 1 MG: 1 TABLET ORAL at 08:36

## 2021-09-22 RX ADMIN — LEVETIRACETAM 500 MG: 250 TABLET, FILM COATED ORAL at 22:21

## 2021-09-22 RX ADMIN — THIAMINE HCL TAB 100 MG 100 MG: 100 TAB at 08:36

## 2021-09-22 RX ADMIN — VENLAFAXINE HYDROCHLORIDE 75 MG: 75 CAPSULE, EXTENDED RELEASE ORAL at 08:31

## 2021-09-22 RX ADMIN — GABAPENTIN 400 MG: 300 CAPSULE ORAL at 08:35

## 2021-09-22 RX ADMIN — GABAPENTIN 400 MG: 300 CAPSULE ORAL at 22:22

## 2021-09-22 RX ADMIN — LORAZEPAM 0.5 MG: 0.5 TABLET ORAL at 08:39

## 2021-09-22 RX ADMIN — ACETAMINOPHEN 650 MG: 325 TABLET, FILM COATED ORAL at 16:45

## 2021-09-22 NOTE — ED NOTES
Patient moved to [facility] due to patient acuity in department  1:1 observation continues with EDT, patient given her personal tablet  Patient cooperative and pleasant        Leslie James RN  09/22/21 [id number]

## 2021-09-22 NOTE — ED NOTES
Ordered patient's breakfast  Offered patient to use the bedpan, patient state not at this time       Maggie Doherty  09/22/21 5596

## 2021-09-22 NOTE — ED NOTES
Bed Search:     In Network ([facility])- no beds  [address] Rafael lOea)- no beds  Britany (Ghanshyam)-no beds  [facility] [place] Tejada)- no beds  [facility] Rhyssudhakar Escobedo)- no beds  [facility] Precdaja Held)- no beds  [facility] (Crisis)- no beds    Denied/unable to accommodate:  ARABELLAS Heaven Cabrera)- unable to accomodate pt's need for assistance with ADLs  [place] Severa Andrea)- unable to accommodate due medical  [place] Pat Osorio)- unable to accomodate pt's need for assistance with ADLs  [facility]) - unable to accomodate pt's need for assistance with ADLs  Marvin Iyer) - unable to accomodate pt's need for a wheelchair/ assistance with ADLs  Perry Perez)- unable to accomodate pt's need for a wheelchair/ assistance with ADLs  Daryl Joaquín Antunez)- unable to accomodate pt's need for a wheelchair/ assistance with ADLs  [facility]) no appropriate bed  [facility])- unable to accomodate pt's need for assistance with ADLs  [facility])- unable to accomodate pt's need for assistance with ADLs

## 2021-09-22 NOTE — ED NOTES
Patient is currently sitting up in bed and eating breakfast  Assisted patient with cutting her food       Maggie Doherty  09/22/21 0828       Sayra Doherty  09/22/21 0830

## 2021-09-23 LAB
GLUCOSE SERPL-MCNC: 81 MG/DL (ref 65–140)
GLUCOSE SERPL-MCNC: 95 MG/DL (ref 65–140)
GLUCOSE SERPL-MCNC: 95 MG/DL (ref 65–140)

## 2021-09-23 PROCEDURE — 82948 REAGENT STRIP/BLOOD GLUCOSE: CPT

## 2021-09-23 PROCEDURE — 99284 EMERGENCY DEPT VISIT MOD MDM: CPT | Performed by: PHYSICIAN ASSISTANT

## 2021-09-23 RX ADMIN — LEVOTHYROXINE SODIUM 50 MCG: 25 TABLET ORAL at 07:31

## 2021-09-23 RX ADMIN — METHOCARBAMOL 500 MG: 500 TABLET ORAL at 08:40

## 2021-09-23 RX ADMIN — METHOCARBAMOL 500 MG: 500 TABLET ORAL at 15:49

## 2021-09-23 RX ADMIN — GABAPENTIN 400 MG: 300 CAPSULE ORAL at 08:38

## 2021-09-23 RX ADMIN — POTASSIUM CHLORIDE 10 MEQ: 750 TABLET, EXTENDED RELEASE ORAL at 08:39

## 2021-09-23 RX ADMIN — MAGNESIUM OXIDE TAB 400 MG (241.3 MG ELEMENTAL MG) 400 MG: 400 (241.3 MG) TAB at 19:00

## 2021-09-23 RX ADMIN — POTASSIUM CHLORIDE 10 MEQ: 750 TABLET, EXTENDED RELEASE ORAL at 19:00

## 2021-09-23 RX ADMIN — ZOLPIDEM TARTRATE 10 MG: 5 TABLET, COATED ORAL at 21:18

## 2021-09-23 RX ADMIN — THIAMINE HCL TAB 100 MG 100 MG: 100 TAB at 08:41

## 2021-09-23 RX ADMIN — DOCUSATE SODIUM 100 MG: 100 CAPSULE, LIQUID FILLED ORAL at 19:00

## 2021-09-23 RX ADMIN — DOCUSATE SODIUM 100 MG: 100 CAPSULE, LIQUID FILLED ORAL at 08:39

## 2021-09-23 RX ADMIN — LEVETIRACETAM 500 MG: 250 TABLET, FILM COATED ORAL at 08:40

## 2021-09-23 RX ADMIN — LORAZEPAM 0.5 MG: 0.5 TABLET ORAL at 15:49

## 2021-09-23 RX ADMIN — MAGNESIUM OXIDE TAB 400 MG (241.3 MG ELEMENTAL MG) 400 MG: 400 (241.3 MG) TAB at 08:40

## 2021-09-23 RX ADMIN — ACETAMINOPHEN 650 MG: 325 TABLET, FILM COATED ORAL at 08:40

## 2021-09-23 RX ADMIN — ACETAMINOPHEN 650 MG: 325 TABLET, FILM COATED ORAL at 15:48

## 2021-09-23 RX ADMIN — LEVETIRACETAM 500 MG: 250 TABLET, FILM COATED ORAL at 21:18

## 2021-09-23 RX ADMIN — MIRTAZAPINE 7.5 MG: 15 TABLET, FILM COATED ORAL at 21:18

## 2021-09-23 RX ADMIN — GABAPENTIN 400 MG: 300 CAPSULE ORAL at 21:17

## 2021-09-23 RX ADMIN — LORAZEPAM 0.5 MG: 0.5 TABLET ORAL at 19:43

## 2021-09-23 RX ADMIN — PRAZOSIN HYDROCHLORIDE 2 MG: 1 CAPSULE ORAL at 21:17

## 2021-09-23 RX ADMIN — ERGOCALCIFEROL 50000 UNITS: 1.25 CAPSULE ORAL at 08:39

## 2021-09-23 RX ADMIN — LORAZEPAM 0.5 MG: 0.5 TABLET ORAL at 08:38

## 2021-09-23 RX ADMIN — VENLAFAXINE HYDROCHLORIDE 75 MG: 75 CAPSULE, EXTENDED RELEASE ORAL at 08:39

## 2021-09-23 RX ADMIN — POLYETHYLENE GLYCOL 3350 17 G: 17 POWDER, FOR SOLUTION ORAL at 08:38

## 2021-09-23 RX ADMIN — METHOCARBAMOL 500 MG: 500 TABLET ORAL at 21:17

## 2021-09-23 RX ADMIN — FOLIC ACID 1 MG: 1 TABLET ORAL at 08:38

## 2021-09-23 RX ADMIN — GABAPENTIN 400 MG: 300 CAPSULE ORAL at 15:48

## 2021-09-23 NOTE — ED CARE HANDOFF
Emergency Department Sign Out Note        Sign out and transfer of care from [facility]*  See Separate Emergency Department note  The patient, Dora Latham, was evaluated by the previous provider for suicide plan to overdose on pills  Workup Completed:  Medically cleared    ED Course / Workup Pending (followup):  201 signed, bed search                                  ED Course as of Sep 24 0037   Tue Sep 21, 2021   0039 Sign out to [employer] - suicidal, plan to overdose on meds - medically cleared for inpatient psych - 201 signed, bed search in place  Thu Sep 23, 2021   0026 Patient required additional Ativan 1 mg po tonight  Otherwise cooperative  Will need psych to see patient again tomorrow as she is here for a few days  2210 Sign out to [address] suicidal with plan, 201 signed, bed search      2346 Sign out to [employer] suicide with plan to overdose on pills  Signed 201, bed search          Procedures  MDM  Number of Diagnoses or Management Options  Anemia: minor  Depression: established and worsening  Suicidal ideation: new and requires workup     Amount and/or Complexity of Data Reviewed  Clinical lab tests: reviewed  Obtain history from someone other than the patient: yes  Discuss the patient with other providers: yes    Patient Progress  Patient progress: improved      Disposition  Final diagnoses:   Depression   Suicidal ideation   Anemia     Time reflects when diagnosis was documented in both MDM as applicable and the Disposition within this note     Time User Action Codes Description Comment    9/20/2021 12:56 PM Yaya Proctor [F32 9] Depression     9/20/2021 12:56 PM Jose Mike Suicidal ideation     9/20/2021  2:30 PM Gabriel Plascencia, [facility] [D64 9] Anemia       ED Disposition     ED Disposition Condition Date/Time Comment    Transfer to [facility] Sep 20, 2021  2:30 PM Dora Latham should be transferred out to ** [facility] for bed search* and has been medically cleared  Follow-up Information    None       Patient's Medications   Discharge Prescriptions    No medications on file     No discharge procedures on file         ED Provider  Electronically Signed by     Phillip Dutta DO  09/24/21 5152

## 2021-09-23 NOTE — PROGRESS NOTES
Progress Note - Behavioral Health     Tiffany Anthony 46 y o  female MRN: [id number]   Unit/Bed#: ED 09 Encounter: [id number]    Behavior over the last 24 hours: deborah Cummins is a 47 y/o female with a history of depression who presents for psychiatric follow up  Please refer to Tuesday's initial psych consult  Patient continues to endorse SI w/ plan to OD on medications  Remains with blunted affect and significant anhedonia and hopelessness  Sleeping and eating ok  Denies acute concerns  Still awaiting bed placement, has limited use of lower extremities and is currently wheelchair bound in the context of complex polyneuropathy  No HI/AH/VH  Contracts for safety  Remains on 1:1      Sleep: normal  Appetite: improving  Medication side effects: No   ROS: all other systems are negative    Mental Status Evaluation:    Appearance:  age appropriate, adequate grooming, dressed in hospital attire   Behavior:  cooperative, calm   Speech:  normal rate and volume   Mood:  depressed   Affect:  blunted   Thought Process:  organized, linear   Associations: intact associations   Thought Content:  no overt delusions, negative thinking, ruminating thoughts   Perceptual Disturbances: no auditory hallucinations, no visual hallucinations, does not appear responding to internal stimuli   Risk Potential: Suicidal ideation - Yes, with plan to overdose on medications, contracts for safety on the unit, would not feel safe if discharged  Homicidal ideation - None at present  Potential for aggression - No   Sensorium:  oriented to person, place and time/date   Memory:  recent and remote memory grossly intact   Consciousness:  alert and awake   Attention/Concentration: attention span and concentration are age appropriate   Insight:  fair   Judgment: fair   Gait/Station: in bed, uses wheelchair   Motor Activity: no abnormal movements     Vital signs in last 24 hours:    Temp:  [98 °F (36 7 °C)] 98 °F (36 7 °C)  HR:  [78] 78  Resp:  [18] 18  BP: (107)/(58) 107/58    Laboratory results: I have personally reviewed all pertinent laboratory/tests results    Most Recent Labs:   Lab Results   Component Value Date    WBC 7 75 09/20/2021    RBC 4 11 09/20/2021    HGB 9 3 (L) 09/20/2021    HCT 32 3 (L) 09/20/2021     09/20/2021    RDW 14 4 09/20/2021    NEUTROABS 5 65 09/20/2021    SODIUM 141 09/20/2021    K 4 9 09/20/2021     09/20/2021    CO2 30 09/20/2021    BUN 10 09/20/2021    CREATININE 0 72 09/20/2021    GLUC 87 09/20/2021    CALCIUM 8 6 09/20/2021    AST 15 09/20/2021    ALT 22 09/20/2021    ALKPHOS 125 (H) 09/20/2021    TP 6 1 (L) 09/20/2021    ALB 3 3 (L) 09/20/2021    TBILI 0 20 09/20/2021    [id number] 1 694 09/21/2021    FREET4 0 91 04/06/2021    PREGUR negative 09/20/2021    RPR Non-Reactive 04/06/2021    HGBA1C 5 2 12/31/2020     12/31/2020       Progress Toward Goals: still very depressed, remains suicidal, placement pending    Assessment/Plan   Active Problems:    * No active hospital problems  *      Recommended Treatment:     Planned medication and treatment changes: All current active medications have been reviewed  Continue close observation for safety    Labs reviewed, Keppra level pending, anemia appears stable  Patient remains suicidal with plan, continues to merit inpatient psych placement for medication adjustment and stabilization  On 201  Will defer med adjustments to admitting team but may benefit from optimization of Effexor dose vs  Augmentation with an atypical antipsychotic such as Abilify (weight-neutral)        Current Facility-Administered Medications   Medication Dose Route Frequency Provider Last Rate    acetaminophen  650 mg Oral Q8H PRN Emile Arrieta, DO      docusate sodium  100 mg Oral BID Emile Arrieta,       ergocalciferol  50,000 Units Oral Once per day on Mon Thu Emile Arrieta, DO      folic acid  1 mg Oral Daily Emile Arrieta DO      gabapentin  400 mg Oral TID Fentress Gemma Ricardo Cárdenas,       levETIRAcetam  500 mg Oral BID Mendel Angelica, DO      levothyroxine  50 mcg Oral Early Morning Mendel Angelica, DO      LORazepam  0 5 mg Oral TID PRN Mendel Angelica, DO      magnesium oxide  400 mg Oral BID Mendel Angelica, DO      methocarbamol  500 mg Oral TID Mendel Angelica, DO      mirtazapine  7 5 mg Oral HS Mendel Angelica, DO      ondansetron  4 mg Oral Q8H PRN Mendel Angelica, DO      polyethylene glycol  17 g Oral Daily Mendel Angelica, DO      potassium chloride  10 mEq Oral BID Mendel Angelica, DO      prazosin  2 mg Oral HS Mendel Angelica, DO      Thiamine Mononitrate  100 mg Oral Daily Mendel Angelica, DO      venlafaxine  75 mg Oral Daily Mendel Angelica, DO      zolpidem  10 mg Oral HS Mendel Angelica, DO       Risks / Benefits of Treatment:    Risks, benefits, and possible side effects of medications explained to patient and patient verbalizes understanding and agreement for treatment  Counseling / Coordination of Care:    Patient's progress discussed with staff in treatment team meeting  Medications, treatment progress and treatment plan reviewed with patient      Marizol Cr PA-C 09/23/21

## 2021-09-23 NOTE — ED CARE HANDOFF
Emergency Department Sign Out Note        Sign out and transfer of care from [facility]**  See Separate Emergency Department note  The patient, Tessie April, was evaluated by the previous provider for suicidal plan  Workup Completed:  Medically cleared    ED Course / Workup Pending (followup): Signed 201, bed search, Keppra level pending, psych saw again today and deterring any change in meds until admitted                                  ED Course as of Sep 24 0038   Tue Sep 21, 2021   0039 Sign out to [employer] - suicidal, plan to overdose on meds - medically cleared for inpatient psych - 201 signed, bed search in place  Thu Sep 23, 2021   0026 Patient required additional Ativan 1 mg po tonight  Otherwise cooperative  Will need psych to see patient again tomorrow as she is here for a few days  9931 Sign out to [facility] suicidal with plan, 201 signed, bed search      2346 Sign out to [employer] suicide with plan to overdose on pills  Signed 201, bed search          Procedures  MDM  Number of Diagnoses or Management Options  Anemia: minor  Depression: established and worsening  Suicidal ideation: new and requires workup     Amount and/or Complexity of Data Reviewed  Clinical lab tests: reviewed  Obtain history from someone other than the patient: yes  Discuss the patient with other providers: yes    Patient Progress  Patient progress: improved      Disposition  Final diagnoses:   Depression   Suicidal ideation   Anemia     Time reflects when diagnosis was documented in both MDM as applicable and the Disposition within this note     Time User Action Codes Description Comment    9/20/2021 12:56 PM Aye Woodward [F32 9] Depression     9/20/2021 12:56 PM Jose Mike  74 Suicidal ideation     9/20/2021  2:30 PM Michelle Gallagher, [facility] [D64 9] Anemia       ED Disposition     ED Disposition Condition Date/Time Comment    Transfer to [facility] Sep 20, 2021  2:30 PM Rachel Baker [facility] should be transferred out to ** crisis for bed search* and has been medically cleared  Follow-up Information    None       Patient's Medications   Discharge Prescriptions    No medications on file     No discharge procedures on file         ED Provider  Electronically Signed by     Jerrell Silva DO  09/24/21 0038

## 2021-09-23 NOTE — ED NOTES
Bed Search:     [facility]) no beds  [facility]) no beds  [address] [place]) no beds  [address] [place]) no beds; follow in morning  [facility]- left message  [facility] ) no beds  [facility]) no beds  [facility]) no beds  [facility] no beds

## 2021-09-23 NOTE — ED NOTES
Crisis spoke with mack Lou who stated bed availability is being assess within the network       Crisis spoke with Molly Garcia [facility])- no beds, suggested calling at or after 12:00pm

## 2021-09-24 LAB
GLUCOSE SERPL-MCNC: 79 MG/DL (ref 65–140)
GLUCOSE SERPL-MCNC: 85 MG/DL (ref 65–140)
LEVETIRACETAM SERPL-MCNC: 13.2 UG/ML (ref 10–40)

## 2021-09-24 PROCEDURE — 99284 EMERGENCY DEPT VISIT MOD MDM: CPT | Performed by: PHYSICIAN ASSISTANT

## 2021-09-24 PROCEDURE — 82948 REAGENT STRIP/BLOOD GLUCOSE: CPT

## 2021-09-24 RX ORDER — OLANZAPINE 2.5 MG/1
2.5 TABLET ORAL
Status: CANCELLED | OUTPATIENT
Start: 2021-09-24

## 2021-09-24 RX ORDER — MAGNESIUM HYDROXIDE/ALUMINUM HYDROXICE/SIMETHICONE 120; 1200; 1200 MG/30ML; MG/30ML; MG/30ML
30 SUSPENSION ORAL EVERY 4 HOURS PRN
Status: CANCELLED | OUTPATIENT
Start: 2021-09-24

## 2021-09-24 RX ORDER — ZOLPIDEM TARTRATE 5 MG/1
10 TABLET ORAL
Status: CANCELLED | OUTPATIENT
Start: 2021-09-24

## 2021-09-24 RX ORDER — OLANZAPINE 2.5 MG/1
10 TABLET ORAL
Status: CANCELLED | OUTPATIENT
Start: 2021-09-24

## 2021-09-24 RX ORDER — LORAZEPAM 2 MG/ML
2 INJECTION INTRAMUSCULAR
Status: CANCELLED | OUTPATIENT
Start: 2021-09-24

## 2021-09-24 RX ORDER — HYDROXYZINE HYDROCHLORIDE 25 MG/1
25 TABLET, FILM COATED ORAL
Status: CANCELLED | OUTPATIENT
Start: 2021-09-24

## 2021-09-24 RX ORDER — LORAZEPAM 1 MG/1
1 TABLET ORAL
Status: CANCELLED | OUTPATIENT
Start: 2021-09-24

## 2021-09-24 RX ORDER — HYDROXYZINE HYDROCHLORIDE 25 MG/1
50 TABLET, FILM COATED ORAL
Status: CANCELLED | OUTPATIENT
Start: 2021-09-24

## 2021-09-24 RX ORDER — DOCUSATE SODIUM 100 MG/1
100 CAPSULE, LIQUID FILLED ORAL 2 TIMES DAILY
Status: CANCELLED | OUTPATIENT
Start: 2021-09-24

## 2021-09-24 RX ORDER — BENZTROPINE MESYLATE 0.5 MG/1
0.5 TABLET ORAL EVERY 6 HOURS PRN
Status: CANCELLED | OUTPATIENT
Start: 2021-09-24

## 2021-09-24 RX ORDER — LEVETIRACETAM 250 MG/1
500 TABLET ORAL 2 TIMES DAILY
Status: CANCELLED | OUTPATIENT
Start: 2021-09-24

## 2021-09-24 RX ORDER — LANOLIN ALCOHOL/MO/W.PET/CERES
100 CREAM (GRAM) TOPICAL DAILY
Status: CANCELLED | OUTPATIENT
Start: 2021-09-25

## 2021-09-24 RX ORDER — FOLIC ACID 1 MG/1
1 TABLET ORAL DAILY
Status: CANCELLED | OUTPATIENT
Start: 2021-09-25

## 2021-09-24 RX ORDER — MIRTAZAPINE 15 MG/1
7.5 TABLET, FILM COATED ORAL
Status: CANCELLED | OUTPATIENT
Start: 2021-09-24

## 2021-09-24 RX ORDER — POLYETHYLENE GLYCOL 3350 17 G/17G
17 POWDER, FOR SOLUTION ORAL DAILY
Status: CANCELLED | OUTPATIENT
Start: 2021-09-25

## 2021-09-24 RX ORDER — ACETAMINOPHEN 325 MG/1
975 TABLET ORAL EVERY 6 HOURS PRN
Status: CANCELLED | OUTPATIENT
Start: 2021-09-24

## 2021-09-24 RX ORDER — ACETAMINOPHEN 325 MG/1
650 TABLET ORAL EVERY 4 HOURS PRN
Status: CANCELLED | OUTPATIENT
Start: 2021-09-24

## 2021-09-24 RX ORDER — OLANZAPINE 2.5 MG/1
5 TABLET ORAL
Status: CANCELLED | OUTPATIENT
Start: 2021-09-24

## 2021-09-24 RX ORDER — LORAZEPAM 2 MG/ML
1 INJECTION INTRAMUSCULAR EVERY 4 HOURS PRN
Status: CANCELLED | OUTPATIENT
Start: 2021-09-24

## 2021-09-24 RX ORDER — METHOCARBAMOL 500 MG/1
500 TABLET, FILM COATED ORAL 3 TIMES DAILY
Status: CANCELLED | OUTPATIENT
Start: 2021-09-24

## 2021-09-24 RX ORDER — OLANZAPINE 10 MG/1
10 INJECTION, POWDER, LYOPHILIZED, FOR SOLUTION INTRAMUSCULAR
Status: CANCELLED | OUTPATIENT
Start: 2021-09-24

## 2021-09-24 RX ORDER — LEVOTHYROXINE SODIUM 0.03 MG/1
50 TABLET ORAL
Status: CANCELLED | OUTPATIENT
Start: 2021-09-25

## 2021-09-24 RX ORDER — OLANZAPINE 10 MG/1
5 INJECTION, POWDER, LYOPHILIZED, FOR SOLUTION INTRAMUSCULAR
Status: CANCELLED | OUTPATIENT
Start: 2021-09-24

## 2021-09-24 RX ORDER — POTASSIUM CHLORIDE 750 MG/1
10 TABLET, EXTENDED RELEASE ORAL 2 TIMES DAILY
Status: CANCELLED | OUTPATIENT
Start: 2021-09-24

## 2021-09-24 RX ORDER — VENLAFAXINE HYDROCHLORIDE 75 MG/1
75 CAPSULE, EXTENDED RELEASE ORAL DAILY
Status: CANCELLED | OUTPATIENT
Start: 2021-09-25

## 2021-09-24 RX ORDER — TRAZODONE HYDROCHLORIDE 50 MG/1
50 TABLET ORAL
Status: CANCELLED | OUTPATIENT
Start: 2021-09-24

## 2021-09-24 RX ORDER — ERGOCALCIFEROL 1.25 MG/1
50000 CAPSULE ORAL 2 TIMES WEEKLY
Status: CANCELLED | OUTPATIENT
Start: 2021-09-27

## 2021-09-24 RX ORDER — ACETAMINOPHEN 325 MG/1
650 TABLET ORAL EVERY 6 HOURS PRN
Status: CANCELLED | OUTPATIENT
Start: 2021-09-24

## 2021-09-24 RX ORDER — PRAZOSIN HYDROCHLORIDE 1 MG/1
2 CAPSULE ORAL
Status: CANCELLED | OUTPATIENT
Start: 2021-09-24

## 2021-09-24 RX ADMIN — GABAPENTIN 400 MG: 300 CAPSULE ORAL at 09:00

## 2021-09-24 RX ADMIN — VENLAFAXINE HYDROCHLORIDE 75 MG: 75 CAPSULE, EXTENDED RELEASE ORAL at 09:00

## 2021-09-24 RX ADMIN — GABAPENTIN 400 MG: 300 CAPSULE ORAL at 15:45

## 2021-09-24 RX ADMIN — ACETAMINOPHEN 650 MG: 325 TABLET, FILM COATED ORAL at 20:42

## 2021-09-24 RX ADMIN — POTASSIUM CHLORIDE 10 MEQ: 750 TABLET, EXTENDED RELEASE ORAL at 09:00

## 2021-09-24 RX ADMIN — LORAZEPAM 0.5 MG: 0.5 TABLET ORAL at 15:45

## 2021-09-24 RX ADMIN — MAGNESIUM OXIDE TAB 400 MG (241.3 MG ELEMENTAL MG) 400 MG: 400 (241.3 MG) TAB at 09:00

## 2021-09-24 RX ADMIN — MIRTAZAPINE 7.5 MG: 15 TABLET, FILM COATED ORAL at 22:01

## 2021-09-24 RX ADMIN — LEVETIRACETAM 500 MG: 250 TABLET, FILM COATED ORAL at 20:43

## 2021-09-24 RX ADMIN — DOCUSATE SODIUM 100 MG: 100 CAPSULE, LIQUID FILLED ORAL at 09:00

## 2021-09-24 RX ADMIN — MAGNESIUM OXIDE TAB 400 MG (241.3 MG ELEMENTAL MG) 400 MG: 400 (241.3 MG) TAB at 20:46

## 2021-09-24 RX ADMIN — ONDANSETRON 4 MG: 4 TABLET, ORALLY DISINTEGRATING ORAL at 09:00

## 2021-09-24 RX ADMIN — METHOCARBAMOL 500 MG: 500 TABLET ORAL at 20:40

## 2021-09-24 RX ADMIN — METHOCARBAMOL 500 MG: 500 TABLET ORAL at 15:45

## 2021-09-24 RX ADMIN — LORAZEPAM 0.5 MG: 0.5 TABLET ORAL at 09:00

## 2021-09-24 RX ADMIN — METHOCARBAMOL 500 MG: 500 TABLET ORAL at 09:00

## 2021-09-24 RX ADMIN — FOLIC ACID 1 MG: 1 TABLET ORAL at 09:00

## 2021-09-24 RX ADMIN — LEVETIRACETAM 500 MG: 250 TABLET, FILM COATED ORAL at 09:00

## 2021-09-24 RX ADMIN — PRAZOSIN HYDROCHLORIDE 2 MG: 1 CAPSULE ORAL at 22:02

## 2021-09-24 RX ADMIN — POLYETHYLENE GLYCOL 3350 17 G: 17 POWDER, FOR SOLUTION ORAL at 09:00

## 2021-09-24 RX ADMIN — THIAMINE HCL TAB 100 MG 100 MG: 100 TAB at 09:00

## 2021-09-24 RX ADMIN — POTASSIUM CHLORIDE 10 MEQ: 750 TABLET, EXTENDED RELEASE ORAL at 22:01

## 2021-09-24 RX ADMIN — DOCUSATE SODIUM 100 MG: 100 CAPSULE, LIQUID FILLED ORAL at 20:46

## 2021-09-24 RX ADMIN — LEVOTHYROXINE SODIUM 50 MCG: 25 TABLET ORAL at 06:06

## 2021-09-24 RX ADMIN — GABAPENTIN 400 MG: 300 CAPSULE ORAL at 20:43

## 2021-09-24 RX ADMIN — ZOLPIDEM TARTRATE 10 MG: 5 TABLET, COATED ORAL at 20:42

## 2021-09-24 RX ADMIN — ACETAMINOPHEN 650 MG: 325 TABLET, FILM COATED ORAL at 09:43

## 2021-09-24 NOTE — ED NOTES
Crisis spoke with Janes Forbes [facility]) requested to call after noon to inquire about discharge beds

## 2021-09-24 NOTE — ED NOTES
Crisis Worker called [facility] and spoke with Jennifer Cobb who stated they are not accepting any referral in patient admission due to a positive Covid in their unit

## 2021-09-24 NOTE — NURSING NOTE
Pt has fleeting SI contracts for safety able to redirect thoughts with Tablet    Voided qs, able to use leg to lift on to pan, appears to have good lower body strength  In legs, pleasant and cooperative at this time

## 2021-09-24 NOTE — ED NOTES
Patient used the bedpan, without incident   Ordered breakfast      Toy Tressa Doherty  09/24/21 2300

## 2021-09-24 NOTE — ED CARE HANDOFF
Emergency Department Sign Out Note        Sign out and transfer of care from Dr Lorin Harris  See Separate Emergency Department note  The patient, Ammon Bruner, was evaluated by the previous provider for suicidal ideation with plan  ED Course as of Sep 24 2337   Fri Sep 24, 2021   1535 Patient care signed out from Dr Lorin Harris  Patient is a 45 yo F who p/w SI with plan to OD on pills  201 signed  Bed search in progress  2336 Patient care signed out to Dr David Aguilera        Procedures  MDM    Disposition  Final diagnoses:   Depression   Suicidal ideation   Anemia     Time reflects when diagnosis was documented in both MDM as applicable and the Disposition within this note     Time User Action Codes Description Comment    9/20/2021 12:56 PM Othelia Crease Add [F32 9] Depression     9/20/2021 12:56 PM Othelia Crease Add [O00 747] Suicidal ideation     9/20/2021  2:30 PM Doug Comas [D64 9] Anemia     9/24/2021  1:50 PM Medei, [address] [W06 971] Partial idiopathic epilepsy with seizures of localized onset, not intractable, without status epilepticus ([facility] [address] )     9/24/2021  1:50 PM Wava Churn Add [E83 42] Hypomagnesemia     9/24/2021  1:50 PM Wava Churn Add [Z98 84] History of gastric bypass     9/24/2021  1:50 PM Wava Churn Add [Z86 018] Pituitary enlargement     9/24/2021  1:50 PM Wava Churn Add [G62 9] Peripheral polyneuropathy     9/24/2021  1:50 PM Medei, Glendia Bolden Add [E43] Severe protein-calorie malnutrition ([facility] [address] )     9/24/2021  1:50 PM Wava Churn Add [E03 9] Hypothyroidism       ED Disposition     ED Disposition Condition Date/Time Comment    Transfer to [facility] Sep 20, 2021  2:30 PM Ammon Bruner should be transferred out to ** crisis for bed search* and has been medically cleared           Follow-up Information    None       Patient's Medications   Discharge Prescriptions    No medications on file     No discharge procedures on file        ED Provider  Electronically Signed by     Peter Greene,   09/24/21 3460

## 2021-09-24 NOTE — ED NOTES
Crisis attempted to schedule transport:    [facility]) no availablity  [facility]) no availability  [facility]) no availability  [facility]) no availability  [facility]- left voicemail, requesting call back  [employer]- left voicemail, requesting call back  [address] [facility] Lindsay Chowdhury) no availability  [facility]- no answer  [facility]) no availability  [facility]) no availability   [facility] [place] [facility] no availability

## 2021-09-24 NOTE — ED NOTES
Patient is currently watching her iPad  Pleasant and cooperative at this time       Jeremías Doherty  09/24/21 5502

## 2021-09-24 NOTE — ED NOTES
Crisis Worker called [facility]  Spoke with Javier Mchugh who stated their social work department is responsible for referral intakes from other hospitala and to contact main number tomorrow at [phone]  Javier Mchugh also confirmed there are psyche open beds

## 2021-09-24 NOTE — ED NOTES
Patient's breakfast arrived, was cold to patient, offered to warm it up for patient, patient denied  Patient did not eat her breakfast, drank her coffee        Jordyn Doherty  09/24/21 5336

## 2021-09-24 NOTE — PROGRESS NOTES
Progress Note - Behavioral Health     Jayjay Brochure 46 y o  female MRN: [id number]   Unit/Bed#: ED 09 Encounter: [id number]    Behavior over the last 24 hours: unchanged  Yarely Fisher is a 47 y/o female seen again today for follow up due to length of stay in the ED  Please refer to earlier notes from this week  Continues to endorse depression, SI with plan to either cut self, starve self or OD on meds  Has been accepted to [facility]  Denies any acute concerns      Sleep: normal  Appetite: improving  Medication side effects: No   ROS: all other systems are negative    Mental Status Evaluation:    Appearance:  age appropriate, adequate grooming, dressed in hospital attire   Behavior:  cooperative, calm   Speech:  normal rate and volume   Mood:  depressed, anxious   Affect:  blunted, at times tearful   Thought Process:  organized, linear   Associations: circumstantial associations   Thought Content:  no overt delusions, negative thinking, intrusive thoughts, distorted body image   Perceptual Disturbances: no auditory hallucinations, no visual hallucinations, does not appear responding to internal stimuli   Risk Potential: Suicidal ideation - yes with multiple plans outlined above; contracts for safety; would not feel safe if discharged and would try to harm self if d/c'd  Homicidal ideation - None at present  Potential for aggression - No   Sensorium:  oriented to person, place and time/date   Memory:  recent and remote memory grossly intact   Consciousness:  alert and awake   Attention/Concentration: attention span and concentration appear shorter than expected for age   Insight:  fair   Judgment: fair   Gait/Station: in bed, also uses wheelchair   Motor Activity: no abnormal movements     Vital signs in last 24 hours:    Temp:  [97 8 °F (36 6 °C)-98 1 °F (36 7 °C)] 98 1 °F (36 7 °C)  HR:  [65-88] 66  Resp:  [16] 16  BP: ()/(53-61) 104/61    Laboratory results: I have personally reviewed all pertinent laboratory/tests results    Most Recent Labs:   Lab Results   Component Value Date    WBC 7 75 09/20/2021    RBC 4 11 09/20/2021    HGB 9 3 (L) 09/20/2021    HCT 32 3 (L) 09/20/2021     09/20/2021    RDW 14 4 09/20/2021    NEUTROABS 5 65 09/20/2021    SODIUM 141 09/20/2021    K 4 9 09/20/2021     09/20/2021    CO2 30 09/20/2021    BUN 10 09/20/2021    CREATININE 0 72 09/20/2021    GLUC 87 09/20/2021    CALCIUM 8 6 09/20/2021    AST 15 09/20/2021    ALT 22 09/20/2021    ALKPHOS 125 (H) 09/20/2021    TP 6 1 (L) 09/20/2021    ALB 3 3 (L) 09/20/2021    TBILI 0 20 09/20/2021    [id number] 1 694 09/21/2021    FREET4 0 91 04/06/2021    PREGUR negative 09/20/2021    RPR Non-Reactive 04/06/2021    HGBA1C 5 2 12/31/2020     12/31/2020       Progress Toward Goals: progressing, still very depressed, remains suicidal, placement pending    Assessment/Plan   Active Problems:    * No active hospital problems  *      Recommended Treatment:     Planned medication and treatment changes:     All current active medications have been reviewed  Continue close observation for safety  Continue current medications:    Current Facility-Administered Medications   Medication Dose Route Frequency Provider Last Rate    acetaminophen  650 mg Oral Q8H PRN Jeralene Decree, DO      docusate sodium  100 mg Oral BID Jeralene Decree, DO      ergocalciferol  50,000 Units Oral Once per day on Mon Thu Jeralene Decree, DO      folic acid  1 mg Oral Daily Jeralene Decree, DO      gabapentin  400 mg Oral TID Jeralene Decree, DO      levETIRAcetam  500 mg Oral BID Jeralene Decree, DO      levothyroxine  50 mcg Oral Early Morning Jeralene Decree, DO      LORazepam  0 5 mg Oral TID PRN Jeralene Decree, DO      magnesium oxide  400 mg Oral BID Jeralene Decree, DO      methocarbamol  500 mg Oral TID Jeralene Decree, DO      mirtazapine  7 5 mg Oral HS Jeralene Decree, DO      ondansetron  4 mg Oral Q8H PRN Jeralene Decree, DO     [facility] polyethylene glycol  17 g Oral Daily Dorothy Aguirre, DO      potassium chloride  10 mEq Oral BID Dorothy Aguirre, DO      prazosin  2 mg Oral HS Dorothy Aguirre, DO      Thiamine Mononitrate  100 mg Oral Daily Dorothy Aguirre, DO      venlafaxine  75 mg Oral Daily Dorothy Aguirre, DO      zolpidem  10 mg Oral HS Dorothy Aguirre, DO       Risks / Benefits of Treatment:    Risks, benefits, and possible side effects of medications explained to patient and patient verbalizes understanding and agreement for treatment  Counseling / Coordination of Care:    Patient's progress discussed with staff in treatment team meeting  Medications, treatment progress and treatment plan reviewed with patient      Oseas Akers PA-C 09/24/21

## 2021-09-24 NOTE — ED NOTES
[facility]) no beds and extensive wait list with no projected availability      Rosa Maria Chu 36 Gh[address]an Glance on 2 Central Unit) left voicemail requesting a return call

## 2021-09-24 NOTE — ED NOTES
Patient made large formed, soft,  BM  Patient stated, this is the first time she has made a BM since she has arrived        Claudell Hoffmann Alim-Frazier  09/24/21 9462

## 2021-09-24 NOTE — ED NOTES
Nirali spoke with Maya Rangel and scheduled transport to [facility] for 9:15pm on 9/25

## 2021-09-24 NOTE — ED NOTES
Patient is accepted at [address] [facility]  Patient is accepted by Dr Scooby Varela per Intake Mya Carrizales)    Nurse report is to be called to [phone] prior to patient transfer  Intake (Lina) will contact crisis once transport can be scheduled

## 2021-09-24 NOTE — ED NOTES
Crisis Worker contacted [address] [facility] [address] Adult [facility] Unit who also takes patients who are anorexic  Spoke with [address] who suggest to contact ntake/assessment unit tomorrow at [phone]

## 2021-09-25 ENCOUNTER — HOSPITAL ENCOUNTER (INPATIENT)
Facility: HOSPITAL | Age: 41
LOS: 5 days | Discharge: NURSING, REHAB OR LONG-TERM CARE | DRG: 885 | End: 2021-09-30
Attending: HOSPITALIST | Admitting: HOSPITALIST
Payer: COMMERCIAL

## 2021-09-25 VITALS
DIASTOLIC BLOOD PRESSURE: 56 MMHG | RESPIRATION RATE: 16 BRPM | HEART RATE: 71 BPM | TEMPERATURE: 98.2 F | SYSTOLIC BLOOD PRESSURE: 99 MMHG | OXYGEN SATURATION: 97 %

## 2021-09-25 DIAGNOSIS — E43 SEVERE PROTEIN-CALORIE MALNUTRITION (HCC): ICD-10-CM

## 2021-09-25 DIAGNOSIS — R11.0 NAUSEA: ICD-10-CM

## 2021-09-25 DIAGNOSIS — D64.9 ANEMIA: ICD-10-CM

## 2021-09-25 DIAGNOSIS — E53.1 VITAMIN B6 DEFICIENCY: ICD-10-CM

## 2021-09-25 DIAGNOSIS — F33.2 SEVERE EPISODE OF RECURRENT MAJOR DEPRESSIVE DISORDER (HCC): Primary | ICD-10-CM

## 2021-09-25 DIAGNOSIS — G47.00 INSOMNIA: ICD-10-CM

## 2021-09-25 DIAGNOSIS — E03.9 ACQUIRED HYPOTHYROIDISM: ICD-10-CM

## 2021-09-25 DIAGNOSIS — E55.9 VITAMIN D DEFICIENCY: ICD-10-CM

## 2021-09-25 DIAGNOSIS — M62.838 MUSCLE SPASM: ICD-10-CM

## 2021-09-25 DIAGNOSIS — G40.009 PARTIAL IDIOPATHIC EPILEPSY WITH SEIZURES OF LOCALIZED ONSET, NOT INTRACTABLE, WITHOUT STATUS EPILEPTICUS (HCC): ICD-10-CM

## 2021-09-25 DIAGNOSIS — Z86.018 HISTORY OF PITUITARY ADENOMA: ICD-10-CM

## 2021-09-25 DIAGNOSIS — K59.00 CONSTIPATION: ICD-10-CM

## 2021-09-25 DIAGNOSIS — F43.10 PTSD (POST-TRAUMATIC STRESS DISORDER): ICD-10-CM

## 2021-09-25 DIAGNOSIS — E83.42 HYPOMAGNESEMIA: ICD-10-CM

## 2021-09-25 DIAGNOSIS — G62.9 PERIPHERAL POLYNEUROPATHY: ICD-10-CM

## 2021-09-25 DIAGNOSIS — E03.9 HYPOTHYROIDISM: ICD-10-CM

## 2021-09-25 DIAGNOSIS — Z98.84 HISTORY OF GASTRIC BYPASS: ICD-10-CM

## 2021-09-25 LAB — GLUCOSE SERPL-MCNC: 110 MG/DL (ref 65–140)

## 2021-09-25 PROCEDURE — 82948 REAGENT STRIP/BLOOD GLUCOSE: CPT

## 2021-09-25 RX ORDER — LEVETIRACETAM 500 MG/1
500 TABLET ORAL 2 TIMES DAILY
Status: DISCONTINUED | OUTPATIENT
Start: 2021-09-26 | End: 2021-09-30 | Stop reason: HOSPADM

## 2021-09-25 RX ORDER — OLANZAPINE 10 MG/1
10 TABLET ORAL
Status: DISCONTINUED | OUTPATIENT
Start: 2021-09-25 | End: 2021-09-30 | Stop reason: HOSPADM

## 2021-09-25 RX ORDER — ACETAMINOPHEN 325 MG/1
975 TABLET ORAL EVERY 6 HOURS PRN
Status: DISCONTINUED | OUTPATIENT
Start: 2021-09-25 | End: 2021-09-30 | Stop reason: HOSPADM

## 2021-09-25 RX ORDER — TRAZODONE HYDROCHLORIDE 50 MG/1
50 TABLET ORAL
Status: DISCONTINUED | OUTPATIENT
Start: 2021-09-25 | End: 2021-09-30 | Stop reason: HOSPADM

## 2021-09-25 RX ORDER — FOLIC ACID 1 MG/1
1 TABLET ORAL DAILY
Status: DISCONTINUED | OUTPATIENT
Start: 2021-09-26 | End: 2021-09-30 | Stop reason: HOSPADM

## 2021-09-25 RX ORDER — POLYETHYLENE GLYCOL 3350 17 G/17G
17 POWDER, FOR SOLUTION ORAL DAILY
Status: DISCONTINUED | OUTPATIENT
Start: 2021-09-26 | End: 2021-09-30 | Stop reason: HOSPADM

## 2021-09-25 RX ORDER — BENZTROPINE MESYLATE 0.5 MG/1
0.5 TABLET ORAL EVERY 6 HOURS PRN
Status: DISCONTINUED | OUTPATIENT
Start: 2021-09-25 | End: 2021-09-30 | Stop reason: HOSPADM

## 2021-09-25 RX ORDER — OLANZAPINE 5 MG/1
5 TABLET ORAL
Status: DISCONTINUED | OUTPATIENT
Start: 2021-09-25 | End: 2021-09-30 | Stop reason: HOSPADM

## 2021-09-25 RX ORDER — POTASSIUM CHLORIDE 750 MG/1
10 TABLET, EXTENDED RELEASE ORAL 2 TIMES DAILY
Status: DISCONTINUED | OUTPATIENT
Start: 2021-09-26 | End: 2021-09-30 | Stop reason: HOSPADM

## 2021-09-25 RX ORDER — PRAZOSIN HYDROCHLORIDE 1 MG/1
2 CAPSULE ORAL
Status: DISCONTINUED | OUTPATIENT
Start: 2021-09-25 | End: 2021-09-30 | Stop reason: HOSPADM

## 2021-09-25 RX ORDER — ERGOCALCIFEROL 1.25 MG/1
50000 CAPSULE ORAL 2 TIMES WEEKLY
Status: DISCONTINUED | OUTPATIENT
Start: 2021-09-27 | End: 2021-09-30 | Stop reason: HOSPADM

## 2021-09-25 RX ORDER — OLANZAPINE 2.5 MG/1
2.5 TABLET ORAL
Status: DISCONTINUED | OUTPATIENT
Start: 2021-09-25 | End: 2021-09-30 | Stop reason: HOSPADM

## 2021-09-25 RX ORDER — METHOCARBAMOL 500 MG/1
500 TABLET, FILM COATED ORAL 3 TIMES DAILY
Status: DISCONTINUED | OUTPATIENT
Start: 2021-09-26 | End: 2021-09-30 | Stop reason: HOSPADM

## 2021-09-25 RX ORDER — LANOLIN ALCOHOL/MO/W.PET/CERES
100 CREAM (GRAM) TOPICAL DAILY
Status: DISCONTINUED | OUTPATIENT
Start: 2021-09-26 | End: 2021-09-30 | Stop reason: HOSPADM

## 2021-09-25 RX ORDER — ACETAMINOPHEN 325 MG/1
650 TABLET ORAL EVERY 4 HOURS PRN
Status: DISCONTINUED | OUTPATIENT
Start: 2021-09-25 | End: 2021-09-30 | Stop reason: HOSPADM

## 2021-09-25 RX ORDER — LORAZEPAM 2 MG/ML
2 INJECTION INTRAMUSCULAR
Status: DISCONTINUED | OUTPATIENT
Start: 2021-09-25 | End: 2021-09-30 | Stop reason: HOSPADM

## 2021-09-25 RX ORDER — HYDROXYZINE HYDROCHLORIDE 25 MG/1
25 TABLET, FILM COATED ORAL
Status: DISCONTINUED | OUTPATIENT
Start: 2021-09-25 | End: 2021-09-30 | Stop reason: HOSPADM

## 2021-09-25 RX ORDER — DOCUSATE SODIUM 100 MG/1
100 CAPSULE, LIQUID FILLED ORAL 2 TIMES DAILY
Status: DISCONTINUED | OUTPATIENT
Start: 2021-09-26 | End: 2021-09-30 | Stop reason: HOSPADM

## 2021-09-25 RX ORDER — HYDROXYZINE HYDROCHLORIDE 25 MG/1
50 TABLET, FILM COATED ORAL
Status: DISCONTINUED | OUTPATIENT
Start: 2021-09-25 | End: 2021-09-30 | Stop reason: HOSPADM

## 2021-09-25 RX ORDER — LORAZEPAM 2 MG/ML
1 INJECTION INTRAMUSCULAR EVERY 4 HOURS PRN
Status: DISCONTINUED | OUTPATIENT
Start: 2021-09-25 | End: 2021-09-30 | Stop reason: HOSPADM

## 2021-09-25 RX ORDER — GABAPENTIN 400 MG/1
400 CAPSULE ORAL 3 TIMES DAILY
Status: DISCONTINUED | OUTPATIENT
Start: 2021-09-26 | End: 2021-09-30 | Stop reason: HOSPADM

## 2021-09-25 RX ORDER — MIRTAZAPINE 15 MG/1
7.5 TABLET, FILM COATED ORAL
Status: DISCONTINUED | OUTPATIENT
Start: 2021-09-25 | End: 2021-09-30 | Stop reason: HOSPADM

## 2021-09-25 RX ORDER — OLANZAPINE 10 MG/1
5 INJECTION, POWDER, LYOPHILIZED, FOR SOLUTION INTRAMUSCULAR
Status: DISCONTINUED | OUTPATIENT
Start: 2021-09-25 | End: 2021-09-30 | Stop reason: HOSPADM

## 2021-09-25 RX ORDER — LORAZEPAM 1 MG/1
1 TABLET ORAL
Status: DISCONTINUED | OUTPATIENT
Start: 2021-09-25 | End: 2021-09-30 | Stop reason: HOSPADM

## 2021-09-25 RX ORDER — ZOLPIDEM TARTRATE 5 MG/1
10 TABLET ORAL
Status: DISCONTINUED | OUTPATIENT
Start: 2021-09-25 | End: 2021-09-30 | Stop reason: HOSPADM

## 2021-09-25 RX ORDER — LEVOTHYROXINE SODIUM 0.03 MG/1
50 TABLET ORAL
Status: DISCONTINUED | OUTPATIENT
Start: 2021-09-26 | End: 2021-09-30 | Stop reason: HOSPADM

## 2021-09-25 RX ORDER — MAGNESIUM HYDROXIDE/ALUMINUM HYDROXICE/SIMETHICONE 120; 1200; 1200 MG/30ML; MG/30ML; MG/30ML
30 SUSPENSION ORAL EVERY 4 HOURS PRN
Status: DISCONTINUED | OUTPATIENT
Start: 2021-09-25 | End: 2021-09-30 | Stop reason: HOSPADM

## 2021-09-25 RX ORDER — OLANZAPINE 10 MG/1
10 INJECTION, POWDER, LYOPHILIZED, FOR SOLUTION INTRAMUSCULAR
Status: DISCONTINUED | OUTPATIENT
Start: 2021-09-25 | End: 2021-09-30 | Stop reason: HOSPADM

## 2021-09-25 RX ORDER — ACETAMINOPHEN 325 MG/1
650 TABLET ORAL EVERY 6 HOURS PRN
Status: DISCONTINUED | OUTPATIENT
Start: 2021-09-25 | End: 2021-09-30 | Stop reason: HOSPADM

## 2021-09-25 RX ORDER — VENLAFAXINE HYDROCHLORIDE 75 MG/1
75 CAPSULE, EXTENDED RELEASE ORAL DAILY
Status: DISCONTINUED | OUTPATIENT
Start: 2021-09-26 | End: 2021-09-30 | Stop reason: HOSPADM

## 2021-09-25 RX ADMIN — METHOCARBAMOL 500 MG: 500 TABLET ORAL at 16:00

## 2021-09-25 RX ADMIN — POLYETHYLENE GLYCOL 3350 17 G: 17 POWDER, FOR SOLUTION ORAL at 08:05

## 2021-09-25 RX ADMIN — LEVETIRACETAM 500 MG: 250 TABLET, FILM COATED ORAL at 08:05

## 2021-09-25 RX ADMIN — LEVOTHYROXINE SODIUM 50 MCG: 25 TABLET ORAL at 06:51

## 2021-09-25 RX ADMIN — ACETAMINOPHEN 650 MG: 325 TABLET, FILM COATED ORAL at 16:46

## 2021-09-25 RX ADMIN — GABAPENTIN 400 MG: 300 CAPSULE ORAL at 08:06

## 2021-09-25 RX ADMIN — MAGNESIUM OXIDE TAB 400 MG (241.3 MG ELEMENTAL MG) 400 MG: 400 (241.3 MG) TAB at 16:44

## 2021-09-25 RX ADMIN — ZOLPIDEM TARTRATE 10 MG: 5 TABLET, COATED ORAL at 23:42

## 2021-09-25 RX ADMIN — MAGNESIUM OXIDE TAB 400 MG (241.3 MG ELEMENTAL MG) 400 MG: 400 (241.3 MG) TAB at 08:07

## 2021-09-25 RX ADMIN — LORAZEPAM 0.5 MG: 0.5 TABLET ORAL at 16:44

## 2021-09-25 RX ADMIN — FOLIC ACID 1 MG: 1 TABLET ORAL at 08:07

## 2021-09-25 RX ADMIN — ACETAMINOPHEN 650 MG: 325 TABLET, FILM COATED ORAL at 08:05

## 2021-09-25 RX ADMIN — GABAPENTIN 400 MG: 300 CAPSULE ORAL at 16:00

## 2021-09-25 RX ADMIN — THIAMINE HCL TAB 100 MG 100 MG: 100 TAB at 08:07

## 2021-09-25 RX ADMIN — PRAZOSIN HYDROCHLORIDE 2 MG: 1 CAPSULE ORAL at 23:42

## 2021-09-25 RX ADMIN — POTASSIUM CHLORIDE 10 MEQ: 750 TABLET, EXTENDED RELEASE ORAL at 08:06

## 2021-09-25 RX ADMIN — LORAZEPAM 0.5 MG: 0.5 TABLET ORAL at 12:08

## 2021-09-25 RX ADMIN — POTASSIUM CHLORIDE 10 MEQ: 750 TABLET, EXTENDED RELEASE ORAL at 16:43

## 2021-09-25 RX ADMIN — VENLAFAXINE HYDROCHLORIDE 75 MG: 75 CAPSULE, EXTENDED RELEASE ORAL at 08:07

## 2021-09-25 RX ADMIN — DOCUSATE SODIUM 100 MG: 100 CAPSULE, LIQUID FILLED ORAL at 16:43

## 2021-09-25 RX ADMIN — METHOCARBAMOL 500 MG: 500 TABLET ORAL at 08:07

## 2021-09-25 RX ADMIN — DOCUSATE SODIUM 100 MG: 100 CAPSULE, LIQUID FILLED ORAL at 08:06

## 2021-09-25 RX ADMIN — MIRTAZAPINE 7.5 MG: 15 TABLET, FILM COATED ORAL at 23:43

## 2021-09-25 RX ADMIN — LORAZEPAM 0.5 MG: 0.5 TABLET ORAL at 08:07

## 2021-09-25 NOTE — EMTALA/ACUTE CARE TRANSFER
[facility] EMERGENCY DEPARTMENT  [address] [facility] [address]  Dept: [phone]      EMTALA TRANSFER CONSENT    NAME Wing Lopez                                         1980                              MRN [id number]    I have been informed of my rights regarding examination, treatment, and transfer   by Dr Nathalia Queen DO    Benefits: Specialized equipment and/or services available at the receiving facility (Include comment)________________________ (inpatient psych)    Risks: Potential for delay in receiving treatment, Potential deterioration of medical condition, Increased discomfort during transfer, Possible worsening of condition or death during transfer      Consent for Transfer:  I acknowledge that my medical condition has been evaluated and explained to me by the emergency department physician or other qualified medical person and/or my attending physician, who has recommended that I be transferred to the service of  Accepting Physician: Dr Nisha Caputo at [address] [place] [address] Name, [address] : [address]  The above potential benefits of such transfer, the potential risks associated with such transfer, and the probable risks of not being transferred have been explained to me, and I fully understand them  The doctor has explained that, in my case, the benefits of transfer outweigh the risks  I agree to be transferred  I authorize the performance of emergency medical procedures and treatments upon me in both transit and upon arrival at the receiving facility  Additionally, I authorize the release of any and all medical records to the receiving facility and request they be transported with me, if possible  I understand that the safest mode of transportation during a medical emergency is an ambulance and that the Hospital advocates the use of this mode of transport   Risks of traveling to the receiving facility by car, including absence of medical control, life sustaining equipment, such as oxygen, and medical personnel has been explained to me and I fully understand them  (TOMAS CORRECT BOX BELOW)  [  ]  I consent to the stated transfer and to be transported by ambulance/helicopter  [  ]  I consent to the stated transfer, but refuse transportation by ambulance and accept full responsibility for my transportation by car  I understand the risks of non-ambulance transfers and I exonerate the Hospital and its staff from any deterioration in my condition that results from this refusal     X___________________________________________    DATE  21  TIME________  Signature of patient or legally responsible individual signing on patient behalf           RELATIONSHIP TO PATIENT_________________________          Provider Certification    NAME Tiffany Anthony                                        [facility] 1980                              MRN [id number]    A medical screening exam was performed on the above named patient  Based on the examination:    Condition Necessitating Transfer The primary encounter diagnosis was Depression  Diagnoses of Suicidal ideation, Anemia, Partial idiopathic epilepsy with seizures of localized onset, not intractable, without status epilepticus ([facility] [address] ), Hypomagnesemia, History of gastric bypass, Pituitary enlargement, Peripheral polyneuropathy, Severe protein-calorie malnutrition ([facility] [address] ), and Hypothyroidism were also pertinent to this visit      Patient Condition: The patient has been stabilized such that within reasonable medical probability, no material deterioration of the patient condition or the condition of the unborn child(cyn) is likely to result from the transfer    Reason for Transfer: Level of Care needed not available at this facility    Transfer Requirements: [place], [place]   · Space available and qualified personnel available for treatment as acknowledged by Rachel Ley [phone]  · Agreed to accept transfer and to provide appropriate medical treatment as acknowledged by       Dr Igor Grant  · Appropriate medical records of the examination and treatment of the patient are provided at the time of transfer   [address] _______  · Transfer will be performed by qualified personnel from [facility]  and appropriate transfer equipment as required, including the use of necessary and appropriate life support measures  Provider Certification: I have examined the patient and explained the following risks and benefits of being transferred/refusing transfer to the patient/family:  General risk, such as traffic hazards, adverse weather conditions, rough terrain or turbulence, possible failure of equipment (including vehicle or aircraft), or consequences of actions of persons outside the control of the transport personnel, Unanticipated needs of medical equipment and personnel during transport, Risk of worsening condition, The possibility of a transport vehicle being unavailable, Consent was not obtained as patient is committed to psychiatric facility and transfer is mandated, The patient is stable for psychiatric transfer because they are medically stable, and is protected from harming him/herself or others during transport      Based on these reasonable risks and benefits to the patient and/or the unborn child(cyn), and based upon the information available at the time of the patients examination, I certify that the medical benefits reasonably to be expected from the provision of appropriate medical treatments at another medical facility outweigh the increasing risks, if any, to the individuals medical condition, and in the case of labor to the unborn child, from effecting the transfer      X____________________________________________ DATE 09/25/21        TIME_______      ORIGINAL - SEND TO MEDICAL RECORDS   COPY - SEND WITH PATIENT DURING TRANSFER

## 2021-09-25 NOTE — ED CARE HANDOFF
Emergency Department Sign Out Note        Sign out and transfer of care from [facility]*  See Separate Emergency Department note  The patient, Moraima Montejo, was evaluated by the previous provider for suicidal plan to overdose on pills  Workup Completed:  Chronic anemia    ED Course / Workup Pending (followup): Medically cleared, 201 signed, continual obs, bed search    signed out to [address] [place] [address] patient suicidal with plan, signed 12, going to [facility] later tonight  Forms completed  ED Course as of Sep 25 1755   Tue Sep 21, 2021   0039 Sign out to [employer] - suicidal, plan to overdose on meds - medically cleared for inpatient psych - 201 signed, bed search in place  u Sep 23, 2021   0026 Patient required additional Ativan 1 mg po tonight  Otherwise cooperative  Will need psych to see patient again tomorrow as she is here for a few days  1555 Sign out to [address] suicidal with plan, 201 signed, bed search      2346 Sign out to [employer] suicide with plan to overdose on pills  Signed 201, bed search        Sat Sep 25, 2021   1006 Keppra level therapeutic at 13 2        Procedures  MDM  Number of Diagnoses or Management Options  Anemia: minor  Depression: established and worsening  Suicidal ideation: new and requires workup     Amount and/or Complexity of Data Reviewed  Clinical lab tests: reviewed  Obtain history from someone other than the patient: yes  Discuss the patient with other providers: yes    Patient Progress  Patient progress: improved      Disposition  Final diagnoses:   Depression   Suicidal ideation   Anemia     Time reflects when diagnosis was documented in both MDM as applicable and the Disposition within this note     Time User Action Codes Description Comment    9/20/2021 12:56 PM Charolet Adjutant [F32 9] Depression     9/20/2021 12:56 PM Jerry Barger Add [D91 179] Suicidal ideation     9/20/2021  2:30 PM Gwenith Barger Add [D64 9] Anemia 9/24/2021  1:50 PM Mati Metcalf Add [T94 721] Partial idiopathic epilepsy with seizures of localized onset, not intractable, without status epilepticus ([facility] [address] )     9/24/2021  1:50 PM Jodie Koyanagi Add [E83 42] Hypomagnesemia     9/24/2021  1:50 PM Jodie Koyanagi Add [Z98 84] History of gastric bypass     9/24/2021  1:50 PM Jodie Koyanagi Add [Z86 018] Pituitary enlargement     9/24/2021  1:50 PM Jodie Koyanagi Add [G62 9] Peripheral polyneuropathy     9/24/2021  1:50 PM Mati Metcalf Add [E43] Severe protein-calorie malnutrition ([facility] [address] )     9/24/2021  1:50 PM Jodie Koyanagi Add [E03 9] Hypothyroidism       ED Disposition     ED Disposition Condition Date/Time Comment    Transfer to [facility] Sep 25, 2021  2:23 PM Amy Curry should be transferred out to [facility] for bed search* and has been medically cleared           MD Documentation      Most Recent Value   Patient Condition  The patient has been stabilized such that within reasonable medical probability, no material deterioration of the patient condition or the condition of the unborn child(cyn) is likely to result from the transfer   Reason for Transfer  Level of Care needed not available at this facility   Benefits of Transfer  Specialized equipment and/or services available at the receiving facility (Include comment)________________________ [inpatient psych]   Risks of Transfer  Potential for delay in receiving treatment, Potential deterioration of medical condition, Increased discomfort during transfer, Possible worsening of condition or death during transfer   Accepting Physician  Dr Janie Robison Name, Ivy Chong [place]    (Name & Tel number)  Wilfredrodolfo Brown [phone]   Transported by [facility] and Unit #)  [place]   Sending MD Rafael Nova DO   Provider Certification  General risk, such as traffic hazards, adverse weather conditions, rough terrain or turbulence, possible failure of equipment (including vehicle or aircraft), or consequences of actions of persons outside the control of the transport personnel, Unanticipated needs of medical equipment and personnel during transport, Risk of worsening condition, The possibility of a transport vehicle being unavailable, Consent was not obtained as patient is committed to psychiatric facility and transfer is mandated, The patient is stable for psychiatric transfer because they are medically stable, and is protected from harming him/herself or others during transport      RN Documentation      [facility] Name, [address] [place] [address]    (Name & Tel number)  [facility]   Transported by (Company and Unit #)  [facility]      Follow-up Information    None       Patient's Medications   Discharge Prescriptions    No medications on file     No discharge procedures on file         ED Provider  Electronically Signed by     Kai Woo DO  09/25/21 0238

## 2021-09-25 NOTE — NURSING NOTE
PM care  Given, hair shampooed pleasant and cooperative, fleeting SI contracts for safety,  Listening to music

## 2021-09-26 PROBLEM — M62.838 MUSCLE SPASM: Status: ACTIVE | Noted: 2021-09-26

## 2021-09-26 PROBLEM — G43.909 MIGRAINE: Status: ACTIVE | Noted: 2021-09-26

## 2021-09-26 LAB
BASOPHILS # BLD AUTO: 0.1 THOUSANDS/ΜL (ref 0–0.1)
BASOPHILS NFR BLD AUTO: 1 % (ref 0–2)
CHOLEST SERPL-MCNC: 206 MG/DL (ref 0–200)
EOSINOPHIL # BLD AUTO: 0.2 THOUSAND/ΜL (ref 0–0.61)
EOSINOPHIL NFR BLD AUTO: 3 % (ref 0–5)
ERYTHROCYTE [DISTWIDTH] IN BLOOD BY AUTOMATED COUNT: 15.2 % (ref 11.5–14.5)
HCT VFR BLD AUTO: 31.8 % (ref 42–47)
HDLC SERPL-MCNC: 72 MG/DL
HGB BLD-MCNC: 9.9 G/DL (ref 12–16)
LDLC SERPL CALC-MCNC: 108 MG/DL (ref 0–100)
LYMPHOCYTES # BLD AUTO: 2.2 THOUSANDS/ΜL (ref 0.6–4.47)
LYMPHOCYTES NFR BLD AUTO: 33 % (ref 21–51)
MCH RBC QN AUTO: 23.3 PG (ref 26–34)
MCHC RBC AUTO-ENTMCNC: 31.3 G/DL (ref 31–37)
MCV RBC AUTO: 74 FL (ref 81–99)
MICROCYTES BLD QL AUTO: PRESENT
MONOCYTES # BLD AUTO: 0.4 THOUSAND/ΜL (ref 0.17–1.22)
MONOCYTES NFR BLD AUTO: 6 % (ref 2–12)
NEUTROPHILS # BLD AUTO: 3.8 THOUSANDS/ΜL (ref 1.4–6.5)
NEUTS SEG NFR BLD AUTO: 57 % (ref 42–75)
NONHDLC SERPL-MCNC: 134 MG/DL
OVALOCYTES BLD QL SMEAR: PRESENT
PLATELET # BLD AUTO: 245 THOUSANDS/UL (ref 149–390)
PLATELET BLD QL SMEAR: ADEQUATE
PMV BLD AUTO: 9.5 FL (ref 8.6–11.7)
RBC # BLD AUTO: 4.28 MILLION/UL (ref 3.9–5.2)
RBC MORPH BLD: NORMAL
TRIGL SERPL-MCNC: 128 MG/DL
WBC # BLD AUTO: 6.6 THOUSAND/UL (ref 4.8–10.8)

## 2021-09-26 PROCEDURE — 85025 COMPLETE CBC W/AUTO DIFF WBC: CPT | Performed by: NURSE PRACTITIONER

## 2021-09-26 PROCEDURE — 99223 1ST HOSP IP/OBS HIGH 75: CPT | Performed by: PSYCHIATRY & NEUROLOGY

## 2021-09-26 PROCEDURE — 80061 LIPID PANEL: CPT | Performed by: NURSE PRACTITIONER

## 2021-09-26 PROCEDURE — 99222 1ST HOSP IP/OBS MODERATE 55: CPT | Performed by: INTERNAL MEDICINE

## 2021-09-26 RX ORDER — LORAZEPAM 0.5 MG/1
0.5 TABLET ORAL 3 TIMES DAILY
Status: DISCONTINUED | OUTPATIENT
Start: 2021-09-26 | End: 2021-09-30 | Stop reason: HOSPADM

## 2021-09-26 RX ORDER — METHOCARBAMOL 500 MG/1
500 TABLET, FILM COATED ORAL EVERY 6 HOURS PRN
Status: DISCONTINUED | OUTPATIENT
Start: 2021-09-26 | End: 2021-09-30 | Stop reason: HOSPADM

## 2021-09-26 RX ADMIN — GABAPENTIN 400 MG: 400 CAPSULE ORAL at 09:18

## 2021-09-26 RX ADMIN — METHOCARBAMOL 500 MG: 500 TABLET ORAL at 16:37

## 2021-09-26 RX ADMIN — FOLIC ACID 1 MG: 1 TABLET ORAL at 09:18

## 2021-09-26 RX ADMIN — POLYETHYLENE GLYCOL 3350 17 G: 17 POWDER, FOR SOLUTION ORAL at 09:19

## 2021-09-26 RX ADMIN — ACETAMINOPHEN 975 MG: 325 TABLET ORAL at 16:37

## 2021-09-26 RX ADMIN — GABAPENTIN 400 MG: 400 CAPSULE ORAL at 16:37

## 2021-09-26 RX ADMIN — GABAPENTIN 400 MG: 400 CAPSULE ORAL at 21:35

## 2021-09-26 RX ADMIN — MIRTAZAPINE 7.5 MG: 15 TABLET, FILM COATED ORAL at 21:35

## 2021-09-26 RX ADMIN — MAGNESIUM GLUCONATE 500 MG ORAL TABLET 400 MG: 500 TABLET ORAL at 17:48

## 2021-09-26 RX ADMIN — POTASSIUM CHLORIDE 10 MEQ: 750 TABLET, EXTENDED RELEASE ORAL at 09:20

## 2021-09-26 RX ADMIN — ACETAMINOPHEN 975 MG: 325 TABLET ORAL at 09:39

## 2021-09-26 RX ADMIN — LORAZEPAM 0.5 MG: 0.5 TABLET ORAL at 16:37

## 2021-09-26 RX ADMIN — MAGNESIUM GLUCONATE 500 MG ORAL TABLET 400 MG: 500 TABLET ORAL at 09:19

## 2021-09-26 RX ADMIN — METHOCARBAMOL 500 MG: 500 TABLET ORAL at 09:19

## 2021-09-26 RX ADMIN — LORAZEPAM 0.5 MG: 0.5 TABLET ORAL at 09:21

## 2021-09-26 RX ADMIN — LEVOTHYROXINE SODIUM 50 MCG: 25 TABLET ORAL at 06:38

## 2021-09-26 RX ADMIN — PRAZOSIN HYDROCHLORIDE 2 MG: 1 CAPSULE ORAL at 21:34

## 2021-09-26 RX ADMIN — LORAZEPAM 0.5 MG: 0.5 TABLET ORAL at 21:35

## 2021-09-26 RX ADMIN — DOCUSATE SODIUM 100 MG: 100 CAPSULE, LIQUID FILLED ORAL at 17:48

## 2021-09-26 RX ADMIN — DOCUSATE SODIUM 100 MG: 100 CAPSULE, LIQUID FILLED ORAL at 09:18

## 2021-09-26 RX ADMIN — POTASSIUM CHLORIDE 10 MEQ: 750 TABLET, EXTENDED RELEASE ORAL at 17:48

## 2021-09-26 RX ADMIN — THIAMINE HCL TAB 100 MG 100 MG: 100 TAB at 09:20

## 2021-09-26 RX ADMIN — Medication 1 TABLET: at 16:37

## 2021-09-26 RX ADMIN — LEVETIRACETAM 500 MG: 500 TABLET ORAL at 09:19

## 2021-09-26 RX ADMIN — ZOLPIDEM TARTRATE 10 MG: 5 TABLET, COATED ORAL at 21:34

## 2021-09-26 RX ADMIN — LEVETIRACETAM 500 MG: 500 TABLET ORAL at 21:35

## 2021-09-26 RX ADMIN — VENLAFAXINE HYDROCHLORIDE 75 MG: 75 CAPSULE, EXTENDED RELEASE ORAL at 09:20

## 2021-09-26 RX ADMIN — METHOCARBAMOL 500 MG: 500 TABLET ORAL at 21:35

## 2021-09-27 PROBLEM — F33.2 SEVERE EPISODE OF RECURRENT MAJOR DEPRESSIVE DISORDER (HCC): Status: ACTIVE | Noted: 2020-12-17

## 2021-09-27 PROCEDURE — 99232 SBSQ HOSP IP/OBS MODERATE 35: CPT | Performed by: HOSPITALIST

## 2021-09-27 RX ORDER — VENLAFAXINE HYDROCHLORIDE 37.5 MG/1
37.5 CAPSULE, EXTENDED RELEASE ORAL DAILY
Status: DISCONTINUED | OUTPATIENT
Start: 2021-09-28 | End: 2021-09-30 | Stop reason: HOSPADM

## 2021-09-27 RX ADMIN — GABAPENTIN 400 MG: 400 CAPSULE ORAL at 21:48

## 2021-09-27 RX ADMIN — DOCUSATE SODIUM 100 MG: 100 CAPSULE, LIQUID FILLED ORAL at 17:37

## 2021-09-27 RX ADMIN — LORAZEPAM 0.5 MG: 0.5 TABLET ORAL at 17:38

## 2021-09-27 RX ADMIN — ZOLPIDEM TARTRATE 10 MG: 5 TABLET, COATED ORAL at 21:47

## 2021-09-27 RX ADMIN — POTASSIUM CHLORIDE 10 MEQ: 750 TABLET, EXTENDED RELEASE ORAL at 17:37

## 2021-09-27 RX ADMIN — Medication 1 TABLET: at 09:09

## 2021-09-27 RX ADMIN — MIRTAZAPINE 7.5 MG: 15 TABLET, FILM COATED ORAL at 21:48

## 2021-09-27 RX ADMIN — THIAMINE HCL TAB 100 MG 100 MG: 100 TAB at 09:09

## 2021-09-27 RX ADMIN — METHOCARBAMOL 500 MG: 500 TABLET ORAL at 17:37

## 2021-09-27 RX ADMIN — LORAZEPAM 0.5 MG: 0.5 TABLET ORAL at 21:48

## 2021-09-27 RX ADMIN — GABAPENTIN 400 MG: 400 CAPSULE ORAL at 09:09

## 2021-09-27 RX ADMIN — LEVETIRACETAM 500 MG: 500 TABLET ORAL at 09:09

## 2021-09-27 RX ADMIN — GABAPENTIN 400 MG: 400 CAPSULE ORAL at 17:37

## 2021-09-27 RX ADMIN — VENLAFAXINE HYDROCHLORIDE 75 MG: 75 CAPSULE, EXTENDED RELEASE ORAL at 09:09

## 2021-09-27 RX ADMIN — LORAZEPAM 0.5 MG: 0.5 TABLET ORAL at 09:09

## 2021-09-27 RX ADMIN — METHOCARBAMOL 500 MG: 500 TABLET ORAL at 09:09

## 2021-09-27 RX ADMIN — POLYETHYLENE GLYCOL 3350 17 G: 17 POWDER, FOR SOLUTION ORAL at 09:09

## 2021-09-27 RX ADMIN — PRAZOSIN HYDROCHLORIDE 2 MG: 1 CAPSULE ORAL at 21:48

## 2021-09-27 RX ADMIN — MAGNESIUM GLUCONATE 500 MG ORAL TABLET 400 MG: 500 TABLET ORAL at 17:37

## 2021-09-27 RX ADMIN — METHOCARBAMOL 500 MG: 500 TABLET ORAL at 21:49

## 2021-09-27 RX ADMIN — MAGNESIUM GLUCONATE 500 MG ORAL TABLET 400 MG: 500 TABLET ORAL at 09:09

## 2021-09-27 RX ADMIN — DOCUSATE SODIUM 100 MG: 100 CAPSULE, LIQUID FILLED ORAL at 09:09

## 2021-09-27 RX ADMIN — LEVETIRACETAM 500 MG: 500 TABLET ORAL at 21:48

## 2021-09-27 RX ADMIN — FOLIC ACID 1 MG: 1 TABLET ORAL at 09:09

## 2021-09-27 RX ADMIN — LEVOTHYROXINE SODIUM 50 MCG: 25 TABLET ORAL at 06:43

## 2021-09-27 RX ADMIN — ERGOCALCIFEROL 50000 UNITS: 1.25 CAPSULE, LIQUID FILLED ORAL at 09:09

## 2021-09-27 RX ADMIN — POTASSIUM CHLORIDE 10 MEQ: 750 TABLET, EXTENDED RELEASE ORAL at 09:09

## 2021-09-27 RX ADMIN — ACETAMINOPHEN 975 MG: 325 TABLET ORAL at 09:16

## 2021-09-27 NOTE — ED NOTES
Insurance Authorization for admission:   Phone call placed to SHREYA Snyder number: [phone]  Spoke to [facility]    3 days approved from 9/25/2021 until 9/27/2021  Level of care: [facility] [address] [place] [address]; 201/voluntary  Review on 9/28/2021  [employer] will call SIOBHAN Pinae Tony on 9/28/2021   Authorization # [id number]    COB completed by Marleni with [address] [place] [address] (Eligibility Verification System) called - [phone]    Automated system indicates: Pt is active with [employer] ID [id number]

## 2021-09-28 PROCEDURE — 97163 PT EVAL HIGH COMPLEX 45 MIN: CPT

## 2021-09-28 PROCEDURE — 97167 OT EVAL HIGH COMPLEX 60 MIN: CPT

## 2021-09-28 PROCEDURE — 99232 SBSQ HOSP IP/OBS MODERATE 35: CPT | Performed by: NURSE PRACTITIONER

## 2021-09-28 RX ADMIN — ZOLPIDEM TARTRATE 10 MG: 5 TABLET, COATED ORAL at 20:51

## 2021-09-28 RX ADMIN — MAGNESIUM GLUCONATE 500 MG ORAL TABLET 400 MG: 500 TABLET ORAL at 08:46

## 2021-09-28 RX ADMIN — MIRTAZAPINE 7.5 MG: 15 TABLET, FILM COATED ORAL at 20:51

## 2021-09-28 RX ADMIN — POTASSIUM CHLORIDE 10 MEQ: 750 TABLET, EXTENDED RELEASE ORAL at 08:48

## 2021-09-28 RX ADMIN — Medication 1 TABLET: at 08:48

## 2021-09-28 RX ADMIN — LEVETIRACETAM 500 MG: 500 TABLET ORAL at 20:51

## 2021-09-28 RX ADMIN — DOCUSATE SODIUM 100 MG: 100 CAPSULE, LIQUID FILLED ORAL at 16:53

## 2021-09-28 RX ADMIN — DOCUSATE SODIUM 100 MG: 100 CAPSULE, LIQUID FILLED ORAL at 08:48

## 2021-09-28 RX ADMIN — METHOCARBAMOL 500 MG: 500 TABLET ORAL at 08:48

## 2021-09-28 RX ADMIN — PRAZOSIN HYDROCHLORIDE 2 MG: 1 CAPSULE ORAL at 21:07

## 2021-09-28 RX ADMIN — POTASSIUM CHLORIDE 10 MEQ: 750 TABLET, EXTENDED RELEASE ORAL at 16:54

## 2021-09-28 RX ADMIN — GABAPENTIN 400 MG: 400 CAPSULE ORAL at 16:53

## 2021-09-28 RX ADMIN — VENLAFAXINE HYDROCHLORIDE 37.5 MG: 37.5 CAPSULE, EXTENDED RELEASE ORAL at 08:49

## 2021-09-28 RX ADMIN — GABAPENTIN 400 MG: 400 CAPSULE ORAL at 08:46

## 2021-09-28 RX ADMIN — MAGNESIUM GLUCONATE 500 MG ORAL TABLET 400 MG: 500 TABLET ORAL at 16:53

## 2021-09-28 RX ADMIN — FOLIC ACID 1 MG: 1 TABLET ORAL at 08:48

## 2021-09-28 RX ADMIN — THIAMINE HCL TAB 100 MG 100 MG: 100 TAB at 08:46

## 2021-09-28 RX ADMIN — LORAZEPAM 0.5 MG: 0.5 TABLET ORAL at 20:46

## 2021-09-28 RX ADMIN — LEVOTHYROXINE SODIUM 50 MCG: 25 TABLET ORAL at 08:50

## 2021-09-28 RX ADMIN — POLYETHYLENE GLYCOL 3350 17 G: 17 POWDER, FOR SOLUTION ORAL at 08:45

## 2021-09-28 RX ADMIN — LORAZEPAM 0.5 MG: 0.5 TABLET ORAL at 08:46

## 2021-09-28 RX ADMIN — METHOCARBAMOL 500 MG: 500 TABLET ORAL at 16:53

## 2021-09-28 RX ADMIN — LORAZEPAM 0.5 MG: 0.5 TABLET ORAL at 16:54

## 2021-09-28 RX ADMIN — LEVETIRACETAM 500 MG: 500 TABLET ORAL at 08:48

## 2021-09-28 RX ADMIN — METHOCARBAMOL 500 MG: 500 TABLET ORAL at 20:46

## 2021-09-28 RX ADMIN — ACETAMINOPHEN 975 MG: 325 TABLET ORAL at 08:54

## 2021-09-28 RX ADMIN — ACETAMINOPHEN 975 MG: 325 TABLET ORAL at 16:56

## 2021-09-28 RX ADMIN — VENLAFAXINE HYDROCHLORIDE 75 MG: 75 CAPSULE, EXTENDED RELEASE ORAL at 08:47

## 2021-09-28 RX ADMIN — GABAPENTIN 400 MG: 400 CAPSULE ORAL at 20:46

## 2021-09-29 LAB — SARS-COV-2 RNA RESP QL NAA+PROBE: NEGATIVE

## 2021-09-29 PROCEDURE — U0005 INFEC AGEN DETEC AMPLI PROBE: HCPCS | Performed by: NURSE PRACTITIONER

## 2021-09-29 PROCEDURE — U0003 INFECTIOUS AGENT DETECTION BY NUCLEIC ACID (DNA OR RNA); SEVERE ACUTE RESPIRATORY SYNDROME CORONAVIRUS 2 (SARS-COV-2) (CORONAVIRUS DISEASE [COVID-19]), AMPLIFIED PROBE TECHNIQUE, MAKING USE OF HIGH THROUGHPUT TECHNOLOGIES AS DESCRIBED BY CMS-2020-01-R: HCPCS | Performed by: NURSE PRACTITIONER

## 2021-09-29 PROCEDURE — 99232 SBSQ HOSP IP/OBS MODERATE 35: CPT | Performed by: NURSE PRACTITIONER

## 2021-09-29 RX ORDER — GABAPENTIN 400 MG/1
400 CAPSULE ORAL 3 TIMES DAILY
Qty: 90 CAPSULE | Refills: 0 | Status: SHIPPED | OUTPATIENT
Start: 2021-09-29 | End: 2021-12-01 | Stop reason: HOSPADM

## 2021-09-29 RX ORDER — PRAZOSIN HYDROCHLORIDE 2 MG/1
2 CAPSULE ORAL
Qty: 30 CAPSULE | Refills: 0 | Status: SHIPPED | OUTPATIENT
Start: 2021-09-29 | End: 2021-09-29 | Stop reason: SDUPTHER

## 2021-09-29 RX ORDER — PRAZOSIN HYDROCHLORIDE 2 MG/1
2 CAPSULE ORAL
Qty: 30 CAPSULE | Refills: 0 | Status: SHIPPED | OUTPATIENT
Start: 2021-09-29 | End: 2021-12-01 | Stop reason: HOSPADM

## 2021-09-29 RX ORDER — LORAZEPAM 0.5 MG/1
0.5 TABLET ORAL 3 TIMES DAILY
Qty: 90 TABLET | Refills: 0 | Status: CANCELLED | OUTPATIENT
Start: 2021-09-29 | End: 2021-10-29

## 2021-09-29 RX ORDER — VENLAFAXINE HYDROCHLORIDE 37.5 MG/1
37.5 CAPSULE, EXTENDED RELEASE ORAL DAILY
Qty: 30 CAPSULE | Refills: 0 | Status: SHIPPED | OUTPATIENT
Start: 2021-09-30 | End: 2021-12-01 | Stop reason: HOSPADM

## 2021-09-29 RX ORDER — VENLAFAXINE HYDROCHLORIDE 37.5 MG/1
37.5 CAPSULE, EXTENDED RELEASE ORAL DAILY
Qty: 30 CAPSULE | Refills: 0 | Status: SHIPPED | OUTPATIENT
Start: 2021-09-30 | End: 2021-09-29

## 2021-09-29 RX ORDER — VENLAFAXINE HYDROCHLORIDE 75 MG/1
75 CAPSULE, EXTENDED RELEASE ORAL DAILY
Qty: 30 CAPSULE | Refills: 0 | Status: SHIPPED | OUTPATIENT
Start: 2021-09-30 | End: 2021-12-01 | Stop reason: HOSPADM

## 2021-09-29 RX ORDER — MIRTAZAPINE 7.5 MG/1
7.5 TABLET, FILM COATED ORAL
Qty: 30 TABLET | Refills: 0 | Status: SHIPPED | OUTPATIENT
Start: 2021-09-29 | End: 2021-09-29

## 2021-09-29 RX ORDER — MIRTAZAPINE 7.5 MG/1
7.5 TABLET, FILM COATED ORAL
Qty: 30 TABLET | Refills: 0 | Status: SHIPPED | OUTPATIENT
Start: 2021-09-29 | End: 2021-12-01 | Stop reason: HOSPADM

## 2021-09-29 RX ORDER — GABAPENTIN 400 MG/1
400 CAPSULE ORAL 3 TIMES DAILY
Qty: 90 CAPSULE | Refills: 0 | Status: SHIPPED | OUTPATIENT
Start: 2021-09-29 | End: 2021-09-29

## 2021-09-29 RX ORDER — VENLAFAXINE HYDROCHLORIDE 75 MG/1
75 CAPSULE, EXTENDED RELEASE ORAL DAILY
Qty: 30 CAPSULE | Refills: 0 | Status: SHIPPED | OUTPATIENT
Start: 2021-09-30 | End: 2021-09-29

## 2021-09-29 RX ORDER — ZOLPIDEM TARTRATE 10 MG/1
10 TABLET ORAL
Qty: 30 TABLET | Refills: 0 | Status: CANCELLED | OUTPATIENT
Start: 2021-09-29 | End: 2021-10-29

## 2021-09-29 RX ADMIN — LEVETIRACETAM 500 MG: 500 TABLET ORAL at 21:46

## 2021-09-29 RX ADMIN — VENLAFAXINE HYDROCHLORIDE 75 MG: 75 CAPSULE, EXTENDED RELEASE ORAL at 08:42

## 2021-09-29 RX ADMIN — FOLIC ACID 1 MG: 1 TABLET ORAL at 08:41

## 2021-09-29 RX ADMIN — POTASSIUM CHLORIDE 10 MEQ: 750 TABLET, EXTENDED RELEASE ORAL at 17:46

## 2021-09-29 RX ADMIN — MAGNESIUM GLUCONATE 500 MG ORAL TABLET 400 MG: 500 TABLET ORAL at 08:41

## 2021-09-29 RX ADMIN — METHOCARBAMOL 500 MG: 500 TABLET ORAL at 17:46

## 2021-09-29 RX ADMIN — GABAPENTIN 400 MG: 400 CAPSULE ORAL at 21:53

## 2021-09-29 RX ADMIN — METHOCARBAMOL 500 MG: 500 TABLET ORAL at 21:47

## 2021-09-29 RX ADMIN — VENLAFAXINE HYDROCHLORIDE 37.5 MG: 37.5 CAPSULE, EXTENDED RELEASE ORAL at 08:41

## 2021-09-29 RX ADMIN — POLYETHYLENE GLYCOL 3350 17 G: 17 POWDER, FOR SOLUTION ORAL at 08:43

## 2021-09-29 RX ADMIN — ZOLPIDEM TARTRATE 10 MG: 5 TABLET, COATED ORAL at 21:46

## 2021-09-29 RX ADMIN — THIAMINE HCL TAB 100 MG 100 MG: 100 TAB at 08:41

## 2021-09-29 RX ADMIN — LEVOTHYROXINE SODIUM 50 MCG: 25 TABLET ORAL at 05:44

## 2021-09-29 RX ADMIN — ACETAMINOPHEN 650 MG: 325 TABLET ORAL at 14:03

## 2021-09-29 RX ADMIN — DOCUSATE SODIUM 100 MG: 100 CAPSULE, LIQUID FILLED ORAL at 08:42

## 2021-09-29 RX ADMIN — METHOCARBAMOL 500 MG: 500 TABLET ORAL at 08:42

## 2021-09-29 RX ADMIN — GABAPENTIN 400 MG: 400 CAPSULE ORAL at 08:42

## 2021-09-29 RX ADMIN — LORAZEPAM 0.5 MG: 0.5 TABLET ORAL at 08:43

## 2021-09-29 RX ADMIN — ACETAMINOPHEN 975 MG: 325 TABLET ORAL at 08:42

## 2021-09-29 RX ADMIN — POTASSIUM CHLORIDE 10 MEQ: 750 TABLET, EXTENDED RELEASE ORAL at 08:43

## 2021-09-29 RX ADMIN — DOCUSATE SODIUM 100 MG: 100 CAPSULE, LIQUID FILLED ORAL at 17:46

## 2021-09-29 RX ADMIN — PRAZOSIN HYDROCHLORIDE 2 MG: 1 CAPSULE ORAL at 21:47

## 2021-09-29 RX ADMIN — LEVETIRACETAM 500 MG: 500 TABLET ORAL at 08:42

## 2021-09-29 RX ADMIN — MAGNESIUM GLUCONATE 500 MG ORAL TABLET 400 MG: 500 TABLET ORAL at 17:46

## 2021-09-29 RX ADMIN — MIRTAZAPINE 7.5 MG: 15 TABLET, FILM COATED ORAL at 21:47

## 2021-09-29 RX ADMIN — GABAPENTIN 400 MG: 400 CAPSULE ORAL at 17:47

## 2021-09-29 RX ADMIN — LORAZEPAM 0.5 MG: 0.5 TABLET ORAL at 21:46

## 2021-09-29 RX ADMIN — Medication 1 TABLET: at 08:42

## 2021-09-29 RX ADMIN — LORAZEPAM 0.5 MG: 0.5 TABLET ORAL at 17:46

## 2021-09-30 VITALS
WEIGHT: 220 LBS | SYSTOLIC BLOOD PRESSURE: 89 MMHG | BODY MASS INDEX: 35.36 KG/M2 | RESPIRATION RATE: 18 BRPM | HEIGHT: 66 IN | HEART RATE: 76 BPM | OXYGEN SATURATION: 97 % | DIASTOLIC BLOOD PRESSURE: 58 MMHG | TEMPERATURE: 97.9 F

## 2021-09-30 PROBLEM — F33.2 SEVERE EPISODE OF RECURRENT MAJOR DEPRESSIVE DISORDER (HCC): Status: RESOLVED | Noted: 2020-12-17 | Resolved: 2021-09-30

## 2021-09-30 PROBLEM — F50.01 ANOREXIA NERVOSA, RESTRICTING TYPE: Status: RESOLVED | Noted: 2020-12-17 | Resolved: 2021-09-30

## 2021-09-30 RX ORDER — THIAMINE MONONITRATE (VIT B1) 100 MG
100 TABLET ORAL DAILY
Qty: 30 TABLET | Refills: 0 | Status: SHIPPED | OUTPATIENT
Start: 2021-09-30 | End: 2022-07-01

## 2021-09-30 RX ORDER — ONDANSETRON 4 MG/1
4 TABLET, FILM COATED ORAL EVERY 8 HOURS PRN
Qty: 20 TABLET | Refills: 0 | Status: SHIPPED | OUTPATIENT
Start: 2021-09-30 | End: 2021-12-01 | Stop reason: HOSPADM

## 2021-09-30 RX ORDER — POLYETHYLENE GLYCOL 3350 17 G/17G
17 POWDER, FOR SOLUTION ORAL DAILY
Qty: 510 G | Refills: 0 | Status: SHIPPED | OUTPATIENT
Start: 2021-09-30 | End: 2022-06-22

## 2021-09-30 RX ORDER — LEVOTHYROXINE SODIUM 0.05 MG/1
50 TABLET ORAL
Qty: 30 TABLET | Refills: 0 | Status: SHIPPED | OUTPATIENT
Start: 2021-09-30 | End: 2022-07-01

## 2021-09-30 RX ORDER — METHOCARBAMOL 500 MG/1
500 TABLET, FILM COATED ORAL 3 TIMES DAILY
Qty: 45 TABLET | Refills: 0 | Status: SHIPPED | OUTPATIENT
Start: 2021-09-30 | End: 2021-12-01 | Stop reason: HOSPADM

## 2021-09-30 RX ORDER — FOLIC ACID 1 MG/1
1 TABLET ORAL DAILY
Qty: 30 TABLET | Refills: 0 | Status: SHIPPED | OUTPATIENT
Start: 2021-09-30 | End: 2022-07-01

## 2021-09-30 RX ORDER — LEVETIRACETAM 500 MG/1
500 TABLET ORAL EVERY 12 HOURS SCHEDULED
Qty: 60 TABLET | Refills: 0 | Status: SHIPPED | OUTPATIENT
Start: 2021-09-30

## 2021-09-30 RX ORDER — ERGOCALCIFEROL 1.25 MG/1
50000 CAPSULE ORAL 2 TIMES WEEKLY
Qty: 30 CAPSULE | Refills: 0 | Status: ON HOLD | OUTPATIENT
Start: 2021-09-30 | End: 2021-12-01 | Stop reason: SDUPTHER

## 2021-09-30 RX ADMIN — MAGNESIUM GLUCONATE 500 MG ORAL TABLET 400 MG: 500 TABLET ORAL at 09:01

## 2021-09-30 RX ADMIN — LORAZEPAM 0.5 MG: 0.5 TABLET ORAL at 09:01

## 2021-09-30 RX ADMIN — METHOCARBAMOL 500 MG: 500 TABLET ORAL at 09:00

## 2021-09-30 RX ADMIN — POTASSIUM CHLORIDE 10 MEQ: 750 TABLET, EXTENDED RELEASE ORAL at 09:00

## 2021-09-30 RX ADMIN — FOLIC ACID 1 MG: 1 TABLET ORAL at 08:57

## 2021-09-30 RX ADMIN — DOCUSATE SODIUM 100 MG: 100 CAPSULE, LIQUID FILLED ORAL at 09:00

## 2021-09-30 RX ADMIN — ERGOCALCIFEROL 50000 UNITS: 1.25 CAPSULE, LIQUID FILLED ORAL at 09:00

## 2021-09-30 RX ADMIN — GABAPENTIN 400 MG: 400 CAPSULE ORAL at 09:01

## 2021-09-30 RX ADMIN — LEVOTHYROXINE SODIUM 50 MCG: 25 TABLET ORAL at 05:40

## 2021-09-30 RX ADMIN — VENLAFAXINE HYDROCHLORIDE 75 MG: 75 CAPSULE, EXTENDED RELEASE ORAL at 08:57

## 2021-09-30 RX ADMIN — THIAMINE HCL TAB 100 MG 100 MG: 100 TAB at 09:01

## 2021-09-30 RX ADMIN — Medication 1 TABLET: at 09:01

## 2021-09-30 RX ADMIN — VENLAFAXINE HYDROCHLORIDE 37.5 MG: 37.5 CAPSULE, EXTENDED RELEASE ORAL at 09:00

## 2021-09-30 RX ADMIN — LEVETIRACETAM 500 MG: 500 TABLET ORAL at 09:01

## 2021-09-30 RX ADMIN — ACETAMINOPHEN 975 MG: 325 TABLET ORAL at 08:59

## 2021-09-30 RX ADMIN — POLYETHYLENE GLYCOL 3350 17 G: 17 POWDER, FOR SOLUTION ORAL at 09:01

## 2021-10-12 NOTE — PROGRESS NOTES
Impression: Retinal detachment with single break, right eye: H33.011. Plan: She complains of flashes OD. Exam with scleral depression shows good laser around her inferotemporal RD. There are no new retinal tears or extension of fluid beyond the laser seend with scleral depressed exam today. observe with RD precautions. She will call with any changes.  
RTC 6  month Virtual Regular Visit    Problem List Items Addressed This Visit        Endocrine    Hypothyroidism       Other    Pituitary enlargement    History of gastric bypass    Vitamin D deficiency      Other Visit Diagnoses     Hyperparathyroidism ([facility] [address] )    -  Primary    Hypoglycemia            Reason for visit is hypothyroidism, history of pituitary adenoma, hypoglycemia after gastric bypass  Encounter provider SMITHA Cope    Provider located at [address] [place] [address] [address] [place] [address]      Recent Visits  No visits were found meeting these conditions  Showing recent visits within past 7 days and meeting all other requirements  Today's Visits  Date Type Provider Dept   07/01/21 Telemedicine SMITHA Cope [facility]   Showing today's visits and meeting all other requirements  Future Appointments  No visits were found meeting these conditions  Showing future appointments within next 150 days and meeting all other requirements       The patient was identified by name and date of birth  Carlos Alberto Lopez was informed that this is a telemedicine visit and that the visit is being conducted through telephone  My office door was closed  No one else was in the room  She acknowledged consent and understanding of privacy and security of the video platform  The patient has agreed to participate and understands they can discontinue the visit at any time  Patient is aware this is a billable service  Subjective  Carlos Alberto Lopez is a 47 y o  female  with history of anorexia, seizures, pituitary adenoma, gastric bypass surgery presents for follow-up  She has history of post gastric bypass hypoglycemia  Her first Pebbles-en-Y gastric bypass surgery was in 2007  She developed ulcerations and needed a revision and a new pouch around 4231-0519    Hypoglycemia occurred after high carbohydrate meals and snacks and beverages  She has not met with a dietitian yet  She was on acarbose in the past but not currently on this  It is been discussed and reminded to her in the past to treat a low blood sugar after measuring her blood sugar with both carbohydrate and protein  She presents today feeling weak and getting rehabilitation to help her ambulation  She notes that she has only had a few mild episodes of hypoglycemia  She plans to restart utilizing the Dexcom for her personal use after she is released from the rehab facility      For her hypothyroidism, she is treated with levothyroxine 50 mcg daily  There are no recent thyroid function studies available for review in the office today  She has history of pituitary adenoma when she was 17 and most recent MRI from October of 2019 is without a discrete focus within the pituitary gland        She also has vitamin-D deficiency and supplements with 10609 units twice weekly    HPI     Past Medical History:   Diagnosis Date    Anemia     Anxiety     Hypothyroidism     Psychiatric disorder     depression anxiety    Seizures ([address] )     Vitamin D deficiency        Past Surgical History:   Procedure Laterality Date    GASTRIC BYPASS      IR PICC PLACEMENT SINGLE LUMEN  4/16/2021    IR TEMPORARY DIALYSIS CATHETER PLACEMENT  4/8/2021       Current Outpatient Medications   Medication Sig Dispense Refill    acetaminophen (Tylenol) 325 mg tablet Take 650 mg by mouth every 6 (six) hours as needed for mild pain, headaches or fever      AquaLance Lancets 30G MISC USE TO TEST BLOOD GLUCOSE FOUR TIMES A DAY      cyanocobalamin 1,000 mcg/mL inject 1 milliliter ( 1000 MCG ) intramuscularly Every Month      docusate sodium (COLACE) 100 mg capsule Take 100 mg by mouth 2 (two) times a day      ergocalciferol (VITAMIN D2) 50,000 units Take 1 capsule (50,000 Units total) by mouth 2 (two) times a week 30 capsule 0    folic acid (FOLVITE) 1 mg tablet Take by mouth daily      gabapentin (NEURONTIN) 100 mg capsule Take 1 capsule (100 mg total) by mouth 3 (three) times a day as needed (leg pain) 90 capsule 0    gabapentin (NEURONTIN) 300 mg capsule Take 1 capsule (300 mg total) by mouth daily at bedtime 30 capsule 0    levETIRAcetam (KEPPRA) 500 mg tablet Take 1 tablet (500 mg total) by mouth every 12 (twelve) hours 60 tablet 0    levothyroxine 50 mcg tablet Take 1 tablet (50 mcg total) by mouth daily in the early morning 30 tablet 0    LORazepam (ATIVAN) 0 5 mg tablet Take by mouth every 8 (eight) hours as needed for anxiety      magnesium oxide (MAG-OX) 400 mg Take 1 tablet (400 mg total) by mouth 2 (two) times a day 10 tablet 0    mirtazapine (REMERON) 15 mg tablet Take 1 tablet (15 mg total) by mouth daily at bedtime 30 tablet 0    potassium chloride (MICRO-K) 10 MEQ CR capsule Take 1 capsule (10 mEq total) by mouth 2 (two) times a day 10 capsule 0    prazosin (MINIPRESS) 2 mg capsule Take 1 capsule (2 mg total) by mouth daily at bedtime 30 capsule 0    thiamine (VITAMIN B1) 100 mg tablet Take 100 mg by mouth daily      venlafaxine (EFFEXOR-XR) 75 mg 24 hr capsule       zolpidem (AMBIEN) 5 mg tablet Take 1 tablet (5 mg total) by mouth daily at bedtime as needed (insomnia) for up to 10 days 10 tablet 0    Continuous Blood Gluc Sensor ([employer] CAITY 14 DAY SENSOR) MISC 1 application by Does not apply route every 14 (fourteen) days (Patient not taking: Reported on 7/1/2021) 1 each 6     No current facility-administered medications for this visit  Allergies   Allergen Reactions    Anti-Hist [Diphenhydramine]     Buspar [Buspirone]     Haldol [Haloperidol]     Penicillins Throat Swelling    Pennsaid [Diclofenac Sodium]     Zoloft [Sertraline]        Review of Systems   Constitutional: Positive for fatigue  Negative for chills and fever  HENT: Negative  Negative for trouble swallowing and voice change  Eyes: Negative    Negative for visual disturbance  Respiratory: Negative  Negative for chest tightness and shortness of breath  Cardiovascular: Negative  Negative for chest pain and palpitations  Gastrointestinal: Negative  Negative for abdominal pain, constipation, diarrhea and vomiting  Endocrine: Negative for cold intolerance, heat intolerance, polydipsia, polyphagia and polyuria  Genitourinary: Negative  Musculoskeletal: Positive for myalgias  Joint swelling: lower extremity weakness  Skin: Negative  Allergic/Immunologic: Negative  Neurological: Negative  Negative for dizziness, syncope, light-headedness and headaches  Hematological: Negative  Psychiatric/Behavioral: Negative  All other systems reviewed and are negative  Video Exam  It was my intent to perform this visit via video technology but the patient was not able to do a video connection so the visit was completed via audio telephone only  There were no vitals filed for this visit  Physical Exam ( not available-phone visit )    Plan:  1  Hypoglycemia:  Suspect post gastric bypass hypoglycemia  She is no longer treated with acarbose  Unfortunately, her rehabilitation facility will not allow her to utilize her dex com continuous glucose monitor  She denies any severe episodes of hypoglycemia but does state that she has had a few occasional episodes of mild hypoglycemia related to her diet  She will restart her dex com continuous glucose monitor and link it with our office when she is released from the rehab facility      2  Hypothyroidism: For now, continue levothyroxine 50 mcg daily  Recheck TSH and free T4 now and prior to next visit      3  History of pituitary adenoma:  Most recent MRI just showed pituitary enlargement  Check IGF-1 and prolactin level      4  Vitamin-D deficiency: Continue supplementation  Check 25 hydroxy vitamin-D level      I spent 30 minutes directly with the patient during this visit      VIRTUAL VISIT 23 Sekou Mobley acknowledges that she has consented to an online visit or consultation  She understands that the online visit is based solely on information provided by her, and that, in the absence of a face-to-face physical evaluation by the physician, the diagnosis she receives is both limited and provisional in terms of accuracy and completeness  This is not intended to replace a full medical face-to-face evaluation by the physician  Uriel Santos understands and accepts these terms

## 2021-11-15 ENCOUNTER — APPOINTMENT (EMERGENCY)
Dept: RADIOLOGY | Facility: HOSPITAL | Age: 41
End: 2021-11-15
Payer: COMMERCIAL

## 2021-11-15 ENCOUNTER — HOSPITAL ENCOUNTER (EMERGENCY)
Facility: HOSPITAL | Age: 41
End: 2021-11-17
Attending: EMERGENCY MEDICINE
Payer: COMMERCIAL

## 2021-11-15 DIAGNOSIS — T14.91XA SUICIDAL BEHAVIOR WITH ATTEMPTED SELF-INJURY (HCC): ICD-10-CM

## 2021-11-15 DIAGNOSIS — T39.312A INTENTIONAL IBUPROFEN OVERDOSE, INITIAL ENCOUNTER (HCC): ICD-10-CM

## 2021-11-15 DIAGNOSIS — F33.2 MAJOR DEPRESSIVE DISORDER, RECURRENT, SEVERE WITHOUT PSYCHOTIC FEATURES (HCC): Primary | Chronic | ICD-10-CM

## 2021-11-15 LAB
ALBUMIN SERPL BCP-MCNC: 3.5 G/DL (ref 3.5–5)
ALP SERPL-CCNC: 100 U/L (ref 46–116)
ALT SERPL W P-5'-P-CCNC: 15 U/L (ref 12–78)
AMPHETAMINES SERPL QL SCN: NEGATIVE
ANION GAP SERPL CALCULATED.3IONS-SCNC: 11 MMOL/L (ref 4–13)
APAP SERPL-MCNC: <2 UG/ML (ref 10–20)
AST SERPL W P-5'-P-CCNC: 11 U/L (ref 5–45)
BARBITURATES UR QL: NEGATIVE
BASOPHILS # BLD AUTO: 0.04 THOUSANDS/ΜL (ref 0–0.1)
BASOPHILS NFR BLD AUTO: 1 % (ref 0–1)
BENZODIAZ UR QL: NEGATIVE
BILIRUB SERPL-MCNC: 0.6 MG/DL (ref 0.2–1)
BUN SERPL-MCNC: 16 MG/DL (ref 5–25)
CALCIUM SERPL-MCNC: 8.9 MG/DL (ref 8.3–10.1)
CARDIAC TROPONIN I PNL SERPL HS: <2 NG/L
CHLORIDE SERPL-SCNC: 104 MMOL/L (ref 100–108)
CO2 SERPL-SCNC: 27 MMOL/L (ref 21–32)
COCAINE UR QL: NEGATIVE
CREAT SERPL-MCNC: 0.84 MG/DL (ref 0.6–1.3)
EOSINOPHIL # BLD AUTO: 0.22 THOUSAND/ΜL (ref 0–0.61)
EOSINOPHIL NFR BLD AUTO: 3 % (ref 0–6)
ERYTHROCYTE [DISTWIDTH] IN BLOOD BY AUTOMATED COUNT: 15.6 % (ref 11.6–15.1)
ETHANOL SERPL-MCNC: <3 MG/DL (ref 0–3)
EXT PREG TEST URINE: NORMAL
EXT. CONTROL ED NAV: NORMAL
FLUAV RNA RESP QL NAA+PROBE: NEGATIVE
FLUBV RNA RESP QL NAA+PROBE: NEGATIVE
GFR SERPL CREATININE-BSD FRML MDRD: 88 ML/MIN/1.73SQ M
GLUCOSE SERPL-MCNC: 88 MG/DL (ref 65–140)
HCT VFR BLD AUTO: 34.4 % (ref 34.8–46.1)
HGB BLD-MCNC: 9.5 G/DL (ref 11.5–15.4)
IMM GRANULOCYTES # BLD AUTO: 0.04 THOUSAND/UL (ref 0–0.2)
IMM GRANULOCYTES NFR BLD AUTO: 1 % (ref 0–2)
LYMPHOCYTES # BLD AUTO: 1.53 THOUSANDS/ΜL (ref 0.6–4.47)
LYMPHOCYTES NFR BLD AUTO: 22 % (ref 14–44)
MCH RBC QN AUTO: 21.2 PG (ref 26.8–34.3)
MCHC RBC AUTO-ENTMCNC: 27.6 G/DL (ref 31.4–37.4)
MCV RBC AUTO: 77 FL (ref 82–98)
METHADONE UR QL: NEGATIVE
MONOCYTES # BLD AUTO: 0.36 THOUSAND/ΜL (ref 0.17–1.22)
MONOCYTES NFR BLD AUTO: 5 % (ref 4–12)
NEUTROPHILS # BLD AUTO: 4.7 THOUSANDS/ΜL (ref 1.85–7.62)
NEUTS SEG NFR BLD AUTO: 68 % (ref 43–75)
NRBC BLD AUTO-RTO: 0 /100 WBCS
OPIATES UR QL SCN: NEGATIVE
OXYCODONE+OXYMORPHONE UR QL SCN: NEGATIVE
PCP UR QL: NEGATIVE
PLATELET # BLD AUTO: 410 THOUSANDS/UL (ref 149–390)
PMV BLD AUTO: 10.2 FL (ref 8.9–12.7)
POTASSIUM SERPL-SCNC: 5.5 MMOL/L (ref 3.5–5.3)
PROT SERPL-MCNC: 6.3 G/DL (ref 6.4–8.2)
RBC # BLD AUTO: 4.49 MILLION/UL (ref 3.81–5.12)
RSV RNA RESP QL NAA+PROBE: NEGATIVE
SALICYLATES SERPL-MCNC: <3 MG/DL (ref 3–20)
SARS-COV-2 RNA RESP QL NAA+PROBE: NEGATIVE
SODIUM SERPL-SCNC: 142 MMOL/L (ref 136–145)
THC UR QL: NEGATIVE
TSH SERPL DL<=0.05 MIU/L-ACNC: 1.91 UIU/ML (ref 0.36–3.74)
WBC # BLD AUTO: 6.89 THOUSAND/UL (ref 4.31–10.16)

## 2021-11-15 PROCEDURE — 80177 DRUG SCRN QUAN LEVETIRACETAM: CPT | Performed by: EMERGENCY MEDICINE

## 2021-11-15 PROCEDURE — 82077 ASSAY SPEC XCP UR&BREATH IA: CPT | Performed by: EMERGENCY MEDICINE

## 2021-11-15 PROCEDURE — 99285 EMERGENCY DEPT VISIT HI MDM: CPT | Performed by: EMERGENCY MEDICINE

## 2021-11-15 PROCEDURE — 71045 X-RAY EXAM CHEST 1 VIEW: CPT

## 2021-11-15 PROCEDURE — 99285 EMERGENCY DEPT VISIT HI MDM: CPT

## 2021-11-15 PROCEDURE — 81025 URINE PREGNANCY TEST: CPT | Performed by: EMERGENCY MEDICINE

## 2021-11-15 PROCEDURE — 80143 DRUG ASSAY ACETAMINOPHEN: CPT | Performed by: EMERGENCY MEDICINE

## 2021-11-15 PROCEDURE — 84443 ASSAY THYROID STIM HORMONE: CPT | Performed by: EMERGENCY MEDICINE

## 2021-11-15 PROCEDURE — 36415 COLL VENOUS BLD VENIPUNCTURE: CPT | Performed by: EMERGENCY MEDICINE

## 2021-11-15 PROCEDURE — 85025 COMPLETE CBC W/AUTO DIFF WBC: CPT | Performed by: EMERGENCY MEDICINE

## 2021-11-15 PROCEDURE — 0241U HB NFCT DS VIR RESP RNA 4 TRGT: CPT | Performed by: EMERGENCY MEDICINE

## 2021-11-15 PROCEDURE — 84484 ASSAY OF TROPONIN QUANT: CPT | Performed by: EMERGENCY MEDICINE

## 2021-11-15 PROCEDURE — 80307 DRUG TEST PRSMV CHEM ANLYZR: CPT | Performed by: EMERGENCY MEDICINE

## 2021-11-15 PROCEDURE — 93005 ELECTROCARDIOGRAM TRACING: CPT

## 2021-11-15 PROCEDURE — 80053 COMPREHEN METABOLIC PANEL: CPT | Performed by: EMERGENCY MEDICINE

## 2021-11-15 PROCEDURE — 80179 DRUG ASSAY SALICYLATE: CPT | Performed by: EMERGENCY MEDICINE

## 2021-11-15 RX ORDER — GABAPENTIN 400 MG/1
400 CAPSULE ORAL 3 TIMES DAILY
Status: DISCONTINUED | OUTPATIENT
Start: 2021-11-15 | End: 2021-11-17 | Stop reason: HOSPADM

## 2021-11-15 RX ORDER — MIRTAZAPINE 15 MG/1
7.5 TABLET, FILM COATED ORAL
Status: DISCONTINUED | OUTPATIENT
Start: 2021-11-15 | End: 2021-11-17 | Stop reason: HOSPADM

## 2021-11-15 RX ORDER — LANOLIN ALCOHOL/MO/W.PET/CERES
100 CREAM (GRAM) TOPICAL DAILY
Status: DISCONTINUED | OUTPATIENT
Start: 2021-11-16 | End: 2021-11-17 | Stop reason: HOSPADM

## 2021-11-15 RX ORDER — PRAZOSIN HYDROCHLORIDE 1 MG/1
2 CAPSULE ORAL
Status: DISCONTINUED | OUTPATIENT
Start: 2021-11-15 | End: 2021-11-17 | Stop reason: HOSPADM

## 2021-11-15 RX ORDER — ZOLPIDEM TARTRATE 5 MG/1
10 TABLET ORAL
Status: DISCONTINUED | OUTPATIENT
Start: 2021-11-15 | End: 2021-11-17 | Stop reason: HOSPADM

## 2021-11-15 RX ORDER — LEVETIRACETAM 250 MG/1
500 TABLET ORAL EVERY 12 HOURS SCHEDULED
Status: DISCONTINUED | OUTPATIENT
Start: 2021-11-15 | End: 2021-11-17 | Stop reason: HOSPADM

## 2021-11-15 RX ORDER — ACETAMINOPHEN 325 MG/1
650 TABLET ORAL EVERY 4 HOURS PRN
COMMUNITY

## 2021-11-15 RX ORDER — ACETAMINOPHEN 325 MG/1
650 TABLET ORAL EVERY 6 HOURS PRN
Status: DISCONTINUED | OUTPATIENT
Start: 2021-11-15 | End: 2021-11-17 | Stop reason: HOSPADM

## 2021-11-15 RX ORDER — FOLIC ACID 1 MG/1
1 TABLET ORAL DAILY
Status: DISCONTINUED | OUTPATIENT
Start: 2021-11-16 | End: 2021-11-17 | Stop reason: HOSPADM

## 2021-11-15 RX ORDER — LEVOTHYROXINE SODIUM 0.03 MG/1
50 TABLET ORAL
Status: DISCONTINUED | OUTPATIENT
Start: 2021-11-16 | End: 2021-11-17 | Stop reason: HOSPADM

## 2021-11-15 RX ORDER — POLYETHYLENE GLYCOL 400 AND PROPYLENE GLYCOL 4; 3 MG/ML; MG/ML
1 SOLUTION/ DROPS OPHTHALMIC EVERY 4 HOURS PRN
COMMUNITY
End: 2021-12-01 | Stop reason: HOSPADM

## 2021-11-15 RX ORDER — TOPIRAMATE 50 MG/1
50 TABLET, FILM COATED ORAL DAILY
COMMUNITY
End: 2022-06-22

## 2021-11-15 RX ORDER — LORAZEPAM 0.5 MG/1
0.5 TABLET ORAL 2 TIMES DAILY
Status: DISCONTINUED | OUTPATIENT
Start: 2021-11-15 | End: 2021-11-17 | Stop reason: HOSPADM

## 2021-11-15 RX ORDER — BISACODYL 10 MG
10 SUPPOSITORY, RECTAL RECTAL AS NEEDED
COMMUNITY

## 2021-11-15 RX ORDER — METHOCARBAMOL 500 MG/1
500 TABLET, FILM COATED ORAL EVERY 8 HOURS PRN
Status: DISCONTINUED | OUTPATIENT
Start: 2021-11-15 | End: 2021-11-17 | Stop reason: HOSPADM

## 2021-11-15 RX ORDER — TOPIRAMATE 25 MG/1
25 TABLET ORAL DAILY
Status: DISCONTINUED | OUTPATIENT
Start: 2021-11-16 | End: 2021-11-17 | Stop reason: HOSPADM

## 2021-11-15 RX ORDER — LORAZEPAM 0.5 MG/1
0.5 TABLET ORAL EVERY 6 HOURS PRN
Status: DISCONTINUED | OUTPATIENT
Start: 2021-11-15 | End: 2021-11-15

## 2021-11-15 RX ADMIN — MIRTAZAPINE 7.5 MG: 15 TABLET, FILM COATED ORAL at 21:09

## 2021-11-15 RX ADMIN — PRAZOSIN HYDROCHLORIDE 2 MG: 1 CAPSULE ORAL at 21:09

## 2021-11-15 RX ADMIN — LEVETIRACETAM 500 MG: 250 TABLET ORAL at 21:09

## 2021-11-15 RX ADMIN — ZOLPIDEM TARTRATE 10 MG: 5 TABLET ORAL at 21:09

## 2021-11-15 RX ADMIN — GABAPENTIN 400 MG: 400 CAPSULE ORAL at 21:09

## 2021-11-15 RX ADMIN — METHOCARBAMOL 500 MG: 500 TABLET ORAL at 21:12

## 2021-11-16 VITALS
SYSTOLIC BLOOD PRESSURE: 102 MMHG | DIASTOLIC BLOOD PRESSURE: 89 MMHG | BODY MASS INDEX: 38.57 KG/M2 | WEIGHT: 240 LBS | OXYGEN SATURATION: 97 % | RESPIRATION RATE: 18 BRPM | TEMPERATURE: 98.4 F | HEIGHT: 66 IN | HEART RATE: 69 BPM

## 2021-11-16 PROBLEM — F33.2 MAJOR DEPRESSIVE DISORDER, RECURRENT, SEVERE WITHOUT PSYCHOTIC FEATURES (HCC): Chronic | Status: ACTIVE | Noted: 2020-12-17

## 2021-11-16 PROBLEM — E11.9 NON-INSULIN DEPENDENT TYPE 2 DIABETES MELLITUS (HCC): Status: ACTIVE | Noted: 2020-01-23

## 2021-11-16 LAB
ATRIAL RATE: 79 BPM
P AXIS: 61 DEGREES
PR INTERVAL: 140 MS
QRS AXIS: 13 DEGREES
QRSD INTERVAL: 70 MS
QT INTERVAL: 340 MS
QTC INTERVAL: 389 MS
T WAVE AXIS: 26 DEGREES
VENTRICULAR RATE: 79 BPM

## 2021-11-16 PROCEDURE — 93010 ELECTROCARDIOGRAM REPORT: CPT | Performed by: INTERNAL MEDICINE

## 2021-11-16 RX ORDER — LORAZEPAM 0.5 MG/1
0.5 TABLET ORAL 2 TIMES DAILY
Status: CANCELLED | OUTPATIENT
Start: 2021-11-16

## 2021-11-16 RX ORDER — OLANZAPINE 10 MG/1
5 INJECTION, POWDER, LYOPHILIZED, FOR SOLUTION INTRAMUSCULAR
Status: CANCELLED | OUTPATIENT
Start: 2021-11-16

## 2021-11-16 RX ORDER — OLANZAPINE 2.5 MG/1
2.5 TABLET ORAL
Status: CANCELLED | OUTPATIENT
Start: 2021-11-16

## 2021-11-16 RX ORDER — AMOXICILLIN 250 MG
1 CAPSULE ORAL DAILY PRN
Status: CANCELLED | OUTPATIENT
Start: 2021-11-16

## 2021-11-16 RX ORDER — LEVOTHYROXINE SODIUM 0.03 MG/1
50 TABLET ORAL
Status: CANCELLED | OUTPATIENT
Start: 2021-11-17

## 2021-11-16 RX ORDER — OLANZAPINE 10 MG/1
10 INJECTION, POWDER, LYOPHILIZED, FOR SOLUTION INTRAMUSCULAR
Status: CANCELLED | OUTPATIENT
Start: 2021-11-16

## 2021-11-16 RX ORDER — BENZTROPINE MESYLATE 1 MG/ML
1 INJECTION INTRAMUSCULAR; INTRAVENOUS
Status: CANCELLED | OUTPATIENT
Start: 2021-11-16

## 2021-11-16 RX ORDER — LANOLIN ALCOHOL/MO/W.PET/CERES
3 CREAM (GRAM) TOPICAL
Status: CANCELLED | OUTPATIENT
Start: 2021-11-16

## 2021-11-16 RX ORDER — LANOLIN ALCOHOL/MO/W.PET/CERES
100 CREAM (GRAM) TOPICAL DAILY
Status: CANCELLED | OUTPATIENT
Start: 2021-11-16

## 2021-11-16 RX ORDER — OLANZAPINE 2.5 MG/1
10 TABLET ORAL
Status: CANCELLED | OUTPATIENT
Start: 2021-11-16

## 2021-11-16 RX ORDER — LORAZEPAM 2 MG/ML
2 INJECTION INTRAMUSCULAR
Status: CANCELLED | OUTPATIENT
Start: 2021-11-16

## 2021-11-16 RX ORDER — FOLIC ACID 1 MG/1
1 TABLET ORAL DAILY
Status: CANCELLED | OUTPATIENT
Start: 2021-11-16

## 2021-11-16 RX ORDER — LEVETIRACETAM 250 MG/1
500 TABLET ORAL EVERY 12 HOURS SCHEDULED
Status: CANCELLED | OUTPATIENT
Start: 2021-11-16

## 2021-11-16 RX ORDER — TOPIRAMATE 25 MG/1
25 TABLET ORAL DAILY
Status: CANCELLED | OUTPATIENT
Start: 2021-11-16

## 2021-11-16 RX ORDER — GABAPENTIN 400 MG/1
400 CAPSULE ORAL 3 TIMES DAILY
Status: CANCELLED | OUTPATIENT
Start: 2021-11-16

## 2021-11-16 RX ORDER — BENZTROPINE MESYLATE 0.5 MG/1
1 TABLET ORAL
Status: CANCELLED | OUTPATIENT
Start: 2021-11-16

## 2021-11-16 RX ORDER — METHOCARBAMOL 500 MG/1
500 TABLET, FILM COATED ORAL EVERY 8 HOURS PRN
Status: CANCELLED | OUTPATIENT
Start: 2021-11-16

## 2021-11-16 RX ORDER — ACETAMINOPHEN 325 MG/1
650 TABLET ORAL EVERY 6 HOURS PRN
Status: CANCELLED | OUTPATIENT
Start: 2021-11-16

## 2021-11-16 RX ORDER — MINERAL OIL AND PETROLATUM 150; 830 MG/G; MG/G
1 OINTMENT OPHTHALMIC
Status: CANCELLED | OUTPATIENT
Start: 2021-11-16

## 2021-11-16 RX ORDER — MAGNESIUM HYDROXIDE/ALUMINUM HYDROXICE/SIMETHICONE 120; 1200; 1200 MG/30ML; MG/30ML; MG/30ML
30 SUSPENSION ORAL EVERY 4 HOURS PRN
Status: CANCELLED | OUTPATIENT
Start: 2021-11-16

## 2021-11-16 RX ORDER — PRAZOSIN HYDROCHLORIDE 1 MG/1
2 CAPSULE ORAL
Status: CANCELLED | OUTPATIENT
Start: 2021-11-16

## 2021-11-16 RX ORDER — LORAZEPAM 2 MG/ML
1 INJECTION INTRAMUSCULAR EVERY 4 HOURS PRN
Status: CANCELLED | OUTPATIENT
Start: 2021-11-16

## 2021-11-16 RX ORDER — LORAZEPAM 1 MG/1
1 TABLET ORAL
Status: CANCELLED | OUTPATIENT
Start: 2021-11-16

## 2021-11-16 RX ORDER — BISACODYL 10 MG
10 SUPPOSITORY, RECTAL RECTAL DAILY PRN
Status: CANCELLED | OUTPATIENT
Start: 2021-11-16

## 2021-11-16 RX ORDER — OLANZAPINE 2.5 MG/1
5 TABLET ORAL
Status: CANCELLED | OUTPATIENT
Start: 2021-11-16

## 2021-11-16 RX ORDER — ACETAMINOPHEN 325 MG/1
650 TABLET ORAL EVERY 4 HOURS PRN
Status: CANCELLED | OUTPATIENT
Start: 2021-11-16

## 2021-11-16 RX ORDER — HYDROXYZINE HYDROCHLORIDE 25 MG/1
25 TABLET, FILM COATED ORAL
Status: CANCELLED | OUTPATIENT
Start: 2021-11-16

## 2021-11-16 RX ORDER — MIRTAZAPINE 15 MG/1
7.5 TABLET, FILM COATED ORAL
Status: CANCELLED | OUTPATIENT
Start: 2021-11-16

## 2021-11-16 RX ORDER — ACETAMINOPHEN 325 MG/1
975 TABLET ORAL EVERY 6 HOURS PRN
Status: CANCELLED | OUTPATIENT
Start: 2021-11-16

## 2021-11-16 RX ORDER — HYDROXYZINE HYDROCHLORIDE 25 MG/1
50 TABLET, FILM COATED ORAL
Status: CANCELLED | OUTPATIENT
Start: 2021-11-16

## 2021-11-16 RX ADMIN — MIRTAZAPINE 7.5 MG: 15 TABLET, FILM COATED ORAL at 22:48

## 2021-11-16 RX ADMIN — LORAZEPAM 0.5 MG: 0.5 TABLET ORAL at 18:47

## 2021-11-16 RX ADMIN — FOLIC ACID 1 MG: 1 TABLET ORAL at 08:49

## 2021-11-16 RX ADMIN — METHOCARBAMOL 500 MG: 500 TABLET ORAL at 18:47

## 2021-11-16 RX ADMIN — LEVOTHYROXINE SODIUM 50 MCG: 25 TABLET ORAL at 08:50

## 2021-11-16 RX ADMIN — Medication 100 MG: at 08:51

## 2021-11-16 RX ADMIN — VENLAFAXINE HYDROCHLORIDE 112.5 MG: 75 CAPSULE, EXTENDED RELEASE ORAL at 08:50

## 2021-11-16 RX ADMIN — GABAPENTIN 400 MG: 400 CAPSULE ORAL at 08:49

## 2021-11-16 RX ADMIN — TOPIRAMATE 25 MG: 25 TABLET, FILM COATED ORAL at 08:50

## 2021-11-16 RX ADMIN — LEVETIRACETAM 500 MG: 250 TABLET ORAL at 08:49

## 2021-11-16 RX ADMIN — GABAPENTIN 400 MG: 400 CAPSULE ORAL at 18:46

## 2021-11-16 RX ADMIN — LEVETIRACETAM 500 MG: 250 TABLET ORAL at 22:50

## 2021-11-16 RX ADMIN — LORAZEPAM 0.5 MG: 0.5 TABLET ORAL at 08:51

## 2021-11-17 ENCOUNTER — HOSPITAL ENCOUNTER (INPATIENT)
Facility: HOSPITAL | Age: 41
LOS: 14 days | Discharge: NURSING, REHAB OR LONG-TERM CARE | DRG: 885 | End: 2021-12-01
Attending: PSYCHIATRY & NEUROLOGY | Admitting: PSYCHIATRY & NEUROLOGY
Payer: COMMERCIAL

## 2021-11-17 DIAGNOSIS — F33.2 MAJOR DEPRESSIVE DISORDER, RECURRENT, SEVERE WITHOUT PSYCHOTIC FEATURES (HCC): Primary | Chronic | ICD-10-CM

## 2021-11-17 DIAGNOSIS — E55.9 VITAMIN D DEFICIENCY: ICD-10-CM

## 2021-11-17 DIAGNOSIS — M62.838 MUSCLE SPASM: ICD-10-CM

## 2021-11-17 DIAGNOSIS — F43.12 POST-TRAUMATIC STRESS DISORDER, CHRONIC: Chronic | ICD-10-CM

## 2021-11-17 DIAGNOSIS — F41.1 GAD (GENERALIZED ANXIETY DISORDER): Chronic | ICD-10-CM

## 2021-11-17 DIAGNOSIS — G47.00 INSOMNIA: ICD-10-CM

## 2021-11-17 DIAGNOSIS — N30.00 ACUTE CYSTITIS WITHOUT HEMATURIA: ICD-10-CM

## 2021-11-17 PROBLEM — Z00.8 MEDICAL CLEARANCE FOR PSYCHIATRIC ADMISSION: Status: ACTIVE | Noted: 2021-11-17

## 2021-11-17 PROBLEM — F50.9 EATING DISORDER: Chronic | Status: ACTIVE | Noted: 2020-12-17

## 2021-11-17 LAB
ALBUMIN SERPL BCP-MCNC: 3.9 G/DL (ref 3–5.2)
ALP SERPL-CCNC: 88 U/L (ref 43–122)
ALT SERPL W P-5'-P-CCNC: 12 U/L
AMPHETAMINES SERPL QL SCN: NEGATIVE
ANION GAP SERPL CALCULATED.3IONS-SCNC: 3 MMOL/L (ref 5–14)
AST SERPL W P-5'-P-CCNC: 19 U/L (ref 14–36)
ATRIAL RATE: 65 BPM
BARBITURATES UR QL: NEGATIVE
BASOPHILS # BLD AUTO: 0.1 THOUSANDS/ΜL (ref 0–0.1)
BASOPHILS NFR BLD AUTO: 1 % (ref 0–1)
BENZODIAZ UR QL: NEGATIVE
BILIRUB SERPL-MCNC: 0.38 MG/DL
BUN SERPL-MCNC: 13 MG/DL (ref 5–25)
CALCIUM SERPL-MCNC: 9.5 MG/DL (ref 8.4–10.2)
CHLORIDE SERPL-SCNC: 103 MMOL/L (ref 97–108)
CHOLEST SERPL-MCNC: 193 MG/DL
CO2 SERPL-SCNC: 30 MMOL/L (ref 22–30)
COCAINE UR QL: NEGATIVE
CREAT SERPL-MCNC: 0.88 MG/DL (ref 0.6–1.2)
EOSINOPHIL # BLD AUTO: 0.3 THOUSAND/ΜL (ref 0–0.4)
EOSINOPHIL NFR BLD AUTO: 4 % (ref 0–6)
ERYTHROCYTE [DISTWIDTH] IN BLOOD BY AUTOMATED COUNT: 16.6 %
EST. AVERAGE GLUCOSE BLD GHB EST-MCNC: 105 MG/DL
GFR SERPL CREATININE-BSD FRML MDRD: 83 ML/MIN/1.73SQ M
GLUCOSE SERPL-MCNC: 83 MG/DL (ref 70–99)
HBA1C MFR BLD: 5.3 %
HCG SERPL QL: NEGATIVE
HCT VFR BLD AUTO: 32.4 % (ref 36–46)
HDLC SERPL-MCNC: 48 MG/DL
HGB BLD-MCNC: 9.8 G/DL (ref 12–16)
LDLC SERPL CALC-MCNC: 110 MG/DL
LYMPHOCYTES # BLD AUTO: 2.4 THOUSANDS/ΜL (ref 0.5–4)
LYMPHOCYTES NFR BLD AUTO: 31 % (ref 25–45)
MCH RBC QN AUTO: 21.5 PG (ref 26–34)
MCHC RBC AUTO-ENTMCNC: 30.2 G/DL (ref 31–36)
MCV RBC AUTO: 71 FL (ref 80–100)
METHADONE UR QL: NEGATIVE
MICROCYTES BLD QL AUTO: PRESENT
MONOCYTES # BLD AUTO: 0.5 THOUSAND/ΜL (ref 0.2–0.9)
MONOCYTES NFR BLD AUTO: 6 % (ref 1–10)
NEUTROPHILS # BLD AUTO: 4.5 THOUSANDS/ΜL (ref 1.8–7.8)
NEUTS SEG NFR BLD AUTO: 59 % (ref 45–65)
NONHDLC SERPL-MCNC: 145 MG/DL
OPIATES UR QL SCN: NEGATIVE
OVALOCYTES BLD QL SMEAR: PRESENT
OXYCODONE+OXYMORPHONE UR QL SCN: NEGATIVE
P AXIS: -9 DEGREES
PCP UR QL: NEGATIVE
PLATELET # BLD AUTO: 395 THOUSANDS/UL (ref 150–450)
PLATELET BLD QL SMEAR: ADEQUATE
PMV BLD AUTO: 8.9 FL (ref 8.9–12.7)
POTASSIUM SERPL-SCNC: 4.6 MMOL/L (ref 3.6–5)
PR INTERVAL: 132 MS
PROT SERPL-MCNC: 6.9 G/DL (ref 5.9–8.4)
QRS AXIS: 2 DEGREES
QRSD INTERVAL: 68 MS
QT INTERVAL: 374 MS
QTC INTERVAL: 388 MS
RBC # BLD AUTO: 4.55 MILLION/UL (ref 4–5.2)
RBC MORPH BLD: NORMAL
SODIUM SERPL-SCNC: 136 MMOL/L (ref 137–147)
T WAVE AXIS: -2 DEGREES
THC UR QL: NEGATIVE
TRIGL SERPL-MCNC: 173 MG/DL
TSH SERPL DL<=0.05 MIU/L-ACNC: 3.89 UIU/ML (ref 0.47–4.68)
VENTRICULAR RATE: 65 BPM
WBC # BLD AUTO: 7.7 THOUSAND/UL (ref 4.5–11)

## 2021-11-17 PROCEDURE — 86592 SYPHILIS TEST NON-TREP QUAL: CPT | Performed by: PSYCHIATRY & NEUROLOGY

## 2021-11-17 PROCEDURE — 85025 COMPLETE CBC W/AUTO DIFF WBC: CPT | Performed by: PSYCHIATRY & NEUROLOGY

## 2021-11-17 PROCEDURE — 97530 THERAPEUTIC ACTIVITIES: CPT

## 2021-11-17 PROCEDURE — 80053 COMPREHEN METABOLIC PANEL: CPT | Performed by: PSYCHIATRY & NEUROLOGY

## 2021-11-17 PROCEDURE — 80061 LIPID PANEL: CPT | Performed by: PSYCHIATRY & NEUROLOGY

## 2021-11-17 PROCEDURE — 99222 1ST HOSP IP/OBS MODERATE 55: CPT | Performed by: INTERNAL MEDICINE

## 2021-11-17 PROCEDURE — 83036 HEMOGLOBIN GLYCOSYLATED A1C: CPT | Performed by: PSYCHIATRY & NEUROLOGY

## 2021-11-17 PROCEDURE — 84703 CHORIONIC GONADOTROPIN ASSAY: CPT | Performed by: PSYCHIATRY & NEUROLOGY

## 2021-11-17 PROCEDURE — 93005 ELECTROCARDIOGRAM TRACING: CPT

## 2021-11-17 PROCEDURE — 97163 PT EVAL HIGH COMPLEX 45 MIN: CPT

## 2021-11-17 PROCEDURE — 84443 ASSAY THYROID STIM HORMONE: CPT | Performed by: PSYCHIATRY & NEUROLOGY

## 2021-11-17 PROCEDURE — 93010 ELECTROCARDIOGRAM REPORT: CPT | Performed by: INTERNAL MEDICINE

## 2021-11-17 PROCEDURE — 80307 DRUG TEST PRSMV CHEM ANLYZR: CPT | Performed by: PSYCHIATRY & NEUROLOGY

## 2021-11-17 PROCEDURE — 99222 1ST HOSP IP/OBS MODERATE 55: CPT | Performed by: PSYCHIATRY & NEUROLOGY

## 2021-11-17 RX ORDER — HYDROXYZINE 50 MG/1
50 TABLET, FILM COATED ORAL
Status: DISCONTINUED | OUTPATIENT
Start: 2021-11-17 | End: 2021-11-17

## 2021-11-17 RX ORDER — LANOLIN ALCOHOL/MO/W.PET/CERES
3 CREAM (GRAM) TOPICAL
Status: DISCONTINUED | OUTPATIENT
Start: 2021-11-17 | End: 2021-12-01 | Stop reason: HOSPADM

## 2021-11-17 RX ORDER — FUROSEMIDE 20 MG/1
20 TABLET ORAL 2 TIMES DAILY
COMMUNITY
End: 2021-12-01 | Stop reason: HOSPADM

## 2021-11-17 RX ORDER — CYANOCOBALAMIN 1000 UG/ML
1000 INJECTION INTRAMUSCULAR; SUBCUTANEOUS
Status: DISCONTINUED | OUTPATIENT
Start: 2021-11-24 | End: 2021-12-01 | Stop reason: HOSPADM

## 2021-11-17 RX ORDER — AMOXICILLIN 250 MG
1 CAPSULE ORAL DAILY PRN
Status: DISCONTINUED | OUTPATIENT
Start: 2021-11-17 | End: 2021-12-01 | Stop reason: HOSPADM

## 2021-11-17 RX ORDER — BENZTROPINE MESYLATE 1 MG/1
1 TABLET ORAL
Status: DISCONTINUED | OUTPATIENT
Start: 2021-11-17 | End: 2021-12-01 | Stop reason: HOSPADM

## 2021-11-17 RX ORDER — LORAZEPAM 1 MG/1
1 TABLET ORAL
Status: DISCONTINUED | OUTPATIENT
Start: 2021-11-17 | End: 2021-11-22

## 2021-11-17 RX ORDER — ZOLPIDEM TARTRATE 5 MG/1
10 TABLET ORAL DAILY
Status: DISCONTINUED | OUTPATIENT
Start: 2021-11-17 | End: 2021-11-17

## 2021-11-17 RX ORDER — LORAZEPAM 2 MG/ML
2 INJECTION INTRAMUSCULAR
Status: DISCONTINUED | OUTPATIENT
Start: 2021-11-17 | End: 2021-11-22

## 2021-11-17 RX ORDER — OLANZAPINE 10 MG/1
5 INJECTION, POWDER, LYOPHILIZED, FOR SOLUTION INTRAMUSCULAR
Status: DISCONTINUED | OUTPATIENT
Start: 2021-11-17 | End: 2021-12-01 | Stop reason: HOSPADM

## 2021-11-17 RX ORDER — FOLIC ACID 1 MG/1
1 TABLET ORAL DAILY
Status: DISCONTINUED | OUTPATIENT
Start: 2021-11-17 | End: 2021-11-17 | Stop reason: SDUPTHER

## 2021-11-17 RX ORDER — OLANZAPINE 5 MG/1
5 TABLET ORAL
Status: DISCONTINUED | OUTPATIENT
Start: 2021-11-17 | End: 2021-12-01 | Stop reason: HOSPADM

## 2021-11-17 RX ORDER — QUETIAPINE FUMARATE 25 MG/1
25 TABLET, FILM COATED ORAL 2 TIMES DAILY
Status: DISCONTINUED | OUTPATIENT
Start: 2021-11-17 | End: 2021-11-19

## 2021-11-17 RX ORDER — BISACODYL 10 MG
10 SUPPOSITORY, RECTAL RECTAL DAILY PRN
Status: DISCONTINUED | OUTPATIENT
Start: 2021-11-17 | End: 2021-11-17

## 2021-11-17 RX ORDER — ZOLPIDEM TARTRATE 5 MG/1
10 TABLET ORAL ONCE
Status: COMPLETED | OUTPATIENT
Start: 2021-11-17 | End: 2021-11-17

## 2021-11-17 RX ORDER — FUROSEMIDE 20 MG/1
20 TABLET ORAL 2 TIMES DAILY
Status: DISCONTINUED | OUTPATIENT
Start: 2021-11-17 | End: 2021-12-01 | Stop reason: HOSPADM

## 2021-11-17 RX ORDER — ERGOCALCIFEROL 1.25 MG/1
50000 CAPSULE ORAL 2 TIMES WEEKLY
Status: DISCONTINUED | OUTPATIENT
Start: 2021-11-18 | End: 2021-12-01 | Stop reason: HOSPADM

## 2021-11-17 RX ORDER — LEVOTHYROXINE SODIUM 0.05 MG/1
50 TABLET ORAL
Status: DISCONTINUED | OUTPATIENT
Start: 2021-11-17 | End: 2021-12-01 | Stop reason: HOSPADM

## 2021-11-17 RX ORDER — ACETAMINOPHEN 325 MG/1
650 TABLET ORAL EVERY 4 HOURS PRN
Status: DISCONTINUED | OUTPATIENT
Start: 2021-11-17 | End: 2021-12-01 | Stop reason: HOSPADM

## 2021-11-17 RX ORDER — PRAZOSIN HYDROCHLORIDE 1 MG/1
2 CAPSULE ORAL
Status: DISCONTINUED | OUTPATIENT
Start: 2021-11-17 | End: 2021-11-27

## 2021-11-17 RX ORDER — POLYETHYLENE GLYCOL 3350 17 G/17G
17 POWDER, FOR SOLUTION ORAL DAILY
Status: DISCONTINUED | OUTPATIENT
Start: 2021-11-17 | End: 2021-12-01 | Stop reason: HOSPADM

## 2021-11-17 RX ORDER — BISACODYL 10 MG
10 SUPPOSITORY, RECTAL RECTAL DAILY PRN
Status: DISCONTINUED | OUTPATIENT
Start: 2021-11-17 | End: 2021-12-01 | Stop reason: HOSPADM

## 2021-11-17 RX ORDER — OLANZAPINE 10 MG/1
10 INJECTION, POWDER, LYOPHILIZED, FOR SOLUTION INTRAMUSCULAR
Status: DISCONTINUED | OUTPATIENT
Start: 2021-11-17 | End: 2021-12-01 | Stop reason: HOSPADM

## 2021-11-17 RX ORDER — LANOLIN ALCOHOL/MO/W.PET/CERES
100 CREAM (GRAM) TOPICAL DAILY
Status: DISCONTINUED | OUTPATIENT
Start: 2021-11-17 | End: 2021-11-17 | Stop reason: SDUPTHER

## 2021-11-17 RX ORDER — MINERAL OIL AND PETROLATUM 150; 830 MG/G; MG/G
1 OINTMENT OPHTHALMIC
Status: DISCONTINUED | OUTPATIENT
Start: 2021-11-17 | End: 2021-12-01 | Stop reason: HOSPADM

## 2021-11-17 RX ORDER — OLANZAPINE 10 MG/1
10 TABLET ORAL
Status: DISCONTINUED | OUTPATIENT
Start: 2021-11-17 | End: 2021-12-01 | Stop reason: HOSPADM

## 2021-11-17 RX ORDER — LORAZEPAM 0.5 MG/1
0.5 TABLET ORAL EVERY 8 HOURS PRN
Status: DISCONTINUED | OUTPATIENT
Start: 2021-11-17 | End: 2021-11-22

## 2021-11-17 RX ORDER — PRAZOSIN HYDROCHLORIDE 1 MG/1
2 CAPSULE ORAL
Status: DISCONTINUED | OUTPATIENT
Start: 2021-11-17 | End: 2021-11-17

## 2021-11-17 RX ORDER — LANOLIN ALCOHOL/MO/W.PET/CERES
100 CREAM (GRAM) TOPICAL DAILY
Status: DISCONTINUED | OUTPATIENT
Start: 2021-11-17 | End: 2021-12-01 | Stop reason: HOSPADM

## 2021-11-17 RX ORDER — MIRTAZAPINE 7.5 MG/1
7.5 TABLET, FILM COATED ORAL
Status: DISCONTINUED | OUTPATIENT
Start: 2021-11-17 | End: 2021-11-23

## 2021-11-17 RX ORDER — OLANZAPINE 2.5 MG/1
2.5 TABLET ORAL
Status: DISCONTINUED | OUTPATIENT
Start: 2021-11-17 | End: 2021-12-01 | Stop reason: HOSPADM

## 2021-11-17 RX ORDER — TOPIRAMATE 25 MG/1
25 TABLET ORAL DAILY
Status: DISCONTINUED | OUTPATIENT
Start: 2021-11-17 | End: 2021-12-01 | Stop reason: HOSPADM

## 2021-11-17 RX ORDER — LORAZEPAM 0.5 MG/1
0.25 TABLET ORAL 2 TIMES DAILY
Status: COMPLETED | OUTPATIENT
Start: 2021-11-17 | End: 2021-11-19

## 2021-11-17 RX ORDER — LORAZEPAM 2 MG/ML
1 INJECTION INTRAMUSCULAR EVERY 4 HOURS PRN
Status: DISCONTINUED | OUTPATIENT
Start: 2021-11-17 | End: 2021-11-22

## 2021-11-17 RX ORDER — ACETAMINOPHEN 325 MG/1
975 TABLET ORAL EVERY 6 HOURS PRN
Status: DISCONTINUED | OUTPATIENT
Start: 2021-11-17 | End: 2021-12-01 | Stop reason: HOSPADM

## 2021-11-17 RX ORDER — MAGNESIUM HYDROXIDE/ALUMINUM HYDROXICE/SIMETHICONE 120; 1200; 1200 MG/30ML; MG/30ML; MG/30ML
30 SUSPENSION ORAL EVERY 4 HOURS PRN
Status: DISCONTINUED | OUTPATIENT
Start: 2021-11-17 | End: 2021-12-01 | Stop reason: HOSPADM

## 2021-11-17 RX ORDER — ZOLPIDEM TARTRATE 5 MG/1
10 TABLET ORAL
Status: DISCONTINUED | OUTPATIENT
Start: 2021-11-17 | End: 2021-11-24

## 2021-11-17 RX ORDER — METHOCARBAMOL 500 MG/1
500 TABLET, FILM COATED ORAL EVERY 8 HOURS PRN
Status: DISCONTINUED | OUTPATIENT
Start: 2021-11-17 | End: 2021-12-01 | Stop reason: HOSPADM

## 2021-11-17 RX ORDER — HYDROXYZINE HYDROCHLORIDE 25 MG/1
25 TABLET, FILM COATED ORAL
Status: DISCONTINUED | OUTPATIENT
Start: 2021-11-17 | End: 2021-11-17

## 2021-11-17 RX ORDER — ACETAMINOPHEN 325 MG/1
650 TABLET ORAL EVERY 6 HOURS PRN
Status: DISCONTINUED | OUTPATIENT
Start: 2021-11-17 | End: 2021-12-01 | Stop reason: HOSPADM

## 2021-11-17 RX ORDER — METHOCARBAMOL 500 MG/1
500 TABLET, FILM COATED ORAL 3 TIMES DAILY
Status: DISCONTINUED | OUTPATIENT
Start: 2021-11-17 | End: 2021-12-01 | Stop reason: HOSPADM

## 2021-11-17 RX ORDER — BENZTROPINE MESYLATE 1 MG/ML
1 INJECTION INTRAMUSCULAR; INTRAVENOUS
Status: DISCONTINUED | OUTPATIENT
Start: 2021-11-17 | End: 2021-12-01 | Stop reason: HOSPADM

## 2021-11-17 RX ORDER — GABAPENTIN 400 MG/1
400 CAPSULE ORAL 3 TIMES DAILY
Status: DISCONTINUED | OUTPATIENT
Start: 2021-11-17 | End: 2021-11-20

## 2021-11-17 RX ORDER — LEVETIRACETAM 500 MG/1
500 TABLET ORAL EVERY 12 HOURS SCHEDULED
Status: DISCONTINUED | OUTPATIENT
Start: 2021-11-17 | End: 2021-12-01 | Stop reason: HOSPADM

## 2021-11-17 RX ORDER — FOLIC ACID 1 MG/1
1 TABLET ORAL DAILY
Status: DISCONTINUED | OUTPATIENT
Start: 2021-11-17 | End: 2021-12-01 | Stop reason: HOSPADM

## 2021-11-17 RX ORDER — VENLAFAXINE HYDROCHLORIDE 150 MG/1
150 CAPSULE, EXTENDED RELEASE ORAL DAILY
Status: DISCONTINUED | OUTPATIENT
Start: 2021-11-18 | End: 2021-12-01 | Stop reason: HOSPADM

## 2021-11-17 RX ORDER — LORAZEPAM 0.5 MG/1
0.5 TABLET ORAL 2 TIMES DAILY
Status: DISCONTINUED | OUTPATIENT
Start: 2021-11-17 | End: 2021-11-17

## 2021-11-17 RX ORDER — POTASSIUM CHLORIDE 750 MG/1
10 TABLET, EXTENDED RELEASE ORAL 2 TIMES DAILY
Status: DISCONTINUED | OUTPATIENT
Start: 2021-11-17 | End: 2021-12-01 | Stop reason: HOSPADM

## 2021-11-17 RX ADMIN — FOLIC ACID 1 MG: 1 TABLET ORAL at 08:23

## 2021-11-17 RX ADMIN — LORAZEPAM 0.25 MG: 0.5 TABLET ORAL at 16:57

## 2021-11-17 RX ADMIN — ACETAMINOPHEN 650 MG: 325 TABLET ORAL at 15:15

## 2021-11-17 RX ADMIN — ZOLPIDEM TARTRATE 10 MG: 5 TABLET ORAL at 21:19

## 2021-11-17 RX ADMIN — POTASSIUM CHLORIDE 10 MEQ: 750 TABLET, EXTENDED RELEASE ORAL at 17:00

## 2021-11-17 RX ADMIN — LEVETIRACETAM 500 MG: 500 TABLET, FILM COATED ORAL at 08:23

## 2021-11-17 RX ADMIN — GABAPENTIN 400 MG: 400 CAPSULE ORAL at 08:23

## 2021-11-17 RX ADMIN — GABAPENTIN 400 MG: 400 CAPSULE ORAL at 16:58

## 2021-11-17 RX ADMIN — ENOXAPARIN SODIUM 40 MG: 40 INJECTION SUBCUTANEOUS at 08:19

## 2021-11-17 RX ADMIN — FUROSEMIDE 20 MG: 20 TABLET ORAL at 11:30

## 2021-11-17 RX ADMIN — FUROSEMIDE 20 MG: 20 TABLET ORAL at 17:00

## 2021-11-17 RX ADMIN — TOPIRAMATE 25 MG: 25 TABLET, FILM COATED ORAL at 08:23

## 2021-11-17 RX ADMIN — METHOCARBAMOL TABLETS 500 MG: 500 TABLET, COATED ORAL at 08:38

## 2021-11-17 RX ADMIN — PRAZOSIN HYDROCHLORIDE 2 MG: 1 CAPSULE ORAL at 21:19

## 2021-11-17 RX ADMIN — METHOCARBAMOL TABLETS 500 MG: 500 TABLET, COATED ORAL at 21:17

## 2021-11-17 RX ADMIN — GABAPENTIN 400 MG: 400 CAPSULE ORAL at 21:16

## 2021-11-17 RX ADMIN — QUETIAPINE FUMARATE 25 MG: 25 TABLET ORAL at 11:30

## 2021-11-17 RX ADMIN — THIAMINE HCL TAB 100 MG 100 MG: 100 TAB at 08:23

## 2021-11-17 RX ADMIN — LEVETIRACETAM 500 MG: 500 TABLET, FILM COATED ORAL at 21:16

## 2021-11-17 RX ADMIN — VENLAFAXINE HYDROCHLORIDE 112.5 MG: 37.5 CAPSULE, EXTENDED RELEASE ORAL at 08:22

## 2021-11-17 RX ADMIN — MAGNESIUM OXIDE TAB 400 MG (241.3 MG ELEMENTAL MG) 400 MG: 400 (241.3 MG) TAB at 17:00

## 2021-11-17 RX ADMIN — ZOLPIDEM TARTRATE 10 MG: 5 TABLET ORAL at 04:12

## 2021-11-17 RX ADMIN — LORAZEPAM 0.5 MG: 0.5 TABLET ORAL at 08:23

## 2021-11-17 RX ADMIN — QUETIAPINE FUMARATE 25 MG: 25 TABLET ORAL at 17:00

## 2021-11-17 RX ADMIN — POTASSIUM CHLORIDE 10 MEQ: 750 TABLET, EXTENDED RELEASE ORAL at 11:30

## 2021-11-17 RX ADMIN — MAGNESIUM OXIDE TAB 400 MG (241.3 MG ELEMENTAL MG) 400 MG: 400 (241.3 MG) TAB at 11:30

## 2021-11-17 RX ADMIN — MIRTAZAPINE 7.5 MG: 7.5 TABLET, FILM COATED ORAL at 21:18

## 2021-11-17 RX ADMIN — METHOCARBAMOL TABLETS 500 MG: 500 TABLET, COATED ORAL at 16:58

## 2021-11-18 LAB
GLUCOSE SERPL-MCNC: 129 MG/DL (ref 65–140)
GLUCOSE SERPL-MCNC: 52 MG/DL (ref 65–140)
RPR SER QL: NORMAL

## 2021-11-18 PROCEDURE — 99232 SBSQ HOSP IP/OBS MODERATE 35: CPT | Performed by: PSYCHIATRY & NEUROLOGY

## 2021-11-18 PROCEDURE — 82948 REAGENT STRIP/BLOOD GLUCOSE: CPT

## 2021-11-18 RX ADMIN — TOPIRAMATE 25 MG: 25 TABLET, FILM COATED ORAL at 08:31

## 2021-11-18 RX ADMIN — MIRTAZAPINE 7.5 MG: 7.5 TABLET, FILM COATED ORAL at 21:26

## 2021-11-18 RX ADMIN — FOLIC ACID 1 MG: 1 TABLET ORAL at 08:31

## 2021-11-18 RX ADMIN — FUROSEMIDE 20 MG: 20 TABLET ORAL at 08:31

## 2021-11-18 RX ADMIN — ENOXAPARIN SODIUM 40 MG: 40 INJECTION SUBCUTANEOUS at 08:32

## 2021-11-18 RX ADMIN — GABAPENTIN 400 MG: 400 CAPSULE ORAL at 21:26

## 2021-11-18 RX ADMIN — MAGNESIUM OXIDE TAB 400 MG (241.3 MG ELEMENTAL MG) 400 MG: 400 (241.3 MG) TAB at 18:33

## 2021-11-18 RX ADMIN — QUETIAPINE FUMARATE 25 MG: 25 TABLET ORAL at 08:32

## 2021-11-18 RX ADMIN — PRAZOSIN HYDROCHLORIDE 2 MG: 1 CAPSULE ORAL at 21:26

## 2021-11-18 RX ADMIN — LORAZEPAM 0.25 MG: 0.5 TABLET ORAL at 16:43

## 2021-11-18 RX ADMIN — METHOCARBAMOL TABLETS 500 MG: 500 TABLET, COATED ORAL at 21:26

## 2021-11-18 RX ADMIN — THIAMINE HCL TAB 100 MG 100 MG: 100 TAB at 08:32

## 2021-11-18 RX ADMIN — ZOLPIDEM TARTRATE 10 MG: 5 TABLET ORAL at 21:26

## 2021-11-18 RX ADMIN — LEVETIRACETAM 500 MG: 500 TABLET, FILM COATED ORAL at 21:26

## 2021-11-18 RX ADMIN — METHOCARBAMOL TABLETS 500 MG: 500 TABLET, COATED ORAL at 16:41

## 2021-11-18 RX ADMIN — LEVOTHYROXINE SODIUM 50 MCG: 50 TABLET ORAL at 05:54

## 2021-11-18 RX ADMIN — ERGOCALCIFEROL 50000 UNITS: 1.25 CAPSULE ORAL at 08:32

## 2021-11-18 RX ADMIN — METHOCARBAMOL TABLETS 500 MG: 500 TABLET, COATED ORAL at 08:31

## 2021-11-18 RX ADMIN — LORAZEPAM 0.25 MG: 0.5 TABLET ORAL at 08:31

## 2021-11-18 RX ADMIN — LEVETIRACETAM 500 MG: 500 TABLET, FILM COATED ORAL at 08:31

## 2021-11-18 RX ADMIN — POTASSIUM CHLORIDE 10 MEQ: 750 TABLET, EXTENDED RELEASE ORAL at 08:31

## 2021-11-18 RX ADMIN — MAGNESIUM OXIDE TAB 400 MG (241.3 MG ELEMENTAL MG) 400 MG: 400 (241.3 MG) TAB at 08:32

## 2021-11-18 RX ADMIN — GABAPENTIN 400 MG: 400 CAPSULE ORAL at 16:41

## 2021-11-18 RX ADMIN — GABAPENTIN 400 MG: 400 CAPSULE ORAL at 08:31

## 2021-11-18 RX ADMIN — ACETAMINOPHEN 975 MG: 325 TABLET ORAL at 11:07

## 2021-11-18 RX ADMIN — VENLAFAXINE HYDROCHLORIDE 150 MG: 150 CAPSULE, EXTENDED RELEASE ORAL at 08:31

## 2021-11-18 RX ADMIN — QUETIAPINE FUMARATE 25 MG: 25 TABLET ORAL at 18:33

## 2021-11-18 RX ADMIN — POTASSIUM CHLORIDE 10 MEQ: 750 TABLET, EXTENDED RELEASE ORAL at 18:33

## 2021-11-19 PROCEDURE — 99232 SBSQ HOSP IP/OBS MODERATE 35: CPT | Performed by: PSYCHIATRY & NEUROLOGY

## 2021-11-19 RX ORDER — QUETIAPINE FUMARATE 25 MG/1
25 TABLET, FILM COATED ORAL DAILY
Status: DISCONTINUED | OUTPATIENT
Start: 2021-11-20 | End: 2021-11-22

## 2021-11-19 RX ORDER — QUETIAPINE FUMARATE 50 MG/1
50 TABLET, FILM COATED ORAL
Status: DISCONTINUED | OUTPATIENT
Start: 2021-11-19 | End: 2021-11-22

## 2021-11-19 RX ADMIN — MAGNESIUM OXIDE TAB 400 MG (241.3 MG ELEMENTAL MG) 400 MG: 400 (241.3 MG) TAB at 17:11

## 2021-11-19 RX ADMIN — TOPIRAMATE 25 MG: 25 TABLET, FILM COATED ORAL at 08:57

## 2021-11-19 RX ADMIN — GABAPENTIN 400 MG: 400 CAPSULE ORAL at 21:18

## 2021-11-19 RX ADMIN — QUETIAPINE FUMARATE 50 MG: 50 TABLET ORAL at 21:20

## 2021-11-19 RX ADMIN — PRAZOSIN HYDROCHLORIDE 2 MG: 1 CAPSULE ORAL at 21:19

## 2021-11-19 RX ADMIN — MAGNESIUM OXIDE TAB 400 MG (241.3 MG ELEMENTAL MG) 400 MG: 400 (241.3 MG) TAB at 08:56

## 2021-11-19 RX ADMIN — LEVETIRACETAM 500 MG: 500 TABLET, FILM COATED ORAL at 21:18

## 2021-11-19 RX ADMIN — ENOXAPARIN SODIUM 40 MG: 40 INJECTION SUBCUTANEOUS at 08:57

## 2021-11-19 RX ADMIN — VENLAFAXINE HYDROCHLORIDE 150 MG: 150 CAPSULE, EXTENDED RELEASE ORAL at 08:56

## 2021-11-19 RX ADMIN — GABAPENTIN 400 MG: 400 CAPSULE ORAL at 08:57

## 2021-11-19 RX ADMIN — QUETIAPINE FUMARATE 25 MG: 25 TABLET ORAL at 08:56

## 2021-11-19 RX ADMIN — FUROSEMIDE 20 MG: 20 TABLET ORAL at 17:11

## 2021-11-19 RX ADMIN — POTASSIUM CHLORIDE 10 MEQ: 750 TABLET, EXTENDED RELEASE ORAL at 08:56

## 2021-11-19 RX ADMIN — MIRTAZAPINE 7.5 MG: 7.5 TABLET, FILM COATED ORAL at 21:18

## 2021-11-19 RX ADMIN — ZOLPIDEM TARTRATE 10 MG: 5 TABLET ORAL at 21:18

## 2021-11-19 RX ADMIN — LEVOTHYROXINE SODIUM 50 MCG: 50 TABLET ORAL at 06:08

## 2021-11-19 RX ADMIN — THIAMINE HCL TAB 100 MG 100 MG: 100 TAB at 08:57

## 2021-11-19 RX ADMIN — METHOCARBAMOL TABLETS 500 MG: 500 TABLET, COATED ORAL at 08:56

## 2021-11-19 RX ADMIN — LORAZEPAM 0.25 MG: 0.5 TABLET ORAL at 08:56

## 2021-11-19 RX ADMIN — POTASSIUM CHLORIDE 10 MEQ: 750 TABLET, EXTENDED RELEASE ORAL at 17:11

## 2021-11-19 RX ADMIN — METHOCARBAMOL TABLETS 500 MG: 500 TABLET, COATED ORAL at 17:00

## 2021-11-19 RX ADMIN — METHOCARBAMOL TABLETS 500 MG: 500 TABLET, COATED ORAL at 21:18

## 2021-11-19 RX ADMIN — LEVETIRACETAM 500 MG: 500 TABLET, FILM COATED ORAL at 08:57

## 2021-11-19 RX ADMIN — GABAPENTIN 400 MG: 400 CAPSULE ORAL at 17:00

## 2021-11-19 RX ADMIN — FOLIC ACID 1 MG: 1 TABLET ORAL at 08:57

## 2021-11-19 RX ADMIN — LORAZEPAM 1 MG: 1 TABLET ORAL at 17:20

## 2021-11-19 RX ADMIN — ACETAMINOPHEN 975 MG: 325 TABLET ORAL at 12:05

## 2021-11-20 PROCEDURE — 99232 SBSQ HOSP IP/OBS MODERATE 35: CPT | Performed by: PHYSICIAN ASSISTANT

## 2021-11-20 RX ORDER — BACITRACIN, NEOMYCIN, POLYMYXIN B 400; 3.5; 5 [USP'U]/G; MG/G; [USP'U]/G
1 OINTMENT TOPICAL 2 TIMES DAILY
Status: DISCONTINUED | OUTPATIENT
Start: 2021-11-20 | End: 2021-11-30

## 2021-11-20 RX ORDER — GABAPENTIN 300 MG/1
600 CAPSULE ORAL 3 TIMES DAILY
Status: DISCONTINUED | OUTPATIENT
Start: 2021-11-20 | End: 2021-11-23

## 2021-11-20 RX ADMIN — LEVETIRACETAM 500 MG: 500 TABLET, FILM COATED ORAL at 09:16

## 2021-11-20 RX ADMIN — MAGNESIUM OXIDE TAB 400 MG (241.3 MG ELEMENTAL MG) 400 MG: 400 (241.3 MG) TAB at 09:16

## 2021-11-20 RX ADMIN — POTASSIUM CHLORIDE 10 MEQ: 750 TABLET, EXTENDED RELEASE ORAL at 17:12

## 2021-11-20 RX ADMIN — VENLAFAXINE HYDROCHLORIDE 150 MG: 150 CAPSULE, EXTENDED RELEASE ORAL at 09:17

## 2021-11-20 RX ADMIN — BACITRACIN ZINC, NEOMYCIN, POLYMYXIN B 1 SMALL APPLICATION: 400; 3.5; 5 OINTMENT TOPICAL at 10:42

## 2021-11-20 RX ADMIN — LEVOTHYROXINE SODIUM 50 MCG: 50 TABLET ORAL at 06:49

## 2021-11-20 RX ADMIN — THIAMINE HCL TAB 100 MG 100 MG: 100 TAB at 09:15

## 2021-11-20 RX ADMIN — LORAZEPAM 0.5 MG: 0.5 TABLET ORAL at 14:54

## 2021-11-20 RX ADMIN — FOLIC ACID 1 MG: 1 TABLET ORAL at 09:16

## 2021-11-20 RX ADMIN — POTASSIUM CHLORIDE 10 MEQ: 750 TABLET, EXTENDED RELEASE ORAL at 09:17

## 2021-11-20 RX ADMIN — QUETIAPINE FUMARATE 25 MG: 25 TABLET ORAL at 09:17

## 2021-11-20 RX ADMIN — METHOCARBAMOL TABLETS 500 MG: 500 TABLET, COATED ORAL at 21:05

## 2021-11-20 RX ADMIN — GABAPENTIN 600 MG: 300 CAPSULE ORAL at 21:05

## 2021-11-20 RX ADMIN — LEVETIRACETAM 500 MG: 500 TABLET, FILM COATED ORAL at 21:05

## 2021-11-20 RX ADMIN — MIRTAZAPINE 7.5 MG: 7.5 TABLET, FILM COATED ORAL at 21:05

## 2021-11-20 RX ADMIN — GABAPENTIN 600 MG: 300 CAPSULE ORAL at 15:01

## 2021-11-20 RX ADMIN — MAGNESIUM OXIDE TAB 400 MG (241.3 MG ELEMENTAL MG) 400 MG: 400 (241.3 MG) TAB at 17:13

## 2021-11-20 RX ADMIN — TOPIRAMATE 25 MG: 25 TABLET, FILM COATED ORAL at 09:16

## 2021-11-20 RX ADMIN — ZOLPIDEM TARTRATE 10 MG: 5 TABLET ORAL at 21:05

## 2021-11-20 RX ADMIN — ACETAMINOPHEN 975 MG: 325 TABLET ORAL at 10:52

## 2021-11-20 RX ADMIN — PRAZOSIN HYDROCHLORIDE 2 MG: 1 CAPSULE ORAL at 21:05

## 2021-11-20 RX ADMIN — ENOXAPARIN SODIUM 40 MG: 40 INJECTION SUBCUTANEOUS at 09:17

## 2021-11-20 RX ADMIN — METHOCARBAMOL TABLETS 500 MG: 500 TABLET, COATED ORAL at 09:15

## 2021-11-20 RX ADMIN — BACITRACIN ZINC, NEOMYCIN, POLYMYXIN B 1 SMALL APPLICATION: 400; 3.5; 5 OINTMENT TOPICAL at 17:11

## 2021-11-20 RX ADMIN — METHOCARBAMOL TABLETS 500 MG: 500 TABLET, COATED ORAL at 15:01

## 2021-11-20 RX ADMIN — FUROSEMIDE 20 MG: 20 TABLET ORAL at 09:16

## 2021-11-20 RX ADMIN — GABAPENTIN 400 MG: 400 CAPSULE ORAL at 09:16

## 2021-11-20 RX ADMIN — QUETIAPINE FUMARATE 50 MG: 50 TABLET ORAL at 21:05

## 2021-11-21 PROCEDURE — 99232 SBSQ HOSP IP/OBS MODERATE 35: CPT | Performed by: PHYSICIAN ASSISTANT

## 2021-11-21 RX ADMIN — LEVETIRACETAM 500 MG: 500 TABLET, FILM COATED ORAL at 08:14

## 2021-11-21 RX ADMIN — GABAPENTIN 600 MG: 300 CAPSULE ORAL at 08:14

## 2021-11-21 RX ADMIN — METHOCARBAMOL TABLETS 500 MG: 500 TABLET, COATED ORAL at 21:15

## 2021-11-21 RX ADMIN — LORAZEPAM 1 MG: 1 TABLET ORAL at 09:25

## 2021-11-21 RX ADMIN — VENLAFAXINE HYDROCHLORIDE 150 MG: 150 CAPSULE, EXTENDED RELEASE ORAL at 08:14

## 2021-11-21 RX ADMIN — ENOXAPARIN SODIUM 40 MG: 40 INJECTION SUBCUTANEOUS at 08:14

## 2021-11-21 RX ADMIN — MAGNESIUM OXIDE TAB 400 MG (241.3 MG ELEMENTAL MG) 400 MG: 400 (241.3 MG) TAB at 08:14

## 2021-11-21 RX ADMIN — BACITRACIN ZINC, NEOMYCIN, POLYMYXIN B 1 SMALL APPLICATION: 400; 3.5; 5 OINTMENT TOPICAL at 17:01

## 2021-11-21 RX ADMIN — FOLIC ACID 1 MG: 1 TABLET ORAL at 08:15

## 2021-11-21 RX ADMIN — FUROSEMIDE 20 MG: 20 TABLET ORAL at 08:15

## 2021-11-21 RX ADMIN — METHOCARBAMOL TABLETS 500 MG: 500 TABLET, COATED ORAL at 16:56

## 2021-11-21 RX ADMIN — ZOLPIDEM TARTRATE 10 MG: 5 TABLET ORAL at 21:12

## 2021-11-21 RX ADMIN — MAGNESIUM OXIDE TAB 400 MG (241.3 MG ELEMENTAL MG) 400 MG: 400 (241.3 MG) TAB at 17:01

## 2021-11-21 RX ADMIN — PRAZOSIN HYDROCHLORIDE 2 MG: 1 CAPSULE ORAL at 21:16

## 2021-11-21 RX ADMIN — TOPIRAMATE 25 MG: 25 TABLET, FILM COATED ORAL at 08:14

## 2021-11-21 RX ADMIN — GABAPENTIN 600 MG: 300 CAPSULE ORAL at 21:17

## 2021-11-21 RX ADMIN — QUETIAPINE FUMARATE 25 MG: 25 TABLET ORAL at 08:14

## 2021-11-21 RX ADMIN — GABAPENTIN 600 MG: 300 CAPSULE ORAL at 16:56

## 2021-11-21 RX ADMIN — POTASSIUM CHLORIDE 10 MEQ: 750 TABLET, EXTENDED RELEASE ORAL at 08:14

## 2021-11-21 RX ADMIN — LEVETIRACETAM 500 MG: 500 TABLET, FILM COATED ORAL at 21:12

## 2021-11-21 RX ADMIN — POTASSIUM CHLORIDE 10 MEQ: 750 TABLET, EXTENDED RELEASE ORAL at 17:01

## 2021-11-21 RX ADMIN — THIAMINE HCL TAB 100 MG 100 MG: 100 TAB at 08:14

## 2021-11-21 RX ADMIN — LEVOTHYROXINE SODIUM 50 MCG: 50 TABLET ORAL at 06:12

## 2021-11-21 RX ADMIN — ACETAMINOPHEN 975 MG: 325 TABLET ORAL at 11:46

## 2021-11-21 RX ADMIN — LORAZEPAM 1 MG: 1 TABLET ORAL at 18:55

## 2021-11-21 RX ADMIN — MIRTAZAPINE 7.5 MG: 7.5 TABLET, FILM COATED ORAL at 21:16

## 2021-11-21 RX ADMIN — QUETIAPINE FUMARATE 50 MG: 50 TABLET ORAL at 21:15

## 2021-11-21 RX ADMIN — BACITRACIN ZINC, NEOMYCIN, POLYMYXIN B 1 SMALL APPLICATION: 400; 3.5; 5 OINTMENT TOPICAL at 08:15

## 2021-11-21 RX ADMIN — METHOCARBAMOL TABLETS 500 MG: 500 TABLET, COATED ORAL at 08:14

## 2021-11-22 LAB — LEVETIRACETAM SERPL-MCNC: 13.6 UG/ML (ref 10–40)

## 2021-11-22 PROCEDURE — 97530 THERAPEUTIC ACTIVITIES: CPT

## 2021-11-22 PROCEDURE — 99232 SBSQ HOSP IP/OBS MODERATE 35: CPT | Performed by: PSYCHIATRY & NEUROLOGY

## 2021-11-22 RX ORDER — GABAPENTIN 100 MG/1
200 CAPSULE ORAL EVERY 6 HOURS PRN
Status: DISCONTINUED | OUTPATIENT
Start: 2021-11-22 | End: 2021-12-01 | Stop reason: HOSPADM

## 2021-11-22 RX ORDER — LORAZEPAM 0.5 MG/1
0.5 TABLET ORAL 3 TIMES DAILY
Status: COMPLETED | OUTPATIENT
Start: 2021-11-22 | End: 2021-11-22

## 2021-11-22 RX ORDER — LORAZEPAM 0.5 MG/1
0.5 TABLET ORAL 2 TIMES DAILY
Status: DISCONTINUED | OUTPATIENT
Start: 2021-11-23 | End: 2021-11-24

## 2021-11-22 RX ORDER — GABAPENTIN 300 MG/1
300 CAPSULE ORAL EVERY 6 HOURS PRN
Status: DISCONTINUED | OUTPATIENT
Start: 2021-11-22 | End: 2021-12-01 | Stop reason: HOSPADM

## 2021-11-22 RX ORDER — QUETIAPINE FUMARATE 25 MG/1
25 TABLET, FILM COATED ORAL 2 TIMES DAILY
Status: DISCONTINUED | OUTPATIENT
Start: 2021-11-22 | End: 2021-12-01 | Stop reason: HOSPADM

## 2021-11-22 RX ORDER — GABAPENTIN 100 MG/1
100 CAPSULE ORAL EVERY 6 HOURS PRN
Status: DISCONTINUED | OUTPATIENT
Start: 2021-11-22 | End: 2021-12-01 | Stop reason: HOSPADM

## 2021-11-22 RX ORDER — QUETIAPINE FUMARATE 100 MG/1
100 TABLET, FILM COATED ORAL
Status: DISCONTINUED | OUTPATIENT
Start: 2021-11-22 | End: 2021-11-24

## 2021-11-22 RX ORDER — LORAZEPAM 2 MG/ML
1 INJECTION INTRAMUSCULAR
Status: DISCONTINUED | OUTPATIENT
Start: 2021-11-22 | End: 2021-12-01 | Stop reason: HOSPADM

## 2021-11-22 RX ADMIN — BACITRACIN ZINC, NEOMYCIN, POLYMYXIN B 1 SMALL APPLICATION: 400; 3.5; 5 OINTMENT TOPICAL at 08:08

## 2021-11-22 RX ADMIN — ENOXAPARIN SODIUM 40 MG: 40 INJECTION SUBCUTANEOUS at 08:08

## 2021-11-22 RX ADMIN — METHOCARBAMOL TABLETS 500 MG: 500 TABLET, COATED ORAL at 17:09

## 2021-11-22 RX ADMIN — BACITRACIN ZINC, NEOMYCIN, POLYMYXIN B 1 SMALL APPLICATION: 400; 3.5; 5 OINTMENT TOPICAL at 17:10

## 2021-11-22 RX ADMIN — MAGNESIUM OXIDE TAB 400 MG (241.3 MG ELEMENTAL MG) 400 MG: 400 (241.3 MG) TAB at 17:10

## 2021-11-22 RX ADMIN — THIAMINE HCL TAB 100 MG 100 MG: 100 TAB at 08:09

## 2021-11-22 RX ADMIN — METHOCARBAMOL TABLETS 500 MG: 500 TABLET, COATED ORAL at 21:18

## 2021-11-22 RX ADMIN — GABAPENTIN 600 MG: 300 CAPSULE ORAL at 17:09

## 2021-11-22 RX ADMIN — POTASSIUM CHLORIDE 10 MEQ: 750 TABLET, EXTENDED RELEASE ORAL at 08:09

## 2021-11-22 RX ADMIN — POTASSIUM CHLORIDE 10 MEQ: 750 TABLET, EXTENDED RELEASE ORAL at 17:09

## 2021-11-22 RX ADMIN — MAGNESIUM OXIDE TAB 400 MG (241.3 MG ELEMENTAL MG) 400 MG: 400 (241.3 MG) TAB at 08:08

## 2021-11-22 RX ADMIN — QUETIAPINE FUMARATE 100 MG: 100 TABLET ORAL at 21:18

## 2021-11-22 RX ADMIN — LORAZEPAM 0.5 MG: 0.5 TABLET ORAL at 21:18

## 2021-11-22 RX ADMIN — FOLIC ACID 1 MG: 1 TABLET ORAL at 08:08

## 2021-11-22 RX ADMIN — LEVETIRACETAM 500 MG: 500 TABLET, FILM COATED ORAL at 21:18

## 2021-11-22 RX ADMIN — LEVETIRACETAM 500 MG: 500 TABLET, FILM COATED ORAL at 08:09

## 2021-11-22 RX ADMIN — QUETIAPINE FUMARATE 25 MG: 25 TABLET ORAL at 17:09

## 2021-11-22 RX ADMIN — TOPIRAMATE 25 MG: 25 TABLET, FILM COATED ORAL at 08:08

## 2021-11-22 RX ADMIN — LEVOTHYROXINE SODIUM 50 MCG: 50 TABLET ORAL at 06:39

## 2021-11-22 RX ADMIN — QUETIAPINE FUMARATE 25 MG: 25 TABLET ORAL at 08:09

## 2021-11-22 RX ADMIN — LORAZEPAM 0.5 MG: 0.5 TABLET ORAL at 17:09

## 2021-11-22 RX ADMIN — ERGOCALCIFEROL 50000 UNITS: 1.25 CAPSULE ORAL at 08:11

## 2021-11-22 RX ADMIN — GABAPENTIN 600 MG: 300 CAPSULE ORAL at 21:17

## 2021-11-22 RX ADMIN — ZOLPIDEM TARTRATE 10 MG: 5 TABLET ORAL at 21:18

## 2021-11-22 RX ADMIN — LORAZEPAM 0.5 MG: 0.5 TABLET ORAL at 09:26

## 2021-11-22 RX ADMIN — VENLAFAXINE HYDROCHLORIDE 150 MG: 150 CAPSULE, EXTENDED RELEASE ORAL at 08:09

## 2021-11-22 RX ADMIN — METHOCARBAMOL TABLETS 500 MG: 500 TABLET, COATED ORAL at 08:09

## 2021-11-22 RX ADMIN — GABAPENTIN 600 MG: 300 CAPSULE ORAL at 08:09

## 2021-11-22 RX ADMIN — MIRTAZAPINE 7.5 MG: 7.5 TABLET, FILM COATED ORAL at 21:18

## 2021-11-22 RX ADMIN — PRAZOSIN HYDROCHLORIDE 2 MG: 1 CAPSULE ORAL at 21:17

## 2021-11-23 PROCEDURE — 99232 SBSQ HOSP IP/OBS MODERATE 35: CPT | Performed by: PSYCHIATRY & NEUROLOGY

## 2021-11-23 PROCEDURE — 97167 OT EVAL HIGH COMPLEX 60 MIN: CPT

## 2021-11-23 RX ORDER — GABAPENTIN 300 MG/1
600 CAPSULE ORAL 3 TIMES DAILY
Status: DISCONTINUED | OUTPATIENT
Start: 2021-11-23 | End: 2021-11-29

## 2021-11-23 RX ORDER — MIRTAZAPINE 15 MG/1
15 TABLET, FILM COATED ORAL
Status: DISCONTINUED | OUTPATIENT
Start: 2021-11-23 | End: 2021-12-01 | Stop reason: HOSPADM

## 2021-11-23 RX ORDER — GABAPENTIN 300 MG/1
300 CAPSULE ORAL DAILY
Status: DISCONTINUED | OUTPATIENT
Start: 2021-11-23 | End: 2021-11-29

## 2021-11-23 RX ADMIN — ZOLPIDEM TARTRATE 10 MG: 5 TABLET ORAL at 21:14

## 2021-11-23 RX ADMIN — METHOCARBAMOL TABLETS 500 MG: 500 TABLET, COATED ORAL at 21:15

## 2021-11-23 RX ADMIN — QUETIAPINE FUMARATE 100 MG: 100 TABLET ORAL at 21:15

## 2021-11-23 RX ADMIN — POTASSIUM CHLORIDE 10 MEQ: 750 TABLET, EXTENDED RELEASE ORAL at 08:15

## 2021-11-23 RX ADMIN — FOLIC ACID 1 MG: 1 TABLET ORAL at 08:14

## 2021-11-23 RX ADMIN — LORAZEPAM 0.5 MG: 0.5 TABLET ORAL at 08:15

## 2021-11-23 RX ADMIN — VENLAFAXINE HYDROCHLORIDE 150 MG: 150 CAPSULE, EXTENDED RELEASE ORAL at 08:15

## 2021-11-23 RX ADMIN — PRAZOSIN HYDROCHLORIDE 2 MG: 1 CAPSULE ORAL at 21:14

## 2021-11-23 RX ADMIN — QUETIAPINE FUMARATE 25 MG: 25 TABLET ORAL at 08:14

## 2021-11-23 RX ADMIN — MAGNESIUM OXIDE TAB 400 MG (241.3 MG ELEMENTAL MG) 400 MG: 400 (241.3 MG) TAB at 08:15

## 2021-11-23 RX ADMIN — GABAPENTIN 300 MG: 300 CAPSULE ORAL at 13:12

## 2021-11-23 RX ADMIN — POTASSIUM CHLORIDE 10 MEQ: 750 TABLET, EXTENDED RELEASE ORAL at 17:11

## 2021-11-23 RX ADMIN — GABAPENTIN 600 MG: 300 CAPSULE ORAL at 21:15

## 2021-11-23 RX ADMIN — TOPIRAMATE 25 MG: 25 TABLET, FILM COATED ORAL at 08:15

## 2021-11-23 RX ADMIN — QUETIAPINE FUMARATE 25 MG: 25 TABLET ORAL at 17:12

## 2021-11-23 RX ADMIN — LEVOTHYROXINE SODIUM 50 MCG: 50 TABLET ORAL at 06:21

## 2021-11-23 RX ADMIN — METHOCARBAMOL TABLETS 500 MG: 500 TABLET, COATED ORAL at 17:11

## 2021-11-23 RX ADMIN — LEVETIRACETAM 500 MG: 500 TABLET, FILM COATED ORAL at 21:15

## 2021-11-23 RX ADMIN — MAGNESIUM OXIDE TAB 400 MG (241.3 MG ELEMENTAL MG) 400 MG: 400 (241.3 MG) TAB at 17:12

## 2021-11-23 RX ADMIN — GABAPENTIN 600 MG: 300 CAPSULE ORAL at 08:15

## 2021-11-23 RX ADMIN — GABAPENTIN 600 MG: 300 CAPSULE ORAL at 17:12

## 2021-11-23 RX ADMIN — METHOCARBAMOL TABLETS 500 MG: 500 TABLET, COATED ORAL at 08:15

## 2021-11-23 RX ADMIN — THIAMINE HCL TAB 100 MG 100 MG: 100 TAB at 08:15

## 2021-11-23 RX ADMIN — LEVETIRACETAM 500 MG: 500 TABLET, FILM COATED ORAL at 08:14

## 2021-11-23 RX ADMIN — LORAZEPAM 0.5 MG: 0.5 TABLET ORAL at 17:11

## 2021-11-23 RX ADMIN — ACETAMINOPHEN 975 MG: 325 TABLET ORAL at 14:53

## 2021-11-23 RX ADMIN — ENOXAPARIN SODIUM 40 MG: 40 INJECTION SUBCUTANEOUS at 08:14

## 2021-11-23 RX ADMIN — MIRTAZAPINE 15 MG: 15 TABLET, FILM COATED ORAL at 21:15

## 2021-11-24 PROCEDURE — 99232 SBSQ HOSP IP/OBS MODERATE 35: CPT | Performed by: PSYCHIATRY & NEUROLOGY

## 2021-11-24 RX ORDER — TRAZODONE HYDROCHLORIDE 100 MG/1
100 TABLET ORAL
Status: DISCONTINUED | OUTPATIENT
Start: 2021-11-24 | End: 2021-12-01 | Stop reason: HOSPADM

## 2021-11-24 RX ORDER — LORAZEPAM 0.5 MG/1
0.5 TABLET ORAL 2 TIMES DAILY
Status: DISCONTINUED | OUTPATIENT
Start: 2021-11-24 | End: 2021-12-01 | Stop reason: HOSPADM

## 2021-11-24 RX ORDER — LORAZEPAM 1 MG/1
1 TABLET ORAL
Status: DISCONTINUED | OUTPATIENT
Start: 2021-11-24 | End: 2021-12-01 | Stop reason: HOSPADM

## 2021-11-24 RX ADMIN — METHOCARBAMOL TABLETS 500 MG: 500 TABLET, COATED ORAL at 16:50

## 2021-11-24 RX ADMIN — FOLIC ACID 1 MG: 1 TABLET ORAL at 08:15

## 2021-11-24 RX ADMIN — VENLAFAXINE HYDROCHLORIDE 150 MG: 150 CAPSULE, EXTENDED RELEASE ORAL at 08:15

## 2021-11-24 RX ADMIN — LORAZEPAM 0.5 MG: 0.5 TABLET ORAL at 16:30

## 2021-11-24 RX ADMIN — QUETIAPINE FUMARATE 150 MG: 100 TABLET ORAL at 21:19

## 2021-11-24 RX ADMIN — LEVETIRACETAM 500 MG: 500 TABLET, FILM COATED ORAL at 08:15

## 2021-11-24 RX ADMIN — TOPIRAMATE 25 MG: 25 TABLET, FILM COATED ORAL at 08:18

## 2021-11-24 RX ADMIN — LEVOTHYROXINE SODIUM 50 MCG: 50 TABLET ORAL at 05:46

## 2021-11-24 RX ADMIN — GABAPENTIN 300 MG: 300 CAPSULE ORAL at 12:14

## 2021-11-24 RX ADMIN — FUROSEMIDE 20 MG: 20 TABLET ORAL at 08:15

## 2021-11-24 RX ADMIN — POTASSIUM CHLORIDE 10 MEQ: 750 TABLET, EXTENDED RELEASE ORAL at 17:20

## 2021-11-24 RX ADMIN — CYANOCOBALAMIN 1000 MCG: 1000 INJECTION INTRAMUSCULAR; SUBCUTANEOUS at 08:15

## 2021-11-24 RX ADMIN — THIAMINE HCL TAB 100 MG 100 MG: 100 TAB at 08:16

## 2021-11-24 RX ADMIN — POTASSIUM CHLORIDE 10 MEQ: 750 TABLET, EXTENDED RELEASE ORAL at 08:16

## 2021-11-24 RX ADMIN — QUETIAPINE FUMARATE 25 MG: 25 TABLET ORAL at 08:16

## 2021-11-24 RX ADMIN — LORAZEPAM 0.5 MG: 0.5 TABLET ORAL at 08:15

## 2021-11-24 RX ADMIN — GABAPENTIN 600 MG: 300 CAPSULE ORAL at 21:19

## 2021-11-24 RX ADMIN — METHOCARBAMOL TABLETS 500 MG: 500 TABLET, COATED ORAL at 21:20

## 2021-11-24 RX ADMIN — FUROSEMIDE 20 MG: 20 TABLET ORAL at 17:19

## 2021-11-24 RX ADMIN — PRAZOSIN HYDROCHLORIDE 2 MG: 1 CAPSULE ORAL at 21:19

## 2021-11-24 RX ADMIN — LORAZEPAM 1 MG: 1 TABLET ORAL at 21:20

## 2021-11-24 RX ADMIN — METHOCARBAMOL TABLETS 500 MG: 500 TABLET, COATED ORAL at 08:22

## 2021-11-24 RX ADMIN — ENOXAPARIN SODIUM 40 MG: 40 INJECTION SUBCUTANEOUS at 08:15

## 2021-11-24 RX ADMIN — GABAPENTIN 600 MG: 300 CAPSULE ORAL at 08:15

## 2021-11-24 RX ADMIN — LEVETIRACETAM 500 MG: 500 TABLET, FILM COATED ORAL at 21:19

## 2021-11-24 RX ADMIN — TRAZODONE HYDROCHLORIDE 100 MG: 100 TABLET ORAL at 21:20

## 2021-11-24 RX ADMIN — MAGNESIUM OXIDE TAB 400 MG (241.3 MG ELEMENTAL MG) 400 MG: 400 (241.3 MG) TAB at 08:15

## 2021-11-24 RX ADMIN — GABAPENTIN 600 MG: 300 CAPSULE ORAL at 17:19

## 2021-11-24 RX ADMIN — MIRTAZAPINE 15 MG: 15 TABLET, FILM COATED ORAL at 21:19

## 2021-11-24 RX ADMIN — QUETIAPINE FUMARATE 25 MG: 25 TABLET ORAL at 16:30

## 2021-11-24 RX ADMIN — MAGNESIUM OXIDE TAB 400 MG (241.3 MG ELEMENTAL MG) 400 MG: 400 (241.3 MG) TAB at 17:19

## 2021-11-25 PROCEDURE — 99232 SBSQ HOSP IP/OBS MODERATE 35: CPT | Performed by: PHYSICIAN ASSISTANT

## 2021-11-25 RX ORDER — QUETIAPINE FUMARATE 100 MG/1
200 TABLET, FILM COATED ORAL
Status: DISCONTINUED | OUTPATIENT
Start: 2021-11-25 | End: 2021-11-26

## 2021-11-25 RX ADMIN — MAGNESIUM OXIDE TAB 400 MG (241.3 MG ELEMENTAL MG) 400 MG: 400 (241.3 MG) TAB at 17:50

## 2021-11-25 RX ADMIN — LEVETIRACETAM 500 MG: 500 TABLET, FILM COATED ORAL at 08:05

## 2021-11-25 RX ADMIN — ENOXAPARIN SODIUM 40 MG: 40 INJECTION SUBCUTANEOUS at 08:05

## 2021-11-25 RX ADMIN — LEVOTHYROXINE SODIUM 50 MCG: 50 TABLET ORAL at 05:37

## 2021-11-25 RX ADMIN — LORAZEPAM 1 MG: 1 TABLET ORAL at 21:39

## 2021-11-25 RX ADMIN — PRAZOSIN HYDROCHLORIDE 2 MG: 1 CAPSULE ORAL at 21:40

## 2021-11-25 RX ADMIN — LEVETIRACETAM 500 MG: 500 TABLET, FILM COATED ORAL at 21:39

## 2021-11-25 RX ADMIN — POTASSIUM CHLORIDE 10 MEQ: 750 TABLET, EXTENDED RELEASE ORAL at 08:04

## 2021-11-25 RX ADMIN — QUETIAPINE FUMARATE 25 MG: 25 TABLET ORAL at 15:24

## 2021-11-25 RX ADMIN — QUETIAPINE FUMARATE 25 MG: 25 TABLET ORAL at 08:04

## 2021-11-25 RX ADMIN — MAGNESIUM OXIDE TAB 400 MG (241.3 MG ELEMENTAL MG) 400 MG: 400 (241.3 MG) TAB at 08:04

## 2021-11-25 RX ADMIN — GABAPENTIN 600 MG: 300 CAPSULE ORAL at 08:04

## 2021-11-25 RX ADMIN — METHOCARBAMOL TABLETS 500 MG: 500 TABLET, COATED ORAL at 21:39

## 2021-11-25 RX ADMIN — ERGOCALCIFEROL 50000 UNITS: 1.25 CAPSULE ORAL at 08:02

## 2021-11-25 RX ADMIN — GABAPENTIN 600 MG: 300 CAPSULE ORAL at 21:39

## 2021-11-25 RX ADMIN — LORAZEPAM 0.5 MG: 0.5 TABLET ORAL at 08:04

## 2021-11-25 RX ADMIN — FUROSEMIDE 20 MG: 20 TABLET ORAL at 17:50

## 2021-11-25 RX ADMIN — GABAPENTIN 600 MG: 300 CAPSULE ORAL at 17:50

## 2021-11-25 RX ADMIN — TRAZODONE HYDROCHLORIDE 100 MG: 100 TABLET ORAL at 21:40

## 2021-11-25 RX ADMIN — FOLIC ACID 1 MG: 1 TABLET ORAL at 08:05

## 2021-11-25 RX ADMIN — THIAMINE HCL TAB 100 MG 100 MG: 100 TAB at 08:04

## 2021-11-25 RX ADMIN — METHOCARBAMOL TABLETS 500 MG: 500 TABLET, COATED ORAL at 08:04

## 2021-11-25 RX ADMIN — QUETIAPINE FUMARATE 200 MG: 100 TABLET ORAL at 21:39

## 2021-11-25 RX ADMIN — BACITRACIN ZINC, NEOMYCIN, POLYMYXIN B 1 SMALL APPLICATION: 400; 3.5; 5 OINTMENT TOPICAL at 08:05

## 2021-11-25 RX ADMIN — GABAPENTIN 300 MG: 300 CAPSULE ORAL at 12:34

## 2021-11-25 RX ADMIN — POTASSIUM CHLORIDE 10 MEQ: 750 TABLET, EXTENDED RELEASE ORAL at 17:50

## 2021-11-25 RX ADMIN — MIRTAZAPINE 15 MG: 15 TABLET, FILM COATED ORAL at 21:40

## 2021-11-25 RX ADMIN — LORAZEPAM 0.5 MG: 0.5 TABLET ORAL at 15:24

## 2021-11-25 RX ADMIN — METHOCARBAMOL TABLETS 500 MG: 500 TABLET, COATED ORAL at 15:24

## 2021-11-25 RX ADMIN — VENLAFAXINE HYDROCHLORIDE 150 MG: 150 CAPSULE, EXTENDED RELEASE ORAL at 08:04

## 2021-11-25 RX ADMIN — TOPIRAMATE 25 MG: 25 TABLET, FILM COATED ORAL at 08:04

## 2021-11-26 PROCEDURE — 99232 SBSQ HOSP IP/OBS MODERATE 35: CPT | Performed by: PSYCHIATRY & NEUROLOGY

## 2021-11-26 RX ORDER — QUETIAPINE FUMARATE 300 MG/1
300 TABLET, FILM COATED ORAL
Status: DISCONTINUED | OUTPATIENT
Start: 2021-11-27 | End: 2021-12-01 | Stop reason: HOSPADM

## 2021-11-26 RX ADMIN — QUETIAPINE FUMARATE 25 MG: 25 TABLET ORAL at 15:47

## 2021-11-26 RX ADMIN — LORAZEPAM 0.5 MG: 0.5 TABLET ORAL at 15:47

## 2021-11-26 RX ADMIN — LORAZEPAM 0.5 MG: 0.5 TABLET ORAL at 08:59

## 2021-11-26 RX ADMIN — TOPIRAMATE 25 MG: 25 TABLET, FILM COATED ORAL at 08:59

## 2021-11-26 RX ADMIN — GABAPENTIN 300 MG: 300 CAPSULE ORAL at 13:18

## 2021-11-26 RX ADMIN — QUETIAPINE FUMARATE 250 MG: 100 TABLET ORAL at 21:10

## 2021-11-26 RX ADMIN — POTASSIUM CHLORIDE 10 MEQ: 750 TABLET, EXTENDED RELEASE ORAL at 08:59

## 2021-11-26 RX ADMIN — VENLAFAXINE HYDROCHLORIDE 150 MG: 150 CAPSULE, EXTENDED RELEASE ORAL at 08:59

## 2021-11-26 RX ADMIN — GABAPENTIN 600 MG: 300 CAPSULE ORAL at 08:58

## 2021-11-26 RX ADMIN — MAGNESIUM OXIDE TAB 400 MG (241.3 MG ELEMENTAL MG) 400 MG: 400 (241.3 MG) TAB at 09:00

## 2021-11-26 RX ADMIN — POTASSIUM CHLORIDE 10 MEQ: 750 TABLET, EXTENDED RELEASE ORAL at 17:17

## 2021-11-26 RX ADMIN — METHOCARBAMOL TABLETS 500 MG: 500 TABLET, COATED ORAL at 21:10

## 2021-11-26 RX ADMIN — THIAMINE HCL TAB 100 MG 100 MG: 100 TAB at 08:59

## 2021-11-26 RX ADMIN — PRAZOSIN HYDROCHLORIDE 2 MG: 1 CAPSULE ORAL at 21:11

## 2021-11-26 RX ADMIN — METHOCARBAMOL TABLETS 500 MG: 500 TABLET, COATED ORAL at 15:47

## 2021-11-26 RX ADMIN — MIRTAZAPINE 15 MG: 15 TABLET, FILM COATED ORAL at 21:10

## 2021-11-26 RX ADMIN — MAGNESIUM OXIDE TAB 400 MG (241.3 MG ELEMENTAL MG) 400 MG: 400 (241.3 MG) TAB at 17:17

## 2021-11-26 RX ADMIN — ENOXAPARIN SODIUM 40 MG: 40 INJECTION SUBCUTANEOUS at 09:00

## 2021-11-26 RX ADMIN — LEVETIRACETAM 500 MG: 500 TABLET, FILM COATED ORAL at 21:09

## 2021-11-26 RX ADMIN — GABAPENTIN 600 MG: 300 CAPSULE ORAL at 21:20

## 2021-11-26 RX ADMIN — LEVETIRACETAM 500 MG: 500 TABLET, FILM COATED ORAL at 08:59

## 2021-11-26 RX ADMIN — FUROSEMIDE 20 MG: 20 TABLET ORAL at 17:17

## 2021-11-26 RX ADMIN — LORAZEPAM 1 MG: 1 TABLET ORAL at 21:10

## 2021-11-26 RX ADMIN — TRAZODONE HYDROCHLORIDE 100 MG: 100 TABLET ORAL at 21:11

## 2021-11-26 RX ADMIN — QUETIAPINE FUMARATE 25 MG: 25 TABLET ORAL at 08:59

## 2021-11-26 RX ADMIN — FOLIC ACID 1 MG: 1 TABLET ORAL at 08:59

## 2021-11-26 RX ADMIN — METHOCARBAMOL TABLETS 500 MG: 500 TABLET, COATED ORAL at 08:59

## 2021-11-26 RX ADMIN — LEVOTHYROXINE SODIUM 50 MCG: 50 TABLET ORAL at 06:14

## 2021-11-26 RX ADMIN — GABAPENTIN 600 MG: 300 CAPSULE ORAL at 17:17

## 2021-11-27 PROCEDURE — 99232 SBSQ HOSP IP/OBS MODERATE 35: CPT | Performed by: PSYCHIATRY & NEUROLOGY

## 2021-11-27 RX ORDER — PRAZOSIN HYDROCHLORIDE 1 MG/1
3 CAPSULE ORAL
Status: DISCONTINUED | OUTPATIENT
Start: 2021-11-27 | End: 2021-12-01 | Stop reason: HOSPADM

## 2021-11-27 RX ADMIN — FOLIC ACID 1 MG: 1 TABLET ORAL at 08:17

## 2021-11-27 RX ADMIN — POTASSIUM CHLORIDE 10 MEQ: 750 TABLET, EXTENDED RELEASE ORAL at 08:16

## 2021-11-27 RX ADMIN — METHOCARBAMOL TABLETS 500 MG: 500 TABLET, COATED ORAL at 08:16

## 2021-11-27 RX ADMIN — GABAPENTIN 300 MG: 300 CAPSULE ORAL at 12:17

## 2021-11-27 RX ADMIN — QUETIAPINE FUMARATE 25 MG: 25 TABLET ORAL at 08:17

## 2021-11-27 RX ADMIN — VENLAFAXINE HYDROCHLORIDE 150 MG: 150 CAPSULE, EXTENDED RELEASE ORAL at 08:17

## 2021-11-27 RX ADMIN — TOPIRAMATE 25 MG: 25 TABLET, FILM COATED ORAL at 08:16

## 2021-11-27 RX ADMIN — LEVOTHYROXINE SODIUM 50 MCG: 50 TABLET ORAL at 06:15

## 2021-11-27 RX ADMIN — MIRTAZAPINE 15 MG: 15 TABLET, FILM COATED ORAL at 21:20

## 2021-11-27 RX ADMIN — ENOXAPARIN SODIUM 40 MG: 40 INJECTION SUBCUTANEOUS at 08:16

## 2021-11-27 RX ADMIN — MAGNESIUM OXIDE TAB 400 MG (241.3 MG ELEMENTAL MG) 400 MG: 400 (241.3 MG) TAB at 08:17

## 2021-11-27 RX ADMIN — LORAZEPAM 1 MG: 1 TABLET ORAL at 21:20

## 2021-11-27 RX ADMIN — GABAPENTIN 600 MG: 300 CAPSULE ORAL at 17:09

## 2021-11-27 RX ADMIN — QUETIAPINE FUMARATE 300 MG: 300 TABLET ORAL at 21:17

## 2021-11-27 RX ADMIN — MAGNESIUM OXIDE TAB 400 MG (241.3 MG ELEMENTAL MG) 400 MG: 400 (241.3 MG) TAB at 17:09

## 2021-11-27 RX ADMIN — METHOCARBAMOL TABLETS 500 MG: 500 TABLET, COATED ORAL at 21:19

## 2021-11-27 RX ADMIN — ACETAMINOPHEN 650 MG: 325 TABLET ORAL at 06:20

## 2021-11-27 RX ADMIN — LEVETIRACETAM 500 MG: 500 TABLET, FILM COATED ORAL at 21:16

## 2021-11-27 RX ADMIN — GABAPENTIN 600 MG: 300 CAPSULE ORAL at 21:17

## 2021-11-27 RX ADMIN — LORAZEPAM 0.5 MG: 0.5 TABLET ORAL at 08:17

## 2021-11-27 RX ADMIN — POTASSIUM CHLORIDE 10 MEQ: 750 TABLET, EXTENDED RELEASE ORAL at 17:09

## 2021-11-27 RX ADMIN — THIAMINE HCL TAB 100 MG 100 MG: 100 TAB at 08:17

## 2021-11-27 RX ADMIN — QUETIAPINE FUMARATE 25 MG: 25 TABLET ORAL at 16:49

## 2021-11-27 RX ADMIN — TRAZODONE HYDROCHLORIDE 100 MG: 100 TABLET ORAL at 21:19

## 2021-11-27 RX ADMIN — PRAZOSIN HYDROCHLORIDE 3 MG: 1 CAPSULE ORAL at 21:18

## 2021-11-27 RX ADMIN — LEVETIRACETAM 500 MG: 500 TABLET, FILM COATED ORAL at 08:16

## 2021-11-27 RX ADMIN — LORAZEPAM 0.5 MG: 0.5 TABLET ORAL at 16:49

## 2021-11-27 RX ADMIN — GABAPENTIN 600 MG: 300 CAPSULE ORAL at 08:16

## 2021-11-27 RX ADMIN — METHOCARBAMOL TABLETS 500 MG: 500 TABLET, COATED ORAL at 16:49

## 2021-11-28 PROCEDURE — 99232 SBSQ HOSP IP/OBS MODERATE 35: CPT | Performed by: PSYCHIATRY & NEUROLOGY

## 2021-11-28 RX ADMIN — POTASSIUM CHLORIDE 10 MEQ: 750 TABLET, EXTENDED RELEASE ORAL at 08:07

## 2021-11-28 RX ADMIN — MAGNESIUM OXIDE TAB 400 MG (241.3 MG ELEMENTAL MG) 400 MG: 400 (241.3 MG) TAB at 08:07

## 2021-11-28 RX ADMIN — TOPIRAMATE 25 MG: 25 TABLET, FILM COATED ORAL at 08:07

## 2021-11-28 RX ADMIN — LORAZEPAM 1 MG: 1 TABLET ORAL at 22:13

## 2021-11-28 RX ADMIN — LORAZEPAM 0.5 MG: 0.5 TABLET ORAL at 08:06

## 2021-11-28 RX ADMIN — LORAZEPAM 0.5 MG: 0.5 TABLET ORAL at 17:11

## 2021-11-28 RX ADMIN — LEVETIRACETAM 500 MG: 500 TABLET, FILM COATED ORAL at 22:12

## 2021-11-28 RX ADMIN — GABAPENTIN 600 MG: 300 CAPSULE ORAL at 08:06

## 2021-11-28 RX ADMIN — POTASSIUM CHLORIDE 10 MEQ: 750 TABLET, EXTENDED RELEASE ORAL at 17:11

## 2021-11-28 RX ADMIN — PRAZOSIN HYDROCHLORIDE 3 MG: 1 CAPSULE ORAL at 22:12

## 2021-11-28 RX ADMIN — QUETIAPINE FUMARATE 25 MG: 25 TABLET ORAL at 17:10

## 2021-11-28 RX ADMIN — LEVETIRACETAM 500 MG: 500 TABLET, FILM COATED ORAL at 08:06

## 2021-11-28 RX ADMIN — GABAPENTIN 600 MG: 300 CAPSULE ORAL at 22:12

## 2021-11-28 RX ADMIN — METHOCARBAMOL TABLETS 500 MG: 500 TABLET, COATED ORAL at 08:06

## 2021-11-28 RX ADMIN — ENOXAPARIN SODIUM 40 MG: 40 INJECTION SUBCUTANEOUS at 08:07

## 2021-11-28 RX ADMIN — QUETIAPINE FUMARATE 25 MG: 25 TABLET ORAL at 08:06

## 2021-11-28 RX ADMIN — QUETIAPINE FUMARATE 300 MG: 300 TABLET ORAL at 22:13

## 2021-11-28 RX ADMIN — METHOCARBAMOL TABLETS 500 MG: 500 TABLET, COATED ORAL at 17:11

## 2021-11-28 RX ADMIN — THIAMINE HCL TAB 100 MG 100 MG: 100 TAB at 08:06

## 2021-11-28 RX ADMIN — GABAPENTIN 300 MG: 300 CAPSULE ORAL at 13:05

## 2021-11-28 RX ADMIN — MIRTAZAPINE 15 MG: 15 TABLET, FILM COATED ORAL at 22:13

## 2021-11-28 RX ADMIN — MAGNESIUM OXIDE TAB 400 MG (241.3 MG ELEMENTAL MG) 400 MG: 400 (241.3 MG) TAB at 17:11

## 2021-11-28 RX ADMIN — LEVOTHYROXINE SODIUM 50 MCG: 50 TABLET ORAL at 06:14

## 2021-11-28 RX ADMIN — FOLIC ACID 1 MG: 1 TABLET ORAL at 08:07

## 2021-11-28 RX ADMIN — VENLAFAXINE HYDROCHLORIDE 150 MG: 150 CAPSULE, EXTENDED RELEASE ORAL at 08:07

## 2021-11-28 RX ADMIN — TRAZODONE HYDROCHLORIDE 100 MG: 100 TABLET ORAL at 22:13

## 2021-11-28 RX ADMIN — METHOCARBAMOL TABLETS 500 MG: 500 TABLET, COATED ORAL at 22:12

## 2021-11-28 RX ADMIN — FUROSEMIDE 20 MG: 20 TABLET ORAL at 17:10

## 2021-11-28 RX ADMIN — GABAPENTIN 600 MG: 300 CAPSULE ORAL at 17:11

## 2021-11-29 LAB
BACTERIA UR QL AUTO: ABNORMAL /HPF
BILIRUB UR QL STRIP: NEGATIVE
CLARITY UR: CLEAR
COLOR UR: ABNORMAL
GLUCOSE UR STRIP-MCNC: NEGATIVE MG/DL
HGB UR QL STRIP.AUTO: NEGATIVE
KETONES UR STRIP-MCNC: NEGATIVE MG/DL
LEUKOCYTE ESTERASE UR QL STRIP: 100
NITRITE UR QL STRIP: NEGATIVE
NON-SQ EPI CELLS URNS QL MICRO: ABNORMAL /HPF
PH UR STRIP.AUTO: 7 [PH]
PROT UR STRIP-MCNC: NEGATIVE MG/DL
RBC #/AREA URNS AUTO: ABNORMAL /HPF
SP GR UR STRIP.AUTO: 1 (ref 1–1.04)
UROBILINOGEN UA: NEGATIVE MG/DL
WBC #/AREA URNS AUTO: ABNORMAL /HPF

## 2021-11-29 PROCEDURE — 81003 URINALYSIS AUTO W/O SCOPE: CPT | Performed by: PSYCHIATRY & NEUROLOGY

## 2021-11-29 PROCEDURE — 81001 URINALYSIS AUTO W/SCOPE: CPT | Performed by: PSYCHIATRY & NEUROLOGY

## 2021-11-29 PROCEDURE — 99232 SBSQ HOSP IP/OBS MODERATE 35: CPT | Performed by: PSYCHIATRY & NEUROLOGY

## 2021-11-29 RX ORDER — NITROFURANTOIN 25; 75 MG/1; MG/1
100 CAPSULE ORAL 2 TIMES DAILY WITH MEALS
Status: DISCONTINUED | OUTPATIENT
Start: 2021-11-30 | End: 2021-12-01 | Stop reason: HOSPADM

## 2021-11-29 RX ORDER — GABAPENTIN 300 MG/1
600 CAPSULE ORAL 4 TIMES DAILY
Status: DISCONTINUED | OUTPATIENT
Start: 2021-11-29 | End: 2021-12-01 | Stop reason: HOSPADM

## 2021-11-29 RX ORDER — NITROFURANTOIN 25; 75 MG/1; MG/1
100 CAPSULE ORAL 2 TIMES DAILY WITH MEALS
Status: DISCONTINUED | OUTPATIENT
Start: 2021-11-30 | End: 2021-11-29

## 2021-11-29 RX ORDER — GABAPENTIN 300 MG/1
600 CAPSULE ORAL 4 TIMES DAILY
Status: DISCONTINUED | OUTPATIENT
Start: 2021-11-29 | End: 2021-11-29

## 2021-11-29 RX ADMIN — QUETIAPINE FUMARATE 300 MG: 300 TABLET ORAL at 21:12

## 2021-11-29 RX ADMIN — ERGOCALCIFEROL 50000 UNITS: 1.25 CAPSULE ORAL at 09:08

## 2021-11-29 RX ADMIN — FOLIC ACID 1 MG: 1 TABLET ORAL at 09:09

## 2021-11-29 RX ADMIN — METHOCARBAMOL TABLETS 500 MG: 500 TABLET, COATED ORAL at 21:14

## 2021-11-29 RX ADMIN — POTASSIUM CHLORIDE 10 MEQ: 750 TABLET, EXTENDED RELEASE ORAL at 17:14

## 2021-11-29 RX ADMIN — ACETAMINOPHEN 650 MG: 325 TABLET ORAL at 14:21

## 2021-11-29 RX ADMIN — MAGNESIUM OXIDE TAB 400 MG (241.3 MG ELEMENTAL MG) 400 MG: 400 (241.3 MG) TAB at 17:14

## 2021-11-29 RX ADMIN — BACITRACIN ZINC, NEOMYCIN, POLYMYXIN B 1 SMALL APPLICATION: 400; 3.5; 5 OINTMENT TOPICAL at 17:16

## 2021-11-29 RX ADMIN — LORAZEPAM 1 MG: 1 TABLET ORAL at 21:11

## 2021-11-29 RX ADMIN — TOPIRAMATE 25 MG: 25 TABLET, FILM COATED ORAL at 09:09

## 2021-11-29 RX ADMIN — GABAPENTIN 600 MG: 300 CAPSULE ORAL at 14:23

## 2021-11-29 RX ADMIN — GABAPENTIN 600 MG: 300 CAPSULE ORAL at 17:15

## 2021-11-29 RX ADMIN — LORAZEPAM 0.5 MG: 0.5 TABLET ORAL at 17:14

## 2021-11-29 RX ADMIN — LEVETIRACETAM 500 MG: 500 TABLET, FILM COATED ORAL at 09:09

## 2021-11-29 RX ADMIN — QUETIAPINE FUMARATE 25 MG: 25 TABLET ORAL at 17:16

## 2021-11-29 RX ADMIN — QUETIAPINE FUMARATE 25 MG: 25 TABLET ORAL at 09:09

## 2021-11-29 RX ADMIN — THIAMINE HCL TAB 100 MG 100 MG: 100 TAB at 09:09

## 2021-11-29 RX ADMIN — MAGNESIUM OXIDE TAB 400 MG (241.3 MG ELEMENTAL MG) 400 MG: 400 (241.3 MG) TAB at 09:08

## 2021-11-29 RX ADMIN — TRAZODONE HYDROCHLORIDE 100 MG: 100 TABLET ORAL at 21:13

## 2021-11-29 RX ADMIN — PRAZOSIN HYDROCHLORIDE 3 MG: 1 CAPSULE ORAL at 21:15

## 2021-11-29 RX ADMIN — ENOXAPARIN SODIUM 40 MG: 40 INJECTION SUBCUTANEOUS at 09:10

## 2021-11-29 RX ADMIN — METHOCARBAMOL TABLETS 500 MG: 500 TABLET, COATED ORAL at 09:08

## 2021-11-29 RX ADMIN — BACITRACIN ZINC, NEOMYCIN, POLYMYXIN B 1 SMALL APPLICATION: 400; 3.5; 5 OINTMENT TOPICAL at 09:09

## 2021-11-29 RX ADMIN — VENLAFAXINE HYDROCHLORIDE 150 MG: 150 CAPSULE, EXTENDED RELEASE ORAL at 09:08

## 2021-11-29 RX ADMIN — LEVETIRACETAM 500 MG: 500 TABLET, FILM COATED ORAL at 21:13

## 2021-11-29 RX ADMIN — METHOCARBAMOL TABLETS 500 MG: 500 TABLET, COATED ORAL at 17:14

## 2021-11-29 RX ADMIN — GABAPENTIN 600 MG: 300 CAPSULE ORAL at 09:07

## 2021-11-29 RX ADMIN — POTASSIUM CHLORIDE 10 MEQ: 750 TABLET, EXTENDED RELEASE ORAL at 09:08

## 2021-11-29 RX ADMIN — LEVOTHYROXINE SODIUM 50 MCG: 50 TABLET ORAL at 06:03

## 2021-11-29 RX ADMIN — GABAPENTIN 600 MG: 300 CAPSULE ORAL at 21:14

## 2021-11-29 RX ADMIN — MIRTAZAPINE 15 MG: 15 TABLET, FILM COATED ORAL at 21:13

## 2021-11-30 PROCEDURE — 99232 SBSQ HOSP IP/OBS MODERATE 35: CPT | Performed by: PSYCHIATRY & NEUROLOGY

## 2021-11-30 RX ADMIN — POTASSIUM CHLORIDE 10 MEQ: 750 TABLET, EXTENDED RELEASE ORAL at 17:37

## 2021-11-30 RX ADMIN — QUETIAPINE FUMARATE 25 MG: 25 TABLET ORAL at 17:38

## 2021-11-30 RX ADMIN — LORAZEPAM 0.5 MG: 0.5 TABLET ORAL at 08:44

## 2021-11-30 RX ADMIN — MAGNESIUM OXIDE TAB 400 MG (241.3 MG ELEMENTAL MG) 400 MG: 400 (241.3 MG) TAB at 17:37

## 2021-11-30 RX ADMIN — LORAZEPAM 1 MG: 1 TABLET ORAL at 21:13

## 2021-11-30 RX ADMIN — MIRTAZAPINE 15 MG: 15 TABLET, FILM COATED ORAL at 21:12

## 2021-11-30 RX ADMIN — VENLAFAXINE HYDROCHLORIDE 150 MG: 150 CAPSULE, EXTENDED RELEASE ORAL at 08:46

## 2021-11-30 RX ADMIN — MAGNESIUM OXIDE TAB 400 MG (241.3 MG ELEMENTAL MG) 400 MG: 400 (241.3 MG) TAB at 08:47

## 2021-11-30 RX ADMIN — METHOCARBAMOL TABLETS 500 MG: 500 TABLET, COATED ORAL at 17:37

## 2021-11-30 RX ADMIN — LEVOTHYROXINE SODIUM 50 MCG: 50 TABLET ORAL at 06:24

## 2021-11-30 RX ADMIN — POTASSIUM CHLORIDE 10 MEQ: 750 TABLET, EXTENDED RELEASE ORAL at 08:44

## 2021-11-30 RX ADMIN — GABAPENTIN 600 MG: 300 CAPSULE ORAL at 21:13

## 2021-11-30 RX ADMIN — ENOXAPARIN SODIUM 40 MG: 40 INJECTION SUBCUTANEOUS at 08:47

## 2021-11-30 RX ADMIN — METHOCARBAMOL TABLETS 500 MG: 500 TABLET, COATED ORAL at 08:46

## 2021-11-30 RX ADMIN — QUETIAPINE FUMARATE 300 MG: 300 TABLET ORAL at 21:12

## 2021-11-30 RX ADMIN — TOPIRAMATE 25 MG: 25 TABLET, FILM COATED ORAL at 08:44

## 2021-11-30 RX ADMIN — TRAZODONE HYDROCHLORIDE 100 MG: 100 TABLET ORAL at 21:14

## 2021-11-30 RX ADMIN — LEVETIRACETAM 500 MG: 500 TABLET, FILM COATED ORAL at 08:46

## 2021-11-30 RX ADMIN — QUETIAPINE FUMARATE 25 MG: 25 TABLET ORAL at 08:45

## 2021-11-30 RX ADMIN — METHOCARBAMOL TABLETS 500 MG: 500 TABLET, COATED ORAL at 21:12

## 2021-11-30 RX ADMIN — NITROFURANTOIN (MONOHYDRATE/MACROCRYSTALS) 100 MG: 75; 25 CAPSULE ORAL at 08:44

## 2021-11-30 RX ADMIN — FOLIC ACID 1 MG: 1 TABLET ORAL at 08:45

## 2021-11-30 RX ADMIN — NITROFURANTOIN (MONOHYDRATE/MACROCRYSTALS) 100 MG: 75; 25 CAPSULE ORAL at 17:37

## 2021-11-30 RX ADMIN — GABAPENTIN 600 MG: 300 CAPSULE ORAL at 08:45

## 2021-11-30 RX ADMIN — LORAZEPAM 0.5 MG: 0.5 TABLET ORAL at 17:37

## 2021-11-30 RX ADMIN — GABAPENTIN 600 MG: 300 CAPSULE ORAL at 12:29

## 2021-11-30 RX ADMIN — GABAPENTIN 600 MG: 300 CAPSULE ORAL at 17:37

## 2021-11-30 RX ADMIN — ACETAMINOPHEN 650 MG: 325 TABLET ORAL at 06:27

## 2021-11-30 RX ADMIN — THIAMINE HCL TAB 100 MG 100 MG: 100 TAB at 08:44

## 2021-11-30 RX ADMIN — LEVETIRACETAM 500 MG: 500 TABLET, FILM COATED ORAL at 21:14

## 2021-11-30 RX ADMIN — PRAZOSIN HYDROCHLORIDE 3 MG: 1 CAPSULE ORAL at 21:14

## 2021-12-01 VITALS
BODY MASS INDEX: 38.57 KG/M2 | HEART RATE: 85 BPM | TEMPERATURE: 97.5 F | WEIGHT: 240 LBS | RESPIRATION RATE: 18 BRPM | SYSTOLIC BLOOD PRESSURE: 104 MMHG | DIASTOLIC BLOOD PRESSURE: 59 MMHG | HEIGHT: 66 IN | OXYGEN SATURATION: 100 %

## 2021-12-01 PROBLEM — Z00.8 MEDICAL CLEARANCE FOR PSYCHIATRIC ADMISSION: Status: RESOLVED | Noted: 2021-11-17 | Resolved: 2021-12-01

## 2021-12-01 LAB
GLUCOSE SERPL-MCNC: 194 MG/DL (ref 65–140)
GLUCOSE SERPL-MCNC: 64 MG/DL (ref 65–140)

## 2021-12-01 PROCEDURE — 82948 REAGENT STRIP/BLOOD GLUCOSE: CPT

## 2021-12-01 PROCEDURE — 99239 HOSP IP/OBS DSCHRG MGMT >30: CPT | Performed by: PSYCHIATRY & NEUROLOGY

## 2021-12-01 RX ORDER — TRAZODONE HYDROCHLORIDE 100 MG/1
100 TABLET ORAL
Qty: 30 TABLET | Refills: 0 | Status: SHIPPED | OUTPATIENT
Start: 2021-12-01 | End: 2022-07-01

## 2021-12-01 RX ORDER — LORAZEPAM 0.5 MG/1
0.5 TABLET ORAL 2 TIMES DAILY
Qty: 60 TABLET | Refills: 0 | Status: SHIPPED | OUTPATIENT
Start: 2021-12-01 | End: 2022-07-01

## 2021-12-01 RX ORDER — QUETIAPINE FUMARATE 300 MG/1
300 TABLET, FILM COATED ORAL
Qty: 30 TABLET | Refills: 0 | Status: SHIPPED | OUTPATIENT
Start: 2021-12-01 | End: 2022-02-01

## 2021-12-01 RX ORDER — VENLAFAXINE HYDROCHLORIDE 150 MG/1
150 CAPSULE, EXTENDED RELEASE ORAL DAILY
Qty: 30 CAPSULE | Refills: 0 | Status: SHIPPED | OUTPATIENT
Start: 2021-12-02 | End: 2022-06-22

## 2021-12-01 RX ORDER — MIRTAZAPINE 15 MG/1
15 TABLET, FILM COATED ORAL
Qty: 30 TABLET | Refills: 0 | Status: SHIPPED | OUTPATIENT
Start: 2021-12-01 | End: 2022-06-22

## 2021-12-01 RX ORDER — METHOCARBAMOL 500 MG/1
500 TABLET, FILM COATED ORAL EVERY 8 HOURS PRN
Refills: 0
Start: 2021-12-01 | End: 2022-06-22

## 2021-12-01 RX ORDER — NITROFURANTOIN 25; 75 MG/1; MG/1
100 CAPSULE ORAL 2 TIMES DAILY WITH MEALS
Qty: 7 CAPSULE | Refills: 0 | Status: SHIPPED | OUTPATIENT
Start: 2021-12-01 | End: 2021-12-05

## 2021-12-01 RX ORDER — PRAZOSIN HYDROCHLORIDE 1 MG/1
3 CAPSULE ORAL
Qty: 90 CAPSULE | Refills: 0 | Status: SHIPPED | OUTPATIENT
Start: 2021-12-01 | End: 2022-06-22

## 2021-12-01 RX ORDER — GABAPENTIN 300 MG/1
600 CAPSULE ORAL 4 TIMES DAILY
Qty: 240 CAPSULE | Refills: 0 | Status: SHIPPED | OUTPATIENT
Start: 2021-12-01 | End: 2022-07-01

## 2021-12-01 RX ORDER — LORAZEPAM 1 MG/1
1 TABLET ORAL
Qty: 30 TABLET | Refills: 0 | Status: SHIPPED | OUTPATIENT
Start: 2021-12-01 | End: 2022-07-01

## 2021-12-01 RX ORDER — ERGOCALCIFEROL 1.25 MG/1
50000 CAPSULE ORAL 2 TIMES WEEKLY
Start: 2021-12-02

## 2021-12-01 RX ORDER — QUETIAPINE FUMARATE 25 MG/1
25 TABLET, FILM COATED ORAL 2 TIMES DAILY
Qty: 60 TABLET | Refills: 0 | Status: SHIPPED | OUTPATIENT
Start: 2021-12-01 | End: 2022-02-01

## 2021-12-01 RX ADMIN — MAGNESIUM OXIDE TAB 400 MG (241.3 MG ELEMENTAL MG) 400 MG: 400 (241.3 MG) TAB at 08:00

## 2021-12-01 RX ADMIN — GABAPENTIN 600 MG: 300 CAPSULE ORAL at 11:39

## 2021-12-01 RX ADMIN — QUETIAPINE FUMARATE 25 MG: 25 TABLET ORAL at 08:01

## 2021-12-01 RX ADMIN — LORAZEPAM 0.5 MG: 0.5 TABLET ORAL at 08:00

## 2021-12-01 RX ADMIN — LEVETIRACETAM 500 MG: 500 TABLET, FILM COATED ORAL at 08:01

## 2021-12-01 RX ADMIN — GABAPENTIN 600 MG: 300 CAPSULE ORAL at 08:01

## 2021-12-01 RX ADMIN — NITROFURANTOIN (MONOHYDRATE/MACROCRYSTALS) 100 MG: 75; 25 CAPSULE ORAL at 08:01

## 2021-12-01 RX ADMIN — LEVOTHYROXINE SODIUM 50 MCG: 50 TABLET ORAL at 06:40

## 2021-12-01 RX ADMIN — FOLIC ACID 1 MG: 1 TABLET ORAL at 08:01

## 2021-12-01 RX ADMIN — TOPIRAMATE 25 MG: 25 TABLET, FILM COATED ORAL at 08:00

## 2021-12-01 RX ADMIN — VENLAFAXINE HYDROCHLORIDE 150 MG: 150 CAPSULE, EXTENDED RELEASE ORAL at 08:01

## 2021-12-01 RX ADMIN — POTASSIUM CHLORIDE 10 MEQ: 750 TABLET, EXTENDED RELEASE ORAL at 08:01

## 2021-12-01 RX ADMIN — ENOXAPARIN SODIUM 40 MG: 40 INJECTION SUBCUTANEOUS at 08:06

## 2021-12-01 RX ADMIN — THIAMINE HCL TAB 100 MG 100 MG: 100 TAB at 08:00

## 2021-12-01 RX ADMIN — METHOCARBAMOL TABLETS 500 MG: 500 TABLET, COATED ORAL at 08:01

## 2022-02-01 ENCOUNTER — TELEMEDICINE (OUTPATIENT)
Dept: ENDOCRINOLOGY | Facility: HOSPITAL | Age: 42
End: 2022-02-01
Payer: COMMERCIAL

## 2022-02-01 DIAGNOSIS — E55.9 VITAMIN D DEFICIENCY: ICD-10-CM

## 2022-02-01 DIAGNOSIS — Z98.84 HISTORY OF GASTRIC BYPASS: ICD-10-CM

## 2022-02-01 DIAGNOSIS — E03.9 HYPOTHYROIDISM, UNSPECIFIED TYPE: ICD-10-CM

## 2022-02-01 DIAGNOSIS — E21.3 HYPERPARATHYROIDISM (HCC): Primary | ICD-10-CM

## 2022-02-01 DIAGNOSIS — E11.9 NON-INSULIN DEPENDENT TYPE 2 DIABETES MELLITUS (HCC): ICD-10-CM

## 2022-02-01 DIAGNOSIS — E16.2 HYPOGLYCEMIA: ICD-10-CM

## 2022-02-01 DIAGNOSIS — Z86.018 HISTORY OF PITUITARY ADENOMA: ICD-10-CM

## 2022-02-01 PROCEDURE — 99214 OFFICE O/P EST MOD 30 MIN: CPT | Performed by: NURSE PRACTITIONER

## 2022-02-01 RX ORDER — ESZOPICLONE 3 MG/1
3 TABLET, FILM COATED ORAL
COMMUNITY

## 2022-02-01 NOTE — PATIENT INSTRUCTIONS
Be mindful of diet      Stay active and stay hydrated       Continue to follow up with the nutritionist and Ophthalmology      For your hypothyroidism, please continue levothyroxine at current dose      Take ergocalciferol 11821 units - 2 times weekly      Obtain lab work when possible  We will contact you with results

## 2022-02-01 NOTE — PROGRESS NOTES
Virtual Regular Visit    Verification of patient location:    Patient is located in the following state in which I hold an active license [place]      Problem List Items Addressed This Visit        Endocrine    Hypothyroidism    Non-insulin dependent type 2 diabetes mellitus ([facility] [address] )       Other    Pituitary enlargement    History of gastric bypass    Vitamin D deficiency      Other Visit Diagnoses     Hyperparathyroidism ([facility] [address] )    -  Primary    Hypoglycemia            Reason for visit is hypothyroidism, history of pituitary adenoma, hypoglycemia after gastric bypass  Encounter provider SMITHA Germain    Provider located at [address] [place] [address] [address] [place] [address]      Recent Visits  No visits were found meeting these conditions  Showing recent visits within past 7 days and meeting all other requirements  Today's Visits  Date Type Provider Dept   02/01/22 Telemedicine SMITHA Germ[facility]   Showing today's visits and meeting all other requirements  Future Appointments  No visits were found meeting these conditions  Showing future appointments within next 150 days and meeting all other requirements       The patient was identified by name and date of birth  Lucas Sandoval was informed that this is a telemedicine visit and that the visit is being conducted through [address] and patient was informed this is a secure, HIPAA-complaint platform  She agrees to proceed     My office door was closed  No one else was in the room  She acknowledged consent and understanding of privacy and security of the video platform  The patient has agreed to participate and understands they can discontinue the visit at any time  Patient is aware this is a billable service       Subjective  Lucas Sandoval is a 46 y o  female with history of anorexia, seizures, pituitary adenoma, gastric bypass surgery presents for follow-up   She has history of post gastric bypass hypoglycemia   Her first Pebbles-en-Y gastric bypass surgery was in 2007  She developed ulcerations and needed a revision and a new pouch around [id number]   Hypoglycemia occurred after high carbohydrate meals and snacks and beverages  She was on acarbose in the past but not currently on this medication   It is been discussed and reminded to her in the past to treat a low blood sugar after measuring her blood sugar with both carbohydrate and protein  She notes that she has only had a few mild episodes of hypoglycemia, most recently when hospitalized in November 2021       For her hypothyroidism, she is treated with levothyroxine 50 mcg daily  There are no recent thyroid function studies available for review in the office today       She has history of pituitary adenoma when she was 17 and most recent MRI from April 6, 2021 reveals a minimally decreased prominence of the pituitary gland that still has a convex superior border  Size is upper limits of normal  There is midline T1 hyperintense structure in the posterior sella measuring 5 mm  Although this may represent increased conspicuity of the posterior pituitary gland, in retrospect, there is a similar sized T1 hypointense hypoenhancing structure at this location and 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   There is no recent IGF 1 or prolactin lab work      She also has vitamin-D deficiency and supplements with 60345 units twice weekly  There is no recent 25 hydroxy vitamin-D level      HPI     Past Medical History:   Diagnosis Date    Acute cystitis     Anemia     Anxiety     Bacteremia     Copper deficiency     Eating disorder     Elevated alkaline phosphatase level     Generalized weakness     Head injury     History of enlargement of pituitary gland     Hypothyroidism     Migraine     Non-insulin dependent type 2 diabetes mellitus ([facility] [address] )  Panic attack     Peripheral neuropathy     PTSD (post-traumatic stress disorder)     Seizures (HCC)     Self-injurious behavior     Sepsis ([facility] [address] )     Soft tissue lesion of pelvic region     Urinary retention     Vitamin B6 deficiency     Vitamin D deficiency        Past Surgical History:   Procedure Laterality Date    GASTRIC BYPASS      IR PICC PLACEMENT SINGLE LUMEN  4/16/2021    IR TEMPORARY DIALYSIS CATHETER PLACEMENT  4/8/2021       Current Outpatient Medications   Medication Sig Dispense Refill    acetaminophen (TYLENOL) 325 mg tablet Take 650 mg by mouth every 4 (four) hours as needed for mild pain or fever      AquaLance Lancets 30G MISC USE TO TEST BLOOD GLUCOSE FOUR TIMES A DAY      bisacodyl (Dulcolax) 10 mg suppository Insert 10 mg into the rectum as needed for constipation      Continuous Blood Gluc Sensor (kabukuYLE CAITY 14 DAY SENSOR) MISC 1 application by Does not apply route every 14 (fourteen) days 1 each 6    cyanocobalamin 1,000 mcg/mL inject 1 milliliter ( 1000 MCG ) intramuscularly Every Month      ergocalciferol (VITAMIN D2) 50,000 units Take 1 capsule (50,000 Units total) by mouth 2 (two) times a week Monday and Thursday      eszopiclone (LUNESTA) 3 MG tablet Take 3 mg by mouth daily at bedtime as needed for sleep Take immediately before bedtime      folic acid (FOLVITE) 1 mg tablet Take 1 tablet (1 mg total) by mouth daily 30 tablet 0    gabapentin (NEURONTIN) 300 mg capsule Take 2 capsules (600 mg total) by mouth 4 (four) times a day 240 capsule 0    levETIRAcetam (KEPPRA) 500 mg tablet Take 1 tablet (500 mg total) by mouth every 12 (twelve) hours 60 tablet 0    levothyroxine 50 mcg tablet Take 1 tablet (50 mcg total) by mouth daily in the early morning 30 tablet 0    LORazepam (ATIVAN) 0 5 mg tablet Take 1 tablet (0 5 mg total) by mouth 2 (two) times a day 60 tablet 0    LORazepam (ATIVAN) 1 mg tablet Take 1 tablet (1 mg total) by mouth daily at bedtime 30 tablet 0    magnesium oxide (MAG-OX) 400 mg Take 1 tablet (400 mg total) by mouth 2 (two) times a day 60 tablet 0    methocarbamol (ROBAXIN) 500 mg tablet Take 1 tablet (500 mg total) by mouth every 8 (eight) hours as needed for muscle spasms  0    polyethylene glycol (MIRALAX) 17 g packet Take 17 g by mouth daily 510 g 0    potassium chloride (MICRO-K) 10 MEQ CR capsule Take 1 capsule (10 mEq total) by mouth 2 (two) times a day 10 capsule 0    prazosin (MINIPRESS) 1 mg capsule Take 3 capsules (3 mg total) by mouth daily at bedtime 90 capsule 0    thiamine (VITAMIN B1) 100 mg tablet Take 1 tablet (100 mg total) by mouth daily 30 tablet 0    topiramate (TOPAMAX) 25 mg tablet Take 25 mg by mouth daily      traZODone (DESYREL) 100 mg tablet Take 1 tablet (100 mg total) by mouth daily at bedtime 30 tablet 0    venlafaxine (EFFEXOR-XR) 150 mg 24 hr capsule Take 1 capsule (150 mg total) by mouth daily 30 capsule 0    mirtazapine (REMERON) 15 mg tablet Take 1 tablet (15 mg total) by mouth daily at bedtime (Patient not taking: Reported on 2/1/2022 ) 30 tablet 0     No current facility-administered medications for this visit  Allergies   Allergen Reactions    Anti-Hist [Diphenhydramine] Hyperactivity     Patient stated any antihistamine (hydroxyzine, diphenhydramine, etc ) causes extreme excitation, energy and lack of sleep   (Paradoxical reaction )     Buspar [Buspirone] Other (See Comments)     Per patient - hemorrhage and feeling like they were being electrocuted     Haldol [Haloperidol] Other (See Comments)     Acute Dystonia - resolved with IM Benztropine     Zoloft [Sertraline] Other (See Comments)     Serotonin Syndrome (patient - per own report - stated they had to be hospitalized due to severe flu like symptoms and fever 1 week post taking Zoloft) - patient DID tolerate Fluoxetine, Celexa and Lexapro)     Penicillins Throat Swelling    Pennsaid [Diclofenac Sodium]        Review of Systems Constitutional: Positive for fatigue  Negative for chills and fever  HENT: Negative  Negative for trouble swallowing and voice change  Eyes: Negative for visual disturbance  Respiratory: Negative  Negative for chest tightness and shortness of breath  Cardiovascular: Negative  Negative for chest pain  Gastrointestinal: Negative  Negative for abdominal pain, constipation, diarrhea and vomiting  Endocrine: Negative for cold intolerance, heat intolerance, polydipsia, polyphagia and polyuria  Genitourinary: Negative  Musculoskeletal: Negative  Skin: Negative  Allergic/Immunologic: Negative  Neurological: Positive for weakness  Negative for dizziness, syncope, light-headedness and headaches  Hematological: Negative  Psychiatric/Behavioral: Negative  All other systems reviewed and are negative  Video Exam    There were no vitals filed for this visit  Physical Exam     Physical Exam   Constitutional: She is oriented to person, place, and time  She appears well-developed and well-nourished  No distress  HENT:   Head: Normocephalic and atraumatic  Neck: Normal range of motion  Pulmonary/Chest: Effort normal    Musculoskeletal: Normal range of motion  Neurological: She is alert and oriented to person, place, and time  Skin: She is not diaphoretic  Psychiatric: She has a normal mood and affect  Her behavior is normal        Plan:  1  Hypoglycemia: Her most recent hemoglobin A1c is 5 3   Suspect post gastric bypass hypoglycemia     She is no longer treated with acarbose  Unfortunately, her rehabilitation facility will not allow her to utilize her dex com continuous glucose monitor  She denies any severe episodes of hypoglycemia but does state that she has had a few occasional episodes of mild hypoglycemia related to her diet  Check hemoglobin A1c now and prior to next office visit      2  Hypothyroidism: For now, continue levothyroxine 50 mcg daily    Recheck TSH and free T4 now and prior to next visit      3  History of pituitary adenoma: Check IGF-1 and prolactin level now and prior to next visit  Obtain MRI of the Brain/Pitutary in April 2022 to maintain surveillance      4  Vitamin-D deficiency: Continue supplementation  Check 25 hydroxy vitamin-D level  I spent 30 minutes directly with the patient during this visit    [address]all verbally agrees to participate in [employer]  Pt is aware that [employer] could be limited without vital signs or the ability to perform a full hands-on physical Shay Watts understands she or the provider may request at any time to terminate the video visit and request the patient to seek care or treatment in person

## 2022-02-22 ENCOUNTER — TELEPHONE (OUTPATIENT)
Dept: NEUROLOGY | Facility: CLINIC | Age: 42
End: 2022-02-22

## 2022-02-22 DIAGNOSIS — G62.9 PERIPHERAL POLYNEUROPATHY: Primary | ICD-10-CM

## 2022-02-22 NOTE — TELEPHONE ENCOUNTER
Received call from [facility] [phone]  Patient was scheduled to have an EMG today; however, Edward Dotson states patient is having high anxiety today and was unable to keep scheduled test  Edward Dotson attempted to reschedule EMG but was advised they were scheduling appts beyond August so they will require a new order to reschedule as the current order will  in 2022  LOV - 2021     Kirsten Amin - can you place new order for EMG? Thanks!

## 2022-02-22 NOTE — TELEPHONE ENCOUNTER
Spoke w/Eduarda of [facility]  Advised her new order for EMG is placed  Transferred Dilia Ruelas to  to reschedule EMG

## 2022-02-28 ENCOUNTER — TELEPHONE (OUTPATIENT)
Dept: HEMATOLOGY ONCOLOGY | Facility: CLINIC | Age: 42
End: 2022-02-28

## 2022-02-28 NOTE — TELEPHONE ENCOUNTER
New Patient Intake Form   Patient Details:    Dilan Mohan  1980  [id number]    Appointment Information   Who is calling to schedule? Physician Office   If not self, what is the caller's name? Please put name of [facility] nurse as well  Ely Spearing    Referring provider    What is the diagnosis? Anemia      Is there a confirmed tissue diagnosis? No   Is there a biopsy ordered or pending? Please specify dates   n/a     Is patient aware of diagnosis? Yes   Have you had any imaging or labs done? If yes, where? (If imaging done outside of [facility], please remind patient to bring a disk ) Yes     12/01     If imaging done at outside facility, did you instruct patient to obtain discs and bring to visit? n/a   Have you been seen by another Oncologist/Hematologist?  If so, who and where? no   Are the records in [facility] or Care Everywhere? yes   Does the patient have records at another facility/hospital? no   If yes, Name of facility, city and state where facility is located  Did you instruct patient to have records faxed to [facility] and provide [facility] number? no   Preferred [facility]   Is the patient willing to be seen by another provider?   (This is for breast patients only) n/a   Miscellaneous Information: appt made for 03/14

## 2022-03-03 ENCOUNTER — TELEPHONE (OUTPATIENT)
Dept: NEUROLOGY | Facility: CLINIC | Age: 42
End: 2022-03-03

## 2022-03-03 NOTE — TELEPHONE ENCOUNTER
Called pt to reschedule appointment today due to Barrett Loera being out of the office  Pt did not answer, left voicemail to call me back to set up new appointment

## 2022-03-10 ENCOUNTER — TELEPHONE (OUTPATIENT)
Dept: HEMATOLOGY ONCOLOGY | Facility: CLINIC | Age: 42
End: 2022-03-10

## 2022-03-10 DIAGNOSIS — D64.9 ANEMIA, UNSPECIFIED TYPE: Primary | ICD-10-CM

## 2022-03-10 NOTE — TELEPHONE ENCOUNTER
03/10/22    LVM reminding pt for updated labs prior to the next appt  I will check her CBC/ CMP/ MMA/ iron panel    Advising pt to get blood work done in any [facility] walk-in labs  [employer] number also provided

## 2022-03-11 NOTE — PROGRESS NOTES
Hematology/Oncology Outpatient Consult Note  Gregoria Duenas 46 y o  female MRN: @ Encounter: [id number]        Date:  3/14/2022        CC:  Iron deficiency Anemia       HPI:  Gregoria Duenas is a 46year old female with a history of eating disorder, PTSD, depression/anxiety currently residing at [address] [place] [address] who is referred to hematology for evaluation and management of microcytic, hypochromic anemia and low serum iron  She is being seen for initial consultation 3/14/2022     Patient arrived via [facility]  She states she has muscle weakness and difficulty walking secondary to her history of significant nutritional deficiency  Patient reports she has eating disorder  H/O anorexia and binge eating/over eating disorder that led to significant weight gain of 350lb  She underwent gastric bypass in 4/2007, required a revision in 2017  Patient's reports of history of iron deficiency for many years and has followed with a hematologist in past and has received iron infusions previously  (She can not remember name of previous hematologist or when she last received parental iron)     Labs on file demonstrate a history of anemia dating back to at least 2020  Her Hgb has ranged between 8 4 and 11 over the past two years  White count has been normal  Few instances of thrombocytosis noted, and likely reactive  Most recent CBC completed on 2/11/22 shows WBC 7 2, Hgb 9 3, MCV 74, MCH 20, MCHC 27, RDW 17 3, Platelets 578, serum iron 23  Patient has a very dysphoric mood  She endorses occasional headaches, fatigue  Denies any abnormal bleeding  No menorrhagia        Test Results:    Imaging: No results found      Labs      Lab Results   Component Value Date    WBC 7 70 11/17/2021    HGB 9 8 (L) 11/17/2021    HCT 32 4 (L) 11/17/2021    MCV 71 (L) 11/17/2021     11/17/2021     Lab Results   Component Value Date    K 4 6 11/17/2021     11/17/2021    CO2 30 11/17/2021    BUN 13 11/17/2021    CREATININE 0 88 11/17/2021    GLUCOSE 89 04/07/2021    GLUF 80 12/31/2020    CALCIUM 9 5 11/17/2021    CORRECTEDCA 9 2 09/20/2021    AST 19 11/17/2021    ALT 12 11/17/2021    ALKPHOS 88 11/17/2021    EGFR 83 11/17/2021             ROS:  Review of Systems   Constitutional: Positive for activity change and fatigue  Musculoskeletal: Positive for gait problem  Neurological: Positive for headaches  Psychiatric/Behavioral: Positive for dysphoric mood  All other systems reviewed and are negative            Active Problems:   Patient Active Problem List   Diagnosis    Pituitary enlargement    Elevated alkaline phosphatase level    Hypothyroidism    History of gastric bypass    Vitamin D deficiency    Eating disorder    Post-traumatic stress disorder, chronic    Major depressive disorder, recurrent, severe without psychotic features ([address] )    Laxative abuse    Generalized weakness    Anemia    Soft tissue lesion of pelvic region    Severe protein-calorie malnutrition (HCC)    Acute cystitis    Urinary retention    Vitamin B6 deficiency    Copper deficiency    Altered mental status    Hypokalemia    Hypomagnesemia    Bacteremia    Peripheral polyneuropathy    Partial idiopathic epilepsy with seizures of localized onset, not intractable, without status epilepticus ([address] )    Migraine    Muscle spasm    Non-insulin dependent type 2 diabetes mellitus ([address] )    BEAR (generalized anxiety disorder)       Past Medical History:   Past Medical History:   Diagnosis Date    Acute cystitis     Anemia     Anxiety     Bacteremia     Copper deficiency     Eating disorder     Elevated alkaline phosphatase level     Generalized weakness     Head injury     History of enlargement of pituitary gland     Hypothyroidism     Migraine     Non-insulin dependent type 2 diabetes mellitus (HCC)     Panic attack     Peripheral neuropathy     PTSD (post-traumatic stress disorder)     Seizures ([address] )     Self-injurious behavior     Sepsis ([facility] [address] [address] )     Soft tissue lesion of pelvic region     Urinary retention     Vitamin B6 deficiency     Vitamin D deficiency        Surgical History:   Past Surgical History:   Procedure Laterality Date    GASTRIC BYPASS      IR PICC PLACEMENT SINGLE LUMEN  2021    IR TEMPORARY DIALYSIS CATHETER PLACEMENT  2021       Family History:    Family History   Problem Relation Age of Onset    Hypertension Mother     Heart attack Mother     No Known Problems Father     Lung cancer Maternal Grandmother     Hypothyroidism Maternal Grandmother     Diabetes type II Paternal Grandmother     Diabetes type II Paternal Grandfather        Cancer-related family history includes Lung cancer in her maternal grandmother      Social History:   Social History     Socioeconomic History    Marital status: Single     Spouse name: Not on file    Number of children: Not on file    Years of education: Not on file    Highest education level: Not on file   Occupational History    Not on file   Tobacco Use    Smoking status: Former Smoker     Quit date: 3/1/2021     Years since quittin 0    Smokeless tobacco: Never Used   Vaping Use    Vaping Use: Never used   Substance and Sexual Activity    Alcohol use: Never    Drug use: Not Currently     Comment: in revovery for benzos    Sexual activity: Not Currently   Other Topics Concern    Not on file   Social History Narrative    Not on file     Social Determinants of Health     Financial Resource Strain: Not on file   Food Insecurity: Not on file   Transportation Needs: Not on file   Physical Activity: Not on file   Stress: Not on file   Social Connections: Not on file   Intimate Partner Violence: Not on file   Housing Stability: Not on file       Current Medications:   Current Outpatient Medications   Medication Sig Dispense Refill    acetaminophen (TYLENOL) 325 mg tablet Take 650 mg by mouth every 4 (four) hours as needed for mild pain or fever  AquaLance Lancets 30G MISC USE TO TEST BLOOD GLUCOSE FOUR TIMES A DAY      bisacodyl (Dulcolax) 10 mg suppository Insert 10 mg into the rectum as needed for constipation      Continuous Blood Gluc Sensor (FREESTYLE CAITY 14 DAY SENSOR) MIS 1 application by Does not apply route every 14 (fourteen) days 1 each 6    cyanocobalamin 1,000 mcg/mL inject 1 milliliter ( 1000 MCG ) intramuscularly Every Month      ergocalciferol (VITAMIN D2) 50,000 units Take 1 capsule (50,000 Units total) by mouth 2 (two) times a week Monday and Thursday      eszopiclone (LUNESTA) 3 MG tablet Take 3 mg by mouth daily at bedtime as needed for sleep Take immediately before bedtime      levETIRAcetam (KEPPRA) 500 mg tablet Take 1 tablet (500 mg total) by mouth every 12 (twelve) hours 60 tablet 0    methocarbamol (ROBAXIN) 500 mg tablet Take 1 tablet (500 mg total) by mouth every 8 (eight) hours as needed for muscle spasms  0    potassium chloride (MICRO-K) 10 MEQ CR capsule Take 1 capsule (10 mEq total) by mouth 2 (two) times a day 10 capsule 0    topiramate (TOPAMAX) 25 mg tablet Take 25 mg by mouth daily      folic acid (FOLVITE) 1 mg tablet Take 1 tablet (1 mg total) by mouth daily 30 tablet 0    gabapentin (NEURONTIN) 300 mg capsule Take 2 capsules (600 mg total) by mouth 4 (four) times a day 240 capsule 0    levothyroxine 50 mcg tablet Take 1 tablet (50 mcg total) by mouth daily in the early morning 30 tablet 0    LORazepam (ATIVAN) 0 5 mg tablet Take 1 tablet (0 5 mg total) by mouth 2 (two) times a day 60 tablet 0    LORazepam (ATIVAN) 1 mg tablet Take 1 tablet (1 mg total) by mouth daily at bedtime 30 tablet 0    magnesium oxide (MAG-OX) 400 mg Take 1 tablet (400 mg total) by mouth 2 (two) times a day 60 tablet 0    mirtazapine (REMERON) 15 mg tablet Take 1 tablet (15 mg total) by mouth daily at bedtime (Patient not taking: Reported on 2/1/2022 ) 30 tablet 0    polyethylene glycol (MIRALAX) 17 g packet Take 17 g by mouth daily 510 g 0    prazosin (MINIPRESS) 1 mg capsule Take 3 capsules (3 mg total) by mouth daily at bedtime 90 capsule 0    thiamine (VITAMIN B1) 100 mg tablet Take 1 tablet (100 mg total) by mouth daily 30 tablet 0    traZODone (DESYREL) 100 mg tablet Take 1 tablet (100 mg total) by mouth daily at bedtime 30 tablet 0    venlafaxine (EFFEXOR-XR) 150 mg 24 hr capsule Take 1 capsule (150 mg total) by mouth daily 30 capsule 0     No current facility-administered medications for this visit  Allergies: Allergies   Allergen Reactions    Anti-Hist [Diphenhydramine] Hyperactivity     Patient stated any antihistamine (hydroxyzine, diphenhydramine, etc ) causes extreme excitation, energy and lack of sleep  (Paradoxical reaction )     Buspar [Buspirone] Other (See Comments)     Per patient - hemorrhage and feeling like they were being electrocuted     Haldol [Haloperidol] Other (See Comments)     Acute Dystonia - resolved with IM Benztropine     Zoloft [Sertraline] Other (See Comments)     Serotonin Syndrome (patient - per own report - stated they had to be hospitalized due to severe flu like symptoms and fever 1 week post taking Zoloft) - patient DID tolerate Fluoxetine, Celexa and Lexapro)     Penicillins Throat Swelling    Pennsaid [Diclofenac Sodium]          Physical Exam:    Body surface area is 2 16 meters squared  Wt Readings from Last 3 Encounters:   03/14/22 109 kg (240 lb)   11/17/21 109 kg (240 lb)   11/15/21 109 kg (240 lb)        Temp Readings from Last 3 Encounters:   03/14/22 97 7 °F (36 5 °C) (Tympanic Core)   12/01/21 97 5 °F (36 4 °C) (Temporal)   11/15/21 98 4 °F (36 9 °C) (Temporal)        BP Readings from Last 3 Encounters:   03/14/22 132/72   12/01/21 104/59   11/16/21 102/89         Pulse Readings from Last 3 Encounters:   03/14/22 82   12/01/21 85   11/16/21 69          Physical Exam  Constitutional:       General: She is not in acute distress    HENT:      Head: Normocephalic  Eyes:      General: No scleral icterus  Right eye: No discharge  Left eye: No discharge  Conjunctiva/sclera: Conjunctivae normal    Cardiovascular:      Rate and Rhythm: Normal rate and regular rhythm  Pulmonary:      Breath sounds: Normal breath sounds  Abdominal:      General: Bowel sounds are normal       Palpations: Abdomen is soft  Neurological:      General: No focal deficit present  Mental Status: She is alert and oriented to person, place, and time  Psychiatric:         Mood and Affect: Mood is depressed  Affect is blunt  Behavior: Behavior normal          Thought Content: Thought content normal            Assessment/ Plan:    1  Iron deficiency anemia secondary to inadequate dietary iron intake      The patient is a 46year old female with significant psychiatric history including eating disorder and h/o gastric bypass which has contributed to malabsorption issues  She has evidence of hypochromic, microcytic anemia dating back several years  Her most recent CBC continues to demonstrate evidence of anemia  Her serum iron level is low  She would benefit from iron replacement  She is unable to tolerate oral iron  We did discuss IV iron replacement  Potential side effects of IV iron could include but may not be limited to:  change in taste, diarrhea, muscle cramps, nausea or vomiting, pain in the arms or legs, pain or burning sensation in the injection site, allergic reaction  The patient verbalized understanding and wishes to proceed  I will set her up to receive IV Venofer 200 mg twice a week for 8 doses  She already receives B12 1000 mcg IM monthly at her nursing facility  She will return for a follow up visit in 3 months with repeat blood work  She may benefit from maintenance infusions given her history and risk for recurrent iron deficiency in setting of post gastrectomy malabsorption      Instructions with recommendations were written down for her nursing facility with instructions to call with any questions or concerns prior to next visit  Barriers: None  Patient  able to self care  Portions of the record may have been created with voice recognition software  Occasional wrong word or "sound a like" substitutions may have occurred due to the inherent limitations of voice recognition software  Read the chart carefully and recognize, using context, where substitutions have occurred

## 2022-03-14 ENCOUNTER — CONSULT (OUTPATIENT)
Dept: HEMATOLOGY ONCOLOGY | Facility: HOSPITAL | Age: 42
End: 2022-03-14
Payer: COMMERCIAL

## 2022-03-14 VITALS
BODY MASS INDEX: 38.57 KG/M2 | DIASTOLIC BLOOD PRESSURE: 72 MMHG | SYSTOLIC BLOOD PRESSURE: 132 MMHG | HEIGHT: 66 IN | WEIGHT: 240 LBS | OXYGEN SATURATION: 99 % | TEMPERATURE: 97.7 F | RESPIRATION RATE: 18 BRPM | HEART RATE: 82 BPM

## 2022-03-14 DIAGNOSIS — D50.8 IRON DEFICIENCY ANEMIA SECONDARY TO INADEQUATE DIETARY IRON INTAKE: Primary | ICD-10-CM

## 2022-03-14 PROCEDURE — 99205 OFFICE O/P NEW HI 60 MIN: CPT | Performed by: NURSE PRACTITIONER

## 2022-03-14 RX ORDER — SODIUM CHLORIDE 9 MG/ML
20 INJECTION, SOLUTION INTRAVENOUS ONCE
Status: CANCELLED | OUTPATIENT
Start: 2022-03-18

## 2022-03-18 ENCOUNTER — HOSPITAL ENCOUNTER (OUTPATIENT)
Dept: INFUSION CENTER | Facility: HOSPITAL | Age: 42
Discharge: HOME | End: 2022-03-18
Attending: INTERNAL MEDICINE
Payer: COMMERCIAL

## 2022-03-18 VITALS
RESPIRATION RATE: 18 BRPM | TEMPERATURE: 99.3 F | HEART RATE: 94 BPM | OXYGEN SATURATION: 98 % | DIASTOLIC BLOOD PRESSURE: 67 MMHG | SYSTOLIC BLOOD PRESSURE: 110 MMHG

## 2022-03-18 DIAGNOSIS — D50.8 IRON DEFICIENCY ANEMIA SECONDARY TO INADEQUATE DIETARY IRON INTAKE: Primary | ICD-10-CM

## 2022-03-18 PROCEDURE — 96365 THER/PROPH/DIAG IV INF INIT: CPT

## 2022-03-18 RX ORDER — SODIUM CHLORIDE 9 MG/ML
20 INJECTION, SOLUTION INTRAVENOUS ONCE
Status: COMPLETED | OUTPATIENT
Start: 2022-03-18 | End: 2022-03-18

## 2022-03-18 RX ORDER — SODIUM CHLORIDE 9 MG/ML
20 INJECTION, SOLUTION INTRAVENOUS ONCE
Status: CANCELLED | OUTPATIENT
Start: 2022-03-20

## 2022-03-18 RX ADMIN — IRON SUCROSE 200 MG: 20 INJECTION, SOLUTION INTRAVENOUS at 14:13

## 2022-03-18 RX ADMIN — SODIUM CHLORIDE 20 ML/HR: 9 INJECTION, SOLUTION INTRAVENOUS at 14:11

## 2022-03-18 NOTE — PROGRESS NOTES
Rec'd pt via wc by [facility] transport  PIV easily obtained  Venofer infused without problems  Pt appeared comfortable and offered no complaints  PIV then removed and pt returned to facility in [place] escorted by [facility]

## 2022-03-21 ENCOUNTER — HOSPITAL ENCOUNTER (OUTPATIENT)
Dept: INFUSION CENTER | Facility: HOSPITAL | Age: 42
Discharge: HOME | End: 2022-03-21
Attending: INTERNAL MEDICINE
Payer: COMMERCIAL

## 2022-03-21 VITALS
TEMPERATURE: 99.1 F | RESPIRATION RATE: 16 BRPM | OXYGEN SATURATION: 98 % | HEART RATE: 83 BPM | DIASTOLIC BLOOD PRESSURE: 62 MMHG | SYSTOLIC BLOOD PRESSURE: 105 MMHG

## 2022-03-21 DIAGNOSIS — D50.8 IRON DEFICIENCY ANEMIA SECONDARY TO INADEQUATE DIETARY IRON INTAKE: Primary | ICD-10-CM

## 2022-03-21 PROCEDURE — 96365 THER/PROPH/DIAG IV INF INIT: CPT

## 2022-03-21 RX ORDER — SODIUM CHLORIDE 9 MG/ML
20 INJECTION, SOLUTION INTRAVENOUS ONCE
Status: COMPLETED | OUTPATIENT
Start: 2022-03-21 | End: 2022-03-21

## 2022-03-21 RX ORDER — SODIUM CHLORIDE 9 MG/ML
20 INJECTION, SOLUTION INTRAVENOUS ONCE
Status: CANCELLED | OUTPATIENT
Start: 2022-03-25

## 2022-03-21 RX ADMIN — SODIUM CHLORIDE 20 ML/HR: 9 INJECTION, SOLUTION INTRAVENOUS at 14:30

## 2022-03-21 RX ADMIN — IRON SUCROSE 200 MG: 20 INJECTION, SOLUTION INTRAVENOUS at 14:31

## 2022-03-21 NOTE — PROGRESS NOTES
Pt tolerated IV Venofer infusion without adverse reaction  Pt assisted to wc and escorted off unit to meet [facility] transport team   AVS printed and given to pt

## 2022-03-25 ENCOUNTER — HOSPITAL ENCOUNTER (OUTPATIENT)
Dept: INFUSION CENTER | Facility: HOSPITAL | Age: 42
Discharge: HOME | End: 2022-03-25
Attending: INTERNAL MEDICINE
Payer: COMMERCIAL

## 2022-03-25 VITALS
RESPIRATION RATE: 14 BRPM | DIASTOLIC BLOOD PRESSURE: 58 MMHG | TEMPERATURE: 98.5 F | SYSTOLIC BLOOD PRESSURE: 95 MMHG | OXYGEN SATURATION: 100 % | HEART RATE: 85 BPM

## 2022-03-25 DIAGNOSIS — D50.8 IRON DEFICIENCY ANEMIA SECONDARY TO INADEQUATE DIETARY IRON INTAKE: Primary | ICD-10-CM

## 2022-03-25 PROCEDURE — 96365 THER/PROPH/DIAG IV INF INIT: CPT

## 2022-03-25 RX ORDER — SODIUM CHLORIDE 9 MG/ML
20 INJECTION, SOLUTION INTRAVENOUS ONCE
Status: COMPLETED | OUTPATIENT
Start: 2022-03-25 | End: 2022-03-25

## 2022-03-25 RX ORDER — SODIUM CHLORIDE 9 MG/ML
20 INJECTION, SOLUTION INTRAVENOUS ONCE
Status: CANCELLED | OUTPATIENT
Start: 2022-03-28

## 2022-03-25 RX ADMIN — IRON SUCROSE 200 MG: 20 INJECTION, SOLUTION INTRAVENOUS at 15:02

## 2022-03-25 RX ADMIN — SODIUM CHLORIDE 20 ML/HR: 9 INJECTION, SOLUTION INTRAVENOUS at 15:02

## 2022-03-27 ENCOUNTER — HOSPITAL ENCOUNTER (EMERGENCY)
Facility: HOSPITAL | Age: 42
Discharge: HOME | End: 2022-03-27
Attending: EMERGENCY MEDICINE
Payer: COMMERCIAL

## 2022-03-27 VITALS
SYSTOLIC BLOOD PRESSURE: 104 MMHG | TEMPERATURE: 97.4 F | HEART RATE: 90 BPM | OXYGEN SATURATION: 96 % | DIASTOLIC BLOOD PRESSURE: 56 MMHG | RESPIRATION RATE: 16 BRPM

## 2022-03-27 DIAGNOSIS — R51.9 HEADACHE: Primary | ICD-10-CM

## 2022-03-27 DIAGNOSIS — T39.391A NSAID OVERDOSE, ACCIDENTAL OR UNINTENTIONAL, INITIAL ENCOUNTER: ICD-10-CM

## 2022-03-27 PROCEDURE — 99284 EMERGENCY DEPT VISIT MOD MDM: CPT

## 2022-03-27 PROCEDURE — 99284 EMERGENCY DEPT VISIT MOD MDM: CPT | Performed by: EMERGENCY MEDICINE

## 2022-03-27 RX ORDER — ACETAMINOPHEN 325 MG/1
650 TABLET ORAL ONCE
Status: COMPLETED | OUTPATIENT
Start: 2022-03-27 | End: 2022-03-27

## 2022-03-27 RX ORDER — LIDOCAINE 50 MG/G
1 PATCH TOPICAL ONCE
Status: DISCONTINUED | OUTPATIENT
Start: 2022-03-27 | End: 2022-03-27 | Stop reason: HOSPADM

## 2022-03-27 RX ADMIN — ACETAMINOPHEN 650 MG: 325 TABLET ORAL at 01:00

## 2022-03-27 RX ADMIN — LIDOCAINE 5% 1 PATCH: 700 PATCH TOPICAL at 01:00

## 2022-03-27 NOTE — ED PROVIDER NOTES
History  Chief Complaint   Patient presents with    Headache     pt states that she has had a HA for a few days and neck pain  pt states the she took 6 advil and then another 6 advil  pt states that she took this in a 6 hour period  pt denies SI or HI  EMS stated that pt is from [facility]  EMS stated that pt denies IV      46 y o  female with a history of depression, anxiety, migraines BIBA from assisted living facility for evaluation of "overdose" with advil  Over a span of 6 hours, she took 6 pills, 200mg each, then another 6 pills, 200mg each to help with a HA  NOT suicidal  No HI or hallucinations  She didn't even want to come for evaluation, facility sent her in  Pt states her HA has been ongoing for a few days  Consistent with past headaches  No visual changes, N/V  No fevers  No injury/trauma  No thinners  No other complaints  Wants to go back to facility  Pain radiates from left posterior neck of left side of head  Mild in nature  No numbness/tingling/weakness  Pt denies pregnancy  No GI/ complaints        History provided by:  Patient and medical records   used: No    Headache  Quality:  Dull  Radiates to:  L neck  Onset quality:  Gradual  Timing:  Constant  Progression:  Unchanged  Chronicity:  Recurrent  Similar to prior headaches: yes    Worsened by:  Light  Ineffective treatments:  NSAIDs  Associated symptoms: neck pain    Associated symptoms: no abdominal pain, no back pain, no blurred vision, no congestion, no cough, no diarrhea, no dizziness, no drainage, no ear pain, no eye pain, no facial pain, no fatigue, no fever, no focal weakness, no hearing loss, no loss of balance, no myalgias, no nausea, no near-syncope, no neck stiffness, no numbness, no paresthesias, no photophobia, no seizures, no sinus pressure, no sore throat, no swollen glands, no syncope, no tingling, no URI, no visual change, no vomiting and no weakness        Prior to Admission Medications Prescriptions Last Dose Informant Patient Reported? Taking?    AquaLance Lancets 30G MISC  Self Yes No   Sig: USE TO TEST BLOOD GLUCOSE FOUR TIMES A DAY   Continuous Blood Gluc Sensor (FREESTYLE CAITY 14 DAY SENSOR) MISC  Self No No   Si application by Does not apply route every 14 (fourteen) days   LORazepam (ATIVAN) 0 5 mg tablet  Self No No   Sig: Take 1 tablet (0 5 mg total) by mouth 2 (two) times a day   LORazepam (ATIVAN) 1 mg tablet  Self No No   Sig: Take 1 tablet (1 mg total) by mouth daily at bedtime   acetaminophen (TYLENOL) 325 mg tablet  Self Yes No   Sig: Take 650 mg by mouth every 4 (four) hours as needed for mild pain or fever   bisacodyl (Dulcolax) 10 mg suppository  Self Yes No   Sig: Insert 10 mg into the rectum as needed for constipation   cyanocobalamin 1,000 mcg/mL  Self Yes No   Sig: inject 1 milliliter ( 1000 MCG ) intramuscularly Every Month   ergocalciferol (VITAMIN D2) 50,000 units  Self No No   Sig: Take 1 capsule (50,000 Units total) by mouth 2 (two) times a week Monday and Thursday   eszopiclone (LUNESTA) 3 MG tablet  Self Yes No   Sig: Take 3 mg by mouth daily at bedtime as needed for sleep Take immediately before bedtime   folic acid (FOLVITE) 1 mg tablet  Self No No   Sig: Take 1 tablet (1 mg total) by mouth daily   gabapentin (NEURONTIN) 300 mg capsule  Self No No   Sig: Take 2 capsules (600 mg total) by mouth 4 (four) times a day   levETIRAcetam (KEPPRA) 500 mg tablet  Self No No   Sig: Take 1 tablet (500 mg total) by mouth every 12 (twelve) hours   levothyroxine 50 mcg tablet  Self No No   Sig: Take 1 tablet (50 mcg total) by mouth daily in the early morning   magnesium oxide (MAG-OX) 400 mg  Self No No   Sig: Take 1 tablet (400 mg total) by mouth 2 (two) times a day   methocarbamol (ROBAXIN) 500 mg tablet  Self No No   Sig: Take 1 tablet (500 mg total) by mouth every 8 (eight) hours as needed for muscle spasms   mirtazapine (REMERON) 15 mg tablet  Self No No   Sig: Take 1 tablet (15 mg total) by mouth daily at bedtime   Patient not taking: Reported on 2/1/2022    polyethylene glycol (MIRALAX) 17 g packet  Self No No   Sig: Take 17 g by mouth daily   potassium chloride (MICRO-K) 10 MEQ CR capsule  Self No No   Sig: Take 1 capsule (10 mEq total) by mouth 2 (two) times a day   prazosin (MINIPRESS) 1 mg capsule  Self No No   Sig: Take 3 capsules (3 mg total) by mouth daily at bedtime   thiamine (VITAMIN B1) 100 mg tablet  Self No No   Sig: Take 1 tablet (100 mg total) by mouth daily   topiramate (TOPAMAX) 25 mg tablet  Self Yes No   Sig: Take 25 mg by mouth daily   traZODone (DESYREL) 100 mg tablet  Self No No   Sig: Take 1 tablet (100 mg total) by mouth daily at bedtime   venlafaxine (EFFEXOR-XR) 150 mg 24 hr capsule  Self No No   Sig: Take 1 capsule (150 mg total) by mouth daily      Facility-Administered Medications: None       Past Medical History:   Diagnosis Date    Acute cystitis     Anemia     Anxiety     Bacteremia     Copper deficiency     Eating disorder     Elevated alkaline phosphatase level     Generalized weakness     Head injury     History of enlargement of pituitary gland     Hypothyroidism     Migraine     Non-insulin dependent type 2 diabetes mellitus (HCC)     Panic attack     Peripheral neuropathy     PTSD (post-traumatic stress disorder)     Seizures (HCC)     Self-injurious behavior     Sepsis ([address] )     Soft tissue lesion of pelvic region     Urinary retention     Vitamin B6 deficiency     Vitamin D deficiency        Past Surgical History:   Procedure Laterality Date    GASTRIC BYPASS      IR PICC PLACEMENT SINGLE LUMEN  4/16/2021    IR TEMPORARY DIALYSIS CATHETER PLACEMENT  4/8/2021       Family History   Problem Relation Age of Onset    Hypertension Mother     Heart attack Mother     No Known Problems Father     Lung cancer Maternal Grandmother     Hypothyroidism Maternal Grandmother     Diabetes type II Paternal Grandmother     Diabetes type II Paternal Grandfather      I have reviewed and agree with the history as documented  E-Cigarette/Vaping    E-Cigarette Use Never User      E-Cigarette/Vaping Substances    Nicotine No      Social History     Tobacco Use    Smoking status: Former Smoker     Quit date: 3/1/2021     Years since quittin 0    Smokeless tobacco: Never Used   Vaping Use    Vaping Use: Never used   Substance Use Topics    Alcohol use: Never    Drug use: Not Currently     Comment: in revovery for benzos       Review of Systems   Constitutional: Negative for appetite change, chills, fatigue and fever  HENT: Negative for congestion, ear pain, hearing loss, postnasal drip, rhinorrhea, sinus pressure, sore throat, trouble swallowing and voice change  Eyes: Negative for blurred vision, photophobia, pain and visual disturbance  Respiratory: Negative for cough, chest tightness and shortness of breath  Cardiovascular: Negative for chest pain, palpitations, leg swelling, syncope and near-syncope  Gastrointestinal: Negative for abdominal pain, blood in stool, constipation, diarrhea, nausea and vomiting  Genitourinary: Negative for difficulty urinating, flank pain and hematuria  Musculoskeletal: Positive for neck pain  Negative for back pain, myalgias and neck stiffness  Skin: Negative for rash  Neurological: Positive for headaches  Negative for dizziness, focal weakness, seizures, syncope, speech difficulty, weakness, light-headedness, numbness, paresthesias and loss of balance  Psychiatric/Behavioral: Negative for confusion and suicidal ideas  Physical Exam  Physical Exam  Vitals and nursing note reviewed  Constitutional:       General: She is not in acute distress  Appearance: She is well-developed  She is obese  She is not ill-appearing, toxic-appearing or diaphoretic  Comments: Pt drowsy but arousable to voice (got all her PM meds from facility PTA)      HENT:      Head: Normocephalic and atraumatic  Right Ear: External ear normal       Left Ear: External ear normal       Nose: Nose normal       Mouth/Throat:      Mouth: Mucous membranes are moist       Pharynx: Oropharynx is clear  Eyes:      General: No scleral icterus  Right eye: No discharge  Left eye: No discharge  Extraocular Movements: Extraocular movements intact  Conjunctiva/sclera: Conjunctivae normal       Pupils: Pupils are equal, round, and reactive to light  Neck:      Trachea: No tracheal deviation  Cardiovascular:      Rate and Rhythm: Normal rate and regular rhythm  Heart sounds: Normal heart sounds  No murmur heard  No friction rub  No gallop  Pulmonary:      Effort: Pulmonary effort is normal  No respiratory distress  Breath sounds: Normal breath sounds  No stridor  Chest:      Chest wall: No tenderness  Abdominal:      General: Bowel sounds are normal       Palpations: Abdomen is soft  Tenderness: There is no abdominal tenderness  There is no guarding or rebound  Musculoskeletal:         General: No deformity  Normal range of motion  Cervical back: Normal range of motion and neck supple  Tenderness (along left trapezius  no midline tenderness or meningeal signs  ) present  No rigidity  Lymphadenopathy:      Cervical: No cervical adenopathy  Skin:     General: Skin is warm and dry  Findings: No rash  Neurological:      General: No focal deficit present  Mental Status: She is alert and oriented to person, place, and time  Cranial Nerves: No cranial nerve deficit  Sensory: No sensory deficit  Motor: No weakness        Coordination: Coordination normal    Psychiatric:         Behavior: Behavior normal          Vital Signs  ED Triage Vitals   Temperature Pulse Respirations Blood Pressure SpO2   03/27/22 0032 03/27/22 0032 03/27/22 0032 03/27/22 0032 03/27/22 0032   (!) 97 4 °F (36 3 °C) 77 18 109/69 95 %      Temp Source Heart Rate Source Patient Position - Orthostatic VS BP Location FiO2 (%)   03/27/22 0032 -- -- -- --   Temporal          Pain Score       03/27/22 0100       8           Vitals:    03/27/22 0032   BP: 109/69   Pulse: 77         Visual Acuity      ED Medications  Medications   lidocaine (LIDODERM) 5 % patch 1 patch (1 patch Topical Medication Applied 3/27/22 0100)   acetaminophen (TYLENOL) tablet 650 mg (650 mg Oral Given 3/27/22 0100)       Diagnostic Studies  Results Reviewed     None                 No orders to display              Procedures  Procedures         ED Course  ED Course as of 03/27/22 0129   Sun Mar 27, 2022   0127 Will treat HA, suspect tension HA  Discussed supportive care and appropriate NSAID use in future  Will d/c back to facility  No SI/HI  No abd pain, N/V  Pt medically cleared  SBIRT 20yo+      Most Recent Value   SBIRT (24 yo +)    In order to provide better care to our patients, we are screening all of our patients for alcohol and drug use  Would it be okay to ask you these screening questions? Yes Filed at: 03/27/2022 0036   Initial Alcohol Screen: US AUDIT-C     1  How often do you have a drink containing alcohol? 0 Filed at: 03/27/2022 0036   2  How many drinks containing alcohol do you have on a typical day you are drinking? 0 Filed at: 03/27/2022 0036   3a  Male UNDER 65: How often do you have five or more drinks on one occasion? 0 Filed at: 03/27/2022 0036   3b  FEMALE Any Age, or MALE 65+: How often do you have 4 or more drinks on one occassion? 0 Filed at: 03/27/2022 0036   Audit-C Score 0 Filed at: 03/27/2022 1972   JOVAN: How many times in the past year have you    Used an illegal drug or used a prescription medication for non-medical reasons?  Never Filed at: 03/27/2022 0036                    MDM  Number of Diagnoses or Management Options  Headache: new and requires workup  NSAID overdose, accidental or unintentional, initial encounter: new and requires workup     Amount and/or Complexity of Data Reviewed  Obtain history from someone other than the patient: yes  Review and summarize past medical records: yes    Risk of Complications, Morbidity, and/or Mortality  Presenting problems: low  Diagnostic procedures: minimal  Management options: low    Patient Progress  Patient progress: improved      Disposition  Final diagnoses:   Headache   NSAID overdose, accidental or unintentional, initial encounter     Time reflects when diagnosis was documented in both MDM as applicable and the Disposition within this note     Time User Action Codes Description Comment    3/27/2022 12:51 AM Kelly MANE Add [R51 9] Headache     3/27/2022 12:51 AM Osmar Murphy Add [T39 391A] NSAID overdose, accidental or unintentional, initial encounter       ED Disposition     ED Disposition Condition Date/Time Comment    Discharge Stable Sun Mar 27, 2022 12:51 AM Umesh Fam discharge to home/self care  Follow-up Information     Follow up With Specialties Details Why Contact Info Additional Information    Justin Dexter MD Internal Medicine Schedule an appointment as soon as possible for a visit   [facility]  [address] [place] [address] Emergency Department Emergency Medicine  If symptoms worsen [address] [address]  [address] [facility], [address] [place] [address] , [address]          Patient's Medications   Discharge Prescriptions    No medications on file       No discharge procedures on file      PDMP Review       Value Time User    PDMP Reviewed  Yes 12/1/2021 10:15 AM Eliazar Ackerman MD          ED Provider  Electronically Signed by           Yimi Esparza MD  03/27/22 3868

## 2022-03-28 ENCOUNTER — HOSPITAL ENCOUNTER (OUTPATIENT)
Dept: INFUSION CENTER | Facility: HOSPITAL | Age: 42
Discharge: HOME | End: 2022-03-28
Attending: INTERNAL MEDICINE
Payer: COMMERCIAL

## 2022-03-28 VITALS
SYSTOLIC BLOOD PRESSURE: 130 MMHG | TEMPERATURE: 97 F | DIASTOLIC BLOOD PRESSURE: 84 MMHG | HEART RATE: 86 BPM | RESPIRATION RATE: 16 BRPM | OXYGEN SATURATION: 97 %

## 2022-03-28 DIAGNOSIS — D50.8 IRON DEFICIENCY ANEMIA SECONDARY TO INADEQUATE DIETARY IRON INTAKE: Primary | ICD-10-CM

## 2022-03-28 PROCEDURE — 96365 THER/PROPH/DIAG IV INF INIT: CPT

## 2022-03-28 RX ORDER — SODIUM CHLORIDE 9 MG/ML
20 INJECTION, SOLUTION INTRAVENOUS ONCE
Status: CANCELLED | OUTPATIENT
Start: 2022-04-01

## 2022-03-28 RX ORDER — SODIUM CHLORIDE 9 MG/ML
20 INJECTION, SOLUTION INTRAVENOUS ONCE
Status: COMPLETED | OUTPATIENT
Start: 2022-03-28 | End: 2022-03-28

## 2022-03-28 RX ADMIN — IRON SUCROSE 200 MG: 20 INJECTION, SOLUTION INTRAVENOUS at 14:43

## 2022-03-28 RX ADMIN — SODIUM CHLORIDE 20 ML/HR: 9 INJECTION, SOLUTION INTRAVENOUS at 14:37

## 2022-03-28 NOTE — PROGRESS NOTES
Pt arrived via w/c for Venofer infusion  Reviewed IA, pt states she's allergic to bee stings and has an EPI pen in addition to other allergies  PIV inserted, Venofer infused  Pt butch well w/o AR seen  PIV removed intact  Assisted to bathroom  Discharged via w/c to to lobby for discharge with Nitza FLOREZ sent with pt

## 2022-03-31 ENCOUNTER — TELEPHONE (OUTPATIENT)
Dept: ENDOCRINOLOGY | Facility: HOSPITAL | Age: 42
End: 2022-03-31

## 2022-03-31 DIAGNOSIS — Z86.018 HISTORY OF PITUITARY ADENOMA: Primary | ICD-10-CM

## 2022-03-31 NOTE — TELEPHONE ENCOUNTER
Received call from [facility], patient is scheduled for a MRI on 4/7/22, she needs lab work prior, what do you want ordered?

## 2022-04-01 ENCOUNTER — HOSPITAL ENCOUNTER (OUTPATIENT)
Dept: INFUSION CENTER | Facility: HOSPITAL | Age: 42
Discharge: HOME | End: 2022-04-01
Attending: INTERNAL MEDICINE
Payer: COMMERCIAL

## 2022-04-01 VITALS
OXYGEN SATURATION: 99 % | HEART RATE: 88 BPM | RESPIRATION RATE: 16 BRPM | SYSTOLIC BLOOD PRESSURE: 100 MMHG | DIASTOLIC BLOOD PRESSURE: 56 MMHG | TEMPERATURE: 96.7 F

## 2022-04-01 DIAGNOSIS — D50.8 IRON DEFICIENCY ANEMIA SECONDARY TO INADEQUATE DIETARY IRON INTAKE: Primary | ICD-10-CM

## 2022-04-01 PROCEDURE — 96365 THER/PROPH/DIAG IV INF INIT: CPT

## 2022-04-01 RX ORDER — SODIUM CHLORIDE 9 MG/ML
20 INJECTION, SOLUTION INTRAVENOUS ONCE
Status: COMPLETED | OUTPATIENT
Start: 2022-04-01 | End: 2022-04-01

## 2022-04-01 RX ORDER — SODIUM CHLORIDE 9 MG/ML
20 INJECTION, SOLUTION INTRAVENOUS ONCE
Status: CANCELLED | OUTPATIENT
Start: 2022-04-04

## 2022-04-01 RX ADMIN — SODIUM CHLORIDE 20 ML/HR: 9 INJECTION, SOLUTION INTRAVENOUS at 14:16

## 2022-04-01 RX ADMIN — IRON SUCROSE 200 MG: 20 INJECTION, SOLUTION INTRAVENOUS at 14:16

## 2022-04-04 ENCOUNTER — HOSPITAL ENCOUNTER (OUTPATIENT)
Dept: INFUSION CENTER | Facility: HOSPITAL | Age: 42
Discharge: HOME | End: 2022-04-04
Attending: INTERNAL MEDICINE
Payer: COMMERCIAL

## 2022-04-04 ENCOUNTER — TELEPHONE (OUTPATIENT)
Dept: HEMATOLOGY ONCOLOGY | Facility: HOSPITAL | Age: 42
End: 2022-04-04

## 2022-04-04 VITALS
OXYGEN SATURATION: 100 % | HEART RATE: 91 BPM | DIASTOLIC BLOOD PRESSURE: 58 MMHG | SYSTOLIC BLOOD PRESSURE: 90 MMHG | RESPIRATION RATE: 16 BRPM | TEMPERATURE: 97 F

## 2022-04-04 DIAGNOSIS — D50.8 IRON DEFICIENCY ANEMIA SECONDARY TO INADEQUATE DIETARY IRON INTAKE: Primary | ICD-10-CM

## 2022-04-04 DIAGNOSIS — D64.9 ANEMIA, UNSPECIFIED TYPE: ICD-10-CM

## 2022-04-04 LAB
BASOPHILS # BLD AUTO: 0.05 THOUSANDS/ΜL (ref 0–0.1)
BASOPHILS NFR BLD AUTO: 1 % (ref 0–1)
EOSINOPHIL # BLD AUTO: 0.33 THOUSAND/ΜL (ref 0–0.61)
EOSINOPHIL NFR BLD AUTO: 3 % (ref 0–6)
ERYTHROCYTE [DISTWIDTH] IN BLOOD BY AUTOMATED COUNT: 24.5 % (ref 11.6–15.1)
HCT VFR BLD AUTO: 37.5 % (ref 34.8–46.1)
HGB BLD-MCNC: 10.5 G/DL (ref 11.5–15.4)
IMM GRANULOCYTES # BLD AUTO: 0.03 THOUSAND/UL (ref 0–0.2)
IMM GRANULOCYTES NFR BLD AUTO: 0 % (ref 0–2)
LYMPHOCYTES # BLD AUTO: 2.2 THOUSANDS/ΜL (ref 0.6–4.47)
LYMPHOCYTES NFR BLD AUTO: 23 % (ref 14–44)
MCH RBC QN AUTO: 22.7 PG (ref 26.8–34.3)
MCHC RBC AUTO-ENTMCNC: 28 G/DL (ref 31.4–37.4)
MCV RBC AUTO: 81 FL (ref 82–98)
MONOCYTES # BLD AUTO: 0.47 THOUSAND/ΜL (ref 0.17–1.22)
MONOCYTES NFR BLD AUTO: 5 % (ref 4–12)
NEUTROPHILS # BLD AUTO: 6.6 THOUSANDS/ΜL (ref 1.85–7.62)
NEUTS SEG NFR BLD AUTO: 68 % (ref 43–75)
NRBC BLD AUTO-RTO: 0 /100 WBCS
PLATELET # BLD AUTO: 288 THOUSANDS/UL (ref 149–390)
PMV BLD AUTO: 11.8 FL (ref 8.9–12.7)
RBC # BLD AUTO: 4.63 MILLION/UL (ref 3.81–5.12)
WBC # BLD AUTO: 9.68 THOUSAND/UL (ref 4.31–10.16)

## 2022-04-04 PROCEDURE — 85025 COMPLETE CBC W/AUTO DIFF WBC: CPT | Performed by: INTERNAL MEDICINE

## 2022-04-04 PROCEDURE — 96365 THER/PROPH/DIAG IV INF INIT: CPT

## 2022-04-04 RX ORDER — SODIUM CHLORIDE 9 MG/ML
20 INJECTION, SOLUTION INTRAVENOUS ONCE
Status: COMPLETED | OUTPATIENT
Start: 2022-04-04 | End: 2022-04-04

## 2022-04-04 RX ORDER — SODIUM CHLORIDE 9 MG/ML
20 INJECTION, SOLUTION INTRAVENOUS ONCE
Status: CANCELLED | OUTPATIENT
Start: 2022-04-08

## 2022-04-04 RX ADMIN — IRON SUCROSE 200 MG: 20 INJECTION, SOLUTION INTRAVENOUS at 15:03

## 2022-04-04 RX ADMIN — SODIUM CHLORIDE 20 ML/HR: 9 INJECTION, SOLUTION INTRAVENOUS at 15:04

## 2022-04-04 NOTE — PROGRESS NOTES
Pt arrived on unit for venofer  Blood pressure noted to be very low, with systolic in the 28'X  Pt states she feels ok other than a H/A  H/A is reoccuring, and not newly related to hypotension  Spoke with Pia Carrillo RN with Dr Patience Daniels office  Draw a CBC and treat  Re-check BP prior to discharge

## 2022-04-04 NOTE — PLAN OF CARE
Problem: Knowledge Deficit  Goal: Patient/family/caregiver demonstrates understanding of disease process, treatment plan, medications, and discharge instructions  Description: Complete learning assessment and assess knowledge base    Interventions:  - Provide teaching at level of understanding  - Provide teaching via preferred learning methods  Outcome: Progressing     Problem: Potential for Falls  Goal: Patient will remain free of falls  Description: INTERVENTIONS:  - Educate patient/family on patient safety including physical limitations  - Instruct patient to call for assistance with activity   - Consult OT/PT to assist with strengthening/mobility   - Keep Call bell within reach  - Keep bed low and locked with side rails adjusted as appropriate  - Keep care items and personal belongings within reach  - Initiate and maintain comfort rounds  - Make Fall Risk Sign visible to staff  Outcome: Progressing

## 2022-04-04 NOTE — PROGRESS NOTES
Pt tolerated venofer infusion well without complication  [address] [place] [address] notified of pt's blood pressure  Romed for   Pt taken by wheelchair for d/c  Next appts scheduled; avs received

## 2022-04-04 NOTE — TELEPHONE ENCOUNTER
Message from Lottie Woods in infusion center mentioned about pt complaints if headache and low bp  BP 90/58  Ordered CBC to be checked  Pt to receive iron today  Per trends pt bp runs on the low side  Advised nurse to recheck bp prior to dc

## 2022-04-07 ENCOUNTER — LAB (OUTPATIENT)
Dept: LAB | Facility: HOSPITAL | Age: 42
End: 2022-04-07
Payer: COMMERCIAL

## 2022-04-07 ENCOUNTER — HOSPITAL ENCOUNTER (OUTPATIENT)
Dept: MRI IMAGING | Facility: HOSPITAL | Age: 42
Discharge: HOME | End: 2022-04-07
Payer: COMMERCIAL

## 2022-04-07 DIAGNOSIS — Z86.018 HISTORY OF PITUITARY ADENOMA: ICD-10-CM

## 2022-04-07 LAB
ALBUMIN SERPL BCP-MCNC: 3.6 G/DL (ref 3.5–5)
ALP SERPL-CCNC: 295 U/L (ref 46–116)
ALT SERPL W P-5'-P-CCNC: 15 U/L (ref 12–78)
ANION GAP SERPL CALCULATED.3IONS-SCNC: 9 MMOL/L (ref 4–13)
AST SERPL W P-5'-P-CCNC: 8 U/L (ref 5–45)
BILIRUB SERPL-MCNC: 0.3 MG/DL (ref 0.2–1)
BUN SERPL-MCNC: 9 MG/DL (ref 5–25)
CALCIUM SERPL-MCNC: 8.7 MG/DL (ref 8.3–10.1)
CHLORIDE SERPL-SCNC: 106 MMOL/L (ref 100–108)
CO2 SERPL-SCNC: 24 MMOL/L (ref 21–32)
CREAT SERPL-MCNC: 0.65 MG/DL (ref 0.6–1.3)
GFR SERPL CREATININE-BSD FRML MDRD: 112 ML/MIN/1.73SQ M
GLUCOSE SERPL-MCNC: 85 MG/DL (ref 65–140)
POTASSIUM SERPL-SCNC: 3.9 MMOL/L (ref 3.5–5.3)
PROT SERPL-MCNC: 6.5 G/DL (ref 6.4–8.2)
SODIUM SERPL-SCNC: 139 MMOL/L (ref 136–145)

## 2022-04-07 PROCEDURE — 80053 COMPREHEN METABOLIC PANEL: CPT

## 2022-04-07 PROCEDURE — 36415 COLL VENOUS BLD VENIPUNCTURE: CPT

## 2022-04-08 ENCOUNTER — TELEPHONE (OUTPATIENT)
Dept: ENDOCRINOLOGY | Facility: HOSPITAL | Age: 42
End: 2022-04-08

## 2022-04-08 ENCOUNTER — HOSPITAL ENCOUNTER (OUTPATIENT)
Dept: INFUSION CENTER | Facility: HOSPITAL | Age: 42
Discharge: HOME | End: 2022-04-08
Attending: INTERNAL MEDICINE
Payer: COMMERCIAL

## 2022-04-08 ENCOUNTER — TELEPHONE (OUTPATIENT)
Dept: NEUROLOGY | Facility: CLINIC | Age: 42
End: 2022-04-08

## 2022-04-08 VITALS
SYSTOLIC BLOOD PRESSURE: 89 MMHG | TEMPERATURE: 97.1 F | HEART RATE: 85 BPM | DIASTOLIC BLOOD PRESSURE: 58 MMHG | RESPIRATION RATE: 16 BRPM

## 2022-04-08 DIAGNOSIS — D50.8 IRON DEFICIENCY ANEMIA SECONDARY TO INADEQUATE DIETARY IRON INTAKE: Primary | ICD-10-CM

## 2022-04-08 PROCEDURE — 96365 THER/PROPH/DIAG IV INF INIT: CPT

## 2022-04-08 RX ORDER — SODIUM CHLORIDE 9 MG/ML
20 INJECTION, SOLUTION INTRAVENOUS ONCE
Status: CANCELLED | OUTPATIENT
Start: 2022-04-11

## 2022-04-08 RX ORDER — SODIUM CHLORIDE 9 MG/ML
20 INJECTION, SOLUTION INTRAVENOUS ONCE
Status: COMPLETED | OUTPATIENT
Start: 2022-04-08 | End: 2022-04-08

## 2022-04-08 RX ADMIN — IRON SUCROSE 200 MG: 20 INJECTION, SOLUTION INTRAVENOUS at 14:41

## 2022-04-08 RX ADMIN — SODIUM CHLORIDE 20 ML/HR: 9 INJECTION, SOLUTION INTRAVENOUS at 14:41

## 2022-04-08 NOTE — TELEPHONE ENCOUNTER
Received voicemail from patient  She had a recent CMP for an upcoming MRI and is concerned about some of the test results  Please advise

## 2022-04-08 NOTE — TELEPHONE ENCOUNTER
Pt calling to report she was unable to have MRI completed as she needed labs first  States she had labs completed and is concerned about alkaline phosphatase level  Advised pt both MRI and lab were ordered by Chalino Baker  Call transferred to endocrinology

## 2022-04-08 NOTE — PROGRESS NOTES
Pt tolerated venofer infusion well without complication  Contacted Nitza for   Pt taken by wheelchair for d/c  Next appts scheduled; avs received

## 2022-04-11 ENCOUNTER — HOSPITAL ENCOUNTER (OUTPATIENT)
Dept: INFUSION CENTER | Facility: HOSPITAL | Age: 42
Discharge: HOME | End: 2022-04-11
Attending: INTERNAL MEDICINE
Payer: COMMERCIAL

## 2022-04-11 VITALS
HEART RATE: 80 BPM | TEMPERATURE: 96.2 F | SYSTOLIC BLOOD PRESSURE: 99 MMHG | RESPIRATION RATE: 18 BRPM | DIASTOLIC BLOOD PRESSURE: 64 MMHG

## 2022-04-11 DIAGNOSIS — D50.8 IRON DEFICIENCY ANEMIA SECONDARY TO INADEQUATE DIETARY IRON INTAKE: Primary | ICD-10-CM

## 2022-04-11 PROCEDURE — 96365 THER/PROPH/DIAG IV INF INIT: CPT

## 2022-04-11 RX ORDER — SODIUM CHLORIDE 9 MG/ML
20 INJECTION, SOLUTION INTRAVENOUS ONCE
Status: CANCELLED | OUTPATIENT
Start: 2022-04-11

## 2022-04-11 RX ORDER — SODIUM CHLORIDE 9 MG/ML
20 INJECTION, SOLUTION INTRAVENOUS ONCE
Status: COMPLETED | OUTPATIENT
Start: 2022-04-11 | End: 2022-04-11

## 2022-04-11 RX ADMIN — SODIUM CHLORIDE 20 ML/HR: 9 INJECTION, SOLUTION INTRAVENOUS at 14:20

## 2022-04-11 RX ADMIN — IRON SUCROSE 200 MG: 20 INJECTION, SOLUTION INTRAVENOUS at 14:20

## 2022-04-11 NOTE — PROGRESS NOTES
Pt tolerated IV Venofer infusion without adverse reaction  Pt escorted to wc and escorted off unit to meet transport team  AVS printed and given to patient

## 2022-04-14 NOTE — RESULT ENCOUNTER NOTE
Please call the patient regarding abnormal result  Serum electrolytes (sodium, potassium and calcium) are normal   Kidney function is also normal   Alk phosphatase is elevated after being normal   However, she has had an elevated alk phosphatase in the past that was transient  We can recheck a CMP again to re-evaluate

## 2022-04-15 DIAGNOSIS — E21.3 HYPERPARATHYROIDISM (HCC): Primary | ICD-10-CM

## 2022-06-09 ENCOUNTER — TELEPHONE (OUTPATIENT)
Dept: HEMATOLOGY ONCOLOGY | Facility: CLINIC | Age: 42
End: 2022-06-09

## 2022-06-09 NOTE — TELEPHONE ENCOUNTER
Appointment Cancellation Or Reschedule     Person calling in Physician Office    Provider Jad Be   Office Visit Date and Time  6/22/22 11am   Office Visit Location [facility]   Did patient want to reschedule their office appointment? If so, when was it scheduled to? Yes  7/1/22 1:00 pm   Is this patient calling to reschedule an infusion appointment? no   When is their next infusion appointment? n/a   Is this patient a Chemo patient? no   Reason for Cancellation or Reschedule Lindaann Severin ([facility])  Request to reschedule pt appointment  If the patient is a treatment patient, please route this to the office nurse  If the patient is not on treatment, please route to the office MA

## 2022-06-13 ENCOUNTER — HOSPITAL ENCOUNTER (EMERGENCY)
Facility: HOSPITAL | Age: 42
End: 2022-06-16
Attending: EMERGENCY MEDICINE
Payer: COMMERCIAL

## 2022-06-13 ENCOUNTER — OFFICE VISIT (OUTPATIENT)
Dept: OBGYN CLINIC | Facility: CLINIC | Age: 42
End: 2022-06-13
Payer: COMMERCIAL

## 2022-06-13 ENCOUNTER — APPOINTMENT (EMERGENCY)
Dept: CT IMAGING | Facility: HOSPITAL | Age: 42
End: 2022-06-13
Payer: COMMERCIAL

## 2022-06-13 VITALS — DIASTOLIC BLOOD PRESSURE: 60 MMHG | SYSTOLIC BLOOD PRESSURE: 92 MMHG

## 2022-06-13 DIAGNOSIS — N83.201 CYST OF RIGHT OVARY: Primary | ICD-10-CM

## 2022-06-13 DIAGNOSIS — F32.A DEPRESSION: Primary | ICD-10-CM

## 2022-06-13 DIAGNOSIS — Z12.31 BREAST CANCER SCREENING BY MAMMOGRAM: ICD-10-CM

## 2022-06-13 DIAGNOSIS — Z80.3 FAMILY HISTORY OF BREAST CANCER IN FEMALE: ICD-10-CM

## 2022-06-13 DIAGNOSIS — Z00.8 MEDICAL CLEARANCE FOR PSYCHIATRIC ADMISSION: ICD-10-CM

## 2022-06-13 DIAGNOSIS — R19.00 PELVIC MASS: ICD-10-CM

## 2022-06-13 DIAGNOSIS — R45.851 SUICIDAL IDEATIONS: ICD-10-CM

## 2022-06-13 LAB
AMPHETAMINES SERPL QL SCN: NEGATIVE
BARBITURATES UR QL: NEGATIVE
BENZODIAZ UR QL: NEGATIVE
COCAINE UR QL: NEGATIVE
ETHANOL EXG-MCNC: 0 MG/DL
EXT PREG TEST URINE: NEGATIVE
EXT. CONTROL ED NAV: NORMAL
FLUAV RNA RESP QL NAA+PROBE: NEGATIVE
FLUBV RNA RESP QL NAA+PROBE: NEGATIVE
METHADONE UR QL: NEGATIVE
OPIATES UR QL SCN: NEGATIVE
OXYCODONE+OXYMORPHONE UR QL SCN: NEGATIVE
PCP UR QL: NEGATIVE
RSV RNA RESP QL NAA+PROBE: NEGATIVE
SARS-COV-2 RNA RESP QL NAA+PROBE: NEGATIVE
THC UR QL: POSITIVE

## 2022-06-13 PROCEDURE — 74176 CT ABD & PELVIS W/O CONTRAST: CPT

## 2022-06-13 PROCEDURE — 0241U HB NFCT DS VIR RESP RNA 4 TRGT: CPT | Performed by: EMERGENCY MEDICINE

## 2022-06-13 PROCEDURE — 99204 OFFICE O/P NEW MOD 45 MIN: CPT | Performed by: OBSTETRICS & GYNECOLOGY

## 2022-06-13 PROCEDURE — 82075 ASSAY OF BREATH ETHANOL: CPT | Performed by: EMERGENCY MEDICINE

## 2022-06-13 PROCEDURE — 99285 EMERGENCY DEPT VISIT HI MDM: CPT | Performed by: EMERGENCY MEDICINE

## 2022-06-13 PROCEDURE — 99285 EMERGENCY DEPT VISIT HI MDM: CPT

## 2022-06-13 PROCEDURE — 81025 URINE PREGNANCY TEST: CPT | Performed by: EMERGENCY MEDICINE

## 2022-06-13 PROCEDURE — 80307 DRUG TEST PRSMV CHEM ANLYZR: CPT | Performed by: EMERGENCY MEDICINE

## 2022-06-13 PROCEDURE — G1004 CDSM NDSC: HCPCS

## 2022-06-13 RX ORDER — LANOLIN ALCOHOL/MO/W.PET/CERES
100 CREAM (GRAM) TOPICAL DAILY
Status: DISCONTINUED | OUTPATIENT
Start: 2022-06-14 | End: 2022-06-16 | Stop reason: HOSPADM

## 2022-06-13 RX ORDER — LANOLIN ALCOHOL/MO/W.PET/CERES
CREAM (GRAM) TOPICAL
COMMUNITY
Start: 2022-05-15 | End: 2022-06-22

## 2022-06-13 RX ORDER — LEVOTHYROXINE SODIUM 0.05 MG/1
50 TABLET ORAL
Status: DISCONTINUED | OUTPATIENT
Start: 2022-06-14 | End: 2022-06-16 | Stop reason: HOSPADM

## 2022-06-13 RX ORDER — GABAPENTIN 300 MG/1
600 CAPSULE ORAL 4 TIMES DAILY
Status: DISCONTINUED | OUTPATIENT
Start: 2022-06-13 | End: 2022-06-16 | Stop reason: HOSPADM

## 2022-06-13 RX ORDER — VENLAFAXINE HYDROCHLORIDE 150 MG/1
150 CAPSULE, EXTENDED RELEASE ORAL DAILY
Status: DISCONTINUED | OUTPATIENT
Start: 2022-06-14 | End: 2022-06-14

## 2022-06-13 RX ORDER — PRAZOSIN HYDROCHLORIDE 1 MG/1
3 CAPSULE ORAL
Status: DISCONTINUED | OUTPATIENT
Start: 2022-06-13 | End: 2022-06-16 | Stop reason: HOSPADM

## 2022-06-13 RX ORDER — TOPIRAMATE 25 MG/1
25 TABLET ORAL DAILY
Status: DISCONTINUED | OUTPATIENT
Start: 2022-06-14 | End: 2022-06-16 | Stop reason: HOSPADM

## 2022-06-13 RX ORDER — POLYETHYLENE GLYCOL 3350 17 G/17G
17 POWDER, FOR SOLUTION ORAL DAILY
Status: DISCONTINUED | OUTPATIENT
Start: 2022-06-14 | End: 2022-06-16 | Stop reason: HOSPADM

## 2022-06-13 RX ORDER — ONDANSETRON 4 MG/1
4 TABLET, FILM COATED ORAL EVERY 8 HOURS PRN
COMMUNITY

## 2022-06-13 RX ORDER — FUROSEMIDE 20 MG/1
1 TABLET ORAL DAILY
COMMUNITY
Start: 2022-05-29

## 2022-06-13 RX ORDER — METHOCARBAMOL 500 MG/1
500 TABLET, FILM COATED ORAL EVERY 8 HOURS
Status: DISCONTINUED | OUTPATIENT
Start: 2022-06-13 | End: 2022-06-13

## 2022-06-13 RX ORDER — IBUPROFEN 400 MG/1
TABLET ORAL
COMMUNITY
Start: 2022-06-08

## 2022-06-13 RX ORDER — LORAZEPAM 1 MG/1
1 TABLET ORAL
Status: DISCONTINUED | OUTPATIENT
Start: 2022-06-13 | End: 2022-06-16 | Stop reason: HOSPADM

## 2022-06-13 RX ORDER — TRAZODONE HYDROCHLORIDE 50 MG/1
100 TABLET ORAL
Status: DISCONTINUED | OUTPATIENT
Start: 2022-06-13 | End: 2022-06-16 | Stop reason: HOSPADM

## 2022-06-13 RX ORDER — FOLIC ACID 1 MG/1
1 TABLET ORAL DAILY
Status: DISCONTINUED | OUTPATIENT
Start: 2022-06-14 | End: 2022-06-16 | Stop reason: HOSPADM

## 2022-06-13 RX ORDER — METHOCARBAMOL 500 MG/1
1000 TABLET, FILM COATED ORAL EVERY 8 HOURS
Status: DISCONTINUED | OUTPATIENT
Start: 2022-06-14 | End: 2022-06-16 | Stop reason: HOSPADM

## 2022-06-13 RX ORDER — FUROSEMIDE 20 MG/1
20 TABLET ORAL DAILY
Status: DISCONTINUED | OUTPATIENT
Start: 2022-06-14 | End: 2022-06-16 | Stop reason: HOSPADM

## 2022-06-13 RX ORDER — LOTEPREDNOL ETABONATE 5 MG/G
GEL OPHTHALMIC 4 TIMES DAILY
COMMUNITY
Start: 2022-06-05

## 2022-06-13 RX ORDER — LORAZEPAM 0.5 MG/1
0.5 TABLET ORAL 2 TIMES DAILY
Status: DISCONTINUED | OUTPATIENT
Start: 2022-06-13 | End: 2022-06-16 | Stop reason: HOSPADM

## 2022-06-13 RX ORDER — POTASSIUM CITRATE 10 MEQ/1
10 TABLET, EXTENDED RELEASE ORAL
Status: DISCONTINUED | OUTPATIENT
Start: 2022-06-14 | End: 2022-06-16 | Stop reason: HOSPADM

## 2022-06-13 RX ORDER — LEVETIRACETAM 250 MG/1
500 TABLET ORAL EVERY 12 HOURS SCHEDULED
Status: DISCONTINUED | OUTPATIENT
Start: 2022-06-13 | End: 2022-06-16 | Stop reason: HOSPADM

## 2022-06-13 RX ADMIN — LEVETIRACETAM 500 MG: 250 TABLET, FILM COATED ORAL at 22:39

## 2022-06-13 RX ADMIN — GABAPENTIN 600 MG: 300 CAPSULE ORAL at 22:40

## 2022-06-13 RX ADMIN — TRAZODONE HYDROCHLORIDE 100 MG: 50 TABLET ORAL at 22:40

## 2022-06-13 RX ADMIN — MAGNESIUM OXIDE TAB 400 MG (241.3 MG ELEMENTAL MG) 400 MG: 400 (241.3 MG) TAB at 17:42

## 2022-06-13 RX ADMIN — METHOCARBAMOL 500 MG: 500 TABLET ORAL at 17:42

## 2022-06-13 RX ADMIN — IOHEXOL 25 ML: 240 INJECTION, SOLUTION INTRATHECAL; INTRAVASCULAR; INTRAVENOUS; ORAL at 19:02

## 2022-06-13 RX ADMIN — LORAZEPAM 1 MG: 1 TABLET ORAL at 22:40

## 2022-06-13 RX ADMIN — GABAPENTIN 600 MG: 300 CAPSULE ORAL at 17:42

## 2022-06-13 RX ADMIN — PRAZOSIN HYDROCHLORIDE 3 MG: 1 CAPSULE ORAL at 22:40

## 2022-06-13 RX ADMIN — LORAZEPAM 0.5 MG: 0.5 TABLET ORAL at 18:10
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
## 2022-06-13 NOTE — ED PROVIDER NOTES
History  Chief Complaint   Patient presents with    Psychiatric Evaluation     Patient arrives from [facility], reports she received a cancer diagnosis-12 cm mass on her ovary  She made suicidal statements to staff--when asked if she has a plan she says "just to make the pain stop"  This is a 53-year-old female who presents via ambulance from [facility] for crisis evaluation  She states that she has a history of anxiety depression and last psychiatric admission was several months ago  Patient states that she was at her OBGYN doctor today where she  Was evaluated for questionable pelvic mass she had an ultrasound done on  which showed a questionable right ovarian cyst hemorrhagic versus endometrial in a she then had a CT scan on the 8th of May which showed again the questionable right ovarian cyst  She was referred to Gynecology Oncology by her OBGYN today and had outpatient scheduling CT scan and MRI on an outpatient basis  She states that she felt like taking a a razor blade and cutting herself with suicidal intentions  Patient does have history of eating disorder ambulatory dysfunction and walks with a walker and has been at the nursing home for approximately 1 year      History provided by:  Patient and EMS personnel  Medical Problem  Location:   generalized  Quality:   anxiety depression and suicidal ideation  Severity:  Severe  Onset quality:  Sudden  Timing:  Constant  Progression:  Waxing and waning  Chronicity:  Recurrent  Context:   suicidal ideation after OBGYN visit today  Worsened by:   medical evaluation by OBGYN today  Associated symptoms: abdominal pain        Prior to Admission Medications   Prescriptions Last Dose Informant Patient Reported? Taking?    AquaLance Lancets 30G MISC   Yes Yes   Continuous Blood Gluc Sensor (FREESTYLE CAITY 14 DAY SENSOR) MISC   No Yes   Si application by Does not apply route every 14 (fourteen) days   LORazepam (ATIVAN) 0 5 mg tablet Self No Yes   Sig: Take 1 tablet (0 5 mg total) by mouth 2 (two) times a day   LORazepam (ATIVAN) 1 mg tablet  Self No Yes   Sig: Take 1 tablet (1 mg total) by mouth daily at bedtime   Lotemax 0 5 % ophth gel   Yes Yes   Sig: Administer to the right eye 4 (four) times a day   acetaminophen (TYLENOL) 325 mg tablet  Self Yes Yes   Sig: Take 650 mg by mouth every 4 (four) hours as needed for mild pain or fever   bisacodyl (DULCOLAX) 10 mg suppository  Self Yes Yes   Sig: Insert 10 mg into the rectum as needed for constipation   cyanocobalamin 1,000 mcg/mL  Self Yes Yes   Sig: inject 1 milliliter ( 1000 MCG ) intramuscularly Every Month   ergocalciferol (VITAMIN D2) 50,000 units  Self No Yes   Sig: Take 1 capsule (50,000 Units total) by mouth 2 (two) times a week Monday and Thursday   eszopiclone (LUNESTA) 3 MG tablet  Self Yes Yes   Sig: Take 3 mg by mouth daily at bedtime as needed for sleep Take immediately before bedtime   folic acid (FOLVITE) 1 mg tablet  Self No Yes   Sig: Take 1 tablet (1 mg total) by mouth daily   furosemide (LASIX) 20 mg tablet   Yes Yes   Sig: Take 1 tablet by mouth in the morning   gabapentin (NEURONTIN) 300 mg capsule  Self No Yes   Sig: Take 2 capsules (600 mg total) by mouth 4 (four) times a day   ibuprofen (MOTRIN) 400 mg tablet   Yes Yes   levETIRAcetam (KEPPRA) 500 mg tablet  Self No Yes   Sig: Take 1 tablet (500 mg total) by mouth every 12 (twelve) hours   levothyroxine 50 mcg tablet  Self No Yes   Sig: Take 1 tablet (50 mcg total) by mouth daily in the early morning   magnesium oxide (MAG-OX) 400 mg  Self No Yes   Sig: Take 1 tablet (400 mg total) by mouth 2 (two) times a day   methocarbamol (ROBAXIN) 500 mg tablet  Self No Yes   Sig: Take 1 tablet (500 mg total) by mouth every 8 (eight) hours as needed for muscle spasms   Patient taking differently: Take 1,000 mg by mouth every 8 (eight) hours as needed for muscle spasms   mirtazapine (REMERON) 15 mg tablet  Self No No   Sig: Take 1 tablet (15 mg total) by mouth daily at bedtime   Patient not taking: Reported on 2/1/2022    ondansetron (ZOFRAN) 4 mg tablet   Yes Yes   Sig: Take 4 mg by mouth every 8 (eight) hours as needed for nausea or vomiting   polyethylene glycol (MIRALAX) 17 g packet  Self No No   Sig: Take 17 g by mouth daily   potassium chloride (MICRO-K) 10 MEQ CR capsule  Self No Yes   Sig: Take 1 capsule (10 mEq total) by mouth 2 (two) times a day   prazosin (MINIPRESS) 1 mg capsule  Self No Yes   Sig: Take 3 capsules (3 mg total) by mouth daily at bedtime   thiamine (VITAMIN B1) 100 mg tablet  Self No Yes   Sig: Take 1 tablet (100 mg total) by mouth daily   thiamine 100 MG tablet   Yes No   Patient not taking: Reported on 6/13/2022   topiramate (TOPAMAX) 50 MG tablet  Self Yes Yes   Sig: Take 50 mg by mouth daily   traZODone (DESYREL) 100 mg tablet  Self No Yes   Sig: Take 1 tablet (100 mg total) by mouth daily at bedtime   venlafaxine (EFFEXOR-XR) 150 mg 24 hr capsule  Self No Yes   Sig: Take 1 capsule (150 mg total) by mouth daily      Facility-Administered Medications: None       Past Medical History:   Diagnosis Date    Acute cystitis     Anemia     Anxiety     Bacteremia     Copper deficiency     Disease of thyroid gland     Eating disorder     Elevated alkaline phosphatase level     Generalized weakness     Head injury     History of bariatric surgery     History of drug abuse (HCC)     History of enlargement of pituitary gland     Hypothyroidism     Migraine without aura     Panic attack     Papanicolaou smear     Peripheral neuropathy     PTSD (post-traumatic stress disorder)     Seizures (HCC)     Self-injurious behavior     Sepsis ([address] )     Soft tissue lesion of pelvic region     Type 2 diabetes mellitus (HCC)     Type 2, developed after 2nd bariatric surgery    Urinary retention     Vitamin B6 deficiency     Vitamin D deficiency        Past Surgical History:   Procedure Laterality Date    ANTERIOR CRUCIATE LIGAMENT REPAIR Left     CHOLECYSTECTOMY      2003    COLONOSCOPY      GASTRIC BYPASS      Pebbles en Y 2007, 2016 had pouch ulcers and revision performed    IR PICC PLACEMENT SINGLE LUMEN  04/16/2021    IR TEMPORARY DIALYSIS CATHETER PLACEMENT  04/08/2021    TONSILLECTOMY      UPPER GASTROINTESTINAL ENDOSCOPY         Family History   Problem Relation Age of Onset    Hypertension Mother     Heart attack Mother     No Known Problems Father     Lung cancer Maternal Grandmother     Hypothyroidism Maternal Grandmother     Breast cancer Maternal Grandmother         66's    Colon cancer Maternal Grandfather     Diabetes type II Paternal Grandmother     Diabetes type II Paternal Grandfather      I have reviewed and agree with the history as documented  E-Cigarette/Vaping    E-Cigarette Use Never User      E-Cigarette/Vaping Substances    Nicotine No      Social History     Tobacco Use    Smoking status: Current Every Day Smoker     Packs/day: 1 00    Smokeless tobacco: Never Used    Tobacco comment: had quit in 2021, restarted  Vaping Use    Vaping Use: Never used   Substance Use Topics    Alcohol use: Never    Drug use: Not Currently     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       Review of Systems   Gastrointestinal: Positive for abdominal pain  Psychiatric/Behavioral: Positive for sleep disturbance and suicidal ideas  The patient is nervous/anxious  All other systems reviewed and are negative  Physical Exam  Physical Exam  Vitals and nursing note reviewed  Constitutional:       General: She is not in acute distress  Appearance: She is not ill-appearing, toxic-appearing or diaphoretic  HENT:      Head: Normocephalic and atraumatic  Right Ear: Tympanic membrane, ear canal and external ear normal       Left Ear: Tympanic membrane, ear canal and external ear normal       Nose: Nose normal    Eyes:      General: No scleral icterus  Right eye: No discharge  Left eye: No discharge  Extraocular Movements: Extraocular movements intact  Pupils: Pupils are equal, round, and reactive to light  Cardiovascular:      Rate and Rhythm: Normal rate and regular rhythm  Pulses: Normal pulses  Heart sounds: No murmur heard  No friction rub  No gallop  Pulmonary:      Effort: Pulmonary effort is normal  No respiratory distress  Breath sounds: Normal breath sounds  No stridor  No wheezing, rhonchi or rales  Abdominal:      General: There is no distension  Palpations: Abdomen is soft  Tenderness: There is abdominal tenderness ( right lower quadrant)  There is no guarding  Musculoskeletal:         General: No signs of injury  Cervical back: Normal range of motion and neck supple  No rigidity or tenderness  Right lower leg: Edema present  Left lower leg: Edema present  Skin:     General: Skin is warm and dry  Coloration: Skin is not jaundiced  Findings: No bruising, erythema or rash  Comments: Old lacerations to left forearm   Neurological:      General: No focal deficit present  Mental Status: She is alert and oriented to person, place, and time  Sensory: No sensory deficit        Coordination: Coordination normal    Psychiatric:      Comments: Depressed flat affect with suicidal ideation to cut self         Vital Signs  ED Triage Vitals [06/13/22 1631]   Temperature Pulse Respirations Blood Pressure SpO2   97 8 °F (36 6 °C) 76 18 110/69 95 %      Temp Source Heart Rate Source Patient Position - Orthostatic VS BP Location FiO2 (%)   Oral -- Sitting Left arm --      Pain Score       8           Vitals:    06/13/22 1631   BP: 110/69   Pulse: 76   Patient Position - Orthostatic VS: Sitting         Visual Acuity      ED Medications  Medications   magnesium oxide (MAG-OX) tablet 400 mg (400 mg Oral Given 6/13/22 1742)   polyethylene glycol (MIRALAX) packet 17 g (has no administration in time range) topiramate (TOPAMAX) tablet 25 mg (has no administration in time range)   traZODone (DESYREL) tablet 100 mg (has no administration in time range)   venlafaxine (EFFEXOR-XR) 24 hr capsule 150 mg (has no administration in time range)   potassium citrate (UROCIT-K) CR tablet 10 mEq (has no administration in time range)   prazosin (MINIPRESS) capsule 3 mg (has no administration in time range)   thiamine tablet 100 mg (has no administration in time range)   gabapentin (NEURONTIN) capsule 600 mg (600 mg Oral Given 6/13/22 1742)   levETIRAcetam (KEPPRA) tablet 500 mg (has no administration in time range)   levothyroxine tablet 50 mcg (has no administration in time range)   LORazepam (ATIVAN) tablet 1 mg (has no administration in time range)   furosemide (LASIX) tablet 20 mg (has no administration in time range)   folic acid (FOLVITE) tablet 1 mg (has no administration in time range)   methocarbamol (ROBAXIN) tablet 1,000 mg (has no administration in time range)   LORazepam (ATIVAN) tablet 0 5 mg (0 5 mg Oral Given 6/13/22 1810)   iohexol (OMNIPAQUE) 240 MG/ML solution 50 mL (25 mL Oral Given 6/13/22 1902)       Diagnostic Studies  Results Reviewed     Procedure Component Value Units Date/Time    COVID/FLU/RSV - 2 hour TAT [[id number]]  (Normal) Collected: 06/13/22 1724    Lab Status: Final result Specimen: Nares from Nose Updated: 06/13/22 1822     SARS-CoV-2 Negative     INFLUENZA A PCR Negative     INFLUENZA B PCR Negative     RSV PCR Negative    Narrative:      FOR PEDIATRIC PATIENTS - copy/paste COVID Guidelines URL to browser: [email or web address]    SARS-CoV-2 assay is a Nucleic Acid Amplification assay intended for the  qualitative detection of nucleic acid from SARS-CoV-2 in nasopharyngeal  swabs  Results are for the presumptive identification of SARS-CoV-2 RNA      Positive results are indicative of infection with SARS-CoV-2, the virus  causing COVID-19, but do not rule out bacterial infection or co-infection  with other viruses  Laboratories within the [facility] and its  territories are required to report all positive results to the appropriate  public health authorities  Negative results do not preclude SARS-CoV-2  infection and should not be used as the sole basis for treatment or other  patient management decisions  Negative results must be combined with  clinical observations, patient history, and epidemiological information  This test has not been FDA cleared or approved  This test has been authorized by FDA under an Emergency Use Authorization  (EUA)  This test is only authorized for the duration of time the  declaration that circumstances exist justifying the authorization of the  emergency use of an in vitro diagnostic tests for detection of SARS-CoV-2  virus and/or diagnosis of COVID-19 infection under section 564(b)(1) of  the Act, 21 U  S C  531BWL-8(Y)(0), unless the authorization is terminated  or revoked sooner  The test has been validated but independent review by FDA  and CLIA is pending  Test performed using [employer] GeneXpert: This RT-PCR assay targets N2,  a region unique to SARS-CoV-2  A conserved region in the E-gene was chosen  for pan-Sarbecovirus detection which includes SARS-CoV-2  Rapid drug screen, urine [[id number]]  (Abnormal) Collected: 06/13/22 1724    Lab Status: Final result Specimen: Urine, Clean Catch Updated: 06/13/22 1754     Amph/Meth UR Negative     Barbiturate Ur Negative     Benzodiazepine Urine Negative     Cocaine Urine Negative     Methadone Urine Negative     Opiate Urine Negative     PCP Ur Negative     THC Urine Positive     Oxycodone Urine Negative    Narrative:      Presumptive report  If requested, specimen will be sent to reference lab for confirmation  FOR MEDICAL PURPOSES ONLY  IF CONFIRMATION NEEDED PLEASE CONTACT THE LAB WITHIN 5 DAYS      Drug Screen Cutoff Levels:  AMPHETAMINE/METHAMPHETAMINES  1000 ng/mL  BARBITURATES     200 ng/mL  BENZODIAZEPINES     200 ng/mL  COCAINE      300 ng/mL  METHADONE      300 ng/mL  OPIATES      300 ng/mL  PHENCYCLIDINE     25 ng/mL  THC       50 ng/mL  OXYCODONE      100 ng/mL    POCT pregnancy, urine [[id number]]  (Normal) Resulted: 06/13/22 1724    Lab Status: Final result Updated: 06/13/22 1725     EXT PREG TEST UR (Ref: Negative) Negative     Control Valid    POCT alcohol breath test [[id number]]  (Normal) Resulted: 06/13/22 1723    Lab Status: Final result Updated: 06/13/22 1723     EXTBreath Alcohol 0 00                 CT abdomen pelvis wo contrast    (Results Pending)              Procedures  Procedures         ED Course                               SBIRT 20yo+    Flowsheet Row Most Recent Value   SBIRT (23 yo +)    In order to provide better care to our patients, we are screening all of our patients for alcohol and drug use  Would it be okay to ask you these screening questions? Yes Filed at: 06/13/2022 1636   Initial Alcohol Screen: US AUDIT-C     1  How often do you have a drink containing alcohol? 0 Filed at: 06/13/2022 1636   2  How many drinks containing alcohol do you have on a typical day you are drinking? 0 Filed at: 06/13/2022 1636   3b  FEMALE Any Age, or MALE 65+: How often do you have 4 or more drinks on one occassion? 0 Filed at: 06/13/2022 1636   Audit-C Score 0 Filed at: 06/13/2022 1636   JOVAN: How many times in the past year have you    Used an illegal drug or used a prescription medication for non-medical reasons?  Never Filed at: 06/13/2022 1636                    MDM  Number of Diagnoses or Management Options  Diagnosis management comments:  Anxiety depression suicidal ideation will consult crisis also will check CT scan of the abdomen for further evaluation of questionable pelvic mass       Amount and/or Complexity of Data Reviewed  Clinical lab tests: ordered  Tests in the radiology section of CPT®: ordered        Disposition  Final diagnoses: Depression   Suicidal ideations   Pelvic mass - follow-up with OBGYN Hematology-Oncology     Time reflects when diagnosis was documented in both MDM as applicable and the Disposition within this note     Time User Action Codes Description Comment    6/13/2022  5:27 PM Jenny Low [F32  A] Depression     6/13/2022  5:27 PM Noralyn Nettle Add [N63 777] Suicidal ideations     6/13/2022  6:07 PM Noralyn Nettle Add [R19 00] Pelvic mass     6/13/2022  6:08 PM Noralyn Nettle Modify [R19 00] Pelvic mass follow-up with [facility] [place] Hematology-Oncology      ED Disposition     ED Disposition   Transfer to [address] [place] [address]   --    Date/Time   Mon Jun 13, 2022  6:08 PM    Comment   Burdette Lennox should be transferred out to ** [facility] for evaluation and placement* and has been medically cleared  Follow-up Information    None         Patient's Medications   Discharge Prescriptions    No medications on file       No discharge procedures on file      PDMP Review       Value Time User    PDMP Reviewed  Yes 12/1/2021 10:15 AM Charlee Coe MD          ED Provider  Electronically Signed by           Kye Hines DO  06/13/22 3886

## 2022-06-13 NOTE — ED NOTES
Crisis met with pt to complete Crisis Intake and Safety Risk Assessment  Introduce self, role, and evaluation process  Pt presents in ED via ambulance from [facility] where she has been living for 1 year  She states she was at her OBGYN appointment and was told she is diagnosed with ovarian cancer  Pt states at that point, she wanted to grab a razor and cut her wrist with it, and "take her life"  Pt states she has a history of Anxiety, and Depression, and last hospital inpatient behavioral health admission was a few months ago  During conversation, pt was cooperative but affect was labile  She appeared depressed, and spoke softly  Pt denies homicidal ideations, auditory, visual hallucinations  Crisis and pt discussed treatment options and the need for inpatient admission  Pt signed 201 after rights and 72 hour noticed were discussed

## 2022-06-14 ENCOUNTER — TELEPHONE (OUTPATIENT)
Dept: GYNECOLOGIC ONCOLOGY | Facility: CLINIC | Age: 42
End: 2022-06-14

## 2022-06-14 PROCEDURE — G0425 INPT/ED TELECONSULT30: HCPCS | Performed by: PSYCHIATRY & NEUROLOGY

## 2022-06-14 RX ORDER — ZOLPIDEM TARTRATE 5 MG/1
10 TABLET ORAL
Status: DISCONTINUED | OUTPATIENT
Start: 2022-06-14 | End: 2022-06-16 | Stop reason: HOSPADM

## 2022-06-14 RX ORDER — ACETAMINOPHEN 325 MG/1
975 TABLET ORAL ONCE
Status: COMPLETED | OUTPATIENT
Start: 2022-06-14 | End: 2022-06-14

## 2022-06-14 RX ADMIN — ACETAMINOPHEN 975 MG: 325 TABLET ORAL at 20:07

## 2022-06-14 RX ADMIN — LORAZEPAM 0.5 MG: 0.5 TABLET ORAL at 19:29

## 2022-06-14 RX ADMIN — LEVOTHYROXINE SODIUM 50 MCG: 25 TABLET ORAL at 06:06

## 2022-06-14 RX ADMIN — LEVETIRACETAM 500 MG: 250 TABLET, FILM COATED ORAL at 20:08

## 2022-06-14 RX ADMIN — MAGNESIUM OXIDE TAB 400 MG (241.3 MG ELEMENTAL MG) 400 MG: 400 (241.3 MG) TAB at 10:14

## 2022-06-14 RX ADMIN — GABAPENTIN 600 MG: 300 CAPSULE ORAL at 19:29

## 2022-06-14 RX ADMIN — LORAZEPAM 0.5 MG: 0.5 TABLET ORAL at 10:13

## 2022-06-14 RX ADMIN — MAGNESIUM OXIDE TAB 400 MG (241.3 MG ELEMENTAL MG) 400 MG: 400 (241.3 MG) TAB at 20:08

## 2022-06-14 RX ADMIN — VENLAFAXINE HYDROCHLORIDE 150 MG: 150 CAPSULE, EXTENDED RELEASE ORAL at 10:13

## 2022-06-14 RX ADMIN — LEVETIRACETAM 500 MG: 250 TABLET, FILM COATED ORAL at 10:14

## 2022-06-14 RX ADMIN — THIAMINE HCL TAB 100 MG 100 MG: 100 TAB at 10:14

## 2022-06-14 RX ADMIN — ACETAMINOPHEN 975 MG: 325 TABLET, FILM COATED ORAL at 10:47

## 2022-06-14 RX ADMIN — POTASSIUM CITRATE 10 MEQ: 10 TABLET, EXTENDED RELEASE ORAL at 20:07

## 2022-06-14 RX ADMIN — ZOLPIDEM TARTRATE 10 MG: 5 TABLET, COATED ORAL at 22:14

## 2022-06-14 RX ADMIN — GABAPENTIN 600 MG: 300 CAPSULE ORAL at 10:14

## 2022-06-14 RX ADMIN — FOLIC ACID 1 MG: 1 TABLET ORAL at 10:14

## 2022-06-14 RX ADMIN — TRAZODONE HYDROCHLORIDE 100 MG: 50 TABLET ORAL at 21:58

## 2022-06-14 RX ADMIN — METHOCARBAMOL 1000 MG: 500 TABLET ORAL at 10:13

## 2022-06-14 RX ADMIN — METHOCARBAMOL 1000 MG: 500 TABLET ORAL at 20:00

## 2022-06-14 RX ADMIN — FUROSEMIDE 20 MG: 20 TABLET ORAL at 10:14

## 2022-06-14 RX ADMIN — GABAPENTIN 600 MG: 300 CAPSULE ORAL at 21:58

## 2022-06-14 RX ADMIN — POTASSIUM CITRATE 10 MEQ: 10 TABLET, EXTENDED RELEASE ORAL at 10:13

## 2022-06-14 RX ADMIN — TOPIRAMATE 25 MG: 25 TABLET, FILM COATED ORAL at 10:14

## 2022-06-14 RX ADMIN — LORAZEPAM 1 MG: 1 TABLET ORAL at 22:02

## 2022-06-14 NOTE — TELEPHONE ENCOUNTER
Intake Form   Patient Details   Rupinder Irizarry     1980     Reason For Appointment   Who is Calling? Offc -> Pt   If not patient, Name? Who is the Referring Doctor? Thaddeus Hayes MD   What is the diagnosis? N83 201 (ICD-10-CM) - Cyst of right ovary   Has this diagnosis been confirmed by a biopsy/surgery? If yes, what is the date it was done? no   Biopsy done at [place] [address]? If not, where was it done? no   Was imaging done, and was it done at [facility]? If not, where was it done? CT 6/13/22   For 2nd Opinions Only: Are you currently undergoing treatment, or are you scheduled to start treatment? If yes, name of facility, city and state     For "History Of" only: Have you completed treatment? Have you had Genetic Testing done in the past?  If so, advise to bring test results to their visit no   Record Gathering Information   Did you advise to have records faxed to [phone]? no   Did you advise to bring discs to office visit? no   Scheduling Information   What is the best call back number?   [phone]   What Insurance does patient have & is an insurance referral required [employer]   If patient is NEW to [facility] a new patient packet (Titled Breast/Gyn)    Miscellaneous Information

## 2022-06-14 NOTE — ED NOTES
[facility]    ID [id number]    [address] [facility] 06/14/2022 06/14/2022  ROBERT ALBARRAN [address] 06/14/2022 06/14/2022    Other or Additional Payor MEDICARE PART B 06/14/2022 06/14/2022  Other or Additional Payor MEDICARE PART A 06/14/2022 06/14/2022  Other or Additional Payor [facility]S(MED ADV 06/14/2022 06/14/2022

## 2022-06-14 NOTE — ED NOTES
Bed Search    [facility]) no beds  [facility] (Nelida Adames) no beds  Gildardo Dhaliwal (no answer)  [facility] Gonzales) no due to walker  [facility]) no beds  [facility]) no beds  [facility] (no answer)  Marvin Chavira) no due to walker  [facility]rakash Miramontes) faxed clinical  [facility]) no beds

## 2022-06-14 NOTE — ED CARE HANDOFF
Emergency Department Sign Out Note        Sign out and transfer of care from [facility]*  See Separate Emergency Department note  The patient, Saloni Pope, was evaluated by the previous provider for suicidal ideation with plan to cut wrist     Workup Completed:  Ct scan right pelvic mass - patient to follow-up with gyn/onc late June 2022  Medically cleared for inpatient psych care    ED Course / Workup Pending (followup): Signed 201, bed search - will need in-network because of walker                                  ED Course as of 06/14/22 1748   e Jun 14, 2022   1610 Psych consult completed - Inpatient psychiatric treatment is indicated for provision precautions, further diagnostic evaluation and treatment stabilization  If she were to refuse then 302 is indicated  Continue continual observation suicide precautions  In the meantime recommend increasing Effexor XR to 225 mg p o  daily for depression  The patient is in agreement this plan  1659 Sign out to [address] [place] [address] Dr kirkpatrick signed 201, hx of eating disorder and pelvic mass  Medically cleared for inpatient psych care  Bed search continues, probably in-network as she requires a walker  Procedures  MDM  Number of Diagnoses or Management Options  Depression: established and worsening  Pelvic mass: new and does not require workup  Suicidal ideations: new and requires workup     Amount and/or Complexity of Data Reviewed  Clinical lab tests: reviewed  Obtain history from someone other than the patient: yes  Discuss the patient with other providers: yes    Patient Progress  Patient progress: improved          Disposition  Final diagnoses:   Depression   Suicidal ideations   Pelvic mass - follow-up with OBGYN Hematology-Oncology     Time reflects when diagnosis was documented in both MDM as applicable and the Disposition within this note     Time User Action Codes Description Comment    6/13/2022  5:27 PM Alberta Gutierrez Add [F32  A] Depression 6/13/2022  5:27 PM uS Marr Add [J92 485] Suicidal ideations     6/13/2022  6:07 PM Su Marr Add [R19 00] Pelvic mass     6/13/2022  6:08 PM Su Marr Modify [R19 00] Pelvic mass follow-up with [facility] [place] Hematology-Oncology      ED Disposition     ED Disposition   Transfer to [address] [place] [address]   --    Date/Time   Mon Jun 13, 2022  6:08 PM    Comment   Alma Crawley should be transferred out to ** [facility] for evaluation and placement* and has been medically cleared  Follow-up Information    None       Patient's Medications   Discharge Prescriptions    No medications on file     No discharge procedures on file         ED Provider  Electronically Signed by     Yaron Veronica DO  06/14/22 1739

## 2022-06-14 NOTE — ED NOTES
Dede Loving from [facility] declined admission due to not having an appropriate bed

## 2022-06-14 NOTE — ED NOTES
Lina from intake reported no bed available today  Bed search conducted:    [facility]): No beds  [facility]): No beds  [facility]): No beds  [place] [facility]): No beds  [facility]): No beds  [facility]): No beds  [facility]): No beds  [facility]): No beds  [facility] Mon[facility]):  No beds  [place]: clinical faxed  [facility]: clinical faxed  [facility]): clinical faxed

## 2022-06-14 NOTE — TELEMEDICINE
TeleConsultation - [address] [place] 46 y o  female MRN: [id number]  Unit/Bed#: Laura Castañeda 02 Encounter: [id number]        REQUIRED DOCUMENTATION:     1  This service was provided via Telemedicine  2  Provider located at [place]  3  TeleMed provider: Derrick Alexander MD   4  Identify all parties in room with patient during tele consult:  Patient  5  Patient was then informed that this was a Telemedicine visit and that the exam was being conducted confidentially over secure lines  My office door was closed  No one else was in the room  Patient acknowledged consent and understanding of privacy and security of the Telemedicine visit, and gave us permission to have the assistant stay in the room in order to assist with the history and to conduct the exam   I informed the patient that I have reviewed their record in Epic and presented the opportunity for them to ask any questions regarding the visit today  The patient agreed to participate  Assessment/Plan     Assessment:  Major depression by history is severe phase without psychotic features; PTSD by history; generalized use history; she does a disorder by history; eating disorder by history currently in remission    Plan:   Risks, benefits and possible side effects of Medications:   Risks, benefits, and possible side effects of medications explained to patient and patient verbalizes understanding  Inpatient psychiatric treatment is indicated for provision precautions, further diagnostic evaluation and treatment stabilization  The patient is in agreement with this plan and is appropriate to sign 201 voluntary paperwork  If she were to refuse then 302 is indicated  Continue continual observation suicide precautions  In the meantime recommend increasing Effexor XR to 225 mg p o  daily for depression  The patient is in agreement this plan  Re-consult Psychiatry as needed      Chief Complaint: "I wanted to die"    History of Present Illness     Reason for Consult / Principal Problem:  Suicidal ideation    Patient is a 46 y o  female who presented to the emergency department were crisis obtained documented the following information: Crisis met with pt to complete Crisis Intake and Safety Risk Assessment  Introduce self, role, and evaluation process  Pt presents in ED via ambulance from [facility], where she has been living for 1 year  She states she was at her OBGYN appointment and was told she is diagnosed with ovarian cancer  Pt states at that point, she wanted to grab a razor and cut her wrist with it, and "take her life"  Pt states she has a history of Anxiety, and Depression, and last hospital inpatient behavioral health admission was a few months ago  During conversation, pt was cooperative but affect was labile  She appeared depressed, and spoke softly  Pt denies homicidal ideations, auditory, visual hallucinations  Crisis and pt discussed treatment options and the need for inpatient admission  Pt signed 201 after rights and 72 hour noticed were discussed       The patient states she has been feeling depressed for some time and that news from the OBGYN was distraught the camel's back  She states she has not been happy residing in nursing home and has been very much wanting to get her own apartment  Past psychiatric history:  The patient reports history of generalized anxiety, disorder PTSD, major depression, ADHD and eating disorder which she states is currently in remission  She states that the Effexor  mg p o  every day that she is on has been prescribed for depression but this is the highest dose she has been on and she is not find it beneficial currently  Social history:  The patient states she was placed in a nursing home to help her with her eating disorder which she states is now in remission  She is wanting to get her own apartment at this time    The patient did not elaborate on her origin of her PTSD  Family history:  The patient states her mother has suffered from depression anxiety after patient's brother  when he was 27 years was 14years old  Reports has bipolar disorder  Sedation:  The patient is smoking but denies use of other substances  Mental status examination:  The patient is alert well oriented all spheres  Affect is depressed and melancholic  She spoke with low volume and initially frequently looked away but then began a better eye contact  Speech was somewhat monotone otherwise unremarkable  Sensorium is clear  Thought process was logical linear  Thought content is reality based  Associations were tight  Memory is grossly intact in all spheres  He admits to suicidal ideation as above  She denies homicidal ideation  She denies hallucinations and other psychotic features  She appears to be of average intelligence by her use vocabulary, general fund knowledge comes in structure and syntax  Insight and judgment have become impaired by current stressors and depression with feelings of hopelessness and helplessness  She is present however as motivated for inpatient psychiatric treatment as I have shared the recommendation for this      Consult to Psychiatry  Consult performed by: Mecca Camarena MD  Consult ordered by: Karley Altamirano DO            Past Medical History:   Diagnosis Date    Acute cystitis     Anemia     Anxiety     Bacteremia     Copper deficiency     Disease of thyroid gland     Eating disorder     Elevated alkaline phosphatase level     Generalized weakness     Head injury     History of bariatric surgery     History of drug abuse ([address] )     History of enlargement of pituitary gland     Hypothyroidism     Migraine without aura     Panic attack     Papanicolaou smear     Peripheral neuropathy     PTSD (post-traumatic stress disorder)     Seizures ([address] )     Self-injurious behavior     Sepsis ([address] )     Soft tissue lesion of pelvic region     Type 2 diabetes mellitus (HCC)     Type 2, developed after 2nd bariatric surgery    Urinary retention     Vitamin B6 deficiency     Vitamin D deficiency        Medical Review Of Systems:  Review of Systems    Meds/Allergies   all current active meds have been reviewed  Allergies   Allergen Reactions    Anti-Hist [Diphenhydramine] Hyperactivity     Patient stated any antihistamine (hydroxyzine, diphenhydramine, etc ) causes extreme excitation, energy and lack of sleep  (Paradoxical reaction )     Bee Venom Anaphylaxis, Anxiety, Eye Swelling, Facial Swelling, Hives, Itching, Lip Swelling, Shortness Of Breath, Swelling, Throat Swelling and Tongue Swelling    Buspar [Buspirone] Other (See Comments)     Per patient - hemorrhage and feeling like they were being electrocuted     Haldol [Haloperidol] Other (See Comments)     Acute Dystonia - resolved with IM Benztropine     Zoloft [Sertraline] Other (See Comments)     Serotonin Syndrome (patient - per own report - stated they had to be hospitalized due to severe flu like symptoms and fever 1 week post taking Zoloft) - patient DID tolerate Fluoxetine, Celexa and Lexapro)     Penicillins Throat Swelling    Pennsaid [Diclofenac Sodium]        Objective   Vital signs in last 24 hours:  Temp:  [97 8 °F (36 6 °C)] 97 8 °F (36 6 °C)  HR:  [69-76] 69  Resp:  [18] 18  BP: (105-114)/(66-72) 105/66    No intake or output data in the 24 hours ending 06/14/22 1927      Lab Results:  Reviewed  Imaging Studies:  Reviewed  EKG, Pathology, and Other Studies:  Reviewed    Code Status: Prior  Advance Directive and Living Will:      Power of :    POLST:      Counseling / Coordination of Care  Total floor / unit time spent today 30 minutes  Greater than 50% of total time was spent with the patient and / or family counseling and / or coordination of care   A description of the counseling / coordination of care:  Chart review, patient evaluation, coordination communication with staff, nursing and provider

## 2022-06-14 NOTE — ED NOTES
Eryn Vincent from [facility] called back  Unable to accommodate patient due to her medical conditions and unable to refer again

## 2022-06-14 NOTE — ED NOTES
Bed Search: Gabbie/Indira - updating bed availability, will call back shortly     Wendy Thorne/Miya - no beds  Marvin/Desiree - no beds  Jimena/Indira - no beds  Felipe/Efraín - no beds  Chon/Emmie - no beds  KARLOS/Zak - no beds  [place]/Joselin - no beds  Michelle/Maria E - no beds  [facility]/Cedric - no beds  [place] [facility]

## 2022-06-15 RX ORDER — ACETAMINOPHEN 325 MG/1
650 TABLET ORAL EVERY 6 HOURS PRN
Status: DISCONTINUED | OUTPATIENT
Start: 2022-06-15 | End: 2022-06-16 | Stop reason: HOSPADM

## 2022-06-15 RX ORDER — ACETAMINOPHEN 325 MG/1
650 TABLET ORAL ONCE
Status: COMPLETED | OUTPATIENT
Start: 2022-06-15 | End: 2022-06-15

## 2022-06-15 RX ORDER — ARIPIPRAZOLE 2 MG/1
2 TABLET ORAL DAILY
Status: DISCONTINUED | OUTPATIENT
Start: 2022-06-16 | End: 2022-06-16 | Stop reason: HOSPADM

## 2022-06-15 RX ORDER — LORAZEPAM 1 MG/1
1 TABLET ORAL ONCE
Status: COMPLETED | OUTPATIENT
Start: 2022-06-15 | End: 2022-06-15

## 2022-06-15 RX ADMIN — ACETAMINOPHEN 650 MG: 325 TABLET ORAL at 09:14

## 2022-06-15 RX ADMIN — THIAMINE HCL TAB 100 MG 100 MG: 100 TAB at 08:44

## 2022-06-15 RX ADMIN — LORAZEPAM 1 MG: 1 TABLET ORAL at 22:13

## 2022-06-15 RX ADMIN — FOLIC ACID 1 MG: 1 TABLET ORAL at 08:45

## 2022-06-15 RX ADMIN — LEVETIRACETAM 500 MG: 250 TABLET, FILM COATED ORAL at 22:00

## 2022-06-15 RX ADMIN — LORAZEPAM 0.5 MG: 0.5 TABLET ORAL at 08:45

## 2022-06-15 RX ADMIN — PRAZOSIN HYDROCHLORIDE 3 MG: 1 CAPSULE ORAL at 22:13

## 2022-06-15 RX ADMIN — MAGNESIUM OXIDE TAB 400 MG (241.3 MG ELEMENTAL MG) 400 MG: 400 (241.3 MG) TAB at 08:46

## 2022-06-15 RX ADMIN — VENLAFAXINE HYDROCHLORIDE 225 MG: 150 CAPSULE, EXTENDED RELEASE ORAL at 08:44

## 2022-06-15 RX ADMIN — LORAZEPAM 1 MG: 1 TABLET ORAL at 16:05

## 2022-06-15 RX ADMIN — MAGNESIUM OXIDE TAB 400 MG (241.3 MG ELEMENTAL MG) 400 MG: 400 (241.3 MG) TAB at 19:00

## 2022-06-15 RX ADMIN — FUROSEMIDE 20 MG: 20 TABLET ORAL at 08:44

## 2022-06-15 RX ADMIN — POTASSIUM CITRATE 10 MEQ: 10 TABLET, EXTENDED RELEASE ORAL at 16:04

## 2022-06-15 RX ADMIN — GABAPENTIN 600 MG: 300 CAPSULE ORAL at 19:00

## 2022-06-15 RX ADMIN — LORAZEPAM 0.5 MG: 0.5 TABLET ORAL at 19:00

## 2022-06-15 RX ADMIN — ACETAMINOPHEN 650 MG: 325 TABLET ORAL at 14:06

## 2022-06-15 RX ADMIN — METHOCARBAMOL 1000 MG: 500 TABLET ORAL at 16:04

## 2022-06-15 RX ADMIN — POLYETHYLENE GLYCOL 3350 17 G: 17 POWDER, FOR SOLUTION ORAL at 08:46

## 2022-06-15 RX ADMIN — LEVOTHYROXINE SODIUM 50 MCG: 25 TABLET ORAL at 06:07

## 2022-06-15 RX ADMIN — TRAZODONE HYDROCHLORIDE 100 MG: 50 TABLET ORAL at 22:13

## 2022-06-15 RX ADMIN — TOPIRAMATE 25 MG: 25 TABLET, FILM COATED ORAL at 08:44

## 2022-06-15 RX ADMIN — GABAPENTIN 600 MG: 300 CAPSULE ORAL at 08:44

## 2022-06-15 RX ADMIN — METHOCARBAMOL 1000 MG: 500 TABLET ORAL at 06:09

## 2022-06-15 RX ADMIN — ACETAMINOPHEN 650 MG: 325 TABLET ORAL at 22:14

## 2022-06-15 RX ADMIN — GABAPENTIN 600 MG: 300 CAPSULE ORAL at 22:14

## 2022-06-15 RX ADMIN — POTASSIUM CITRATE 10 MEQ: 10 TABLET, EXTENDED RELEASE ORAL at 08:45

## 2022-06-15 RX ADMIN — LEVETIRACETAM 500 MG: 250 TABLET, FILM COATED ORAL at 08:45

## 2022-06-15 RX ADMIN — GABAPENTIN 600 MG: 300 CAPSULE ORAL at 14:06

## 2022-06-15 RX ADMIN — METHOCARBAMOL 1000 MG: 500 TABLET ORAL at 08:45

## 2022-06-15 RX ADMIN — ZOLPIDEM TARTRATE 10 MG: 5 TABLET, COATED ORAL at 22:15

## 2022-06-15 NOTE — ED NOTES
Bed Search    [facility]) no beds  [facility]) no beds  [facility] (no answer)  Felipe Shah) unable to accomodate  [facility]) no beds  Michelle Garcia) unable to [address] ([place]) no beds  Marvin Lo) no beds  Joaquin Camp) no beds  [facility]) no beds

## 2022-06-15 NOTE — ED NOTES
Bed Search    No Beds:   In network ([address]) no beds  [facility]- LVM    Denied and unable to represent:  [facility] CHRIS Alcazar) unable to accommodate due to eating d/o and christopher Byrd) unable to accommodate due to eating d/o and christopher Oliveira) unable to accommodate  [facility]) unable to accommodate  [facility]) unable to accommodate due medical conditions  [facility]) no appropriate bed  Kaylie Harley) unable to accommodate due to eating d/o   [facility]) unable to accommodate due to eating d/o   Katlyn Britt) unable to accommodate due to eating d/o   [facility]inner) unable to accommodate due to eating d/o  Mindy Long) unable to accommodate due to eating d/o

## 2022-06-15 NOTE — ED NOTES
Patient requesting to be discharged  States that she is no longer suicidal and was just impulsive when she "heard bad news"  Wishes to speak to the doctor about going home   Provider notified     Maddy Brewer RN  06/15/22 8048

## 2022-06-16 ENCOUNTER — HOSPITAL ENCOUNTER (INPATIENT)
Facility: HOSPITAL | Age: 42
LOS: 6 days | Discharge: HOME | DRG: 885 | End: 2022-06-22
Attending: STUDENT IN AN ORGANIZED HEALTH CARE EDUCATION/TRAINING PROGRAM | Admitting: STUDENT IN AN ORGANIZED HEALTH CARE EDUCATION/TRAINING PROGRAM
Payer: COMMERCIAL

## 2022-06-16 ENCOUNTER — DOCUMENTATION (OUTPATIENT)
Dept: BEHAVIORAL HEALTH UNIT | Facility: HOSPITAL | Age: 42
End: 2022-06-16

## 2022-06-16 VITALS
OXYGEN SATURATION: 97 % | TEMPERATURE: 97.2 F | DIASTOLIC BLOOD PRESSURE: 72 MMHG | RESPIRATION RATE: 18 BRPM | SYSTOLIC BLOOD PRESSURE: 111 MMHG | BODY MASS INDEX: 39.37 KG/M2 | HEIGHT: 66 IN | WEIGHT: 245 LBS | HEART RATE: 72 BPM

## 2022-06-16 DIAGNOSIS — Z00.8 MEDICAL CLEARANCE FOR PSYCHIATRIC ADMISSION: ICD-10-CM

## 2022-06-16 DIAGNOSIS — F33.2 MAJOR DEPRESSIVE DISORDER, RECURRENT, SEVERE WITHOUT PSYCHOTIC FEATURES (HCC): Chronic | ICD-10-CM

## 2022-06-16 DIAGNOSIS — L30.4 INTERTRIGO: Primary | ICD-10-CM

## 2022-06-16 DIAGNOSIS — M62.838 MUSCLE SPASM: ICD-10-CM

## 2022-06-16 DIAGNOSIS — K59.00 CONSTIPATION: ICD-10-CM

## 2022-06-16 DIAGNOSIS — E87.6 HYPOKALEMIA: ICD-10-CM

## 2022-06-16 DIAGNOSIS — F43.10 PTSD (POST-TRAUMATIC STRESS DISORDER): ICD-10-CM

## 2022-06-16 DIAGNOSIS — F50.9 EATING DISORDER: Chronic | ICD-10-CM

## 2022-06-16 PROCEDURE — G0425 INPT/ED TELECONSULT30: HCPCS | Performed by: GENERAL PRACTICE

## 2022-06-16 RX ORDER — LANOLIN ALCOHOL/MO/W.PET/CERES
100 CREAM (GRAM) TOPICAL DAILY
Status: DISCONTINUED | OUTPATIENT
Start: 2022-06-17 | End: 2022-06-22 | Stop reason: HOSPADM

## 2022-06-16 RX ORDER — POTASSIUM CITRATE 10 MEQ/1
10 TABLET, EXTENDED RELEASE ORAL
Status: DISCONTINUED | OUTPATIENT
Start: 2022-06-17 | End: 2022-06-22 | Stop reason: HOSPADM

## 2022-06-16 RX ORDER — FOLIC ACID 1 MG/1
1 TABLET ORAL DAILY
Status: CANCELLED | OUTPATIENT
Start: 2022-06-17

## 2022-06-16 RX ORDER — LANOLIN ALCOHOL/MO/W.PET/CERES
100 CREAM (GRAM) TOPICAL DAILY
Status: CANCELLED | OUTPATIENT
Start: 2022-06-17

## 2022-06-16 RX ORDER — ACETAMINOPHEN 325 MG/1
650 TABLET ORAL EVERY 6 HOURS PRN
Status: DISCONTINUED | OUTPATIENT
Start: 2022-06-16 | End: 2022-06-22 | Stop reason: HOSPADM

## 2022-06-16 RX ORDER — BENZTROPINE MESYLATE 1 MG/ML
1 INJECTION INTRAMUSCULAR; INTRAVENOUS
Status: DISCONTINUED | OUTPATIENT
Start: 2022-06-16 | End: 2022-06-22 | Stop reason: HOSPADM

## 2022-06-16 RX ORDER — BENZTROPINE MESYLATE 1 MG/1
1 TABLET ORAL
Status: DISCONTINUED | OUTPATIENT
Start: 2022-06-16 | End: 2022-06-22 | Stop reason: HOSPADM

## 2022-06-16 RX ORDER — OLANZAPINE 10 MG/1
10 TABLET ORAL
Status: DISCONTINUED | OUTPATIENT
Start: 2022-06-16 | End: 2022-06-22 | Stop reason: HOSPADM

## 2022-06-16 RX ORDER — FUROSEMIDE 20 MG/1
20 TABLET ORAL DAILY
Status: CANCELLED | OUTPATIENT
Start: 2022-06-17

## 2022-06-16 RX ORDER — MAGNESIUM HYDROXIDE/ALUMINUM HYDROXICE/SIMETHICONE 120; 1200; 1200 MG/30ML; MG/30ML; MG/30ML
30 SUSPENSION ORAL EVERY 4 HOURS PRN
Status: DISCONTINUED | OUTPATIENT
Start: 2022-06-16 | End: 2022-06-22 | Stop reason: HOSPADM

## 2022-06-16 RX ORDER — HYDROXYZINE 50 MG/1
100 TABLET, FILM COATED ORAL
Status: DISCONTINUED | OUTPATIENT
Start: 2022-06-16 | End: 2022-06-22 | Stop reason: HOSPADM

## 2022-06-16 RX ORDER — OLANZAPINE 2.5 MG/1
2.5 TABLET ORAL
Status: CANCELLED | OUTPATIENT
Start: 2022-06-16

## 2022-06-16 RX ORDER — DIPHENHYDRAMINE HYDROCHLORIDE 50 MG/ML
50 INJECTION INTRAMUSCULAR; INTRAVENOUS EVERY 6 HOURS PRN
Status: DISCONTINUED | OUTPATIENT
Start: 2022-06-16 | End: 2022-06-22 | Stop reason: HOSPADM

## 2022-06-16 RX ORDER — LORAZEPAM 1 MG/1
1 TABLET ORAL
Status: DISCONTINUED | OUTPATIENT
Start: 2022-06-17 | End: 2022-06-22 | Stop reason: HOSPADM

## 2022-06-16 RX ORDER — AMOXICILLIN 250 MG
1 CAPSULE ORAL DAILY PRN
Status: DISCONTINUED | OUTPATIENT
Start: 2022-06-16 | End: 2022-06-22 | Stop reason: HOSPADM

## 2022-06-16 RX ORDER — POLYETHYLENE GLYCOL 3350 17 G/17G
17 POWDER, FOR SOLUTION ORAL DAILY
Status: DISCONTINUED | OUTPATIENT
Start: 2022-06-17 | End: 2022-06-22 | Stop reason: HOSPADM

## 2022-06-16 RX ORDER — LORAZEPAM 0.5 MG/1
0.5 TABLET ORAL ONCE
Status: COMPLETED | OUTPATIENT
Start: 2022-06-16 | End: 2022-06-16

## 2022-06-16 RX ORDER — PRAZOSIN HYDROCHLORIDE 1 MG/1
3 CAPSULE ORAL
Status: DISCONTINUED | OUTPATIENT
Start: 2022-06-17 | End: 2022-06-18

## 2022-06-16 RX ORDER — LORAZEPAM 1 MG/1
1 TABLET ORAL
Status: CANCELLED | OUTPATIENT
Start: 2022-06-16

## 2022-06-16 RX ORDER — ZOLPIDEM TARTRATE 5 MG/1
5 TABLET ORAL
Status: CANCELLED | OUTPATIENT
Start: 2022-06-16

## 2022-06-16 RX ORDER — GABAPENTIN 300 MG/1
600 CAPSULE ORAL 4 TIMES DAILY
Status: CANCELLED | OUTPATIENT
Start: 2022-06-16

## 2022-06-16 RX ORDER — POLYETHYLENE GLYCOL 3350 17 G/17G
17 POWDER, FOR SOLUTION ORAL DAILY PRN
Status: CANCELLED | OUTPATIENT
Start: 2022-06-16

## 2022-06-16 RX ORDER — DIPHENHYDRAMINE HYDROCHLORIDE 50 MG/ML
50 INJECTION INTRAMUSCULAR; INTRAVENOUS EVERY 6 HOURS PRN
Status: CANCELLED | OUTPATIENT
Start: 2022-06-16

## 2022-06-16 RX ORDER — OLANZAPINE 10 MG/1
5 INJECTION, POWDER, LYOPHILIZED, FOR SOLUTION INTRAMUSCULAR
Status: CANCELLED | OUTPATIENT
Start: 2022-06-16

## 2022-06-16 RX ORDER — POLYETHYLENE GLYCOL 3350 17 G/17G
17 POWDER, FOR SOLUTION ORAL DAILY PRN
Status: DISCONTINUED | OUTPATIENT
Start: 2022-06-16 | End: 2022-06-22 | Stop reason: HOSPADM

## 2022-06-16 RX ORDER — LEVOTHYROXINE SODIUM 0.05 MG/1
50 TABLET ORAL
Status: CANCELLED | OUTPATIENT
Start: 2022-06-17

## 2022-06-16 RX ORDER — ZOLPIDEM TARTRATE 5 MG/1
5 TABLET ORAL
Status: DISCONTINUED | OUTPATIENT
Start: 2022-06-16 | End: 2022-06-22 | Stop reason: HOSPADM

## 2022-06-16 RX ORDER — LEVETIRACETAM 250 MG/1
500 TABLET ORAL EVERY 12 HOURS SCHEDULED
Status: CANCELLED | OUTPATIENT
Start: 2022-06-16

## 2022-06-16 RX ORDER — OLANZAPINE 10 MG/1
10 TABLET ORAL
Status: CANCELLED | OUTPATIENT
Start: 2022-06-16

## 2022-06-16 RX ORDER — HYDROXYZINE HYDROCHLORIDE 25 MG/1
50 TABLET, FILM COATED ORAL
Status: CANCELLED | OUTPATIENT
Start: 2022-06-16

## 2022-06-16 RX ORDER — AMOXICILLIN 250 MG
1 CAPSULE ORAL DAILY PRN
Status: CANCELLED | OUTPATIENT
Start: 2022-06-16

## 2022-06-16 RX ORDER — LEVOTHYROXINE SODIUM 0.05 MG/1
50 TABLET ORAL
Status: DISCONTINUED | OUTPATIENT
Start: 2022-06-17 | End: 2022-06-22 | Stop reason: HOSPADM

## 2022-06-16 RX ORDER — ACETAMINOPHEN 325 MG/1
650 TABLET ORAL EVERY 4 HOURS PRN
Status: DISCONTINUED | OUTPATIENT
Start: 2022-06-16 | End: 2022-06-22 | Stop reason: HOSPADM

## 2022-06-16 RX ORDER — GABAPENTIN 300 MG/1
600 CAPSULE ORAL 4 TIMES DAILY
Status: DISCONTINUED | OUTPATIENT
Start: 2022-06-17 | End: 2022-06-22 | Stop reason: HOSPADM

## 2022-06-16 RX ORDER — LEVETIRACETAM 500 MG/1
500 TABLET ORAL EVERY 12 HOURS SCHEDULED
Status: DISCONTINUED | OUTPATIENT
Start: 2022-06-17 | End: 2022-06-22 | Stop reason: HOSPADM

## 2022-06-16 RX ORDER — POTASSIUM CITRATE 10 MEQ/1
10 TABLET, EXTENDED RELEASE ORAL
Status: CANCELLED | OUTPATIENT
Start: 2022-06-16

## 2022-06-16 RX ORDER — OLANZAPINE 5 MG/1
5 TABLET ORAL
Status: CANCELLED | OUTPATIENT
Start: 2022-06-16

## 2022-06-16 RX ORDER — TOPIRAMATE 25 MG/1
25 TABLET ORAL DAILY
Status: CANCELLED | OUTPATIENT
Start: 2022-06-17

## 2022-06-16 RX ORDER — LORAZEPAM 2 MG/ML
2 INJECTION INTRAMUSCULAR EVERY 6 HOURS PRN
Status: CANCELLED | OUTPATIENT
Start: 2022-06-16

## 2022-06-16 RX ORDER — MINERAL OIL AND PETROLATUM 150; 830 MG/G; MG/G
1 OINTMENT OPHTHALMIC
Status: DISCONTINUED | OUTPATIENT
Start: 2022-06-16 | End: 2022-06-22 | Stop reason: HOSPADM

## 2022-06-16 RX ORDER — ACETAMINOPHEN 325 MG/1
975 TABLET ORAL EVERY 6 HOURS PRN
Status: DISCONTINUED | OUTPATIENT
Start: 2022-06-16 | End: 2022-06-22 | Stop reason: HOSPADM

## 2022-06-16 RX ORDER — METHOCARBAMOL 500 MG/1
1000 TABLET, FILM COATED ORAL EVERY 8 HOURS
Status: CANCELLED | OUTPATIENT
Start: 2022-06-16

## 2022-06-16 RX ORDER — HYDROXYZINE 50 MG/1
50 TABLET, FILM COATED ORAL
Status: DISCONTINUED | OUTPATIENT
Start: 2022-06-16 | End: 2022-06-22 | Stop reason: HOSPADM

## 2022-06-16 RX ORDER — LORAZEPAM 0.5 MG/1
0.5 TABLET ORAL 2 TIMES DAILY
Status: CANCELLED | OUTPATIENT
Start: 2022-06-16

## 2022-06-16 RX ORDER — POLYETHYLENE GLYCOL 3350 17 G/17G
17 POWDER, FOR SOLUTION ORAL DAILY
Status: CANCELLED | OUTPATIENT
Start: 2022-06-17

## 2022-06-16 RX ORDER — OLANZAPINE 10 MG/1
5 INJECTION, POWDER, LYOPHILIZED, FOR SOLUTION INTRAMUSCULAR
Status: DISCONTINUED | OUTPATIENT
Start: 2022-06-16 | End: 2022-06-22 | Stop reason: HOSPADM

## 2022-06-16 RX ORDER — ACETAMINOPHEN 325 MG/1
650 TABLET ORAL EVERY 6 HOURS PRN
Status: CANCELLED | OUTPATIENT
Start: 2022-06-16

## 2022-06-16 RX ORDER — TRAZODONE HYDROCHLORIDE 50 MG/1
100 TABLET ORAL
Status: CANCELLED | OUTPATIENT
Start: 2022-06-16

## 2022-06-16 RX ORDER — FUROSEMIDE 20 MG/1
20 TABLET ORAL DAILY
Status: DISCONTINUED | OUTPATIENT
Start: 2022-06-17 | End: 2022-06-22 | Stop reason: HOSPADM

## 2022-06-16 RX ORDER — TOPIRAMATE 25 MG/1
25 TABLET ORAL DAILY
Status: DISCONTINUED | OUTPATIENT
Start: 2022-06-17 | End: 2022-06-18

## 2022-06-16 RX ORDER — OLANZAPINE 10 MG/1
10 INJECTION, POWDER, LYOPHILIZED, FOR SOLUTION INTRAMUSCULAR
Status: CANCELLED | OUTPATIENT
Start: 2022-06-16

## 2022-06-16 RX ORDER — HYDROXYZINE HYDROCHLORIDE 25 MG/1
25 TABLET, FILM COATED ORAL
Status: DISCONTINUED | OUTPATIENT
Start: 2022-06-16 | End: 2022-06-22 | Stop reason: HOSPADM

## 2022-06-16 RX ORDER — VENLAFAXINE HYDROCHLORIDE 75 MG/1
225 CAPSULE, EXTENDED RELEASE ORAL DAILY
Status: DISCONTINUED | OUTPATIENT
Start: 2022-06-17 | End: 2022-06-22 | Stop reason: HOSPADM

## 2022-06-16 RX ORDER — ACETAMINOPHEN 325 MG/1
650 TABLET ORAL EVERY 4 HOURS PRN
Status: CANCELLED | OUTPATIENT
Start: 2022-06-16

## 2022-06-16 RX ORDER — HYDROXYZINE HYDROCHLORIDE 25 MG/1
100 TABLET, FILM COATED ORAL
Status: CANCELLED | OUTPATIENT
Start: 2022-06-16

## 2022-06-16 RX ORDER — BENZTROPINE MESYLATE 1 MG/ML
1 INJECTION INTRAMUSCULAR; INTRAVENOUS
Status: CANCELLED | OUTPATIENT
Start: 2022-06-16

## 2022-06-16 RX ORDER — MAGNESIUM HYDROXIDE/ALUMINUM HYDROXICE/SIMETHICONE 120; 1200; 1200 MG/30ML; MG/30ML; MG/30ML
30 SUSPENSION ORAL EVERY 4 HOURS PRN
Status: CANCELLED | OUTPATIENT
Start: 2022-06-16

## 2022-06-16 RX ORDER — BENZTROPINE MESYLATE 0.5 MG/1
1 TABLET ORAL
Status: CANCELLED | OUTPATIENT
Start: 2022-06-16

## 2022-06-16 RX ORDER — HYDROXYZINE HYDROCHLORIDE 25 MG/1
25 TABLET, FILM COATED ORAL
Status: CANCELLED | OUTPATIENT
Start: 2022-06-16

## 2022-06-16 RX ORDER — OLANZAPINE 10 MG/1
10 INJECTION, POWDER, LYOPHILIZED, FOR SOLUTION INTRAMUSCULAR
Status: DISCONTINUED | OUTPATIENT
Start: 2022-06-16 | End: 2022-06-22 | Stop reason: HOSPADM

## 2022-06-16 RX ORDER — OLANZAPINE 5 MG/1
5 TABLET ORAL
Status: DISCONTINUED | OUTPATIENT
Start: 2022-06-16 | End: 2022-06-22 | Stop reason: HOSPADM

## 2022-06-16 RX ORDER — ARIPIPRAZOLE 2 MG/1
2 TABLET ORAL DAILY
Status: DISCONTINUED | OUTPATIENT
Start: 2022-06-17 | End: 2022-06-20

## 2022-06-16 RX ORDER — FOLIC ACID 1 MG/1
1 TABLET ORAL DAILY
Status: DISCONTINUED | OUTPATIENT
Start: 2022-06-17 | End: 2022-06-22 | Stop reason: HOSPADM

## 2022-06-16 RX ORDER — LORAZEPAM 2 MG/ML
2 INJECTION INTRAMUSCULAR EVERY 6 HOURS PRN
Status: DISCONTINUED | OUTPATIENT
Start: 2022-06-16 | End: 2022-06-22 | Stop reason: HOSPADM

## 2022-06-16 RX ORDER — ARIPIPRAZOLE 2 MG/1
2 TABLET ORAL DAILY
Status: CANCELLED | OUTPATIENT
Start: 2022-06-17

## 2022-06-16 RX ORDER — OLANZAPINE 2.5 MG/1
2.5 TABLET ORAL
Status: DISCONTINUED | OUTPATIENT
Start: 2022-06-16 | End: 2022-06-22 | Stop reason: HOSPADM

## 2022-06-16 RX ORDER — METHOCARBAMOL 500 MG/1
1000 TABLET, FILM COATED ORAL EVERY 8 HOURS
Status: DISCONTINUED | OUTPATIENT
Start: 2022-06-17 | End: 2022-06-22 | Stop reason: HOSPADM

## 2022-06-16 RX ORDER — ACETAMINOPHEN 325 MG/1
975 TABLET ORAL EVERY 6 HOURS PRN
Status: CANCELLED | OUTPATIENT
Start: 2022-06-16

## 2022-06-16 RX ORDER — LORAZEPAM 0.5 MG/1
0.5 TABLET ORAL 2 TIMES DAILY
Status: DISCONTINUED | OUTPATIENT
Start: 2022-06-17 | End: 2022-06-22 | Stop reason: HOSPADM

## 2022-06-16 RX ORDER — TRAZODONE HYDROCHLORIDE 100 MG/1
100 TABLET ORAL
Status: DISCONTINUED | OUTPATIENT
Start: 2022-06-17 | End: 2022-06-22 | Stop reason: HOSPADM

## 2022-06-16 RX ORDER — PRAZOSIN HYDROCHLORIDE 1 MG/1
3 CAPSULE ORAL
Status: CANCELLED | OUTPATIENT
Start: 2022-06-16

## 2022-06-16 RX ORDER — MINERAL OIL AND PETROLATUM 150; 830 MG/G; MG/G
1 OINTMENT OPHTHALMIC
Status: CANCELLED | OUTPATIENT
Start: 2022-06-16

## 2022-06-16 RX ADMIN — LORAZEPAM 1 MG: 1 TABLET ORAL at 21:45

## 2022-06-16 RX ADMIN — POLYETHYLENE GLYCOL 3350 17 G: 17 POWDER, FOR SOLUTION ORAL at 08:31

## 2022-06-16 RX ADMIN — METHOCARBAMOL 1000 MG: 500 TABLET ORAL at 08:23

## 2022-06-16 RX ADMIN — ACETAMINOPHEN 650 MG: 325 TABLET ORAL at 19:18

## 2022-06-16 RX ADMIN — MAGNESIUM OXIDE TAB 400 MG (241.3 MG ELEMENTAL MG) 400 MG: 400 (241.3 MG) TAB at 08:22

## 2022-06-16 RX ADMIN — LEVETIRACETAM 500 MG: 250 TABLET, FILM COATED ORAL at 08:22

## 2022-06-16 RX ADMIN — LORAZEPAM 0.5 MG: 0.5 TABLET ORAL at 19:18

## 2022-06-16 RX ADMIN — GABAPENTIN 600 MG: 300 CAPSULE ORAL at 08:22

## 2022-06-16 RX ADMIN — TRAZODONE HYDROCHLORIDE 100 MG: 50 TABLET ORAL at 21:44

## 2022-06-16 RX ADMIN — THIAMINE HCL TAB 100 MG 100 MG: 100 TAB at 08:24

## 2022-06-16 RX ADMIN — ZOLPIDEM TARTRATE 10 MG: 5 TABLET, COATED ORAL at 21:49

## 2022-06-16 RX ADMIN — PRAZOSIN HYDROCHLORIDE 3 MG: 1 CAPSULE ORAL at 21:44

## 2022-06-16 RX ADMIN — FOLIC ACID 1 MG: 1 TABLET ORAL at 08:24

## 2022-06-16 RX ADMIN — ARIPIPRAZOLE 2 MG: 2 TABLET ORAL at 08:24

## 2022-06-16 RX ADMIN — MAGNESIUM OXIDE TAB 400 MG (241.3 MG ELEMENTAL MG) 400 MG: 400 (241.3 MG) TAB at 19:18

## 2022-06-16 RX ADMIN — GABAPENTIN 600 MG: 300 CAPSULE ORAL at 12:15

## 2022-06-16 RX ADMIN — LORAZEPAM 0.5 MG: 0.5 TABLET ORAL at 12:14

## 2022-06-16 RX ADMIN — GABAPENTIN 600 MG: 300 CAPSULE ORAL at 19:18

## 2022-06-16 RX ADMIN — METHOCARBAMOL 1000 MG: 500 TABLET ORAL at 19:19

## 2022-06-16 RX ADMIN — VENLAFAXINE HYDROCHLORIDE 225 MG: 150 CAPSULE, EXTENDED RELEASE ORAL at 08:24

## 2022-06-16 RX ADMIN — FUROSEMIDE 20 MG: 20 TABLET ORAL at 08:22

## 2022-06-16 RX ADMIN — ACETAMINOPHEN 650 MG: 325 TABLET ORAL at 08:23

## 2022-06-16 RX ADMIN — TOPIRAMATE 25 MG: 25 TABLET, FILM COATED ORAL at 08:25

## 2022-06-16 RX ADMIN — LEVOTHYROXINE SODIUM 50 MCG: 25 TABLET ORAL at 06:14

## 2022-06-16 RX ADMIN — METHOCARBAMOL 1000 MG: 500 TABLET ORAL at 02:01

## 2022-06-16 RX ADMIN — LORAZEPAM 0.5 MG: 0.5 TABLET ORAL at 08:24

## 2022-06-16 RX ADMIN — LEVETIRACETAM 500 MG: 250 TABLET, FILM COATED ORAL at 21:44

## 2022-06-16 NOTE — EMTALA/ACUTE CARE TRANSFER
[facility] EMERGENCY DEPARTMENT  [address] [address]  Dept: [phone]      TRACE TRANSFER CONSENT    NAME Misty Asencio                                         1980                              MRN [id number]    I have been informed of my rights regarding examination, treatment, and transfer   by Dr Baylee Morales DO    Benefits: Specialized equipment and/or services available at the receiving facility (Include comment)________________________    Risks: Potential for delay in receiving treatment      Consent for Transfer:  I acknowledge that my medical condition has been evaluated and explained to me by the emergency department physician or other qualified medical person and/or my attending physician, who has recommended that I be transferred to the service of  Accepting Physician: Dr Melodie Delgado at [address] [facility] [address] : [place], [place]  The above potential benefits of such transfer, the potential risks associated with such transfer, and the probable risks of not being transferred have been explained to me, and I fully understand them  The doctor has explained that, in my case, the benefits of transfer outweigh the risks  I agree to be transferred  I authorize the performance of emergency medical procedures and treatments upon me in both transit and upon arrival at the receiving facility  Additionally, I authorize the release of any and all medical records to the receiving facility and request they be transported with me, if possible  I understand that the safest mode of transportation during a medical emergency is an ambulance and that the Hospital advocates the use of this mode of transport  Risks of traveling to the receiving facility by car, including absence of medical control, life sustaining equipment, such as oxygen, and medical personnel has been explained to me and I fully understand them      ([place] BELOW)  [  ]  I consent to the stated transfer and to be transported by ambulance/helicopter  [  ]  I consent to the stated transfer, but refuse transportation by ambulance and accept full responsibility for my transportation by car  I understand the risks of non-ambulance transfers and I exonerate the Hospital and its staff from any deterioration in my condition that results from this refusal     X___________________________________________    DATE  22  TIME________  Signature of patient or legally responsible individual signing on patient behalf           RELATIONSHIP TO PATIENT_________________________          Provider Certification    NAME Katie Gramajo                                         1980                              MRN [id number]    A medical screening exam was performed on the above named patient  Based on the examination:    Condition Necessitating Transfer The primary encounter diagnosis was Depression  Diagnoses of Suicidal ideations, Pelvic mass, and Medical clearance for psychiatric admission were also pertinent to this visit      Patient Condition: The patient has been stabilized such that within reasonable medical probability, no material deterioration of the patient condition or the condition of the unborn child(cyn) is likely to result from the transfer    Reason for Transfer: Level of Care needed not available at this facility    Transfer Requirements:  [place], [place]   · Space available and qualified personnel available for treatment as acknowledged by [employer]  · Agreed to accept transfer and to provide appropriate medical treatment as acknowledged by       Dr Areli Whalen  · Appropriate medical records of the examination and treatment of the patient are provided at the time of transfer   [address] [facility], [address] _______  · Transfer will be performed by qualified personnel from [employer]  and appropriate transfer equipment as required, including the use of necessary and appropriate life support measures  Provider Certification: I have examined the patient and explained the following risks and benefits of being transferred/refusing transfer to the patient/family:  General risk, such as traffic hazards, adverse weather conditions, rough terrain or turbulence, possible failure of equipment (including vehicle or aircraft), or consequences of actions of persons outside the control of the transport personnel, Unanticipated needs of medical equipment and personnel during transport      Based on these reasonable risks and benefits to the patient and/or the unborn child(cyn), and based upon the information available at the time of the patients examination, I certify that the medical benefits reasonably to be expected from the provision of appropriate medical treatments at another medical facility outweigh the increasing risks, if any, to the individuals medical condition, and in the case of labor to the unborn child, from effecting the transfer      X____________________________________________ DATE 06/16/22        TIME_______      ORIGINAL - SEND TO MEDICAL RECORDS   COPY - SEND WITH PATIENT DURING TRANSFER
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
## 2022-06-16 NOTE — ED NOTES
Patient is accepted at [address]  Patient is accepted by Dr Irina Mora per [facility] is arranged with [facility]  Transportation is scheduled for **  Patient may go to the floor at anytime        Nurse report is to be called to [phone] prior to patient transfer

## 2022-06-16 NOTE — ED NOTES
Insurance Authorization for admission:   Phone call placed to Jennifer Isaac number: [phone]  Spoke to Ava lake  5 days approved  Level of care: Inpatient Mental Health  Review on 6/20/22     Authorization # [id number]

## 2022-06-17 PROBLEM — R93.89 ABNORMAL FINDING ON CT SCAN: Status: ACTIVE | Noted: 2022-06-17

## 2022-06-17 PROBLEM — L30.4 INTERTRIGO: Status: ACTIVE | Noted: 2022-06-17

## 2022-06-17 PROBLEM — E66.9 OBESITY (BMI 30-39.9): Status: ACTIVE | Noted: 2022-06-17

## 2022-06-17 PROBLEM — Z72.0 TOBACCO USE: Status: ACTIVE | Noted: 2022-06-17

## 2022-06-17 LAB
25(OH)D3 SERPL-MCNC: 97.4 NG/ML (ref 30–100)
ALBUMIN SERPL BCP-MCNC: 3.9 G/DL (ref 3–5.2)
ALP SERPL-CCNC: 200 U/L (ref 43–122)
ALT SERPL W P-5'-P-CCNC: 13 U/L
ANION GAP SERPL CALCULATED.3IONS-SCNC: 5 MMOL/L (ref 5–14)
AST SERPL W P-5'-P-CCNC: 18 U/L (ref 14–36)
BASOPHILS # BLD AUTO: 0.04 THOUSANDS/ΜL (ref 0–0.1)
BASOPHILS NFR BLD AUTO: 1 % (ref 0–1)
BILIRUB SERPL-MCNC: 0.3 MG/DL
BUN SERPL-MCNC: 6 MG/DL (ref 5–25)
CALCIUM SERPL-MCNC: 9 MG/DL (ref 8.4–10.2)
CHLORIDE SERPL-SCNC: 109 MMOL/L (ref 97–108)
CHOLEST SERPL-MCNC: 197 MG/DL
CO2 SERPL-SCNC: 24 MMOL/L (ref 22–30)
CREAT SERPL-MCNC: 0.61 MG/DL (ref 0.6–1.2)
EOSINOPHIL # BLD AUTO: 0.35 THOUSAND/ΜL (ref 0–0.61)
EOSINOPHIL NFR BLD AUTO: 6 % (ref 0–6)
ERYTHROCYTE [DISTWIDTH] IN BLOOD BY AUTOMATED COUNT: 15.7 % (ref 11.6–15.1)
EST. AVERAGE GLUCOSE BLD GHB EST-MCNC: 88 MG/DL
GFR SERPL CREATININE-BSD FRML MDRD: 114 ML/MIN/1.73SQ M
GLUCOSE P FAST SERPL-MCNC: 81 MG/DL (ref 70–99)
GLUCOSE SERPL-MCNC: 81 MG/DL (ref 70–99)
HBA1C MFR BLD: 4.7 %
HCT VFR BLD AUTO: 45 % (ref 34.8–46.1)
HDLC SERPL-MCNC: 49 MG/DL
HGB BLD-MCNC: 13.7 G/DL (ref 11.5–15.4)
IMM GRANULOCYTES # BLD AUTO: 0.01 THOUSAND/UL (ref 0–0.2)
IMM GRANULOCYTES NFR BLD AUTO: 0 % (ref 0–2)
LDLC SERPL CALC-MCNC: 119 MG/DL
LYMPHOCYTES # BLD AUTO: 1.79 THOUSANDS/ΜL (ref 0.6–4.47)
LYMPHOCYTES NFR BLD AUTO: 30 % (ref 14–44)
MAGNESIUM SERPL-MCNC: 2.3 MG/DL (ref 1.6–2.3)
MCH RBC QN AUTO: 26.5 PG (ref 26.8–34.3)
MCHC RBC AUTO-ENTMCNC: 30.4 G/DL (ref 31.4–37.4)
MCV RBC AUTO: 87 FL (ref 82–98)
MONOCYTES # BLD AUTO: 0.36 THOUSAND/ΜL (ref 0.17–1.22)
MONOCYTES NFR BLD AUTO: 6 % (ref 4–12)
NEUTROPHILS # BLD AUTO: 3.4 THOUSANDS/ΜL (ref 1.85–7.62)
NEUTS SEG NFR BLD AUTO: 57 % (ref 43–75)
NONHDLC SERPL-MCNC: 148 MG/DL
NRBC BLD AUTO-RTO: 0 /100 WBCS
PHOSPHATE SERPL-MCNC: 3.5 MG/DL (ref 2.5–4.8)
PLATELET # BLD AUTO: 204 THOUSANDS/UL (ref 149–390)
PMV BLD AUTO: 11.8 FL (ref 8.9–12.7)
POTASSIUM SERPL-SCNC: 3.6 MMOL/L (ref 3.6–5)
PROT SERPL-MCNC: 6.3 G/DL (ref 5.9–8.4)
RBC # BLD AUTO: 5.17 MILLION/UL (ref 3.81–5.12)
RPR SER QL: NORMAL
SODIUM SERPL-SCNC: 138 MMOL/L (ref 137–147)
TRIGL SERPL-MCNC: 144 MG/DL
TSH SERPL DL<=0.05 MIU/L-ACNC: 1.22 UIU/ML (ref 0.45–4.5)
VIT B12 SERPL-MCNC: 943 PG/ML (ref 100–900)
WBC # BLD AUTO: 5.95 THOUSAND/UL (ref 4.31–10.16)

## 2022-06-17 PROCEDURE — 80061 LIPID PANEL: CPT | Performed by: STUDENT IN AN ORGANIZED HEALTH CARE EDUCATION/TRAINING PROGRAM

## 2022-06-17 PROCEDURE — 82306 VITAMIN D 25 HYDROXY: CPT | Performed by: STUDENT IN AN ORGANIZED HEALTH CARE EDUCATION/TRAINING PROGRAM

## 2022-06-17 PROCEDURE — 83735 ASSAY OF MAGNESIUM: CPT | Performed by: STUDENT IN AN ORGANIZED HEALTH CARE EDUCATION/TRAINING PROGRAM

## 2022-06-17 PROCEDURE — 99223 1ST HOSP IP/OBS HIGH 75: CPT | Performed by: STUDENT IN AN ORGANIZED HEALTH CARE EDUCATION/TRAINING PROGRAM

## 2022-06-17 PROCEDURE — 80053 COMPREHEN METABOLIC PANEL: CPT | Performed by: STUDENT IN AN ORGANIZED HEALTH CARE EDUCATION/TRAINING PROGRAM

## 2022-06-17 PROCEDURE — 84443 ASSAY THYROID STIM HORMONE: CPT | Performed by: STUDENT IN AN ORGANIZED HEALTH CARE EDUCATION/TRAINING PROGRAM

## 2022-06-17 PROCEDURE — 86592 SYPHILIS TEST NON-TREP QUAL: CPT | Performed by: STUDENT IN AN ORGANIZED HEALTH CARE EDUCATION/TRAINING PROGRAM

## 2022-06-17 PROCEDURE — 83036 HEMOGLOBIN GLYCOSYLATED A1C: CPT | Performed by: STUDENT IN AN ORGANIZED HEALTH CARE EDUCATION/TRAINING PROGRAM

## 2022-06-17 PROCEDURE — 84100 ASSAY OF PHOSPHORUS: CPT | Performed by: STUDENT IN AN ORGANIZED HEALTH CARE EDUCATION/TRAINING PROGRAM

## 2022-06-17 PROCEDURE — 99253 IP/OBS CNSLTJ NEW/EST LOW 45: CPT | Performed by: PHYSICIAN ASSISTANT

## 2022-06-17 PROCEDURE — 85025 COMPLETE CBC W/AUTO DIFF WBC: CPT | Performed by: STUDENT IN AN ORGANIZED HEALTH CARE EDUCATION/TRAINING PROGRAM

## 2022-06-17 PROCEDURE — 82607 VITAMIN B-12: CPT | Performed by: STUDENT IN AN ORGANIZED HEALTH CARE EDUCATION/TRAINING PROGRAM

## 2022-06-17 RX ORDER — NYSTATIN 100000 [USP'U]/G
POWDER TOPICAL 2 TIMES DAILY
Status: DISCONTINUED | OUTPATIENT
Start: 2022-06-17 | End: 2022-06-17

## 2022-06-17 RX ORDER — NYSTATIN 100000 [USP'U]/G
POWDER TOPICAL 2 TIMES DAILY
Status: DISCONTINUED | OUTPATIENT
Start: 2022-06-17 | End: 2022-06-22 | Stop reason: HOSPADM

## 2022-06-17 RX ADMIN — ZOLPIDEM TARTRATE 5 MG: 5 TABLET ORAL at 21:47

## 2022-06-17 RX ADMIN — LORAZEPAM 0.5 MG: 0.5 TABLET ORAL at 17:57

## 2022-06-17 RX ADMIN — METHOCARBAMOL 1000 MG: 500 TABLET, FILM COATED ORAL at 13:28

## 2022-06-17 RX ADMIN — LORAZEPAM 1 MG: 1 TABLET ORAL at 21:49

## 2022-06-17 RX ADMIN — GABAPENTIN 600 MG: 300 CAPSULE ORAL at 13:28

## 2022-06-17 RX ADMIN — GABAPENTIN 600 MG: 300 CAPSULE ORAL at 08:09

## 2022-06-17 RX ADMIN — MAGNESIUM OXIDE TAB 400 MG (241.3 MG ELEMENTAL MG) 400 MG: 400 (241.3 MG) TAB at 17:57

## 2022-06-17 RX ADMIN — METHOCARBAMOL 1000 MG: 500 TABLET, FILM COATED ORAL at 19:06

## 2022-06-17 RX ADMIN — LEVETIRACETAM 500 MG: 500 TABLET, FILM COATED ORAL at 08:09

## 2022-06-17 RX ADMIN — GABAPENTIN 600 MG: 300 CAPSULE ORAL at 21:47

## 2022-06-17 RX ADMIN — POLYETHYLENE GLYCOL 3350 17 G: 17 POWDER, FOR SOLUTION ORAL at 08:10

## 2022-06-17 RX ADMIN — TOPIRAMATE 25 MG: 25 TABLET, FILM COATED ORAL at 08:09

## 2022-06-17 RX ADMIN — ARIPIPRAZOLE 2 MG: 2 TABLET ORAL at 08:09

## 2022-06-17 RX ADMIN — GABAPENTIN 600 MG: 300 CAPSULE ORAL at 17:57

## 2022-06-17 RX ADMIN — TRAZODONE HYDROCHLORIDE 100 MG: 100 TABLET ORAL at 21:47

## 2022-06-17 RX ADMIN — FOLIC ACID 1 MG: 1 TABLET ORAL at 08:08

## 2022-06-17 RX ADMIN — MAGNESIUM OXIDE TAB 400 MG (241.3 MG ELEMENTAL MG) 400 MG: 400 (241.3 MG) TAB at 08:11

## 2022-06-17 RX ADMIN — ACETAMINOPHEN 975 MG: 325 TABLET ORAL at 13:37

## 2022-06-17 RX ADMIN — FUROSEMIDE 20 MG: 20 TABLET ORAL at 08:09

## 2022-06-17 RX ADMIN — LORAZEPAM 0.5 MG: 0.5 TABLET ORAL at 08:09

## 2022-06-17 RX ADMIN — POTASSIUM CITRATE 10 MEQ: 10 TABLET ORAL at 19:05

## 2022-06-17 RX ADMIN — LEVETIRACETAM 500 MG: 500 TABLET, FILM COATED ORAL at 21:47

## 2022-06-17 RX ADMIN — NYSTATIN: 100000 POWDER TOPICAL at 17:59

## 2022-06-17 RX ADMIN — VENLAFAXINE HYDROCHLORIDE 225 MG: 75 CAPSULE, EXTENDED RELEASE ORAL at 08:08

## 2022-06-17 RX ADMIN — LEVOTHYROXINE SODIUM 50 MCG: 50 TABLET ORAL at 06:22

## 2022-06-17 RX ADMIN — METHOCARBAMOL 1000 MG: 500 TABLET, FILM COATED ORAL at 06:22

## 2022-06-17 RX ADMIN — THIAMINE HCL TAB 100 MG 100 MG: 100 TAB at 08:10

## 2022-06-17 RX ADMIN — POTASSIUM CITRATE 10 MEQ: 10 TABLET ORAL at 09:45

## 2022-06-17 RX ADMIN — ACETAMINOPHEN 975 MG: 325 TABLET ORAL at 21:48

## 2022-06-17 NOTE — PROGRESS NOTES
[facility] Group Note     06/17/22 1415   Activity/Group Checklist   Group Personal control  (Music and Lyrics)   Attendance Attended   Attendance Duration (min) 0-15  (pulled by clinician)   Interactions Interacted appropriately   Affect/Mood Appropriate;Bright   Goals Achieved Able to listen to others; Able to engage in interactions; Able to recieve feedback; Able to give feedback to another

## 2022-06-17 NOTE — ASSESSMENT & PLAN NOTE
· CT scan on 6/13/22 significant for:  · 4 7 cm soft tissue density lesion in the right hemipelvis  Recommend pelvic sonogram in the appropriate clinical setting     · Patient reports that she had a pelvic ultrasound at the [facility] which showed a solid mass on ovary   She was referred to gyn onc who recommended oophorectomy with pathology  · Recommend follow up with gyn onc upon discharge

## 2022-06-17 NOTE — NURSING NOTE
Pt is a 201 from [facility]  Pt was brought to ED after endorsing SI w/ plan to cut self with razor "just to make the pain stop " Pt made these statements to her OB staff after given the news that she has a "questionable R-ovarian cyst" which pt told ED staff was a "cancer diagnosis " However, unconfirmed  Pt has hx of multiple psych admissions including to this location last year  Pt endorses 1x SA in her 25s by intentional overdose  Pt has visible self-harm cutting scars on arms  Pt has hx of oxycodone abuse and possible benzo abuse but claims to be clean for 5 years "because that was the last promise I made my mother " Pt UDS+ THC, though she requests that [facility] not be made aware of this "or they'll kick me out "` Pt has been resident of [facility] for approximately 1 year  Pt has PTSD, DMII, seizures (last one 5 yrs ago), peripheral neuropathy and mobility issues (pt uses walker or wheelchair)  Pt has hx MDRO  Pt smokes 1 5/pack of cigarettes a day with no desire to quit  Upon admission, pt is calm, flat, depressed, cooperative  Pt needs encouragement to help herself with simple tasks  Pt denies SI/HI/AH/VH and other psych SS at this time  2-RN skin check performed, scars from gastric bypass and L-ACL surgery visible  Pt has rash in groin region  Pt oriented to unit and shown to room

## 2022-06-17 NOTE — ED NOTES
Sury Saha and left a voicemail for Davie Saul due to not receiving a return call regarding pt authorization ([phone] ext [phone])

## 2022-06-17 NOTE — PROGRESS NOTES
06/17/22 1000   Activity/Group Checklist   Group Other (Comment)  (Group Art Therapy/Psychodynamic, Exploring Affective Expression with Color, Movement and Pressure)   Attendance Attended   Attendance Duration (min) Greater than 60   Interactions Interacted appropriately   Affect/Mood Appropriate   Goals Achieved Identified feelings; Discussed coping strategies; Identified distorted thoughts/beliefs; Able to listen to others; Able to engage in interactions; Able to manage/cope with feelings; Able to recieve feedback; Able to give feedback to another;Able to self-disclose  (Able to engage materials and directive; full participation)

## 2022-06-17 NOTE — NURSING NOTE
Patient has been out on the unit attending group, med compliant, has a good appetite and slept well  Patient given 975 mg tylenol for 8/10 pain in head and abd  Medication was effective  She reports 3/10 pain after the tylenol  Patient has been social with select peers, pleasant and cooperative

## 2022-06-17 NOTE — PLAN OF CARE
Problem: DISCHARGE PLANNING  Goal: Discharge to home or other facility with appropriate resources  Description: INTERVENTIONS:  - Identify barriers to discharge w/patient and caregiver  - Arrange for needed discharge resources and transportation as appropriate  - Identify discharge learning needs (meds, wound care, etc )  - Arrange for interpretive services to assist at discharge as needed  - Refer to Case Management Department for coordinating discharge planning if the patient needs post-hospital services based on physician/advanced practitioner order or complex needs related to functional status, cognitive ability, or social support system  Outcome: Not Progressing     Problem: SELF HARM/SUICIDALITY  Goal: Will have no self-injury during hospital stay  Description: INTERVENTIONS:  - Q 15 MINUTES: Routine safety checks  - Q WAKING SHIFT & PRN: Assess risk to determine if routine checks are adequate to maintain patient safety  - Encourage patient to participate actively in care by formulating a plan to combat response to suicidal ideation, identify supports and resources  Outcome: Not Progressing     Problem: DEPRESSION  Goal: Will be euthymic at discharge  Description: INTERVENTIONS:  - Administer medication as ordered  - Provide emotional support via 1:1 interaction with staff  - Encourage involvement in milieu/groups/activities  - Monitor for social isolation  Outcome: Not Progressing     Problem: ANXIETY  Goal: Will report anxiety at manageable levels  Description: INTERVENTIONS:  - Administer medication as ordered  - Teach and encourage coping skills  - Provide emotional support  - Assess patient/family for anxiety and ability to cope  Outcome: Not Progressing  Goal: By discharge: Patient will verbalize 2 strategies to deal with anxiety  Description: Interventions:  - Identify any obvious source/trigger to anxiety  - Staff will assist patient in applying identified coping technique/skills  - Encourage attendance of scheduled groups and activities  Outcome: Not Progressing     Problem: SUBSTANCE USE/ABUSE  Goal: Will have no detox symptoms and will verbalize plan for changing substance-related behavior  Description: INTERVENTIONS:  - Monitor physical status and assess for symptoms of withdrawal  - Administer medication as ordered  - Provide emotional support with 1 on 1 interaction with staff  - Encourage recovery focused program/ addiction education  - Assess for verbalization of changing behaviors related to substance abuse  - Initiate consults and referrals as appropriate (Case Management, Spiritual Care, etc )  Outcome: Not Progressing  Goal: By discharge, will develop insight into their chemical dependency and sustain motivation to continue in recovery  Description: INTERVENTIONS:  - Attends all daily group sessions and scheduled AA groups  - Actively practices coping skills through participation in the therapeutic community and adherence to program rules  - Reviews and completes assignments from individual treatment plan  - Assist patient development of understanding of their personal cycle of addiction and relapse triggers  Outcome: Not Progressing  Goal: By discharge, patient will have ongoing treatment plan addressing chemical dependency  Description: INTERVENTIONS:  - Assist patient with resources and/or appointments for ongoing recovery based living  Outcome: Not Progressing     Problem: Risk for Self Injury/Neglect  Goal: Treatment Goal: Remain safe during length of stay, learn and adopt new coping skills, and be free of self-injurious ideation, impulses and acts at the time of discharge  Outcome: Not Progressing  Goal: Verbalize thoughts and feelings  Description: Interventions:  - Assess and re-assess patient's lethality and potential for self-injury  - Engage patient in 1:1 interactions, daily, for a minimum of 15 minutes  - Encourage patient to express feelings, fears, frustrations, hopes  - Establish rapport/trust with patient   Outcome: Not Progressing  Goal: Refrain from harming self  Description: Interventions:  - Monitor patient closely, per order  - Develop a trusting relationship  - Supervise medication ingestion, monitor effects and side effects   Outcome: Not Progressing  Goal: Attend and participate in unit activities, including therapeutic, recreational, and educational groups  Description: Interventions:  - Provide therapeutic and educational activities daily, encourage attendance and participation, and document same in the medical record  - Obtain collateral information, encourage visitation and family involvement in care   Outcome: Not Progressing  Goal: Recognize maladaptive responses and adopt new coping mechanisms  Outcome: Not Progressing  Goal: Complete daily ADLs, including personal hygiene independently, as able  Description: Interventions:  - Observe, teach, and assist patient with ADLS  - Monitor and promote a balance of rest/activity, with adequate nutrition and elimination  Outcome: Not Progressing     Problem: Depression  Goal: Treatment Goal: Demonstrate behavioral control of depressive symptoms, verbalize feelings of improved mood/affect, and adopt new coping skills prior to discharge  Outcome: Not Progressing  Goal: Verbalize thoughts and feelings  Description: Interventions:  - Assess and re-assess patient's level of risk   - Engage patient in 1:1 interactions, daily, for a minimum of 15 minutes   - Encourage patient to express feelings, fears, frustrations, hopes   Outcome: Not Progressing  Goal: Refrain from harming self  Description: Interventions:  - Monitor patient closely, per order   - Supervise medication ingestion, monitor effects and side effects   Outcome: Not Progressing  Goal: Refrain from isolation  Description: Interventions:  - Develop a trusting relationship   - Encourage socialization   Outcome: Not Progressing  Goal: Refrain from self-neglect  Outcome: Not Progressing  Goal: Attend and participate in unit activities, including therapeutic, recreational, and educational groups  Description: Interventions:  - Provide therapeutic and educational activities daily, encourage attendance and participation, and document same in the medical record   Outcome: Not Progressing  Goal: Complete daily ADLs, including personal hygiene independently, as able  Description: Interventions:  - Observe, teach, and assist patient with ADLS  -  Monitor and promote a balance of rest/activity, with adequate nutrition and elimination   Outcome: Not Progressing     Problem: Anxiety  Goal: Anxiety is at manageable level  Description: Interventions:  - Assess and monitor patient's anxiety level  - Monitor for signs and symptoms (heart palpitations, chest pain, shortness of breath, headaches, nausea, feeling jumpy, restlessness, irritable, apprehensive)  - Collaborate with interdisciplinary team and initiate plan and interventions as ordered    - [place] patient to unit/surroundings  - Explain treatment plan  - Encourage participation in care  - Encourage verbalization of concerns/fears  - Identify coping mechanisms  - Assist in developing anxiety-reducing skills  - Administer/offer alternative therapies  - Limit or eliminate stimulants  Outcome: Not Progressing
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
## 2022-06-17 NOTE — PROGRESS NOTES
[facility] Group Note     06/17/22 0930   Activity/Group Checklist   Group Community meeting  (Goals)   Attendance Attended   Attendance Duration (min) 16-30   Interactions Interacted appropriately   Affect/Mood Appropriate;Calm   Goals Achieved Identified feelings; Identified triggers; Discussed coping strategies; Discussed self-esteem issues; Able to listen to others; Able to engage in interactions; Able to reflect/comment on own behavior;Able to manage/cope with feelings; Able to self-disclose; Able to recieve feedback; Able to give feedback to another

## 2022-06-17 NOTE — PROGRESS NOTES
06/17/22 0938   Team Meeting   Meeting Type Daily Rounds   Team Members Present   Team Members Present Physician;Nurse;   Physician Team Member Dr Suad Powell Team Member [facility] Team Member Joshua   Patient/Family Present   Patient Present No   Patient's Family Present No   Pt is a new admit here on a 12 from [facility] ED  Pt endorsed SI with a plan to cut herself with a razor to her OBGYN  Pt's readmit score is 22  Discharge to be determined

## 2022-06-17 NOTE — PROGRESS NOTES
Met with Bonita Concepcion completing her admission self assessment  She identified having fear of the possible diagnosis of ovarian cancer  Her health is improving she currently resides in a nursing home and struggles with people she meets and likes die  She currently has made two close friends that she considers family and has a male  that makes her happy

## 2022-06-17 NOTE — ASSESSMENT & PLAN NOTE
· Candidal infection of pannus 2/2 to excess skin from weight loss from bariatric surgery  · Erythema at skin fold  · Will order nystatin powder

## 2022-06-17 NOTE — TREATMENT PLAN
TREATMENT PLAN REVIEW - [facility] Laurie Platt 46 y o  1980 female MRN: [id number]    [address] [place] [address] Room / Bed: [place] [address]/[facility] 788-10 Encounter: [id number]          Admit Date/Time:  6/16/2022 11:12 PM    Treatment Team: Attending Provider: Lena Baez MD; Patient Care Assistant: Ray Fletcher; Registered Nurse: Clinton Gibson RN; : Annetta Burgess; Patient Care Technician: Manas oSto; Nursing Student: Ann Santos    Diagnosis: Principal Problem:    Major depressive disorder, recurrent, severe without psychotic features ([facility] )  Active Problems:    Eating disorder; history of both anorexia and bulimia    Post-traumatic stress disorder, chronic    BEAR (generalized anxiety disorder)    Hypothyroidism    History of gastric bypass    Anemia    Peripheral polyneuropathy    Partial idiopathic epilepsy with seizures of localized onset, not intractable, without status epilepticus ([facility] )    Medical clearance for psychiatric admission    Obesity (BMI 30-39  9)    Tobacco use    Abnormal finding on CT scan    Intertrigo      Patient Strengths/Assets: cooperative, motivation for treatment/growth, negotiates basic needs, patient is on a voluntary commitment, reasoning ability, sense of humor    Patient Barriers/Limitations: lack of social/family support, poor physical health    Short Term Goals: decrease in depressive symptoms, decrease in suicidal thoughts    Long Term Goals: improvement in depression, improvement in anxiety, resolution of depressive symptoms, free of suicidal thoughts    Progress Towards Goals: continue psychiatric medications as prescribed    Recommended Treatment: medication management, patient medication education, group therapy, milieu therapy, continued Behavioral Health psychiatric evaluation/assessment process    Treatment Frequency: daily medication monitoring, group and milieu therapy daily, monitoring through interdisciplinary rounds, monitoring through weekly patient care conferences    Expected Discharge Date:  10 days    Discharge Plan: attend aftercare/continuing care group, referral for outpatient medication management with a psychiatrist, referral for outpatient psychotherapy    Treatment Plan Created/Updated By: Kaitlin Lynn MD

## 2022-06-17 NOTE — ASSESSMENT & PLAN NOTE
Patient is medically cleared for admission to the [facility] for treatment of the underlying psychiatric illness

## 2022-06-17 NOTE — CONSULTS
[address] 1980, 46 y o  female MRN: [id number]  Unit/Bed#: [facility] 597-04 Encounter: [id number]  Primary Care Provider: Carrie Sanchez MD   Date and time admitted to hospital: 6/16/2022 11:12 PM    Inpatient consult for Medical Clearance for [address] patient  Consult performed by: Justus Lewis PA-C  Consult ordered by: Lottie Hill MD        Medical clearance for psychiatric admission  Assessment & Plan  Patient is medically cleared for admission to the [facility] for treatment of the underlying psychiatric illness    Intertrigo  Assessment & Plan  · Candidal infection of pannus 2/2 to excess skin from weight loss from bariatric surgery  · Erythema at skin fold  · Will order nystatin powder    Abnormal finding on CT scan  Assessment & Plan  · CT scan on 6/13/22 significant for:  · 4 7 cm soft tissue density lesion in the right hemipelvis  Recommend pelvic sonogram in the appropriate clinical setting     · Patient reports that she had a pelvic ultrasound at the [facility] which showed a solid mass on ovary  She was referred to gyn onc who recommended oophorectomy with pathology  · Recommend follow up with gyn onc upon discharge    Tobacco use  Assessment & Plan  · Smokes 1 5 ppd  No desire to quit  · Encouraged cessation  · Offer NRT    Obesity (BMI 30-39  9)  Assessment & Plan  · BMI 39 5  · S/p gastric bypass in 2007  · Encouraged lifestyle modifications    Partial idiopathic epilepsy with seizures of localized onset, not intractable, without status epilepticus ([facility] [address] )  Assessment & Plan  · Reports last seizure was 5 years ago due to missed AED  · Continue pre hospital Keppra 500mg BID    Peripheral polyneuropathy  Assessment & Plan  · Continue pre-hospital gabapentin 600mg qid    Anemia  Assessment & Plan  · Chronic  Hgb above base line at 13 7  · Not on PO iron supplemenation  · Patient follows with [facility] heme/onc   Encourage follow up for continuous management    History of gastric bypass  Assessment & Plan  · S/p gastric bypass in 2007  · Continue vitamin supplementation    Hypothyroidism  Assessment & Plan  · Continue pre hospital levothyroxine 50mcg    ECT Clearance:   History of recent seizure or stroke:  No - last seizure 5 years ago due to missed medication    History of pheochromocytoma:  no   History of active bleeding (Intracranial hemorrhage, aneurysm or AVM):  no   History of metallic implants in the head or neck:  no   History of increased intracranial pressure with mass effect:  no  ·   EKG within 3 months? No  o If yes, was an arrhythmia present and at baseline?  o If yes, what is the baseline QT interval?  o If no, obtain prior to ECT for arrhythmia evaluation and baseline QT interval     Based on above criteria, Patient is not medically cleared for ECT should it be recommended  Counseling / Coordination of Care Time: 30 minutes  Greater than 50% of total time spent on patient counseling and coordination of care  Collaboration of Care: Were Recommendations Directly Discussed with Primary Treatment Team? - No     History of Present Illness:    Ivett Young is a 46 y o  female who is originally admitted to the psychiatry service due to [place] [address]  We are consulted for medical clearance for admission to [facility] Unit and treatment of underlying psychiatric illness  This patient has a past medical history significant for anemia, seizures, hypothyroidism, and s/p gastric bypass  She initially presented to the ED from [facility] for SI  Currently complaining of rash, headache and pelvic pain  Recent imaging showed large solid mass on ovary, she is following with gyn onc  Rash is chronic under pannus 2/2 to excess skin s/p bariatric surgery  Denies CP, SOB, n/v/d/c or urinary symptoms  Review of Systems:    Review of Systems   Constitutional: Negative for activity change, chills, diaphoresis and fever     HENT: Negative for congestion, rhinorrhea, sinus pressure, sinus pain and sore throat  Eyes: Negative for visual disturbance  Respiratory: Negative for cough, shortness of breath and wheezing  Cardiovascular: Negative for chest pain and palpitations  Gastrointestinal: Positive for abdominal pain  Negative for abdominal distention, constipation, diarrhea, nausea and vomiting  Genitourinary: Negative for dysuria, frequency, hematuria and urgency  Musculoskeletal: Negative for arthralgias, back pain and myalgias  Skin: Positive for rash  Neurological: Negative for dizziness, weakness, light-headedness and headaches         Past Medical and Surgical History:     Past Medical History:   Diagnosis Date    Acute cystitis     Anemia     Anxiety     Bacteremia     Copper deficiency     Disease of thyroid gland     Eating disorder     Elevated alkaline phosphatase level     Generalized weakness     Head injury     History of bariatric surgery     History of drug abuse ([place] [address] )     History of enlargement of pituitary gland     Hypothyroidism     Migraine without aura     Panic attack     Papanicolaou smear     Peripheral neuropathy     PTSD (post-traumatic stress disorder)     Seizures ([facility] )     Self-injurious behavior     Sepsis ([place] [address] )     Soft tissue lesion of pelvic region     Type 2 diabetes mellitus ([place] [address] )     Type 2, developed after 2nd bariatric surgery    Urinary retention     Vitamin B6 deficiency     Vitamin D deficiency        Past Surgical History:   Procedure Laterality Date    ANTERIOR CRUCIATE LIGAMENT REPAIR Left     CHOLECYSTECTOMY      2003    COLONOSCOPY      GASTRIC BYPASS      Pebbles en Y 2007, 2016 had pouch ulcers and revision performed    IR PICC PLACEMENT SINGLE LUMEN  04/16/2021    IR TEMPORARY DIALYSIS CATHETER PLACEMENT  04/08/2021    TONSILLECTOMY      UPPER GASTROINTESTINAL ENDOSCOPY         Meds/Allergies:    all medications and allergies reviewed    Allergies: Allergies   Allergen Reactions    Anti-Hist [Diphenhydramine] Hyperactivity     Patient stated any antihistamine (hydroxyzine, diphenhydramine, etc ) causes extreme excitation, energy and lack of sleep  (Paradoxical reaction )     Bee Venom Anaphylaxis, Anxiety, Eye Swelling, Facial Swelling, Hives, Itching, Lip Swelling, Shortness Of Breath, Swelling, Throat Swelling and Tongue Swelling    Buspar [Buspirone] Other (See Comments)     Per patient - hemorrhage and feeling like they were being electrocuted     Haldol [Haloperidol] Other (See Comments)     Acute Dystonia - resolved with IM Benztropine     Zoloft [Sertraline] Other (See Comments)     Serotonin Syndrome (patient - per own report - stated they had to be hospitalized due to severe flu like symptoms and fever 1 week post taking Zoloft) - patient DID tolerate Fluoxetine, Celexa and Lexapro)     Penicillins Throat Swelling    Pennsaid [Diclofenac Sodium]        Social History:     Marital Status: Single    Substance Use History:   Social History     Substance and Sexual Activity   Alcohol Use Never     Social History     Tobacco Use   Smoking Status Current Every Day Smoker    Packs/day: 1 50    Years: 17 00    Pack years: 25 50    Types: Cigarettes   Smokeless Tobacco Never Used   Tobacco Comment    had quit in 2021, restarted  Social History     Substance and Sexual Activity   Drug Use Yes    Types: Marijuana    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       Family History:    non-contributory    Physical Exam:   Patient seen and examined while sitting in wheel chair   Was able to stand on her own for physical exam   Vitals:   Blood Pressure: 107/59 (06/17/22 0754)  Pulse: 68 (06/17/22 0754)  Temperature: (!) 97 2 °F (36 2 °C) (06/17/22 0754)  Temp Source: Temporal (06/17/22 0754)  Respirations: 16 (06/17/22 0754)  Height: 5' 6" (167 6 cm) (06/16/22 2316)  Weight - Scale: 111 kg (245 lb) (06/16/22 2316)  SpO2: 99 % (06/17/22 80)    Physical Exam  Constitutional:       General: She is not in acute distress  Appearance: Normal appearance  She is obese  She is not ill-appearing, toxic-appearing or diaphoretic  HENT:      Head: Normocephalic and atraumatic  Nose: Nose normal  No congestion or rhinorrhea  Mouth/Throat:      Mouth: Mucous membranes are moist    Eyes:      General: No scleral icterus  Extraocular Movements: Extraocular movements intact  Cardiovascular:      Rate and Rhythm: Normal rate and regular rhythm  Heart sounds: Normal heart sounds  No murmur heard  Pulmonary:      Effort: Pulmonary effort is normal  No respiratory distress  Breath sounds: Normal breath sounds  No wheezing  Abdominal:      General: Abdomen is flat  Bowel sounds are normal  There is no distension  Palpations: Abdomen is soft  Tenderness: There is no abdominal tenderness  Musculoskeletal:      Right lower leg: No edema  Left lower leg: No edema  Skin:     General: Skin is warm and dry  Coloration: Skin is not jaundiced  Neurological:      General: No focal deficit present  Mental Status: She is alert and oriented to person, place, and time  Psychiatric:         Mood and Affect: Mood normal          Behavior: Behavior normal          Thought Content: Thought content normal          Judgment: Judgment normal          Additional Data:     Lab Results: I have personally reviewed pertinent reports        Results from last 7 days   Lab Units 06/17/22  0642   WBC Thousand/uL 5 95   HEMOGLOBIN g/dL 13 7   HEMATOCRIT % 45 0   PLATELETS Thousands/uL 204   NEUTROS PCT % 57   LYMPHS PCT % 30   MONOS PCT % 6   EOS PCT % 6     Results from last 7 days   Lab Units 06/17/22  0642   SODIUM mmol/L 138   POTASSIUM mmol/L 3 6   CHLORIDE mmol/L 109*   CO2 mmol/L 24   BUN mg/dL 6   CREATININE mg/dL 0 61   ANION GAP mmol/L 5   CALCIUM mg/dL 9 0   ALBUMIN g/dL 3 9   TOTAL BILIRUBIN mg/dL 0 30   ALK PHOS U/L 200*   ALT U/L 13   AST U/L 18   GLUCOSE RANDOM mg/dL 81             Lab Results   Component Value Date/Time    HGBA1C 4 7 06/17/2022 06:42 AM    HGBA1C 4 8 02/11/2022 12:00 AM    HGBA1C 5 3 11/17/2021 04:18 AM    HGBA1C 5 2 12/31/2020 09:51 AM    HGBA1C 4 7 11/13/2020 09:45 AM    HGBA1C 5 1 04/29/2020 12:22 PM    HGBA1C 4 9 10/25/2019 09:38 AM           EKG, Pathology, and Other Studies Reviewed on Admission:   · EKG:    No new EKG on file    ** Please Note: This note has been constructed using a voice recognition system   **

## 2022-06-17 NOTE — PLAN OF CARE
Problem: DISCHARGE PLANNING  Goal: Discharge to home or other facility with appropriate resources  Description: INTERVENTIONS:  - Identify barriers to discharge w/patient and caregiver  - Arrange for needed discharge resources and transportation as appropriate  - Identify discharge learning needs (meds, wound care, etc )  - Arrange for interpretive services to assist at discharge as needed  - Refer to Case Management Department for coordinating discharge planning if the patient needs post-hospital services based on physician/advanced practitioner order or complex needs related to functional status, cognitive ability, or social support system  Outcome: Progressing     Problem: SELF HARM/SUICIDALITY  Goal: Will have no self-injury during hospital stay  Description: INTERVENTIONS:  - Q 15 MINUTES: Routine safety checks  - Q WAKING SHIFT & PRN: Assess risk to determine if routine checks are adequate to maintain patient safety  - Encourage patient to participate actively in care by formulating a plan to combat response to suicidal ideation, identify supports and resources  Outcome: Progressing     Problem: DEPRESSION  Goal: Will be euthymic at discharge  Description: INTERVENTIONS:  - Administer medication as ordered  - Provide emotional support via 1:1 interaction with staff  - Encourage involvement in milieu/groups/activities  - Monitor for social isolation  Outcome: Progressing     Problem: ANXIETY  Goal: Will report anxiety at manageable levels  Description: INTERVENTIONS:  - Administer medication as ordered  - Teach and encourage coping skills  - Provide emotional support  - Assess patient/family for anxiety and ability to cope  Outcome: Progressing  Goal: By discharge: Patient will verbalize 2 strategies to deal with anxiety  Description: Interventions:  - Identify any obvious source/trigger to anxiety  - Staff will assist patient in applying identified coping technique/skills  - Encourage attendance of scheduled groups and activities  Outcome: Progressing     Problem: SUBSTANCE USE/ABUSE  Goal: Will have no detox symptoms and will verbalize plan for changing substance-related behavior  Description: INTERVENTIONS:  - Monitor physical status and assess for symptoms of withdrawal  - Administer medication as ordered  - Provide emotional support with 1 on 1 interaction with staff  - Encourage recovery focused program/ addiction education  - Assess for verbalization of changing behaviors related to substance abuse  - Initiate consults and referrals as appropriate (Case Management, Spiritual Care, etc )  Outcome: Progressing  Goal: By discharge, will develop insight into their chemical dependency and sustain motivation to continue in recovery  Description: INTERVENTIONS:  - Attends all daily group sessions and scheduled AA groups  - Actively practices coping skills through participation in the therapeutic community and adherence to program rules  - Reviews and completes assignments from individual treatment plan  - Assist patient development of understanding of their personal cycle of addiction and relapse triggers  Outcome: Progressing  Goal: By discharge, patient will have ongoing treatment plan addressing chemical dependency  Description: INTERVENTIONS:  - Assist patient with resources and/or appointments for ongoing recovery based living  Outcome: Progressing     Problem: Risk for Self Injury/Neglect  Goal: Treatment Goal: Remain safe during length of stay, learn and adopt new coping skills, and be free of self-injurious ideation, impulses and acts at the time of discharge  Outcome: Progressing  Goal: Verbalize thoughts and feelings  Description: Interventions:  - Assess and re-assess patient's lethality and potential for self-injury  - Engage patient in 1:1 interactions, daily, for a minimum of 15 minutes  - Encourage patient to express feelings, fears, frustrations, hopes  - Establish rapport/trust with patient   Outcome: Progressing  Goal: Refrain from harming self  Description: Interventions:  - Monitor patient closely, per order  - Develop a trusting relationship  - Supervise medication ingestion, monitor effects and side effects   Outcome: Progressing  Goal: Attend and participate in unit activities, including therapeutic, recreational, and educational groups  Description: Interventions:  - Provide therapeutic and educational activities daily, encourage attendance and participation, and document same in the medical record  - Obtain collateral information, encourage visitation and family involvement in care   Outcome: Progressing  Goal: Recognize maladaptive responses and adopt new coping mechanisms  Outcome: Progressing  Goal: Complete daily ADLs, including personal hygiene independently, as able  Description: Interventions:  - Observe, teach, and assist patient with ADLS  - Monitor and promote a balance of rest/activity, with adequate nutrition and elimination  Outcome: Progressing     Problem: Depression  Goal: Treatment Goal: Demonstrate behavioral control of depressive symptoms, verbalize feelings of improved mood/affect, and adopt new coping skills prior to discharge  Outcome: Progressing  Goal: Verbalize thoughts and feelings  Description: Interventions:  - Assess and re-assess patient's level of risk   - Engage patient in 1:1 interactions, daily, for a minimum of 15 minutes   - Encourage patient to express feelings, fears, frustrations, hopes   Outcome: Progressing  Goal: Refrain from harming self  Description: Interventions:  - Monitor patient closely, per order   - Supervise medication ingestion, monitor effects and side effects   Outcome: Progressing  Goal: Refrain from isolation  Description: Interventions:  - Develop a trusting relationship   - Encourage socialization   Outcome: Progressing  Goal: Refrain from self-neglect  Outcome: Progressing  Goal: Attend and participate in unit activities, including therapeutic, recreational, and educational groups  Description: Interventions:  - Provide therapeutic and educational activities daily, encourage attendance and participation, and document same in the medical record   Outcome: Progressing  Goal: Complete daily ADLs, including personal hygiene independently, as able  Description: Interventions:  - Observe, teach, and assist patient with ADLS  -  Monitor and promote a balance of rest/activity, with adequate nutrition and elimination   Outcome: Progressing     Problem: Anxiety  Goal: Anxiety is at manageable level  Description: Interventions:  - Assess and monitor patient's anxiety level  - Monitor for signs and symptoms (heart palpitations, chest pain, shortness of breath, headaches, nausea, feeling jumpy, restlessness, irritable, apprehensive)  - Collaborate with interdisciplinary team and initiate plan and interventions as ordered    - [place] patient to unit/surroundings  - Explain treatment plan  - Encourage participation in care  - Encourage verbalization of concerns/fears  - Identify coping mechanisms  - Assist in developing anxiety-reducing skills  - Administer/offer alternative therapies  - Limit or eliminate stimulants  Outcome: Progressing
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
## 2022-06-17 NOTE — ASSESSMENT & PLAN NOTE
· Chronic  Hgb above base line at 13 7  · Not on PO iron supplemenation  · Patient follows with [facility] heme/onc   Encourage follow up for continuous management

## 2022-06-18 PROCEDURE — 93005 ELECTROCARDIOGRAM TRACING: CPT

## 2022-06-18 PROCEDURE — 99232 SBSQ HOSP IP/OBS MODERATE 35: CPT | Performed by: STUDENT IN AN ORGANIZED HEALTH CARE EDUCATION/TRAINING PROGRAM

## 2022-06-18 RX ORDER — PRAZOSIN HYDROCHLORIDE 1 MG/1
3 CAPSULE ORAL
Status: DISCONTINUED | OUTPATIENT
Start: 2022-06-18 | End: 2022-06-20

## 2022-06-18 RX ORDER — TOPIRAMATE 25 MG/1
25 TABLET ORAL 2 TIMES DAILY
Status: DISCONTINUED | OUTPATIENT
Start: 2022-06-18 | End: 2022-06-22 | Stop reason: HOSPADM

## 2022-06-18 RX ADMIN — MAGNESIUM OXIDE TAB 400 MG (241.3 MG ELEMENTAL MG) 400 MG: 400 (241.3 MG) TAB at 09:01

## 2022-06-18 RX ADMIN — GABAPENTIN 600 MG: 300 CAPSULE ORAL at 21:56

## 2022-06-18 RX ADMIN — POLYETHYLENE GLYCOL 3350 17 G: 17 POWDER, FOR SOLUTION ORAL at 09:02

## 2022-06-18 RX ADMIN — TOPIRAMATE 25 MG: 25 TABLET, FILM COATED ORAL at 17:15

## 2022-06-18 RX ADMIN — POTASSIUM CITRATE 10 MEQ: 10 TABLET ORAL at 09:00

## 2022-06-18 RX ADMIN — POTASSIUM CITRATE 10 MEQ: 10 TABLET ORAL at 16:00

## 2022-06-18 RX ADMIN — THIAMINE HCL TAB 100 MG 100 MG: 100 TAB at 09:01

## 2022-06-18 RX ADMIN — LEVETIRACETAM 500 MG: 500 TABLET, FILM COATED ORAL at 21:56

## 2022-06-18 RX ADMIN — MAGNESIUM OXIDE TAB 400 MG (241.3 MG ELEMENTAL MG) 400 MG: 400 (241.3 MG) TAB at 17:15

## 2022-06-18 RX ADMIN — METHOCARBAMOL 1000 MG: 500 TABLET, FILM COATED ORAL at 05:01

## 2022-06-18 RX ADMIN — NYSTATIN: 100000 POWDER TOPICAL at 09:02

## 2022-06-18 RX ADMIN — LORAZEPAM 0.5 MG: 0.5 TABLET ORAL at 09:01

## 2022-06-18 RX ADMIN — LORAZEPAM 0.5 MG: 0.5 TABLET ORAL at 17:15

## 2022-06-18 RX ADMIN — FOLIC ACID 1 MG: 1 TABLET ORAL at 09:02

## 2022-06-18 RX ADMIN — LEVOTHYROXINE SODIUM 50 MCG: 50 TABLET ORAL at 05:01

## 2022-06-18 RX ADMIN — LORAZEPAM 1 MG: 1 TABLET ORAL at 21:56

## 2022-06-18 RX ADMIN — ACETAMINOPHEN 975 MG: 325 TABLET ORAL at 19:47

## 2022-06-18 RX ADMIN — GABAPENTIN 600 MG: 300 CAPSULE ORAL at 17:15

## 2022-06-18 RX ADMIN — METHOCARBAMOL 1000 MG: 500 TABLET, FILM COATED ORAL at 19:46

## 2022-06-18 RX ADMIN — LEVETIRACETAM 500 MG: 500 TABLET, FILM COATED ORAL at 09:01

## 2022-06-18 RX ADMIN — GABAPENTIN 600 MG: 300 CAPSULE ORAL at 12:00

## 2022-06-18 RX ADMIN — GABAPENTIN 600 MG: 300 CAPSULE ORAL at 09:01

## 2022-06-18 RX ADMIN — NYSTATIN: 100000 POWDER TOPICAL at 17:16

## 2022-06-18 RX ADMIN — METHOCARBAMOL 1000 MG: 500 TABLET, FILM COATED ORAL at 12:00

## 2022-06-18 RX ADMIN — TOPIRAMATE 25 MG: 25 TABLET, FILM COATED ORAL at 09:01

## 2022-06-18 RX ADMIN — ZOLPIDEM TARTRATE 5 MG: 5 TABLET ORAL at 22:02

## 2022-06-18 RX ADMIN — ACETAMINOPHEN 975 MG: 325 TABLET ORAL at 13:35

## 2022-06-18 RX ADMIN — ARIPIPRAZOLE 2 MG: 2 TABLET ORAL at 09:01

## 2022-06-18 RX ADMIN — TRAZODONE HYDROCHLORIDE 100 MG: 100 TABLET ORAL at 21:55

## 2022-06-18 RX ADMIN — FUROSEMIDE 20 MG: 20 TABLET ORAL at 09:01

## 2022-06-18 RX ADMIN — PRAZOSIN HYDROCHLORIDE 3 MG: 1 CAPSULE ORAL at 21:55

## 2022-06-18 RX ADMIN — VENLAFAXINE HYDROCHLORIDE 225 MG: 75 CAPSULE, EXTENDED RELEASE ORAL at 09:01

## 2022-06-18 RX ADMIN — ACETAMINOPHEN 975 MG: 325 TABLET ORAL at 05:02

## 2022-06-18 NOTE — NURSING NOTE
Patient is pleasant and cooperative  Visible and social in the dayroom with peers  Reports feeling depressed  Denies SI, HI, AVH  Pt brightens on approach  Endorses good appetite and sleep  Denies side effects from medications  Does not report any unmet needs

## 2022-06-18 NOTE — NURSING NOTE
Pt about unit in wheelchair, social with peers and attends group  Denies SI and states that threat made prior to coming to hospital was only a threat to self harm as she has a history of cutting  Pt denies current symptoms other than being anxious about not having her Lunesta for sleep  She is requesting a change in her sleep meds and was encouraged to speak with the psychiatry team  She has requested and will have tylenol for headache and PRN Ambien with HS meds  Pt is compliant with unit routines and care

## 2022-06-18 NOTE — PLAN OF CARE
Problem: DISCHARGE PLANNING  Goal: Discharge to home or other facility with appropriate resources  Description: INTERVENTIONS:  - Identify barriers to discharge w/patient and caregiver  - Arrange for needed discharge resources and transportation as appropriate  - Identify discharge learning needs (meds, wound care, etc )  - Arrange for interpretive services to assist at discharge as needed  - Refer to Case Management Department for coordinating discharge planning if the patient needs post-hospital services based on physician/advanced practitioner order or complex needs related to functional status, cognitive ability, or social support system  6/18/2022 1043 by Sanaz Diaz RN  Outcome: Progressing  6/18/2022 0816 by Sanaz Diaz RN  Outcome: Progressing     Problem: SELF HARM/SUICIDALITY  Goal: Will have no self-injury during hospital stay  Description: INTERVENTIONS:  - Q 15 MINUTES: Routine safety checks  - Q WAKING SHIFT & PRN: Assess risk to determine if routine checks are adequate to maintain patient safety  - Encourage patient to participate actively in care by formulating a plan to combat response to suicidal ideation, identify supports and resources  6/18/2022 1043 by Sanaz Diaz RN  Outcome: Progressing  6/18/2022 0816 by Sanaz Diaz RN  Outcome: Progressing     Problem: DEPRESSION  Goal: Will be euthymic at discharge  Description: INTERVENTIONS:  - Administer medication as ordered  - Provide emotional support via 1:1 interaction with staff  - Encourage involvement in milieu/groups/activities  - Monitor for social isolation  6/18/2022 1043 by Sanaz Diaz RN  Outcome: Progressing  6/18/2022 0816 by Sanaz Diaz RN  Outcome: Progressing     Problem: ANXIETY  Goal: Will report anxiety at manageable levels  Description: INTERVENTIONS:  - Administer medication as ordered  - Teach and encourage coping skills  - Provide emotional support  - Assess patient/family for anxiety and ability to cope  6/18/2022 1043 by Radha Weston RN  Outcome: Progressing  6/18/2022 0816 by Radha Weston RN  Outcome: Progressing  Goal: By discharge: Patient will verbalize 2 strategies to deal with anxiety  Description: Interventions:  - Identify any obvious source/trigger to anxiety  - Staff will assist patient in applying identified coping technique/skills  - Encourage attendance of scheduled groups and activities  6/18/2022 1043 by Radha Weston RN  Outcome: Progressing  6/18/2022 0816 by Radha Weston RN  Outcome: Progressing     Problem: SUBSTANCE USE/ABUSE  Goal: Will have no detox symptoms and will verbalize plan for changing substance-related behavior  Description: INTERVENTIONS:  - Monitor physical status and assess for symptoms of withdrawal  - Administer medication as ordered  - Provide emotional support with 1 on 1 interaction with staff  - Encourage recovery focused program/ addiction education  - Assess for verbalization of changing behaviors related to substance abuse  - Initiate consults and referrals as appropriate (Case Management, Spiritual Care, etc )  6/18/2022 1043 by Radha Weston RN  Outcome: Progressing  6/18/2022 0816 by Radha Weston RN  Outcome: Progressing  Goal: By discharge, will develop insight into their chemical dependency and sustain motivation to continue in recovery  Description: INTERVENTIONS:  - Attends all daily group sessions and scheduled AA groups  - Actively practices coping skills through participation in the therapeutic community and adherence to program rules  - Reviews and completes assignments from individual treatment plan  - Assist patient development of understanding of their personal cycle of addiction and relapse triggers  6/18/2022 1043 by Radha Weston RN  Outcome: Progressing  6/18/2022 0816 by Radha Weston RN  Outcome: Progressing  Goal: By discharge, patient will have ongoing treatment plan addressing chemical dependency  Description: INTERVENTIONS:  - Assist patient with resources and/or appointments for ongoing recovery based living  6/18/2022 1043 by Yolanda Snyder RN  Outcome: Progressing  6/18/2022 0816 by Yolanda Snyder RN  Outcome: Progressing     Problem: Risk for Self Injury/Neglect  Goal: Treatment Goal: Remain safe during length of stay, learn and adopt new coping skills, and be free of self-injurious ideation, impulses and acts at the time of discharge  6/18/2022 1043 by Yolanda Snyder RN  Outcome: Progressing  6/18/2022 0816 by Yolanda Snyder RN  Outcome: Progressing  Goal: Verbalize thoughts and feelings  Description: Interventions:  - Assess and re-assess patient's lethality and potential for self-injury  - Engage patient in 1:1 interactions, daily, for a minimum of 15 minutes  - Encourage patient to express feelings, fears, frustrations, hopes  - Establish rapport/trust with patient   6/18/2022 1043 by Yolanda Snyder RN  Outcome: Progressing  6/18/2022 0816 by Yolanda Snyder RN  Outcome: Progressing  Goal: Refrain from harming self  Description: Interventions:  - Monitor patient closely, per order  - Develop a trusting relationship  - Supervise medication ingestion, monitor effects and side effects   6/18/2022 1043 by Yolanda Snyder RN  Outcome: Progressing  6/18/2022 0816 by Yolanda Snyder RN  Outcome: Progressing  Goal: Attend and participate in unit activities, including therapeutic, recreational, and educational groups  Description: Interventions:  - Provide therapeutic and educational activities daily, encourage attendance and participation, and document same in the medical record  - Obtain collateral information, encourage visitation and family involvement in care   6/18/2022 1043 by Yolanda Snyder RN  Outcome: Progressing  6/18/2022 0816 by Yolanda Snyder RN  Outcome: Progressing  Goal: Recognize maladaptive responses and adopt new coping mechanisms  6/18/2022 1043 by Martha Noe RN  Outcome: Progressing  6/18/2022 0816 by Martha Noe RN  Outcome: Progressing  Goal: Complete daily ADLs, including personal hygiene independently, as able  Description: Interventions:  - Observe, teach, and assist patient with ADLS  - Monitor and promote a balance of rest/activity, with adequate nutrition and elimination  6/18/2022 1043 by Martha Noe RN  Outcome: Progressing  6/18/2022 0816 by Martha Noe RN  Outcome: Progressing     Problem: Depression  Goal: Treatment Goal: Demonstrate behavioral control of depressive symptoms, verbalize feelings of improved mood/affect, and adopt new coping skills prior to discharge  6/18/2022 1043 by Martha Noe RN  Outcome: Progressing  6/18/2022 0816 by Martha Noe RN  Outcome: Progressing  Goal: Verbalize thoughts and feelings  Description: Interventions:  - Assess and re-assess patient's level of risk   - Engage patient in 1:1 interactions, daily, for a minimum of 15 minutes   - Encourage patient to express feelings, fears, frustrations, hopes   6/18/2022 1043 by Martha Noe RN  Outcome: Progressing  6/18/2022 0816 by Martha Noe RN  Outcome: Progressing  Goal: Refrain from harming self  Description: Interventions:  - Monitor patient closely, per order   - Supervise medication ingestion, monitor effects and side effects   6/18/2022 1043 by Martha Noe RN  Outcome: Progressing  6/18/2022 0816 by Martha Noe RN  Outcome: Progressing  Goal: Refrain from isolation  Description: Interventions:  - Develop a trusting relationship   - Encourage socialization   6/18/2022 1043 by Martha Noe RN  Outcome: Progressing  6/18/2022 0816 by Martha Noe RN  Outcome: Progressing  Goal: Refrain from self-neglect  6/18/2022 1043 by Martha Noe RN  Outcome: Progressing  6/18/2022 0816 by Martha Noe RN  Outcome: Progressing  Goal: Attend and participate in unit activities, including therapeutic, recreational, and educational groups  Description: Interventions:  - Provide therapeutic and educational activities daily, encourage attendance and participation, and document same in the medical record   6/18/2022 1043 by Stephanie Price RN  Outcome: Progressing  6/18/2022 0816 by Stephanie Price RN  Outcome: Progressing  Goal: Complete daily ADLs, including personal hygiene independently, as able  Description: Interventions:  - Observe, teach, and assist patient with ADLS  -  Monitor and promote a balance of rest/activity, with adequate nutrition and elimination   6/18/2022 1043 by Stephanie Price RN  Outcome: Progressing  6/18/2022 0816 by Stephanie Price RN  Outcome: Progressing     Problem: Anxiety  Goal: Anxiety is at manageable level  Description: Interventions:  - Assess and monitor patient's anxiety level  - Monitor for signs and symptoms (heart palpitations, chest pain, shortness of breath, headaches, nausea, feeling jumpy, restlessness, irritable, apprehensive)  - Collaborate with interdisciplinary team and initiate plan and interventions as ordered    - [place] patient to unit/surroundings  - Explain treatment plan  - Encourage participation in care  - Encourage verbalization of concerns/fears  - Identify coping mechanisms  - Assist in developing anxiety-reducing skills  - Administer/offer alternative therapies  - Limit or eliminate stimulants  6/18/2022 1043 by Stephanie Price RN  Outcome: Progressing  6/18/2022 0816 by Stephanie Price RN  Outcome: Progressing

## 2022-06-18 NOTE — PROGRESS NOTES
Progress Note - [address] 46 y o  female MRN: [id number]  Unit/Bed#: [facility] 703-50 Encounter: [id number]       Patient was visited on unit for continuing care; chart reviewed and discussed with the nursing staff  On approach, the patient was calm and cooperative, sitting on his wheelchair  She reported feeling less overwhelmed since being on the unit  She expatiated on her concerns after was notified about having an ovarian cyst, but reported feeling better since being in the hospital  She mentioned that her blood pressure is always around 95-90/60, and as her Prazosin was held last night, she had nightmares and interrupted sleep  She denied any flashbacks lately, but noted that she avoided being around a patient who was reportedly loud on the unit to avoid being triggered  She appears self-perseverative, asked for a radio in her room which would help her feel calmer listening to music  Endorsed good appetite, and fair energy level  Denied A/VH currently  Denied SI/HI, intent or plan upon direct inquiry at this time  Patient continues to be social and visible, interactive  with staff and peers  No reports of aggression or self-injurious behavior on unit  No PRN medications used in the past 24 hours  Patient accepted all offered medications and reported feeling better  No adverse effects of medications noted or reported  SBP for Prazosin was adjusted to 95       Current Mental Status Evaluation:  Appearance and attitude: appeared as stated age, dressed in hospital attire, wearing eyeglasses, with good hygiene, ambulating in wheelchair  Eye contact: good  Motor Function: within normal limits, No PMA/PMR  Gait/station:  needs assistive device  Speech: talking in soft tone, with normal latency and amount  Language: No overt abnormality  Mood/affect: "better" / Affect was constricted but reactive, mood congruent  Thought Processes: linear  Thought content: denied suicidal ideations or homicidal ideations, no overt delusions elicited  Associations: concrete associations  Perceptual disturbances: denies Auditory/Visual/Tactile Hallucinations  Orientation: oriented to time, person, place and to the situational context  Cognitive Function: intact  Memory: recent and remote memory grossly intact  Intellect: average  Fund of knowledge: aware of current events, aware of past history and vocabulary average  Impulse control: good  Insight/judgment: fair/fair    Vital signs in last 24 hours:    Temp:  [97 6 °F (36 4 °C)-98 5 °F (36 9 °C)] 97 6 °F (36 4 °C)  HR:  [61-71] 63  Resp:  [16] 16  BP: ()/(54-66) 92/54    Laboratory results: I have personally reviewed all pertinent laboratory/tests results    Results from the past 24 hours: No results found for this or any previous visit (from the past 24 hour(s))  Progress Toward Goals: some improvement    Assessment:  Principal Problem:    Major depressive disorder, recurrent, severe without psychotic features ([facility] [address] )  Active Problems:    Hypothyroidism    History of gastric bypass    Eating disorder; history of both anorexia and bulimia    Post-traumatic stress disorder, chronic    Anemia    Peripheral polyneuropathy    Partial idiopathic epilepsy with seizures of localized onset, not intractable, without status epilepticus ([facility] [address] )    Medical clearance for psychiatric admission    BEAR (generalized anxiety disorder)    Obesity (BMI 30-39  9)    Tobacco use    Abnormal finding on CT scan    Intertrigo        Plan:  - f/u SLIM recs regarding the medical problems  - Continue medication titration and treatment plan; adjust medication to optimize treatment response and as clinically indicated       Scheduled medications:  Current Facility-Administered Medications   Medication Dose Route Frequency Provider Last Rate    acetaminophen  650 mg Oral Q6H PRN Milo Dominguez MD      acetaminophen  650 mg Oral Q4H PRN Milo Dominguez MD      acetaminophen  975 mg Oral Q6H PRN Sandi Mars MD      aluminum-magnesium hydroxide-simethicone  30 mL Oral Q4H PRN Sandi Mars MD      ARIPiprazole  2 mg Oral Daily Sandi Mars MD      artificial tear  1 application Both Eyes Y2N PRN Sandi Mars MD      benztropine  1 mg Intramuscular Q4H PRN Max 6/day Sandi Mars MD      benztropine  1 mg Oral Q4H PRN Max 6/day Sandi Mars MD      hydrOXYzine HCL  50 mg Oral Q6H PRN Max 4/day Sandi Mars MD      Or    diphenhydrAMINE  50 mg Intramuscular Q6H PRN Sandi Mars MD      folic acid  1 mg Oral Daily Sandi Mars MD      furosemide  20 mg Oral Daily Sandi Mars MD      gabapentin  600 mg Oral 4x Daily Sandi Mars MD      hydrOXYzine HCL  100 mg Oral Q6H PRN Max 4/day Sandi Mars MD      Or   [facility] LORazepam  2 mg Intramuscular Q6H PRN Sandi Mars MD      hydrOXYzine HCL  25 mg Oral Q6H PRN Max 4/day Sandi Mars MD      levETIRAcetam  500 mg Oral Q12H Luz Maradiaga MD      levothyroxine  50 mcg Oral Early Morning Sandi Mars MD      LORazepam  0 5 mg Oral BID Sandi Mars MD      LORazepam  1 mg Oral HS Sandi Mars MD      magnesium oxide  400 mg Oral BID Sandi Mars MD      methocarbamol  1,000 mg Oral Q8H Sandi Mars MD      nystatin   Topical BID Leslie Vo PA-C      OLANZapine  10 mg Oral Q3H PRN Max 3/day Sandi Mars MD      Or    OLANZapine  10 mg Intramuscular Q3H PRN Max 3/day Sandi Mars MD      OLANZapine  5 mg Oral Q3H PRN Max 6/day Sandi Mars MD      Or    OLANZapine  5 mg Intramuscular Q3H PRN Max 6/day Sandi Mars MD      OLANZapine  2 5 mg Oral Q3H PRN Max 8/day Sandi Mars MD      polyethylene glycol  17 g Oral Daily PRN Sandi Mars MD      polyethylene glycol  17 g Oral Daily Sandi Mars MD      potassium citrate  10 mEq Oral BID (diuretic) Sandi Mars MD      prazosin  3 mg Oral HS Kianna Stone MD      senna-docusate sodium  1 tablet Oral Daily PRN Sandi Mars MD     [facility] Thiamine Mononitrate  100 mg Oral Daily Sandi Mars MD      topiramate  25 mg Oral BID David Wheatley MD      traZODone  100 mg Oral HS Alexsandra Mar MD      venlafaxine  225 mg Oral Daily Alexsandra Mar MD      zolpidem  5 mg Oral HS PRN Alexsandra Mar MD          PRN:  [employer]  acetaminophen    acetaminophen    acetaminophen    aluminum-magnesium hydroxide-simethicone    artificial tear    benztropine    benztropine    hydrOXYzine HCL **OR** diphenhydrAMINE    hydrOXYzine HCL **OR** LORazepam    hydrOXYzine HCL    OLANZapine **OR** OLANZapine    OLANZapine **OR** OLANZapine    OLANZapine    polyethylene glycol    senna-docusate sodium    zolpidem    - Observation: routine    - VS: as per unit protocol  - Diet: Regular diet  - Psychoeducation (benefits and potential risks) discussed, importance of compliance with the psychiatric treatment reiterated, and the patient verbalized understanding of the matter  - Encourage group attendance and milieu therapy    - The pt was educated and agreed to verbalize any suicidal thoughts, frustrations or concerns to the nursing staff, immediately  - Dispo: TBD       Next of Kin  · Extended Emergency Contact Information  · Primary Emergency Contact: quiana marcelo  · Mobile Phone: [phone]  · Relation: Friend      Counseling / Coordination of Care     Total floor / unit time spent today 20 minutes  Greater than 50% of total time was spent with the patient and / or family counseling and / or coordination of care  A description of the counseling / coordination of care  Patient's Rights, confidentiality and exceptions to confidentiality, use of automated medical record, [facility] staff access to medical record, and consent to treatment reviewed      David Wheatley MD  Attending [address]

## 2022-06-18 NOTE — PLAN OF CARE
Problem: DISCHARGE PLANNING  Goal: Discharge to home or other facility with appropriate resources  Description: INTERVENTIONS:  - Identify barriers to discharge w/patient and caregiver  - Arrange for needed discharge resources and transportation as appropriate  - Identify discharge learning needs (meds, wound care, etc )  - Arrange for interpretive services to assist at discharge as needed  - Refer to Case Management Department for coordinating discharge planning if the patient needs post-hospital services based on physician/advanced practitioner order or complex needs related to functional status, cognitive ability, or social support system  Outcome: Progressing     Problem: SELF HARM/SUICIDALITY  Goal: Will have no self-injury during hospital stay  Description: INTERVENTIONS:  - Q 15 MINUTES: Routine safety checks  - Q WAKING SHIFT & PRN: Assess risk to determine if routine checks are adequate to maintain patient safety  - Encourage patient to participate actively in care by formulating a plan to combat response to suicidal ideation, identify supports and resources  Outcome: Progressing     Problem: DEPRESSION  Goal: Will be euthymic at discharge  Description: INTERVENTIONS:  - Administer medication as ordered  - Provide emotional support via 1:1 interaction with staff  - Encourage involvement in milieu/groups/activities  - Monitor for social isolation  Outcome: Progressing     Problem: ANXIETY  Goal: Will report anxiety at manageable levels  Description: INTERVENTIONS:  - Administer medication as ordered  - Teach and encourage coping skills  - Provide emotional support  - Assess patient/family for anxiety and ability to cope  Outcome: Progressing  Goal: By discharge: Patient will verbalize 2 strategies to deal with anxiety  Description: Interventions:  - Identify any obvious source/trigger to anxiety  - Staff will assist patient in applying identified coping technique/skills  - Encourage attendance of scheduled groups and activities  Outcome: Progressing     Problem: SUBSTANCE USE/ABUSE  Goal: Will have no detox symptoms and will verbalize plan for changing substance-related behavior  Description: INTERVENTIONS:  - Monitor physical status and assess for symptoms of withdrawal  - Administer medication as ordered  - Provide emotional support with 1 on 1 interaction with staff  - Encourage recovery focused program/ addiction education  - Assess for verbalization of changing behaviors related to substance abuse  - Initiate consults and referrals as appropriate (Case Management, Spiritual Care, etc )  Outcome: Progressing  Goal: By discharge, will develop insight into their chemical dependency and sustain motivation to continue in recovery  Description: INTERVENTIONS:  - Attends all daily group sessions and scheduled AA groups  - Actively practices coping skills through participation in the therapeutic community and adherence to program rules  - Reviews and completes assignments from individual treatment plan  - Assist patient development of understanding of their personal cycle of addiction and relapse triggers  Outcome: Progressing  Goal: By discharge, patient will have ongoing treatment plan addressing chemical dependency  Description: INTERVENTIONS:  - Assist patient with resources and/or appointments for ongoing recovery based living  Outcome: Progressing     Problem: Risk for Self Injury/Neglect  Goal: Treatment Goal: Remain safe during length of stay, learn and adopt new coping skills, and be free of self-injurious ideation, impulses and acts at the time of discharge  Outcome: Progressing  Goal: Verbalize thoughts and feelings  Description: Interventions:  - Assess and re-assess patient's lethality and potential for self-injury  - Engage patient in 1:1 interactions, daily, for a minimum of 15 minutes  - Encourage patient to express feelings, fears, frustrations, hopes  - Establish rapport/trust with patient   Outcome: Progressing  Goal: Refrain from harming self  Description: Interventions:  - Monitor patient closely, per order  - Develop a trusting relationship  - Supervise medication ingestion, monitor effects and side effects   Outcome: Progressing  Goal: Attend and participate in unit activities, including therapeutic, recreational, and educational groups  Description: Interventions:  - Provide therapeutic and educational activities daily, encourage attendance and participation, and document same in the medical record  - Obtain collateral information, encourage visitation and family involvement in care   Outcome: Progressing  Goal: Recognize maladaptive responses and adopt new coping mechanisms  Outcome: Progressing  Goal: Complete daily ADLs, including personal hygiene independently, as able  Description: Interventions:  - Observe, teach, and assist patient with ADLS  - Monitor and promote a balance of rest/activity, with adequate nutrition and elimination  Outcome: Progressing     Problem: Depression  Goal: Treatment Goal: Demonstrate behavioral control of depressive symptoms, verbalize feelings of improved mood/affect, and adopt new coping skills prior to discharge  Outcome: Progressing  Goal: Verbalize thoughts and feelings  Description: Interventions:  - Assess and re-assess patient's level of risk   - Engage patient in 1:1 interactions, daily, for a minimum of 15 minutes   - Encourage patient to express feelings, fears, frustrations, hopes   Outcome: Progressing  Goal: Refrain from harming self  Description: Interventions:  - Monitor patient closely, per order   - Supervise medication ingestion, monitor effects and side effects   Outcome: Progressing  Goal: Refrain from isolation  Description: Interventions:  - Develop a trusting relationship   - Encourage socialization   Outcome: Progressing  Goal: Refrain from self-neglect  Outcome: Progressing  Goal: Attend and participate in unit activities, including therapeutic, recreational, and educational groups  Description: Interventions:  - Provide therapeutic and educational activities daily, encourage attendance and participation, and document same in the medical record   Outcome: Progressing  Goal: Complete daily ADLs, including personal hygiene independently, as able  Description: Interventions:  - Observe, teach, and assist patient with ADLS  -  Monitor and promote a balance of rest/activity, with adequate nutrition and elimination   Outcome: Progressing     Problem: Anxiety  Goal: Anxiety is at manageable level  Description: Interventions:  - Assess and monitor patient's anxiety level  - Monitor for signs and symptoms (heart palpitations, chest pain, shortness of breath, headaches, nausea, feeling jumpy, restlessness, irritable, apprehensive)  - Collaborate with interdisciplinary team and initiate plan and interventions as ordered    - [place] patient to unit/surroundings  - Explain treatment plan  - Encourage participation in care  - Encourage verbalization of concerns/fears  - Identify coping mechanisms  - Assist in developing anxiety-reducing skills  - Administer/offer alternative therapies  - Limit or eliminate stimulants  Outcome: Progressing

## 2022-06-19 LAB
ATRIAL RATE: 66 BPM
ATRIAL RATE: 66 BPM
P AXIS: 23 DEGREES
P AXIS: 23 DEGREES
PR INTERVAL: 142 MS
PR INTERVAL: 142 MS
QRS AXIS: 10 DEGREES
QRS AXIS: 10 DEGREES
QRSD INTERVAL: 78 MS
QRSD INTERVAL: 78 MS
QT INTERVAL: 398 MS
QT INTERVAL: 398 MS
QTC INTERVAL: 417 MS
QTC INTERVAL: 417 MS
T WAVE AXIS: 21 DEGREES
T WAVE AXIS: 21 DEGREES
VENTRICULAR RATE: 66 BPM
VENTRICULAR RATE: 66 BPM

## 2022-06-19 PROCEDURE — 93010 ELECTROCARDIOGRAM REPORT: CPT | Performed by: INTERNAL MEDICINE

## 2022-06-19 PROCEDURE — 99232 SBSQ HOSP IP/OBS MODERATE 35: CPT | Performed by: STUDENT IN AN ORGANIZED HEALTH CARE EDUCATION/TRAINING PROGRAM

## 2022-06-19 RX ADMIN — VENLAFAXINE HYDROCHLORIDE 225 MG: 75 CAPSULE, EXTENDED RELEASE ORAL at 08:21

## 2022-06-19 RX ADMIN — LORAZEPAM 0.5 MG: 0.5 TABLET ORAL at 17:01

## 2022-06-19 RX ADMIN — METHOCARBAMOL 1000 MG: 500 TABLET, FILM COATED ORAL at 20:06

## 2022-06-19 RX ADMIN — LEVOTHYROXINE SODIUM 50 MCG: 50 TABLET ORAL at 05:29

## 2022-06-19 RX ADMIN — NYSTATIN: 100000 POWDER TOPICAL at 18:19

## 2022-06-19 RX ADMIN — ARIPIPRAZOLE 2 MG: 2 TABLET ORAL at 08:22

## 2022-06-19 RX ADMIN — METHOCARBAMOL 1000 MG: 500 TABLET, FILM COATED ORAL at 04:29

## 2022-06-19 RX ADMIN — GABAPENTIN 600 MG: 300 CAPSULE ORAL at 17:02

## 2022-06-19 RX ADMIN — TRAZODONE HYDROCHLORIDE 100 MG: 100 TABLET ORAL at 22:02

## 2022-06-19 RX ADMIN — LORAZEPAM 1 MG: 1 TABLET ORAL at 22:02

## 2022-06-19 RX ADMIN — ACETAMINOPHEN 975 MG: 325 TABLET ORAL at 08:20

## 2022-06-19 RX ADMIN — GABAPENTIN 600 MG: 300 CAPSULE ORAL at 12:38

## 2022-06-19 RX ADMIN — POLYETHYLENE GLYCOL 3350 17 G: 17 POWDER, FOR SOLUTION ORAL at 08:20

## 2022-06-19 RX ADMIN — NYSTATIN 1 APPLICATION: 100000 POWDER TOPICAL at 08:21

## 2022-06-19 RX ADMIN — LEVETIRACETAM 500 MG: 500 TABLET, FILM COATED ORAL at 08:22

## 2022-06-19 RX ADMIN — FOLIC ACID 1 MG: 1 TABLET ORAL at 08:21

## 2022-06-19 RX ADMIN — PRAZOSIN HYDROCHLORIDE 3 MG: 1 CAPSULE ORAL at 22:02

## 2022-06-19 RX ADMIN — MAGNESIUM OXIDE TAB 400 MG (241.3 MG ELEMENTAL MG) 400 MG: 400 (241.3 MG) TAB at 17:02

## 2022-06-19 RX ADMIN — METHOCARBAMOL 1000 MG: 500 TABLET, FILM COATED ORAL at 12:38

## 2022-06-19 RX ADMIN — GABAPENTIN 600 MG: 300 CAPSULE ORAL at 22:02

## 2022-06-19 RX ADMIN — GABAPENTIN 600 MG: 300 CAPSULE ORAL at 08:21

## 2022-06-19 RX ADMIN — FUROSEMIDE 20 MG: 20 TABLET ORAL at 08:21

## 2022-06-19 RX ADMIN — ZOLPIDEM TARTRATE 5 MG: 5 TABLET ORAL at 22:01

## 2022-06-19 RX ADMIN — ACETAMINOPHEN 975 MG: 325 TABLET ORAL at 17:01

## 2022-06-19 RX ADMIN — LEVETIRACETAM 500 MG: 500 TABLET, FILM COATED ORAL at 22:00

## 2022-06-19 RX ADMIN — LORAZEPAM 0.5 MG: 0.5 TABLET ORAL at 08:22

## 2022-06-19 RX ADMIN — THIAMINE HCL TAB 100 MG 100 MG: 100 TAB at 08:21

## 2022-06-19 RX ADMIN — TOPIRAMATE 25 MG: 25 TABLET, FILM COATED ORAL at 17:02

## 2022-06-19 RX ADMIN — POTASSIUM CITRATE 10 MEQ: 10 TABLET ORAL at 17:02

## 2022-06-19 RX ADMIN — MAGNESIUM OXIDE TAB 400 MG (241.3 MG ELEMENTAL MG) 400 MG: 400 (241.3 MG) TAB at 08:22

## 2022-06-19 RX ADMIN — TOPIRAMATE 25 MG: 25 TABLET, FILM COATED ORAL at 08:21

## 2022-06-19 RX ADMIN — POTASSIUM CITRATE 10 MEQ: 10 TABLET ORAL at 08:21

## 2022-06-19 NOTE — PROGRESS NOTES
Progress Note - [address] 46 y o  female MRN: [id number]  Unit/Bed#: [facility] 949-54 Encounter: [id number]       Patient was visited on unit for continuing care; chart reviewed and discussed with the nursing staff  On approach, the patient was calm and cooperative, sitting on the wheelchair in her room, surfing on her tablet  She appreciated the access to radio and tablet, and reported feeling better since had a better sleep last night as took Prazosin  Endorsed good appetite, and fair energy level  Denied A/VH currently  Denied SI/HI, intent or plan upon direct inquiry at this time  Patient continues to be selectively interactive with staff and peers, and demanding at times  No reports of aggression or self-injurious behavior on unit  No PRN medications used in the past 24 hours except Ambien at 22 for sleep and Tylenol for pain  She noted that Atarax does not help her with her anxiety and she refused to take offered PRN Atarax earlier, and noted that she was able to calm down with meditation and distraction techniques, but did not elaborate further details on her anxiety sxs  Patient accepted all offered medications and reported feeling better  No adverse effects of medications noted or reported      Current Mental Status Evaluation:  Appearance and attitude: appeared as stated age, casually dressed, wearing eyeglasses, with good hygiene, sitting on the wheelchair in her room  Eye contact: good  Motor Function: within normal limits, No PMA/PMR  Gait/station:  needs assistive device  Speech: talking in soft tone, with normal latency and amount  Language: No overt abnormality  Mood/affect: "better" / Affect was constricted but reactive, mood congruent  Thought Processes: linear  Thought content: denied suicidal ideations or homicidal ideations, no overt delusions elicited  Associations: concrete associations  Perceptual disturbances: denies Auditory/Visual/Tactile Hallucinations  Orientation: oriented to time, person, place and to the situational context  Cognitive Function: intact  Memory: recent and remote memory grossly intact  Intellect: average  Fund of knowledge: aware of current events, aware of past history and vocabulary average  Impulse control: good  Insight/judgment: fair/fair    Vital signs in last 24 hours:    Temp:  [97 3 °F (36 3 °C)] 97 3 °F (36 3 °C)  HR:  [65-72] 68  Resp:  [16] 16  BP: (103-113)/(55-78) 103/59    Laboratory results: I have personally reviewed all pertinent laboratory/tests results    Results from the past 24 hours: No results found for this or any previous visit (from the past 24 hour(s))  Progress Toward Goals: some improvement    Assessment:  Principal Problem:    Major depressive disorder, recurrent, severe without psychotic features ([facility] )  Active Problems:    Hypothyroidism    History of gastric bypass    Eating disorder; history of both anorexia and bulimia    Post-traumatic stress disorder, chronic    Anemia    Peripheral polyneuropathy    Partial idiopathic epilepsy with seizures of localized onset, not intractable, without status epilepticus ([facility] 75 )    Medical clearance for psychiatric admission    BEAR (generalized anxiety disorder)    Obesity (BMI 30-39  9)    Tobacco use    Abnormal finding on CT scan    Intertrigo        Plan:  - f/u SLIM recs regarding the medical problems  - Continue medication titration and treatment plan; adjust medication to optimize treatment response and as clinically indicated       Scheduled medications:  Current Facility-Administered Medications   Medication Dose Route Frequency Provider Last Rate    acetaminophen  650 mg Oral Q6H PRN iMlo Dominguez MD      acetaminophen  650 mg Oral Q4H PRN Milo Dominguez MD      acetaminophen  975 mg Oral Q6H PRN Milo Dominguez MD      aluminum-magnesium hydroxide-simethicone  30 mL Oral Q4H PRN Milo Dominguez MD      ARIPiprazole  2 mg Oral Daily MD Monica Burks artificial tear  1 application Both Eyes B4H PRN Sherri Bower MD      benztropine  1 mg Intramuscular Q4H PRN Max 6/day Sherri Bower MD      benztropine  1 mg Oral Q4H PRN Max 6/day Sherri Bower MD      hydrOXYzine HCL  50 mg Oral Q6H PRN Max 4/day Sherri Bower MD      Or    diphenhydrAMINE  50 mg Intramuscular Q6H PRN Sherri Bower MD      folic acid  1 mg Oral Daily Sherri Bower MD      furosemide  20 mg Oral Daily Sherri Bower MD      gabapentin  600 mg Oral 4x Daily Sherri Bower MD      hydrOXYzine HCL  100 mg Oral Q6H PRN Max 4/day Sherri Bower MD      Or   Francy Rawls LORazepam  2 mg Intramuscular Q6H PRN Sherri Bower MD      hydrOXYzine HCL  25 mg Oral Q6H PRN Max 4/day Sherri Bower MD      levETIRAcetam  500 mg Oral Q12H [address] [place] [address], MD      levothyroxine  50 mcg Oral Early Morning Sherri Bower MD      LORazepam  0 5 mg Oral BID Sherri Bower MD      LORazepam  1 mg Oral HS Sherri Bower MD      magnesium oxide  400 mg Oral BID Sherri Bower MD      methocarbamol  1,000 mg Oral Q8H Sherri Bower MD      nystatin   Topical BID Gerlean Seip, PA-C      OLANZapine  10 mg Oral Q3H PRN Max 3/day Sherri Bower MD      Or    OLANZapine  10 mg Intramuscular Q3H PRN Max 3/day Sherri Bower MD      OLANZapine  5 mg Oral Q3H PRN Max 6/day Sherri Bower MD      Or    OLANZapine  5 mg Intramuscular Q3H PRN Max 6/day Sherri Bower MD      OLANZapine  2 5 mg Oral Q3H PRN Max 8/day Sherri Bower MD      polyethylene glycol  17 g Oral Daily PRN Sherri Bower MD      polyethylene glycol  17 g Oral Daily Sherri Bower MD      potassium citrate  10 mEq Oral BID (diuretic) Sherri Bower MD      prazosin  3 mg Oral HS Clemente Nassar MD      senna-docusate sodium  1 tablet Oral Daily PRN MD Francy Villegas Thiamine Mononitrate  100 mg Oral Daily Sherri Bower MD      topiramate  25 mg Oral BID Clemente Nassar MD      traZODone  100 mg Oral HS Sherri Bower MD      venlafaxine  225 mg Oral Daily Sherri Bower MD      zolpidem  5 mg Oral HS PRN Carli Wesley MD          PRN:  Aminah Loera  acetaminophen    acetaminophen    acetaminophen    aluminum-magnesium hydroxide-simethicone    artificial tear    benztropine    benztropine    hydrOXYzine HCL **OR** diphenhydrAMINE    hydrOXYzine HCL **OR** LORazepam    hydrOXYzine HCL    OLANZapine **OR** OLANZapine    OLANZapine **OR** OLANZapine    OLANZapine    polyethylene glycol    senna-docusate sodium    zolpidem    - Observation: routine    - VS: as per unit protocol  - Diet: Regular diet  - Psychoeducation (benefits and potential risks) discussed, importance of compliance with the psychiatric treatment reiterated, and the patient verbalized understanding of the matter  - Encourage group attendance and milieu therapy    - The pt was educated and agreed to verbalize any suicidal thoughts, frustrations or concerns to the nursing staff, immediately  - Dispo: TBD       Next of Kin  · Extended Emergency Contact Information  · Primary Emergency Contact: quiana marcelo  · Mobile Phone: [phone]  · Relation: Friend      Counseling / Coordination of Care     Total floor / unit time spent today 20 minutes  Greater than 50% of total time was spent with the patient and / or family counseling and / or coordination of care  A description of the counseling / coordination of care  Patient's Rights, confidentiality and exceptions to confidentiality, use of automated medical record, [facility] access to medical record, and consent to treatment reviewed      Michelle Leung MD  Attending P LEONELA Ziegler [phone]

## 2022-06-19 NOTE — PLAN OF CARE
Problem: DISCHARGE PLANNING  Goal: Discharge to home or other facility with appropriate resources  Description: INTERVENTIONS:  - Identify barriers to discharge w/patient and caregiver  - Arrange for needed discharge resources and transportation as appropriate  - Identify discharge learning needs (meds, wound care, etc )  - Arrange for interpretive services to assist at discharge as needed  - Refer to Case Management Department for coordinating discharge planning if the patient needs post-hospital services based on physician/advanced practitioner order or complex needs related to functional status, cognitive ability, or social support system  Outcome: Progressing     Problem: SELF HARM/SUICIDALITY  Goal: Will have no self-injury during hospital stay  Description: INTERVENTIONS:  - Q 15 MINUTES: Routine safety checks  - Q WAKING SHIFT & PRN: Assess risk to determine if routine checks are adequate to maintain patient safety  - Encourage patient to participate actively in care by formulating a plan to combat response to suicidal ideation, identify supports and resources  Outcome: Progressing     Problem: Risk for Self Injury/Neglect  Goal: Treatment Goal: Remain safe during length of stay, learn and adopt new coping skills, and be free of self-injurious ideation, impulses and acts at the time of discharge  Outcome: Progressing  Goal: Verbalize thoughts and feelings  Description: Interventions:  - Assess and re-assess patient's lethality and potential for self-injury  - Engage patient in 1:1 interactions, daily, for a minimum of 15 minutes  - Encourage patient to express feelings, fears, frustrations, hopes  - Establish rapport/trust with patient   Outcome: Progressing  Goal: Refrain from harming self  Description: Interventions:  - Monitor patient closely, per order  - Develop a trusting relationship  - Supervise medication ingestion, monitor effects and side effects   Outcome: Progressing  Goal: Attend and participate in unit activities, including therapeutic, recreational, and educational groups  Description: Interventions:  - Provide therapeutic and educational activities daily, encourage attendance and participation, and document same in the medical record  - Obtain collateral information, encourage visitation and family involvement in care   Outcome: Progressing  Goal: Recognize maladaptive responses and adopt new coping mechanisms  Outcome: Progressing  Goal: Complete daily ADLs, including personal hygiene independently, as able  Description: Interventions:  - Observe, teach, and assist patient with ADLS  - Monitor and promote a balance of rest/activity, with adequate nutrition and elimination  Outcome: Progressing     Problem: Depression  Goal: Treatment Goal: Demonstrate behavioral control of depressive symptoms, verbalize feelings of improved mood/affect, and adopt new coping skills prior to discharge  Outcome: Progressing  Goal: Verbalize thoughts and feelings  Description: Interventions:  - Assess and re-assess patient's level of risk   - Engage patient in 1:1 interactions, daily, for a minimum of 15 minutes   - Encourage patient to express feelings, fears, frustrations, hopes   Outcome: Progressing  Goal: Refrain from harming self  Description: Interventions:  - Monitor patient closely, per order   - Supervise medication ingestion, monitor effects and side effects   Outcome: Progressing  Goal: Refrain from isolation  Description: Interventions:  - Develop a trusting relationship   - Encourage socialization   Outcome: Progressing  Goal: Refrain from self-neglect  Outcome: Progressing  Goal: Attend and participate in unit activities, including therapeutic, recreational, and educational groups  Description: Interventions:  - Provide therapeutic and educational activities daily, encourage attendance and participation, and document same in the medical record   Outcome: Progressing  Goal: Complete daily ADLs, including personal hygiene independently, as able  Description: Interventions:  - Observe, teach, and assist patient with ADLS  -  Monitor and promote a balance of rest/activity, with adequate nutrition and elimination   Outcome: Progressing     Problem: DEPRESSION  Goal: Will be euthymic at discharge  Description: INTERVENTIONS:  - Administer medication as ordered  - Provide emotional support via 1:1 interaction with staff  - Encourage involvement in milieu/groups/activities  - Monitor for social isolation  Outcome: Not Progressing     Problem: ANXIETY  Goal: Will report anxiety at manageable levels  Description: INTERVENTIONS:  - Administer medication as ordered  - Teach and encourage coping skills  - Provide emotional support  - Assess patient/family for anxiety and ability to cope  Outcome: Not Progressing  Goal: By discharge: Patient will verbalize 2 strategies to deal with anxiety  Description: Interventions:  - Identify any obvious source/trigger to anxiety  - Staff will assist patient in applying identified coping technique/skills  - Encourage attendance of scheduled groups and activities  Outcome: Not Progressing     Problem: SUBSTANCE USE/ABUSE  Goal: Will have no detox symptoms and will verbalize plan for changing substance-related behavior  Description: INTERVENTIONS:  - Monitor physical status and assess for symptoms of withdrawal  - Administer medication as ordered  - Provide emotional support with 1 on 1 interaction with staff  - Encourage recovery focused program/ addiction education  - Assess for verbalization of changing behaviors related to substance abuse  - Initiate consults and referrals as appropriate (Case Management, Spiritual Care, etc )  Outcome: Not Progressing  Goal: By discharge, will develop insight into their chemical dependency and sustain motivation to continue in recovery  Description: INTERVENTIONS:  - Attends all daily group sessions and scheduled AA groups  - Actively practices coping skills through participation in the therapeutic community and adherence to program rules  - Reviews and completes assignments from individual treatment plan  - Assist patient development of understanding of their personal cycle of addiction and relapse triggers  Outcome: Not Progressing  Goal: By discharge, patient will have ongoing treatment plan addressing chemical dependency  Description: INTERVENTIONS:  - Assist patient with resources and/or appointments for ongoing recovery based living  Outcome: Not Progressing     Problem: Anxiety  Goal: Anxiety is at manageable level  Description: Interventions:  - Assess and monitor patient's anxiety level  - Monitor for signs and symptoms (heart palpitations, chest pain, shortness of breath, headaches, nausea, feeling jumpy, restlessness, irritable, apprehensive)  - Collaborate with interdisciplinary team and initiate plan and interventions as ordered    - [place] patient to unit/surroundings  - Explain treatment plan  - Encourage participation in care  - Encourage verbalization of concerns/fears  - Identify coping mechanisms  - Assist in developing anxiety-reducing skills  - Administer/offer alternative therapies  - Limit or eliminate stimulants  Outcome: Not Progressing

## 2022-06-19 NOTE — NURSING NOTE
Pt watching videos in room in wheelchair  Affect depressed, reports feeling depressed but denies SI  Given tylenol and robaxin for pelvic pain  Pt reports anxiety  Pt is worried about ovarian mass, has appt on 30th of this month  Pt telling this nurse about her history of restrictive eating and purging  Also explained to this nurse she is in wheelchair due to restrictive eating and has neuropathy in hands and legs  Pt remains wheelchair bound  No AVH or delusions  Reports "skin crawling and feeling restless " Pt explained side effect of abilify and educated on side effects and treatment options  Pt explained treatment of propranolol for symptoms  Agrees to come to nursing if unable to sleep due to symptoms due to risk of low BP from pt BP history  Assessment ongoing

## 2022-06-19 NOTE — NURSING NOTE
Patient is visible on the unit, sitting in common area conversing with a peer  She denies SI, HI, AH's and VH's  She endorses severe depression and moderate anxiety  Patient complains of a headache for which she received tylenol  She appears depressed and states that she does not wish to impose on staff, although she makes frequent requests despite announcing this  She is medication and meal compliant  She denies any needs at this time

## 2022-06-20 PROCEDURE — 99233 SBSQ HOSP IP/OBS HIGH 50: CPT | Performed by: STUDENT IN AN ORGANIZED HEALTH CARE EDUCATION/TRAINING PROGRAM

## 2022-06-20 PROCEDURE — 97116 GAIT TRAINING THERAPY: CPT

## 2022-06-20 PROCEDURE — 97163 PT EVAL HIGH COMPLEX 45 MIN: CPT

## 2022-06-20 RX ORDER — ARIPIPRAZOLE 5 MG/1
5 TABLET ORAL DAILY
Status: DISCONTINUED | OUTPATIENT
Start: 2022-06-21 | End: 2022-06-22 | Stop reason: HOSPADM

## 2022-06-20 RX ORDER — PRAZOSIN HYDROCHLORIDE 1 MG/1
4 CAPSULE ORAL
Status: DISCONTINUED | OUTPATIENT
Start: 2022-06-20 | End: 2022-06-22 | Stop reason: HOSPADM

## 2022-06-20 RX ADMIN — TOPIRAMATE 25 MG: 25 TABLET, FILM COATED ORAL at 17:47

## 2022-06-20 RX ADMIN — LORAZEPAM 0.5 MG: 0.5 TABLET ORAL at 08:29

## 2022-06-20 RX ADMIN — ACETAMINOPHEN 975 MG: 325 TABLET ORAL at 04:10

## 2022-06-20 RX ADMIN — METHOCARBAMOL 1000 MG: 500 TABLET, FILM COATED ORAL at 18:31

## 2022-06-20 RX ADMIN — LEVETIRACETAM 500 MG: 500 TABLET, FILM COATED ORAL at 22:00

## 2022-06-20 RX ADMIN — METHOCARBAMOL 1000 MG: 500 TABLET, FILM COATED ORAL at 04:10

## 2022-06-20 RX ADMIN — FOLIC ACID 1 MG: 1 TABLET ORAL at 08:29

## 2022-06-20 RX ADMIN — POTASSIUM CITRATE 10 MEQ: 10 TABLET ORAL at 16:15

## 2022-06-20 RX ADMIN — METHOCARBAMOL 1000 MG: 500 TABLET, FILM COATED ORAL at 12:29

## 2022-06-20 RX ADMIN — ZOLPIDEM TARTRATE 5 MG: 5 TABLET ORAL at 22:07

## 2022-06-20 RX ADMIN — TRAZODONE HYDROCHLORIDE 100 MG: 100 TABLET ORAL at 22:02

## 2022-06-20 RX ADMIN — GABAPENTIN 600 MG: 300 CAPSULE ORAL at 08:28

## 2022-06-20 RX ADMIN — GABAPENTIN 600 MG: 300 CAPSULE ORAL at 22:02

## 2022-06-20 RX ADMIN — GABAPENTIN 600 MG: 300 CAPSULE ORAL at 17:46

## 2022-06-20 RX ADMIN — LORAZEPAM 0.5 MG: 0.5 TABLET ORAL at 17:47

## 2022-06-20 RX ADMIN — POLYETHYLENE GLYCOL 3350 17 G: 17 POWDER, FOR SOLUTION ORAL at 08:28

## 2022-06-20 RX ADMIN — MAGNESIUM OXIDE TAB 400 MG (241.3 MG ELEMENTAL MG) 400 MG: 400 (241.3 MG) TAB at 17:47

## 2022-06-20 RX ADMIN — ACETAMINOPHEN 975 MG: 325 TABLET ORAL at 12:33

## 2022-06-20 RX ADMIN — GABAPENTIN 600 MG: 300 CAPSULE ORAL at 12:29

## 2022-06-20 RX ADMIN — NYSTATIN: 100000 POWDER TOPICAL at 09:20

## 2022-06-20 RX ADMIN — LORAZEPAM 1 MG: 1 TABLET ORAL at 22:02

## 2022-06-20 RX ADMIN — LEVOTHYROXINE SODIUM 50 MCG: 50 TABLET ORAL at 06:35

## 2022-06-20 RX ADMIN — THIAMINE HCL TAB 100 MG 100 MG: 100 TAB at 08:29

## 2022-06-20 RX ADMIN — MAGNESIUM OXIDE TAB 400 MG (241.3 MG ELEMENTAL MG) 400 MG: 400 (241.3 MG) TAB at 08:28

## 2022-06-20 RX ADMIN — LEVETIRACETAM 500 MG: 500 TABLET, FILM COATED ORAL at 08:29

## 2022-06-20 RX ADMIN — PRAZOSIN HYDROCHLORIDE 4 MG: 1 CAPSULE ORAL at 22:02

## 2022-06-20 RX ADMIN — FUROSEMIDE 20 MG: 20 TABLET ORAL at 08:28

## 2022-06-20 RX ADMIN — POTASSIUM CITRATE 10 MEQ: 10 TABLET ORAL at 08:29

## 2022-06-20 RX ADMIN — TOPIRAMATE 25 MG: 25 TABLET, FILM COATED ORAL at 08:29

## 2022-06-20 RX ADMIN — ARIPIPRAZOLE 2 MG: 2 TABLET ORAL at 08:28

## 2022-06-20 RX ADMIN — VENLAFAXINE HYDROCHLORIDE 225 MG: 75 CAPSULE, EXTENDED RELEASE ORAL at 08:28

## 2022-06-20 NOTE — PROGRESS NOTES
[facility] Group Note     06/20/22 1400   Activity/Group Checklist   Group Other (Comment)  (JOSE ANGEL)   Attendance Attended   Attendance Duration (min) 46-60   Interactions Interacted appropriately   Affect/Mood Appropriate;Bright   Goals Achieved Identified feelings; Discussed discharge plans; Able to listen to others; Able to engage in interactions; Able to reflect/comment on own behavior;Able to self-disclose; Able to recieve feedback; Able to give feedback to another

## 2022-06-20 NOTE — PROGRESS NOTES
Progress Note - Behavioral Health   Jonetta Buerger 46 y o  female MRN: [id number]  Unit/Bed#: [facility] 343-02 Encounter: [id number]    Assessment/Plan   Principal Problem:    Major depressive disorder, recurrent, severe without psychotic features ([place] [address] )  Active Problems:    Eating disorder; history of both anorexia and bulimia    Post-traumatic stress disorder, chronic    BEAR (generalized anxiety disorder)    Hypothyroidism    History of gastric bypass    Anemia    Peripheral polyneuropathy    Partial idiopathic epilepsy with seizures of localized onset, not intractable, without status epilepticus ([place] [address] )    Medical clearance for psychiatric admission    Obesity (BMI 30-39  9)    Tobacco use    Abnormal finding on CT scan    Intertrigo    Recommended Treatment:   Continue Effexor 225 mg daily for depression and anxiety  Increase Abilify 2 mg daily for augmentation of major depressive disorder  Continue Ativan 0 5 mg b i d , 1 mg HS for anxiety  Increase prazosin 4 mg HS for PTSD  Continue trazodone 100 mg HS for insomnia  Ambien 5mg HS PRN for insomnia  Appreciate PT recommendations  All current active medications have been reviewed  Encourage group therapy, milieu therapy and occupational therapy  Behavioral Health checks every 7 minutes    ----------------------------------------      Subjective:  Per nursing report, patient visible in present on the unit  Social with select peers although continues to report depressive symptoms  On examination, patient use report feeling depressed and down  She states that she has been having some sleep difficulty, more nightmares from past trauma over the past few days  Relays she is awakening intermittently, feeling tired, less energy during the day  Reports that higher dose of prazosin was more effective although had to have been reduced due to hypotension    She reports appetite as stable although does admit to 1 episode of purging in which she vomited a dessert over the weekend  Reports feeling increased guilt about this  Reports that she continues to feel stressed and anxious about her ovarian cyst, reports that she is catastrophizing and ruminating about various worse case scenarios such as if she will require chemotherapy or radiation  She is trying to remain oriented towards her upcoming OB/GYN appointment to see what treatment options are  Denies SI,HI, A/VH  Behavior over the last 24 hours:  unchanged  Sleep: insomnia  Appetite: normal, one episode purging over weekend reported  Medication side effects: No  ROS: no complaints    Mental Status Evaluation:  Appearance:  casually dressed, adequate grooming, in wheelchair   Behavior:  pleasant, cooperative, calm   Speech:  soft, monotone   Mood:  depressed   Affect:  constricted   Thought Process:  organized, logical, coherent, goal directed   Associations: intact associations   Thought Content:  no overt delusions   Perceptual Disturbances: no auditory hallucinations, no visual hallucinations, does not appear responding to internal stimuli   Risk Potential: Suicidal ideation - None at present  Homicidal ideation - None at present  Potential for aggression - No   Sensorium:  oriented to person, place and time/date   Memory:  recent and remote memory grossly intact   Consciousness:  alert and awake   Attention/Concentration: attention span and concentration are age appropriate   Insight:  limited   Judgment: limited   Gait/Station: in wheelchair   Motor Activity: no abnormal movements     Medications: all current active meds have been reviewed and continue current psychiatric medications    Current Facility-Administered Medications   Medication Dose Route Frequency Provider Last Rate    acetaminophen  650 mg Oral Q6H PRN Milagros Dunn MD      acetaminophen  650 mg Oral Q4H PRN Milagros Dunn MD      acetaminophen  975 mg Oral Q6H PRN Milagros Dunn MD      aluminum-magnesium hydroxide-simethicone  30 mL Oral Q4H PRN Gerhardt Lynn MD Aminah Campos ON 6/21/2022] ARIPiprazole  5 mg Oral Daily Carli Wesley MD      artificial tear  1 application Both Eyes F5J PRN Carli Wesley MD      benztropine  1 mg Intramuscular Q4H PRN Max 6/day Carli Wesley MD      benztropine  1 mg Oral Q4H PRN Max 6/day Carli Wesley MD      hydrOXYzine HCL  50 mg Oral Q6H PRN Max 4/day Carli Wesley MD      Or    diphenhydrAMINE  50 mg Intramuscular Q6H PRN Carli Wesley MD      folic acid  1 mg Oral Daily Carli Wesley MD      furosemide  20 mg Oral Daily Carli Wesley MD      gabapentin  600 mg Oral 4x Daily Carli Wesley MD      hydrOXYzine HCL  100 mg Oral Q6H PRN Max 4/day MD Kenny Land LORazepam  2 mg Intramuscular Q6H PRN Carli Wesley MD      hydrOXYzine HCL  25 mg Oral Q6H PRN Max 4/day Carli Wesley MD      levETIRAcetam  500 mg Oral Q12H Deshaun Godinez MD      levothyroxine  50 mcg Oral Early Morning Carli Wesley MD      LORazepam  0 5 mg Oral BID Carli Wesley MD      LORazepam  1 mg Oral HS Carli Wesley MD      magnesium oxide  400 mg Oral BID Carli Wesley MD      methocarbamol  1,000 mg Oral Q8H Carli Wesley MD      nystatin   Topical BID Annalisa Segovia PA-C      OLANZapine  10 mg Oral Q3H PRN Max 3/day Carli Wesley MD      Or    OLANZapine  10 mg Intramuscular Q3H PRN Max 3/day Carli Wesley MD      OLANZapine  5 mg Oral Q3H PRN Max 6/day Carli Wesley MD      Or    OLANZapine  5 mg Intramuscular Q3H PRN Max 6/day Carli Wseley MD      OLANZapine  2 5 mg Oral Q3H PRN Max 8/day Carli Wesley MD      polyethylene glycol  17 g Oral Daily PRN Carli Wesley MD      polyethylene glycol  17 g Oral Daily Carli Wesley MD      potassium citrate  10 mEq Oral BID (diuretic) Carli Wesley MD      prazosin  4 mg Oral HS Carli Wesley MD      senna-docusate sodium  1 tablet Oral Daily PRN MD Aminah Land Thiamine Mononitrate  100 mg Oral Daily Carli Wesley MD      topiramate  25 mg Oral BID Michelle Leung MD      traZODone  100 mg Oral HS Carli Wesley MD  venlafaxine  225 mg Oral Daily Jewel Tang MD      zolpidem  5 mg Oral HS PRN Jewel Tang MD         Labs: I have personally reviewed all pertinent laboratory/tests results  Results from the past 24 hours: No results found for this or any previous visit (from the past 24 hour(s))  Progress Toward Goals: progressing gradually    Risks / Benefits of Treatment:    Risks, benefits, and possible side effects of medications explained to patient and patient verbalizes understanding and agreement for treatment  Counseling / Coordination of Care: Total floor / unit time spent today 35 minutes  Greater than 50% of total time was spent with the patient and / or family counseling and / or coordination of care  A description of counseling / coordination of care:  Patient's progress discussed with staff in treatment team meeting  Medications, treatment progress and treatment plan reviewed with patient  Recent stressors including health issues, medical problems, housing issues, everyday stressors and ongoing anxiety discussed with patient  Educated on importance of medication and treatment compliance  Reassurance and supportive therapy provided  Encouraged participation in milieu and group therapy on the unit      Jewel Tang MD 06/20/22

## 2022-06-20 NOTE — PROGRESS NOTES
[facility] Group Note     06/20/22 1100   Activity/Group Checklist   Group Life Skills  (Automatic Thoughts)   Attendance Attended   Attendance Duration (min) 31-45  (pulled by multiple clinicians)   Interactions Interacted appropriately   Affect/Mood Appropriate;Calm   Goals Achieved Identified feelings; Identified triggers; Identified relapse prevention strategies; Discussed coping strategies; Discussed self-esteem issues; Able to listen to others; Able to engage in interactions; Able to reflect/comment on own behavior;Able to self-disclose; Able to recieve feedback; Able to give feedback to another

## 2022-06-20 NOTE — PHYSICAL THERAPY NOTE
Physical Therapy Evaluation    Patient's Name: Amy Duque    Admitting Diagnosis  Major depressive disorder, single episode, unspecified [F32 9]  MDD (major depressive disorder), recurrent severe, without psychosis ([facility] ) [F33 2]    Problem List  Patient Active Problem List   Diagnosis    Pituitary enlargement    Elevated alkaline phosphatase level    Hypothyroidism    History of gastric bypass    Vitamin D deficiency    Eating disorder; history of both anorexia and bulimia    Post-traumatic stress disorder, chronic    Major depressive disorder, recurrent, severe without psychotic features ([facility] )    Laxative abuse    Generalized weakness    Anemia    Soft tissue lesion of pelvic region    Severe protein-calorie malnutrition ([facility] )    Acute cystitis    Urinary retention    Vitamin B6 deficiency    Copper deficiency    Altered mental status    Hypokalemia    Hypomagnesemia    Bacteremia    Peripheral polyneuropathy    Partial idiopathic epilepsy with seizures of localized onset, not intractable, without status epilepticus ([facility] )    Migraine    Muscle spasm    Non-insulin dependent type 2 diabetes mellitus ([facility] )    Medical clearance for psychiatric admission    BEAR (generalized anxiety disorder)    Obesity (BMI 30-39  9)    Tobacco use    Abnormal finding on CT scan    Intertrigo       Past Medical History  Past Medical History:   Diagnosis Date    Acute cystitis     Anemia     Anxiety     Bacteremia     Copper deficiency     Disease of thyroid gland     Eating disorder     Elevated alkaline phosphatase level     Generalized weakness     Head injury     History of bariatric surgery     History of drug abuse ([facility] )     History of enlargement of pituitary gland     Hypothyroidism     Migraine without aura     Panic attack     Papanicolaou smear     Peripheral neuropathy     PTSD (post-traumatic stress disorder)     Seizures (HCC)     Self-injurious behavior     Sepsis ([facility] )     Soft tissue lesion of pelvic region     Type 2 diabetes mellitus ([facility] )     Type 2, developed after 2nd bariatric surgery    Urinary retention     Vitamin B6 deficiency     Vitamin D deficiency        Past Surgical History  Past Surgical History:   Procedure Laterality Date    ANTERIOR CRUCIATE LIGAMENT REPAIR Left     CHOLECYSTECTOMY      2003    COLONOSCOPY      GASTRIC BYPASS      [place] 2007, 2016 had pouch ulcers and revision performed    IR PICC PLACEMENT SINGLE LUMEN  04/16/2021    IR TEMPORARY DIALYSIS CATHETER PLACEMENT  04/08/2021    TONSILLECTOMY      UPPER GASTROINTESTINAL ENDOSCOPY         Recent Imaging  No orders to display       Recent Vital Signs  Vitals:    06/19/22 0733 06/19/22 1555 06/19/22 2026 06/20/22 0804   BP: 103/59 105/65 107/58 109/57   BP Location: Left arm Left arm Left arm Left arm   Pulse: 68 65 61 65   Resp: 16 16  16   Temp: (!) 97 3 °F (36 3 °C) (!) 97 1 °F (36 2 °C)  97 9 °F (36 6 °C)   TempSrc: Temporal Temporal  Temporal   SpO2: 97% 97%  97%   Weight:       Height:            06/20/22 1120   PT Last Visit   PT Visit Date 06/20/22   Note Type   Note type Evaluation   Pain Assessment   Pain Assessment Tool 0-10   Pain Score No Pain   Restrictions/Precautions   Weight Bearing Precautions Per Order No   Other Precautions Pain; Fall Risk   Home Living   Type of Home SNF   Additional Comments pt continues to live at the [facility] where she is receiving continued PT   Prior Function   Level of [place] Needs assistance with IADLs; Needs assistance with ADLs and functional mobility   Lives With Facility staff   Receives Help From Personal care attendant   ADL Assistance Needs assistance   IADLs Needs assistance   Falls in the last 6 months 0   General   Family/Caregiver Present No   Cognition   Arousal/Participation Alert   Orientation Level Oriented X4   Memory Within functional limits   Following Commands Follows all commands and directions without difficulty   Strength RLE   RLE Overall Strength 4-/5   Strength LLE LLE Overall Strength 4-/5   Coordination   Movements are Fluid and Coordinated 0   Coordination and Movement Description reduced coordination in the LE and mild unsteadiness with gait with RW   Sensation X   Light Touch   RLE Light Touch Impaired   RLE Light Touch Comments decreased distally in LE and UE due to neuropathy   LLE Light Touch Impaired   LLE Light Touch Comments decreased distally in LE and UE due to neuropathy   Bed Mobility   Supine to Sit 6  Modified independent   Additional items Increased time required   Sit to Supine 6  Modified independent   Additional items Increased time required   Transfers   Sit to Stand 5  Supervision   Additional items Increased time required   Stand to Sit 5  Supervision   Additional items Increased time required   Additional Comments with RW   Ambulation/Elevation   Gait pattern Step through pattern;Decreased heel strike;Decreased toe off;Decreased foot clearance; Wide CAMI; Improper Weight shift   Gait Assistance 5  Supervision   Additional items Verbal cues   Assistive Device Rolling walker   Distance 55ft   Wheelchair Activities   Wheelchair Parts Management   (independent)   Propulsion   (independent household distances)   Balance   Static Sitting Fair +   Dynamic Sitting Fair +   Static Standing Fair   Dynamic Standing Fair   Ambulatory Fair   Endurance Deficit   Endurance Deficit Yes   Endurance Deficit Description LE fatigue   Activity Tolerance   Activity Tolerance Patient limited by fatigue   Medical Staff Made Aware spoke to RN   Assessment   Prognosis Good   Problem List Decreased strength;Decreased range of motion;Decreased endurance; Impaired balance;Decreased mobility; Decreased coordination; Impaired sensation;Obesity;Pain   Barriers to Discharge None   Goals   Patient Goals to keep improving mobility status   STG Expiration Date 06/30/22   Short Term Goal #1 see eval note   PT Treatment Day 1   Plan   Treatment/Interventions ADL retraining;Functional transfer training;LE strengthening/ROM; Therapeutic exercise;Elevations; Endurance training;Patient/family training;Equipment eval/education; Bed mobility;Gait training;Spoke to nursing;OT   PT Frequency 1-2x/wk   Recommendation   PT Discharge Recommendation Return to facility with rehabilitation services   Equipment Recommended Walker; Wheelchair   [place] Recommended Wheeled walker   AM-PAC Basic Mobility Inpatient   Turning in Bed Without Bedrails 4   Lying on Back to Sitting on Edge of Flat Bed 4   Moving Bed to Chair 4   Standing Up From Chair 3   Walk in Room 3   Climb 3-5 Stairs 3   Basic Mobility Inpatient Raw Score 21   Basic Mobility Standardized Score 45 55   Highest Level Of Mobility   JH-HLM Goal 6: Walk 10 steps or more   JH-HLM Achieved 7: Walk 25 feet or more   Additional Treatment Session   Start Time 1135   End Time 1145   Treatment Assessment Pt completed additional transfer and gait training using the RW, able to demonstrate safe transfers using RW as well as safe ambulation short distances  She should continue to use w/c in hallways and for any longer distance mobility  Use of RW is good for in room and bathroom  Equipment Use RW, manual w/c   Additional Treatment Day 1   End of Consult   Patient Position at End of Consult   (in w/c)         ASSESSMENT                                                                                                                     Chon Espinal is a 46 y o  female admitted to [address] [place] [address] on 6/16/2022 for Major depressive disorder, recurrent, severe without psychotic features ([facility] )   Pt  has a past medical history of Acute cystitis, Anemia, Anxiety, Bacteremia, Copper deficiency, Disease of thyroid gland, Eating disorder, Elevated alkaline phosphatase level, Generalized weakness, Head injury, History of bariatric surgery, History of drug abuse ([address] ), History of enlargement of pituitary gland, Hypothyroidism, Migraine without aura, Panic attack, Papanicolaou smear, Peripheral neuropathy, PTSD (post-traumatic stress disorder), Seizures ([facility] ), Self-injurious behavior, Sepsis ([facility] ), Soft tissue lesion of pelvic region, Type 2 diabetes mellitus ([facility] ), Urinary retention, Vitamin B6 deficiency, and Vitamin D deficiency    PT was consulted and pt was seen on 6/20/2022 for mobility assessment and d/c planning  Pt presents seated in w/c alert and agreeable to therapy  She is reporting some pain from headache at this time  Sensation is impaired to LE due to chronic neuropathy  Weakness in BLE also limited ability to ambulate with RW, functional for transfers and for shorter ambulation distances  Balance is fair with RW for short distances  Impairments limiting pt at this time include decreased ROM, impaired balance, decreased endurance, decreased coordination, decreased IADLS, pain, decreased activity tolerance, decreased sensation, decreased strength, and impaired psychosocial skills  Pt is currently functioning at a modified independent assistance level for bed mobility, supervision assistance x1 level for transfers, supervision assistance x1 level for ambulation with Rolling Walker  The patient's AM-[facility] Basic Mobility Inpatient Short Form Raw Score is 21  A Raw score of greater than 16 suggests the patient may benefit from discharge to home  Please also refer to the recommendation of the Physical Therapist for safe discharge planning      Goals                                                                                                                                    1) Bed mobility skills with modified independent assistance to facilitate safe return to previous living environment 2) Functional transfers with modified independent assistance to facilitate safe return to previous living environment  3) Ambulation with least restrictive AD modified independent assistance without LOB and stable vitals for safe ambulation home/ community distances  4) Improve balance grades to fair + to reduce risk of falls  5)Improve LE strength grades by 1 to increase independence w/ transfers and gait  6) PT for ongoing pt and family education; DME needs and D/C planning to promote highest level of function in least restrictive environment  Recommendations                                                                                                              Pt will benefit from continued skilled IP PT to address the above mentioned impairments in order to maximize recovery and increase functional independence when completing mobility and ADLs  See flow sheet for goals and POC       DME: Tenzin Jainier and Manual Wheelchair    Discharge Disposition:   Return to SNF with continued rehab services      Maru Esposito PT, DPT

## 2022-06-20 NOTE — PROGRESS NOTES
06/20/22 2340   Team Meeting   Meeting Type Daily Rounds   Team Members Present   Team Members Present Physician;Nurse;   Physician Team Member Dr Chito Bright Team Member [address] Management Team Member Joshua   Patient/Family Present   Patient Present No   Patient's Family Present No   Pt is medication compliant  Pt continues to report depression and some anxiety  Pt is pleasant and cooperative on the unit  Discussed increase of Pt's Abilify  Discharge pending for this week

## 2022-06-20 NOTE — PROGRESS NOTES
06/20/22 1300   Activity/Group Checklist   Group   (recovery group)   Attendance Attended   Attendance Duration (min) 46-60   Interactions Interacted appropriately   Affect/Mood Appropriate   Goals Achieved Able to listen to others; Able to engage in interactions; Able to reflect/comment on own behavior;Able to self-disclose; Able to recieve feedback; Discussed self-esteem issues; Discussed coping strategies; Identified distorted thoughts/beliefs

## 2022-06-20 NOTE — PLAN OF CARE
Problem: PHYSICAL THERAPY ADULT  Goal: Performs mobility at highest level of function for planned discharge setting  See evaluation for individualized goals  Description: Treatment/Interventions: ADL retraining, Functional transfer training, LE strengthening/ROM, Therapeutic exercise, Elevations, Endurance training, Patient/family training, Equipment eval/education, Bed mobility, Gait training, Spoke to nursing, OT  Equipment Recommended: Hunter Florez, Wheelchair       See flowsheet documentation for full assessment, interventions and recommendations  Outcome: Progressing  Note: Prognosis: Good  Problem List: Decreased strength, Decreased range of motion, Decreased endurance, Impaired balance, Decreased mobility, Decreased coordination, Impaired sensation, Obesity, Pain     Barriers to Discharge: None        PT Discharge Recommendation: Return to facility with rehabilitation services          See flowsheet documentation for full assessment

## 2022-06-20 NOTE — NURSING NOTE
Pt is visible on unit and social with select peers  Brightens somewhat on approach but continues to report depression  Denies SI/HI/VH/AH  Med and meal compliant  Denies any unmet needs or complaints at this time  Will monitor

## 2022-06-20 NOTE — PLAN OF CARE
Problem: DISCHARGE PLANNING  Goal: Discharge to home or other facility with appropriate resources  Description: INTERVENTIONS:  - Identify barriers to discharge w/patient and caregiver  - Arrange for needed discharge resources and transportation as appropriate  - Identify discharge learning needs (meds, wound care, etc )  - Arrange for interpretive services to assist at discharge as needed  - Refer to Case Management Department for coordinating discharge planning if the patient needs post-hospital services based on physician/advanced practitioner order or complex needs related to functional status, cognitive ability, or social support system  Outcome: Progressing     Problem: SELF HARM/SUICIDALITY  Goal: Will have no self-injury during hospital stay  Description: INTERVENTIONS:  - Q 15 MINUTES: Routine safety checks  - Q WAKING SHIFT & PRN: Assess risk to determine if routine checks are adequate to maintain patient safety  - Encourage patient to participate actively in care by formulating a plan to combat response to suicidal ideation, identify supports and resources  Outcome: Progressing     Problem: DEPRESSION  Goal: Will be euthymic at discharge  Description: INTERVENTIONS:  - Administer medication as ordered  - Provide emotional support via 1:1 interaction with staff  - Encourage involvement in milieu/groups/activities  - Monitor for social isolation  Outcome: Progressing     Problem: ANXIETY  Goal: Will report anxiety at manageable levels  Description: INTERVENTIONS:  - Administer medication as ordered  - Teach and encourage coping skills  - Provide emotional support  - Assess patient/family for anxiety and ability to cope  Outcome: Progressing  Goal: By discharge: Patient will verbalize 2 strategies to deal with anxiety  Description: Interventions:  - Identify any obvious source/trigger to anxiety  - Staff will assist patient in applying identified coping technique/skills  - Encourage attendance of scheduled groups and activities  Outcome: Progressing     Problem: SUBSTANCE USE/ABUSE  Goal: Will have no detox symptoms and will verbalize plan for changing substance-related behavior  Description: INTERVENTIONS:  - Monitor physical status and assess for symptoms of withdrawal  - Administer medication as ordered  - Provide emotional support with 1 on 1 interaction with staff  - Encourage recovery focused program/ addiction education  - Assess for verbalization of changing behaviors related to substance abuse  - Initiate consults and referrals as appropriate (Case Management, Spiritual Care, etc )  Outcome: Progressing  Goal: By discharge, will develop insight into their chemical dependency and sustain motivation to continue in recovery  Description: INTERVENTIONS:  - Attends all daily group sessions and scheduled AA groups  - Actively practices coping skills through participation in the therapeutic community and adherence to program rules  - Reviews and completes assignments from individual treatment plan  - Assist patient development of understanding of their personal cycle of addiction and relapse triggers  Outcome: Progressing  Goal: By discharge, patient will have ongoing treatment plan addressing chemical dependency  Description: INTERVENTIONS:  - Assist patient with resources and/or appointments for ongoing recovery based living  Outcome: Progressing     Problem: Risk for Self Injury/Neglect  Goal: Treatment Goal: Remain safe during length of stay, learn and adopt new coping skills, and be free of self-injurious ideation, impulses and acts at the time of discharge  Outcome: Progressing  Goal: Verbalize thoughts and feelings  Description: Interventions:  - Assess and re-assess patient's lethality and potential for self-injury  - Engage patient in 1:1 interactions, daily, for a minimum of 15 minutes  - Encourage patient to express feelings, fears, frustrations, hopes  - Establish rapport/trust with patient   Outcome: Progressing  Goal: Refrain from harming self  Description: Interventions:  - Monitor patient closely, per order  - Develop a trusting relationship  - Supervise medication ingestion, monitor effects and side effects   Outcome: Progressing  Goal: Attend and participate in unit activities, including therapeutic, recreational, and educational groups  Description: Interventions:  - Provide therapeutic and educational activities daily, encourage attendance and participation, and document same in the medical record  - Obtain collateral information, encourage visitation and family involvement in care   Outcome: Progressing  Goal: Recognize maladaptive responses and adopt new coping mechanisms  Outcome: Progressing  Goal: Complete daily ADLs, including personal hygiene independently, as able  Description: Interventions:  - Observe, teach, and assist patient with ADLS  - Monitor and promote a balance of rest/activity, with adequate nutrition and elimination  Outcome: Progressing     Problem: Depression  Goal: Treatment Goal: Demonstrate behavioral control of depressive symptoms, verbalize feelings of improved mood/affect, and adopt new coping skills prior to discharge  Outcome: Progressing  Goal: Verbalize thoughts and feelings  Description: Interventions:  - Assess and re-assess patient's level of risk   - Engage patient in 1:1 interactions, daily, for a minimum of 15 minutes   - Encourage patient to express feelings, fears, frustrations, hopes   Outcome: Progressing  Goal: Refrain from harming self  Description: Interventions:  - Monitor patient closely, per order   - Supervise medication ingestion, monitor effects and side effects   Outcome: Progressing  Goal: Refrain from isolation  Description: Interventions:  - Develop a trusting relationship   - Encourage socialization   Outcome: Progressing  Goal: Refrain from self-neglect  Outcome: Progressing  Goal: Attend and participate in unit activities, including therapeutic, recreational, and educational groups  Description: Interventions:  - Provide therapeutic and educational activities daily, encourage attendance and participation, and document same in the medical record   Outcome: Progressing  Goal: Complete daily ADLs, including personal hygiene independently, as able  Description: Interventions:  - Observe, teach, and assist patient with ADLS  -  Monitor and promote a balance of rest/activity, with adequate nutrition and elimination   Outcome: Progressing     Problem: Anxiety  Goal: Anxiety is at manageable level  Description: Interventions:  - Assess and monitor patient's anxiety level  - Monitor for signs and symptoms (heart palpitations, chest pain, shortness of breath, headaches, nausea, feeling jumpy, restlessness, irritable, apprehensive)  - Collaborate with interdisciplinary team and initiate plan and interventions as ordered    - [place] patient to unit/surroundings  - Explain treatment plan  - Encourage participation in care  - Encourage verbalization of concerns/fears  - Identify coping mechanisms  - Assist in developing anxiety-reducing skills  - Administer/offer alternative therapies  - Limit or eliminate stimulants  Outcome: Progressing

## 2022-06-21 LAB
FLUAV RNA RESP QL NAA+PROBE: NEGATIVE
FLUBV RNA RESP QL NAA+PROBE: NEGATIVE
RSV RNA RESP QL NAA+PROBE: NEGATIVE
SARS-COV-2 RNA RESP QL NAA+PROBE: NEGATIVE

## 2022-06-21 PROCEDURE — 0241U HB NFCT DS VIR RESP RNA 4 TRGT: CPT | Performed by: STUDENT IN AN ORGANIZED HEALTH CARE EDUCATION/TRAINING PROGRAM

## 2022-06-21 PROCEDURE — 99232 SBSQ HOSP IP/OBS MODERATE 35: CPT | Performed by: STUDENT IN AN ORGANIZED HEALTH CARE EDUCATION/TRAINING PROGRAM

## 2022-06-21 RX ORDER — POLYETHYLENE GLYCOL 3350 17 G/17G
17 POWDER, FOR SOLUTION ORAL DAILY PRN
Qty: 17 G | Refills: 0 | Status: SHIPPED | OUTPATIENT
Start: 2022-06-21 | End: 2022-07-21

## 2022-06-21 RX ORDER — VENLAFAXINE HYDROCHLORIDE 75 MG/1
225 CAPSULE, EXTENDED RELEASE ORAL DAILY
Qty: 90 CAPSULE | Refills: 0 | Status: SHIPPED | OUTPATIENT
Start: 2022-06-22 | End: 2022-06-30 | Stop reason: DRUGHIGH

## 2022-06-21 RX ORDER — ARIPIPRAZOLE 5 MG/1
5 TABLET ORAL DAILY
Qty: 30 TABLET | Refills: 0 | Status: SHIPPED | OUTPATIENT
Start: 2022-06-22 | End: 2022-07-22

## 2022-06-21 RX ORDER — PRAZOSIN HYDROCHLORIDE 2 MG/1
4 CAPSULE ORAL
Qty: 60 CAPSULE | Refills: 0 | Status: SHIPPED | OUTPATIENT
Start: 2022-06-21 | End: 2022-07-21

## 2022-06-21 RX ORDER — NYSTATIN 100000 [USP'U]/G
POWDER TOPICAL 2 TIMES DAILY
Qty: 15 G | Refills: 0 | Status: SHIPPED | OUTPATIENT
Start: 2022-06-21

## 2022-06-21 RX ORDER — METHOCARBAMOL 500 MG/1
1000 TABLET, FILM COATED ORAL EVERY 8 HOURS PRN
Qty: 30 TABLET | Refills: 0 | Status: SHIPPED | OUTPATIENT
Start: 2022-06-21 | End: 2022-07-21

## 2022-06-21 RX ORDER — TOPIRAMATE 25 MG/1
25 TABLET ORAL 2 TIMES DAILY
Qty: 60 TABLET | Refills: 0 | Status: SHIPPED | OUTPATIENT
Start: 2022-06-21 | End: 2022-07-21

## 2022-06-21 RX ADMIN — NYSTATIN: 100000 POWDER TOPICAL at 08:09

## 2022-06-21 RX ADMIN — GABAPENTIN 600 MG: 300 CAPSULE ORAL at 17:18

## 2022-06-21 RX ADMIN — POTASSIUM CITRATE 10 MEQ: 10 TABLET ORAL at 15:12

## 2022-06-21 RX ADMIN — NYSTATIN: 100000 POWDER TOPICAL at 17:24

## 2022-06-21 RX ADMIN — FUROSEMIDE 20 MG: 20 TABLET ORAL at 08:06

## 2022-06-21 RX ADMIN — METHOCARBAMOL 1000 MG: 500 TABLET, FILM COATED ORAL at 11:20

## 2022-06-21 RX ADMIN — LEVETIRACETAM 500 MG: 500 TABLET, FILM COATED ORAL at 21:10

## 2022-06-21 RX ADMIN — GABAPENTIN 600 MG: 300 CAPSULE ORAL at 08:05

## 2022-06-21 RX ADMIN — MAGNESIUM OXIDE TAB 400 MG (241.3 MG ELEMENTAL MG) 400 MG: 400 (241.3 MG) TAB at 17:23

## 2022-06-21 RX ADMIN — MAGNESIUM OXIDE TAB 400 MG (241.3 MG ELEMENTAL MG) 400 MG: 400 (241.3 MG) TAB at 08:06

## 2022-06-21 RX ADMIN — ZOLPIDEM TARTRATE 5 MG: 5 TABLET ORAL at 22:10

## 2022-06-21 RX ADMIN — TOPIRAMATE 25 MG: 25 TABLET, FILM COATED ORAL at 08:05

## 2022-06-21 RX ADMIN — VENLAFAXINE HYDROCHLORIDE 225 MG: 75 CAPSULE, EXTENDED RELEASE ORAL at 08:05

## 2022-06-21 RX ADMIN — TOPIRAMATE 25 MG: 25 TABLET, FILM COATED ORAL at 17:18

## 2022-06-21 RX ADMIN — METHOCARBAMOL 1000 MG: 500 TABLET, FILM COATED ORAL at 18:32

## 2022-06-21 RX ADMIN — POTASSIUM CITRATE 10 MEQ: 10 TABLET ORAL at 08:05

## 2022-06-21 RX ADMIN — ACETAMINOPHEN 975 MG: 325 TABLET ORAL at 08:11

## 2022-06-21 RX ADMIN — LORAZEPAM 1 MG: 1 TABLET ORAL at 22:11

## 2022-06-21 RX ADMIN — LEVOTHYROXINE SODIUM 50 MCG: 50 TABLET ORAL at 06:04

## 2022-06-21 RX ADMIN — ACETAMINOPHEN 975 MG: 325 TABLET ORAL at 17:23

## 2022-06-21 RX ADMIN — PRAZOSIN HYDROCHLORIDE 4 MG: 1 CAPSULE ORAL at 22:08

## 2022-06-21 RX ADMIN — GABAPENTIN 600 MG: 300 CAPSULE ORAL at 11:20

## 2022-06-21 RX ADMIN — LEVETIRACETAM 500 MG: 500 TABLET, FILM COATED ORAL at 08:05

## 2022-06-21 RX ADMIN — METHOCARBAMOL 1000 MG: 500 TABLET, FILM COATED ORAL at 06:04

## 2022-06-21 RX ADMIN — ARIPIPRAZOLE 5 MG: 5 TABLET ORAL at 08:05

## 2022-06-21 RX ADMIN — FOLIC ACID 1 MG: 1 TABLET ORAL at 08:05

## 2022-06-21 RX ADMIN — LORAZEPAM 0.5 MG: 0.5 TABLET ORAL at 08:05

## 2022-06-21 RX ADMIN — TRAZODONE HYDROCHLORIDE 100 MG: 100 TABLET ORAL at 22:10

## 2022-06-21 RX ADMIN — THIAMINE HCL TAB 100 MG 100 MG: 100 TAB at 08:05

## 2022-06-21 RX ADMIN — LORAZEPAM 0.5 MG: 0.5 TABLET ORAL at 17:19

## 2022-06-21 RX ADMIN — GABAPENTIN 600 MG: 300 CAPSULE ORAL at 22:10

## 2022-06-21 NOTE — PROGRESS NOTES
06/21/22 1022   Team Meeting   Meeting Type Daily Rounds   Team Members Present   Team Members Present Physician;Nurse;   Physician Team Member Dr Chito Bright Team Member [facility] Management Team Member Joshua   Patient/Family Present   Patient Present No   Patient's Family Present No   Pt is medication compliant  Pt reports feeling ready for discharge tomorrow and denying all psych symptoms  Discharge pending for tomorrow

## 2022-06-21 NOTE — NURSING NOTE
Patient remained visible on the unit throughout the evening  Pleasant and cooperative with routine  Denied any unmet needs  HS medication compliant and Ambien prn requested and received  Patient retreated to her room for sleep without issue  Will monitor

## 2022-06-21 NOTE — SOCIAL WORK
Worker called [employer] to coordinate discharge for tomorrow  Worker spoke with Unit Manager who advised they can accept Pt whenever the hospital can provide transportation  Confirmed Pt receives psychiatry and therapy services through [facility] and will be seen upon return  QC requested medication list and COVID results be faxed this afternoon or early tomorrow morning (Fax: [phone])

## 2022-06-21 NOTE — PROGRESS NOTES
[facility] Group Note     06/21/22 1115   Activity/Group Checklist   Group Life Skills  (Alternate Perspectives and World Views Discussion)   Attendance Attended   Attendance Duration (min) 31-45   Interactions Interacted appropriately  (became agitated by peer and removed self from group for a period of time)   Affect/Mood Appropriate;Calm   Goals Achieved Identified feelings; Discussed coping strategies; Able to listen to others; Able to engage in interactions; Able to self-disclose; Able to recieve feedback; Able to give feedback to another

## 2022-06-21 NOTE — DISCHARGE INSTR - APPOINTMENTS
Tolu Pendleton or Marilin, our Vic and Joanne, will be calling you after your discharge, on the phone number that you provided  They will be available as an additional support, if needed  If you wish to speak with one of them, you may contact Eugenio Magallanes at [phone] or Tara Santos at [phone]

## 2022-06-21 NOTE — PROGRESS NOTES
Met with Joaquin Fuentes and completed her relapse prevention  The trigger that got her into the hospital was getting bad news that she may have cancer  Increase anxiety, increased depression  Her coping skills are reading, listening to music and talking with friends  Her support people are Anabella Crowe and Kimberly Backer  Her warning signs are: thoughts of self harm, increase in panic attacks/nightmares, increased depression, fear of the unknown, grief loss over peoples death including her mom, feeling like she can not trust, and getting overwhelmed wanting to get her own apartment and having to wait  Reviewed the community supports  Patient's affect is bright and she is looking forward to discharge tomorrow

## 2022-06-21 NOTE — PROGRESS NOTES
06/21/22 1300   Activity/Group Checklist   Group   (recovery group)   Attendance Attended   Attendance Duration (min) 46-60   Interactions Interacted appropriately   Affect/Mood Appropriate   Goals Achieved Discussed self-esteem issues; Discussed coping strategies; Identified distorted thoughts/beliefs; Able to listen to others; Able to engage in interactions; Able to reflect/comment on own behavior;Able to manage/cope with feelings; Able to self-disclose; Able to recieve feedback; Able to give feedback to another

## 2022-06-21 NOTE — DISCHARGE INSTR - OTHER ORDERS
If you are in a Crisis situation Call [phone] to speak to a trained crisis worker - Anytime - Day or Night  You may also call one of the numbers below:  [address] [address]:   [phone]       [facility] [place] [address] ([id number])   [email or web address]   [email or web address] "[email or web address]  [email or web address]     [facility]: [place], [place] [facility], [address] [address] [facility]: [address] [place] [address] Padma ISRAEL

## 2022-06-21 NOTE — PROGRESS NOTES
Progress Note - Behavioral Health   Andrew Hui 46 y o  female MRN: [id number]  Unit/Bed#: [facility] 343-02 Encounter: [id number]    Assessment/Plan   Principal Problem:    Major depressive disorder, recurrent, severe without psychotic features ([place] [address] )  Active Problems:    Eating disorder; history of both anorexia and bulimia    Post-traumatic stress disorder, chronic    BEAR (generalized anxiety disorder)    Hypothyroidism    History of gastric bypass    Anemia    Peripheral polyneuropathy    Partial idiopathic epilepsy with seizures of localized onset, not intractable, without status epilepticus ([place] [address] )    Medical clearance for psychiatric admission    Obesity (BMI 30-39  9)    Tobacco use    Abnormal finding on CT scan    Intertrigo    Recommended Treatment:   Effexor 225 mg daily for depression and anxiety  Abilify 5 mg daily for augmentation of major depressive disorder  Continue Ativan 0 5 mg b i d , 1 mg HS for anxiety  Prazosin 4 mg HS for PTSD  Continue trazodone 100 mg HS for insomnia  Ambien 5mg HS PRN for insomnia  Appreciate PT recommendations  All current active medications have been reviewed  Encourage group therapy, milieu therapy and occupational therapy  Behavioral Health checks every 7 minutes    ----------------------------------------      Subjective:  Per nursing report, patient is pleasant and cooperative on the unit  Received Ambien last night as a p r n  which was effective for sleep  She reports today that her mood is better  States that her sleep last night was improved with the increase in the prazosin  She reports that she continues to have some intermittent traumatic nightmares although lessened with the increase  Still woke early in the morning but feels that she got approximately 7 hours of sleep last night and feels more rested    She reports that she continues to experience intermittent anxiety, catastrophizing about her ovarian mass although states that she felt that some of the coping mechanisms she has learned here has helped improve her outlook and allowed for better focus on tasks at hand  She reports that her appetite has been stable, states it a daily struggle to eat meals with her history of anorexia/bulimia but feels she is doing well  Denies any recent periods of bingeing or purging  She overall states that her depression is better than it was when she was 1st admitted  She feels less foggy and more clear  She is future oriented and discussed goals of maintaining her physical health and managing the ovarian cyst as well as looking forward to obtaining an apartment and independent housing in the future  Behavior over the last 24 hours:  improved  Sleep: improving  Appetite: normal  Medication side effects: No  ROS: no complaints    Mental Status Evaluation:  Appearance:  casually dressed, dressed appropriately, adequate grooming, overweight, in wheelchair   Behavior:  pleasant, cooperative, calm   Speech:  soft   Mood:  "better"   Affect:  slightly brighter   Thought Process:  organized, logical, coherent, goal directed   Associations: intact associations   Thought Content:  no overt delusions   Perceptual Disturbances: no auditory hallucinations, no visual hallucinations, does not appear responding to internal stimuli   Risk Potential: Suicidal ideation - None at present  Homicidal ideation - None at present  Potential for aggression - No   Sensorium:  oriented to person, place and time/date   Memory:  recent and remote memory grossly intact   Consciousness:  alert and awake   Attention/Concentration: attention span and concentration are age appropriate   Insight:  fair   Judgment: improving   Gait/Station: in wheelchair   Motor Activity: no abnormal movements     Medications: all current active meds have been reviewed and continue current psychiatric medications    Current Facility-Administered Medications   Medication Dose Route Frequency Provider Last Rate  acetaminophen  650 mg Oral Q6H PRN Yesika Enamorado MD      acetaminophen  650 mg Oral Q4H PRN Yesika Enamorado MD      acetaminophen  975 mg Oral Q6H PRN Yesika Enamorado MD      aluminum-magnesium hydroxide-simethicone  30 mL Oral Q4H PRN Yesika Enamorado MD      ARIPiprazole  5 mg Oral Daily Yesika Enamorado MD      artificial tear  1 application Both Eyes M7M PRN Yesika Enamorado MD      benztropine  1 mg Intramuscular Q4H PRN Max 6/day Yesika Enamorado MD      benztropine  1 mg Oral Q4H PRN Max 6/day Yesika Enamorado MD      hydrOXYzine HCL  50 mg Oral Q6H PRN Max 4/day Yesika Enamorado MD      Or    diphenhydrAMINE  50 mg Intramuscular Q6H PRN Yesika Enamorado MD      folic acid  1 mg Oral Daily Yesika Enamorado MD      furosemide  20 mg Oral Daily Yesika Enamorado MD      gabapentin  600 mg Oral 4x Daily Yesika Enamorado MD      hydrOXYzine HCL  100 mg Oral Q6H PRN Max 4/day Yesika Enamorado MD      Or   Kristen Splinter LORazepam  2 mg Intramuscular Q6H PRN Yesika Enamorado MD      hydrOXYzine HCL  25 mg Oral Q6H PRN Max 4/day Yesika Enamorado MD      levETIRAcetam  500 mg Oral Q12H April Forbes MD      levothyroxine  50 mcg Oral Early Morning Yesika Enamorado MD      LORazepam  0 5 mg Oral BID Yesika Enamorado MD      LORazepam  1 mg Oral HS Yesika Enamorado MD      magnesium oxide  400 mg Oral BID Yesika Enamorado MD      methocarbamol  1,000 mg Oral Q8H Yesika Enamorado MD      nystatin   Topical BID Maria Del Rosario Weir PA-C      OLANZapine  10 mg Oral Q3H PRN Max 3/day Yesika Enamorado MD      Or    OLANZapine  10 mg Intramuscular Q3H PRN Max 3/day Yesika Enamorado MD      OLANZapine  5 mg Oral Q3H PRN Max 6/day Yesika Enamorado MD      Or    OLANZapine  5 mg Intramuscular Q3H PRN Max 6/day Yesika Enamorado MD      OLANZapine  2 5 mg Oral Q3H PRN Max 8/day Yesika Enamorado MD      polyethylene glycol  17 g Oral Daily PRN Yesika Enamorado MD      polyethylene glycol  17 g Oral Daily Yesika Enamorado MD      potassium citrate  10 mEq Oral BID (diuretic) Yesika Enamorado MD      prazosin  4 mg Oral HS MD Kristen Chavez senna-docusate sodium  1 tablet Oral Daily PRN MD Pepper Jacksonamita Ta Thiamine Mononitrate  100 mg Oral Daily Thea Thrasher MD      topiramate  25 mg Oral BID Nia Min MD      traZODone  100 mg Oral HS Thea Thrasher MD      venlafaxine  225 mg Oral Daily Thea Thrasher MD      zolpidem  5 mg Oral HS PRN Thea Thrasher MD         Labs: I have personally reviewed all pertinent laboratory/tests results  Results from the past 24 hours: No results found for this or any previous visit (from the past 24 hour(s))  Progress Toward Goals: progressing    Risks / Benefits of Treatment:    Risks, benefits, and possible side effects of medications explained to patient and patient verbalizes understanding and agreement for treatment  Counseling / Coordination of Care: Total floor / unit time spent today 25 minutes  Greater than 50% of total time was spent with the patient and / or family counseling and / or coordination of care  A description of counseling / coordination of care:  Patient's progress discussed with staff in treatment team meeting  Medications, treatment progress and treatment plan reviewed with patient  Recent stressors including health issues, social difficulties, everyday stressors, chronic anxiety and difficulty with maintaining healthy weight discussed with patient  Educated on importance of medication and treatment compliance  Reassurance and supportive therapy provided  Encouraged participation in milieu and group therapy on the unit      Thea Thrasher MD 06/21/22

## 2022-06-21 NOTE — BH TRANSITION RECORD
Contact Information: If you have any questions, concerns, pended studies, tests and/or procedures, or emergencies regarding your inpatient behavioral health visit  Please contact [facility] behavioral health unit 3B [phone]  and ask to speak to a , nurse or physician  A contact is available 24 hours/ 7 days a week at this number  Summary of Procedures Performed During your Stay:  Below is a list of major procedures performed during your hospital stay and a summary of results:  - No major procedures performed  Pending Studies (From admission, onward)    None        If studies are pending at discharge, follow up with your PCP and/or referring provider

## 2022-06-21 NOTE — PLAN OF CARE
Problem: DISCHARGE PLANNING  Goal: Discharge to home or other facility with appropriate resources  Description: INTERVENTIONS:  - Identify barriers to discharge w/patient and caregiver  - Arrange for needed discharge resources and transportation as appropriate  - Identify discharge learning needs (meds, wound care, etc )  - Arrange for interpretive services to assist at discharge as needed  - Refer to Case Management Department for coordinating discharge planning if the patient needs post-hospital services based on physician/advanced practitioner order or complex needs related to functional status, cognitive ability, or social support system  Outcome: Progressing     Problem: SELF HARM/SUICIDALITY  Goal: Will have no self-injury during hospital stay  Description: INTERVENTIONS:  - Q 15 MINUTES: Routine safety checks  - Q WAKING SHIFT & PRN: Assess risk to determine if routine checks are adequate to maintain patient safety  - Encourage patient to participate actively in care by formulating a plan to combat response to suicidal ideation, identify supports and resources  Outcome: Progressing     Problem: DEPRESSION  Goal: Will be euthymic at discharge  Description: INTERVENTIONS:  - Administer medication as ordered  - Provide emotional support via 1:1 interaction with staff  - Encourage involvement in milieu/groups/activities  - Monitor for social isolation  Outcome: Progressing     Problem: ANXIETY  Goal: Will report anxiety at manageable levels  Description: INTERVENTIONS:  - Administer medication as ordered  - Teach and encourage coping skills  - Provide emotional support  - Assess patient/family for anxiety and ability to cope  Outcome: Progressing  Goal: By discharge: Patient will verbalize 2 strategies to deal with anxiety  Description: Interventions:  - Identify any obvious source/trigger to anxiety  - Staff will assist patient in applying identified coping technique/skills  - Encourage attendance of scheduled groups and activities  Outcome: Progressing     Problem: SUBSTANCE USE/ABUSE  Goal: Will have no detox symptoms and will verbalize plan for changing substance-related behavior  Description: INTERVENTIONS:  - Monitor physical status and assess for symptoms of withdrawal  - Administer medication as ordered  - Provide emotional support with 1 on 1 interaction with staff  - Encourage recovery focused program/ addiction education  - Assess for verbalization of changing behaviors related to substance abuse  - Initiate consults and referrals as appropriate (Case Management, Spiritual Care, etc )  Outcome: Progressing  Goal: By discharge, will develop insight into their chemical dependency and sustain motivation to continue in recovery  Description: INTERVENTIONS:  - Attends all daily group sessions and scheduled AA groups  - Actively practices coping skills through participation in the therapeutic community and adherence to program rules  - Reviews and completes assignments from individual treatment plan  - Assist patient development of understanding of their personal cycle of addiction and relapse triggers  Outcome: Progressing  Goal: By discharge, patient will have ongoing treatment plan addressing chemical dependency  Description: INTERVENTIONS:  - Assist patient with resources and/or appointments for ongoing recovery based living  Outcome: Progressing     Problem: Risk for Self Injury/Neglect  Goal: Treatment Goal: Remain safe during length of stay, learn and adopt new coping skills, and be free of self-injurious ideation, impulses and acts at the time of discharge  Outcome: Progressing  Goal: Verbalize thoughts and feelings  Description: Interventions:  - Assess and re-assess patient's lethality and potential for self-injury  - Engage patient in 1:1 interactions, daily, for a minimum of 15 minutes  - Encourage patient to express feelings, fears, frustrations, hopes  - Establish rapport/trust with patient   Outcome: Progressing  Goal: Refrain from harming self  Description: Interventions:  - Monitor patient closely, per order  - Develop a trusting relationship  - Supervise medication ingestion, monitor effects and side effects   Outcome: Progressing  Goal: Attend and participate in unit activities, including therapeutic, recreational, and educational groups  Description: Interventions:  - Provide therapeutic and educational activities daily, encourage attendance and participation, and document same in the medical record  - Obtain collateral information, encourage visitation and family involvement in care   Outcome: Progressing  Goal: Recognize maladaptive responses and adopt new coping mechanisms  Outcome: Progressing  Goal: Complete daily ADLs, including personal hygiene independently, as able  Description: Interventions:  - Observe, teach, and assist patient with ADLS  - Monitor and promote a balance of rest/activity, with adequate nutrition and elimination  Outcome: Progressing     Problem: Depression  Goal: Treatment Goal: Demonstrate behavioral control of depressive symptoms, verbalize feelings of improved mood/affect, and adopt new coping skills prior to discharge  Outcome: Progressing  Goal: Verbalize thoughts and feelings  Description: Interventions:  - Assess and re-assess patient's level of risk   - Engage patient in 1:1 interactions, daily, for a minimum of 15 minutes   - Encourage patient to express feelings, fears, frustrations, hopes   Outcome: Progressing  Goal: Refrain from harming self  Description: Interventions:  - Monitor patient closely, per order   - Supervise medication ingestion, monitor effects and side effects   Outcome: Progressing  Goal: Refrain from isolation  Description: Interventions:  - Develop a trusting relationship   - Encourage socialization   Outcome: Progressing  Goal: Refrain from self-neglect  Outcome: Progressing  Goal: Attend and participate in unit activities, including therapeutic, recreational, and educational groups  Description: Interventions:  - Provide therapeutic and educational activities daily, encourage attendance and participation, and document same in the medical record   Outcome: Progressing  Goal: Complete daily ADLs, including personal hygiene independently, as able  Description: Interventions:  - Observe, teach, and assist patient with ADLS  -  Monitor and promote a balance of rest/activity, with adequate nutrition and elimination   Outcome: Progressing     Problem: Anxiety  Goal: Anxiety is at manageable level  Description: Interventions:  - Assess and monitor patient's anxiety level  - Monitor for signs and symptoms (heart palpitations, chest pain, shortness of breath, headaches, nausea, feeling jumpy, restlessness, irritable, apprehensive)  - Collaborate with interdisciplinary team and initiate plan and interventions as ordered    - [place] patient to unit/surroundings  - Explain treatment plan  - Encourage participation in care  - Encourage verbalization of concerns/fears  - Identify coping mechanisms  - Assist in developing anxiety-reducing skills  - Administer/offer alternative therapies  - Limit or eliminate stimulants  Outcome: Progressing

## 2022-06-21 NOTE — NURSING NOTE
Pt visible on unit and social with peers and staff  Pleasant with this writer  Expresses hopefulness/acceptance of potential upcoming discharge  Med and meal compliant  Denies any unmet needs or complaints at this time  Will monitor

## 2022-06-22 ENCOUNTER — TELEPHONE (OUTPATIENT)
Dept: NEUROLOGY | Facility: CLINIC | Age: 42
End: 2022-06-22

## 2022-06-22 VITALS
HEIGHT: 66 IN | RESPIRATION RATE: 16 BRPM | DIASTOLIC BLOOD PRESSURE: 62 MMHG | OXYGEN SATURATION: 98 % | HEART RATE: 79 BPM | WEIGHT: 245 LBS | TEMPERATURE: 97.7 F | BODY MASS INDEX: 39.37 KG/M2 | SYSTOLIC BLOOD PRESSURE: 97 MMHG

## 2022-06-22 PROBLEM — Z00.8 MEDICAL CLEARANCE FOR PSYCHIATRIC ADMISSION: Status: RESOLVED | Noted: 2021-11-17 | Resolved: 2022-06-22

## 2022-06-22 PROCEDURE — 99238 HOSP IP/OBS DSCHRG MGMT 30/<: CPT | Performed by: STUDENT IN AN ORGANIZED HEALTH CARE EDUCATION/TRAINING PROGRAM

## 2022-06-22 RX ORDER — POTASSIUM CITRATE 10 MEQ/1
10 TABLET, EXTENDED RELEASE ORAL 2 TIMES DAILY WITH MEALS
Qty: 60 TABLET | Refills: 0 | Status: SHIPPED | OUTPATIENT
Start: 2022-06-22 | End: 2022-07-22

## 2022-06-22 RX ADMIN — TOPIRAMATE 25 MG: 25 TABLET, FILM COATED ORAL at 08:14

## 2022-06-22 RX ADMIN — POLYETHYLENE GLYCOL 3350 17 G: 17 POWDER, FOR SOLUTION ORAL at 08:13

## 2022-06-22 RX ADMIN — THIAMINE HCL TAB 100 MG 100 MG: 100 TAB at 08:14

## 2022-06-22 RX ADMIN — FOLIC ACID 1 MG: 1 TABLET ORAL at 08:14

## 2022-06-22 RX ADMIN — LORAZEPAM 0.5 MG: 0.5 TABLET ORAL at 08:14

## 2022-06-22 RX ADMIN — POTASSIUM CITRATE 10 MEQ: 10 TABLET ORAL at 08:14

## 2022-06-22 RX ADMIN — VENLAFAXINE HYDROCHLORIDE 225 MG: 75 CAPSULE, EXTENDED RELEASE ORAL at 08:14

## 2022-06-22 RX ADMIN — ARIPIPRAZOLE 5 MG: 5 TABLET ORAL at 08:13

## 2022-06-22 RX ADMIN — GABAPENTIN 600 MG: 300 CAPSULE ORAL at 08:14

## 2022-06-22 RX ADMIN — MAGNESIUM OXIDE TAB 400 MG (241.3 MG ELEMENTAL MG) 400 MG: 400 (241.3 MG) TAB at 08:13

## 2022-06-22 RX ADMIN — LEVOTHYROXINE SODIUM 50 MCG: 50 TABLET ORAL at 06:00

## 2022-06-22 RX ADMIN — LEVETIRACETAM 500 MG: 500 TABLET, FILM COATED ORAL at 08:14

## 2022-06-22 RX ADMIN — METHOCARBAMOL 1000 MG: 500 TABLET, FILM COATED ORAL at 04:30

## 2022-06-22 RX ADMIN — ACETAMINOPHEN 975 MG: 325 TABLET ORAL at 04:40

## 2022-06-22 NOTE — PLAN OF CARE
Pt to D/C today  Pt denies SI/HI/AVH  Pt oriented x3  Pt to d/c to [facility] via [facility]  Pt to follow up with [facility] upon return for psychiatry and therapy services  Paper Scripts provided upon discharge at request of [facility]      Discharge Address: [address] [facility] [address]  Phone:  [phone]    Problem: DISCHARGE PLANNING  Goal: Discharge to home or other facility with appropriate resources  Description: INTERVENTIONS:  - Identify barriers to discharge w/patient and caregiver  - Arrange for needed discharge resources and transportation as appropriate  - Identify discharge learning needs (meds, wound care, etc )  - Arrange for interpretive services to assist at discharge as needed  - Refer to Case Management Department for coordinating discharge planning if the patient needs post-hospital services based on physician/advanced practitioner order or complex needs related to functional status, cognitive ability, or social support system  Outcome: Completed

## 2022-06-22 NOTE — NURSING NOTE
Patient visible in the milieu  Interacting appropriately with peers and watching t v  Appears comfortable in the milieu  Pleasant, calm and cooperative  Compliant with morning medications  Lasix held due to BP not meeting parameters  Denies symptoms and is looking forward to discharge today

## 2022-06-22 NOTE — PLAN OF CARE
Problem: DISCHARGE PLANNING  Goal: Discharge to home or other facility with appropriate resources  Description: INTERVENTIONS:  - Identify barriers to discharge w/patient and caregiver  - Arrange for needed discharge resources and transportation as appropriate  - Identify discharge learning needs (meds, wound care, etc )  - Arrange for interpretive services to assist at discharge as needed  - Refer to Case Management Department for coordinating discharge planning if the patient needs post-hospital services based on physician/advanced practitioner order or complex needs related to functional status, cognitive ability, or social support system  Outcome: Completed     Problem: SELF HARM/SUICIDALITY  Goal: Will have no self-injury during hospital stay  Description: INTERVENTIONS:  - Q 15 MINUTES: Routine safety checks  - Q WAKING SHIFT & PRN: Assess risk to determine if routine checks are adequate to maintain patient safety  - Encourage patient to participate actively in care by formulating a plan to combat response to suicidal ideation, identify supports and resources  Outcome: Completed     Problem: DEPRESSION  Goal: Will be euthymic at discharge  Description: INTERVENTIONS:  - Administer medication as ordered  - Provide emotional support via 1:1 interaction with staff  - Encourage involvement in milieu/groups/activities  - Monitor for social isolation  Outcome: Completed     Problem: ANXIETY  Goal: Will report anxiety at manageable levels  Description: INTERVENTIONS:  - Administer medication as ordered  - Teach and encourage coping skills  - Provide emotional support  - Assess patient/family for anxiety and ability to cope  Outcome: Completed  Goal: By discharge: Patient will verbalize 2 strategies to deal with anxiety  Description: Interventions:  - Identify any obvious source/trigger to anxiety  - Staff will assist patient in applying identified coping technique/skills  - Encourage attendance of scheduled groups and activities  Outcome: Completed     Problem: SUBSTANCE USE/ABUSE  Goal: Will have no detox symptoms and will verbalize plan for changing substance-related behavior  Description: INTERVENTIONS:  - Monitor physical status and assess for symptoms of withdrawal  - Administer medication as ordered  - Provide emotional support with 1 on 1 interaction with staff  - Encourage recovery focused program/ addiction education  - Assess for verbalization of changing behaviors related to substance abuse  - Initiate consults and referrals as appropriate (Case Management, Spiritual Care, etc )  Outcome: Completed  Goal: By discharge, will develop insight into their chemical dependency and sustain motivation to continue in recovery  Description: INTERVENTIONS:  - Attends all daily group sessions and scheduled AA groups  - Actively practices coping skills through participation in the therapeutic community and adherence to program rules  - Reviews and completes assignments from individual treatment plan  - Assist patient development of understanding of their personal cycle of addiction and relapse triggers  Outcome: Completed  Goal: By discharge, patient will have ongoing treatment plan addressing chemical dependency  Description: INTERVENTIONS:  - Assist patient with resources and/or appointments for ongoing recovery based living  Outcome: Completed     Problem: Risk for Self Injury/Neglect  Goal: Treatment Goal: Remain safe during length of stay, learn and adopt new coping skills, and be free of self-injurious ideation, impulses and acts at the time of discharge  Outcome: Completed  Goal: Verbalize thoughts and feelings  Description: Interventions:  - Assess and re-assess patient's lethality and potential for self-injury  - Engage patient in 1:1 interactions, daily, for a minimum of 15 minutes  - Encourage patient to express feelings, fears, frustrations, hopes  - Establish rapport/trust with patient   Outcome: Completed  Goal: Refrain from harming self  Description: Interventions:  - Monitor patient closely, per order  - Develop a trusting relationship  - Supervise medication ingestion, monitor effects and side effects   Outcome: Completed  Goal: Attend and participate in unit activities, including therapeutic, recreational, and educational groups  Description: Interventions:  - Provide therapeutic and educational activities daily, encourage attendance and participation, and document same in the medical record  - Obtain collateral information, encourage visitation and family involvement in care   Outcome: Completed  Goal: Recognize maladaptive responses and adopt new coping mechanisms  Outcome: Completed  Goal: Complete daily ADLs, including personal hygiene independently, as able  Description: Interventions:  - Observe, teach, and assist patient with ADLS  - Monitor and promote a balance of rest/activity, with adequate nutrition and elimination  Outcome: Completed     Problem: Depression  Goal: Treatment Goal: Demonstrate behavioral control of depressive symptoms, verbalize feelings of improved mood/affect, and adopt new coping skills prior to discharge  Outcome: Completed  Goal: Verbalize thoughts and feelings  Description: Interventions:  - Assess and re-assess patient's level of risk   - Engage patient in 1:1 interactions, daily, for a minimum of 15 minutes   - Encourage patient to express feelings, fears, frustrations, hopes   Outcome: Completed  Goal: Refrain from harming self  Description: Interventions:  - Monitor patient closely, per order   - Supervise medication ingestion, monitor effects and side effects   Outcome: Completed  Goal: Refrain from isolation  Description: Interventions:  - Develop a trusting relationship   - Encourage socialization   Outcome: Completed  Goal: Refrain from self-neglect  Outcome: Completed  Goal: Attend and participate in unit activities, including therapeutic, recreational, and educational groups  Description: Interventions:  - Provide therapeutic and educational activities daily, encourage attendance and participation, and document same in the medical record   Outcome: Completed  Goal: Complete daily ADLs, including personal hygiene independently, as able  Description: Interventions:  - Observe, teach, and assist patient with ADLS  -  Monitor and promote a balance of rest/activity, with adequate nutrition and elimination   Outcome: Completed     Problem: Anxiety  Goal: Anxiety is at manageable level  Description: Interventions:  - Assess and monitor patient's anxiety level  - Monitor for signs and symptoms (heart palpitations, chest pain, shortness of breath, headaches, nausea, feeling jumpy, restlessness, irritable, apprehensive)  - Collaborate with interdisciplinary team and initiate plan and interventions as ordered    - [place] patient to unit/surroundings  - Explain treatment plan  - Encourage participation in care  - Encourage verbalization of concerns/fears  - Identify coping mechanisms  - Assist in developing anxiety-reducing skills  - Administer/offer alternative therapies  - Limit or eliminate stimulants  Outcome: Completed

## 2022-06-22 NOTE — TELEPHONE ENCOUNTER
Received vm from [address] [place] [address]  [phone] ext  [phone]  She needs to reschedule EMG appt for pt  Call [facility]  Spoke w/Eduarda  Transferred call to CS so she could reschedule EMG

## 2022-06-22 NOTE — NURSING NOTE
Patient has been In the TV room in the company of peers  She has made herself very comfortable and she is watching TV  She was cooperative with HS medications and recognises her medications and understands their purposes  She is anticipating discharge tomorrow  She denies S I H I  A/H V/H

## 2022-06-22 NOTE — PROGRESS NOTES
06/22/22 1011   Team Meeting   Meeting Type Daily Rounds   Team Members Present   Team Members Present Physician;Nurse;   Physician Team Member Dr Bhupinder Leos Team Member [address] Management Team Member Joshua   Patient/Family Present   Patient Present No   Patient's Family Present No   Pt is medication compliant, denying all psych symptoms  Discharge today
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
## 2022-06-26 PROBLEM — N83.201 CYST OF RIGHT OVARY: Status: ACTIVE | Noted: 2022-06-26

## 2022-06-26 NOTE — ASSESSMENT & PLAN NOTE
Axel Swanson appears comfortable in the office today, although anxious and worried about cancer  She declines any type of physical exam today  Reviewed imaging from 5/2022 with patient and 4/2021 imaging  Patient states she does not recall being told previously that she had an ovarian cyst       Given the prior ultrasound and the patients age, this is likely a benign cyst, most likely an endometrioma given prior characterization and patient's long history of painful menses  Reviewed with patient and her sister that imaging cannot completely rule out malignancy and she will need surgery both to remove the ovary and rule out malignancy  Given Tri's h/o bariatric surgery with follow up revision in the past, as well as her complex medical history and medical issues - I recommend she see Gyn Oncology and consider undergoing surgery at a tertiary care center  Although I feel there is a low likelihood of malignancy, surgery by Gyn Oncology is recommended to deal with any possible complications due to prior abdominal surgery  Discussed w/ Dr Yanelis Calvo and Magdaleno Lynn at his office, while patient was here in the office  Appt made with Dr  [place] for patient 6/30/2022 prior to her leaving the office  She has CT later this week and /2022  All questions answered and tried to reassure patient of plan of care

## 2022-06-27 ENCOUNTER — TELEPHONE (OUTPATIENT)
Dept: HEMATOLOGY ONCOLOGY | Facility: CLINIC | Age: 42
End: 2022-06-27

## 2022-06-27 NOTE — TELEPHONE ENCOUNTER
6/27/22 Spoke with pt  She lives in a nursing home  Nurse stated that she will have her labs done tomorrow

## 2022-06-29 ENCOUNTER — TELEPHONE (OUTPATIENT)
Dept: HEMATOLOGY ONCOLOGY | Facility: CLINIC | Age: 42
End: 2022-06-29

## 2022-06-29 ENCOUNTER — HOSPITAL ENCOUNTER (OUTPATIENT)
Dept: MRI IMAGING | Facility: HOSPITAL | Age: 42
Discharge: HOME | End: 2022-06-29
Payer: COMMERCIAL

## 2022-06-29 DIAGNOSIS — R19.09 OTHER INTRA-ABDOMINAL AND PELVIC SWELLING, MASS AND LUMP: ICD-10-CM

## 2022-06-29 PROCEDURE — G1004 CDSM NDSC: HCPCS

## 2022-06-29 PROCEDURE — 74183 MRI ABD W/O CNTR FLWD CNTR: CPT

## 2022-06-29 PROCEDURE — A9585 GADOBUTROL INJECTION: HCPCS | Performed by: NURSE PRACTITIONER

## 2022-06-29 PROCEDURE — 72197 MRI PELVIS W/O & W/DYE: CPT

## 2022-06-29 RX ADMIN — GADOBUTROL 11 ML: 604.72 INJECTION INTRAVENOUS at 15:32

## 2022-06-29 NOTE — PROGRESS NOTES
Hematology/Oncology Outpatient Follow- up Note  Kristen Hinton 1980, [id number]  7/1/2022        Chief Complaint   Patient presents with    Follow-up       HPI:  Kristen Hinton is a 46year old female with a history of eating disorder, PTSD, depression/anxiety currently residing at [address] [place] [address] who was referred to hematology for evaluation and management of microcytic, hypochromic anemia and low serum iron  Patient reports she has eating disorder  H/O anorexia and binge eating/over eating disorder that led to significant weight gain of 350lb  She underwent gastric bypass in 4/2007, required a revision in 2017  She states she has muscle weakness and difficulty walking secondary to her history of significant nutritional deficiency    Labs on file demonstrate a history of anemia dating back to at least 2020  Her Hgb has ranged between 8 4 and 11 with microcytic indices over the past two years  She was recently treated with IV Venofer resulting in normalization of her numbers  Previous Hematologic/ Oncologic History:    IV Venofer 200 mg x 8 doses (3/18-4/11/22)    Current Hematologic/ Oncologic Treatment:    Observation  B12 1000 mcg monthly at nursing facility       Interval History:   The patient presents for routine follow up  She was last seen on 3/14/22  She completed iron infusions on 4/11  She reports she tolerated iron infusions well  Most recent blood work completed on 5/24 was reviewed  She is no longer anemic  Her Hgb is improved to 13 9, MCV 88  White count and platelet count are normal  Iron panel was not drawn recently  MMA also missed  She does report improvement in symptoms since completing iron  She has been experiencing some abnormal bleeding and abdominal pain  Workup demonstrated evidence of 4 7 cm soft tissue density lesion in the right hemipelvis     MRI of abdomen and pelvis did show no evidence of active malignancy or mass in the abdomen  Pelvis:  5 2 cm O RADS category 3 right ovarian cyst, considered low risk for malignancy, approximately 5% positive predictive value for malignancy  She will be undergoing laparoscopic hysterectomy on 8/15         Test Results:    Imaging: CT abdomen pelvis wo contrast    Result Date: 6/13/2022  Narrative: CT ABDOMEN AND PELVIS WITHOUT IV CONTRAST INDICATION:   RLQ abdominal pain (Age >= 14y) Lower abdominal pain and questionable pelvic mass  COMPARISON:  4/5/2021  TECHNIQUE:  CT examination of the abdomen and pelvis was performed without intravenous contrast  This examination was performed without intravenous contrast in the context of the critical nationwide Omnipaque shortage  Axial, sagittal, and coronal 2D reformatted images were created from the source data and submitted for interpretation  Radiation dose length product (DLP) for this visit:  1118 64 mGy-cm   This examination, like all CT scans performed in the [facility], was performed utilizing techniques to minimize radiation dose exposure, including the use of iterative reconstruction and automated exposure control  Enteric contrast was administered  FINDINGS: ABDOMEN LOWER CHEST:  No clinically significant abnormality identified in the visualized lower chest  LIVER/BILIARY TREE:  Unremarkable  GALLBLADDER:  No calcified gallstones  No pericholecystic inflammatory change  SPLEEN:  Unremarkable  PANCREAS:  Unremarkable  ADRENAL GLANDS:  Unremarkable  KIDNEYS/URETERS:  Duplicated left collecting system, 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, with decreased but persistent hydronephrosis of the lower pole moiety    No hydronephrosis  STOMACH AND BOWEL:  Post gastric bypass  APPENDIX:  Appendiceal stump is noted    ABDOMINOPELVIC CAVITY:  No ascites  No pneumoperitoneum  No lymphadenopathy  VESSELS:  Unremarkable for patient's age  PELVIS REPRODUCTIVE ORGANS:  4 7 cm soft tissue density lesion in the right hemipelvis   Recommend pelvic sonogram in the appropriate clinical setting  Lajean Cassette URINARY BLADDER:  Unremarkable  ABDOMINAL WALL/INGUINAL REGIONS:  Unremarkable  OSSEOUS STRUCTURES:  No acute fracture or destructive osseous lesion  Impression: Duplicated left collecting system, 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, with decreased but persistent hydronephrosis of the lower pole moiety    No hydronephrosis  4 7 cm soft tissue density lesion in the right hemipelvis  Recommend pelvic sonogram in the appropriate clinical setting    Workstation performed: [id number]       Labs:   Lab Results   Component Value Date    WBC 5 95 06/17/2022    HGB 13 7 06/17/2022    HCT 45 0 06/17/2022    MCV 87 06/17/2022     06/17/2022     Lab Results   Component Value Date    K 3 6 06/17/2022     (H) 06/17/2022    CO2 24 06/17/2022    BUN 6 06/17/2022    CREATININE 0 61 06/17/2022    GLUCOSE 89 04/07/2021    GLUF 81 06/17/2022    CALCIUM 9 0 06/17/2022    CORRECTEDCA 9 2 09/20/2021    AST 18 06/17/2022    ALT 13 06/17/2022    ALKPHOS 200 (H) 06/17/2022    EGFR 114 06/17/2022           Review of Systems   Musculoskeletal: Positive for gait problem  Psychiatric/Behavioral: Positive for dysphoric mood  All other systems reviewed and are negative          Active Problems:   Patient Active Problem List   Diagnosis    Pituitary enlargement    Elevated alkaline phosphatase level    Hypothyroidism    History of gastric bypass    Vitamin D deficiency    Eating disorder; history of both anorexia and bulimia    Post-traumatic stress disorder, chronic    Major depressive disorder, recurrent, severe without psychotic features ([address] )    Laxative abuse    Generalized weakness    Anemia    Soft tissue lesion of pelvic region    Severe protein-calorie malnutrition ([address] )    Acute cystitis    Urinary retention    Vitamin B6 deficiency    Copper deficiency    Altered mental status    Hypokalemia    Hypomagnesemia    Bacteremia    Peripheral polyneuropathy    Partial idiopathic epilepsy with seizures of localized onset, not intractable, without status epilepticus ([facility]    Migraine    Muscle spasm    Non-insulin dependent type 2 diabetes mellitus ([facility]    BEAR (generalized anxiety disorder)    Obesity (BMI 30-39  9)    Tobacco use    Abnormal finding on CT scan    Intertrigo    Cyst of right ovary       Past Medical History:   Past Medical History:   Diagnosis Date    Acute cystitis     Anemia     Anxiety     Bacteremia     Copper deficiency     Disease of thyroid gland     Eating disorder     Elevated alkaline phosphatase level     Generalized weakness     Head injury     History of bariatric surgery     History of drug abuse ([facility] )     History of enlargement of pituitary gland     Hypothyroidism     Migraine without aura     Panic attack     Papanicolaou smear     Peripheral neuropathy     PTSD (post-traumatic stress disorder)     Seizures ([facility] )     Self-injurious behavior     Sepsis ([facility]     Soft tissue lesion of pelvic region     Type 2 diabetes mellitus (HCC)     Type 2, developed after 2nd bariatric surgery    Urinary retention     Vitamin B6 deficiency     Vitamin D deficiency        Surgical History:   Past Surgical History:   Procedure Laterality Date    ANTERIOR CRUCIATE LIGAMENT REPAIR Left     CHOLECYSTECTOMY      2003    COLONOSCOPY      GASTRIC BYPASS      Pebbles en Y 2007, 2016 had pouch ulcers and revision performed    IR PICC PLACEMENT SINGLE LUMEN  04/16/2021    IR TEMPORARY DIALYSIS CATHETER PLACEMENT  04/08/2021    TONSILLECTOMY      UPPER GASTROINTESTINAL ENDOSCOPY         Family History:    Family History   Problem Relation Age of Onset    Hypertension Mother     Heart attack Mother     No Known Problems Father     Lung cancer Maternal Grandmother     Hypothyroidism Maternal Grandmother     Breast cancer Maternal Grandmother         66's    Colon cancer Maternal Grandfather     Diabetes type II Paternal Grandmother     Diabetes type II Paternal Grandfather        Cancer-related family history includes Breast cancer in her maternal grandmother; Colon cancer in her maternal grandfather; Lung cancer in her maternal grandmother  Social History:   Social History     Socioeconomic History    Marital status: Single     Spouse name: Not on file    Number of children: Not on file    Years of education: Not on file    Highest education level: Not on file   Occupational History    Not on file   Tobacco Use    Smoking status: Current Every Day Smoker     Packs/day: 1 50     Years: 17 00     Pack years: 25 50     Types: Cigarettes    Smokeless tobacco: Never Used    Tobacco comment: had quit in 2021, restarted     Vaping Use    Vaping Use: Never used   Substance and Sexual Activity    Alcohol use: Never    Drug use: Yes     Types: Marijuana     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    Sexual activity: Not Currently     Birth control/protection: Condom Male   Other Topics Concern    Not on file   Social History Narrative    Not on file     Social Determinants of Health     Financial Resource Strain: Not on file   Food Insecurity: Not on file   Transportation Needs: Not on file   Physical Activity: Not on file   Stress: Not on file   Social Connections: Not on file   Intimate Partner Violence: Not on file   Housing Stability: Not on file       Current Medications:   Current Outpatient Medications   Medication Sig Dispense Refill    acetaminophen (TYLENOL) 325 mg tablet Take 650 mg by mouth every 4 (four) hours as needed for mild pain or fever      AquaLance Lancets 30G MISC       ARIPiprazole (ABILIFY) 5 mg tablet Take 1 tablet (5 mg total) by mouth daily 30 tablet 0    bisacodyl (DULCOLAX) 10 mg suppository Insert 10 mg into the rectum as needed for constipation      Continuous Blood Gluc Sensor (FREESTYLE CAITY 14 DAY SENSOR) MISC 1 application by Does not apply route every 14 (fourteen) days 1 each 6    cyanocobalamin 1,000 mcg/mL inject 1 milliliter ( 1000 MCG ) intramuscularly Every Month      ergocalciferol (VITAMIN D2) 50,000 units Take 1 capsule (50,000 Units total) by mouth 2 (two) times a week Monday and Thursday      eszopiclone (LUNESTA) 3 MG tablet Take 3 mg by mouth daily at bedtime as needed for sleep Take immediately before bedtime      folic acid (FOLVITE) 1 mg tablet Take 1 tablet (1 mg total) by mouth daily 30 tablet 0    furosemide (LASIX) 20 mg tablet Take 1 tablet by mouth in the morning      gabapentin (NEURONTIN) 300 mg capsule Take 2 capsules (600 mg total) by mouth 4 (four) times a day 240 capsule 0    ibuprofen (MOTRIN) 400 mg tablet       levETIRAcetam (KEPPRA) 500 mg tablet Take 1 tablet (500 mg total) by mouth every 12 (twelve) hours 60 tablet 0    levothyroxine 50 mcg tablet Take 1 tablet (50 mcg total) by mouth daily in the early morning 30 tablet 0    LORazepam (ATIVAN) 0 5 mg tablet Take 1 tablet (0 5 mg total) by mouth 2 (two) times a day 60 tablet 0    LORazepam (ATIVAN) 1 mg tablet Take 1 tablet (1 mg total) by mouth daily at bedtime 30 tablet 0    Lotemax 0 5 % ophth gel Administer to the right eye 4 (four) times a day      magnesium oxide (MAG-OX) 400 mg Take 1 tablet (400 mg total) by mouth 2 (two) times a day 60 tablet 0    methocarbamol (ROBAXIN) 500 mg tablet Take 2 tablets (1,000 mg total) by mouth every 8 (eight) hours as needed for muscle spasms 30 tablet 0    nystatin (MYCOSTATIN) powder Apply topically 2 (two) times a day 15 g 0    ondansetron (ZOFRAN) 4 mg tablet Take 4 mg by mouth every 8 (eight) hours as needed for nausea or vomiting      polyethylene glycol (MIRALAX) 17 g packet Take 17 g by mouth daily as needed (constipation refractory to Senokot-S) 17 g 0    potassium citrate (UROCIT-K) 10 mEq Take 1 tablet (10 mEq total) by mouth 2 (two) times a day with meals 60 tablet 0    prazosin (MINIPRESS) 2 mg capsule Take 2 capsules (4 mg total) by mouth daily at bedtime 60 capsule 0    thiamine (VITAMIN B1) 100 mg tablet Take 1 tablet (100 mg total) by mouth daily 30 tablet 0    topiramate (TOPAMAX) 25 mg tablet Take 1 tablet (25 mg total) by mouth 2 (two) times a day 60 tablet 0    traZODone (DESYREL) 100 mg tablet Take 1 tablet (100 mg total) by mouth daily at bedtime 30 tablet 0    venlafaxine 225 MG TB24 24 hr tablet        No current facility-administered medications for this visit  Allergies: Allergies   Allergen Reactions    Anti-Hist [Diphenhydramine] Hyperactivity     Patient stated any antihistamine (hydroxyzine, diphenhydramine, etc ) causes extreme excitation, energy and lack of sleep  (Paradoxical reaction )     Bee Venom Anaphylaxis, Anxiety, Eye Swelling, Facial Swelling, Hives, Itching, Lip Swelling, Shortness Of Breath, Swelling, Throat Swelling and Tongue Swelling    Buspar [Buspirone] Other (See Comments)     Per patient - hemorrhage and feeling like they were being electrocuted     Haldol [Haloperidol] Other (See Comments)     Acute Dystonia - resolved with IM Benztropine     Zoloft [Sertraline] Other (See Comments)     Serotonin Syndrome (patient - per own report - stated they had to be hospitalized due to severe flu like symptoms and fever 1 week post taking Zoloft) - patient DID tolerate Fluoxetine, Celexa and Lexapro)     Penicillins Throat Swelling    Pennsaid [Diclofenac Sodium]        Physical Exam:  /76 (BP Location: Left arm, Patient Position: Sitting, Cuff Size: Adult)   Pulse 74   Temp 98 °F (36 7 °C) (Temporal)   Resp 14   Ht 5' 6" (1 676 m)   LMP 06/05/2022 (Approximate) Comment: irregular   SpO2 96%   BMI 39 54 kg/m²   Body surface area is 2 18 meters squared  Wt Readings from Last 3 Encounters:   06/13/22 111 kg (245 lb)   03/14/22 109 kg (240 lb)   11/15/21 109 kg (240 lb)           Physical Exam  Constitutional:       General: She is not in acute distress  Appearance: Normal appearance  She is obese     HENT: Head: Normocephalic and atraumatic  Eyes:      General: No scleral icterus  Right eye: No discharge  Left eye: No discharge  Conjunctiva/sclera: Conjunctivae normal    Cardiovascular:      Rate and Rhythm: Normal rate and regular rhythm  Pulmonary:      Effort: Pulmonary effort is normal  No respiratory distress  Breath sounds: Normal breath sounds  Chest:   Breasts:      Right: No axillary adenopathy or supraclavicular adenopathy  Left: No axillary adenopathy or supraclavicular adenopathy  Abdominal:      General: Bowel sounds are normal  There is no distension  Palpations: Abdomen is soft  There is no mass  Tenderness: There is no abdominal tenderness  Musculoskeletal:         General: Normal range of motion  Lymphadenopathy:      Cervical: No cervical adenopathy  Upper Body:      Right upper body: No supraclavicular, axillary or pectoral adenopathy  Left upper body: No supraclavicular, axillary or pectoral adenopathy  Skin:     General: Skin is warm and dry  Neurological:      General: No focal deficit present  Mental Status: She is alert and oriented to person, place, and time  Psychiatric:         Mood and Affect: Mood normal          Behavior: Behavior normal          Assessment / Plan:    1  Iron deficiency anemia secondary to inadequate dietary iron intake      The patient is a 46year old female with significant psychiatric history including eating disorder and h/o gastric bypass which has contributed to malabsorption issues  She has evidence of hypochromic, microcytic anemia dating back several years  At presentation in March, she was noted to be iron deficient and was set up to receive IV Venofer 200 mg twice a week for 8 doses  She already receives B12 1000 mcg IM monthly at her nursing facility  Her most recent blood work does demonstrate resolution of anemia  Her hemoglobin is 13 1, MCV 88    Unfortunately, iron panel was not drawn but I suspected that her serum iron has also improved now that she is no longer anemic  Patient is feeling much better  I will keep her on observation at this time  I will repeat blood work including CBC, CMP, full iron panel, MMA and she will return for a follow-up visit in 3 months to review  Given her history of malabsorption she is at risk for recurrent iron deficiency  Patient verbalized understanding was in agreement with this plan of care  I will see her back in 3 months  These recommendations were written down for her nursing facility with instructions to call with any questions or concerns  Barriers: None  Patient  able to self care  Portions of the record may have been created with voice recognition software  Occasional wrong word or "sound a like" substitutions may have occurred due to the inherent limitations of voice recognition software  Read the chart carefully and recognize, using context, where substitutions have occurred

## 2022-06-29 NOTE — TELEPHONE ENCOUNTER
6/29/22 Spoke with nurse at home and she stated that pt had labs drawn at facility and that she would be bringing them with her to appt

## 2022-06-30 ENCOUNTER — CONSULT (OUTPATIENT)
Dept: GYNECOLOGIC ONCOLOGY | Facility: HOSPITAL | Age: 42
End: 2022-06-30
Payer: COMMERCIAL

## 2022-06-30 VITALS
HEIGHT: 66 IN | SYSTOLIC BLOOD PRESSURE: 130 MMHG | BODY MASS INDEX: 39.54 KG/M2 | DIASTOLIC BLOOD PRESSURE: 80 MMHG | TEMPERATURE: 98.2 F

## 2022-06-30 DIAGNOSIS — N83.201 CYST OF RIGHT OVARY: ICD-10-CM

## 2022-06-30 PROCEDURE — 99205 OFFICE O/P NEW HI 60 MIN: CPT | Performed by: OBSTETRICS & GYNECOLOGY

## 2022-06-30 RX ORDER — ACETAMINOPHEN 325 MG/1
975 TABLET ORAL ONCE
Status: CANCELLED | OUTPATIENT
Start: 2022-06-30 | End: 2022-06-30

## 2022-06-30 RX ORDER — VENLAFAXINE HYDROCHLORIDE 225 MG/1
TABLET, EXTENDED RELEASE ORAL
COMMUNITY
Start: 2022-06-22

## 2022-06-30 RX ORDER — HEPARIN SODIUM 5000 [USP'U]/ML
5000 INJECTION, SOLUTION INTRAVENOUS; SUBCUTANEOUS
Status: CANCELLED | OUTPATIENT
Start: 2022-07-01 | End: 2022-07-02

## 2022-06-30 RX ORDER — SODIUM CHLORIDE, SODIUM LACTATE, POTASSIUM CHLORIDE, CALCIUM CHLORIDE 600; 310; 30; 20 MG/100ML; MG/100ML; MG/100ML; MG/100ML
125 INJECTION, SOLUTION INTRAVENOUS CONTINUOUS
Status: CANCELLED | OUTPATIENT
Start: 2022-06-30

## 2022-06-30 RX ORDER — CLINDAMYCIN PHOSPHATE 900 MG/50ML
900 INJECTION INTRAVENOUS ONCE
Status: CANCELLED | OUTPATIENT
Start: 2022-06-30 | End: 2022-06-30

## 2022-06-30 NOTE — PATIENT INSTRUCTIONS
1  Nothing to eat or drink after midnight prior to the operation  You are allowed clear liquids until 3 hours prior to the scheduled start time of surgery  No milk products or solid food for 8 hours prior to surgery  2   Please avoid ibuprofen, aspirin, fish oil for 7 days prior to surgery  3  You may take all of your medications the morning of surgery with a sip of water with the exception of your Lasix  Please do not take Lasix the morning of surgery

## 2022-06-30 NOTE — ASSESSMENT & PLAN NOTE
53-year-old para 0 with abnormal uterine bleeding, significant dysmenorrhea, history of anemia, 5 2 cm homogeneous appearing right adnexal mass, ORAD category 3 by MRI, surgical history notable for Pebbles-en-Y gastric bypass followed by sleeve gastrectomy, laparoscopic cholecystectomy  She has a history of opioid dependence, PTSD and ambulates with the assistance of a walker  She does not desire future fertility  I reviewed her CBC, CMP, hemoglobin A1c, TSH, CT of abdomen pelvis images, MRI images  She declined physical examination  Her performance status is 2     1  I reviewed the imaging findings in detail  I discussed that this is likely a benign lesion in the right ovary  2  I reviewed treatment options for her right adnexal mass, pelvic pain, abnormal uterine bleeding, dysmenorrhea  Given that she does not desire future fertility, commended robotic assisted total laparoscopic hysterectomy, bilateral salpingectomy, right oophorectomy with possible conversion to exploratory laparotomy and all other indicated procedures  She understands the risks and benefits of the operation and agrees to proceed as outlined  Consent for surgery was obtained by me in the office  3  The alternative to surgery would be ongoing observation and use hormonal manipulation for abnormal uterine bleeding  4  We discussed perioperative medication management  Thank you for the courtesy of this consultation  All questions were answered by the end of the visit

## 2022-07-01 ENCOUNTER — OFFICE VISIT (OUTPATIENT)
Dept: HEMATOLOGY ONCOLOGY | Facility: HOSPITAL | Age: 42
End: 2022-07-01
Payer: COMMERCIAL

## 2022-07-01 VITALS
BODY MASS INDEX: 39.54 KG/M2 | HEIGHT: 66 IN | SYSTOLIC BLOOD PRESSURE: 126 MMHG | TEMPERATURE: 98 F | DIASTOLIC BLOOD PRESSURE: 76 MMHG | RESPIRATION RATE: 14 BRPM | OXYGEN SATURATION: 96 % | HEART RATE: 74 BPM

## 2022-07-01 DIAGNOSIS — D50.8 IRON DEFICIENCY ANEMIA SECONDARY TO INADEQUATE DIETARY IRON INTAKE: Primary | ICD-10-CM

## 2022-07-01 PROCEDURE — 99214 OFFICE O/P EST MOD 30 MIN: CPT | Performed by: NURSE PRACTITIONER

## 2022-07-02 NOTE — PROGRESS NOTES
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, laparoscopic cholecystectomy  She has a history of opioid dependence, PTSD and ambulates with the assistance of a walker  She does not desire future fertility  I reviewed her CBC, CMP, hemoglobin A1c, TSH, CT of abdomen pelvis images, MRI images  She declined physical examination  Her performance status is 2     1  I reviewed the imaging findings in detail  I discussed that this is likely a benign lesion in the right ovary  2  I reviewed treatment options for her right adnexal mass, pelvic pain, abnormal uterine bleeding, dysmenorrhea  Given that she does not desire future fertility, commended robotic assisted total laparoscopic hysterectomy, bilateral salpingectomy, right oophorectomy with possible conversion to exploratory laparotomy and all other indicated procedures  She understands the risks and benefits of the operation and agrees to proceed as outlined  Consent for surgery was obtained by me in the office  3  The alternative to surgery would be ongoing observation and use hormonal manipulation for abnormal uterine bleeding  4  We discussed perioperative medication management  Thank you for the courtesy of this consultation  All questions were answered by the end of the visit               Relevant Orders    Case request operating room: HYSTERECTOMY LAPAROSCOPIC TOTAL ([address] [place] [address]) W/ ROBOTICS (Completed)    Type and screen    Comprehensive metabolic panel    CBC and Platelet    Protime-INR    TSH    EKG 12 lead    XR chest pa & lateral              CHIEF COMPLAINT:  Right adnexal mass, dysmenorrhea, abnormal uterine bleeding, anemia        Patient ID: Casey Dugan is a 46 y o  female  82-year-old para 0 who does not desire future fertility with a 4 7 cm right adnexal mass, abnormal uterine bleeding with bleeding between cycles, dysmenorrhea that can be debilitating  She was initially referred by Dr Jessica Ortiz to discuss treatment options and had an interim psychiatric admission from 6/16/2022 to 6/22/2022  CT of the abdomen pelvis with contrast on 6/13/2022 revealed a 4 7 cm right adnexal mass  There was no evidence of distant disease  I reviewed the images  She then had MRI of the pelvis on 6/29/2022 which I also reviewed  The right adnexal mass was mainly simple but there was minimal mural nodularity  By MRI, the cyst was ORAD category 3  I reviewed her labs from 6/17/22  She had anemia which resolved  Review of Systems   Constitutional: Negative for activity change and unexpected weight change  HENT: Negative  Eyes: Negative  Respiratory: Negative  Cardiovascular: Negative  Gastrointestinal: Positive for abdominal pain  Negative for abdominal distention  Endocrine: Negative  Genitourinary: Positive for menstrual problem and pelvic pain  Negative for vaginal bleeding  Musculoskeletal: Negative  Skin: Negative  Allergic/Immunologic: Negative  Neurological: Negative  Hematological: Negative  Psychiatric/Behavioral: The patient is nervous/anxious          Current Outpatient Medications   Medication Sig Dispense Refill    acetaminophen (TYLENOL) 325 mg tablet Take 650 mg by mouth every 4 (four) hours as needed for mild pain or fever      AquaLance Lancets 30G MISC       ARIPiprazole (ABILIFY) 5 mg tablet Take 1 tablet (5 mg total) by mouth daily 30 tablet 0    bisacodyl (DULCOLAX) 10 mg suppository Insert 10 mg into the rectum as needed for constipation      Continuous Blood Gluc Sensor (FREESTYLE CAITY 14 DAY SENSOR) MISC 1 application by Does not apply route every 14 (fourteen) days 1 each 6    cyanocobalamin 1,000 mcg/mL inject 1 milliliter ( 1000 MCG ) intramuscularly Every Month      ergocalciferol (VITAMIN D2) 50,000 units Take 1 capsule (50,000 Units total) by mouth 2 (two) times a week Monday and Thursday      eszopiclone (LUNESTA) 3 MG tablet Take 3 mg by mouth daily at bedtime as needed for sleep Take immediately before bedtime      folic acid (FOLVITE) 1 mg tablet Take 1 tablet (1 mg total) by mouth daily 30 tablet 0    furosemide (LASIX) 20 mg tablet Take 1 tablet by mouth in the morning      gabapentin (NEURONTIN) 300 mg capsule Take 2 capsules (600 mg total) by mouth 4 (four) times a day 240 capsule 0    ibuprofen (MOTRIN) 400 mg tablet       levETIRAcetam (KEPPRA) 500 mg tablet Take 1 tablet (500 mg total) by mouth every 12 (twelve) hours 60 tablet 0    levothyroxine 50 mcg tablet Take 1 tablet (50 mcg total) by mouth daily in the early morning 30 tablet 0    LORazepam (ATIVAN) 0 5 mg tablet Take 1 tablet (0 5 mg total) by mouth 2 (two) times a day 60 tablet 0    LORazepam (ATIVAN) 1 mg tablet Take 1 tablet (1 mg total) by mouth daily at bedtime 30 tablet 0    Lotemax 0 5 % ophth gel Administer to the right eye 4 (four) times a day      magnesium oxide (MAG-OX) 400 mg Take 1 tablet (400 mg total) by mouth 2 (two) times a day 60 tablet 0    methocarbamol (ROBAXIN) 500 mg tablet Take 2 tablets (1,000 mg total) by mouth every 8 (eight) hours as needed for muscle spasms 30 tablet 0    nystatin (MYCOSTATIN) powder Apply topically 2 (two) times a day 15 g 0    ondansetron (ZOFRAN) 4 mg tablet Take 4 mg by mouth every 8 (eight) hours as needed for nausea or vomiting      polyethylene glycol (MIRALAX) 17 g packet Take 17 g by mouth daily as needed (constipation refractory to Senokot-S) 17 g 0    potassium citrate (UROCIT-K) 10 mEq Take 1 tablet (10 mEq total) by mouth 2 (two) times a day with meals 60 tablet 0    prazosin (MINIPRESS) 2 mg capsule Take 2 capsules (4 mg total) by mouth daily at bedtime 60 capsule 0    thiamine (VITAMIN B1) 100 mg tablet Take 1 tablet (100 mg total) by mouth daily 30 tablet 0    topiramate (TOPAMAX) 25 mg tablet Take 1 tablet (25 mg total) by mouth 2 (two) times a day 60 tablet 0    traZODone (DESYREL) 100 mg tablet Take 1 tablet (100 mg total) by mouth daily at bedtime 30 tablet 0    venlafaxine 225 MG TB24 24 hr tablet        No current facility-administered medications for this visit  Allergies   Allergen Reactions    Anti-Hist [Diphenhydramine] Hyperactivity     Patient stated any antihistamine (hydroxyzine, diphenhydramine, etc ) causes extreme excitation, energy and lack of sleep   (Paradoxical reaction )     Bee Venom Anaphylaxis, Anxiety, Eye Swelling, Facial Swelling, Hives, Itching, Lip Swelling, Shortness Of Breath, Swelling, Throat Swelling and Tongue Swelling    Buspar [Buspirone] Other (See Comments)     Per patient - hemorrhage and feeling like they were being electrocuted     Haldol [Haloperidol] Other (See Comments)     Acute Dystonia - resolved with IM Benztropine     Zoloft [Sertraline] Other (See Comments)     Serotonin Syndrome (patient - per own report - stated they had to be hospitalized due to severe flu like symptoms and fever 1 week post taking Zoloft) - patient DID tolerate Fluoxetine, Celexa and Lexapro)     Penicillins Throat Swelling    Pennsaid [Diclofenac Sodium]        Past Medical History:   Diagnosis Date    Acute cystitis     Anemia     Anxiety     Bacteremia     Copper deficiency     Disease of thyroid gland     Eating disorder     Elevated alkaline phosphatase level     Generalized weakness     Head injury     History of bariatric surgery     History of drug abuse ([facility] )     History of enlargement of pituitary gland     Hypothyroidism     Migraine without aura     Panic attack     Papanicolaou smear     Peripheral neuropathy     PTSD (post-traumatic stress disorder)     Seizures ([facility] )     Self-injurious behavior     [facility]epsis [facility]     Soft tissue lesion of pelvic region     Type 2 diabetes mellitus (HCC)     Type 2, developed after 2nd bariatric surgery    Urinary retention     Vitamin B6 deficiency     Vitamin D deficiency        Past Surgical History:   Procedure Laterality Date    ANTERIOR CRUCIATE LIGAMENT REPAIR Left     CHOLECYSTECTOMY          COLONOSCOPY      GASTRIC BYPASS      Pebbles en Y , 2016 had pouch ulcers and revision performed    IR PICC PLACEMENT SINGLE LUMEN  2021    IR TEMPORARY DIALYSIS CATHETER PLACEMENT  2021    TONSILLECTOMY      UPPER GASTROINTESTINAL ENDOSCOPY         OB History        0    Para   0    Term   0       0    AB   0    Living   0       SAB   0    IAB   0    Ectopic   0    Multiple   0    Live Births   0                 Family History   Problem Relation Age of Onset    Hypertension Mother     Heart attack Mother     No Known Problems Father     Lung cancer Maternal Grandmother     Hypothyroidism Maternal Grandmother     Breast cancer Maternal Grandmother         66's    Colon cancer Maternal Grandfather     Diabetes type II Paternal Grandmother     Diabetes type II Paternal Grandfather        The following portions of the patient's history were reviewed and updated as appropriate: allergies, current medications, past family history, past medical history, past social history, past surgical history and problem list       Objective:    Blood pressure 130/80, temperature 98 2 °F (36 8 °C), temperature source Tympanic, height 5' 6" (1 676 m), last menstrual period 2022, not currently breastfeeding  Body mass index is 39 54 kg/m²  She declined a physical examination including pelvic examination  Physical Exam  Vitals reviewed  Constitutional:       General: She is not in acute distress  Appearance: Normal appearance  She is obese  She is not ill-appearing, toxic-appearing or diaphoretic     HENT: Head: Normocephalic and atraumatic  Eyes:      General: No scleral icterus  Right eye: No discharge  Left eye: No discharge  Conjunctiva/sclera: Conjunctivae normal    Pulmonary:      Effort: Pulmonary effort is normal    Skin:     General: Skin is dry  Coloration: Skin is not jaundiced  Findings: No rash  Neurological:      General: No focal deficit present  Mental Status: She is alert and oriented to person, place, and time  Motor: No weakness  Gait: Gait normal    Psychiatric:         Behavior: Behavior normal          Thought Content:  Thought content normal          Judgment: Judgment normal       Comments: Anxious              No results found for:   Lab Results   Component Value Date    WBC 5 95 06/17/2022    HGB 13 7 06/17/2022    HCT 45 0 06/17/2022    MCV 87 06/17/2022     06/17/2022     Lab Results   Component Value Date    K 3 6 06/17/2022     (H) 06/17/2022    CO2 24 06/17/2022    BUN 6 06/17/2022    CREATININE 0 61 06/17/2022    GLUCOSE 89 04/07/2021    GLUF 81 06/17/2022    CALCIUM 9 0 06/17/2022    CORRECTEDCA 9 2 09/20/2021    AST 18 06/17/2022    ALT 13 06/17/2022    ALKPHOS 200 (H) 06/17/2022    EGFR 114 06/17/2022        Trend:  No results found for:

## 2022-07-03 ENCOUNTER — PATIENT MESSAGE (OUTPATIENT)
Dept: GYNECOLOGIC ONCOLOGY | Facility: HOSPITAL | Age: 42
End: 2022-07-03

## 2022-07-05 ENCOUNTER — TELEPHONE (OUTPATIENT)
Dept: GYNECOLOGIC ONCOLOGY | Facility: CLINIC | Age: 42
End: 2022-07-05

## 2022-07-05 NOTE — TELEPHONE ENCOUNTER
Call placed to patient  All questions answered  ----- Message from Tanja Roberson sent at 7/5/2022  7:58 AM EDT -----  Regarding: FW: Hi    ----- Message -----  From: Millie Hughes  Sent: 7/3/2022   7:07 PM EDT  To: [facility] [place] [facility]  Subject: Hi                                               Hi I was wondering if you could give me a call  I just have a bunch of questions about the procedure of coming up   They're just some random ones and I'm just really anxious and I would hope that you could put my mind at ease I'd be a really great thank you and hope you have a great rest of your day and week uSnil Lassiter

## 2022-07-28 ENCOUNTER — TELEPHONE (OUTPATIENT)
Dept: GYNECOLOGIC ONCOLOGY | Facility: CLINIC | Age: 42
End: 2022-07-28

## 2022-07-28 NOTE — TELEPHONE ENCOUNTER
Patient called in regarding her upcoming surgery with Dr Lisa Ash  Please Advise   Thank you     [phone]- Dorian Deleon

## 2022-07-29 ENCOUNTER — TELEPHONE (OUTPATIENT)
Dept: GYNECOLOGIC ONCOLOGY | Facility: CLINIC | Age: 42
End: 2022-07-29

## 2022-07-29 NOTE — TELEPHONE ENCOUNTER
Call placed to patient after discussion with Dr Fatou Garcia  If patient decides to cancel surgery, would plan for 3 mo f/u u/s in September  Patient is requesting further discussion with Dr Fatou Garcia  Regarding: Hi  ----- Message from Gio Hutson sent at 7/28/2022  2:18 PM EDT -----       ----- Message from Alberto Cantrell to Fab Busch MD sent at 7/28/2022  2:12 PM -----   Can I please cancel this surgery? What would happen if I didn't get it?      ----- Message -----       Luiz Ernandez       Sent:7/3/2022  7:07 PM EDT         To:Christian Casper MD    Subject:Hi    Hi I was wondering if you could give me a call  I just have a bunch of questions about the procedure of coming up   They're just some random ones and I'm just really anxious and I would hope that you could put my mind at ease I'd be a really great thank you and hope you have a great rest of your day and week Nataly Lopez

## 2022-07-29 NOTE — TELEPHONE ENCOUNTER
Called and spoke with patient regarding surgery  Patient not looking to cancel she says only wants to push surgery out a little bit  Would like a call from Dr Kilo Villanueva  Emailed provider to call patient back  Regarding: Hi  ----- Message from Starling Emerald sent at 7/28/2022  2:18 PM EDT -----       ----- Message from David Nielsen to Denver Kitten, MD sent at 7/28/2022  2:12 PM -----   Can I please cancel this surgery? What would happen if I didn't get it?      ----- Message -----       Maxine Rodriguez       Sent:7/3/2022  7:07 PM EDT         To:Christian Fischer MD    Subject:Hi    Hi I was wondering if you could give me a call  I just have a bunch of questions about the procedure of coming up   They're just some random ones and I'm just really anxious and I would hope that you could put my mind at ease I'd be a really great thank you and hope you have a great rest of your day and week Elicia Hodgkins

## 2022-08-02 ENCOUNTER — OFFICE VISIT (OUTPATIENT)
Dept: ENDOCRINOLOGY | Facility: HOSPITAL | Age: 42
End: 2022-08-02
Payer: COMMERCIAL

## 2022-08-02 VITALS — BODY MASS INDEX: 39.54 KG/M2 | SYSTOLIC BLOOD PRESSURE: 118 MMHG | HEIGHT: 66 IN | DIASTOLIC BLOOD PRESSURE: 80 MMHG

## 2022-08-02 DIAGNOSIS — E11.9 NON-INSULIN DEPENDENT TYPE 2 DIABETES MELLITUS (HCC): ICD-10-CM

## 2022-08-02 DIAGNOSIS — Z98.84 HISTORY OF GASTRIC BYPASS: ICD-10-CM

## 2022-08-02 DIAGNOSIS — Z86.018 HISTORY OF PITUITARY ADENOMA: ICD-10-CM

## 2022-08-02 DIAGNOSIS — E21.3 HYPERPARATHYROIDISM (HCC): Primary | ICD-10-CM

## 2022-08-02 DIAGNOSIS — E16.2 HYPOGLYCEMIA: ICD-10-CM

## 2022-08-02 DIAGNOSIS — E03.9 HYPOTHYROIDISM, UNSPECIFIED TYPE: ICD-10-CM

## 2022-08-02 DIAGNOSIS — E55.9 VITAMIN D DEFICIENCY: ICD-10-CM

## 2022-08-02 PROCEDURE — 99214 OFFICE O/P EST MOD 30 MIN: CPT | Performed by: NURSE PRACTITIONER

## 2022-08-02 RX ORDER — FLASH GLUCOSE SCANNING READER
1 EACH MISCELLANEOUS
Qty: 1 EACH | Refills: 0 | Status: SHIPPED | OUTPATIENT
Start: 2022-08-02

## 2022-08-02 RX ORDER — FLASH GLUCOSE SENSOR
1 KIT MISCELLANEOUS
Qty: 2 EACH | Refills: 6 | Status: SHIPPED | OUTPATIENT
Start: 2022-08-02

## 2022-08-02 RX ORDER — LANOLIN ALCOHOL/MO/W.PET/CERES
CREAM (GRAM) TOPICAL
COMMUNITY
Start: 2022-07-09

## 2022-08-02 RX ORDER — VARENICLINE TARTRATE 1 MG/1
TABLET, FILM COATED ORAL
COMMUNITY
Start: 2022-07-13

## 2022-08-02 NOTE — PATIENT INSTRUCTIONS
Be mindful of diet  Stay active and stay hydrated  Utilize [employer] for surveillance of blood sugar readings  For your hypothyroidism, please continue levothyroxine at current dose  Take Vitamin D 2000 units daily  Obtain pituitary lab work and MRI, when possible  We will contact you with results
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
## 2022-08-03 DIAGNOSIS — N83.201 CYST OF RIGHT OVARY: Primary | ICD-10-CM

## 2022-08-03 NOTE — PROGRESS NOTES
Patient contacted the office to postpone surgery  As per prior discussion, follow-up imaging is reasonable based on the MRI characteristics of the cyst   I ordered a follow-up ultrasound at the end of September  She was encouraged to call the office to discuss hormonal management of her abnormal uterine bleeding  Please see the patient message dated today as well

## 2022-08-25 ENCOUNTER — TELEPHONE (OUTPATIENT)
Dept: NEUROLOGY | Facility: CLINIC | Age: 42
End: 2022-08-25

## 2022-09-19 ENCOUNTER — TELEPHONE (OUTPATIENT)
Dept: SURGICAL ONCOLOGY | Facility: CLINIC | Age: 42
End: 2022-09-19

## 2022-09-19 NOTE — TELEPHONE ENCOUNTER
Spoke with Alana Savage and she is calling the patient and scheduling an office visit follow up after US  US is scheduled for 9/26    Appointment will be made following US to discuss results and address hormone treatment of abnormal uterine bleeding

## 2022-09-19 NOTE — TELEPHONE ENCOUNTER
Nathalia Witt from [place] called to confirm if patient needs follow up appt with Dr Chu after ultrasound on 9/26/22   Please call patient and confirm if appt is needed, [phone]

## 2022-09-22 ENCOUNTER — TELEPHONE (OUTPATIENT)
Dept: NEUROLOGY | Facility: CLINIC | Age: 42
End: 2022-09-22

## 2022-09-22 NOTE — TELEPHONE ENCOUNTER
PCP office called to reschedule the patient appointment with Dr House Parents, I offered 3-7-23 at 10 am and added her to the wait list and she accepted  She asked that we call their office for any cancellations for a sooner appointment

## 2022-09-26 ENCOUNTER — HOSPITAL ENCOUNTER (OUTPATIENT)
Dept: ULTRASOUND IMAGING | Facility: HOSPITAL | Age: 42
Discharge: HOME | End: 2022-09-26
Attending: OBSTETRICS & GYNECOLOGY
Payer: COMMERCIAL

## 2022-09-26 DIAGNOSIS — N83.201 CYST OF RIGHT OVARY: ICD-10-CM

## 2022-09-26 PROCEDURE — 76856 US EXAM PELVIC COMPLETE: CPT

## 2022-09-26 PROCEDURE — 76830 TRANSVAGINAL US NON-OB: CPT

## 2022-09-29 ENCOUNTER — TELEPHONE (OUTPATIENT)
Dept: HEMATOLOGY ONCOLOGY | Facility: CLINIC | Age: 42
End: 2022-09-29

## 2022-09-29 ENCOUNTER — DOCUMENTATION (OUTPATIENT)
Dept: GYNECOLOGIC ONCOLOGY | Facility: HOSPITAL | Age: 42
End: 2022-09-29

## 2022-09-29 ENCOUNTER — PREP FOR PROCEDURE (OUTPATIENT)
Dept: GYNECOLOGIC ONCOLOGY | Facility: HOSPITAL | Age: 42
End: 2022-09-29

## 2022-09-29 DIAGNOSIS — N83.201 RIGHT OVARIAN CYST: Primary | ICD-10-CM

## 2022-09-29 DIAGNOSIS — N93.9 ABNORMAL UTERINE BLEEDING (AUB): ICD-10-CM

## 2022-09-29 RX ORDER — HEPARIN SODIUM 5000 [USP'U]/ML
5000 INJECTION, SOLUTION INTRAVENOUS; SUBCUTANEOUS
OUTPATIENT
Start: 2022-09-30 | End: 2022-10-01

## 2022-09-29 RX ORDER — SODIUM CHLORIDE, SODIUM LACTATE, POTASSIUM CHLORIDE, CALCIUM CHLORIDE 600; 310; 30; 20 MG/100ML; MG/100ML; MG/100ML; MG/100ML
125 INJECTION, SOLUTION INTRAVENOUS CONTINUOUS
OUTPATIENT
Start: 2022-09-29

## 2022-09-29 RX ORDER — CLINDAMYCIN PHOSPHATE 900 MG/50ML
900 INJECTION INTRAVENOUS ONCE
OUTPATIENT
Start: 2022-09-29 | End: 2022-09-29

## 2022-09-29 RX ORDER — ACETAMINOPHEN 325 MG/1
975 TABLET ORAL ONCE
OUTPATIENT
Start: 2022-09-29 | End: 2022-09-29

## 2022-09-29 NOTE — PROGRESS NOTES
Hematology/Oncology Outpatient Follow- up Note  Shaheen Connell 1980, [id number]  10/3/2022        Chief Complaint   Patient presents with    Follow-up       HPI:  Shaheen Connell is a 38year old female with a history of eating disorder, PTSD, depression/anxiety currently residing at [address] [place] [address] who was referred to hematology for evaluation and management of microcytic, hypochromic anemia and low serum iron  Patient reports she has eating disorder  H/O anorexia and binge eating/over eating disorder that led to significant weight gain of 350lb  She underwent gastric bypass in 4/2007, required a revision in 2017  She states she has muscle weakness and difficulty walking secondary to her history of significant nutritional deficiency    Labs on file demonstrate a history of anemia dating back to at least 2020  Her Hgb has ranged between 8 4 and 11 with microcytic indices over the past two years  She was treated with IV Venofer resulting in normalization of her numbers  Previous Hematologic/ Oncologic History:    IV Venofer 200 mg x 8 doses (3/18-4/11/22)    Current Hematologic/ Oncologic Treatment:    Observation  B12 1000 mcg monthly at nursing facility       Interval History:   The patient presents for routine follow up  She was last seen on 7/1/22  She continues to follow with Gyn/Onc for pelvic pain  She will be undergoing a TLH, bilateral salpingectomy, right oophorectomy  Most recent blood work completed on 9/28/22 shows recurrent iron deficiency  Serum iron 8, iron sat 2%  CBC and diff is normal  Hgb 13 8, MCV 92  MMA improved 246  She feels fatigued  She is walking better with use of Rolator  Denies any CP, SOB  She reports she has been experiencing some stool mixed in with her urine  CT AP and MRI abdomen and pelvis in 6/2022 did not demonstrate any findings concerning for fistula     She continues to smoke    Test Results:    Imaging: CT abdomen pelvis wo contrast    Result Date: 6/13/2022  Narrative: CT ABDOMEN AND PELVIS WITHOUT IV CONTRAST INDICATION:   RLQ abdominal pain (Age >= 14y) Lower abdominal pain and questionable pelvic mass  COMPARISON:  4/5/2021  TECHNIQUE:  CT examination of the abdomen and pelvis was performed without intravenous contrast  This examination was performed without intravenous contrast in the context of the critical nationwide Omnipaque shortage  Axial, sagittal, and coronal 2D reformatted images were created from the source data and submitted for interpretation  Radiation dose length product (DLP) for this visit:  1118 64 mGy-cm   This examination, like all CT scans performed in the 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, with decreased but persistent hydronephrosis of the lower pole moiety    No hydronephrosis  STOMACH AND BOWEL:  Post gastric bypass  APPENDIX:  Appendiceal stump is noted    ABDOMINOPELVIC CAVITY:  No ascites  No pneumoperitoneum  No lymphadenopathy  VESSELS:  Unremarkable for patient's age  PELVIS REPRODUCTIVE ORGANS:  4 7 cm soft tissue density lesion in the right hemipelvis  Recommend pelvic sonogram in the appropriate clinical setting  Summer Skipper URINARY BLADDER:  Unremarkable  ABDOMINAL WALL/INGUINAL REGIONS:  Unremarkable  OSSEOUS STRUCTURES:  No acute fracture or destructive osseous lesion       Impression: Duplicated left collecting system, 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, with decreased but persistent hydronephrosis of the lower pole moiety    No hydronephrosis  4 7 cm soft tissue density lesion in the right hemipelvis  Recommend pelvic sonogram in the appropriate clinical setting    Workstation performed: [id number]       Labs:   Lab Results   Component Value Date    WBC 5 95 06/17/2022    HGB 13 7 06/17/2022    HCT 45 0 06/17/2022    MCV 87 06/17/2022     06/17/2022     Lab Results   Component Value Date    K 3 6 06/17/2022     (H) 06/17/2022    CO2 24 06/17/2022    BUN 6 06/17/2022    CREATININE 0 61 06/17/2022    GLUCOSE 89 04/07/2021    GLUF 81 06/17/2022    CALCIUM 9 0 06/17/2022    CORRECTEDCA 9 2 09/20/2021    AST 18 06/17/2022    ALT 13 06/17/2022    ALKPHOS 200 (H) 06/17/2022    EGFR 114 06/17/2022           Review of Systems   Constitutional: Positive for fatigue  Genitourinary: Positive for pelvic pain  Musculoskeletal: Positive for gait problem  Neurological: Positive for weakness  Psychiatric/Behavioral: Positive for dysphoric mood  All other systems reviewed and are negative          Active Problems:   Patient Active Problem List   Diagnosis    Pituitary enlargement    Elevated alkaline phosphatase level    Hypothyroidism    History of gastric bypass    Vitamin D deficiency    Eating disorder; history of both anorexia and bulimia    Post-traumatic stress disorder, chronic    Major depressive disorder, recurrent, severe without psychotic features ([address] )    Laxative abuse    Generalized weakness    Anemia    Soft tissue lesion of pelvic region    Severe protein-calorie malnutrition ([address] )    Acute cystitis    Urinary retention    Vitamin B6 deficiency    Copper deficiency    Altered mental status    Hypokalemia    Hypomagnesemia    Bacteremia    Peripheral polyneuropathy    Partial idiopathic epilepsy with seizures of localized onset, not intractable, without status epilepticus ([address] )    Migraine    Muscle spasm    Non-insulin dependent type 2 diabetes mellitus ([facility] )    BEAR (generalized anxiety disorder)    Obesity (BMI 30-39  9)    Tobacco use    Abnormal finding on CT scan    Intertrigo    Cyst of right ovary       Past Medical History:   Past Medical History:   Diagnosis Date    Acute cystitis     Anemia     Anxiety     Bacteremia     Copper deficiency     Disease of thyroid gland     Eating disorder     Elevated alkaline phosphatase level     Generalized weakness     Head injury     History of bariatric surgery     History of drug abuse ([facility] )     History of enlargement of pituitary gland     Hypothyroidism     Migraine without aura     Panic attack     Papanicolaou smear     Peripheral neuropathy     PTSD (post-traumatic stress disorder)     Seizures (HCC)     Self-injurious behavior     Sepsis ([facility] )     Soft tissue lesion of pelvic region     Type 2 diabetes mellitus (HCC)     Type 2, developed after 2nd bariatric surgery    Urinary retention     Vitamin B6 deficiency     Vitamin D deficiency        Surgical History:   Past Surgical History:   Procedure Laterality Date    ANTERIOR CRUCIATE LIGAMENT REPAIR Left     CHOLECYSTECTOMY      2003    COLONOSCOPY      GASTRIC BYPASS      Pebbles en Y 2007, 2016 had pouch ulcers and revision performed    IR PICC PLACEMENT SINGLE LUMEN  04/16/2021    IR TEMPORARY DIALYSIS CATHETER PLACEMENT  04/08/2021    TONSILLECTOMY      UPPER GASTROINTESTINAL ENDOSCOPY         Family History:    Family History   Problem Relation Age of Onset    Hypertension Mother     Heart attack Mother     No Known Problems Father     Lung cancer Maternal Grandmother     Hypothyroidism Maternal Grandmother     Breast cancer Maternal Grandmother         66's    Colon cancer Maternal Grandfather     Diabetes type II Paternal Grandmother     Diabetes type II Paternal Grandfather        Cancer-related family history includes Breast cancer in her maternal grandmother; Colon cancer in her maternal grandfather; Lung cancer in her maternal grandmother  Social History:   Social History     Socioeconomic History    Marital status: Single     Spouse name: Not on file    Number of children: Not on file    Years of education: Not on file    Highest education level: Not on file   Occupational History    Not on file   Tobacco Use    Smoking status: Current Every Day Smoker     Packs/day: 1 50     Years: 17 00     Pack years: 25 50     Types: Cigarettes    Smokeless tobacco: Never Used    Tobacco comment: had quit in 2021, restarted     Vaping Use    Vaping Use: Never used   Substance and Sexual Activity    Alcohol use: Never    Drug use: Yes     Types: Marijuana     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    Sexual activity: Not Currently     Birth control/protection: Condom Male   Other Topics Concern    Not on file   Social History Narrative    Not on file     Social Determinants of Health     Financial Resource Strain: Not on file   Food Insecurity: Not on file   Transportation Needs: Not on file   Physical Activity: Not on file   Stress: Not on file   Social Connections: Not on file   Intimate Partner Violence: Not on file   Housing Stability: Not on file       Current Medications:   Current Outpatient Medications   Medication Sig Dispense Refill    acetaminophen (TYLENOL) 325 mg tablet Take 650 mg by mouth every 4 (four) hours as needed for mild pain or fever      cyanocobalamin 1,000 mcg/mL inject 1 milliliter ( 1000 MCG ) intramuscularly Every Month      ergocalciferol (VITAMIN D2) 50,000 units Take 1 capsule (50,000 Units total) by mouth 2 (two) times a week Monday and Thursday      eszopiclone (LUNESTA) 3 MG tablet Take 3 mg by mouth daily at bedtime as needed for sleep Take immediately before bedtime      furosemide (LASIX) 20 mg tablet Take 1 tablet by mouth in the morning      gabapentin (NEURONTIN) 300 mg capsule Take 2 capsules (600 mg total) by mouth 4 (four) times a day 240 capsule 0    ibuprofen (MOTRIN) 400 mg tablet       levETIRAcetam (KEPPRA) 500 mg tablet Take 1 tablet (500 mg total) by mouth every 12 (twelve) hours 60 tablet 0    LORazepam (ATIVAN) 1 mg tablet Take 1 tablet (1 mg total) by mouth daily at bedtime 30 tablet 0    Lotemax 0 5 % ophth gel Administer to the right eye 4 (four) times a day      methocarbamol (ROBAXIN) 500 mg tablet Take 2 tablets (1,000 mg total) by mouth every 8 (eight) hours as needed for muscle spasms 30 tablet 0    nystatin (MYCOSTATIN) powder Apply topically 2 (two) times a day 15 g 0    ondansetron (ZOFRAN) 4 mg tablet Take 4 mg by mouth every 8 (eight) hours as needed for nausea or vomiting      polyethylene glycol (MIRALAX) 17 g packet Take 17 g by mouth daily      prazosin (MINIPRESS) 2 mg capsule Take 2 capsules (4 mg total) by mouth daily at bedtime 60 capsule 0    thiamine 100 MG tablet       topiramate (TOPAMAX) 25 mg tablet Take 1 tablet (25 mg total) by mouth 2 (two) times a day 60 tablet 0    traZODone (DESYREL) 100 mg tablet Take 1 tablet (100 mg total) by mouth daily at bedtime 30 tablet 0    venlafaxine 225 MG TB24 24 hr tablet       AquaLance Lancets 30G MISC  (Patient not taking: Reported on 10/3/2022)      ARIPiprazole (ABILIFY) 5 mg tablet Take 1 tablet (5 mg total) by mouth daily 30 tablet 0    bisacodyl (DULCOLAX) 10 mg suppository Insert 10 mg into the rectum as needed for constipation (Patient not taking: Reported on 10/3/2022)      Continuous Blood Gluc  (FreeStyle Rossi 14 Day Freedom) DINESH Use 1 Device 4 (four) times a day (before meals and at bedtime) (Patient not taking: Reported on 10/3/2022) 1 each 0    Continuous Blood Gluc Sensor (FreeStyle Ross[address] 14 Day Sensor) MISC Use 1 application every 14 (fourteen) days (Patient not taking: Reported on 10/3/2022) 2 each 6    folic acid (FOLVITE) 1 mg tablet Take 1 tablet (1 mg total) by mouth daily 30 tablet 0    levothyroxine 50 mcg tablet Take 1 tablet (50 mcg total) by mouth daily in the early morning 30 tablet 0    LORazepam (ATIVAN) 0 5 mg tablet Take 1 tablet (0 5 mg total) by mouth 2 (two) times a day 60 tablet 0    magnesium oxide (MAG-OX) 400 mg Take 1 tablet (400 mg total) by mouth 2 (two) times a day 60 tablet 0    polyethylene glycol (MIRALAX) 17 g packet Take 17 g by mouth daily as needed (constipation refractory to Senokot-S) 17 g 0    potassium citrate (UROCIT-K) 10 mEq Take 1 tablet (10 mEq total) by mouth 2 (two) times a day with meals 60 tablet 0    thiamine (VITAMIN B1) 100 mg tablet Take 1 tablet (100 mg total) by mouth daily 30 tablet 0    varenicline (CHANTIX) 1 mg tablet  (Patient not taking: Reported on 10/3/2022)       No current facility-administered medications for this visit  Allergies: Allergies   Allergen Reactions    Anti-Hist [Diphenhydramine] Hyperactivity     Patient stated any antihistamine (hydroxyzine, diphenhydramine, etc ) causes extreme excitation, energy and lack of sleep   (Paradoxical reaction )     Bee Venom Anaphylaxis, Anxiety, Eye Swelling, Facial Swelling, Hives, Itching, Lip Swelling, Shortness Of Breath, Swelling, Throat Swelling and Tongue Swelling    Buspar [Buspirone] Other (See Comments)     Per patient - hemorrhage and feeling like they were being electrocuted     Haldol [Haloperidol] Other (See Comments)     Acute Dystonia - resolved with IM Benztropine     Zoloft [Sertraline] Other (See Comments)     Serotonin Syndrome (patient - per own report - stated they had to be hospitalized due to severe flu like symptoms and fever 1 week post taking Zoloft) - patient DID tolerate Fluoxetine, Celexa and Lexapro)     Penicillins Throat Swelling    Pennsaid [Diclofenac Sodium]        Physical Exam:  BP 90/60 (BP Location: Left arm, Patient Position: Sitting, Cuff Size: Large)   Pulse 93   Temp (!) 96 9 °F (36 1 °C) (Tympanic)   Resp 16   Ht 5' 6" (1 676 m)   SpO2 93%   BMI 39 54 kg/m² Body surface area is 2 18 meters squared  Wt Readings from Last 3 Encounters:   06/13/22 111 kg (245 lb)   03/14/22 109 kg (240 lb)   11/15/21 109 kg (240 lb)           Physical Exam  Constitutional:       General: She is not in acute distress  Appearance: Normal appearance  HENT:      Head: Normocephalic and atraumatic  Eyes:      General: No scleral icterus  Right eye: No discharge  Left eye: No discharge  Conjunctiva/sclera: Conjunctivae normal    Cardiovascular:      Rate and Rhythm: Normal rate and regular rhythm  Pulmonary:      Effort: Pulmonary effort is normal  No respiratory distress  Breath sounds: Normal breath sounds  Chest:   Breasts:      Right: No axillary adenopathy or supraclavicular adenopathy  Left: No axillary adenopathy or supraclavicular adenopathy  Abdominal:      General: Bowel sounds are normal  There is no distension  Palpations: Abdomen is soft  There is no mass  Tenderness: There is no abdominal tenderness  Musculoskeletal:         General: Normal range of motion  Lymphadenopathy:      Cervical: No cervical adenopathy  Upper Body:      Right upper body: No supraclavicular, axillary or pectoral adenopathy  Left upper body: No supraclavicular, axillary or pectoral adenopathy  Skin:     General: Skin is warm and dry  Neurological:      General: No focal deficit present  Mental Status: She is alert and oriented to person, place, and time  Psychiatric:         Mood and Affect: Mood normal  Affect is flat  Behavior: Behavior normal          Assessment / Plan:    1  Iron deficiency anemia secondary to inadequate dietary iron intake    2  History of gastric bypass    3  Severe protein-calorie malnutrition ([address] )      The patient is a 38year old female with significant psychiatric history including eating disorder and h/o gastric bypass which has contributed to malabsorption issues     She has evidence of hypochromic, microcytic anemia dating back several years  At presentation in March, she was noted to be iron deficient and was set up to receive IV Venofer 200 mg twice a week for 8 doses  She already receives B12 1000 mcg IM monthly at her nursing facility  This did result in improvement in her numbers  Most recent blood work shows recurrent iron deficiency with serum iron 8 and iron sat 2%  She feels fatigued  Fortunately, she is not anemic but given that she has significant iron deficiency she will benefit from IV iron replacement to prevent anemia  She responded well to IV Venofer in the past   I will set her up to receive IV Venofer 200 mg twice a week for 8 doses  She will continue to receive monthly B12 at her nursing facility  Given her history, she may benefit from maintenance IV Venofer infusions going forward  I will see her back in 3 months with repeat blood work  We discussed maintenance infusions at this follow-up visit  Patient verbalized understanding and was in agreement with this plan of care  These recommendations were written down for her nursing facility with instructions to call with any questions or concerns  Barriers: None  Patient  able to self care  Portions of the record may have been created with voice recognition software  Occasional wrong word or "sound a like" substitutions may have occurred due to the inherent limitations of voice recognition software  Read the chart carefully and recognize, using context, where substitutions have occurred

## 2022-09-29 NOTE — PROGRESS NOTES
Call to discuss the results of her transvaginal pelvic ultrasound  The right adnexal cyst is slightly smaller than previous and has characteristics consistent with endometrioma  She has ongoing severe pelvic pain  We will therefore plan for robotic assisted total laparoscopic hysterectomy, bilateral salpingectomy, right oophorectomy  She has signed consent for this surgery previously

## 2022-10-03 ENCOUNTER — TELEPHONE (OUTPATIENT)
Dept: HEMATOLOGY ONCOLOGY | Facility: HOSPITAL | Age: 42
End: 2022-10-03

## 2022-10-03 ENCOUNTER — OFFICE VISIT (OUTPATIENT)
Dept: HEMATOLOGY ONCOLOGY | Facility: HOSPITAL | Age: 42
End: 2022-10-03
Payer: COMMERCIAL

## 2022-10-03 VITALS
OXYGEN SATURATION: 93 % | HEART RATE: 93 BPM | BODY MASS INDEX: 39.54 KG/M2 | RESPIRATION RATE: 16 BRPM | DIASTOLIC BLOOD PRESSURE: 60 MMHG | TEMPERATURE: 96.9 F | SYSTOLIC BLOOD PRESSURE: 90 MMHG | HEIGHT: 66 IN

## 2022-10-03 DIAGNOSIS — E43 SEVERE PROTEIN-CALORIE MALNUTRITION (HCC): ICD-10-CM

## 2022-10-03 DIAGNOSIS — Z98.84 HISTORY OF GASTRIC BYPASS: ICD-10-CM

## 2022-10-03 DIAGNOSIS — D50.8 IRON DEFICIENCY ANEMIA SECONDARY TO INADEQUATE DIETARY IRON INTAKE: Primary | ICD-10-CM

## 2022-10-03 PROCEDURE — 99214 OFFICE O/P EST MOD 30 MIN: CPT | Performed by: NURSE PRACTITIONER

## 2022-10-03 RX ORDER — SODIUM CHLORIDE 9 MG/ML
20 INJECTION, SOLUTION INTRAVENOUS ONCE
Status: CANCELLED | OUTPATIENT
Start: 2022-10-07

## 2022-10-03 RX ORDER — POLYETHYLENE GLYCOL 3350 17 G/17G
17 POWDER, FOR SOLUTION ORAL DAILY
COMMUNITY

## 2022-10-03 NOTE — TELEPHONE ENCOUNTER
While we try to accommodate patient requests, our priority is to schedule treatment according to Doctor's orders and site availability  1  Does the Provider use the intake sheet or checkout note? Note  2  What would be a preferred day of the week that would work best for your infusion appointment? Monday and Friday  3  Do you prefer mornings or afternoons for your appointments? 10:00 on  4  Are there any days or dates that do not work for your schedule, including any upcoming vacations? None  5  We are going to try our best to schedule you at the infusion center closest to your home  In the event that we are unable to what would be your next preferred infusion site or sites? [facility]    1  [facility]  2    3      6  Do you have transportation to take you to all of your appointments? Yes  7   Would you like the infusion center to draw labs from your port? (disregard if patient doesn't have a port or need labs for infusion appointment) No

## 2022-10-05 ENCOUNTER — TELEPHONE (OUTPATIENT)
Dept: SURGICAL ONCOLOGY | Facility: CLINIC | Age: 42
End: 2022-10-05

## 2022-10-05 NOTE — TELEPHONE ENCOUNTER
Patient calling in wanting to know if she still has to keep her appt with Dr Francoise Madison on 10/13 if she already has her surgery scheduled  Please call back to advise

## 2022-10-06 ENCOUNTER — TELEPHONE (OUTPATIENT)
Dept: HEMATOLOGY ONCOLOGY | Facility: CLINIC | Age: 42
End: 2022-10-06

## 2022-10-06 NOTE — TELEPHONE ENCOUNTER
CALL RETURN FORM   Reason for patient call? Patient calling has conflict with infusion and a scheduled surgery    Patient's primary oncologist? Marcus Ng    Name of person the patient was calling for? Marcus Ng   Any additional information to add, if applicable? Please call [phone]   Informed patient that the message will be forwarded to the team and someone will get back to them as soon as possible    Did you relay this information to the patient?  yes

## 2022-10-06 NOTE — TELEPHONE ENCOUNTER
Please reschedule 10/25 & 10/28 to mid to late November  Patient is having surgery on 10/25  11/1 is tentative to be kept, she will keep in touch to let me know if that needs to be rescheduled also  Thank you!

## 2022-10-07 ENCOUNTER — HOSPITAL ENCOUNTER (OUTPATIENT)
Dept: INFUSION CENTER | Facility: HOSPITAL | Age: 42
End: 2022-10-07
Attending: INTERNAL MEDICINE
Payer: COMMERCIAL

## 2022-10-07 VITALS
DIASTOLIC BLOOD PRESSURE: 58 MMHG | SYSTOLIC BLOOD PRESSURE: 109 MMHG | RESPIRATION RATE: 16 BRPM | OXYGEN SATURATION: 95 % | TEMPERATURE: 98.2 F | HEART RATE: 84 BPM

## 2022-10-07 DIAGNOSIS — D50.8 IRON DEFICIENCY ANEMIA SECONDARY TO INADEQUATE DIETARY IRON INTAKE: Primary | ICD-10-CM

## 2022-10-07 DIAGNOSIS — Z98.84 HISTORY OF GASTRIC BYPASS: ICD-10-CM

## 2022-10-07 PROCEDURE — 96365 THER/PROPH/DIAG IV INF INIT: CPT

## 2022-10-07 RX ORDER — SODIUM CHLORIDE 9 MG/ML
20 INJECTION, SOLUTION INTRAVENOUS ONCE
Status: COMPLETED | OUTPATIENT
Start: 2022-10-07 | End: 2022-10-07

## 2022-10-07 RX ORDER — SODIUM CHLORIDE 9 MG/ML
20 INJECTION, SOLUTION INTRAVENOUS ONCE
Status: CANCELLED | OUTPATIENT
Start: 2022-10-09

## 2022-10-07 RX ADMIN — IRON SUCROSE 200 MG: 20 INJECTION, SOLUTION INTRAVENOUS at 12:16

## 2022-10-07 RX ADMIN — SODIUM CHLORIDE 20 ML/HR: 9 INJECTION, SOLUTION INTRAVENOUS at 12:16

## 2022-10-07 NOTE — PROGRESS NOTES
Pt tolerated IV Venofer infusion without adverse reaction  Pt left unit ambulatory with steady gait using roller walker  AVS printed and given to patient  Nitza called for

## 2022-10-11 ENCOUNTER — HOSPITAL ENCOUNTER (OUTPATIENT)
Dept: INFUSION CENTER | Facility: HOSPITAL | Age: 42
Discharge: HOME | End: 2022-10-11
Attending: INTERNAL MEDICINE
Payer: COMMERCIAL

## 2022-10-11 VITALS
TEMPERATURE: 98.8 F | HEART RATE: 71 BPM | SYSTOLIC BLOOD PRESSURE: 91 MMHG | DIASTOLIC BLOOD PRESSURE: 67 MMHG | RESPIRATION RATE: 16 BRPM | OXYGEN SATURATION: 98 %

## 2022-10-11 DIAGNOSIS — D50.8 IRON DEFICIENCY ANEMIA SECONDARY TO INADEQUATE DIETARY IRON INTAKE: Primary | ICD-10-CM

## 2022-10-11 DIAGNOSIS — Z98.84 HISTORY OF GASTRIC BYPASS: ICD-10-CM

## 2022-10-11 PROCEDURE — 96365 THER/PROPH/DIAG IV INF INIT: CPT

## 2022-10-11 RX ORDER — SODIUM CHLORIDE 9 MG/ML
20 INJECTION, SOLUTION INTRAVENOUS ONCE
Status: CANCELLED | OUTPATIENT
Start: 2022-10-14

## 2022-10-11 RX ORDER — SODIUM CHLORIDE 9 MG/ML
20 INJECTION, SOLUTION INTRAVENOUS ONCE
Status: COMPLETED | OUTPATIENT
Start: 2022-10-11 | End: 2022-10-11

## 2022-10-11 RX ADMIN — SODIUM CHLORIDE 20 ML/HR: 9 INJECTION, SOLUTION INTRAVENOUS at 13:04

## 2022-10-11 RX ADMIN — IRON SUCROSE 200 MG: 20 INJECTION, SOLUTION INTRAVENOUS at 13:04

## 2022-10-11 NOTE — PROGRESS NOTES
Pt tolerated treatment with no adv reactions; AVS given; pt left unit ambulatory with steady gait using rollator; picked up by Nitza

## 2022-10-12 PROBLEM — N30.00 ACUTE CYSTITIS: Status: RESOLVED | Noted: 2021-04-12 | Resolved: 2022-10-12

## 2022-10-14 ENCOUNTER — HOSPITAL ENCOUNTER (OUTPATIENT)
Dept: INFUSION CENTER | Facility: HOSPITAL | Age: 42
End: 2022-10-14
Attending: INTERNAL MEDICINE
Payer: COMMERCIAL

## 2022-10-14 VITALS
SYSTOLIC BLOOD PRESSURE: 103 MMHG | HEART RATE: 79 BPM | DIASTOLIC BLOOD PRESSURE: 59 MMHG | TEMPERATURE: 98 F | OXYGEN SATURATION: 96 % | RESPIRATION RATE: 16 BRPM

## 2022-10-14 DIAGNOSIS — D50.8 IRON DEFICIENCY ANEMIA SECONDARY TO INADEQUATE DIETARY IRON INTAKE: Primary | ICD-10-CM

## 2022-10-14 DIAGNOSIS — Z98.84 HISTORY OF GASTRIC BYPASS: ICD-10-CM

## 2022-10-14 PROCEDURE — 96365 THER/PROPH/DIAG IV INF INIT: CPT

## 2022-10-14 RX ORDER — SODIUM CHLORIDE 9 MG/ML
20 INJECTION, SOLUTION INTRAVENOUS ONCE
Status: CANCELLED | OUTPATIENT
Start: 2022-10-18

## 2022-10-14 RX ORDER — SODIUM CHLORIDE 9 MG/ML
20 INJECTION, SOLUTION INTRAVENOUS ONCE
Status: COMPLETED | OUTPATIENT
Start: 2022-10-14 | End: 2022-10-14

## 2022-10-14 RX ADMIN — IRON SUCROSE 200 MG: 20 INJECTION, SOLUTION INTRAVENOUS at 10:44

## 2022-10-14 RX ADMIN — SODIUM CHLORIDE 20 ML/HR: 9 INJECTION, SOLUTION INTRAVENOUS at 10:45

## 2022-10-14 NOTE — PROGRESS NOTES
Pt had venofer infusion well without complication  PIV removed  And bandage applied  Next appt scheduled  Pt left with walker assistance for d/c

## 2022-10-17 VITALS — WEIGHT: 238 LBS | HEIGHT: 66 IN | BODY MASS INDEX: 38.25 KG/M2

## 2022-10-17 NOTE — PRE-PROCEDURE INSTRUCTIONS
Pre-Surgery Instructions:   Medication Instructions   • acetaminophen (TYLENOL) 325 mg tablet Uses PRN- OK to take day of surgery   • ARIPiprazole (ABILIFY) 5 mg tablet Take day of surgery  • ergocalciferol (VITAMIN D2) 50,000 units Stop taking 7 days prior to surgery  • eszopiclone (LUNESTA) 3 MG tablet Take night before surgery   • folic acid (FOLVITE) 1 mg tablet Stop taking 7 days prior to surgery  • furosemide (LASIX) 20 mg tablet Hold day of surgery  • gabapentin (NEURONTIN) 300 mg capsule Take day of surgery  • ibuprofen (MOTRIN) 400 mg tablet Uses PRN- OK to take day of surgery   • levETIRAcetam (KEPPRA) 500 mg tablet Take day of surgery  • levothyroxine 50 mcg tablet Take day of surgery  • LORazepam (ATIVAN) 1 mg tablet Uses PRN- OK to take day of surgery   • Lotemax 0 5 % ophth gel Take night before surgery   • magnesium oxide (MAG-OX) 400 mg Stop taking 7 days prior to surgery  • methocarbamol (ROBAXIN) 500 mg tablet Uses PRN- OK to take day of surgery   • nystatin (MYCOSTATIN) powder Hold day of surgery  • potassium citrate (UROCIT-K) 10 mEq Hold day of surgery  • prazosin (MINIPRESS) 2 mg capsule Take night before surgery   • thiamine (VITAMIN B1) 100 mg tablet Stop taking 7 days prior to surgery  • thiamine 100 MG tablet Stop taking 7 days prior to surgery  • topiramate (TOPAMAX) 25 mg tablet Take day of surgery  • traZODone (DESYREL) 100 mg tablet Take night before surgery   • venlafaxine 225 MG TB24 24 hr tablet Take day of surgery

## 2022-10-18 ENCOUNTER — HOSPITAL ENCOUNTER (OUTPATIENT)
Dept: INFUSION CENTER | Facility: HOSPITAL | Age: 42
Discharge: HOME | End: 2022-10-18
Attending: INTERNAL MEDICINE
Payer: COMMERCIAL

## 2022-10-18 VITALS
RESPIRATION RATE: 16 BRPM | HEART RATE: 72 BPM | SYSTOLIC BLOOD PRESSURE: 105 MMHG | DIASTOLIC BLOOD PRESSURE: 58 MMHG | OXYGEN SATURATION: 97 % | TEMPERATURE: 98.4 F

## 2022-10-18 DIAGNOSIS — Z98.84 HISTORY OF GASTRIC BYPASS: ICD-10-CM

## 2022-10-18 DIAGNOSIS — D50.8 IRON DEFICIENCY ANEMIA SECONDARY TO INADEQUATE DIETARY IRON INTAKE: Primary | ICD-10-CM

## 2022-10-18 PROCEDURE — 96365 THER/PROPH/DIAG IV INF INIT: CPT

## 2022-10-18 RX ORDER — SODIUM CHLORIDE 9 MG/ML
20 INJECTION, SOLUTION INTRAVENOUS ONCE
Status: COMPLETED | OUTPATIENT
Start: 2022-10-18 | End: 2022-10-18

## 2022-10-18 RX ORDER — SODIUM CHLORIDE 9 MG/ML
20 INJECTION, SOLUTION INTRAVENOUS ONCE
Status: CANCELLED | OUTPATIENT
Start: 2022-10-21

## 2022-10-18 RX ADMIN — SODIUM CHLORIDE 20 ML/HR: 9 INJECTION, SOLUTION INTRAVENOUS at 13:52

## 2022-10-18 RX ADMIN — IRON SUCROSE 200 MG: 20 INJECTION, SOLUTION INTRAVENOUS at 13:50

## 2022-10-18 NOTE — PROGRESS NOTES
Pt tolerated treatment with no adv reactions; AVS given; pt left unit ambulatory with steady gait - ride home with Nitza

## 2022-10-21 ENCOUNTER — HOSPITAL ENCOUNTER (OUTPATIENT)
Dept: INFUSION CENTER | Facility: HOSPITAL | Age: 42
End: 2022-10-21
Attending: INTERNAL MEDICINE
Payer: COMMERCIAL

## 2022-10-21 VITALS
DIASTOLIC BLOOD PRESSURE: 64 MMHG | OXYGEN SATURATION: 98 % | HEART RATE: 76 BPM | TEMPERATURE: 98.8 F | RESPIRATION RATE: 16 BRPM | SYSTOLIC BLOOD PRESSURE: 104 MMHG

## 2022-10-21 DIAGNOSIS — D50.8 IRON DEFICIENCY ANEMIA SECONDARY TO INADEQUATE DIETARY IRON INTAKE: Primary | ICD-10-CM

## 2022-10-21 DIAGNOSIS — Z98.84 HISTORY OF GASTRIC BYPASS: ICD-10-CM

## 2022-10-21 PROCEDURE — 96365 THER/PROPH/DIAG IV INF INIT: CPT

## 2022-10-21 RX ORDER — SODIUM CHLORIDE 9 MG/ML
20 INJECTION, SOLUTION INTRAVENOUS ONCE
Status: COMPLETED | OUTPATIENT
Start: 2022-10-21 | End: 2022-10-21

## 2022-10-21 RX ORDER — FLUTICASONE PROPIONATE 50 MCG
1 SPRAY, SUSPENSION (ML) NASAL DAILY
COMMUNITY

## 2022-10-21 RX ORDER — ATORVASTATIN CALCIUM 10 MG/1
10 TABLET, FILM COATED ORAL DAILY
COMMUNITY

## 2022-10-21 RX ORDER — SODIUM CHLORIDE 9 MG/ML
20 INJECTION, SOLUTION INTRAVENOUS ONCE
Status: CANCELLED | OUTPATIENT
Start: 2022-10-24

## 2022-10-21 RX ADMIN — SODIUM CHLORIDE 20 ML/HR: 9 INJECTION, SOLUTION INTRAVENOUS at 12:15

## 2022-10-21 RX ADMIN — IRON SUCROSE 200 MG: 20 INJECTION, SOLUTION INTRAVENOUS at 12:18

## 2022-10-21 NOTE — PROGRESS NOTES
Infusion tolerated well  No complaints offered  PIV removed, AVS provided, [facility] called  Pt discharged to waiting room with rollator

## 2022-10-24 ENCOUNTER — HOSPITAL ENCOUNTER (OUTPATIENT)
Dept: INFUSION CENTER | Facility: HOSPITAL | Age: 42
Discharge: HOME | End: 2022-10-24
Attending: INTERNAL MEDICINE
Payer: COMMERCIAL

## 2022-10-24 VITALS
OXYGEN SATURATION: 99 % | TEMPERATURE: 97 F | SYSTOLIC BLOOD PRESSURE: 100 MMHG | HEART RATE: 68 BPM | DIASTOLIC BLOOD PRESSURE: 58 MMHG | RESPIRATION RATE: 16 BRPM

## 2022-10-24 DIAGNOSIS — Z98.84 HISTORY OF GASTRIC BYPASS: ICD-10-CM

## 2022-10-24 DIAGNOSIS — D50.8 IRON DEFICIENCY ANEMIA SECONDARY TO INADEQUATE DIETARY IRON INTAKE: Primary | ICD-10-CM

## 2022-10-24 RX ORDER — SODIUM CHLORIDE 9 MG/ML
20 INJECTION, SOLUTION INTRAVENOUS ONCE
Status: COMPLETED | OUTPATIENT
Start: 2022-10-24 | End: 2022-10-24

## 2022-10-24 RX ORDER — SODIUM CHLORIDE 9 MG/ML
20 INJECTION, SOLUTION INTRAVENOUS ONCE
Status: CANCELLED | OUTPATIENT
Start: 2022-10-26

## 2022-10-24 RX ADMIN — SODIUM CHLORIDE 20 ML/HR: 9 INJECTION, SOLUTION INTRAVENOUS at 13:45

## 2022-10-24 RX ADMIN — IRON SUCROSE 200 MG: 20 INJECTION, SOLUTION INTRAVENOUS at 13:50

## 2022-10-24 NOTE — PROGRESS NOTES
Pt  Tolerated Venofer without adverse event  Future appointments reviewed  Pt will return on Wednesday  Copy of AVS sent with facility paperwork  Pt  Discharged with [facility]  In stable condition

## 2022-10-26 ENCOUNTER — HOSPITAL ENCOUNTER (OUTPATIENT)
Dept: INFUSION CENTER | Facility: HOSPITAL | Age: 42
Discharge: HOME | End: 2022-10-26
Attending: INTERNAL MEDICINE
Payer: COMMERCIAL

## 2022-10-26 VITALS
TEMPERATURE: 98.5 F | HEART RATE: 80 BPM | SYSTOLIC BLOOD PRESSURE: 96 MMHG | RESPIRATION RATE: 16 BRPM | OXYGEN SATURATION: 98 % | DIASTOLIC BLOOD PRESSURE: 53 MMHG

## 2022-10-26 DIAGNOSIS — D50.8 IRON DEFICIENCY ANEMIA SECONDARY TO INADEQUATE DIETARY IRON INTAKE: Primary | ICD-10-CM

## 2022-10-26 DIAGNOSIS — Z98.84 HISTORY OF GASTRIC BYPASS: ICD-10-CM

## 2022-10-26 RX ORDER — SODIUM CHLORIDE 9 MG/ML
20 INJECTION, SOLUTION INTRAVENOUS ONCE
Status: COMPLETED | OUTPATIENT
Start: 2022-10-26 | End: 2022-10-26

## 2022-10-26 RX ORDER — SODIUM CHLORIDE 9 MG/ML
20 INJECTION, SOLUTION INTRAVENOUS ONCE
Status: CANCELLED | OUTPATIENT
Start: 2022-10-31

## 2022-10-26 RX ADMIN — SODIUM CHLORIDE 20 ML/HR: 9 INJECTION, SOLUTION INTRAVENOUS at 14:59

## 2022-10-26 RX ADMIN — SODIUM CHLORIDE 200 MG: 900 INJECTION, SOLUTION INTRAVENOUS at 15:02

## 2022-10-26 NOTE — PROGRESS NOTES
Pt arrived amb with rolling walker for Venofer infusion  Sharon well  PIV removed intact  Pressure dssg applied  Disch to [facility] ambulance to [facility] sent with pt

## 2022-10-26 NOTE — PLAN OF CARE
Problem: Knowledge Deficit  Goal: Patient/family/caregiver demonstrates understanding of disease process, treatment plan, medications, and discharge instructions  Description: Complete learning assessment and assess knowledge base    Interventions:  - Provide teaching at level of understanding  - Provide teaching via preferred learning methods  Outcome: Progressing     Problem: Potential for Falls  Goal: Patient will remain free of falls  Description: INTERVENTIONS:  - Educate patient/family on patient safety including physical limitations  - Instruct patient to call for assistance with activity   - Consult OT/PT to assist with strengthening/mobility   - Keep Call bell within reach  - Keep bed low and locked with side rails adjusted as appropriate  - Keep care items and personal belongings within reach  - Initiate and maintain comfort rounds  - Make Fall Risk Sign visible to staff  - Offer Toileting every Hours, in advance of need  - Initiate/Maintain alarm  - Obtain necessary fall risk management equipment:   - Apply yellow socks and bracelet for high fall risk patients  - Consider moving patient to room near nurses station  Outcome: Progressing No

## 2022-10-31 ENCOUNTER — HOSPITAL ENCOUNTER (OUTPATIENT)
Dept: INFUSION CENTER | Facility: HOSPITAL | Age: 42
Discharge: HOME | End: 2022-10-31
Attending: INTERNAL MEDICINE

## 2022-10-31 VITALS
TEMPERATURE: 98.1 F | HEART RATE: 78 BPM | SYSTOLIC BLOOD PRESSURE: 112 MMHG | RESPIRATION RATE: 16 BRPM | DIASTOLIC BLOOD PRESSURE: 59 MMHG | OXYGEN SATURATION: 99 %

## 2022-10-31 DIAGNOSIS — Z98.84 HISTORY OF GASTRIC BYPASS: ICD-10-CM

## 2022-10-31 DIAGNOSIS — D50.8 IRON DEFICIENCY ANEMIA SECONDARY TO INADEQUATE DIETARY IRON INTAKE: Primary | ICD-10-CM

## 2022-10-31 RX ORDER — SODIUM CHLORIDE 9 MG/ML
20 INJECTION, SOLUTION INTRAVENOUS ONCE
Status: CANCELLED | OUTPATIENT
Start: 2022-10-31

## 2022-10-31 RX ORDER — SODIUM CHLORIDE 9 MG/ML
20 INJECTION, SOLUTION INTRAVENOUS ONCE
Status: COMPLETED | OUTPATIENT
Start: 2022-10-31 | End: 2022-10-31

## 2022-10-31 RX ADMIN — SODIUM CHLORIDE 20 ML/HR: 900 INJECTION, SOLUTION INTRAVENOUS at 12:47

## 2022-10-31 RX ADMIN — SODIUM CHLORIDE 200 MG: 900 INJECTION, SOLUTION INTRAVENOUS at 12:47

## 2022-10-31 NOTE — PLAN OF CARE
Problem: Potential for Falls  Goal: Patient will remain free of falls  Description: INTERVENTIONS:  - Educate patient/family on patient safety including physical limitations  - Instruct patient to call for assistance with activity   - Keep Call bell within reach  - Keep bed low and locked with side rails adjusted as appropriate  - Keep care items and personal belongings within reach  - Initiate and maintain comfort round  - Consider moving patient to room near nurses station  Outcome: Progressing     Problem: Knowledge Deficit  Goal: Patient/family/caregiver demonstrates understanding of disease process, treatment plan, medications, and discharge instructions  Description: Complete learning assessment and assess knowledge base    Interventions:  - Provide teaching at level of understanding  - Provide teaching via preferred learning methods  Outcome: Progressing

## 2022-10-31 NOTE — PROGRESS NOTES
Pt here today for venofer infusion tolerated well AVS provided, transport companied notified and pt left ambulatory utilizing a walker with steady gait

## 2022-11-02 ENCOUNTER — HOSPITAL ENCOUNTER (OUTPATIENT)
Dept: RADIOLOGY | Facility: HOSPITAL | Age: 42
Discharge: HOME | End: 2022-11-02

## 2022-11-14 ENCOUNTER — TELEPHONE (OUTPATIENT)
Dept: GYNECOLOGIC ONCOLOGY | Facility: CLINIC | Age: 42
End: 2022-11-14

## 2022-11-14 NOTE — TELEPHONE ENCOUNTER
Patient called in regarding her surgery  Dilan Leo notified   Patient will be contacted back @ [phone]

## 2022-11-21 ENCOUNTER — APPOINTMENT (OUTPATIENT)
Dept: LAB | Facility: HOSPITAL | Age: 42
End: 2022-11-21
Attending: OBSTETRICS & GYNECOLOGY

## 2022-11-21 ENCOUNTER — OFFICE VISIT (OUTPATIENT)
Dept: LAB | Facility: HOSPITAL | Age: 42
End: 2022-11-21
Attending: OBSTETRICS & GYNECOLOGY

## 2022-11-21 ENCOUNTER — HOSPITAL ENCOUNTER (OUTPATIENT)
Dept: RADIOLOGY | Facility: HOSPITAL | Age: 42
Discharge: HOME | End: 2022-11-21
Attending: OBSTETRICS & GYNECOLOGY

## 2022-11-21 DIAGNOSIS — N83.202 BILATERAL OVARIAN CYSTS: ICD-10-CM

## 2022-11-21 DIAGNOSIS — N83.201 RIGHT OVARIAN CYST: ICD-10-CM

## 2022-11-21 DIAGNOSIS — N83.201 BILATERAL OVARIAN CYSTS: ICD-10-CM

## 2022-11-21 LAB
ANION GAP SERPL CALCULATED.3IONS-SCNC: 10 MMOL/L (ref 4–13)
BUN SERPL-MCNC: 17 MG/DL (ref 5–25)
CALCIUM SERPL-MCNC: 8.8 MG/DL (ref 8.3–10.1)
CHLORIDE SERPL-SCNC: 104 MMOL/L (ref 96–108)
CO2 SERPL-SCNC: 24 MMOL/L (ref 21–32)
CREAT SERPL-MCNC: 0.85 MG/DL (ref 0.6–1.3)
ERYTHROCYTE [DISTWIDTH] IN BLOOD BY AUTOMATED COUNT: 14.5 % (ref 11.6–15.1)
EST. AVERAGE GLUCOSE BLD GHB EST-MCNC: 97 MG/DL
GFR SERPL CREATININE-BSD FRML MDRD: 85 ML/MIN/1.73SQ M
GLUCOSE P FAST SERPL-MCNC: 70 MG/DL (ref 65–99)
HBA1C MFR BLD: 5 %
HCT VFR BLD AUTO: 42.6 % (ref 34.8–46.1)
HGB BLD-MCNC: 13.2 G/DL (ref 11.5–15.4)
INR PPP: 0.91 (ref 0.84–1.19)
MCH RBC QN AUTO: 28.9 PG (ref 26.8–34.3)
MCHC RBC AUTO-ENTMCNC: 31 G/DL (ref 31.4–37.4)
MCV RBC AUTO: 93 FL (ref 82–98)
PLATELET # BLD AUTO: 229 THOUSANDS/UL (ref 149–390)
PMV BLD AUTO: 12.2 FL (ref 8.9–12.7)
POTASSIUM SERPL-SCNC: 3.9 MMOL/L (ref 3.5–5.3)
PROTHROMBIN TIME: 13 SECONDS (ref 11.6–14.5)
RBC # BLD AUTO: 4.57 MILLION/UL (ref 3.81–5.12)
SODIUM SERPL-SCNC: 138 MMOL/L (ref 135–147)
WBC # BLD AUTO: 9.13 THOUSAND/UL (ref 4.31–10.16)

## 2022-11-22 ENCOUNTER — LAB REQUISITION (OUTPATIENT)
Dept: LAB | Facility: HOSPITAL | Age: 42
End: 2022-11-22

## 2022-11-22 DIAGNOSIS — N83.201 UNSPECIFIED OVARIAN CYST, RIGHT SIDE: ICD-10-CM

## 2022-11-22 LAB
ABO GROUP BLD: NORMAL
BLD GP AB SCN SERPL QL: NEGATIVE
CANCER AG125 SERPL-ACNC: 13.1 U/ML (ref 0–30)
RH BLD: POSITIVE
SPECIMEN EXPIRATION DATE: NORMAL

## 2022-11-25 LAB
ATRIAL RATE: 65 BPM
P AXIS: 17 DEGREES
PR INTERVAL: 164 MS
QRS AXIS: 8 DEGREES
QRSD INTERVAL: 78 MS
QT INTERVAL: 400 MS
QTC INTERVAL: 416 MS
T WAVE AXIS: 15 DEGREES
VENTRICULAR RATE: 65 BPM

## 2022-12-08 ENCOUNTER — TELEPHONE (OUTPATIENT)
Dept: GYNECOLOGIC ONCOLOGY | Facility: HOSPITAL | Age: 42
End: 2022-12-08

## 2022-12-08 NOTE — TELEPHONE ENCOUNTER
Left message for patient to call back regarding her No show for surgery  Was on the phone with patient and patient hung up the phone  I called back and she did not answer

## 2023-01-05 ENCOUNTER — TELEPHONE (OUTPATIENT)
Dept: HEMATOLOGY ONCOLOGY | Facility: CLINIC | Age: 43
End: 2023-01-05

## 2023-01-05 NOTE — TELEPHONE ENCOUNTER
01/05/23    LVM reminding pt for updated labs prior to the next appt  Advising pt to get blood work done in any [employer] labs  [facility] number also provided

## 2023-01-09 ENCOUNTER — TELEPHONE (OUTPATIENT)
Dept: HEMATOLOGY ONCOLOGY | Facility: HOSPITAL | Age: 43
End: 2023-01-09

## 2023-01-09 NOTE — TELEPHONE ENCOUNTER
Called patient and LVM to see if patient wanted to rescheduled today's missed appointment  LVM with hope line # provided if the patient wanted to reschedule

## 2023-02-27 ENCOUNTER — TELEPHONE (OUTPATIENT)
Dept: NEUROLOGY | Facility: CLINIC | Age: 43
End: 2023-02-27

## 2023-02-27 NOTE — TELEPHONE ENCOUNTER
Called pt to give her recommendations for closer Neurologists after speaking with Dr Stewart Ge  No answer, left voicemail  Told pt I will go ahead and cancel her appointment, if she decides to change her mind she can call us to get rescheduled

## 2024-03-07 ENCOUNTER — TRANSCRIBE ORDERS (OUTPATIENT)
Dept: SCHEDULING | Age: 44
End: 2024-03-07

## 2024-03-07 DIAGNOSIS — Z12.31 ENCOUNTER FOR SCREENING MAMMOGRAM FOR MALIGNANT NEOPLASM OF BREAST: Primary | ICD-10-CM

## 2024-03-07 DIAGNOSIS — N83.8 OTHER NONINFLAMMATORY DISORDERS OF OVARY, FALLOPIAN TUBE AND BROAD LIGAMENT: ICD-10-CM

## 2024-08-15 ENCOUNTER — APPOINTMENT (EMERGENCY)
Dept: RADIOLOGY | Facility: HOSPITAL | Age: 44
DRG: 070 | End: 2024-08-15
Payer: COMMERCIAL

## 2024-08-15 ENCOUNTER — HOSPITAL ENCOUNTER (INPATIENT)
Facility: HOSPITAL | Age: 44
LOS: 5 days | Discharge: HOME | DRG: 070 | End: 2024-08-21
Attending: EMERGENCY MEDICINE | Admitting: INTERNAL MEDICINE
Payer: COMMERCIAL

## 2024-08-15 DIAGNOSIS — E87.6 HYPOKALEMIA: ICD-10-CM
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
## 2024-08-15 DIAGNOSIS — R41.82 ALTERED MENTAL STATUS, UNSPECIFIED ALTERED MENTAL STATUS TYPE: ICD-10-CM

## 2024-08-15 DIAGNOSIS — I95.2 HYPOTENSION DUE TO DRUGS: ICD-10-CM

## 2024-08-15 DIAGNOSIS — R06.89 HYPERCAPNIA: ICD-10-CM

## 2024-08-15 LAB
ALBUMIN SERPL-MCNC: 4 G/DL (ref 3.5–5.7)
ALP SERPL-CCNC: 240 IU/L (ref 34–125)
ALT SERPL-CCNC: 38 IU/L (ref 7–52)
AMORPH CRY #/AREA URNS HPF: 2 /HPF
AMPHET UR QL SCN: NOT DETECTED
ANION GAP SERPL CALC-SCNC: 12 MEQ/L (ref 3–15)
APAP SERPL-MCNC: <0.1 UG/ML (ref 10–30)
AST SERPL-CCNC: 61 IU/L (ref 13–39)
BACTERIA URNS QL MICRO: ABNORMAL /HPF
BARBITURATES UR QL SCN: NOT DETECTED
BASOPHILS # BLD: 0.03 K/UL (ref 0.01–0.1)
BASOPHILS NFR BLD: 0.5 %
BENZODIAZ UR QL SCN: NOT DETECTED
BILIRUB SERPL-MCNC: 0.4 MG/DL (ref 0.3–1.2)
BILIRUB UR QL STRIP.AUTO: NEGATIVE MG/DL
BUN SERPL-MCNC: 5 MG/DL (ref 7–25)
CALCIUM SERPL-MCNC: 8.4 MG/DL (ref 8.6–10.3)
CANNABINOIDS UR QL SCN: POSITIVE
CHLORIDE SERPL-SCNC: 106 MEQ/L (ref 98–107)
CK SERPL-CCNC: 71 U/L (ref 30–223)
CLARITY UR REFRACT.AUTO: ABNORMAL
CO2 SERPL-SCNC: 18 MEQ/L (ref 21–31)
COCAINE UR QL SCN: NOT DETECTED
COLOR UR AUTO: YELLOW
CREAT SERPL-MCNC: 0.9 MG/DL (ref 0.6–1.2)
DIFFERENTIAL METHOD BLD: ABNORMAL
EGFRCR SERPLBLD CKD-EPI 2021: >60 ML/MIN/1.73M*2
EOSINOPHIL # BLD: 0.17 K/UL (ref 0.04–0.36)
EOSINOPHIL NFR BLD: 3 %
ERYTHROCYTE [DISTWIDTH] IN BLOOD BY AUTOMATED COUNT: 14.7 % (ref 11.7–14.4)
ETHANOL SERPL-MCNC: <10 MG/DL
FENTANYL URINE SCR: NOT DETECTED
FLUAV RNA SPEC QL NAA+PROBE: NEGATIVE
FLUBV RNA SPEC QL NAA+PROBE: NEGATIVE
GLUCOSE BLD-MCNC: 117 MG/DL (ref 70–99)
GLUCOSE SERPL-MCNC: 108 MG/DL (ref 70–99)
GLUCOSE UR STRIP.AUTO-MCNC: NEGATIVE MG/DL
HCG UR QL: NEGATIVE
HCT VFR BLD AUTO: 39.5 % (ref 35–45)
HGB BLD-MCNC: 13.1 G/DL (ref 11.8–15.7)
HGB UR QL STRIP.AUTO: NEGATIVE
HYALINE CASTS #/AREA URNS LPF: ABNORMAL /LPF
IMM GRANULOCYTES # BLD AUTO: 0.01 K/UL (ref 0–0.08)
IMM GRANULOCYTES NFR BLD AUTO: 0.2 %
KETONES UR STRIP.AUTO-MCNC: NEGATIVE MG/DL
LEUKOCYTE ESTERASE UR QL STRIP.AUTO: NEGATIVE
LYMPHOCYTES # BLD: 0.85 K/UL (ref 1.2–3.5)
LYMPHOCYTES NFR BLD: 15.1 %
MCH RBC QN AUTO: 35.5 PG (ref 28–33.2)
MCHC RBC AUTO-ENTMCNC: 33.2 G/DL (ref 32.2–35.5)
MCV RBC AUTO: 107 FL (ref 83–98)
MONOCYTES # BLD: 0.53 K/UL (ref 0.28–0.8)
MONOCYTES NFR BLD: 9.4 %
NEUTROPHILS # BLD: 4.03 K/UL (ref 1.7–7)
NEUTS SEG NFR BLD: 71.8 %
NITRITE UR QL STRIP.AUTO: NEGATIVE
NRBC BLD-RTO: 0 %
OPIATES UR QL SCN: NOT DETECTED
PCP UR QL SCN: NOT DETECTED
PDW BLD AUTO: 12.5 FL (ref 9.4–12.3)
PH UR STRIP.AUTO: 6.5 [PH]
PLATELET # BLD AUTO: 158 K/UL (ref 150–369)
POCT TEST: ABNORMAL
POTASSIUM SERPL-SCNC: 2.6 MEQ/L (ref 3.5–5.1)
PROT SERPL-MCNC: 5.8 G/DL (ref 6–8.2)
PROT UR QL STRIP.AUTO: NEGATIVE
RBC # BLD AUTO: 3.69 M/UL (ref 3.93–5.22)
RBC #/AREA URNS HPF: ABNORMAL /HPF
RSV RNA SPEC QL NAA+PROBE: NEGATIVE
SALICYLATES SERPL-MCNC: <1.5 MG/DL
SARS-COV-2 RNA RESP QL NAA+PROBE: NEGATIVE
SODIUM SERPL-SCNC: 136 MEQ/L (ref 136–145)
SP GR UR REFRACT.AUTO: 1
SQUAMOUS URNS QL MICRO: ABNORMAL /HPF
TROPONIN I SERPL HS-MCNC: 2.7 PG/ML
TROPONIN I SERPL HS-MCNC: 3.4 PG/ML
UROBILINOGEN UR STRIP-ACNC: 0.2 EU/DL
WBC # BLD AUTO: 5.62 K/UL (ref 3.8–10.5)
WBC #/AREA URNS HPF: ABNORMAL /HPF

## 2024-08-15 PROCEDURE — 80053 COMPREHEN METABOLIC PANEL: CPT | Performed by: EMERGENCY MEDICINE

## 2024-08-15 PROCEDURE — 82010 KETONE BODYS QUAN: CPT | Performed by: STUDENT IN AN ORGANIZED HEALTH CARE EDUCATION/TRAINING PROGRAM

## 2024-08-15 PROCEDURE — 36415 COLL VENOUS BLD VENIPUNCTURE: CPT | Performed by: EMERGENCY MEDICINE

## 2024-08-15 PROCEDURE — 70450 CT HEAD/BRAIN W/O DYE: CPT

## 2024-08-15 PROCEDURE — 82550 ASSAY OF CK (CPK): CPT | Performed by: PHYSICIAN ASSISTANT

## 2024-08-15 PROCEDURE — 84703 CHORIONIC GONADOTROPIN ASSAY: CPT | Performed by: PHYSICIAN ASSISTANT

## 2024-08-15 PROCEDURE — G0480 DRUG TEST DEF 1-7 CLASSES: HCPCS | Performed by: PHYSICIAN ASSISTANT

## 2024-08-15 PROCEDURE — 84484 ASSAY OF TROPONIN QUANT: CPT | Mod: 91 | Performed by: EMERGENCY MEDICINE

## 2024-08-15 PROCEDURE — 84484 ASSAY OF TROPONIN QUANT: CPT | Performed by: EMERGENCY MEDICINE

## 2024-08-15 PROCEDURE — 84100 ASSAY OF PHOSPHORUS: CPT | Performed by: STUDENT IN AN ORGANIZED HEALTH CARE EDUCATION/TRAINING PROGRAM

## 2024-08-15 PROCEDURE — 71045 X-RAY EXAM CHEST 1 VIEW: CPT

## 2024-08-15 PROCEDURE — 84443 ASSAY THYROID STIM HORMONE: CPT | Performed by: STUDENT IN AN ORGANIZED HEALTH CARE EDUCATION/TRAINING PROGRAM

## 2024-08-15 PROCEDURE — 96366 THER/PROPH/DIAG IV INF ADDON: CPT

## 2024-08-15 PROCEDURE — 72125 CT NECK SPINE W/O DYE: CPT

## 2024-08-15 PROCEDURE — 93005 ELECTROCARDIOGRAM TRACING: CPT

## 2024-08-15 PROCEDURE — 63600000 HC DRUGS/DETAIL CODE: Mod: JZ | Performed by: PHYSICIAN ASSISTANT

## 2024-08-15 PROCEDURE — 85025 COMPLETE CBC W/AUTO DIFF WBC: CPT | Performed by: EMERGENCY MEDICINE

## 2024-08-15 PROCEDURE — 25800000 HC PHARMACY IV SOLUTIONS: Performed by: PHYSICIAN ASSISTANT

## 2024-08-15 PROCEDURE — 80307 DRUG TEST PRSMV CHEM ANLYZR: CPT | Performed by: PHYSICIAN ASSISTANT

## 2024-08-15 PROCEDURE — 81001 URINALYSIS AUTO W/SCOPE: CPT | Performed by: EMERGENCY MEDICINE

## 2024-08-15 PROCEDURE — 99285 EMERGENCY DEPT VISIT HI MDM: CPT | Mod: 25

## 2024-08-15 PROCEDURE — 96365 THER/PROPH/DIAG IV INF INIT: CPT

## 2024-08-15 PROCEDURE — 87637 SARSCOV2&INF A&B&RSV AMP PRB: CPT | Performed by: EMERGENCY MEDICINE

## 2024-08-15 PROCEDURE — 82390 ASSAY OF CERULOPLASMIN: CPT | Performed by: INTERNAL MEDICINE

## 2024-08-15 PROCEDURE — 93005 ELECTROCARDIOGRAM TRACING: CPT | Performed by: EMERGENCY MEDICINE

## 2024-08-15 PROCEDURE — 83735 ASSAY OF MAGNESIUM: CPT | Performed by: STUDENT IN AN ORGANIZED HEALTH CARE EDUCATION/TRAINING PROGRAM

## 2024-08-15 RX ORDER — POTASSIUM CHLORIDE 14.9 MG/ML
20 INJECTION INTRAVENOUS ONCE
Status: COMPLETED | OUTPATIENT
Start: 2024-08-15 | End: 2024-08-15

## 2024-08-15 RX ADMIN — POTASSIUM CHLORIDE 20 MEQ: 14.9 INJECTION, SOLUTION INTRAVENOUS at 21:12

## 2024-08-15 RX ADMIN — SODIUM CHLORIDE 1000 ML: 9 INJECTION, SOLUTION INTRAVENOUS at 23:57

## 2024-08-15 RX ADMIN — SODIUM CHLORIDE 1000 ML: 9 INJECTION, SOLUTION INTRAVENOUS at 21:12

## 2024-08-16 ENCOUNTER — APPOINTMENT (INPATIENT)
Dept: RADIOLOGY | Facility: HOSPITAL | Age: 44
DRG: 070 | End: 2024-08-16
Attending: NURSE PRACTITIONER
Payer: COMMERCIAL

## 2024-08-16 PROBLEM — F41.9 ANXIETY: Chronic | Status: ACTIVE | Noted: 2024-08-16

## 2024-08-16 PROBLEM — I95.9 HYPOTENSION: Status: ACTIVE | Noted: 2024-08-16

## 2024-08-16 PROBLEM — R06.89 HYPERCAPNIA: Status: ACTIVE | Noted: 2024-08-16

## 2024-08-16 PROBLEM — F50.9 EATING DISORDER: Chronic | Status: ACTIVE | Noted: 2024-08-16

## 2024-08-16 PROBLEM — F32.A DEPRESSION: Chronic | Status: ACTIVE | Noted: 2024-08-16

## 2024-08-16 PROBLEM — F43.10 PTSD (POST-TRAUMATIC STRESS DISORDER): Chronic | Status: ACTIVE | Noted: 2024-08-16

## 2024-08-16 PROBLEM — E87.6 HYPOKALEMIA: Status: ACTIVE | Noted: 2024-08-16

## 2024-08-16 PROBLEM — R56.9 SEIZURES (CMS/HCC): Chronic | Status: ACTIVE | Noted: 2024-08-16

## 2024-08-16 PROBLEM — E03.9 HYPOTHYROIDISM: Chronic | Status: ACTIVE | Noted: 2024-08-16

## 2024-08-16 PROBLEM — Z91.89 AT RISK FOR POLYPHARMACY: Status: ACTIVE | Noted: 2024-08-16

## 2024-08-16 PROBLEM — G47.00 INSOMNIA: Chronic | Status: ACTIVE | Noted: 2024-08-16

## 2024-08-16 PROBLEM — E87.20 METABOLIC ACIDOSIS, NORMAL ANION GAP (NAG): Status: ACTIVE | Noted: 2024-08-16

## 2024-08-16 LAB
ANION GAP SERPL CALC-SCNC: 7 MEQ/L (ref 3–15)
ANION GAP SERPL CALC-SCNC: 7 MEQ/L (ref 3–15)
ATRIAL RATE: 62
ATRIAL RATE: 88
B-OH-BUTYR SERPL-SCNC: 0.05 MMOL/L
BASE EXCESS BLDA CALC-SCNC: -5.5 MEQ/L
BASE EXCESS BLDV CALC-SCNC: -12.6 MEQ/L
BASE EXCESS BLDV CALC-SCNC: -7.2 MEQ/L
BUN SERPL-MCNC: 3 MG/DL (ref 7–25)
BUN SERPL-MCNC: 3 MG/DL (ref 7–25)
BUPRENORPHINE UR QL: NOT DETECTED
CA-I BLD-SCNC: 1.15 MMOL/L (ref 1.15–1.27)
CALCIUM SERPL-MCNC: 7.8 MG/DL (ref 8.6–10.3)
CALCIUM SERPL-MCNC: 7.9 MG/DL (ref 8.6–10.3)
CHLORIDE BLDA-SCNC: 112 MEQ/L (ref 98–109)
CHLORIDE SERPL-SCNC: 112 MEQ/L (ref 98–107)
CHLORIDE SERPL-SCNC: 113 MEQ/L (ref 98–107)
CHLORIDE UR-SCNC: 23 MEQ/L
CK SERPL-CCNC: 59 U/L (ref 30–223)
CO2 BLDA-SCNC: 22 MEQ/L (ref 22–32)
CO2 BLDV-SCNC: 16.6 MEQ/L (ref 22–32)
CO2 BLDV-SCNC: 22.8 MEQ/L (ref 22–32)
CO2 SERPL-SCNC: 22 MEQ/L (ref 21–31)
CO2 SERPL-SCNC: 23 MEQ/L (ref 21–31)
COHGB MFR BLD: 0.5 %
CREAT SERPL-MCNC: 0.6 MG/DL (ref 0.6–1.2)
CREAT SERPL-MCNC: 0.8 MG/DL (ref 0.6–1.2)
EGFRCR SERPLBLD CKD-EPI 2021: >60 ML/MIN/1.73M*2
EGFRCR SERPLBLD CKD-EPI 2021: >60 ML/MIN/1.73M*2
ERYTHROCYTE [DISTWIDTH] IN BLOOD BY AUTOMATED COUNT: 14.8 % (ref 11.7–14.4)
FIO2 ON VENT: 21 %
FIO2 ON VENT: 40 %
FIO2 ON VENT: ABNORMAL %
GLUCOSE BLD-MCNC: 100 MG/DL (ref 70–99)
GLUCOSE BLDA-MCNC: 74 MG/DL (ref 70–99)
GLUCOSE SERPL-MCNC: 86 MG/DL (ref 70–99)
GLUCOSE SERPL-MCNC: 88 MG/DL (ref 70–99)
HCO3 BLDA-SCNC: 20.6 MEQ/L (ref 21–28)
HCO3 BLDV-SCNC: 14.2 MEQ/L (ref 21–28)
HCO3 BLDV-SCNC: 18.2 MEQ/L (ref 21–28)
HCT VFR BLD AUTO: 38 % (ref 35–45)
HGB BLD-MCNC: 12.3 G/DL (ref 11.8–15.7)
HGB BLDA OXIMETRY-MCNC: 11.6 G/DL (ref 12–16)
INHALED O2 CONCENTRATION: ABNORMAL %
LACTATE BLDA-SCNC: 0.4 MMOL/L (ref 0.4–1.6)
LACTATE SERPL-SCNC: 0.6 MMOL/L (ref 0.4–2)
LACTATE SERPL-SCNC: 0.7 MMOL/L (ref 0.4–2)
LACTATE SERPL-SCNC: 0.7 MMOL/L (ref 0.4–2)
MAGNESIUM SERPL-MCNC: 1.7 MG/DL (ref 1.8–2.5)
MAGNESIUM SERPL-MCNC: 1.7 MG/DL (ref 1.8–2.5)
MAGNESIUM SERPL-MCNC: 2.9 MG/DL (ref 1.8–2.5)
MCH RBC QN AUTO: 35.1 PG (ref 28–33.2)
MCHC RBC AUTO-ENTMCNC: 32.4 G/DL (ref 32.2–35.5)
MCV RBC AUTO: 108.6 FL (ref 83–98)
METHADONE UR QL SCN: NOT DETECTED
METHGB BLD-SCNC: 1.2 % (ref 0.4–1.5)
MRSA DNA SPEC QL NAA+PROBE: NEGATIVE
OSMOLALITY UR: 133 MOSM/KG (ref 100–1400)
OXYCODONE UR QL SCN: NOT DETECTED
P AXIS: -12
P AXIS: 57
PCO2 BLDA: 42 MM HG (ref 35–48)
PCO2 BLDV: 43 MM HG (ref 41–51)
PCO2 BLDV: 54 MM HG (ref 41–51)
PDW BLD AUTO: 12.3 FL (ref 9.4–12.3)
PH BLDA: 7.3 [PH] (ref 7.35–7.45)
PH BLDV: 7.16 [PH] (ref 7.32–7.42)
PH BLDV: 7.2 [PH] (ref 7.32–7.42)
PHOSPHATE SERPL-MCNC: 1.8 MG/DL (ref 2.4–4.7)
PHOSPHATE SERPL-MCNC: 2.6 MG/DL (ref 2.4–4.7)
PHOSPHATE SERPL-MCNC: 2.6 MG/DL (ref 2.4–4.7)
PLATELET # BLD AUTO: 144 K/UL (ref 150–369)
PO2 BLDA: 198 MM HG (ref 83–100)
PO2 BLDV: 27 MM HG (ref 25–40)
PO2 BLDV: 29 MM HG (ref 25–40)
POCT TEST: ABNORMAL
POTASSIUM BLDA-SCNC: 3 MEQ/L (ref 3.4–4.5)
POTASSIUM SERPL-SCNC: 3.3 MEQ/L (ref 3.5–5.1)
POTASSIUM SERPL-SCNC: 3.4 MEQ/L (ref 3.5–5.1)
POTASSIUM UR-SCNC: 6.9 MEQ/L
PR INTERVAL: 170
PR INTERVAL: 184
QRS DURATION: 78
QRS DURATION: 84
QT INTERVAL: 320
QT INTERVAL: 380
QTC CALCULATION(BAZETT): 385
QTC CALCULATION(BAZETT): 387
R AXIS: -19
R AXIS: -24
RBC # BLD AUTO: 3.5 M/UL (ref 3.93–5.22)
SAO2 % BLDA: 97 % (ref 93–98)
SODIUM BLDA-SCNC: 138 MEQ/L (ref 136–145)
SODIUM SERPL-SCNC: 142 MEQ/L (ref 136–145)
SODIUM SERPL-SCNC: 142 MEQ/L (ref 136–145)
SODIUM UR-SCNC: <10 MEQ/L
T WAVE AXIS: -31
T WAVE AXIS: 26
T4 FREE SERPL-MCNC: 0.55 NG/DL (ref 0.58–1.64)
TSH SERPL DL<=0.05 MIU/L-ACNC: 6.67 MIU/L (ref 0.34–5.6)
VENTRICULAR RATE: 62
VENTRICULAR RATE: 88
VIT B12 SERPL-MCNC: 239 PG/ML (ref 180–914)
WBC # BLD AUTO: 5.56 K/UL (ref 3.8–10.5)

## 2024-08-16 PROCEDURE — 25000000 HC PHARMACY GENERAL: Performed by: STUDENT IN AN ORGANIZED HEALTH CARE EDUCATION/TRAINING PROGRAM

## 2024-08-16 PROCEDURE — 20000000 HC ROOM AND CARE ICU

## 2024-08-16 PROCEDURE — 82803 BLOOD GASES ANY COMBINATION: CPT | Performed by: STUDENT IN AN ORGANIZED HEALTH CARE EDUCATION/TRAINING PROGRAM

## 2024-08-16 PROCEDURE — 82607 VITAMIN B-12: CPT | Performed by: PHYSICIAN ASSISTANT

## 2024-08-16 PROCEDURE — 80307 DRUG TEST PRSMV CHEM ANLYZR: CPT | Performed by: NURSE PRACTITIONER

## 2024-08-16 PROCEDURE — 63600000 HC DRUGS/DETAIL CODE: Mod: JZ | Performed by: NURSE PRACTITIONER

## 2024-08-16 PROCEDURE — 63700000 HC SELF-ADMINISTRABLE DRUG: Performed by: PHYSICIAN ASSISTANT

## 2024-08-16 PROCEDURE — 84300 ASSAY OF URINE SODIUM: CPT | Performed by: STUDENT IN AN ORGANIZED HEALTH CARE EDUCATION/TRAINING PROGRAM

## 2024-08-16 PROCEDURE — 90792 PSYCH DIAG EVAL W/MED SRVCS: CPT

## 2024-08-16 PROCEDURE — 63600000 HC DRUGS/DETAIL CODE: Mod: JZ | Performed by: INTERNAL MEDICINE

## 2024-08-16 PROCEDURE — 80201 ASSAY OF TOPIRAMATE: CPT | Performed by: PHYSICIAN ASSISTANT

## 2024-08-16 PROCEDURE — 80048 BASIC METABOLIC PNL TOTAL CA: CPT | Performed by: STUDENT IN AN ORGANIZED HEALTH CARE EDUCATION/TRAINING PROGRAM

## 2024-08-16 PROCEDURE — 36415 COLL VENOUS BLD VENIPUNCTURE: CPT | Performed by: PHYSICIAN ASSISTANT

## 2024-08-16 PROCEDURE — 83935 ASSAY OF URINE OSMOLALITY: CPT | Performed by: STUDENT IN AN ORGANIZED HEALTH CARE EDUCATION/TRAINING PROGRAM

## 2024-08-16 PROCEDURE — 82805 BLOOD GASES W/O2 SATURATION: CPT | Performed by: NURSE PRACTITIONER

## 2024-08-16 PROCEDURE — 94660 CPAP INITIATION&MGMT: CPT

## 2024-08-16 PROCEDURE — 87045 FECES CULTURE AEROBIC BACT: CPT | Performed by: PHYSICIAN ASSISTANT

## 2024-08-16 PROCEDURE — 85018 HEMOGLOBIN: CPT | Performed by: NURSE PRACTITIONER

## 2024-08-16 PROCEDURE — 82550 ASSAY OF CK (CPK): CPT | Performed by: INTERNAL MEDICINE

## 2024-08-16 PROCEDURE — 84439 ASSAY OF FREE THYROXINE: CPT | Performed by: PHYSICIAN ASSISTANT

## 2024-08-16 PROCEDURE — 36600 WITHDRAWAL OF ARTERIAL BLOOD: CPT

## 2024-08-16 PROCEDURE — 99223 1ST HOSP IP/OBS HIGH 75: CPT | Performed by: STUDENT IN AN ORGANIZED HEALTH CARE EDUCATION/TRAINING PROGRAM

## 2024-08-16 PROCEDURE — 80048 BASIC METABOLIC PNL TOTAL CA: CPT | Performed by: INTERNAL MEDICINE

## 2024-08-16 PROCEDURE — 93005 ELECTROCARDIOGRAM TRACING: CPT | Performed by: STUDENT IN AN ORGANIZED HEALTH CARE EDUCATION/TRAINING PROGRAM

## 2024-08-16 PROCEDURE — 96375 TX/PRO/DX INJ NEW DRUG ADDON: CPT

## 2024-08-16 PROCEDURE — 63700000 HC SELF-ADMINISTRABLE DRUG: Performed by: STUDENT IN AN ORGANIZED HEALTH CARE EDUCATION/TRAINING PROGRAM

## 2024-08-16 PROCEDURE — 25000000 HC PHARMACY GENERAL: Performed by: PHYSICIAN ASSISTANT

## 2024-08-16 PROCEDURE — 63600000 HC DRUGS/DETAIL CODE: Mod: JZ | Performed by: STUDENT IN AN ORGANIZED HEALTH CARE EDUCATION/TRAINING PROGRAM

## 2024-08-16 PROCEDURE — 85027 COMPLETE CBC AUTOMATED: CPT | Performed by: STUDENT IN AN ORGANIZED HEALTH CARE EDUCATION/TRAINING PROGRAM

## 2024-08-16 PROCEDURE — 83735 ASSAY OF MAGNESIUM: CPT | Performed by: INTERNAL MEDICINE

## 2024-08-16 PROCEDURE — 25800000 HC PHARMACY IV SOLUTIONS: Performed by: PHYSICIAN ASSISTANT

## 2024-08-16 PROCEDURE — 87040 BLOOD CULTURE FOR BACTERIA: CPT | Performed by: PHYSICIAN ASSISTANT

## 2024-08-16 PROCEDURE — 25000000 HC PHARMACY GENERAL

## 2024-08-16 PROCEDURE — 74176 CT ABD & PELVIS W/O CONTRAST: CPT

## 2024-08-16 PROCEDURE — 87077 CULTURE AEROBIC IDENTIFY: CPT | Performed by: PHYSICIAN ASSISTANT

## 2024-08-16 PROCEDURE — 87493 C DIFF AMPLIFIED PROBE: CPT | Performed by: PHYSICIAN ASSISTANT

## 2024-08-16 PROCEDURE — 84100 ASSAY OF PHOSPHORUS: CPT | Performed by: INTERNAL MEDICINE

## 2024-08-16 PROCEDURE — 25800000 HC PHARMACY IV SOLUTIONS: Performed by: STUDENT IN AN ORGANIZED HEALTH CARE EDUCATION/TRAINING PROGRAM

## 2024-08-16 PROCEDURE — 87641 MR-STAPH DNA AMP PROBE: CPT | Performed by: NURSE PRACTITIONER

## 2024-08-16 PROCEDURE — 83605 ASSAY OF LACTIC ACID: CPT | Performed by: STUDENT IN AN ORGANIZED HEALTH CARE EDUCATION/TRAINING PROGRAM

## 2024-08-16 PROCEDURE — 83605 ASSAY OF LACTIC ACID: CPT | Performed by: PHYSICIAN ASSISTANT

## 2024-08-16 PROCEDURE — 84132 ASSAY OF SERUM POTASSIUM: CPT | Performed by: NURSE PRACTITIONER

## 2024-08-16 PROCEDURE — 82525 ASSAY OF COPPER: CPT | Performed by: INTERNAL MEDICINE

## 2024-08-16 PROCEDURE — 63600000 HC DRUGS/DETAIL CODE: Mod: JZ,JG | Performed by: PHYSICIAN ASSISTANT

## 2024-08-16 RX ORDER — QUETIAPINE FUMARATE 100 MG/1
100 TABLET, FILM COATED ORAL NIGHTLY
COMMUNITY
End: 2024-08-21 | Stop reason: HOSPADM

## 2024-08-16 RX ORDER — ACETAMINOPHEN 325 MG/1
975 TABLET ORAL EVERY 6 HOURS PRN
Status: DISCONTINUED | OUTPATIENT
Start: 2024-08-16 | End: 2024-08-21 | Stop reason: HOSPADM

## 2024-08-16 RX ORDER — THIAMINE HCL 100 MG
200 TABLET ORAL DAILY
Status: DISCONTINUED | OUTPATIENT
Start: 2024-08-16 | End: 2024-08-19

## 2024-08-16 RX ORDER — DICYCLOMINE HYDROCHLORIDE 20 MG/1
20 TABLET ORAL DAILY PRN
COMMUNITY
End: 2024-08-21 | Stop reason: HOSPADM

## 2024-08-16 RX ORDER — VIT C/E/ZN/COPPR/LUTEIN/ZEAXAN 250MG-90MG
125 CAPSULE ORAL 2 TIMES DAILY
Status: DISCONTINUED | OUTPATIENT
Start: 2024-08-16 | End: 2024-08-21 | Stop reason: HOSPADM

## 2024-08-16 RX ORDER — TRAZODONE HYDROCHLORIDE 100 MG/1
1-2 TABLET ORAL NIGHTLY
COMMUNITY
End: 2024-10-03 | Stop reason: HOSPADM

## 2024-08-16 RX ORDER — OMEPRAZOLE 20 MG/1
20 CAPSULE, DELAYED RELEASE ORAL
COMMUNITY

## 2024-08-16 RX ORDER — POTASSIUM CHLORIDE 20 MEQ/1
20 TABLET, EXTENDED RELEASE ORAL AS NEEDED
Status: DISCONTINUED | OUTPATIENT
Start: 2024-08-16 | End: 2024-08-21 | Stop reason: HOSPADM

## 2024-08-16 RX ORDER — ACARBOSE 25 MG/1
25 TABLET ORAL
COMMUNITY
End: 2024-08-21 | Stop reason: HOSPADM

## 2024-08-16 RX ORDER — DEXTROSE 40 %
15-30 GEL (GRAM) ORAL AS NEEDED
Status: DISCONTINUED | OUTPATIENT
Start: 2024-08-16 | End: 2024-08-21 | Stop reason: HOSPADM

## 2024-08-16 RX ORDER — FOLIC ACID 1 MG/1
1 TABLET ORAL DAILY
COMMUNITY
End: 2024-08-16 | Stop reason: ENTERED-IN-ERROR

## 2024-08-16 RX ORDER — SODIUM CHLORIDE 9 MG/ML
INJECTION, SOLUTION INTRAVENOUS CONTINUOUS
Status: ACTIVE | OUTPATIENT
Start: 2024-08-16 | End: 2024-08-17

## 2024-08-16 RX ORDER — LEVOFLOXACIN 5 MG/ML
500 INJECTION, SOLUTION INTRAVENOUS
Status: DISCONTINUED | OUTPATIENT
Start: 2024-08-16 | End: 2024-08-16

## 2024-08-16 RX ORDER — NOREPINEPHRINE BITARTRATE 0.02 MG/ML
0-90 INJECTION, SOLUTION INTRAVENOUS
Status: DISCONTINUED | OUTPATIENT
Start: 2024-08-16 | End: 2024-08-18

## 2024-08-16 RX ORDER — GABAPENTIN 400 MG/1
400 CAPSULE ORAL 4 TIMES DAILY
COMMUNITY
End: 2025-02-18 | Stop reason: HOSPADM

## 2024-08-16 RX ORDER — TOPIRAMATE 50 MG/1
50 TABLET, FILM COATED ORAL 2 TIMES DAILY
COMMUNITY

## 2024-08-16 RX ORDER — SODIUM BICARBONATE 1 MEQ/ML
50 SYRINGE (ML) INTRAVENOUS ONCE
Status: COMPLETED | OUTPATIENT
Start: 2024-08-16 | End: 2024-08-16

## 2024-08-16 RX ORDER — FLUOXETINE HYDROCHLORIDE 40 MG/1
40 CAPSULE ORAL DAILY
COMMUNITY

## 2024-08-16 RX ORDER — DEXTROSE 50 % IN WATER (D50W) INTRAVENOUS SYRINGE
50 ONCE
Status: COMPLETED | OUTPATIENT
Start: 2024-08-16 | End: 2024-08-16

## 2024-08-16 RX ORDER — TRAZODONE HYDROCHLORIDE 100 MG/1
100 TABLET ORAL ONCE
Status: COMPLETED | OUTPATIENT
Start: 2024-08-16 | End: 2024-08-16

## 2024-08-16 RX ORDER — POTASSIUM CHLORIDE 14.9 MG/ML
20 INJECTION INTRAVENOUS AS NEEDED
Status: DISCONTINUED | OUTPATIENT
Start: 2024-08-16 | End: 2024-08-21 | Stop reason: HOSPADM

## 2024-08-16 RX ORDER — ZOLPIDEM TARTRATE 5 MG/1
5 TABLET ORAL ONCE
Status: DISCONTINUED | OUTPATIENT
Start: 2024-08-16 | End: 2024-08-17

## 2024-08-16 RX ORDER — CALCIUM CARBONATE 200(500)MG
200 TABLET,CHEWABLE ORAL DAILY PRN
Status: DISCONTINUED | OUTPATIENT
Start: 2024-08-16 | End: 2024-08-21 | Stop reason: HOSPADM

## 2024-08-16 RX ORDER — IBUPROFEN 200 MG
16-32 TABLET ORAL AS NEEDED
Status: DISCONTINUED | OUTPATIENT
Start: 2024-08-16 | End: 2024-08-21 | Stop reason: HOSPADM

## 2024-08-16 RX ORDER — QUETIAPINE FUMARATE 50 MG/1
75 TABLET, FILM COATED ORAL 2 TIMES DAILY
COMMUNITY
End: 2024-08-21 | Stop reason: HOSPADM

## 2024-08-16 RX ORDER — POTASSIUM CHLORIDE 20 MEQ/1
40 TABLET, EXTENDED RELEASE ORAL AS NEEDED
Status: DISCONTINUED | OUTPATIENT
Start: 2024-08-16 | End: 2024-08-21 | Stop reason: HOSPADM

## 2024-08-16 RX ORDER — LORAZEPAM 0.5 MG/1
0.5 TABLET ORAL NIGHTLY
COMMUNITY
End: 2024-08-21 | Stop reason: HOSPADM

## 2024-08-16 RX ORDER — IBUPROFEN 200 MG
4-6 TABLET ORAL EVERY 8 HOURS PRN
COMMUNITY
End: 2024-08-21 | Stop reason: HOSPADM

## 2024-08-16 RX ORDER — NALOXONE HYDROCHLORIDE 4 MG/.1ML
1 SPRAY NASAL ONCE
COMMUNITY
End: 2024-08-21 | Stop reason: HOSPADM

## 2024-08-16 RX ORDER — LORAZEPAM 1 MG/1
1 TABLET ORAL 2 TIMES DAILY PRN
COMMUNITY
End: 2024-08-21 | Stop reason: HOSPADM

## 2024-08-16 RX ORDER — ATORVASTATIN CALCIUM 20 MG/1
20 TABLET, FILM COATED ORAL EVERY MORNING
COMMUNITY

## 2024-08-16 RX ORDER — FUROSEMIDE 20 MG/1
20 TABLET ORAL DAILY PRN
COMMUNITY
End: 2024-08-21 | Stop reason: HOSPADM

## 2024-08-16 RX ORDER — FERROUS SULFATE 325(65) MG
325 TABLET ORAL 2 TIMES DAILY WITH MEALS
Status: DISCONTINUED | OUTPATIENT
Start: 2024-08-16 | End: 2024-08-21 | Stop reason: HOSPADM

## 2024-08-16 RX ORDER — ENOXAPARIN SODIUM 100 MG/ML
40 INJECTION SUBCUTANEOUS
Status: DISCONTINUED | OUTPATIENT
Start: 2024-08-16 | End: 2024-08-21 | Stop reason: HOSPADM

## 2024-08-16 RX ORDER — NOREPINEPHRINE BITARTRATE 0.02 MG/ML
INJECTION, SOLUTION INTRAVENOUS
Status: COMPLETED
Start: 2024-08-16 | End: 2024-08-16

## 2024-08-16 RX ORDER — PRAZOSIN HYDROCHLORIDE 5 MG/1
5 CAPSULE ORAL NIGHTLY
COMMUNITY
End: 2024-08-21 | Stop reason: HOSPADM

## 2024-08-16 RX ORDER — SODIUM CHLORIDE 9 MG/ML
INJECTION, SOLUTION INTRAVENOUS CONTINUOUS
Status: ACTIVE | OUTPATIENT
Start: 2024-08-16 | End: 2024-08-16

## 2024-08-16 RX ORDER — ZOLPIDEM TARTRATE 5 MG/1
10 TABLET ORAL ONCE
Status: DISCONTINUED | OUTPATIENT
Start: 2024-08-16 | End: 2024-08-16

## 2024-08-16 RX ORDER — DEXTROSE 50 % IN WATER (D50W) INTRAVENOUS SYRINGE
25 AS NEEDED
Status: DISCONTINUED | OUTPATIENT
Start: 2024-08-16 | End: 2024-08-21 | Stop reason: HOSPADM

## 2024-08-16 RX ORDER — METRONIDAZOLE 500 MG/100ML
500 INJECTION, SOLUTION INTRAVENOUS
Status: DISCONTINUED | OUTPATIENT
Start: 2024-08-16 | End: 2024-08-16

## 2024-08-16 RX ORDER — QUETIAPINE FUMARATE 100 MG/1
150 TABLET, FILM COATED ORAL 2 TIMES DAILY
COMMUNITY
End: 2024-08-16 | Stop reason: ALTCHOICE

## 2024-08-16 RX ORDER — ZOLPIDEM TARTRATE 10 MG/1
10 TABLET ORAL NIGHTLY
COMMUNITY
End: 2024-08-21 | Stop reason: HOSPADM

## 2024-08-16 RX ORDER — LEVOTHYROXINE SODIUM 50 UG/1
50 TABLET ORAL
COMMUNITY

## 2024-08-16 RX ADMIN — MAGNESIUM SULFATE HEPTAHYDRATE 2 G: 40 INJECTION, SOLUTION INTRAVENOUS at 12:48

## 2024-08-16 RX ADMIN — SODIUM CHLORIDE 1000 ML: 9 INJECTION, SOLUTION INTRAVENOUS at 00:00

## 2024-08-16 RX ADMIN — POTASSIUM CHLORIDE 20 MEQ: 14.9 INJECTION, SOLUTION INTRAVENOUS at 23:30

## 2024-08-16 RX ADMIN — NOREPINEPHRINE BITARTRATE 1 MCG/MIN: 0.02 INJECTION, SOLUTION INTRAVENOUS at 01:12

## 2024-08-16 RX ADMIN — Medication 125 MCG: at 20:39

## 2024-08-16 RX ADMIN — SODIUM CHLORIDE: 9 INJECTION, SOLUTION INTRAVENOUS at 01:15

## 2024-08-16 RX ADMIN — ACETAMINOPHEN 975 MG: 325 TABLET, FILM COATED ORAL at 20:41

## 2024-08-16 RX ADMIN — THERA TABS 1 TABLET: TAB at 20:39

## 2024-08-16 RX ADMIN — ENOXAPARIN SODIUM 40 MG: 40 INJECTION SUBCUTANEOUS at 18:25

## 2024-08-16 RX ADMIN — VANCOMYCIN HYDROCHLORIDE 1750 MG: 1 INJECTION, POWDER, LYOPHILIZED, FOR SOLUTION INTRAVENOUS at 00:48

## 2024-08-16 RX ADMIN — SODIUM BICARBONATE 50 MEQ: 84 INJECTION, SOLUTION INTRAVENOUS at 05:05

## 2024-08-16 RX ADMIN — TRAZODONE HYDROCHLORIDE 100 MG: 100 TABLET ORAL at 21:34

## 2024-08-16 RX ADMIN — DEXTROSE MONOHYDRATE 25 G: 25 INJECTION, SOLUTION INTRAVENOUS at 00:11

## 2024-08-16 RX ADMIN — Medication 200 MG: at 16:47

## 2024-08-16 RX ADMIN — SODIUM CHLORIDE: 9 INJECTION, SOLUTION INTRAVENOUS at 12:48

## 2024-08-16 ASSESSMENT — ENCOUNTER SYMPTOMS
FEVER: 0
APPETITE CHANGE: 1
WEAKNESS: 1
HEADACHES: 1
NAUSEA: 0
FATIGUE: 1
ABDOMINAL PAIN: 0
ABDOMINAL PAIN: 1
COUGH: 0
SHORTNESS OF BREATH: 0
VOMITING: 0

## 2024-08-16 ASSESSMENT — COGNITIVE AND FUNCTIONAL STATUS - GENERAL
DRESSING REGULAR UPPER BODY CLOTHING: 3 - A LITTLE
CLIMB 3 TO 5 STEPS WITH RAILING: 2 - A LOT
EATING MEALS: 3 - A LITTLE
WALKING IN HOSPITAL ROOM: 2 - A LOT
STANDING UP FROM CHAIR USING ARMS: 2 - A LOT
HELP NEEDED FOR BATHING: 3 - A LITTLE
TOILETING: 2 - A LOT
DRESSING REGULAR LOWER BODY CLOTHING: 3 - A LITTLE
STANDING UP FROM CHAIR USING ARMS: 1 - TOTAL
MOVING TO AND FROM BED TO CHAIR: 2 - A LOT
MOVING TO AND FROM BED TO CHAIR: 2 - A LOT
WALKING IN HOSPITAL ROOM: 1 - TOTAL
HELP NEEDED FOR PERSONAL GROOMING: 3 - A LITTLE
CLIMB 3 TO 5 STEPS WITH RAILING: 1 - TOTAL

## 2024-08-16 NOTE — ASSESSMENT & PLAN NOTE
[address] [facility] [address] [place] [address] PRIMARY CARE  [address] [place] [id number]  Dept: [phone]  Dept Fax: : [phone]   Chief Complaint  Chief Complaint   Patient presents with    Shortness of Breath     x 4 days, Wheezing, chest congestion, pt has COPD       HPI:  81 y.o.male who presents for SOB:    SOB: x4 days with the weather; not using the albuterol much because he says it doesn't help. Last exacerbation was 2020; has a nebulizer machine but doesn't use it. No fevers. Just SOB/wheezing     Past Medical History:   Diagnosis Date    A-fib [facility])         [facility] fl[facility]     Atrial flutter with rapid ventricular response (HCC)     BMI 40.0-44.9, adult (HCC)     CAD (coronary artery disease)     [facility]     History of tobacco abuse     Hypertension     Substance abuse ([facility]     Tobacco abuse      Past Surgical History:   Procedure Laterality Date    GASTROSTOMY TUBE PLACEMENT      removed 2015    TRACHEOTOMY      removed 2015    TRANSTHORACIC ECHOCARDIOGRAM  2013     Social History     Socioeconomic History    Marital status:      Spouse name: Not on file    Number of children: Not on file    Years of education: Not on file    Highest education level: Not on file   Occupational History    Not on file   Social Needs    Financial resource strain: Not on file    Food insecurity     Worry: Not on file     Inability: Not on file   [employer] needs     Medical: Not on file     Non-medical: Not on file   Tobacco Use    Smoking status: Former Smoker     Packs/day: 0.50     Years: 50.00     Pack years: 25.00     Last attempt to quit: 2014     Years since quittin.9    Smokeless tobacco: Never Used   Substance and Sexual Activity    Alcohol use:  Yes     Alcohol/week: 3.0 standard drinks     Types: 3 Cans of beer per week    Drug Chronic condition.  The patient takes a regimen of fluoxetine 20 mg oral daily, lorazepam 1 mg twice daily as needed, prazosin 5 mg oral daily, quetiapine 75 mg oral daily and 100 mg nightly, trazodone and 100 to 200 mg daily.  Hold potential sedating medications in the setting of altered mental status and hypotension.  Psychiatry consulted.   use: No    Sexual activity: Yes     Partners: Female   Lifestyle    Physical activity     Days per week: Not on file     Minutes per session: Not on file    Stress: Not on file   Relationships    Social connections     Talks on phone: Not on file     Gets together: Not on file     Attends Mandaen service: Not on file     Active member of club or organization: Not on file     Attends meetings of clubs or organizations: Not on file     Relationship status: Not on file    Intimate partner violence     Fear of current or ex partner: Not on file     Emotionally abused: Not on file     Physically abused: Not on file     Forced sexual activity: Not on file   Other Topics Concern    Not on file   Social History Narrative    Not on file     Allergies   Allergen Reactions    Levofloxacin Shortness Of Breath, Rash and Other (See Comments)     Dizziness     Current Outpatient Medications   Medication Sig Dispense Refill    azithromycin (ZITHROMAX) 250 MG tablet Take 1 tablet by mouth See Admin Instructions for 5 days 500mg on day 1 followed by 250mg on days 2 - 5 6 tablet 1    predniSONE (DELTASONE) 20 MG tablet Take 3 tablets by mouth daily for 10 days Take 60mg x4days; then 40mg x4 days; then 20mg x4 days 24 tablet 1    ipratropium-albuterol (DUONEB) 0.5-2.5 (3) MG/3ML SOLN nebulizer solution Inhale 3 mLs into the lungs every 4 hours as needed for Shortness of Breath 360 mL 1    carvedilol (COREG) 12.5 MG tablet TAKE 1 TABLET BY MOUTH TWICE DAILY WITH MEALS 180 tablet 2    albuterol sulfate HFA (PROAIR HFA) 108 (90 Base) MCG/ACT inhaler Inhale 2 puffs into the lungs every 6 hours as needed for Wheezing 8.5 g 11    ELIQUIS 5 MG TABS tablet TAKE 1 TABLET BY MOUTH TWICE DAILY 60 tablet 5    amiodarone (CORDARONE) 200 MG tablet Take 1 tablet by mouth daily Mondays/Wednesdays/Fridays only 36 tablet 3    furosemide (LASIX) 40 MG tablet Take 1 tablet by mouth daily.  90 tablet 3    KLOR-CON M20 20 MEQ extended release tablet Take 1 tablet by mouth daily. 90 tablet 3    SYMBICORT 160-4.5 MCG/ACT AERO Inhale 2 puffs into the lungs 2 times daily   3     No current facility-administered medications for this visit. ROS:  Review of Systems   Constitutional: Negative for chills and fever. HENT: Negative for rhinorrhea and sore throat. Respiratory: Positive for chest tightness, shortness of breath and wheezing. Negative for cough. Gastrointestinal: Negative for abdominal pain, constipation and diarrhea. Endocrine: Negative for polydipsia and polyuria. Genitourinary: Negative for dysuria, frequency and urgency. Neurological: Negative for syncope, light-headedness, numbness and headaches. Psychiatric/Behavioral: Negative for sleep disturbance. The patient is not nervous/anxious. Vitals:    10/26/20 1052   BP: (!) 142/88   Site: Right Upper Arm   Position: Sitting   Cuff Size: Large Adult   Pulse: 80   Temp: 97 °F (36.1 °C)   TempSrc: Infrared   SpO2: (!) 87%   Weight: (!) 341 lb (154.7 kg)       Physical exam:  Physical Exam  Vitals signs reviewed. Constitutional:       General: He is not in acute distress. Appearance: He is well-developed. HENT:      Head: Normocephalic and atraumatic. Mouth/Throat:      Pharynx: No oropharyngeal exudate. Neck:      Musculoskeletal: Normal range of motion. Thyroid: No thyromegaly. Cardiovascular:      Rate and Rhythm: Normal rate and regular rhythm. Heart sounds: Normal heart sounds. No murmur. Pulmonary:      Effort: Pulmonary effort is normal. No respiratory distress. Breath sounds: Normal breath sounds. No wheezing. Abdominal:      General: There is no distension. Palpations: Abdomen is soft. Tenderness: There is no abdominal tenderness. There is no guarding or rebound. Lymphadenopathy:      Cervical: No cervical adenopathy. Skin:     General: Skin is warm and dry.    Neurological:      Mental Status: He is alert and oriented to person, place, and time. Psychiatric:         Behavior: Behavior normal.         Assessment/Plan:  81 y.o. male here mainly for COPD exac:  - COPD exac: prednisone taper + azithro with a refill to self treat for next exacerbation; Also encouraged duoneb for additional control. He prefers no pulmonologist for now     Diagnosis Orders   1. COPD exacerbation (HCC)  azithromycin (ZITHROMAX) 250 MG tablet    predniSONE (DELTASONE) 20 MG tablet    ipratropium-albuterol (DUONEB) 0.5-2.5 (3) MG/3ML SOLN nebulizer solution        Return if symptoms worsen or fail to improve.     Lynsey Lantigua MD

## 2024-08-16 NOTE — ASSESSMENT & PLAN NOTE
Chronic condition, exact details unknown.  Seizure precautions in place.  At home, the patient takes a regimen of topiramate 50 mg oral twice daily.  Resume once able to safely swallow; consider additional agents if the patient is unable to swallow for a prolonged period of time.

## 2024-08-16 NOTE — ASSESSMENT & PLAN NOTE
Chronic condition.  Resume home regimen of acarbose when no longer NPO.  Follow up with outpatient psychology/psychiatry.  8/19  Eating/Drinking better today(more awake now)  Will do [place] Count.

## 2024-08-16 NOTE — ED PROVIDER NOTES
HPI   HISTORY OF PRESENT ILLNESS     Patient presents emergency department via EMS, found on floor, thought to have been there for about 3 hours.  Patient told them that she fell and was eventually able to call 911.  Upon initial examination, patient very limited verbally-seems very sleepy, but is arousable to voice.  Patient does state that she fell, and landed on her back.  Did eat tacos today.  Denies any recent nausea vomiting or diarrhea.  No family at bedside- on demographic sheet called, patient does have home health care, does have a history of depression, anxiety, eating disorder-states that she has not talked to her in the past week..      History provided by:  EMS personnel  Weakness - Generalized  Severity:  Unable to specify  Onset quality:  Unable to specify  Relieved by:  None tried  Worsened by:  Nothing  Ineffective treatments:  None tried  Associated symptoms: no abdominal pain, no nausea and no shortness of breath    Trauma  Mechanism of injury: Fall     Current symptoms:       Associated symptoms:             Denies abdominal pain and nausea.         Patient History   PAST HISTORY     Reviewed from Nursing Triage:  Tobacco  Med Hx  Surg Hx  Fam Hx  Soc Hx      Past Medical History:   Diagnosis Date    Anxiety     Depression     Eating disorder        History reviewed. No pertinent surgical history.    History reviewed. No pertinent family history.    Social History     Tobacco Use    Smoking status: Unknown   Substance Use Topics    Alcohol use: Not Currently    Drug use: Yes     Types: Marijuana         Review of Systems   REVIEW OF SYSTEMS     Review of Systems   Reason unable to perform ROS: unable to obtain ros- pt with minimal responses.   Respiratory:  Negative for shortness of breath.    Gastrointestinal:  Negative for abdominal pain and nausea.         VITALS     ED Vitals      Date/Time Temp Pulse Resp BP SpO2 [facility]   08/16/24 0015 -- 72 13 85/52 93 %    08/15/24 7466  -- 65 16 76/51 95 %    08/15/24 2217 -- 86 16 92/60 94 %    08/15/24 2145 -- 87 18 81/51 94 %    08/15/24 2115 -- 86 16 97/65 96 %    08/15/24 2045 -- 84 16 102/56 98 %    08/15/24 1940 36.9 °C (98.4 °F) 89 16 97/54 96 % PZ          Pulse Ox %: 95 % (08/16/24 0008)  Pulse Ox Interpretation: Normal (08/16/24 0008)           Physical Exam   PHYSICAL EXAM     Physical Exam  Vitals and nursing note reviewed.   Constitutional:       General: She is not in acute distress.     Appearance: She is well-developed. She is not ill-appearing, toxic-appearing or diaphoretic.   HENT:      Head: Normocephalic and atraumatic.   Eyes:      Conjunctiva/sclera: Conjunctivae normal.   Cardiovascular:      Rate and Rhythm: Normal rate and regular rhythm.      Heart sounds: No murmur heard.  Pulmonary:      Effort: Pulmonary effort is normal. No respiratory distress.      Breath sounds: Normal breath sounds. No wheezing or rhonchi.   Chest:      Chest wall: No tenderness.   Abdominal:      General: There is no distension.      Palpations: Abdomen is soft. There is no mass.      Tenderness: There is no abdominal tenderness. There is no right CVA tenderness or left CVA tenderness.   Musculoskeletal:         General: No tenderness or deformity. Normal range of motion.      Cervical back: Normal range of motion.      Right lower leg: No edema.      Left lower leg: No edema.   Skin:     General: Skin is warm.      Findings: No rash.   Neurological:      Mental Status: She is alert.      Comments: Arouses with stimulation.  Does answer very simple questions with one to two word answers..     Psychiatric:         Behavior: Behavior normal.           PROCEDURES     Critical Care    Performed by: Anuja Belcher PA C  Authorized by: Godshall, Duane K, MD    Critical care provider statement:     Critical care time (minutes):  60    Critical care time was exclusive of:  Separately billable procedures and treating other patients     Critical care was necessary to treat or prevent imminent or life-threatening deterioration of the following conditions:  Circulatory failure, shock and toxidrome    Critical care was time spent personally by me on the following activities:  Blood draw for specimens, discussions with consultants, evaluation of patient's response to treatment, ordering and review of laboratory studies, ordering and review of radiographic studies, pulse oximetry, re-evaluation of patient's condition, development of treatment plan with patient or surrogate and obtaining history from patient or surrogate    Care discussed with: admitting provider      Care discussed with comment:  And ED attending       DATA     Results       Procedure Component Value Units Date/Time    Blood Culture Blood, Venous [[id number]] Collected: 08/16/24 0017    Specimen: Blood, Venous Updated: 08/16/24 0032    Blood Gas+Lactate, Venous [[id number]]  (Abnormal) Collected: 08/16/24 0017    Specimen: Blood, Venous Updated: 08/16/24 0030     pH, Venous 7.20     pCO2, Venous 54 mm Hg      pO2, Venous 29 mm Hg      Comment: PO2 values <30 have not been validated by the laboratory. Suggest clinical correlation.        HCO3, Venous 18.2 mEQ/L      Base Excess, Venous -7.2 mEQ/L      TCO2, Venous 22.8 mEQ/L      Lactate 0.7 mmol/L      Source Of Oxygen 0L     Comment: room air        FIO2 21     Comment: room air       Lactate, w/ reflex repeat if > 2.0 [[id number]]  (Normal) Collected: 08/16/24 0017    Specimen: Blood, Venous Updated: 08/16/24 0027     Lactate 0.7 mmol/L     Urine drug screen (UDS) [[id number]]  (Abnormal) Collected: 08/15/24 2151    Specimen: Urine, Clean Catch Updated: 08/15/24 2228     PCP Scrn, Ur Not Detected     Comment: Assay Detects: phencyclidine in urine. Lowest detectable concentration is 25 ng/mL of phencyclidine.        Benzodiazepine Ur Qual Not Detected     Comment: Assay Detects: benzodiazepines and metabolites at varying concentrations.  Lowest detectable concentration is 200 ng/mL of oxazepam.        Cocaine Screen, Urine Not Detected     Comment: Assay Detects: benzoylecgonine and cocaine in urine. Lowest detectable concentration is 300 ng/mL of benzoylecgonine.        Amphetamine+Methamphetamine Screen, Ur Not Detected     Comment: Assay Detects: d-methamphetamine, d-amphetamine, methlyenedioxyamphetamine (MDA), and methlyenendioxymethamphetamine (MDMA) in urine. Lowest detectable concentration is 1000 ng/mL of d-methamphetamine.<br>Assay is less sensitive to MDA and MDMA (lowest detectable concentration, 2500 ng/mL) and could produce a false negative result. If MDMA overdose is suspected and the result is negative, a more specific test should be requested.        Cannabinoid Screen, Urine Positive     Comment: Assay Detects: cannabinoid metabolites in urine. Lowest detectable concentration is 50 ng/mL        Opiate Scrn, Ur Not Detected     Comment: Assay Detects: codeine, dihydrocodeine, hydrocodone, hydromorphone, levorphanol, morphine, morphine-3-glucuronide, norcodeine, oxycodone in urine. Lowest detectable concentration is 300 ng/mL of morphine.        Barbiturate Screen, Ur Not Detected     Comment: Assay Detects: alphenal, amobarbital, aprobarbital, barbital, butabarbital, butalbital, butethal, diallybarbital, pentobarbital, secobarbital,talbutal, and thiopental. Lowest detectable concentration is 200 ng/mL of secobarbital.        Fentanyl Screen, Urine Not Detected     Comment: Assay Detects: fentanyl metabolite in urine, lowest detectable concentration is 5 ng/mL of norfentanyl.       UA with Reflex Culture [[id number]]  (Abnormal) Collected: 08/15/24 2151    Specimen: Urine, Clean Catch Updated: 08/15/24 2220    Narrative:      The following orders were created for panel order UA with Reflex Culture.  Procedure                               Abnormality         Status                     ---------                                -----------         ------                     UA Reflex to Culture (Ma...[[id number]]  Abnormal            Final result               UA Microscopic[[id number]]               Abnormal            Final result                 Please view results for these tests on the individual orders.    UA Microscopic [[id number]]  (Abnormal) Collected: 08/15/24 2151    Specimen: Urine, Clean Catch Updated: 08/15/24 2220     RBC, Urine 0 TO 4 /HPF      WBC, Urine 0 TO 3 /HPF      Squamous Epithelial Rare /hpf      Hyaline Cast None Seen /lpf      Bacteria, Urine None Seen /HPF      AMORPHOUS CRYSTALS +2 /hpf     UA Reflex to Culture (Macroscopic) [[id number]]  (Abnormal) Collected: 08/15/24 2151    Specimen: Urine, Clean Catch Updated: 08/15/24 2211     Color, Urine Yellow     Clarity, Urine Cloudy     Specific Gravity, Urine 1.003     pH, Urine 6.5     Leukocyte Esterase Negative     Comment: Results can be falsely negative due to high specific gravity, some antibiotics, glucose >3 g/dl, or WBC other than neutrophils.        Nitrite, Urine Negative     Protein, Urine Negative     Glucose, Urine Negative mg/dL      Ketones, Urine Negative mg/dL      Urobilinogen, Urine 0.2 EU/dL      Bilirubin, Urine Negative mg/dL      Blood, Urine Negative     Comment: The sensitivity of the occult blood test is equivalent to approximately 4 intact RBC/HPF.       ER toxicology screen, serum [[id number]]  (Abnormal) Collected: 08/15/24 1950    Specimen: Blood, Venous Updated: 08/15/24 2057     Salicylate <1.5 mg/dL      Acetaminophen <0.1 ug/mL      Ethanol <10 mg/dL     CK, Total [[id number]]  (Normal) Collected: 08/15/24 1950    Specimen: Blood, Venous Updated: 08/15/24 2057     Total CK 71 U/L     BhCG, Serum, Qual [[id number]]  (Normal) Collected: 08/15/24 1950    Specimen: Blood, Venous Updated: 08/15/24 2041     Preg Test, Serum Negative    SARS-COV-2 (COVID-19)/ FLU A/B, AND RSV, PCR Nasopharynx [[id number]]  (Normal) Collected: 08/15/24 1950     Specimen: Nasopharyngeal Swab from Nasopharynx Updated: 08/15/24 2039     SARS-CoV-2 (COVID-19) Negative     Influenza A Negative     Influenza B Negative     Respiratory Syncytial Virus Negative    Narrative:      Testing performed using real-time PCR for detection of COVID-19. EUA approved validation studies performed on site.     Comprehensive metabolic panel [[id number]]  (Abnormal) Collected: 08/15/24 1950    Specimen: Blood, Venous Updated: 08/15/24 2033     Sodium 136 mEQ/L      Potassium 2.6 mEQ/L      Comment: Results obtained on plasma. Plasma Potassium values may be up to 0.4 mEQ/L less than serum values. The differences may be greater for patients with high platelet or white cell counts.        Chloride 106 mEQ/L      CO2 18 mEQ/L      BUN 5 mg/dL      Creatinine 0.9 mg/dL      Glucose 108 mg/dL      Calcium 8.4 mg/dL      AST (SGOT) 61 IU/L      ALT (SGPT) 38 IU/L      Alkaline Phosphatase 240 IU/L      Total Protein 5.8 g/dL      Comment: Test performed on plasma which typically contains approximately 0.4 g/dL more protein than serum.        Albumin 4.0 g/dL      Bilirubin, Total 0.4 mg/dL      eGFR >60.0 mL/min/1.73m*2      Comment: Calculation based on the Chronic Kidney Disease Epidemiology Collaboration (CKD-EPI) equation refit without adjustment for race.        Anion Gap 12 mEQ/L     HS Troponin I (with 2 hour reflex) [[id number]]  (Normal) Collected: 08/15/24 1950    Specimen: Blood, Venous Updated: 08/15/24 2030     High Sens Troponin I 3.4 pg/mL     CBC and differential [[id number]]  (Abnormal) Collected: 08/15/24 1950    Specimen: Blood, Venous Updated: 08/15/24 2003     WBC 5.62 K/uL      RBC 3.69 M/uL      Hemoglobin 13.1 g/dL      Hematocrit 39.5 %      .0 fL      MCH 35.5 pg      MCHC 33.2 g/dL      RDW 14.7 %      Platelets 158 K/uL      MPV 12.5 fL      Differential Type Auto     nRBC 0.0 %      Immature Granulocytes 0.2 %      Neutrophils 71.8 %      Lymphocytes 15.1 %       Monocytes 9.4 %      Eosinophils 3.0 %      Basophils 0.5 %      Immature Granulocytes, Absolute 0.01 K/uL      Neutrophils, Absolute 4.03 K/uL      Lymphocytes, Absolute 0.85 K/uL      Monocytes, Absolute 0.53 K/uL      Eosinophils, Absolute 0.17 K/uL      Basophils, Absolute 0.03 K/uL     RAINBOW RED [[id number]] Collected: 08/15/24 1950    Specimen: Blood, Venous Updated: 08/15/24 1959    RAINBOW GOLD [[id number]] Collected: 08/15/24 1950    Specimen: Blood, Venous Updated: 08/15/24 1959    [place] Draw Panel [[id number]] Collected: 08/15/24 1950    Specimen: Blood, Venous Updated: 08/15/24 1959    Narrative:      The following orders were created for panel order [place] Draw Panel.  Procedure                               Abnormality         Status                     ---------                               -----------         ------                     RAINBOW RED[[id number]]                                      In process                 RAINBOW LT BLUE[[id number]]                                  In process                 RAINBOW GOLD[[id number]]                                     In process                   Please view results for these tests on the individual orders.    RAINBOW LT BLUE [[id number]] Collected: 08/15/24 1950    Specimen: Blood, Venous Updated: 08/15/24 1959            Imaging Results              CT HEAD WITHOUT IV CONTRAST (Final result)  Result time 08/15/24 21:15:11      Final result                   Impression:    IMPRESSION:  No acute intracranial traumatic injury               Narrative:      CLINICAL HISTORY:  ms change/fall    CT DOSE:  One or more dose reduction techniques (e.g. automated exposure  control, adjustment of the mA and/or kV according to patient size, use of  iterative reconstruction technique) utilized for this examination.    COMMENT:    Noncontrast CT scan of the brain was performed from the skull base through the  higher convexities.  Sagittal and coronal  reconstructions were performed.    Comparison date: none.    The ventricles are normal in size and position for the patient's age.  There is  no mass effect or midline shift.  There is no abnormal intra-axial or extra  axial blood or fluid collections.  There is no evidence of acute infarction. The  visualized paranasal sinuses are clear. The osseous structures are normal.                                         CT CERVICAL SPINE WITHOUT IV CONTRAST (Final result)  Result time 08/15/24 21:16:46      Final result                   Impression:    IMPRESSION:    No fracture               Narrative:      CLINICAL HISTORY:  Trauma  CT DOSE:  The kV and/or mA were adjusted according to the patient's size and  body part for CT dose reduction.  COMMENT:    1.25 mm axial sections were obtained from the skull base through the upper  thoracic spine with sagittal and coronal reformats.    The vertebral bodies are intact.  Mild Multilevel degenerative changes seen  resulting in varying levels of neuro foraminal and central canal narrowing.  .  No fracture is identified.  No traumatic bony impingement is identified on the  spinal canal.  There is no evidence for soft tissue swelling.                                       X-RAY CHEST 1 VIEW (In process)  Result time 08/15/24 20:05:54   Procedure changed from X-RAY CHEST 2 VIEWS                   ECG 12 lead    (Results Pending)       Scoring tools                                  ED Course & MDM   MDM / ED COURSE / CLINICAL IMPRESSION / DISPO   Vital Signs Review: Vital signs have been reviewed.   The oxygen saturation is SpO2: 96 % which is normal.    Medical Decision Making  Problems Addressed:  Altered mental status, unspecified altered mental status type: acute illness or injury  Fall, initial encounter: acute illness or injury  Hypokalemia: acute illness or injury    Amount and/or Complexity of Data Reviewed  Independent Historian: friend     Details: Spoke with pt contact/  friend Laquita- see ED course.  Labs: ordered. Decision-making details documented in ED Course.  Radiology: ordered.  ECG/medicine tests: ordered.    Risk  Prescription drug management.          ED Course as of 08/16/24 0032   Thu Aug 15, 2024   2026 PDMP reviewed, no narcotics, only benzodiazepines    Was able to speak with patient's friend Laquita..  states that pt with h/o depression.  Has home health care and does walk with a walker.  States she hasn't talked to her In a week. [ND]   2052 Potassium, Bld(!!): 2.6  KCL started [ND]   2352 Patient blood pressure has been slowly becoming more hypotensive.  Patient is not tachycardic.  Has had 2 L of normal saline in the emergency department, and also 500 cc from EMS.  Mental status has not improved.  CT head and C-spine without acute findings.  Blood cultures, VBG with lactic has been ordered. [ND]   2353 I attempted to call patient's friend again for update.  No answer, message left. [ND]   Fri Aug 16, 2024   0000 US guided line placed by RN.  Pt  at this time, will try 1amp of D50.   [ND]   0018 Spoke with Dr. Johnson- will come to ED to see patient.   [ND]   0030 Patient seen by Dr. Johnson.  Will start pt on low dose pressors, likely overmedicated.  Pt will be admitted to ICU level of care.   [ND]   0031 pH, Venous(!!): 7.20 [ND]      ED Course User Index  [ND] Anuja Belcher PA C     Clinical Impression      Fall, initial encounter   Hypokalemia   Altered mental status, unspecified altered mental status type     _________________       ED Disposition   Admit / Observation                    This document was created using Dragon dictation software.  There might be some typographical errors due to this technology.    We are in a period of time with increased volumes and decreased capacity.      Anjua Belcher PA C  08/16/24 0032

## 2024-08-16 NOTE — PLAN OF CARE
Problem: Adult Inpatient Plan of Care  Goal: Plan of Care Review  Outcome: Progressing  Flowsheets (Taken 8/16/2024 1305)  Plan of Care Reviewed With: (MIRLANDE) patient     Nutrition Interventions/ Recommendations:   Once electrolytes are WDL: ADAT to Regular  - monitor tolerance/intakes  - ONS for intakes < 50%  2. Treat labs/lytes  - consider checking a blood ceruloplasmin or total blood copper test to determine possible copper deficiency  - correct/replace all to WDL  3. Vitamin/mineral supplementation for GBS  - MVI w/Iron BID  - Calcium w/Vit D (2000 IU Vit D/1200 mg Ca) divided into 2 doses.  Calcium needs to be taken separately from MVI w/Iron  - B12: check level and supplement as indicated  - give 200 mg Thiamine daily x 5 days  4. Weekly weight  - monitor I/O

## 2024-08-16 NOTE — ASSESSMENT & PLAN NOTE
Acute condition, appears to be secondary to polypharmacy, denied any ingestion. Baseline blood pressures are 90/50 per patient  Hold home medication regimen.  UDS negative  In ICU: Titrate norepinephrine infusion to maintain mean arterial pressure greater than 65 mmHg, resolved   Received IV fluid hydration.   BMP and CBC daily.   C diff, blood cultures negative to date  8/19  Baseline weak BP.  Denied dizziness today  PT/OT evaluate patient.

## 2024-08-16 NOTE — ASSESSMENT & PLAN NOTE
Chronic condition.  The patient takes a regimen of fluoxetine 20 mg oral daily, lorazepam 1 mg twice daily as needed, prazosin 5 mg oral daily, quetiapine 75 mg oral daily and 100 mg nightly, trazodone and 100 to 200 mg daily.  Hold potential sedating medications in the setting of altered mental status and hypotension.  Psychiatry consulted.  8/19  No indication to acute inpatient Psychiatric care  Avoiding Seroquel and Prazosin per Dr. Valdivia.

## 2024-08-16 NOTE — ASSESSMENT & PLAN NOTE
Acute condition, chronicity unknown.  Urine osmolality and electrolytes to assess for potential type I RTA.  Based on chart review, it appears that the patient has a history of gastric bypass procedures in both 2007 and 2017.  The patient is unable to convey any information, however, she has not had any diarrhea while in the hospital.  Administer 1 amp of sodium bicarb drip.  Continue cautious fluid administration.  Resolved

## 2024-08-16 NOTE — PLAN OF CARE
Plan of Care Review  Plan of Care Reviewed With: patient  Progress: improving  Outcome Evaluation: Pt to ICU 3202 on RA, mentating well. Levo infusing.

## 2024-08-16 NOTE — ASSESSMENT & PLAN NOTE
Acute condition.  Likely secondary to sedation in the setting of polypharmacy.  Continue to monitor VBG.  Continue BiPAP to help with CO2 clearance.  Resolved

## 2024-08-16 NOTE — ASSESSMENT & PLAN NOTE
Chronic condition.  The patient takes a regimen of fluoxetine 20 mg oral daily, lorazepam 1 mg twice daily as needed, prazosin 5 mg oral daily, quetiapine 75 mg oral daily and 100 mg nightly, trazodone and 100 to 200 mg daily.  Hold potential sedating medications in the setting of altered mental status and hypotension.  Psychiatry consulted.  8/19  Offered small dose of Ativan to avoid withdraw.

## 2024-08-16 NOTE — CONSULTS
GASTROENTEROLOGY CONSULTATION   [facility]     PATIENT NAME:  Felipa Cuadra        YOB: 1980   AGE:  44 y.o.         MRN:  [id number]              HISTORY   CC: acute on chronic abd pain, hx gastric bypass, ?colitis    45 yo F with PMHx of depression, anxiety, hypothyroidism, PTSD, seizures, peripheral neuropathy, Radha-en-Y gastric bypass (2007) with revision (2017) who presented to ED 8/15 after fall at home, was down for aprox 3hrs.     In ED, pt was reportedly somnolent but arousable, was hypotensive despite fluid resus, requiring pressors (currently levo), hypoxic req BIPAP, (now improved and currently satting well on RA). Labs notable for K 2.6, Bicarb 18, WBC 5.62, AST 61, ALT 38, , Tbili nml, UDS +cannabinoids, otherwise neg. CT head and CXR unremarkable. CT A/P with tiny defect in the midline epigastric region and through which a curvilinear soft tissue attenuation structure passes, liquid stool and possible thickening of wall of ascending colon/cecum, periportal edema.     Pt chart shows similar event in June 2024, suspected 2/2 polypharmacy, pt had been taking multiple sedating meds. She also noted chronic left sided abdominal pain, intermittent diarrhea, though prev denied this. Unsure if bloody. Reports remote hx of colonoscopy, but does not recall reason or if there were any findings. Has hx of radha-en-Y gastric bypass, originally ~2007, with revision in 2017. States she is on antacid at home through her PCP, also appears she has script for Bentyl, does not follow with GI. No recent abx.     PAST MEDICAL HISTORY     Past Medical History:   Diagnosis Date    Anxiety     Depression     Eating disorder        PAST SURGICAL HISTORY   History reviewed. No pertinent surgical history.     FAMILY HISTORY   History reviewed. No pertinent family history.    SOCIAL HISTORY     Social History     Tobacco Use    Smoking status: Every Day     Packs/day: 0.25     Years: 20.00     Additional pack  years: 0.00     Total pack years: 5.00     Types: Cigarettes     Start date: 8/16/2004    Smokeless tobacco: Current   Substance Use Topics    Alcohol use: Not Currently       MEDICATIONS   Home Medication:    acarbose, Take 25 mg by mouth 3 (three) times a day with meals.    atorvastatin, Take 20 mg by mouth nightly.    dicyclomine, Take 20 mg by mouth daily as needed.    FLUoxetine, Take 20 mg by mouth daily.    furosemide, Take 20 mg by mouth daily as needed (Heart and fluid retention). for heart and fluid retention    gabapentin, Take 400 mg by mouth 3 (three) times a day.    ibuprofen, Take 4-6 tablets by mouth every 8 (eight) hours as needed for pain.    levothyroxine, Take 50 mcg by mouth daily.    LORazepam, Take 0.5 mg by mouth nightly.    LORazepam, Take 1 mg by mouth 2 (two) times a day as needed for anxiety.    omeprazole, Take 20 mg by mouth daily before breakfast. For heartburn    prazosin, Take 5 mg by mouth nightly.    QUEtiapine, Take 100 mg by mouth nightly. Take 1 tablet by mouth at bedtime    QUEtiapine, Take 75 mg by mouth 2 (two) times a day. Take 1 AND 1/2 tablet twice a day    topiramate, Take 50 mg by mouth 2 (two) times a day.    traZODone, Take 1-2 tablets by mouth nightly.    zolpidem, Take 10 mg by mouth nightly.    naloxone, Administer 1 spray into affected nostril(s) once. For opioid overdose    MEDICATIONS:  Infusions:     norepinephrine  0-90 mcg/min 1 mcg/min (08/16/24 1504)    sodium chloride 0.9 %   100 mL/hr at 08/16/24 1504          Scheduled:    cholecalciferol (vitamin D3)  125 mcg oral BID    enoxaparin  40 mg subcutaneous Daily (6p)    ferrous sulfate  325 mg oral BID with meals    multivitamin  1 tablet oral BID    thiamine  200 mg oral Daily       ALLERGIES     Allergies   Allergen Reactions    Aspartame Anaphylaxis    Bee Venom Protein (Honey Bee) Anaphylaxis    Buspirone Other (see comments)     Per patient - hemorrhage and feeling like they were being electrocuted     Diphenhydramine Anaphylaxis     Other reaction(s): Hyperactivity   Patient stated any antihistamine (hydroxyzine, diphenhydramine, etc....) causes extreme excitation, energy and lack of sleep. (Paradoxical reaction.)    Haloperidol Other (see comments) and Shortness of breath     Acute Dystonia - resolved with IM Benztropine    Iodine Anaphylaxis     Seafood and iodine per patient report    Penicillins Anaphylaxis and Angioedema    Sertraline Other (see comments) and Shortness of breath     Serotonin Syndrome (patient - per own report - stated they had to be hospitalized due to severe flu like symptoms and fever 1 week post taking Zoloft) - patient DID tolerate Fluoxetine, Celexa and Lexapro)     Reports serotonin syndrome on this medication    Diclofenac Sodium Other (see comments)     Unsure of reaction    Hydroxyzine     Shellfish Containing Products Other (see comments)    Sudafed Cold-Allergy Other (see comments)    Tetracyclines        REVIEW OF SYSTEMS   Systems reviewed and otherwise negative except as documented above.    PHYSICAL EXAMINATION   Temp:  [35.6 °C (96 °F)-36.9 °C (98.4 °F)] 35.9 °C (96.7 °F)  Heart Rate:  [53-89] 57  Resp:  [11-22] 16  BP: ()/(50-72) 98/66  FiO2 (%) (Set):  [40 %] 40 %      Intake/Output Summary (Last 24 hours) at 8/16/2024 1558  Last data filed at 8/16/2024 1504  Gross per 24 hour   Intake 1517.99 ml   Output --   Net 1517.99 ml       General appearance: no acute distress, appears stated age  Head: normocephalic, atraumatic  Eyes: EOMI  ENT: oral mucosa moist  Lungs: non-labored respirations  Heart: regular rate   Abdomen: soft, generalized TTP; bowel sounds normal; no masses, no organomegaly  Extremities: no edema  Skin:  Warm, dry, no rashes, no lesions, no jaundice  Neurologic: lethargic, arousable to verbal stimuli  Psych: cooperative with exam    Diagnostic Data:     Labs reviewed  Results from last 7 days   Lab Units 08/16/24  0528 08/15/24  1950   WBC K/uL 5.56  5.62   HEMOGLOBIN g/dL 12.3 13.1   HEMATOCRIT % 38.0 39.5   PLATELETS K/uL 144* 158   ]  Results from last 7 days   Lab Units 08/16/24  1502 08/16/24  0528 08/15/24  1950   SODIUM mEQ/L 142 142 136   POTASSIUM mEQ/L 3.4* 3.3* 2.6*   CHLORIDE mEQ/L 113* 112* 106   CO2 mEQ/L 22 23 18*   BUN mg/dL 3* 3* 5*   CREATININE mg/dL 0.6 0.8 0.9   CALCIUM mg/dL 7.9* 7.8* 8.4*   ALBUMIN g/dL  --   --  4.0   BILIRUBIN TOTAL mg/dL  --   --  0.4   ALK PHOS IU/L  --   --  240*   ALT IU/L  --   --  38   AST IU/L  --   --  61*   GLUCOSE mg/dL 88 86 108*   ]    ]    ]    Imaging reviewed  CT ABDOMEN PELVIS WITHOUT IV CONTRAST    Result Date: 8/16/2024  IMPRESSION: There is liquid stool throughout the length of colon and possibly in the rectum, supporting diarrhea. The could be some thickening of the wall of the ascending colon and cecum concerning for colitis, but this is difficult to confirm on this exam. Mary-en-Y bariatric surgery noted. No bowel obstruction. Findings noted which suggests support periportal edema. Please correlate. *There is atrophy of the lower pole the left kidney. 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 The patient's urinary tract. Further investigated with CT reconstructions, as indicated. Suspected fibroid uterus. Markedly distended urinary bladder. *Epigastric region/defect noted, as described. Clinical correlation recommended. Comparison study:  None available. COMMENT:  Axial CT images of the abdomen and pelvis are completed without oral or intravenous contrast. Images are reconstructed in sagittal and coronal planes. CT DOSE:  One or more dose reduction techniques (e.g. automated exposure control, adjustment of the mA and/or kV according to patient size, use of iterative reconstruction technique) utilized for this examination. There is curvilinear volume loss in both lower lobes, more extensive on the left. No pleural fluid or consolidation. Unremarkable imaged heart and pericardium. Bowel/mesenteries:  Mary-en-Y bariatric surgery. No abnormal bowel distention. There appears to the distal throughout the length of colon, and possibly in the rectum, supporting diarrhea but there is no discernible mural thickening distally. The could be minor thickening of the wall of the right colon on this is difficult to confirm. There is mild induration in the spine wall down mesenteric root as well as an increased number of small lymph nodes, nonspecific. Consider mesenteritis. Short appendix noted and unremarkable. Normal hepatic attenuation. No discernible intrahepatic mass on noncontrast imaging. Periportal edema is suspected. Gallbladder surgically absent normal caliber extrahepatic duct. Fatty atrophy of the pancreas appears slightly advanced for age. The spleen is normal in diameter, unremarkable. Unremarkable adrenal glands. KUB: Unremarkable right kidney. 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 There is atrophy and lower pole of the left kidney, possibly the result of chronic reflux. Markedly distended urinary bladder. No mural thickening or mass. No discernible urinary tract calculi. There is a 5 cm soft tissue attenuation mass arising from the right uterine body, suspected fibroid. No additional adnexal mass. Unremarkable aorta, IVC and their major branches, as demonstrated on this noncontrast study. There are no pathologically enlarged nodes in the abdomen and pelvis. *There is tiny defect in the midline epigastric region and through which a curvilinear soft tissue attenuation structure passes. This could represent scarring from prior surgery. Herniation of a bowel loop is considered less likely given the configuration. Clinical evaluation recommended. There are early spondylitic changes in the lower dorsal spine. The osseous structures are otherwise unremarkable.    X-RAY CHEST 1 VIEW    Result Date: 8/16/2024  IMPRESSION: Minimal bibasilar atelectasis..     CT CERVICAL SPINE WITHOUT  IV CONTRAST    Result Date: 8/15/2024  IMPRESSION: No fracture    CT HEAD WITHOUT IV CONTRAST    Result Date: 8/15/2024  IMPRESSION: No acute intracranial traumatic injury       ASSESSMENT/PLAN   45 yo F with PMHx of depression, anxiety, hypothyroidism, PTSD, seizures, Mary-en-Y gastric bypass (2007) with revision (2017) being seen for acute on chronic abdominal pain    Acute on chronic abd pain: chronic left sided with rx hx of bentyl prn, with unclear hx of more recent acute pain, appears generalized TTP across abd, CT A/P with possible small hernia at gastric bypass site, liquid stool and ?colitis  Abnormal abdominal imaging: liquid stool noted on CT, pt initially denied diarrhea now reporting intermittent, stool cx and cdiff ordered  NAG metabolic acidosis, metabolic encephalopathy, hypotension: if pt is having diarrhea and given hx of gastric bypass, possible these are contributing factors to met acidosis  Hypokalemia, hypomagnesemia  Hx seizure d/o, anxiety, depression, PTSD, hypothyroidism, GERD on prilosec    Plan:  - Monitor BM given unclear hx on diarrhea  - F/u pending stool studies  - Continue current management, electrolyte correction  - Pain management per primary team  - Consider surg consult if concern for hernia at/near gastric bypass site  - D/w attending, Dr. Frankel on this weekend.    AUTHOR:  VIDAL Zazueta  8/16/2024

## 2024-08-16 NOTE — ASSESSMENT & PLAN NOTE
Chronic condition.  Hold home regimen of zolpidem in the setting of sedation and hypotension.  8/19  Trazodone / Ambien prn
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
## 2024-08-16 NOTE — CONSULTS
Critical Care Consult     Consulting Provider: Ann-Marie Knox DO  Reason for Consult: Hypotension, Metabolic acidosis    History of Present Illness:  44-year-old female with a past medical history of depression, anxiety, prior suicide attempts, self injurious behavior, hypothyroidism, seizure disorder, prior gastric bypass with unspecified eating disorder, who presented to the hospital for evaluation of a fall.  Patient reports that she is weak in her legs and whenever she falls she is unable to get up.  She reports tripping at home after coming out of the bathroom, and was down on the ground for 3 hours.  She was ultimately able to crawl to a phone and called 911.    Upon arrival to the ED, patient was afebrile, heart rate 89, blood pressure was 97/54 and she was 96% on room air.  While in the ED she became more hypotensive with a blood pressure of 76/50.  She received 3 L of normal saline, remained hypotensive and was then started on Levophed at 2 mcg/min.  Her initial labs were significant for a potassium of 2.6, bicarb of 18, BUN of 5, creatinine 0.9.  AST was 61, alkaline phosphatase 240.  Magnesium was 1.7 and phosphorus is 1.8.  Venous blood gas was obtained which showed a pH of 7.2, pCO2 of 54, and a pO2 of 29.  She was somnolent in the ER, but able to answer some questions.  She was started on BiPAP, with follow-up VBG of 7.1 6/43/27 on BiPAP at 40% FiO2.  Urine drug screen was positive only for cannabinoid.  Ethanol, salicylate and acetaminophen levels were negative.    Patient was seen and evaluated in the ED this morning.  She is on BiPAP, but easily arousable and answering questions.  She denies taking any additional medications except for what is prescribed for her.  She tells me that she takes Ativan 3 times a day, takes zolpidem at night for sleep.  She also takes her seizure medications.  She took ibuprofen last night for a headache which she states is chronic.  She admits to abdominal pain,  she has not had any vomiting or diarrhea.  She states that she intermittently has abdominal pain, but that this is a new pain for her.        Past Medical History:   Diagnosis Date    Anxiety     Depression     Eating disorder      History reviewed. No pertinent surgical history.  Social History     Socioeconomic History    Marital status: Single     Spouse name: None    Number of children: None    Years of education: None    Highest education level: None   Tobacco Use    Smoking status: Unknown   Substance and Sexual Activity    Alcohol use: Not Currently    Drug use: Yes     Types: Marijuana     Social Determinants of Health     Food Insecurity: Patient Unable To Answer (8/16/2024)    Hunger Vital Sign     Worried About Running Out of Food in the Last Year: Patient unable to answer     Ran Out of Food in the Last Year: Patient unable to answer     History reviewed. No pertinent family history.    Allergies: Aspartame, Bee venom protein (honey bee), Buspirone, Diphenhydramine, Haloperidol, Iodine, Penicillins, Sertraline, Diclofenac sodium, Hydroxyzine, Shellfish containing products, Sudafed cold-allergy, and Tetracyclines    HOME MEDS  Not in a hospital admission.    CURRENT MEDS    glucose, 16-32 g of dextrose, oral, PRN **OR** dextrose, 15-30 g of dextrose, oral, PRN **OR** glucagon, 1 mg, intramuscular, PRN **OR** dextrose 50 % in water (D50), 25 mL, intravenous, PRN    enoxaparin, 40 mg, subcutaneous, Daily (6p)    norepinephrine, 0-90 mcg/min, intravenous, Titrated    potassium, 20 mEq, oral, PRN    potassium, 40 mEq, oral, PRN    potassium chloride, 20 mEq, intravenous, PRN    potassium chloride in water, 20 mEq, intravenous, PRN **AND** potassium chloride in water, 20 mEq, intravenous, PRN    sodium chloride 0.9 %, , intravenous, Continuous    acarbose    atorvastatin    dicyclomine    FLUoxetine    folic acid    furosemide    gabapentin    levothyroxine    LORazepam    naloxone    omeprazole    prazosin     QUEtiapine    topiramate    traZODone    zolpidem    REVIEW OF SYSTEMS  Review of Systems   Constitutional:  Positive for appetite change and fatigue. Negative for fever.   HENT: Negative.     Respiratory:  Negative for cough and shortness of breath.    Cardiovascular:  Negative for chest pain and leg swelling.   Gastrointestinal:  Positive for abdominal pain. Negative for nausea and vomiting.   Neurological:  Positive for weakness and headaches.   All other systems reviewed and are negative.      VITAL SIGNS  Vitals:    08/16/24 0759 08/16/24 0832 08/16/24 1000 08/16/24 1030   BP: 107/63 99/60 (!) 81/51 (!) 91/59   BP Location: Left upper arm Left upper arm Left upper arm Right upper arm   Patient Position: Lying Sitting Sitting Sitting   Pulse: (!) 58 (!) 53 60 (!) 59   Resp: 20 20 18 18   Temp: (!) 35.6 °C (96 °F)      TempSrc: Temporal      SpO2: 100% 100% 100% 100%   Weight:       Height:         VENTILATOR SETTINGS  FiO2 (%) (Set):  [40 %] 40 %    INTAKE/OUTPUT    Intake/Output Summary (Last 24 hours) at 8/16/2024 1054  Last data filed at 8/16/2024 0924  Gross per 24 hour   Intake 54.88 ml   Output --   Net 54.88 ml       Lines, Drains, Airways, Wounds:  Peripheral IV (Adult) 08/16/24 Anterior;Left;Proximal Forearm (Active)   Number of days: 0       Peripheral IV (Adult) 08/16/24 Right Antecubital (Active)   Number of days: 0       PHYSICAL EXAM  Physical Exam  Vitals reviewed.   Constitutional:       Appearance: She is obese.      Comments: Sleepy, arouseable   HENT:      Head: Normocephalic and atraumatic.      Mouth/Throat:      Mouth: Mucous membranes are moist.   Eyes:      General: No scleral icterus.     Conjunctiva/sclera: Conjunctivae normal.   Cardiovascular:      Rate and Rhythm: Normal rate and regular rhythm.   Pulmonary:      Comments: CTA B/L  Abdominal:      General: Bowel sounds are normal. There is no distension.      Palpations: Abdomen is soft.      Tenderness: There is abdominal  tenderness.      Comments: Mild diffuse tenderness   Musculoskeletal:      Right lower leg: No edema.      Left lower leg: No edema.      Comments: Scars on B/L arms c/w prior self injury   Skin:     General: Skin is warm.   Neurological:      Mental Status: She is alert.         LAB RESULTS  ABG  Results from last 7 days   Lab Units 08/16/24  1037 08/16/24  0431 08/16/24  0017   PH ART  7.30*  --   --    PCO2 ART mm Hg 42  --   --    PO2 ART mm Hg 198*  --   --    HCO3 ART mEQ/L 20.6*  --   --    O2 SAT ART % 97  --   --    BASE EXC ART mEQ/L -5.5  --   --    SOURCE OF OXYGEN  NIV Bipap 0L     CBC  Results from last 7 days   Lab Units 08/16/24  0528 08/15/24  1950   WBC K/uL 5.56 5.62   RBC M/uL 3.50* 3.69*   HEMOGLOBIN g/dL 12.3 13.1   HEMATOCRIT % 38.0 39.5   MCV fL 108.6* 107.0*   MCH pg 35.1* 35.5*   MCHC g/dL 32.4 33.2   PLATELETS K/uL 144* 158   RDW % 14.8* 14.7*   MPV fL 12.3 12.5*     BMP  Results from last 7 days   Lab Units 08/16/24  0528 08/15/24  1950   SODIUM mEQ/L 142 136   POTASSIUM mEQ/L 3.3* 2.6*   CHLORIDE mEQ/L 112* 106   CO2 mEQ/L 23 18*   BUN mg/dL 3* 5*   CREATININE mg/dL 0.8 0.9   CALCIUM mg/dL 7.8* 8.4*   ALBUMIN g/dL  --  4.0   BILIRUBIN TOTAL mg/dL  --  0.4   ALK PHOS IU/L  --  240*   ALT IU/L  --  38   AST IU/L  --  61*   GLUCOSE mg/dL 86 108*     Coag      IMAGING  Imaging personally reviewed  X-RAY CHEST 1 VIEW    Result Date: 8/16/2024  CLINICAL HISTORY:   Chest pain. Arrhythmia. COMMENT: Comparison: None. Single frontal AP view of the chest was obtained. There is minimal bibasilar atelectasis. There is no additional focal airspace consolidation..  There is no evidence of pleural effusion or pneumothorax. Cardiac silhouette appears within normal limits for size..     IMPRESSION: Minimal bibasilar atelectasis..     CT CERVICAL SPINE WITHOUT IV CONTRAST    Result Date: 8/15/2024  CLINICAL HISTORY:  Trauma CT DOSE:  The kV and/or mA were adjusted according to the patient's size and body  part for CT dose reduction. COMMENT: 1.25 mm axial sections were obtained from the skull base through the upper thoracic spine with sagittal and coronal reformats. The vertebral bodies are intact.  Mild Multilevel degenerative changes seen resulting in varying levels of neuro foraminal and central canal narrowing.  . No fracture is identified.  No traumatic bony impingement is identified on the spinal canal.  There is no evidence for soft tissue swelling.     IMPRESSION: No fracture    CT HEAD WITHOUT IV CONTRAST    Result Date: 8/15/2024  CLINICAL HISTORY:  ms change/fall CT DOSE:  One or more dose reduction techniques (e.g. automated exposure control, adjustment of the mA and/or kV according to patient size, use of iterative reconstruction technique) utilized for this examination. COMMENT: Noncontrast CT scan of the brain was performed from the skull base through the higher convexities.  Sagittal and coronal reconstructions were performed. Comparison date: none. The ventricles are normal in size and position for the patient's age.  There is no mass effect or midline shift.  There is no abnormal intra-axial or extra axial blood or fluid collections.  There is no evidence of acute infarction. The visualized paranasal sinuses are clear. The osseous structures are normal.     IMPRESSION: No acute intracranial traumatic injury          ASSESSMENT & PLAN  44-year-old female with a past medical history of depression, anxiety, PTSD, seizure disorder, who presented to the hospital after a fall at home with inability to get up.  She was found to have multiple laboratory abnormalities including mild respiratory acidosis, metabolic acidosis, and electrolyte derangement    # Acute metabolic encephalopathy, in the setting of acidosis, electrolyte abnormality.  Possible ingestion, although UDS positive only for cannabinoid  -CT head negative  -More awake per RN, with improving acidosis by ABG  -Continue close monitoring    #  Acute nonanion gap metabolic acidosis unclear etiology.    - No diarrhea per history, normal renal function  -Standard UDS negative, supplemental UDS pending.  Acetaminophen and salicylate levels are undetectable  -Received 1 amp of bicarb  -Acidosis improving by ABG    # Acute hypercapnic respiratory failure, with initial pCO2 of 54  -Concern for possible overdose of zolpidem, although patient reports only taking her usual prescribed amount  -She also admits to taking Ativan 3 times a day, but UDS was negative for benzos  -Currently on BiPAP, with recent ABG 7 point 3/42/498.  Can discontinue BiPAP for now    # Hypotension  -Possibly related to polypharmacy, although patient denies any ingestion  -Given abdominal pain, will obtain CT abdomen/pelvis to evaluate for any acute abdominal pathology  -Received 3 L of saline  -Currently on Levophed at 2 mcg/min, titrate to maintain MAP greater than 65  -Of note, she has had multiple admissions to the ED at [facility], with similar presentation of altered mental status and hypotension.  Typically it is fluid responsive, but this time she is not responding to IV fluids    # Hypokalemia, hypomagnesemia, hypophosphatemia  -Prior gastric bypass  -Replete electrolytes and trend  -Nutrition consult    # Seizure disorder  -Maintained on topiramate 50 mg twice daily  -Resume home meds when more awake and able to take meds    # History of anxiety, depression, PTSD  -Sedating meds on hold  -Maintained on fluoxetine, prazosin, quetiapine, trazodone, and lorazepam (although of note UDS was negative for benzos)  -Meds held in the setting of altered mental status and hypotension  -Psychiatry consulted    # Hypothyroidism  -Mildly elevated TSH  -Resume levothyroxine unable to take p.o.    NPO  Lovenox for DVT ppx       CODE STATUS  Full Code    The case was reviewed this morning at the patient's beside multidisciplinary rounds with the patient's nurse, dietician, pharmacist,  respiratory therapist, physical therapist and critical care nurse coordinator. The patient's clinical status with regards to diagnosis, treatment plans including disposition of any IV, arterial lines, Dunaway and tubes were discussed, as well as dietary, respiratory therapy and mobilization needs.     This case was discussed with consultants.    Total critical time spent managing acute metabolic encephalopathy, hypotension requiring vasopressors, electrolyte derangement on this patient excluding any procedure time totals 60 minutes      Ann-Marie Knox,   8/16/2024

## 2024-08-16 NOTE — ED ATTESTATION NOTE
Procedures  Physical Exam  Review of Systems    8/15/56096:54 PM  I have personally seen and examined the patient.  I personally performed the key   components of the encounter and provided a substantive portion of the care and   medical decision making for this patient.     I have reviewed and agree with the PA/NP/Resident's assessment and ED plan of care, with any exceptions as documented below.  My examination, assessment and plan of care of Felipa Cuadra is as follows:    Patient is a 44-year-old female who presents from home after being found on the ground for an unknown period of time, patient does live at home with a home health aide, EMS reports that the home health aide left the patient's home around 3 PM and when she returned hours later the patient was on the floor, patient not running a fever in the emergency room, does report increased generalized weakness    Exam:   Vital signs have been reviewed, pulse ox is 96% on room air, normal  Heart: RRR, normal S1/S2  Lungs: CTA bilaterally  Abdo: soft, non-tender    Plan/Medical Decision Making: Patient is a 44-year-old female who presents from home after being found on the ground for unknown time., checking labs and imaging, reevaluate afterwards      This document was created using Dragon Dictation software. There might be some typographical errors due to this technology.          Godshall, Duane K, MD  08/15/24 6132

## 2024-08-16 NOTE — ASSESSMENT & PLAN NOTE
Patient lethargic on exam.  Appears slowed, with soft speech.  Guarded.  Patient reports she follows closely with outpatient psychiatrist, however cannot recall providers name.  She reports only recent medication change is decrease in Ativan dose.  She reports taking medications as ordered, denies taking more medication than prescribed.  She is requesting to continue current medication regimen as she does not believe medication contributing to AMS or fall.       1) Recommend continue outpatient Ativan 0.5mg BID to prevent benzodiazepine withdrawal.  Recommend HOLD for oversedation    2) Recommend HOLD outpatient Seroquel and Prazosin given concern for hypotension    3) Recommend making Trazodone and Ambien PRN orders for insomnia given concern for oversedation    4) Can continue outpatient Prozac for mood.     5) Per patient Gabapentin  prescribed for neuropathy, Topamax for seizures.  Will defer to neurology    6) No clear indication for acute inpatient psychiatric treatment at this time.  Patient denies SI/HI/AVH, denies a wish to be dead.  Reports she plans to continue to follow with outpatient psychiatric provider and talk therapist.

## 2024-08-16 NOTE — ASSESSMENT & PLAN NOTE
Chronic condition. Mildly elevated TSH  Levothyroxine 50 mcg oral daily when able to tolerate oral intake.  Outpatient check up

## 2024-08-16 NOTE — CONSULTS
Nutrition Note       Clinical Course: Patient is a 44 y.o. female who was admitted on 8/15/2024 with a diagnosis of Hypokalemia [E87.6]  Hypercapnia [R06.89]  Hypotension [I95.9]  Hypotension due to drugs [I95.2]  Fall, initial encounter [W19.XXXA]  Altered mental status, unspecified altered mental status type [R41.82].   Past Medical History:   Diagnosis Date    Anxiety     Depression     Eating disorder      History reviewed. No pertinent surgical history.    Nutrition Interventions/ Recommendations:   Once electrolytes are WDL: ADAT to Regular  - monitor tolerance/intakes  - ONS for intakes < 50%  2. Treat labs/lytes  - consider checking a blood ceruloplasmin or total blood copper test to determine possible copper deficiency  - correct/replace all to WDL  3. Vitamin/mineral supplementation for GBS  - MVI w/Iron BID  - Calcium w/Vit D (2000 IU Vit D/1200 mg Ca) divided into 2 doses.  Calcium needs to be taken separately from MVI w/Iron  - B12: check level and supplement as indicated  - give 200 mg Thiamine daily x 5 days  4. Weekly weight  - monitor I/O        Nutrition Status Classification: Moderate nutritional compromise       Dietary Orders   (From admission, onward)                 Start     Ordered    08/16/24 0107  NPO Diet  Diet effective now         08/16/24 0106                        Reason for Assessment  Reason For Assessment: nurse/nurse practitioner consult  Diagnosis: other (see comments)    MST Nutrition Screen Tool  Has patient lost weight without trying?: 0-->No  Has patient been eating poorly due to decreased appetite?: 1-->Yes  MST Nutrition Screen Score: 1    Nutrition/Diet History  Diet Prior to Admission: regular  Appetite Prior to Admission: Poor-0-25%  Meal/Snack Patterns: 1 meal daily  Vitamin/Mineral/Herbal Supplements: does not take Rx vit/min for GBS  Functional Status: able to prepare meals, able to purchase food, ambulatory  Food Allergies: fish/shellfish  Factors Affecting  "Nutritional Intake: anorexia, eating disorder(s) (abdominal pain)    Physical Findings  Overall Physical Appearance: obese (pale)  Gastrointestinal: nausea (GBS; pta)  Oral/Mouth Cavity:  (endentulous)  Skin: intact         Nutrition Order  Nutrition Order: does not meet nutritional requirements  Nutrition Order Comments: NPO    Anthropometrics  Height: 165.1 cm (5' 5\")    Weights (last 7 days)       Date/Time Weight Drug Calculation Weight (Dosing Weight)    08/16/24 1245 93.9 kg (207 lb 0.2 oz) 93.9 kg (207 lb 0.2 oz)    08/15/24 1940 92.7 kg (204 lb 5.9 oz) --            Admit Weight  Admit Weight: 92.7 kg (204 lb 5.9 oz)    Current Weight  Weight Method: Bed scale  Weight: 93.9 kg (207 lb 0.2 oz)    Ideal Body Weight (IBW)  Ideal Body Weight (IBW) (kg): 57.29  % Ideal Body Weight: 163.9         Body Mass Index (BMI)  BMI (Calculated): 34.4  BMI Assessment: BMI 30-34.9: obesity grade I                        Labs/Procedures/Meds  Lab Results Reviewed: reviewed      Results from last 7 days   Lab Units 08/16/24  0528 08/15/24  1950   SODIUM mEQ/L 142 136   POTASSIUM mEQ/L 3.3* 2.6*   CHLORIDE mEQ/L 112* 106   CO2 mEQ/L 23 18*   BUN mg/dL 3* 5*   CREATININE mg/dL 0.8 0.9   GLUCOSE mg/dL 86 108*   CALCIUM mg/dL 7.8* 8.4*      Results from last 7 days   Lab Units 08/15/24  1950   ALK PHOS IU/L 240*   BILIRUBIN TOTAL mg/dL 0.4   ALBUMIN g/dL 4.0   ALT IU/L 38   AST IU/L 61*          No results found for: \"HGBA1C\"  No results found for: \"[id number]\"  Lab Results   Component Value Date    CALCIUM 7.8 (L) 08/16/2024    PHOS 2.6 08/16/2024     Results from last 7 days   Lab Units 08/16/24  0528 08/15/24  1950   WBC K/uL 5.56 5.62   HEMOGLOBIN g/dL 12.3 13.1   HEMATOCRIT % 38.0 39.5   PLATELETS K/uL 144* 158               Results from last 7 days   Lab Units 08/16/24  0528 08/15/24  1950   MAGNESIUM mg/dL 1.7* 1.7*          Diagnostic Tests/Procedures  Diagnostic Test/Procedure Reviewed: reviewed    Medications  Pertinent " "Medications Reviewed: reviewed   enoxaparin  40 mg subcutaneous Daily (6p)    magnesium sulfate  2 g intravenous Once      norepinephrine  0-90 mcg/min 2 mcg/min (24 1130)    sodium chloride 0.9 %   100 mL/hr at 24 1248       Estimated/Assessed Needs  Additional Documentation: Calorie Requirements (Group), Fluid Requirements (Group), Protein Requirements (Group)    Calorie Requirements  Estimated kCal Needs: Actual Body Weight  Estimated Calorie Need Method: kcal/kg  Calorie/kg Recommended: 11-14  Calorie Recommendations: 9068-5707      Protein Requirements  Recommended Dosing Weight (Estimated Protein Needs): Ideal Body Weight  Protein Recommendations:  (1.5-2)    Fluid Requirements  Fluid Recommendation (mL): 30-35ml  Recommended Fluid Needs Dosing Weight: Ideal Body Weight  Fluid Requirements (mL/day): 0841-2652  Vinicio-Roland Method (over 20 kg): 6657                                                        PES  Statement: PES Statement  Nutrition Diagnosis: Inadequate Oral Intake  Related To:: Illness/Eating d/o  As Evidenced By:: multiple electrolyte derangements  Nutritional Needs Met?: No                                   Clinical Comments:   45 y/o female admitted from home s/p , found to have multiple electrolyte derangements.  PMH includes depression, previous SA, GBS, unspecified eating d/o.  Admitted to the ICU for hypotension and AMS.  At time of visit:     On RA  Levophed at 2 mcg/min  Nacl at 100 ml/hr  Admit labs: K: 2.6; M.7; Phos: 1.8    Pt reports that noted eating d/o is anorexia (although ? if also with hx of bulimia given lack of any teeth) and that is the reason why she only eats 1 meal daily.  Reported previous weight was 100# prior to going to live in \"the clinic.\"  Pt then reports a weight of 250# prior to her first GBS in 2001, with a revision in 2018.  She has not been compliant with her vitamin/mineral therapy as she does not like taking all the pills.    RD " concerned for possible copper deficiency (common symptoms: fatigue/weakness, memory problems, difficulty walking, and pale skin).  Would consider checking a blood copper test to r/o.   Mild risk for RFS, would start daily thiamine and keep NPO until all electrolytes are replaced to normal.  RD will follow for needs.      Goals: PO intakes >/= 50% x 7 days      Monitor and Evaluation:   NPO status  Diet advance/tolerance  Labs/lytes  Weight  I/O  Medical course    Discussed with: patient                            Verbalizes understanding        Date: 08/16/24  Signature: Bee Baeza, LDN

## 2024-08-16 NOTE — H&P
Hospital Medicine Service -  History & Physical        CHIEF COMPLAINT   Fall     HISTORY OF PRESENT ILLNESS      Felipa Cuadra is a 44 y.o. female with a past medical history of depression, anxiety, unspecified eating disorder, hypothyroidism, PTSD, and seizures who presents to the hospital for the evaluation of a fall.  On the evening of 8/15/2024, the patient reportedly called 911 after falling at home and being down for approximately 3 hours.      In the ED, it was noted that the patient was very somnolent but was arousable to voice.  Patient also reported not experiencing any nausea, vomiting, or diarrhea.  Unfortunately, the patient's mental status continued to decline while in the emergency department and she quired the initiation of norepinephrine infusion due to worsening hypotension despite fluid resuscitation.    Laboratory studies in the emergency department were notable for potassium of 2.6, bicarbonate of 18, and white blood cell count of 5.62.  Urine drug screen was positive for cannabinoids but was otherwise negative.  Urinalysis was not concerning for infection. CT scans of the head and neck did not show any acute intracranial injuries.  Chest x-ray was not concerning for acute pathology.    Upon record review, it is noted that the patient had a similar presentation in June 2024 and at that time, the patient's symptoms were attributed to polypharmacy.  I reviewed the patient's pharmacy fill history and it appears that she is on multiple potentially sedating medications.    At this time, the patient will be admitted to the ICU for further fluid resuscitation, norepinephrine support, and assessment.    PAST MEDICAL AND SURGICAL HISTORY      Past Medical History:   Diagnosis Date    Anxiety     Depression     Eating disorder        History reviewed. No pertinent surgical history.    PCP: Emily Del Valle CRNP    MEDICATIONS      Prior to Admission medications    Not on File       ALLERGIES       Aspartame, Bee venom protein (honey bee), Buspirone, Diphenhydramine, Haloperidol, Iodine, Penicillins, Sertraline, Diclofenac sodium, Hydroxyzine, Shellfish containing products, Sudafed cold-allergy, and Tetracyclines    FAMILY HISTORY      History reviewed. No pertinent family history.    SOCIAL HISTORY      Social History     Socioeconomic History    Marital status: Single     Spouse name: None    Number of children: None    Years of education: None    Highest education level: None   Tobacco Use    Smoking status: Unknown   Substance and Sexual Activity    Alcohol use: Not Currently    Drug use: Yes     Types: Marijuana     Social Determinants of Health     Food Insecurity: Patient Unable To Answer (8/16/2024)    Hunger Vital Sign     Worried About Running Out of Food in the Last Year: Patient unable to answer     Ran Out of Food in the Last Year: Patient unable to answer       REVIEW OF SYSTEMS      Review of Systems   Unable to perform ROS: Mental status change         PHYSICAL EXAMINATION      Temp:  [36.9 °C (98.4 °F)] 36.9 °C (98.4 °F)  Heart Rate:  [62-89] 66  Resp:  [11-18] 14  BP: ()/(50-68) 97/68  FiO2 (%) (Set):  [40 %] 40 %  Body mass index is 32.99 kg/m².    Physical Exam  Constitutional:       General: She is not in acute distress.     Appearance: She is obese. She is ill-appearing.   HENT:      Head: Normocephalic and atraumatic.      Nose: No congestion or rhinorrhea.      Mouth/Throat:      Mouth: Mucous membranes are moist.      Pharynx: Oropharynx is clear.   Eyes:      General: No scleral icterus.     Conjunctiva/sclera: Conjunctivae normal.      Pupils: Pupils are equal, round, and reactive to light.   Cardiovascular:      Rate and Rhythm: Normal rate and regular rhythm.      Heart sounds: No murmur heard.     No friction rub. No gallop.   Pulmonary:      Breath sounds: No wheezing, rhonchi or rales.   Abdominal:      General: Abdomen is flat.      Palpations: Abdomen is soft.    Musculoskeletal:      Right lower leg: No edema.      Left lower leg: No edema.   Skin:     Comments: Scars on left upper extremity from prior documented self injury.  The patient is generally pale appearing and looks unwell.   Neurological:      Comments: Somnolent, can only occasionally get out single words.   Psychiatric:      Comments: Unable to assess.         LABS / IMAGING / EKG        Labs  Lab Results   Component Value Date    WBC 5.62 08/15/2024    HGB 13.1 08/15/2024    HCT 39.5 08/15/2024    .0 (H) 08/15/2024     08/15/2024     Lab Results   Component Value Date    GLUCOSE 108 (H) 08/15/2024    CALCIUM 8.4 (L) 08/15/2024     08/15/2024    K 2.6 (LL) 08/15/2024    CO2 18 (L) 08/15/2024     08/15/2024    BUN 5 (L) 08/15/2024    CREATININE 0.9 08/15/2024       Imaging  See HPI      ECG/Telemetry  Telemetry shows sinus rhythm at 66 bpm without signs of acute ischemia.  Twelve-lead ECG pending.    ASSESSMENT AND PLAN           * Hypotension  Assessment & Plan  Acute condition, appears to be secondary to polypharmacy.  Hold home medication regimen.  Titrate norepinephrine infusion to maintain mean arterial pressure greater than 65 mmHg.  Continue IV fluid hydration.  Admit to ICU for close monitoring.  BMP and CBC daily.  TSH pending.  Continue to monitor blood cultures.    Metabolic acidosis, normal anion gap (NAG)  Assessment & Plan  Acute condition, chronicity unknown.  Urine osmolality and electrolytes to assess for potential type I RTA.  The patient is unable to convey any information, however, she has not had any diarrhea while in the hospital.  Administer 1 amp of sodium bicarb drip.  Continue cautious fluid administration.    Hypercapnia  Assessment & Plan  Acute condition.  Likely secondary to sedation in the setting of polypharmacy.  Continue to monitor VBG.  Continue BiPAP to help with CO2 clearance.    Hypokalemia  Assessment & Plan  Acute condition, unclear cause.  BMP  daily to monitor renal and electrolyte status.  Replete as needed.    Insomnia  Assessment & Plan  Chronic condition.  Hold home regimen of zolpidem in the setting of sedation and hypotension.    Seizures (CMS/HCC)  Assessment & Plan  Chronic condition, exact details unknown.  Seizure precautions in place.  At home, the patient takes a regimen of topiramate 50 mg oral twice daily.  Resume once able to safely swallow; consider additional agents if the patient is unable to swallow for a prolonged period of time.    PTSD (post-traumatic stress disorder)  Assessment & Plan  Chronic condition.  The patient takes a regimen of fluoxetine 20 mg oral daily, lorazepam 1 mg twice daily as needed, prazosin 5 mg oral daily, quetiapine 75 mg oral daily and 100 mg nightly, trazodone and 100 to 200 mg daily.  Hold potential sedating medications in the setting of altered mental status and hypotension.  Psychiatry consulted.    Hypothyroidism  Assessment & Plan  Chronic condition.  Levothyroxine 50 mcg oral daily when able to tolerate oral intake.    Eating disorder  Assessment & Plan  Chronic condition.  Resume home regimen of acarbose when no longer NPO.  Follow up with outpatient psychology/psychiatry.    Anxiety  Assessment & Plan  Chronic condition.  The patient takes a regimen of fluoxetine 20 mg oral daily, lorazepam 1 mg twice daily as needed, prazosin 5 mg oral daily, quetiapine 75 mg oral daily and 100 mg nightly, trazodone and 100 to 200 mg daily.  Hold potential sedating medications in the setting of altered mental status and hypotension.  Psychiatry consulted.    Depression  Assessment & Plan  Chronic condition.  The patient takes a regimen of fluoxetine 20 mg oral daily, lorazepam 1 mg twice daily as needed, prazosin 5 mg oral daily, quetiapine 75 mg oral daily and 100 mg nightly, trazodone and 100 to 200 mg daily.  Hold potential sedating medications in the setting of altered mental status and hypotension.  Psychiatry  consulted.         VTE Assessment: Pad[place] VTE Score: 4  VTE Prophylaxis: Current anticoagulants:  enoxaparin (LOVENOX) syringe 40 mg, subcutaneous, Daily (6p)      Code Status: Full Code      Discussed advanced care planning- At this time, the patient is assumed to be full code as she is unable to convey otherwise.  Estimated Discharge Date: 2024  Disposition Plannin+ hours.     Dr. Tyron Johnson DO  [facility] [place] [facility]  2024

## 2024-08-16 NOTE — ASSESSMENT & PLAN NOTE
Acute condition, unclear cause.  BMP daily to monitor renal and electrolyte status.  Replete as needed.

## 2024-08-17 LAB
ANION GAP SERPL CALC-SCNC: 5 MEQ/L (ref 3–15)
BUN SERPL-MCNC: 3 MG/DL (ref 7–25)
C DIFF TOX GENS STL QL NAA+PROBE: NEGATIVE
CALCIUM SERPL-MCNC: 7.9 MG/DL (ref 8.6–10.3)
CHLORIDE SERPL-SCNC: 113 MEQ/L (ref 98–107)
CO2 SERPL-SCNC: 23 MEQ/L (ref 21–31)
CREAT SERPL-MCNC: 0.8 MG/DL (ref 0.6–1.2)
EGFRCR SERPLBLD CKD-EPI 2021: >60 ML/MIN/1.73M*2
ERYTHROCYTE [DISTWIDTH] IN BLOOD BY AUTOMATED COUNT: 15.1 % (ref 11.7–14.4)
GLUCOSE BLD-MCNC: 83 MG/DL (ref 70–99)
GLUCOSE BLD-MCNC: 93 MG/DL (ref 70–99)
GLUCOSE SERPL-MCNC: 86 MG/DL (ref 70–99)
HCT VFR BLD AUTO: 35.6 % (ref 35–45)
HGB BLD-MCNC: 11.5 G/DL (ref 11.8–15.7)
LABORATORY COMMENT REPORT: NORMAL
MCH RBC QN AUTO: 35.6 PG (ref 28–33.2)
MCHC RBC AUTO-ENTMCNC: 32.3 G/DL (ref 32.2–35.5)
MCV RBC AUTO: 110.2 FL (ref 83–98)
PDW BLD AUTO: 12.5 FL (ref 9.4–12.3)
PLATELET # BLD AUTO: 135 K/UL (ref 150–369)
POCT TEST: NORMAL
POCT TEST: NORMAL
POTASSIUM SERPL-SCNC: 3.4 MEQ/L (ref 3.5–5.1)
RBC # BLD AUTO: 3.23 M/UL (ref 3.93–5.22)
SODIUM SERPL-SCNC: 141 MEQ/L (ref 136–145)
WBC # BLD AUTO: 4.98 K/UL (ref 3.8–10.5)

## 2024-08-17 PROCEDURE — 20600000 HC ROOM AND CARE INTERMEDIATE/TELEMETRY

## 2024-08-17 PROCEDURE — 85027 COMPLETE CBC AUTOMATED: CPT | Performed by: STUDENT IN AN ORGANIZED HEALTH CARE EDUCATION/TRAINING PROGRAM

## 2024-08-17 PROCEDURE — 25800000 HC PHARMACY IV SOLUTIONS: Performed by: NURSE PRACTITIONER

## 2024-08-17 PROCEDURE — 63700000 HC SELF-ADMINISTRABLE DRUG: Performed by: PHYSICIAN ASSISTANT

## 2024-08-17 PROCEDURE — 63700000 HC SELF-ADMINISTRABLE DRUG: Performed by: STUDENT IN AN ORGANIZED HEALTH CARE EDUCATION/TRAINING PROGRAM

## 2024-08-17 PROCEDURE — 25800000 HC PHARMACY IV SOLUTIONS: Performed by: STUDENT IN AN ORGANIZED HEALTH CARE EDUCATION/TRAINING PROGRAM

## 2024-08-17 PROCEDURE — 63700000 HC SELF-ADMINISTRABLE DRUG: Performed by: NURSE PRACTITIONER

## 2024-08-17 PROCEDURE — 36415 COLL VENOUS BLD VENIPUNCTURE: CPT | Performed by: STUDENT IN AN ORGANIZED HEALTH CARE EDUCATION/TRAINING PROGRAM

## 2024-08-17 PROCEDURE — 82947 ASSAY GLUCOSE BLOOD QUANT: CPT | Performed by: STUDENT IN AN ORGANIZED HEALTH CARE EDUCATION/TRAINING PROGRAM

## 2024-08-17 PROCEDURE — 63600000 HC DRUGS/DETAIL CODE: Mod: JZ | Performed by: STUDENT IN AN ORGANIZED HEALTH CARE EDUCATION/TRAINING PROGRAM

## 2024-08-17 RX ORDER — TOPIRAMATE 25 MG/1
50 TABLET ORAL 2 TIMES DAILY
Status: DISCONTINUED | OUTPATIENT
Start: 2024-08-17 | End: 2024-08-21 | Stop reason: HOSPADM

## 2024-08-17 RX ORDER — LEVOTHYROXINE SODIUM 50 UG/1
50 TABLET ORAL
Status: DISCONTINUED | OUTPATIENT
Start: 2024-08-17 | End: 2024-08-21 | Stop reason: HOSPADM

## 2024-08-17 RX ORDER — PANTOPRAZOLE SODIUM 20 MG/1
20 TABLET, DELAYED RELEASE ORAL DAILY
Status: DISCONTINUED | OUTPATIENT
Start: 2024-08-17 | End: 2024-08-21 | Stop reason: HOSPADM

## 2024-08-17 RX ORDER — FLUOXETINE HYDROCHLORIDE 20 MG/1
20 CAPSULE ORAL DAILY
Status: DISCONTINUED | OUTPATIENT
Start: 2024-08-17 | End: 2024-08-21 | Stop reason: HOSPADM

## 2024-08-17 RX ORDER — NYSTATIN 100000 [USP'U]/G
POWDER TOPICAL 2 TIMES DAILY
Status: DISCONTINUED | OUTPATIENT
Start: 2024-08-17 | End: 2024-08-21 | Stop reason: HOSPADM

## 2024-08-17 RX ORDER — GABAPENTIN 400 MG/1
400 CAPSULE ORAL 3 TIMES DAILY
Status: DISCONTINUED | OUTPATIENT
Start: 2024-08-17 | End: 2024-08-21 | Stop reason: HOSPADM

## 2024-08-17 RX ORDER — TRAZODONE HYDROCHLORIDE 50 MG/1
100 TABLET ORAL NIGHTLY PRN
Status: DISCONTINUED | OUTPATIENT
Start: 2024-08-17 | End: 2024-08-21 | Stop reason: HOSPADM

## 2024-08-17 RX ORDER — LORAZEPAM 0.5 MG/1
0.5 TABLET ORAL EVERY 8 HOURS PRN
Status: DISCONTINUED | OUTPATIENT
Start: 2024-08-17 | End: 2024-08-21 | Stop reason: HOSPADM

## 2024-08-17 RX ORDER — ATORVASTATIN CALCIUM 20 MG/1
20 TABLET, FILM COATED ORAL NIGHTLY
Status: DISCONTINUED | OUTPATIENT
Start: 2024-08-17 | End: 2024-08-21 | Stop reason: HOSPADM

## 2024-08-17 RX ORDER — TRAZODONE HYDROCHLORIDE 100 MG/1
100 TABLET ORAL NIGHTLY
Status: DISCONTINUED | OUTPATIENT
Start: 2024-08-17 | End: 2024-08-17

## 2024-08-17 RX ADMIN — GABAPENTIN 400 MG: 400 CAPSULE ORAL at 13:08

## 2024-08-17 RX ADMIN — SODIUM CHLORIDE: 9 INJECTION, SOLUTION INTRAVENOUS at 04:05

## 2024-08-17 RX ADMIN — GABAPENTIN 400 MG: 400 CAPSULE ORAL at 08:53

## 2024-08-17 RX ADMIN — FERROUS SULFATE TAB 325 MG (65 MG ELEMENTAL FE) 325 MG: 325 (65 FE) TAB at 08:51

## 2024-08-17 RX ADMIN — NYSTATIN: 100000 POWDER TOPICAL at 13:08

## 2024-08-17 RX ADMIN — ENOXAPARIN SODIUM 40 MG: 40 INJECTION SUBCUTANEOUS at 17:00

## 2024-08-17 RX ADMIN — FERROUS SULFATE TAB 325 MG (65 MG ELEMENTAL FE) 325 MG: 325 (65 FE) TAB at 17:00

## 2024-08-17 RX ADMIN — PANTOPRAZOLE SODIUM 20 MG: 20 TABLET, DELAYED RELEASE ORAL at 08:54

## 2024-08-17 RX ADMIN — ACETAMINOPHEN 975 MG: 325 TABLET, FILM COATED ORAL at 09:24

## 2024-08-17 RX ADMIN — POTASSIUM CHLORIDE 40 MEQ: 1500 TABLET, EXTENDED RELEASE ORAL at 08:52

## 2024-08-17 RX ADMIN — Medication 200 MG: at 08:54

## 2024-08-17 RX ADMIN — Medication 125 MCG: at 20:26

## 2024-08-17 RX ADMIN — SODIUM CHLORIDE 250 ML: 9 INJECTION, SOLUTION INTRAVENOUS at 16:07

## 2024-08-17 RX ADMIN — GABAPENTIN 400 MG: 400 CAPSULE ORAL at 20:26

## 2024-08-17 RX ADMIN — Medication 125 MCG: at 08:53

## 2024-08-17 RX ADMIN — TOPIRAMATE 50 MG: 25 TABLET, FILM COATED ORAL at 20:25

## 2024-08-17 RX ADMIN — LORAZEPAM 0.5 MG: 0.5 TABLET ORAL at 20:25

## 2024-08-17 RX ADMIN — LEVOTHYROXINE SODIUM 50 MCG: 0.05 TABLET ORAL at 08:53

## 2024-08-17 RX ADMIN — LORAZEPAM 0.5 MG: 0.5 TABLET ORAL at 11:34

## 2024-08-17 RX ADMIN — TRAZODONE HYDROCHLORIDE 100 MG: 100 TABLET ORAL at 20:26

## 2024-08-17 RX ADMIN — TOPIRAMATE 50 MG: 25 TABLET, FILM COATED ORAL at 08:53

## 2024-08-17 RX ADMIN — THERA TABS 1 TABLET: TAB at 20:25

## 2024-08-17 RX ADMIN — NYSTATIN: 100000 POWDER TOPICAL at 20:30

## 2024-08-17 RX ADMIN — THERA TABS 1 TABLET: TAB at 08:54

## 2024-08-17 RX ADMIN — SODIUM CHLORIDE 250 ML: 9 INJECTION, SOLUTION INTRAVENOUS at 18:11

## 2024-08-17 RX ADMIN — FLUOXETINE HYDROCHLORIDE 20 MG: 20 CAPSULE ORAL at 08:52

## 2024-08-17 ASSESSMENT — COGNITIVE AND FUNCTIONAL STATUS - GENERAL
CLIMB 3 TO 5 STEPS WITH RAILING: 1 - TOTAL
MOVING TO AND FROM BED TO CHAIR: 2 - A LOT
WALKING IN HOSPITAL ROOM: 1 - TOTAL
STANDING UP FROM CHAIR USING ARMS: 2 - A LOT

## 2024-08-17 NOTE — PROGRESS NOTES
GI Daily Progress Note          CC: ..  Chief Complaint   Patient presents with    Weakness - Generalized    Fall         Subjective   Interval History:   Poor historian. Intermittent lower abdominal pain which is her baseline. Comes and goes. No epigastric pain. Had 750 mL of diarrhea last night. Pt does not recall this until prompted by nursing.  Apparently she has 1 BM per week and it's either hard stool or diarrhea. She has a history of laxative abuse but denies it recently.    Objective     Vital signs in last 24 hours:  Temp:  [35.6 °C (96 °F)-36.6 °C (97.9 °F)] 36.6 °C (97.9 °F)  Heart Rate:  [48-77] 71  Resp:  [10-44] 16  BP: ()/(48-67) 96/56  FiO2 (%) (Set):  [40 %] 40 %      Intake/Output Summary (Last 24 hours) at 8/17/2024 0928  Last data filed at 8/17/2024 0900  Gross per 24 hour   Intake 3814.79 ml   Output 1875 ml   Net 1939.79 ml     Intake/Output this shift:  I/O this shift:  In: 466.2 [P.O.:150; I.V.:316.2]  Out: -     Physical exam:  Gen: AAOx3  HEENT: NC/AT  Cv: RRR  Pulm: CTAB/L  Abd: s/nt/nd +BS  Ext: No c/c/e  Psych: appropriate    Current Medications:    acetaminophen, 975 mg, oral, q6h PRN    atorvastatin, 20 mg, oral, Nightly    calcium (as carbonate), 200 mg of elemental calcium, oral, Daily PRN    cholecalciferol (vitamin D3), 125 mcg, oral, BID    glucose, 16-32 g of dextrose, oral, PRN **OR** dextrose, 15-30 g of dextrose, oral, PRN **OR** glucagon, 1 mg, intramuscular, PRN **OR** dextrose 50 % in water (D50), 25 mL, intravenous, PRN    enoxaparin, 40 mg, subcutaneous, Daily (6p)    ferrous sulfate, 325 mg, oral, BID with meals    FLUoxetine, 20 mg, oral, Daily    gabapentin, 400 mg, oral, TID    levothyroxine, 50 mcg, oral, Daily (6:30a)    LORazepam, 0.5 mg, oral, q8h PRN    multivitamin, 1 tablet, oral, BID    norepinephrine, 0-90 mcg/min, intravenous, Titrated    pantoprazole, 20 mg, oral, Daily    potassium, 20 mEq, oral, PRN    potassium, 40 mEq, oral, PRN    potassium  chloride, 20 mEq, intravenous, PRN    potassium chloride in water, 20 mEq, intravenous, PRN **AND** potassium chloride in water, 20 mEq, intravenous, PRN    sodium chloride 0.9 %, , intravenous, Continuous    thiamine, 200 mg, oral, Daily    topiramate, 50 mg, oral, BID    traZODone, 100 mg, oral, Nightly PRN         Labs  CBC Results         08/17/24 08/16/24 08/15/24     0553 0528 1950    WBC 4.98 5.56 5.62    RBC 3.23 3.50 3.69    HGB 11.5 12.3 13.1    HCT 35.6 38.0 39.5    .2 108.6 107.0    MCH 35.6 35.1 35.5    MCHC 32.3 32.4 33.2     144 158          CMP Results         08/17/24 08/16/24 08/16/24     0553 1502 0528     142 142    K 3.4 3.4 3.3    Cl 113 113 112    CO2 23 22 23    Glucose 86 88 86    BUN 3 3 3    Creatinine 0.8 0.6 0.8    Calcium 7.9 7.9 7.8    Anion Gap 5 7 7    EGFR >60.0 >60.0 >60.0           Comment for K at 0528 on 08/16/24: Results obtained on plasma. Plasma Potassium values may be up to 0.4 mEQ/L less than serum values. The differences may be greater for patients with high platelet or white cell counts.    Comment for EGFR at 0553 on 08/17/24: Calculation based on the Chronic Kidney Disease Epidemiology Collaboration (CKD-EPI) equation refit without adjustment for race.    Comment for EGFR at 1502 on 08/16/24: Calculation based on the Chronic Kidney Disease Epidemiology Collaboration (CKD-EPI) equation refit without adjustment for race.    Comment for EGFR at 0528 on 08/16/24: Calculation based on the Chronic Kidney Disease Epidemiology Collaboration (CKD-EPI) equation refit without adjustment for race.          PT PTT Results    No lab values to display.         Imaging  Reviewed last 24 hours      Assessment/Plan:  44F with hx gastric bypass with revision, PTSD, seizures, presenting with altered mental status, severe acidosis which appears to be mixed respiratory and metabolic, the latter may be from diarrhea. There are electrolyte abnormalities which are consistent  with diarrhea such as low mag and low potassium, especially in the setting of an acidosis where the levels should be further elevated. CT also shows liquid stool. She admits to a history of laxative abuse and unfortunately her history telling is unreliable as she admits to ativan use but never has benzos on her UDS.    There is question of some irregularity in the epigastrium on CT but she has no epigastric pain    Continue to monitor bowel output while being monitored inpatient. If the diarrhea resolves over time then this would suggest diarrhea resulting from ingested material whether laxatives, magnesium or otherwise. If it persists there could be an underlying gastrointestinal problem.    Abdominal pain is chronic and of unclear etiology.    Would continue to monitor and treat supportively. If diarrhea is an ongoing persistent problem that requires further workup, please let us know. Would not give laxatives or anti-diarrheals so this problem can be better assessed    Robert Frankel, MD  [facility]        Expected Discharge Date:   8/19/2024 No discharge date for patient encounter.      Robert A. Frankel, MD  8/17/2024  9:28 AM

## 2024-08-17 NOTE — CONSULTS
Visited with Felipa Cuadra while rounding on the unit.  Felipa welcomed the visit and was engaged in the conversation.    Offered empathetic supportive listening, words of encouragement, and prayer.  Provided Felipa with a Rosary and a daily reader.    The Spiritual Care team will remain available for spiritual and emotional needs during this hospital stay.    Daya Raza  Spiritual Care Specialist  Pager [phone]  Office [phone]

## 2024-08-17 NOTE — PROGRESS NOTES
Critical Care Progress Note     SUBJECTIVE  Interval History:   Awake, alert, following commands  Denies weakness, lightheadedness, + anxiety  States baseline Bp 90/50  On levophed at 2 mcg, turned off during rounds    Afebrile      Allergies: Aspartame, Bee venom protein (honey bee), Buspirone, Diphenhydramine, Haloperidol, Iodine, Penicillins, Sertraline, Diclofenac sodium, Hydroxyzine, Shellfish containing products, Sudafed cold-allergy, and Tetracyclines    CURRENT MEDS    acetaminophen, 975 mg, oral, q6h PRN    atorvastatin, 20 mg, oral, Nightly    calcium (as carbonate), 200 mg of elemental calcium, oral, Daily PRN    cholecalciferol (vitamin D3), 125 mcg, oral, BID    glucose, 16-32 g of dextrose, oral, PRN **OR** dextrose, 15-30 g of dextrose, oral, PRN **OR** glucagon, 1 mg, intramuscular, PRN **OR** dextrose 50 % in water (D50), 25 mL, intravenous, PRN    enoxaparin, 40 mg, subcutaneous, Daily (6p)    ferrous sulfate, 325 mg, oral, BID with meals    FLUoxetine, 20 mg, oral, Daily    gabapentin, 400 mg, oral, TID    levothyroxine, 50 mcg, oral, Daily (6:30a)    LORazepam, 0.5 mg, oral, q8h PRN    multivitamin, 1 tablet, oral, BID    norepinephrine, 0-90 mcg/min, intravenous, Titrated    nystatin, , Topical, BID    pantoprazole, 20 mg, oral, Daily    potassium, 20 mEq, oral, PRN    potassium, 40 mEq, oral, PRN    potassium chloride, 20 mEq, intravenous, PRN    potassium chloride in water, 20 mEq, intravenous, PRN **AND** potassium chloride in water, 20 mEq, intravenous, PRN    sodium chloride 0.9 %, , intravenous, Continuous    thiamine, 200 mg, oral, Daily    topiramate, 50 mg, oral, BID    traZODone, 100 mg, oral, Nightly PRN    VITAL SIGNS  Vitals:    08/17/24 0900 08/17/24 1000 08/17/24 1050 08/17/24 1100   BP: (!) 96/56 (!) 86/53 (!) 87/53 (!) 81/51   Pulse: 71 68 73 69   Resp: 16 (!) 11 18 16   Temp: 36.7 °C (98.1 °F)      TempSrc: Oral      SpO2: 98% 97% 97% 97%   Weight:       Height:            VENTILATOR SETTINGS       Oxygen:  Oxygen Therapy: None (Room air)  O2 Delivery Method: CPAP/BILEVEL mask  FiO2 (%) (Set): 40 %        INTAKE/OUTPUT    Intake/Output Summary (Last 24 hours) at 8/17/2024 1217  Last data filed at 8/17/2024 1100  Gross per 24 hour   Intake 4254.79 ml   Output 2025 ml   Net 2229.79 ml       Lines, Drains, Airways, Wounds:  Peripheral IV (Adult) 08/16/24 Anterior;Left;Proximal Forearm (Active)   Number of days: 1       Peripheral IV (Adult) 08/16/24 Right Antecubital (Active)   Number of days: 1       PHYSICAL EXAM  Physical Exam  Vitals and nursing note reviewed.   Constitutional:       General: She is not in acute distress.     Appearance: She is obese.   HENT:      Head: Normocephalic and atraumatic.      Mouth/Throat:      Mouth: Mucous membranes are dry.   Eyes:      General: No scleral icterus.     Pupils: Pupils are equal, round, and reactive to light.   Cardiovascular:      Rate and Rhythm: Normal rate and regular rhythm.      Pulses: Normal pulses.      Heart sounds: Normal heart sounds. No murmur heard.  Pulmonary:      Effort: No respiratory distress.      Breath sounds: Normal breath sounds. No wheezing, rhonchi or rales.   Abdominal:      Palpations: Abdomen is soft.   Musculoskeletal:      Right lower leg: No edema.      Left lower leg: No edema.   Skin:     General: Skin is warm and dry.   Neurological:      Mental Status: She is alert and oriented to person, place, and time.   Psychiatric:         Mood and Affect: Mood is anxious.         Behavior: Behavior is cooperative.         LAB RESULTS  ABG  Results from last 7 days   Lab Units 08/16/24  1037 08/16/24  0431 08/16/24  0017   PH ART  7.30*  --   --    PCO2 ART mm Hg 42  --   --    PO2 ART mm Hg 198*  --   --    HCO3 ART mEQ/L 20.6*  --   --    O2 SAT ART % 97  --   --    BASE EXC ART mEQ/L -5.5  --   --    SOURCE OF OXYGEN  NIV Bipap 0L     CBC  Results from last 7 days   Lab Units 08/17/24  0553 08/16/24  0528  08/15/24  1950   WBC K/uL 4.98 5.56 5.62   RBC M/uL 3.23* 3.50* 3.69*   HEMOGLOBIN g/dL 11.5* 12.3 13.1   HEMATOCRIT % 35.6 38.0 39.5   MCV fL 110.2* 108.6* 107.0*   MCH pg 35.6* 35.1* 35.5*   MCHC g/dL 32.3 32.4 33.2   PLATELETS K/uL 135* 144* 158   RDW % 15.1* 14.8* 14.7*   MPV fL 12.5* 12.3 12.5*     BMP  Results from last 7 days   Lab Units 08/17/24  0553 08/16/24  1502 08/16/24  0528 08/15/24  1950   SODIUM mEQ/L 141 142 142 136   POTASSIUM mEQ/L 3.4* 3.4* 3.3* 2.6*   CHLORIDE mEQ/L 113* 113* 112* 106   CO2 mEQ/L 23 22 23 18*   BUN mg/dL 3* 3* 3* 5*   CREATININE mg/dL 0.8 0.6 0.8 0.9   CALCIUM mg/dL 7.9* 7.9* 7.8* 8.4*   ALBUMIN g/dL  --   --   --  4.0   BILIRUBIN TOTAL mg/dL  --   --   --  0.4   ALK PHOS IU/L  --   --   --  240*   ALT IU/L  --   --   --  38   AST IU/L  --   --   --  61*   GLUCOSE mg/dL 86 88 86 108*     Coag    Micro  Microbiology Results       Procedure Component Value Units Date/Time    C. difficile by PCR Stool [[id number]] Collected: 08/16/24 2038    Specimen: Stool Updated: 08/17/24 0101     C diff PCR Negative     C diff PCR Comment --    MRSA Screen, Nares Only Nose [[id number]]  (Normal) Collected: 08/16/24 1303    Specimen: Nasal Swab from Nose Updated: 08/16/24 1644     MRSA DNA, Nares Negative    Blood Culture Blood, Venous [[id number]]  (Normal) Collected: 08/16/24 0017    Specimen: Blood, Venous Updated: 08/17/24 0400     Culture No growth at 18-24 hours    Blood Culture Blood, Venous [[id number]]  (Normal) Collected: 08/16/24 0017    Specimen: Blood, Venous Updated: 08/17/24 0400     Culture No growth at 18-24 hours    SARS-COV-2 (COVID-19)/ FLU A/B, AND RSV, PCR Nasopharynx [[id number]]  (Normal) Collected: 08/15/24 1950    Specimen: Nasopharyngeal Swab from Nasopharynx Updated: 08/15/24 2039     SARS-CoV-2 (COVID-19) Negative     Influenza A Negative     Influenza B Negative     Respiratory Syncytial Virus Negative    Narrative:      Testing performed using real-time PCR for  detection of COVID-19. EUA approved validation studies performed on site.             IMAGING  CT abd/pelvis 8/16/2024  There is liquid stool throughout the length of colon and possibly in  the rectum, supporting diarrhea. The could be some thickening of the wall of the  ascending colon and cecum concerning for colitis, but this is difficult to  confirm on this exam.  Mary-en-Y bariatric surgery noted. No bowel obstruction.  Findings noted which suggests support periportal edema. Please correlate.  *There is atrophy of the lower pole the left kidney. 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 The patient's urinary tract. Further investigated with CT  reconstructions, as indicated.  Suspected fibroid uterus.  Markedly distended urinary bladder.  *Epigastric region/defect noted, as described. Clinical correlation recommended.    X-RAY CHEST 1 VIEW     Result Date: 8/16/2024  CLINICAL HISTORY:   Chest pain. Arrhythmia. COMMENT: Comparison: None. Single frontal AP view of the chest was obtained. There is minimal bibasilar atelectasis. There is no additional focal airspace consolidation..  There is no evidence of pleural effusion or pneumothorax. Cardiac silhouette appears within normal limits for size..      IMPRESSION: Minimal bibasilar atelectasis..      CT CERVICAL SPINE WITHOUT IV CONTRAST     Result Date: 8/15/2024  CLINICAL HISTORY:  Trauma CT DOSE:  The kV and/or mA were adjusted according to the patient's size and body part for CT dose reduction. COMMENT: 1.25 mm axial sections were obtained from the skull base through the upper thoracic spine with sagittal and coronal reformats. The vertebral bodies are intact.  Mild Multilevel degenerative changes seen resulting in varying levels of neuro foraminal and central canal narrowing.  . No fracture is identified.  No traumatic bony impingement is identified on the spinal canal.  There is no evidence for soft tissue swelling.      IMPRESSION: No fracture     CT HEAD  WITHOUT IV CONTRAST     Result Date: 8/15/2024  CLINICAL HISTORY:  ms change/fall CT DOSE:  One or more dose reduction techniques (e.g. automated exposure control, adjustment of the mA and/or kV according to patient size, use of iterative reconstruction technique) utilized for this examination. COMMENT: Noncontrast CT scan of the brain was performed from the skull base through the higher convexities.  Sagittal and coronal reconstructions were performed. Comparison date: none. The ventricles are normal in size and position for the patient's age.  There is no mass effect or midline shift.  There is no abnormal intra-axial or extra axial blood or fluid collections.  There is no evidence of acute infarction. The visualized paranasal sinuses are clear. The osseous structures are normal.      IMPRESSION: No acute intracranial traumatic injury      ECG/Telemetry  I have independently reviewed the telemetry. No events for the last 24 hours.    ASSESSMENT    44-year-old female with a past medical history of depression, anxiety, PTSD, seizure disorder, who presented to the hospital after a fall at home with inability to get up.  She was found to have multiple laboratory abnormalities including mild respiratory acidosis, metabolic acidosis, and electrolyte derangement     PLAN  # Acute metabolic encephalopathy, in the setting of acidosis, electrolyte abnormality, polypharmacy with multiple sedative medications.  - Possible ingestion, although UDS positive only for cannabinoid  - CT head negative  -Awake and alert this morning  - states takes prescribed medications as directed, declined taking any extra doses.  States that she takes Ativan twice a day every day, and that is administered by her aide.  Could not explain why her UDS was negative  - Continue close monitoring, improved     # Acute nonanion gap metabolic acidosis unclear etiology.  -Standard UDS negative, supplemental UDS negative.  Acetaminophen and salicylate  levels are undetectable   - noted hx chronic abd pain of unclear etiology   - No diarrhea per history, normal renal function  -Ct abd/pelvis noted liquid stool throughout the length of colon and possibly in  the rectum, supporting diarrhea.  - + diarrhea overnight  approx 750 cc per RN.  Suspect diarrhea contributed to acidosis.  It is improved today  - appreciate GI input  - stool culture pending  - Avoid laxatives/antidiarrheals      # Acute hypercapnic respiratory failure, with initial pCO2 of 54  -Concern for possible overdose of zolpidem, although patient reports only taking her usual prescribed amount  -She also admits to taking Ativan 2 times a day, but UDS was negative for benzos  - resolved, on RA  - resumed medications slowly and monitor     # Hypotension  -Possibly related to polypharmacy, although patient denies any ingestion.  She is on multiple medications with the potential to cause hypotension.  Baseline blood pressure 90/50 per patient's report  - UDS negative with exception cannabis  - patient does endorse poor po intake stating she struggles with eating disorder  -Of note, she has had multiple admissions to the ED at [facility], with similar presentation of altered mental status and hypotension.  Typically it is fluid responsive  - s/p 3 L IVF on admit  -CT abd as noted above  - levophed dc'd this am, maintain MAP greater than 65  - noted baseline Bp 90/50 per patient  - monitor off pressor    # Hypokalemia, hypomagnesemia, hypophosphatemia  -Prior gastric bypass  -Replete electrolytes and trend  -Nutrition consult appreciated   - copper/ ceruloplasmin levels pending  - B12 level 239     # Seizure disorder  -Maintained on topiramate 50 mg twice daily  - topamax level pending  -Resume Topamax     # History of anxiety, depression, PTSD  -Sedating meds on hold  -Maintained on fluoxetine, prazosin, quetiapine, trazodone, and lorazepam (although of note UDS was negative for benzos)  -Meds held in  the setting of altered mental status and hypotension  -Psychiatry consult appreciated  - resume prozac, gabapentin, topamax     # Hypothyroidism  -Mildly elevated TSH with low free T4  -Resume levothyroxine  - will need OP followup     DVT prophylaxis: lovenox  GI prophylaxis: PPI  Lines/drains/townsend: PIV x 2  Diet: FEN    Dispo: keep ICU      CODE STATUS  Full Code    The case was reviewed this morning at the patient's beside multidisciplinary rounds with the patient's nurse, dietician, pharmacist, respiratory therapist, physical therapist and critical care nurse coordinator. The patient's clinical status with regards to diagnosis, treatment plans including disposition of any IV, arterial lines, Townsend and tubes were discussed, as well as dietary, respiratory therapy and mobilization needs.     This case was discussed with consultants.    This note was scribed by MIRLANDE Alba in my presence and on my behalf    Total critical time spent managing hypotension requiring vasopressors, encephalopathy on this patient excluding any procedure time totals 35 minutes      Ann-Marie Knox,   8/17/2024

## 2024-08-18 PROBLEM — G93.41 ACUTE METABOLIC ENCEPHALOPATHY: Status: ACTIVE | Noted: 2024-08-18

## 2024-08-18 LAB
ANION GAP SERPL CALC-SCNC: 3 MEQ/L (ref 3–15)
BACTERIA STL CULT: NORMAL
BUN SERPL-MCNC: 2 MG/DL (ref 7–25)
CALCIUM SERPL-MCNC: 7.5 MG/DL (ref 8.6–10.3)
CHLORIDE SERPL-SCNC: 117 MEQ/L (ref 98–107)
CO2 SERPL-SCNC: 21 MEQ/L (ref 21–31)
CREAT SERPL-MCNC: 0.6 MG/DL (ref 0.6–1.2)
EGFRCR SERPLBLD CKD-EPI 2021: >60 ML/MIN/1.73M*2
ERYTHROCYTE [DISTWIDTH] IN BLOOD BY AUTOMATED COUNT: 15.1 % (ref 11.7–14.4)
GLUCOSE BLD-MCNC: 81 MG/DL (ref 70–99)
GLUCOSE BLD-MCNC: 84 MG/DL (ref 70–99)
GLUCOSE BLD-MCNC: 86 MG/DL (ref 70–99)
GLUCOSE BLD-MCNC: 93 MG/DL (ref 70–99)
GLUCOSE SERPL-MCNC: 77 MG/DL (ref 70–99)
HCT VFR BLD AUTO: 31.3 % (ref 35–45)
HGB BLD-MCNC: 10.1 G/DL (ref 11.8–15.7)
MCH RBC QN AUTO: 35.7 PG (ref 28–33.2)
MCHC RBC AUTO-ENTMCNC: 32.3 G/DL (ref 32.2–35.5)
MCV RBC AUTO: 110.6 FL (ref 83–98)
PDW BLD AUTO: 12.8 FL (ref 9.4–12.3)
PLATELET # BLD AUTO: 111 K/UL (ref 150–369)
POCT TEST: NORMAL
POTASSIUM SERPL-SCNC: 3.7 MEQ/L (ref 3.5–5.1)
RBC # BLD AUTO: 2.83 M/UL (ref 3.93–5.22)
SODIUM SERPL-SCNC: 141 MEQ/L (ref 136–145)
WBC # BLD AUTO: 3.31 K/UL (ref 3.8–10.5)

## 2024-08-18 PROCEDURE — 63700000 HC SELF-ADMINISTRABLE DRUG: Performed by: INTERNAL MEDICINE

## 2024-08-18 PROCEDURE — 63700000 HC SELF-ADMINISTRABLE DRUG: Performed by: PHYSICIAN ASSISTANT

## 2024-08-18 PROCEDURE — 20600000 HC ROOM AND CARE INTERMEDIATE/TELEMETRY

## 2024-08-18 PROCEDURE — 80048 BASIC METABOLIC PNL TOTAL CA: CPT | Performed by: STUDENT IN AN ORGANIZED HEALTH CARE EDUCATION/TRAINING PROGRAM

## 2024-08-18 PROCEDURE — 63700000 HC SELF-ADMINISTRABLE DRUG: Performed by: NURSE PRACTITIONER

## 2024-08-18 PROCEDURE — 36415 COLL VENOUS BLD VENIPUNCTURE: CPT | Performed by: STUDENT IN AN ORGANIZED HEALTH CARE EDUCATION/TRAINING PROGRAM

## 2024-08-18 PROCEDURE — 63700000 HC SELF-ADMINISTRABLE DRUG

## 2024-08-18 PROCEDURE — 85027 COMPLETE CBC AUTOMATED: CPT | Performed by: STUDENT IN AN ORGANIZED HEALTH CARE EDUCATION/TRAINING PROGRAM

## 2024-08-18 PROCEDURE — 63700000 HC SELF-ADMINISTRABLE DRUG: Performed by: STUDENT IN AN ORGANIZED HEALTH CARE EDUCATION/TRAINING PROGRAM

## 2024-08-18 PROCEDURE — 63600000 HC DRUGS/DETAIL CODE: Mod: JZ | Performed by: STUDENT IN AN ORGANIZED HEALTH CARE EDUCATION/TRAINING PROGRAM

## 2024-08-18 PROCEDURE — 99233 SBSQ HOSP IP/OBS HIGH 50: CPT | Performed by: INTERNAL MEDICINE

## 2024-08-18 RX ORDER — CYCLOBENZAPRINE HCL 5 MG
5 TABLET ORAL ONCE
Status: COMPLETED | OUTPATIENT
Start: 2024-08-18 | End: 2024-08-18

## 2024-08-18 RX ORDER — MIDODRINE HYDROCHLORIDE 5 MG/1
5 TABLET ORAL
Status: DISCONTINUED | OUTPATIENT
Start: 2024-08-18 | End: 2024-08-20

## 2024-08-18 RX ORDER — CYCLOBENZAPRINE HCL 5 MG
5 TABLET ORAL 3 TIMES DAILY PRN
Status: DISCONTINUED | OUTPATIENT
Start: 2024-08-18 | End: 2024-08-21 | Stop reason: HOSPADM

## 2024-08-18 RX ADMIN — ACETAMINOPHEN 975 MG: 325 TABLET, FILM COATED ORAL at 08:47

## 2024-08-18 RX ADMIN — THERA TABS 1 TABLET: TAB at 08:48

## 2024-08-18 RX ADMIN — LORAZEPAM 0.5 MG: 0.5 TABLET ORAL at 08:48

## 2024-08-18 RX ADMIN — ENOXAPARIN SODIUM 40 MG: 40 INJECTION SUBCUTANEOUS at 17:00

## 2024-08-18 RX ADMIN — NYSTATIN: 100000 POWDER TOPICAL at 08:49

## 2024-08-18 RX ADMIN — Medication 125 MCG: at 08:48

## 2024-08-18 RX ADMIN — ATORVASTATIN CALCIUM 20 MG: 20 TABLET, FILM COATED ORAL at 00:11

## 2024-08-18 RX ADMIN — FLUOXETINE HYDROCHLORIDE 20 MG: 20 CAPSULE ORAL at 08:48

## 2024-08-18 RX ADMIN — ATORVASTATIN CALCIUM 20 MG: 20 TABLET, FILM COATED ORAL at 21:11

## 2024-08-18 RX ADMIN — GABAPENTIN 400 MG: 400 CAPSULE ORAL at 21:11

## 2024-08-18 RX ADMIN — TRAZODONE HYDROCHLORIDE 100 MG: 100 TABLET ORAL at 21:11

## 2024-08-18 RX ADMIN — MIDODRINE HYDROCHLORIDE 5 MG: 5 TABLET ORAL at 16:50

## 2024-08-18 RX ADMIN — SODIUM CHLORIDE, POTASSIUM CHLORIDE, SODIUM LACTATE AND CALCIUM CHLORIDE 1000 ML: 600; 310; 30; 20 INJECTION, SOLUTION INTRAVENOUS at 23:17

## 2024-08-18 RX ADMIN — LORAZEPAM 0.5 MG: 0.5 TABLET ORAL at 21:11

## 2024-08-18 RX ADMIN — GABAPENTIN 400 MG: 400 CAPSULE ORAL at 08:47

## 2024-08-18 RX ADMIN — Medication 125 MCG: at 21:11

## 2024-08-18 RX ADMIN — TOPIRAMATE 50 MG: 25 TABLET, FILM COATED ORAL at 21:10

## 2024-08-18 RX ADMIN — THERA TABS 1 TABLET: TAB at 21:11

## 2024-08-18 RX ADMIN — LEVOTHYROXINE SODIUM 50 MCG: 0.05 TABLET ORAL at 05:39

## 2024-08-18 RX ADMIN — NYSTATIN: 100000 POWDER TOPICAL at 21:14

## 2024-08-18 RX ADMIN — TOPIRAMATE 50 MG: 25 TABLET, FILM COATED ORAL at 08:48

## 2024-08-18 RX ADMIN — CYCLOBENZAPRINE HYDROCHLORIDE 5 MG: 5 TABLET, FILM COATED ORAL at 14:25

## 2024-08-18 RX ADMIN — PANTOPRAZOLE SODIUM 20 MG: 20 TABLET, DELAYED RELEASE ORAL at 08:48

## 2024-08-18 RX ADMIN — ACETAMINOPHEN 975 MG: 325 TABLET, FILM COATED ORAL at 16:55

## 2024-08-18 RX ADMIN — FERROUS SULFATE TAB 325 MG (65 MG ELEMENTAL FE) 325 MG: 325 (65 FE) TAB at 17:05

## 2024-08-18 RX ADMIN — POTASSIUM CHLORIDE 20 MEQ: 1500 TABLET, EXTENDED RELEASE ORAL at 05:43

## 2024-08-18 RX ADMIN — CYCLOBENZAPRINE HYDROCHLORIDE 5 MG: 5 TABLET, FILM COATED ORAL at 21:11

## 2024-08-18 RX ADMIN — GABAPENTIN 400 MG: 400 CAPSULE ORAL at 13:58

## 2024-08-18 RX ADMIN — Medication 200 MG: at 08:48

## 2024-08-18 RX ADMIN — FERROUS SULFATE TAB 325 MG (65 MG ELEMENTAL FE) 325 MG: 325 (65 FE) TAB at 08:48

## 2024-08-18 ASSESSMENT — COGNITIVE AND FUNCTIONAL STATUS - GENERAL
STANDING UP FROM CHAIR USING ARMS: 2 - A LOT
CLIMB 3 TO 5 STEPS WITH RAILING: 1 - TOTAL
WALKING IN HOSPITAL ROOM: 1 - TOTAL
MOVING TO AND FROM BED TO CHAIR: 2 - A LOT

## 2024-08-18 NOTE — ASSESSMENT & PLAN NOTE
Multifactorial, polypharmacy psych medications, acidosis, possible ingestion with neg UDS, only positive for cannabinoid  Awake and alert in the morning  Continue close monitoring  Fluids given    8/19  More awake and good conversation.  Willing to eat/drink more.   PT/OT evaluation, patient is long term wheel chair bounded   MS back to baseline since no sedatives here(except small dose of Ativan)

## 2024-08-18 NOTE — PROGRESS NOTES
Hospital Medicine     Daily Progress Note       SUBJECTIVE   Interval History: Patient transfer from ICU, admitted with acidemia, possible due to prolonged loose stools. Seen by GI, no current reason for loose stools. C diff negative, stool culture negative. Polypharmacy might be contributing. She is on psych medication. GI would follow if patient has persistent symptoms. She wants flexiral for abdominal pain, she takes at home.       OBJECTIVE      Vital signs in last 24 hours:  Temp:  [36.7 °C (98 °F)-37.1 °C (98.7 °F)] 36.9 °C (98.4 °F)  Heart Rate:  [58-82] 73  Resp:  [12-28] 16  BP: (73-94)/(42-59) 94/59    Intake/Output Summary (Last 24 hours) at 8/18/2024 1249  Last data filed at 8/17/2024 2011  Gross per 24 hour   Intake 2070 ml   Output 1075 ml   Net 995 ml       PHYSICAL EXAMINATION      Physical Exam  Vitals and nursing note reviewed.   Constitutional:       Appearance: She is obese.   HENT:      Head: Normocephalic and atraumatic.      Nose: No congestion or rhinorrhea.   Cardiovascular:      Rate and Rhythm: Normal rate and regular rhythm.      Heart sounds: No murmur heard.     No friction rub. No gallop.   Pulmonary:      Effort: Pulmonary effort is normal. No respiratory distress.      Breath sounds: Normal breath sounds. No stridor. No wheezing or rhonchi.   Abdominal:      General: There is no distension.      Palpations: There is no mass.      Tenderness: There is no abdominal tenderness.      Hernia: No hernia is present.   Musculoskeletal:         General: No swelling, tenderness, deformity or signs of injury. Normal range of motion.   Skin:     Coloration: Skin is not jaundiced or pale.      Findings: No bruising or erythema.   Neurological:      General: No focal deficit present.      Mental Status: She is alert and oriented to person, place, and time.      Cranial Nerves: No cranial nerve deficit.      Sensory: No sensory deficit.      Motor: No weakness.      Coordination: Coordination  normal.   Psychiatric:         Mood and Affect: Mood normal.         Behavior: Behavior normal.         Thought Content: Thought content normal.         Judgment: Judgment normal.            LINES, CATHETERS, DRAINS, AIRWAYS, AND WOUNDS   Lines, Drains, and Airways:  Wounds (agree with documentation and present on admission):  Peripheral IV (Adult) 08/16/24 Right Antecubital (Active)   Number of days: 2       Peripheral IV (Adult) 08/17/24 Anterior;Right Forearm (Active)   Number of days: 1         Comments:    LABS / IMAGING / TELE      Labs     LABS   CMP Results         08/18/24 08/17/24 08/16/24     0310 0553 1502     141 142    K 3.7 3.4 3.4    Cl 117 113 113    CO2 21 23 22    Glucose 77 86 88    BUN 2 3 3    Creatinine 0.6 0.8 0.6    Calcium 7.5 7.9 7.9    Anion Gap 3 5 7    EGFR >60.0 >60.0 >60.0           Comment for EGFR at 0310 on 08/18/24: Calculation based on the Chronic Kidney Disease Epidemiology Collaboration (CKD-EPI) equation refit without adjustment for race.    Comment for EGFR at 0553 on 08/17/24: Calculation based on the Chronic Kidney Disease Epidemiology Collaboration (CKD-EPI) equation refit without adjustment for race.    Comment for EGFR at 1502 on 08/16/24: Calculation based on the Chronic Kidney Disease Epidemiology Collaboration (CKD-EPI) equation refit without adjustment for race.          CBC Results         08/18/24 08/17/24 08/16/24     0310 0553 0528    WBC 3.31 4.98 5.56    RBC 2.83 3.23 3.50    HGB 10.1 11.5 12.3    HCT 31.3 35.6 38.0    .6 110.2 108.6    MCH 35.7 35.6 35.1    MCHC 32.3 32.3 32.4     135 144          Troponin I Results         08/15/24 08/15/24     2214 1950    HS Troponin I 2.7 3.4          PT/PTT Results    No lab values to display.       Lab Results   Component Value Date    TSH 6.67 (H) 08/15/2024         Imaging  CT ABDOMEN PELVIS WITHOUT IV CONTRAST    Result Date: 8/16/2024  IMPRESSION: There is liquid stool throughout the length of  colon and possibly in the rectum, supporting diarrhea. The could be some thickening of the wall of the ascending colon and cecum concerning for colitis, but this is difficult to confirm on this exam. Mary-en-Y bariatric surgery noted. No bowel obstruction. Findings noted which suggests support periportal edema. Please correlate. *There is atrophy of the lower pole the left kidney. 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 The patient's urinary tract. Further investigated with CT reconstructions, as indicated. Suspected fibroid uterus. Markedly distended urinary bladder. *Epigastric region/defect noted, as described. Clinical correlation recommended. Comparison study:  None available. COMMENT:  Axial CT images of the abdomen and pelvis are completed without oral or intravenous contrast. Images are reconstructed in sagittal and coronal planes. CT DOSE:  One or more dose reduction techniques (e.g. automated exposure control, adjustment of the mA and/or kV according to patient size, use of iterative reconstruction technique) utilized for this examination. There is curvilinear volume loss in both lower lobes, more extensive on the left. No pleural fluid or consolidation. Unremarkable imaged heart and pericardium. Bowel/mesenteries: Mary-en-Y bariatric surgery. No abnormal bowel distention. There appears to the distal throughout the length of colon, and possibly in the rectum, supporting diarrhea but there is no discernible mural thickening distally. The could be minor thickening of the wall of the right colon on this is difficult to confirm. There is mild induration in the spine wall down mesenteric root as well as an increased number of small lymph nodes, nonspecific. Consider mesenteritis. Short appendix noted and unremarkable. Normal hepatic attenuation. No discernible intrahepatic mass on noncontrast imaging. Periportal edema is suspected. Gallbladder surgically absent normal caliber extrahepatic duct. Fatty atrophy  of the pancreas appears slightly advanced for age. The spleen is normal in diameter, unremarkable. Unremarkable adrenal glands. KUB: Unremarkable right kidney. 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 There is atrophy and lower pole of the left kidney, possibly the result of chronic reflux. Markedly distended urinary bladder. No mural thickening or mass. No discernible urinary tract calculi. There is a 5 cm soft tissue attenuation mass arising from the right uterine body, suspected fibroid. No additional adnexal mass. Unremarkable aorta, IVC and their major branches, as demonstrated on this noncontrast study. There are no pathologically enlarged nodes in the abdomen and pelvis. *There is tiny defect in the midline epigastric region and through which a curvilinear soft tissue attenuation structure passes. This could represent scarring from prior surgery. Herniation of a bowel loop is considered less likely given the configuration. Clinical evaluation recommended. There are early spondylitic changes in the lower dorsal spine. The osseous structures are otherwise unremarkable.    X-RAY CHEST 1 VIEW    Result Date: 8/16/2024  IMPRESSION: Minimal bibasilar atelectasis..     CT CERVICAL SPINE WITHOUT IV CONTRAST    Result Date: 8/15/2024  IMPRESSION: No fracture    CT HEAD WITHOUT IV CONTRAST    Result Date: 8/15/2024  IMPRESSION: No acute intracranial traumatic injury     Lab Results   Component Value Date    VENTRICRATE 62 08/16/2024    VENTRICRATE 88 08/15/2024    ATRIALRATE 62 08/16/2024    ATRIALRATE 88 08/15/2024    PRINT 184 08/16/2024    PRINT 170 08/15/2024    QRSDURATION 78 08/16/2024    QRSDURATION 84 08/15/2024    QTINT 380 08/16/2024    QTINT 320 08/15/2024    QTCCALCULAT 385 08/16/2024    QTCCALCULAT 387 08/15/2024    PAXIS -12 08/16/2024    PAXIS 57 08/15/2024    RAXIS -24 08/16/2024    RAXIS -19 08/15/2024    TWAVEAXIS -31 08/16/2024    TWAVEAXIS 26 08/15/2024         ASSESSMENT AND PLAN      Acute metabolic encephalopathy  Assessment & Plan  Multifactorial, polypharmacy psych medications, acidosis, possible ingestion with neg UDS, only positive for cannabinoid  Awake and alert in the morning  Continue close monitoring  Fluids given    Metabolic acidosis, normal anion gap (NAG)  Assessment & Plan  Acute condition, chronicity unknown.  Urine osmolality and electrolytes to assess for potential type I RTA.  Based on chart review, it appears that the patient has a history of gastric bypass procedures in both 2007 and 2017.  The patient is unable to convey any information, however, she has not had any diarrhea while in the hospital.  Administer 1 amp of sodium bicarb drip.  Continue cautious fluid administration.  Resolved     Hypercapnia  Assessment & Plan  Acute condition.  Likely secondary to sedation in the setting of polypharmacy.  Continue to monitor VBG.  Continue BiPAP to help with CO2 clearance.  Resolved     Insomnia  Assessment & Plan  Chronic condition.  Hold home regimen of zolpidem in the setting of sedation and hypotension.    Hypokalemia  Assessment & Plan  Acute condition, unclear cause.  BMP daily to monitor renal and electrolyte status.  Replete as needed.    Seizures (CMS/[facility])  Assessment & Plan  Chronic condition, exact details unknown.  Seizure precautions in place.  At home, the patient takes a regimen of topiramate 50 mg oral twice daily.  Resume once able to safely swallow; consider additional agents if the patient is unable to swallow for a prolonged period of time.    PTSD (post-traumatic stress disorder)  Assessment & Plan  Chronic condition.  The patient takes a regimen of fluoxetine 20 mg oral daily, lorazepam 1 mg twice daily as needed, prazosin 5 mg oral daily, quetiapine 75 mg oral daily and 100 mg nightly, trazodone and 100 to 200 mg daily.  Hold potential sedating medications in the setting of altered mental status and hypotension.  Psychiatry  consulted.    Hypothyroidism  Assessment & Plan  Chronic condition. Mildly elevated TSH  Levothyroxine 50 mcg oral daily when able to tolerate oral intake.  Outpatient check up    Eating disorder  Assessment & Plan  Chronic condition.  Resume home regimen of acarbose when no longer NPO.  Follow up with outpatient psychology/psychiatry.    Anxiety  Assessment & Plan  Chronic condition.  The patient takes a regimen of fluoxetine 20 mg oral daily, lorazepam 1 mg twice daily as needed, prazosin 5 mg oral daily, quetiapine 75 mg oral daily and 100 mg nightly, trazodone and 100 to 200 mg daily.  Hold potential sedating medications in the setting of altered mental status and hypotension.  Psychiatry consulted.    Depression  Assessment & Plan  Chronic condition.  The patient takes a regimen of fluoxetine 20 mg oral daily, lorazepam 1 mg twice daily as needed, prazosin 5 mg oral daily, quetiapine 75 mg oral daily and 100 mg nightly, trazodone and 100 to 200 mg daily.  Hold potential sedating medications in the setting of altered mental status and hypotension.  Psychiatry consulted.    * Hypotension  Assessment & Plan  Acute condition, appears to be secondary to polypharmacy, denied any ingestion. Baseline blood pressures are 90/50 per patient  Hold home medication regimen.  UDS negative  In ICU: Titrate norepinephrine infusion to maintain mean arterial pressure greater than 65 mmHg, resolved   Received IV fluid hydration.   BMP and CBC daily.   C diff, blood cultures negative to date         VTE Assessment: [place] VTE Score: 4  VTE Prophylaxis:  Current anticoagulants:  enoxaparin (LOVENOX) syringe 40 mg, subcutaneous, Daily (6p)      Code Status: Full Code      Estimated Discharge Date: 8/19/2024   Disposition Planning:  discharge to home     Dalton Cerrato MD  8/18/2024

## 2024-08-18 NOTE — PLAN OF CARE
Plan of Care Review  Plan of Care Reviewed With: patient  Progress: no change  Outcome Evaluation: Pt arrived to 4A from PCU/ICU overflow. Cardiac monitor placed and verified. BP 83/52, first dose midodrine admin, pt educated, will need reinforcement. Dual RN skin check performed. Bed alarm on and call bell within reach.

## 2024-08-19 LAB
ANION GAP SERPL CALC-SCNC: 7 MEQ/L (ref 3–15)
BUN SERPL-MCNC: 2 MG/DL (ref 7–25)
CALCIUM SERPL-MCNC: 8 MG/DL (ref 8.6–10.3)
CERULOPLASMIN SERPL-MCNC: 36.9 MG/DL (ref 20–60)
CHLORIDE SERPL-SCNC: 118 MEQ/L (ref 98–107)
CO2 SERPL-SCNC: 17 MEQ/L (ref 21–31)
CREAT SERPL-MCNC: 0.7 MG/DL (ref 0.6–1.2)
EGFRCR SERPLBLD CKD-EPI 2021: >60 ML/MIN/1.73M*2
ERYTHROCYTE [DISTWIDTH] IN BLOOD BY AUTOMATED COUNT: 15.6 % (ref 11.7–14.4)
GLUCOSE BLD-MCNC: 80 MG/DL (ref 70–99)
GLUCOSE BLD-MCNC: 90 MG/DL (ref 70–99)
GLUCOSE BLD-MCNC: 91 MG/DL (ref 70–99)
GLUCOSE BLD-MCNC: 92 MG/DL (ref 70–99)
GLUCOSE SERPL-MCNC: 77 MG/DL (ref 70–99)
HCT VFR BLD AUTO: 33.1 % (ref 35–45)
HGB BLD-MCNC: 10.8 G/DL (ref 11.8–15.7)
MCH RBC QN AUTO: 35.2 PG (ref 28–33.2)
MCHC RBC AUTO-ENTMCNC: 32.6 G/DL (ref 32.2–35.5)
MCV RBC AUTO: 107.8 FL (ref 83–98)
PDW BLD AUTO: 12.1 FL (ref 9.4–12.3)
PLATELET # BLD AUTO: 122 K/UL (ref 150–369)
POCT TEST: NORMAL
POTASSIUM SERPL-SCNC: 5.5 MEQ/L (ref 3.5–5.1)
RBC # BLD AUTO: 3.07 M/UL (ref 3.93–5.22)
SODIUM SERPL-SCNC: 142 MEQ/L (ref 136–145)
WBC # BLD AUTO: 4.32 K/UL (ref 3.8–10.5)

## 2024-08-19 PROCEDURE — 63700000 HC SELF-ADMINISTRABLE DRUG: Performed by: NURSE PRACTITIONER

## 2024-08-19 PROCEDURE — 36415 COLL VENOUS BLD VENIPUNCTURE: CPT | Performed by: STUDENT IN AN ORGANIZED HEALTH CARE EDUCATION/TRAINING PROGRAM

## 2024-08-19 PROCEDURE — 63700000 HC SELF-ADMINISTRABLE DRUG: Performed by: PHYSICIAN ASSISTANT

## 2024-08-19 PROCEDURE — 63700000 HC SELF-ADMINISTRABLE DRUG: Performed by: INTERNAL MEDICINE

## 2024-08-19 PROCEDURE — 63700000 HC SELF-ADMINISTRABLE DRUG

## 2024-08-19 PROCEDURE — 80048 BASIC METABOLIC PNL TOTAL CA: CPT | Performed by: STUDENT IN AN ORGANIZED HEALTH CARE EDUCATION/TRAINING PROGRAM

## 2024-08-19 PROCEDURE — 97162 PT EVAL MOD COMPLEX 30 MIN: CPT | Mod: GP

## 2024-08-19 PROCEDURE — 63600000 HC DRUGS/DETAIL CODE: Mod: JZ

## 2024-08-19 PROCEDURE — 97166 OT EVAL MOD COMPLEX 45 MIN: CPT | Mod: GO

## 2024-08-19 PROCEDURE — 97116 GAIT TRAINING THERAPY: CPT | Mod: GP

## 2024-08-19 PROCEDURE — 97535 SELF CARE MNGMENT TRAINING: CPT | Mod: GO

## 2024-08-19 PROCEDURE — 20600000 HC ROOM AND CARE INTERMEDIATE/TELEMETRY

## 2024-08-19 PROCEDURE — 63600000 HC DRUGS/DETAIL CODE: Mod: JZ | Performed by: STUDENT IN AN ORGANIZED HEALTH CARE EDUCATION/TRAINING PROGRAM

## 2024-08-19 PROCEDURE — 99233 SBSQ HOSP IP/OBS HIGH 50: CPT | Performed by: HOSPITALIST

## 2024-08-19 PROCEDURE — 85027 COMPLETE CBC AUTOMATED: CPT | Performed by: STUDENT IN AN ORGANIZED HEALTH CARE EDUCATION/TRAINING PROGRAM

## 2024-08-19 PROCEDURE — 63700000 HC SELF-ADMINISTRABLE DRUG: Performed by: STUDENT IN AN ORGANIZED HEALTH CARE EDUCATION/TRAINING PROGRAM

## 2024-08-19 RX ORDER — THIAMINE HCL 100 MG
100 TABLET ORAL DAILY
Status: COMPLETED | OUTPATIENT
Start: 2024-08-20 | End: 2024-08-20

## 2024-08-19 RX ADMIN — NYSTATIN: 100000 POWDER TOPICAL at 21:41

## 2024-08-19 RX ADMIN — GABAPENTIN 400 MG: 400 CAPSULE ORAL at 08:38

## 2024-08-19 RX ADMIN — PANTOPRAZOLE SODIUM 20 MG: 20 TABLET, DELAYED RELEASE ORAL at 08:38

## 2024-08-19 RX ADMIN — Medication 200 MG: at 08:38

## 2024-08-19 RX ADMIN — THERA TABS 1 TABLET: TAB at 08:38

## 2024-08-19 RX ADMIN — GABAPENTIN 400 MG: 400 CAPSULE ORAL at 21:38

## 2024-08-19 RX ADMIN — CYCLOBENZAPRINE HYDROCHLORIDE 5 MG: 5 TABLET, FILM COATED ORAL at 08:38

## 2024-08-19 RX ADMIN — FLUOXETINE HYDROCHLORIDE 20 MG: 20 CAPSULE ORAL at 08:38

## 2024-08-19 RX ADMIN — Medication 125 MCG: at 08:38

## 2024-08-19 RX ADMIN — LORAZEPAM 0.5 MG: 0.5 TABLET ORAL at 08:38

## 2024-08-19 RX ADMIN — FERROUS SULFATE TAB 325 MG (65 MG ELEMENTAL FE) 325 MG: 325 (65 FE) TAB at 08:38

## 2024-08-19 RX ADMIN — ENOXAPARIN SODIUM 40 MG: 40 INJECTION SUBCUTANEOUS at 17:32

## 2024-08-19 RX ADMIN — THERA TABS 1 TABLET: TAB at 21:39

## 2024-08-19 RX ADMIN — ACETAMINOPHEN 975 MG: 325 TABLET, FILM COATED ORAL at 05:40

## 2024-08-19 RX ADMIN — ATORVASTATIN CALCIUM 20 MG: 20 TABLET, FILM COATED ORAL at 21:38

## 2024-08-19 RX ADMIN — MIDODRINE HYDROCHLORIDE 5 MG: 5 TABLET ORAL at 11:47

## 2024-08-19 RX ADMIN — MIDODRINE HYDROCHLORIDE 5 MG: 5 TABLET ORAL at 05:37

## 2024-08-19 RX ADMIN — TOPIRAMATE 50 MG: 25 TABLET, FILM COATED ORAL at 21:38

## 2024-08-19 RX ADMIN — TOPIRAMATE 50 MG: 25 TABLET, FILM COATED ORAL at 08:38

## 2024-08-19 RX ADMIN — GABAPENTIN 400 MG: 400 CAPSULE ORAL at 14:39

## 2024-08-19 RX ADMIN — Medication 125 MCG: at 21:39

## 2024-08-19 RX ADMIN — FERROUS SULFATE TAB 325 MG (65 MG ELEMENTAL FE) 325 MG: 325 (65 FE) TAB at 16:19

## 2024-08-19 RX ADMIN — SODIUM CHLORIDE, POTASSIUM CHLORIDE, SODIUM LACTATE AND CALCIUM CHLORIDE 1000 ML: 600; 310; 30; 20 INJECTION, SOLUTION INTRAVENOUS at 21:46

## 2024-08-19 RX ADMIN — MIDODRINE HYDROCHLORIDE 5 MG: 5 TABLET ORAL at 16:20

## 2024-08-19 RX ADMIN — SODIUM CHLORIDE, POTASSIUM CHLORIDE, SODIUM LACTATE AND CALCIUM CHLORIDE 1000 ML: 600; 310; 30; 20 INJECTION, SOLUTION INTRAVENOUS at 01:43

## 2024-08-19 RX ADMIN — NYSTATIN: 100000 POWDER TOPICAL at 08:41

## 2024-08-19 RX ADMIN — LEVOTHYROXINE SODIUM 50 MCG: 0.05 TABLET ORAL at 05:36

## 2024-08-19 ASSESSMENT — COGNITIVE AND FUNCTIONAL STATUS - GENERAL
STANDING UP FROM CHAIR USING ARMS: 2 - A LOT
CLIMB 3 TO 5 STEPS WITH RAILING: 1 - TOTAL
TOILETING: 4 - NONE
MOVING TO AND FROM BED TO CHAIR: 2 - A LOT
AFFECT: WFL;FLAT/BLUNTED AFFECT
CLIMB 3 TO 5 STEPS WITH RAILING: 3 - A LITTLE
MOVING TO AND FROM BED TO CHAIR: 3 - A LITTLE
HELP NEEDED FOR PERSONAL GROOMING: 4 - NONE
STANDING UP FROM CHAIR USING ARMS: 3 - A LITTLE
CLIMB 3 TO 5 STEPS WITH RAILING: 3 - A LITTLE
MOVING TO AND FROM BED TO CHAIR: 4 - NONE
DRESSING REGULAR LOWER BODY CLOTHING: 3 - A LITTLE
WALKING IN HOSPITAL ROOM: 1 - TOTAL
STANDING UP FROM CHAIR USING ARMS: 4 - NONE
EATING MEALS: 4 - NONE
WALKING IN HOSPITAL ROOM: 4 - NONE
AFFECT: WFL;FLAT/BLUNTED AFFECT
HELP NEEDED FOR BATHING: 3 - A LITTLE
WALKING IN HOSPITAL ROOM: 3 - A LITTLE
DRESSING REGULAR UPPER BODY CLOTHING: 4 - NONE

## 2024-08-19 NOTE — PROGRESS NOTES
Referral received. Chart reviewed. Spoke to patient who confirmed she would like [facility] to provide services once she is discharged. Will continue to follow for discharge needs.                             V

## 2024-08-19 NOTE — PLAN OF CARE
Problem: Adult Inpatient Plan of Care  Goal: Plan of Care Review  Outcome: Progressing  Flowsheets (Taken 8/19/2024 5184)  Progress: no change  Outcome Evaluation: PT evaluation complete.  Plan of Care Reviewed With: patient     Problem: Mobility Impairment  Goal: Optimal Mobility  Outcome: Progressing

## 2024-08-19 NOTE — PROGRESS NOTES
"   Occupational Therapy -  Initial Evaluation     Patient: Felipa Cuadra  Location: [address]  MRN: [id number]  Today's date: 8/19/2024    HISTORY OF PRESENT ILLNESS     Felipa is a 44 y.o. female admitted on 8/15/2024 with Hypokalemia [E87.6]  Hypercapnia [R06.89]  Hypotension [I95.9]  Hypotension due to drugs [I95.2]  Fall, initial encounter [W19.XXXA]  Altered mental status, unspecified altered mental status type [R41.82]. Principal problem is Hypotension.    Past Medical History  Felipa has a past medical history of Anxiety, Depression, and Eating disorder.    History of Present Illness   Presents to the hospital for the evaluation of a fall.  On the evening of 8/15/2024, the patient reportedly called 911 after falling at home and being down for approximately 3 hours.       In the ED, it was noted that the patient was very somnolent but was arousable to voice.  Patient also reported not experiencing any nausea, vomiting, or diarrhea.  Unfortunately, the patient's mental status continued to decline while in the emergency department and she quired the initiation of norepinephrine infusion due to worsening hypotension despite fluid resuscitation.  PRIOR LEVEL OF FUNCTION AND LIVING ENVIRONMENT     Prior Level of Function      Flowsheet Row Most Recent Value   Dominant Hand right   Ambulation assistive equipment   Transferring assistive equipment   Toileting independent   Bathing assistive person   Dressing assistive person   Eating independent   IADLs assistive person   Driving/Transportation public transportation   Communication understands/communicates without difficulty   Prior Level of Function Comment HHA daily except for sundays from 9am-3pm, assists w/ dressing, bathing and all home management. rollator for all ambulation. reports \"at least 12 falls in the 3 years since moving into my apartment\", stating usually due to moving too quickly or trying to do too much at once.   Assistive Device " Currently Used at Home grab bar, raised toilet, ramps, walker, 4-wheeled, shower chair             Prior Living Environment      Flowsheet Row Most Recent Value   People in Home alone   Current Living Arrangements apartment   Living Environment Comment [employer] disability housing, apartment w/ ramp to enter building, elevator access inside, bathtub w/ multiple grab-bars and tub bench          Occupational Profile      Flowsheet Row Most Recent Value   Performance Patterns enjoys reading fan fiction   Environmental Supports and Barriers aide 9-3 daily except sundays          VITALS AND PAIN     OT Vitals      Date/Time Pulse HR Source SpO2 Pt Activity O2 Therapy BP BP Location BP Method Pt Position [facility]   08/19/24 1405 64 Monitor 98 % At rest None (Room air) 97/54 Right upper arm Automatic Lying BS   08/19/24 1407 -- -- -- -- -- 103/58 Right upper arm Automatic Sitting BS   08/19/24 1410 -- -- -- -- -- 102/59 Right upper arm Automatic Standing BS   08/19/24 1420 64 Monitor 96 % Walking None (Room air) 96/55 Right upper arm Automatic Standing BS          OT Pain      Date/Time Pain Type Rating: Rest Rating: Activity [facility]   08/19/24 1405 Pain Assessment 0 0 BS             Objective   OBJECTIVE     Start time:  1404  End time:  1427  Session Length: 23 min  Mode of Treatment: co-treatment  Reason for co-treatment: d/c planning; OT assessing ADLs    General Observations  Patient received upright, in bed. She was no issues or concerns identified by nurse prior to session, agreeable to therapy.      Precautions: fall              OT Eval and Treat - 08/19/24 1404          Cognition    Orientation Status oriented x 4     Affect/Mental Status WFL;flat/blunted affect     Follows Commands WFL;follows multi-step commands     Cognitive Function WFL     Comment, Cognition Pleasant and cooperative - motivated, receptive to OT education        Vision Assessment/Intervention    Vision Assessment WFL;corrective lenses full-time         Hearing Assessment    Hearing Status WFL        Sensory Assessment    Sensory Assessment sensation intact, upper extremities        Upper Extremity Assessment    UE Assessment ROM and Strength WFL        Bed Mobility    Bed Mobility Activities left;supine to sit;sit to supine     [place] modified independence     Safety/Cues increased time to complete     Assistive Device head of bed elevated;bed rails        Mobility Belt    Mobility Belt Used During Session no - patient refused        Sit/Stand Transfer    Surface edge of bed     [place] modified independence     Safety/Cues increased time to complete     Assistive Device walker, front-wheeled        Toilet Transfer    Transfer Technique sit/stand     [place] supervision     Assistive Device grab bars/safety frame;walker, front-wheeled     Comment amb approach with RW        Functional Mobility    Distance in room/bathroom     Functional Mobility [place] supervision     Assistive Device walker, front-wheeled     Functional Mobility Comments no LOB - denies dizziness - VSS        Lower Body Dressing    Tasks socks     [place] supervision     Position edge of bed sitting     Adaptive Equipment none     Comment figure 4 to adjust socks        Grooming    Tasks washes, rinses and dries hands     [place] supervision     Position sink side;supported standing     Adaptive Equipment none        Toileting    Tasks adjust/manage clothing;perform bladder hygiene     [place] supervision     Position supported standing;unsupported sitting     Adaptive Equipment grab bar/safety frame        Balance    Static Sitting Balance WFL;sitting, edge of bed     Dynamic Sitting Balance WFL;sitting, edge of bed     Sit to Stand Dynamic Balance WFL;supported     Static Standing Balance WFL;supported     Dynamic Standing Balance WFL;supported     Balance Interventions occupation based/functional task     Comment, Balance w/RW - BP stable throughout         Impairments/Safety Issues    Impairments Affecting Function endurance/activity tolerance     Functional Endurance fair; BP stable throughout                                    Education Documentation  Self-Care, taught by Alisa Chen OT at 8/19/2024  2:43 PM.  Learner: Patient  Readiness: Acceptance  Method: Explanation  Response: Verbalizes Understanding, Demonstrated Understanding  Comment: role of OT, ADLs, mobility        Session Outcome  Patient supine, in bed at end of session, call light in reach, bed alarm on, personal items in reach, all needs met. Nursing notified about patient's response to therapy/activity, patient's performance, and patient's position.    AM-PAC™ - ADL (Current Function)     Putting on/taking off regular lower body clothing 3 - A Little   Bathing 3 - A Little   Toileting 4 - None   Putting on/taking off regular upper body clothing 4 - None   Help for taking care of personal grooming 4 - None   Eating meals 4 - None   AM-PAC™ ADL Score 22      ASSESSMENT AND PLAN     Progress Summary   OT eval complete. ADL AMPAC 22. Pt performs ModIND/S with ADL tasks and functional mobility utilizing RW. Pt functioning near baseline, post education being provided for safety. Recommend home with assist and HHOT at d/c. No further acute needs at this time - VSS throughout.     Patient/Family Therapy Goal Statement: go home    OT Plan      Flowsheet Row Most Recent Value   Therapy Frequency evaluation only at 08/19/2024 1404            OT Discharge Recommendations      Flowsheet Row Most Recent Value   OT Recommended Discharge Disposition home with assistance, home with home health at 08/19/2024 1404   Anticipated Equipment Needs if Discharged Home (OT) none at 08/19/2024 1404

## 2024-08-19 NOTE — PLAN OF CARE
Problem: Adult Inpatient Plan of Care  Goal: Plan of Care Review  Outcome: Progressing  Flowsheets (Taken 8/19/2024 4585)  Progress: improving  Outcome Evaluation: OT eval complete - no further acute needs, rec home w assist and HHOT  Plan of Care Reviewed With: patient

## 2024-08-19 NOTE — HOSPITAL COURSE
Felipa is a 44 y.o. female admitted on 8/15/2024 with Hypokalemia [E87.6]  Hypercapnia [R06.89]  Hypotension [I95.9]  Hypotension due to drugs [I95.2]  Fall, initial encounter [W19.XXXA]  Altered mental status, unspecified altered mental status type [R41.82]. Principal problem is Hypotension.    Past Medical History  Felipa has a past medical history of Anxiety, Depression, and Eating disorder.    History of Present Illness   Presents to the hospital for the evaluation of a fall.  On the evening of 8/15/2024, the patient reportedly called 911 after falling at home and being down for approximately 3 hours.       In the ED, it was noted that the patient was very somnolent but was arousable to voice.  Patient also reported not experiencing any nausea, vomiting, or diarrhea.  Unfortunately, the patient's mental status continued to decline while in the emergency department and she quired the initiation of norepinephrine infusion due to worsening hypotension despite fluid resuscitation.

## 2024-08-19 NOTE — PLAN OF CARE
Plan of Care Review  Plan of Care Reviewed With: patient  Progress: no change  Outcome Evaluation: Pt A/O, able to make needs known. Lethargic after PM meds, arousable to voice. Hypotensive throughout shift, given 2 1 L LR boluses with minimal effect, HMS aware/following pt's BPs. Voids on bed pan. C/o period cramping. Bed alarm on and call bell within reach.

## 2024-08-19 NOTE — PROGRESS NOTES
Calorie count consult rec'd for pt, nutrition services already following. 3 day calorie count hung to run 8/19-21, full reassessment due 8/21.     Rev'd with RN, pt had 10-25% breakfsat this am, 50% turkey sandwich and coke for lunch.     Will revisit to assess intake on 8/21. Consult prn    MONICO Rodriguez

## 2024-08-19 NOTE — PLAN OF CARE
Care Coordination Admission Assessment Note    General Information:  Readmission Within the last 30 days: no previous admission in last 30 days  Does patient have a : No  Patient-Specific Goals (include timeframe):      Living Arrangements:  Arrived From: home  Current Living Arrangements: apartment, other (see comments) (Pt lives in [employer] housing)  People in Home: alone  Home Accessibility: elevator accessible  Living Arrangement Comments:      Housing Stability and Utility Access (SDOH):  In the last 12 months, was there a time when you were not able to pay the mortgage or rent on time?: No  In the last 12 months, how many places have you lived?:    In the last 12 months, was there a time when you did not have a steady place to sleep or slept in a shelter (including now)?: No  In the past 12 months has the electric, gas, oil, or water company threatened to shut off services in your home?: No    Functional Status Prior to Admission:   Assistive Device/Animal Currently Used at Home: shower chair, raised toilet, walker, 4-wheeled  Functional Status Comments:    IADL Comments:       Supports and Services:  Current Outpatient/Agency/Support Group: homecare agency  Type of Current Home Care Services: home health aide  History of home care episode or rehab stay:      Discharge Needs Assessment:   Concerns to be Addressed: discharge planning, care coordination/care conferences  Current Discharge Risk: lives alone  Anticipated Changes Related to Illness: inability to care for self    Patient/Family Anticipated Discharge Plan:  Patient/Family Anticipates Transition To: home with help/services  Patient/Family Anticipated Services at Transition: home health care    Connection to Community       Patient Choice:   Offered/Gave Vendor List:    Patient's Choice of Community Agency(s):         Anticipated Discharge Plan:  Met with patient. Provided education and contact information for Care Coordination services.:  yes  Anticipated Discharge Disposition: home with assistance, home with home health  Type of Home Care Services: home health aide, home OT, home PT, nursing    Transportation Needs (SDOH):  Transportation Concerns: none  Transportation Anticipated: health plan transportation  Is Out of Hospital DNR needed at discharge?: no    In the past 12 months, has lack of transportation kept you from medical appointments or from getting medications?: No  In the past 12 months, has lack of transportation kept you from meetings, work, or from getting things needed for daily living?: No    Concerns - comments:  Met with pt at the bedside, explained CCRN role. Pt lives in [employer] housing. She has aides everyday but Sunday from 9-3. Pt has groceries delivered. PT/OT rec home health, pt would like [facility] referral sent. Pt will require transport at d/c. Will continue to follow.

## 2024-08-19 NOTE — PROGRESS NOTES
"   Physical Therapy -  Initial Evaluation     Patient: Felipa Cuadra  Location: [address]  MRN: [id number]  Today's date: 8/19/2024    HISTORY OF PRESENT ILLNESS     Felipa is a 44 y.o. female admitted on 8/15/2024 with Hypokalemia [E87.6]  Hypercapnia [R06.89]  Hypotension [I95.9]  Hypotension due to drugs [I95.2]  Fall, initial encounter [W19.XXXA]  Altered mental status, unspecified altered mental status type [R41.82]. Principal problem is Hypotension.    Past Medical History  Felipa has a past medical history of Anxiety, Depression, and Eating disorder.    History of Present Illness   Presents to the hospital for the evaluation of a fall.  On the evening of 8/15/2024, the patient reportedly called 911 after falling at home and being down for approximately 3 hours.       In the ED, it was noted that the patient was very somnolent but was arousable to voice.  Patient also reported not experiencing any nausea, vomiting, or diarrhea.  Unfortunately, the patient's mental status continued to decline while in the emergency department and she quired the initiation of norepinephrine infusion due to worsening hypotension despite fluid resuscitation.  PRIOR LEVEL OF FUNCTION AND LIVING ENVIRONMENT     Prior Level of Function      Flowsheet Row Most Recent Value   Dominant Hand right   Ambulation assistive equipment   Transferring assistive equipment   Toileting independent   Bathing assistive person   Dressing assistive person   Eating independent   IADLs assistive person   Driving/Transportation public transportation   Communication understands/communicates without difficulty   Prior Level of Function Comment HHA daily except for sundays from 9am-3pm, assists w/ dressing, bathing and all home management. rollator for all ambulation. reports \"at least 12 falls in the 3 years since moving into my apartment\", stating usually due to moving too quickly or trying to do too much at once.   Assistive Device " Currently Used at Home grab bar, raised toilet, ramps, walker, 4-wheeled, shower chair        History of Falls: Two or more falls in the past year    Prior Living Environment      Flowsheet Row Most Recent Value   People in Home alone   Current Living Arrangements apartment   Living Environment Comment [employer] disability housing, apartment w/ ramp to enter building, elevator access inside, bathtub w/ multiple grab-bars and tub bench          VITALS AND PAIN     PT Vitals      Date/Time Pulse HR Source SpO2 Pt Activity O2 Therapy BP BP Location BP Method Pt Position [facility]   08/19/24 1405 64 Monitor 98 % At rest None (Room air) 97/54 Right upper arm Automatic Lying BS   08/19/24 1407 -- -- -- -- -- 103/58 Right upper arm Automatic Sitting BS   08/19/24 1410 -- -- -- -- -- 102/59 Right upper arm Automatic Standing BS   08/19/24 1420 64 Monitor 96 % Walking None (Room air) 96/55 Right upper arm Automatic Standing BS          PT Pain      Date/Time Pain Type Rating: Rest Rating: Activity [facility]   08/19/24 1405 Pain Assessment 0 0 BS             Objective   OBJECTIVE     Start time:  1403  End time:  1427  Session Length: 24 min  Mode of Treatment: co-treatment  Reason for co-treatment: priority for DC planning, PT focus on mobility    General Observations  Patient received upright, in bed (+bed alarm). She was agreeable to therapy, no issues or concerns identified by nurse prior to session.      Precautions: fall              PT Eval and Treat - 08/19/24 1403          Cognition    Orientation Status oriented x 4     Affect/Mental Status WFL;flat/blunted affect     Follows Commands WFL;follows multi-step commands     Cognitive Function WFL     Comment, Cognition pleasant and cooperative, eager to participate w/ PT and return home, receptive to PT cues, education and recommendations.        Vision Assessment/Intervention    Vision Assessment WFL;corrective lenses full-time        Hearing Assessment    Hearing Status WFL         Sensory Assessment    Sensory Assessment sensation intact except     Sensation Impaired Location(s) left LE;right LE     Sensory Subjective Reports paresthesia   bilateral foot neuropathy       Lower Extremity Assessment    LE Assessment ROM and Strength WFL     General Observations based on functional assessment        Bed Mobility    Bed Mobility Activities supine to sit;sit to supine     Shelby modified independence     Assistive Device head of bed elevated;bed rails     Comment out of bed to R, no dizziness/ lightheadedness in upright sitting        Mobility Belt    Mobility Belt Used During Session no - independent with all mobility        Sit/Stand Transfer    Surface edge of bed     Shelby modified independence     Safety/Cues increased time to complete     Assistive Device walker, front-wheeled     Transfer Comments slow to rise but steady in standing, denied orthostatic symptoms, BP stable        Toilet Transfer    Transfer Technique sit/stand     Shelby modified independence     Assistive Device grab bars/safety frame;walker, front-wheeled     Comment L grab-bar        Gait Training    Shelby, Gait modified independence     Safety/Cues increased time to complete     Assistive Device walker, front-wheeled     Distance in Feet 12 feet     Pattern step-through     Deviations/Abnormal Patterns nikolay decreased;gait speed decreased;step length decreased     Comment 12 ft x2, steady reciprocal gait pattern w/ no LOB, fair foot clearance bilaterally, deferred further distance as patient requesting return to bed        Stairs Training    Shelby, Stairs not tested     Comment not a barrier to discharge        Balance    Static Sitting Balance WFL;unsupported;sitting, edge of bed     Dynamic Sitting Balance WFL;unsupported;sitting, edge of bed     Sit to Stand Dynamic Balance WFL;supported     Static Standing Balance WFL;supported     Dynamic Standing Balance WFL;supported     Comment,  Balance w/ RW. no LOB or increased postural sway in supported standing for hand hygiene or standing BP checks.        Impairments/Safety Issues    Impairments Affecting Function endurance/activity tolerance     Functional Endurance fair, no orthostatic symptoms throughout session, vitals grossly stable w/ activity (see above)     Comment, Safety Issues/Impairments patient at her baseline, no functional safety concerns at this time                                    Education Documentation  Home Safety, taught by Rani Maciel PT at 8/19/2024  2:34 PM.  Learner: Patient  Readiness: Acceptance  Method: Explanation  Response: Verbalizes Understanding  Comment: PT POC and progress towards discharge goals, benefits of upright sitting and mobility w/ staff as able, gradual activity progression, activity modification, energy conservation, safety and fall prevention in home and hospital settings.    Energy Conservation, taught by Rani Maciel PT at 8/19/2024  2:34 PM.  Learner: Patient  Readiness: Acceptance  Method: Explanation  Response: Verbalizes Understanding  Comment: PT POC and progress towards discharge goals, benefits of upright sitting and mobility w/ staff as able, gradual activity progression, activity modification, energy conservation, safety and fall prevention in home and hospital settings.    Assistive/Adaptive Devices, taught by Rani Maciel PT at 8/19/2024  2:34 PM.  Learner: Patient  Readiness: Acceptance  Method: Explanation  Response: Verbalizes Understanding  Comment: PT POC and progress towards discharge goals, benefits of upright sitting and mobility w/ staff as able, gradual activity progression, activity modification, energy conservation, safety and fall prevention in home and hospital settings.    Fall Prevention, taught by Rani Maciel PT at 8/19/2024  2:34 PM.  Learner: Patient  Readiness: Acceptance  Method: Explanation  Response: Verbalizes  Understanding  Comment: PT POC and progress towards discharge goals, benefits of upright sitting and mobility w/ staff as able, gradual activity progression, activity modification, energy conservation, safety and fall prevention in home and hospital settings.    Call Light Use, taught by Rani Maciel PT at 8/19/2024  2:34 PM.  Learner: Patient  Readiness: Acceptance  Method: Explanation  Response: Verbalizes Understanding  Comment: PT POC and progress towards discharge goals, benefits of upright sitting and mobility w/ staff as able, gradual activity progression, activity modification, energy conservation, safety and fall prevention in home and hospital settings.    Treatment Plan, taught by Rani Maciel PT at 8/19/2024  2:34 PM.  Learner: Patient  Readiness: Acceptance  Method: Explanation  Response: Verbalizes Understanding  Comment: PT POC and progress towards discharge goals, benefits of upright sitting and mobility w/ staff as able, gradual activity progression, activity modification, energy conservation, safety and fall prevention in home and hospital settings.        Session Outcome  Patient reclined, in bed (HOB elevated to comfort) at end of session, bed alarm on, all needs met, call light in reach, personal items in reach. Nursing notified about patient's performance, patient's position, and patient's response to therapy/activity.    AM-PAC™ - Mobility (Current Function)     Turning form your back to your side while in flat bed without using bedrails 4 - None   Moving from lying on your back to sitting on the side of a flat bed without using bedrails 4 - None   Moving to and from a bed to a chair 4 - None   Standing up from a chair using your arms 4 - None   To walk in a hospital room 4 - None   Climbing 3-5 steps with a railing 3 - A Little   AM-PAC™ Mobility Score 23      ASSESSMENT AND PLAN     Progress Summary  PT evaluation complete. Patient demonstrates safety and independence w/ bed  "mobility, transfers and short household distance ambulation w/ RW. Good safety awareness and verbalizing good insight to functional status and deficits in light of fairly extensive PMH. Patient appears at her functional baseline w/ no skilled PT needs in this setting, recommend return home w/ current level of assist and home PT to address strength and endurance deficits and decrease risk for future falls upon discharge. PT will sign off at this time, re-consult if patient status changes and new orders are received. AM-PAC 23/24.    Patient/Family Therapy Goals Statement: \"They said if I do well w/ therapy I can go home tomorrow\"    PT Plan      Flowsheet Row Most Recent Value   Therapy Frequency evaluation only at 08/19/2024 1403            PT Discharge Recommendations      Flowsheet Row Most Recent Value   PT Recommended Discharge Disposition home with assistance, home with home health at 08/19/2024 1403   Anticipated Equipment Needs if Discharged Home (PT) none at 08/19/2024 1403                   "

## 2024-08-19 NOTE — PROGRESS NOTES
"   Hospital Medicine Service -  Daily Progress Note       SUBJECTIVE     Interval History: No acute events overnight. Awake, good spirit. Liked the Lunch. Drinking well. 4 cans of Coca and one bottle of water at table.   OOAx4. Denied pain or dizziness.   Want to go home  PT/OT evaluation  Justinmarielle Alli for her chronic eating disorder.   Discharge plan     OBJECTIVE        Vital signs in last 24 hours:  Temp:  [36.4 °C (97.5 °F)-36.7 °C (98.1 °F)] 36.7 °C (98.1 °F)  Heart Rate:  [57-70] 68  Resp:  [16-18] 16  BP: (77-93)/(47-61) 93/58  I/O last 3 completed shifts:  In: 670 [P.O.:420; IV Piggyback:250]  Out: -     PHYSICAL EXAMINATION        GEN: well-developed and well-nourished; not in acute distress  HEENT: normocephalic; atraumatic  NECK: no JVD; no bruits  CARDIO: regular rate and rhythm; no murmurs or rubs  RESP: clear to auscultation bilaterally; no rales, rhonchi, or wheezes  ABD: soft, non-distended, non-tender, normal bowel sounds  EXT: no cyanosis, clubbing, or edema  SKIN: clean, dry, warm, and intact  MUSCULOSKELETAL: no injury or deformity  NEURO: alert and oriented x 3; nonfocal  BEHAVIOR/EMOTIONAL: appropriate; cooperative     LABS / IMAGING / TELE        Labs  Lab Results   Component Value Date    WBC 4.32 08/19/2024    HGB 10.8 (L) 08/19/2024    HCT 33.1 (L) 08/19/2024    .8 (H) 08/19/2024     (L) 08/19/2024     Lab Results   Component Value Date    GLUCOSE 77 08/19/2024    CALCIUM 8.0 (L) 08/19/2024     08/19/2024    K 5.5 (H) 08/19/2024    CO2 17 (L) 08/19/2024     (H) 08/19/2024    BUN 2 (L) 08/19/2024    CREATININE 0.7 08/19/2024     No results found for: \"INR\", \"PROTIME\"    Imaging  CT ABDOMEN PELVIS WITHOUT IV CONTRAST    Result Date: 8/16/2024  IMPRESSION: There is liquid stool throughout the length of colon and possibly in the rectum, supporting diarrhea. The could be some thickening of the wall of the ascending colon and cecum concerning for colitis, but this is " difficult to confirm on this exam. Mary-en-Y bariatric surgery noted. No bowel obstruction. Findings noted which suggests support periportal edema. Please correlate. *There is atrophy of the lower pole the left kidney. 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 The patient's urinary tract. Further investigated with CT reconstructions, as indicated. Suspected fibroid uterus. Markedly distended urinary bladder. *Epigastric region/defect noted, as described. Clinical correlation recommended. Comparison study:  None available. COMMENT:  Axial CT images of the abdomen and pelvis are completed without oral or intravenous contrast. Images are reconstructed in sagittal and coronal planes. CT DOSE:  One or more dose reduction techniques (e.g. automated exposure control, adjustment of the mA and/or kV according to patient size, use of iterative reconstruction technique) utilized for this examination. There is curvilinear volume loss in both lower lobes, more extensive on the left. No pleural fluid or consolidation. Unremarkable imaged heart and pericardium. Bowel/mesenteries: Mary-en-Y bariatric surgery. No abnormal bowel distention. There appears to the distal throughout the length of colon, and possibly in the rectum, supporting diarrhea but there is no discernible mural thickening distally. The could be minor thickening of the wall of the right colon on this is difficult to confirm. There is mild induration in the spine wall down mesenteric root as well as an increased number of small lymph nodes, nonspecific. Consider mesenteritis. Short appendix noted and unremarkable. Normal hepatic attenuation. No discernible intrahepatic mass on noncontrast imaging. Periportal edema is suspected. Gallbladder surgically absent normal caliber extrahepatic duct. Fatty atrophy of the pancreas appears slightly advanced for age. The spleen is normal in diameter, unremarkable. Unremarkable adrenal glands. KUB: Unremarkable right kidney.  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 There is atrophy and lower pole of the left kidney, possibly the result of chronic reflux. Markedly distended urinary bladder. No mural thickening or mass. No discernible urinary tract calculi. There is a 5 cm soft tissue attenuation mass arising from the right uterine body, suspected fibroid. No additional adnexal mass. Unremarkable aorta, IVC and their major branches, as demonstrated on this noncontrast study. There are no pathologically enlarged nodes in the abdomen and pelvis. *There is tiny defect in the midline epigastric region and through which a curvilinear soft tissue attenuation structure passes. This could represent scarring from prior surgery. Herniation of a bowel loop is considered less likely given the configuration. Clinical evaluation recommended. There are early spondylitic changes in the lower dorsal spine. The osseous structures are otherwise unremarkable.    X-RAY CHEST 1 VIEW    Result Date: 8/16/2024  IMPRESSION: Minimal bibasilar atelectasis..     CT CERVICAL SPINE WITHOUT IV CONTRAST    Result Date: 8/15/2024  IMPRESSION: No fracture    CT HEAD WITHOUT IV CONTRAST    Result Date: 8/15/2024  IMPRESSION: No acute intracranial traumatic injury     ECG/Telemetry  I have independently reviewed the telemetry. No events for the last 24 hours.    ASSESSMENT AND PLAN      Acute metabolic encephalopathy  Assessment & Plan  Multifactorial, polypharmacy psych medications, acidosis, possible ingestion with neg UDS, only positive for cannabinoid  Awake and alert in the morning  Continue close monitoring  Fluids given    8/19  More awake and good conversation.  Willing to eat/drink more.   PT/OT evaluation, patient is long term wheel chair bounded   MS back to baseline since no sedatives here(except small dose of Ativan)    Metabolic acidosis, normal anion gap (NAG)  Assessment & Plan  Acute condition, chronicity unknown.  Urine  osmolality and electrolytes to assess for potential type I RTA.  Based on chart review, it appears that the patient has a history of gastric bypass procedures in both 2007 and 2017.  The patient is unable to convey any information, however, she has not had any diarrhea while in the hospital.  Administer 1 amp of sodium bicarb drip.  Continue cautious fluid administration.  Resolved     Hypercapnia  Assessment & Plan  Acute condition.  Likely secondary to sedation in the setting of polypharmacy.  Continue to monitor VBG.  Continue BiPAP to help with CO2 clearance.  Resolved     Insomnia  Assessment & Plan  Chronic condition.  Hold home regimen of zolpidem in the setting of sedation and hypotension.  8/19  Trazodone / Ambien prn    Hypokalemia  Assessment & Plan  Acute condition, unclear cause.  BMP daily to monitor renal and electrolyte status.  Replete as needed.    Seizures (CMS/HCC)  Assessment & Plan  Chronic condition, exact details unknown.  Seizure precautions in place.  At home, the patient takes a regimen of topiramate 50 mg oral twice daily.  Resume once able to safely swallow; consider additional agents if the patient is unable to swallow for a prolonged period of time.    PTSD (post-traumatic stress disorder)  Assessment & Plan  Chronic condition.  The patient takes a regimen of fluoxetine 20 mg oral daily, lorazepam 1 mg twice daily as needed, prazosin 5 mg oral daily, quetiapine 75 mg oral daily and 100 mg nightly, trazodone and 100 to 200 mg daily.  Hold potential sedating medications in the setting of altered mental status and hypotension.  Psychiatry consulted.    Hypothyroidism  Assessment & Plan  Chronic condition. Mildly elevated TSH  Levothyroxine 50 mcg oral daily when able to tolerate oral intake.  Outpatient check up    Eating disorder  Assessment & Plan  Chronic condition.  Resume home regimen of acarbose when no longer NPO.  Follow up with outpatient  psychology/psychiatry.  8/19  Eating/Drinking better today(more awake now)  Will do Calori Count.      Anxiety  Assessment & Plan  Chronic condition.  The patient takes a regimen of fluoxetine 20 mg oral daily, lorazepam 1 mg twice daily as needed, prazosin 5 mg oral daily, quetiapine 75 mg oral daily and 100 mg nightly, trazodone and 100 to 200 mg daily.  Hold potential sedating medications in the setting of altered mental status and hypotension.  Psychiatry consulted.  8/19  Offered small dose of Ativan to avoid withdraw.    Depression  Assessment & Plan  Chronic condition.  The patient takes a regimen of fluoxetine 20 mg oral daily, lorazepam 1 mg twice daily as needed, prazosin 5 mg oral daily, quetiapine 75 mg oral daily and 100 mg nightly, trazodone and 100 to 200 mg daily.  Hold potential sedating medications in the setting of altered mental status and hypotension.  Psychiatry consulted.  8/19  No indication to acute inpatient Psychiatric care  Avoiding Seroquel and Prazosin per Dr. Valdivia.     * Hypotension  Assessment & Plan  Acute condition, appears to be secondary to polypharmacy, denied any ingestion. Baseline blood pressures are 90/50 per patient  Hold home medication regimen.  UDS negative  In ICU: Titrate norepinephrine infusion to maintain mean arterial pressure greater than 65 mmHg, resolved   Received IV fluid hydration.   BMP and CBC daily.   C diff, blood cultures negative to date  8/19  Baseline weak BP.  Denied dizziness today  PT/OT evaluate patient.          VTE Assessment: Padua VTE Score: 4  Code Status: Full Code  Estimated discharge date: 8/19/2024     Dillon Ochoa MD  8/19/2024  12:07 PM

## 2024-08-19 NOTE — PLAN OF CARE
Plan of Care Review  Plan of Care Reviewed With: patient  Progress: improving  Outcome Evaluation: AxOx3,worked with OT,SBP in low 90s,asymptomatic ,omn Midodrine,startd on calorie count,able to make needs known,possible d/c in am.

## 2024-08-20 LAB
BACTERIA BLD CULT: NORMAL
BACTERIA BLD CULT: NORMAL
COPPER SERPL-MCNC: 88 MCG/DL (ref 70–175)
GLUCOSE BLD-MCNC: 106 MG/DL (ref 70–99)
GLUCOSE BLD-MCNC: 133 MG/DL (ref 70–99)
GLUCOSE BLD-MCNC: 93 MG/DL (ref 70–99)
GLUCOSE BLD-MCNC: 99 MG/DL (ref 70–99)
POCT TEST: ABNORMAL
POCT TEST: ABNORMAL
POCT TEST: NORMAL
POCT TEST: NORMAL
TOPIRAMATE SERPL-MCNC: 5.4 MCG/ML

## 2024-08-20 PROCEDURE — 63700000 HC SELF-ADMINISTRABLE DRUG: Performed by: STUDENT IN AN ORGANIZED HEALTH CARE EDUCATION/TRAINING PROGRAM

## 2024-08-20 PROCEDURE — 63700000 HC SELF-ADMINISTRABLE DRUG: Performed by: PHYSICIAN ASSISTANT

## 2024-08-20 PROCEDURE — 20600000 HC ROOM AND CARE INTERMEDIATE/TELEMETRY

## 2024-08-20 PROCEDURE — 99233 SBSQ HOSP IP/OBS HIGH 50: CPT | Performed by: HOSPITALIST

## 2024-08-20 PROCEDURE — 25800000 HC PHARMACY IV SOLUTIONS: Performed by: HOSPITALIST

## 2024-08-20 PROCEDURE — 63700000 HC SELF-ADMINISTRABLE DRUG: Performed by: HOSPITALIST

## 2024-08-20 PROCEDURE — 63600000 HC DRUGS/DETAIL CODE: Mod: JZ | Performed by: STUDENT IN AN ORGANIZED HEALTH CARE EDUCATION/TRAINING PROGRAM

## 2024-08-20 PROCEDURE — 63700000 HC SELF-ADMINISTRABLE DRUG

## 2024-08-20 PROCEDURE — 63700000 HC SELF-ADMINISTRABLE DRUG: Performed by: NURSE PRACTITIONER

## 2024-08-20 RX ORDER — FLUDROCORTISONE ACETATE 0.1 MG/1
0.1 TABLET ORAL DAILY
Status: DISCONTINUED | OUTPATIENT
Start: 2024-08-20 | End: 2024-08-21 | Stop reason: HOSPADM

## 2024-08-20 RX ORDER — MIDODRINE HYDROCHLORIDE 5 MG/1
10 TABLET ORAL
Status: DISCONTINUED | OUTPATIENT
Start: 2024-08-20 | End: 2024-08-21 | Stop reason: HOSPADM

## 2024-08-20 RX ORDER — SODIUM CHLORIDE 9 MG/ML
INJECTION, SOLUTION INTRAVENOUS CONTINUOUS
Status: DISCONTINUED | OUTPATIENT
Start: 2024-08-20 | End: 2024-08-21

## 2024-08-20 RX ADMIN — Medication 125 MCG: at 20:25

## 2024-08-20 RX ADMIN — THERA TABS 1 TABLET: TAB at 20:24

## 2024-08-20 RX ADMIN — PANTOPRAZOLE SODIUM 20 MG: 20 TABLET, DELAYED RELEASE ORAL at 08:30

## 2024-08-20 RX ADMIN — NYSTATIN: 100000 POWDER TOPICAL at 20:25

## 2024-08-20 RX ADMIN — NYSTATIN 1 APPLICATION: 100000 POWDER TOPICAL at 08:32

## 2024-08-20 RX ADMIN — LEVOTHYROXINE SODIUM 50 MCG: 0.05 TABLET ORAL at 05:26

## 2024-08-20 RX ADMIN — Medication 125 MCG: at 08:30

## 2024-08-20 RX ADMIN — TOPIRAMATE 50 MG: 25 TABLET, FILM COATED ORAL at 20:25

## 2024-08-20 RX ADMIN — MIDODRINE HYDROCHLORIDE 10 MG: 5 TABLET ORAL at 17:35

## 2024-08-20 RX ADMIN — THERA TABS 1 TABLET: TAB at 08:30

## 2024-08-20 RX ADMIN — LORAZEPAM 0.5 MG: 0.5 TABLET ORAL at 20:25

## 2024-08-20 RX ADMIN — TRAZODONE HYDROCHLORIDE 100 MG: 100 TABLET ORAL at 20:25

## 2024-08-20 RX ADMIN — ACETAMINOPHEN 975 MG: 325 TABLET, FILM COATED ORAL at 17:35

## 2024-08-20 RX ADMIN — SODIUM CHLORIDE: 9 INJECTION, SOLUTION INTRAVENOUS at 08:58

## 2024-08-20 RX ADMIN — ATORVASTATIN CALCIUM 20 MG: 20 TABLET, FILM COATED ORAL at 20:25

## 2024-08-20 RX ADMIN — FERROUS SULFATE TAB 325 MG (65 MG ELEMENTAL FE) 325 MG: 325 (65 FE) TAB at 08:29

## 2024-08-20 RX ADMIN — CYCLOBENZAPRINE HYDROCHLORIDE 5 MG: 5 TABLET, FILM COATED ORAL at 17:35

## 2024-08-20 RX ADMIN — Medication 100 MG: at 08:29

## 2024-08-20 RX ADMIN — GABAPENTIN 400 MG: 400 CAPSULE ORAL at 20:24

## 2024-08-20 RX ADMIN — GABAPENTIN 400 MG: 400 CAPSULE ORAL at 08:29

## 2024-08-20 RX ADMIN — FLUDROCORTISONE ACETATE 0.1 MG: 0.1 TABLET ORAL at 08:57

## 2024-08-20 RX ADMIN — MIDODRINE HYDROCHLORIDE 10 MG: 5 TABLET ORAL at 11:49

## 2024-08-20 RX ADMIN — TOPIRAMATE 50 MG: 25 TABLET, FILM COATED ORAL at 08:30

## 2024-08-20 RX ADMIN — ENOXAPARIN SODIUM 40 MG: 40 INJECTION SUBCUTANEOUS at 17:35

## 2024-08-20 RX ADMIN — FLUOXETINE HYDROCHLORIDE 20 MG: 20 CAPSULE ORAL at 08:30

## 2024-08-20 RX ADMIN — GABAPENTIN 400 MG: 400 CAPSULE ORAL at 14:19

## 2024-08-20 RX ADMIN — TRAZODONE HYDROCHLORIDE 100 MG: 100 TABLET ORAL at 00:24

## 2024-08-20 RX ADMIN — LORAZEPAM 0.5 MG: 0.5 TABLET ORAL at 08:28

## 2024-08-20 RX ADMIN — FERROUS SULFATE TAB 325 MG (65 MG ELEMENTAL FE) 325 MG: 325 (65 FE) TAB at 17:35

## 2024-08-20 ASSESSMENT — COGNITIVE AND FUNCTIONAL STATUS - GENERAL
WALKING IN HOSPITAL ROOM: 3 - A LITTLE
STANDING UP FROM CHAIR USING ARMS: 3 - A LITTLE
MOVING TO AND FROM BED TO CHAIR: 3 - A LITTLE
CLIMB 3 TO 5 STEPS WITH RAILING: 3 - A LITTLE

## 2024-08-20 NOTE — ASSESSMENT & PLAN NOTE
Acute condition, appears to be secondary to polypharmacy, denied any ingestion. Baseline blood pressures are 90/50 per patient  Hold home medication regimen.  UDS negative  In ICU: Titrate norepinephrine infusion to maintain mean arterial pressure greater than 65 mmHg, resolved   Received IV fluid hydration.   BMP and CBC daily.   C diff, blood cultures negative to date  8/19  Baseline weak BP.  Denied dizziness today  PT/OT evaluate patient.     8/20  BP still low: 80s/50  Increasing Midodrine and adding Florinef, IVF  Monitoring BP closely.

## 2024-08-20 NOTE — ASSESSMENT & PLAN NOTE
Chronic condition.  The patient takes a regimen of fluoxetine 20 mg oral daily, lorazepam 1 mg twice daily as needed, prazosin 5 mg oral daily, quetiapine 75 mg oral daily and 100 mg nightly, trazodone and 100 to 200 mg daily.  Hold potential sedating medications in the setting of altered mental status and hypotension.  Psychiatry consulted.

## 2024-08-20 NOTE — PLAN OF CARE
"Plan of Care Review  Plan of Care Reviewed With: patient  Progress: no change  Outcome Evaluation: pt remains with low BP, complains of dizziness with movement but states \"that is normal for me\". IVF started at 100ml/hr this shift. Florinef started and midodrine continued. Pt voiding in bedpan. Calorie count continued per orders. Fall precautions maintained.  "

## 2024-08-20 NOTE — ASSESSMENT & PLAN NOTE
Multifactorial, polypharmacy psych medications, acidosis, possible ingestion with neg UDS, only positive for cannabinoid  Awake and alert in the morning  Continue close monitoring  Fluids given    8/19~  More awake and good conversation.  Willing to eat/drink more.   PT/OT evaluation, patient is long term wheel chair bounded   MS back to baseline since no sedatives here(except small dose of Ativan)

## 2024-08-20 NOTE — PROGRESS NOTES
"   Hospital Medicine Service -  Daily Progress Note       SUBJECTIVE     Interval History: No acute events overnight. Awake, eating/drinking OK. Denied dizziness with sitting  BP 82/50 since last night.  Starting IVF  Increasing Midodrine, adding Florinef  Encouraging po intake  Monitoring BP closely    Want to go home  PT/OT evaluation  Justinmarielle Alli for her chronic eating disorder.   Discharge plan     OBJECTIVE        Vital signs in last 24 hours:  Temp:  [36.4 °C (97.6 °F)-36.7 °C (98 °F)] 36.6 °C (97.8 °F)  Heart Rate:  [54-70] 66  Resp:  [16-20] 17  BP: ()/(50-59) 82/50  I/O last 3 completed shifts:  In: 2810 [P.O.:1800; I.V.:10; IV Piggyback:1000]  Out: 900 [Urine:900]    PHYSICAL EXAMINATION        GEN: well-developed and well-nourished; not in acute distress  HEENT: normocephalic; atraumatic  NECK: no JVD; no bruits  CARDIO: regular rate and rhythm; no murmurs or rubs  RESP: clear to auscultation bilaterally; no rales, rhonchi, or wheezes  ABD: soft, non-distended, non-tender, normal bowel sounds  EXT: no cyanosis, clubbing, or edema  SKIN: clean, dry, warm, and intact  MUSCULOSKELETAL: no injury or deformity  NEURO: alert and oriented x 3; nonfocal  BEHAVIOR/EMOTIONAL: appropriate; cooperative     LABS / IMAGING / TELE        Labs  Lab Results   Component Value Date    WBC 4.32 08/19/2024    HGB 10.8 (L) 08/19/2024    HCT 33.1 (L) 08/19/2024    .8 (H) 08/19/2024     (L) 08/19/2024     Lab Results   Component Value Date    GLUCOSE 77 08/19/2024    CALCIUM 8.0 (L) 08/19/2024     08/19/2024    K 5.5 (H) 08/19/2024    CO2 17 (L) 08/19/2024     (H) 08/19/2024    BUN 2 (L) 08/19/2024    CREATININE 0.7 08/19/2024     No results found for: \"INR\", \"PROTIME\"    Imaging  CT ABDOMEN PELVIS WITHOUT IV CONTRAST    Result Date: 8/16/2024  IMPRESSION: There is liquid stool throughout the length of colon and possibly in the rectum, supporting diarrhea. The could be some thickening of the wall " of the ascending colon and cecum concerning for colitis, but this is difficult to confirm on this exam. Mary-en-Y bariatric surgery noted. No bowel obstruction. Findings noted which suggests support periportal edema. Please correlate. *There is atrophy of the lower pole the left kidney. 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 The patient's urinary tract. Further investigated with CT reconstructions, as indicated. Suspected fibroid uterus. Markedly distended urinary bladder. *Epigastric region/defect noted, as described. Clinical correlation recommended. Comparison study:  None available. COMMENT:  Axial CT images of the abdomen and pelvis are completed without oral or intravenous contrast. Images are reconstructed in sagittal and coronal planes. CT DOSE:  One or more dose reduction techniques (e.g. automated exposure control, adjustment of the mA and/or kV according to patient size, use of iterative reconstruction technique) utilized for this examination. There is curvilinear volume loss in both lower lobes, more extensive on the left. No pleural fluid or consolidation. Unremarkable imaged heart and pericardium. Bowel/mesenteries: Mary-en-Y bariatric surgery. No abnormal bowel distention. There appears to the distal throughout the length of colon, and possibly in the rectum, supporting diarrhea but there is no discernible mural thickening distally. The could be minor thickening of the wall of the right colon on this is difficult to confirm. There is mild induration in the spine wall down mesenteric root as well as an increased number of small lymph nodes, nonspecific. Consider mesenteritis. Short appendix noted and unremarkable. Normal hepatic attenuation. No discernible intrahepatic mass on noncontrast imaging. Periportal edema is suspected. Gallbladder surgically absent normal caliber extrahepatic duct. Fatty atrophy of the pancreas appears slightly advanced for age. The spleen is normal in diameter,  unremarkable. Unremarkable adrenal glands. KUB: Unremarkable right kidney. 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 There is atrophy and lower pole of the left kidney, possibly the result of chronic reflux. Markedly distended urinary bladder. No mural thickening or mass. No discernible urinary tract calculi. There is a 5 cm soft tissue attenuation mass arising from the right uterine body, suspected fibroid. No additional adnexal mass. Unremarkable aorta, IVC and their major branches, as demonstrated on this noncontrast study. There are no pathologically enlarged nodes in the abdomen and pelvis. *There is tiny defect in the midline epigastric region and through which a curvilinear soft tissue attenuation structure passes. This could represent scarring from prior surgery. Herniation of a bowel loop is considered less likely given the configuration. Clinical evaluation recommended. There are early spondylitic changes in the lower dorsal spine. The osseous structures are otherwise unremarkable.    X-RAY CHEST 1 VIEW    Result Date: 8/16/2024  IMPRESSION: Minimal bibasilar atelectasis..     CT CERVICAL SPINE WITHOUT IV CONTRAST    Result Date: 8/15/2024  IMPRESSION: No fracture    CT HEAD WITHOUT IV CONTRAST    Result Date: 8/15/2024  IMPRESSION: No acute intracranial traumatic injury     ECG/Telemetry  I have independently reviewed the telemetry. No events for the last 24 hours.    ASSESSMENT AND PLAN      * Hypotension  Assessment & Plan  Acute condition, appears to be secondary to polypharmacy, denied any ingestion. Baseline blood pressures are 90/50 per patient  Hold home medication regimen.  UDS negative  In ICU: Titrate norepinephrine infusion to maintain mean arterial pressure greater than 65 mmHg, resolved   Received IV fluid hydration.   BMP and CBC daily.   C diff, blood cultures negative to date  8/19  Baseline weak BP.  Denied dizziness today  PT/OT evaluate patient.      8/20  BP still low: 80s/50  Increasing Midodrine and adding Florinef, IVF  Monitoring BP closely.    Acute metabolic encephalopathy  Assessment & Plan  Multifactorial, polypharmacy psych medications, acidosis, possible ingestion with neg UDS, only positive for cannabinoid  Awake and alert in the morning  Continue close monitoring  Fluids given    8/19~  More awake and good conversation.  Willing to eat/drink more.   PT/OT evaluation, patient is long term wheel chair bounded   MS back to baseline since no sedatives here(except small dose of Ativan)    Metabolic acidosis, normal anion gap (NAG)  Assessment & Plan  Acute condition, chronicity unknown.  Urine osmolality and electrolytes to assess for potential type I RTA.  Based on chart review, it appears that the patient has a history of gastric bypass procedures in both 2007 and 2017.  The patient is unable to convey any information, however, she has not had any diarrhea while in the hospital.  Administer 1 amp of sodium bicarb drip.  Continue cautious fluid administration.  Resolved     Hypercapnia  Assessment & Plan  Acute condition.  Likely secondary to sedation in the setting of polypharmacy.  Continue to monitor VBG.  Continue BiPAP to help with CO2 clearance.  Resolved     Insomnia  Assessment & Plan  Chronic condition.  Hold home regimen of zolpidem in the setting of sedation and hypotension.  8/19  Trazodone / Ambien prn    Hypokalemia  Assessment & Plan  Acute condition, unclear cause.  BMP daily to monitor renal and electrolyte status.  Replete as needed.    Seizures (CMS/HCC)  Assessment & Plan  Chronic condition, exact details unknown.  Seizure precautions in place.  At home, the patient takes a regimen of topiramate 50 mg oral twice daily.  Resume once able to safely swallow; consider additional agents if the patient is unable to swallow for a prolonged period of time.    PTSD (post-traumatic stress disorder)  Assessment & Plan  Chronic  condition.  The patient takes a regimen of fluoxetine 20 mg oral daily, lorazepam 1 mg twice daily as needed, prazosin 5 mg oral daily, quetiapine 75 mg oral daily and 100 mg nightly, trazodone and 100 to 200 mg daily.  Hold potential sedating medications in the setting of altered mental status and hypotension.  Psychiatry consulted.    Hypothyroidism  Assessment & Plan  Chronic condition. Mildly elevated TSH  Levothyroxine 50 mcg oral daily when able to tolerate oral intake.  Outpatient check up    Eating disorder  Assessment & Plan  Chronic condition.  Resume home regimen of acarbose when no longer NPO.  Follow up with outpatient psychology/psychiatry.  8/19  Eating/Drinking better today(more awake now)  Will do Calori Count.      Anxiety  Assessment & Plan  Chronic condition.  The patient takes a regimen of fluoxetine 20 mg oral daily, lorazepam 1 mg twice daily as needed, prazosin 5 mg oral daily, quetiapine 75 mg oral daily and 100 mg nightly, trazodone and 100 to 200 mg daily.  Hold potential sedating medications in the setting of altered mental status and hypotension.  Psychiatry consulted.  8/19  Offered small dose of Ativan to avoid withdraw.    Depression  Assessment & Plan  Chronic condition.  The patient takes a regimen of fluoxetine 20 mg oral daily, lorazepam 1 mg twice daily as needed, prazosin 5 mg oral daily, quetiapine 75 mg oral daily and 100 mg nightly, trazodone and 100 to 200 mg daily.  Hold potential sedating medications in the setting of altered mental status and hypotension.  Psychiatry consulted.  8/19  No indication to acute inpatient Psychiatric care  Avoiding Seroquel and Prazosin per Dr. Valdivia.          VTE Assessment: [place] VTE Score: 4  Code Status: Full Code  Estimated discharge date: 8/20/2024     Dillon Ochoa MD  8/20/2024  1:13 PM

## 2024-08-21 VITALS
HEART RATE: 61 BPM | TEMPERATURE: 97.2 F | RESPIRATION RATE: 16 BRPM | HEIGHT: 65 IN | SYSTOLIC BLOOD PRESSURE: 102 MMHG | WEIGHT: 235.23 LBS | BODY MASS INDEX: 39.19 KG/M2 | OXYGEN SATURATION: 99 % | DIASTOLIC BLOOD PRESSURE: 57 MMHG

## 2024-08-21 LAB
GLUCOSE BLD-MCNC: 113 MG/DL (ref 70–99)
GLUCOSE BLD-MCNC: 83 MG/DL (ref 70–99)
GLUCOSE BLD-MCNC: 84 MG/DL (ref 70–99)
POCT TEST: ABNORMAL
POCT TEST: NORMAL
POCT TEST: NORMAL

## 2024-08-21 PROCEDURE — 63700000 HC SELF-ADMINISTRABLE DRUG

## 2024-08-21 PROCEDURE — 99238 HOSP IP/OBS DSCHRG MGMT 30/<: CPT | Performed by: HOSPITALIST

## 2024-08-21 PROCEDURE — 25800000 HC PHARMACY IV SOLUTIONS: Performed by: HOSPITALIST

## 2024-08-21 PROCEDURE — 63700000 HC SELF-ADMINISTRABLE DRUG: Performed by: NURSE PRACTITIONER

## 2024-08-21 PROCEDURE — 63700000 HC SELF-ADMINISTRABLE DRUG: Performed by: PHYSICIAN ASSISTANT

## 2024-08-21 PROCEDURE — 63700000 HC SELF-ADMINISTRABLE DRUG: Performed by: HOSPITALIST

## 2024-08-21 RX ORDER — NYSTATIN 100000 [USP'U]/G
POWDER TOPICAL 2 TIMES DAILY
Qty: 30 G | Refills: 1 | Status: SHIPPED | OUTPATIENT
Start: 2024-08-21 | End: 2024-09-20

## 2024-08-21 RX ORDER — VIT C/E/ZN/COPPR/LUTEIN/ZEAXAN 250MG-90MG
125 CAPSULE ORAL 2 TIMES DAILY
Qty: 60 CAPSULE | Refills: 0 | Status: SHIPPED | OUTPATIENT
Start: 2024-08-21 | End: 2024-09-28 | Stop reason: ENTERED-IN-ERROR

## 2024-08-21 RX ORDER — MULTIVITAMIN
1 TABLET ORAL DAILY
Qty: 30 TABLET | Refills: 0 | Status: SHIPPED | OUTPATIENT
Start: 2024-08-21 | End: 2024-09-28 | Stop reason: ENTERED-IN-ERROR

## 2024-08-21 RX ORDER — MIDODRINE HYDROCHLORIDE 10 MG/1
10 TABLET ORAL
Qty: 90 TABLET | Refills: 0 | Status: SHIPPED | OUTPATIENT
Start: 2024-08-21 | End: 2024-10-03 | Stop reason: HOSPADM

## 2024-08-21 RX ORDER — FLUDROCORTISONE ACETATE 0.1 MG/1
0.1 TABLET ORAL DAILY
Qty: 30 TABLET | Refills: 0 | Status: SHIPPED | OUTPATIENT
Start: 2024-08-22 | End: 2024-09-28 | Stop reason: ENTERED-IN-ERROR

## 2024-08-21 RX ORDER — LORAZEPAM 0.5 MG/1
0.5 TABLET ORAL EVERY 8 HOURS PRN
Qty: 20 TABLET | Refills: 0 | Status: SHIPPED | OUTPATIENT
Start: 2024-08-21 | End: 2024-09-28 | Stop reason: ENTERED-IN-ERROR

## 2024-08-21 RX ADMIN — PANTOPRAZOLE SODIUM 20 MG: 20 TABLET, DELAYED RELEASE ORAL at 09:04

## 2024-08-21 RX ADMIN — GABAPENTIN 400 MG: 400 CAPSULE ORAL at 14:50

## 2024-08-21 RX ADMIN — GABAPENTIN 400 MG: 400 CAPSULE ORAL at 09:04

## 2024-08-21 RX ADMIN — FLUDROCORTISONE ACETATE 0.1 MG: 0.1 TABLET ORAL at 09:04

## 2024-08-21 RX ADMIN — LORAZEPAM 0.5 MG: 0.5 TABLET ORAL at 09:08

## 2024-08-21 RX ADMIN — MIDODRINE HYDROCHLORIDE 10 MG: 5 TABLET ORAL at 16:03

## 2024-08-21 RX ADMIN — THERA TABS 1 TABLET: TAB at 09:04

## 2024-08-21 RX ADMIN — NYSTATIN: 100000 POWDER TOPICAL at 09:09

## 2024-08-21 RX ADMIN — FERROUS SULFATE TAB 325 MG (65 MG ELEMENTAL FE) 325 MG: 325 (65 FE) TAB at 16:03

## 2024-08-21 RX ADMIN — TOPIRAMATE 50 MG: 25 TABLET, FILM COATED ORAL at 09:04

## 2024-08-21 RX ADMIN — FLUOXETINE HYDROCHLORIDE 20 MG: 20 CAPSULE ORAL at 09:04

## 2024-08-21 RX ADMIN — LEVOTHYROXINE SODIUM 50 MCG: 0.05 TABLET ORAL at 06:00

## 2024-08-21 RX ADMIN — MIDODRINE HYDROCHLORIDE 10 MG: 5 TABLET ORAL at 09:04

## 2024-08-21 RX ADMIN — CYCLOBENZAPRINE HYDROCHLORIDE 5 MG: 5 TABLET, FILM COATED ORAL at 16:06

## 2024-08-21 RX ADMIN — MIDODRINE HYDROCHLORIDE 10 MG: 5 TABLET ORAL at 11:49

## 2024-08-21 RX ADMIN — Medication 125 MCG: at 09:04

## 2024-08-21 RX ADMIN — SODIUM CHLORIDE: 9 INJECTION, SOLUTION INTRAVENOUS at 06:00

## 2024-08-21 RX ADMIN — FERROUS SULFATE TAB 325 MG (65 MG ELEMENTAL FE) 325 MG: 325 (65 FE) TAB at 09:04

## 2024-08-21 ASSESSMENT — COGNITIVE AND FUNCTIONAL STATUS - GENERAL
WALKING IN HOSPITAL ROOM: 3 - A LITTLE
MOVING TO AND FROM BED TO CHAIR: 3 - A LITTLE
CLIMB 3 TO 5 STEPS WITH RAILING: 3 - A LITTLE
STANDING UP FROM CHAIR USING ARMS: 3 - A LITTLE

## 2024-08-21 NOTE — PROGRESS NOTES
Nutrition Note       Clinical Course: Patient is a 44 y.o. female who was admitted on 8/15/2024 with a diagnosis of Hypokalemia [E87.6]  Hypercapnia [R06.89]  Hypotension [I95.9]  Hypotension due to drugs [I95.2]  Fall, initial encounter [W19.XXXA]  Altered mental status, unspecified altered mental status type [R41.82].   Past Medical History:   Diagnosis Date    Anxiety     Depression     Eating disorder      History reviewed. No pertinent surgical history.    Nutrition Interventions/ Recommendations:   Regular diet  - monitor tolerance/intakes  - ONS for intakes < 50%  - calorie count collected x 2 days. >50% of most meals. Will assess tomorrow if change is dc plan   2. Treat labs/lytes   - K/Mg elevated? Recheck   - blood ceruloplasmin to r/o copper deficiency: WNL   - correct/replace all to WDL  - check iron   3. Vitamin/mineral supplementation for GBS  - MVI w/Iron BID  - Calcium w/Vit D (2000 IU Vit D/1200 mg Ca) divided into 2 doses.  Calcium needs to be taken separately from MVI w/Iron  - B12:WNL (8/16)   4. Weekly weight  - monitor I/O      Nutrition Status Classification: Moderate nutritional compromise       Dietary Orders   (From admission, onward)                 Start     Ordered    08/19/24 1158  Calorie count  Once         08/19/24 1157    08/16/24 1640  Adult Diet Regular; RD/LDN may adjust order  Diet effective now        References:    IDDSI Diet reference   Question Answer Comment   Diet Texture Regular    Delegation of Authority. Diet orders written by PA/Mehnaz may not be adjusted by RD/LDNs. RD/LDN may adjust order        08/16/24 1639                        Reason for Assessment  Reason For Assessment: physician consult  Diagnosis: other (see comments)    M[facility] Nutrition Screen Tool  Has patient lost weight without trying?: 0-->No  Has patient been eating poorly due to decreased appetite?: 1-->Yes  MST Nutrition Screen Score: 1    Nutrition/Diet History  Diet Prior to Admission:  "regular  Appetite Prior to Admission: Poor-0-25%  Intake (%): 0%  Meal/Snack Patterns: 1 meal daily  Vitamin/Mineral/Herbal Supplements: does not take Rx vit/min for GBS  Functional Status: able to prepare meals, able to purchase food, ambulatory  Food Allergies:  (aspartame)  Factors Affecting Nutritional Intake: anorexia, eating disorder(s) (abdominal pain)    Physical Findings  Overall Physical Appearance: obese (pale)  Gastrointestinal: nausea (GBS; pta)  Last Bowel Movement: 08/19/24  Oral/Mouth Cavity:  (endentulous)  Skin: intact    Last Bowel Movement: 08/19/24    Nutrition Order  Nutrition Order: does not meet nutritional requirements  Nutrition Order Comments: NPO    Anthropometrics  Height: 165.1 cm (5' 5\")    Weights (last 7 days)       Date/Time Weight Drug Calculation Weight (Dosing Weight)    08/19/24 0626 104 kg (229 lb 8 oz) --    08/18/24 0312 100 kg (220 lb 14.4 oz) --    08/17/24 0600 94.3 kg (207 lb 14.3 oz) --    08/16/24 1245 93.9 kg (207 lb 0.2 oz) 93.9 kg (207 lb 0.2 oz)    08/15/24 1940 92.7 kg (204 lb 5.9 oz) --            Admit Weight  Admit Weight: 92.7 kg (204 lb 5.9 oz)    Current Weight  Weight Method: Bed scale  Weight: 104 kg (229 lb 8 oz)    Ideal Body Weight (IBW)  Ideal Body Weight (IBW) (kg): 57.29  % Ideal Body Weight: 163.9         Body Mass Index (BMI)  BMI (Calculated): 38.2  BMI Assessment: BMI 30-34.9: obesity grade I                        Labs/Procedures/Meds  Lab Results Reviewed: reviewed      Results from last 7 days   Lab Units 08/19/24  0511 08/18/24  0310 08/17/24  0553   SODIUM mEQ/L 142 141 141   POTASSIUM mEQ/L 5.5* 3.7 3.4*   CHLORIDE mEQ/L 118* 117* 113*   CO2 mEQ/L 17* 21 23   BUN mg/dL 2* 2* 3*   CREATININE mg/dL 0.7 0.6 0.8   GLUCOSE mg/dL 77 77 86   CALCIUM mg/dL 8.0* 7.5* 7.9*      Results from last 7 days   Lab Units 08/15/24  1950   ALK PHOS IU/L 240*   BILIRUBIN TOTAL mg/dL 0.4   ALBUMIN g/dL 4.0   ALT IU/L 38   AST IU/L 61*          No results found " "for: \"HGBA1C\"  Lab Results   Component Value Date    DIGKONRX25 239 08/16/2024     Lab Results   Component Value Date    CALCIUM 8.0 (L) 08/19/2024    PHOS 2.6 08/16/2024     Results from last 7 days   Lab Units 08/19/24  1101 08/18/24  0310 08/17/24  0553   WBC K/uL 4.32 3.31* 4.98   HEMOGLOBIN g/dL 10.8* 10.1* 11.5*   HEMATOCRIT % 33.1* 31.3* 35.6   PLATELETS K/uL 122* 111* 135*               Results from last 7 days   Lab Units 08/16/24  1502 08/16/24  0528 08/15/24  1950   MAGNESIUM mg/dL 2.9* 1.7* 1.7*          Diagnostic Tests/Procedures  Diagnostic Test/Procedure Reviewed: reviewed    Medications  Pertinent Medications Reviewed: reviewed   atorvastatin  20 mg oral Nightly    cholecalciferol (vitamin D3)  125 mcg oral BID    enoxaparin  40 mg subcutaneous Daily (6p)    ferrous sulfate  325 mg oral BID with meals    fludrocortisone  0.1 mg oral Daily    FLUoxetine  20 mg oral Daily    gabapentin  400 mg oral TID    levothyroxine  50 mcg oral Daily (6:30a)    midodrine  10 mg oral TID AC    multivitamin  1 tablet oral BID    nystatin   Topical BID    pantoprazole  20 mg oral Daily    topiramate  50 mg oral BID      sodium chloride 0.9 %   100 mL/hr at 08/21/24 0600       Estimated/Assessed Needs  Additional Documentation: Calorie Requirements (Group), Fluid Requirements (Group), Protein Requirements (Group)    Calorie Requirements  Estimated kCal Needs: Actual Body Weight  Estimated Calorie Need Method: kcal/kg  Calorie/kg Recommended: 11-14  Calorie Recommendations: 3376-7777      Protein Requirements  Recommended Dosing Weight (Estimated Protein Needs): Ideal Body Weight  Protein Recommendations:  (1.5-2)    Fluid Requirements  Fluid Recommendation (mL): 30-35ml  Recommended Fluid Needs Dosing Weight: Ideal Body Weight  Fluid Requirements (mL/day): 8017-3400  Lillie Method (over 20 kg): 5564             PES  Statement: PES Statement  Nutrition Diagnosis: Inadequate Oral Intake  Related To:: " Illness/Eating d/o  As Evidenced By:: multiple electrolyte derangements  Nutritional Needs Met?: No                                   Clinical Comments:       45 y/o female admitted from home s/p fall, found to have multiple electrolyte derangements.  PMH includes depression, previous SA, GBS, unspecified eating d/o. Presents with hypotension and AMS. Admitted to ICU with acidemia, possibly 2/2 prolonged loose stools. Seen by GI, no current reason for loose stools. C diff negative, stool culture negative. Polypharmacy thought to be contributing. Downgraded to floors, calorie count x 3 days with results as above 2/2 hx ED.  Currently on regular diet, allergies to shellfish and aspartame noted.     Patient on calorie count day 3 today. Intakes improving x past 2 days. Discharge order active with plans to go home at 5 pm. Nutrition will follow up if there is a change in dc plans.       Goals: PO intakes >/= 50% x 7 days      Monitor and Evaluation:   Diet tolerance  Labs/lytes  Weight  I/O  Medical course      Date: 08/21/24  Signature: MONICO Root

## 2024-08-21 NOTE — DISCHARGE SUMMARY
Hospital Medicine Service -  Inpatient Discharge Summary        BRIEF OVERVIEW   Admitting Provider: Ann-Marie Knox DO  Attending Provider: Dillon Ochoa MD Attending phys phone: [phone]    PCP: Emily Del Valle CRNP [phone]    Admission Date: 8/15/2024  Discharge Date: 8/21/2024     DISCHARGE DIAGNOSES      Primary Discharge Diagnosis  Hypotension    Secondary Discharge Diagnoses  Active Hospital Problems    Diagnosis Date Noted    Hypotension 08/16/2024     Priority: High    Acute metabolic encephalopathy 08/18/2024    Depression 08/16/2024    Anxiety 08/16/2024    Eating disorder 08/16/2024    Hypothyroidism 08/16/2024    PTSD (post-traumatic stress disorder) 08/16/2024    Seizures (CMS/HCC) 08/16/2024    Hypokalemia 08/16/2024    Insomnia 08/16/2024    Hypercapnia 08/16/2024    Metabolic acidosis, normal anion gap (NAG) 08/16/2024    At risk for polypharmacy 08/16/2024      Resolved Hospital Problems   No resolved problems to display.     SUMMARY OF HOSPITALIZATION      Presenting Problem/History of Present Illness  Hypokalemia [E87.6]  Hypercapnia [R06.89]  Hypotension [I95.9]  Hypotension due to drugs [I95.2]  Fall, initial encounter [W19.XXXA]  Altered mental status, unspecified altered mental status type [R41.82]    This is a 44 y.o. year-old female admitted on 8/15/2024 with Hypokalemia [E87.6]  Hypercapnia [R06.89]  Hypotension [I95.9]  Hypotension due to drugs [I95.2]  Fall, initial encounter [W19.XXXA]  Altered mental status, unspecified altered mental status type [R41.82].    Hospital Course  Patient was admitted to ICU for unstable V/S, Low BP, Metabolic disturbance and somnolent status. No clinical evidence of Infection or Seizure activities. She was evaluated by GI service regarding her laxative abuse and diarrhea and psychiatrist regarding polypharmacy rist and multiple sedatives use. After being  transferred out of ICU, her BP still at low 80s without dizziness although she is  wheelchair bounded.  After IVF hydration, increased Midodrine to 10 mg TID and adding Florinef 0.1 mg daily, her BP has been 90s/ 50s which is her chronic BP level according to the records from [facility] last 2 years. She has been only on low dose of Ativan 0.5 mg prn for anxiety.     She has been eating and drinking well so far.     She will be home with her care give and Home care arranged. She will be followed by PCP for her BP and medication.      Exam on Day of Discharge  Physical Exam    Consults During Admission  IP CONSULT TO PSYCHIATRY  IP CONSULT TO NUTRITION SERVICES  IP CONSULT TO GASTROENTEROLOGY    PROCEDURES / LABS / IMAGING      Operative Procedures      Pertinent Labs  Lab Results   Component Value Date    GLUCOSE 77 08/19/2024    CALCIUM 8.0 (L) 08/19/2024     08/19/2024    K 5.5 (H) 08/19/2024    CO2 17 (L) 08/19/2024     (H) 08/19/2024    BUN 2 (L) 08/19/2024    CREATININE 0.7 08/19/2024     Lab Results   Component Value Date    WBC 4.32 08/19/2024    HGB 10.8 (L) 08/19/2024    HCT 33.1 (L) 08/19/2024    .8 (H) 08/19/2024     (L) 08/19/2024       Pertinent Imaging  CT ABDOMEN PELVIS WITHOUT IV CONTRAST    Result Date: 8/16/2024  IMPRESSION: There is liquid stool throughout the length of colon and possibly in the rectum, supporting diarrhea. The could be some thickening of the wall of the ascending colon and cecum concerning for colitis, but this is difficult to confirm on this exam. Mary-en-Y bariatric surgery noted. No bowel obstruction. Findings noted which suggests support periportal edema. Please correlate. *There is atrophy of the lower pole the left kidney. 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 The patient's urinary tract. Further investigated with CT reconstructions, as indicated. Suspected fibroid uterus. Markedly distended urinary bladder. *Epigastric region/defect noted, as described. Clinical correlation recommended. Comparison study:  None available. COMMENT:   Axial CT images of the abdomen and pelvis are completed without oral or intravenous contrast. Images are reconstructed in sagittal and coronal planes. CT DOSE:  One or more dose reduction techniques (e.g. automated exposure control, adjustment of the mA and/or kV according to patient size, use of iterative reconstruction technique) utilized for this examination. There is curvilinear volume loss in both lower lobes, more extensive on the left. No pleural fluid or consolidation. Unremarkable imaged heart and pericardium. Bowel/mesenteries: Mary-en-Y bariatric surgery. No abnormal bowel distention. There appears to the distal throughout the length of colon, and possibly in the rectum, supporting diarrhea but there is no discernible mural thickening distally. The could be minor thickening of the wall of the right colon on this is difficult to confirm. There is mild induration in the spine wall down mesenteric root as well as an increased number of small lymph nodes, nonspecific. Consider mesenteritis. Short appendix noted and unremarkable. Normal hepatic attenuation. No discernible intrahepatic mass on noncontrast imaging. Periportal edema is suspected. Gallbladder surgically absent normal caliber extrahepatic duct. Fatty atrophy of the pancreas appears slightly advanced for age. The spleen is normal in diameter, unremarkable. Unremarkable adrenal glands. KUB: Unremarkable right kidney. 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 There is atrophy and lower pole of the left kidney, possibly the result of chronic reflux. Markedly distended urinary bladder. No mural thickening or mass. No discernible urinary tract calculi. There is a 5 cm soft tissue attenuation mass arising from the right uterine body, suspected fibroid. No additional adnexal mass. Unremarkable aorta, IVC and their major branches, as demonstrated on this noncontrast study. There are no pathologically enlarged nodes in the  abdomen and pelvis. *There is tiny defect in the midline epigastric region and through which a curvilinear soft tissue attenuation structure passes. This could represent scarring from prior surgery. Herniation of a bowel loop is considered less likely given the configuration. Clinical evaluation recommended. There are early spondylitic changes in the lower dorsal spine. The osseous structures are otherwise unremarkable.    X-RAY CHEST 1 VIEW    Result Date: 8/16/2024  IMPRESSION: Minimal bibasilar atelectasis..     CT CERVICAL SPINE WITHOUT IV CONTRAST    Result Date: 8/15/2024  IMPRESSION: No fracture    CT HEAD WITHOUT IV CONTRAST    Result Date: 8/15/2024  IMPRESSION: No acute intracranial traumatic injury     OUTPATIENT  FOLLOW-UP / REFERRALS / PENDING TESTS        Outpatient Follow-Up Appointments  Encounter Information    This patient does not currently have any appointments scheduled.         Referrals  No orders of the defined types were placed in this encounter.      Test Results Pending at Discharge  Unresulted Labs (From admission, onward)       Start     Ordered    08/16/24 0922  Drug screen panel, urine  Once        Question:  Release to patient  Answer:  Immediate    08/16/24 0922    08/15/24 1949  [place] Draw Panel  STAT        Question Answer Comment   Red Top 1 Label    Gold Top 1 Label    Light Blue 1 Label    Light Green Top No Labels    Lavender Top No Labels    Pink Top No Labels    Yellow - Urine Tall No Labels    Urine Culture Tube No Labels    Release to patient Immediate        08/15/24 1948                    Important Issues to Address in Follow-Up      DISCHARGE DISPOSITION      Disposition: Home     Code Status At Discharge: Full Code    Physician Order for Life-Sustaining Treatment Document Status        No documents found

## 2024-08-21 NOTE — PLAN OF CARE
Care Coordination Discharge Plan Summary    Admission Assessment Summary    General Information  Readmission Within the last 30 days: no previous admission in last 30 days  Does patient have a : No  Patient-Specific Goals (include timeframe):      Living Arrangements  Arrived From: home  Current Living Arrangements: apartment, other (see comments) (Pt lives in [employer] housing)  People in Home: alone  Home Accessibility: elevator accessible  Living Arrangement Comments:      Social Determinants of Health - Screenings  Housing Stability  In the last 12 months, was there a time when you were not able to pay the mortgage or rent on time?: No  In the last 12 months, was there a time when you did not have a steady place to sleep or slept in a shelter (including now)?: No  Utility Access  In the past 12 months has the electric, gas, oil, or water company threatened to shut off services in your home?: No  Transportation Needs  In the past 12 months, has lack of transportation kept you from medical appointments or from getting medications?: No  In the past 12 months, has lack of transportation kept you from meetings, work, or from getting things needed for daily living?: No    Functional Status Prior to Admission  Assistive Device/Animal Currently Used at Home: shower chair, raised toilet, walker, 4-wheeled  Functional Status Comments:    IADL Comments:      Discharge Needs Assessment    Concerns to be Addressed: discharge planning, care coordination/care conferences  Current Discharge Risk: lives alone  Anticipated Changes Related to Illness: inability to care for self    Discharge Plan Summary    Patient Choice          Concerns / Comments: Met with pt at the bedside, discussed discharge plan. [facility] arranged for [facility]  at 1700 on 8/21. Confirmed with pt her aide will meet her at the apartment with her keys. HMS and RN notified of  time.    Discharge Plan:  Disposition/Destination: Home  /          Connection to Community     Community Resources:      Discharge Transportation:  Is Out of Hospital DNR needed at Discharge: no  Does patient need discharge transport? Yes  Discharge Transportation Vendor: Bill ([phone])  Type of Transportation: basic life support  What day is the transport expected?: 08/21/24  What time is the transport expected?: 1700

## 2024-08-21 NOTE — PLAN OF CARE
Problem: Adult Inpatient Plan of Care  Goal: Plan of Care Review  Flowsheets (Taken 8/21/2024 0207)  Progress: no change  Outcome Evaluation: Pt AAOx4. BP remains low. IV fluids running without difficulty. Voiding in bedpan. C/o abdominal pain. Resting in bed. Call bell within reach.  Plan of Care Reviewed With: patient   Plan of Care Review  Plan of Care Reviewed With: patient  Progress: no change  Outcome Evaluation: Pt AAOx4. BP remains low. IV fluids running without difficulty. Voiding in bedpan. C/o abdominal pain. Resting in bed. Call bell within reach.

## 2024-09-28 ENCOUNTER — HOSPITAL ENCOUNTER (INPATIENT)
Facility: HOSPITAL | Age: 44
LOS: 4 days | Discharge: HOME HEALTH | DRG: 872 | End: 2024-10-03
Attending: EMERGENCY MEDICINE | Admitting: INTERNAL MEDICINE
Payer: COMMERCIAL

## 2024-09-28 DIAGNOSIS — I95.9 HYPOTENSION, UNSPECIFIED HYPOTENSION TYPE: Primary | ICD-10-CM

## 2024-09-28 DIAGNOSIS — R00.1 BRADYCARDIA: ICD-10-CM

## 2024-09-28 DIAGNOSIS — F50.019 ANOREXIA NERVOSA, RESTRICTING TYPE: Chronic | ICD-10-CM

## 2024-09-28 DIAGNOSIS — E87.6 HYPOKALEMIA: ICD-10-CM

## 2024-09-28 DIAGNOSIS — N39.0 URINARY TRACT INFECTION WITHOUT HEMATURIA, SITE UNSPECIFIED: ICD-10-CM

## 2024-09-28 PROBLEM — F13.20 SEVERE BENZODIAZEPINE USE DISORDER (CMS/HCC): Status: RESOLVED | Noted: 2024-06-19 | Resolved: 2024-09-28

## 2024-09-28 PROBLEM — E83.42 HYPOMAGNESEMIA: Status: ACTIVE | Noted: 2024-09-28

## 2024-09-28 PROBLEM — G89.4 CHRONIC PAIN DISORDER: Status: ACTIVE | Noted: 2024-09-14

## 2024-09-28 PROBLEM — F19.982: Status: ACTIVE | Noted: 2024-06-19

## 2024-09-28 PROBLEM — F10.10 ALCOHOL ABUSE, CONTINUOUS: Status: ACTIVE | Noted: 2024-06-19

## 2024-09-28 PROBLEM — F13.20 SEVERE BENZODIAZEPINE USE DISORDER (CMS/HCC): Status: ACTIVE | Noted: 2024-06-19

## 2024-09-28 PROBLEM — Z76.5 DRUG-SEEKING BEHAVIOR: Status: ACTIVE | Noted: 2024-06-19

## 2024-09-28 PROBLEM — Z72.0 CURRENT NICOTINE USE: Status: ACTIVE | Noted: 2024-09-28

## 2024-09-28 PROBLEM — R26.2 AMBULATORY DYSFUNCTION: Status: ACTIVE | Noted: 2024-06-19

## 2024-09-28 PROBLEM — F32.9 MAJOR DEPRESSION, MELANCHOLIC TYPE: Status: ACTIVE | Noted: 2023-12-13

## 2024-09-28 PROBLEM — F31.81: Status: ACTIVE | Noted: 2020-01-23

## 2024-09-28 PROBLEM — R26.9 NEUROLOGIC GAIT DYSFUNCTION: Status: ACTIVE | Noted: 2024-09-14

## 2024-09-28 PROBLEM — F33.9 MAJOR DEPRESSIVE DISORDER, RECURRENT EPISODE (CMS/HCC): Status: ACTIVE | Noted: 2022-08-25

## 2024-09-28 PROBLEM — A41.9 SEPSIS DUE TO URINARY TRACT INFECTION  (CMS/HCC): Status: ACTIVE | Noted: 2024-09-28

## 2024-09-28 PROBLEM — G62.9 NEUROPATHY: Status: ACTIVE | Noted: 2024-06-19

## 2024-09-28 PROBLEM — E78.2 HYPERLIPIDEMIA, MIXED: Status: ACTIVE | Noted: 2024-02-07

## 2024-09-28 PROBLEM — K21.9 GERD WITHOUT ESOPHAGITIS: Status: ACTIVE | Noted: 2024-06-19

## 2024-09-28 PROBLEM — G43.019 INTRACTABLE MIGRAINE WITHOUT AURA AND WITHOUT STATUS MIGRAINOSUS: Status: ACTIVE | Noted: 2024-09-25

## 2024-09-28 PROBLEM — E11.9 NON-INSULIN DEPENDENT TYPE 2 DIABETES MELLITUS (CMS/HCC): Status: ACTIVE | Noted: 2020-01-23

## 2024-09-28 LAB
ALBUMIN SERPL-MCNC: 3.5 G/DL (ref 3.5–5.7)
ALP SERPL-CCNC: 117 IU/L (ref 34–125)
ALT SERPL-CCNC: 8 IU/L (ref 7–52)
ANION GAP SERPL CALC-SCNC: 8 MEQ/L (ref 3–15)
ANION GAP SERPL CALC-SCNC: 9 MEQ/L (ref 3–15)
AST SERPL-CCNC: 12 IU/L (ref 13–39)
BACTERIA URNS QL MICRO: 2 /HPF
BASOPHILS # BLD: 0.02 K/UL (ref 0.01–0.1)
BASOPHILS NFR BLD: 0.2 %
BILIRUB SERPL-MCNC: 0.6 MG/DL (ref 0.3–1.2)
BILIRUB UR QL STRIP.AUTO: NEGATIVE MG/DL
BUN SERPL-MCNC: 7 MG/DL (ref 7–25)
BUN SERPL-MCNC: 9 MG/DL (ref 7–25)
CALCIUM SERPL-MCNC: 8 MG/DL (ref 8.6–10.3)
CALCIUM SERPL-MCNC: 8.5 MG/DL (ref 8.6–10.3)
CHLORIDE SERPL-SCNC: 106 MEQ/L (ref 98–107)
CHLORIDE SERPL-SCNC: 111 MEQ/L (ref 98–107)
CLARITY UR REFRACT.AUTO: ABNORMAL
CO2 SERPL-SCNC: 20 MEQ/L (ref 21–31)
CO2 SERPL-SCNC: 22 MEQ/L (ref 21–31)
COLOR UR AUTO: YELLOW
CREAT SERPL-MCNC: 0.7 MG/DL (ref 0.6–1.2)
CREAT SERPL-MCNC: 1 MG/DL (ref 0.6–1.2)
D DIMER PPP IA.FEU-MCNC: 0.74 UG/ML FEU (ref 0–0.5)
DIFFERENTIAL METHOD BLD: ABNORMAL
EGFRCR SERPLBLD CKD-EPI 2021: >60 ML/MIN/1.73M*2
EGFRCR SERPLBLD CKD-EPI 2021: >60 ML/MIN/1.73M*2
EOSINOPHIL # BLD: 0.01 K/UL (ref 0.04–0.36)
EOSINOPHIL NFR BLD: 0.1 %
ERYTHROCYTE [DISTWIDTH] IN BLOOD BY AUTOMATED COUNT: 15.2 % (ref 11.7–14.4)
ETHANOL SERPL-MCNC: <10 MG/DL
FLUAV RNA SPEC QL NAA+PROBE: NEGATIVE
FLUBV RNA SPEC QL NAA+PROBE: NEGATIVE
GLUCOSE SERPL-MCNC: 102 MG/DL (ref 70–99)
GLUCOSE SERPL-MCNC: 96 MG/DL (ref 70–99)
GLUCOSE UR STRIP.AUTO-MCNC: NEGATIVE MG/DL
HCT VFR BLD AUTO: 34.9 % (ref 35–45)
HGB BLD-MCNC: 11.7 G/DL (ref 11.8–15.7)
HGB UR QL STRIP.AUTO: NEGATIVE
HYALINE CASTS #/AREA URNS LPF: ABNORMAL /LPF
IMM GRANULOCYTES # BLD AUTO: 0.02 K/UL (ref 0–0.08)
IMM GRANULOCYTES NFR BLD AUTO: 0.2 %
KETONES UR STRIP.AUTO-MCNC: NEGATIVE MG/DL
LACTATE SERPL-SCNC: 1 MMOL/L (ref 0.4–2)
LACTATE SERPL-SCNC: 1.2 MMOL/L (ref 0.4–2)
LEUKOCYTE ESTERASE UR QL STRIP.AUTO: 3
LIPASE SERPL-CCNC: 14 U/L (ref 11–82)
LYMPHOCYTES # BLD: 0.91 K/UL (ref 1.2–3.5)
LYMPHOCYTES NFR BLD: 11.2 %
MAGNESIUM SERPL-MCNC: 1.6 MG/DL (ref 1.8–2.5)
MAGNESIUM SERPL-MCNC: 1.6 MG/DL (ref 1.8–2.5)
MCH RBC QN AUTO: 34.6 PG (ref 28–33.2)
MCHC RBC AUTO-ENTMCNC: 33.5 G/DL (ref 32.2–35.5)
MCV RBC AUTO: 103.3 FL (ref 83–98)
MONOCYTES # BLD: 1.01 K/UL (ref 0.28–0.8)
MONOCYTES NFR BLD: 12.4 %
MUCOUS THREADS URNS QL MICRO: 3 /LPF
NEUTROPHILS # BLD: 6.17 K/UL (ref 1.7–7)
NEUTS SEG NFR BLD: 75.9 %
NITRITE UR QL STRIP.AUTO: POSITIVE
NRBC BLD-RTO: 0 %
PDW BLD AUTO: 11.9 FL (ref 9.4–12.3)
PH UR STRIP.AUTO: 6.5 [PH]
PLATELET # BLD AUTO: 135 K/UL (ref 150–369)
POTASSIUM SERPL-SCNC: 3.1 MEQ/L (ref 3.5–5.1)
POTASSIUM SERPL-SCNC: 3.7 MEQ/L (ref 3.5–5.1)
PROT SERPL-MCNC: 5.8 G/DL (ref 6–8.2)
PROT UR QL STRIP.AUTO: 1
RBC # BLD AUTO: 3.38 M/UL (ref 3.93–5.22)
RBC #/AREA URNS HPF: ABNORMAL /HPF
RSV RNA SPEC QL NAA+PROBE: NEGATIVE
SARS-COV-2 RNA RESP QL NAA+PROBE: NEGATIVE
SODIUM SERPL-SCNC: 137 MEQ/L (ref 136–145)
SODIUM SERPL-SCNC: 139 MEQ/L (ref 136–145)
SP GR UR REFRACT.AUTO: 1.02
SQUAMOUS URNS QL MICRO: ABNORMAL /HPF
TROPONIN I SERPL HS-MCNC: 2.8 PG/ML
UROBILINOGEN UR STRIP-ACNC: 4 EU/DL
WBC # BLD AUTO: 8.14 K/UL (ref 3.8–10.5)
WBC #/AREA URNS HPF: ABNORMAL /HPF

## 2024-09-28 PROCEDURE — 85025 COMPLETE CBC W/AUTO DIFF WBC: CPT | Performed by: EMERGENCY MEDICINE

## 2024-09-28 PROCEDURE — 63700000 HC SELF-ADMINISTRABLE DRUG

## 2024-09-28 PROCEDURE — 63600000 HC DRUGS/DETAIL CODE

## 2024-09-28 PROCEDURE — 85379 FIBRIN DEGRADATION QUANT: CPT | Performed by: HOSPITALIST

## 2024-09-28 PROCEDURE — 87086 URINE CULTURE/COLONY COUNT: CPT

## 2024-09-28 PROCEDURE — 87077 CULTURE AEROBIC IDENTIFY: CPT

## 2024-09-28 PROCEDURE — 96367 TX/PROPH/DG ADDL SEQ IV INF: CPT

## 2024-09-28 PROCEDURE — 96366 THER/PROPH/DIAG IV INF ADDON: CPT

## 2024-09-28 PROCEDURE — 84484 ASSAY OF TROPONIN QUANT: CPT | Performed by: HOSPITALIST

## 2024-09-28 PROCEDURE — 63700000 HC SELF-ADMINISTRABLE DRUG: Performed by: EMERGENCY MEDICINE

## 2024-09-28 PROCEDURE — 25800000 HC PHARMACY IV SOLUTIONS

## 2024-09-28 PROCEDURE — 80048 BASIC METABOLIC PNL TOTAL CA: CPT

## 2024-09-28 PROCEDURE — 93005 ELECTROCARDIOGRAM TRACING: CPT

## 2024-09-28 PROCEDURE — G0378 HOSPITAL OBSERVATION PER HR: HCPCS

## 2024-09-28 PROCEDURE — 80053 COMPREHEN METABOLIC PANEL: CPT | Performed by: EMERGENCY MEDICINE

## 2024-09-28 PROCEDURE — 83605 ASSAY OF LACTIC ACID: CPT | Performed by: HOSPITALIST

## 2024-09-28 PROCEDURE — 36415 COLL VENOUS BLD VENIPUNCTURE: CPT | Performed by: EMERGENCY MEDICINE

## 2024-09-28 PROCEDURE — 87186 SC STD MICRODIL/AGAR DIL: CPT

## 2024-09-28 PROCEDURE — 83735 ASSAY OF MAGNESIUM: CPT

## 2024-09-28 PROCEDURE — 25800000 HC PHARMACY IV SOLUTIONS: Performed by: HOSPITALIST

## 2024-09-28 PROCEDURE — 81003 URINALYSIS AUTO W/O SCOPE: CPT

## 2024-09-28 PROCEDURE — G0480 DRUG TEST DEF 1-7 CLASSES: HCPCS

## 2024-09-28 PROCEDURE — 63600000 HC DRUGS/DETAIL CODE: Mod: JZ

## 2024-09-28 PROCEDURE — 96361 HYDRATE IV INFUSION ADD-ON: CPT

## 2024-09-28 PROCEDURE — 99222 1ST HOSP IP/OBS MODERATE 55: CPT | Performed by: HOSPITALIST

## 2024-09-28 PROCEDURE — 96372 THER/PROPH/DIAG INJ SC/IM: CPT

## 2024-09-28 PROCEDURE — 96365 THER/PROPH/DIAG IV INF INIT: CPT

## 2024-09-28 PROCEDURE — 83690 ASSAY OF LIPASE: CPT | Performed by: EMERGENCY MEDICINE

## 2024-09-28 PROCEDURE — 87637 SARSCOV2&INF A&B&RSV AMP PRB: CPT

## 2024-09-28 PROCEDURE — 83605 ASSAY OF LACTIC ACID: CPT

## 2024-09-28 PROCEDURE — 99285 EMERGENCY DEPT VISIT HI MDM: CPT | Mod: 25

## 2024-09-28 RX ORDER — MIDODRINE HYDROCHLORIDE 5 MG/1
10 TABLET ORAL
Status: DISCONTINUED | OUTPATIENT
Start: 2024-09-28 | End: 2024-10-02

## 2024-09-28 RX ORDER — LEVOTHYROXINE SODIUM 50 UG/1
50 TABLET ORAL
Status: DISCONTINUED | OUTPATIENT
Start: 2024-09-29 | End: 2024-09-29

## 2024-09-28 RX ORDER — DEXTROSE 40 %
15-30 GEL (GRAM) ORAL AS NEEDED
Status: DISCONTINUED | OUTPATIENT
Start: 2024-09-28 | End: 2024-10-03 | Stop reason: HOSPADM

## 2024-09-28 RX ORDER — ZOLPIDEM TARTRATE 10 MG/1
10 TABLET ORAL NIGHTLY
Status: ON HOLD | COMMUNITY
End: 2025-02-18

## 2024-09-28 RX ORDER — ATORVASTATIN CALCIUM 20 MG/1
20 TABLET, FILM COATED ORAL EVERY MORNING
Status: DISCONTINUED | OUTPATIENT
Start: 2024-09-29 | End: 2024-10-03 | Stop reason: HOSPADM

## 2024-09-28 RX ORDER — DEXTROSE 40 %
15-30 GEL (GRAM) ORAL AS NEEDED
Status: DISCONTINUED | OUTPATIENT
Start: 2024-09-28 | End: 2024-09-28 | Stop reason: SDUPTHER

## 2024-09-28 RX ORDER — ACETAMINOPHEN 325 MG/1
650 TABLET ORAL EVERY 4 HOURS PRN
Status: DISCONTINUED | OUTPATIENT
Start: 2024-09-28 | End: 2024-10-03 | Stop reason: HOSPADM

## 2024-09-28 RX ORDER — IBUPROFEN 200 MG
16-32 TABLET ORAL AS NEEDED
Status: DISCONTINUED | OUTPATIENT
Start: 2024-09-28 | End: 2024-09-28 | Stop reason: SDUPTHER

## 2024-09-28 RX ORDER — ACETAMINOPHEN 325 MG/1
975 TABLET ORAL ONCE
Status: COMPLETED | OUTPATIENT
Start: 2024-09-28 | End: 2024-09-28

## 2024-09-28 RX ORDER — TOPIRAMATE 25 MG/1
50 TABLET ORAL 2 TIMES DAILY
Status: DISCONTINUED | OUTPATIENT
Start: 2024-09-28 | End: 2024-10-03 | Stop reason: HOSPADM

## 2024-09-28 RX ORDER — POTASSIUM CHLORIDE 20 MEQ/1
40 TABLET, EXTENDED RELEASE ORAL AS NEEDED
Status: DISCONTINUED | OUTPATIENT
Start: 2024-09-28 | End: 2024-10-03 | Stop reason: HOSPADM

## 2024-09-28 RX ORDER — DEXTROSE 50 % IN WATER (D50W) INTRAVENOUS SYRINGE
25 AS NEEDED
Status: DISCONTINUED | OUTPATIENT
Start: 2024-09-28 | End: 2024-10-03 | Stop reason: HOSPADM

## 2024-09-28 RX ORDER — SODIUM CHLORIDE 9 MG/ML
INJECTION, SOLUTION INTRAVENOUS CONTINUOUS
Status: DISCONTINUED | OUTPATIENT
Start: 2024-09-28 | End: 2024-09-28

## 2024-09-28 RX ORDER — LANOLIN ALCOHOL/MO/W.PET/CERES
400 CREAM (GRAM) TOPICAL ONCE
Status: COMPLETED | OUTPATIENT
Start: 2024-09-28 | End: 2024-09-28

## 2024-09-28 RX ORDER — MIDODRINE HYDROCHLORIDE 5 MG/1
5 TABLET ORAL
Status: DISCONTINUED | OUTPATIENT
Start: 2024-09-28 | End: 2024-09-28

## 2024-09-28 RX ORDER — FLUOXETINE HYDROCHLORIDE 20 MG/1
20 CAPSULE ORAL DAILY
Status: DISCONTINUED | OUTPATIENT
Start: 2024-09-29 | End: 2024-10-03 | Stop reason: HOSPADM

## 2024-09-28 RX ORDER — PRAZOSIN HYDROCHLORIDE 5 MG/1
5 CAPSULE ORAL NIGHTLY
COMMUNITY
End: 2024-10-03 | Stop reason: HOSPADM

## 2024-09-28 RX ORDER — SODIUM CHLORIDE, SODIUM LACTATE, POTASSIUM CHLORIDE, CALCIUM CHLORIDE 600; 310; 30; 20 MG/100ML; MG/100ML; MG/100ML; MG/100ML
INJECTION, SOLUTION INTRAVENOUS CONTINUOUS
Status: ACTIVE | OUTPATIENT
Start: 2024-09-29 | End: 2024-09-30

## 2024-09-28 RX ORDER — DEXTROSE 50 % IN WATER (D50W) INTRAVENOUS SYRINGE
25 AS NEEDED
Status: DISCONTINUED | OUTPATIENT
Start: 2024-09-28 | End: 2024-09-28 | Stop reason: SDUPTHER

## 2024-09-28 RX ORDER — NITROGLYCERIN 0.4 MG/1
0.4 TABLET SUBLINGUAL EVERY 5 MIN PRN
Status: DISCONTINUED | OUTPATIENT
Start: 2024-09-28 | End: 2024-10-03 | Stop reason: HOSPADM

## 2024-09-28 RX ORDER — ZOLPIDEM TARTRATE 5 MG/1
5 TABLET ORAL ONCE
Status: COMPLETED | OUTPATIENT
Start: 2024-09-28 | End: 2024-09-29

## 2024-09-28 RX ORDER — FAMOTIDINE 10 MG/ML
20 INJECTION INTRAVENOUS
Status: DISPENSED | OUTPATIENT
Start: 2024-09-28 | End: 2024-09-29

## 2024-09-28 RX ORDER — MIDODRINE HYDROCHLORIDE 5 MG/1
10 TABLET ORAL ONCE
Status: COMPLETED | OUTPATIENT
Start: 2024-09-28 | End: 2024-09-28

## 2024-09-28 RX ORDER — TOPIRAMATE 25 MG/1
50 TABLET ORAL EVERY 12 HOURS
Status: DISCONTINUED | OUTPATIENT
Start: 2024-09-28 | End: 2024-09-28

## 2024-09-28 RX ORDER — HEPARIN SODIUM 5000 [USP'U]/ML
5000 INJECTION, SOLUTION INTRAVENOUS; SUBCUTANEOUS EVERY 8 HOURS
Status: DISCONTINUED | OUTPATIENT
Start: 2024-09-28 | End: 2024-10-03 | Stop reason: HOSPADM

## 2024-09-28 RX ORDER — LORAZEPAM 0.5 MG/1
0.25 TABLET ORAL
COMMUNITY

## 2024-09-28 RX ORDER — GABAPENTIN 400 MG/1
400 CAPSULE ORAL 4 TIMES DAILY
Status: DISCONTINUED | OUTPATIENT
Start: 2024-09-28 | End: 2024-10-03 | Stop reason: HOSPADM

## 2024-09-28 RX ORDER — IBUPROFEN 200 MG
16-32 TABLET ORAL AS NEEDED
Status: DISCONTINUED | OUTPATIENT
Start: 2024-09-28 | End: 2024-10-03 | Stop reason: HOSPADM

## 2024-09-28 RX ORDER — PANTOPRAZOLE SODIUM 20 MG/1
20 TABLET, DELAYED RELEASE ORAL DAILY
Status: DISCONTINUED | OUTPATIENT
Start: 2024-09-28 | End: 2024-10-03 | Stop reason: HOSPADM

## 2024-09-28 RX ORDER — METHOCARBAMOL 500 MG/1
500 TABLET, FILM COATED ORAL 3 TIMES DAILY
COMMUNITY
End: 2024-09-28 | Stop reason: ENTERED-IN-ERROR

## 2024-09-28 RX ORDER — POTASSIUM CHLORIDE 20 MEQ/1
40 TABLET, EXTENDED RELEASE ORAL ONCE
Status: COMPLETED | OUTPATIENT
Start: 2024-09-28 | End: 2024-09-28

## 2024-09-28 RX ORDER — POTASSIUM CHLORIDE 20 MEQ/1
20 TABLET, EXTENDED RELEASE ORAL AS NEEDED
Status: DISCONTINUED | OUTPATIENT
Start: 2024-09-28 | End: 2024-10-03 | Stop reason: HOSPADM

## 2024-09-28 RX ORDER — PREDNISONE 50 MG/1
50 TABLET ORAL
Status: COMPLETED | OUTPATIENT
Start: 2024-09-29 | End: 2024-09-29

## 2024-09-28 RX ORDER — LANOLIN ALCOHOL/MO/W.PET/CERES
400 CREAM (GRAM) TOPICAL 2 TIMES DAILY PRN
Status: DISCONTINUED | OUTPATIENT
Start: 2024-09-28 | End: 2024-10-03 | Stop reason: HOSPADM

## 2024-09-28 RX ORDER — TRAZODONE HYDROCHLORIDE 50 MG/1
100-200 TABLET ORAL NIGHTLY
Status: DISCONTINUED | OUTPATIENT
Start: 2024-09-28 | End: 2024-10-03 | Stop reason: HOSPADM

## 2024-09-28 RX ORDER — MIDODRINE HYDROCHLORIDE 5 MG/1
10 TABLET ORAL
Status: DISCONTINUED | OUTPATIENT
Start: 2024-09-28 | End: 2024-09-28

## 2024-09-28 RX ADMIN — Medication 400 MG: at 12:08

## 2024-09-28 RX ADMIN — PANTOPRAZOLE SODIUM 20 MG: 20 TABLET, DELAYED RELEASE ORAL at 17:56

## 2024-09-28 RX ADMIN — SODIUM CHLORIDE, POTASSIUM CHLORIDE, SODIUM LACTATE AND CALCIUM CHLORIDE 2000 ML: 600; 310; 30; 20 INJECTION, SOLUTION INTRAVENOUS at 13:07

## 2024-09-28 RX ADMIN — MIDODRINE HYDROCHLORIDE 10 MG: 5 TABLET ORAL at 14:39

## 2024-09-28 RX ADMIN — SODIUM CHLORIDE 500 ML: 9 INJECTION, SOLUTION INTRAVENOUS at 22:42

## 2024-09-28 RX ADMIN — SODIUM CHLORIDE 1000 ML: 9 INJECTION, SOLUTION INTRAVENOUS at 11:42

## 2024-09-28 RX ADMIN — HEPARIN SODIUM 5000 UNITS: 5000 INJECTION, SOLUTION INTRAVENOUS; SUBCUTANEOUS at 17:56

## 2024-09-28 RX ADMIN — ACETAMINOPHEN 325MG 650 MG: 325 TABLET ORAL at 20:43

## 2024-09-28 RX ADMIN — GABAPENTIN 400 MG: 400 CAPSULE ORAL at 17:56

## 2024-09-28 RX ADMIN — TOPIRAMATE 50 MG: 25 TABLET, FILM COATED ORAL at 20:43

## 2024-09-28 RX ADMIN — POTASSIUM CHLORIDE 40 MEQ: 1500 TABLET, EXTENDED RELEASE ORAL at 12:08

## 2024-09-28 RX ADMIN — SODIUM CHLORIDE: 9 INJECTION, SOLUTION INTRAVENOUS at 17:57

## 2024-09-28 RX ADMIN — SODIUM CHLORIDE: 9 INJECTION, SOLUTION INTRAVENOUS at 20:23

## 2024-09-28 RX ADMIN — GABAPENTIN 400 MG: 400 CAPSULE ORAL at 22:15

## 2024-09-28 RX ADMIN — CEFTRIAXONE SODIUM 1 G: 1 INJECTION, POWDER, FOR SOLUTION INTRAMUSCULAR; INTRAVENOUS at 13:07

## 2024-09-28 RX ADMIN — ACETAMINOPHEN 325MG 975 MG: 325 TABLET ORAL at 12:08

## 2024-09-28 RX ADMIN — MIDODRINE HYDROCHLORIDE 10 MG: 5 TABLET ORAL at 17:55

## 2024-09-28 ASSESSMENT — ENCOUNTER SYMPTOMS
ARTHRALGIAS: 0
VOMITING: 0
FLANK PAIN: 1
DIARRHEA: 0
SHORTNESS OF BREATH: 0
FEVER: 1
DYSURIA: 0
FREQUENCY: 1
ABDOMINAL PAIN: 1
HEMATURIA: 0
HEADACHES: 1
CHILLS: 0
RHINORRHEA: 0
NAUSEA: 0

## 2024-09-28 ASSESSMENT — COGNITIVE AND FUNCTIONAL STATUS - GENERAL
STANDING UP FROM CHAIR USING ARMS: 2 - A LOT
MOVING TO AND FROM BED TO CHAIR: 2 - A LOT
CLIMB 3 TO 5 STEPS WITH RAILING: 2 - A LOT
WALKING IN HOSPITAL ROOM: 2 - A LOT

## 2024-09-28 NOTE — H&P
Hospital Medicine Service -  History & Physical        CHIEF COMPLAINT     Chief Complaint   Patient presents with    Abdominal Pain        HISTORY OF PRESENT ILLNESS      44 y.o. female with a past medical history of anxiety, depression, eating disorder with chronic laxative use, seizure, PTSD, current every day smoker who was having fever and abdominal pain at home.  Urine was white blood count too numerous to count +3 leukocytes nitrates, febrile on admission 100.5 °F, and hypotensive.  Sepsis IV fluids treated in emergency department and patient was given IV ceftriaxone.    Patient at baseline has a rolling walker at home reports no falls since her most recent fall on 8/27 which at that time resulted in an ED visit.  Reports home health care 8 hours a day 9-4, as well as RN and PT. denies any current alcohol use at or medical marijuana use however admits to smoking 3/4 pack of cigarettes daily.    Patient to be admitted to PCU, baseline hypotension on midodrine at home, continue IV fluids, monitor and replete electrolytes, fall precautions, PT OT social work consult, CIWA protocol.  CODE STATUS reviewed with patient and patient is a full code.     PAST MEDICAL AND SURGICAL HISTORY      Past Medical History:   Diagnosis Date    Anxiety     Depression     Eating disorder        No past surgical history on file.    MEDICATIONS      Prior to Admission medications    Medication Sig Start Date End Date Taking? Authorizing Provider   atorvastatin (LIPITOR) 20 mg tablet Take 20 mg by mouth nightly.    Provider, Casi Guzman MD   cholecalciferol, vitamin D3, 5,000 unit (125 mcg) capsule Take 1 capsule (125 mcg total) by mouth 2 (two) times a day. 8/21/24 9/20/24  Dillon Ochoa MD   fludrocortisone (FLORINEF) 0.1 mg tablet Take 1 tablet (0.1 mg total) by mouth daily. 8/22/24 9/21/24  Dillon Ochoa MD   FLUoxetine (PROzac) 20 mg capsule Take 20 mg by mouth daily.    Provider, Casi Guzman MD   gabapentin (NEURONTIN)  400 mg capsule Take 400 mg by mouth 3 (three) times a day.    ProviderCasi MD   levothyroxine (SYNTHROID) 50 mcg tablet Take 50 mcg by mouth daily.    ProviderCasi MD   LORazepam (ATIVAN) 0.5 mg tablet Take 1 tablet (0.5 mg total) by mouth every 8 (eight) hours as needed for anxiety for up to 20 doses. 8/21/24   Dillon Ochoa MD   midodrine (PROAMATINE) 10 mg tablet Take 1 tablet (10 mg total) by mouth 3 (three) times a day before meals. 8/21/24 9/20/24  Dillon Ochoa MD   multivitamin (THERAGRAN) tablet Take 1 tablet by mouth daily. 8/21/24 9/20/24  Dillon Ochoa MD   omeprazole (PriLOSEC) 20 mg capsule Take 20 mg by mouth daily before breakfast. For heartburn    Casi Blue MD   topiramate (TOPAMAX) 50 mg tablet Take 50 mg by mouth 2 (two) times a day.    ProviderCasi MD   traZODone (DESYREL) 100 mg tablet Take 1-2 tablets by mouth nightly.    ProviderCasi MD       ALLERGIES      Aspartame, Bee venom protein (honey bee), Buspirone, Diphenhydramine, Haloperidol, Iodine, Penicillins, Sertraline, Diclofenac sodium, Hydroxyzine, Shellfish containing products, Sudafed cold-allergy, and Tetracyclines    FAMILY HISTORY      No family history on file.    SOCIAL HISTORY      Social History     Socioeconomic History    Marital status: Single     Spouse name: None    Number of children: None    Years of education: None    Highest education level: None   Tobacco Use    Smoking status: Every Day     Current packs/day: 0.25     Average packs/day: 0.3 packs/day for 20.1 years (5.0 ttl pk-yrs)     Types: Cigarettes     Start date: 8/16/2004    Smokeless tobacco: Current   Substance and Sexual Activity    Alcohol use: Not Currently    Drug use: Yes     Types: Marijuana     Social Drivers of Health     Food Insecurity: Patient Unable To Answer (8/16/2024)    Hunger Vital Sign     Worried About Running Out of Food in the Last Year: Patient unable to answer     Ran Out of  Food in the Last Year: Patient unable to answer   Transportation Needs: No Transportation Needs (8/19/2024)    PRAPARE - Transportation     Lack of Transportation (Medical): No     Lack of Transportation (Non-Medical): No   Housing Stability: Unknown (8/19/2024)    Housing Stability Vital Sign     Unable to Pay for Housing in the Last Year: No     Unstable Housing in the Last Year: No       REVIEW OF SYSTEMS        Review of Systems    Per HPI      PHYSICAL EXAMINATION      Temp:  [37.7 °C (99.9 °F)-38.1 °C (100.5 °F)] 37.7 °C (99.9 °F)  Heart Rate:  [62-85] 62  Resp:  [17-19] 18  BP: (70-83)/(42-52) 81/52  Body mass index is 33.53 kg/m².    Physical Exam  Constitutional:       General: She is not in acute distress.  Cardiovascular:      Rate and Rhythm: Normal rate.      Pulses: Normal pulses.   Pulmonary:      Breath sounds: Normal breath sounds.   Abdominal:      Palpations: Abdomen is soft.      Tenderness: There is abdominal tenderness in the suprapubic area.   Musculoskeletal:      Right lower leg: No edema.      Left lower leg: No edema.   Skin:     General: Skin is warm.      Coloration: Skin is pale.   Neurological:      Mental Status: She is alert and oriented to person, place, and time.          LABS / IMAGING / EKG        Labs  CBC Results         09/28/24 08/19/24 08/18/24     1049 1101 0310    WBC 8.14 4.32 3.31    RBC 3.38 3.07 2.83    HGB 11.7 10.8 10.1    HCT 34.9 33.1 31.3    .3 107.8 110.6    MCH 34.6 35.2 35.7    MCHC 33.5 32.6 32.3     122 111          CMP Results         09/28/24 08/19/24 08/18/24     1049 0511 0310     142 141    K 3.1 5.5 3.7    Cl 106 118 117    CO2 22 17 21    Glucose 102 77 77    BUN 9 2 2    Creatinine 1.0 0.7 0.6    Calcium 8.5 8.0 7.5    Anion Gap 9 7 3    AST 12 -- --    ALT 8 -- --    Albumin 3.5 -- --    EGFR >60.0 >60.0 >60.0           Comment for NA at 0511 on 08/19/24: Moderate hemolysis, results may be affected.     Comment for K at 1049 on  09/28/24: Results obtained on plasma. Plasma Potassium values may be up to 0.4 mEQ/L less than serum values. The differences may be greater for patients with high platelet or white cell counts.    Comment for K at 0511 on 08/19/24: Moderate hemolysis, results may be affected.     Comment for EGFR at 1049 on 09/28/24: Calculation based on the Chronic Kidney Disease Epidemiology Collaboration (CKD-EPI) equation refit without adjustment for race.    Comment for EGFR at 0511 on 08/19/24: Calculation based on the Chronic Kidney Disease Epidemiology Collaboration (CKD-EPI) equation refit without adjustment for race.    Comment for EGFR at 0310 on 08/18/24: Calculation based on the Chronic Kidney Disease Epidemiology Collaboration (CKD-EPI) equation refit without adjustment for race.            Troponin I Results         08/15/24 08/15/24     2214 1950    HS Troponin I 2.7 3.4          Microbiology Results       Procedure Component Value Units Date/Time    SARS-COV-2 (COVID-19)/ FLU A/B, AND RSV, PCR Nasopharynx [[id number]]  (Normal) Collected: 09/28/24 1144    Specimen: Nasopharyngeal Swab from Nasopharynx Updated: 09/28/24 1243     SARS-CoV-2 (COVID-19) Negative     Influenza A Negative     Influenza B Negative     Respiratory Syncytial Virus Negative    Narrative:      Testing performed using real-time PCR for detection of COVID-19. EUA approved validation studies performed on site.           UA Results         09/28/24     1145    Color Yellow    Clarity Cloudy    Glucose Negative    Bilirubin Negative    Ketones Negative    Sp Grav 1.017    Blood Negative    Ph 6.5    Protein +1    Urobilinogen 4.0    Nitrite Positive    Leuk Est +3    WBC Too Numerous To Count    RBC 0 TO 4    Bacteria +2           Comment for Blood at 1145 on 09/28/24: The sensitivity of the occult blood test is equivalent to approximately 4 intact RBC/HPF.            Imaging  ECG 12 lead    (Results Pending)         ECG/Telemetry  reviewed by  me    ASSESSMENT AND PLAN           * Sepsis due to urinary tract infection  (CMS/[facility])  Assessment & Plan  Urinalysis shows white blood count numerous to count, +3 leukocytes, positive nitrates,  Received IV ceftriaxone and sepsis protocol fluids in emergency department  Continue ceftriaxone  Monitor intake and output        Hypomagnesemia  Assessment & Plan  Magnesium 1.6 -received replacement p.o. in the emergency department  Telemetry admission  Continue to monitor and replete as needed, next check 2200    Substance or medication-induced sleep disorder, insomnia type (CMS/[facility])  Assessment & Plan  On trazodone baseline at home  Will administer trazodone with hold parameters do not give for systolic blood pressure less than 110    Severe bipolar II disorder, most recent episode major depressive without psychotic features (CMS/[facility])  Assessment & Plan  On fluoxetine, continue home meds    Neuropathy  Assessment & Plan  Gabapentin   Fall precautions    Hyperlipidemia, mixed  Assessment & Plan  Continue atorvastatin    GERD without esophagitis  Assessment & Plan  On omeprazole at home  Pantoprazole daily  Follow GERD precautions    Ambulatory dysfunction  Assessment & Plan  Uses baseline rolling walker at home -has caregiver 6 days a week for 8 hours a day -home health care through [facility]  Fall precautions  Rolling walker for ambulation  PT OT and social work evaluation      Alcohol abuse, continuous  Assessment & Plan  Denies current alcohol use, will check EtOH level  Will initiate CIWA protocol -of note there is a history of dependence with benzos, will hold any benzo pending observation    Hypokalemia  Assessment & Plan  Potassium 3.1 -received oral replacement in the emergency department  Monitor electrolytes replete as needed, next check 2200    Seizures (CMS/[facility])  Assessment & Plan  Continue Topamax  Seizure precautions    Hypothyroidism  Assessment & Plan  Continue levothyroxine    Eating  disorder  Assessment & Plan  history of chronic laxative abuse -magnesium 1.6, will check phosphorus level  Telemetry admission  Multivitamin with folic acid  Continue to replace electrolytes as needed    Hypotension  Assessment & Plan  History of chronic baseline hypotension -on midodrine 3 times a day with meals at home -most recent fall 8/28 at home - baseline RW  Continue midodrine 3 times daily  Monitor blood pressure  Fall precautions    Current nicotine use  Assessment & Plan  Admits to smoking three-quarter of a pack to 1 pack/day  Offered nicotine replacement supplement and patient declined as it makes her anxious  Smoking Cessation education        VTE Assessment: Pad[place]    VTE Prophylaxis Plan: scd's, heparin sub q  Code Status: Full Code       MIRLANDE Flores  9/28/2024

## 2024-09-28 NOTE — ASSESSMENT & PLAN NOTE
Admits to smoking three-quarter of a pack to 1 pack/day  Offered nicotine replacement supplement and patient declined as it makes her anxious  Smoking Cessation education

## 2024-09-28 NOTE — ASSESSMENT & PLAN NOTE
Last TSH in August was high and her T4 was low  Was taking 50mcg of syntroid at that time and still is  Will increase the dosage and recheck her labs.

## 2024-09-28 NOTE — ASSESSMENT & PLAN NOTE
On trazodone baseline at home  Will administer trazodone with hold parameters do not give for systolic blood pres sure less than 110

## 2024-09-28 NOTE — ED PROVIDER NOTES
Emergency Medicine Note  HPI   HISTORY OF PRESENT ILLNESS     44-year-old female, smoking, with significant past medical history of anxiety, depression, eating disorder leading to electrolyte imbalances, seizure presents to the emergency department today with report of fever and lower abdominal pain.  Patient reports pain in her suprapubic area radiating up to bilateral flanks.  Patient reports pain onset last night.  Patient notes that her aide came in today and told her she had a fever.  Patient denies current dysuria, but does note frequency.  Patient also notes headache, similar to prior headaches.  Patient denies shortness of breath, chest pain, nausea, vomiting, leg swelling.      History provided by:  Patient  Abdominal Pain  Associated symptoms: fever    Associated symptoms: no chest pain, no chills, no diarrhea, no dysuria, no hematuria, no nausea, no shortness of breath and no vomiting          Patient History   PAST HISTORY     Reviewed from Nursing Triage:       Past Medical History:   Diagnosis Date    Anxiety     Depression     Eating disorder        No past surgical history on file.    No family history on file.    Social History     Tobacco Use    Smoking status: Every Day     Current packs/day: 0.25     Average packs/day: 0.3 packs/day for 20.1 years (5.0 ttl pk-yrs)     Types: Cigarettes     Start date: 8/16/2004    Smokeless tobacco: Current   Substance Use Topics    Alcohol use: Not Currently    Drug use: Yes     Types: Marijuana         Review of Systems   REVIEW OF SYSTEMS     Review of Systems   Constitutional:  Positive for fever. Negative for chills.   HENT:  Negative for rhinorrhea.    Respiratory:  Negative for shortness of breath.    Cardiovascular:  Negative for chest pain.   Gastrointestinal:  Positive for abdominal pain. Negative for diarrhea, nausea and vomiting.   Genitourinary:  Positive for flank pain and frequency. Negative for dysuria and hematuria.   Musculoskeletal:  Negative  for arthralgias.   Skin:  Negative for pallor.   Neurological:  Positive for headaches.         VITALS     ED Vitals      Date/Time Temp Pulse Resp BP SpO2 [facility]   09/28/24 1219 37.7 °C (99.9 °F) 77 17 70/42 96 % MS   09/28/24 1052 -- -- -- 76/50 -- LC   09/28/24 1040 38.1 °C (100.5 °F) 85 17 82/50 96 % MS                         Physical Exam   PHYSICAL EXAM     Physical Exam  Vitals and nursing note reviewed.   Constitutional:       Appearance: She is well-developed.   HENT:      Head: Normocephalic and atraumatic.   Eyes:      Extraocular Movements: Extraocular movements intact.   Cardiovascular:      Rate and Rhythm: Normal rate and regular rhythm.      Heart sounds: No murmur heard.  Pulmonary:      Effort: Pulmonary effort is normal. No respiratory distress.      Breath sounds: No wheezing.   Abdominal:      General: There is no distension.      Palpations: Abdomen is soft.      Tenderness: There is abdominal tenderness in the suprapubic area.      Hernia: No hernia is present.   Skin:     General: Skin is warm.   Neurological:      Mental Status: She is alert and oriented to person, place, and time.           PROCEDURES     Procedures     DATA     Results       Procedure Component Value Units Date/Time    SARS-COV-2 (COVID-19)/ FLU A/B, AND RSV, PCR Nasopharynx [[id number]]  (Normal) Collected: 09/28/24 1144    Specimen: Nasopharyngeal Swab from Nasopharynx Updated: 09/28/24 1243     SARS-CoV-2 (COVID-19) Negative     Influenza A Negative     Influenza B Negative     Respiratory Syncytial Virus Negative    Narrative:      Testing performed using real-time PCR for detection of COVID-19. EUA approved validation studies performed on site.     UA w/ reflex culture (ED Only) [[id number]]  (Abnormal) Collected: 09/28/24 1145    Specimen: Urine, Clean Catch Updated: 09/28/24 1213    Narrative:      The following orders were created for panel order UA w/ reflex culture (ED Only).  Procedure                                Abnormality         Status                     ---------                               -----------         ------                     UA Reflex to Culture (Ma...[[id number]]  Abnormal            Final result               UA Microscopic[[id number]]               Abnormal            Final result                 Please view results for these tests on the individual orders.    UA Microscopic [[id number]]  (Abnormal) Collected: 09/28/24 1145    Specimen: Urine, Clean Catch Updated: 09/28/24 1213     RBC, Urine 0 TO 4 /HPF      WBC, Urine Too Numerous To Count /HPF      Squamous Epithelial Rare /hpf      Hyaline Cast None Seen /lpf      Bacteria, Urine +2 /HPF      Mucus +3 /LPF     UA Reflex to Culture (Macroscopic) [[id number]]  (Abnormal) Collected: 09/28/24 1145    Specimen: Urine, Clean Catch Updated: 09/28/24 1209     Color, Urine Yellow     Clarity, Urine Cloudy     Specific Gravity, Urine 1.017     pH, Urine 6.5     Leukocyte Esterase +3     Nitrite, Urine Positive     Protein, Urine +1     Glucose, Urine Negative mg/dL      Ketones, Urine Negative mg/dL      Urobilinogen, Urine 4.0 EU/dL      Bilirubin, Urine Negative mg/dL      Blood, Urine Negative     Comment: The sensitivity of the occult blood test is equivalent to approximately 4 intact RBC/HPF.       Lactate, w/ reflex repeat if > 2.0 [[id number]]  (Normal) Collected: 09/28/24 1145    Specimen: Blood, Venous Updated: 09/28/24 1206     Lactate 1.2 mmol/L     Lipase, if pain is above umbilicus [[id number]]  (Normal) Collected: 09/28/24 1049    Specimen: Blood, Venous Updated: 09/28/24 1133     Lipase 14 U/L     Magnesium [[id number]]  (Abnormal) Collected: 09/28/24 1049    Specimen: Blood, Venous Updated: 09/28/24 1133     Magnesium 1.6 mg/dL     Comprehensive metabolic panel [[id number]]  (Abnormal) Collected: 09/28/24 1049    Specimen: Blood, Venous Updated: 09/28/24 1133     Sodium 137 mEQ/L      Potassium 3.1 mEQ/L      Comment: Results obtained on plasma.  Plasma Potassium values may be up to 0.4 mEQ/L less than serum values. The differences may be greater for patients with high platelet or white cell counts.        Chloride 106 mEQ/L      CO2 22 mEQ/L      BUN 9 mg/dL      Creatinine 1.0 mg/dL      Glucose 102 mg/dL      Calcium 8.5 mg/dL      AST (SGOT) 12 IU/L      ALT (SGPT) 8 IU/L      Alkaline Phosphatase 117 IU/L      Total Protein 5.8 g/dL      Comment: Test performed on plasma which typically contains approximately 0.4 g/dL more protein than serum.        Albumin 3.5 g/dL      Bilirubin, Total 0.6 mg/dL      eGFR >60.0 mL/min/1.73m*2      Comment: Calculation based on the Chronic Kidney Disease Epidemiology Collaboration (CKD-EPI) equation refit without adjustment for race.        Anion Gap 9 mEQ/L     RAINBOW LT BLUE [[id number]] Collected: 09/28/24 1049    Specimen: Blood, Venous Updated: 09/28/24 1121    CBC and differential [[id number]]  (Abnormal) Collected: 09/28/24 1049    Specimen: Blood, Venous Updated: 09/28/24 1120     WBC 8.14 K/uL      RBC 3.38 M/uL      Hemoglobin 11.7 g/dL      Hematocrit 34.9 %      .3 fL      MCH 34.6 pg      MCHC 33.5 g/dL      RDW 15.2 %      Platelets 135 K/uL      MPV 11.9 fL      Differential Type Auto     nRBC 0.0 %      Immature Granulocytes 0.2 %      Neutrophils 75.9 %      Lymphocytes 11.2 %      Monocytes 12.4 %      Eosinophils 0.1 %      Basophils 0.2 %      Immature Granulocytes, Absolute 0.02 K/uL      Neutrophils, Absolute 6.17 K/uL      Lymphocytes, Absolute 0.91 K/uL      Monocytes, Absolute 1.01 K/uL      Eosinophils, Absolute 0.01 K/uL      Basophils, Absolute 0.02 K/uL     RAINBOW RED [[id number]] Collected: 09/28/24 1049    Specimen: Blood, Venous Updated: 09/28/24 1107    RAINBOW GOLD [[id number]] Collected: 09/28/24 1049    Specimen: Blood, Venous Updated: 09/28/24 1107    [place] Draw Panel [[id number]] Collected: 09/28/24 1049    Specimen: Blood, Venous Updated: 09/28/24 1057    Narrative:      The  following orders were created for panel order [place] Draw Panel.  Procedure                               Abnormality         Status                     ---------                               -----------         ------                     RAINBOW RED[[id number]]                                      In process                 RAINBOW LT BLUE[[id number]]                                  In process                 RAINBOW GOLD[[id number]]                                     In process                 RAINBOW LT GREEN[[id number]]                                 In process                   Please view results for these tests on the individual orders.    RAINBOW LT GREEN [[id number]] Collected: 09/28/24 1049    Specimen: Blood, Venous Updated: 09/28/24 1057            Imaging Results    None         ECG 12 lead    (Results Pending)       Scoring tools                                  ED Course & MDM   MDM / ED COURSE / CLINICAL IMPRESSION / DISPO     Medical Decision Making  44-year-old female with past medical history of anxiety, depression, eating disorder, overuse of laxatives leading to electrolyte imbalances, hypotension on home midodrine, seizure presents to emergency department with reported fever, urinary frequency, and lower abdominal pain, found to be hypotensive here.    Exam as above.    Consider UTI, cystitis, pyelonephritis given fever.  Sepsis a consideration given hypotension, although per chart review hypotension appears to be patient's baseline.    On exam, patient was hypotensive though mentating appropriately.    Labs notable for nitrite positive urine with too many to count white blood cells.  Creatinine 1.0, within normal limits but elevated from baseline of 0.7.    Given fever, hypotension, and likely urinary source, antibiotics initiated in addition to sepsis fluid bolus.,  Though lactate is 1.2.    COVID-negative.  Potassium low, repleted.  Magnesium low, repleted.    Patient blood pressure  responded to fluids and home midodrine. Patient at her her baseline of 80-90 systolic.      Case discussed with ICU and hospitalist, believe that hypotension is more baseline then secondary to sepsis given normal lactate, no leukocytosis.  Patient will be admitted for further monitoring of her hypotension, IV antibiotics for UTI, IV fluids and electrolyte repletion.    ED Course as of 09/28/24 1457   Sat Sep 28, 2024   1216 Lactate: 1.2 [MB]   1216 Potassium, Bld(!): 3.1  Replete   [MB]   1218 Magnesium(!): 1.6 [MB]   1225 Creatinine: 1.0  Above baseline of 0.7 [MB]   1229 WBC, Urine(!): Too Numerous To Count [MB]   1229 Nitrite, Urine(!): Positive [MB]   1229 Ceftriaxone and sepsis fluids initiated.   [MB]   1252 SARS-CoV-2 (COVID-19): Negative [MB]   1457 D/W ICU  [MB]      ED Course User Index  [MB] Lisa Lai DO     Clinical Impression      None                 Lisa Lai DO  Resident  09/28/24 [id number]

## 2024-09-28 NOTE — ED ATTESTATION NOTE
I have personally seen and examined Felipa Cuadra. I reviewed and agree with resident's assessment and plan of care, with any exceptions as documented below.       My focused history, examination, assessment, and plan of care of Felipa Cuadra is as follows:    Brief History:  HPI  44-year-old female with history of anxiety, depression, bulimia, PTSD, hypotension, neuropathy, type 2 diabetes was recently hospitalized here for hypotension.  Patient was treated with Florinef and midodrine.  Patient lives at home alone and has home health aides.  Patient presents today with increased groin discomfort, urinary frequency and a fever that started last night.      Focused Physical Exam:  Physical Exam    Patient is awake alert in no acute distress.  Patient is febrile to 100.5.  Her heart rate is normal.  She has a normal room air oxygen saturation.  Her blood pressure is low at 82/50 which is consistent with her prior blood pressure readings.  Her head is normocephalic and atraumatic.    Assessment / Plan / MDM:  Medical Decision Making  We reviewed patient's old records.  Lab work was obtained.  Her white blood cell count is not elevated.  Her COVID, flu, RSV testing are negative.  Her lactate was normal.  Her urinalysis shows 2+ bacteria and too numerous to count white blood cells consistent with a urinary tract infection.  No prior urine culture results were available in her chart review.  Patient was given IV Rocephin.  Patient was given IV fluids with no significant change in her blood pressure.  She was also given her Florinef and midodrine.  The case was discussed with ICU who does not feel the patient needs to the ICU and recommends discussing it with medicine.  Patient was not started on pressors as she has chronic low blood pressure and is mentating without an elevated lactate .  Critical care time spent taking care of the patient was 44 minutes.        I was physically present for the key/critical portions  of the following procedures:  Procedures       Krista Montilla MD  09/28/24 [id number]

## 2024-09-29 ENCOUNTER — APPOINTMENT (INPATIENT)
Dept: RADIOLOGY | Facility: HOSPITAL | Age: 44
DRG: 872 | End: 2024-09-29
Attending: HOSPITALIST
Payer: COMMERCIAL

## 2024-09-29 PROBLEM — J81.0 ACUTE PULMONARY EDEMA (CMS/HCC): Status: ACTIVE | Noted: 2024-09-29

## 2024-09-29 PROBLEM — I95.9 HYPOTENSION, UNSPECIFIED HYPOTENSION TYPE: Status: ACTIVE | Noted: 2024-09-29

## 2024-09-29 PROBLEM — R00.1 BRADYCARDIA: Status: ACTIVE | Noted: 2024-09-29

## 2024-09-29 LAB
ANION GAP SERPL CALC-SCNC: 6 MEQ/L (ref 3–15)
ATRIAL RATE: 78
BASOPHILS # BLD: 0.01 K/UL (ref 0.01–0.1)
BASOPHILS NFR BLD: 0.2 %
BUN SERPL-MCNC: 7 MG/DL (ref 7–25)
CALCIUM SERPL-MCNC: 8.5 MG/DL (ref 8.6–10.3)
CHLORIDE SERPL-SCNC: 110 MEQ/L (ref 98–107)
CO2 SERPL-SCNC: 22 MEQ/L (ref 21–31)
CORTIS SERPL-MCNC: 12.7 UG/DL
CREAT SERPL-MCNC: 0.6 MG/DL (ref 0.6–1.2)
DIFFERENTIAL METHOD BLD: ABNORMAL
EGFRCR SERPLBLD CKD-EPI 2021: >60 ML/MIN/1.73M*2
EOSINOPHIL # BLD: 0 K/UL (ref 0.04–0.36)
EOSINOPHIL NFR BLD: 0 %
ERYTHROCYTE [DISTWIDTH] IN BLOOD BY AUTOMATED COUNT: 15.5 % (ref 11.7–14.4)
FOLATE SERPL-MCNC: 10.9 NG/ML
GLUCOSE SERPL-MCNC: 174 MG/DL (ref 70–99)
HCT VFR BLD AUTO: 38.2 % (ref 35–45)
HGB BLD-MCNC: 12.2 G/DL (ref 11.8–15.7)
IMM GRANULOCYTES # BLD AUTO: 0.02 K/UL (ref 0–0.08)
IMM GRANULOCYTES NFR BLD AUTO: 0.3 %
LACTATE SERPL-SCNC: 1.5 MMOL/L (ref 0.4–2)
LYMPHOCYTES # BLD: 0.47 K/UL (ref 1.2–3.5)
LYMPHOCYTES NFR BLD: 7.3 %
MAGNESIUM SERPL-MCNC: 2.1 MG/DL (ref 1.8–2.5)
MCH RBC QN AUTO: 33.3 PG (ref 28–33.2)
MCHC RBC AUTO-ENTMCNC: 31.9 G/DL (ref 32.2–35.5)
MCV RBC AUTO: 104.4 FL (ref 83–98)
MONOCYTES # BLD: 0.07 K/UL (ref 0.28–0.8)
MONOCYTES NFR BLD: 1.1 %
NEUTROPHILS # BLD: 5.83 K/UL (ref 1.7–7)
NEUTS SEG NFR BLD: 91.1 %
NRBC BLD-RTO: 0 %
P AXIS: 49
PDW BLD AUTO: 12.3 FL (ref 9.4–12.3)
PLATELET # BLD AUTO: 168 K/UL (ref 150–369)
POTASSIUM SERPL-SCNC: 4 MEQ/L (ref 3.5–5.1)
PR INTERVAL: 150
QRS DURATION: 78
QT INTERVAL: 384
QTC CALCULATION(BAZETT): 437
R AXIS: 9
RBC # BLD AUTO: 3.66 M/UL (ref 3.93–5.22)
SODIUM SERPL-SCNC: 138 MEQ/L (ref 136–145)
T WAVE AXIS: 29
TSH SERPL DL<=0.05 MIU/L-ACNC: 1.4 MIU/L (ref 0.34–5.6)
VENTRICULAR RATE: 78
VIT B12 SERPL-MCNC: 365 PG/ML (ref 180–914)
WBC # BLD AUTO: 6.4 K/UL (ref 3.8–10.5)

## 2024-09-29 PROCEDURE — 63600105 HC IODINE BASED CONTRAST: Performed by: HOSPITALIST

## 2024-09-29 PROCEDURE — 25800000 HC PHARMACY IV SOLUTIONS

## 2024-09-29 PROCEDURE — 63700000 HC SELF-ADMINISTRABLE DRUG

## 2024-09-29 PROCEDURE — 96361 HYDRATE IV INFUSION ADD-ON: CPT

## 2024-09-29 PROCEDURE — 85025 COMPLETE CBC W/AUTO DIFF WBC: CPT

## 2024-09-29 PROCEDURE — 63700000 HC SELF-ADMINISTRABLE DRUG: Performed by: HOSPITALIST

## 2024-09-29 PROCEDURE — 82533 TOTAL CORTISOL: CPT | Performed by: HOSPITALIST

## 2024-09-29 PROCEDURE — 83605 ASSAY OF LACTIC ACID: CPT | Performed by: STUDENT IN AN ORGANIZED HEALTH CARE EDUCATION/TRAINING PROGRAM

## 2024-09-29 PROCEDURE — 25000000 HC PHARMACY GENERAL: Performed by: INTERNAL MEDICINE

## 2024-09-29 PROCEDURE — 71275 CT ANGIOGRAPHY CHEST: CPT

## 2024-09-29 PROCEDURE — 63600000 HC DRUGS/DETAIL CODE: Mod: JZ

## 2024-09-29 PROCEDURE — G0378 HOSPITAL OBSERVATION PER HR: HCPCS

## 2024-09-29 PROCEDURE — 36415 COLL VENOUS BLD VENIPUNCTURE: CPT | Performed by: STUDENT IN AN ORGANIZED HEALTH CARE EDUCATION/TRAINING PROGRAM

## 2024-09-29 PROCEDURE — 96367 TX/PROPH/DG ADDL SEQ IV INF: CPT

## 2024-09-29 PROCEDURE — 96372 THER/PROPH/DIAG INJ SC/IM: CPT

## 2024-09-29 PROCEDURE — 25800000 HC PHARMACY IV SOLUTIONS: Performed by: STUDENT IN AN ORGANIZED HEALTH CARE EDUCATION/TRAINING PROGRAM

## 2024-09-29 PROCEDURE — 82607 VITAMIN B-12: CPT | Performed by: HOSPITALIST

## 2024-09-29 PROCEDURE — 63700000 HC SELF-ADMINISTRABLE DRUG: Performed by: INTERNAL MEDICINE

## 2024-09-29 PROCEDURE — 20600000 HC ROOM AND CARE INTERMEDIATE/TELEMETRY

## 2024-09-29 PROCEDURE — 96366 THER/PROPH/DIAG IV INF ADDON: CPT

## 2024-09-29 PROCEDURE — 83735 ASSAY OF MAGNESIUM: CPT

## 2024-09-29 PROCEDURE — 84443 ASSAY THYROID STIM HORMONE: CPT | Performed by: INTERNAL MEDICINE

## 2024-09-29 PROCEDURE — 63600000 HC DRUGS/DETAIL CODE: Mod: JZ | Performed by: HOSPITALIST

## 2024-09-29 PROCEDURE — 93005 ELECTROCARDIOGRAM TRACING: CPT

## 2024-09-29 PROCEDURE — 96376 TX/PRO/DX INJ SAME DRUG ADON: CPT

## 2024-09-29 PROCEDURE — 82746 ASSAY OF FOLIC ACID SERUM: CPT | Performed by: HOSPITALIST

## 2024-09-29 PROCEDURE — 99233 SBSQ HOSP IP/OBS HIGH 50: CPT | Performed by: INTERNAL MEDICINE

## 2024-09-29 PROCEDURE — 97162 PT EVAL MOD COMPLEX 30 MIN: CPT | Mod: GP

## 2024-09-29 PROCEDURE — 97166 OT EVAL MOD COMPLEX 45 MIN: CPT | Mod: GO

## 2024-09-29 PROCEDURE — 80048 BASIC METABOLIC PNL TOTAL CA: CPT

## 2024-09-29 RX ORDER — PROCHLORPERAZINE EDISYLATE 5 MG/ML
10 INJECTION INTRAMUSCULAR; INTRAVENOUS EVERY 6 HOURS PRN
Status: DISCONTINUED | OUTPATIENT
Start: 2024-09-29 | End: 2024-10-03 | Stop reason: HOSPADM

## 2024-09-29 RX ORDER — MIDODRINE HYDROCHLORIDE 5 MG/1
10 TABLET ORAL ONCE
Status: DISCONTINUED | OUTPATIENT
Start: 2024-09-29 | End: 2024-09-29

## 2024-09-29 RX ORDER — LEVOTHYROXINE SODIUM 25 UG/1
25 TABLET ORAL ONCE
Status: COMPLETED | OUTPATIENT
Start: 2024-09-29 | End: 2024-09-29

## 2024-09-29 RX ORDER — FAMOTIDINE 10 MG/ML
20 INJECTION INTRAVENOUS
Status: COMPLETED | OUTPATIENT
Start: 2024-09-29 | End: 2024-09-29

## 2024-09-29 RX ORDER — LEVOTHYROXINE SODIUM 75 UG/1
75 TABLET ORAL
Status: DISCONTINUED | OUTPATIENT
Start: 2024-09-30 | End: 2024-10-01

## 2024-09-29 RX ORDER — ZOLPIDEM TARTRATE 5 MG/1
5 TABLET ORAL ONCE
Status: COMPLETED | OUTPATIENT
Start: 2024-09-29 | End: 2024-09-29

## 2024-09-29 RX ORDER — IOPAMIDOL 755 MG/ML
100 INJECTION, SOLUTION INTRAVASCULAR
Status: COMPLETED | OUTPATIENT
Start: 2024-09-29 | End: 2024-09-29

## 2024-09-29 RX ADMIN — GABAPENTIN 400 MG: 400 CAPSULE ORAL at 08:26

## 2024-09-29 RX ADMIN — SODIUM CHLORIDE, POTASSIUM CHLORIDE, SODIUM LACTATE AND CALCIUM CHLORIDE: 600; 310; 30; 20 INJECTION, SOLUTION INTRAVENOUS at 08:31

## 2024-09-29 RX ADMIN — LEVOTHYROXINE SODIUM 25 MCG: 0.03 TABLET ORAL at 16:56

## 2024-09-29 RX ADMIN — FAMOTIDINE 20 MG: 10 INJECTION, SOLUTION INTRAVENOUS at 14:00

## 2024-09-29 RX ADMIN — MIDODRINE HYDROCHLORIDE 10 MG: 5 TABLET ORAL at 11:48

## 2024-09-29 RX ADMIN — TOPIRAMATE 50 MG: 25 TABLET, FILM COATED ORAL at 20:10

## 2024-09-29 RX ADMIN — HEPARIN SODIUM 5000 UNITS: 5000 INJECTION, SOLUTION INTRAVENOUS; SUBCUTANEOUS at 18:05

## 2024-09-29 RX ADMIN — LEVOTHYROXINE SODIUM 50 MCG: 0.05 TABLET ORAL at 07:05

## 2024-09-29 RX ADMIN — ATORVASTATIN CALCIUM 20 MG: 20 TABLET, FILM COATED ORAL at 08:26

## 2024-09-29 RX ADMIN — SODIUM CHLORIDE, POTASSIUM CHLORIDE, SODIUM LACTATE AND CALCIUM CHLORIDE: 600; 310; 30; 20 INJECTION, SOLUTION INTRAVENOUS at 00:54

## 2024-09-29 RX ADMIN — HEPARIN SODIUM 5000 UNITS: 5000 INJECTION, SOLUTION INTRAVENOUS; SUBCUTANEOUS at 10:41

## 2024-09-29 RX ADMIN — FLUOXETINE HYDROCHLORIDE 20 MG: 20 CAPSULE ORAL at 08:26

## 2024-09-29 RX ADMIN — MIDODRINE HYDROCHLORIDE 10 MG: 5 TABLET ORAL at 08:26

## 2024-09-29 RX ADMIN — MAGNESIUM SULFATE HEPTAHYDRATE 2 G: 40 INJECTION, SOLUTION INTRAVENOUS at 00:55

## 2024-09-29 RX ADMIN — TOPIRAMATE 50 MG: 25 TABLET, FILM COATED ORAL at 08:26

## 2024-09-29 RX ADMIN — SODIUM CHLORIDE 500 ML: 9 INJECTION, SOLUTION INTRAVENOUS at 04:45

## 2024-09-29 RX ADMIN — GABAPENTIN 400 MG: 400 CAPSULE ORAL at 18:05

## 2024-09-29 RX ADMIN — ZOLPIDEM TARTRATE 5 MG: 5 TABLET ORAL at 00:40

## 2024-09-29 RX ADMIN — CEFTRIAXONE SODIUM 1 G: 1 INJECTION, POWDER, FOR SOLUTION INTRAMUSCULAR; INTRAVENOUS at 13:14

## 2024-09-29 RX ADMIN — PREDNISONE 50 MG: 50 TABLET ORAL at 06:36

## 2024-09-29 RX ADMIN — ACETAMINOPHEN 325MG 650 MG: 325 TABLET ORAL at 15:26

## 2024-09-29 RX ADMIN — GABAPENTIN 400 MG: 400 CAPSULE ORAL at 13:14

## 2024-09-29 RX ADMIN — PANTOPRAZOLE SODIUM 20 MG: 20 TABLET, DELAYED RELEASE ORAL at 08:26

## 2024-09-29 RX ADMIN — ACETAMINOPHEN 325MG 650 MG: 325 TABLET ORAL at 08:26

## 2024-09-29 RX ADMIN — PREDNISONE 50 MG: 50 TABLET ORAL at 13:25

## 2024-09-29 RX ADMIN — SODIUM CHLORIDE, POTASSIUM CHLORIDE, SODIUM LACTATE AND CALCIUM CHLORIDE: 600; 310; 30; 20 INJECTION, SOLUTION INTRAVENOUS at 18:05

## 2024-09-29 RX ADMIN — ACETAMINOPHEN 325MG 650 MG: 325 TABLET ORAL at 21:20

## 2024-09-29 RX ADMIN — PREDNISONE 50 MG: 50 TABLET ORAL at 00:48

## 2024-09-29 RX ADMIN — GABAPENTIN 400 MG: 400 CAPSULE ORAL at 21:19

## 2024-09-29 RX ADMIN — MIDODRINE HYDROCHLORIDE 10 MG: 5 TABLET ORAL at 16:29

## 2024-09-29 RX ADMIN — ZOLPIDEM TARTRATE 5 MG: 5 TABLET ORAL at 21:20

## 2024-09-29 RX ADMIN — IOPAMIDOL 75 ML: 755 INJECTION, SOLUTION INTRAVENOUS at 15:13

## 2024-09-29 RX ADMIN — HEPARIN SODIUM 5000 UNITS: 5000 INJECTION, SOLUTION INTRAVENOUS; SUBCUTANEOUS at 02:54

## 2024-09-29 ASSESSMENT — COGNITIVE AND FUNCTIONAL STATUS - GENERAL
TOILETING: 3 - A LITTLE
WALKING IN HOSPITAL ROOM: 3 - A LITTLE
WALKING IN HOSPITAL ROOM: 2 - A LOT
EATING MEALS: 4 - NONE
AFFECT: WFL;FLAT/BLUNTED AFFECT
DRESSING REGULAR LOWER BODY CLOTHING: 2 - A LOT
MOVING TO AND FROM BED TO CHAIR: 2 - A LOT
AFFECT: WFL;FLAT/BLUNTED AFFECT
HELP NEEDED FOR PERSONAL GROOMING: 4 - NONE
CLIMB 3 TO 5 STEPS WITH RAILING: 1 - TOTAL
DRESSING REGULAR UPPER BODY CLOTHING: 3 - A LITTLE
STANDING UP FROM CHAIR USING ARMS: 4 - NONE
MOVING TO AND FROM BED TO CHAIR: 4 - NONE
STANDING UP FROM CHAIR USING ARMS: 2 - A LOT
CLIMB 3 TO 5 STEPS WITH RAILING: 3 - A LITTLE
HELP NEEDED FOR BATHING: 2 - A LOT

## 2024-09-29 NOTE — PLAN OF CARE
Plan of Care Review  Plan of Care Reviewed With: patient  Progress: no change  Outcome Evaluation: Pt up from ED and oriented to room. BP soft but otherwise VSS. pt with c/o bilateral flank pain radiating to the back, pain meds administered per MAR with some relief. Pt had no output through shift and random bladder scan first showed 194mL, second showed 884 ml, [facility] Luz aware and orders placed for q6 bladder scan and straight cath >400. attempted x3 straight cath and unsuccessful. Pt refused midodrine and AM labs requesting rest. [facility] Dr. Escobar aware. No further request at this time, call bell within reach and bed alarm set.

## 2024-09-29 NOTE — PLAN OF CARE
Problem: Adult Inpatient Plan of Care  Goal: Plan of Care Review  Outcome: Progressing  Flowsheets (Taken 9/29/2024 3735)  Progress: improving  Outcome Evaluation: PT evaluation complete  Plan of Care Reviewed With: patient

## 2024-09-29 NOTE — ASSESSMENT & PLAN NOTE
2005: Patient settled from CT scan. Connected PT and restarted to CRRT machine.    2030: CRRT machine error alarms. Suspected clotting. Blood returned to PT.   Called Technical support for trouble shooting assistance. Technical support states machine is experiencing hardware error and machine needs to be inspected by their Biomed staff.     CXR done this morning  Still some pulm congestion  BNP was elevated on admission, 278  Takes bumex daily, giving one time dose of IV lasix for now
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
## 2024-09-29 NOTE — PROGRESS NOTES
Hospital Medicine     Daily Progress Note       SUBJECTIVE   Interval History: no events overnight  Pt went for CTA chest today after contrast prep  Eating okay, no CP, SOB  Pt with bradycardia during the day  Uses walker to get to bathroom       OBJECTIVE      Vital signs in last 24 hours:  Temp:  [36.4 °C (97.6 °F)-37.4 °C (99.4 °F)] 36.4 °C (97.6 °F)  Heart Rate:  [42-80] 43  Resp:  [17-18] 18  BP: ()/(43-62) 92/55    Intake/Output Summary (Last 24 hours) at 9/29/2024 1702  Last data filed at 9/29/2024 0700  Gross per 24 hour   Intake 2000 ml   Output 200 ml   Net 1800 ml       PHYSICAL EXAMINATION      Physical Exam  Vitals and nursing note reviewed.   Constitutional:       Appearance: She is ill-appearing.   HENT:      Head: Normocephalic and atraumatic.      Right Ear: External ear normal.      Left Ear: External ear normal.      Nose: Nose normal.   Eyes:      General: No scleral icterus.        Right eye: No discharge.         Left eye: No discharge.   Pulmonary:      Effort: No respiratory distress.   Skin:     Coloration: Skin is not jaundiced.   Neurological:      Mental Status: She is alert. Mental status is at baseline.   Psychiatric:         Behavior: Behavior normal.            LINES, CATHETERS, DRAINS, AIRWAYS, AND WOUNDS   Lines, Drains, and Airways:  Wounds (agree with documentation and present on admission):  Peripheral IV (Adult) 09/28/24 Anterior;Proximal;Right Forearm (Active)   Number of days: 1         Comments:    LABS / IMAGING / TELE      Labs  Lab Results   Component Value Date    WBC 6.40 09/29/2024    HGB 12.2 09/29/2024    HCT 38.2 09/29/2024    .4 (H) 09/29/2024     09/29/2024     Lab Results   Component Value Date    GLUCOSE 174 (H) 09/29/2024    CALCIUM 8.5 (L) 09/29/2024     09/29/2024    K 4.0 09/29/2024    CO2 22 09/29/2024     (H) 09/29/2024    BUN 7 09/29/2024    CREATININE 0.6 09/29/2024     Lab Results   Component Value Date    TSH 6.67 (H)  08/15/2024     Free T4 in August: 0.55  (Low)    ASSESSMENT AND PLAN              Hypomagnesemia  Assessment & Plan  Replete with Mag, recheck level      Bradycardia  Assessment & Plan  HR 30-40s on tele  Suspect it could be due to poorly controlled hypothyroidism  Cont with tele  Not on jake blocking agents  Cardio eval pending.       Current nicotine use  Assessment & Plan  Admits to smoking three-quarter of a pack to 1 pack/day  Offered nicotine replacement supplement and patient declined as it makes her anxious  Smoking Cessation education    Substance or medication-induced sleep disorder, insomnia type (CMS/HCC)  Assessment & Plan  On trazodone baseline at home  Will administer trazodone with hold parameters do not give for systolic blood pres sure less than 110    Severe bipolar II disorder, most recent episode major depressive without psychotic features (CMS/[facility])  Assessment & Plan  On fluoxetine, continue home meds     Neuropathy  Assessment & Plan  Gabapentin   Fall precautions    Hyperlipidemia, mixed  Assessment & Plan  Continue atorvastatin     GERD without esophagitis  Assessment & Plan  On omeprazole at home  Pantoprazole daily  Follow GERD precautions     Ambulatory dysfunction  Assessment & Plan  PT/OT f/u  Fall precautions      Alcohol abuse, continuous  Assessment & Plan  CIWA protocol  Behav health f/u      Hypokalemia  Assessment & Plan  Replete with Kcl, recheck level      Seizures (CMS/[facility])  Assessment & Plan   Continue Topamax  Seizure precautions     Hypothyroidism  Assessment & Plan  Last TSH in August was high and her T4 was low  Was taking 50mcg of syntroid at that time and still is  Will increase the dosage and recheck her labs.       Eating disorder  Assessment & Plan  Cont with po intake  Monitor electrolytes  Behav health f/u      Hypotension  Assessment & Plan  Cont with midodrine  Fluid hydration  Monitor BP on tele.       * Sepsis due to urinary tract infection   (CMS/[facility])  Assessment & Plan  Sepsis resolved  Cont with abx for UTI  F/u cultures        VTE Assessment: Padua    VTE Prophylaxis:  Current anticoagulants:  heparin (porcine) 5,000 unit/mL injection 5,000 Units, subcutaneous, q8h PRABHA      Code Status: Full Code      Estimated Discharge Date: 9/30/2024   Disposition Planning:      Clark Gordon MD  9/29/2024

## 2024-09-29 NOTE — PLAN OF CARE
Problem: Adult Inpatient Plan of Care  Goal: Plan of Care Review  Outcome: Progressing  Flowsheets (Taken 9/29/2024 1435)  Progress: improving  Outcome Evaluation: OT IE completed  Plan of Care Reviewed With: patient     Problem: Self-Care Deficit  Goal: Improved Ability to Complete Activities of Daily Living  Outcome: Progressing

## 2024-09-29 NOTE — ASSESSMENT & PLAN NOTE
HR 30-40s on tele  Suspect it could be due to poorly controlled hypothyroidism  Cont with tele  Not on jake blocking agents  Cardio eval pending.

## 2024-09-29 NOTE — PROGRESS NOTES
Physical Therapy -  Initial Evaluation     Patient: Felipa Cuadra  Location: [facility] Care Unit [facility]  MRN: [id number]  Today's date: 9/29/2024    HISTORY OF PRESENT ILLNESS     Felipa is a 44 y.o. female admitted on 9/28/2024 with Hypokalemia [E87.6]  Anorexia nervosa, restricting type [F50.01]  Sepsis due to urinary tract infection  (CMS/HCC) [A41.9, N39.0]  Hypotension, unspecified hypotension type [I95.9]  Urinary tract infection without hematuria, site unspecified [N39.0]. Principal problem is Sepsis due to urinary tract infection  (CMS/HCC).    Past Medical History  Felipa has a past medical history of Anxiety, Depression, and Eating disorder.    History of Present Illness   44 y.o. female with a past medical history of anxiety, depression, eating disorder with chronic laxative use, seizure, PTSD, current every day smoker who was having fever and abdominal pain at home.  Urine was white blood count too numerous to count +3 leukocytes nitrates, febrile on admission 100.5 °F, and hypotensive.  Sepsis IV fluids treated in emergency department and patient was given IV ceftriaxone.   PRIOR LEVEL OF FUNCTION AND LIVING ENVIRONMENT     Prior Level of Function      Flowsheet Row Most Recent Value   Dominant Hand right   Ambulation assistive equipment   Transferring assistive equipment   Toileting independent   Bathing assistive equipment and person   Dressing assistive person   Eating independent   IADLs assistive person   Driving/Transportation public transportation   Prior Level of Function Comment Reports assist from [facility]HA with bathing/dressing, all IADLs. Use of rollator for ambulation, endorses recent falls. States her home care PT recently ordered a RW for her.   Assistive Device Currently Used at Home walker, 4-wheeled, shower chair, grab bar, raised toilet        History of Falls: Two or more falls in the past year    Prior Living Environment      Flowsheet Row Most Recent Value   People  in Home alone   Current Living Arrangements apartment   Living Environment Comment Lives alone in apartment, ramped entrance, elevator access within building, tub shower +GBs +tub bench. [facility] Monday-Saturday, 9 AM - 3 PM.          VITALS AND PAIN     PT Vitals      Date/Time Pulse SpO2 Pt Activity O2 Therapy BP MAP BP Location BP Method Pt Position [facility]   09/29/24 1130 56 99 % At rest None (Room air) 89/51 65 mmHg Left upper arm Automatic Lying MPN   09/29/24 1140 65 99 % At rest None (Room air) 81/53 62 mmHg Left upper arm Automatic Lying MPN          PT Pain      Date/Time Pain Type Side/Orientation Location Rating: Rest Rating: Activity Interventions [facility]   09/29/24 1130 Pain Assessment bilateral flank 6 6 position adjusted MPN             Objective   OBJECTIVE     Start time:  1126  End time:  1142  Session Length: 16 min  Mode of Treatment: co-treatment  Reason for co-treatment: d/c planning, PT focus on balance and mobility    General Observations  Patient received reclined, in bed. She was agreeable to therapy, no issues or concerns identified by nurse prior to session.      Precautions: fall, aspiration, seizures, head of bed elevated 30 degrees              PT Eval and Treat - 09/29/24 1126          Cognition    Orientation Status oriented x 3     Affect/Mental Status WFL;flat/blunted affect     Follows Commands WFL     Cognitive Function WFL        Vision Assessment/Intervention    Vision Assessment WFL;corrective lenses full-time        Hearing Assessment    Hearing Status WFL        Sensory Assessment    Sensory Assessment sensation intact except     Sensation Impaired Location(s) left LE;right LE   Pt reports N/T in BLE due to history of neuropathy, LT intact but grossly diminished    Left LE Sensory Impairment light touch awareness;diminished     Right LE Sensory Impairment light touch awareness;diminished     Sensory Subjective Reports numbness        Lower Extremity Assessment    LE Assessment ROM and  Strength WFL        Bed Mobility    Bed Mobility Activities left;supine to sit;sit to supine     [place] modified independence     Safety/Cues increased time to complete     Assistive Device head of bed elevated     Comment Increased time and effort but able to complete safely        Mobility Belt    Mobility Belt Used During Session no - patient refused        Sit/Stand Transfer    Surface edge of bed     [place] modified independence     Safety/Cues increased time to complete     Assistive Device walker, front-wheeled        Surface-to-Surface Transfers    Transfer Technique stand step   Ambulatory approach back to edge of bed following gait training    [place] distant supervision     Safety/Cues increased time to complete     Assistive Device walker, front-wheeled        Gait Training    [place], Gait distant supervision     Safety/Cues increased time to complete;gait deviations     Assistive Device walker, front-wheeled     Distance in Feet 100 feet     Pattern step-to     Deviations/Abnormal Patterns gait speed decreased;step length decreased;stride length decreased;bilateral deviations     Bilateral Gait Deviations heel strike decreased     Comment Pt ambulated in room and hallway, use of RW. Slow but steady, step-to pattern. Heel strike decreased bilaterally, decreased step length/height noted. No overt LOB, benefits from RW.        Stairs Training    [place], Stairs not tested     Comment Not a barrier for d/c        Balance    Static Sitting Balance WFL;sitting, edge of bed     Dynamic Sitting Balance WFL;sitting, edge of bed     Sit to Stand Dynamic Balance WFL;supported     Static Standing Balance WFL;supported     Dynamic Standing Balance WFL;supported     Balance Interventions occupation based/functional task     Comment, Balance Use of RW        Impairments/Safety Issues    Impairments Affecting Function endurance/activity tolerance;pain     Functional Endurance Fair (+)                                     Education Documentation  Fall Prevention, taught by Bunny Munguia PT at 9/29/2024  1:18 PM.  Learner: Patient  Readiness: Acceptance  Method: Explanation  Response: Verbalizes Understanding  Comment: Role of PT, PT POC and d/c recs, use of RW, encouraged mobility with RN staff while hospitalized, use of call bell and assist with mobility    Call Light Use, taught by Bunny Munguia PT at 9/29/2024  1:18 PM.  Learner: Patient  Readiness: Acceptance  Method: Explanation  Response: Verbalizes Understanding  Comment: Role of PT, PT POC and d/c recs, use of RW, encouraged mobility with RN staff while hospitalized, use of call bell and assist with mobility    Treatment Plan, taught by Bunny Munguia PT at 9/29/2024  1:18 PM.  Learner: Patient  Readiness: Acceptance  Method: Explanation  Response: Verbalizes Understanding  Comment: Role of PT, PT POC and d/c recs, use of RW, encouraged mobility with RN staff while hospitalized, use of call bell and assist with mobility        Session Outcome  Patient reclined, in bed at end of session, bed alarm on, all needs met, personal items in reach, call light in reach. Nursing notified about change in vital signs, patient's performance, patient's position, and patient's response to therapy/activity.    AM-PAC™ - Mobility (Current Function)     Turning form your back to your side while in flat bed without using bedrails 4 - None   Moving from lying on your back to sitting on the side of a flat bed without using bedrails 4 - None   Moving to and from a bed to a chair 4 - None   Standing up from a chair using your arms 4 - None   To walk in a hospital room 3 - A Little   Climbing 3-5 steps with a railing 3 - A Little   AM-PAC™ Mobility Score 22      ASSESSMENT AND PLAN     Progress Summary  PT evaluation complete. [facility] 22/24. Pt demonstrates ability to perform bed mobility and sit <> stand transfers with modified independence, distant supervision provided  with ambulation for safety. She ambulated in room and hallway with slow but steady pace, demonstrates good standing balance with use of RW. No overt LOB, able to complete all mobility tasks safely. PT to sign off at this time, no acute care PT needs identified. Recommending home with continued assist and home PT.    Patient/Family Therapy Goals Statement: Return home    PT Plan      Flowsheet Row Most Recent Value   Therapy Frequency evaluation only at 09/29/2024 1126            PT Discharge Recommendations      Flowsheet Row Most Recent Value   PT Recommended Discharge Disposition home with assistance, home with home health at 09/29/2024 1126   Anticipated Equipment Needs if Discharged Home (PT) none at 09/29/2024 1126

## 2024-09-29 NOTE — PROGRESS NOTES
Occupational Therapy -  Initial Evaluation     Patient: Felipa Cuadra  Location: [facility] Care Unit [facility]  MRN: [id number]  Today's date: 9/29/2024    HISTORY OF PRESENT ILLNESS     Felipa is a 44 y.o. female admitted on 9/28/2024 with Hypokalemia [E87.6]  Anorexia nervosa, restricting type [F50.01]  Sepsis due to urinary tract infection  (CMS/HCC) [A41.9, N39.0]  Hypotension, unspecified hypotension type [I95.9]  Urinary tract infection without hematuria, site unspecified [N39.0]. Principal problem is Sepsis due to urinary tract infection  (CMS/HCC).    Past Medical History  Felipa has a past medical history of Anxiety, Depression, and Eating disorder.    History of Present Illness   44 y.o. female with a past medical history of anxiety, depression, eating disorder with chronic laxative use, seizure, PTSD, current every day smoker who was having fever and abdominal pain at home.  Urine was white blood count too numerous to count +3 leukocytes nitrates, febrile on admission 100.5 °F, and hypotensive.  Sepsis IV fluids treated in emergency department and patient was given IV ceftriaxone.   PRIOR LEVEL OF FUNCTION AND LIVING ENVIRONMENT     Prior Level of Function      Flowsheet Row Most Recent Value   Dominant Hand right   Ambulation assistive equipment   Transferring assistive equipment   Toileting independent   Bathing assistive equipment and person   Dressing assistive person   Eating independent   IADLs assistive person   Driving/Transportation public transportation   Prior Level of Function Comment Reports assist from [facility] with bathing/dressing, all IADLs. Use of rollator for ambulation, endorses recent falls. States her home care PT recently ordered a RW for her.   Assistive Device Currently Used at Home walker, 4-wheeled, shower chair, grab bar, raised toilet             Prior Living Environment      Flowsheet Row Most Recent Value   People in Home alone   Current Living Arrangements  apartment   Living Environment Comment Lives alone in apartment, ramped entrance, elevator access within building, tub shower +GBs +tub bench. [facility] Monday-Saturday, 9 AM - 3 PM.          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   Occupational History/Life Experiences Enjoys reading fan fiction          VITALS AND PAIN     OT Vitals      Date/Time Pulse SpO2 Pt Activity O2 Therapy BP MAP BP Location BP Method Pt Position [facility]   09/29/24 1130 56 99 % At rest None (Room air) 89/51 65 mmHg Left upper arm Automatic Lying MPN   09/29/24 1140 65 99 % At rest None (Room air) 81/53 62 mmHg Left upper arm Automatic Lying MPN          OT Pain      Date/Time Pain Type Side/Orientation Location Rating: Rest Rating: Activity Interventions [facility]   09/29/24 1130 Pain Assessment flank bilateral 6 6 position adjusted MPN             Objective   OBJECTIVE     Start time:  1123  End time:  1142  Session Length: 19 min  Mode of Treatment: co-treatment  Reason for co-treatment: d/c planning    General Observations  Patient received reclined, in bed. She was agreeable to therapy, no issues or concerns identified by nurse prior to session.      Precautions: fall, aspiration, seizures, head of bed elevated 30 degrees              OT Eval and Treat - 09/29/24 1123          Cognition    Orientation Status oriented x 4     Affect/Mental Status WFL;flat/blunted affect     Follows Commands WFL     Cognitive Function WFL     Comment, Cognition pleasant and cooperative.        Vision Assessment/Intervention    Vision Assessment corrective lenses full-time        Hearing Assessment    Hearing Status WFL        Sensory Assessment    Sensory Assessment sensation intact, upper extremities   intact to LT however history of neuropathy       Upper Extremity Assessment    UE Assessment ROM and Strength WFL        Bed Mobility    Bed Mobility Activities  left;supine to sit;sit to supine     [place] modified independence     Safety/Cues increased time to complete     Assistive Device head of bed elevated     Comment Incr time and pt effort.        Mobility Belt    Mobility Belt Used During Session no - patient refused        Sit/Stand Transfer    Surface edge of bed     [place] modified independence     Safety/Cues increased time to complete     Assistive Device walker, front-wheeled        Toilet Transfer    Transfer Technique sit/stand     [place] modified independence     Safety/Cues increased time to complete     Assistive Device grab bars/safety frame        Functional Mobility    Distance in room/bathroom     Functional Mobility [place] distant supervision     Safety/Cues increased time to complete     Assistive Device walker, front-wheeled     Functional Mobility Comments DS for all mobility in room and hallway with RW        Lower Body Dressing    [place] not tested     Comment Aides assist w/ ADL at baseline.        Toileting    [place] not tested     Comment Denies needs.        Balance    Static Sitting Balance WFL;sitting, edge of bed     Dynamic Sitting Balance WFL;sitting, edge of bed     Sit to Stand Dynamic Balance WFL;supported     Static Standing Balance WFL;supported     Dynamic Standing Balance WFL;supported     Comment, Balance RW        Impairments/Safety Issues    Impairments Affecting Function endurance/activity tolerance;pain     Functional Endurance fair(+)                                    Education Documentation  Self-Care, taught by Sherrie Goetz OT at 9/29/2024  4:00 PM.  Learner: Patient  Readiness: Acceptance  Method: Explanation  Response: Verbalizes Understanding  Comment: role OT, safe xfers, d/c planning        Session Outcome  Patient reclined, in bed at end of session, bed alarm on, all needs met, call light in reach, personal items in reach. Nursing notified about patient's  performance, patient's position, patient's response to therapy/activity, and change in vital signs.    AM-PAC™ - ADL (Current Function)     Putting on/taking off regular lower body clothing 2 - A Lot   Bathing 2 - A Lot   Toileting 3 - A Little   Putting on/taking off regular upper body clothing 3 - A Little   Help for taking care of personal grooming 4 - None   Eating meals 4 - None   AM-PAC™ ADL Score 18      ASSESSMENT AND PLAN     Progress Summary  OT IE completed, ADL AMPAC 18. Pt completed bed mobility Mod I, STS Mod I, functional mobility distant supervision. RW used. Aides assist ADL at baseline, declines toileting needs. Recommend d/c home with assistance. OT signing off.    Patient/Family Therapy Goal Statement: Home    OT Plan      Flowsheet Row Most Recent Value   Therapy Frequency evaluation only at 09/29/2024 1123            OT Discharge Recommendations      Flowsheet Row Most Recent Value   OT Recommended Discharge Disposition home with assistance, home with home health at 09/29/2024 1123   Anticipated Equipment Needs if Discharged Home (OT) none at 09/29/2024 1123

## 2024-09-29 NOTE — PLAN OF CARE
Plan of Care Review  Progress: no change  Outcome Evaluation: Pt up from ED and oriented to room. BP soft but otherwise VSS. pt with c/o bilateral flank pain radiating to the back, pain meds administered per MAR with some relief. Pt had no output through shift and random bladder scan first showed 194mL, second showed 884 ml, [facility] Luz aware and orders placed for q6 bladder scan and straight cath >400. attempted x3 straight cath and unsuccessful. Pt refused midodrine and AM labs requesting rest. [facility] Dr. Escobar aware. No further request at this time, call bell within reach and bed alarm set.

## 2024-09-29 NOTE — HOSPITAL COURSE
Felipa is a 44 y.o. female admitted on 9/28/2024 with Hypokalemia [E87.6]  Anorexia nervosa, restricting type [F50.01]  Sepsis due to urinary tract infection  (CMS/HCC) [A41.9, N39.0]  Hypotension, unspecified hypotension type [I95.9]  Urinary tract infection without hematuria, site unspecified [N39.0]. Principal problem is Sepsis due to urinary tract infection  (CMS/HCC).    Past Medical History  Felipa has a past medical history of Anxiety, Depression, and Eating disorder.    History of Present Illness   44 y.o. female with a past medical history of anxiety, depression, eating disorder with chronic laxative use, seizure, PTSD, current every day smoker who was having fever and abdominal pain at home.  Urine was white blood count too numerous to count +3 leukocytes nitrates, febrile on admission 100.5 °F, and hypotensive.  Sepsis IV fluids treated in emergency department and patient was given IV ceftriaxone.    Per pt's  Sha Janey, pt has prior AH/VH. Pt has not endorsed S I/I/P to  during admission.

## 2024-09-30 ENCOUNTER — APPOINTMENT (INPATIENT)
Dept: CARDIOLOGY | Facility: HOSPITAL | Age: 44
DRG: 872 | End: 2024-09-30
Attending: INTERNAL MEDICINE
Payer: COMMERCIAL

## 2024-09-30 LAB
ANION GAP SERPL CALC-SCNC: 7 MEQ/L (ref 3–15)
AORTIC ROOT ANNULUS: 2.7 CM
AORTIC VALVE MEAN VELOCITY: 0.78 M/S
AORTIC VALVE VELOCITY TIME INTEGRAL: 30.4 CM
ASCENDING AORTA: 2.8 CM
ATRIAL RATE: 42
AV MEAN GRADIENT: 3 MMHG
AV PEAK GRADIENT: 5 MMHG
AV PEAK VELOCITY-S: 1.13 M/S
AV VALVE AREA INDEX: 1.01
AV VALVE AREA: 1.78 CM2
AV VELOCITY RATIO: 0.81
AVA (VTI): 2.07 CM2
BACTERIA UR CULT: ABNORMAL
BACTERIA UR CULT: ABNORMAL
BSA FOR ECHO PROCEDURE: 2.04 M2
BUN SERPL-MCNC: 5 MG/DL (ref 7–25)
CALCIUM SERPL-MCNC: 8.7 MG/DL (ref 8.6–10.3)
CHLORIDE SERPL-SCNC: 112 MEQ/L (ref 98–107)
CO2 SERPL-SCNC: 23 MEQ/L (ref 21–31)
CREAT SERPL-MCNC: 0.7 MG/DL (ref 0.6–1.2)
DOP CALC LVOT STROKE VOLUME: 62.82 CM3
E WAVE DECELERATION TIME: 100 MS
E/A RATIO: 1.8
E/E' RATIO: 8.5
E/LAT E' RATIO: 7.4
EDV (BP): 79.5 CM3
EF (A4C): 64.1 %
EF A2C: 61.5 %
EGFRCR SERPLBLD CKD-EPI 2021: >60 ML/MIN/1.73M*2
EJECTION FRACTION: 63.4 %
ERYTHROCYTE [DISTWIDTH] IN BLOOD BY AUTOMATED COUNT: 15.7 % (ref 11.7–14.4)
ESV (BP): 29.1 CM3
FRACTIONAL SHORTENING: 44.47 %
GLUCOSE SERPL-MCNC: 95 MG/DL (ref 70–99)
HCT VFR BLD AUTO: 33.6 % (ref 35–45)
HGB BLD-MCNC: 10.9 G/DL (ref 11.8–15.7)
INTERVENTRICULAR SEPTUM: 1 CM
LA ESV (BP): 48 CM3
LA ESV INDEX (A2C): 19.56 CM3/M2
LA ESV INDEX (BP): 23.53 CM3/M2
LA/AORTA RATIO: 1.22
LAAS-AP2: 14.9 CM2
LAAS-AP4: 19 CM2
LAD 2D: 3.3 CM
LALD A4C: 4.51 CM
LALD A4C: 5.27 CM
LAV-S: 39.9 CM3
LEFT ATRIUM VOLUME INDEX: 24.41 CM3/M2
LEFT ATRIUM VOLUME: 49.8 CM3
LEFT INTERNAL DIMENSION IN SYSTOLE: 2.71 CM (ref 2.86–4.32)
LEFT VENTRICLE DIASTOLIC VOLUME INDEX: 47.06 CM3/M2
LEFT VENTRICLE DIASTOLIC VOLUME: 96 CM3
LEFT VENTRICLE SYSTOLIC VOLUME INDEX: 16.91 CM3/M2
LEFT VENTRICLE SYSTOLIC VOLUME: 34.5 CM3
LEFT VENTRICULAR INTERNAL DIMENSION IN DIASTOLE: 4.88 CM (ref 4.85–6.74)
LEFT VENTRICULAR POSTERIOR WALL IN END DIASTOLE: 0.82 CM (ref 0.63–1.17)
LV DIASTOLIC VOLUME: 64.1 CM3
LV ESV (APICAL 2 CHAMBER): 24.7 CM3
LVAD-AP2: 22.6 CM2
LVAD-AP4: 28.2 CM2
LVAS-AP2: 13 CM2
LVAS-AP4: 15.1 CM2
LVEDVI(A2C): 31.42 CM3/M2
LVEDVI(BP): 38.97 CM3/M2
LVESVI(A2C): 12.11 CM3/M2
LVESVI(BP): 14.26 CM3/M2
LVLD-AP2: 6.81 CM
LVLD-AP4: 7.02 CM
LVLS-AP2: 5.71 CM
LVLS-AP4: 5.59 CM
LVOT 2D: 1.8 CM
LVOT A: 2.54 CM2
LVOT MG: 2 MMHG
LVOT MV: 0.64 M/S
LVOT PEAK VELOCITY: 1.01 M/S
LVOT PG: 4 MMHG
LVOT STROKE VOLUME INDEX: 30.8 ML/M2
LVOT VTI: 24.7 CM
MAGNESIUM SERPL-MCNC: 2 MG/DL (ref 1.8–2.5)
MCH RBC QN AUTO: 34 PG (ref 28–33.2)
MCHC RBC AUTO-ENTMCNC: 32.4 G/DL (ref 32.2–35.5)
MCV RBC AUTO: 104.7 FL (ref 83–98)
MLH CV ECHO AVA INDEX VELOCITY RATIO: 0.9
MV E'TISSUE VEL-LAT: 0.13 M/S
MV E'TISSUE VEL-MED: 0.11 M/S
MV PEAK A VEL: 0.54 M/S
MV PEAK E VEL: 0.97 M/S
MV STENOSIS PRESSURE HALF TIME: 29 MS
MV VALVE AREA P 1/2 METHOD: 7.59 CM2
PDW BLD AUTO: 12.4 FL (ref 9.4–12.3)
PISA TR RADIUS: 0.6 CM
PLATELET # BLD AUTO: 165 K/UL (ref 150–369)
POSTERIOR WALL: 0.82 CM
POTASSIUM SERPL-SCNC: 4.2 MEQ/L (ref 3.5–5.1)
PR INTERVAL: 184
QRS DURATION: 66
QT INTERVAL: 448
QTC CALCULATION(BAZETT): 374
R AXIS: 56
RBC # BLD AUTO: 3.21 M/UL (ref 3.93–5.22)
SEPTAL TISSUE DOPPLER FREE WALL LATE DIA VELOCITY (APICAL 4 CHAMBER VIEW): 0.13 M/S
SODIUM SERPL-SCNC: 142 MEQ/L (ref 136–145)
T WAVE AXIS: 57
TAPSE: 2.6 CM
TR MAX PG: 30.03 MMHG
TR VTI: 107 CM
TRICUSPID VALVE PEAK REGURGITATION VELOCITY: 2.74 M/S
VENTRICULAR RATE: 42
WBC # BLD AUTO: 7.95 K/UL (ref 3.8–10.5)
Z-SCORE OF LEFT VENTRICULAR DIMENSION IN END DIASTOLE: -1.58
Z-SCORE OF LEFT VENTRICULAR DIMENSION IN END SYSTOLE: -2.05
Z-SCORE OF LEFT VENTRICULAR POSTERIOR WALL IN END DIASTOLE: -0.24

## 2024-09-30 PROCEDURE — 63700000 HC SELF-ADMINISTRABLE DRUG

## 2024-09-30 PROCEDURE — 93306 TTE W/DOPPLER COMPLETE: CPT

## 2024-09-30 PROCEDURE — 83735 ASSAY OF MAGNESIUM: CPT | Performed by: INTERNAL MEDICINE

## 2024-09-30 PROCEDURE — 20600000 HC ROOM AND CARE INTERMEDIATE/TELEMETRY

## 2024-09-30 PROCEDURE — 85027 COMPLETE CBC AUTOMATED: CPT | Performed by: INTERNAL MEDICINE

## 2024-09-30 PROCEDURE — 63700000 HC SELF-ADMINISTRABLE DRUG: Performed by: INTERNAL MEDICINE

## 2024-09-30 PROCEDURE — 36415 COLL VENOUS BLD VENIPUNCTURE: CPT | Performed by: INTERNAL MEDICINE

## 2024-09-30 PROCEDURE — 84100 ASSAY OF PHOSPHORUS: CPT | Performed by: INTERNAL MEDICINE

## 2024-09-30 PROCEDURE — 25800000 HC PHARMACY IV SOLUTIONS

## 2024-09-30 PROCEDURE — 80048 BASIC METABOLIC PNL TOTAL CA: CPT | Performed by: INTERNAL MEDICINE

## 2024-09-30 PROCEDURE — 99232 SBSQ HOSP IP/OBS MODERATE 35: CPT | Performed by: INTERNAL MEDICINE

## 2024-09-30 PROCEDURE — 63600000 HC DRUGS/DETAIL CODE

## 2024-09-30 RX ORDER — CEPHALEXIN 250 MG/1
250 CAPSULE ORAL 4 TIMES DAILY
Status: COMPLETED | OUTPATIENT
Start: 2024-10-01 | End: 2024-10-02

## 2024-09-30 RX ORDER — ZOLPIDEM TARTRATE 5 MG/1
5 TABLET ORAL ONCE
Status: COMPLETED | OUTPATIENT
Start: 2024-09-30 | End: 2024-09-30

## 2024-09-30 RX ADMIN — TOPIRAMATE 50 MG: 25 TABLET, FILM COATED ORAL at 09:00

## 2024-09-30 RX ADMIN — MIDODRINE HYDROCHLORIDE 10 MG: 5 TABLET ORAL at 08:46

## 2024-09-30 RX ADMIN — CEFTRIAXONE SODIUM 1 G: 1 INJECTION, POWDER, FOR SOLUTION INTRAMUSCULAR; INTRAVENOUS at 12:51

## 2024-09-30 RX ADMIN — ACETAMINOPHEN 325MG 650 MG: 325 TABLET ORAL at 13:29

## 2024-09-30 RX ADMIN — HEPARIN SODIUM 5000 UNITS: 5000 INJECTION, SOLUTION INTRAVENOUS; SUBCUTANEOUS at 10:24

## 2024-09-30 RX ADMIN — PANTOPRAZOLE SODIUM 20 MG: 20 TABLET, DELAYED RELEASE ORAL at 08:46

## 2024-09-30 RX ADMIN — GABAPENTIN 400 MG: 400 CAPSULE ORAL at 21:00

## 2024-09-30 RX ADMIN — GABAPENTIN 400 MG: 400 CAPSULE ORAL at 08:46

## 2024-09-30 RX ADMIN — TOPIRAMATE 50 MG: 25 TABLET, FILM COATED ORAL at 20:06

## 2024-09-30 RX ADMIN — LEVOTHYROXINE SODIUM 75 MCG: 0.07 TABLET ORAL at 05:37

## 2024-09-30 RX ADMIN — ATORVASTATIN CALCIUM 20 MG: 20 TABLET, FILM COATED ORAL at 08:45

## 2024-09-30 RX ADMIN — HEPARIN SODIUM 5000 UNITS: 5000 INJECTION, SOLUTION INTRAVENOUS; SUBCUTANEOUS at 02:58

## 2024-09-30 RX ADMIN — HEPARIN SODIUM 5000 UNITS: 5000 INJECTION, SOLUTION INTRAVENOUS; SUBCUTANEOUS at 17:42

## 2024-09-30 RX ADMIN — GABAPENTIN 400 MG: 400 CAPSULE ORAL at 13:22

## 2024-09-30 RX ADMIN — GABAPENTIN 400 MG: 400 CAPSULE ORAL at 17:41

## 2024-09-30 RX ADMIN — ZOLPIDEM TARTRATE 5 MG: 5 TABLET ORAL at 20:57

## 2024-09-30 RX ADMIN — MIDODRINE HYDROCHLORIDE 10 MG: 5 TABLET ORAL at 09:00

## 2024-09-30 RX ADMIN — ACETAMINOPHEN 325MG 650 MG: 325 TABLET ORAL at 08:46

## 2024-09-30 RX ADMIN — FLUOXETINE HYDROCHLORIDE 20 MG: 20 CAPSULE ORAL at 08:45

## 2024-09-30 RX ADMIN — MIDODRINE HYDROCHLORIDE 10 MG: 5 TABLET ORAL at 17:42

## 2024-09-30 ASSESSMENT — COGNITIVE AND FUNCTIONAL STATUS - GENERAL
STANDING UP FROM CHAIR USING ARMS: 4 - NONE
MOVING TO AND FROM BED TO CHAIR: 4 - NONE
MOVING TO AND FROM BED TO CHAIR: 4 - NONE
WALKING IN HOSPITAL ROOM: 3 - A LITTLE
CLIMB 3 TO 5 STEPS WITH RAILING: 3 - A LITTLE
STANDING UP FROM CHAIR USING ARMS: 4 - NONE
WALKING IN HOSPITAL ROOM: 4 - NONE
CLIMB 3 TO 5 STEPS WITH RAILING: 3 - A LITTLE

## 2024-09-30 NOTE — PROGRESS NOTES
[facility]....Pt is in a current episode of Skilled Homecare visits.  Will follow case for any homecare needs -if pt will Dc back to home.

## 2024-09-30 NOTE — PROGRESS NOTES
Hospital Medicine     Daily Progress Note       SUBJECTIVE   Interval History: no events overnight  Using ambien for sleep  Eating a little bit  No abd pain, N/V/D/C  Tired  No new complaints otherwise       OBJECTIVE      Vital signs in last 24 hours:  Temp:  [36.4 °C (97.5 °F)-36.7 °C (98.1 °F)] 36.6 °C (97.8 °F)  Heart Rate:  [33-66] 66  Resp:  [16-20] 16  BP: ()/(51-61) 90/56    Intake/Output Summary (Last 24 hours) at 9/30/2024 1554  Last data filed at 9/30/2024 1251  Gross per 24 hour   Intake 2231.25 ml   Output 4 ml   Net 2227.25 ml       PHYSICAL EXAMINATION      Physical Exam  Vitals and nursing note reviewed.   Constitutional:       General: She is not in acute distress.  HENT:      Head: Normocephalic and atraumatic.      Right Ear: External ear normal.      Left Ear: External ear normal.   Eyes:      General: No scleral icterus.        Right eye: No discharge.         Left eye: No discharge.   Cardiovascular:      Rate and Rhythm: Regular rhythm. Bradycardia present.   Pulmonary:      Effort: No respiratory distress.   Abdominal:      General: There is no distension.      Palpations: Abdomen is soft.      Tenderness: There is no abdominal tenderness.   Neurological:      Mental Status: She is oriented to person, place, and time. Mental status is at baseline.      Motor: Weakness present.   Psychiatric:         Behavior: Behavior normal.            LINES, CATHETERS, DRAINS, AIRWAYS, AND WOUNDS   Lines, Drains, and Airways:  Wounds (agree with documentation and present on admission):  Peripheral IV (Adult) 09/28/24 Anterior;Proximal;Right Forearm (Active)   Number of days: 2         Comments:    LABS / IMAGING / TELE      Labs  Lab Results   Component Value Date    WBC 7.95 09/30/2024    HGB 10.9 (L) 09/30/2024    HCT 33.6 (L) 09/30/2024    .7 (H) 09/30/2024     09/30/2024     Lab Results   Component Value Date    GLUCOSE 95 09/30/2024    CALCIUM 8.7 09/30/2024     09/30/2024     K 4.2 09/30/2024    CO2 23 09/30/2024     (H) 09/30/2024    BUN 5 (L) 09/30/2024    CREATININE 0.7 09/30/2024           ECG/Telemetry  Tele: Sinus brittany, 30's    ECHO:    Left Ventricle: Normal ventricle size. Normal wall thickness. Estimated EF 65%. No regional wall motion abnormalities. Normal diastolic filling pattern for age.    Left Atrium: Normal sized atrium.    Mitral Valve: Normal leaflet structure. Mild regurgitation.    Aorta: Aortic root normal. Ascending aorta normal-sized.    Tricuspid Valve: Normal structure. Moderate regurgitation.    Pulmonic Valve: Grossly normal structure. No regurgitation.    Aortic Valve: Normal structure. Trace regurgitation. No stenosis. Calculated dimensionless index = 0.81.    Right Ventricle: Normal ventricle size. Normal systolic function.    IVC/SVC: Inferior vena cava is <2.1cm. Inferior vena cava collapses >50% during inspiration.    Pericardium: No evidence of pericardial effusion.    Right Atrium: Normal sized atrium.    ASSESSMENT AND PLAN              Bradycardia  Assessment & Plan  HR still low in 30's  ECHO done today  Cardiology input noted  Will limit trazodone use  Cont to monitor on tele        Hypokalemia  Assessment & Plan  Replete with Kcl, recheck level      Seizures (CMS/HCC)  Assessment & Plan   Continue Topamax  Seizure precautions      Hypothyroidism  Assessment & Plan  Last TSH in August was high and her T4 was low  Synthroid dosage increased by me  New TSH Is WNL       Eating disorder  Assessment & Plan  Cont with po intake  Monitor electrolytes  Behav health f/u       Hypotension  Assessment & Plan  BP is improved, HR remains bradycardic   Cont with po intake  Not on BP meds for nwo      * Sepsis due to urinary tract infection  (CMS/HCC)  Assessment & Plan  Sepsis resolved  Cont with abx for UTI  F/u cultures         VTE Assessment: [place]    VTE Prophylaxis:  Current anticoagulants:  heparin (porcine) 5,000 unit/mL injection 5,000 Units,  subcutaneous, q8h PRABHA      Code Status: Full Code      Estimated Discharge Date: 10/1/2024   Disposition Planning: DC when HR improves, pt cleared by therapy for home DC     Clark Gordon MD  9/30/2024

## 2024-09-30 NOTE — CONSULTS
Offered visit to patient Felipa Cuadra on referral from nurse, Felipa welcomed visit.     Felipa shared extensive life review and spiritual history, sharing she is no longer comforted in [employer]lee be believes in a Heaven where she will find beloved family. She shared various medical circumstances that have led to her hospitalization. 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 She appreciated the visit and would welcome follow up support from Spiritual Care.    The Spiritual Care team will remain available for spiritual and emotional needs.    Mindy Oppenheimer  Spiritual Care Manager  [phone]  #[phone]

## 2024-09-30 NOTE — ASSESSMENT & PLAN NOTE
HR still low in 30's  ECHO done today  Cardiology input noted  Will limit trazodone use  Cont to monitor on tele

## 2024-09-30 NOTE — CONSULTS
Reason for Consultation: bradycardia    Provider Requesting Consultation: Dr Gordon    Primary Cardiologist: none    History of Present Illness: 44 year-old  female with PMHx of depression,anxiety, chronic laxative abuse/bulimia, type II diabetes who presented to  with groin pain, fever, and urinary frequency.  She has chronic back pain, ambulates with a walker, and has home health aides.  She was found to have a UTI with E coli.  She was given antibiotics, IV fluids, and her hypotension improved.  She has developed bradycardia which started early yesterday am and has persisted and worsened.  She is asymptomatic in this regard.  No syncope, lightheadedness, substernal chest pain.  She admits to an occasional chest twinge, but it is fleeting, lasts seconds, and is not exertional.    EKG 9/28/24 showed NSR with low voltage and nonspecific T wave changes.    EKG 9/29/24 marked SB with HR of 42 bpm.  Low voltage and nonspecific ST-T wave changes.    She reportedly had a h/o CONCHA but believes it resolved when she lost weight.  She never had a repeat sleep study, however.        Review of Systems:   Constitutional: Denies +fever prior to admission  Eyes: Denies acute changes in vision  Nose: Denies epistaxis  Cardiovascular: Denies angina, dyspnea, orthopnea, paroxysmal nocturnal dyspnea, edema, sudden weight gain, lightheadedness, syncope, palpitations, claudication  Respiratory: Denies cough, wheezing, hemoptysis  Gastrointestinal: Denies changes in bowel habit, nausea, emesis, hematemesis, hematochezia, melena  Genitourinary: Denies dysuria, urinary frequency, urgency, or hematuria  Musculoskeletal: +back pain, +walks with a walker  Neurologic:  Denies focal neurologic deficits suggestive of TIA/CVA, recurrent falls, dizziness  Hematologic: Denies unusual bruising or bleeding  Integumentary: Denies unusual rashes    Medications:    atorvastatin  20 mg oral q AM    cefTRIAXone  1 g intravenous q24h  "INT    FLUoxetine  20 mg oral Daily    gabapentin  400 mg oral QID    heparin (porcine)  5,000 Units subcutaneous q8h PRABHA    levothyroxine  75 mcg oral Daily (6:30a)    midodrine  10 mg oral TID AC    pantoprazole  20 mg oral Daily    topiramate  50 mg oral BID    traZODone  100-200 mg oral Nightly       Allergies: Aspartame, Bee venom protein (honey bee), Buspirone, Diphenhydramine, Haloperidol, Iodine, Penicillins, Sertraline, Diclofenac sodium, Hydroxyzine, Shellfish containing products, Sudafed cold-allergy, and Tetracyclines    Medical History:   Past Medical History:   Diagnosis Date    Anxiety     Depression     Eating disorder        Surgical History: No past surgical history on file.    Social History: The patient denies alcohol, nicotine, or recreational drug abuse.    Family History: The patient denies a family history of premature ischemic heart disease or sudden cardiac death.    Physical Exam:  Constitutional: Vitals: Visit Vitals  BP (!) 101/56 (BP Location: Left upper arm, Patient Position: Lying)   Pulse (!) 37   Temp 36.6 °C (97.8 °F)   Resp 16   Ht 1.651 m (5' 5\")   Wt 91.4 kg (201 lb 8 oz)   SpO2 100%   BMI 33.53 kg/m²     General: Obese WF lying in bed in NAD  Eyes: No conjunctival pallor.  Sclera anicteric.  Mouth: Mucus membranes moist.  Lung: Clear to ausculation bilaterally.  Heart : Regular without murmur, gallop, rub.  No JVD.  No carotid bruits.  Vascular exam demonstrates distal pulses to be symmetric and intact.  Extremities demonstrate no significant edema peripherally.  Abdomen: Soft, NT, ND.  Bowel sound present.  Skin: Warm.  No unusual rashes are noted.  Neuro: Alert and oriented x3. Affect appropriate.  Musculoskeletal: Neck supple.    Lab Results:  Results from last 7 days   Lab Units 09/30/24  0547 09/29/24  1003 09/28/24  2253 09/28/24  1049   SODIUM mEQ/L 142 138 139 137   POTASSIUM mEQ/L 4.2 4.0 3.7 3.1*   MAGNESIUM mg/dL 2.0 2.1 1.6* 1.6*   CHLORIDE mEQ/L 112* 110* 111* 106 " "  CO2 mEQ/L 23 22 20* 22   BUN mg/dL 5* 7 7 9   CREATININE mg/dL 0.7 0.6 0.7 1.0   AST IU/L  --   --   --  12*   ALT IU/L  --   --   --  8     Results from last 7 days   Lab Units 09/30/24  0547 09/29/24  1003 09/28/24  1049   WBC K/uL 7.95 6.40 8.14   HEMOGLOBIN g/dL 10.9* 12.2 11.7*   HEMATOCRIT % 33.6* 38.2 34.9*   PLATELETS K/uL 165 168 135*     Lab Results   Component Value Date    TSH 1.40 09/29/2024     No results found for: \"CHOL\", \"LDLCALC\", \"HDL\", \"TRIG\"  No results found for: \"BNP\"    TSH wnl    ECG:  as above.    Pertinent Studies:   CTA chest 9/28/24:. No acute pulmonary arterial embolism. No evidence of acute pathology within  the chest.  2. Mild coronary arterial atherosclerotic vascular calcification.    Telemetry: SB down to 31 bpm    Impressions and Recommendations:     Sinus bradycardia - she has multiple reasons for bradycardia.  She has a h/o eating disorder with evidence for hypoproteinemia, is in chronic pain secondary to her back pain, and uses trazadone which is associated with bradyarrhythmias.  In addition, she had a h/o CONCHA in the past.  TSH wnl.  Nursing staff will ambulate patient to ensure appropriate chronotropic response to exercise.  Will check TTE and overnight pulse ox, if patient remains in hospital.  Would minimize trazadone usage.  Outpatient MOMME at d/c.  Avoid AVN blocking agents  Tobacco abuse - tobacco cessation counseling has been given.  CONCHA - needs outpatient sleep study.  Mixed dyslipidemia - on atorvastatin  GERD - PPI.    Emilie Rucker MD  9/30/2024     Emily Del Valle CRNP  "

## 2024-09-30 NOTE — PLAN OF CARE
Care Coordination Admission Assessment Note    General Information:  Readmission Within the last 30 days: no previous admission in last 30 days  Does patient have a :    Patient-Specific Goals (include timeframe): return home with [facility] for nurse and PT services, has private home health aide thru [facility] and pt stated she will need w/c van transport home    Living Arrangements:  Arrived From: home  Current Living Arrangements: apartment  People in Home: alone  Home Accessibility: elevator accessible  Living Arrangement Comments: Lives alone in an apartment with ramp access and elevator access to her apt    Housing Stability and Utility Access (SDOH):  In the last 12 months, was there a time when you were not able to pay the mortgage or rent on time?: No  In the past 12 months, how many times have you moved?:    At any time in the past 12 months, were you homeless or living in a shelter (including now)?: No  In the past 12 months has the electric, gas, oil, or water company threatened to shut off services in your home?: No    Functional Status Prior to Admission:   Assistive Device/Animal Currently Used at Home: grab bar, shower chair, raised toilet, walker, 4-wheeled, bath bench  Functional Status Comments: Pt ambulates with rollator  IADL Comments: pt needs assist with ADLs and has home health aide thru [facility] on Mon- Saturday from 9am to 3pm     Supports and Services:  Current Outpatient/Agency/Support Group: homecare agency  Type of Current Home Care Services: home health aide, home PT, nursing  History of home care episode or rehab stay: current with [facility] for nurse and PT services, aide thru [facility]    Discharge Needs Assessment:   Concerns to be Addressed: care coordination/care conferences, discharge planning  Current Discharge Risk: lives alone, substance use/abuse, psychiatric illness  Anticipated Changes Related to Illness:      Patient/Family Anticipated Discharge Plan:  Patient/Family Anticipates  Transition To: home  Patient/Family Anticipated Services at Transition: home health care    Connection to Community  Not applicable    Patient Choice:   Offered/Gave Vendor List: yes  Patient's Choice of Community Agency(s): [facility]  Patient and/or patient guardian/advocate was made aware of their right to choose a provider. A list of eligible providers was presented and reviewed with the patient and/or patient guardian/advocate in written and/or verbal form. The list delineates providers in the patient’s desired geographic area who are participating in the Medicare program and/or providers contracted with the patient’s primary insurance. The Medicare list and quality ratings were obtained from the Medicare.gov [medicare.gov] website.    Anticipated Discharge Plan:  Met with patient. Provided education and contact information for Care Coordination services.: yes  Anticipated Discharge Disposition: home with home health  Type of Home Care Services: home PT, nursing, home health aide    Transportation Needs (SDOH):  Transportation Concerns: no car, rides, unreliable from others  Transportation Anticipated:    Is Out of Hospital DNR needed at discharge?: no    In the past 12 months, has lack of transportation kept you from medical appointments or from getting medications?:    In the past 12 months, has lack of transportation kept you from meetings, work, or from getting things needed for daily living?:      Concerns - comments: Chart reviewed and met with pt.  Pt resides alone in an apartment with elevator access and ramp access to her building.  Pt has a home health aide thru [facility] Monday - Saturday from 9am to 3pm.  Pt is on disability.  Pt ambulates with rollator and needs assist with ADLs.  Pt has rollator, grab bars, showerchair, tub bench, and raised toilet seat at home.  Pt is current with [facility] and wants them to continue upon d/c and referral made back to them and I spoke with sean Giron and made her aware pt  is in hospital. Pt stated that her emergency contact moved out of state and her brother lives in [place] and can not assist her with transport home.  Pt stated she will need transport home and does not feel comfortable with [employer] transport, discussed w/c van transport with pt and she is agreeable and aware of costs.  PLAN: home alone with [facility] for nurse and PT services, pt will need w/c van transport home.  Rand Garcia MSWS LSW

## 2024-09-30 NOTE — ASSESSMENT & PLAN NOTE
Last TSH in August was high and her T4 was low  Synthroid dosage increased by me  New TSH Is WNL

## 2024-10-01 PROBLEM — E83.42 HYPOMAGNESEMIA: Status: RESOLVED | Noted: 2024-09-28 | Resolved: 2024-10-01

## 2024-10-01 PROBLEM — I95.9 HYPOTENSION: Status: RESOLVED | Noted: 2024-08-16 | Resolved: 2024-10-01

## 2024-10-01 PROBLEM — E87.6 HYPOKALEMIA: Status: RESOLVED | Noted: 2024-08-16 | Resolved: 2024-10-01

## 2024-10-01 LAB — PHOSPHATE SERPL-MCNC: 3.2 MG/DL (ref 2.4–4.7)

## 2024-10-01 PROCEDURE — 25800000 HC PHARMACY IV SOLUTIONS: Performed by: INTERNAL MEDICINE

## 2024-10-01 PROCEDURE — 87040 BLOOD CULTURE FOR BACTERIA: CPT | Performed by: INTERNAL MEDICINE

## 2024-10-01 PROCEDURE — 36415 COLL VENOUS BLD VENIPUNCTURE: CPT | Performed by: INTERNAL MEDICINE

## 2024-10-01 PROCEDURE — 20600000 HC ROOM AND CARE INTERMEDIATE/TELEMETRY

## 2024-10-01 PROCEDURE — 63700000 HC SELF-ADMINISTRABLE DRUG

## 2024-10-01 PROCEDURE — 63600000 HC DRUGS/DETAIL CODE

## 2024-10-01 PROCEDURE — 99233 SBSQ HOSP IP/OBS HIGH 50: CPT | Performed by: INTERNAL MEDICINE

## 2024-10-01 PROCEDURE — 25800000 HC PHARMACY IV SOLUTIONS

## 2024-10-01 PROCEDURE — 63700000 HC SELF-ADMINISTRABLE DRUG: Performed by: INTERNAL MEDICINE

## 2024-10-01 RX ORDER — ZOLPIDEM TARTRATE 5 MG/1
5 TABLET ORAL NIGHTLY
Status: DISCONTINUED | OUTPATIENT
Start: 2024-10-01 | End: 2024-10-03 | Stop reason: HOSPADM

## 2024-10-01 RX ORDER — LEVOTHYROXINE SODIUM 50 UG/1
50 TABLET ORAL
Status: DISCONTINUED | OUTPATIENT
Start: 2024-10-02 | End: 2024-10-03 | Stop reason: HOSPADM

## 2024-10-01 RX ORDER — SODIUM CHLORIDE 9 MG/ML
INJECTION, SOLUTION INTRAVENOUS CONTINUOUS
Status: ACTIVE | OUTPATIENT
Start: 2024-10-01 | End: 2024-10-02

## 2024-10-01 RX ADMIN — CEPHALEXIN 250 MG: 250 CAPSULE ORAL at 17:22

## 2024-10-01 RX ADMIN — CEPHALEXIN 250 MG: 250 CAPSULE ORAL at 13:24

## 2024-10-01 RX ADMIN — ZOLPIDEM TARTRATE 5 MG: 5 TABLET ORAL at 21:35

## 2024-10-01 RX ADMIN — SODIUM CHLORIDE 500 ML: 9 INJECTION, SOLUTION INTRAVENOUS at 10:20

## 2024-10-01 RX ADMIN — GABAPENTIN 400 MG: 400 CAPSULE ORAL at 21:34

## 2024-10-01 RX ADMIN — HEPARIN SODIUM 5000 UNITS: 5000 INJECTION, SOLUTION INTRAVENOUS; SUBCUTANEOUS at 01:00

## 2024-10-01 RX ADMIN — PANTOPRAZOLE SODIUM 20 MG: 20 TABLET, DELAYED RELEASE ORAL at 08:43

## 2024-10-01 RX ADMIN — CEPHALEXIN 250 MG: 250 CAPSULE ORAL at 21:34

## 2024-10-01 RX ADMIN — SODIUM CHLORIDE: 9 INJECTION, SOLUTION INTRAVENOUS at 12:35

## 2024-10-01 RX ADMIN — MIDODRINE HYDROCHLORIDE 10 MG: 5 TABLET ORAL at 08:43

## 2024-10-01 RX ADMIN — ACETAMINOPHEN 325MG 650 MG: 325 TABLET ORAL at 08:58

## 2024-10-01 RX ADMIN — CEPHALEXIN 250 MG: 250 CAPSULE ORAL at 08:43

## 2024-10-01 RX ADMIN — HEPARIN SODIUM 5000 UNITS: 5000 INJECTION, SOLUTION INTRAVENOUS; SUBCUTANEOUS at 09:00

## 2024-10-01 RX ADMIN — TOPIRAMATE 50 MG: 25 TABLET, FILM COATED ORAL at 19:54

## 2024-10-01 RX ADMIN — LEVOTHYROXINE SODIUM 75 MCG: 0.07 TABLET ORAL at 05:14

## 2024-10-01 RX ADMIN — HEPARIN SODIUM 5000 UNITS: 5000 INJECTION, SOLUTION INTRAVENOUS; SUBCUTANEOUS at 17:22

## 2024-10-01 RX ADMIN — ATORVASTATIN CALCIUM 20 MG: 20 TABLET, FILM COATED ORAL at 10:21

## 2024-10-01 RX ADMIN — TOPIRAMATE 50 MG: 25 TABLET, FILM COATED ORAL at 08:43

## 2024-10-01 RX ADMIN — SODIUM CHLORIDE 500 ML: 9 INJECTION, SOLUTION INTRAVENOUS at 01:36

## 2024-10-01 RX ADMIN — MIDODRINE HYDROCHLORIDE 10 MG: 5 TABLET ORAL at 12:21

## 2024-10-01 RX ADMIN — GABAPENTIN 400 MG: 400 CAPSULE ORAL at 08:43

## 2024-10-01 RX ADMIN — MIDODRINE HYDROCHLORIDE 10 MG: 5 TABLET ORAL at 17:22

## 2024-10-01 RX ADMIN — FLUOXETINE HYDROCHLORIDE 20 MG: 20 CAPSULE ORAL at 08:43

## 2024-10-01 RX ADMIN — GABAPENTIN 400 MG: 400 CAPSULE ORAL at 17:22

## 2024-10-01 RX ADMIN — ACETAMINOPHEN 325MG 650 MG: 325 TABLET ORAL at 17:22

## 2024-10-01 RX ADMIN — GABAPENTIN 400 MG: 400 CAPSULE ORAL at 13:25

## 2024-10-01 ASSESSMENT — COGNITIVE AND FUNCTIONAL STATUS - GENERAL
CLIMB 3 TO 5 STEPS WITH RAILING: 3 - A LITTLE
WALKING IN HOSPITAL ROOM: 4 - NONE
WALKING IN HOSPITAL ROOM: 4 - NONE
STANDING UP FROM CHAIR USING ARMS: 4 - NONE
STANDING UP FROM CHAIR USING ARMS: 4 - NONE
CLIMB 3 TO 5 STEPS WITH RAILING: 3 - A LITTLE
MOVING TO AND FROM BED TO CHAIR: 4 - NONE
MOVING TO AND FROM BED TO CHAIR: 4 - NONE

## 2024-10-01 NOTE — ASSESSMENT & PLAN NOTE
Appears stable at present, although per staff patient repeatedly declines attempt at mobility and getting out of bed    Plan:  Cont w/fluoxetine, lorazepam at d/c.

## 2024-10-01 NOTE — PROGRESS NOTES
Cardiology Progress Note    No cardiac complaints.  Persistent low blood pressure and sinus rate.    REVIEW OF SYSTEMS : No other significant changes to review of systems over the past 24 hours.    Current Facility-Administered Medications   Medication Dose Route Frequency Provider Last Rate Last Admin    acetaminophen (TYLENOL) tablet 650 mg  650 mg oral q4h PRN Vicki Foley CRNP   650 mg at 10/01/24 0858    atorvastatin (LIPITOR) tablet 20 mg  20 mg oral q AM Vicki Foley CRNP   20 mg at 09/30/24 0845    cephalexin (KEFLEX) capsule 250 mg  250 mg oral QID Clark Gordon MD   250 mg at 10/01/24 0843    glucose chewable tablet 16-32 g of dextrose  16-32 g of dextrose oral PRN Vicki Foley CRNP        Or    dextrose 40 % oral gel 15-30 g of dextrose  15-30 g of dextrose oral PRN Vicki Foley CRNP        Or    glucagon (GLUCAGEN) injection 1 mg  1 mg intramuscular PRN Vicki Foley CRNP        Or    dextrose 50 % in water (D50) injection 12.5 g  25 mL intravenous PRN Vicki Foley CRNP        FLUoxetine (PROzac) capsule 20 mg  20 mg oral Daily Vicki Foley CRNP   20 mg at 10/01/24 0843    gabapentin (NEURONTIN) capsule 400 mg  400 mg oral QID Vicki Foley CRNP   400 mg at 10/01/24 0843    heparin (porcine) 5,000 unit/mL injection 5,000 Units  5,000 Units subcutaneous q8h PRABHA Vicki Foley CRNP   5,000 Units at 10/01/24 0900    levothyroxine (SYNTHROID) tablet 75 mcg  75 mcg oral Daily (6:30a) Clark Gordon MD   75 mcg at 10/01/24 0514    magnesium oxide (MAG-OX) tablet 400 mg  400 mg oral 2x daily PRN Vicki Foley CRNP        magnesium sulfate IVPB 1g in 100 mL NSS/D5W/SWFI  1 g intravenous PRN Vicki Foley CRNP        Or    magnesium sulfate IVPB 2g in 50 mL NSS/D5W/SWFI  2 g intravenous PRN Vicki Foley CRNP   Stopped at 09/29/24 0311    midodrine (PROAMATINE) tablet 10 mg  10 mg oral TID AC Vicki Foley CRNP   10 mg at 10/01/24 0843     nitroglycerin (NITROSTAT) SL tablet 0.4 mg  0.4 mg sublingual q5 min PRN Vicki Foely CRNP        pantoprazole (PROTONIX) tablet,delayed release (DR/EC) 20 mg  20 mg oral Daily Vicki Foley CRNP   20 mg at 10/01/24 0843    potassium chloride (KLOR-CON M) ER tablet (particles/crystals) 20 mEq  20 mEq oral PRN Vicki Foley CRNP        potassium chloride (KLOR-CON M) ER tablet (particles/crystals) 40 mEq  40 mEq oral PRN Vicki Foely CRNP        prochlorperazine (COMPAZINE) injection 10 mg  10 mg intravenous q6h PRN Clark Gordon MD        topiramate (TOPAMAX) tablet 50 mg  50 mg oral BID Vicki Foley CRNP   50 mg at 10/01/24 0843    traZODone (DESYREL) tablet 100-200 mg  100-200 mg oral Nightly Vicki Foley CRNP        zolpidem (AMBIEN) tablet 5 mg  5 mg oral Nightly Clark Gordon MD              Objective   Labs  Serum protein on 9/28 was 5.8.  Albumin was 3.5.  Creatinine today 0.7.  Magnesium 2.0.    Telemetry  I have personally reviewed the telemetry tracings.  Sinus pericardia    Physical Exam  Temp:  [36.4 °C (97.6 °F)-36.8 °C (98.2 °F)] 36.8 °C (98.2 °F)  Heart Rate:  [35-66] 49  Resp:  [15-20] 15  BP: ()/(50-59) 83/50    Intake/Output Summary (Last 24 hours) at 10/1/2024 0922  Last data filed at 10/1/2024 0345  Gross per 24 hour   Intake 1310 ml   Output 950 ml   Net 360 ml       Awake and alert, NAD  Lungs clear  Heart regular, bradycardic  No significant edema  Moves all 4 extremities    ASSESSMENT / PLAN:    Persistent sinus bradycardia and low blood pressure in the setting of low protein.  She has poor functional status.  Highly likely that there is dysautonomia contributing, but with ambulation she does have an adequate increase in her heart rate and so there are no signs or symptoms of chronotropic incompetence nor is there any evidence of heart block.    At this point, she does not require further cardiac workup.  We can consider outpatient  ambulatory telemetry monitoring once her acute issues are resolved.  Try to keep magnesium greater than 2 and potassium greater than 4 and focus on improving nutritional and functional markers.    As there are no active or unstable cardiovascular issues at this time . we will sign off.  Please do not hesitate to reconsult with any further cardiovascular questions or concerns while she is here.

## 2024-10-01 NOTE — ASSESSMENT & PLAN NOTE
Noted in prior records, although per behavior health notes patient declines to provide info on ERYN history.  Ethanol undetectable on 9/28  Currently without si/sxs of withdrawal    Plan:  Cont to monitor via CIWA protocol  Behav health consulted. Pt declines any substance use or need for treatment at this time.

## 2024-10-01 NOTE — ASSESSMENT & PLAN NOTE
Patient with persistently low blood pressures throughout hospitalization. Initially attributed to sepsis in the setting of UTI, however despite treatment of infection hypotension persists.  Initially improved with IVFs, but has since downtrended following discontinuation of IVFs. History of chronic baseline hypotension is noted in records, per patient baseline SBPs in the 90s  Chest CTA unremrkable for PE  TSH and cortisol wnl.  TTE w/TTE w/Left Ventricle: Normal ventricle size. Normal wall thickness. Estimated EF 65%. No regional wall motion abnormalities. Normal diastolic filling pattern for age. Mild MR, moderate TR. Of note IVC/SVC: Inferior vena cava is <2.1cm. Inferior vena cava collapses >50% during inspiration.  Blood cultures neg to date.  Per staff patient with adequate oral intake, eating approximately half of her meals  Trazodone remains on hold at preset in light of hypotension    Per discussions with cardiology findings to include bradycardia and hypotension likely related to Autonomic insufficiency in the setting dec mobility. BP improved with addition of florinef and remaisns stable of IVFs w/SBPs in the 100s. Denies any sxs at this time.     Plan:  Cont to monitor on telemetry  IVFs d/dl   florinef 0.1mg daily and midodrine PRN.   Hold prazosin   Encourage OOB, PT/OT.   Given clinical improvement will plan to d/c to home later today.

## 2024-10-01 NOTE — ASSESSMENT & PLAN NOTE
Admits to smoking three-quarter of a pack to 1 pack/day  Pt offered nicotine replacement supplement earlier in hospitalization but she declined as it makes her anxious    Plan:  Cont w/Smoking Cessation education

## 2024-10-01 NOTE — ASSESSMENT & PLAN NOTE
On trazodone at baseline at home  ?if this may be contributing to vital sign abnormalieies    Plan:  Cont w/trazodone for now with hold parameters do not give for systolic blood pres sure less than 110, although has not been able to take for the past several days given low Bps. Will d/c at this point.   Cont w/outpt regime of ambien 10mg daily   Pt to f/u with her outpatient psychiatrist to discuss further management of options of insomnia if ambien alone does not provide relief.

## 2024-10-01 NOTE — ASSESSMENT & PLAN NOTE
Hospital course c/b persistently low heart rates in the 30-50s. Appropriate increase is noted with ambulation with HR inc to the 70s. Patient with one brief 3 second pause on 10/2 but no others noted. Per cardiology no additional intervention recommended at this time.   TTE w/Left Ventricle: Normal ventricle size. Normal wall thickness. Estimated EF 65%. No regional wall motion abnormalities. Normal diastolic filling pattern for age. Mild MR, moderate TR  TSH wnl  Electrolytes remain stable  Per discussions with cardiology findings to include bradycardia and hypotension likely related to Autonomic insufficiency in the setting dec mobility. Patient did have 3 sec pause yesterday afternoon however per cardiology no acute intervention recommended at this time.   Patietn remains asymptomatic at this time and anxious for d/c.     Plan:  Cardiology input noted.   Cont to monitor on telemetry  Hold trazodone as stated  Monitor electrolytes and replete as required for K>4, Mg>2  Encourage mobility, OOB, PT/PT  Given clinical stability will plan to d/c to home later today. Will need outpt cardiology f/u for continued evaluation and management to include possible outpatient cardiac monitoring

## 2024-10-01 NOTE — PROGRESS NOTES
Hospital Medicine Service -  Daily Progress Note       SUBJECTIVE   Interval History: No acute events overnight. Hypotensive with SBPs in the 80-90s and bradycardic with HR in the 40s overnight which is similar to previous values. Per staff heart rate and blood pressure do improve with ambulation with HR inc to the 70s and SBPs to the 100s.  Patient seen and examined. Complains of feeling fatigue and lightheaeded.     OBJECTIVE      Vital signs in last 24 hours:  Temp:  [36.4 °C (97.6 °F)-36.8 °C (98.2 °F)] 36.8 °C (98.2 °F)  Heart Rate:  [35-66] 44  Resp:  [15-20] 15  BP: ()/(49-59) 83/49    Intake/Output Summary (Last 24 hours) at 10/1/2024 1020  Last data filed at 10/1/2024 0345  Gross per 24 hour   Intake 1310 ml   Output 950 ml   Net 360 ml       PHYSICAL EXAMINATION      GEN: well-developed and well-nourished; not in acute distress  HEENT: normocephalic; atraumatic  NECK: no JVD  CARDIO: regular rate and rhythm; no murmurs, rubs or gallops  RESP: clear to auscultation bilaterally; no rales, rhonchi, or wheezes  ABD: soft, non-distended, non-tender, normal bowel sounds  EXT: no cyanosis, clubbing, or edema  SKIN: clean, dry, warm, and intact. Multiple scars noted over LUE  MUSCULOSKELETAL: no injury or deformity  NEURO: alert and oriented x 3; nonfocal  BEHAVIOR/EMOTIONAL: appropriate; cooperative     LABS / IMAGING / TELE      Labs  Lab Results   Component Value Date    GLUCOSE 95 09/30/2024    CALCIUM 8.7 09/30/2024     09/30/2024    K 4.2 09/30/2024    CO2 23 09/30/2024     (H) 09/30/2024    BUN 5 (L) 09/30/2024    CREATININE 0.7 09/30/2024     Lab Results   Component Value Date    WBC 7.95 09/30/2024    HGB 10.9 (L) 09/30/2024    HCT 33.6 (L) 09/30/2024    .7 (H) 09/30/2024     09/30/2024     Lab Results   Component Value Date    ALBUMIN 3.5 09/28/2024    BILITOT 0.6 09/28/2024    ALKPHOS 117 09/28/2024    AST 12 (L) 09/28/2024    ALT 8 09/28/2024    PROTEIN 5.8 (L)  09/28/2024       Imaging  CT ANGIOGRAPHY CHEST PULMONARY EMBOLISM WITH IV CONTRAST    Result Date: 9/29/2024  IMPRESSION: 1. No acute pulmonary arterial embolism. No evidence of acute pathology within the chest. 2. Mild coronary arterial atherosclerotic vascular calcification.     ECG/Telemetry  I have independently reviewed the telemetry. Significant findings include sinus brittany, HR 30-40s.    ASSESSMENT AND PLAN      * Sepsis due to urinary tract infection  (CMS/HCC)  Assessment & Plan  Presents w/pain in her suprapubic area radiating up to bilateral flanks and secondary to UTI. Initially there was concern for sepsis given low grade fever of 100.5 and hypotension, although patient with h/o chronic hypotension and despite treatment of UTI BP remains low  Pt notes remote h/o recurrent UTIs in the past for reasons that are uncertain but none recently  Initial UA c/f infection. Urine culture w/pan sensitive E.Coli  Denies any further sxs since treatment of infection.     Plan:  Initiated on ceftriaxone, transitioned to keflex, currently day 4/7  F/u blood cultures as stated   Check PVR  IVFs d/dl. Will reinitiate brief course of IVFs in light of hypotension        Bradycardia  Assessment & Plan  Hospital course c/b persistently low heart rates in the 30-40s. Appropriate increase is noted with ambulation with HR inc to the 70s  TTE w/Left Ventricle: Normal ventricle size. Normal wall thickness. Estimated EF 65%. No regional wall motion abnormalities. Normal diastolic filling pattern for age. Mild MR, moderate TR  TSH wnl  Electrolytes remain stable  Per discussions with cardiology findings to include bradycardia and hypotension likely related to Autonomic insufficiency in the setting dec mobility.     Plan:  Cardiology input noted.   Cont to monitor on telemetry  Limit trazodone use as stated  Monitor electrolytes and replete as required for K>4, Mg>2  Encourage mobility, OOB, PT/PT          Hypotension, unspecified  hypotension type  Assessment & Plan  Patient with persistently low blood pressures throughout hospitalization. Initially attributed to sepsis in the setting of UTI, however despite treatment of infection hypotension persists.  Initially improved with IVFs, but has since downtrended following discontinuation of IVFs. History of chronic baseline hypotension is noted in records, per patient baseline SBPs in the 90s  Chest CTA unremrkable for PE  TSH and cortisol wnl.  TTE w/TTE w/Left Ventricle: Normal ventricle size. Normal wall thickness. Estimated EF 65%. No regional wall motion abnormalities. Normal diastolic filling pattern for age. Mild MR, moderate TR. Of note IVC/SVC: Inferior vena cava is <2.1cm. Inferior vena cava collapses >50% during inspiration.  Per staff patient with adequate oral intake, eating approximately half of her meals  Trazodone remains on hold at preset in light of hypotension    Per discussions with cardiology findings to include bradycardia and hypotension likely related to Autonomic insufficiency in the setting dec mobility.     Plan:  Cont to monitor in PCU for now  Check blood cultures for completeness although suspicion for ongoing infection remains low at this time  Check UDS  IVFs d/dl. S/p 500cc bolus early this am. Will administer another 500cc bolus and initiate gentle IVFs w/NS at 80cc/hr for one more day  Cont w/midodrine 10mg TID, ? If florinef should be considered. Will f/u cards recs  Hold prazosin   Encourage OOB, PT/OT.       Severe bipolar II disorder, most recent episode major depressive without psychotic features (CMS/HCC)  Assessment & Plan  Appears stable at present, although per staff patient repeatedly declines attempt at mobility and getting out of bed    Plan:  Cont w/fluoxetine. Lorazepam remain on hold at present. Will cont to hold for now.   T/c psych eval if concern mental health issues may be contributing to overall mobility issues.     Eating  disorder  Assessment & Plan  history of eating disorder, chronic laxative abuse, PTSD noted in history, also h/o rou-en-y bypass  Per staff patient continues to have appropriate oral intake, eating at least half of her meals. Electrolytes also remain stsable.    Plan:  Cont to encourage po intake  Monitor electrolytes and replete as required for K>4, Mg>2  Behav health f/u       Current nicotine use  Assessment & Plan  Admits to smoking three-quarter of a pack to 1 pack/day  Pt offered nicotine replacement supplement earlier in hospitalization but she declined as it makes her anxious    Plan:  Cont w/Smoking Cessation education    Substance or medication-induced sleep disorder, insomnia type (CMS/[facility])  Assessment & Plan  On trazodone at baseline at home  ?if this may be contributing to vital sign abnormalieies    Plan:  Cont w/trazodone for now with hold parameters do not give for systolic blood pres sure less than 110  Cont w/outpt regime of ambien 10mg daily     Neuropathy  Assessment & Plan  Stable    Plan:  Cont w/Gabapentin   Fall precautions    Hyperlipidemia, mixed  Assessment & Plan  Continue atorvastatin     GERD without esophagitis  Assessment & Plan  Stable. On omeprazole at home    Plan;  Cont w/Pantoprazole (therapeutic substitute for omeprazole) 20mg daily while in house  Follow GERD precautions     Ambulatory dysfunction  Assessment & Plan  PT/OT f/u  Fall precautions      Alcohol abuse, continuous  Assessment & Plan  Noted in prior records, although per behavior health notes patient declines to provide info on ERYN history.  Ethanol undetectable on 9/28  Currently without si/sxs of withdrawal    Plan:  Cont to monitor via CIWA protocol  Check UDS  Behav health consulted. F/u recs      Seizures (CMS/[facility])  Assessment & Plan  Stable.    Plan;  Continue Topamax  Seizure precautions      Hypothyroidism  Assessment & Plan  TSH as of 9/29 wnl    Plan:  Given normal TSH will resume outpt regimen of synthroid  50mcg daily               VTE Assessment: Padua    DVT PPX: heparin sq, GI PPX: PPI  Diet: regular diet  Dispo: PCU   Estimated discharge date: 10/1/2024  Code Status: Full Code     Luisa Verdugo DO  10/1/2024

## 2024-10-01 NOTE — ASSESSMENT & PLAN NOTE
Presents w/pain in her suprapubic area radiating up to bilateral flanks and secondary to UTI. Initially there was concern for sepsis given low grade fever of 100.5 and hypotension, although patient with h/o chronic hypotension and despite treatment of UTI BP remains low  Pt notes remote h/o recurrent UTIs in the past for reasons that are uncertain but none recently  Initial UA c/f infection. Urine culture w/pan sensitive E.Coli. Blood cultures NTD  PVR stable  Denies any further sxs since treatment of infection. BP remains low end of normal, improved somewhat today following IVFs    Plan:  Initiated on ceftriaxone, transitioned to keflex, currently day 6/7  IVFs d/dl  Fall precautions, PT/OT consulted. Last evaluation was performed on 9/29 at which time home with home healthcare was recommended and signed off. Per nursing staff patient is still ambulating and performing ADLS without difficulty, and with supervision only. Patient is anxious for d/c and wants to return home regardless.

## 2024-10-01 NOTE — ASSESSMENT & PLAN NOTE
TSH as of 9/29 wnl    Plan:  Given normal TSH will resume outpt regimen of synthroid 50mcg daily

## 2024-10-01 NOTE — CONSULTS
Checked in with patient, provided comfort blanket with healing wishes, gratitude expressed. Felipa shared feeling encouraged that she is improving; she appeared settled and smiling.    The Spiritual Care team will remain available for spiritual and emotional needs.    Mindy Oppenheimer  Spiritual Care Manager  [phone]  #[phone]

## 2024-10-01 NOTE — ASSESSMENT & PLAN NOTE
Stable. On omeprazole at home    Plan;  Cont w/Pantoprazole (therapeutic substitute for omeprazole) 20mg daily while in house  Follow GERD precautions

## 2024-10-01 NOTE — ASSESSMENT & PLAN NOTE
history of eating disorder, chronic laxative abuse, PTSD noted in history, also h/o rou-en-y bypass  Per staff patient continues to have appropriate oral intake, eating at least half of her meals. Electrolytes also remain stsable.    Plan:  Cont to encourage po intake  Monitor electrolytes and replete as required for K>4, Mg>2  Behav health f/u

## 2024-10-02 PROCEDURE — 63700000 HC SELF-ADMINISTRABLE DRUG

## 2024-10-02 PROCEDURE — 63700000 HC SELF-ADMINISTRABLE DRUG: Performed by: INTERNAL MEDICINE

## 2024-10-02 PROCEDURE — 20600000 HC ROOM AND CARE INTERMEDIATE/TELEMETRY

## 2024-10-02 PROCEDURE — 99233 SBSQ HOSP IP/OBS HIGH 50: CPT | Performed by: INTERNAL MEDICINE

## 2024-10-02 PROCEDURE — 25800000 HC PHARMACY IV SOLUTIONS: Performed by: INTERNAL MEDICINE

## 2024-10-02 PROCEDURE — 63600000 HC DRUGS/DETAIL CODE

## 2024-10-02 RX ORDER — FLUDROCORTISONE ACETATE 0.1 MG/1
0.1 TABLET ORAL DAILY
Status: DISCONTINUED | OUTPATIENT
Start: 2024-10-02 | End: 2024-10-03 | Stop reason: HOSPADM

## 2024-10-02 RX ORDER — MIDODRINE HYDROCHLORIDE 5 MG/1
10 TABLET ORAL 3 TIMES DAILY PRN
Status: DISCONTINUED | OUTPATIENT
Start: 2024-10-02 | End: 2024-10-03 | Stop reason: HOSPADM

## 2024-10-02 RX ADMIN — ACETAMINOPHEN 325MG 650 MG: 325 TABLET ORAL at 20:28

## 2024-10-02 RX ADMIN — TOPIRAMATE 50 MG: 25 TABLET, FILM COATED ORAL at 20:28

## 2024-10-02 RX ADMIN — SODIUM CHLORIDE: 9 INJECTION, SOLUTION INTRAVENOUS at 00:01

## 2024-10-02 RX ADMIN — ZOLPIDEM TARTRATE 5 MG: 5 TABLET ORAL at 23:03

## 2024-10-02 RX ADMIN — MIDODRINE HYDROCHLORIDE 10 MG: 5 TABLET ORAL at 11:37

## 2024-10-02 RX ADMIN — ACETAMINOPHEN 325MG 650 MG: 325 TABLET ORAL at 16:04

## 2024-10-02 RX ADMIN — HEPARIN SODIUM 5000 UNITS: 5000 INJECTION, SOLUTION INTRAVENOUS; SUBCUTANEOUS at 11:37

## 2024-10-02 RX ADMIN — FLUDROCORTISONE ACETATE 0.1 MG: 0.1 TABLET ORAL at 16:05

## 2024-10-02 RX ADMIN — CEPHALEXIN 250 MG: 250 CAPSULE ORAL at 23:03

## 2024-10-02 RX ADMIN — CEPHALEXIN 250 MG: 250 CAPSULE ORAL at 19:04

## 2024-10-02 RX ADMIN — PANTOPRAZOLE SODIUM 20 MG: 20 TABLET, DELAYED RELEASE ORAL at 08:03

## 2024-10-02 RX ADMIN — ACETAMINOPHEN 325MG 650 MG: 325 TABLET ORAL at 11:40

## 2024-10-02 RX ADMIN — GABAPENTIN 400 MG: 400 CAPSULE ORAL at 16:05

## 2024-10-02 RX ADMIN — ATORVASTATIN CALCIUM 20 MG: 20 TABLET, FILM COATED ORAL at 08:03

## 2024-10-02 RX ADMIN — GABAPENTIN 400 MG: 400 CAPSULE ORAL at 23:03

## 2024-10-02 RX ADMIN — MIDODRINE HYDROCHLORIDE 10 MG: 5 TABLET ORAL at 08:03

## 2024-10-02 RX ADMIN — GABAPENTIN 400 MG: 400 CAPSULE ORAL at 19:04

## 2024-10-02 RX ADMIN — HEPARIN SODIUM 5000 UNITS: 5000 INJECTION, SOLUTION INTRAVENOUS; SUBCUTANEOUS at 01:33

## 2024-10-02 RX ADMIN — LEVOTHYROXINE SODIUM 50 MCG: 0.05 TABLET ORAL at 05:31

## 2024-10-02 RX ADMIN — FLUOXETINE HYDROCHLORIDE 20 MG: 20 CAPSULE ORAL at 08:03

## 2024-10-02 RX ADMIN — GABAPENTIN 400 MG: 400 CAPSULE ORAL at 08:03

## 2024-10-02 RX ADMIN — TOPIRAMATE 50 MG: 25 TABLET, FILM COATED ORAL at 08:03

## 2024-10-02 RX ADMIN — HEPARIN SODIUM 5000 UNITS: 5000 INJECTION, SOLUTION INTRAVENOUS; SUBCUTANEOUS at 18:35

## 2024-10-02 RX ADMIN — CEPHALEXIN 250 MG: 250 CAPSULE ORAL at 16:04

## 2024-10-02 RX ADMIN — CEPHALEXIN 250 MG: 250 CAPSULE ORAL at 08:03

## 2024-10-02 ASSESSMENT — COGNITIVE AND FUNCTIONAL STATUS - GENERAL
MOVING TO AND FROM BED TO CHAIR: 4 - NONE
STANDING UP FROM CHAIR USING ARMS: 4 - NONE
WALKING IN HOSPITAL ROOM: 3 - A LITTLE
CLIMB 3 TO 5 STEPS WITH RAILING: 3 - A LITTLE
STANDING UP FROM CHAIR USING ARMS: 4 - NONE
MOVING TO AND FROM BED TO CHAIR: 4 - NONE
CLIMB 3 TO 5 STEPS WITH RAILING: 3 - A LITTLE
WALKING IN HOSPITAL ROOM: 3 - A LITTLE

## 2024-10-02 NOTE — PLAN OF CARE
Problem: Adult Inpatient Plan of Care  Goal: Readiness for Transition of Care  Outcome: Progressing     Problem: Fall Injury Risk  Goal: Absence of Fall and Fall-Related Injury  Outcome: Progressing   Plan of Care Review  Plan of Care Reviewed With: patient  Progress: improving  Outcome Evaluation: Pt is AAOX4; no c/o pain, SB on monitor in 40's; BP is running low; provider notified; Pt is asymptomatic and has NSS running, On RA and lungs sounds CTA.

## 2024-10-02 NOTE — PLAN OF CARE
Care Coordination Discharge Plan Note     Discharge Needs Assessment  Concerns to be Addressed: care coordination/care conferences, discharge planning  Current Discharge Risk: lives alone, substance use/abuse, psychiatric illness    Anticipated Discharge Plan  Anticipated Discharge Disposition: home with home health  Type of Home Care Services: home PT, nursing, home health aide      Patient Choice  Offered/Gave Vendor List: yes  Patient's Choice of Community Agency(s): [facility]    Patient and/or patient guardian/advocate was made aware of their right to choose a provider. A list of eligible providers was presented and reviewed with the patient and/or patient guardian/advocate in written and/or verbal form. The list delineates providers in the patient’s desired geographic area who are participating in the Medicare program and/or providers contracted with the patient’s primary insurance. The Medicare list and quality ratings were obtained from the Medicare.gov [medicare.gov] website.    ---------------------------------------------------------------------------------------------------------------------    Interdisciplinary Discharge Plan Review:  Participants:advanced practice provider, patient, nursing, occupational therapy, social work/services    Concerns Comments: patient disucssed in care progression rounds. nica is for downgrade out of pcu today. jailene met with patient in her room & went over role fo care coordination. patient stated that she needs to contact her landlord that she is in the hospital . per nica her rent was due 10/1 also. jailene discussed if there is issues with her being in the hosptial with the land lord her aftervisit summary would show hospitalization. patient is to place call to her landlord. eufemiayudi si aware of plan is for home with [facility] & will need a w/c van ride home.    Discharge Plan:   Disposition/Destination:   /    Discharge Facility:    Community Resources:      Discharge  Transportation:  Is Out of Hospital DNR needed at Discharge: no  Does patient need discharge transport?

## 2024-10-02 NOTE — PROGRESS NOTES
Hospital Medicine Service -  Daily Progress Note       SUBJECTIVE   Interval History: No acute events overnight. BP remains low end of normal but improving with administration of IVFs. Patient seen and examined. No new concerns or complaints.      OBJECTIVE      Vital signs in last 24 hours:  Temp:  [36.2 °C (97.2 °F)-36.8 °C (98.2 °F)] 36.2 °C (97.2 °F)  Heart Rate:  [36-57] 50  Resp:  [15-18] 16  BP: ()/(46-63) 101/61    Intake/Output Summary (Last 24 hours) at 10/2/2024 1545  Last data filed at 10/2/2024 0645  Gross per 24 hour   Intake 1703.33 ml   Output 1450 ml   Net 253.33 ml       PHYSICAL EXAMINATION      GEN: well-developed and well-nourished; not in acute distress  HEENT: normocephalic; atraumatic  NECK: no JVD  CARDIO: regular rate and rhythm; no murmurs, rubs or gallops  RESP: clear to auscultation bilaterally; no rales, rhonchi, or wheezes  ABD: soft, non-distended, non-tender, normal bowel sounds  EXT: no cyanosis, clubbing, or edema  SKIN: clean, dry, warm, and intact. Multiple scars noted over LUE  MUSCULOSKELETAL: no injury or deformity  NEURO: alert and oriented x 3; nonfocal  BEHAVIOR/EMOTIONAL: appropriate; cooperative     LABS / IMAGING / TELE      Labs  Lab Results   Component Value Date    GLUCOSE 95 09/30/2024    CALCIUM 8.7 09/30/2024     09/30/2024    K 4.2 09/30/2024    CO2 23 09/30/2024     (H) 09/30/2024    BUN 5 (L) 09/30/2024    CREATININE 0.7 09/30/2024     Lab Results   Component Value Date    WBC 7.95 09/30/2024    HGB 10.9 (L) 09/30/2024    HCT 33.6 (L) 09/30/2024    .7 (H) 09/30/2024     09/30/2024     Lab Results   Component Value Date    ALBUMIN 3.5 09/28/2024    BILITOT 0.6 09/28/2024    ALKPHOS 117 09/28/2024    AST 12 (L) 09/28/2024    ALT 8 09/28/2024    PROTEIN 5.8 (L) 09/28/2024       Imaging  CT ANGIOGRAPHY CHEST PULMONARY EMBOLISM WITH IV CONTRAST    Result Date: 9/29/2024  IMPRESSION: 1. No acute pulmonary arterial embolism. No  evidence of acute pathology within the chest. 2. Mild coronary arterial atherosclerotic vascular calcification.     ECG/Telemetry  I have independently reviewed the telemetry. Significant findings include intermittent sinus bradycardia, one 3.2 second pause.    ASSESSMENT AND PLAN      * Sepsis due to urinary tract infection  (CMS/HCC)  Assessment & Plan  Presents w/pain in her suprapubic area radiating up to bilateral flanks and secondary to UTI. Initially there was concern for sepsis given low grade fever of 100.5 and hypotension, although patient with h/o chronic hypotension and despite treatment of UTI BP remains low  Pt notes remote h/o recurrent UTIs in the past for reasons that are uncertain but none recently  Initial UA c/f infection. Urine culture w/pan sensitive E.Coli  PVR stable  Denies any further sxs since treatment of infection. BP remains low end of normal, improved somewhat today following IVFs    Plan:  Initiated on ceftriaxone, transitioned to keflex, currently day 5/7  F/u blood cultures as stated   Cot w/NS at 80cc/hr for now,          Bradycardia  Assessment & Plan  Hospital course c/b persistently low heart rates in the 30-40s. Appropriate increase is noted with ambulation with HR inc to the 70s  TTE w/Left Ventricle: Normal ventricle size. Normal wall thickness. Estimated EF 65%. No regional wall motion abnormalities. Normal diastolic filling pattern for age. Mild MR, moderate TR  TSH wnl  Electrolytes remain stable  Per discussions with cardiology findings to include bradycardia and hypotension likely related to Autonomic insufficiency in the setting dec mobility. Patient did have 3 sec pause yesterday afternoon.     Plan:  Cardiology input noted. Will re-engage given 3 second pause noted yesterday  Cont to monitor on telemetry  Limit trazodone use as stated  Monitor electrolytes and replete as required for K>4, Mg>2  Encourage mobility, OOB, PT/PT    ADDENDUM:  Discussed with cardiology re:  ?pause noted on telemetry. Per evaluation no acute intervention recommended at this time. Cont w/supprotive care as stated above.       Hypotension, unspecified hypotension type  Assessment & Plan  Patient with persistently low blood pressures throughout hospitalization. Initially attributed to sepsis in the setting of UTI, however despite treatment of infection hypotension persists.  Initially improved with IVFs, but has since downtrended following discontinuation of IVFs. History of chronic baseline hypotension is noted in records, per patient baseline SBPs in the 90s  Chest CTA unremrkable for PE  TSH and cortisol wnl.  TTE w/TTE w/Left Ventricle: Normal ventricle size. Normal wall thickness. Estimated EF 65%. No regional wall motion abnormalities. Normal diastolic filling pattern for age. Mild MR, moderate TR. Of note IVC/SVC: Inferior vena cava is <2.1cm. Inferior vena cava collapses >50% during inspiration.  Per staff patient with adequate oral intake, eating approximately half of her meals  Trazodone remains on hold at preset in light of hypotension    Per discussions with cardiology findings to include bradycardia and hypotension likely related to Autonomic insufficiency in the setting dec mobility.     Plan:  Cont to monitor in PCU for now  Check blood cultures for completeness although suspicion for ongoing infection remains low at this time  Check UDS  Cont w/gentle IVFs w/NS at 80cc/hr for one more day  Cont w/midodrine 10mg TID, ? If florinef should be considered. Will f/u cards recs  Hold prazosin   Encourage OOB, PT/OT.     ADDENDUM:  Discussed ongoing hypotension with cardiology. Will switch to florinef 0.1mg daily and midodrine PRN. Hopefully with medications adjustments this will assist with maintaining BP at a more reasonable level. Pending improvement in BP will plan to transfer out of PCU later today.       Severe bipolar II disorder, most recent episode major depressive without psychotic  features (CMS/[facility])  Assessment & Plan  Appears stable at present, although per staff patient repeatedly declines attempt at mobility and getting out of bed    Plan:  Cont w/fluoxetine. Lorazepam remain on hold at present. Will cont to hold for now.   T/c psych eval if concern mental health issues may be contributing to overall mobility issues.     Eating disorder  Assessment & Plan  history of eating disorder, chronic laxative abuse, PTSD noted in history, also h/o rou-en-y bypass  Per staff patient continues to have appropriate oral intake, eating at least half of her meals. Electrolytes also remain stsable.    Plan:  Cont to encourage po intake  Monitor electrolytes and replete as required for K>4, Mg>2  Behav health f/u       Current nicotine use  Assessment & Plan  Admits to smoking three-quarter of a pack to 1 pack/day  Pt offered nicotine replacement supplement earlier in hospitalization but she declined as it makes her anxious    Plan:  Cont w/Smoking Cessation education    Substance or medication-induced sleep disorder, insomnia type (CMS/[facility])  Assessment & Plan  On trazodone at baseline at home  ?if this may be contributing to vital sign abnormalieies    Plan:  Cont w/trazodone for now with hold parameters do not give for systolic blood pres sure less than 110  Cont w/outpt regime of ambien 10mg daily     Neuropathy  Assessment & Plan  Stable    Plan:  Cont w/Gabapentin   Fall precautions    Hyperlipidemia, mixed  Assessment & Plan  Continue atorvastatin     GERD without esophagitis  Assessment & Plan  Stable. On omeprazole at home    Plan;  Cont w/Pantoprazole (therapeutic substitute for omeprazole) 20mg daily while in house  Follow GERD precautions     Ambulatory dysfunction  Assessment & Plan  PT/OT f/u  Fall precautions      Alcohol abuse, continuous  Assessment & Plan  Noted in prior records, although per behavior health notes patient declines to provide info on ERYN history.  Ethanol undetectable on  9/28  Currently without si/sxs of withdrawal    Plan:  Cont to monitor via CIWA protocol  Check UDS  Behav health consulted. F/u recs      Seizures (CMS/HCC)  Assessment & Plan  Stable.    Plan;  Continue Topamax  Seizure precautions      Hypothyroidism  Assessment & Plan  TSH as of 9/29 wnl    Plan:  Given normal TSH will resume outpt regimen of synthroid 50mcg daily               VTE Assessment: Padua    DVT PPX: heparin sq, GI PPX: PPI  Diet: regular diet  Dispo: PCU   Estimated discharge date: 10/3/2024  Code Status: Full Code     Luisa Verdugo, DO  10/2/2024

## 2024-10-03 VITALS
RESPIRATION RATE: 20 BRPM | TEMPERATURE: 98.9 F | HEART RATE: 61 BPM | HEIGHT: 65 IN | DIASTOLIC BLOOD PRESSURE: 65 MMHG | OXYGEN SATURATION: 97 % | WEIGHT: 215.83 LBS | SYSTOLIC BLOOD PRESSURE: 104 MMHG | BODY MASS INDEX: 35.96 KG/M2

## 2024-10-03 PROBLEM — R26.2 AMBULATORY DYSFUNCTION: Status: RESOLVED | Noted: 2024-06-19 | Resolved: 2024-10-03

## 2024-10-03 LAB
ANION GAP SERPL CALC-SCNC: 7 MEQ/L (ref 3–15)
BUN SERPL-MCNC: 7 MG/DL (ref 7–25)
CALCIUM SERPL-MCNC: 7.8 MG/DL (ref 8.6–10.3)
CHLORIDE SERPL-SCNC: 114 MEQ/L (ref 98–107)
CO2 SERPL-SCNC: 22 MEQ/L (ref 21–31)
CREAT SERPL-MCNC: 0.6 MG/DL (ref 0.6–1.2)
EGFRCR SERPLBLD CKD-EPI 2021: >60 ML/MIN/1.73M*2
ERYTHROCYTE [DISTWIDTH] IN BLOOD BY AUTOMATED COUNT: 15.8 % (ref 11.7–14.4)
GLUCOSE SERPL-MCNC: 78 MG/DL (ref 70–99)
HCT VFR BLD AUTO: 30 % (ref 35–45)
HGB BLD-MCNC: 9.7 G/DL (ref 11.8–15.7)
MAGNESIUM SERPL-MCNC: 1.8 MG/DL (ref 1.8–2.5)
MCH RBC QN AUTO: 34.3 PG (ref 28–33.2)
MCHC RBC AUTO-ENTMCNC: 32.3 G/DL (ref 32.2–35.5)
MCV RBC AUTO: 106 FL (ref 83–98)
PLATELET # BLD AUTO: 191 K/UL (ref 150–369)
PMV BLD AUTO: 11.6 FL (ref 9.4–12.3)
POTASSIUM SERPL-SCNC: 3.3 MEQ/L (ref 3.5–5.1)
RBC # BLD AUTO: 2.83 M/UL (ref 3.93–5.22)
SODIUM SERPL-SCNC: 143 MEQ/L (ref 136–145)
WBC # BLD AUTO: 4.27 K/UL (ref 3.8–10.5)

## 2024-10-03 PROCEDURE — 63700000 HC SELF-ADMINISTRABLE DRUG

## 2024-10-03 PROCEDURE — 200200 PR NO CHARGE: Performed by: INTERNAL MEDICINE

## 2024-10-03 PROCEDURE — 63600000 HC DRUGS/DETAIL CODE

## 2024-10-03 PROCEDURE — 80048 BASIC METABOLIC PNL TOTAL CA: CPT | Performed by: INTERNAL MEDICINE

## 2024-10-03 PROCEDURE — 83735 ASSAY OF MAGNESIUM: CPT | Performed by: INTERNAL MEDICINE

## 2024-10-03 PROCEDURE — 63700000 HC SELF-ADMINISTRABLE DRUG: Performed by: INTERNAL MEDICINE

## 2024-10-03 PROCEDURE — 36415 COLL VENOUS BLD VENIPUNCTURE: CPT | Performed by: INTERNAL MEDICINE

## 2024-10-03 PROCEDURE — 99239 HOSP IP/OBS DSCHRG MGMT >30: CPT | Performed by: INTERNAL MEDICINE

## 2024-10-03 PROCEDURE — 85027 COMPLETE CBC AUTOMATED: CPT | Performed by: INTERNAL MEDICINE

## 2024-10-03 RX ORDER — CEPHALEXIN 250 MG/1
250 CAPSULE ORAL 4 TIMES DAILY
Qty: 6 CAPSULE | Refills: 0 | Status: SHIPPED | OUTPATIENT
Start: 2024-10-03 | End: 2024-10-05

## 2024-10-03 RX ORDER — MIDODRINE HYDROCHLORIDE 10 MG/1
10 TABLET ORAL 3 TIMES DAILY PRN
Qty: 30 TABLET | Refills: 0 | Status: SHIPPED | OUTPATIENT
Start: 2024-10-03 | End: 2025-02-18 | Stop reason: HOSPADM

## 2024-10-03 RX ORDER — FLUDROCORTISONE ACETATE 0.1 MG/1
0.1 TABLET ORAL DAILY
Qty: 30 TABLET | Refills: 0 | Status: SHIPPED | OUTPATIENT
Start: 2024-10-04 | End: 2025-02-18 | Stop reason: HOSPADM

## 2024-10-03 RX ORDER — CEPHALEXIN 250 MG/1
250 CAPSULE ORAL 4 TIMES DAILY
Status: DISCONTINUED | OUTPATIENT
Start: 2024-10-03 | End: 2024-10-03 | Stop reason: HOSPADM

## 2024-10-03 RX ADMIN — MIDODRINE HYDROCHLORIDE 10 MG: 5 TABLET ORAL at 04:27

## 2024-10-03 RX ADMIN — PANTOPRAZOLE SODIUM 20 MG: 20 TABLET, DELAYED RELEASE ORAL at 08:32

## 2024-10-03 RX ADMIN — HEPARIN SODIUM 5000 UNITS: 5000 INJECTION, SOLUTION INTRAVENOUS; SUBCUTANEOUS at 10:45

## 2024-10-03 RX ADMIN — FLUOXETINE HYDROCHLORIDE 20 MG: 20 CAPSULE ORAL at 08:32

## 2024-10-03 RX ADMIN — FLUDROCORTISONE ACETATE 0.1 MG: 0.1 TABLET ORAL at 08:32

## 2024-10-03 RX ADMIN — TOPIRAMATE 50 MG: 25 TABLET, FILM COATED ORAL at 08:32

## 2024-10-03 RX ADMIN — LEVOTHYROXINE SODIUM 50 MCG: 0.05 TABLET ORAL at 06:38

## 2024-10-03 RX ADMIN — Medication 400 MG: at 15:15

## 2024-10-03 RX ADMIN — HEPARIN SODIUM 5000 UNITS: 5000 INJECTION, SOLUTION INTRAVENOUS; SUBCUTANEOUS at 02:57

## 2024-10-03 RX ADMIN — GABAPENTIN 400 MG: 400 CAPSULE ORAL at 17:16

## 2024-10-03 RX ADMIN — ATORVASTATIN CALCIUM 20 MG: 20 TABLET, FILM COATED ORAL at 08:32

## 2024-10-03 RX ADMIN — CEPHALEXIN 250 MG: 250 CAPSULE ORAL at 10:45

## 2024-10-03 RX ADMIN — GABAPENTIN 400 MG: 400 CAPSULE ORAL at 15:15

## 2024-10-03 RX ADMIN — CEPHALEXIN 250 MG: 250 CAPSULE ORAL at 15:15

## 2024-10-03 RX ADMIN — POTASSIUM CHLORIDE 40 MEQ: 1500 TABLET, EXTENDED RELEASE ORAL at 15:15

## 2024-10-03 RX ADMIN — CEPHALEXIN 250 MG: 250 CAPSULE ORAL at 17:16

## 2024-10-03 RX ADMIN — GABAPENTIN 400 MG: 400 CAPSULE ORAL at 08:32

## 2024-10-03 ASSESSMENT — COGNITIVE AND FUNCTIONAL STATUS - GENERAL
MOVING TO AND FROM BED TO CHAIR: 4 - NONE
WALKING IN HOSPITAL ROOM: 3 - A LITTLE
STANDING UP FROM CHAIR USING ARMS: 4 - NONE
CLIMB 3 TO 5 STEPS WITH RAILING: 3 - A LITTLE

## 2024-10-03 NOTE — DISCHARGE INSTRUCTIONS
ADDITIONAL INSTRUCTIONS:  Repeat labs (BMP, Mg) are recommended to be performed on Monday, October 7, 2024 for continued monitoring of electrolytes. Labs to be faxed to primary care provider, MIRLANDE Del Valle for review.      Please check your blood pressure twice a day and record values. Bring your recorded values to your next  appointment with your primary care provider for review. Adjustments may be made to your blood pressure medications pending measurements. Please contact your physician sooner if your blood pressure is less than 90/60 or greater than 170/100 or you experience any symptoms (I.e. chest pain, shortness of breath, blurry vision, nausea/vomiting, severe headache).     ACTIVITY:  Activity as tolerated.    MEDICATIONS:  Take all medications as prescribed.    Your Trazodone was discontinued given concern this was contributing to your low blood pressures and low heart rate. Please follow-up with psychiatry as stated above for further discussions regarding other potential treatment options of your insomnia.     Call your doctor or return to the Emergency Room of you develop any of the following:  Return of your original symptoms  Persistent nausea and vomiting   Chest Pain  Shortness of Breath  Fever above 100.4 degrees or chills  Difficulty or inability to urinate  Change in mental status from baseline    [facility] Office....[phone]..Office to schedule RN , PT, OT visits...Please Call Office if you have not gotten a Scheduling Call within 24 hrs

## 2024-10-03 NOTE — DISCHARGE SUMMARY
Hospital Medicine Service -  Inpatient Discharge Summary        BRIEF OVERVIEW   Admission Date: 9/28/2024  Discharge Date: 10/3/2024    Admitting Provider: Clark Gordon MD  Attending Provider: Luisa Verdugo DO Attending phys phone: [phone]    PCP: Emily Del Valle CRNP [phone]    Consults During Admission:  IP CONSULT TO BEHAVIORAL HEALTH CRISIS  IP CONSULT TO SOCIAL WORK  IP CONSULT TO CARDIOLOGY     DISCHARGE DIAGNOSES      Primary Discharge Diagnosis  Sepsis due to urinary tract infection  (CMS/[facility])    Secondary Discharge Diagnoses  Active Hospital Problems    Diagnosis Date Noted    Sepsis due to urinary tract infection  (CMS/[facility]) 09/28/2024     Priority: High    Hypotension, unspecified hypotension type 09/29/2024     Priority: Medium    Bradycardia 09/29/2024     Priority: Medium    Eating disorder 08/16/2024     Priority: Low    Severe bipolar II disorder, most recent episode major depressive without psychotic features (CMS/[facility]) 01/23/2020     Priority: Low    Current nicotine use 09/28/2024    Hypothyroidism 08/16/2024    Seizures (CMS/[facility]) 08/16/2024    GERD without esophagitis 06/19/2024    Alcohol abuse, continuous 06/19/2024    Neuropathy 06/19/2024    Substance or medication-induced sleep disorder, insomnia type (CMS/[facility]) 06/19/2024    Hyperlipidemia, mixed 02/07/2024      Resolved Hospital Problems    Diagnosis Date Noted Date Resolved    Hypomagnesemia 09/28/2024 10/01/2024     Priority: Medium    Hypotension 08/16/2024 10/01/2024     Priority: Medium    Hypokalemia 08/16/2024 10/01/2024    Ambulatory dysfunction 06/19/2024 10/03/2024       SUMMARY OF HOSPITALIZATION        44 y.o. year-old female with past medical history of depression,anxiety, PTSD, chronic laxative abuse/bulimia, type II diabetes and chronic back pain requiring walker for ambulation who presented to [facility] ER on 9/28/2024 with groin pain, fever, and urinary frequency and admitted for sepsis  secondary to E.Coli urinary tract infection.    Upon arrival to the ER, patient was noted to be hypotensive wit B.Ps reaching a troy of 70/42 and febrile with a temperature of 100.5. Initial labs were notable for a hemoglobin of 11.7, platelets of 135 (similar to baseline), and K of 3.1. Lipase was within normal limits. Lactate was also within normal limits. COVID 19/Flu/RSV was negative. Urinalysis was concerning for infection and Urine culture was performed. Chest CT was unremarkable for any acute pulmonary arterial embolism or acute pathology within the chest. Mild coronary arterial atherosclerotic vascular calcification was incidentally noted.     Patient was ultimately admitted to the medicine service for further evaluation and management. Upon admission, patient was initiated on IVFs and ceftriaxone for further treatment of infection. Urine culture ultimately grew positive for E.coli, sensitive to ceftriaxone and as a result, patient was continued on ceftriaxone as stated above. Following initiation of therapy patient's urinary resolved, however despite improvement in infectious symptoms hypotension persisted. Additional testing to include blood cultures, TSH and cortisol were performed. Blood cultures were negative for any persistent infection/bacteremia and TSH and cortisol were within normal limits. TTE was also performed results of which revealed michael left ventricular systolic function with an EF of 65%. No regional wall motion abnormalities were noted and only mild mitral and moderate tricuspid regurgitation were seen. Given persistent blood pressure abnormalities, Cardiology was ultimately consulted and per evaluation findings were felt most likely to be secondary to autonomic insufficiency in the setting of decreased mobility. Encouragement of mobility was recommended as well as initiation of midodrine for further treatment. Following initiation of therapy patient's hypotension gradually improved  however blood pressure measurements still remained below goal. As a result florinef was also initiated after which measurements gradually improved and no further intervention was required.     Hospital course was also complicated by persistent sinus bradycardia for which Cardiology was consulted as well. Electrolytes were stable and as stated above TSH and cortisol were within normal limits. Trazodone was held in case medication was contributing however despite withholding medication bradycardia persisted. Patient remained asymptomatic however and heart rate was noted to have an appropriate response with activity. Per cardiology evaluation findings were again likely related to autonomic insufficiency and given lack of symptoms no acute intervention to include pacemaker placement was felt to be required at this time. Conservative management to include encouragement of mobility and physical therapy was recommended. Close outpatient follow-up with Cardiology was recommended however for continued monitoring and management, to include possible outpatient cardiac monitoring.    The remainder of patient's hospitalization was uneventful and no further intervention was required.     Patient was ultimately discharged to home with home healthcare on 10/3/24. Patient is to be transitioned from Ceftriaxone to Ceftin for more two more days to complete a total of a 7 day course for treatment of urinary tract infection. Patient is also to be continued on Florinef 0.1mg daily and midodrine PRN for management of persistent hypotension. Lastly patient's trazodone is to be held given potential contributions to ongoing bradycardia. Patient is to follow-up with Cardiology within 1-2 weeks for continued monitoring and management of bradycardia and hypotension, to include possible outpatient cardiac monitoring. Patient is also to follow-up with Psychiatry  for continued management of bipolar disorder and insomnia and further discussions  regarding risks vs benefits of continuing trazodone given vital sign abnormalities noted above. Lastly patient is to call and schedule an appointment with primary care physician within 7-10 days for hospital follow-up and monitoring of heart rate and blood pressure. Repeat labs (BMP, Mg) are recommended to be performed on Monday, October 7, 2024 for continued monitoring of electrolytes. Labs to be faxed to primary care provider, for review.        DISCHARGE MEDICATIONS      Medication List        START taking these medications      cephalexin 250 mg capsule  Commonly known as: KEFLEX  Take 1 capsule (250 mg total) by mouth 4 (four) times a day for 2 days Indications: urinary tract infection.  Dose: 250 mg     fludrocortisone 0.1 mg tablet  Commonly known as: FLORINEF  Start taking on: October 4, 2024  Take 1 tablet (0.1 mg total) by mouth daily.  Dose: 0.1 mg            CHANGE how you take these medications      midodrine 10 mg tablet  Commonly known as: PROAMATINE  Take 1 tablet (10 mg total) by mouth 3 (three) times a day as needed (for systolic blood pressure less than 90).  Dose: 10 mg  What changed:   when to take this  reasons to take this            CONTINUE taking these medications      atorvastatin 20 mg tablet  Commonly known as: LIPITOR  Take 20 mg by mouth every morning.  Dose: 20 mg     FLUoxetine 20 mg capsule  Commonly known as: PROzac  Take 20 mg by mouth daily.  Dose: 20 mg     gabapentin 400 mg capsule  Commonly known as: NEURONTIN  Take 400 mg by mouth 4 (four) times a day.  Dose: 400 mg     levothyroxine 50 mcg tablet  Commonly known as: SYNTHROID  Take 50 mcg by mouth daily.  Dose: 50 mcg     LORAZEPAM ORAL  Take 0.5 mg in the morning and 1 mg daily at 2pm.     omeprazole 20 mg capsule  Commonly known as: PriLOSEC  Take 20 mg by mouth daily before breakfast. For heartburn  Dose: 20 mg     topiramate 50 mg tablet  Commonly known as: TOPAMAX  Take 50 mg by mouth 2 (two) times a day. AM and  2pm  Dose: 50 mg     zolpidem 10 mg tablet  Commonly known as: AMBIEN  Take 10 mg by mouth nightly.  Dose: 10 mg            STOP taking these medications      prazosin 5 mg capsule  Commonly known as: MINIPRESS     traZODone 100 mg tablet  Commonly known as: DESYREL               Medication List        START taking these medications      cephalexin 250 mg capsule  Commonly known as: KEFLEX  Take 1 capsule (250 mg total) by mouth 4 (four) times a day for 2 days Indications: urinary tract infection.  Dose: 250 mg     fludrocortisone 0.1 mg tablet  Commonly known as: FLORINEF  Start taking on: October 4, 2024  Take 1 tablet (0.1 mg total) by mouth daily.  Dose: 0.1 mg            CHANGE how you take these medications      midodrine 10 mg tablet  Commonly known as: PROAMATINE  Take 1 tablet (10 mg total) by mouth 3 (three) times a day as needed (for systolic blood pressure less than 90).  Dose: 10 mg  What changed:   when to take this  reasons to take this            CONTINUE taking these medications      atorvastatin 20 mg tablet  Commonly known as: LIPITOR  Take 20 mg by mouth every morning.  Dose: 20 mg     FLUoxetine 20 mg capsule  Commonly known as: PROzac  Take 20 mg by mouth daily.  Dose: 20 mg     gabapentin 400 mg capsule  Commonly known as: NEURONTIN  Take 400 mg by mouth 4 (four) times a day.  Dose: 400 mg     levothyroxine 50 mcg tablet  Commonly known as: SYNTHROID  Take 50 mcg by mouth daily.  Dose: 50 mcg     LORAZEPAM ORAL  Take 0.5 mg in the morning and 1 mg daily at 2pm.     omeprazole 20 mg capsule  Commonly known as: PriLOSEC  Take 20 mg by mouth daily before breakfast. For heartburn  Dose: 20 mg     topiramate 50 mg tablet  Commonly known as: TOPAMAX  Take 50 mg by mouth 2 (two) times a day. AM and 2pm  Dose: 50 mg     zolpidem 10 mg tablet  Commonly known as: AMBIEN  Take 10 mg by mouth nightly.  Dose: 10 mg            STOP taking these medications      prazosin 5 mg capsule  Commonly known as:  MINIPRESS     traZODone 100 mg tablet  Commonly known as: DESYREL                 PROCEDURES / LABS / IMAGING      Operative Procedures  None    Other Procedures  None    Pertinent Labs  As above.    Pertinent Imaging  CT ANGIOGRAPHY CHEST PULMONARY EMBOLISM WITH IV CONTRAST    Result Date: 9/29/2024  IMPRESSION: 1. No acute pulmonary arterial embolism. No evidence of acute pathology within the chest. 2. Mild coronary arterial atherosclerotic vascular calcification.     OUTPATIENT  FOLLOW-UP / REFERRALS / PENDING TESTS      Outpatient Follow-Up Appointments    Please call and schedule follow-up with Cardiology, Dr. Emilie Rucker within 1-2 weeks for continued monitoring and management of bradycardia, to include possible outpatient monitoring and management of low blood pressures.     Please call and schedule follow-up with Psychiatry, MIRLANDE Feliciano for continued management of bipolar disorder and insomnia and further discussions regarding risks vs benefits of continuing trazodone for treatment of insomnia given hypotension.     Please call and schedule an appointment with primary care physician within 7-10 days for hospital follow-up and monitoring of heart rate and blood pressure.     Test Results Pending at Discharge  Unresulted Labs (From admission, onward)       Start     Ordered    10/03/24 0000  Basic metabolic panel        Comments: Please fax results to Dr. Donis Bryan for review (phone [phone])     Question Answer Comment   Which Provider would you like to Cc? DONIS BRYAN    Release to patient Immediate        10/03/24 1557    10/03/24 0000  Magnesium        Comments: Please fax results to Dr. Donis Bryan for review (phone [phone])     Question Answer Comment   Which Provider would you like to Cc? DONIS BRYAN    Release to patient Immediate        10/03/24 1557    09/28/24 1601  Sputum culture / smear Expectorated Sputum  Once        Question:  Release to patient  Answer:  Immediate     09/28/24 1600    09/28/24 1601  Sputum Gram Stain (Lab Only) Expectorated Sputum  PROCEDURE ONCE        Question:  Release to patient  Answer:  Immediate    09/28/24 1600    09/28/24 1542  Drug screen panel, urine  Once        Question:  Release to patient  Answer:  Immediate    09/28/24 1542    09/28/24 1050  [place] Draw Panel  STAT        Question Answer Comment   Red Top 1 Label    Gold Top 1 Label    Light Blue 1 Label    Light Green Top 1 Label    Lavender Top No Labels    Pink Top No Labels    Yellow - Urine Tall No Labels    Urine Culture Tube No Labels    Release to patient Immediate        09/28/24 1049                    Important Issues to Address in Follow-Up  Repeat labs (BMP, Mg) are recommended to be performed on Monday, October 7, 2024 for continued monitoring of electrolytes. Labs to be faxed to primary care provider, MIRLANDE Del Valle for review.      Please check your blood pressure twice a day and record values. Bring your recorded values to your next  appointment with your primary care provider for review. Adjustments may be made to your blood pressure medications pending measurements. Please contact your physician sooner if your blood pressure is less than 90/60 or greater than 170/100 or you experience any symptoms (I.e. chest pain, shortness of breath, blurry vision, nausea/vomiting, severe headache).     MEDICATIONS:  Take all medications as prescribed.    Your Trazodone was discontinued given concern this was contributing to your low blood pressures and low heart rate. Please follow-up with psychiatry as stated above for further discussions regarding other potential treatment options of your insomnia.     DISCHARGE DISPOSITION      Disposition: [facility]    Code Status At Discharge: Full Code    Physician Order for Life-Sustaining Treatment Document Status        No documents found

## 2024-10-03 NOTE — PLAN OF CARE
Care Coordination Discharge Plan Summary    Admission Assessment Summary    General Information  Readmission Within the last 30 days: no previous admission in last 30 days  Does patient have a :    Patient-Specific Goals (include timeframe): return home with [facility] for nurse and PT services, has private home health aide thru [facility] and pt stated she will need w/c van transport home    Living Arrangements  Arrived From: home  Current Living Arrangements: apartment  People in Home: alone  Home Accessibility: elevator accessible  Living Arrangement Comments: Lives alone in an apartment with ramp access and elevator access to her apt    Social Drivers of Health - Screenings  Housing Stability  In the last 12 months, was there a time when you were not able to pay the mortgage or rent on time?: No  At any time in the past 12 months, were you homeless or living in a shelter (including now)?: No  Utility Access  In the past 12 months has the electric, gas, oil, or water company threatened to shut off services in your home?: No  Transportation Needs       Functional Status Prior to Admission  Assistive Device/Animal Currently Used at Home: grab bar, shower chair, raised toilet, walker, 4-wheeled, bath bench  Functional Status Comments: Pt ambulates with rollator  IADL Comments: pt needs assist with ADLs and has home health aide thru [facility] on Mon- Saturday from 9am to 3pm    Discharge Needs Assessment    Concerns to be Addressed: care coordination/care conferences, discharge planning  Current Discharge Risk: lives alone, substance use/abuse, psychiatric illness  Anticipated Changes Related to Illness:      Discharge Plan Summary    Patient Choice  Offered/Gave Vendor List: yes  Patient's Choice of Community Agency(s): [facility]  Patient and/or patient guardian/advocate was made aware of their right to choose a provider. A list of eligible providers was presented and reviewed with the patient and/or patient  guardian/advocate in written and/or verbal form. The list delineates providers in the patient’s desired geographic area who are participating in the Medicare program and/or providers contracted with the patient’s primary insurance. The Medicare list and quality ratings were obtained from the Medicare.gov [medicare.gov] website.    Concerns / Comments: Spoke with patient at bedside.  For discharge home today, requests WC van, aware of costs and agreeable.  Transport arranged for 5:30 today.  Updated [facility] regarding discharge.  Patient called her HHA, who will meet her at home.  IMM reviewed and signed at bedside.  Sepsis letter provided and explained to patient.  Discharge Plan:  Disposition/Destination:   /         Connection to Community  Not applicable  Community Resources:      Discharge Transportation:  Is Out of Hospital DNR needed at Discharge: no  Does patient need discharge transport?

## 2024-10-03 NOTE — PROGRESS NOTES
[facility] Medicine Service -  Daily Progress Note       SUBJECTIVE   Interval History: No acute events overnight. Patient seen and examined. Denies any lightheadedness, dizziness. Denies any cp, sob, abd pain, n/v. Anxious for d/c.      OBJECTIVE      Vital signs in last 24 hours:  Temp:  [36.2 °C (97.1 °F)-37.2 °C (98.9 °F)] 37.2 °C (98.9 °F)  Heart Rate:  [38-66] 51  Resp:  [16-20] 20  BP: ()/(52-65) 102/64    Intake/Output Summary (Last 24 hours) at 10/3/2024 1529  Last data filed at 10/3/2024 1445  Gross per 24 hour   Intake 740 ml   Output 2250 ml   Net -1510 ml       PHYSICAL EXAMINATION      GEN: well-developed and well-nourished; not in acute distress  HEENT: normocephalic; atraumatic  NECK: no JVD  CARDIO: regular rate and rhythm; no murmurs, rubs or gallops  RESP: clear to auscultation bilaterally; no rales, rhonchi, or wheezes  ABD: soft, non-distended, non-tender, normal bowel sounds  EXT: no cyanosis, clubbing, or edema  SKIN: clean, dry, warm, and intact. Multiple scars noted over LUE  MUSCULOSKELETAL: no injury or deformity  NEURO: alert and oriented x 3; nonfocal  BEHAVIOR/EMOTIONAL: appropriate; cooperative   LABS / IMAGING / TELE      Labs  Lab Results   Component Value Date    GLUCOSE 78 10/03/2024    CALCIUM 7.8 (L) 10/03/2024     10/03/2024    K 3.3 (L) 10/03/2024    CO2 22 10/03/2024     (H) 10/03/2024    BUN 7 10/03/2024    CREATININE 0.6 10/03/2024     Lab Results   Component Value Date    WBC 4.27 10/03/2024    HGB 9.7 (L) 10/03/2024    HCT 30.0 (L) 10/03/2024    .0 (H) 10/03/2024     10/03/2024     Lab Results   Component Value Date    ALBUMIN 3.5 09/28/2024    BILITOT 0.6 09/28/2024    ALKPHOS 117 09/28/2024    AST 12 (L) 09/28/2024    ALT 8 09/28/2024    PROTEIN 5.8 (L) 09/28/2024       Imaging  CT ANGIOGRAPHY CHEST PULMONARY EMBOLISM WITH IV CONTRAST    Result Date: 9/29/2024  IMPRESSION: 1. No acute pulmonary arterial embolism. No evidence of acute  pathology within the chest. 2. Mild coronary arterial atherosclerotic vascular calcification.     ECG/Telemetry  I have independently reviewed the telemetry. Significant findings include sinus brittany, HR 50s.    ASSESSMENT AND PLAN      * Sepsis due to urinary tract infection  (CMS/HCC)  Assessment & Plan  Presents w/pain in her suprapubic area radiating up to bilateral flanks and secondary to UTI. Initially there was concern for sepsis given low grade fever of 100.5 and hypotension, although patient with h/o chronic hypotension and despite treatment of UTI BP remains low  Pt notes remote h/o recurrent UTIs in the past for reasons that are uncertain but none recently  Initial UA c/f infection. Urine culture w/pan sensitive E.Coli. Blood cultures NTD  PVR stable  Denies any further sxs since treatment of infection. BP remains low end of normal, improved somewhat today following IVFs    Plan:  Initiated on ceftriaxone, transitioned to keflex, currently day 6/7  IVFs d/dl  Fall precautions, PT/OT consulted. Last evaluation was performed on 9/29 at which time home with home healthcare was recommended and signed off. Per nursing staff patient is still ambulating and performing ADLS without difficulty, and with supervision only. Patient is anxious for d/c and wants to return home regardless.      Bradycardia  Assessment & Plan  Hospital course c/b persistently low heart rates in the 30-50s. Appropriate increase is noted with ambulation with HR inc to the 70s. Patient with one brief 3 second pause on 10/2 but no others noted. Per cardiology no additional intervention recommended at this time.   TTE w/Left Ventricle: Normal ventricle size. Normal wall thickness. Estimated EF 65%. No regional wall motion abnormalities. Normal diastolic filling pattern for age. Mild MR, moderate TR  TSH wnl  Electrolytes remain stable  Per discussions with cardiology findings to include bradycardia and hypotension likely related to Autonomic  insufficiency in the setting dec mobility. Patient did have 3 sec pause yesterday afternoon however per cardiology no acute intervention recommended at this time.   Patietn remains asymptomatic at this time and anxious for d/c.     Plan:  Cardiology input noted.   Cont to monitor on telemetry  Hold trazodone as stated  Monitor electrolytes and replete as required for K>4, Mg>2  Encourage mobility, OOB, PT/PT  Given clinical stability will plan to d/c to home later today. Will need outpt cardiology f/u for continued evaluation and management to include possible outpatient cardiac monitoring    Hypotension, unspecified hypotension type  Assessment & Plan  Patient with persistently low blood pressures throughout hospitalization. Initially attributed to sepsis in the setting of UTI, however despite treatment of infection hypotension persists.  Initially improved with IVFs, but has since downtrended following discontinuation of IVFs. History of chronic baseline hypotension is noted in records, per patient baseline SBPs in the 90s  Chest CTA unremrkable for PE  TSH and cortisol wnl.  TTE w/TTE w/Left Ventricle: Normal ventricle size. Normal wall thickness. Estimated EF 65%. No regional wall motion abnormalities. Normal diastolic filling pattern for age. Mild MR, moderate TR. Of note IVC/SVC: Inferior vena cava is <2.1cm. Inferior vena cava collapses >50% during inspiration.  Blood cultures neg to date.  Per staff patient with adequate oral intake, eating approximately half of her meals  Trazodone remains on hold at preset in light of hypotension    Per discussions with cardiology findings to include bradycardia and hypotension likely related to Autonomic insufficiency in the setting dec mobility. BP improved with addition of florinef and remaisns stable of IVFs w/SBPs in the 100s. Denies any sxs at this time.     Plan:  Cont to monitor on telemetry  IVFs d/dl   florinef 0.1mg daily and midodrine PRN.   Hold prazosin    Encourage OOB, PT/OT.   Given clinical improvement will plan to d/c to home later today.       Severe bipolar II disorder, most recent episode major depressive without psychotic features (CMS/HCC)  Assessment & Plan  Appears stable at present, although per staff patient repeatedly declines attempt at mobility and getting out of bed    Plan:  Cont w/fluoxetine, lorazepam at d/c.      Eating disorder  Assessment & Plan  history of eating disorder, chronic laxative abuse, PTSD noted in history, also h/o rou-en-y bypass  Per staff patient continues to have appropriate oral intake, eating at least half of her meals. Electrolytes also remain stsable.    Plan:  Cont to encourage po intake  Monitor electrolytes and replete as required for K>4, Mg>2  Behav health f/u       Current nicotine use  Assessment & Plan  Admits to smoking three-quarter of a pack to 1 pack/day  Pt offered nicotine replacement supplement earlier in hospitalization but she declined as it makes her anxious    Plan:  Cont w/Smoking Cessation education    Substance or medication-induced sleep disorder, insomnia type (CMS/[facility])  Assessment & Plan  On trazodone at baseline at home  ?if this may be contributing to vital sign abnormalieies    Plan:  Cont w/trazodone for now with hold parameters do not give for systolic blood pres sure less than 110, although has not been able to take for the past several days given low Bps. Will d/c at this point.   Cont w/outpt regime of ambien 10mg daily   Pt to f/u with her outpatient psychiatrist to discuss further management of options of insomnia if ambien alone does not provide relief.     Neuropathy  Assessment & Plan  Stable    Plan:  Cont w/Gabapentin   Fall precautions    Hyperlipidemia, mixed  Assessment & Plan  Continue atorvastatin     GERD without esophagitis  Assessment & Plan  Stable. On omeprazole at home    Plan;  Cont w/Pantoprazole (therapeutic substitute for omeprazole) 20mg daily while in house  Follow  GERD precautions     Alcohol abuse, continuous  Assessment & Plan  Noted in prior records, although per behavior health notes patient declines to provide info on ERYN history.  Ethanol undetectable on 9/28  Currently without si/sxs of withdrawal    Plan:  Cont to monitor via CIWA protocol  Behav health consulted. Pt declines any substance use or need for treatment at this time.       Seizures (CMS/HCC)  Assessment & Plan  Stable.    Plan;  Continue Topamax  Seizure precautions      Hypothyroidism  Assessment & Plan  TSH as of 9/29 wnl    Plan:  Given normal TSH will resume outpt regimen of synthroid 50mcg daily               VTE Assessment: Padua    DVT PPX: heparin sq, GI PPX: PPI  Diet: regular diet  Dispo: F/T. Given clinical stability will plan to d/c to home later today. Close outpt pcp, cardiology and psychiatry f/u recommended. 55mins spent on d/c planning/paperwork.   Estimated discharge date: 10/3/2024  Code Status: Full Code     Luisa Verdugo, DO  10/3/2024

## 2024-10-05 LAB
BACTERIA BLD CULT: NORMAL
BACTERIA BLD CULT: NORMAL

## 2024-10-12 LAB
ATRIAL RATE: 42
PR INTERVAL: 184
QRS DURATION: 66
QT INTERVAL: 448
QTC CALCULATION(BAZETT): 374
R AXIS: 56
T WAVE AXIS: 57
VENTRICULAR RATE: 42

## 2025-01-27 ENCOUNTER — APPOINTMENT (EMERGENCY)
Dept: RADIOLOGY | Facility: HOSPITAL | Age: 45
End: 2025-01-27
Attending: EMERGENCY MEDICINE
Payer: COMMERCIAL

## 2025-01-27 ENCOUNTER — HOSPITAL ENCOUNTER (EMERGENCY)
Facility: HOSPITAL | Age: 45
Discharge: HOME | End: 2025-01-28
Attending: EMERGENCY MEDICINE | Admitting: EMERGENCY MEDICINE
Payer: COMMERCIAL

## 2025-01-27 ENCOUNTER — APPOINTMENT (EMERGENCY)
Dept: RADIOLOGY | Facility: HOSPITAL | Age: 45
End: 2025-01-27
Attending: PHYSICIAN ASSISTANT
Payer: COMMERCIAL

## 2025-01-27 DIAGNOSIS — N30.00 ACUTE CYSTITIS WITHOUT HEMATURIA: Primary | ICD-10-CM

## 2025-01-27 LAB
ALBUMIN SERPL-MCNC: 3.9 G/DL (ref 3.5–5.7)
ALP SERPL-CCNC: 73 IU/L (ref 34–125)
ALT SERPL-CCNC: 12 IU/L (ref 7–52)
ANION GAP SERPL CALC-SCNC: 10 MEQ/L (ref 3–15)
ANISOCYTOSIS BLD QL SMEAR: ABNORMAL
AST SERPL-CCNC: 13 IU/L (ref 13–39)
BACTERIA URNS QL MICRO: 2 /HPF
BASOPHILS # BLD: 0.01 K/UL (ref 0.01–0.1)
BASOPHILS NFR BLD: 0.2 %
BILIRUB SERPL-MCNC: 0.9 MG/DL (ref 0.3–1.2)
BILIRUB UR QL STRIP.AUTO: NEGATIVE MG/DL
BUN SERPL-MCNC: 17 MG/DL (ref 7–25)
BURR CELLS BLD QL SMEAR: ABNORMAL
CALCIUM SERPL-MCNC: 9.1 MG/DL (ref 8.6–10.3)
CHLORIDE SERPL-SCNC: 97 MEQ/L (ref 98–107)
CLARITY UR REFRACT.AUTO: ABNORMAL
CO2 SERPL-SCNC: 26 MEQ/L (ref 21–31)
COLOR UR AUTO: YELLOW
CREAT SERPL-MCNC: 0.7 MG/DL (ref 0.6–1.2)
D DIMER PPP IA.FEU-MCNC: 0.33 UG/ML FEU (ref 0–0.5)
DIFFERENTIAL METHOD BLD: ABNORMAL
EGFRCR SERPLBLD CKD-EPI 2021: >60 ML/MIN/1.73M*2
EOSINOPHIL # BLD: 0.04 K/UL (ref 0.04–0.36)
EOSINOPHIL NFR BLD: 0.9 %
ERYTHROCYTE [DISTWIDTH] IN BLOOD BY AUTOMATED COUNT: 14.3 % (ref 11.7–14.4)
FLUAV RNA SPEC QL NAA+PROBE: NEGATIVE
FLUBV RNA SPEC QL NAA+PROBE: NEGATIVE
GLUCOSE SERPL-MCNC: 90 MG/DL (ref 70–99)
GLUCOSE UR STRIP.AUTO-MCNC: NEGATIVE MG/DL
HCT VFR BLD AUTO: 36.6 % (ref 35–45)
HGB BLD-MCNC: 12.8 G/DL (ref 11.8–15.7)
HGB UR QL STRIP.AUTO: NEGATIVE
HYALINE CASTS #/AREA URNS LPF: ABNORMAL /LPF
IMM GRANULOCYTES # BLD AUTO: 0.02 K/UL (ref 0–0.08)
IMM GRANULOCYTES NFR BLD AUTO: 0.4 %
KETONES UR STRIP.AUTO-MCNC: 2 MG/DL
LEUKOCYTE ESTERASE UR QL STRIP.AUTO: 3
LYMPHOCYTES # BLD: 0.85 K/UL (ref 1.2–3.5)
LYMPHOCYTES NFR BLD: 18.6 %
MACROCYTES BLD QL SMEAR: ABNORMAL
MCH RBC QN AUTO: 35.7 PG (ref 28–33.2)
MCHC RBC AUTO-ENTMCNC: 35 G/DL (ref 32.2–35.5)
MCV RBC AUTO: 101.9 FL (ref 83–98)
MONOCYTES # BLD: 0.32 K/UL (ref 0.28–0.8)
MONOCYTES NFR BLD: 7 %
MUCOUS THREADS URNS QL MICRO: ABNORMAL /LPF
NEUTROPHILS # BLD: 3.32 K/UL (ref 1.7–7)
NEUTS SEG NFR BLD: 72.9 %
NITRITE UR QL STRIP.AUTO: POSITIVE
NRBC BLD-RTO: 0.4 %
PH UR STRIP.AUTO: 7.5 [PH]
PLAT MORPH BLD: NORMAL
PLATELET # BLD AUTO: 118 K/UL (ref 150–369)
PLATELET # BLD EST: ABNORMAL 10*3/UL
PMV BLD AUTO: 12 FL (ref 9.4–12.3)
POIKILOCYTOSIS BLD QL SMEAR: ABNORMAL
POTASSIUM SERPL-SCNC: 3.9 MEQ/L (ref 3.5–5.1)
PROT SERPL-MCNC: 5.8 G/DL (ref 6–8.2)
PROT UR QL STRIP.AUTO: 1
RBC # BLD AUTO: 3.59 M/UL (ref 3.93–5.22)
RBC #/AREA URNS HPF: ABNORMAL /HPF
RSV RNA SPEC QL NAA+PROBE: NEGATIVE
SARS-COV-2 RNA RESP QL NAA+PROBE: NEGATIVE
SODIUM SERPL-SCNC: 133 MEQ/L (ref 136–145)
SP GR UR REFRACT.AUTO: 1.02
SQUAMOUS URNS QL MICRO: ABNORMAL /HPF
TROPONIN I SERPL HS-MCNC: 12.5 PG/ML
TROPONIN I SERPL HS-MCNC: 9.1 PG/ML
UROBILINOGEN UR STRIP-ACNC: 4 EU/DL
WBC # BLD AUTO: 4.56 K/UL (ref 3.8–10.5)
WBC #/AREA URNS HPF: ABNORMAL /HPF

## 2025-01-27 PROCEDURE — 84484 ASSAY OF TROPONIN QUANT: CPT | Mod: 91 | Performed by: EMERGENCY MEDICINE

## 2025-01-27 PROCEDURE — 93005 ELECTROCARDIOGRAM TRACING: CPT | Performed by: EMERGENCY MEDICINE

## 2025-01-27 PROCEDURE — 99284 EMERGENCY DEPT VISIT MOD MDM: CPT | Mod: 25

## 2025-01-27 PROCEDURE — 84484 ASSAY OF TROPONIN QUANT: CPT | Performed by: EMERGENCY MEDICINE

## 2025-01-27 PROCEDURE — 74176 CT ABD & PELVIS W/O CONTRAST: CPT

## 2025-01-27 PROCEDURE — 25800000 HC PHARMACY IV SOLUTIONS: Performed by: PHYSICIAN ASSISTANT

## 2025-01-27 PROCEDURE — 96360 HYDRATION IV INFUSION INIT: CPT

## 2025-01-27 PROCEDURE — 96361 HYDRATE IV INFUSION ADD-ON: CPT

## 2025-01-27 PROCEDURE — 81001 URINALYSIS AUTO W/SCOPE: CPT | Performed by: EMERGENCY MEDICINE

## 2025-01-27 PROCEDURE — 93005 ELECTROCARDIOGRAM TRACING: CPT

## 2025-01-27 PROCEDURE — 71046 X-RAY EXAM CHEST 2 VIEWS: CPT

## 2025-01-27 PROCEDURE — 87637 SARSCOV2&INF A&B&RSV AMP PRB: CPT | Performed by: PHYSICIAN ASSISTANT

## 2025-01-27 PROCEDURE — 63700000 HC SELF-ADMINISTRABLE DRUG: Performed by: STUDENT IN AN ORGANIZED HEALTH CARE EDUCATION/TRAINING PROGRAM

## 2025-01-27 PROCEDURE — 3E0337Z INTRODUCTION OF ELECTROLYTIC AND WATER BALANCE SUBSTANCE INTO PERIPHERAL VEIN, PERCUTANEOUS APPROACH: ICD-10-PCS | Performed by: EMERGENCY MEDICINE

## 2025-01-27 PROCEDURE — 87086 URINE CULTURE/COLONY COUNT: CPT | Performed by: EMERGENCY MEDICINE

## 2025-01-27 PROCEDURE — 36415 COLL VENOUS BLD VENIPUNCTURE: CPT

## 2025-01-27 PROCEDURE — 80053 COMPREHEN METABOLIC PANEL: CPT | Performed by: EMERGENCY MEDICINE

## 2025-01-27 PROCEDURE — 85025 COMPLETE CBC W/AUTO DIFF WBC: CPT | Performed by: EMERGENCY MEDICINE

## 2025-01-27 PROCEDURE — 85379 FIBRIN DEGRADATION QUANT: CPT | Performed by: PHYSICIAN ASSISTANT

## 2025-01-27 RX ORDER — NITROFURANTOIN 25; 75 MG/1; MG/1
100 CAPSULE ORAL ONCE
Status: COMPLETED | OUTPATIENT
Start: 2025-01-27 | End: 2025-01-27

## 2025-01-27 RX ORDER — NITROFURANTOIN 25; 75 MG/1; MG/1
100 CAPSULE ORAL 2 TIMES DAILY
Qty: 10 CAPSULE | Refills: 0 | Status: SHIPPED | OUTPATIENT
Start: 2025-01-27 | End: 2025-02-18 | Stop reason: HOSPADM

## 2025-01-27 RX ADMIN — NITROFURANTOIN MONOHYDRATE/MACROCRYSTALS 100 MG: 25; 75 CAPSULE ORAL at 22:02

## 2025-01-27 RX ADMIN — SODIUM CHLORIDE 1000 ML: 9 INJECTION, SOLUTION INTRAVENOUS at 17:30

## 2025-01-27 ASSESSMENT — ENCOUNTER SYMPTOMS
SINUS PRESSURE: 0
VOMITING: 0
COUGH: 0
DYSURIA: 0
SORE THROAT: 0
HEADACHES: 0
SINUS PAIN: 0
DIZZINESS: 0
CHEST TIGHTNESS: 0
ABDOMINAL PAIN: 1
DIARRHEA: 0
CHILLS: 0
FATIGUE: 0
FREQUENCY: 0
FEVER: 0
PALPITATIONS: 0
PSYCHIATRIC NEGATIVE: 1
LIGHT-HEADEDNESS: 0
NAUSEA: 0
SHORTNESS OF BREATH: 0
BACK PAIN: 0

## 2025-01-27 NOTE — ED PROVIDER NOTES
HPI    Chief Complaint   Patient presents with    Chest Pain        HPI   Patient is a 44-year-old female with past medical history significant for anxiety, depression, eating disorder who presents emergency department for multiple complaints.  Patient states for the past 3 to 4 weeks she has had severe lower abdominal pain which waxes and wanes without any obvious exacerbating relieving factors.  This has been associated with anorexia and significantly decreased p.o. intake of solids and liquids.  Patient states she is not really moving her bowels much but is producing urine.  Denies any frequency or urgency but states her urine output has been significantly less.  Also states that for the past several days she has had associated chest pressure.  Denies any obvious exacerbating relieving factors.  Denies any fever, chills, sweats.  Denies any vomiting.  Denies any coughing.      Past Medical History:   Diagnosis Date    Anxiety     Depression     Eating disorder          No past surgical history on file.    No family history on file.    Social History     Tobacco Use    Smoking status: Every Day     Current packs/day: 0.25     Average packs/day: 0.3 packs/day for 20.4 years (5.1 ttl pk-yrs)     Types: Cigarettes     Start date: 8/16/2004    Smokeless tobacco: Current   Substance Use Topics    Alcohol use: Not Currently    Drug use: Yes     Types: Marijuana       Systems Reviewed from Nursing Triage:               Review of Systems   Constitutional:  Negative for chills, fatigue and fever.   HENT:  Negative for congestion, ear pain, sinus pressure, sinus pain and sore throat.    Respiratory:  Negative for cough, chest tightness and shortness of breath.    Cardiovascular:  Positive for chest pain. Negative for palpitations.   Gastrointestinal:  Positive for abdominal pain. Negative for diarrhea, nausea and vomiting.   Genitourinary:  Negative for dysuria, frequency and urgency.   Musculoskeletal:  Negative for back  "pain.   Skin:  Negative for pallor and rash.   Neurological:  Negative for dizziness, light-headedness and headaches.   Psychiatric/Behavioral: Negative.              ED Triage Vitals   Temp Heart Rate Resp BP SpO2   -- 01/27/25 1615 01/27/25 1615 01/27/25 1621 01/27/25 1615    89 18 103/67 99 %      Temp src Heart Rate Source Patient Position BP Location FiO2 (%) (Set)   -- -- 01/27/25 1621 01/27/25 1621 --     Lying Left upper arm        Vitals:    01/27/25 1615 01/27/25 1621   BP:  103/67   BP Location:  Left upper arm   Patient Position:  Lying   Pulse: 89    Resp: 18    SpO2: 99%    Weight: 96.2 kg (212 lb 1.3 oz)    Height: 1.651 m (5' 5\")                          Physical Exam  Vitals and nursing note reviewed.   Constitutional:       Appearance: She is well-developed.   HENT:      Head: Normocephalic and atraumatic.   Eyes:      Conjunctiva/sclera: Conjunctivae normal.   Cardiovascular:      Rate and Rhythm: Normal rate and regular rhythm.   Pulmonary:      Effort: Pulmonary effort is normal.      Breath sounds: Normal breath sounds.   Abdominal:      General: There is no distension.      Palpations: Abdomen is soft. There is no mass.      Tenderness: There is abdominal tenderness (Diffuse, lower).   Musculoskeletal:         General: No tenderness or deformity. Normal range of motion.      Cervical back: Normal range of motion and neck supple.   Skin:     General: Skin is warm and dry.   Neurological:      Mental Status: She is alert. Mental status is at baseline.   Psychiatric:         Behavior: Behavior normal.              ECG 12 lead    (Results Pending)       Labs Reviewed   CBC AND DIFF - Abnormal       Result Value    WBC 4.56      RBC 3.59 (*)     Hemoglobin 12.8      Hematocrit 36.6      .9 (*)     MCH 35.7 (*)     MCHC 35.0      RDW 14.3      Platelets 118 (*)     MPV 12.0      Differential Type Auto      nRBC 0.4 (*)     Immature Granulocytes 0.4      Neutrophils 72.9      Lymphocytes 18.6  "     Monocytes 7.0      Eosinophils 0.9      Basophils 0.2      Immature Granulocytes, Absolute 0.02      Neutrophils, Absolute 3.32      Lymphocytes, Absolute 0.85 (*)     Monocytes, Absolute 0.32      Eosinophils, Absolute 0.04      Basophils, Absolute 0.01      PLT Morphology Normal      Platelet Estimate Decreased (51,000-149,000)      Anisocytosis 1+      Macrocytes 1+      Poikilocytes 1+      [place] Cells Occasional     COMPREHENSIVE METABOLIC PANEL - Abnormal    Sodium 133 (*)     Potassium 3.9      Chloride 97 (*)     CO2 26      BUN 17      Creatinine 0.7      Glucose 90      Calcium 9.1      AST (SGOT) 13      ALT (SGPT) 12      Alkaline Phosphatase 73      Total Protein 5.8 (*)     Albumin 3.9      Bilirubin, Total 0.9      eGFR >60.0      Anion Gap 10     HIGH SENSITIVE TROPONIN I (BASELINE - REFLEX 2HR) - Normal    High Sens Troponin I 9.1     D-DIMER - Normal    D-Dimer, Quant 0.33     SARS-COV-2 (COVID-19)/ FLU A/B, AND RSV, PCR   EXTRA TUBES    Narrative:     The following orders were created for panel order Extra Tubes.  Procedure                               Abnormality         Status                     ---------                               -----------         ------                     Extra Tube Lt Blue[[id number]]                               In process                   Please view results for these tests on the individual orders.   HIGH SENSITIVE TROPONIN I (NO REFLEX)   EXTUB LT BLUE       ECG 12 lead    (Results Pending)         Procedures    Final diagnoses:   None       ED Course & MDM     ED Course as of 01/27/25 2157 Mon Jan 27, 2025 2157 UA is consistent with UTI.  Patient does not have signs of pyelonephritis.  Macrobid ordered. [RR]   2157 CT imaging with uterine fibroid, cystitis, and dense material within the colon.  Results discussed with patient.  Patient  denies ingestion.  No obstruction.  Stable for discharge.  Recommend PCP follow-up in 1 to 3 days [RR]      ED Course  User Index  [RR] Landry Costello DO           Medical Decision Making  Differential diagnosis includes UTI, bowel obstruction, ileus, diverticulitis, electrolyte abnormality, hypotension, volume depletion, SUSU    Amount and/or Complexity of Data Reviewed  Labs: ordered.  Radiology: ordered.  ECG/medicine tests: ordered.        5:21 PM    Impression: Abdominal pain, chest pain anorexia    Plan: Labs, imaging    Vital Signs Review: Vital signs have been reviewed. The oxygen saturation is SpO2: 99 % which is normal.    VIDAL Plummer  1/27/2025           Maribell Gil PA C  01/28/25 0625

## 2025-01-27 NOTE — CONSULTS
1/27/25 1724 Pt is 43 y/o female who presents with chest pain. Pt scored low risk on CSSR. Physician assistant met with pt and pt does not want to meet with behavioral health during this visit. No safety concerns presented, denies SI/HI. Will remain available if pt decides she would like to speak with our team.     Elvira Penny LCSW

## 2025-01-28 VITALS
RESPIRATION RATE: 18 BRPM | HEIGHT: 65 IN | TEMPERATURE: 97.5 F | SYSTOLIC BLOOD PRESSURE: 79 MMHG | HEART RATE: 76 BPM | OXYGEN SATURATION: 99 % | BODY MASS INDEX: 35.34 KG/M2 | WEIGHT: 212.08 LBS | DIASTOLIC BLOOD PRESSURE: 55 MMHG

## 2025-01-28 LAB
ATRIAL RATE: 84
P AXIS: 26
PR INTERVAL: 130
QRS DURATION: 76
QT INTERVAL: 376
QTC CALCULATION(BAZETT): 444
R AXIS: -21
T WAVE AXIS: 82
VENTRICULAR RATE: 84

## 2025-01-28 PROCEDURE — 93010 ELECTROCARDIOGRAM REPORT: CPT | Performed by: STUDENT IN AN ORGANIZED HEALTH CARE EDUCATION/TRAINING PROGRAM

## 2025-01-28 NOTE — DISCHARGE INSTRUCTIONS
I have prescribed antibiotics to be taken twice a day for 5 days.  Follow-up with primary care physician in 1 to 3 days.  Return for new or worsening symptoms of concern.

## 2025-01-28 NOTE — ED ATTESTATION NOTE
I have personally seen and examined the patient.  I have performed the key components of the encounter and provided a substantive portion of the care and medical decision making for the patient.     I reviewed and agree with resident / physician assistant / nurse practitioner’s assessment and plan of care, with any exceptions as documented below.     My focused history, examination, assessment, and plan of care of  Felipa Cuadra is as follows:       Vital Signs Review: Vital signs have been reviewed. The oxygen saturation is  SpO2: 99 % which is  normal.    The patient is a 44-year-old female with past medical history of depression, eating disorder, anxiety, bipolar disorder, drug-seeking behavior, chronic pain, who presents to the emergency department for evaluation of lower abdominal pain.  The patient reports bilateral lower abdominal pain has been present for approximately 3 to 4 weeks.  The patient does complain of dysuria.  The patient has multiple other nonspecific complaints.  The patient denies fever, chills, chest pain, dyspnea, vomiting or diarrhea.  Patient has had nausea and decreased oral intake over the past several weeks.  The patient states she is not eaten for approximately 1 month.         Physical exam: Vital signs reviewed.  General: Patient appears comfortable lying in bed.  HEENT: NCAT, EOMI, MMM.  Neck: Supple, nontender.  Chest: Lungs clear.  Heart: Regular no murmurs.  Abdomen: Soft, mild tenderness suprapubic, mild tenderness bilateral lower quadrants.  No guarding, no rebound.  Extremities: No cyanosis, clubbing or edema.  Skin: Warm and dry, no rash.      Plan: Check labs.  UA.  CT scan abdomen pelvis rule out acute infectious or inflammatory intra-abdominal pathology      Labs Reviewed   CBC AND DIFF - Abnormal       Result Value    WBC 4.56      RBC 3.59 (*)     Hemoglobin 12.8      Hematocrit 36.6      .9 (*)     MCH 35.7 (*)     MCHC 35.0      RDW 14.3      Platelets 118 (*)      MPV 12.0      Differential Type Auto      nRBC 0.4 (*)     Immature Granulocytes 0.4      Neutrophils 72.9      Lymphocytes 18.6      Monocytes 7.0      Eosinophils 0.9      Basophils 0.2      Immature Granulocytes, Absolute 0.02      Neutrophils, Absolute 3.32      Lymphocytes, Absolute 0.85 (*)     Monocytes, Absolute 0.32      Eosinophils, Absolute 0.04      Basophils, Absolute 0.01      PLT Morphology Normal      Platelet Estimate Decreased (51,000-149,000)      Anisocytosis 1+      Macrocytes 1+      Poikilocytes 1+      [place] Cells Occasional     COMPREHENSIVE METABOLIC PANEL - Abnormal    Sodium 133 (*)     Potassium 3.9      Chloride 97 (*)     CO2 26      BUN 17      Creatinine 0.7      Glucose 90      Calcium 9.1      AST (SGOT) 13      ALT (SGPT) 12      Alkaline Phosphatase 73      Total Protein 5.8 (*)     Albumin 3.9      Bilirubin, Total 0.9      eGFR >60.0      Anion Gap 10     SARS-COV-2 (COVID-19)/ FLU A/B, AND RSV, PCR - Normal    SARS-CoV-2 (COVID-19) Negative      Influenza A Negative      Influenza B Negative      Respiratory Syncytial Virus Negative      Narrative:     Testing performed using real-time PCR for detection of COVID-19. EUA approved validation studies performed on site.    HIGH SENSITIVE TROPONIN I (BASELINE - REFLEX 2HR) - Normal    High Sens Troponin I 9.1     HIGH SENSITIVE TROPONIN I (NO REFLEX) - Normal    High Sens Troponin I 12.5     D-DIMER - Normal    D-Dimer, Quant 0.33     EXTRA TUBES    Narrative:     The following orders were created for panel order Extra Tubes.  Procedure                               Abnormality         Status                     ---------                               -----------         ------                     Extra Tube Lt Blue[[id number]]                               In process                   Please view results for these tests on the individual orders.   URINALYSIS REFLEX CULTURE (ED AND OUTPATIENT ONLY)    Narrative:     The following  orders were created for panel order UA w/ reflex culture (ED Only).  Procedure                               Abnormality         Status                     ---------                               -----------         ------                     UA Reflex to Culture (Ma...[[id number]]                                                   Please view results for these tests on the individual orders.   UA REFLEX CULTURE (MACROSCOPIC)   EXTUB LT BLUE         MDM: Patient test results reviewed.  Patient requested straight catheterization for urinalysis.     Disposition pending CT scan results.  UA pending.    Impression: Acute abdominal pain.     I was physically present for the key/critical portions of the following procedures: None    This document was created using Dragon dictation software.  There may be some typographical errors due to this technology.     Tobias Edwards MD  01/27/25 7657

## 2025-01-29 LAB
BACTERIA UR CULT: NORMAL
BACTERIA UR CULT: NORMAL

## 2025-02-01 ENCOUNTER — HOSPITAL ENCOUNTER (INPATIENT)
Facility: HOSPITAL | Age: 45
LOS: 16 days | Discharge: NURSING, REHAB OR LONG-TERM CARE | DRG: 690 | End: 2025-02-18
Attending: EMERGENCY MEDICINE | Admitting: STUDENT IN AN ORGANIZED HEALTH CARE EDUCATION/TRAINING PROGRAM
Payer: COMMERCIAL

## 2025-02-01 DIAGNOSIS — R10.30 LOWER ABDOMINAL PAIN: Primary | ICD-10-CM

## 2025-02-01 DIAGNOSIS — G89.4 CHRONIC PAIN DISORDER: ICD-10-CM

## 2025-02-01 DIAGNOSIS — N39.0 URINARY TRACT INFECTION WITH HEMATURIA, SITE UNSPECIFIED: ICD-10-CM

## 2025-02-01 DIAGNOSIS — R31.9 URINARY TRACT INFECTION WITH HEMATURIA, SITE UNSPECIFIED: ICD-10-CM

## 2025-02-01 DIAGNOSIS — R00.1 BRADYCARDIA: ICD-10-CM

## 2025-02-01 PROBLEM — D69.6 THROMBOCYTOPENIA (CMS/HCC): Status: ACTIVE | Noted: 2025-02-01

## 2025-02-01 PROBLEM — E87.1 HYPONATREMIA: Status: ACTIVE | Noted: 2025-02-01

## 2025-02-01 LAB
ALBUMIN SERPL-MCNC: 4.3 G/DL (ref 3.5–5.7)
ALP SERPL-CCNC: 68 IU/L (ref 34–125)
ALT SERPL-CCNC: 16 IU/L (ref 7–52)
ANION GAP SERPL CALC-SCNC: 9 MEQ/L (ref 3–15)
AST SERPL-CCNC: 19 IU/L (ref 13–39)
BACTERIA URNS QL MICRO: 3 /HPF
BASOPHILS # BLD: 0.01 K/UL (ref 0.01–0.1)
BASOPHILS NFR BLD: 0.2 %
BILIRUB SERPL-MCNC: 0.9 MG/DL (ref 0.3–1.2)
BILIRUB UR QL STRIP.AUTO: 1 MG/DL
BUN SERPL-MCNC: 18 MG/DL (ref 7–25)
CALCIUM SERPL-MCNC: 9.2 MG/DL (ref 8.6–10.3)
CHLORIDE SERPL-SCNC: 99 MEQ/L (ref 98–107)
CLARITY UR REFRACT.AUTO: ABNORMAL
CO2 SERPL-SCNC: 24 MEQ/L (ref 21–31)
COLOR UR AUTO: ABNORMAL
CREAT SERPL-MCNC: 0.6 MG/DL (ref 0.6–1.2)
DIFFERENTIAL METHOD BLD: ABNORMAL
EGFRCR SERPLBLD CKD-EPI 2021: >60 ML/MIN/1.73M*2
EOSINOPHIL # BLD: 0.06 K/UL (ref 0.04–0.36)
EOSINOPHIL NFR BLD: 1.1 %
ERYTHROCYTE [DISTWIDTH] IN BLOOD BY AUTOMATED COUNT: 14.1 % (ref 11.7–14.4)
ETHANOL SERPL-MCNC: <10 MG/DL
GLUCOSE BLD-MCNC: 100 MG/DL (ref 70–99)
GLUCOSE BLD-MCNC: 120 MG/DL (ref 70–99)
GLUCOSE SERPL-MCNC: 93 MG/DL (ref 70–99)
GLUCOSE UR STRIP.AUTO-MCNC: NEGATIVE MG/DL
HCT VFR BLD AUTO: 41.1 % (ref 35–45)
HGB BLD-MCNC: 14.4 G/DL (ref 11.8–15.7)
HGB UR QL STRIP.AUTO: 2
HYALINE CASTS #/AREA URNS LPF: ABNORMAL /LPF
IMM GRANULOCYTES # BLD AUTO: 0.04 K/UL (ref 0–0.08)
IMM GRANULOCYTES NFR BLD AUTO: 0.7 %
KETONES UR STRIP.AUTO-MCNC: 3 MG/DL
LEUKOCYTE ESTERASE UR QL STRIP.AUTO: 3
LYMPHOCYTES # BLD: 1.06 K/UL (ref 1.2–3.5)
LYMPHOCYTES NFR BLD: 19.8 %
MCH RBC QN AUTO: 35.1 PG (ref 28–33.2)
MCHC RBC AUTO-ENTMCNC: 35 G/DL (ref 32.2–35.5)
MCV RBC AUTO: 100.2 FL (ref 83–98)
MONOCYTES # BLD: 0.33 K/UL (ref 0.28–0.8)
MONOCYTES NFR BLD: 6.2 %
MUCOUS THREADS URNS QL MICRO: 3 /LPF
NEUTROPHILS # BLD: 3.86 K/UL (ref 1.7–7)
NEUTS SEG NFR BLD: 72 %
NITRITE UR QL STRIP.AUTO: NEGATIVE
NRBC BLD-RTO: 0 %
PH UR STRIP.AUTO: 6.5 [PH]
PLATELET # BLD AUTO: 121 K/UL (ref 150–369)
PMV BLD AUTO: 11.7 FL (ref 9.4–12.3)
POCT TEST: ABNORMAL
POCT TEST: ABNORMAL
POTASSIUM SERPL-SCNC: 3.7 MEQ/L (ref 3.5–5.1)
PROT SERPL-MCNC: 6.4 G/DL (ref 6–8.2)
PROT UR QL STRIP.AUTO: 1
RBC # BLD AUTO: 4.1 M/UL (ref 3.93–5.22)
RBC #/AREA URNS HPF: ABNORMAL /HPF
SODIUM SERPL-SCNC: 132 MEQ/L (ref 136–145)
SP GR UR REFRACT.AUTO: 1.02
SQUAMOUS URNS QL MICRO: ABNORMAL /HPF
UROBILINOGEN UR STRIP-ACNC: 4 EU/DL
WBC # BLD AUTO: 5.36 K/UL (ref 3.8–10.5)
WBC #/AREA URNS HPF: ABNORMAL /HPF

## 2025-02-01 PROCEDURE — 63600000 HC DRUGS/DETAIL CODE: Mod: JZ | Performed by: PHYSICIAN ASSISTANT

## 2025-02-01 PROCEDURE — 87077 CULTURE AEROBIC IDENTIFY: CPT | Performed by: PHYSICIAN ASSISTANT

## 2025-02-01 PROCEDURE — 63700000 HC SELF-ADMINISTRABLE DRUG: Performed by: PHYSICIAN ASSISTANT

## 2025-02-01 PROCEDURE — G0378 HOSPITAL OBSERVATION PER HR: HCPCS

## 2025-02-01 PROCEDURE — 36415 COLL VENOUS BLD VENIPUNCTURE: CPT | Performed by: PHYSICIAN ASSISTANT

## 2025-02-01 PROCEDURE — 96365 THER/PROPH/DIAG IV INF INIT: CPT | Mod: 59

## 2025-02-01 PROCEDURE — 80053 COMPREHEN METABOLIC PANEL: CPT | Performed by: PHYSICIAN ASSISTANT

## 2025-02-01 PROCEDURE — 96365 THER/PROPH/DIAG IV INF INIT: CPT

## 2025-02-01 PROCEDURE — 63600000 HC DRUGS/DETAIL CODE: Performed by: PHYSICIAN ASSISTANT

## 2025-02-01 PROCEDURE — 87040 BLOOD CULTURE FOR BACTERIA: CPT | Mod: 91 | Performed by: PHYSICIAN ASSISTANT

## 2025-02-01 PROCEDURE — 99285 EMERGENCY DEPT VISIT HI MDM: CPT | Mod: 25

## 2025-02-01 PROCEDURE — 85025 COMPLETE CBC W/AUTO DIFF WBC: CPT | Performed by: PHYSICIAN ASSISTANT

## 2025-02-01 PROCEDURE — 25800000 HC PHARMACY IV SOLUTIONS: Performed by: PHYSICIAN ASSISTANT

## 2025-02-01 PROCEDURE — 99223 1ST HOSP IP/OBS HIGH 75: CPT | Performed by: INTERNAL MEDICINE

## 2025-02-01 PROCEDURE — G0480 DRUG TEST DEF 1-7 CLASSES: HCPCS | Performed by: PHYSICIAN ASSISTANT

## 2025-02-01 PROCEDURE — 81001 URINALYSIS AUTO W/SCOPE: CPT | Performed by: PHYSICIAN ASSISTANT

## 2025-02-01 PROCEDURE — 63700000 HC SELF-ADMINISTRABLE DRUG: Performed by: HOSPITALIST

## 2025-02-01 RX ORDER — ZOLPIDEM TARTRATE 5 MG/1
10 TABLET ORAL NIGHTLY PRN
Status: DISCONTINUED | OUTPATIENT
Start: 2025-02-01 | End: 2025-02-01

## 2025-02-01 RX ORDER — INSULIN LISPRO 100 [IU]/ML
3-5 INJECTION, SOLUTION INTRAVENOUS; SUBCUTANEOUS
Status: DISCONTINUED | OUTPATIENT
Start: 2025-02-01 | End: 2025-02-13

## 2025-02-01 RX ORDER — ATORVASTATIN CALCIUM 20 MG/1
20 TABLET, FILM COATED ORAL EVERY MORNING
Status: DISCONTINUED | OUTPATIENT
Start: 2025-02-02 | End: 2025-02-18 | Stop reason: HOSPADM

## 2025-02-01 RX ORDER — ACETAMINOPHEN 325 MG/1
650 TABLET ORAL EVERY 4 HOURS PRN
Status: DISCONTINUED | OUTPATIENT
Start: 2025-02-01 | End: 2025-02-07

## 2025-02-01 RX ORDER — IBUPROFEN 200 MG
16-32 TABLET ORAL AS NEEDED
Status: DISCONTINUED | OUTPATIENT
Start: 2025-02-01 | End: 2025-02-18 | Stop reason: HOSPADM

## 2025-02-01 RX ORDER — RIMEGEPANT SULFATE 75 MG/75MG
75 TABLET, ORALLY DISINTEGRATING ORAL AS NEEDED
COMMUNITY
Start: 2025-01-03

## 2025-02-01 RX ORDER — ACETAMINOPHEN 325 MG/1
975 TABLET ORAL ONCE
Status: COMPLETED | OUTPATIENT
Start: 2025-02-01 | End: 2025-02-01

## 2025-02-01 RX ORDER — PRAZOSIN HYDROCHLORIDE 5 MG/1
5 CAPSULE ORAL NIGHTLY
COMMUNITY
Start: 2025-01-20 | End: 2025-02-18 | Stop reason: HOSPADM

## 2025-02-01 RX ORDER — PANTOPRAZOLE SODIUM 20 MG/1
20 TABLET, DELAYED RELEASE ORAL 2 TIMES DAILY
Status: DISCONTINUED | OUTPATIENT
Start: 2025-02-01 | End: 2025-02-18 | Stop reason: HOSPADM

## 2025-02-01 RX ORDER — NYSTATIN 100000 [USP'U]/G
POWDER TOPICAL 2 TIMES DAILY
Status: DISCONTINUED | OUTPATIENT
Start: 2025-02-01 | End: 2025-02-18 | Stop reason: HOSPADM

## 2025-02-01 RX ORDER — SODIUM CHLORIDE 9 MG/ML
INJECTION, SOLUTION INTRAVENOUS CONTINUOUS
Status: ACTIVE | OUTPATIENT
Start: 2025-02-01 | End: 2025-02-02

## 2025-02-01 RX ORDER — GABAPENTIN 400 MG/1
400 CAPSULE ORAL 4 TIMES DAILY
Status: DISCONTINUED | OUTPATIENT
Start: 2025-02-01 | End: 2025-02-15

## 2025-02-01 RX ORDER — DEXTROSE 40 %
15-30 GEL (GRAM) ORAL AS NEEDED
Status: DISCONTINUED | OUTPATIENT
Start: 2025-02-01 | End: 2025-02-18 | Stop reason: HOSPADM

## 2025-02-01 RX ORDER — LEVOTHYROXINE SODIUM 50 UG/1
50 TABLET ORAL
Status: DISCONTINUED | OUTPATIENT
Start: 2025-02-02 | End: 2025-02-18 | Stop reason: HOSPADM

## 2025-02-01 RX ORDER — FLUOXETINE HYDROCHLORIDE 20 MG/1
40 CAPSULE ORAL DAILY
Status: DISCONTINUED | OUTPATIENT
Start: 2025-02-02 | End: 2025-02-18 | Stop reason: HOSPADM

## 2025-02-01 RX ORDER — TOPIRAMATE 25 MG/1
50 TABLET ORAL 2 TIMES DAILY
Status: DISCONTINUED | OUTPATIENT
Start: 2025-02-01 | End: 2025-02-18 | Stop reason: HOSPADM

## 2025-02-01 RX ORDER — QUETIAPINE FUMARATE 100 MG/1
100 TABLET, FILM COATED ORAL NIGHTLY
COMMUNITY
Start: 2024-12-14 | End: 2025-02-01 | Stop reason: ENTERED-IN-ERROR

## 2025-02-01 RX ORDER — QUETIAPINE FUMARATE 100 MG/1
100 TABLET, FILM COATED ORAL NIGHTLY
Status: DISCONTINUED | OUTPATIENT
Start: 2025-02-01 | End: 2025-02-10

## 2025-02-01 RX ORDER — PRAZOSIN HYDROCHLORIDE 5 MG/1
5 CAPSULE ORAL NIGHTLY
Status: DISCONTINUED | OUTPATIENT
Start: 2025-02-01 | End: 2025-02-13

## 2025-02-01 RX ORDER — TRAZODONE HYDROCHLORIDE 100 MG/1
100 TABLET ORAL NIGHTLY
Status: DISCONTINUED | OUTPATIENT
Start: 2025-02-01 | End: 2025-02-17

## 2025-02-01 RX ORDER — DEXTROSE 50 % IN WATER (D50W) INTRAVENOUS SYRINGE
25 AS NEEDED
Status: DISCONTINUED | OUTPATIENT
Start: 2025-02-01 | End: 2025-02-18 | Stop reason: HOSPADM

## 2025-02-01 RX ORDER — LORAZEPAM 0.5 MG/1
0.25 TABLET ORAL 2 TIMES DAILY
Status: DISCONTINUED | OUTPATIENT
Start: 2025-02-01 | End: 2025-02-14

## 2025-02-01 RX ORDER — ZOLPIDEM TARTRATE 5 MG/1
5 TABLET ORAL NIGHTLY PRN
Status: DISCONTINUED | OUTPATIENT
Start: 2025-02-01 | End: 2025-02-07

## 2025-02-01 RX ORDER — TRAZODONE HYDROCHLORIDE 100 MG/1
100 TABLET ORAL NIGHTLY
Status: ON HOLD | COMMUNITY
Start: 2025-01-11 | End: 2025-02-18

## 2025-02-01 RX ADMIN — PANTOPRAZOLE SODIUM 20 MG: 20 TABLET, DELAYED RELEASE ORAL at 22:15

## 2025-02-01 RX ADMIN — ACETAMINOPHEN 975 MG: 325 TABLET ORAL at 16:24

## 2025-02-01 RX ADMIN — LORAZEPAM 0.25 MG: 0.5 TABLET ORAL at 17:51

## 2025-02-01 RX ADMIN — QUETIAPINE FUMARATE 100 MG: 100 TABLET ORAL at 22:15

## 2025-02-01 RX ADMIN — GABAPENTIN 400 MG: 400 CAPSULE ORAL at 22:15

## 2025-02-01 RX ADMIN — GABAPENTIN 400 MG: 400 CAPSULE ORAL at 16:25

## 2025-02-01 RX ADMIN — CEFTRIAXONE SODIUM 1 G: 1 INJECTION, POWDER, FOR SOLUTION INTRAMUSCULAR; INTRAVENOUS at 17:10

## 2025-02-01 RX ADMIN — PRAZOSIN HYDROCHLORIDE 5 MG: 1 CAPSULE ORAL at 22:15

## 2025-02-01 RX ADMIN — TOPIRAMATE 50 MG: 100 TABLET, FILM COATED ORAL at 17:51

## 2025-02-01 RX ADMIN — ZOLPIDEM TARTRATE 5 MG: 5 TABLET, FILM COATED ORAL at 22:16

## 2025-02-01 RX ADMIN — ACETAMINOPHEN 650 MG: 325 TABLET ORAL at 22:16

## 2025-02-01 RX ADMIN — TRAZODONE HYDROCHLORIDE 100 MG: 100 TABLET ORAL at 22:15

## 2025-02-01 RX ADMIN — SODIUM CHLORIDE: 9 INJECTION, SOLUTION INTRAVENOUS at 15:49

## 2025-02-01 ASSESSMENT — ENCOUNTER SYMPTOMS
SORE THROAT: 0
SHORTNESS OF BREATH: 0
DIZZINESS: 0
NAUSEA: 0
SINUS PRESSURE: 0
DIARRHEA: 0
ABDOMINAL PAIN: 1
CHEST TIGHTNESS: 0
FREQUENCY: 0
BACK PAIN: 0
PSYCHIATRIC NEGATIVE: 1
VOMITING: 0
SINUS PAIN: 0
FEVER: 0
FATIGUE: 0
LIGHT-HEADEDNESS: 0
PALPITATIONS: 0
DYSURIA: 0
HEADACHES: 0
CHILLS: 0
COUGH: 0

## 2025-02-01 ASSESSMENT — COGNITIVE AND FUNCTIONAL STATUS - GENERAL
STANDING UP FROM CHAIR USING ARMS: 1 - TOTAL
CLIMB 3 TO 5 STEPS WITH RAILING: 1 - TOTAL
WALKING IN HOSPITAL ROOM: 1 - TOTAL
STANDING UP FROM CHAIR USING ARMS: 1 - TOTAL
MOVING TO AND FROM BED TO CHAIR: 2 - A LOT
CLIMB 3 TO 5 STEPS WITH RAILING: 1 - TOTAL
WALKING IN HOSPITAL ROOM: 1 - TOTAL
MOVING TO AND FROM BED TO CHAIR: 2 - A LOT

## 2025-02-01 NOTE — H&P
Hospital Medicine Service -  History & Physical        CHIEF COMPLAINT     Chief Complaint   Patient presents with    Abdominal Pain        HISTORY OF PRESENT ILLNESS      44 y.o. female with a past medical history of eating disorder, depression, anxiety, PTSD, bipolar disease, hypothyroidism, seizures, HLD, DM2, GERD, migraines, drug-seeking behavior, current tobacco and marijuana use, history of alcohol abuse who presents for evaluation of abdominal pain.  Patient was seen in the ED 1 week ago for evaluation of abdominal pain and generalized weakness.  Workup revealed acute cystitis.  She was discharged on a course of Macrobid, which patient reports she has been taking.  She presents today for evaluation of persistent abdominal pain.  Patient reports suprapubic pain that radiates to her back.  She reports it is constant.  She reports she is weak and unable to ambulate.  She reports increased frequency of urination.  She reports chills.  She reports nausea without vomiting.  She reports she has not had solid food intake since Christmas.  She reports she has been drinking a lot of Gatorade.  She reports she is currently having black stool and vaginal bleeding x 3 days.  She reports she rarely gets her period.  She has not noticed any blood clots.  She reports she is feeling more depressed lately.  She denies suicidal ideation.  She reports she smokes 4 cigarettes/day.  She reports she quit alcohol use 4 years ago.  She denies fever, syncope, vomiting, chest pain, shortness of breath, constipation, diarrhea.    ED workup:   Hypotensive, other vitals normal.  Labs significant for hyponatremia, thrombocytopenia.  CMP and CBC within normal limits.  UA positive UTI.  Head CT abdomen pelvis last week, which showed concentric prominence of the urinary bladder, cystitis cannot be excluded; high density material within the colon presumably representing ingested product, no evidence of a bowel obstruction; status post  Mary-en-Y gastric surgery; uterine fibroid.  Received tylenol in the ED.  Admitted for further workup and management of UTI, failure of outpatient management.  PAST MEDICAL AND SURGICAL HISTORY      Past Medical History:   Diagnosis Date    Anxiety     Depression     Eating disorder        No past surgical history on file.    MEDICATIONS      Prior to Admission medications    Medication Sig Start Date End Date Taking? Authorizing Provider   atorvastatin (LIPITOR) 20 mg tablet Take 20 mg by mouth every morning.    ProviderCasi MD   fludrocortisone (FLORINEF) 0.1 mg tablet Take 1 tablet (0.1 mg total) by mouth daily. 10/4/24 11/3/24  Luisa Verdugo DO   FLUoxetine (PROzac) 20 mg capsule Take 20 mg by mouth daily.    ProviderCasi MD   gabapentin (NEURONTIN) 400 mg capsule Take 400 mg by mouth 4 (four) times a day.    Casi Blue MD   levothyroxine (SYNTHROID) 50 mcg tablet Take 50 mcg by mouth daily.    Casi Blue MD   LORAZEPAM ORAL Take 0.5 mg in the morning and 1 mg daily at 2pm.    Casi Blue MD   midodrine (PROAMATINE) 10 mg tablet Take 1 tablet (10 mg total) by mouth 3 (three) times a day as needed (for systolic blood pressure less than 90). 10/3/24 11/2/24  Luisa Verdugo DO   nitrofurantoin, macrocrystal-monohydrate, (MACROBID) 100 mg capsule Take 1 capsule (100 mg total) by mouth 2 (two) times a day for 5 days. 1/27/25 2/1/25  Landry Costello DO   omeprazole (PriLOSEC) 20 mg capsule Take 20 mg by mouth daily before breakfast. For heartburn    Casi Blue MD   topiramate (TOPAMAX) 50 mg tablet Take 50 mg by mouth 2 (two) times a day. AM and 2pm    Casi Blue MD   zolpidem (AMBIEN) 10 mg tablet Take 10 mg by mouth nightly.    Casi Blue MD       ALLERGIES      Aspartame, Bee venom protein (honey bee), Buspirone, Diphenhydramine, Haloperidol, Iodine, Penicillins, Sertraline, Diclofenac  sodium, Hydroxyzine, Shellfish containing products, Sudafed cold-allergy, and Tetracyclines    FAMILY HISTORY      No family history on file.    SOCIAL HISTORY      Social History     Socioeconomic History    Marital status: Single     Spouse name: None    Number of children: None    Years of education: None    Highest education level: None   Tobacco Use    Smoking status: Every Day     Current packs/day: 0.25     Average packs/day: 0.3 packs/day for 20.5 years (5.1 ttl pk-yrs)     Types: Cigarettes     Start date: 8/16/2004    Smokeless tobacco: Current   Substance and Sexual Activity    Alcohol use: Not Currently    Drug use: Yes     Types: Marijuana     Social Drivers of Health     Food Insecurity: No Food Insecurity (2/1/2025)    Hunger Vital Sign     Worried About Running Out of Food in the Last Year: Never true     Ran Out of Food in the Last Year: Never true   Transportation Needs: No Transportation Needs (8/19/2024)    PRAPARE - Transportation     Lack of Transportation (Medical): No     Lack of Transportation (Non-Medical): No   Housing Stability: Unknown (9/30/2024)    Housing Stability Vital Sign     Unable to Pay for Housing in the Last Year: No     Homeless in the Last Year: No       REVIEW OF SYSTEMS        Review of Systems  All others reviewed and negative.        PHYSICAL EXAMINATION      Temp:  [36.4 °C (97.5 °F)] 36.4 °C (97.5 °F)  Heart Rate:  [83-89] 89  Resp:  [16-20] 16  BP: (103-115)/(59-76) 114/71  Body mass index is 28.25 kg/m².    General exam : appears age stated, well nourished, not in distress  Head: atraumatic, normocephalic  Eyes : PERRLA, EOMI, no pallor, no icterus  ENT: no lesions, oropharynx pink, mucous membranes moist   Neck: supple, no Lymph nodes, no Thyromegaly, no JVD   CVS : normal rate, normal rhythm, S1 and S2 heard, no murmurs, rubs or gallops  Resp:normal accessory muscle usage, clear to auscultation Bilaterally  Abdomen : soft, suprapubic tenderness, BS +, no  organomegaly   Extremities : no edema, no cyanosis   MSK: no DJD, no joint swellings, no joint tenderness   Skin: intact, warm, no rash  Neuro: AAO x3, CN 2-12 intact,  motor strength in all extremities, sensations, DTRs, coordination intact.  Psych: normal mood, cooperative      LABS / IMAGING / EKG        Labs  CBC Results         02/01/25 01/27/25 10/03/24     1223 1622 0357    WBC 5.36 4.56 4.27    RBC 4.10 3.59 2.83    HGB 14.4 12.8 9.7    HCT 41.1 36.6 30.0    .2 101.9 106.0    MCH 35.1 35.7 34.3    MCHC 35.0 35.0 32.3     118 191           Comment for PLT at 1223 on 02/01/25: ALL RESULTS HAVE BEEN RECHECKEDRESULTS OBTAINED AFTER VORTEXING TO ELIMINATE PLT CLUMPSSPECIMEN QUALITY CHECKED          CMP Results         02/01/25 01/27/25 10/03/24     1223 1622 0357     133 143    K 3.7 3.9 3.3    Cl 99 97 114    CO2 24 26 22    Glucose 93 90 78    BUN 18 17 7    Creatinine 0.6 0.7 0.6    Calcium 9.2 9.1 7.8    Anion Gap 9 10 7    AST 19 13 --    ALT 16 12 --    Albumin 4.3 3.9 --    EGFR >60.0 >60.0 >60.0           Comment for K at 1223 on 02/01/25: Results obtained on plasma. Plasma Potassium values may be up to 0.4 mEQ/L less than serum values. The differences may be greater for patients with high platelet or white cell counts.    Comment for K at 1622 on 01/27/25: Results obtained on plasma. Plasma Potassium values may be up to 0.4 mEQ/L less than serum values. The differences may be greater for patients with high platelet or white cell counts.    Comment for EGFR at 1223 on 02/01/25: Calculation based on the Chronic Kidney Disease Epidemiology Collaboration (CKD-EPI) equation refit without adjustment for race.    Comment for EGFR at 1622 on 01/27/25: Calculation based on the Chronic Kidney Disease Epidemiology Collaboration (CKD-EPI) equation refit without adjustment for race.    Comment for EGFR at 0357 on 10/03/24: Calculation based on the Chronic Kidney Disease Epidemiology Collaboration  (CKD-EPI) equation refit without adjustment for race.            Troponin I Results         01/27/25 01/27/25 09/28/24     1827 1622 2253    HS Troponin I 12.5 9.1 2.8          Microbiology Results       Procedure Component Value Units Date/Time    Urine culture Urine, Clean Catch [[id number]] Collected: 01/27/25 2044    Specimen: Urine, Clean Catch Updated: 01/29/25 1036     Urine Culture Probable contaminants, suggest recollection      >=100,000 cfu/mL Mixed Growth    SARS-COV-2 (COVID-19)/ FLU A/B, AND RSV, PCR Nasopharynx [[id number]]  (Normal) Collected: 01/27/25 1729    Specimen: Nasopharyngeal Swab from Nasopharynx Updated: 01/27/25 1826     SARS-CoV-2 (COVID-19) Negative     Influenza A Negative     Influenza B Negative     Respiratory Syncytial Virus Negative    Narrative:      Testing performed using real-time PCR for detection of COVID-19. EUA approved validation studies performed on site.           UA Results         02/01/25     1215    Color Orange    Clarity Cloudy    Glucose Negative    Bilirubin +1    Ketones +3    Sp Grav 1.025    Blood +2    Ph 6.5    Protein +1    Urobilinogen 4.0    Nitrite Negative    Leuk Est +3    WBC Too Numerous To Count    RBC Too Numerous To Count    Bacteria +3           Comment for Ketones at 1215 on 02/01/25: Free sulfhydryl drugs such as Mesna, Capoten, and Acetylcysteine (Mucomyst) may cause false positive ketonuria.    Comment for Blood at 1215 on 02/01/25: The sensitivity of the occult blood test is equivalent to approximately 4 intact RBC/HPF.            Imaging  No orders to display         ECG/Telemetry  reviewed by me    ASSESSMENT AND PLAN           Assessment & Plan  UTI (urinary tract infection)    # UTI  # Failure of OP treatment  - diagnosed with UTI 1 week ago and started on course of macrobid, ?compliance   - UA today again reveals UTI   - started IV rocephin   - follow blood and urine cultures     # Generalized weakness   - likely multifactorial: UTI,  eating disorder with poor OP intake, anxiety/depression/bipolar disorder, hyponatremia  - PT/OT and SW consult.  strong consideration for placement      # Eating disorder   - reports she has not had solid food intake since christmas  - supervise feeds   - psych consulted     # Depression   # Anxiety   # Bipolar disease   # PTSD  - continued on home  medications  - psych consulted      # Hyponatremia: mild  - sodium: 132 (2/1/25)  - similar to sodium 1 week ago, however baseline appears 143  - reports she has not had solid food intake since christmas, only gatorade  - also is on numerous psych medications   - checking serum and urine osmolality, urine electrolytes   - nephrology consulted      # DM2  - poct with ssi correction   - checking hgba1c level     # Seizure disorder   - continued on topiramate     # Hypothyroidism  - TSH: 1.4 (9/29/24), free T4: 0.6 (8/16/24)  - continued on synthroid     # GERD without esophagitis  - continued on PPI     # Thrombocytopenia  - platelets: 121 (2/1/25)  - stable when compared to baseline      # Drug seeking behavior   - judicious use of narcotics while hospitalized      # Marijuana use   - checking UDS     # Tobacco use   - smokes 4 cigarettes per day  - declined nicotine patch   - tobacco cessation advised      # Alcohol use disorder, mild, without withdrawal  - checking alcohol level, reports last use 4 years ago  - monitor for signs/symtpoms of alcohol w/d    VTE Assessment: Padua    VTE Prophylaxis Plan: scds  Code Status: Full Code       VIDAL Fink C  2/1/2025

## 2025-02-01 NOTE — CONSULTS
NEPHROLOGY CONSULTATION    Reason for consult: Hyponatremia     HPI: Thank you for this consultation on Felipa Cuadra who is a 44 y.o. female, with a past medical history of eating disorder, depression, anxiety, PTSD, bipolar disease, hypothyroidism, seizures, HLD, DM2, GERD, migraines, drug-seeking behavior, current tobacco and marijuana use, history of alcohol abuse who presents for evaluation of abdominal pain. Neprhology is consulted for Hyponatremia     Review of Systems  A 10-point review of systems was negative except as above.  Past Medical History:   Diagnosis Date    Anxiety     Depression     Eating disorder        No past surgical history on file.    Social History     Tobacco Use    Smoking status: Every Day     Current packs/day: 0.25     Average packs/day: 0.3 packs/day for 20.5 years (5.1 ttl pk-yrs)     Types: Cigarettes     Start date: 8/16/2004    Smokeless tobacco: Current   Substance Use Topics    Alcohol use: Not Currently    Drug use: Yes     Types: Marijuana       No family history on file.    Allergies   Allergen Reactions    Aspartame Anaphylaxis    Bee Venom Protein (Honey Bee) Anaphylaxis    Buspirone Other (see comments)     Per patient - hemorrhage and feeling like they were being electrocuted    Diphenhydramine Anaphylaxis     Other reaction(s): Hyperactivity   Patient stated any antihistamine (hydroxyzine, diphenhydramine, etc....) causes extreme excitation, energy and lack of sleep. (Paradoxical reaction.)    Haloperidol Other (see comments) and Shortness of breath     Acute Dystonia - resolved with IM Benztropine    Iodine Anaphylaxis     Seafood and iodine per patient report    Penicillins Anaphylaxis and Angioedema     Pt tolerated ceftriaxone during September 2024 hospital admission    Sertraline Other (see comments) and Shortness of breath     Serotonin Syndrome (patient - per own report - stated they had to be hospitalized due to severe flu like symptoms and fever 1 week post  taking Zoloft) - patient DID tolerate Fluoxetine, Celexa and Lexapro)     Reports serotonin syndrome on this medication    Diclofenac Sodium Other (see comments)     Unsure of reaction    Hydroxyzine     Shellfish Containing Products Other (see comments)    Sudafed Cold-Allergy Other (see comments)    Tetracyclines        Home Medication List:   acetaminophen (TYLENOL) tablet 650 mg  650 mg oral q4h PRN    acetaminophen (TYLENOL) tablet 975 mg  975 mg oral Once    cefTRIAXone (ROCEPHIN) IVPB 1 g in 100 mL NSS vial in bag  1 g intravenous q24h INT    glucose chewable tablet 16-32 g of dextrose  16-32 g of dextrose oral PRN    Or    dextrose 40 % oral gel 15-30 g of dextrose  15-30 g of dextrose oral PRN    Or    glucagon (GLUCAGEN) injection 1 mg  1 mg intramuscular PRN    Or    dextrose 50 % in water (D50) injection 12.5 g  25 mL intravenous PRN    sodium chloride 0.9 % infusion   intravenous Continuous         Current Facility-Administered Medications:     acetaminophen (TYLENOL) tablet 650 mg, 650 mg, oral, q4h PRN, Milly Roberson PA C    acetaminophen (TYLENOL) tablet 975 mg, 975 mg, oral, Once, Maribell Gil PA C    cefTRIAXone (ROCEPHIN) IVPB 1 g in 100 mL NSS vial in bag, 1 g, intravenous, q24h INT, Milly Roberson PA C    glucose chewable tablet 16-32 g of dextrose, 16-32 g of dextrose, oral, PRN **OR** dextrose 40 % oral gel 15-30 g of dextrose, 15-30 g of dextrose, oral, PRN **OR** glucagon (GLUCAGEN) injection 1 mg, 1 mg, intramuscular, PRN **OR** dextrose 50 % in water (D50) injection 12.5 g, 25 mL, intravenous, PRN, Milly Roberson PA C    sodium chloride 0.9 % infusion, , intravenous, Continuous, Milly Roberson PA C    Current Outpatient Medications:     atorvastatin (LIPITOR) 20 mg tablet, Take 20 mg by mouth every morning., Disp: , Rfl:     fludrocortisone (FLORINEF) 0.1 mg tablet, Take 1 tablet (0.1 mg total) by mouth daily., Disp: 30 tablet, Rfl: 0    FLUoxetine (PROzac)  "20 mg capsule, Take 20 mg by mouth daily., Disp: , Rfl:     gabapentin (NEURONTIN) 400 mg capsule, Take 400 mg by mouth 4 (four) times a day., Disp: , Rfl:     levothyroxine (SYNTHROID) 50 mcg tablet, Take 50 mcg by mouth daily., Disp: , Rfl:     LORAZEPAM ORAL, Take 0.5 mg in the morning and 1 mg daily at 2pm., Disp: , Rfl:     midodrine (PROAMATINE) 10 mg tablet, Take 1 tablet (10 mg total) by mouth 3 (three) times a day as needed (for systolic blood pressure less than 90)., Disp: 30 tablet, Rfl: 0    nitrofurantoin, macrocrystal-monohydrate, (MACROBID) 100 mg capsule, Take 1 capsule (100 mg total) by mouth 2 (two) times a day for 5 days., Disp: 10 capsule, Rfl: 0    omeprazole (PriLOSEC) 20 mg capsule, Take 20 mg by mouth daily before breakfast. For heartburn, Disp: , Rfl:     topiramate (TOPAMAX) 50 mg tablet, Take 50 mg by mouth 2 (two) times a day. AM and 2pm, Disp: , Rfl:     zolpidem (AMBIEN) 10 mg tablet, Take 10 mg by mouth nightly., Disp: , Rfl:     PHYSICAL EXAM:  Vitals:    02/01/25 1031 02/01/25 1217 02/01/25 1231 02/01/25 1330   BP: 115/76 (!) 103/59 104/66 114/71   Pulse: 88 89 83 89   Resp: 18 20 18 16   Temp: 36.4 °C (97.5 °F)      TempSrc: Temporal      SpO2: 96% 98% 97% 97%   Weight: 77 kg (169 lb 12.1 oz)      Height: 1.651 m (5' 5\")          Lying in bed, in no distress, comfortable  Normal extraocular movements, pink conjunctivae, moist mucous membranes   No JVD, midline trachea, no thyromegaly  No lymph nodes in the axillary or cervical area   Lungs are clear to auscultation bilaterally   Regular rate and rhythm, no murmurs rubs or gallops  Abdomen is soft, nontender, without suprapubic tenderness, with positive bowel sounds  No peripheral edema  Able to move all extremities  Nonfocal neurologic examination   Normal skin turgor  Appropriate mood and affect        No intake or output data in the 24 hours ending 02/01/25 1404    Physical Exam    Results from last 7 days   Lab Units " 02/01/25  1223   SODIUM mEQ/L 132*   POTASSIUM mEQ/L 3.7   CHLORIDE mEQ/L 99   CO2 mEQ/L 24   BUN mg/dL 18   CREATININE mg/dL 0.6   EGFR mL/min/1.73m*2 >60.0   GLUCOSE mg/dL 93   CALCIUM mg/dL 9.2   ALBUMIN g/dL 4.3   WBC K/uL 5.36   HEMOGLOBIN g/dL 14.4   HEMATOCRIT % 41.1   PLATELETS K/uL 121*       Pertinent radiology and labs reviewed    ASSESSMENT:  Principal Problem:    UTI (urinary tract infection)      PLAN:      44 y.o. female, with a past medical history of eating disorder, depression, anxiety, PTSD, bipolar disease, hypothyroidism, seizures, HLD, DM2, GERD, migraines, drug-seeking behavior, current tobacco and marijuana use, history of alcohol abuse who presents for evaluation of abdominal pain. Neprhology is consulted for Hyponatremia     Acute Asymptomatic  Hyponatremia:   Sodium troy - 132   Likely poor oral intake   Patients blood pressure is soft - will effect sodium- bolus as needed to keep MAP above 65   Will order serum osm - urine osm and urine lytes   Goal sodium by tomorrow jeremias is 138  BMP daily  Fluid restriction-  1.5 liters.   High protein drinks - with at least 15 gms of protein twice daily.   Will monitor sodium to avoid increasing more than 6-8 meq in 24 hours.      UTI: S/p Macrobid - now on Ceftriaxone     Yasemin Jones MD

## 2025-02-01 NOTE — ED PROVIDER NOTES
HPI    Chief Complaint   Patient presents with    Abdominal Pain        HPI   Patient is a 44-year-old female with a past medical history significant for anxiety, depression, eating disorder who presents to the emergency department today for evaluation of persistent abdominal pain.  Patient was seen in our ER approximately 5 to 6 days ago and was diagnosed with a UTI.  Patient also states that she has had no intake of p.o. solids since 12/25/2024.  Is taking in fluids      Past Medical History:   Diagnosis Date    Anxiety     Depression     Eating disorder          No past surgical history on file.    No family history on file.    Social History     Tobacco Use    Smoking status: Every Day     Current packs/day: 0.25     Average packs/day: 0.3 packs/day for 20.5 years (5.1 ttl pk-yrs)     Types: Cigarettes     Start date: 8/16/2004    Smokeless tobacco: Current   Substance Use Topics    Alcohol use: Not Currently    Drug use: Yes     Types: Marijuana       Systems Reviewed from Nursing Triage:               Review of Systems   Constitutional:  Negative for chills, fatigue and fever.   HENT:  Negative for congestion, ear pain, sinus pressure, sinus pain and sore throat.    Respiratory:  Negative for cough, chest tightness and shortness of breath.    Cardiovascular:  Negative for chest pain and palpitations.   Gastrointestinal:  Positive for abdominal pain. Negative for diarrhea, nausea and vomiting.   Genitourinary:  Negative for dysuria, frequency and urgency.   Musculoskeletal:  Negative for back pain.   Skin:  Negative for pallor and rash.   Neurological:  Negative for dizziness, light-headedness and headaches.   Psychiatric/Behavioral: Negative.              ED Triage Vitals [02/01/25 1031]   Temp Heart Rate Resp BP SpO2   36.4 °C (97.5 °F) 88 18 115/76 96 %      Temp Source Heart Rate Source Patient Position BP Location FiO2 (%) (Set)   Temporal -- -- -- --       Vitals:    02/01/25 1031 02/01/25 1217   BP:  "115/76 (!) 103/59   Pulse: 88 89   Resp: 18 20   Temp: 36.4 °C (97.5 °F)    TempSrc: Temporal    SpO2: 96% 98%   Weight: 77 kg (169 lb 12.1 oz)    Height: 1.651 m (5' 5\")                          Physical Exam         CT ABDOMEN PELVIS WITH IV CONTRAST    (Results Pending)       Labs Reviewed   CBC AND DIFF   COMPREHENSIVE METABOLIC PANEL   URINALYSIS REFLEX CULTURE (ED AND OUTPATIENT ONLY)    Narrative:     The following orders were created for panel order UA w/ reflex culture (ED Only).  Procedure                               Abnormality         Status                     ---------                               -----------         ------                     UA Reflex to Culture (Ma...[[id number]]                      In process                   Please view results for these tests on the individual orders.   UA REFLEX CULTURE (MACROSCOPIC)   RAINBOW DRAW PANEL    Narrative:     The following orders were created for panel order [place] Draw Panel.  Procedure                               Abnormality         Status                     ---------                               -----------         ------                     RAINBOW LT BLUE[[id number]]                                  In process                   Please view results for these tests on the individual orders.   RAINBOW LT BLUE       CT ABDOMEN PELVIS WITH IV CONTRAST    (Results Pending)         Procedures    Final diagnoses:   None       ED Course & MDM              Medical Decision Making  Problems Addressed:  Lower abdominal pain: acute illness or injury  Urinary tract infection with hematuria, site unspecified: acute illness or injury    Amount and/or Complexity of Data Reviewed  Labs: ordered.    Risk  OTC drugs.  Decision regarding hospitalization.        12:40 PM    Impression: Chronic abdominal pain, fatigue, anorexia    Plan: Labs    Vital Signs Review: Vital signs have been reviewed. The oxygen saturation is SpO2: 96 % which is normal.    Maribell" VIDAL Simon C  2/1/2025           Maribell Gil PA C  02/03/25 1017

## 2025-02-02 ENCOUNTER — APPOINTMENT (OUTPATIENT)
Dept: RADIOLOGY | Facility: HOSPITAL | Age: 45
Setting detail: OBSERVATION
DRG: 690 | End: 2025-02-02
Attending: STUDENT IN AN ORGANIZED HEALTH CARE EDUCATION/TRAINING PROGRAM
Payer: COMMERCIAL

## 2025-02-02 PROBLEM — R10.30 LOWER ABDOMINAL PAIN: Status: ACTIVE | Noted: 2025-02-02

## 2025-02-02 LAB
AMPHET UR QL SCN: NOT DETECTED
B-HCG UR QL: NEGATIVE
BARBITURATES UR QL SCN: NOT DETECTED
BENZODIAZ UR QL SCN: NOT DETECTED
CANNABINOIDS UR QL SCN: POSITIVE
CHLORIDE UR-SCNC: 84 MEQ/L
COCAINE UR QL SCN: NOT DETECTED
FENTANYL CTO UR SCN-MCNC: NOT DETECTED NG/ML
GLUCOSE BLD-MCNC: 107 MG/DL (ref 70–99)
GLUCOSE BLD-MCNC: 107 MG/DL (ref 70–99)
GLUCOSE BLD-MCNC: 98 MG/DL (ref 70–99)
OPIATES UR QL SCN: NOT DETECTED
OSMOLALITY UR: 530 MOSM/KG (ref 100–1400)
PCP UR QL SCN: NOT DETECTED
POCT TEST: ABNORMAL
POCT TEST: ABNORMAL
POCT TEST: NORMAL
POTASSIUM UR-SCNC: 23.4 MEQ/L
SODIUM UR-SCNC: 43 MEQ/L

## 2025-02-02 PROCEDURE — 84133 ASSAY OF URINE POTASSIUM: CPT | Performed by: PHYSICIAN ASSISTANT

## 2025-02-02 PROCEDURE — 87086 URINE CULTURE/COLONY COUNT: CPT | Performed by: STUDENT IN AN ORGANIZED HEALTH CARE EDUCATION/TRAINING PROGRAM

## 2025-02-02 PROCEDURE — 81025 URINE PREGNANCY TEST: CPT | Performed by: STUDENT IN AN ORGANIZED HEALTH CARE EDUCATION/TRAINING PROGRAM

## 2025-02-02 PROCEDURE — 25800000 HC PHARMACY IV SOLUTIONS: Performed by: PHYSICIAN ASSISTANT

## 2025-02-02 PROCEDURE — 63700000 HC SELF-ADMINISTRABLE DRUG: Performed by: PHYSICIAN ASSISTANT

## 2025-02-02 PROCEDURE — 80307 DRUG TEST PRSMV CHEM ANLYZR: CPT | Performed by: PHYSICIAN ASSISTANT

## 2025-02-02 PROCEDURE — 63700000 HC SELF-ADMINISTRABLE DRUG: Performed by: INTERNAL MEDICINE

## 2025-02-02 PROCEDURE — 63600000 HC DRUGS/DETAIL CODE: Mod: JZ | Performed by: STUDENT IN AN ORGANIZED HEALTH CARE EDUCATION/TRAINING PROGRAM

## 2025-02-02 PROCEDURE — 72128 CT CHEST SPINE W/O DYE: CPT

## 2025-02-02 PROCEDURE — 96361 HYDRATE IV INFUSION ADD-ON: CPT

## 2025-02-02 PROCEDURE — 25800000 HC PHARMACY IV SOLUTIONS: Performed by: NURSE PRACTITIONER

## 2025-02-02 PROCEDURE — G0378 HOSPITAL OBSERVATION PER HR: HCPCS

## 2025-02-02 PROCEDURE — 99233 SBSQ HOSP IP/OBS HIGH 50: CPT | Performed by: STUDENT IN AN ORGANIZED HEALTH CARE EDUCATION/TRAINING PROGRAM

## 2025-02-02 PROCEDURE — 12000000 HC ROOM AND CARE MED/SURG

## 2025-02-02 PROCEDURE — 72131 CT LUMBAR SPINE W/O DYE: CPT

## 2025-02-02 PROCEDURE — 63600000 HC DRUGS/DETAIL CODE: Mod: JZ | Performed by: PHYSICIAN ASSISTANT

## 2025-02-02 PROCEDURE — 83935 ASSAY OF URINE OSMOLALITY: CPT | Performed by: PHYSICIAN ASSISTANT

## 2025-02-02 PROCEDURE — 63700000 HC SELF-ADMINISTRABLE DRUG: Performed by: HOSPITALIST

## 2025-02-02 RX ORDER — OXYCODONE HYDROCHLORIDE 5 MG/1
5 TABLET ORAL EVERY 6 HOURS PRN
Status: DISCONTINUED | OUTPATIENT
Start: 2025-02-02 | End: 2025-02-13

## 2025-02-02 RX ADMIN — TOPIRAMATE 50 MG: 100 TABLET, FILM COATED ORAL at 15:13

## 2025-02-02 RX ADMIN — FLUOXETINE HYDROCHLORIDE 40 MG: 20 CAPSULE ORAL at 08:50

## 2025-02-02 RX ADMIN — TRAZODONE HYDROCHLORIDE 100 MG: 100 TABLET ORAL at 21:51

## 2025-02-02 RX ADMIN — SODIUM CHLORIDE 500 ML: 9 INJECTION, SOLUTION INTRAVENOUS at 22:00

## 2025-02-02 RX ADMIN — GABAPENTIN 400 MG: 400 CAPSULE ORAL at 08:50

## 2025-02-02 RX ADMIN — LEVOTHYROXINE SODIUM 50 MCG: 0.05 TABLET ORAL at 06:26

## 2025-02-02 RX ADMIN — ZOLPIDEM TARTRATE 5 MG: 5 TABLET, FILM COATED ORAL at 21:51

## 2025-02-02 RX ADMIN — ACETAMINOPHEN 650 MG: 325 TABLET ORAL at 10:47

## 2025-02-02 RX ADMIN — GABAPENTIN 400 MG: 400 CAPSULE ORAL at 13:24

## 2025-02-02 RX ADMIN — GABAPENTIN 400 MG: 400 CAPSULE ORAL at 21:51

## 2025-02-02 RX ADMIN — OXYCODONE HYDROCHLORIDE 5 MG: 5 TABLET ORAL at 15:13

## 2025-02-02 RX ADMIN — ACETAMINOPHEN 650 MG: 325 TABLET ORAL at 15:13

## 2025-02-02 RX ADMIN — CEFTRIAXONE SODIUM 1 G: 1 INJECTION, POWDER, FOR SOLUTION INTRAMUSCULAR; INTRAVENOUS at 13:26

## 2025-02-02 RX ADMIN — NYSTATIN: 100000 POWDER TOPICAL at 08:51

## 2025-02-02 RX ADMIN — GABAPENTIN 400 MG: 400 CAPSULE ORAL at 18:26

## 2025-02-02 RX ADMIN — ACETAMINOPHEN 650 MG: 325 TABLET ORAL at 21:51

## 2025-02-02 RX ADMIN — NYSTATIN: 100000 POWDER TOPICAL at 21:53

## 2025-02-02 RX ADMIN — LORAZEPAM 0.25 MG: 0.5 TABLET ORAL at 13:24

## 2025-02-02 RX ADMIN — LORAZEPAM 0.25 MG: 0.5 TABLET ORAL at 08:50

## 2025-02-02 RX ADMIN — SODIUM CHLORIDE, POTASSIUM CHLORIDE, SODIUM LACTATE AND CALCIUM CHLORIDE 1000 ML: 600; 310; 30; 20 INJECTION, SOLUTION INTRAVENOUS at 10:41

## 2025-02-02 RX ADMIN — TOPIRAMATE 50 MG: 100 TABLET, FILM COATED ORAL at 08:50

## 2025-02-02 RX ADMIN — OXYCODONE HYDROCHLORIDE 5 MG: 5 TABLET ORAL at 08:50

## 2025-02-02 RX ADMIN — PANTOPRAZOLE SODIUM 20 MG: 20 TABLET, DELAYED RELEASE ORAL at 08:50

## 2025-02-02 RX ADMIN — PANTOPRAZOLE SODIUM 20 MG: 20 TABLET, DELAYED RELEASE ORAL at 21:51

## 2025-02-02 RX ADMIN — ACETAMINOPHEN 650 MG: 325 TABLET ORAL at 06:26

## 2025-02-02 RX ADMIN — QUETIAPINE FUMARATE 100 MG: 100 TABLET ORAL at 21:51

## 2025-02-02 RX ADMIN — ATORVASTATIN CALCIUM 20 MG: 20 TABLET, FILM COATED ORAL at 08:50

## 2025-02-02 ASSESSMENT — COGNITIVE AND FUNCTIONAL STATUS - GENERAL
STANDING UP FROM CHAIR USING ARMS: 1 - TOTAL
MOVING TO AND FROM BED TO CHAIR: 1 - TOTAL
CLIMB 3 TO 5 STEPS WITH RAILING: 1 - TOTAL
MOVING TO AND FROM BED TO CHAIR: 1 - TOTAL
STANDING UP FROM CHAIR USING ARMS: 1 - TOTAL
WALKING IN HOSPITAL ROOM: 1 - TOTAL
CLIMB 3 TO 5 STEPS WITH RAILING: 1 - TOTAL
WALKING IN HOSPITAL ROOM: 1 - TOTAL

## 2025-02-02 NOTE — PLAN OF CARE
Plan of Care Review  Plan of Care Reviewed With: patient  Progress: no change  Outcome Evaluation: Pt alert and oriented x 4, continues with IV fluids and antibiotics for UTI. States she has generalized weakness and cannot move her legs much. Given Tylenol for pain in back/abdomen, pt sleeping afterwards. Requires x 2 assist in bed for incontinence care and for repositioning. Bed alarm on for safety and call bell within reach.

## 2025-02-02 NOTE — PROGRESS NOTES
Hospital Medicine     Daily Progress Note       SUBJECTIVE   Patient seen and examined at bedside.  Complaining of lower abdominal pain as well as back pain.  States this started on Monday, has not had an injury.  Radiates to the sides.  Has weak legs, legs are tender to touch     OBJECTIVE   Vital signs in last 24 hours:  Temp:  [36.4 °C (97.5 °F)-36.7 °C (98 °F)] 36.4 °C (97.5 °F)  Heart Rate:  [] 100  Resp:  [10-20] 14  BP: ()/(50-76) 110/60    Intake/Output Summary (Last 24 hours) at 2/2/2025 0957  Last data filed at 2/2/2025 0606  Gross per 24 hour   Intake 957 ml   Output --   Net 957 ml       General: no acute distress, awake and alert  HEENT: extraocular movements intact  Cardio: regular rate and rhythm  Pulm: clear to auscultation bilaterally, no wheezing  Abdominal: soft, nontender, nondistended  MSK: grossly intact, ROM intact, no edema.  Legs are tender to touch  Skin: normal color and turgor  Neuro: Awake and alert, oriented x3, global weakness     LINES, DRAINS, AIRWAYS, WOUNDS   Peripheral IV (Adult) 02/01/25 Left Antecubital (Active)   Number of days: 1          LABS, IMAGING, TELEMETRY   Results from last 7 days   Lab Units 02/01/25  1223 01/27/25  1622   WBC K/uL 5.36 4.56   HEMOGLOBIN g/dL 14.4 12.8   HEMATOCRIT % 41.1 36.6   PLATELETS K/uL 121* 118*      Results from last 7 days   Lab Units 02/01/25  1223 01/27/25  1622   SODIUM mEQ/L 132* 133*   POTASSIUM mEQ/L 3.7 3.9   CHLORIDE mEQ/L 99 97*   CO2 mEQ/L 24 26   BUN mg/dL 18 17   CREATININE mg/dL 0.6 0.7   GLUCOSE mg/dL 93 90   CALCIUM mg/dL 9.2 9.1        CT ABDOMEN PELVIS WITHOUT IV CONTRAST    Result Date: 1/27/2025  IMPRESSION: 1.  Concentric prominence the walls the urinary bladder.  Cystitis cannot be excluded 2.  There is high density material present within the colon presumably representing ingested product.  There is no CT evidence of a bowel obstruction. The patient is status post Mary-en-Y gastric surgery. 3.   Uterine fibroid 4.  See comments for additional findings     X-RAY CHEST 2 VIEWS    Result Date: 1/27/2025  IMPRESSION: No acute cardiopulmonary process.       ASSESSMENT AND PLAN     # Acute cystitis  - diagnosed with UTI 1 week ago and started on course of macrobid, ?compliance  - UA today again reveals UTI  - started IV rocephin  - follow blood and urine cultures     # Back pain  patient complains of mid to low back pain which started about a week ago. Denies acute injury. States she has leg weakness  CT scan     # Generalized weakness  # Hyponatremia  - likely multifactorial: UTI, eating disorder with poor OP intake, anxiety/depression/bipolar disorder, hyponatremia  - PT/OT and SW consult. strong consideration for placement     # Thrombocytopenia, mild  - monitor     # Eating disorder  - reports she has not had solid food intake since christmas  - supervise feeds  - psych consulted     # Depression  # Anxiety  # Bipolar disease  # PTSD  - continued on home medications  - psych consulted     # Hyponatremia: mild  - sodium: 132 (2/1/25)  - similar to sodium 1 week ago, however baseline appears 143  - reports she has not had solid food intake since christmas, only gatorade  - also is on numerous psych medications  - checking serum and urine osmolality, urine electrolytes  - nephrology consulted     # DM2  - poct with ssi correction  - checking hgba1c level     # Seizure disorder  - continued on topiramate     # Hypothyroidism  - TSH: 1.4 (9/29/24), free T4: 0.6 (8/16/24)  - continued on synthroid     # GERD without esophagitis  - continued on PPI     # Drug seeking behavior  - judicious use of narcotics while hospitalized     # Marijuana use  - checking UDS     # Tobacco use  - smokes 4 cigarettes per day  - declined nicotine patch  - tobacco cessation advised     # Alcohol use disorder, mild, without withdrawal  - checking alcohol level, reports last use 4 years ago  - monitor for signs/symtpoms of alcohol w/d        VTE Prophylaxis:  Current anticoagulants:    None      Code Status: Full Code        Estimated Discharge Date: 2/3/2025   Disposition Planning: Await urine culture.  CT scan for back pain       Bunny Chavarria,   2/2/2025

## 2025-02-02 NOTE — PROGRESS NOTES
NEPHROLOGY PROGRESS NOTE    Subjective: Patient is a pleasant  44  yr old who is seen and examined for Hyponatremia       Vitals:    02/01/25 1547 02/01/25 1600 02/01/25 2215 02/02/25 0815   BP: (!) 102/50 (!) 104/55 99/66 110/60   BP Location:   Right upper arm Right forearm   Patient Position:   Lying Lying   Pulse: 96 86 88 100   Resp: (!) 10 (!) 10 14 14   Temp:   36.7 °C (98 °F) 36.4 °C (97.5 °F)   TempSrc:   Oral Oral   SpO2: 97% 98% 97% 93%   Weight:       Height:             Intake/Output Summary (Last 24 hours) at 2/2/2025 0915  Last data filed at 2/2/2025 0606  Gross per 24 hour   Intake 957 ml   Output --   Net 957 ml       Physical Exam    Comfortable and in no distress  Normal extraocular movements, pink conjunctivae, moist mucous membranes  no JVD, Midline trachea,  no thyromegaly   Lungs are clear to auscultation bilaterally  Regular rate and rhythm, no murmurs rubs or gallops  Abdomen is soft, nontender, with positive bowel sounds and no bruits  Nonfocal neurologic examination    No peripheral edema   Appropriate mood and affect    LABS:  Results from last 7 days   Lab Units 02/01/25  1223   SODIUM mEQ/L 132*   POTASSIUM mEQ/L 3.7   CHLORIDE mEQ/L 99   CO2 mEQ/L 24   BUN mg/dL 18   CREATININE mg/dL 0.6   EGFR mL/min/1.73m*2 >60.0   GLUCOSE mg/dL 93   CALCIUM mg/dL 9.2   ALBUMIN g/dL 4.3   WBC K/uL 5.36   HEMOGLOBIN g/dL 14.4   HEMATOCRIT % 41.1   PLATELETS K/uL 121*       Meds:    Current Facility-Administered Medications:     acetaminophen (TYLENOL) tablet 650 mg, 650 mg, oral, q4h PRN, Milly Roberson PA C, 650 mg at 02/02/25 0626    atorvastatin (LIPITOR) tablet 20 mg, 20 mg, oral, q AM, Milly Roberson PA C, 20 mg at 02/02/25 0850    cefTRIAXone (ROCEPHIN) IVPB 1 g in 100 mL NSS vial in bag, 1 g, intravenous, q24h INT, Milly Roberson PA C, Stopped at 02/01/25 3958    glucose chewable tablet 16-32 g of dextrose, 16-32 g of dextrose, oral, PRN **OR** dextrose 40 % oral gel 15-30 g  of dextrose, 15-30 g of dextrose, oral, PRN **OR** glucagon (GLUCAGEN) injection 1 mg, 1 mg, intramuscular, PRN **OR** dextrose 50 % in water (D50) injection 12.5 g, 25 mL, intravenous, PRN, Milly Roberson PA C    FLUoxetine (PROzac) capsule 40 mg, 40 mg, oral, Daily, Milly Roberson PA C, 40 mg at 02/02/25 0850    gabapentin (NEURONTIN) capsule 400 mg, 400 mg, oral, QID, Milly Roberson PA C, 400 mg at 02/02/25 0850    insulin lispro U-100 (HumaLOG) pen 3-5 Units, 3-5 Units, subcutaneous, With meals & nightly, Milly Roberson PA C    levothyroxine (SYNTHROID) tablet 50 mcg, 50 mcg, oral, Daily (6:30a), Milly Roberson PA C, 50 mcg at 02/02/25 0626    LORazepam (ATIVAN) tablet 0.25 mg, 0.25 mg, oral, BID, Milly Roberson PA C, 0.25 mg at 02/02/25 0850    nystatin (MYCOSTATIN) 100,000 unit/gram topical powder, , Topical, BID, Milly Roberson PA C, Given at 02/02/25 0851    oxyCODONE (ROXICODONE) immediate release tablet 5 mg, 5 mg, oral, q6h PRN, Jessica Ballard MD, 5 mg at 02/02/25 0850    pantoprazole (PROTONIX) tablet,delayed release (DR/EC) 20 mg, 20 mg, oral, BID, Milly Roberson PA C, 20 mg at 02/02/25 0850    prazosin (MINIPRESS) capsule 5 mg, 5 mg, oral, Nightly, Milly Roberson PA C, 5 mg at 02/01/25 2215    QUEtiapine (SEROQUEL) tablet 100 mg, 100 mg, oral, Nightly, Milly Roberson PA C, 100 mg at 02/01/25 2215    sodium chloride 0.9 % infusion, , intravenous, Continuous, Milly Roberson PA C, Last Rate: 60 mL/hr at 02/02/25 0606, Rate Verify at 02/02/25 0606    topiramate (TOPAMAX) tablet 50 mg, 50 mg, oral, BID, Milly Roberson PA C, 50 mg at 02/02/25 0850    traZODone (DESYREL) tablet 100 mg, 100 mg, oral, Nightly, Milly Roberson PA C, 100 mg at 02/01/25 2215    zolpidem (AMBIEN) tablet 5 mg, 5 mg, oral, Nightly PRN, Charity Aguilar MD, 5 mg at 02/01/25 2216    ASSESSMENT/PLAN:  Principal Problem:    UTI (urinary tract  infection)  Active Problems:    Hyponatremia    Thrombocytopenia (CMS/HCC)      44 y.o. female, with a past medical history of eating disorder, depression, anxiety, PTSD, bipolar disease, hypothyroidism, seizures, HLD, DM2, GERD, migraines, drug-seeking behavior, current tobacco and marijuana use, history of alcohol abuse who presents for evaluation of abdominal pain. Neprhology is consulted for Hyponatremia      Acute Asymptomatic  Hyponatremia:   Sodium troy - 132 - awaiting today's labs  Likely poor oral intake   Patients blood pressure is soft - will effect sodium- bolus as needed to keep MAP above 65   Will await serum osm, however urine osm- 133 and urine lytes- less than 10  BMP daily  Fluid restriction-  1.5 liters.   High protein drinks - with at least 15 gms of protein twice daily.   Will monitor sodium to avoid increasing more than 6-8 meq in 24 hours.       UTI: S/p Macrobid - now on Ceftriaxone     Yasemin Jones MD

## 2025-02-02 NOTE — PROGRESS NOTES
"Patient: Felipa Cuadra  Location: [facility] 4B 4209  MRN:  [id number]  Today's date:  2/2/2025    Attempted to see patient for therapy. Unable due to patient refused (Pt politely refusing, stating \"she is too weak\" and states she is scared to fall, OT educating on safety and importance of mobility, stated she would try later, OT to follow up as able).       "

## 2025-02-02 NOTE — PLAN OF CARE
Plan of Care Review  Plan of Care Reviewed With: patient  Progress: no change  Outcome Evaluation: AAOx4. AX2 to turn in bed. IVF infusing. Pt straight cathed for Urine specimen d/t incontinence. Incontinence care provided along with repositoning. Supervised feeds. Tylenol and oxycodone given for pain.

## 2025-02-03 LAB
BACTERIA UR CULT: NORMAL
ERYTHROCYTE [DISTWIDTH] IN BLOOD BY AUTOMATED COUNT: 14.3 % (ref 11.7–14.4)
GLUCOSE BLD-MCNC: 104 MG/DL (ref 70–99)
GLUCOSE BLD-MCNC: 105 MG/DL (ref 70–99)
GLUCOSE BLD-MCNC: 105 MG/DL (ref 70–99)
GLUCOSE BLD-MCNC: 95 MG/DL (ref 70–99)
HCT VFR BLD AUTO: 33 % (ref 35–45)
HGB BLD-MCNC: 11.1 G/DL (ref 11.8–15.7)
LDH SERPL L TO P-CCNC: 593 IU/L (ref 98–271)
MCH RBC QN AUTO: 34.6 PG (ref 28–33.2)
MCHC RBC AUTO-ENTMCNC: 33.6 G/DL (ref 32.2–35.5)
MCV RBC AUTO: 102.8 FL (ref 83–98)
OSMOLALITY SERPL: 281 MOSM/KG (ref 280–300)
PLATELET # BLD AUTO: 60 K/UL (ref 150–369)
PMV BLD AUTO: 12.6 FL (ref 9.4–12.3)
POCT TEST: ABNORMAL
POCT TEST: NORMAL
RBC # BLD AUTO: 3.21 M/UL (ref 3.93–5.22)
RETICS #: 0.02 M/UL (ref 0–0.12)
RETICS/RBC NFR: 0.68 % (ref 0.6–2.8)
VIT B12 SERPL-MCNC: 708 PG/ML (ref 180–914)
WBC # BLD AUTO: 2.04 K/UL (ref 3.8–10.5)

## 2025-02-03 PROCEDURE — 83930 ASSAY OF BLOOD OSMOLALITY: CPT | Performed by: PHYSICIAN ASSISTANT

## 2025-02-03 PROCEDURE — 63700000 HC SELF-ADMINISTRABLE DRUG: Performed by: PHYSICIAN ASSISTANT

## 2025-02-03 PROCEDURE — 99233 SBSQ HOSP IP/OBS HIGH 50: CPT

## 2025-02-03 PROCEDURE — 99223 1ST HOSP IP/OBS HIGH 75: CPT | Performed by: INTERNAL MEDICINE

## 2025-02-03 PROCEDURE — 63700000 HC SELF-ADMINISTRABLE DRUG: Performed by: INTERNAL MEDICINE

## 2025-02-03 PROCEDURE — 63700000 HC SELF-ADMINISTRABLE DRUG: Performed by: HOSPITALIST

## 2025-02-03 PROCEDURE — 25800000 HC PHARMACY IV SOLUTIONS: Performed by: PHYSICIAN ASSISTANT

## 2025-02-03 PROCEDURE — 83615 LACTATE (LD) (LDH) ENZYME: CPT | Performed by: INTERNAL MEDICINE

## 2025-02-03 PROCEDURE — 25800000 HC PHARMACY IV SOLUTIONS: Performed by: INTERNAL MEDICINE

## 2025-02-03 PROCEDURE — 63600000 HC DRUGS/DETAIL CODE: Mod: JZ | Performed by: PHYSICIAN ASSISTANT

## 2025-02-03 PROCEDURE — 82607 VITAMIN B-12: CPT | Performed by: INTERNAL MEDICINE

## 2025-02-03 PROCEDURE — 85045 AUTOMATED RETICULOCYTE COUNT: CPT | Performed by: INTERNAL MEDICINE

## 2025-02-03 PROCEDURE — 36415 COLL VENOUS BLD VENIPUNCTURE: CPT | Performed by: PHYSICIAN ASSISTANT

## 2025-02-03 PROCEDURE — 85027 COMPLETE CBC AUTOMATED: CPT | Performed by: STUDENT IN AN ORGANIZED HEALTH CARE EDUCATION/TRAINING PROGRAM

## 2025-02-03 PROCEDURE — 99233 SBSQ HOSP IP/OBS HIGH 50: CPT | Performed by: INTERNAL MEDICINE

## 2025-02-03 PROCEDURE — 12000000 HC ROOM AND CARE MED/SURG

## 2025-02-03 RX ORDER — B-COMPLEX WITH VITAMIN C
1 TABLET ORAL DAILY
Status: DISCONTINUED | OUTPATIENT
Start: 2025-02-03 | End: 2025-02-18 | Stop reason: HOSPADM

## 2025-02-03 RX ORDER — SODIUM CHLORIDE 9 MG/ML
INJECTION, SOLUTION INTRAVENOUS CONTINUOUS
Status: ACTIVE | OUTPATIENT
Start: 2025-02-03 | End: 2025-02-04

## 2025-02-03 RX ADMIN — CEFTRIAXONE SODIUM 1 G: 1 INJECTION, POWDER, FOR SOLUTION INTRAMUSCULAR; INTRAVENOUS at 14:50

## 2025-02-03 RX ADMIN — TOPIRAMATE 50 MG: 100 TABLET, FILM COATED ORAL at 09:40

## 2025-02-03 RX ADMIN — ACETAMINOPHEN 650 MG: 325 TABLET ORAL at 12:30

## 2025-02-03 RX ADMIN — SODIUM CHLORIDE: 9 INJECTION, SOLUTION INTRAVENOUS at 12:04

## 2025-02-03 RX ADMIN — ATORVASTATIN CALCIUM 20 MG: 20 TABLET, FILM COATED ORAL at 09:39

## 2025-02-03 RX ADMIN — LORAZEPAM 0.25 MG: 0.5 TABLET ORAL at 09:38

## 2025-02-03 RX ADMIN — FLUOXETINE HYDROCHLORIDE 40 MG: 20 CAPSULE ORAL at 09:39

## 2025-02-03 RX ADMIN — SODIUM CHLORIDE 500 ML: 9 INJECTION, SOLUTION INTRAVENOUS at 10:51

## 2025-02-03 RX ADMIN — QUETIAPINE FUMARATE 100 MG: 100 TABLET ORAL at 21:20

## 2025-02-03 RX ADMIN — PANTOPRAZOLE SODIUM 20 MG: 20 TABLET, DELAYED RELEASE ORAL at 09:40

## 2025-02-03 RX ADMIN — LORAZEPAM 0.25 MG: 0.5 TABLET ORAL at 14:48

## 2025-02-03 RX ADMIN — NYSTATIN: 100000 POWDER TOPICAL at 21:32

## 2025-02-03 RX ADMIN — TOPIRAMATE 50 MG: 100 TABLET, FILM COATED ORAL at 16:42

## 2025-02-03 RX ADMIN — GABAPENTIN 400 MG: 400 CAPSULE ORAL at 18:27

## 2025-02-03 RX ADMIN — Medication 1 TABLET: at 16:42

## 2025-02-03 RX ADMIN — ACETAMINOPHEN 650 MG: 325 TABLET ORAL at 06:29

## 2025-02-03 RX ADMIN — GABAPENTIN 400 MG: 400 CAPSULE ORAL at 21:19

## 2025-02-03 RX ADMIN — LEVOTHYROXINE SODIUM 50 MCG: 0.05 TABLET ORAL at 06:29

## 2025-02-03 RX ADMIN — ACETAMINOPHEN 650 MG: 325 TABLET ORAL at 10:35

## 2025-02-03 RX ADMIN — NYSTATIN: 100000 POWDER TOPICAL at 10:01

## 2025-02-03 RX ADMIN — TRAZODONE HYDROCHLORIDE 100 MG: 100 TABLET ORAL at 21:19

## 2025-02-03 RX ADMIN — OXYCODONE HYDROCHLORIDE 5 MG: 5 TABLET ORAL at 01:07

## 2025-02-03 RX ADMIN — ZOLPIDEM TARTRATE 5 MG: 5 TABLET, FILM COATED ORAL at 21:20

## 2025-02-03 RX ADMIN — ACETAMINOPHEN 650 MG: 325 TABLET ORAL at 18:27

## 2025-02-03 RX ADMIN — GABAPENTIN 400 MG: 400 CAPSULE ORAL at 14:48

## 2025-02-03 RX ADMIN — GABAPENTIN 400 MG: 400 CAPSULE ORAL at 09:40

## 2025-02-03 RX ADMIN — PANTOPRAZOLE SODIUM 20 MG: 20 TABLET, DELAYED RELEASE ORAL at 21:19

## 2025-02-03 ASSESSMENT — COGNITIVE AND FUNCTIONAL STATUS - GENERAL
MOVING TO AND FROM BED TO CHAIR: 1 - TOTAL
WALKING IN HOSPITAL ROOM: 2 - A LOT
CLIMB 3 TO 5 STEPS WITH RAILING: 2 - A LOT
MOVING TO AND FROM BED TO CHAIR: 2 - A LOT
CLIMB 3 TO 5 STEPS WITH RAILING: 1 - TOTAL
MOVING TO AND FROM BED TO CHAIR: 1 - TOTAL
STANDING UP FROM CHAIR USING ARMS: 1 - TOTAL
STANDING UP FROM CHAIR USING ARMS: 1 - TOTAL
WALKING IN HOSPITAL ROOM: 1 - TOTAL
WALKING IN HOSPITAL ROOM: 1 - TOTAL
CLIMB 3 TO 5 STEPS WITH RAILING: 1 - TOTAL
STANDING UP FROM CHAIR USING ARMS: 1 - TOTAL

## 2025-02-03 NOTE — CONSULTS
Behavioral Health Crisis Consult     Reason for Consult    Substance abuse    Admitting Diagnoses  UTI (urinary tract infection) [N39.0]  Lower abdominal pain [R10.30]  Urinary tract infection with hematuria, site unspecified [N39.0, R31.9]     Interval History  Pt admitted for  Behavioral Health Crisis consult received for substance use.  Psychiatry also consulted for eating disorder. Per their evaluation, pt is interested in going to eating disorder program.  Upon meeting with pt, she would like to be referred to [facility]. Pt reported having difficulty with ambulation, discussed with her this will be barrier for placement. She will need to be independent with ambulation and all ADLs. Pt aware of this.       Plan  Will follow in conjunction with Psychiatry to facilitate aftercare recommendations per their evaluation.    Magalie Ford M.A.  Pgr: [phone]

## 2025-02-03 NOTE — ASSESSMENT & PLAN NOTE
Patient reports long history of eating disorder inpatient several times in the past.  Patient reporting restricting PO intake since [place].  She denies binging or purging.  She reports drinking water and Gatorade at home, reports not eating food at all    1) Patient is requesting inpatient eating disorder treatment.  She reports she would be agreeable to discharging to [facility] once medically stable.  Discussed concern for issues with ambulation.  Agree with PT/OT consult     2) Discussed with behavioral health, agree with  consult

## 2025-02-03 NOTE — CONSULTS
Nutrition Note    Clinical Course: Patient is a 44 y.o. female who was admitted on 2/1/2025 with a diagnosis of UTI (urinary tract infection) [N39.0]  Lower abdominal pain [R10.30]  Urinary tract infection with hematuria, site unspecified [N39.0, R31.9].   Past Medical History:   Diagnosis Date    Anxiety     Depression     Eating disorder      No past surgical history on file.    Nutrition Interventions/ Recommendations:   Continue regular, liberalized diet    - encouraged well balanced meals   - missing teeth, reviewed soft menu options    - monitor and record % meal intake    - fluid restriction per DM  Added Ensure Clear (240 kcals, 8 g protein, 52 g CHO each) BID    - monitor for increased BG levels; d/c if >200 mg/dL  Trend labs/lytes, treat/replete prn    - POC glucose checks with goal: </=140-180 mg/dL  Weekly blind weight   Monitor GI function     Nutrition Status Classification: Moderate nutritional compromise  Active Diet Orders (From admission, onward)       Start        02/03/25 1114  Dietary nutrition supplements  Once        Question Answer Comment   Select Supplement (PH): Ensure Clear Gonzalez    Meal period Lunch Dinner           02/02/25 0921  Adult Diet Regular; 1500 mL Fluid; RD/LDN may adjust order  Diet effective now        Question Answer Comment   Diet Texture Regular    Fluid restriction dietary / 24h: 1500 mL Fluid    Delegation of Authority. Diet orders written by PA/CRCharles may not be adjusted by RD/LDNs. RD/LDN may adjust order           02/01/25 1358  Supervise feeds  Until discontinued                          Reason for Assessment  Reason For Assessment: other (see comments) (PA consult for eating disorder)  Diagnosis: infection/sepsis (UTI)    MST Nutrition Screen Tool  Has patient lost weight without trying?: 0-->No  Has patient been eating poorly due to decreased appetite?: 1-->Yes  MST Nutrition Screen Score: 1    Nutrition/Diet History  Typical Food/Fluid Intake: from home with aide  "6 days per week from 9A-3P per pt  Diet Prior to Admission: regular  Appetite Prior to Admission: Poor-0-25% (limited intake of solid foods >/=1 month (mostly water and gatorade))  Intake (%): 25%  Food Allergies: fish/shellfish, other (see comments) (aspartame - anaphylaxis)  Factors Affecting Nutritional Intake: eating disorder(s)    Physical Findings  Overall Physical Appearance: overweight  Gastrointestinal: constipation  Last Bowel Movement: 02/01/25  Oral/Mouth Cavity: other (see comments) (missing teeth)  Skin: intact, edema (Edema: +1 bilateral feet)    Last Bowel Movement: 02/01/25    Nutrition Order  Nutrition Order: meets nutritional requirements  Nutrition Order Comments: regular diet with 1500 ml fluid restriction  Current TF/TPN Regimen: none    Anthropometrics  Height: 165.1 cm (5' 5\")    Weights (last 7 days)       Date/Time Weight    02/03/25 1400 78.5 kg (173 lb)    02/01/25 1031 77 kg (169 lb 12.1 oz)            Admit Weight  Admit Weight Method: measured  Admit Weight: 76.7 kg (169 lb)    Current Weight  Weight Method: Bed scale  Weight: 78.5 kg (173 lb)    Ideal Body Weight (IBW)  Ideal Body Weight (IBW) (kg): 57.29  % Ideal Body Weight: 134.4    Usual Body Weight (UBW)  Usual Body Weight: 97.5 kg (215 lb) (10/3/24 per EMR)  % Usual Body Weight: 80.47  Weight Loss: other (see comments)  Time Frame: Other (comments) (42 lb / 19.5% weight loss in 4 months - severe)    Body Mass Index (BMI)  BMI (Calculated): 28.8  BMI Assessment: BMI 25-29.9: overweight    Labs/Procedures/Meds  Lab Results Reviewed: reviewed      Results from last 7 days   Lab Units 02/01/25  1223 01/27/25  1622   SODIUM mEQ/L 132* 133*   POTASSIUM mEQ/L 3.7 3.9   CHLORIDE mEQ/L 99 97*   CO2 mEQ/L 24 26   BUN mg/dL 18 17   CREATININE mg/dL 0.6 0.7   GLUCOSE mg/dL 93 90   CALCIUM mg/dL 9.2 9.1      Results from last 7 days   Lab Units 02/01/25  1223 01/27/25  1622   ALK PHOS IU/L 68 73   BILIRUBIN TOTAL mg/dL 0.9 0.9   ALBUMIN g/dL " "4.3 3.9   ALT IU/L 16 12   AST IU/L 19 13          No results found for: \"HGBA1C\"  Lab Results   Component Value Date    SPAHXTER47 365 09/29/2024     Lab Results   Component Value Date    CALCIUM 9.2 02/01/2025    PHOS 3.2 09/30/2024     Results from last 7 days   Lab Units 02/03/25  1316 02/01/25  1223 01/27/25  1622   WBC K/uL 2.04* 5.36 4.56   HEMOGLOBIN g/dL 11.1* 14.4 12.8   HEMATOCRIT % 33.0* 41.1 36.6   PLATELETS K/uL 60* 121* 118*      Diagnostic Tests/Procedures  Diagnostic Test/Procedure Reviewed: reviewed, pertinent    Medications  Pertinent Medications Reviewed: reviewed   atorvastatin  20 mg oral q AM    cefTRIAXone  1 g intravenous q24h INT    FLUoxetine  40 mg oral Daily    gabapentin  400 mg oral QID    insulin lispro U-100  3-5 Units subcutaneous With meals & nightly    levothyroxine  50 mcg oral Daily (6:30a)    LORazepam  0.25 mg oral BID    nystatin   Topical BID    pantoprazole  20 mg oral BID    prazosin  5 mg oral Nightly    QUEtiapine  100 mg oral Nightly    topiramate  50 mg oral BID    traZODone  100 mg oral Nightly      sodium chloride 0.9 %   100 mL/hr at 02/03/25 1204       Estimated/Assessed Needs  Additional Documentation: Additional Requirements (Group), Calorie Requirements (Group), Fluid Requirements (Group), Protein Requirements (Group)    Calorie Requirements  Estimated kCal Needs: Actual Body Weight (79 Kg)  Estimated Calorie Need Method: kcal/kg  Calorie/kg Recommended: 22-25  Calorie Recommendations: 4584-6324 Kcal/day    Protein Requirements  Recommended Dosing Weight (Estimated Protein Needs): Actual Body Weight (79 Kg)  Protein Recommendations: 79-95 g protein/day (1-1.2 g protein/Kg)    Fluid Requirements  Fluid Recommendation (mL): 25-30ml  Recommended Fluid Needs Dosing Weight: Actual Body Weight  Fluid Requirements (mL/day): 4857-1814 ml/day    PES  Statement: PES Statement  Nutrition Diagnosis: Inadequate Protein-Energy Intake  Related To:: Psychological causes such as " depression and disordered eating  As Evidenced By:: 42 lb / 19.5% weight loss x4 months  Nutritional Needs Met?: No    Clinical Comments: Consult for eating disorder received. 45 y/o female with PMHx significant for depression, anxiety, bipolar, PTSD, ETOH abuse (4 years ago per chart) and DM2. Admitted on 2/1 with UTI. Ordered for a regular diet with 1500 ml fluid restriction.     Met with patient at bedside. Reports poor po intake since [place] (mostly water and Gatorade). Lives at home alone. Has an aide 6 days per week from 9A-3P. Asking for Ensure Clear. Reports regular Ensure reminds her of the times she has been in treatment (off and on since age 15 per pt). Severe weight loss in the past 4 months per EMR trends. Likely meeting <50% of Kcal/Protein needs for >1 month per pt report. Tolerated some banana and milk this AM. Encourage po intake + use of ONS. Emphasized importance of balanaced meals. Patient missing teeth. Reviewed soft menu options. Labs/Meds reviewed. POC glucose checks well controlled. Monitor for rise in BG levels with intake of Ensure Clear and adjust prn. Last BM 2/1. Monitor need for bowel regimen / caution laxative abuse. Recommend weekly blind weights. 173 lbs via bed scale today. RD to follow per protocol.     Goals:  Meet >75% of estimated Kcal/Protein needs via PO intake     Monitor and Evaluation:   PO intake   Trends in weights/labs/lytes  Medications  GI function  I/O  6.   Medical course    Discussed with: Patient     Date: 02/03/25  Signature: Rani Mitchell RD

## 2025-02-03 NOTE — CONSULTS
"Psychiatry Progress Note    Chief Complaint/Reason for follow-up: HX of bipolar d/t, PTSD, HX of alcohol abuse,     Interval History: Patient is known to me from previous admission. At that time, psychiatry consulted due to concern for oversedation, possibly related to psychiatric medications.  Patient is a 43 yo female with pmh of eating disorder, depression, anxiety, PTSD, bipolar disease, hypothyroidism, seizures, HLD, DM2, GERD, migraines, drug-seeking behavior, current tobacco and marijuana use, history of alcohol abuse.  She presented to [facility] with abdominal pain, poor PO intake, generalized weakness.  Patient  with UTI, hyponatremia.  Per EMR, patient reporting poor PO intake since [place], thus psychiatry was consulted to eating disorder.  Patient received lying in bed, dressed in hospital attire.  Awake, mildly lethargic.  Oriented to person, place and time.  Patient with soft, mumble speech, difficult to understand at times. Patient reports she is \"not good.\"  Reports poor PO intake since [place] time.  States initially she was not eating due to nausea, however, reports \"now I just don't want to eat.\"  She reports progressive weakness, states \"now I can't walk.\"  Patient reports she lives home alone, has a home health aid for 8 hours per day, 6 days per weeks.  She reports following closely with outpatient therapist and psychiatrist, last saw last week.  She reports psychiatric providers are part of a mobile psychiatric practice who come to her home.  She reports medication compliance.  Patient also quick to state \"I need more Ativan especially with how I am feeling.\"  She reports outpatient providers are aware of decreased PO intake.  She endorses several previous inpatient admissions to eating disorder units in the past.  Reports she was last admitted to [facility] 3-4 years ago.  Patient reports she would be interested in returning to treatment, however states \"I don't think I can go if I can't " "walk.\"  She endorses low mood, spending most of her time sitting in her chair at home.  Patient denies SI/HI/AVH.  No evidence of melani, psychosis or agitation on exam.    Patient denies recent substance use.  Reports she stopped drinking alcohol 4 years ago.  She denies abusing prescription medications.  UDS positive for cannabis    Review of Systems:   No abnormal motor movements on exam     Vital Signs for the last 24 hours:  Temp:  [36.7 °C (98.1 °F)-37.1 °C (98.8 °F)] 36.7 °C (98.1 °F)  Heart Rate:  [] 94  Resp:  [14-15] 14  BP: ()/(57-71) 103/71    Scheduled Meds:   atorvastatin  20 mg oral q AM    cefTRIAXone  1 g intravenous q24h INT    FLUoxetine  40 mg oral Daily    gabapentin  400 mg oral QID    insulin lispro U-100  3-5 Units subcutaneous With meals & nightly    levothyroxine  50 mcg oral Daily (6:30a)    LORazepam  0.25 mg oral BID    nystatin   Topical BID    pantoprazole  20 mg oral BID    prazosin  5 mg oral Nightly    QUEtiapine  100 mg oral Nightly    sodium chloride  500 mL intravenous Once    topiramate  50 mg oral BID    traZODone  100 mg oral Nightly       Labs:  Selected Electrolytes  Results from last 7 days   Lab Units 02/01/25  1223 01/27/25  1622   HEMOGLOBIN g/dL 14.4 12.8   WBC K/uL 5.36 4.56   PLATELETS K/uL 121* 118*   POTASSIUM mEQ/L 3.7 3.9   SODIUM mEQ/L 132* 133*   CREATININE mg/dL 0.6 0.7       Recent EKG HR/QTC  Lab Results   Component Value Date    VENTRICRATE 84 01/27/2025    QTCCALCULAT 444 01/27/2025       No results found for: \"VPA\", \"LITHIUM\"      Mental Status Exam  Appearance:    in hospital attire and decreased grooming   Attention:  impaired   Behavior (General):   Awake, mildly lethargic, calm   Behavior (Motoric):  decreased psychomotor activity   Speech:   Mumbling, soft speech, difficult to understand at times   Language:  fluent and no abnormalities on exam   Affect:  flat and constricted   Mood:   \"Not good\"   Associations:  coherent   Thought Process: "  goal-directed   Thought Content:  no auditory or visual hallucinations on exam and no paranoia or delusions on exam   Suicidality:  denies desire or thoughts to harm/kill self; denies thoughts to harm/kill others   Orientation:  A&O x 4   Insight/Judgement:  acknowledges illness and help accepting   Memory:  recalls recent events and recalls remote events     Assessment & Plan  Depression  Patient with long history of anxiety and depression.  She follows closely with outpatient mental health providers.  She reports she is compliant with medication.  She denies SI/HI/AVH.    1) No clear indication for acute inpatient psychiatric admission.  Patient denies SI/HI, no indication for a 1:1 from a psychiatric standpoint     2) Agree with continue outpatient medications.  Recommend HOLD Prozac if concerned this medication contributing to hyponatremia.     3) PRN Ativan per primary team. It does not appear patient with current prescription for Ativan as outpatient.  Concern in the past, patient with hx of misusing prescription benzodiazepines.  I do not recommend further escalating Ativan dose in the hospital, nor do I recommend discharging patient with script for Ativan     4) Recommend HOLD outpatient Seroquel and Prazosin if team has concern for hypotension     5)  Recommend making Trazodone PRN order for insomnia if team has concern for oversedation  Anxiety    Eating disorder  Patient reports long history of eating disorder inpatient several times in the past.  Patient reporting restricting PO intake since Christmas.  She denies binging or purging.  She reports drinking water and Gatorade at home, reports not eating food at all    1) Patient is requesting inpatient eating disorder treatment.  She reports she would be agreeable to discharging to [facility] once medically stable.  Discussed concern for issues with ambulation.  Agree with PT/OT consult     2) Discussed with behavioral health, agree with   consult       PTSD (post-traumatic stress disorder)     61 Minutes of total time were spent reviewing past notes and tests, examining the patient, communicating with the team, counseling the patient and documenting in the EMR

## 2025-02-03 NOTE — PROGRESS NOTES
Patient: Felipa Cuadra  Location: [facility] 4B 4209  MRN:  [id number]  Today's date:  2/3/2025    Attempted to see patient for therapy. Unable due to medical hold (discussed with RN, hold at this time for pending labs. OT will continue to follow and re-attempt as able/appropriate.).

## 2025-02-03 NOTE — PLAN OF CARE
Care Coordination Admission Assessment Note    General Information:  Readmission Within the last 30 days: no previous admission in last 30 days  Does patient have a :    Patient-Specific Goals (include timeframe):      Living Arrangements:  Arrived From: home  Current Living Arrangements: home  People in Home: alone  Home Accessibility: elevator accessible  Living Arrangement Comments: Patient lives in a 2nd floor apartment with elevator access.    Housing Stability and Utility Access (SDOH):  In the last 12 months, was there a time when you were not able to pay the mortgage or rent on time?: No  In the past 12 months, how many times have you moved?:    At any time in the past 12 months, were you homeless or living in a shelter (including now)?: No  In the past 12 months has the electric, gas, oil, or water company threatened to shut off services in your home?: No    Functional Status Prior to Admission:   Assistive Device/Animal Currently Used at Home: walker, standard, grab bar, shower chair  Functional Status Comments: Patient is independent.  IADL Comments: Patient is independent.     Supports and Services:  Current Outpatient/Agency/Support Group:    Type of Current Home Care Services:    History of home care episode or rehab stay: Patient has hx of rehab in [place].    Discharge Needs Assessment:   Concerns to be Addressed: care coordination/care conferences, discharge planning, mental health  Current Discharge Risk:    Anticipated Changes Related to Illness: none    Patient/Family Anticipated Discharge Plan:  Patient/Family Anticipates Transition To: home  Patient/Family Anticipated Services at Transition: none    Connection to Community  Not applicable    Patient Choice:   Offered/Gave Vendor List:         Anticipated Discharge Plan:  Met with patient. Provided education and contact information for Care Coordination services.: yes  Anticipated Discharge Disposition:       Transportation  Needs (SDOH):  Transportation Concerns:    Transportation Anticipated:    Is Out of Hospital DNR needed at discharge?: no    In the past 12 months, has lack of transportation kept you from medical appointments or from getting medications?: No  In the past 12 months, has lack of transportation kept you from meetings, work, or from getting things needed for daily living?: No    Concerns - comments: Patient lives in a 2nd floor apartment with elevator access. Patient's PCP & Pharmacy were confirmed. Patient is independent with some & HHA assist with others. Patient reported she is current with [facility]. Patient uses walker to assist with ambulation. Patient may need assistance with transport.

## 2025-02-03 NOTE — PROGRESS NOTES
Patient: Felipa Cuadra  Location: [facility] 4B 4209  MRN:  [id number]  Today's date:  2/3/2025    Attempted to see patient for therapy. Unable due to medical hold. Discussed with RN, requesting PT hold at this time due to pending labs. PT will continue to follow and re-attempt as able/appropriate.

## 2025-02-03 NOTE — ASSESSMENT & PLAN NOTE
Patient with long history of anxiety and depression.  She follows closely with outpatient mental health providers.  She reports she is compliant with medication.  She denies SI/HI/AVH.    1) No clear indication for acute inpatient psychiatric admission.  Patient denies SI/HI, no indication for a 1:1 from a psychiatric standpoint     2) Agree with continue outpatient medications.  Recommend HOLD Prozac if concerned this medication contributing to hyponatremia.     3) PRN Ativan per primary team. It does not appear patient with current prescription for Ativan as outpatient.  Concern in the past, patient with hx of misusing prescription benzodiazepines.  I do not recommend further escalating Ativan dose in the hospital, nor do I recommend discharging patient with script for Ativan     4) Recommend HOLD outpatient Seroquel and Prazosin if team has concern for hypotension     5)  Recommend making Trazodone PRN order for insomnia if team has concern for oversedation

## 2025-02-03 NOTE — PLAN OF CARE
Problem: Adult Inpatient Plan of Care  Goal: Plan of Care Review  Flowsheets (Taken 2/3/2025 1503)  Progress: improving  Plan of Care Reviewed With: patient     Problem: Skin Injury Risk Increased  Goal: Skin Health and Integrity  Intervention: Promote and Optimize Oral Intake  Flowsheets (Taken 2/3/2025 1503)  Oral Nutrition Promotion:   calorie-dense liquids provided   nutrition counseling provided     Nutrition Interventions/ Recommendations:   Continue regular, liberalized diet    - encouraged well balanced meals   - missing teeth, reviewed soft menu options    - monitor and record % meal intake    - fluid restriction per DM  Added Ensure Clear (240 kcals, 8 g protein, 52 g CHO each) BID    - monitor for increased BG levels; d/c if >200 mg/dL  Trend labs/lytes, treat/replete prn    - POC glucose checks with goal: </=140-180 mg/dL  Weekly blind weight   Monitor GI function

## 2025-02-03 NOTE — NURSING NOTE
Provider notified that pt's BP low, 87/57. Pt wants oxycodone, explained to pt that we cannot give for BP that low. Fluid bolus ordered and started. Per MD do not give oxycodone for SBP <105; pt aware. Will recheck BP one hour after bolus complete.    Pt's BP improved to 109/71 after bolus; oxycodone given per pt request.

## 2025-02-03 NOTE — ASSESSMENT & PLAN NOTE
Thrombocytopenia without abnormal bleeding issues thus far.  Additionally has leukopenia (differential not available) which is new from yesterday.  -Low suspicion for DIC or microangiopathic hemolysis.  Likely medication related probably Rocephin.  -Okay to continue Rocephin while monitoring blood counts at this time.  Will request routine workup including B12 levels and peripheral smear review.  Obtain PT/INR, PTT and fibrinogen levels as well.    May need additional workup if thrombocytopenia continues to worsen as well as alternative antimicrobial therapy.  -My impression and plan of care reviewed with patient today.  All of her questions were answered.

## 2025-02-03 NOTE — PLAN OF CARE
Plan of Care Review  Plan of Care Reviewed With: patient  Progress: no change  Outcome Evaluation: Pt alert and oriented x 4, flat affect, continues with IV fluids and antibiotics. Repositioned for comfort, incontinence care provided. BP low this shift, bolus given per provider and BP improved. Prn oxycodone given per pt request for back/abdominal pain. Pt states she has numbness in BL arms and BLLE and cannot walk due to weakness and pain. Bed alarm on for safety.

## 2025-02-03 NOTE — PROGRESS NOTES
NEPHROLOGY PROGRESS NOTE    Subjective:   Seen in followup for hyponatremia.       Review of Systems   All other systems reviewed and are negative.      Vitals:    02/03/25 1015 02/03/25 1045 02/03/25 1047 02/03/25 1200   BP: 103/71 (!) 86/60 93/60 102/66   BP Location: Right upper arm Right upper arm Right upper arm Right upper arm   Patient Position: Lying Lying Lying Lying   Pulse: 94  94 92   Resp:       Temp:       TempSrc:       SpO2:       Weight:       Height:             Intake/Output Summary (Last 24 hours) at 2/3/2025 1353  Last data filed at 2/3/2025 0515  Gross per 24 hour   Intake 350 ml   Output 100 ml   Net 250 ml       Physical Exam  Vitals reviewed.   Constitutional:       General: She is not in acute distress.  HENT:      Head: Normocephalic.      Right Ear: External ear normal.      Left Ear: External ear normal.      Mouth/Throat:      Mouth: Mucous membranes are moist.   Eyes:      General: No scleral icterus.  Cardiovascular:      Rate and Rhythm: Regular rhythm.      Heart sounds:      No friction rub.   Pulmonary:      Effort: No respiratory distress.   Abdominal:      General: There is no distension.      Tenderness: There is no right CVA tenderness or left CVA tenderness.   Musculoskeletal:         General: No deformity.      Cervical back: No rigidity.   Skin:     Coloration: Skin is not jaundiced.   Neurological:      Mental Status: She is alert and oriented to person, place, and time.   Psychiatric:         Behavior: Behavior normal.         LABS:  Results from last 7 days   Lab Units 02/03/25  1316 02/01/25  1223   SODIUM mEQ/L  --  132*   POTASSIUM mEQ/L  --  3.7   CHLORIDE mEQ/L  --  99   CO2 mEQ/L  --  24   BUN mg/dL  --  18   CREATININE mg/dL  --  0.6   EGFR mL/min/1.73m*2  --  >60.0   GLUCOSE mg/dL  --  93   CALCIUM mg/dL  --  9.2   ALBUMIN g/dL  --  4.3   WBC K/uL 2.04* 5.36   HEMOGLOBIN g/dL 11.1* 14.4   HEMATOCRIT % 33.0* 41.1   PLATELETS K/uL 60* 121*       Meds:    Current  Facility-Administered Medications:     acetaminophen (TYLENOL) tablet 650 mg, 650 mg, oral, q4h PRN, Milly Roberson PA C, 650 mg at 02/03/25 1230    atorvastatin (LIPITOR) tablet 20 mg, 20 mg, oral, q AM, Milly Roberson PA C, 20 mg at 02/03/25 0939    cefTRIAXone (ROCEPHIN) IVPB 1 g in 100 mL NSS vial in bag, 1 g, intravenous, q24h INT, Milly Roberson PA C, Stopped at 02/02/25 1358    glucose chewable tablet 16-32 g of dextrose, 16-32 g of dextrose, oral, PRN **OR** dextrose 40 % oral gel 15-30 g of dextrose, 15-30 g of dextrose, oral, PRN **OR** glucagon (GLUCAGEN) injection 1 mg, 1 mg, intramuscular, PRN **OR** dextrose 50 % in water (D50) injection 12.5 g, 25 mL, intravenous, PRN, Milly Roberson PA C    FLUoxetine (PROzac) capsule 40 mg, 40 mg, oral, Daily, Milly Roberson PA C, 40 mg at 02/03/25 0939    gabapentin (NEURONTIN) capsule 400 mg, 400 mg, oral, QID, Milly Roberson PA C, 400 mg at 02/03/25 0940    insulin lispro U-100 (HumaLOG) pen 3-5 Units, 3-5 Units, subcutaneous, With meals & nightly, Milly Roberson PA C    levothyroxine (SYNTHROID) tablet 50 mcg, 50 mcg, oral, Daily (6:30a), Milly Roberson PA C, 50 mcg at 02/03/25 0629    LORazepam (ATIVAN) tablet 0.25 mg, 0.25 mg, oral, BID, Milly Roberson PA C, 0.25 mg at 02/03/25 0938    nystatin (MYCOSTATIN) 100,000 unit/gram topical powder, , Topical, BID, Milly Roberson PA C, Given at 02/03/25 1001    oxyCODONE (ROXICODONE) immediate release tablet 5 mg, 5 mg, oral, q6h PRN, Jessica Ballard MD, 5 mg at 02/03/25 0107    pantoprazole (PROTONIX) tablet,delayed release (DR/EC) 20 mg, 20 mg, oral, BID, Milly Roberson PA C, 20 mg at 02/03/25 0940    prazosin (MINIPRESS) capsule 5 mg, 5 mg, oral, Nightly, Milly Roberson PA C, 5 mg at 02/01/25 2215    QUEtiapine (SEROQUEL) tablet 100 mg, 100 mg, oral, Nightly, Milly Roberson PA C, 100 mg at 02/02/25 2151    sodium chloride 0.9 %  infusion, , intravenous, Continuous, Ibazebo, Ebehireme, MD, Last Rate: 100 mL/hr at 02/03/25 1204, New Bag at 02/03/25 1204    topiramate (TOPAMAX) tablet 50 mg, 50 mg, oral, BID, Milly Roberson PA C, 50 mg at 02/03/25 0940    traZODone (DESYREL) tablet 100 mg, 100 mg, oral, Nightly, Milly Roberson PA C, 100 mg at 02/02/25 2151    zolpidem (AMBIEN) tablet 5 mg, 5 mg, oral, Nightly PRN, Charity Aguilar MD, 5 mg at 02/02/25 2151    ASSESSMENT:  Principal Problem:    UTI (urinary tract infection)  Active Problems:    Depression    Anxiety    Eating disorder    Hypothyroidism    PTSD (post-traumatic stress disorder)    Insomnia    At risk for polypharmacy    Chronic pain disorder    Hyponatremia    Thrombocytopenia (CMS/HCC)    Lower abdominal pain      PLAN:  44 y.o. female, with a past medical history of eating disorder, depression, anxiety, PTSD, bipolar disease, hypothyroidism, seizures, HLD, DM2, GERD, migraines, drug-seeking behavior, current tobacco and marijuana use, history of alcohol abuse who presents for evaluation of abdominal pain. Neprhology is consulted for Hyponatremia      Acute Asymptomatic  Hyponatremia:   Sodium troy - 132 on 2/1. No BMP labs yet today  Low Na Likely due to poor oral solute intake   Patients blood pressure was soft - bolus as needed to keep MAP above 65   Normal serum osm 281, however urine osm- 133 and urine sodium was less than 10, consistent with poor oral solute intake  BMP daily  Fluid restriction-  1.5 liters.   Continue High protein drinks - with at least 15 gms of protein twice daily.   Will monitor sodium:  avoid increasing more than 6-8 meq in 24 hours.       UTI: S/p Macrobid - now on Ceftriaxone     Vinod Ely MD

## 2025-02-03 NOTE — CONSULTS
Hematology/Oncology -  [facility]     Consult Note    Subjective     Felipa Cuadra is a 44 y.o. female who was admitted for UTI (urinary tract infection) [N39.0]  Lower abdominal pain [R10.30]  Urinary tract infection with hematuria, site unspecified [N39.0, R31.9]. Patient was referred by primary team for management recommendations. Patient is a 44-year-old WF with multiple chronic health issues including significant psychiatric history now hospitalized due to complaints of abdominal pain in setting of acute cystitis, eating disorder which is a chronic issue and progressive thrombocytopenia.  Patient denies any prior knowledge of blood disorders except needing transfusions when she was pregnant.  Currently no overt bleeding issues, unexplained bruising or petechiae.  Not receiving any anticoagulants but has been started on IV Rocephin for cystitis that did not resolve after Macrobid treatment as outpatient.  -Reports back pain requesting oxycodone for it.    Outside records reviewed. Pertinent radiology results reviewed. Pertinent lab results reviewed.    Past Medical History:   Diagnosis Date    Anxiety     Depression     Eating disorder        No past surgical history on file.    Social History     Socioeconomic History    Marital status: Single     Spouse name: None    Number of children: None    Years of education: None    Highest education level: None   Tobacco Use    Smoking status: Every Day     Current packs/day: 0.25     Average packs/day: 0.3 packs/day for 20.5 years (5.1 ttl pk-yrs)     Types: Cigarettes     Start date: 8/16/2004    Smokeless tobacco: Current   Substance and Sexual Activity    Alcohol use: Not Currently    Drug use: Yes     Types: Marijuana     Social Drivers of Health     Food Insecurity: No Food Insecurity (2/1/2025)    Hunger Vital Sign     Worried About Running Out of Food in the Last Year: Never true     Ran Out of Food in the Last Year: Never true    Transportation Needs: No Transportation Needs (2/3/2025)    PRAPARE - Transportation     Lack of Transportation (Medical): No     Lack of Transportation (Non-Medical): No   Housing Stability: Unknown (2/3/2025)    Housing Stability Vital Sign     Unable to Pay for Housing in the Last Year: No     Homeless in the Last Year: No       No family history on file.    Aspartame, Bee venom protein (honey bee), Buspirone, Diphenhydramine, Haloperidol, Iodine, Penicillins, Sertraline, Diclofenac sodium, Hydroxyzine, Shellfish containing products, Sudafed cold-allergy, and Tetracyclines    Current Facility-Administered Medications   Medication Dose Route Frequency Provider Last Rate Last Admin    acetaminophen (TYLENOL) tablet 650 mg  650 mg oral q4h PRN Milly Roberson PA C   650 mg at 02/03/25 1230    atorvastatin (LIPITOR) tablet 20 mg  20 mg oral q AM Milly Roberson PA C   20 mg at 02/03/25 0939    B-complex with vitamin C tablet 1 tablet  1 tablet oral Daily Julian Alberto MD   1 tablet at 02/03/25 1642    cefTRIAXone (ROCEPHIN) IVPB 1 g in 100 mL NSS vial in bag  1 g intravenous q24h INT Milly Roberson PA C   Stopped at 02/03/25 1520    glucose chewable tablet 16-32 g of dextrose  16-32 g of dextrose oral PRN Milly Roberson PA C        Or    dextrose 40 % oral gel 15-30 g of dextrose  15-30 g of dextrose oral PRN Milly Roberson PA C        Or    glucagon (GLUCAGEN) injection 1 mg  1 mg intramuscular PRN Milly Roberson PA C        Or    dextrose 50 % in water (D50) injection 12.5 g  25 mL intravenous PRN Milly Roberson PA C        FLUoxetine (PROzac) capsule 40 mg  40 mg oral Daily Milly Roberson PA C   40 mg at 02/03/25 0939    gabapentin (NEURONTIN) capsule 400 mg  400 mg oral QID Milly Roberson PA C   400 mg at 02/03/25 1448    insulin lispro U-100 (HumaLOG) pen 3-5 Units  3-5 Units subcutaneous With meals & nightly Milly Roberson PA C        levothyroxine  (SYNTHROID) tablet 50 mcg  50 mcg oral Daily (6:30a) Milly Roberson PA C   50 mcg at 02/03/25 0629    LORazepam (ATIVAN) tablet 0.25 mg  0.25 mg oral BID Milly Roberson PA C   0.25 mg at 02/03/25 1448    nystatin (MYCOSTATIN) 100,000 unit/gram topical powder   Topical BID Milly Roberson PA C   Given at 02/03/25 1001    oxyCODONE (ROXICODONE) immediate release tablet 5 mg  5 mg oral q6h PRN Jessica Ballard MD   5 mg at 02/03/25 0107    pantoprazole (PROTONIX) tablet,delayed release (DR/EC) 20 mg  20 mg oral BID Milly Roberson PA C   20 mg at 02/03/25 0940    prazosin (MINIPRESS) capsule 5 mg  5 mg oral Nightly Milly Roberson PA C   5 mg at 02/01/25 2215    QUEtiapine (SEROQUEL) tablet 100 mg  100 mg oral Nightly Milly Roberson PA C   100 mg at 02/02/25 2151    sodium chloride 0.9 % infusion   intravenous Continuous Ibazebo, Ebehireme,  mL/hr at 02/03/25 1204 New Bag at 02/03/25 1204    topiramate (TOPAMAX) tablet 50 mg  50 mg oral BID Milly Roberson PA C   50 mg at 02/03/25 0940    traZODone (DESYREL) tablet 100 mg  100 mg oral Nightly Milly Roberson PA C   100 mg at 02/02/25 2151    zolpidem (AMBIEN) tablet 5 mg  5 mg oral Nightly PRN Charity Aguilar MD   5 mg at 02/02/25 2151       Review of Systems  All other systems reviewed and negative except as noted in the HPI.    Vital signs in last 24 hours:  Temp:  [36.6 °C (97.9 °F)-37.1 °C (98.8 °F)] 36.6 °C (97.9 °F)  Heart Rate:  [] 94  Resp:  [14-15] 14  BP: ()/(57-75) 102/60    Objective     Physical Exam  GEN: Patient is well nourished, not in distress  Head: atraumatic, normocephalic  Eyes : PERRLA, EOMI, no pallor, no icterus  ENT: no lesions, oropharynx pink, mucous membranes moist   Neck: supple, no appreciable adenopathy  CVS: Tachycardic with regular rhythm  Resp: NWB on RA  Abd: soft, nondistended   Extremities : no edema, bruising or purpura  Skin: intact, warm, no rash  Neuro: AAO  x3  Psych: Depressed mood and flat affect.  Calm and cooperative      Labs  Recent Results (from the past 24 hours)   POCT Glucose    Collection Time: 02/02/25  5:29 PM   Result Value Ref Range    POCT Bedside Glucose 107 (H) 70 - 99 mg/dL    POC Test POC    POCT Glucose    Collection Time: 02/02/25 11:12 PM   Result Value Ref Range    POCT Bedside Glucose 107 (H) 70 - 99 mg/dL    POC Test POC    POCT Glucose    Collection Time: 02/03/25  8:04 AM   Result Value Ref Range    POCT Bedside Glucose 105 (H) 70 - 99 mg/dL    POC Test POC    Osmolality, Serum    Collection Time: 02/03/25 11:07 AM   Result Value Ref Range    Osmolality, Serum 281 280 - 300 mOsm/kg   POCT Glucose    Collection Time: 02/03/25 12:36 PM   Result Value Ref Range    POCT Bedside Glucose 105 (H) 70 - 99 mg/dL    POC Test POC    CBC    Collection Time: 02/03/25  1:16 PM   Result Value Ref Range    WBC 2.04 (L) 3.80 - 10.50 K/uL    RBC 3.21 (L) 3.93 - 5.22 M/uL    Hemoglobin 11.1 (L) 11.8 - 15.7 g/dL    Hematocrit 33.0 (L) 35.0 - 45.0 %    .8 (H) 83.0 - 98.0 fL    MCH 34.6 (H) 28.0 - 33.2 pg    MCHC 33.6 32.2 - 35.5 g/dL    RDW 14.3 11.7 - 14.4 %    Platelets 60 (L) 150 - 369 K/uL    MPV 12.6 (H) 9.4 - 12.3 fL   RETICULOCYTE COUNT (ADDON)    Collection Time: 02/03/25  1:16 PM   Result Value Ref Range    Retic, Absolute Count 0.0218 0.0028 - 0.1220 M/uL    Retic Count(%) 0.68 0.60 - 2.80 %       Imaging  CT LUMBAR SPINE WITHOUT IV CONTRAST    Result Date: 2/2/2025  CLINICAL HISTORY: Mid back pain COMPARISON: CT from January 27, 2025 TECHNIQUE: CT of the lumbar spine without intravenous contrast CT DOSE:  One or more dose reduction techniques (e.g. automated exposure control, adjustment of the mA and/or kV according to patient size, use of iterative reconstruction technique) utilized for this examination. COMMENT: ALIGNMENT: Anatomic DEGENERATIVE CHANGE: Within normal limits There is no compression deformity or suspicious osseous lesion.  ADDITIONAL FINDINGS: There is a uterine fibroid as before. The patient is status post gastrojejunostomy surgery. The colon is air-filled. There is a possible 19 mm left ovarian follicle.     IMPRESSION: No acute osseous abnormalities or significant degenerative change    CT THORACIC SPINE WITHOUT IV CONTRAST    Result Date: 2/2/2025  CLINICAL HISTORY: Mid back pain COMPARISON: September 29, 2024 TECHNIQUE: CT of the thoracic spine without intravenous contrast CT DOSE:  One or more dose reduction techniques (e.g. automated exposure control, adjustment of the mA and/or kV according to patient size, use of iterative reconstruction technique) utilized for this examination. COMMENT: ALIGNMENT: Anatomic DEGENERATIVE CHANGE: There is spurring along the ventral aspects of the T4-T9 vertebral bodies. There is no significant endplate degenerative change or degenerative change of the facets. There is no compression deformity. The neuroforamina appear grossly patent. ADDITIONAL FINDINGS: N/A     IMPRESSION: Mild multilevel thoracic spondylosis without compression deformity or destructive change   I have independently reviewed the pertinent imaging from the last 24 hrs.      Assessment   44 y.o. female being consulted for management recommendations regarding progressive thrombocytopenia.     Assessment & Plan  UTI (urinary tract infection)    Depression    Anxiety    Eating disorder    Hypothyroidism    PTSD (post-traumatic stress disorder)    Insomnia    At risk for polypharmacy    Chronic pain disorder    Hyponatremia    Thrombocytopenia (CMS/HCC)  Thrombocytopenia without abnormal bleeding issues thus far.  Additionally has leukopenia (differential not available) which is new from yesterday.  -Low suspicion for DIC or microangiopathic hemolysis.  Likely medication related probably Rocephin.  -Okay to continue Rocephin while monitoring blood counts at this time.  Will request routine workup including B12 levels and peripheral  smear review.  Obtain PT/INR, PTT and fibrinogen levels as well.    May need additional workup if thrombocytopenia continues to worsen as well as alternative antimicrobial therapy.  -My impression and plan of care reviewed with patient today.  All of her questions were answered.  Lower abdominal pain    Will follow along.  Please call if there are any questions or concerns.  Thanks       Julian Alberto MD

## 2025-02-03 NOTE — PROGRESS NOTES
[facility] Medicine     Daily Progress Note       SUBJECTIVE   Patient seen and examined at bedside.  Major complaint is back pain.  Blood pressure low normal requiring IV fluids.  Patient denies dizziness or lightheadedness   OBJECTIVE   Vital signs in last 24 hours:  Temp:  [36.6 °C (97.9 °F)-37.1 °C (98.8 °F)] 36.6 °C (97.9 °F)  Heart Rate:  [] 90  Resp:  [14-15] 14  BP: ()/(57-75) 100/75    Intake/Output Summary (Last 24 hours) at 2/3/2025 1501  Last data filed at 2/3/2025 0515  Gross per 24 hour   Intake 350 ml   Output --   Net 350 ml         Gen: Appears stated age  Head: atraumatic, normocephalic  Eyes: PERRLA, EOMI, no pallor  Neck: supple, no Lymph nodes, no Thyromegaly, no JVD  CVS: RRR, No M/G, no JVD  Pulm: CTA b/l, no wheezes or rhonchi, no rales  Abd: Soft, NT, ND, normal bowel sounds  Extremities:no edema, no cyanosis   MSK: no DJD, no joint swellings, no joint tenderness   Neuro: AAO x3      LINES, DRAINS, AIRWAYS, WOUNDS   Lines, Drains, and Airways:  Wounds (agree with documentation and present on admission):  Peripheral IV (Adult) 02/01/25 Left Antecubital (Active)   Number of days: 2         Comments:      LABS, IMAGING, TELEMETRY   Labs  Results from last 7 days   Lab Units 02/03/25  1316   WBC K/uL 2.04*   HEMOGLOBIN g/dL 11.1*   HEMATOCRIT % 33.0*   PLATELETS K/uL 60*     Results from last 7 days   Lab Units 02/01/25  1223   SODIUM mEQ/L 132*   POTASSIUM mEQ/L 3.7   CHLORIDE mEQ/L 99   CO2 mEQ/L 24   BUN mg/dL 18   CREATININE mg/dL 0.6   GLUCOSE mg/dL 93   CALCIUM mg/dL 9.2     Microbiology Results       Procedure Component Value Units Date/Time    Urine culture Urine, Clean Catch [[id number]]  (Normal) Collected: 02/02/25 1515    Specimen: Urine, Clean Catch Updated: 02/03/25 1449     Urine Culture No Growth (Limit of detection 1000 cfu/mL)    Blood Culture Blood, Venous [[id number]]  (Normal) Collected: 02/01/25 1419    Specimen: Blood, Venous Updated: 02/02/25 2001      Culture No growth at 18-24 hours    Blood Culture Blood, Venous [[id number]]  (Normal) Collected: 02/01/25 1419    Specimen: Blood, Venous Updated: 02/02/25 2001     Culture No growth at 18-24 hours    Urine culture Urine, Clean Catch [[id number]]  (Abnormal) Collected: 02/01/25 1215    Specimen: Urine, Clean Catch Updated: 02/03/25 1417     Urine Culture **Positive Culture**      >=100,000 cfu/mL Gram Negative Rods      >=100,000 cfu/mL Proteus mirabilis    Urine culture Urine, Clean Catch [[id number]] Collected: 01/27/25 2044    Specimen: Urine, Clean Catch Updated: 01/29/25 1036     Urine Culture Probable contaminants, suggest recollection      >=100,000 cfu/mL Mixed Growth    SARS-COV-2 (COVID-19)/ FLU A/B, AND RSV, PCR Nasopharynx [[id number]]  (Normal) Collected: 01/27/25 1729    Specimen: Nasopharyngeal Swab from Nasopharynx Updated: 01/27/25 1826     SARS-CoV-2 (COVID-19) Negative     Influenza A Negative     Influenza B Negative     Respiratory Syncytial Virus Negative    Narrative:      Testing performed using real-time PCR for detection of COVID-19. EUA approved validation studies performed on site.                 Imaging  CT LUMBAR SPINE WITHOUT IV CONTRAST    Result Date: 2/2/2025  Narrative: CLINICAL HISTORY: Mid back pain COMPARISON: CT from January 27, 2025 TECHNIQUE: CT of the lumbar spine without intravenous contrast CT DOSE:  One or more dose reduction techniques (e.g. automated exposure control, adjustment of the mA and/or kV according to patient size, use of iterative reconstruction technique) utilized for this examination. COMMENT: ALIGNMENT: Anatomic DEGENERATIVE CHANGE: Within normal limits There is no compression deformity or suspicious osseous lesion. ADDITIONAL FINDINGS: There is a uterine fibroid as before. The patient is status post gastrojejunostomy surgery. The colon is air-filled. There is a possible 19 mm left ovarian follicle.     Impression: IMPRESSION: No acute osseous abnormalities  or significant degenerative change    CT THORACIC SPINE WITHOUT IV CONTRAST    Result Date: 2/2/2025  Narrative: CLINICAL HISTORY: Mid back pain COMPARISON: September 29, 2024 TECHNIQUE: CT of the thoracic spine without intravenous contrast CT DOSE:  One or more dose reduction techniques (e.g. automated exposure control, adjustment of the mA and/or kV according to patient size, use of iterative reconstruction technique) utilized for this examination. COMMENT: ALIGNMENT: Anatomic DEGENERATIVE CHANGE: There is spurring along the ventral aspects of the T4-T9 vertebral bodies. There is no significant endplate degenerative change or degenerative change of the facets. There is no compression deformity. The neuroforamina appear grossly patent. ADDITIONAL FINDINGS: N/A     Impression: IMPRESSION: Mild multilevel thoracic spondylosis without compression deformity or destructive change    CT ABDOMEN PELVIS WITHOUT IV CONTRAST    Result Date: 1/27/2025  Narrative: CLINICAL HISTORY:    Severe intermittent lower abdominal pain with anorexia Technique:  CT of the abdomen and pelvis was performed without intravenous contrast Coronal and sagittal reconstructed images were obtained evaluation of solid organs is affected by lack of intravenous contrast CT DOSE:  One or more dose reduction techniques (e.g. automated exposure control, adjustment of the mA and/or kV according to patient size, use of iterative reconstruction technique) utilized for this examination. COMPARISON:   8/16/2024 COMMENT: Lower chest: Within normal limits. Liver:  Within normal limits. Gallbladder:  Status post cholecystectomy. Pancreas:  Within normal limits. Spleen:      Within normal limits. Bowel: Stomach: Patient status post Mary-en-Y gastric surgery. Small Bowel: Postsurgical changes are noted.  There is no CT evidence of a bowel obstruction. Colon: There is high density material present within the colon consistent with ingested products.  This reaches  the rectum. Appendix:Normal Mesentery/ Peritoneum: Normal Adrenals:  Within normal limits. 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 Urinary Bladder:There is mild concentric prominence the walls of the urinary bladder. Pelvic Organs:  There is a 5.2 x 4.9 cm subserosal fibroid arising from the right aspect of the uterus.. Retroperitoneum:  There are small nonspecific retroperitoneal lymph nodes.. Vessels:  The abdominal aorta is not aneurysmal. Abdominal Wall/ Soft tissue:There is a calcification within the abdominal wall with likely linear scarring present within the supraumbilical region.  There is present in the prior study. Bones:  There is mild degenerative change of the hips..     Impression: IMPRESSION: 1.  Concentric prominence the walls the urinary bladder.  Cystitis cannot be excluded 2.  There is high density material present within the colon presumably representing ingested product.  There is no CT evidence of a bowel obstruction. The patient is status post Mary-en-Y gastric surgery. 3.  Uterine fibroid 4.  See comments for additional findings     X-RAY CHEST 2 VIEWS    Result Date: 1/27/2025  Narrative: CLINICAL HISTORY:      Chest pain and weakness COMPARISON:  08/15/2024 COMMENT:  Two views of the chest were performed. The cardiovascular silhouette is within normal limits. Costophrenic angles are clear. No focal consolidation or pneumothorax.     Impression: IMPRESSION: No acute cardiopulmonary process.         ECG/Telemetry  EKG:    ASSESSMENT AND PLAN     # Acute cystitis  - diagnosed with UTI 1 week ago and started on course of macrobid, ?compliance  - UA today again reveals UTI  - urine cultures with Proteus Mirabilis  -  blood cultures with no growth  - currently on: ceftriaxone 1 g iv q24h (02/01/25 - current)     # Back pain  patient complains of mid to low back pain which started about a week ago. Denies acute injury. States she has leg  weakness  CT scan of the lumbar spine with no abnormality  - continue current management. PT/OT     # Generalized weakness  # Hyponatremia  - likely multifactorial: UTI, eating disorder with poor OP intake, anxiety/depression/bipolar disorder, hyponatremia  - PT/OT and SW consult. strong consideration for placement  - continue IV fluid  - monitor BMP     # Thrombocytopenia  - platelets: 60 (2/3/25) down from 121 (2/1/25)  - this is new. repeat labs drawn and unchanged  - no signs of bleeding. drop in all cell lines, possibly patient was dehydrated on presentation.  - Reviewed meds, not on heparin, less likely heparin-induced thrombocytopenia  - will consult hematology  - monitor CBC     # Eating disorder  - reports she has not had solid food intake since christmas  - supervise feeds  - psych consulted     # Depression  # Anxiety  # Bipolar disease  # PTSD  - psych consulted  - continue home: LORazepam tablet po BID  - continue home: QUEtiapine tablet 100 mg po daily  - continue home: fluoxetine 40 mg po daily  - continue home: trazodone 100 mg po daily     # Hyponatremia: mild, likely due to pseudohyponatremia, isotonic  - sodium: 132 (2/1/25)  - similar to sodium 1 week ago, however baseline appears 143  - reports she has not had solid food intake since christmas, only gatorade  - also is on numerous psych medications  - checking serum and urine osmolality, urine electrolytes  - nephrology consulted  - monitor BMP     # DM2  - poct with ssi correction  - checking hgba1c level     # Seizure disorder  - continued on topiramate     # Hypothyroidism  - TSH: 1.4 (9/29/24), free T4: 0.6 (8/16/24)  - continued on synthroid     # GERD without esophagitis  - continued on PPI     # Drug seeking behavior  - judicious use of narcotics while hospitalized     # Marijuana use  - UDs positive for Cannabis  -Behavioral health consulted     # Tobacco use  - smokes 4 cigarettes per day  - declined nicotine patch  - tobacco cessation  advised     # Alcohol use disorder, mild, without withdrawal  - checking alcohol level, reports last use 4 years ago  - monitor for signs/symtpoms of alcohol w/d       VTE Prophylaxis:  Current anticoagulants:    None      Code Status: Full Code        Estimated Discharge Date: 2/4/2025   Disposition Planning: Pending PT/OT evaluation       Ebehireme Ibazebo, MD  2/3/2025

## 2025-02-04 LAB
ANION GAP SERPL CALC-SCNC: 8 MEQ/L (ref 3–15)
APTT PPP: <22 SEC (ref 23–35)
BACTERIA UR CULT: ABNORMAL
BASOPHILS # BLD: 0.01 K/UL (ref 0.01–0.1)
BASOPHILS NFR BLD: 0.3 %
BUN SERPL-MCNC: 9 MG/DL (ref 7–25)
BURR CELLS BLD QL SMEAR: ABNORMAL
CALCIUM SERPL-MCNC: 8.2 MG/DL (ref 8.6–10.3)
CHLORIDE SERPL-SCNC: 110 MEQ/L (ref 98–107)
CO2 SERPL-SCNC: 20 MEQ/L (ref 21–31)
CREAT SERPL-MCNC: 0.6 MG/DL (ref 0.6–1.2)
DIFFERENTIAL METHOD BLD: ABNORMAL
EGFRCR SERPLBLD CKD-EPI 2021: >60 ML/MIN/1.73M*2
EOSINOPHIL # BLD: 0.07 K/UL (ref 0.04–0.36)
EOSINOPHIL NFR BLD: 2.1 %
ERYTHROCYTE [DISTWIDTH] IN BLOOD BY AUTOMATED COUNT: 14.6 % (ref 11.7–14.4)
FIBRINOGEN PPP-MCNC: 128 MG/DL (ref 220–480)
GLUCOSE BLD-MCNC: 108 MG/DL (ref 70–99)
GLUCOSE BLD-MCNC: 178 MG/DL (ref 70–99)
GLUCOSE BLD-MCNC: 57 MG/DL (ref 70–99)
GLUCOSE BLD-MCNC: 69 MG/DL (ref 70–99)
GLUCOSE BLD-MCNC: 74 MG/DL (ref 70–99)
GLUCOSE BLD-MCNC: 78 MG/DL (ref 70–99)
GLUCOSE BLD-MCNC: 97 MG/DL (ref 70–99)
GLUCOSE SERPL-MCNC: 106 MG/DL (ref 70–99)
HCT VFR BLD AUTO: 34.2 % (ref 35–45)
HGB BLD-MCNC: 12 G/DL (ref 11.8–15.7)
IMM GRANULOCYTES # BLD AUTO: 0.06 K/UL (ref 0–0.08)
IMM GRANULOCYTES NFR BLD AUTO: 1.8 %
INR PPP: 1.3
LYMPHOCYTES # BLD: 0.94 K/UL (ref 1.2–3.5)
LYMPHOCYTES NFR BLD: 27.9 %
MAGNESIUM SERPL-MCNC: 1.7 MG/DL (ref 1.8–2.5)
MCH RBC QN AUTO: 35.4 PG (ref 28–33.2)
MCHC RBC AUTO-ENTMCNC: 35.1 G/DL (ref 32.2–35.5)
MCV RBC AUTO: 100.9 FL (ref 83–98)
MONOCYTES # BLD: 0.22 K/UL (ref 0.28–0.8)
MONOCYTES NFR BLD: 6.5 %
NEUTROPHILS # BLD: 2.07 K/UL (ref 1.7–7)
NEUTS SEG NFR BLD: 61.4 %
NRBC BLD-RTO: 0.6 %
PATH REV BLD -IMP: NORMAL
PLAT MORPH BLD: NORMAL
PLATELET # BLD AUTO: 59 K/UL (ref 150–369)
PLATELET # BLD EST: ABNORMAL 10*3/UL
PMV BLD AUTO: 11.3 FL (ref 9.4–12.3)
POCT TEST: ABNORMAL
POCT TEST: NORMAL
POTASSIUM SERPL-SCNC: 3 MEQ/L (ref 3.5–5.1)
PROTHROMBIN TIME: 15.7 SEC (ref 12.2–14.5)
RBC # BLD AUTO: 3.39 M/UL (ref 3.93–5.22)
SODIUM SERPL-SCNC: 138 MEQ/L (ref 136–145)
WBC # BLD AUTO: 3.37 K/UL (ref 3.8–10.5)

## 2025-02-04 PROCEDURE — 25000000 HC PHARMACY GENERAL: Performed by: PHYSICIAN ASSISTANT

## 2025-02-04 PROCEDURE — 97162 PT EVAL MOD COMPLEX 30 MIN: CPT | Mod: GP

## 2025-02-04 PROCEDURE — 97166 OT EVAL MOD COMPLEX 45 MIN: CPT | Mod: GO

## 2025-02-04 PROCEDURE — 63600000 HC DRUGS/DETAIL CODE: Mod: JZ | Performed by: PHYSICIAN ASSISTANT

## 2025-02-04 PROCEDURE — 99232 SBSQ HOSP IP/OBS MODERATE 35: CPT | Performed by: INTERNAL MEDICINE

## 2025-02-04 PROCEDURE — 25800000 HC PHARMACY IV SOLUTIONS: Performed by: INTERNAL MEDICINE

## 2025-02-04 PROCEDURE — 63700000 HC SELF-ADMINISTRABLE DRUG: Performed by: INTERNAL MEDICINE

## 2025-02-04 PROCEDURE — 99232 SBSQ HOSP IP/OBS MODERATE 35: CPT

## 2025-02-04 PROCEDURE — 85384 FIBRINOGEN ACTIVITY: CPT | Performed by: INTERNAL MEDICINE

## 2025-02-04 PROCEDURE — 83735 ASSAY OF MAGNESIUM: CPT | Performed by: STUDENT IN AN ORGANIZED HEALTH CARE EDUCATION/TRAINING PROGRAM

## 2025-02-04 PROCEDURE — 12000000 HC ROOM AND CARE MED/SURG

## 2025-02-04 PROCEDURE — 63700000 HC SELF-ADMINISTRABLE DRUG: Performed by: PHYSICIAN ASSISTANT

## 2025-02-04 PROCEDURE — 25800000 HC PHARMACY IV SOLUTIONS: Performed by: PHYSICIAN ASSISTANT

## 2025-02-04 PROCEDURE — 97530 THERAPEUTIC ACTIVITIES: CPT | Mod: GP

## 2025-02-04 PROCEDURE — 85025 COMPLETE CBC W/AUTO DIFF WBC: CPT | Performed by: INTERNAL MEDICINE

## 2025-02-04 PROCEDURE — 36415 COLL VENOUS BLD VENIPUNCTURE: CPT | Performed by: INTERNAL MEDICINE

## 2025-02-04 PROCEDURE — 85730 THROMBOPLASTIN TIME PARTIAL: CPT | Performed by: INTERNAL MEDICINE

## 2025-02-04 PROCEDURE — 63600000 HC DRUGS/DETAIL CODE: Mod: JZ | Performed by: INTERNAL MEDICINE

## 2025-02-04 PROCEDURE — 80048 BASIC METABOLIC PNL TOTAL CA: CPT | Performed by: STUDENT IN AN ORGANIZED HEALTH CARE EDUCATION/TRAINING PROGRAM

## 2025-02-04 PROCEDURE — 97530 THERAPEUTIC ACTIVITIES: CPT | Mod: GO

## 2025-02-04 PROCEDURE — 83010 ASSAY OF HAPTOGLOBIN QUANT: CPT | Performed by: INTERNAL MEDICINE

## 2025-02-04 PROCEDURE — 85610 PROTHROMBIN TIME: CPT | Performed by: INTERNAL MEDICINE

## 2025-02-04 RX ORDER — POTASSIUM CHLORIDE 20 MEQ/1
20 TABLET, EXTENDED RELEASE ORAL AS NEEDED
Status: DISCONTINUED | OUTPATIENT
Start: 2025-02-04 | End: 2025-02-06 | Stop reason: SDUPTHER

## 2025-02-04 RX ORDER — POTASSIUM CHLORIDE 20 MEQ/1
40 TABLET, EXTENDED RELEASE ORAL AS NEEDED
Status: DISCONTINUED | OUTPATIENT
Start: 2025-02-04 | End: 2025-02-06 | Stop reason: SDUPTHER

## 2025-02-04 RX ORDER — SODIUM CHLORIDE 9 MG/ML
INJECTION, SOLUTION INTRAVENOUS CONTINUOUS
Status: ACTIVE | OUTPATIENT
Start: 2025-02-04 | End: 2025-02-05

## 2025-02-04 RX ADMIN — GABAPENTIN 400 MG: 400 CAPSULE ORAL at 14:12

## 2025-02-04 RX ADMIN — QUETIAPINE FUMARATE 100 MG: 100 TABLET ORAL at 21:43

## 2025-02-04 RX ADMIN — ATORVASTATIN CALCIUM 20 MG: 20 TABLET, FILM COATED ORAL at 08:13

## 2025-02-04 RX ADMIN — Medication 1 TABLET: at 08:13

## 2025-02-04 RX ADMIN — LORAZEPAM 0.25 MG: 0.5 TABLET ORAL at 09:11

## 2025-02-04 RX ADMIN — GABAPENTIN 400 MG: 400 CAPSULE ORAL at 21:44

## 2025-02-04 RX ADMIN — SODIUM CHLORIDE: 9 INJECTION, SOLUTION INTRAVENOUS at 03:01

## 2025-02-04 RX ADMIN — SODIUM CHLORIDE: 9 INJECTION, SOLUTION INTRAVENOUS at 15:00

## 2025-02-04 RX ADMIN — GABAPENTIN 400 MG: 400 CAPSULE ORAL at 08:13

## 2025-02-04 RX ADMIN — ACETAMINOPHEN 650 MG: 325 TABLET ORAL at 21:44

## 2025-02-04 RX ADMIN — FLUOXETINE HYDROCHLORIDE 40 MG: 20 CAPSULE ORAL at 08:13

## 2025-02-04 RX ADMIN — NYSTATIN: 100000 POWDER TOPICAL at 14:16

## 2025-02-04 RX ADMIN — GABAPENTIN 400 MG: 400 CAPSULE ORAL at 17:52

## 2025-02-04 RX ADMIN — PANTOPRAZOLE SODIUM 20 MG: 20 TABLET, DELAYED RELEASE ORAL at 21:44

## 2025-02-04 RX ADMIN — POTASSIUM CHLORIDE 40 MEQ: 1500 TABLET, EXTENDED RELEASE ORAL at 12:42

## 2025-02-04 RX ADMIN — MAGNESIUM SULFATE HEPTAHYDRATE 2 G: 40 INJECTION, SOLUTION INTRAVENOUS at 12:44

## 2025-02-04 RX ADMIN — ACETAMINOPHEN 650 MG: 325 TABLET ORAL at 14:11

## 2025-02-04 RX ADMIN — DEXTROSE MONOHYDRATE 12.5 G: 25 INJECTION, SOLUTION INTRAVENOUS at 07:50

## 2025-02-04 RX ADMIN — TRAZODONE HYDROCHLORIDE 100 MG: 100 TABLET ORAL at 21:43

## 2025-02-04 RX ADMIN — LORAZEPAM 0.25 MG: 0.5 TABLET ORAL at 14:32

## 2025-02-04 RX ADMIN — PANTOPRAZOLE SODIUM 20 MG: 20 TABLET, DELAYED RELEASE ORAL at 08:13

## 2025-02-04 RX ADMIN — NYSTATIN: 100000 POWDER TOPICAL at 22:06

## 2025-02-04 RX ADMIN — CEFTRIAXONE SODIUM 1 G: 1 INJECTION, POWDER, FOR SOLUTION INTRAMUSCULAR; INTRAVENOUS at 15:52

## 2025-02-04 RX ADMIN — LEVOTHYROXINE SODIUM 50 MCG: 0.05 TABLET ORAL at 06:15

## 2025-02-04 RX ADMIN — ACETAMINOPHEN 650 MG: 325 TABLET ORAL at 06:15

## 2025-02-04 RX ADMIN — TOPIRAMATE 50 MG: 100 TABLET, FILM COATED ORAL at 16:35

## 2025-02-04 RX ADMIN — TOPIRAMATE 50 MG: 100 TABLET, FILM COATED ORAL at 08:13

## 2025-02-04 ASSESSMENT — COGNITIVE AND FUNCTIONAL STATUS - GENERAL
DRESSING REGULAR LOWER BODY CLOTHING: 1 - TOTAL
DRESSING REGULAR UPPER BODY CLOTHING: 2 - A LOT
MOVING TO AND FROM BED TO CHAIR: 2 - A LOT
MOVING TO AND FROM BED TO CHAIR: 2 - A LOT
STANDING UP FROM CHAIR USING ARMS: 2 - A LOT
EATING MEALS: 2 - A LOT
HELP NEEDED FOR BATHING: 1 - TOTAL
CLIMB 3 TO 5 STEPS WITH RAILING: 1 - TOTAL
TOILETING: 1 - TOTAL
CLIMB 3 TO 5 STEPS WITH RAILING: 1 - TOTAL
STANDING UP FROM CHAIR USING ARMS: 1 - TOTAL
AFFECT: FLAT/BLUNTED AFFECT;LOW AROUSAL/LETHARGIC
AFFECT: FLAT/BLUNTED AFFECT;LOW AROUSAL/LETHARGIC
WALKING IN HOSPITAL ROOM: 1 - TOTAL
WALKING IN HOSPITAL ROOM: 2 - A LOT
STANDING UP FROM CHAIR USING ARMS: 2 - A LOT
HELP NEEDED FOR PERSONAL GROOMING: 2 - A LOT
MOVING TO AND FROM BED TO CHAIR: 2 - A LOT
CLIMB 3 TO 5 STEPS WITH RAILING: 2 - A LOT
WALKING IN HOSPITAL ROOM: 1 - TOTAL

## 2025-02-04 ASSESSMENT — ENCOUNTER SYMPTOMS
DIFFICULTY URINATING: 0
CONFUSION: 0

## 2025-02-04 NOTE — NURSING NOTE
Blood sugar 57, pt alert, iv dextrose given as ordered, pt asymptomatic, Dr. Evan dave Md aware, accu check, recheck glucose communicated with MD.

## 2025-02-04 NOTE — PLAN OF CARE
Problem: Adult Inpatient Plan of Care  Goal: Plan of Care Review  Outcome: Progressing  Flowsheets (Taken 2/4/2025 1611)  Progress: improving  Outcome Evaluation: PT evaluation complete. [facility] 10/24. Recommending SNF when medically stable.  Plan of Care Reviewed With: patient     Problem: Mobility Impairment  Goal: Optimal Mobility  Outcome: Progressing

## 2025-02-04 NOTE — PROGRESS NOTES
NEPHROLOGY PROGRESS NOTE    Subjective:   Seen in follow up re: hyponatremia.    Has lower back pain and some abdominal discomfort.     Review of Systems   Constitutional:         Eating disorder    Genitourinary:  Negative for difficulty urinating.   Psychiatric/Behavioral:  Negative for confusion.    All other systems reviewed and are negative.      Vitals:    02/03/25 1636 02/03/25 2117 02/04/25 0735 02/04/25 0740   BP: 102/60 99/65 (!) 87/55 (!) 93/57   BP Location: Right upper arm Left upper arm Left upper arm Right upper arm   Patient Position: Lying Lying Lying Lying   Pulse: 94 85  90   Resp:  14  16   Temp:  36.6 °C (97.9 °F)  36.1 °C (97 °F)   TempSrc:  Axillary  Temporal   SpO2:  95%  98%   Weight:       Height:             Intake/Output Summary (Last 24 hours) at 2/4/2025 1234  Last data filed at 2/4/2025 1200  Gross per 24 hour   Intake 1136 ml   Output --   Net 1136 ml       Physical Exam  Constitutional:       General: She is not in acute distress.  HENT:      Head: Atraumatic.      Nose: No rhinorrhea.      Mouth/Throat:      Pharynx: Oropharynx is clear.   Eyes:      Pupils: Pupils are equal, round, and reactive to light.   Cardiovascular:      Rate and Rhythm: Normal rate and regular rhythm.   Pulmonary:      Effort: No respiratory distress.   Abdominal:      Palpations: Abdomen is soft.   Musculoskeletal:         General: No swelling.      Cervical back: Neck supple.      Right lower leg: No edema.      Left lower leg: No edema.   Skin:     Capillary Refill: Capillary refill takes less than 2 seconds.      Coloration: Skin is not jaundiced.   Neurological:      Mental Status: She is alert and oriented to person, place, and time.         LABS:  Results from last 7 days   Lab Units 02/04/25  0553 02/03/25  1316 02/01/25  1223   SODIUM mEQ/L 138  --  132*   POTASSIUM mEQ/L 3.0*  --  3.7   CHLORIDE mEQ/L 110*  --  99   CO2 mEQ/L 20*  --  24   BUN mg/dL 9  --  18   CREATININE mg/dL 0.6  --  0.6   EGFR  mL/min/1.73m*2 >60.0  --  >60.0   GLUCOSE mg/dL 106*  --  93   CALCIUM mg/dL 8.2*  --  9.2   ALBUMIN g/dL  --   --  4.3   WBC K/uL  --  2.04* 5.36   HEMOGLOBIN g/dL  --  11.1* 14.4   HEMATOCRIT %  --  33.0* 41.1   PLATELETS K/uL  --  60* 121*       Meds:    Current Facility-Administered Medications:     acetaminophen (TYLENOL) tablet 650 mg, 650 mg, oral, q4h PRN, Milly Roberson PA C, 650 mg at 02/04/25 0615    atorvastatin (LIPITOR) tablet 20 mg, 20 mg, oral, q AM, Milly Roberson PA C, 20 mg at 02/04/25 0813    B-complex with vitamin C tablet 1 tablet, 1 tablet, oral, Daily, Julian Alberto MD, 1 tablet at 02/04/25 0813    cefTRIAXone (ROCEPHIN) IVPB 1 g in 100 mL NSS vial in bag, 1 g, intravenous, q24h INT, Milly Roberson PA C, Stopped at 02/03/25 1520    glucose chewable tablet 16-32 g of dextrose, 16-32 g of dextrose, oral, PRN **OR** dextrose 40 % oral gel 15-30 g of dextrose, 15-30 g of dextrose, oral, PRN **OR** glucagon (GLUCAGEN) injection 1 mg, 1 mg, intramuscular, PRN **OR** dextrose 50 % in water (D50) injection 12.5 g, 25 mL, intravenous, PRN, Milly Roberson PA C, 12.5 g at 02/04/25 0750    FLUoxetine (PROzac) capsule 40 mg, 40 mg, oral, Daily, Milly Roberson PA C, 40 mg at 02/04/25 0813    gabapentin (NEURONTIN) capsule 400 mg, 400 mg, oral, QID, Milly Roberson PA C, 400 mg at 02/04/25 0813    insulin lispro U-100 (HumaLOG) pen 3-5 Units, 3-5 Units, subcutaneous, With meals & nightly, Milly Roberson PA C    levothyroxine (SYNTHROID) tablet 50 mcg, 50 mcg, oral, Daily (6:30a), Milly Roberson PA C, 50 mcg at 02/04/25 0615    LORazepam (ATIVAN) tablet 0.25 mg, 0.25 mg, oral, BID, Milly Roberson PA C, 0.25 mg at 02/04/25 0911    magnesium sulfate IVPB 1g in 100 mL NSS/D5W/SWFI, 1 g, intravenous, PRN **OR** magnesium sulfate IVPB 2g in 50 mL NSS/D5W/SWFI, 2 g, intravenous, PRN, Clark Gordon MD    nystatin (MYCOSTATIN) 100,000 unit/gram topical  powder, , Topical, BID, Milly Roberson PA C, Given at 02/03/25 2132    oxyCODONE (ROXICODONE) immediate release tablet 5 mg, 5 mg, oral, q6h PRN, Jessica Ballard MD, 5 mg at 02/03/25 0107    pantoprazole (PROTONIX) tablet,delayed release (DR/EC) 20 mg, 20 mg, oral, BID, Milly Robersno PA C, 20 mg at 02/04/25 0813    potassium chloride (KLOR-CON M) ER tablet (particles/crystals) 20 mEq, 20 mEq, oral, PRN, Clark Gordon MD    potassium chloride (KLOR-CON M) ER tablet (particles/crystals) 40 mEq, 40 mEq, oral, PRN, Clark Gordon MD    prazosin (MINIPRESS) capsule 5 mg, 5 mg, oral, Nightly, AnaliiorMilly marion PA C, 5 mg at 02/01/25 2215    QUEtiapine (SEROQUEL) tablet 100 mg, 100 mg, oral, Nightly, Milly Roberson PA C, 100 mg at 02/03/25 2120    topiramate (TOPAMAX) tablet 50 mg, 50 mg, oral, BID, Milly Roberson PA C, 50 mg at 02/04/25 0813    traZODone (DESYREL) tablet 100 mg, 100 mg, oral, Nightly, TejinderongiorMich mariona A PA C, 100 mg at 02/03/25 2119    zolpidem (AMBIEN) tablet 5 mg, 5 mg, oral, Nightly PRN, Charity Aguilar MD, 5 mg at 02/03/25 2120    ASSESSMENT:  Principal Problem:    UTI (urinary tract infection)  Active Problems:    Depression    Anxiety    Eating disorder    Hypothyroidism    PTSD (post-traumatic stress disorder)    Insomnia    At risk for polypharmacy    Chronic pain disorder    Hyponatremia    Thrombocytopenia (CMS/HCC)    Lower abdominal pain      PLAN:  Hyponatremia: serum osmolality 281. Urine Na < 10. Urine osmolality 133. Tea and toast syndrome. This is consistent with her hx of chronic eating disorder.   - Hx Na improved and 138 today.   - counseled general measures. Continue a mild fluid restriction. Continue oral nutrition support     2. UTI: Cx proteus mirabilis. Abx currently ceftriaxone.    3. Hypercholeremic metabolic acidosis: attempt to limit further Chloride rich IVF.    Nick Gilbert MD

## 2025-02-04 NOTE — ASSESSMENT & PLAN NOTE
Thrombocytopenia without abnormal bleeding issues thus far.  Additionally has leukopenia (differential not available) which is new from yesterday.  -Low suspicion for DIC or microangiopathic hemolysis.  Likely medication related probably Rocephin.  -Okay to continue Rocephin while monitoring blood counts at this time.  Will request routine workup including B12 levels and peripheral smear review.  Obtain PT/INR, PTT and fibrinogen levels as well.  May need additional workup if thrombocytopenia continues to worsen as well as alternative antimicrobial therapy.  -My impression and plan of care reviewed with patient today.  All of her questions were answered.    2/4/25: Await results from today's CBC which is pending.  No evidence of B12 deficiency.  Addendum: CBC from today reviewed hemoglobin normal, white count improving.  Platelet count is stable at 59 without evidence of bleeding.  Can continue to monitor CBC.    2/5/25:  stable at 63K.  No bleeding noted.    PT 15.7/INR 1.3  PTT <22.  Fibrinogen 128.  Pattern not c/w DIC.  Has not received heparin this admission.  Official path review of smear: Thrombocytopenia.  Mild leukopenia with an absolute lymphopenia. RBC morphology with macrocytosis.   B12 708.  .  Retic 0.68%.  Will repeat cbc w/diff, cmp (lft's normal on admission) and haptoglobin.  Will repeat coags and fibrinogen (?hypofibrinogenemia w/ hx of eating d/o)

## 2025-02-04 NOTE — ASSESSMENT & PLAN NOTE
Thrombocytopenia without abnormal bleeding issues thus far.  Additionally has leukopenia (differential not available) which is new from yesterday.  -Low suspicion for DIC or microangiopathic hemolysis.  Likely medication related probably Rocephin.  -Okay to continue Rocephin while monitoring blood counts at this time.  Will request routine workup including B12 levels and peripheral smear review.  Obtain PT/INR, PTT and fibrinogen levels as well.  May need additional workup if thrombocytopenia continues to worsen as well as alternative antimicrobial therapy.  -My impression and plan of care reviewed with patient today.  All of her questions were answered.    2/4/25: Await results from today's CBC which is pending.  No evidence of B12 deficiency.  Addendum: CBC from today reviewed hemoglobin normal, white count improving. Platelet count is stable at 59 without evidence of bleeding. Can continue to monitor CBC.

## 2025-02-04 NOTE — PLAN OF CARE
Problem: Adult Inpatient Plan of Care  Goal: Plan of Care Review  Outcome: Progressing  Flowsheets (Taken 2/4/2025 6873)  Progress: improving  Outcome Evaluation: OT eval complete  Plan of Care Reviewed With: patient     Problem: Self-Care Deficit  Goal: Improved Ability to Complete Activities of Daily Living  Outcome: Progressing

## 2025-02-04 NOTE — PLAN OF CARE
Plan of Care Review  Plan of Care Reviewed With: patient  Progress: no change  Outcome Evaluation: AAOx4, flat affect. Repositioned in bed x2 assist. Incontinence care provided. BP remains soft but stable. Fluids continued. PRN oxycodone held per order as SBP <105. All other VSS. Bed alarmed. Call bell within reach.

## 2025-02-04 NOTE — PROGRESS NOTES
Occupational Therapy -  Initial Evaluation     Patient: Felipa Cuadra  Location: [address]  MRN: [id number]  Today's date: 2/4/2025    HISTORY OF PRESENT ILLNESS     Felipa is a 44 y.o. female admitted on 2/1/2025 with UTI (urinary tract infection) [N39.0]  Lower abdominal pain [R10.30]  Urinary tract infection with hematuria, site unspecified [N39.0, R31.9]. Principal problem is UTI (urinary tract infection).    Past Medical History  Felipa has a past medical history of Anxiety, Depression, and Eating disorder.    History of Present Illness    Patient was seen in the ED 1 week ago for evaluation of abdominal pain and generalized weakness.  Workup revealed acute cystitis.  She was discharged on a course of Macrobid, which patient reports she has been taking.  She presents today for evaluation of persistent abdominal pain.  Patient reports suprapubic pain that radiates to her back.  She reports it is constant.  She reports she is weak and unable to ambulate.  She reports increased frequency of urination.  She reports chills.  She reports nausea without vomiting.  She reports she has not had solid food intake since Christmas.  She reports she has been drinking a lot of Gatorade.  She reports she is currently having black stool and vaginal bleeding x 3 days.  She reports she rarely gets her period.  She has not noticed any blood clots.  She reports she is feeling more depressed lately.  She denies suicidal ideation.  She reports she smokes 4 cigarettes/day.  She reports she quit alcohol use 4 years ago.  She denies fever, syncope, vomiting, chest pain, shortness of breath, constipation, diarrhea.   PRIOR LEVEL OF FUNCTION AND LIVING ENVIRONMENT     Prior Level of Function      Flowsheet Row Most Recent Value   Dominant Hand right   Ambulation assistive equipment   Transferring assistive equipment   Toileting independent   Bathing assistive equipment and person   Dressing assistive person   Eating  independent   IADLs independent   Prior Level of Function Comment Reports assist from HHAs 6 days/week with bathing + dressing. Uses a walker at home   Assistive Device Currently Used at Home walker, standard, grab bar, shower chair             Prior Living Environment      Flowsheet Row Most Recent Value   People in Home alone   Current Living Arrangements home   Home Accessibility elevator accessible   Living Environment Comment Lives alone in apartment with elevator access. Tub shower with SC + GBs.          Occupational Profile      Flowsheet Row Most Recent Value   Performance Patterns Likes to read, likes to write fanfiction   Environmental Supports and Barriers HHAs available to assist 6 days/week          VITALS AND PAIN     OT Vitals      Date/Time Pulse Pt Activity O2 Therapy BP BP Location BP Method Pt Position [facility]   02/04/25 1406 79 At rest None (Room air) 94/58 Left upper arm Automatic Lying [facility]   02/04/25 1418 101 At rest None (Room air) 88/64 Left upper arm Automatic Sitting [facility]          OT Pain      Date/Time Pain Type Acceptable Pain Level Side/Orientation Rating: Rest Rating: Rest Rating: Activity Interventions [facility]   02/04/25 1406 Pain Assessment -- back generalized -- 8 - severe pain 8 - severe pain care clustered [facility]   02/04/25 1411 Pain Assessment 4 back generalized 9 -- -- medication administered;pain management plan reviewed with patient/caregiver;pillow support provided BFS             Objective   OBJECTIVE     Start time:  1403  End time:  1428  Session Length: 25 min  Mode of Treatment: co-treatment  Reason for co-treatment: Ax2 status, d/c planning. OT focus on ADLs    General Observations  Patient received in bed, supine. She was agreeable to therapy, no issues or concerns identified by nurse prior to session. RN arrived to give pt meds during session    Precautions: fall       Limitations/Impairments: sensory, safety/cognitive (reports numbness in hands and legs)      OT Eval and Treat -  02/04/25 1403          Cognition    Orientation Status oriented x 3     Affect/Mental Status flat/blunted affect;low arousal/lethargic     Follows Commands follows one-step commands;over 90% accuracy;repetition of directions required;verbal cues/prompting required;delayed response/completion     Cognitive Function attention deficit;executive function deficit     Attention Deficit focused/sustained attention;requires cues/redirection to task     Comment, Attention VCs to keep eyes open and head up     Executive Function Deficit organization/sequencing     Comment, Cognition Cooperative. Flat affect. Slow to follow commands, but able to engage in conversation. Requires VCs for focus and sequencing of activities        Vision Assessment/Intervention    Vision Assessment corrective lenses full-time        Hearing Assessment    Hearing Status WFL        Sensory Assessment    Sensory Assessment other (see comments)   UE light touch sensation intact, but pt reports she has been having numbness in her hands and feet       Upper Extremity Assessment    General Observations PROM WFL. Poor eccentric control in both arms, especially in LUE. Pt reports has difficulty grasping objects        Upper Extremity Range of Motion    Shoulder, Left (ROM) ~90*     Elbow, Left (ROM) WFL     Shoulder, Right (ROM) ~90*     Elbow, Right (ROM) WFL        Upper Extremity Strength    Shoulder, Left (Strength) 2/5     Elbow, Left (Strength) 2/5     Hand, Left (Strength) 2/5     Shoulder, Right (Strength) 2+/5     Elbow, Right (Strength) 2+/5     Hand, Right (Strength) 2+/5        Bed Mobility    Bed Mobility Activities right;supine to sit;sit to supine;rolling;scooting/bridging     [place] moderate assist (50-74% patient effort);2 person assist     Safety/Cues increased time to complete;verbal cues;hand placement;preparatory posture;technique     Assistive Device bed rails     Comment OOB ModAx2 for trunk and BLEs. ModAx2 and use of bedrail  for rolling to side. MaxAx2 in return to bed at trunk and BLEs and to scoot upwards in bed        Lower Body Dressing    Tasks don;socks     [place] dependent (less than 25% patient effort)     Position supine     Adaptive Equipment none        Toileting    Tasks perform bladder hygiene;adjust/manage clothing     [place] 2 person assist;dependent (less than 25% patient effort)     Position supine     Setup Assistance change pad/brief     Adaptive Equipment bedpan     Comment +void        Balance    Static Sitting Balance moderate impairment;sitting, edge of bed     Dynamic Sitting Balance not tested     Sit to Stand Dynamic Balance unable to perform activity     Static Standing Balance unable to perform activity     Dynamic Standing Balance unable to perform activity     Comment, Balance Mod A for sitting balance progressing to min A for posterior lean. Further mobility deferred due to dizziness and low BP        Impairments/Safety Issues    Impairments Affecting Function balance;cognition;endurance/activity tolerance;grasp;pain;strength     Cognitive Impairments, Safety/Performance attention;sequencing abilities     Functional Endurance Poor-. Limited by pain, dizziness, and low BP. RN aware of BP                                              Education Documentation  Self-Care, taught by Mariluz Nagel at 2/4/2025  3:58 PM.  Learner: Patient  Readiness: Acceptance  Method: Explanation  Response: Verbalizes Understanding  Comment: OT POC, BADLs, bed mobility        Session Outcome  Patient supine, in bed at end of session, all needs met, personal items in reach, bed alarm on, call light in reach. Nursing notified about change in vital signs, patient's performance, patient's position, and patient's response to therapy/activity.    AM-PAC - ADL (Current Function)     Putting on/taking off regular lower body clothing 1 - Total   Bathing 1 - Total   Toileting 1 - Total   Putting on/taking off regular  upper body clothing 2 - A Lot   Help for taking care of personal grooming 2 - A Lot   Eating meals 2 - A Lot   AM-PAC ADL Score 9      ASSESSMENT AND PLAN     Progress Summary  OT eval complete. [facility] 9. Pt presents with UTI. Pt transfers OOB with modAx2. Pt given modAx2 for sitting balance EOB progressing to minAx2. Pt experiencing dizziness and drop in BP when seated EOB. Pt requires maxAx2 in return to bed. Further mobility deffered. Pt is toileting with bedpan and DEP for toileting hygiene. DEP for LB dressing. Pt is significantly limited at this time by dizziness and pain with mobility and shows impairments in cognition, balance, strength, and endurance/activity tolerance. Rec continued OT to improve IND in ADLs + functional mobility. Rec d/c to SNF once medically stable         OT Plan      Flowsheet Row Most Recent Value   Rehab Potential fair, will monitor progress closely at 02/04/2025 1403   Therapy Frequency 3 times/wk at 02/04/2025 1403   Planned Therapy Interventions activity tolerance training, functional balance retraining, transfer/mobility retraining, occupation/activity based interventions at 02/04/2025 1403            OT Discharge Recommendations      Flowsheet Row Most Recent Value   OT Recommended Discharge Disposition skilled nursing facility at 02/04/2025 1403   Anticipated Equipment Needs if Discharged Home (OT) commode, 3-in-1 at 02/04/2025 1403                 OT Goals      Flowsheet Row Most Recent Value   Bed Mobility Goal 1    Activity/Assistive Device sit to supine, supine to sit, rolling to left, rolling to right at 02/04/2025 1403   [place] minimum assist (75% or more patient effort) at 02/04/2025 1403   Time Frame by discharge at 02/04/2025 1403   Progress/Outcome goal ongoing at 02/04/2025 1403   Transfer Goal 1    Activity/Assistive Device commode, 3-in-1, toilet at 02/04/2025 1403   [place] moderate assist (50-74% patient effort) at 02/04/2025 1403   Time Frame by  discharge at 02/04/2025 1403   Progress/Outcome goal ongoing at 02/04/2025 1403   Dressing Goal 1    Activity/Adaptive Equipment lower body dressing at 02/04/2025 1403   [place] minimum assist (75% or more patient effort) at 02/04/2025 1403   Time Frame by discharge at 02/04/2025 1403   Progress/Outcome goal ongoing at 02/04/2025 1403   Toileting Goal 1    Activity/Assistive Device adjust/manage clothing, perform perineal hygiene at 02/04/2025 1403   [place] minimum assist (75% or more patient effort) at 02/04/2025 1403   Time Frame by discharge at 02/04/2025 1403   Progress/Outcome goal ongoing at 02/04/2025 1403

## 2025-02-04 NOTE — HOSPITAL COURSE
Felipa is a 44 y.o. female admitted on 2/1/2025 with UTI (urinary tract infection) [N39.0]  Lower abdominal pain [R10.30]  Urinary tract infection with hematuria, site unspecified [N39.0, R31.9]. Principal problem is UTI (urinary tract infection).    Past Medical History  Felipa has a past medical history of Anxiety, Depression, and Eating disorder.    History of Present Illness    Patient was seen in the ED 1 week ago for evaluation of abdominal pain and generalized weakness.  Workup revealed acute cystitis.  She was discharged on a course of Macrobid, which patient reports she has been taking.  She presents today for evaluation of persistent abdominal pain.  Patient reports suprapubic pain that radiates to her back.  She reports it is constant.  She reports she is weak and unable to ambulate.  She reports increased frequency of urination.  She reports chills.  She reports nausea without vomiting.  She reports she has not had solid food intake since Christmas.  She reports she has been drinking a lot of Gatorade.  She reports she is currently having black stool and vaginal bleeding x 3 days.  She reports she rarely gets her period.  She has not noticed any blood clots.  She reports she is feeling more depressed lately.  She denies suicidal ideation.  She reports she smokes 4 cigarettes/day.  She reports she quit alcohol use 4 years ago.  She denies fever, syncope, vomiting, chest pain, shortness of breath, constipation, diarrhea.     2/13 -  patient hypotensive with a concerning low MAP, required initiation of a norepinephrine infusion and transfer to the ICU.

## 2025-02-04 NOTE — PROGRESS NOTES
"Psychiatry Progress Note    Chief Complaint/Reason for follow-up: eating disorder    Interval History: Patient seen for follow up.  She was received sitting up in bed. Accepting PO medications.  Awake, lethargic, oriented to person, place and time.  Patient reporting back pain.  She reports minimal PO intake, states \"I am trying to eat a little, but its hard.\"  She continues to report she would be agreeable to inpatient eating disorder if this is an option once medically stable.  She endorses low mood.  Denies SI/HI/AVH.  No evidence of agitation on exam     Review of Systems:   Pt with decreased motor movements     Vital Signs for the last 24 hours:  Temp:  [36.1 °C (97 °F)-36.6 °C (97.9 °F)] 36.1 °C (97 °F)  Heart Rate:  [85-94] 90  Resp:  [14-16] 16  BP: ()/(55-75) 93/57    Scheduled Meds:   atorvastatin  20 mg oral q AM    B-complex with vitamin C  1 tablet oral Daily    cefTRIAXone  1 g intravenous q24h INT    FLUoxetine  40 mg oral Daily    gabapentin  400 mg oral QID    insulin lispro U-100  3-5 Units subcutaneous With meals & nightly    levothyroxine  50 mcg oral Daily (6:30a)    LORazepam  0.25 mg oral BID    nystatin   Topical BID    pantoprazole  20 mg oral BID    prazosin  5 mg oral Nightly    QUEtiapine  100 mg oral Nightly    topiramate  50 mg oral BID    traZODone  100 mg oral Nightly       Labs:  Selected Electrolytes  Results from last 7 days   Lab Units 02/04/25  0553 02/03/25  1316 02/01/25  1223   HEMOGLOBIN g/dL  --  11.1* 14.4   WBC K/uL  --  2.04* 5.36   PLATELETS K/uL  --  60* 121*   POTASSIUM mEQ/L 3.0*  --  3.7   SODIUM mEQ/L 138  --  132*   CREATININE mg/dL 0.6  --  0.6       Recent EKG HR/QTC  Lab Results   Component Value Date    VENTRICRATE 84 01/27/2025    QTCCALCULAT 444 01/27/2025       No results found for: \"VPA\", \"LITHIUM\"    Mental Status Exam  Appearance:    in hospital attire and ill appearing    Attention:  attends appropriately during exam   Behavior (General):   Awake, " "lethargic    Behavior (Motoric):  decreased psychomotor activity   Speech:   Mumbling, soft speech    Language:  fluent and no abnormalities on exam   Affect:  flat and constricted   Mood:   \"I don't feel well\"   Associations:  coherent   Thought Process:  goal-directed   Thought Content:  no auditory or visual hallucinations on exam and no paranoia or delusions on exam   Suicidality:  denies desire or thoughts to harm/kill self; denies thoughts to harm/kill others   Orientation:  A&O x 4   Insight/Judgement:  acknowledges illness and help accepting   Memory:  recalls recent events and recalls remote events         PLAN:  Assessment & Plan  Depression  Patient with long history of anxiety and depression.  She follows closely with outpatient mental health providers.  She reports she is compliant with medication.  She denies SI/HI/AVH.    1) No clear indication for acute inpatient psychiatric admission.  Patient denies SI/HI, no indication for a 1:1 from a psychiatric standpoint     2) Agree with continue outpatient medications.  Recommend HOLD Prozac if concerned this medication contributing to hyponatremia.     3) PRN Ativan per primary team. It does not appear patient with current prescription for Ativan as outpatient.  Concern in the past, patient with hx of misusing prescription benzodiazepines.  I do not recommend further escalating Ativan dose in the hospital, nor do I recommend discharging patient with script for Ativan     4) Recommend HOLD outpatient Seroquel and Prazosin if team has concern for hypotension     5)  Recommend making Trazodone PRN order for insomnia if team has concern for oversedation  Anxiety    Eating disorder  Patient reports long history of eating disorder inpatient several times in the past.  Patient reporting restricting PO intake since Christmas.  She denies binging or purging.  She reports drinking water and Gatorade at home, reports not eating food at all    1) Patient is requesting " inpatient eating disorder treatment.  She reports she would be agreeable to discharging to [facility] once medically stable.  Discussed concern for issues with ambulation.  Agree with PT/OT consult     2) Discussed with behavioral health, agree with  consult       PTSD (post-traumatic stress disorder)      36 Minutes of total time were spent reviewing past notes and tests, examining the patient, communicating with the team, counseling the patient and documenting in the EMR

## 2025-02-04 NOTE — PROGRESS NOTES
Physical Therapy -  Initial Evaluation     Patient: Felipa Cuadra  Location: [address]  MRN: [id number]  Today's date: 2/4/2025    HISTORY OF PRESENT ILLNESS     Felipa is a 44 y.o. female admitted on 2/1/2025 with UTI (urinary tract infection) [N39.0]  Lower abdominal pain [R10.30]  Urinary tract infection with hematuria, site unspecified [N39.0, R31.9]. Principal problem is UTI (urinary tract infection).    Past Medical History  Felipa has a past medical history of Anxiety, Depression, and Eating disorder.    History of Present Illness    Patient was seen in the ED 1 week ago for evaluation of abdominal pain and generalized weakness.  Workup revealed acute cystitis.  She was discharged on a course of Macrobid, which patient reports she has been taking.  She presents today for evaluation of persistent abdominal pain.  Patient reports suprapubic pain that radiates to her back.  She reports it is constant.  She reports she is weak and unable to ambulate.  She reports increased frequency of urination.  She reports chills.  She reports nausea without vomiting.  She reports she has not had solid food intake since Christmas.  She reports she has been drinking a lot of Gatorade.  She reports she is currently having black stool and vaginal bleeding x 3 days.  She reports she rarely gets her period.  She has not noticed any blood clots.  She reports she is feeling more depressed lately.  She denies suicidal ideation.  She reports she smokes 4 cigarettes/day.  She reports she quit alcohol use 4 years ago.  She denies fever, syncope, vomiting, chest pain, shortness of breath, constipation, diarrhea.   PRIOR LEVEL OF FUNCTION AND LIVING ENVIRONMENT     Prior Level of Function      Flowsheet Row Most Recent Value   Dominant Hand right   Ambulation assistive equipment   Transferring assistive equipment   Toileting independent   Bathing assistive equipment and person   Dressing assistive person   Eating  independent   IADLs independent   Prior Level of Function Comment Reports assist from HHAs 6 days/week with bathing + dressing. Uses a walker at home   Assistive Device Currently Used at Home walker, standard, grab bar, shower chair             Prior Living Environment      Flowsheet Row Most Recent Value   People in Home alone   Current Living Arrangements home   Home Accessibility elevator accessible   Living Environment Comment Lives alone in apartment with elevator access. Tub shower with SC + GBs.          VITALS AND PAIN     PT Vitals      Date/Time Pulse Pt Activity O2 Therapy BP BP Location BP Method Pt Position [facility]   02/04/25 1406 79 At rest None (Room air) 94/58 Left upper arm Automatic Lying [facility]   02/04/25 1418 101 At rest None (Room air) 88/64 Left upper arm Automatic Sitting [facility]          PT Pain      Date/Time Pain Type Acceptable Pain Level Location Rating: Rest Rating: Rest Rating: Activity Interventions [facility]   02/04/25 1406 Pain Assessment -- back generalized -- 8 - severe pain 8 - severe pain care clustered [facility]   02/04/25 1411 Pain Assessment 4 back generalized 9 -- -- medication administered;pain management plan reviewed with patient/caregiver;pillow support provided BFS             Objective   OBJECTIVE     Start time:  1402  End time:  1428  Session Length: 26 min  Mode of Treatment: co-treatment  Reason for co-treatment: skilled assist x2, limited activity tolerance, d/c planning    General Observations  Patient received supine, in bed. She was agreeable to therapy, no issues or concerns identified by nurse prior to session.      Precautions: fall       Limitations/Impairments: sensory      PT Eval and Treat - 02/04/25 1402          Cognition    Orientation Status oriented x 3     Affect/Mental Status flat/blunted affect;low arousal/lethargic     Follows Commands follows one-step commands;over 90% accuracy;repetition of directions required;verbal cues/prompting required;delayed  response/completion     Cognitive Function attention deficit;executive function deficit     Attention Deficit focused/sustained attention;requires cues/redirection to task     Executive Function Deficit insight/awareness of deficits;judgment;problem-solving/reasoning;organization/sequencing;planning/decision-making;self-monitoring/self-correction        Vision Assessment/Intervention    Vision Assessment WFL;corrective lenses full-time        Hearing Assessment    Hearing Status WFL        Sensory Assessment    Sensory Assessment sensation intact except     Sensation Impaired Location(s) left LE;right LE     Left LE Sensory Impairment light touch awareness;diminished     Right LE Sensory Impairment light touch awareness;diminished     Sensory Subjective Reports numbness   Pt reports N/T in BLE due to history of neuropathy, LT intact but grossly diminished       Lower Extremity Strength    Hip, Left (Strength) 3/5     Knee, Left (Strength) 3+/5     Ankle, Left (Strength) 3+/5     Hip, Right (Strength) 3/5     Knee, Right (Strength) 3+/5     Ankle, Right (Strength) 3+/5        Bed Mobility    Bed Mobility Activities left;right;rolling;supine to sit;sit to supine     [place] moderate assist (50-74% patient effort);maximum assist (25-49% patient effort);1 person assist;2 person assist     Safety/Cues increased time to complete;moderate;verbal cues;tactile cues;hand placement;sequencing;technique     Assistive Device bed rails     Comment Mod A x1 with rolling to L/R, mod A x2 supine to seated transfer, max A x2 seated to supine transfer. Required assist at both BLE and trunk. Able to sit edge of bed ~8 minutes, initially required mod A but progressed to min A to maintain balance.        Mobility Belt    Mobility Belt Used During Session no - patient did not mobilize out of bed or stand        Sit/Stand Transfer    [place] not tested;unable to assess     Transfer Comments Deferred out of bed mobility at this  time due to strength/balance impairments, lethargy, reported dizziness.        Balance    Static Sitting Balance moderate impairment;mild impairment;sitting, edge of bed     Dynamic Sitting Balance not tested     Sit to Stand Dynamic Balance unable to perform activity     Static Standing Balance unable to perform activity     Dynamic Standing Balance unable to perform activity     Balance Interventions occupation based/functional task     Comment, Balance Initially required mod A but progressed to min A to maintain seated balance.        Impairments/Safety Issues    Impairments Affecting Function balance;cognition;endurance/activity tolerance;grasp;pain;strength;coordination;range of motion (ROM);postural/trunk control     Cognitive Impairments, Safety/Performance attention;insight into deficits/self-awareness     Functional Endurance Poor     Safety Issues Affecting Function insight into deficits/self-awareness;problem-solving;sequencing abilities;safety precaution awareness;judgment                                              Education Documentation  Joint Mobility/Strength, taught by Bunny Munguia PT at 2/4/2025  4:11 PM.  Learner: Patient  Readiness: Acceptance  Method: Explanation  Response: Needs Reinforcement  Comment: Role of PT, PT POC and d/c recs, use of call bell as needed    Fall Prevention, taught by Bunny Munguia PT at 2/4/2025  4:11 PM.  Learner: Patient  Readiness: Acceptance  Method: Explanation  Response: Needs Reinforcement  Comment: Role of PT, PT POC and d/c recs, use of call bell as needed    Call Light Use, taught by Bunny Munguia PT at 2/4/2025  4:11 PM.  Learner: Patient  Readiness: Acceptance  Method: Explanation  Response: Needs Reinforcement  Comment: Role of PT, PT POC and d/c recs, use of call bell as needed    Treatment Plan, taught by Bunny Munguia PT at 2/4/2025  4:11 PM.  Learner: Patient  Readiness: Acceptance  Method: Explanation  Response: Needs Reinforcement  Comment: Role  of PT, PT POC and d/c recs, use of call bell as needed        Session Outcome  Patient supine, in bed at end of session, all needs met, call light in reach (RN present at exit). Nursing notified about change in vital signs, patient's performance, patient's position, and patient's response to therapy/activity.    AM-PAC - Mobility (Current Function)     Turning form your back to your side while in flat bed without using bedrails 2 - A Lot   Moving from lying on your back to sitting on the side of a flat bed without using bedrails 2 - A Lot   Moving to and from a bed to a chair 2 - A Lot   Standing up from a chair using your arms 2 - A Lot   To walk in a hospital room 1 - Total   Climbing 3-5 steps with a railing 1 - Total   AM-PAC Mobility Score 10      ASSESSMENT AND PLAN     Progress Summary  PT evaluation complete. [facility] 10/24. Pt presents with BLE and trunk weakness, impaired seated balance, decreased endurance/activity tolerance. She required mod Ax1 with rolling, mod/max A x2 with supine <> seated transfers. Further mobility deferred for safety. PT will continue to follow with goals of maximizing safety and independence with mobility. Recommending SNF when medically stable.    Patient/Family Therapy Goals Statement: To get better    PT Plan      Flowsheet Row Most Recent Value   Rehab Potential fair, will monitor progress closely at 02/04/2025 1402   Therapy Frequency 3 times/wk at 02/04/2025 1402   Planned Therapy Interventions balance training, bed mobility training, gait training, postural re-education, strengthening, patient/family education, transfer training, ROM (range of motion), neuromuscular re-education, motor coordination training at 02/04/2025 1402            PT Discharge Recommendations      Flowsheet Row Most Recent Value   PT Recommended Discharge Disposition skilled nursing facility at 02/04/2025 1402   Anticipated Equipment Needs if Discharged Home (PT) none at 02/04/2025 1402                  PT Goals      Flowsheet Row Most Recent Value   Bed Mobility Goal 1    Activity/Assistive Device sit to supine/supine to sit at 02/04/2025 1402   [place] modified independence at 02/04/2025 1402   Time Frame by discharge at 02/04/2025 1402   Progress/Outcome goal ongoing at 02/04/2025 1402   Transfer Goal 1    Activity/Assistive Device sit-to-stand/stand-to-sit, bed-to-chair/chair-to-bed, walker, front-wheeled at 02/04/2025 1402   [place] supervision required at 02/04/2025 1402   Time Frame by discharge at 02/04/2025 1402   Progress/Outcome goal ongoing at 02/04/2025 1402   Gait Training Goal 1    Activity/Assistive Device gait (walking locomotion), assistive device use, walker, front-wheeled at 02/04/2025 1402   [place] supervision required at 02/04/2025 1402   Distance 25 ft at 02/04/2025 1402   Time Frame by discharge at 02/04/2025 1402   Progress/Outcome goal ongoing at 02/04/2025 1402

## 2025-02-04 NOTE — PROGRESS NOTES
Hematology/Oncology -  Daily Progress Note       SUBJECTIVE   Interval History: Patient reports some back and abdominal discomfort.  Per nursing staff CBC was clotted this morning and awaiting redraw.  No bleeding noted.     OBJECTIVE      Vital signs in last 24 hours:  Temp:  [36.1 °C (97 °F)-36.6 °C (97.9 °F)] 36.1 °C (97 °F)  Heart Rate:  [85-94] 90  Resp:  [14-16] 16  BP: ()/(55-75) 93/57    Intake/Output Summary (Last 24 hours) at 2/4/2025 1347  Last data filed at 2/4/2025 1330  Gross per 24 hour   Intake 1595 ml   Output --   Net 1595 ml       PHYSICAL EXAMINATION          Physical Examination: General appearance - alert, well appearing, and in no distress  Mental status - alert, oriented to person, place, and time  Eyes - sclera anicteric  Mouth - mucous membranes moist, pharynx normal without lesions  Neck - supple, no significant adenopathy  Chest - clear to auscultation, no wheezes, rales or rhonchi, symmetric air entry  Heart - normal rate and regular rhythm  Abdomen - soft, nontender, nondistended, no masses or organomegaly  Extremities - no pedal edema noted  Skin - normal coloration and turgor, no rashes, no suspicious skin lesions noted       LABS / IMAGING / TELE      Labs  Recent Results (from the past 24 hours)   Lactate dehydrogenase    Collection Time: 02/03/25  4:32 PM   Result Value Ref Range     (H) 98 - 271 IU/L   Vitamin B12    Collection Time: 02/03/25  4:32 PM   Result Value Ref Range    Vitamin B-12 708 180 - 914 pg/mL   POCT Glucose    Collection Time: 02/03/25  5:45 PM   Result Value Ref Range    POCT Bedside Glucose 95 70 - 99 mg/dL    POC Test POC    POCT Glucose    Collection Time: 02/03/25  9:31 PM   Result Value Ref Range    POCT Bedside Glucose 104 (H) 70 - 99 mg/dL    POC Test POC    Basic metabolic panel    Collection Time: 02/04/25  5:53 AM   Result Value Ref Range    Sodium 138 136 - 145 mEQ/L    Potassium 3.0 (L) 3.5 - 5.1 mEQ/L    Chloride 110 (H) 98 - 107  mEQ/L    CO2 20 (L) 21 - 31 mEQ/L    BUN 9 7 - 25 mg/dL    Creatinine 0.6 0.6 - 1.2 mg/dL    Glucose 106 (H) 70 - 99 mg/dL    Calcium 8.2 (L) 8.6 - 10.3 mg/dL    eGFR >60.0 >=60.0 mL/min/1.73m*2    Anion Gap 8 3 - 15 mEQ/L   Magnesium    Collection Time: 02/04/25  5:53 AM   Result Value Ref Range    Magnesium 1.7 (L) 1.8 - 2.5 mg/dL   Protime-INR    Collection Time: 02/04/25  5:53 AM   Result Value Ref Range    PT 15.7 (H) 12.2 - 14.5 sec    INR 1.3     PTT    Collection Time: 02/04/25  5:53 AM   Result Value Ref Range    PTT <22 (L) 23 - 35 sec   Fibrinogen    Collection Time: 02/04/25  5:53 AM   Result Value Ref Range    Fibrinogen 128 (L) 220 - 480 mg/dL   POCT Glucose    Collection Time: 02/04/25  7:44 AM   Result Value Ref Range    POCT Bedside Glucose 57 (L) 70 - 99 mg/dL    POC Test POC    POCT Glucose    Collection Time: 02/04/25  8:08 AM   Result Value Ref Range    POCT Bedside Glucose 178 (H) 70 - 99 mg/dL    POC Test POC    POCT Glucose    Collection Time: 02/04/25 11:57 AM   Result Value Ref Range    POCT Bedside Glucose 74 70 - 99 mg/dL    POC Test POC    CBC and differential    Collection Time: 02/04/25 12:29 PM   Result Value Ref Range    WBC 3.37 (L) 3.80 - 10.50 K/uL    RBC 3.39 (L) 3.93 - 5.22 M/uL    Hemoglobin 12.0 11.8 - 15.7 g/dL    Hematocrit 34.2 (L) 35.0 - 45.0 %    .9 (H) 83.0 - 98.0 fL    MCH 35.4 (H) 28.0 - 33.2 pg    MCHC 35.1 32.2 - 35.5 g/dL    RDW 14.6 (H) 11.7 - 14.4 %    Platelets 59 (L) 150 - 369 K/uL    MPV 11.3 9.4 - 12.3 fL    Differential Type Auto     nRBC 0.6 (H) <=0.0 %    Immature Granulocytes 1.8 %    Neutrophils 61.4 %    Lymphocytes 27.9 %    Monocytes 6.5 %    Eosinophils 2.1 %    Basophils 0.3 %    Immature Granulocytes, Absolute 0.06 0.00 - 0.08 K/uL    Neutrophils, Absolute 2.07 1.70 - 7.00 K/uL    Lymphocytes, Absolute 0.94 (L) 1.20 - 3.50 K/uL    Monocytes, Absolute 0.22 (L) 0.28 - 0.80 K/uL    Eosinophils, Absolute 0.07 0.04 - 0.36 K/uL    Basophils,  Absolute 0.01 0.01 - 0.10 K/uL    PLT Morphology Normal     Platelet Estimate Decreased (51,000-149,000)     Mark Cells 1+        Imaging  No results found.  Not applicable    ASSESSMENT AND PLAN           Assessment & Plan  UTI (urinary tract infection)    Depression    Anxiety    Eating disorder    Hypothyroidism    PTSD (post-traumatic stress disorder)    Insomnia    At risk for polypharmacy    Chronic pain disorder    Hyponatremia    Thrombocytopenia (CMS/HCC)  Thrombocytopenia without abnormal bleeding issues thus far.  Additionally has leukopenia (differential not available) which is new from yesterday.  -Low suspicion for DIC or microangiopathic hemolysis.  Likely medication related probably Rocephin.  -Okay to continue Rocephin while monitoring blood counts at this time.  Will request routine workup including B12 levels and peripheral smear review.  Obtain PT/INR, PTT and fibrinogen levels as well.  May need additional workup if thrombocytopenia continues to worsen as well as alternative antimicrobial therapy.  -My impression and plan of care reviewed with patient today.  All of her questions were answered.    2/4/25: Await results from today's CBC which is pending.  No evidence of B12 deficiency.  Addendum: CBC from today reviewed hemoglobin normal, white count improving. Platelet count is stable at 59 without evidence of bleeding. Can continue to monitor CBC.   Lower abdominal pain    Code Status: Full Code     Inés Wilcox MD  2/4/2025  1:47 PM

## 2025-02-04 NOTE — ASSESSMENT & PLAN NOTE
Patient reports long history of eating disorder inpatient several times in the past.  Patient reporting restricting PO intake since [place].  She denies binging or purging.  She reports drinking water and Gatorade at home, reports not eating food at all    1) Patient is requesting inpatient eating disorder treatment.  She reports she would be agreeable to discharging to [facility] once medically stable.  Discussed concern for issues with ambulation.  Agree with PT/OT consult     2) Discussed with behavioral health, agree with  consult

## 2025-02-05 LAB
ACANTHOCYTES BLD QL SMEAR: ABNORMAL
ANISOCYTOSIS BLD QL SMEAR: ABNORMAL
BACTERIA BLD CULT: NORMAL
BACTERIA BLD CULT: NORMAL
BASOPHILS # BLD: 0.02 K/UL (ref 0.01–0.1)
BASOPHILS NFR BLD: 0.5 %
BURR CELLS BLD QL SMEAR: ABNORMAL
DIFFERENTIAL METHOD BLD: ABNORMAL
EOSINOPHIL # BLD: 0.14 K/UL (ref 0.04–0.36)
EOSINOPHIL NFR BLD: 3.4 %
ERYTHROCYTE [DISTWIDTH] IN BLOOD BY AUTOMATED COUNT: 15.1 % (ref 11.7–14.4)
GLUCOSE BLD-MCNC: 123 MG/DL (ref 70–99)
GLUCOSE BLD-MCNC: 133 MG/DL (ref 70–99)
GLUCOSE BLD-MCNC: 136 MG/DL (ref 70–99)
GLUCOSE BLD-MCNC: 72 MG/DL (ref 70–99)
GLUCOSE BLD-MCNC: 73 MG/DL (ref 70–99)
HCT VFR BLD AUTO: 30.9 % (ref 35–45)
HGB BLD-MCNC: 10.6 G/DL (ref 11.8–15.7)
IMM GRANULOCYTES # BLD AUTO: 0.05 K/UL (ref 0–0.08)
IMM GRANULOCYTES NFR BLD AUTO: 1.2 %
LYMPHOCYTES # BLD: 1.22 K/UL (ref 1.2–3.5)
LYMPHOCYTES NFR BLD: 29.6 %
MCH RBC QN AUTO: 34.6 PG (ref 28–33.2)
MCHC RBC AUTO-ENTMCNC: 34.3 G/DL (ref 32.2–35.5)
MCV RBC AUTO: 101 FL (ref 83–98)
MONOCYTES # BLD: 0.4 K/UL (ref 0.28–0.8)
MONOCYTES NFR BLD: 9.7 %
NEUTROPHILS # BLD: 2.29 K/UL (ref 1.7–7)
NEUTS SEG NFR BLD: 55.6 %
NRBC BLD-RTO: 0.7 %
OVALOCYTES BLD QL SMEAR: ABNORMAL
PLATELET # BLD AUTO: 63 K/UL (ref 150–369)
PLATELET # BLD EST: ABNORMAL 10*3/UL
PLATELET CLUMP BLD QL SMEAR: PRESENT
PMV BLD AUTO: 12.3 FL (ref 9.4–12.3)
POCT TEST: ABNORMAL
POCT TEST: NORMAL
POCT TEST: NORMAL
RBC # BLD AUTO: 3.06 M/UL (ref 3.93–5.22)
SMUDGE CELLS BLD QL SMEAR: ABNORMAL
WBC # BLD AUTO: 4.12 K/UL (ref 3.8–10.5)

## 2025-02-05 PROCEDURE — 63700000 HC SELF-ADMINISTRABLE DRUG: Performed by: PHYSICIAN ASSISTANT

## 2025-02-05 PROCEDURE — 36415 COLL VENOUS BLD VENIPUNCTURE: CPT | Performed by: INTERNAL MEDICINE

## 2025-02-05 PROCEDURE — 99231 SBSQ HOSP IP/OBS SF/LOW 25: CPT | Performed by: INTERNAL MEDICINE

## 2025-02-05 PROCEDURE — 99233 SBSQ HOSP IP/OBS HIGH 50: CPT | Performed by: INTERNAL MEDICINE

## 2025-02-05 PROCEDURE — 85025 COMPLETE CBC W/AUTO DIFF WBC: CPT | Performed by: INTERNAL MEDICINE

## 2025-02-05 PROCEDURE — 12000000 HC ROOM AND CARE MED/SURG

## 2025-02-05 PROCEDURE — 63700000 HC SELF-ADMINISTRABLE DRUG: Performed by: INTERNAL MEDICINE

## 2025-02-05 PROCEDURE — 25800000 HC PHARMACY IV SOLUTIONS: Performed by: INTERNAL MEDICINE

## 2025-02-05 PROCEDURE — 63700000 HC SELF-ADMINISTRABLE DRUG: Performed by: HOSPITALIST

## 2025-02-05 PROCEDURE — 63600000 HC DRUGS/DETAIL CODE: Mod: JZ | Performed by: INTERNAL MEDICINE

## 2025-02-05 PROCEDURE — 99999 PR OFFICE/OUTPT VISIT,PROCEDURE ONLY: CPT | Performed by: INTERNAL MEDICINE

## 2025-02-05 RX ADMIN — ACETAMINOPHEN 650 MG: 325 TABLET ORAL at 03:35

## 2025-02-05 RX ADMIN — LORAZEPAM 0.25 MG: 0.5 TABLET ORAL at 08:30

## 2025-02-05 RX ADMIN — Medication 1 TABLET: at 08:30

## 2025-02-05 RX ADMIN — PANTOPRAZOLE SODIUM 20 MG: 20 TABLET, DELAYED RELEASE ORAL at 08:30

## 2025-02-05 RX ADMIN — TOPIRAMATE 50 MG: 100 TABLET, FILM COATED ORAL at 08:30

## 2025-02-05 RX ADMIN — NYSTATIN: 100000 POWDER TOPICAL at 08:34

## 2025-02-05 RX ADMIN — SODIUM CHLORIDE: 9 INJECTION, SOLUTION INTRAVENOUS at 02:17

## 2025-02-05 RX ADMIN — CEFTRIAXONE SODIUM 1 G: 1 INJECTION, POWDER, FOR SOLUTION INTRAMUSCULAR; INTRAVENOUS at 16:15

## 2025-02-05 RX ADMIN — FLUOXETINE HYDROCHLORIDE 40 MG: 20 CAPSULE ORAL at 08:30

## 2025-02-05 RX ADMIN — NYSTATIN: 100000 POWDER TOPICAL at 20:19

## 2025-02-05 RX ADMIN — TOPIRAMATE 50 MG: 100 TABLET, FILM COATED ORAL at 16:15

## 2025-02-05 RX ADMIN — GABAPENTIN 400 MG: 400 CAPSULE ORAL at 20:18

## 2025-02-05 RX ADMIN — ACETAMINOPHEN 650 MG: 325 TABLET ORAL at 08:39

## 2025-02-05 RX ADMIN — TRAZODONE HYDROCHLORIDE 100 MG: 100 TABLET ORAL at 20:18

## 2025-02-05 RX ADMIN — LORAZEPAM 0.25 MG: 0.5 TABLET ORAL at 14:05

## 2025-02-05 RX ADMIN — ZOLPIDEM TARTRATE 5 MG: 5 TABLET, FILM COATED ORAL at 20:18

## 2025-02-05 RX ADMIN — GABAPENTIN 400 MG: 400 CAPSULE ORAL at 14:05

## 2025-02-05 RX ADMIN — PANTOPRAZOLE SODIUM 20 MG: 20 TABLET, DELAYED RELEASE ORAL at 20:18

## 2025-02-05 RX ADMIN — ACETAMINOPHEN 650 MG: 325 TABLET ORAL at 14:06

## 2025-02-05 RX ADMIN — GABAPENTIN 400 MG: 400 CAPSULE ORAL at 08:30

## 2025-02-05 RX ADMIN — GABAPENTIN 400 MG: 400 CAPSULE ORAL at 17:35

## 2025-02-05 RX ADMIN — ACETAMINOPHEN 650 MG: 325 TABLET ORAL at 20:18

## 2025-02-05 RX ADMIN — QUETIAPINE FUMARATE 100 MG: 100 TABLET ORAL at 20:18

## 2025-02-05 RX ADMIN — LEVOTHYROXINE SODIUM 50 MCG: 0.05 TABLET ORAL at 06:22

## 2025-02-05 RX ADMIN — ATORVASTATIN CALCIUM 20 MG: 20 TABLET, FILM COATED ORAL at 08:30

## 2025-02-05 ASSESSMENT — ENCOUNTER SYMPTOMS
CONFUSION: 0
DIFFICULTY URINATING: 0

## 2025-02-05 ASSESSMENT — COGNITIVE AND FUNCTIONAL STATUS - GENERAL
MOVING TO AND FROM BED TO CHAIR: 1 - TOTAL
STANDING UP FROM CHAIR USING ARMS: 1 - TOTAL
CLIMB 3 TO 5 STEPS WITH RAILING: 1 - TOTAL
STANDING UP FROM CHAIR USING ARMS: 1 - TOTAL
MOVING TO AND FROM BED TO CHAIR: 1 - TOTAL
WALKING IN HOSPITAL ROOM: 1 - TOTAL
CLIMB 3 TO 5 STEPS WITH RAILING: 1 - TOTAL
WALKING IN HOSPITAL ROOM: 1 - TOTAL

## 2025-02-05 NOTE — PLAN OF CARE
Care Coordination Discharge Plan Note     Discharge Needs Assessment  Concerns to be Addressed: care coordination/care conferences, discharge planning, mental health  Current Discharge Risk:      Anticipated Discharge Plan  Anticipated Discharge Disposition:         Patient Choice  Offered/Gave Vendor List:         ---------------------------------------------------------------------------------------------------------------------    Interdisciplinary Discharge Plan Review:  Participants:     Concerns Comments: Patient has been rec'd for SNF.  SANDRITA met with patient at bedside & discussed dc plan.  SANDRITA sent referrals to [facility], [facility], [facility], [place], [facility], [facility], [facility], [facility], & [facility].  SANDRITA will continue to follow for dc planning.    Discharge Plan:   Disposition/Destination:   /    Discharge Facility:    Community Resources:      Discharge Transportation:  Is Out of Hospital DNR needed at Discharge: no  Does patient need discharge transport?

## 2025-02-05 NOTE — PLAN OF CARE
Plan of Care Review  Plan of Care Reviewed With: patient  Progress: no change  Outcome Evaluation: AAOx4, flat affect. BS at HS 69, tech gave pt 12oz apple juice. 15 min recheck for 78. Hour recheck for 108. MIRLANDE Sylvester made aware. Received order for 03:00 recheck, BS 73. Sipping on apple juice w/ PRN tylenol for back pain. Repositioned in bed x2 assist. ROM performed for b/l UE and b/l LE, w/ assist. Voiding in bedpan. BP remains soft but stable. Fluids continued. PRN oxycodone held per order as SBP <105. All other VSS. Bed alarmed. Call bell within reach.

## 2025-02-05 NOTE — PLAN OF CARE
Plan of Care Review  Plan of Care Reviewed With: patient  Progress: no change  Outcome Evaluation: Pt am blood sugar and 15 min recheck bs , communicated to Dr. Gordon, pt asymptomatic.  Pt only tolerated small bites from meals, juice taken. Noon blood sugar 74, pt asymptomatic, Md aware.  Therapy here to see pt this afternoon, bps noted at that time to Dr. Gordon, new orders received to continue ivf.  Pt encouraged po intake, incont of urine at times, pt repositioned, pt call bell at reach.

## 2025-02-05 NOTE — PROGRESS NOTES
Hematology/Oncology -  Daily Progress Note       SUBJECTIVE   Interval History: pt sleepy but arousable     OBJECTIVE      Vital signs in last 24 hours:  Temp:  [36.4 °C (97.6 °F)-36.6 °C (97.9 °F)] 36.5 °C (97.7 °F)  Heart Rate:  [86-89] 86  Resp:  [16-20] 20  BP: (92-97)/(53-57) 92/57    Intake/Output Summary (Last 24 hours) at 2/5/2025 1650  Last data filed at 2/5/2025 1315  Gross per 24 hour   Intake 2684 ml   Output 700 ml   Net 1984 ml       PHYSICAL EXAMINATION          Physical Examination: General appearance - fatigued and in no distress  Mental status - oriented to person, place, and time  Eyes - sclera anicteric  Mouth - mucous membranes moist, pharynx normal without lesions  Neck - supple, no significant adenopathy  Chest - clear to auscultation  Heart - normal rate and regular rhythm  Abdomen - soft, nontender, nondistended, no masses or organomegaly  Extremities - no pedal edema noted  Skin - normal coloration and turgor, no rashes, no suspicious skin lesions noted       LABS / IMAGING / TELE      Labs  Recent Results (from the past 24 hours)   POCT Glucose    Collection Time: 02/04/25  5:50 PM   Result Value Ref Range    POCT Bedside Glucose 97 70 - 99 mg/dL    POC Test POC    POCT Glucose    Collection Time: 02/04/25  9:43 PM   Result Value Ref Range    POCT Bedside Glucose 69 (L) 70 - 99 mg/dL    POC Test POC    POCT Glucose    Collection Time: 02/04/25 10:00 PM   Result Value Ref Range    POCT Bedside Glucose 78 70 - 99 mg/dL    POC Test POC    POCT Glucose    Collection Time: 02/04/25 10:56 PM   Result Value Ref Range    POCT Bedside Glucose 108 (H) 70 - 99 mg/dL    POC Test POC    POCT Glucose    Collection Time: 02/05/25  3:26 AM   Result Value Ref Range    POCT Bedside Glucose 73 70 - 99 mg/dL    POC Test POC    POCT Glucose    Collection Time: 02/05/25  8:13 AM   Result Value Ref Range    POCT Bedside Glucose 133 (H) 70 - 99 mg/dL    POC Test POC    CBC and differential    Collection Time:  02/05/25 10:28 AM   Result Value Ref Range    WBC 4.12 3.80 - 10.50 K/uL    RBC 3.06 (L) 3.93 - 5.22 M/uL    Hemoglobin 10.6 (L) 11.8 - 15.7 g/dL    Hematocrit 30.9 (L) 35.0 - 45.0 %    .0 (H) 83.0 - 98.0 fL    MCH 34.6 (H) 28.0 - 33.2 pg    MCHC 34.3 32.2 - 35.5 g/dL    RDW 15.1 (H) 11.7 - 14.4 %    Platelets 63 (L) 150 - 369 K/uL    MPV 12.3 9.4 - 12.3 fL    Differential Type Auto     nRBC 0.7 (H) <=0.0 %    Immature Granulocytes 1.2 %    Neutrophils 55.6 %    Lymphocytes 29.6 %    Monocytes 9.7 %    Eosinophils 3.4 %    Basophils 0.5 %    Immature Granulocytes, Absolute 0.05 0.00 - 0.08 K/uL    Neutrophils, Absolute 2.29 1.70 - 7.00 K/uL    Lymphocytes, Absolute 1.22 1.20 - 3.50 K/uL    Monocytes, Absolute 0.40 0.28 - 0.80 K/uL    Eosinophils, Absolute 0.14 0.04 - 0.36 K/uL    Basophils, Absolute 0.02 0.01 - 0.10 K/uL    Platelet Estimate Decreased (51,000-149,000)     Clumped Platelets Present     Smudge Cells Occasional     Anisocytosis 1+     Ovalocytes 1+     Acanthocytes Occasional     Pinon Cells 1+    POCT Glucose    Collection Time: 02/05/25 12:34 PM   Result Value Ref Range    POCT Bedside Glucose 123 (H) 70 - 99 mg/dL    POC Test POC        Imaging  No results found.  Not applicable    ASSESSMENT AND PLAN           Assessment & Plan  UTI (urinary tract infection)    Depression    Anxiety    Eating disorder    Hypothyroidism    PTSD (post-traumatic stress disorder)    Insomnia    At risk for polypharmacy    Chronic pain disorder    Hyponatremia    Thrombocytopenia (CMS/HCC)  Thrombocytopenia without abnormal bleeding issues thus far.  Additionally has leukopenia (differential not available) which is new from yesterday.  -Low suspicion for DIC or microangiopathic hemolysis.  Likely medication related probably Rocephin.  -Okay to continue Rocephin while monitoring blood counts at this time.  Will request routine workup including B12 levels and peripheral smear review.  Obtain PT/INR, PTT and  fibrinogen levels as well.  May need additional workup if thrombocytopenia continues to worsen as well as alternative antimicrobial therapy.  -My impression and plan of care reviewed with patient today.  All of her questions were answered.    2/4/25: Await results from today's CBC which is pending.  No evidence of B12 deficiency.  Addendum: CBC from today reviewed hemoglobin normal, white count improving.  Platelet count is stable at 59 without evidence of bleeding.  Can continue to monitor CBC.    2/5/25:  stable at 63K.  No bleeding noted.    PT 15.7/INR 1.3  PTT <22.  Fibrinogen 128.  Pattern not c/w DIC.  Has not received heparin this admission.  Official path review of smear: Thrombocytopenia.  Mild leukopenia with an absolute lymphopenia. RBC morphology with macrocytosis.   B12 708.  .  Retic 0.68%.  Will repeat cbc w/diff, cmp (lft's normal on admission) and haptoglobin.  Will repeat coags and fibrinogen (?hypofibrinogenemia w/ hx of eating d/o)  Lower abdominal pain    Code Status: Full Code     Pallavi M. Rastogi, MD  2/5/2025  4:50 PM

## 2025-02-05 NOTE — PROGRESS NOTES
NEPHROLOGY PROGRESS NOTE    Subjective: Seen in follow up re: hyponatremia.  Has lower back pain and some abdominal discomfort.     Review of Systems   Constitutional:         Eating disorder    Genitourinary:  Negative for difficulty urinating.   Psychiatric/Behavioral:  Negative for confusion.    All other systems reviewed and are negative.      Vitals:    02/04/25 1531 02/04/25 2142 02/04/25 2147 02/05/25 0809   BP: (!) 93/59 (!) 92/57  (!) 97/53   BP Location: Right upper arm Left upper arm  Left upper arm   Patient Position: Lying Lying  Lying   Pulse: 93 89 89 86   Resp: 18  16 19   Temp: 36.1 °C (97 °F)  36.4 °C (97.6 °F) 36.6 °C (97.9 °F)   TempSrc: Temporal  Tympanic Oral   SpO2: 96%  97% 99%   Weight:       Height:             Intake/Output Summary (Last 24 hours) at 2/5/2025 0856  Last data filed at 2/5/2025 0615  Gross per 24 hour   Intake 2604 ml   Output 700 ml   Net 1904 ml       Physical Exam  Constitutional:       General: She is not in acute distress.  HENT:      Head: Atraumatic.      Nose: No rhinorrhea.      Mouth/Throat:      Pharynx: Oropharynx is clear.   Eyes:      Pupils: Pupils are equal, round, and reactive to light.   Cardiovascular:      Rate and Rhythm: Normal rate and regular rhythm.   Pulmonary:      Effort: No respiratory distress.   Abdominal:      Palpations: Abdomen is soft.   Musculoskeletal:         General: No swelling.      Cervical back: Neck supple.      Right lower leg: No edema.      Left lower leg: No edema.   Skin:     Capillary Refill: Capillary refill takes less than 2 seconds.      Coloration: Skin is not jaundiced.   Neurological:      Mental Status: She is alert and oriented to person, place, and time.         LABS:  Results from last 7 days   Lab Units 02/04/25  1229 02/04/25  0553 02/03/25  1316 02/01/25  1223   SODIUM mEQ/L  --  138  --  132*   POTASSIUM mEQ/L  --  3.0*  --  3.7   CHLORIDE mEQ/L  --  110*  --  99   CO2 mEQ/L  --  20*  --  24   BUN mg/dL  --  9   --  18   CREATININE mg/dL  --  0.6  --  0.6   EGFR mL/min/1.73m*2  --  >60.0  --  >60.0   GLUCOSE mg/dL  --  106*  --  93   CALCIUM mg/dL  --  8.2*  --  9.2   ALBUMIN g/dL  --   --   --  4.3   WBC K/uL 3.37*  --    < > 5.36   HEMOGLOBIN g/dL 12.0  --    < > 14.4   HEMATOCRIT % 34.2*  --    < > 41.1   PLATELETS K/uL 59*  --    < > 121*    < > = values in this interval not displayed.       Meds:    Current Facility-Administered Medications:     acetaminophen (TYLENOL) tablet 650 mg, 650 mg, oral, q4h PRN, Milly Roberson PA C, 650 mg at 02/05/25 0839    atorvastatin (LIPITOR) tablet 20 mg, 20 mg, oral, q AM, Milly Roberson PA C, 20 mg at 02/05/25 0830    B-complex with vitamin C tablet 1 tablet, 1 tablet, oral, Daily, Julian Alberto MD, 1 tablet at 02/05/25 0830    cefTRIAXone (ROCEPHIN) IVPB 1 g in 100 mL NSS vial in bag, 1 g, intravenous, q24h INT, Milly Roberson PA C, Stopped at 02/04/25 1622    glucose chewable tablet 16-32 g of dextrose, 16-32 g of dextrose, oral, PRN **OR** dextrose 40 % oral gel 15-30 g of dextrose, 15-30 g of dextrose, oral, PRN **OR** glucagon (GLUCAGEN) injection 1 mg, 1 mg, intramuscular, PRN **OR** dextrose 50 % in water (D50) injection 12.5 g, 25 mL, intravenous, PRN, Milly Roberson PA C, 12.5 g at 02/04/25 0750    FLUoxetine (PROzac) capsule 40 mg, 40 mg, oral, Daily, Milly Roberson PA C, 40 mg at 02/05/25 0830    gabapentin (NEURONTIN) capsule 400 mg, 400 mg, oral, QID, Milly Roberson PA C, 400 mg at 02/05/25 0830    insulin lispro U-100 (HumaLOG) pen 3-5 Units, 3-5 Units, subcutaneous, With meals & nightly, Milly Roberson PA C    levothyroxine (SYNTHROID) tablet 50 mcg, 50 mcg, oral, Daily (6:30a), Milly Roberson PA C, 50 mcg at 02/05/25 0622    LORazepam (ATIVAN) tablet 0.25 mg, 0.25 mg, oral, BID, Milly Roberson PA C, 0.25 mg at 02/05/25 0830    magnesium sulfate IVPB 1g in 100 mL NSS/D5W/SWFI, 1 g, intravenous, PRN **OR** magnesium  sulfate IVPB 2g in 50 mL NSS/D5W/SWFI, 2 g, intravenous, PRN, Clark Gordon MD, Stopped at 02/04/25 1445    nystatin (MYCOSTATIN) 100,000 unit/gram topical powder, , Topical, BID, Milly Roberson PA C, Given at 02/05/25 0834    oxyCODONE (ROXICODONE) immediate release tablet 5 mg, 5 mg, oral, q6h PRN, Jessica Ballard MD, 5 mg at 02/03/25 0107    pantoprazole (PROTONIX) tablet,delayed release (DR/EC) 20 mg, 20 mg, oral, BID, Milly Roberson PA C, 20 mg at 02/05/25 0830    potassium chloride (KLOR-CON M) ER tablet (particles/crystals) 20 mEq, 20 mEq, oral, PRN, Clark Gordon MD    potassium chloride (KLOR-CON M) ER tablet (particles/crystals) 40 mEq, 40 mEq, oral, PRN, Clark Gordon MD, 40 mEq at 02/04/25 1242    prazosin (MINIPRESS) capsule 5 mg, 5 mg, oral, Nightly, Milly Roberson PA C, 5 mg at 02/01/25 2215    QUEtiapine (SEROQUEL) tablet 100 mg, 100 mg, oral, Nightly, Milly Roberson PA C, 100 mg at 02/04/25 2143    sodium chloride 0.9 % infusion, , intravenous, Continuous, Clark Gordon MD, Last Rate: 100 mL/hr at 02/05/25 0217, New Bag at 02/05/25 0217    topiramate (TOPAMAX) tablet 50 mg, 50 mg, oral, BID, Milly Roberson PA C, 50 mg at 02/05/25 0830    traZODone (DESYREL) tablet 100 mg, 100 mg, oral, Nightly, TejinderongiorMich mariona ERICK PA C, 100 mg at 02/04/25 2143    zolpidem (AMBIEN) tablet 5 mg, 5 mg, oral, Nightly PRN, Charity Aguilar MD, 5 mg at 02/03/25 2120    ASSESSMENT:  Principal Problem:    UTI (urinary tract infection)  Active Problems:    Depression    Anxiety    Eating disorder    Hypothyroidism    PTSD (post-traumatic stress disorder)    Insomnia    At risk for polypharmacy    Chronic pain disorder    Hyponatremia    Thrombocytopenia (CMS/HCC)    Lower abdominal pain      PLAN:  Hyponatremia: serum osmolality 281. Urine Na < 10. Urine osmolality 133. Tea and toast syndrome. This is consistent with her hx of chronic eating disorder. Na  improved to 138 from 2/4.  No blood work is available from today. Continue a mild fluid restriction at 1800 mL/day.. Continue oral nutrition support     2. UTI: Cx proteus mirabilis. Abx currently ceftriaxone.    3. Hypercholeremic metabolic acidosis: attempt to limit further Chloride rich IVF.  If total CO2 drops any further, she might need some bicarbonate.  Gonzalo Rossi MD

## 2025-02-05 NOTE — PROGRESS NOTES
Hospital Medicine     Daily Progress Note       SUBJECTIVE   Pt somnolent, in pain most of the day  Not eating much       OBJECTIVE   Vital signs in last 24 hours:  Temp:  [36.1 °C (97 °F)-36.4 °C (97.6 °F)] 36.4 °C (97.6 °F)  Heart Rate:  [] 89  Resp:  [16-18] 16  BP: (87-94)/(55-64) 92/57    Intake/Output Summary (Last 24 hours) at 2/5/2025 0352  Last data filed at 2/4/2025 2218  Gross per 24 hour   Intake 2350 ml   Output 300 ml   Net 2050 ml       Physical Exam  Vitals and nursing note reviewed.   Constitutional:       Appearance: She is ill-appearing.   HENT:      Head: Normocephalic and atraumatic.   Eyes:      General: No scleral icterus.        Right eye: No discharge.         Left eye: No discharge.   Cardiovascular:      Rate and Rhythm: Normal rate.      Heart sounds: Normal heart sounds.   Pulmonary:      Effort: No respiratory distress.   Skin:     Coloration: Skin is not jaundiced.   Neurological:      Mental Status: She is alert and oriented to person, place, and time. Mental status is at baseline.   Psychiatric:         Behavior: Behavior normal.          LINES, DRAINS, AIRWAYS, WOUNDS   Peripheral IV (Adult) 02/03/25 Anterior;Right Forearm (Active)   Number of days: 2          LABS, IMAGING, TELEMETRY   CHEMISTRIES   Results from last 7 days   Lab Units 02/04/25  0553 02/01/25  1223   SODIUM mEQ/L 138 132*   POTASSIUM mEQ/L 3.0* 3.7   CHLORIDE mEQ/L 110* 99   CO2 mEQ/L 20* 24   BUN mg/dL 9 18   CREATININE mg/dL 0.6 0.6   GLUCOSE mg/dL 106* 93   CALCIUM mg/dL 8.2* 9.2   EGFR mL/min/1.73m*2 >60.0 >60.0   ANION GAP mEQ/L 8 9   MAGNESIUM mg/dL 1.7*  --      HEPATIC FUNCTION TESTS   Results from last 7 days   Lab Units 02/01/25  1223   AST IU/L 19   ALT IU/L 16   ALK PHOS IU/L 68   ALBUMIN g/dL 4.3   PROTEIN TOTAL g/dL 6.4   BILIRUBIN TOTAL mg/dL 0.9     CBC RESULTS   Results from last 7 days   Lab Units 02/04/25  1229 02/03/25  1316 02/01/25  1223   WBC K/uL 3.37* 2.04* 5.36    HEMOGLOBIN g/dL 12.0 11.1* 14.4   HEMATOCRIT % 34.2* 33.0* 41.1   PLATELETS K/uL 59* 60* 121*     ANEMIA STUDIES   Results from last 7 days   Lab Units 02/03/25  1632   VITAMIN B 12 pg/mL 708     COAGULATION   Results from last 7 days   Lab Units 02/04/25  0553   PROTIME sec 15.7*   INR  1.3   PTT sec <22*     ENDOCRINE STUDIES   Lab Results   Component Value Date    TSH 1.40 09/29/2024    FREET4 0.55 (L) 08/16/2024     LIPIDS     CARDIAC     MICROBIOLOGY   Microbiology Results       Procedure Component Value Units Date/Time    Blood Culture Blood, Venous [[id number]]  (Normal) Collected: 02/01/25 1419    Specimen: Blood, Venous Updated: 02/04/25 2001     Culture No growth at 72 hours    Blood Culture Blood, Venous [[id number]]  (Normal) Collected: 02/01/25 1419    Specimen: Blood, Venous Updated: 02/04/25 2001     Culture No growth at 72 hours    Urine culture Urine, Clean Catch [[id number]]  (Abnormal)  (Susceptibility) Collected: 02/01/25 1215    Specimen: Urine, Clean Catch Updated: 02/04/25 0851     Urine Culture **Positive Culture**      >=100,000 cfu/mL Klebsiella pneumoniae ssp pneumoniae      >=100,000 cfu/mL Proteus mirabilis    Urine culture Urine, Clean Catch [[id number]]  (Normal) Collected: 02/02/25 1515    Specimen: Urine, Clean Catch Updated: 02/03/25 1449     Urine Culture No Growth (Limit of detection 1000 cfu/mL)            Radiology Reports in last 24hNo results found.     ASSESSMENT AND PLAN     # Acute cystitis  cont with Abx, f/u cultures     # Back pain  patient complains of mid to low back pain which started about a week ago. Denies acute injury. States she has leg weakness  CT scan of the lumbar spine with no abnormality  - continue current management. PT/OT     # Generalized weakness  # Hyponatremia  - cont with IVF  encourage po intake  trend chem 7     # Thrombocytopenia  - platelets: 59 (2/4/25) down from 60 (2/3/25)  - this is new. repeat labs drawn and unchanged  - no signs of bleeding.  drop in all cell lines, possibly patient was dehydrated on presentation.  - Reviewed meds, not on heparin, less likely heparin-induced thrombocytopenia  - will consult hematology  - monitor CBC     # Eating disorder  - reports she has not had solid food intake since christmas  - supervise feeds  - psych consulted     # Depression  # Anxiety  # Bipolar disease  # PTSD  - psych input noted  - continue home: LORazepam tablet po BID  - continue home: QUEtiapine tablet 100 mg po daily  - continue home: fluoxetine 40 mg po daily  - continue home: trazodone 100 mg po daily     # Hyponatremia: mild, likely due to pseudohyponatremia, resolved  - sodium: 132 (2/1/25)  - similar to sodium 1 week ago, however baseline appears 143  - reports she has not had solid food intake since christmas, only gatorade  - also is on numerous psych medications  - checking serum and urine osmolality, urine electrolytes  - nephrology consulted  - monitor BMP     # DM2  - poct with ssi correction  - checking hgba1c level     # Seizure disorder  - continued on topiramate     # Hypothyroidism  - TSH: 1.4 (9/29/24), free T4: 0.6 (8/16/24)  - continued on synthroid     # GERD without esophagitis  - continued on PPI     # Drug seeking behavior  - judicious use of narcotics while hospitalized     # Marijuana use  - UDs positive for Cannabis  -Behavioral health consulted     # Tobacco use  - smokes 4 cigarettes per day  - declined nicotine patch  - tobacco cessation advised     # Alcohol use disorder, mild, without withdrawal  - checking alcohol level, reports last use 4 years ago  - monitor for signs/symtpoms of alcohol w/d     # Sepsis with acute organ dysfunction     # Metabolic acidosis     # Hypocalcemia     # Hypomagnesemia: acute     # Hypokalemia: mild to moderate     # Hypoglycemia       VTE Prophylaxis:  Current anticoagulants:    None      Code Status: Full Code        Estimated Discharge Date: 2/6/2025   Disposition Planning: DC when cleared  by psych         Clark Gordon MD  2/5/2025

## 2025-02-05 NOTE — PROGRESS NOTES
Hospital Medicine     Daily Progress Note       SUBJECTIVE   No events overnight  Pt not eating much today  Having some diffuse pains  Feels weak all over     OBJECTIVE   Vital signs in last 24 hours:  Temp:  [36.4 °C (97.6 °F)-36.6 °C (97.9 °F)] 36.5 °C (97.7 °F)  Heart Rate:  [86-89] 86  Resp:  [16-20] 20  BP: (92-97)/(53-57) 92/57    Intake/Output Summary (Last 24 hours) at 2/5/2025 1813  Last data filed at 2/5/2025 1315  Gross per 24 hour   Intake 2684 ml   Output 700 ml   Net 1984 ml       Physical Exam  Vitals and nursing note reviewed.   Constitutional:       General: She is not in acute distress.     Appearance: She is ill-appearing.      Comments: Listless, weak, moves slowly     HENT:      Head: Normocephalic and atraumatic.      Right Ear: External ear normal.      Left Ear: External ear normal.   Cardiovascular:      Rate and Rhythm: Normal rate.   Pulmonary:      Effort: No respiratory distress.   Abdominal:      General: There is no distension.   Skin:     Findings: Lesion present.      Comments: Left arm scarring     Neurological:      Mental Status: She is oriented to person, place, and time. Mental status is at baseline.      Motor: Weakness present.   Psychiatric:         Behavior: Behavior normal.          LINES, DRAINS, AIRWAYS, WOUNDS   Peripheral IV (Adult) 02/03/25 Anterior;Right Forearm (Active)   Number of days: 2          LABS, IMAGING, TELEMETRY   CHEMISTRIES   Results from last 7 days   Lab Units 02/04/25  0553 02/01/25  1223   SODIUM mEQ/L 138 132*   POTASSIUM mEQ/L 3.0* 3.7   CHLORIDE mEQ/L 110* 99   CO2 mEQ/L 20* 24   BUN mg/dL 9 18   CREATININE mg/dL 0.6 0.6   GLUCOSE mg/dL 106* 93   CALCIUM mg/dL 8.2* 9.2   EGFR mL/min/1.73m*2 >60.0 >60.0   ANION GAP mEQ/L 8 9   MAGNESIUM mg/dL 1.7*  --      HEPATIC FUNCTION TESTS   Results from last 7 days   Lab Units 02/01/25  1223   AST IU/L 19   ALT IU/L 16   ALK PHOS IU/L 68   ALBUMIN g/dL 4.3   PROTEIN TOTAL g/dL 6.4   BILIRUBIN  TOTAL mg/dL 0.9     CBC RESULTS   Results from last 7 days   Lab Units 02/05/25  1028 02/04/25  1229 02/03/25  1316 02/01/25  1223   WBC K/uL 4.12 3.37* 2.04* 5.36   HEMOGLOBIN g/dL 10.6* 12.0 11.1* 14.4   HEMATOCRIT % 30.9* 34.2* 33.0* 41.1   PLATELETS K/uL 63* 59* 60* 121*     ANEMIA STUDIES   Results from last 7 days   Lab Units 02/03/25  1632   VITAMIN B 12 pg/mL 708     COAGULATION   Results from last 7 days   Lab Units 02/04/25  0553   PROTIME sec 15.7*   INR  1.3   PTT sec <22*     ENDOCRINE STUDIES   Lab Results   Component Value Date    TSH 1.40 09/29/2024    FREET4 0.55 (L) 08/16/2024     LIPIDS     CARDIAC     MICROBIOLOGY   Microbiology Results       Procedure Component Value Units Date/Time    Blood Culture Blood, Venous [[id number]]  (Normal) Collected: 02/01/25 1419    Specimen: Blood, Venous Updated: 02/04/25 2001     Culture No growth at 72 hours    Blood Culture Blood, Venous [[id number]]  (Normal) Collected: 02/01/25 1419    Specimen: Blood, Venous Updated: 02/04/25 2001     Culture No growth at 72 hours    Urine culture Urine, Clean Catch [[id number]]  (Abnormal)  (Susceptibility) Collected: 02/01/25 1215    Specimen: Urine, Clean Catch Updated: 02/04/25 0851     Urine Culture **Positive Culture**      >=100,000 cfu/mL Klebsiella pneumoniae ssp pneumoniae      >=100,000 cfu/mL Proteus mirabilis    Urine culture Urine, Clean Catch [[id number]]  (Normal) Collected: 02/02/25 1515    Specimen: Urine, Clean Catch Updated: 02/03/25 1449     Urine Culture No Growth (Limit of detection 1000 cfu/mL)            Radiology Reports in last 24hNo results found.     ASSESSMENT AND PLAN     # Acute cystitis  cont with Abx, f/u cultures     # Back pain  cont with pain mgmt  PT/OT  fall precautions.      # Thrombocytopenia  - platelets: 63 (2/5/25) up from 59 (2/4/25)  Hem/onc input noted  cont to monitor  no signs of bleeding, f/u lab.      # Eating disorder  - reports she has not had solid food intake since  gail  - supervise feeds  - psych consulted and input noted.      # Depression  # Anxiety  # Bipolar disease  # PTSD  - psych input noted  - continue home: LORazepam tablet po BID  - continue home: QUEtiapine tablet 100 mg po daily  - continue home: fluoxetine 40 mg po daily  - continue home: trazodone 100 mg po daily     # DM2  - poct with ssi correction  - checking hgba1c level  - currently on: insulin lispro 3 - 5 units sc QID     # Seizure disorder  - continued on topiramate     # Hypothyroidism  - TSH: 1.4 (9/29/24), free T4: 0.6 (8/16/24)  - continue home: levothyroxine 50 mcg po daily     # GERD without esophagitis  - currently on: pantoprazole 20 mg po BID     # Drug seeking behavior  - judicious use of narcotics while hospitalized     # Marijuana use  - UDs positive for Cannabis  -Behavioral health consulted     # Tobacco use  - smokes 4 cigarettes per day  - declined nicotine patch  - tobacco cessation advised     # Alcohol use disorder, mild, without withdrawal  - checking alcohol level, reports last use 4 years ago  - monitor for signs/symtpoms of alcohol w/d     # Sepsis with acute organ dysfunction     # Metabolic acidosis     # Hypocalcemia     # Hypomagnesemia: acute     # Hypokalemia: mild to moderate     # Hypoglycemia     # Pancytopenia     # Anemia: acute on chronic, due to bone marrow suppression, macrocytic       VTE Prophylaxis:  Current anticoagulants:    None      Code Status: Full Code        Estimated Discharge Date: 2/6/2025   Disposition Planning: DC when eating more, when cleared by psych, behav health, needs PT/OT, SW f/u        Clark Gordon MD  2/5/2025

## 2025-02-06 ENCOUNTER — APPOINTMENT (INPATIENT)
Dept: RADIOLOGY | Facility: HOSPITAL | Age: 45
DRG: 690 | End: 2025-02-06
Attending: STUDENT IN AN ORGANIZED HEALTH CARE EDUCATION/TRAINING PROGRAM
Payer: COMMERCIAL

## 2025-02-06 PROBLEM — R74.8 ELEVATED LIVER ENZYMES: Status: ACTIVE | Noted: 2025-02-06

## 2025-02-06 LAB
ALBUMIN SERPL-MCNC: 2.7 G/DL (ref 3.5–5.7)
ALP SERPL-CCNC: 374 IU/L (ref 34–125)
ALT SERPL-CCNC: 610 IU/L (ref 7–52)
ANION GAP SERPL CALC-SCNC: 4 MEQ/L (ref 3–15)
ANION GAP SERPL CALC-SCNC: 6 MEQ/L (ref 3–15)
ANISOCYTOSIS BLD QL SMEAR: ABNORMAL
APTT PPP: 32 SEC (ref 23–35)
AST SERPL-CCNC: 215 IU/L (ref 13–39)
BASOPHILS # BLD: 0.01 K/UL (ref 0.01–0.1)
BASOPHILS NFR BLD: 0.4 %
BILIRUB SERPL-MCNC: 0.5 MG/DL (ref 0.3–1.2)
BUN SERPL-MCNC: 3 MG/DL (ref 7–25)
BUN SERPL-MCNC: 3 MG/DL (ref 7–25)
BURR CELLS BLD QL SMEAR: ABNORMAL
CALCIUM SERPL-MCNC: 7.3 MG/DL (ref 8.6–10.3)
CALCIUM SERPL-MCNC: 7.8 MG/DL (ref 8.6–10.3)
CHLORIDE SERPL-SCNC: 112 MEQ/L (ref 98–107)
CHLORIDE SERPL-SCNC: 113 MEQ/L (ref 98–107)
CO2 SERPL-SCNC: 22 MEQ/L (ref 21–31)
CO2 SERPL-SCNC: 26 MEQ/L (ref 21–31)
CREAT SERPL-MCNC: 0.4 MG/DL (ref 0.6–1.2)
CREAT SERPL-MCNC: 0.4 MG/DL (ref 0.6–1.2)
DACRYOCYTES BLD QL SMEAR: ABNORMAL
DIFFERENTIAL METHOD BLD: ABNORMAL
EGFRCR SERPLBLD CKD-EPI 2021: >60 ML/MIN/1.73M*2
EGFRCR SERPLBLD CKD-EPI 2021: >60 ML/MIN/1.73M*2
EOSINOPHIL # BLD: 0.15 K/UL (ref 0.04–0.36)
EOSINOPHIL NFR BLD: 5.6 %
ERYTHROCYTE [DISTWIDTH] IN BLOOD BY AUTOMATED COUNT: 15.2 % (ref 11.7–14.4)
FIBRINOGEN PPP-MCNC: 260 MG/DL (ref 220–480)
GLUCOSE BLD-MCNC: 88 MG/DL (ref 70–99)
GLUCOSE BLD-MCNC: 91 MG/DL (ref 70–99)
GLUCOSE BLD-MCNC: 91 MG/DL (ref 70–99)
GLUCOSE SERPL-MCNC: 71 MG/DL (ref 70–99)
GLUCOSE SERPL-MCNC: 91 MG/DL (ref 70–99)
HAPTOGLOB SERPL-MCNC: 120 MG/DL (ref 41–203)
HCT VFR BLD AUTO: 29.6 % (ref 35–45)
HGB BLD-MCNC: 10.2 G/DL (ref 11.8–15.7)
IMM GRANULOCYTES # BLD AUTO: 0.06 K/UL (ref 0–0.08)
IMM GRANULOCYTES NFR BLD AUTO: 2.2 %
INR PPP: 1.3
LACTATE SERPL-SCNC: 1.3 MMOL/L (ref 0.4–2)
LYMPHOCYTES # BLD: 0.69 K/UL (ref 1.2–3.5)
LYMPHOCYTES NFR BLD: 25.6 %
MACROCYTES BLD QL SMEAR: ABNORMAL
MAGNESIUM SERPL-MCNC: 1.6 MG/DL (ref 1.8–2.5)
MCH RBC QN AUTO: 34.8 PG (ref 28–33.2)
MCHC RBC AUTO-ENTMCNC: 34.5 G/DL (ref 32.2–35.5)
MCV RBC AUTO: 101 FL (ref 83–98)
MONOCYTES # BLD: 0.23 K/UL (ref 0.28–0.8)
MONOCYTES NFR BLD: 8.5 %
NEUTROPHILS # BLD: 1.56 K/UL (ref 1.7–7)
NEUTS SEG NFR BLD: 57.7 %
NRBC BLD-RTO: 0.7 %
OVALOCYTES BLD QL SMEAR: ABNORMAL
PLAT MORPH BLD: NORMAL
PLATELET # BLD AUTO: 81 K/UL (ref 150–369)
PLATELET # BLD EST: ABNORMAL 10*3/UL
PMV BLD AUTO: 12.2 FL (ref 9.4–12.3)
POCT TEST: NORMAL
POTASSIUM SERPL-SCNC: 2.6 MEQ/L (ref 3.5–5.1)
POTASSIUM SERPL-SCNC: 2.6 MEQ/L (ref 3.5–5.1)
PROT SERPL-MCNC: 3.8 G/DL (ref 6–8.2)
PROTHROMBIN TIME: 15.8 SEC (ref 12.2–14.5)
RBC # BLD AUTO: 2.93 M/UL (ref 3.93–5.22)
SODIUM SERPL-SCNC: 140 MEQ/L (ref 136–145)
SODIUM SERPL-SCNC: 143 MEQ/L (ref 136–145)
WBC # BLD AUTO: 2.7 K/UL (ref 3.8–10.5)

## 2025-02-06 PROCEDURE — 63700000 HC SELF-ADMINISTRABLE DRUG: Performed by: STUDENT IN AN ORGANIZED HEALTH CARE EDUCATION/TRAINING PROGRAM

## 2025-02-06 PROCEDURE — 63700000 HC SELF-ADMINISTRABLE DRUG: Performed by: PHYSICIAN ASSISTANT

## 2025-02-06 PROCEDURE — 76705 ECHO EXAM OF ABDOMEN: CPT

## 2025-02-06 PROCEDURE — 85610 PROTHROMBIN TIME: CPT | Performed by: INTERNAL MEDICINE

## 2025-02-06 PROCEDURE — 63700000 HC SELF-ADMINISTRABLE DRUG: Performed by: HOSPITALIST

## 2025-02-06 PROCEDURE — 21400000 HC ROOM AND CARE CCU/INTERMEDIATE

## 2025-02-06 PROCEDURE — 85384 FIBRINOGEN ACTIVITY: CPT | Performed by: INTERNAL MEDICINE

## 2025-02-06 PROCEDURE — 80053 COMPREHEN METABOLIC PANEL: CPT | Performed by: INTERNAL MEDICINE

## 2025-02-06 PROCEDURE — 85025 COMPLETE CBC W/AUTO DIFF WBC: CPT | Performed by: INTERNAL MEDICINE

## 2025-02-06 PROCEDURE — 83735 ASSAY OF MAGNESIUM: CPT | Performed by: STUDENT IN AN ORGANIZED HEALTH CARE EDUCATION/TRAINING PROGRAM

## 2025-02-06 PROCEDURE — 97530 THERAPEUTIC ACTIVITIES: CPT | Mod: GP,CQ

## 2025-02-06 PROCEDURE — 25800000 HC PHARMACY IV SOLUTIONS: Performed by: STUDENT IN AN ORGANIZED HEALTH CARE EDUCATION/TRAINING PROGRAM

## 2025-02-06 PROCEDURE — 80048 BASIC METABOLIC PNL TOTAL CA: CPT | Performed by: SPECIALIST

## 2025-02-06 PROCEDURE — 86803 HEPATITIS C AB TEST: CPT | Performed by: STUDENT IN AN ORGANIZED HEALTH CARE EDUCATION/TRAINING PROGRAM

## 2025-02-06 PROCEDURE — 36415 COLL VENOUS BLD VENIPUNCTURE: CPT | Performed by: STUDENT IN AN ORGANIZED HEALTH CARE EDUCATION/TRAINING PROGRAM

## 2025-02-06 PROCEDURE — 63700000 HC SELF-ADMINISTRABLE DRUG: Performed by: INTERNAL MEDICINE

## 2025-02-06 PROCEDURE — 63600000 HC DRUGS/DETAIL CODE: Mod: JZ | Performed by: STUDENT IN AN ORGANIZED HEALTH CARE EDUCATION/TRAINING PROGRAM

## 2025-02-06 PROCEDURE — 85730 THROMBOPLASTIN TIME PARTIAL: CPT | Performed by: INTERNAL MEDICINE

## 2025-02-06 PROCEDURE — 80074 ACUTE HEPATITIS PANEL: CPT | Performed by: STUDENT IN AN ORGANIZED HEALTH CARE EDUCATION/TRAINING PROGRAM

## 2025-02-06 PROCEDURE — 83605 ASSAY OF LACTIC ACID: CPT | Performed by: STUDENT IN AN ORGANIZED HEALTH CARE EDUCATION/TRAINING PROGRAM

## 2025-02-06 RX ORDER — POTASSIUM CHLORIDE 20 MEQ/1
40 TABLET, EXTENDED RELEASE ORAL AS NEEDED
Status: DISCONTINUED | OUTPATIENT
Start: 2025-02-06 | End: 2025-02-18 | Stop reason: HOSPADM

## 2025-02-06 RX ORDER — POTASSIUM CHLORIDE 14.9 MG/ML
20 INJECTION INTRAVENOUS AS NEEDED
Status: DISCONTINUED | OUTPATIENT
Start: 2025-02-06 | End: 2025-02-18 | Stop reason: HOSPADM

## 2025-02-06 RX ORDER — POTASSIUM CHLORIDE 20 MEQ/1
20 TABLET, EXTENDED RELEASE ORAL AS NEEDED
Status: DISCONTINUED | OUTPATIENT
Start: 2025-02-06 | End: 2025-02-18 | Stop reason: HOSPADM

## 2025-02-06 RX ADMIN — LORAZEPAM 0.25 MG: 0.5 TABLET ORAL at 08:54

## 2025-02-06 RX ADMIN — PANTOPRAZOLE SODIUM 20 MG: 20 TABLET, DELAYED RELEASE ORAL at 08:52

## 2025-02-06 RX ADMIN — GABAPENTIN 400 MG: 400 CAPSULE ORAL at 08:50

## 2025-02-06 RX ADMIN — ACETAMINOPHEN 650 MG: 325 TABLET ORAL at 22:48

## 2025-02-06 RX ADMIN — ZOLPIDEM TARTRATE 5 MG: 5 TABLET, FILM COATED ORAL at 22:55

## 2025-02-06 RX ADMIN — ACETAMINOPHEN 650 MG: 325 TABLET ORAL at 06:39

## 2025-02-06 RX ADMIN — POTASSIUM CHLORIDE 40 MEQ: 1500 TABLET, EXTENDED RELEASE ORAL at 12:47

## 2025-02-06 RX ADMIN — TOPIRAMATE 50 MG: 100 TABLET, FILM COATED ORAL at 16:31

## 2025-02-06 RX ADMIN — GABAPENTIN 400 MG: 400 CAPSULE ORAL at 13:18

## 2025-02-06 RX ADMIN — GABAPENTIN 400 MG: 400 CAPSULE ORAL at 17:03

## 2025-02-06 RX ADMIN — FLUOXETINE HYDROCHLORIDE 40 MG: 20 CAPSULE ORAL at 08:49

## 2025-02-06 RX ADMIN — POTASSIUM CHLORIDE 40 MEQ: 1500 TABLET, EXTENDED RELEASE ORAL at 22:49

## 2025-02-06 RX ADMIN — PANTOPRAZOLE SODIUM 20 MG: 20 TABLET, DELAYED RELEASE ORAL at 22:48

## 2025-02-06 RX ADMIN — LORAZEPAM 0.25 MG: 0.5 TABLET ORAL at 13:18

## 2025-02-06 RX ADMIN — TRAZODONE HYDROCHLORIDE 100 MG: 100 TABLET ORAL at 22:48

## 2025-02-06 RX ADMIN — LEVOTHYROXINE SODIUM 50 MCG: 0.05 TABLET ORAL at 06:38

## 2025-02-06 RX ADMIN — QUETIAPINE FUMARATE 100 MG: 100 TABLET ORAL at 22:48

## 2025-02-06 RX ADMIN — MAGNESIUM SULFATE HEPTAHYDRATE 2 G: 40 INJECTION, SOLUTION INTRAVENOUS at 23:01

## 2025-02-06 RX ADMIN — NYSTATIN: 100000 POWDER TOPICAL at 23:01

## 2025-02-06 RX ADMIN — SODIUM CHLORIDE 250 ML: 9 INJECTION, SOLUTION INTRAVENOUS at 09:30

## 2025-02-06 RX ADMIN — GABAPENTIN 400 MG: 400 CAPSULE ORAL at 22:48

## 2025-02-06 RX ADMIN — TOPIRAMATE 50 MG: 100 TABLET, FILM COATED ORAL at 08:49

## 2025-02-06 RX ADMIN — Medication 1 TABLET: at 08:49

## 2025-02-06 RX ADMIN — ATORVASTATIN CALCIUM 20 MG: 20 TABLET, FILM COATED ORAL at 08:52

## 2025-02-06 ASSESSMENT — COGNITIVE AND FUNCTIONAL STATUS - GENERAL
MOVING TO AND FROM BED TO CHAIR: 1 - TOTAL
WALKING IN HOSPITAL ROOM: 1 - TOTAL
STANDING UP FROM CHAIR USING ARMS: 2 - A LOT
MOVING TO AND FROM BED TO CHAIR: 1 - TOTAL
STANDING UP FROM CHAIR USING ARMS: 1 - TOTAL
CLIMB 3 TO 5 STEPS WITH RAILING: 1 - TOTAL
WALKING IN HOSPITAL ROOM: 1 - TOTAL
STANDING UP FROM CHAIR USING ARMS: 1 - TOTAL
MOVING TO AND FROM BED TO CHAIR: 1 - TOTAL
CLIMB 3 TO 5 STEPS WITH RAILING: 1 - TOTAL
AFFECT: FLAT/BLUNTED AFFECT;LOW AROUSAL/LETHARGIC
CLIMB 3 TO 5 STEPS WITH RAILING: 1 - TOTAL
WALKING IN HOSPITAL ROOM: 1 - TOTAL

## 2025-02-06 ASSESSMENT — ENCOUNTER SYMPTOMS
DIFFICULTY URINATING: 0
CONFUSION: 0

## 2025-02-06 NOTE — PROGRESS NOTES
Patient: Felipa Cuadra  Location: [facility] [address] [address]  MRN:  [id number]  Today's date:  2/6/2025    Attempted to see patient for therapy. Unable due to medical hold (patient transfer to tele for hypokalemia and start repletion of potassium. OT will need new orders when medically stable due to transfer to higher level of care. will d/c current orders. SC sent to attending.).

## 2025-02-06 NOTE — UM PHYSICIAN REVIEW NOTE
Utilization Secondary Review Note      Patient Name: Felipa Cuadra      MRN: [id number]  Insurance: [facility]  Admission date: 2/1/2025 - 2/6 still here  [birth year]  Ref: [id number]   Member ID [id number]          45yo anxiety, depression, eating d/o    2/1 abd pain.  In ED 5 days ago and dx with UTI, started on macrobid. Has been unable to eat.  Suprapubic pain.  Black stool, vag bleeding.  Platelets 121 (118 last week, 191 before), Na 132.  UA looks infected.      2/2 lower abd pain, back pain radiating to sides.  CT.  BP 87/57    2/3 BP 86/60.  Urine with proteus, cont CTX.   Progressive pancytopenia, wbc 2.04, hgb 11.1, platelets 60.  Seen by rhoda, likely med related.  Rocephin?  Check coags, b12, periph smear    2/4 Glucose 57 in am.  K 3, mg 1.7   CBC stable.  Can hardly ambulate, ampac 10.  ?.  Long hx eating disorder, reports restricting PO intake.  Requests inpatient eating disorder treatment.  Urine now growing proteus and klebsiella                 2/5 platelets 63.  Day 5 ceftriaxone.  IVF heplocked    2/6 85/56, 84/57.  Check cortisol.  K 2.6.  abn LFTs (normal on admit, AST, Alt, alk phos 215, 610, 374.  GI consult, RUQ    Called for P2P avail 9am 2/7         Keiko Blackburn MD  2/6/2025

## 2025-02-06 NOTE — PROGRESS NOTES
Physical Therapy -  Daily Treatment/Progress Note     Patient: Felipa Cuadra  Location: [address] [address]  MRN: [id number]  Today's date: 2/6/2025    HISTORY OF PRESENT ILLNESS     Felipa is a 44 y.o. female admitted on 2/1/2025 with UTI (urinary tract infection) [N39.0]  Lower abdominal pain [R10.30]  Urinary tract infection with hematuria, site unspecified [N39.0, R31.9]. Principal problem is UTI (urinary tract infection).    Past Medical History  Felipa has a past medical history of Anxiety, Depression, and Eating disorder.    History of Present Illness    Patient was seen in the ED 1 week ago for evaluation of abdominal pain and generalized weakness.  Workup revealed acute cystitis.  She was discharged on a course of Macrobid, which patient reports she has been taking.  She presents today for evaluation of persistent abdominal pain.  Patient reports suprapubic pain that radiates to her back.  She reports it is constant.  She reports she is weak and unable to ambulate.  She reports increased frequency of urination.  She reports chills.  She reports nausea without vomiting.  She reports she has not had solid food intake since Christmas.  She reports she has been drinking a lot of Gatorade.  She reports she is currently having black stool and vaginal bleeding x 3 days.  She reports she rarely gets her period.  She has not noticed any blood clots.  She reports she is feeling more depressed lately.  She denies suicidal ideation.  She reports she smokes 4 cigarettes/day.  She reports she quit alcohol use 4 years ago.  She denies fever, syncope, vomiting, chest pain, shortness of breath, constipation, diarrhea.   PRIOR LEVEL OF FUNCTION AND LIVING ENVIRONMENT     Prior Level of Function      Flowsheet Row Most Recent Value   Dominant Hand right   Ambulation assistive equipment   Transferring assistive equipment   Toileting independent   Bathing assistive equipment and person   Dressing assistive person    Eating independent   IADLs independent   Prior Level of Function Comment Reports assist from HHAs 6 days/week with bathing + dressing. Uses a walker at home   Assistive Device Currently Used at Home walker, standard, grab bar, shower chair             Prior Living Environment      Flowsheet Row Most Recent Value   People in Home alone   Current Living Arrangements home   Home Accessibility elevator accessible   Living Environment Comment Lives alone in apartment with elevator access. Tub shower with SC + GBs.          VITALS AND PAIN     PT Vitals      Date/Time Pulse HR Source Resp SpO2 Pt Activity O2 Therapy BP BP Location BP Method Pt Position [facility]   02/06/25 1520 82 Monitor 16 100 % At rest None (Room air) 91/53 Left upper arm Automatic Lying MM          PT Pain      Date/Time Pain Type Location Rating: Rest Rating: Activity [facility]   02/06/25 1520 Pain Assessment leg 0 - no pain 4 - moderate pain MM             Objective   OBJECTIVE     Start time:  1520  End time:  1540  Session Length: 20 min       General Observations  Patient received supine, in bed. She was agreeable to therapy, no issues or concerns identified by nurse prior to session. lethargic; requesting to toilet, reports she can't walk    Precautions:         Limitations/Impairments: sensory, safety/cognitive   Services  Do You Speak a Language Other Than English at Home?: no      PT Eval and Treat - 02/06/25 1520          Cognition    Orientation Status oriented x 3     Affect/Mental Status flat/blunted affect;low arousal/lethargic     Follows Commands follows one-step commands;75-90% accuracy;delayed response/completion;repetition of directions required;verbal cues/prompting required     Cognitive Function attention deficit;executive function deficit;safety deficit     Attention Deficit focused/sustained attention     Executive Function Deficit planning/decision-making;organization/sequencing;self-monitoring/self-correction     Safety  Deficit judgment;safety precautions follow-through/compliance     Comment, Cognition requires encouragement, cueing, stimulation; flat and lethargic, decr'd follow through        Vision Assessment/Intervention    Vision Assessment corrective lenses full-time        Sensory Assessment    Sensory Assessment other (see comments)     Sensation Impaired Location(s) left UE;right UE;left LE;right LE     Sensory Subjective Reports other (see comments)   hands/feet, numbness       Bed Mobility    Bed Mobility Activities left;right;rolling;supine to sit;sit to supine     [place] 1 person assist;other (see comments)     Safety/Cues increased time to complete;moderate;verbal cues;minimal;tactile cues;initiation;preparatory posture;proper use of assistive device;hand placement     Assistive Device bed rails;mattress firmed;head of bed elevated;other (see comments);draw sheet   trendelenberg, draw sheet, and pt head board pull to HOB    Comment Mod A x 1 to roll L/R; Max A x 1 to sit EOB; Dep  x 1 RTB supine and slide to HOB; see balance for max sitting magalys        Mobility Belt    Mobility Belt Used During Session no - patient refused        Sit/Stand Transfer    Surface edge of bed     [place] maximum assist (25-49% patient effort);dependent (less than 25% patient effort);1 person assist;other (see comments)     Safety/Cues moderate;verbal cues;tactile cues;preparatory posture;hand placement;maintaining center of gravity over base of support;initiation     Assistive Device other (see comments)   frontal sppt    Transfer Comments Max A x 1 to initiate stand and Dep x 1 to return to sit; decr'd coord, strength, and balance; see balance for max standing magalys        Balance    Static Sitting Balance mild impairment;supported;sitting, edge of bed     Dynamic Sitting Balance moderate impairment;supported;sitting, edge of bed     Sit to Stand Dynamic Balance severe impairment;supported     Static Standing Balance severe  impairment;supported     Dynamic Standing Balance unable to perform activity     Comment, Balance Max sitting magalys EOB x 7min, Max standing magalys EOB x 6sec; poor strength, posture, coordination, and endurance impairing balance        Lower Extremity (Therapeutic Exercise)    Exercise Position/Type supine;seated;AAROM (active assistive range of motion)     General Exercise heel slides;LAQ (long arc quad)     Reps and Sets 4 reps ea, asst'd        Impairments/Safety Issues    Impairments Affecting Function balance;strength;cognition;coordination;endurance/activity tolerance;postural/trunk control     Cognitive Impairments, Safety/Performance problem-solving/reasoning;safety precaution awareness     Functional Endurance lethargic, flat     Safety Issues Affecting Function insight into deficits/self-awareness;problem-solving;sequencing abilities;safety precaution awareness;judgment     Comment, Safety Issues/Impairments requires encouragement; rec A x 2                                              Education Documentation  Joint Mobility/Strength, taught by Bunny Rondon PTA at 2/6/2025  4:12 PM.  Learner: Patient  Readiness: Acceptance  Method: Explanation  Response: Verbalizes Understanding, Needs Reinforcement  Comment: PT GOC, bed mob, posture, balance, safety, trxfs    Signs/Symptoms, taught by Bunny Rondon PTA at 2/6/2025  4:12 PM.  Learner: Patient  Readiness: Acceptance  Method: Explanation  Response: Verbalizes Understanding, Needs Reinforcement  Comment: PT GOC, bed mob, posture, balance, safety, trxfs    Risk Factors, taught by Bunny Rondon PTA at 2/6/2025  4:12 PM.  Learner: Patient  Readiness: Acceptance  Method: Explanation  Response: Verbalizes Understanding, Needs Reinforcement  Comment: PT GOC, bed mob, posture, balance, safety, trxfs        Session Outcome  Patient in bed, reclined, leg(s) elevated at end of session, bed alarm on, all needs met, call light in reach, personal items in reach.  "Nursing notified about patient's performance, patient's position, and patient's response to therapy/activity.    AM-PAC - Mobility (Current Function)     Turning form your back to your side while in flat bed without using bedrails 2 - A Lot   Moving from lying on your back to sitting on the side of a flat bed without using bedrails 2 - A Lot   Moving to and from a bed to a chair 1 - Total   Standing up from a chair using your arms 2 - A Lot   To walk in a hospital room 1 - Total   Climbing 3-5 steps with a railing 1 - Total   AM-PAC Mobility Score 9      ASSESSMENT AND PLAN     Progress Summary  [facility] mob 9/24; lethargic, flat, required encouragement and explanation of PT GOC; Mod A x 1 roll, Max A x 1 sup to sit, Dep x 1 RTB sup and slide to HOB; Max A x 1 to stand x 6sec and Dep A x 1 to sit; max sitting magalys, sppt'd x 7min.  Deficits in posture, balance, strength, endurance, coordination, and cogn/safety that impairs mob and indep.  Cont PT per POC; rec A x 2.    Patient/Family Therapy Goals Statement: \"go to rehab, skilled nursing home\"    PT Plan      Flowsheet Row Most Recent Value   Rehab Potential fair, will monitor progress closely at 02/06/2025 1520   Therapy Frequency 3 times/wk at 02/06/2025 1520   Planned Therapy Interventions postural re-education, balance training, strengthening, motor coordination training, bed mobility training, transfer training, gait training, patient/family education at 02/06/2025 1520            PT Discharge Recommendations      Flowsheet Row Most Recent Value   PT Recommended Discharge Disposition skilled nursing facility at 02/06/2025 1520   Anticipated Equipment Needs if Discharged Home (PT) none at 02/06/2025 1520                 PT Goals      Flowsheet Row Most Recent Value   Bed Mobility Goal 1    Activity/Assistive Device sit to supine/supine to sit at 02/04/2025 1402   [place] modified independence at 02/04/2025 1402   Time Frame by discharge at 02/04/2025 1402 "   Progress/Outcome goal ongoing at 02/04/2025 1402   Transfer Goal 1    Activity/Assistive Device sit-to-stand/stand-to-sit, bed-to-chair/chair-to-bed, walker, front-wheeled at 02/04/2025 1402   [place] supervision required at 02/04/2025 1402   Time Frame by discharge at 02/04/2025 1402   Progress/Outcome goal ongoing at 02/04/2025 1402   Gait Training Goal 1    Activity/Assistive Device gait (walking locomotion), assistive device use, walker, front-wheeled at 02/04/2025 1402   [place] supervision required at 02/04/2025 1402   Distance 25 ft at 02/04/2025 1402   Time Frame by discharge at 02/04/2025 1402   Progress/Outcome goal ongoing at 02/04/2025 1402

## 2025-02-06 NOTE — ASSESSMENT & PLAN NOTE
-completed five days of ceftriaxone  -ucx was positive for klebsiella and proteus, the cultures and sensitivities are appreciated

## 2025-02-06 NOTE — PROGRESS NOTES
Nutrition Note       Clinical Course: Patient is a 44 y.o. female who was admitted on 2/1/2025 with a diagnosis of UTI (urinary tract infection) [N39.0]  Lower abdominal pain [R10.30]  Urinary tract infection with hematuria, site unspecified [N39.0, R31.9].   Past Medical History:   Diagnosis Date    Anxiety     Depression     Eating disorder      No past surgical history on file.    Nutrition Interventions/ Recommendations:   Continue regular diet with 1800 mL FR              - encouraged increased intake of solid foods       - soft menu options prn 2/2 missing teeth              - monitor and record % meal intake   Continue Ensure Clear (240 kcals, 8 g protein, 52 g CHO each) increase to TID at patient request              - monitor for increased BG levels; d/c if >200 mg/dL  Trend labs/lytes, treat/replete prn               - POC glucose checks with goal: </=140-180 mg/dL  Weekly blind weight   Monitor GI function         Nutrition Status Classification: Moderate nutritional compromise  Active Diet Orders (From admission, onward)       Start        02/05/25 0859  Adult Diet Regular; 1800 mL Fluid; RD/LDN may adjust order  Diet effective now        Question Answer Comment   Diet Texture Regular    Fluid restriction dietary / 24h: 1800 mL Fluid    Delegation of Authority. Diet orders written by PA/CRNPs may not be adjusted by RD/LDNs. RD/LDN may adjust order           02/03/25 1114  Dietary nutrition supplements  Once        Question Answer Comment   Select Supplement (PH): Ensure Clear [place]    Meal period Lunch Dinner           02/01/25 1358  Supervise feeds  Until discontinued                                Reason for Assessment  Reason For Assessment: per organizational policy (f/u)  Diagnosis: infection/sepsis (UTI)    MST Nutrition Screen Tool  Has patient lost weight without trying?: 0-->No  Has patient been eating poorly due to decreased appetite?: 1-->Yes  MST Nutrition Screen Score: 1    Nutrition/Diet  "History  Typical Food/Fluid Intake: from home with aide 6 days per week from 9A-3P per pt  Diet Prior to Admission: regular  Appetite Prior to Admission: Poor-0-25% (limited intake of solid foods >/=1 month (mostly water and gatorade))  Intake (%): 0%  Food Allergies: fish/shellfish, other (see comments) (aspartame - anaphylaxis)  Factors Affecting Nutritional Intake: eating disorder(s)    Physical Findings  Overall Physical Appearance: overweight  Gastrointestinal: constipation  Last Bowel Movement: 02/06/25  Oral/Mouth Cavity: other (see comments) (missing teeth)  Skin: intact, edema (+1 B/L feet; arms)    Last Bowel Movement: 02/06/25    Nutrition Order  Nutrition Order: meets nutritional requirements  Nutrition Order Comments: Regular; 1800 mL FR  Current TF/TPN Regimen: none    Anthropometrics  Height: 165.1 cm (5' 5\")    Weights (last 7 days)       Date/Time Weight    02/03/25 1400 78.5 kg (173 lb)    02/01/25 1031 77 kg (169 lb 12.1 oz)            Admit Weight  Admit Weight Method: measured  Admit Weight: 76.7 kg (169 lb)    Current Weight  Weight Method: Bed scale  Weight: 78.5 kg (173 lb)    Ideal Body Weight (IBW)  Ideal Body Weight (IBW) (kg): 57.29  % Ideal Body Weight: 134.4    Usual Body Weight (UBW)  Usual Body Weight: 97.5 kg (215 lb) (10/3/24 per EMR)  % Usual Body Weight: 80.47  Weight Loss: other (see comments)  Time Frame: Other (comments) (42 lb / 19.5% weight loss in 4 months - severe)    Body Mass Index (BMI)  BMI (Calculated): 28.8  BMI Assessment: BMI 25-29.9: overweight                        Labs/Procedures/Meds  Lab Results Reviewed: reviewed      Results from last 7 days   Lab Units 02/04/25  0553 02/01/25  1223   SODIUM mEQ/L 138 132*   POTASSIUM mEQ/L 3.0* 3.7   CHLORIDE mEQ/L 110* 99   CO2 mEQ/L 20* 24   BUN mg/dL 9 18   CREATININE mg/dL 0.6 0.6   GLUCOSE mg/dL 106* 93   CALCIUM mg/dL 8.2* 9.2      Results from last 7 days   Lab Units 02/01/25  1223   ALK PHOS IU/L 68   BILIRUBIN " "TOTAL mg/dL 0.9   ALBUMIN g/dL 4.3   ALT IU/L 16   AST IU/L 19          No results found for: \"HGBA1C\"  Lab Results   Component Value Date    PETAFNNK63 708 02/03/2025     Lab Results   Component Value Date    CALCIUM 8.2 (L) 02/04/2025    PHOS 3.2 09/30/2024     Results from last 7 days   Lab Units 02/05/25  1028 02/04/25  1229 02/03/25  1316   WBC K/uL 4.12 3.37* 2.04*   HEMOGLOBIN g/dL 10.6* 12.0 11.1*   HEMATOCRIT % 30.9* 34.2* 33.0*   PLATELETS K/uL 63* 59* 60*               Results from last 7 days   Lab Units 02/04/25  0553   MAGNESIUM mg/dL 1.7*          Diagnostic Tests/Procedures  Diagnostic Test/Procedure Reviewed: reviewed, pertinent    Medications  Pertinent Medications Reviewed: reviewed   atorvastatin  20 mg oral q AM    B-complex with vitamin C  1 tablet oral Daily    FLUoxetine  40 mg oral Daily    gabapentin  400 mg oral QID    insulin lispro U-100  3-5 Units subcutaneous With meals & nightly    levothyroxine  50 mcg oral Daily (6:30a)    LORazepam  0.25 mg oral BID    nystatin   Topical BID    pantoprazole  20 mg oral BID    prazosin  5 mg oral Nightly    QUEtiapine  100 mg oral Nightly    sodium chloride  250 mL intravenous Once    topiramate  50 mg oral BID    traZODone  100 mg oral Nightly         Estimated/Assessed Needs  Additional Documentation: Additional Requirements (Group), Calorie Requirements (Group), Fluid Requirements (Group), Protein Requirements (Group)    Calorie Requirements  Estimated kCal Needs: Actual Body Weight (79 Kg)  Estimated Calorie Need Method: kcal/kg  Calorie/kg Recommended: 22-25  Calorie Recommendations: 9537-9684 Kcal/day    Protein Requirements  Recommended Dosing Weight (Estimated Protein Needs): Actual Body Weight (79 Kg)  Protein Recommendations: 79-95 g protein/day (1-1.2 g protein/Kg)    Fluid Requirements  Fluid Recommendation (mL): 25-30ml  Recommended Fluid Needs Dosing Weight: Actual Body Weight  Fluid Requirements (mL/day): 6674-2415 " ml/day  [place] Method (over 20 kg): 3069.44                                                      PES  Statement: PES Statement  Nutrition Diagnosis: Inadequate Protein-Energy Intake  Related To:: Psychological causes such as depression and disordered eating  As Evidenced By:: 42 lb / 19.5% weight loss x4 months  Nutritional Needs Met?: No, Stays the same                                   Clinical Comments:  Pt visited in f/u.  This is a 45 y/o female with PMHx significant for depression, anxiety, bipolar, PTSD, ETOH abuse (4 years ago per chart) and DM2. Admitted on 2/1 with UTI.     Met with patient at bedside, difficult to engage in conversation, keeping eyes closed for most of discussion.  Flow sheets show 0-25% PO intakes since last review, appears the patient is mostly sipping on liquids and taking in small amounts of applesauce.  RD encouraged increased solid food intake.  The patient states she doesn't really like the food on the menu.  RD did offer alternatives, but patient requesting unavailable items (pizza, cheesesteaks).  Mg/K low - ongoing electrolyte abnormalities likely related to insufficient PO intakes.  Unfortunately, given hx of eating d/o, PO intakes unlikely to improve while hospitalized.  Pt did request that ensure be provided TID.  RD will adjust order and continue to follow for needs.      Goals: Meet >75% of estimated Kcal/Protein needs via PO intake       Monitor and Evaluation:   PO intakes   Trends in weights/labs/lytes  Medications  GI function  I/O  6.   Medical course    Discussed with: patient                            Needs review        Date: 02/06/25  Signature: MONICO Daiz

## 2025-02-06 NOTE — PLAN OF CARE
Problem: Oral Intake Inadequate  Goal: Improved Oral Intake  Outcome: Not progressing     Problem: Adult Inpatient Plan of Care  Goal: Plan of Care Review  Outcome: Progressing  Flowsheets (Taken 2/6/2025 1026)  Plan of Care Reviewed With: patient     Nutrition Interventions/ Recommendations:   Continue regular diet with 1800 mL FR              - encouraged increased intake of solid foods       - soft menu options prn 2/2 missing teeth              - monitor and record % meal intake   Continue Ensure Clear (240 kcals, 8 g protein, 52 g CHO each) increase to TID at patient request              - monitor for increased BG levels; d/c if >200 mg/dL  Trend labs/lytes, treat/replete prn               - POC glucose checks with goal: </=140-180 mg/dL  Weekly blind weight   Monitor GI function

## 2025-02-06 NOTE — PROGRESS NOTES
Hospital Medicine     Daily Progress Note       SUBJECTIVE   Interval History:     -seen and examined at bedside  -has abdominal pain and back pain that started prior to admission  -does not feel overall worse, denies abdominal tenderness on clinical exam and no guarding  -no shortness of breath  -has poor appetite    -made patient aware of hypokalemia and will transfer to the telemetry floor and start repletion of potassium, and check magnesium levels.     -per nephrology hyponatremia is resolved, no indication for fluid restriction.          OBJECTIVE      Vital signs in last 24 hours:  Temp:  [36.2 °C (97.1 °F)-36.5 °C (97.7 °F)] 36.2 °C (97.2 °F)  Heart Rate:  [79-86] 81  Resp:  [18-20] 18  BP: (83-96)/(50-58) 92/56    Intake/Output Summary (Last 24 hours) at 2/6/2025 1255  Last data filed at 2/6/2025 1047  Gross per 24 hour   Intake 687 ml   Output 825 ml   Net -138 ml       PHYSICAL EXAMINATION      Physical Exam  Gen: Appears stated age, ill appearing, pale.   Head: atraumatic, normocephalic  Eyes: PERRLA, EOMI, no pallor  Neck: supple, no Lymph nodes, no Thyromegaly, no JVD  CVS: RRR, No M/G, no JVD  Pulm: CTA b/l, no wheezes or rhonchi, no rales  Abd: Soft, NT, ND, normal bowel sounds  Extremities:no edema, no cyanosis   MSK: no DJD, no joint swellings, no joint tenderness   Neuro: AAO x3, has generalized weakness in all extremities.      LINES, CATHETERS, DRAINS, AIRWAYS, AND WOUNDS   Lines, Drains, and Airways:  Wounds (agree with documentation and present on admission):  Peripheral IV (Adult) 02/03/25 Anterior;Right Forearm (Active)   Number of days: 3         Comments:    LABS / IMAGING / TELE      Labs  Results from last 7 days   Lab Units 02/06/25  1105   WBC K/uL 2.70*   HEMOGLOBIN g/dL 10.2*   HEMATOCRIT % 29.6*   PLATELETS K/uL 81*     Results from last 7 days   Lab Units 02/06/25  1105   SODIUM mEQ/L 143   POTASSIUM mEQ/L 2.6*   CHLORIDE mEQ/L 113*   CO2 mEQ/L 26   BUN mg/dL 3*   CREATININE  mg/dL 0.4*   GLUCOSE mg/dL 71   CALCIUM mg/dL 7.3*     Microbiology Results       Procedure Component Value Units Date/Time    Urine culture Urine, Clean Catch [[id number]]  (Normal) Collected: 02/02/25 1515    Specimen: Urine, Clean Catch Updated: 02/03/25 1449     Urine Culture No Growth (Limit of detection 1000 cfu/mL)    Blood Culture Blood, Venous [[id number]]  (Normal) Collected: 02/01/25 1419    Specimen: Blood, Venous Updated: 02/05/25 2001     Culture No growth at 96 hours    Blood Culture Blood, Venous [[id number]]  (Normal) Collected: 02/01/25 1419    Specimen: Blood, Venous Updated: 02/05/25 2001     Culture No growth at 96 hours    Urine culture Urine, Clean Catch [[id number]]  (Abnormal)  (Susceptibility) Collected: 02/01/25 1215    Specimen: Urine, Clean Catch Updated: 02/04/25 0851     Urine Culture **Positive Culture**      >=100,000 cfu/mL Klebsiella pneumoniae ssp pneumoniae      >=100,000 cfu/mL Proteus mirabilis    Urine culture Urine, Clean Catch [[id number]] Collected: 01/27/25 2044    Specimen: Urine, Clean Catch Updated: 01/29/25 1036     Urine Culture Probable contaminants, suggest recollection      >=100,000 cfu/mL Mixed Growth    SARS-COV-2 (COVID-19)/ FLU A/B, AND RSV, PCR Nasopharynx [[id number]]  (Normal) Collected: 01/27/25 1729    Specimen: Nasopharyngeal Swab from Nasopharynx Updated: 01/27/25 1826     SARS-CoV-2 (COVID-19) Negative     Influenza A Negative     Influenza B Negative     Respiratory Syncytial Virus Negative    Narrative:      Testing performed using real-time PCR for detection of COVID-19. EUA approved validation studies performed on site.                 Imaging  CT LUMBAR SPINE WITHOUT IV CONTRAST    Result Date: 2/2/2025  Narrative: CLINICAL HISTORY: Mid back pain COMPARISON: CT from January 27, 2025 TECHNIQUE: CT of the lumbar spine without intravenous contrast CT DOSE:  One or more dose reduction techniques (e.g. automated exposure control, adjustment of the mA and/or  kV according to patient size, use of iterative reconstruction technique) utilized for this examination. COMMENT: ALIGNMENT: Anatomic DEGENERATIVE CHANGE: Within normal limits There is no compression deformity or suspicious osseous lesion. ADDITIONAL FINDINGS: There is a uterine fibroid as before. The patient is status post gastrojejunostomy surgery. The colon is air-filled. There is a possible 19 mm left ovarian follicle.     Impression: IMPRESSION: No acute osseous abnormalities or significant degenerative change    CT THORACIC SPINE WITHOUT IV CONTRAST    Result Date: 2/2/2025  Narrative: CLINICAL HISTORY: Mid back pain COMPARISON: September 29, 2024 TECHNIQUE: CT of the thoracic spine without intravenous contrast CT DOSE:  One or more dose reduction techniques (e.g. automated exposure control, adjustment of the mA and/or kV according to patient size, use of iterative reconstruction technique) utilized for this examination. COMMENT: ALIGNMENT: Anatomic DEGENERATIVE CHANGE: There is spurring along the ventral aspects of the T4-T9 vertebral bodies. There is no significant endplate degenerative change or degenerative change of the facets. There is no compression deformity. The neuroforamina appear grossly patent. ADDITIONAL FINDINGS: N/A     Impression: IMPRESSION: Mild multilevel thoracic spondylosis without compression deformity or destructive change    CT ABDOMEN PELVIS WITHOUT IV CONTRAST    Result Date: 1/27/2025  Narrative: CLINICAL HISTORY:    Severe intermittent lower abdominal pain with anorexia Technique:  CT of the abdomen and pelvis was performed without intravenous contrast Coronal and sagittal reconstructed images were obtained evaluation of solid organs is affected by lack of intravenous contrast CT DOSE:  One or more dose reduction techniques (e.g. automated exposure control, adjustment of the mA and/or kV according to patient size, use of iterative reconstruction technique) utilized for this  examination. COMPARISON:   8/16/2024 COMMENT: Lower chest: Within normal limits. Liver:  Within normal limits. Gallbladder:  Status post cholecystectomy. Pancreas:  Within normal limits. Spleen:      Within normal limits. Bowel: Stomach: Patient status post Mary-en-Y gastric surgery. Small Bowel: Postsurgical changes are noted.  There is no CT evidence of a bowel obstruction. Colon: There is high density material present within the colon consistent with ingested products.  This reaches the rectum. Appendix:Normal Mesentery/ Peritoneum: Normal Adrenals:  Within normal limits. 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 Urinary Bladder:There is mild concentric prominence the walls of the urinary bladder. Pelvic Organs:  There is a 5.2 x 4.9 cm subserosal fibroid arising from the right aspect of the uterus.. Retroperitoneum:  There are small nonspecific retroperitoneal lymph nodes.. Vessels:  The abdominal aorta is not aneurysmal. Abdominal Wall/ Soft tissue:There is a calcification within the abdominal wall with likely linear scarring present within the supraumbilical region.  There is present in the prior study. Bones:  There is mild degenerative change of the hips..     Impression: IMPRESSION: 1.  Concentric prominence the walls the urinary bladder.  Cystitis cannot be excluded 2.  There is high density material present within the colon presumably representing ingested product.  There is no CT evidence of a bowel obstruction. The patient is status post Mary-en-Y gastric surgery. 3.  Uterine fibroid 4.  See comments for additional findings     X-RAY CHEST 2 VIEWS    Result Date: 1/27/2025  Narrative: CLINICAL HISTORY:      Chest pain and weakness COMPARISON:  08/15/2024 COMMENT:  Two views of the chest were performed. The cardiovascular silhouette is within normal limits. Costophrenic angles are clear. No focal consolidation or pneumothorax.     Impression:  IMPRESSION: No acute cardiopulmonary process.         ECG/Telemetry  EKG:       ASSESSMENT AND PLAN        Assessment & Plan  UTI (urinary tract infection)  -completed five days of ceftriaxone  -ucx was positive for klebsiella and proteus, the cultures and sensitivities are appreciated    Depression  - psych input noted  - continue home: LORazepam tablet po BID  - continue home: QUEtiapine tablet 100 mg po daily  - continue home: fluoxetine 40 mg po daily  - continue home: trazodone 100 mg po daily     Anxiety  -see section on depression.   Eating disorder  - reports she has not had solid food intake since gail  - supervise feeds  - psych consulted  Hypothyroidism  - TSH: 1.4 (9/29/24), free T4: 0.6 (8/16/24)  - continued on synthroid  PTSD (post-traumatic stress disorder)  -see section on anxiety and depression.   Insomnia    At risk for polypharmacy    Chronic pain disorder    Hyponatremia    Thrombocytopenia (CMS/HCC)    Lower abdominal pain  -CT abdomen is negative for colitis, negative for bowel obstruction on this admission  Elevated liver enzymes  -check US right upper quadrant to assess liver morphology and rule out any biliary obstruction  -consult GI  -monitor LFTs  -check hepatitis panel.   Hypokalemia  -encourage oral intake  -monitor bmp  -replace potassium  -check mag level  -monitor the bmp  Hypotension  -ensure MAP is greater than 60 points.   -possibly related to poor po intake  -check am cortisol level  -monitor blood pressure  -allow iv hydration if needed.     VTE Assessment: Padua    VTE Prophylaxis:  Current anticoagulants:    None      Code Status: Full Code      Estimated Discharge Date: 2/8/2025   Disposition Planning: plan for discharge once electrolyte issues improved and liver enzyme issue is addressed.      Luis Alberto Simpson,   2/6/2025

## 2025-02-06 NOTE — ASSESSMENT & PLAN NOTE
- psych input noted  - continue home: LORazepam tablet po BID  - continue home: QUEtiapine tablet 100 mg po daily  - continue home: fluoxetine 40 mg po daily  - continue home: trazodone 100 mg po daily

## 2025-02-06 NOTE — ASSESSMENT & PLAN NOTE
-ensure MAP is greater than 60 points.   -possibly related to poor po intake  -check am cortisol level  -monitor blood pressure  -allow iv hydration if needed.

## 2025-02-06 NOTE — ASSESSMENT & PLAN NOTE
-check US right upper quadrant to assess liver morphology and rule out any biliary obstruction  -consult GI  -monitor LFTs  -check hepatitis panel.

## 2025-02-06 NOTE — PROGRESS NOTES
NEPHROLOGY PROGRESS NOTE    Subjective: Seen in follow up re: hyponatremia.  Has lower back pain and some abdominal discomfort.  Hypotensive into the 80s systolic this morning.  Patient tells me that her outpatient blood pressure is usually 83/50.  Got some fluid boluses this morning and is going to telemetry.    Review of Systems   Constitutional:         Eating disorder    Genitourinary:  Negative for difficulty urinating.   Psychiatric/Behavioral:  Negative for confusion.    All other systems reviewed and are negative.      Vitals:    02/06/25 0915 02/06/25 0933 02/06/25 1052 02/06/25 1200   BP: (!) 85/56 (!) 84/57 (!) 83/50 (!) 92/56   BP Location: Left upper arm      Patient Position: Lying      Pulse: 79 83 79 81   Resp: 18      Temp: 36.2 °C (97.2 °F)      TempSrc: Oral      SpO2: 96%      Weight:       Height:             Intake/Output Summary (Last 24 hours) at 2/6/2025 1239  Last data filed at 2/6/2025 1047  Gross per 24 hour   Intake 687 ml   Output 825 ml   Net -138 ml       Physical Exam  Constitutional:       General: She is not in acute distress.  HENT:      Head: Atraumatic.      Nose: No rhinorrhea.      Mouth/Throat:      Pharynx: Oropharynx is clear.   Eyes:      Pupils: Pupils are equal, round, and reactive to light.   Cardiovascular:      Rate and Rhythm: Normal rate and regular rhythm.   Pulmonary:      Effort: No respiratory distress.   Abdominal:      Palpations: Abdomen is soft.   Musculoskeletal:         General: No swelling.      Cervical back: Neck supple.      Right lower leg: No edema.      Left lower leg: No edema.   Skin:     Capillary Refill: Capillary refill takes less than 2 seconds.      Coloration: Skin is not jaundiced.   Neurological:      Mental Status: She is alert and oriented to person, place, and time.         LABS:  Results from last 7 days   Lab Units 02/06/25  1105   SODIUM mEQ/L 143   POTASSIUM mEQ/L 2.6*   CHLORIDE mEQ/L 113*   CO2 mEQ/L 26   BUN mg/dL 3*   CREATININE  mg/dL 0.4*   EGFR mL/min/1.73m*2 >60.0   GLUCOSE mg/dL 71   CALCIUM mg/dL 7.3*   ALBUMIN g/dL 2.7*   WBC K/uL 2.70*   HEMOGLOBIN g/dL 10.2*   HEMATOCRIT % 29.6*   PLATELETS K/uL 81*       Meds:    Current Facility-Administered Medications:     acetaminophen (TYLENOL) tablet 650 mg, 650 mg, oral, q4h PRN, Milly Roberson PA C, 650 mg at 02/06/25 0639    atorvastatin (LIPITOR) tablet 20 mg, 20 mg, oral, q AM, Milly Roberson PA C, 20 mg at 02/06/25 0852    B-complex with vitamin C tablet 1 tablet, 1 tablet, oral, Daily, Julian Alberto MD, 1 tablet at 02/06/25 0849    glucose chewable tablet 16-32 g of dextrose, 16-32 g of dextrose, oral, PRN **OR** dextrose 40 % oral gel 15-30 g of dextrose, 15-30 g of dextrose, oral, PRN **OR** glucagon (GLUCAGEN) injection 1 mg, 1 mg, intramuscular, PRN **OR** dextrose 50 % in water (D50) injection 12.5 g, 25 mL, intravenous, PRN, Milly Roberson PA C, 12.5 g at 02/04/25 0750    FLUoxetine (PROzac) capsule 40 mg, 40 mg, oral, Daily, Milly Roberson PA C, 40 mg at 02/06/25 0849    gabapentin (NEURONTIN) capsule 400 mg, 400 mg, oral, QID, Milly Roberson PA C, 400 mg at 02/06/25 0850    insulin lispro U-100 (HumaLOG) pen 3-5 Units, 3-5 Units, subcutaneous, With meals & nightly, Milly Roberson PA C    levothyroxine (SYNTHROID) tablet 50 mcg, 50 mcg, oral, Daily (6:30a), Milly Roberson PA C, 50 mcg at 02/06/25 0638    LORazepam (ATIVAN) tablet 0.25 mg, 0.25 mg, oral, BID, Milly Roberson PA C, 0.25 mg at 02/06/25 0854    magnesium sulfate IVPB 1g in 100 mL NSS/D5W/SWFI, 1 g, intravenous, PRN **OR** magnesium sulfate IVPB 2g in 50 mL NSS/D5W/SWFI, 2 g, intravenous, PRN, Luis Alberto Simpson,     nystatin (MYCOSTATIN) 100,000 unit/gram topical powder, , Topical, BID, Milly Roberosn PA C, Given at 02/05/25 2019    oxyCODONE (ROXICODONE) immediate release tablet 5 mg, 5 mg, oral, q6h PRN, Jessica Ballard MD, 5 mg at 02/03/25 0107     pantoprazole (PROTONIX) tablet,delayed release (DR/EC) 20 mg, 20 mg, oral, BID, Milly Roberson PA C, 20 mg at 02/06/25 0852    potassium chloride (KLOR-CON M) ER tablet (particles/crystals) 20 mEq, 20 mEq, oral, PRN, Bozai, Luis Alberto, DO    potassium chloride (KLOR-CON M) ER tablet (particles/crystals) 40 mEq, 40 mEq, oral, PRN, Bozai, Luis Alberto, DO    potassium chloride 20 mEq in 100 mL IVPB  (premix), 20 mEq, intravenous, PRN **AND** potassium chloride 20 mEq in 100 mL IVPB  (premix), 20 mEq, intravenous, PRN, Bozai, Luis Alberto, DO    prazosin (MINIPRESS) capsule 5 mg, 5 mg, oral, Nightly, Milly Roberson PA C, 5 mg at 02/01/25 2215    QUEtiapine (SEROQUEL) tablet 100 mg, 100 mg, oral, Nightly, Milly Roberson PA C, 100 mg at 02/05/25 2018    topiramate (TOPAMAX) tablet 50 mg, 50 mg, oral, BID, Milly Roberson PA C, 50 mg at 02/06/25 0849    traZODone (DESYREL) tablet 100 mg, 100 mg, oral, Nightly, TejinderongiorMilly marion PA C, 100 mg at 02/05/25 2018    zolpidem (AMBIEN) tablet 5 mg, 5 mg, oral, Nightly PRN, Charity Aguilar MD, 5 mg at 02/05/25 2018    ASSESSMENT:  Principal Problem:    UTI (urinary tract infection)  Active Problems:    Depression    Anxiety    Eating disorder    Hypothyroidism    PTSD (post-traumatic stress disorder)    Insomnia    At risk for polypharmacy    Chronic pain disorder    Hyponatremia    Thrombocytopenia (CMS/HCC)    Lower abdominal pain      PLAN:  Hyponatremia: serum osmolality 281. Urine Na < 10. Urine osmolality 133. Check TSH, cortisol.  Cortisol from September 2024 was normal.  A previous TSH was elevated at about 6.  Has a chronic eating disorder. Na improved to 143 from 2/6.   No need for fluid restriction, continue oral nutrition support     2. UTI: Cx proteus mirabilis. Abx currently ceftriaxone.    3. Hypercholeremic metabolic acidosis: Resolved.  Total CO2 was 26   4.  Hypokalemia 2.6-give 40 mill equivalents of potassium chloride and recheck later.  5.   Hypotension-she claims that her blood pressure is normally 83/50 as an outpatient.  6.  Elevated LFTs-could be related to hypotension.  Consider GI assessment    Gonzalo Rossi MD

## 2025-02-06 NOTE — PLAN OF CARE
Problem: Adult Inpatient Plan of Care  Goal: Plan of Care Review  Outcome: Progressing    A&Ox4, VSS on RA. Tolerating diet. Voids continently, using bedpan. Weakness continued. Pain managed with PRN tylenol.

## 2025-02-07 LAB
ALBUMIN SERPL-MCNC: 3 G/DL (ref 3.5–5.7)
ALP SERPL-CCNC: 340 IU/L (ref 34–125)
ALT SERPL-CCNC: 434 IU/L (ref 7–52)
ANION GAP SERPL CALC-SCNC: 6 MEQ/L (ref 3–15)
ANION GAP SERPL CALC-SCNC: 6 MEQ/L (ref 3–15)
ANISOCYTOSIS BLD QL SMEAR: ABNORMAL
APTT PPP: 31 SEC (ref 23–35)
AST SERPL-CCNC: 89 IU/L (ref 13–39)
BASOPHILS # BLD: 0 K/UL (ref 0.01–0.1)
BASOPHILS NFR BLD: 0 %
BILIRUB DIRECT SERPL-MCNC: 0.1 MG/DL
BILIRUB SERPL-MCNC: 0.4 MG/DL (ref 0.3–1.2)
BUN SERPL-MCNC: 2 MG/DL (ref 7–25)
BUN SERPL-MCNC: 3 MG/DL (ref 7–25)
BURR CELLS BLD QL SMEAR: ABNORMAL
CALCIUM SERPL-MCNC: 7.7 MG/DL (ref 8.6–10.3)
CALCIUM SERPL-MCNC: 8 MG/DL (ref 8.6–10.3)
CHLORIDE SERPL-SCNC: 114 MEQ/L (ref 98–107)
CHLORIDE SERPL-SCNC: 114 MEQ/L (ref 98–107)
CO2 SERPL-SCNC: 21 MEQ/L (ref 21–31)
CO2 SERPL-SCNC: 22 MEQ/L (ref 21–31)
CORTIS SERPL-MCNC: 7.9 UG/DL
CREAT SERPL-MCNC: 0.4 MG/DL (ref 0.6–1.2)
CREAT SERPL-MCNC: 0.5 MG/DL (ref 0.6–1.2)
DACRYOCYTES BLD QL SMEAR: ABNORMAL
DIFFERENTIAL METHOD BLD: ABNORMAL
EGFRCR SERPLBLD CKD-EPI 2021: >60 ML/MIN/1.73M*2
EGFRCR SERPLBLD CKD-EPI 2021: >60 ML/MIN/1.73M*2
EOSINOPHIL # BLD: 0.11 K/UL (ref 0.04–0.36)
EOSINOPHIL NFR BLD: 4.8 %
ERYTHROCYTE [DISTWIDTH] IN BLOOD BY AUTOMATED COUNT: 15.6 % (ref 11.7–14.4)
EST. AVERAGE GLUCOSE BLD GHB EST-MCNC: 111 MG/DL
GLUCOSE BLD-MCNC: 113 MG/DL (ref 70–99)
GLUCOSE BLD-MCNC: 114 MG/DL (ref 70–99)
GLUCOSE BLD-MCNC: 66 MG/DL (ref 70–99)
GLUCOSE BLD-MCNC: 91 MG/DL (ref 70–99)
GLUCOSE SERPL-MCNC: 101 MG/DL (ref 70–99)
GLUCOSE SERPL-MCNC: 85 MG/DL (ref 70–99)
HAV IGM SER QL: NONREACTIVE
HBA1C MFR BLD: 5.5 %
HBV CORE IGM SER QL: NONREACTIVE
HBV SURFACE AG SER QL: NONREACTIVE
HCT VFR BLD AUTO: 27.1 % (ref 35–45)
HCV AB SER QL: NONREACTIVE
HCV AB SER QL: NONREACTIVE
HGB BLD-MCNC: 9.3 G/DL (ref 11.8–15.7)
HYPOCHROMIA BLD QL SMEAR: ABNORMAL
IMM GRANULOCYTES # BLD AUTO: 0.03 K/UL (ref 0–0.08)
IMM GRANULOCYTES NFR BLD AUTO: 1.3 %
INR PPP: 1.2
LYMPHOCYTES # BLD: 0.8 K/UL (ref 1.2–3.5)
LYMPHOCYTES NFR BLD: 35.1 %
MACROCYTES BLD QL SMEAR: ABNORMAL
MAGNESIUM SERPL-MCNC: 2 MG/DL (ref 1.8–2.5)
MANUAL DIF COMMENT BLD-IMP: ABNORMAL
MCH RBC QN AUTO: 35.1 PG (ref 28–33.2)
MCHC RBC AUTO-ENTMCNC: 34.3 G/DL (ref 32.2–35.5)
MCV RBC AUTO: 102.3 FL (ref 83–98)
MONOCYTES # BLD: 0.27 K/UL (ref 0.28–0.8)
MONOCYTES NFR BLD: 11.8 %
NEUTROPHILS # BLD: 1.07 K/UL (ref 1.7–7)
NEUTS SEG NFR BLD: 47 %
NRBC BLD-RTO: 1.3 %
OVALOCYTES BLD QL SMEAR: ABNORMAL
PLATELET # BLD AUTO: 95 K/UL (ref 150–369)
PLATELET # BLD EST: ABNORMAL 10*3/UL
PLATELET CLUMP BLD QL SMEAR: PRESENT
PMV BLD AUTO: 12.3 FL (ref 9.4–12.3)
POCT TEST: ABNORMAL
POCT TEST: NORMAL
POLYCHROMASIA BLD QL SMEAR: ABNORMAL
POTASSIUM SERPL-SCNC: 2.8 MEQ/L (ref 3.5–5.1)
POTASSIUM SERPL-SCNC: 3 MEQ/L (ref 3.5–5.1)
PROT SERPL-MCNC: 4.1 G/DL (ref 6–8.2)
PROTHROMBIN TIME: 15.2 SEC (ref 12.2–14.5)
RBC # BLD AUTO: 2.65 M/UL (ref 3.93–5.22)
SODIUM SERPL-SCNC: 141 MEQ/L (ref 136–145)
SODIUM SERPL-SCNC: 142 MEQ/L (ref 136–145)
TSH SERPL DL<=0.05 MIU/L-ACNC: 2.12 MIU/L (ref 0.34–5.6)
WBC # BLD AUTO: 2.28 K/UL (ref 3.8–10.5)

## 2025-02-07 PROCEDURE — 36415 COLL VENOUS BLD VENIPUNCTURE: CPT | Performed by: INTERNAL MEDICINE

## 2025-02-07 PROCEDURE — 80048 BASIC METABOLIC PNL TOTAL CA: CPT | Performed by: STUDENT IN AN ORGANIZED HEALTH CARE EDUCATION/TRAINING PROGRAM

## 2025-02-07 PROCEDURE — 63700000 HC SELF-ADMINISTRABLE DRUG: Performed by: STUDENT IN AN ORGANIZED HEALTH CARE EDUCATION/TRAINING PROGRAM

## 2025-02-07 PROCEDURE — 85610 PROTHROMBIN TIME: CPT | Performed by: STUDENT IN AN ORGANIZED HEALTH CARE EDUCATION/TRAINING PROGRAM

## 2025-02-07 PROCEDURE — 83735 ASSAY OF MAGNESIUM: CPT | Performed by: STUDENT IN AN ORGANIZED HEALTH CARE EDUCATION/TRAINING PROGRAM

## 2025-02-07 PROCEDURE — 84443 ASSAY THYROID STIM HORMONE: CPT | Performed by: STUDENT IN AN ORGANIZED HEALTH CARE EDUCATION/TRAINING PROGRAM

## 2025-02-07 PROCEDURE — 85730 THROMBOPLASTIN TIME PARTIAL: CPT | Performed by: STUDENT IN AN ORGANIZED HEALTH CARE EDUCATION/TRAINING PROGRAM

## 2025-02-07 PROCEDURE — 83036 HEMOGLOBIN GLYCOSYLATED A1C: CPT | Performed by: STUDENT IN AN ORGANIZED HEALTH CARE EDUCATION/TRAINING PROGRAM

## 2025-02-07 PROCEDURE — 63700000 HC SELF-ADMINISTRABLE DRUG: Performed by: PHYSICIAN ASSISTANT

## 2025-02-07 PROCEDURE — 80048 BASIC METABOLIC PNL TOTAL CA: CPT | Performed by: SPECIALIST

## 2025-02-07 PROCEDURE — 85025 COMPLETE CBC W/AUTO DIFF WBC: CPT | Performed by: INTERNAL MEDICINE

## 2025-02-07 PROCEDURE — 63700000 HC SELF-ADMINISTRABLE DRUG: Performed by: INTERNAL MEDICINE

## 2025-02-07 PROCEDURE — 80076 HEPATIC FUNCTION PANEL: CPT

## 2025-02-07 PROCEDURE — 82533 TOTAL CORTISOL: CPT | Performed by: STUDENT IN AN ORGANIZED HEALTH CARE EDUCATION/TRAINING PROGRAM

## 2025-02-07 PROCEDURE — 21400000 HC ROOM AND CARE CCU/INTERMEDIATE

## 2025-02-07 PROCEDURE — 99233 SBSQ HOSP IP/OBS HIGH 50: CPT | Performed by: STUDENT IN AN ORGANIZED HEALTH CARE EDUCATION/TRAINING PROGRAM

## 2025-02-07 PROCEDURE — 99232 SBSQ HOSP IP/OBS MODERATE 35: CPT | Performed by: INTERNAL MEDICINE

## 2025-02-07 RX ORDER — ZOLPIDEM TARTRATE 5 MG/1
5 TABLET ORAL NIGHTLY PRN
Status: DISCONTINUED | OUTPATIENT
Start: 2025-02-07 | End: 2025-02-07

## 2025-02-07 RX ORDER — ZOLPIDEM TARTRATE 5 MG/1
10 TABLET ORAL ONCE
Status: ACTIVE | OUTPATIENT
Start: 2025-02-08 | End: 2025-02-08

## 2025-02-07 RX ORDER — POTASSIUM CHLORIDE 14.9 MG/ML
20 INJECTION INTRAVENOUS ONCE
Status: DISPENSED | OUTPATIENT
Start: 2025-02-07 | End: 2025-02-08

## 2025-02-07 RX ORDER — MIDODRINE HYDROCHLORIDE 5 MG/1
10 TABLET ORAL
Status: DISCONTINUED | OUTPATIENT
Start: 2025-02-07 | End: 2025-02-17

## 2025-02-07 RX ORDER — ZOLPIDEM TARTRATE 5 MG/1
10 TABLET ORAL NIGHTLY PRN
Status: DISCONTINUED | OUTPATIENT
Start: 2025-02-07 | End: 2025-02-07

## 2025-02-07 RX ORDER — TRAMADOL HYDROCHLORIDE 50 MG/1
100 TABLET ORAL EVERY 6 HOURS PRN
Status: DISCONTINUED | OUTPATIENT
Start: 2025-02-07 | End: 2025-02-13

## 2025-02-07 RX ADMIN — GABAPENTIN 400 MG: 400 CAPSULE ORAL at 17:15

## 2025-02-07 RX ADMIN — POTASSIUM CHLORIDE 40 MEQ: 1500 TABLET, EXTENDED RELEASE ORAL at 16:44

## 2025-02-07 RX ADMIN — LORAZEPAM 0.25 MG: 0.5 TABLET ORAL at 13:25

## 2025-02-07 RX ADMIN — ACETAMINOPHEN 650 MG: 325 TABLET ORAL at 06:48

## 2025-02-07 RX ADMIN — LEVOTHYROXINE SODIUM 50 MCG: 0.05 TABLET ORAL at 06:48

## 2025-02-07 RX ADMIN — QUETIAPINE FUMARATE 100 MG: 100 TABLET ORAL at 22:44

## 2025-02-07 RX ADMIN — MIDODRINE HYDROCHLORIDE 10 MG: 5 TABLET ORAL at 10:40

## 2025-02-07 RX ADMIN — ATORVASTATIN CALCIUM 20 MG: 20 TABLET, FILM COATED ORAL at 09:03

## 2025-02-07 RX ADMIN — TOPIRAMATE 50 MG: 100 TABLET, FILM COATED ORAL at 15:40

## 2025-02-07 RX ADMIN — GABAPENTIN 400 MG: 400 CAPSULE ORAL at 09:03

## 2025-02-07 RX ADMIN — TOPIRAMATE 50 MG: 100 TABLET, FILM COATED ORAL at 09:03

## 2025-02-07 RX ADMIN — NYSTATIN: 100000 POWDER TOPICAL at 22:44

## 2025-02-07 RX ADMIN — FLUOXETINE HYDROCHLORIDE 40 MG: 20 CAPSULE ORAL at 09:03

## 2025-02-07 RX ADMIN — TRAMADOL HYDROCHLORIDE 100 MG: 50 TABLET, COATED ORAL at 15:40

## 2025-02-07 RX ADMIN — PANTOPRAZOLE SODIUM 20 MG: 20 TABLET, DELAYED RELEASE ORAL at 22:44

## 2025-02-07 RX ADMIN — NYSTATIN: 100000 POWDER TOPICAL at 09:04

## 2025-02-07 RX ADMIN — LORAZEPAM 0.25 MG: 0.5 TABLET ORAL at 09:02

## 2025-02-07 RX ADMIN — POTASSIUM CHLORIDE 40 MEQ: 1500 TABLET, EXTENDED RELEASE ORAL at 22:48

## 2025-02-07 RX ADMIN — PANTOPRAZOLE SODIUM 20 MG: 20 TABLET, DELAYED RELEASE ORAL at 09:03

## 2025-02-07 RX ADMIN — Medication 1 TABLET: at 09:02

## 2025-02-07 RX ADMIN — GABAPENTIN 400 MG: 400 CAPSULE ORAL at 13:25

## 2025-02-07 RX ADMIN — GABAPENTIN 400 MG: 400 CAPSULE ORAL at 22:44

## 2025-02-07 RX ADMIN — MIDODRINE HYDROCHLORIDE 10 MG: 5 TABLET ORAL at 15:40

## 2025-02-07 ASSESSMENT — COGNITIVE AND FUNCTIONAL STATUS - GENERAL
STANDING UP FROM CHAIR USING ARMS: 1 - TOTAL
STANDING UP FROM CHAIR USING ARMS: 1 - TOTAL
WALKING IN HOSPITAL ROOM: 1 - TOTAL
CLIMB 3 TO 5 STEPS WITH RAILING: 1 - TOTAL
MOVING TO AND FROM BED TO CHAIR: 2 - A LOT
MOVING TO AND FROM BED TO CHAIR: 2 - A LOT
CLIMB 3 TO 5 STEPS WITH RAILING: 1 - TOTAL
WALKING IN HOSPITAL ROOM: 1 - TOTAL

## 2025-02-07 ASSESSMENT — ENCOUNTER SYMPTOMS
CONFUSION: 0
DIFFICULTY URINATING: 0

## 2025-02-07 NOTE — PROGRESS NOTES
Handoff received from [facility]; Pt was originally interested in eating disorder treatment; however, her need for physical rehab supercedes her behavioral health needs.  Current discharge plan is SNF.    [facility] will remain available for any additional needs.

## 2025-02-07 NOTE — ASSESSMENT & PLAN NOTE
-hematology consult appreciated  -platelets appear to be improving for now after antibiotics have been stopped  -peripheral smear appreciated  -DIC panel is negative.

## 2025-02-07 NOTE — ASSESSMENT & PLAN NOTE
-encourage oral intake  -monitor bmp  -replace potassium  -check mag level, since this was low replace magnesium.   -monitor the bmp  -hold off on reintroducing florinef as this can promote further hypokalemia.   -cardiac monitoring while there is hypokalemia.

## 2025-02-07 NOTE — ASSESSMENT & PLAN NOTE
-hypovolemic hyponatremia  -hyponatremia is now resolved  -no indication for free water restriction  -encourage oral solute intake  -improved s/p IV fluids  -monitor the bmp

## 2025-02-07 NOTE — PROGRESS NOTES
Hospital Medicine     Daily Progress Note       SUBJECTIVE   Interval History:   -seen and examined at bedside  -denies chest pain  -denies shortness of breath  -denies abdominal pain  -has chronic back and neck pain, not worsened on this hospital stay  -has generalized weakness in her bilateral upper extremities which she states is chronic from her neuropathy. She states that this has not gotten worse from baseline.   -she admits that she has difficulty eating solid foods because of her history of eating disorder. Denies any abdominal or throat pain with eating solids or drinking liquids.   -admits to taking midodrine at home to help with her blood pressures, she denies the use of florinef because of fear of weight gain. She said she heard that this is a potential side effect.            OBJECTIVE      Vital signs in last 24 hours:  Temp:  [36.1 °C (97 °F)-36.6 °C (97.8 °F)] 36.3 °C (97.4 °F)  Heart Rate:  [80-94] 84  Resp:  [16-18] 16  BP: ()/(51-64) 87/55    Intake/Output Summary (Last 24 hours) at 2/7/2025 1114  Last data filed at 2/6/2025 2301  Gross per 24 hour   Intake 610 ml   Output --   Net 610 ml       PHYSICAL EXAMINATION      Physical Exam  Gen: Appears stated age, no acute   Head: atraumatic, normocephalic  Eyes: PERRLA, EOMI, no pallor  Neck: supple, no Lymph nodes, no Thyromegaly, no JVD  CVS: RRR, No M/G, no JVD  Pulm: CTA b/l, no wheezes or rhonchi, no rales  Abd: Soft, nontender, non distended, normal bowel sounds  Extremities:no edema, no cyanosis   MSK: no DJD, no joint swellings, no joint tenderness   Neuro: AAO x3, generalized weakness in all extremities, no facial droop, tongue is midline, speech is clear, makes eye contact and answers questions, can follow commands. Has some difficulty with  strength and coordination.      LINES, CATHETERS, DRAINS, AIRWAYS, AND WOUNDS   Lines, Drains, and Airways:  Wounds (agree with documentation and present on admission):  Peripheral IV  (Adult) 02/03/25 Anterior;Right Forearm (Active)   Number of days: 4         Comments:    LABS / IMAGING / TELE      Labs  Results from last 7 days   Lab Units 02/06/25  1105   WBC K/uL 2.70*   HEMOGLOBIN g/dL 10.2*   HEMATOCRIT % 29.6*   PLATELETS K/uL 81*     Results from last 7 days   Lab Units 02/06/25  2051   SODIUM mEQ/L 140   POTASSIUM mEQ/L 2.6*   CHLORIDE mEQ/L 112*   CO2 mEQ/L 22   BUN mg/dL 3*   CREATININE mg/dL 0.4*   GLUCOSE mg/dL 91   CALCIUM mg/dL 7.8*     Microbiology Results       Procedure Component Value Units Date/Time    Urine culture Urine, Clean Catch [[id number]]  (Normal) Collected: 02/02/25 1515    Specimen: Urine, Clean Catch Updated: 02/03/25 1449     Urine Culture No Growth (Limit of detection 1000 cfu/mL)    Blood Culture Blood, Venous [[id number]]  (Normal) Collected: 02/01/25 1419    Specimen: Blood, Venous Updated: 02/05/25 2001     Culture No growth at 96 hours    Blood Culture Blood, Venous [[id number]]  (Normal) Collected: 02/01/25 1419    Specimen: Blood, Venous Updated: 02/05/25 2001     Culture No growth at 96 hours    Urine culture Urine, Clean Catch [[id number]]  (Abnormal)  (Susceptibility) Collected: 02/01/25 1215    Specimen: Urine, Clean Catch Updated: 02/04/25 0851     Urine Culture **Positive Culture**      >=100,000 cfu/mL Klebsiella pneumoniae ssp pneumoniae      >=100,000 cfu/mL Proteus mirabilis    Urine culture Urine, Clean Catch [[id number]] Collected: 01/27/25 2044    Specimen: Urine, Clean Catch Updated: 01/29/25 1036     Urine Culture Probable contaminants, suggest recollection      >=100,000 cfu/mL Mixed Growth    SARS-COV-2 (COVID-19)/ FLU A/B, AND RSV, PCR Nasopharynx [[id number]]  (Normal) Collected: 01/27/25 1729    Specimen: Nasopharyngeal Swab from Nasopharynx Updated: 01/27/25 1826     SARS-CoV-2 (COVID-19) Negative     Influenza A Negative     Influenza B Negative     Respiratory Syncytial Virus Negative    Narrative:      Testing performed using real-time  PCR for detection of COVID-19. EUA approved validation studies performed on site.                 Imaging  ULTRASOUND ABDOMEN LIMITED    Result Date: 2/6/2025  Narrative: CLINICAL HISTORY: Elevated liver enzymes COMMENT: Ultrasound of the right upper quadrant of the abdomen is performed. The gallbladder is surgically absent.  The pancreas is unremarkable to the extent visualized.  The liver is normal in size with no discrete focal lesions identified.  No biliary ductal dilatation.  The right kidney is normal in size without hydronephrosis.     Impression: IMPRESSION: Unremarkable right upper quadrant ultrasound noting surgical absence of the gallbladder    CT LUMBAR SPINE WITHOUT IV CONTRAST    Result Date: 2/2/2025  Narrative: CLINICAL HISTORY: Mid back pain COMPARISON: CT from January 27, 2025 TECHNIQUE: CT of the lumbar spine without intravenous contrast CT DOSE:  One or more dose reduction techniques (e.g. automated exposure control, adjustment of the mA and/or kV according to patient size, use of iterative reconstruction technique) utilized for this examination. COMMENT: ALIGNMENT: Anatomic DEGENERATIVE CHANGE: Within normal limits There is no compression deformity or suspicious osseous lesion. ADDITIONAL FINDINGS: There is a uterine fibroid as before. The patient is status post gastrojejunostomy surgery. The colon is air-filled. There is a possible 19 mm left ovarian follicle.     Impression: IMPRESSION: No acute osseous abnormalities or significant degenerative change    CT THORACIC SPINE WITHOUT IV CONTRAST    Result Date: 2/2/2025  Narrative: CLINICAL HISTORY: Mid back pain COMPARISON: September 29, 2024 TECHNIQUE: CT of the thoracic spine without intravenous contrast CT DOSE:  One or more dose reduction techniques (e.g. automated exposure control, adjustment of the mA and/or kV according to patient size, use of iterative reconstruction technique) utilized for this examination. COMMENT: ALIGNMENT: Anatomic  DEGENERATIVE CHANGE: There is spurring along the ventral aspects of the T4-T9 vertebral bodies. There is no significant endplate degenerative change or degenerative change of the facets. There is no compression deformity. The neuroforamina appear grossly patent. ADDITIONAL FINDINGS: N/A     Impression: IMPRESSION: Mild multilevel thoracic spondylosis without compression deformity or destructive change    CT ABDOMEN PELVIS WITHOUT IV CONTRAST    Result Date: 1/27/2025  Narrative: CLINICAL HISTORY:    Severe intermittent lower abdominal pain with anorexia Technique:  CT of the abdomen and pelvis was performed without intravenous contrast Coronal and sagittal reconstructed images were obtained evaluation of solid organs is affected by lack of intravenous contrast CT DOSE:  One or more dose reduction techniques (e.g. automated exposure control, adjustment of the mA and/or kV according to patient size, use of iterative reconstruction technique) utilized for this examination. COMPARISON:   8/16/2024 COMMENT: Lower chest: Within normal limits. Liver:  Within normal limits. Gallbladder:  Status post cholecystectomy. Pancreas:  Within normal limits. Spleen:      Within normal limits. Bowel: Stomach: Patient status post Mary-en-Y gastric surgery. Small Bowel: Postsurgical changes are noted.  There is no CT evidence of a bowel obstruction. Colon: There is high density material present within the colon consistent with ingested products.  This reaches the rectum. Appendix:Normal Mesentery/ Peritoneum: Normal Adrenals:  Within normal limits. 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 Urinary Bladder:There is mild concentric prominence the walls of the urinary bladder. Pelvic Organs:  There is a 5.2 x 4.9 cm subserosal fibroid arising from the right aspect of the uterus.. Retroperitoneum:  There are small nonspecific retroperitoneal lymph nodes.. Vessels:  The abdominal aorta is  not aneurysmal. Abdominal Wall/ Soft tissue:There is a calcification within the abdominal wall with likely linear scarring present within the supraumbilical region.  There is present in the prior study. Bones:  There is mild degenerative change of the hips..     Impression: IMPRESSION: 1.  Concentric prominence the walls the urinary bladder.  Cystitis cannot be excluded 2.  There is high density material present within the colon presumably representing ingested product.  There is no CT evidence of a bowel obstruction. The patient is status post Mary-en-Y gastric surgery. 3.  Uterine fibroid 4.  See comments for additional findings     X-RAY CHEST 2 VIEWS    Result Date: 1/27/2025  Narrative: CLINICAL HISTORY:      Chest pain and weakness COMPARISON:  08/15/2024 COMMENT:  Two views of the chest were performed. The cardiovascular silhouette is within normal limits. Costophrenic angles are clear. No focal consolidation or pneumothorax.     Impression: IMPRESSION: No acute cardiopulmonary process.         ECG/Telemetry  EKG:       ASSESSMENT AND PLAN        Assessment & Plan  UTI (urinary tract infection)  -completed five days of ceftriaxone  -ucx was positive for klebsiella and proteus, the cultures and sensitivities are appreciated  -repeat urine culture is negative  -CT abdomen is negative for any acute pathology  -no reason to continue antibiotic therapy.     Depression  - psych input noted  - continue home: LORazepam tablet po BID  - continue home: QUEtiapine tablet 100 mg po daily  - continue home: fluoxetine 40 mg po daily  - continue home: trazodone 100 mg po daily       Anxiety  -see section on depression.   Eating disorder  - reports she has not had solid food intake since [place]  - supervise feeds  - psych consult appreciated: she would benefit from placement at a rehab/eating disorder clinic but her difficulty with ambulation may preclude her from this.   Hypothyroidism  - TSH: 1.4 (9/29/24), free T4: 0.6  (8/16/24)  - continued on synthroid  -since her last function was checked more than 3 months ago ill order a repeat TSH.   PTSD (post-traumatic stress disorder)  -see section on anxiety and depression.   Insomnia    At risk for polypharmacy    Chronic pain disorder  -stop tylenol and use tramadol as needed for severe pain.   Hyponatremia  -hypovolemic hyponatremia  -hyponatremia is now resolved  -no indication for free water restriction  -encourage oral solute intake  -improved s/p IV fluids  -monitor the bmp  Thrombocytopenia (CMS/HCC)  -hematology consult appreciated  -platelets appear to be improving for now after antibiotics have been stopped  -peripheral smear appreciated  -DIC panel is negative.   Lower abdominal pain  -CT abdomen is negative for colitis, negative for bowel obstruction on this admission  -lower abdominal pain is now resolved, possibly had nausea and abdominal discomfort from hypokalemia which we are treating.   Elevated liver enzymes  -US right upper quadrant appreciated, negative for biliary dilatation, neg for hepatic steatosis.   -GI consult appreciated: stop acetaminophen for pain control  -monitor LFTs  -hepatitis panel is negative.   Hypokalemia  -encourage oral intake  -monitor bmp  -replace potassium  -check mag level, since this was low replace magnesium.   -monitor the bmp  -hold off on reintroducing florinef as this can promote further hypokalemia.   -cardiac monitoring while there is hypokalemia.   Hypotension  -ensure MAP is greater than 65 points.   -possibly related to poor po intake  -check am cortisol level, this is pending.   -check TSH, this is pending.  -monitor blood pressure  -allow iv hydration if needed.   -restart midodrine  -avoid florinef for now given the hypokalemia.     VTE Assessment: [place]    VTE Prophylaxis:  Current anticoagulants:    None      Code Status: Full Code      Estimated Discharge Date: 2/8/2025   Disposition Planning: plan for discharge once  electrolytes are corrected, and blood pressure values improve.      Luis Alberto Simpson,   2/7/2025

## 2025-02-07 NOTE — PLAN OF CARE
Care Coordination Discharge Plan Note     Discharge Needs Assessment  Concerns to be Addressed: care coordination/care conferences, discharge planning  Current Discharge Risk:      Anticipated Discharge Plan  Anticipated Discharge Disposition: skilled nursing facility  Type of Skilled Nursing Care Services: PT, OT, nursing      Patient Choice  Offered/Gave Vendor List: yes  Patient and/or patient guardian/advocate was made aware of their right to choose a provider. A list of eligible providers was presented and reviewed with the patient and/or patient guardian/advocate in written and/or verbal form. The list delineates providers in the patients desired geographic area who are participating in the Medicare program and/or providers contracted with the patients primary insurance. The Medicare list and quality ratings were obtained from the Medicare.gov [medicare.gov] website.    ---------------------------------------------------------------------------------------------------------------------    Interdisciplinary Discharge Plan Review:  Participants:nursing    Concerns Comments: Chart reviewed.  Per rounds pt not for d/c this weekend.  Multiple SNF referrals made and  rehab is following.  PLAN: SNF when med stable.  Rand COHEN    Discharge Plan:   Disposition/Destination:   /    Discharge Facility:    Community Resources:      Discharge Transportation:  Is Out of Hospital DNR needed at Discharge: no  Does patient need discharge transport?

## 2025-02-07 NOTE — PROGRESS NOTES
Hematology/Oncology -  Daily Progress Note       SUBJECTIVE   Interval History: 2/7/25 -eating lunch under supervision.  -No abnormal bleeding reported.     OBJECTIVE      Vital signs in last 24 hours:  Temp:  [36.3 °C (97.4 °F)-36.7 °C (98.1 °F)] 36.7 °C (98.1 °F)  Heart Rate:  [80-94] 87  Resp:  [16-18] 16  BP: ()/(52-64) 86/64    Intake/Output Summary (Last 24 hours) at 2/7/2025 1607  Last data filed at 2/6/2025 2301  Gross per 24 hour   Intake 410 ml   Output --   Net 410 ml       PHYSICAL EXAMINATION          Physical Examination:   GEN: Patient is well nourished, not in distress  Eyes : PERRLA, EOMI, no pallor, no icterus  CVS: Regular rate and rhythm  Resp: NWB on RA  Abd: soft, nondistended   Skin: intact, warm, no rash or abnormal bruising noted  Neuro: AAO x3  Psych: Depressed mood and flat affect.  Calm and cooperative       LABS / IMAGING / TELE      Labs  Recent Results (from the past 24 hours)   POCT Glucose    Collection Time: 02/06/25  5:25 PM   Result Value Ref Range    POCT Bedside Glucose 91 70 - 99 mg/dL    POC Test POC    Magnesium    Collection Time: 02/06/25  8:51 PM   Result Value Ref Range    Magnesium 1.6 (L) 1.8 - 2.5 mg/dL   Basic metabolic panel    Collection Time: 02/06/25  8:51 PM   Result Value Ref Range    Sodium 140 136 - 145 mEQ/L    Potassium 2.6 (LL) 3.5 - 5.1 mEQ/L    Chloride 112 (H) 98 - 107 mEQ/L    CO2 22 21 - 31 mEQ/L    BUN 3 (L) 7 - 25 mg/dL    Creatinine 0.4 (L) 0.6 - 1.2 mg/dL    Glucose 91 70 - 99 mg/dL    Calcium 7.8 (L) 8.6 - 10.3 mg/dL    eGFR >60.0 >=60.0 mL/min/1.73m*2    Anion Gap 6 3 - 15 mEQ/L   POCT Glucose    Collection Time: 02/07/25 11:15 AM   Result Value Ref Range    POCT Bedside Glucose 113 (H) 70 - 99 mg/dL    POC Test POC    Basic metabolic panel    Collection Time: 02/07/25  3:12 PM   Result Value Ref Range    Sodium 141 136 - 145 mEQ/L    Potassium 2.8 (LL) 3.5 - 5.1 mEQ/L    Chloride 114 (H) 98 - 107 mEQ/L    CO2 21 21 - 31 mEQ/L    BUN 3  (L) 7 - 25 mg/dL    Creatinine 0.5 (L) 0.6 - 1.2 mg/dL    Glucose 85 70 - 99 mg/dL    Calcium 7.7 (L) 8.6 - 10.3 mg/dL    eGFR >60.0 >=60.0 mL/min/1.73m*2    Anion Gap 6 3 - 15 mEQ/L   Protime-INR    Collection Time: 02/07/25  3:12 PM   Result Value Ref Range    PT 15.2 (H) 12.2 - 14.5 sec    INR 1.2     PTT    Collection Time: 02/07/25  3:12 PM   Result Value Ref Range    PTT 31 23 - 35 sec       Imaging  ULTRASOUND ABDOMEN LIMITED    Result Date: 2/6/2025  CLINICAL HISTORY: Elevated liver enzymes COMMENT: Ultrasound of the right upper quadrant of the abdomen is performed. The gallbladder is surgically absent.  The pancreas is unremarkable to the extent visualized.  The liver is normal in size with no discrete focal lesions identified.  No biliary ductal dilatation.  The right kidney is normal in size without hydronephrosis.     IMPRESSION: Unremarkable right upper quadrant ultrasound noting surgical absence of the gallbladder   I have independently reviewed the pertinent imaging from the last 24 hrs.    ASSESSMENT AND PLAN           Assessment & Plan  UTI (urinary tract infection)    Depression    Anxiety    Eating disorder    Hypothyroidism    PTSD (post-traumatic stress disorder)    Insomnia    At risk for polypharmacy    Chronic pain disorder    Hyponatremia    Thrombocytopenia (CMS/HCC)  Thrombocytopenia without abnormal bleeding issues thus far.  Additionally has leukopenia (differential not available) which is new from yesterday.  -Low suspicion for DIC or microangiopathic hemolysis.  Likely medication related probably Rocephin.  -Okay to continue Rocephin while monitoring blood counts at this time.  Will request routine workup including B12 levels and peripheral smear review.  Obtain PT/INR, PTT and fibrinogen levels as well.  May need additional workup if thrombocytopenia continues to worsen as well as alternative antimicrobial therapy.  -My impression and plan of care reviewed with patient today.  All of  her questions were answered.    2/4/25: Await results from today's CBC which is pending.  No evidence of B12 deficiency.  Addendum: CBC from today reviewed hemoglobin normal, white count improving.  Platelet count is stable at 59 without evidence of bleeding.  Can continue to monitor CBC.    2/5/25:  stable at 63K.  No bleeding noted.    PT 15.7/INR 1.3  PTT <22.  Fibrinogen 128.  Pattern not c/w DIC.  Has not received heparin this admission.  Official path review of smear: Thrombocytopenia.  Mild leukopenia with an absolute lymphopenia. RBC morphology with macrocytosis.   B12 708.  .  Retic 0.68%.  Will repeat cbc w/diff, cmp (lft's normal on admission) and haptoglobin.  Will repeat coags and fibrinogen (?hypofibrinogenemia w/ hx of eating d/o)    2/7/25 -platelets stable to improving.  No other evidence of coagulopathy except low fibrinogen likely related to malnourishment with normal liver imaging.  -No abnormal bleeding.  -Low platelets perhaps related to Rocephin use and should recover now off of it.    Continue daily CBC for monitoring otherwise nothing further needed.     Lower abdominal pain    Hypotension    Hypokalemia    Elevated liver enzymes    Code Status: Full Code     Julian Alberto MD  2/7/2025  4:07 PM

## 2025-02-07 NOTE — ASSESSMENT & PLAN NOTE
-CT abdomen is negative for colitis, negative for bowel obstruction on this admission  -lower abdominal pain is now resolved, possibly had nausea and abdominal discomfort from hypokalemia which we are treating.

## 2025-02-07 NOTE — ED ATTESTATION NOTE
I have personally seen and examined the patient.  I have performed the key components of the encounter and provided a substantive portion of the care and medical decision making for the patient.     I reviewed and agree with resident / physician assistant / nurse practitioners assessment and plan of care, with any exceptions as documented below.     My focused history, examination, assessment, and plan of care of  Felipa Cuadra is as follows:       Vital Signs Review: Vital signs have been reviewed. The oxygen saturation is  SpO2: 96 % which is  normal.    The patient is a 44-year-old female with past medical history of depression, eating disorder, who presented to the emergency department for evaluation of abdominal pain.  Patient was seen in the emergency department approximately 5 days ago and diagnosed with a UTI.  The patient reports decreased oral intake since that time.  Patient is able to tolerate fluids.  Patient had range of generalized weakness and fatigue.  No chest pain, dyspnea, nausea or vomiting.      Physical exam: Vital signs reviewed.  General: Patient appears comfortable lying in bed.  HEENT: NCAT, EOMI.  Neck: Supple.  Chest: Lungs clear.  Heart: Regular.  Abdomen: Soft, nontender, nondistended, no guarding, no rebound.  Extremities: No cyanosis, clubbing or edema.  Skin: Warm and dry.      Plan: Check labs.  UA      Labs Reviewed   URINE CULTURE - Abnormal       Result Value    Urine Culture **Positive Culture** (*)     Urine Culture        Value: >=100,000 cfu/mL Klebsiella pneumoniae ssp pneumoniae    Urine Culture >=100,000 cfu/mL Proteus mirabilis     CBC AND DIFF - Abnormal    WBC 5.36      RBC 4.10      Hemoglobin 14.4      Hematocrit 41.1      .2 (*)     MCH 35.1 (*)     MCHC 35.0      RDW 14.1      Platelets 121 (*)     MPV 11.7      Differential Type Auto      nRBC 0.0      Immature Granulocytes 0.7      Neutrophils 72.0      Lymphocytes 19.8      Monocytes 6.2       Eosinophils 1.1      Basophils 0.2      Immature Granulocytes, Absolute 0.04      Neutrophils, Absolute 3.86      Lymphocytes, Absolute 1.06 (*)     Monocytes, Absolute 0.33      Eosinophils, Absolute 0.06      Basophils, Absolute 0.01     COMPREHENSIVE METABOLIC PANEL - Abnormal    Sodium 132 (*)     Potassium 3.7      Chloride 99      CO2 24      BUN 18      Creatinine 0.6      Glucose 93      Calcium 9.2      AST (SGOT) 19      ALT (SGPT) 16      Alkaline Phosphatase 68      Total Protein 6.4      Albumin 4.3      Bilirubin, Total 0.9      eGFR >60.0      Anion Gap 9     UA REFLEX CULTURE (MACROSCOPIC) - Abnormal    Color, Urine Orange (*)     Clarity, Urine Cloudy (*)     Specific Gravity, Urine 1.025      pH, Urine 6.5      Leukocyte Esterase +3 (*)     Nitrite, Urine Negative      Protein, Urine +1 (*)     Glucose, Urine Negative      Ketones, Urine +3 (*)     Urobilinogen, Urine 4.0 (*)     Bilirubin, Urine +1 (*)     Blood, Urine +2 (*)    UA MICROSCOPIC (UCU) - Abnormal    RBC, Urine Too Numerous To Count (*)     WBC, Urine Too Numerous To Count (*)     Squamous Epithelial Rare (*)     Hyaline Cast None Seen      Bacteria, Urine +3 (*)     Mucus +3 (*)    DRUG SCREEN PANEL, URINE - Abnormal    PCP Scrn, Ur Not Detected      Benzodiazepine Ur Qual Not Detected      Cocaine Screen, Urine Not Detected      Amphetamine+Methamphetamine Screen, Ur Not Detected      Cannabinoid Screen, Urine Positive (*)     Opiate Scrn, Ur Not Detected      Barbiturate Screen, Ur Not Detected      Fentanyl Screen, Urine Not Detected     CBC - Abnormal    WBC 2.04 (*)     RBC 3.21 (*)     Hemoglobin 11.1 (*)     Hematocrit 33.0 (*)     .8 (*)     MCH 34.6 (*)     MCHC 33.6      RDW 14.3      Platelets 60 (*)     MPV 12.6 (*)    BASIC METABOLIC PANEL - Abnormal    Sodium 138      Potassium 3.0 (*)     Chloride 110 (*)     CO2 20 (*)     BUN 9      Creatinine 0.6      Glucose 106 (*)     Calcium 8.2 (*)     eGFR >60.0       Anion Gap 8     MAGNESIUM - Abnormal    Magnesium 1.7 (*)    LACTATE DEHYDROGENASE - Abnormal     (*)    CBC AND DIFF - Abnormal    WBC 3.37 (*)     RBC 3.39 (*)     Hemoglobin 12.0      Hematocrit 34.2 (*)     .9 (*)     MCH 35.4 (*)     MCHC 35.1      RDW 14.6 (*)     Platelets 59 (*)     MPV 11.3      Differential Type Auto      nRBC 0.6 (*)     Immature Granulocytes 1.8      Neutrophils 61.4      Lymphocytes 27.9      Monocytes 6.5      Eosinophils 2.1      Basophils 0.3      Immature Granulocytes, Absolute 0.06      Neutrophils, Absolute 2.07      Lymphocytes, Absolute 0.94 (*)     Monocytes, Absolute 0.22 (*)     Eosinophils, Absolute 0.07      Basophils, Absolute 0.01      PLT Morphology Normal      Platelet Estimate Decreased (51,000-149,000)      Mark Cells 1+     PROTIME-INR - Abnormal    PT 15.7 (*)     INR 1.3     PTT - Abnormal    PTT <22 (*)    FIBRINOGEN - Abnormal    Fibrinogen 128 (*)    CBC AND DIFF - Abnormal    WBC 4.12      RBC 3.06 (*)     Hemoglobin 10.6 (*)     Hematocrit 30.9 (*)     .0 (*)     MCH 34.6 (*)     MCHC 34.3      RDW 15.1 (*)     Platelets 63 (*)     MPV 12.3      Differential Type Auto      nRBC 0.7 (*)     Immature Granulocytes 1.2      Neutrophils 55.6      Lymphocytes 29.6      Monocytes 9.7      Eosinophils 3.4      Basophils 0.5      Immature Granulocytes, Absolute 0.05      Neutrophils, Absolute 2.29      Lymphocytes, Absolute 1.22      Monocytes, Absolute 0.40      Eosinophils, Absolute 0.14      Basophils, Absolute 0.02      Platelet Estimate Decreased (51,000-149,000)      Clumped Platelets Present      Smudge Cells Occasional      Anisocytosis 1+      Ovalocytes 1+      Acanthocytes Occasional      Mark Cells 1+     CBC AND DIFF - Abnormal    WBC 2.70 (*)     RBC 2.93 (*)     Hemoglobin 10.2 (*)     Hematocrit 29.6 (*)     .0 (*)     MCH 34.8 (*)     MCHC 34.5      RDW 15.2 (*)     Platelets 81 (*)     MPV 12.2      Differential Type Auto       nRBC 0.7 (*)     Immature Granulocytes 2.2      Neutrophils 57.7      Lymphocytes 25.6      Monocytes 8.5      Eosinophils 5.6      Basophils 0.4      Immature Granulocytes, Absolute 0.06      Neutrophils, Absolute 1.56 (*)     Lymphocytes, Absolute 0.69 (*)     Monocytes, Absolute 0.23 (*)     Eosinophils, Absolute 0.15      Basophils, Absolute 0.01      PLT Morphology Normal      Platelet Estimate Decreased (51,000-149,000)      Anisocytosis 1+      Macrocytes 1+      Ovalocytes Occasional      Teardrop Cells 1+      [place] Cells Occasional     COMPREHENSIVE METABOLIC PANEL - Abnormal    Sodium 143      Potassium 2.6 (*)     Chloride 113 (*)     CO2 26      BUN 3 (*)     Creatinine 0.4 (*)     Glucose 71      Calcium 7.3 (*)     AST (SGOT) 215 (*)     ALT (SGPT) 610 (*)     Alkaline Phosphatase 374 (*)     Total Protein 3.8 (*)     Albumin 2.7 (*)     Bilirubin, Total 0.5      eGFR >60.0      Anion Gap 4     PROTIME-INR - Abnormal    PT 15.8 (*)     INR 1.3     MAGNESIUM - Abnormal    Magnesium 1.6 (*)    BASIC METABOLIC PANEL - Abnormal    Sodium 140      Potassium 2.6 (*)     Chloride 112 (*)     CO2 22      BUN 3 (*)     Creatinine 0.4 (*)     Glucose 91      Calcium 7.8 (*)     eGFR >60.0      Anion Gap 6     POCT GLUCOSE (BEAKER) - Abnormal    POCT Bedside Glucose 100 (*)     POC Test POC     POCT GLUCOSE (BEAKER) - Abnormal    POCT Bedside Glucose 120 (*)     POC Test POC     POCT GLUCOSE (BEAKER) - Abnormal    POCT Bedside Glucose 107 (*)     POC Test POC     POCT GLUCOSE (BEAKER) - Abnormal    POCT Bedside Glucose 107 (*)     POC Test POC     POCT GLUCOSE (BEAKER) - Abnormal    POCT Bedside Glucose 105 (*)     POC Test POC     POCT GLUCOSE (BEAKER) - Abnormal    POCT Bedside Glucose 105 (*)     POC Test POC     POCT GLUCOSE (BEAKER) - Abnormal    POCT Bedside Glucose 104 (*)     POC Test POC     POCT GLUCOSE (BEAKER) - Abnormal    POCT Bedside Glucose 57 (*)     POC Test POC     POCT GLUCOSE (BEAKER) -  Abnormal    POCT Bedside Glucose 178 (*)     POC Test POC     POCT GLUCOSE (BEAKER) - Abnormal    POCT Bedside Glucose 69 (*)     POC Test POC     POCT GLUCOSE (BEAKER) - Abnormal    POCT Bedside Glucose 108 (*)     POC Test POC     POCT GLUCOSE (BEAKER) - Abnormal    POCT Bedside Glucose 133 (*)     POC Test POC     POCT GLUCOSE (BEAKER) - Abnormal    POCT Bedside Glucose 123 (*)     POC Test POC     POCT GLUCOSE (BEAKER) - Abnormal    POCT Bedside Glucose 136 (*)     POC Test POC     BLOOD CULTURE - Normal    Culture No growth at 96 hours     BLOOD CULTURE - Normal    Culture No growth at 96 hours     URINE CULTURE - Normal    Urine Culture No Growth (Limit of detection 1000 cfu/mL)     ETHANOL - Normal    Ethanol <10     OSMOLALITY, URINE - Normal    Osmolality, Ur 530     OSMOLALITY, SERUM - Normal    Osmolality, Serum 281     BHCG, URINE, QUAL - Normal    BHCG, Qualitative Urine Negative     VITAMIN B12 - Normal    Vitamin B-12 708     RETICULOCYTE COUNT - Normal    Retic, Absolute Count 0.0218      Retic Count(%) 0.68     HAPTOGLOBIN - Normal    Haptoglobin 120.0     PTT - Normal    PTT 32     FIBRINOGEN - Normal    Fibrinogen 260     LACTATE, VENOUS - Normal    Lactate 1.3     URINALYSIS REFLEX CULTURE (ED AND OUTPATIENT ONLY)    Narrative:     The following orders were created for panel order UA w/ reflex culture (ED Only).  Procedure                               Abnormality         Status                     ---------                               -----------         ------                     UA Reflex to Culture (Ma...[[id number]]  Abnormal            Final result               UA Microscopic[[id number]]               Abnormal            Final result                 Please view results for these tests on the individual orders.   ELECTROLYTES, URINE RANDOM    Sodium, Ur 43      Potassium, Ur 23.4      Chloride, Ur 84     PATH REVIEW    Pathology Review        Value: Thrombocytopenia.  Mild leukopenia with  an absolute lymphopenia.  RBC morphology with macrocytosis.    Electronically signed by Ashish Mack MD on February 4, 2025 at 3:13 PM.   RAINBOW DRAW PANEL    Narrative:     The following orders were created for panel order [place] Draw Panel.  Procedure                               Abnormality         Status                     ---------                               -----------         ------                     RAINBOW LT BLUE[[id number]]                                  In process                   Please view results for these tests on the individual orders.   HEPATITIS C ANTIBODY   HEPATITIS PANEL, ACUTE   CBC AND DIFF   BASIC METABOLIC PANEL   CORTISOL   MAGNESIUM   PROTIME-INR   PTT   POCT GLUCOSE   POCT GLUCOSE   POCT GLUCOSE   POCT GLUCOSE   POCT GLUCOSE   POCT GLUCOSE   POCT GLUCOSE   POCT GLUCOSE   POCT GLUCOSE   POCT GLUCOSE   POCT GLUCOSE   POCT GLUCOSE   POCT GLUCOSE   POCT GLUCOSE   POCT GLUCOSE   POCT GLUCOSE   POCT GLUCOSE   POCT GLUCOSE   POCT GLUCOSE   POCT GLUCOSE   POCT GLUCOSE   POCT GLUCOSE   POCT GLUCOSE   POCT GLUCOSE (BEAKER)    POCT Bedside Glucose 98      POC Test POC     POCT GLUCOSE (BEAKER)    POCT Bedside Glucose 95      POC Test POC     POCT GLUCOSE (BEAKER)    POCT Bedside Glucose 74      POC Test POC     POCT GLUCOSE (BEAKER)    POCT Bedside Glucose 97      POC Test POC     POCT GLUCOSE (BEAKER)    POCT Bedside Glucose 78      POC Test POC     POCT GLUCOSE (BEAKER)    POCT Bedside Glucose 73      POC Test POC     POCT GLUCOSE (BEAKER)    POCT Bedside Glucose 72      POC Test POC     POCT GLUCOSE (BEAKER)    POCT Bedside Glucose 91      POC Test POC     POCT GLUCOSE (BEAKER)    POCT Bedside Glucose 88      POC Test POC     POCT GLUCOSE (BEAKER)    POCT Bedside Glucose 91      POC Test POC     RAINBOW LT BLUE     ULTRASOUND ABDOMEN LIMITED   Final Result   IMPRESSION: Unremarkable right upper quadrant ultrasound noting surgical absence   of the gallbladder      CT LUMBAR  SPINE WITHOUT IV CONTRAST   Final Result   IMPRESSION: No acute osseous abnormalities or significant degenerative change      CT THORACIC SPINE WITHOUT IV CONTRAST   Final Result   IMPRESSION: Mild multilevel thoracic spondylosis without compression deformity   or destructive change          MDM: Patient test results reviewed.  Patient has evidence of acute cystitis.  Failure of outpatient management.  Generalized weakness and fatigue.  Plan hospitalize patient.  Rocephin IV given.  Patient does not appear septic at this time.      Impression: Acute cystitis.  Failure of outpatient management     I was physically present for the key/critical portions of the following procedures: None    This document was created using Dragon dictation software.  There may be some typographical errors due to this technology.     Tobias Edwards MD  02/07/25 0127

## 2025-02-07 NOTE — ASSESSMENT & PLAN NOTE
Thrombocytopenia without abnormal bleeding issues thus far.  Additionally has leukopenia (differential not available) which is new from yesterday.  -Low suspicion for DIC or microangiopathic hemolysis.  Likely medication related probably Rocephin.  -Okay to continue Rocephin while monitoring blood counts at this time.  Will request routine workup including B12 levels and peripheral smear review.  Obtain PT/INR, PTT and fibrinogen levels as well.  May need additional workup if thrombocytopenia continues to worsen as well as alternative antimicrobial therapy.  -My impression and plan of care reviewed with patient today.  All of her questions were answered.    2/4/25: Await results from today's CBC which is pending.  No evidence of B12 deficiency.  Addendum: CBC from today reviewed hemoglobin normal, white count improving.  Platelet count is stable at 59 without evidence of bleeding.  Can continue to monitor CBC.    2/5/25:  stable at 63K.  No bleeding noted.    PT 15.7/INR 1.3  PTT <22.  Fibrinogen 128.  Pattern not c/w DIC.  Has not received heparin this admission.  Official path review of smear: Thrombocytopenia.  Mild leukopenia with an absolute lymphopenia. RBC morphology with macrocytosis.   B12 708.  .  Retic 0.68%.  Will repeat cbc w/diff, cmp (lft's normal on admission) and haptoglobin.  Will repeat coags and fibrinogen (?hypofibrinogenemia w/ hx of eating d/o)    2/7/25 -platelets stable to improving.  No other evidence of coagulopathy except low fibrinogen likely related to malnourishment with normal liver imaging.  -No abnormal bleeding.  -Low platelets perhaps related to Rocephin use and should recover now off of it.    Continue daily CBC for monitoring otherwise nothing further needed.

## 2025-02-07 NOTE — PROGRESS NOTES
NEPHROLOGY PROGRESS NOTE    Subjective: Seen in follow up re: hyponatremia. BP remains low, patient states this is her baseline. Awaiting IV team for lab draw this morning.        Review of Systems   Constitutional:         Eating disorder    Genitourinary:  Negative for difficulty urinating.   Psychiatric/Behavioral:  Negative for confusion.    All other systems reviewed and are negative.      Vitals:    02/07/25 0359 02/07/25 0723 02/07/25 0726 02/07/25 1040   BP:  (!) 83/56  (!) 87/55   BP Location:  Right upper arm     Patient Position:  Lying     Pulse: 83 84 84    Resp:  16     Temp:  36.3 °C (97.4 °F)     TempSrc:  Oral     SpO2:  96%     Weight:       Height:             Intake/Output Summary (Last 24 hours) at 2/7/2025 1141  Last data filed at 2/6/2025 2301  Gross per 24 hour   Intake 610 ml   Output --   Net 610 ml       Physical Exam  Constitutional:       General: She is not in acute distress.  HENT:      Head: Atraumatic.      Nose: No rhinorrhea.      Mouth/Throat:      Pharynx: Oropharynx is clear.   Eyes:      Pupils: Pupils are equal, round, and reactive to light.   Cardiovascular:      Rate and Rhythm: Normal rate and regular rhythm.   Pulmonary:      Effort: No respiratory distress.   Abdominal:      Palpations: Abdomen is soft.   Musculoskeletal:         General: No swelling.      Cervical back: Neck supple.      Right lower leg: No edema.      Left lower leg: No edema.   Skin:     Capillary Refill: Capillary refill takes less than 2 seconds.      Coloration: Skin is not jaundiced.   Neurological:      Mental Status: She is alert and oriented to person, place, and time.         LABS:  Results from last 7 days   Lab Units 02/06/25 2051 02/06/25  1105   SODIUM mEQ/L 140 143   POTASSIUM mEQ/L 2.6* 2.6*   CHLORIDE mEQ/L 112* 113*   CO2 mEQ/L 22 26   BUN mg/dL 3* 3*   CREATININE mg/dL 0.4* 0.4*   EGFR mL/min/1.73m*2 >60.0 >60.0   GLUCOSE mg/dL 91 71   CALCIUM mg/dL 7.8* 7.3*   ALBUMIN g/dL  --  2.7*    WBC K/uL  --  2.70*   HEMOGLOBIN g/dL  --  10.2*   HEMATOCRIT %  --  29.6*   PLATELETS K/uL  --  81*       Meds:    Current Facility-Administered Medications:     atorvastatin (LIPITOR) tablet 20 mg, 20 mg, oral, q AM, Milly Roberson PA C, 20 mg at 02/07/25 0903    B-complex with vitamin C tablet 1 tablet, 1 tablet, oral, Daily, Julian Alberto MD, 1 tablet at 02/07/25 0902    glucose chewable tablet 16-32 g of dextrose, 16-32 g of dextrose, oral, PRN **OR** dextrose 40 % oral gel 15-30 g of dextrose, 15-30 g of dextrose, oral, PRN **OR** glucagon (GLUCAGEN) injection 1 mg, 1 mg, intramuscular, PRN **OR** dextrose 50 % in water (D50) injection 12.5 g, 25 mL, intravenous, PRN, Milly Roberson PA C, 12.5 g at 02/04/25 0750    FLUoxetine (PROzac) capsule 40 mg, 40 mg, oral, Daily, Milly Roberson PA C, 40 mg at 02/07/25 0903    gabapentin (NEURONTIN) capsule 400 mg, 400 mg, oral, QID, Milly Roberson PA C, 400 mg at 02/07/25 0903    insulin lispro U-100 (HumaLOG) pen 3-5 Units, 3-5 Units, subcutaneous, With meals & nightly, Milly Roberson PA C    levothyroxine (SYNTHROID) tablet 50 mcg, 50 mcg, oral, Daily (6:30a), Milly Roberson PA C, 50 mcg at 02/07/25 0648    LORazepam (ATIVAN) tablet 0.25 mg, 0.25 mg, oral, BID, Milly Roberson PA C, 0.25 mg at 02/07/25 0902    magnesium sulfate IVPB 1g in 100 mL NSS/D5W/SWFI, 1 g, intravenous, PRN **OR** magnesium sulfate IVPB 2g in 50 mL NSS/D5W/SWFI, 2 g, intravenous, PRN, Luis Alberto Simpson DO, Stopped at 02/07/25 0113    midodrine (PROAMATINE) tablet 10 mg, 10 mg, oral, TID AC, Luis Alberto Simpson DO, 10 mg at 02/07/25 1040    nystatin (MYCOSTATIN) 100,000 unit/gram topical powder, , Topical, BID, Milly Roberson PA C, Given at 02/07/25 0904    oxyCODONE (ROXICODONE) immediate release tablet 5 mg, 5 mg, oral, q6h PRN, Jessica Ballard MD, 5 mg at 02/03/25 0107    pantoprazole (PROTONIX) tablet,delayed release (DR/EC) 20 mg, 20 mg, oral,  BID, Milly Roberson PA C, 20 mg at 02/07/25 0903    potassium chloride (KLOR-CON M) ER tablet (particles/crystals) 20 mEq, 20 mEq, oral, PRN, Bozai, Luis Alberto, DO    potassium chloride (KLOR-CON M) ER tablet (particles/crystals) 40 mEq, 40 mEq, oral, PRN, Bozai, Luis Alberto, DO, 40 mEq at 02/06/25 2249    potassium chloride 20 mEq in 100 mL IVPB  (premix), 20 mEq, intravenous, PRN **AND** potassium chloride 20 mEq in 100 mL IVPB  (premix), 20 mEq, intravenous, PRN, Bozai, Luis Alberto, DO    prazosin (MINIPRESS) capsule 5 mg, 5 mg, oral, Nightly, Milly Roberson PA C, 5 mg at 02/01/25 2215    QUEtiapine (SEROQUEL) tablet 100 mg, 100 mg, oral, Nightly, Milly Roberson PA C, 100 mg at 02/06/25 2248    topiramate (TOPAMAX) tablet 50 mg, 50 mg, oral, BID, Milly Roberson PA C, 50 mg at 02/07/25 0903    traMADoL (ULTRAM) tablet 100 mg, 100 mg, oral, q6h PRN, Bozai, Luis Alberto, DO    traZODone (DESYREL) tablet 100 mg, 100 mg, oral, Nightly, Milly Roberson PA C, 100 mg at 02/06/25 2248    zolpidem (AMBIEN) tablet 5 mg, 5 mg, oral, Nightly PRN, Charity Aguilar MD, 5 mg at 02/06/25 2255    ASSESSMENT:  Principal Problem:    UTI (urinary tract infection)  Active Problems:    Hypotension    Depression    Anxiety    Eating disorder    Hypothyroidism    PTSD (post-traumatic stress disorder)    Hypokalemia    Insomnia    At risk for polypharmacy    Chronic pain disorder    Hyponatremia    Thrombocytopenia (CMS/HCC)    Lower abdominal pain    Elevated liver enzymes      PLAN:  Hyponatremia: serum osmolality 281. Urine Na < 10. Urine osmolality 133. Check TSH, cortisol.  Cortisol from September 2024 was normal.  A previous TSH was elevated at about 6.  Has a chronic eating disorder. Na improved to 143 from 2/6.   No need for fluid restriction, continue oral nutrition support     2. UTI: Cx proteus mirabilis. Abx currently ceftriaxone.    3. Hypercholeremic metabolic acidosis: Resolved.  Total CO2 was 22 yesterday   4.   Hypokalemia 2.6-give 40 mill equivalents of potassium chloride and recheck later.  5.  Hypotension-she claims that her blood pressure is normally 83/50 as an outpatient.  6.  Elevated LFTs-could be related to hypotension.  GI following. Abdominal ultrasound unremarkable.    MIRLANDE Ozuna

## 2025-02-07 NOTE — ASSESSMENT & PLAN NOTE
- reports she has not had solid food intake since [place]  - supervise feeds  - psych consult appreciated: she would benefit from placement at a rehab/eating disorder clinic but her difficulty with ambulation may preclude her from this.

## 2025-02-07 NOTE — ASSESSMENT & PLAN NOTE
- TSH: 1.4 (9/29/24), free T4: 0.6 (8/16/24)  - continued on synthroid  -since her last function was checked more than 3 months ago ill order a repeat TSH.

## 2025-02-07 NOTE — ASSESSMENT & PLAN NOTE
-ensure MAP is greater than 65 points.   -possibly related to poor po intake  -check am cortisol level, this is pending.   -check TSH, this is pending.  -monitor blood pressure  -allow iv hydration if needed.   -restart midodrine  -avoid florinef for now given the hypokalemia.

## 2025-02-07 NOTE — CONSULTS
GASTROENTEROLOGY CONSULTATION   [facility]     PATIENT NAME:  Felipa Cuadra        YOB: 1980   AGE:  44 y.o.         MRN:  [id number]              HISTORY   CC: elevated LFT    45 yo F with PMHx of RYGB, eating disorder, extensive psych history, polysubstance use disorder, hypothyroidism, seizure disorder, HLD, T2DM, GERD, migraines, admitted 2/1 for abd pain, had recent ED visit for same, CT A/P at the time with acute cystitis, fibroids, d/c on Macrobid, returned to ED for persistent sx. UA with pyuria, bacteremia, hematuria, admitted for failure of OP abx, started on IV ceftriaxone. Labs on admission 2/1 with Na 132, reminder of CMP nml, plt 121, hgb/WBC nml. Pt have been hypotensive throughout stay, appears to be her baseline, lowest 83/50 2/6 @1050. First LFT recheck yest (@1105) with , , , Tbili nml, INR 1.3, PT 15.8, WBC/plt low (improving), heme suspected 2/2 Rocephin. Ceftriaxone course completed 2/5. Has been receiving acetaminophen 650mg every 4hr prn (also received 975mg dose 2/1), per med list, pt has been receiving this about 4x/day over last 4 days with 2 doses yesterday.     Acute hep panel neg. US RUQ with surg absent GB, otherwise unremarkable. LFT recheck today not yet drawn.     Denies hx of liver disease or disorder. Does report intermittent epigastric discomfort and mild nausea, about her baseline. Resting in bed comfortably, about to have breakfast.    PAST MEDICAL HISTORY     Past Medical History:   Diagnosis Date    Anxiety     Depression     Eating disorder        PAST SURGICAL HISTORY   No past surgical history on file.     FAMILY HISTORY   No family history on file.    SOCIAL HISTORY     Social History     Tobacco Use    Smoking status: Every Day     Current packs/day: 0.25     Average packs/day: 0.3 packs/day for 20.5 years (5.1 ttl pk-yrs)     Types: Cigarettes     Start date: 8/16/2004    Smokeless tobacco: Current   Substance Use Topics    Alcohol  use: Not Currently       MEDICATIONS   Home Medication:   acetaminophen (TYLENOL) tablet 650 mg  650 mg oral q4h PRN    atorvastatin (LIPITOR) tablet 20 mg  20 mg oral q AM    B-complex with vitamin C tablet 1 tablet  1 tablet oral Daily    glucose chewable tablet 16-32 g of dextrose  16-32 g of dextrose oral PRN    Or    dextrose 40 % oral gel 15-30 g of dextrose  15-30 g of dextrose oral PRN    Or    glucagon (GLUCAGEN) injection 1 mg  1 mg intramuscular PRN    Or    dextrose 50 % in water (D50) injection 12.5 g  25 mL intravenous PRN    FLUoxetine (PROzac) capsule 40 mg  40 mg oral Daily    gabapentin (NEURONTIN) capsule 400 mg  400 mg oral QID    insulin lispro U-100 (HumaLOG) pen 3-5 Units  3-5 Units subcutaneous With meals & nightly    levothyroxine (SYNTHROID) tablet 50 mcg  50 mcg oral Daily (6:30a)    LORazepam (ATIVAN) tablet 0.25 mg  0.25 mg oral BID    magnesium sulfate IVPB 1g in 100 mL NSS/D5W/SWFI  1 g intravenous PRN    Or    magnesium sulfate IVPB 2g in 50 mL NSS/D5W/SWFI  2 g intravenous PRN    nystatin (MYCOSTATIN) 100,000 unit/gram topical powder   Topical BID    oxyCODONE (ROXICODONE) immediate release tablet 5 mg  5 mg oral q6h PRN    pantoprazole (PROTONIX) tablet,delayed release (DR/EC) 20 mg  20 mg oral BID    potassium chloride (KLOR-CON M) ER tablet (particles/crystals) 20 mEq  20 mEq oral PRN    potassium chloride (KLOR-CON M) ER tablet (particles/crystals) 40 mEq  40 mEq oral PRN    potassium chloride 20 mEq in 100 mL IVPB  (premix)  20 mEq intravenous PRN    And    potassium chloride 20 mEq in 100 mL IVPB  (premix)  20 mEq intravenous PRN    prazosin (MINIPRESS) capsule 5 mg  5 mg oral Nightly    QUEtiapine (SEROQUEL) tablet 100 mg  100 mg oral Nightly    topiramate (TOPAMAX) tablet 50 mg  50 mg oral BID    traZODone (DESYREL) tablet 100 mg  100 mg oral Nightly    zolpidem (AMBIEN) tablet 5 mg  5 mg oral Nightly PRN       MEDICATIONS:  Infusions:         Scheduled:    atorvastatin  20  mg oral q AM    B-complex with vitamin C  1 tablet oral Daily    FLUoxetine  40 mg oral Daily    gabapentin  400 mg oral QID    insulin lispro U-100  3-5 Units subcutaneous With meals & nightly    levothyroxine  50 mcg oral Daily (6:30a)    LORazepam  0.25 mg oral BID    nystatin   Topical BID    pantoprazole  20 mg oral BID    prazosin  5 mg oral Nightly    QUEtiapine  100 mg oral Nightly    topiramate  50 mg oral BID    traZODone  100 mg oral Nightly       ALLERGIES     Allergies   Allergen Reactions    Aspartame Anaphylaxis    Bee Venom Protein (Honey Bee) Anaphylaxis    Buspirone Other (see comments)     Per patient - hemorrhage and feeling like they were being electrocuted    Diphenhydramine Anaphylaxis     Other reaction(s): Hyperactivity   Patient stated any antihistamine (hydroxyzine, diphenhydramine, etc....) causes extreme excitation, energy and lack of sleep. (Paradoxical reaction.)    Haloperidol Other (see comments) and Shortness of breath     Acute Dystonia - resolved with IM Benztropine    Iodine Anaphylaxis     Seafood and iodine per patient report    Penicillins Anaphylaxis and Angioedema     Pt tolerated ceftriaxone during September 2024 hospital admission    Sertraline Other (see comments) and Shortness of breath     Serotonin Syndrome (patient - per own report - stated they had to be hospitalized due to severe flu like symptoms and fever 1 week post taking Zoloft) - patient DID tolerate Fluoxetine, Celexa and Lexapro)     Reports serotonin syndrome on this medication    Diclofenac Sodium Other (see comments)     Unsure of reaction    Hydroxyzine     Shellfish Containing Products Other (see comments)    Sudafed Cold-Allergy Other (see comments)    Tetracyclines        REVIEW OF SYSTEMS   Systems reviewed and otherwise negative except as documented above.    PHYSICAL EXAMINATION   Temp:  [36.1 °C (97 °F)-36.6 °C (97.8 °F)] 36.3 °C (97.4 °F)  Heart Rate:  [79-94] 84  Resp:  [16-18] 16  BP:  ()/(50-64) 83/56      Intake/Output Summary (Last 24 hours) at 2/7/2025 0811  Last data filed at 2/6/2025 2301  Gross per 24 hour   Intake 860 ml   Output --   Net 860 ml       General appearance: no acute distress, appears stated age  Head: normocephalic, atraumatic  Eyes: EOMI  ENT: oral mucosa moist  Lungs: non-labored respirations  Heart: regular rate   Abdomen: soft, non-tender  Extremities: no edema  Skin:  Warm, dry, no rashes, no lesions, no jaundice  Neurologic: awake, alert & oriented x 3  Psych: cooperative with exam, appropriate mood and affect    Diagnostic Data:     Labs reviewed  Results from last 7 days   Lab Units 02/06/25 1105 02/05/25  1028 02/04/25  1229   WBC K/uL 2.70* 4.12 3.37*   HEMOGLOBIN g/dL 10.2* 10.6* 12.0   HEMATOCRIT % 29.6* 30.9* 34.2*   PLATELETS K/uL 81* 63* 59*   ]  Results from last 7 days   Lab Units 02/06/25 2051 02/06/25  1105 02/04/25  0553 02/01/25  1223   SODIUM mEQ/L 140 143 138 132*   POTASSIUM mEQ/L 2.6* 2.6* 3.0* 3.7   CHLORIDE mEQ/L 112* 113* 110* 99   CO2 mEQ/L 22 26 20* 24   BUN mg/dL 3* 3* 9 18   CREATININE mg/dL 0.4* 0.4* 0.6 0.6   CALCIUM mg/dL 7.8* 7.3* 8.2* 9.2   ALBUMIN g/dL  --  2.7*  --  4.3   BILIRUBIN TOTAL mg/dL  --  0.5  --  0.9   ALK PHOS IU/L  --  374*  --  68   ALT IU/L  --  610*  --  16   AST IU/L  --  215*  --  19   GLUCOSE mg/dL 91 71 106* 93   ]    ]  Results from last 7 days   Lab Units 02/06/25 1105 02/04/25  0553   INR  1.3 1.3   PTT sec 32 <22*   ]    Imaging reviewed  ULTRASOUND ABDOMEN LIMITED    Result Date: 2/6/2025  IMPRESSION: Unremarkable right upper quadrant ultrasound noting surgical absence of the gallbladder    CT LUMBAR SPINE WITHOUT IV CONTRAST    Result Date: 2/2/2025  IMPRESSION: No acute osseous abnormalities or significant degenerative change    CT THORACIC SPINE WITHOUT IV CONTRAST    Result Date: 2/2/2025  IMPRESSION: Mild multilevel thoracic spondylosis without compression deformity or destructive change    CT  ABDOMEN PELVIS WITHOUT IV CONTRAST    Result Date: 1/27/2025  IMPRESSION: 1.  Concentric prominence the walls the urinary bladder.  Cystitis cannot be excluded 2.  There is high density material present within the colon presumably representing ingested product.  There is no CT evidence of a bowel obstruction. The patient is status post Mary-en-Y gastric surgery. 3.  Uterine fibroid 4.  See comments for additional findings     X-RAY CHEST 2 VIEWS    Result Date: 1/27/2025  IMPRESSION: No acute cardiopulmonary process.        ASSESSMENT/PLAN   45 yo F with PMHx of RYGB, eating disorder, extensive psych history, polysubstance use disorder, hypothyroidism, seizure disorder, HLD, T2DM, GERD, migraines, admitted for failure of  OP abx for UTI, being seen for elevated LFT, normal on admission, 5 days later recheck with sig elevation. US s/p CYY otherwise nml. Was on rocephin (completed 2/5) and tylenol prn. Suspect DILI, pt has hypotension at baseline, unlikely cause.     Plan:   - Trend LFT, awaiting labs to be drawn today   - Limit hepatotoxic medications  - Continue PPI BID  - Will d/w attending     AUTHOR:  VIDAL Zazueta  2/7/2025

## 2025-02-07 NOTE — PROGRESS NOTES
[facility] Response:     Sepsis with acute organ dysfunction not established during this hospitalization.

## 2025-02-07 NOTE — ASSESSMENT & PLAN NOTE
-completed five days of ceftriaxone  -ucx was positive for klebsiella and proteus, the cultures and sensitivities are appreciated  -repeat urine culture is negative  -CT abdomen is negative for any acute pathology  -no reason to continue antibiotic therapy.

## 2025-02-07 NOTE — ASSESSMENT & PLAN NOTE
-US right upper quadrant appreciated, negative for biliary dilatation, neg for hepatic steatosis.   -GI consult appreciated: stop acetaminophen for pain control  -monitor LFTs  -hepatitis panel is negative.

## 2025-02-07 NOTE — HOSPITAL COURSE
44 year old woman with a pmhx of depression, eating disorder,  RYGB, seizure disorder, HLD, type 2 dm, GERD that presents to the hospital with a chief complaint of abdominal pain.   She was recently diagnosed with a UTI prior to coming to the hospital.   She comes to the hospital with generalized weakness and fatigue.     When coming to the ER she was noted to have hypokalemia.     She had an US of the right upper quadrant that was negative.   CT of the lumbar spine was negative for acute osseous abnormalities.   CT thoracic spine is negative.     She was admitted to the hospital for acute cystitis, generalized weakness and fatigue.     She reports suprapubic pain that radiates to her back. She feels that the pain is constant.   She reports increased frequency with urination. She reports chills. She reports nausea without vomiting.     She reports difficulty consuming solid food since Chirstmas time. She is doing well with gatorade.     She also reports darks stools and possible vaginal bleeding for the past three days prior to admission.     Home medications:  -lipitor 20 mg tablet  -florinef 0.1 mg tablet daily  -fluoxetine 20 mg tablet  -gabapentin 400 mg QID  -synthroid 50 mcg tablet daily  -lorazpeam 0.5 mg tablet in the morning, and 1 mg tablet in the afternoon.   -midodrine 10 mg tablet TID  -omeprazole 20 mg tablet daily  -topiramate 50 mg tablet BID  -ambien 10 mg tablet nightly    On this hospitalization nephrology was consulted for hyponatremia.       #hyponatremia  -possibly hypovolemic hyponatremia  -fluid restriction  -monitor bmp  -nephrology feels that her labs are consistent with poor oral solute intake  -      #abdominal pain  -CT abdomen on 01/27/25 prior to admission was negative.     #generalized weakness  -possibly due to hypokalemia      #seizures  -continue topiramate    #HLD    #DM2  -check A1c  -poc glucose checks  -insulin sliding scale    #GERD  -continue PPI    #bipolar  disorder  #anxiety  #depression  #eating disorder.   -psychiatry consult  -nutrition consult  -behavioral health consult  -the patient is agreeable to [facility] once medically stable.       #UTI  -blood cultures are negative  -urine cultures are positive for klebsiella pneumoniae and proteus mirabilis. Sensitive to rocephin.   -repeat urine culture is negative.   -started on IV rocephin.   -CT abdomen   -completed five days of iv ceftriaxone.     #back pain  -CT scan of the back is negative for fracture.     #thrombocytopenia  -hematology consult appreciated  -check b12 levels  -check peripheral smear  -check PT/INR, PTT and fibrinogen levels.   -these levels were reviewed by hematology and this pattern is NOT consistent with DIC.     #pancytopenia  -monitor the CBC    #hyperchloremic metabolic acidosis  -monitor the measured serum bicarbonate.   -appears to be resolved    #hypothyroidism  -check TSH  -continue synthroid     #elevated liver enzymes  -check US RUQ  -consult GI  -monitor LFTs  -check hepatitis panel: hepatitis c antibody is negative, acute hepatitis panel is negative.   -suspicion that this was drug induced liver injury.     #hypotension  -restart home regimen: midodrine and florinef  -check AM cortisol  -check TSH    #hypomagnesemia  -replete the magnesium  -monitor the mag level.         #disposition: SNF    #work up  -B12 levels appears to be wnl.     Labs and imaging that is pending  -check TSH  -check A1c

## 2025-02-08 LAB
ALBUMIN SERPL-MCNC: 3.2 G/DL (ref 3.5–5.7)
ALP SERPL-CCNC: 326 IU/L (ref 34–125)
ALT SERPL-CCNC: 357 IU/L (ref 7–52)
ANION GAP SERPL CALC-SCNC: 6 MEQ/L (ref 3–15)
ANISOCYTOSIS BLD QL SMEAR: ABNORMAL
AST SERPL-CCNC: 87 IU/L (ref 13–39)
BASOPHILS # BLD: 0 K/UL (ref 0.01–0.1)
BASOPHILS NFR BLD: 0 %
BILIRUB DIRECT SERPL-MCNC: 0.2 MG/DL
BILIRUB SERPL-MCNC: 0.5 MG/DL (ref 0.3–1.2)
BUN SERPL-MCNC: 2 MG/DL (ref 7–25)
BURR CELLS BLD QL SMEAR: ABNORMAL
CALCIUM SERPL-MCNC: 8.1 MG/DL (ref 8.6–10.3)
CHLORIDE SERPL-SCNC: 115 MEQ/L (ref 98–107)
CO2 SERPL-SCNC: 22 MEQ/L (ref 21–31)
CREAT SERPL-MCNC: 0.4 MG/DL (ref 0.6–1.2)
DACRYOCYTES BLD QL SMEAR: ABNORMAL
DIFFERENTIAL METHOD BLD: ABNORMAL
EGFRCR SERPLBLD CKD-EPI 2021: >60 ML/MIN/1.73M*2
EOSINOPHIL # BLD: 0.07 K/UL (ref 0.04–0.36)
EOSINOPHIL NFR BLD: 3 %
ERYTHROCYTE [DISTWIDTH] IN BLOOD BY AUTOMATED COUNT: 16.1 % (ref 11.7–14.4)
GLUCOSE BLD-MCNC: 114 MG/DL (ref 70–99)
GLUCOSE BLD-MCNC: 237 MG/DL (ref 70–99)
GLUCOSE BLD-MCNC: 81 MG/DL (ref 70–99)
GLUCOSE BLD-MCNC: 91 MG/DL (ref 70–99)
GLUCOSE BLD-MCNC: 94 MG/DL (ref 70–99)
GLUCOSE BLD-MCNC: 98 MG/DL (ref 70–99)
GLUCOSE SERPL-MCNC: 96 MG/DL (ref 70–99)
HCT VFR BLD AUTO: 29.5 % (ref 35–45)
HGB BLD-MCNC: 9.9 G/DL (ref 11.8–15.7)
HYPOCHROMIA BLD QL SMEAR: ABNORMAL
LYMPHOCYTES # BLD: 1.36 K/UL (ref 1.2–3.5)
LYMPHOCYTES NFR BLD: 60 %
MACROCYTES BLD QL SMEAR: ABNORMAL
MAGNESIUM SERPL-MCNC: 1.9 MG/DL (ref 1.8–2.5)
MCH RBC QN AUTO: 34.6 PG (ref 28–33.2)
MCHC RBC AUTO-ENTMCNC: 33.6 G/DL (ref 32.2–35.5)
MCV RBC AUTO: 103.1 FL (ref 83–98)
METAMYELOCYTES # BLD MANUAL: 0.02 K/UL
METAMYELOCYTES NFR BLD MANUAL: 1 %
MONOCYTES # BLD: 0.11 K/UL (ref 0.28–0.8)
MONOCYTES NFR BLD: 5 %
MYELOCYTES # BLD MANUAL: 0.02 K/UL
MYELOCYTES NFR BLD MANUAL: 1 %
NEUTS BAND # BLD: 0.02 K/UL (ref 0–0.53)
NEUTS BAND # BLD: 0.63 K/UL (ref 1.7–7)
NEUTS BAND NFR BLD: 1 %
NEUTS SEG NFR BLD: 28 %
OVALOCYTES BLD QL SMEAR: ABNORMAL
PHOSPHATE SERPL-MCNC: 3 MG/DL (ref 2.4–4.7)
PLAT MORPH BLD: NORMAL
PLATELET # BLD AUTO: 111 K/UL (ref 150–369)
PLATELET # BLD EST: ABNORMAL 10*3/UL
PMV BLD AUTO: 12.4 FL (ref 9.4–12.3)
POCT TEST: ABNORMAL
POCT TEST: ABNORMAL
POCT TEST: NORMAL
POLYCHROMASIA BLD QL SMEAR: ABNORMAL
POTASSIUM SERPL-SCNC: 3.6 MEQ/L (ref 3.5–5.1)
PROT SERPL-MCNC: 4.5 G/DL (ref 6–8.2)
RBC # BLD AUTO: 2.86 M/UL (ref 3.93–5.22)
SODIUM SERPL-SCNC: 143 MEQ/L (ref 136–145)
VARIANT LYMPHS # BLD MANUAL: 0.02 K/UL
VARIANT LYMPHS NFR BLD: 1 %
WBC # BLD AUTO: 2.26 K/UL (ref 3.8–10.5)

## 2025-02-08 PROCEDURE — 63700000 HC SELF-ADMINISTRABLE DRUG: Performed by: INTERNAL MEDICINE

## 2025-02-08 PROCEDURE — 99232 SBSQ HOSP IP/OBS MODERATE 35: CPT | Performed by: STUDENT IN AN ORGANIZED HEALTH CARE EDUCATION/TRAINING PROGRAM

## 2025-02-08 PROCEDURE — 80048 BASIC METABOLIC PNL TOTAL CA: CPT | Performed by: STUDENT IN AN ORGANIZED HEALTH CARE EDUCATION/TRAINING PROGRAM

## 2025-02-08 PROCEDURE — 25800000 HC PHARMACY IV SOLUTIONS: Performed by: HOSPITALIST

## 2025-02-08 PROCEDURE — 80076 HEPATIC FUNCTION PANEL: CPT | Performed by: STUDENT IN AN ORGANIZED HEALTH CARE EDUCATION/TRAINING PROGRAM

## 2025-02-08 PROCEDURE — 21400000 HC ROOM AND CARE CCU/INTERMEDIATE

## 2025-02-08 PROCEDURE — 83735 ASSAY OF MAGNESIUM: CPT | Performed by: STUDENT IN AN ORGANIZED HEALTH CARE EDUCATION/TRAINING PROGRAM

## 2025-02-08 PROCEDURE — 63700000 HC SELF-ADMINISTRABLE DRUG: Performed by: PHYSICIAN ASSISTANT

## 2025-02-08 PROCEDURE — 85025 COMPLETE CBC W/AUTO DIFF WBC: CPT | Performed by: INTERNAL MEDICINE

## 2025-02-08 PROCEDURE — 84100 ASSAY OF PHOSPHORUS: CPT | Performed by: STUDENT IN AN ORGANIZED HEALTH CARE EDUCATION/TRAINING PROGRAM

## 2025-02-08 PROCEDURE — 63700000 HC SELF-ADMINISTRABLE DRUG: Performed by: STUDENT IN AN ORGANIZED HEALTH CARE EDUCATION/TRAINING PROGRAM

## 2025-02-08 PROCEDURE — 36415 COLL VENOUS BLD VENIPUNCTURE: CPT | Performed by: INTERNAL MEDICINE

## 2025-02-08 RX ADMIN — NYSTATIN: 100000 POWDER TOPICAL at 10:00

## 2025-02-08 RX ADMIN — PANTOPRAZOLE SODIUM 20 MG: 20 TABLET, DELAYED RELEASE ORAL at 09:50

## 2025-02-08 RX ADMIN — Medication 1 TABLET: at 09:51

## 2025-02-08 RX ADMIN — OXYCODONE HYDROCHLORIDE 5 MG: 5 TABLET ORAL at 17:30

## 2025-02-08 RX ADMIN — TRAMADOL HYDROCHLORIDE 100 MG: 50 TABLET, COATED ORAL at 06:32

## 2025-02-08 RX ADMIN — TRAZODONE HYDROCHLORIDE 100 MG: 100 TABLET ORAL at 21:54

## 2025-02-08 RX ADMIN — OXYCODONE HYDROCHLORIDE 5 MG: 5 TABLET ORAL at 10:00

## 2025-02-08 RX ADMIN — LORAZEPAM 0.25 MG: 0.5 TABLET ORAL at 09:46

## 2025-02-08 RX ADMIN — FLUOXETINE HYDROCHLORIDE 40 MG: 20 CAPSULE ORAL at 09:50

## 2025-02-08 RX ADMIN — GABAPENTIN 400 MG: 400 CAPSULE ORAL at 17:30

## 2025-02-08 RX ADMIN — QUETIAPINE FUMARATE 100 MG: 100 TABLET ORAL at 21:54

## 2025-02-08 RX ADMIN — GABAPENTIN 400 MG: 400 CAPSULE ORAL at 13:10

## 2025-02-08 RX ADMIN — PANTOPRAZOLE SODIUM 20 MG: 20 TABLET, DELAYED RELEASE ORAL at 21:54

## 2025-02-08 RX ADMIN — TRAMADOL HYDROCHLORIDE 100 MG: 50 TABLET, COATED ORAL at 13:17

## 2025-02-08 RX ADMIN — POTASSIUM CHLORIDE 20 MEQ: 1500 TABLET, EXTENDED RELEASE ORAL at 13:08

## 2025-02-08 RX ADMIN — TRAMADOL HYDROCHLORIDE 100 MG: 50 TABLET, COATED ORAL at 21:53

## 2025-02-08 RX ADMIN — TOPIRAMATE 50 MG: 100 TABLET, FILM COATED ORAL at 17:30

## 2025-02-08 RX ADMIN — LORAZEPAM 0.25 MG: 0.5 TABLET ORAL at 13:10

## 2025-02-08 RX ADMIN — NYSTATIN: 100000 POWDER TOPICAL at 22:02

## 2025-02-08 RX ADMIN — MIDODRINE HYDROCHLORIDE 10 MG: 5 TABLET ORAL at 17:30

## 2025-02-08 RX ADMIN — SODIUM CHLORIDE 1000 ML: 9 INJECTION, SOLUTION INTRAVENOUS at 00:39

## 2025-02-08 RX ADMIN — GABAPENTIN 400 MG: 400 CAPSULE ORAL at 21:53

## 2025-02-08 RX ADMIN — LEVOTHYROXINE SODIUM 50 MCG: 0.05 TABLET ORAL at 06:32

## 2025-02-08 RX ADMIN — GABAPENTIN 400 MG: 400 CAPSULE ORAL at 09:51

## 2025-02-08 RX ADMIN — ATORVASTATIN CALCIUM 20 MG: 20 TABLET, FILM COATED ORAL at 09:51

## 2025-02-08 RX ADMIN — TOPIRAMATE 50 MG: 100 TABLET, FILM COATED ORAL at 09:50

## 2025-02-08 ASSESSMENT — COGNITIVE AND FUNCTIONAL STATUS - GENERAL
WALKING IN HOSPITAL ROOM: 2 - A LOT
STANDING UP FROM CHAIR USING ARMS: 2 - A LOT
CLIMB 3 TO 5 STEPS WITH RAILING: 3 - A LITTLE
MOVING TO AND FROM BED TO CHAIR: 2 - A LOT

## 2025-02-08 NOTE — PROGRESS NOTES
[facility] Medicine     Daily Progress Note       SUBJECTIVE   Interval History:   -seen and examined at bedside  -has chronic neck and back pain which has been unchanged  -denies chest pain  -denies shortness of breath  -denies fever  -denies abdominal pain  -denies abnormal bleeding.            OBJECTIVE      Vital signs in last 24 hours:  Temp:  [36.2 °C (97.2 °F)-36.7 °C (98.1 °F)] 36.3 °C (97.4 °F)  Heart Rate:  [84-97] 90  Resp:  [16-18] 18  BP: ()/(52-67) 102/60    Intake/Output Summary (Last 24 hours) at 2/8/2025 1135  Last data filed at 2/8/2025 0039  Gross per 24 hour   Intake 1300 ml   Output --   Net 1300 ml       PHYSICAL EXAMINATION      Physical Exam  Gen: Appears stated age, no acute distress, on room air.   Head: atraumatic, normocephalic  Eyes: PERRLA, EOMI, no pallor  Neck: supple, no Lymph nodes, no Thyromegaly, no JVD  CVS: RRR, No M/G, no JVD  Pulm: CTA b/l, no wheezes or rhonchi, no rales  Abd: Soft, nontender, non distended, normal bowel sounds  Extremities:no edema, no cyanosis   MSK: no DJD, no joint swellings, no joint tenderness   Neuro: AAO x3, generalized weakness in all extremities, no facial droop, tongue is midline, speech is clear, makes eye contact and answers questions, can follow commands. Has some difficulty with  strength and coordination.      LINES, CATHETERS, DRAINS, AIRWAYS, AND WOUNDS   Lines, Drains, and Airways:  Wounds (agree with documentation and present on admission):  Peripheral IV (Adult) 02/03/25 Anterior;Right Forearm (Active)   Number of days: 4         Comments:    LABS / IMAGING / TELE      Labs  Results from last 7 days   Lab Units 02/07/25  1512   WBC K/uL 2.28*   HEMOGLOBIN g/dL 9.3*   HEMATOCRIT % 27.1*   PLATELETS K/uL 95*     Results from last 7 days   Lab Units 02/07/25  2157   SODIUM mEQ/L 142   POTASSIUM mEQ/L 3.0*   CHLORIDE mEQ/L 114*   CO2 mEQ/L 22   BUN mg/dL 2*   CREATININE mg/dL 0.4*   GLUCOSE mg/dL 101*   CALCIUM mg/dL 8.0*      Microbiology Results       Procedure Component Value Units Date/Time    Urine culture Urine, Clean Catch [[id number]]  (Normal) Collected: 02/02/25 1515    Specimen: Urine, Clean Catch Updated: 02/03/25 1449     Urine Culture No Growth (Limit of detection 1000 cfu/mL)    Blood Culture Blood, Venous [[id number]]  (Normal) Collected: 02/01/25 1419    Specimen: Blood, Venous Updated: 02/05/25 2001     Culture No growth at 96 hours    Blood Culture Blood, Venous [[id number]]  (Normal) Collected: 02/01/25 1419    Specimen: Blood, Venous Updated: 02/05/25 2001     Culture No growth at 96 hours    Urine culture Urine, Clean Catch [[id number]]  (Abnormal)  (Susceptibility) Collected: 02/01/25 1215    Specimen: Urine, Clean Catch Updated: 02/04/25 0851     Urine Culture **Positive Culture**      >=100,000 cfu/mL Klebsiella pneumoniae ssp pneumoniae      >=100,000 cfu/mL Proteus mirabilis    Urine culture Urine, Clean Catch [[id number]] Collected: 01/27/25 2044    Specimen: Urine, Clean Catch Updated: 01/29/25 1036     Urine Culture Probable contaminants, suggest recollection      >=100,000 cfu/mL Mixed Growth    SARS-COV-2 (COVID-19)/ FLU A/B, AND RSV, PCR Nasopharynx [[id number]]  (Normal) Collected: 01/27/25 1729    Specimen: Nasopharyngeal Swab from Nasopharynx Updated: 01/27/25 1826     SARS-CoV-2 (COVID-19) Negative     Influenza A Negative     Influenza B Negative     Respiratory Syncytial Virus Negative    Narrative:      Testing performed using real-time PCR for detection of COVID-19. EUA approved validation studies performed on site.                 Imaging  ULTRASOUND ABDOMEN LIMITED    Result Date: 2/6/2025  Narrative: CLINICAL HISTORY: Elevated liver enzymes COMMENT: Ultrasound of the right upper quadrant of the abdomen is performed. The gallbladder is surgically absent.  The pancreas is unremarkable to the extent visualized.  The liver is normal in size with no discrete focal lesions identified.  No biliary ductal  dilatation.  The right kidney is normal in size without hydronephrosis.     Impression: IMPRESSION: Unremarkable right upper quadrant ultrasound noting surgical absence of the gallbladder    CT LUMBAR SPINE WITHOUT IV CONTRAST    Result Date: 2/2/2025  Narrative: CLINICAL HISTORY: Mid back pain COMPARISON: CT from January 27, 2025 TECHNIQUE: CT of the lumbar spine without intravenous contrast CT DOSE:  One or more dose reduction techniques (e.g. automated exposure control, adjustment of the mA and/or kV according to patient size, use of iterative reconstruction technique) utilized for this examination. COMMENT: ALIGNMENT: Anatomic DEGENERATIVE CHANGE: Within normal limits There is no compression deformity or suspicious osseous lesion. ADDITIONAL FINDINGS: There is a uterine fibroid as before. The patient is status post gastrojejunostomy surgery. The colon is air-filled. There is a possible 19 mm left ovarian follicle.     Impression: IMPRESSION: No acute osseous abnormalities or significant degenerative change    CT THORACIC SPINE WITHOUT IV CONTRAST    Result Date: 2/2/2025  Narrative: CLINICAL HISTORY: Mid back pain COMPARISON: September 29, 2024 TECHNIQUE: CT of the thoracic spine without intravenous contrast CT DOSE:  One or more dose reduction techniques (e.g. automated exposure control, adjustment of the mA and/or kV according to patient size, use of iterative reconstruction technique) utilized for this examination. COMMENT: ALIGNMENT: Anatomic DEGENERATIVE CHANGE: There is spurring along the ventral aspects of the T4-T9 vertebral bodies. There is no significant endplate degenerative change or degenerative change of the facets. There is no compression deformity. The neuroforamina appear grossly patent. ADDITIONAL FINDINGS: N/A     Impression: IMPRESSION: Mild multilevel thoracic spondylosis without compression deformity or destructive change    CT ABDOMEN PELVIS WITHOUT IV CONTRAST    Result Date:  1/27/2025  Narrative: CLINICAL HISTORY:    Severe intermittent lower abdominal pain with anorexia Technique:  CT of the abdomen and pelvis was performed without intravenous contrast Coronal and sagittal reconstructed images were obtained evaluation of solid organs is affected by lack of intravenous contrast CT DOSE:  One or more dose reduction techniques (e.g. automated exposure control, adjustment of the mA and/or kV according to patient size, use of iterative reconstruction technique) utilized for this examination. COMPARISON:   8/16/2024 COMMENT: Lower chest: Within normal limits. Liver:  Within normal limits. Gallbladder:  Status post cholecystectomy. Pancreas:  Within normal limits. Spleen:      Within normal limits. Bowel: Stomach: Patient status post Mary-en-Y gastric surgery. Small Bowel: Postsurgical changes are noted.  There is no CT evidence of a bowel obstruction. Colon: There is high density material present within the colon consistent with ingested products.  This reaches the rectum. Appendix:Normal Mesentery/ Peritoneum: Normal Adrenals:  Within normal limits. 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 Urinary Bladder:There is mild concentric prominence the walls of the urinary bladder. Pelvic Organs:  There is a 5.2 x 4.9 cm subserosal fibroid arising from the right aspect of the uterus.. Retroperitoneum:  There are small nonspecific retroperitoneal lymph nodes.. Vessels:  The abdominal aorta is not aneurysmal. Abdominal Wall/ Soft tissue:There is a calcification within the abdominal wall with likely linear scarring present within the supraumbilical region.  There is present in the prior study. Bones:  There is mild degenerative change of the hips..     Impression: IMPRESSION: 1.  Concentric prominence the walls the urinary bladder.  Cystitis cannot be excluded 2.  There is high density material present within the colon presumably representing  ingested product.  There is no CT evidence of a bowel obstruction. The patient is status post Mary-en-Y gastric surgery. 3.  Uterine fibroid 4.  See comments for additional findings     X-RAY CHEST 2 VIEWS    Result Date: 1/27/2025  Narrative: CLINICAL HISTORY:      Chest pain and weakness COMPARISON:  08/15/2024 COMMENT:  Two views of the chest were performed. The cardiovascular silhouette is within normal limits. Costophrenic angles are clear. No focal consolidation or pneumothorax.     Impression: IMPRESSION: No acute cardiopulmonary process.         ECG/Telemetry  No acute evnets.       ASSESSMENT AND PLAN        Assessment & Plan  UTI (urinary tract infection)  -completed five days of ceftriaxone  -ucx was positive for klebsiella and proteus, the cultures and sensitivities are appreciated  -repeat urine culture is negative  -CT abdomen is negative for any acute pathology  -no reason to continue antibiotic therapy, as she has completed antibiotics.     Depression  - psych input noted  - continue home: LORazepam tablet po BID  - continue home: QUEtiapine tablet 100 mg po daily  - continue home: fluoxetine 40 mg po daily  - continue home: trazodone 100 mg po daily       Anxiety  -see section on depression.   Eating disorder  - reports she has not had solid food intake since [place]  - supervise feeds  - psych consult appreciated: she would benefit from placement at a rehab/eating disorder clinic but her difficulty with ambulation may preclude her from this.   Hypothyroidism  - TSH: 1.4 (9/29/24), free T4: 0.6 (8/16/24)  - continued on synthroid  -since her last function was checked more than 3 months ago ill order a repeat TSH.   PTSD (post-traumatic stress disorder)  -see section on anxiety and depression.   Insomnia    At risk for polypharmacy    Chronic pain disorder  -stop tylenol and use tramadol as needed for severe pain.   Hyponatremia  -hypovolemic hyponatremia  -hyponatremia is now resolved  -no  indication for free water restriction  -encourage oral solute intake  -improved s/p IV fluids  -monitor the bmp  Thrombocytopenia (CMS/HCC)  -hematology consult appreciated  -platelets appear to be improving for now after antibiotics have been stopped  -peripheral smear appreciated  -DIC panel is negative.   Lower abdominal pain  -CT abdomen is negative for colitis, negative for bowel obstruction on this admission  -lower abdominal pain is now resolved, possibly had nausea and abdominal discomfort from hypokalemia which we are treating.   Elevated liver enzymes  -US right upper quadrant appreciated, negative for biliary dilatation, neg for hepatic steatosis.   -GI consult appreciated: stop acetaminophen for pain control  -monitor LFTs  -hepatitis panel is negative.   Hypokalemia  -encourage oral intake  -monitor bmp  -replace potassium  -check mag level, since this was low replace magnesium.   -monitor the bmp  -hold off on reintroducing florinef as this can promote further hypokalemia.   -cardiac monitoring while there is hypokalemia.   -hypokalemia slightly improving, recheck level today. May need standing replacement orders on discharge.   Hypotension  -ensure MAP is greater than 65 points.   -possibly related to poor po intake  -cortisol level is not low, but was taken in the afternoon so it may not be accurate, can recheck this if hypotension persists.   -TSH level WNL  -monitor blood pressure  -allow iv hydration if needed.   -restart midodrine  -avoid florinef for now given the hypokalemia.     VTE Assessment: [place]    VTE Prophylaxis:  Current anticoagulants:    None      Code Status: Full Code      Estimated Discharge Date: 2/10/2025   Disposition Planning: plan for discharge once electrolytes are corrected, and blood pressure values improve.      Luis Alberto Simpson, DO  2/8/2025

## 2025-02-08 NOTE — PROGRESS NOTES
GASTROENTEROLOGY DAILY PROGRESS NOTE  [facility]     PATIENT NAME:  Felipa Cuadra          YOB: 1980  AGE:  44 y.o.   MRN: [id number]      SUBJECTIVE   CC: Follow-up for elevated LFTs    Chart check    REVIEW OF SYSTEMS   Systems reviewed and otherwise negative except as documented above.    VITAL SIGNS   Temp:  [36.2 °C (97.2 °F)-36.7 °C (98.1 °F)] 36.3 °C (97.3 °F)  Heart Rate:  [84-97] 91  Resp:  [16-18] 16  BP: (78-97)/(52-67) 97/67      Intake/Output Summary (Last 24 hours) at 2/8/2025 1000  Last data filed at 2/8/2025 0039  Gross per 24 hour   Intake 1300 ml   Output --   Net 1300 ml     PHYSICAL EXAM   Physical Exam  General appearance:       Scheduled Meds:   atorvastatin  20 mg oral q AM    B-complex with vitamin C  1 tablet oral Daily    FLUoxetine  40 mg oral Daily    gabapentin  400 mg oral QID    insulin lispro U-100  3-5 Units subcutaneous With meals & nightly    levothyroxine  50 mcg oral Daily (6:30a)    LORazepam  0.25 mg oral BID    midodrine  10 mg oral TID AC    nystatin   Topical BID    pantoprazole  20 mg oral BID    prazosin  5 mg oral Nightly    QUEtiapine  100 mg oral Nightly    topiramate  50 mg oral BID    traZODone  100 mg oral Nightly    zolpidem  10 mg oral Once     Continuous Infusions:  PRN Meds:.  glucose **OR** dextrose **OR** glucagon **OR** dextrose 50 % in water (D50)    magnesium sulfate **OR** magnesium sulfate    oxyCODONE    potassium    potassium    potassium chloride in water **AND** potassium chloride in water    traMADoL    IMAGING AND LABS REVIEWED   Labs reviewed  Results from last 7 days   Lab Units 02/07/25  1512 02/06/25  1105 02/05/25  1028   WBC K/uL 2.28* 2.70* 4.12   HEMOGLOBIN g/dL 9.3* 10.2* 10.6*   HEMATOCRIT % 27.1* 29.6* 30.9*   PLATELETS K/uL 95* 81* 63*   ]    ]  Results from last 7 days   Lab Units 02/07/25 2157 02/07/25  1512 02/06/25 2051 02/06/25  1105 02/04/25  0553 02/01/25  1223   SODIUM mEQ/L 142 141 140 143   < > 132*   POTASSIUM  mEQ/L 3.0* 2.8* 2.6* 2.6*   < > 3.7   CHLORIDE mEQ/L 114* 114* 112* 113*   < > 99   CO2 mEQ/L 22 21 22 26   < > 24   BUN mg/dL 2* 3* 3* 3*   < > 18   CREATININE mg/dL 0.4* 0.5* 0.4* 0.4*   < > 0.6   CALCIUM mg/dL 8.0* 7.7* 7.8* 7.3*   < > 9.2   ALBUMIN g/dL  --  3.0*  --  2.7*  --  4.3   BILIRUBIN TOTAL mg/dL  --  0.4  --  0.5  --  0.9   ALK PHOS IU/L  --  340*  --  374*  --  68   ALT IU/L  --  434*  --  610*  --  16   AST IU/L  --  89*  --  215*  --  19   GLUCOSE mg/dL 101* 85 91 71   < > 93    < > = values in this interval not displayed.   ]    ]  Results from last 7 days   Lab Units 02/07/25  1512 02/06/25  1105 02/04/25  0553   INR  1.2 1.3 1.3   PTT sec 31 32 <22*   ]    Imaging reviewed  ULTRASOUND ABDOMEN LIMITED    Result Date: 2/6/2025  IMPRESSION: Unremarkable right upper quadrant ultrasound noting surgical absence of the gallbladder    CT LUMBAR SPINE WITHOUT IV CONTRAST    Result Date: 2/2/2025  IMPRESSION: No acute osseous abnormalities or significant degenerative change    CT THORACIC SPINE WITHOUT IV CONTRAST    Result Date: 2/2/2025  IMPRESSION: Mild multilevel thoracic spondylosis without compression deformity or destructive change    CT ABDOMEN PELVIS WITHOUT IV CONTRAST    Result Date: 1/27/2025  IMPRESSION: 1.  Concentric prominence the walls the urinary bladder.  Cystitis cannot be excluded 2.  There is high density material present within the colon presumably representing ingested product.  There is no CT evidence of a bowel obstruction. The patient is status post Mary-en-Y gastric surgery. 3.  Uterine fibroid 4.  See comments for additional findings     X-RAY CHEST 2 VIEWS    Result Date: 1/27/2025  IMPRESSION: No acute cardiopulmonary process.      ASSESSMENT/PLAN   S/O: 43 yo F with PMHx of RYGB, eating disorder, extensive psych history, polysubstance use disorder, hypothyroidism, seizure disorder, HLD, T2DM, GERD, migraines, admitted 2/1 for abd pain, had recent ED visit for same, CT A/P at  the time with acute cystitis, fibroids, d/c on Macrobid, returned to ED for persistent sx      We have been consulted for elevated LFTs. These were normal at the onset of hospitalization and were found days later to be elevated. There is only one lab value demonstrating this from 2/6. LFTs on 2/1 were normal.     She has been taking tylenol here around the clock (2.6 grams per day). She does not take tylenol at home. Takes NSAIDs at times but knows she shouldn't because of her RYGB anatomy. Denies RUQ pain. No known history of liver disease.      February 8, 2025  Patient seen by GI team yesterday for elevated LFTs which had been normal on admission.  We assume this is a drug-induced liver injury.  Acetaminophen may have been a factor, theoretically, but patient was also on ceftriaxone which has also been stopped.  LFTs improving, will continue to monitor off acetaminophen and ceftriaxone     AUTHOR:  Emre Rodriguez,   2/8/2025

## 2025-02-08 NOTE — ASSESSMENT & PLAN NOTE
- psych input noted  - continue home: LORazepam tablet po BID  - continue home: QUEtiapine tablet 100 mg po daily  - continue home: fluoxetine 40 mg po daily  - continue home: trazodone 100 mg po daily        Bharat Logan is a 13 year old male here for his physical exam.  Here with mother, brother and sister who remained for the entire visit.    Stays in contact with his friends on zoom.  He enjoys [employer].  He has friends on there.    Diet:   Drinks milk, eats dairy products and a well-rounded diet., Typical childhood sporadic and selective eating patterns    Concerns raised today include   none    REVIEW OF SYSTEMS Except as documented, parent reports no problems with skin, eyes, ENT, cardioresp, musculoskel, GI,, or neurological function..    DEVELOPMENT:  Doing well academically  Doing well socially  Doing well behaviorally  Doing well with family relationships  No significant extracurricular activities    No unusual shortness of breath, labored breathing, or wheezing with exertion., No history of chest pain with exertion, No history of sensation of palpitations or skipped beats with exertion., No history of passing out or feeling faint with exertion., No history of early cardiac morbidity in family    Family History   Problem Relation Age of Onset   • Allergic Rhinitis Mother    • Other Mother         cavernous vascular malformations   • Cataracts Maternal Grandmother        Denies alcohol, tobacco, or drug use    PHYSICAL EXAM  GENERAL:  Well nourished, alert, healthy-appearing child  SKIN:  No significant lesions or rashes; normal texture.  EYES:  Sclerae and conjunctivae clear, RADHA; EOMI; normal cover/uncover, normal adnexa  EARS: Normal external ears and canals.  TM's pearly and mobile, noninflamed.  NOSE:  No apparent anatomic abnormal.  Mucosa appears normal; nares patent.  THROAT:  Mouth appears normal; no lesions; teeth normal for age  NECK:  Thyroid feels normal; No significant cervical lymphadenopathy.  No masses  HEART:  Regular rate and rhythm; quiet precordium; normal S1&S2, no murmur, femoral pulses normal  CHEST:  No tachypnea or retracting; no evidence of resp. distress.  LUNGS: Totally clear,  good breath sounds; no rales, rhonchi, wheezes,or stridor.  ABD: BS normal; soft, nontender, with no masses or organomegaly.  EXTREMITIES:  All normal anatomically without deformities.  SPINE:  No evidence of scoliosis; no rib hump; shoulder and scapular height symmetrical  NEURO:  Normal tone and bulk; normal gait and balance, DTR's = knees, ankles.    ANTICIPATORY GUIDANCE:    GROWTH AND NUTRITION:  Regular meals    SAFETY:  Seat belts  Helmets for bicycling, roller blading, etc    IMMUNIZATIONS: See orders   none at this time    All parental and/or child concerns and questions discussed.    IMP:  WELL ADOLESCENT    PLAN:  Return for next well exam in one year

## 2025-02-08 NOTE — ASSESSMENT & PLAN NOTE
-completed five days of ceftriaxone  -ucx was positive for klebsiella and proteus, the cultures and sensitivities are appreciated  -repeat urine culture is negative  -CT abdomen is negative for any acute pathology  -no reason to continue antibiotic therapy, as she has completed antibiotics.

## 2025-02-08 NOTE — ASSESSMENT & PLAN NOTE
-encourage oral intake  -monitor bmp  -replace potassium  -check mag level, since this was low replace magnesium.   -monitor the bmp  -hold off on reintroducing florinef as this can promote further hypokalemia.   -cardiac monitoring while there is hypokalemia.   -hypokalemia slightly improving, recheck level today. May need standing replacement orders on discharge.

## 2025-02-08 NOTE — ASSESSMENT & PLAN NOTE
-ensure MAP is greater than 65 points.   -possibly related to poor po intake  -cortisol level is not low, but was taken in the afternoon so it may not be accurate, can recheck this if hypotension persists.   -TSH level WNL  -monitor blood pressure  -allow iv hydration if needed.   -restart midodrine  -avoid florinef for now given the hypokalemia.

## 2025-02-08 NOTE — PLAN OF CARE
Care Coordination Discharge Plan Note     Discharge Needs Assessment  Concerns to be Addressed: care coordination/care conferences, discharge planning  Current Discharge Risk:      Anticipated Discharge Plan  Anticipated Discharge Disposition: skilled nursing facility  Type of Skilled Nursing Care Services: PT, OT, nursing      Patient Choice  Offered/Gave Vendor List: yes  Patient and/or patient guardian/advocate was made aware of their right to choose a provider. A list of eligible providers was presented and reviewed with the patient and/or patient guardian/advocate in written and/or verbal form. The list delineates providers in the patients desired geographic area who are participating in the Medicare program and/or providers contracted with the patients primary insurance. The Medicare list and quality ratings were obtained from the Medicare.gov [medicare.gov] website.    ---------------------------------------------------------------------------------------------------------------------    Interdisciplinary Discharge Plan Review:  Participants:physician, occupational therapy, physical therapy, nursing    Concerns Comments: Chart reviewed.  [place] [facility] [place].  Call to Natalia from [facility] and left message [phone], await call back.  Sent secure chat to PT/OT and asked for updated thrapy notes as pt will need insurance auth for SNF.  PLAN: SNF pending available bed and insurance auth.  Rand Garcia [facility]  2/8/2025 3:39 PM - Received call back from Natalia and she stated that she will run pt's benefits to see if they can accept her and will let me know tomorrow.  Rand Garcia [facility]  2/8/2025 3:58 PM - I sent 9 SNF referrals to facilities that are in network with pt's insurance.  Await responses from those facilities.  Rand Garcia [facility]    Discharge Plan:   Disposition/Destination:   /    Discharge Facility:    Community Resources:      Discharge  Transportation:  Is Out of Hospital DNR needed at Discharge: no  Does patient need discharge transport?

## 2025-02-08 NOTE — ASSESSMENT & PLAN NOTE
- reports she has not had solid food intake since [place]  - supervise feeds  - psych consult appreciated: she would benefit from placement at a rehab/eating disorder clinic but her difficulty with ambulation may preclude her from this.

## 2025-02-09 LAB
GLUCOSE BLD-MCNC: 156 MG/DL (ref 70–99)
GLUCOSE BLD-MCNC: 72 MG/DL (ref 70–99)
GLUCOSE BLD-MCNC: 84 MG/DL (ref 70–99)
GLUCOSE BLD-MCNC: 99 MG/DL (ref 70–99)
PATH REV BLD -IMP: NORMAL
POCT TEST: ABNORMAL
POCT TEST: NORMAL

## 2025-02-09 PROCEDURE — 97530 THERAPEUTIC ACTIVITIES: CPT | Mod: GP

## 2025-02-09 PROCEDURE — 21400000 HC ROOM AND CARE CCU/INTERMEDIATE

## 2025-02-09 PROCEDURE — 63700000 HC SELF-ADMINISTRABLE DRUG: Performed by: INTERNAL MEDICINE

## 2025-02-09 PROCEDURE — 63700000 HC SELF-ADMINISTRABLE DRUG: Performed by: STUDENT IN AN ORGANIZED HEALTH CARE EDUCATION/TRAINING PROGRAM

## 2025-02-09 PROCEDURE — 63700000 HC SELF-ADMINISTRABLE DRUG: Performed by: PHYSICIAN ASSISTANT

## 2025-02-09 PROCEDURE — 97110 THERAPEUTIC EXERCISES: CPT | Mod: GP

## 2025-02-09 PROCEDURE — 99231 SBSQ HOSP IP/OBS SF/LOW 25: CPT | Performed by: STUDENT IN AN ORGANIZED HEALTH CARE EDUCATION/TRAINING PROGRAM

## 2025-02-09 PROCEDURE — 97535 SELF CARE MNGMENT TRAINING: CPT | Mod: GO

## 2025-02-09 RX ORDER — LANOLIN ALCOHOL/MO/W.PET/CERES
400 CREAM (GRAM) TOPICAL 2 TIMES DAILY
Status: DISCONTINUED | OUTPATIENT
Start: 2025-02-09 | End: 2025-02-18 | Stop reason: HOSPADM

## 2025-02-09 RX ORDER — ZOLPIDEM TARTRATE 5 MG/1
5 TABLET ORAL NIGHTLY PRN
Status: DISCONTINUED | OUTPATIENT
Start: 2025-02-09 | End: 2025-02-16

## 2025-02-09 RX ORDER — POTASSIUM CHLORIDE 20 MEQ/1
20 TABLET, EXTENDED RELEASE ORAL 2 TIMES DAILY
Status: DISCONTINUED | OUTPATIENT
Start: 2025-02-09 | End: 2025-02-14

## 2025-02-09 RX ADMIN — OXYCODONE HYDROCHLORIDE 5 MG: 5 TABLET ORAL at 01:26

## 2025-02-09 RX ADMIN — NYSTATIN: 100000 POWDER TOPICAL at 21:09

## 2025-02-09 RX ADMIN — Medication 1 TABLET: at 09:44

## 2025-02-09 RX ADMIN — POTASSIUM CHLORIDE 20 MEQ: 1500 TABLET, EXTENDED RELEASE ORAL at 21:05

## 2025-02-09 RX ADMIN — LEVOTHYROXINE SODIUM 50 MCG: 0.05 TABLET ORAL at 07:12

## 2025-02-09 RX ADMIN — FLUOXETINE HYDROCHLORIDE 40 MG: 20 CAPSULE ORAL at 09:45

## 2025-02-09 RX ADMIN — OXYCODONE HYDROCHLORIDE 5 MG: 5 TABLET ORAL at 09:45

## 2025-02-09 RX ADMIN — QUETIAPINE FUMARATE 100 MG: 100 TABLET ORAL at 21:05

## 2025-02-09 RX ADMIN — GABAPENTIN 400 MG: 400 CAPSULE ORAL at 21:05

## 2025-02-09 RX ADMIN — OXYCODONE HYDROCHLORIDE 5 MG: 5 TABLET ORAL at 18:58

## 2025-02-09 RX ADMIN — LORAZEPAM 0.25 MG: 0.5 TABLET ORAL at 09:43

## 2025-02-09 RX ADMIN — TRAZODONE HYDROCHLORIDE 100 MG: 100 TABLET ORAL at 21:05

## 2025-02-09 RX ADMIN — MIDODRINE HYDROCHLORIDE 10 MG: 5 TABLET ORAL at 17:04

## 2025-02-09 RX ADMIN — TRAMADOL HYDROCHLORIDE 100 MG: 50 TABLET, COATED ORAL at 17:04

## 2025-02-09 RX ADMIN — MIDODRINE HYDROCHLORIDE 10 MG: 5 TABLET ORAL at 09:43

## 2025-02-09 RX ADMIN — GABAPENTIN 400 MG: 400 CAPSULE ORAL at 17:05

## 2025-02-09 RX ADMIN — ZOLPIDEM TARTRATE 5 MG: 5 TABLET, FILM COATED ORAL at 21:06

## 2025-02-09 RX ADMIN — TRAMADOL HYDROCHLORIDE 100 MG: 50 TABLET, COATED ORAL at 07:12

## 2025-02-09 RX ADMIN — LORAZEPAM 0.25 MG: 0.5 TABLET ORAL at 17:06

## 2025-02-09 RX ADMIN — MIDODRINE HYDROCHLORIDE 10 MG: 5 TABLET ORAL at 12:09

## 2025-02-09 RX ADMIN — GABAPENTIN 400 MG: 400 CAPSULE ORAL at 09:44

## 2025-02-09 RX ADMIN — ATORVASTATIN CALCIUM 20 MG: 20 TABLET, FILM COATED ORAL at 09:45

## 2025-02-09 RX ADMIN — Medication 400 MG: at 21:05

## 2025-02-09 RX ADMIN — NYSTATIN: 100000 POWDER TOPICAL at 09:50

## 2025-02-09 RX ADMIN — PANTOPRAZOLE SODIUM 20 MG: 20 TABLET, DELAYED RELEASE ORAL at 21:06

## 2025-02-09 RX ADMIN — TOPIRAMATE 50 MG: 100 TABLET, FILM COATED ORAL at 17:04

## 2025-02-09 RX ADMIN — TOPIRAMATE 50 MG: 100 TABLET, FILM COATED ORAL at 09:44

## 2025-02-09 RX ADMIN — PANTOPRAZOLE SODIUM 20 MG: 20 TABLET, DELAYED RELEASE ORAL at 09:46

## 2025-02-09 ASSESSMENT — COGNITIVE AND FUNCTIONAL STATUS - GENERAL
MOVING TO AND FROM BED TO CHAIR: 1 - TOTAL
CLIMB 3 TO 5 STEPS WITH RAILING: 1 - TOTAL
WALKING IN HOSPITAL ROOM: 1 - TOTAL
MOVING TO AND FROM BED TO CHAIR: 1 - TOTAL
AFFECT: FLAT/BLUNTED AFFECT;LOW AROUSAL/LETHARGIC
TOILETING: 1 - TOTAL
STANDING UP FROM CHAIR USING ARMS: 2 - A LOT
HELP NEEDED FOR BATHING: 1 - TOTAL
HELP NEEDED FOR PERSONAL GROOMING: 2 - A LOT
DRESSING REGULAR LOWER BODY CLOTHING: 1 - TOTAL
AFFECT: FLAT/BLUNTED AFFECT
STANDING UP FROM CHAIR USING ARMS: 1 - TOTAL
DRESSING REGULAR UPPER BODY CLOTHING: 2 - A LOT
EATING MEALS: 2 - A LOT
WALKING IN HOSPITAL ROOM: 1 - TOTAL
CLIMB 3 TO 5 STEPS WITH RAILING: 1 - TOTAL

## 2025-02-09 NOTE — PROGRESS NOTES
Occupational Therapy -  Daily Treatment/Progress Note     Patient: Felipa Cuadra  Location: [address] [address]  MRN: [id number]  Today's date: 2/9/2025    HISTORY OF PRESENT ILLNESS     Felipa is a 44 y.o. female admitted on 2/1/2025 with UTI (urinary tract infection) [N39.0]  Lower abdominal pain [R10.30]  Urinary tract infection with hematuria, site unspecified [N39.0, R31.9]. Principal problem is UTI (urinary tract infection).    Past Medical History  Felipa has a past medical history of Anxiety, Depression, and Eating disorder.    History of Present Illness    Patient was seen in the ED 1 week ago for evaluation of abdominal pain and generalized weakness.  Workup revealed acute cystitis.  She was discharged on a course of Macrobid, which patient reports she has been taking.  She presents today for evaluation of persistent abdominal pain.  Patient reports suprapubic pain that radiates to her back.  She reports it is constant.  She reports she is weak and unable to ambulate.  She reports increased frequency of urination.  She reports chills.  She reports nausea without vomiting.  She reports she has not had solid food intake since Christmas.  She reports she has been drinking a lot of Gatorade.  She reports she is currently having black stool and vaginal bleeding x 3 days.  She reports she rarely gets her period.  She has not noticed any blood clots.  She reports she is feeling more depressed lately.  She denies suicidal ideation.  She reports she smokes 4 cigarettes/day.  She reports she quit alcohol use 4 years ago.  She denies fever, syncope, vomiting, chest pain, shortness of breath, constipation, diarrhea.   PRIOR LEVEL OF FUNCTION AND LIVING ENVIRONMENT     Prior Level of Function      Flowsheet Row Most Recent Value   Dominant Hand right   Ambulation assistive equipment   Transferring assistive equipment   Toileting independent   Bathing assistive equipment and person   Dressing assistive  person   Eating independent   IADLs independent   Prior Level of Function Comment Reports assist from HHAs 6 days/week with bathing + dressing. Uses a walker at home   Assistive Device Currently Used at Home walker, standard, grab bar, shower chair             Prior Living Environment      Flowsheet Row Most Recent Value   People in Home alone   Current Living Arrangements home   Home Accessibility elevator accessible   Living Environment Comment Lives alone in apartment with elevator access. Tub shower with SC + GBs.          Occupational Profile      Flowsheet Row Most Recent Value   Successful Occupations Diminished ADL status s/p UTI   Performance Patterns Likes to read, likes to write fanfiction   Environmental Supports and Barriers HHAs available to assist 6 days/week          VITALS AND PAIN     OT Vitals      Date/Time Pulse BP BP Location BP Method Pt Position [facility]   02/09/25 1200 95 74/51 Right upper arm Automatic Lying K(   02/09/25 1212 95 115/66 Right upper arm Automatic Sitting K(M   02/09/25 1220 94 98/54 Right upper arm Automatic Lying K(M          OT Pain      Date/Time Pain Type Location Rating: Rest Rating: Activity Interventions [facility]   02/09/25 1200 Pain Assessment other (see comments) lower back, neck and bilateral LE's 7 7 position adjusted K(M             Objective   OBJECTIVE     Start time:  1200  End time:  1223  Session Length: 23 min  Mode of Treatment: co-treatment  Reason for co-treatment: assist of 2-OT focus on ADL status and functional transfers    General Observations  Patient received supine, in bed. She was agreeable to therapy, no issues or concerns identified by nurse prior to session.      Precautions: fall       Limitations/Impairments: safety/cognitive      OT Eval and Treat - 02/09/25 1200          Cognition    Orientation Status oriented x 3     Affect/Mental Status flat/blunted affect;low arousal/lethargic     Follows Commands follows one-step commands;75-90%  accuracy;delayed response/completion;repetition of directions required;verbal cues/prompting required     Comment, Attention Pt focused and alert this session.  No VC required     Executive Function Deficit planning/decision-making;organization/sequencing;self-monitoring/self-correction     Safety Deficit judgment;safety precautions follow-through/compliance     Comment, Cognition Pleasent and coopertaive;  Pt with fair attention requiring no verbal cues.  Continues with increased time for processing and response time        Bed Mobility    Bed Mobility Activities left;supine to sit;sit to supine     [place] moderate assist (50-74% patient effort);1 person assist     Safety/Cues increased time to complete;hand placement;tactile cues     Assistive Device bed rails;head of bed elevated     Comment mod A x 1 supine > sit and max A x 2 for sit > supine        Mobility Belt    Mobility Belt Used During Session no - patient did not mobilize out of bed or stand        Sit/Stand Transfer    [place] unable to assess        Toilet Transfer    [place] unable to assess        Lower Body Dressing    Tasks don;socks     [place] dependent (less than 25% patient effort)     Position supine     Adaptive Equipment none        Balance    Static Sitting Balance mild impairment;supported;sitting, edge of bed     Dynamic Sitting Balance mild impairment;supported;sitting, edge of bed     Static Standing Balance unable to perform activity     Dynamic Standing Balance unable to perform activity     Comment, Balance Cl S-min A sitting balance EOB several slight LOB requiring min A to correct.  Tolerated sitting > 5 mins        Impairments/Safety Issues    Impairments Affecting Function balance;strength;cognition;coordination;endurance/activity tolerance;postural/trunk control     Cognitive Impairments, Safety/Performance problem-solving/reasoning;safety precaution awareness     Functional Endurance Poor +     Safety  Issues Affecting Function insight into deficits/self-awareness;problem-solving;sequencing abilities;safety precaution awareness;judgment                                              Education Documentation  No documentation found.      Session Outcome  Patient in bed, reclined at end of session, bed alarm on, all needs met, call light in reach, personal items in reach. Nursing notified about patient's position, patient's performance, patient's response to therapy/activity, and change in vital signs.    AM-PAC - ADL (Current Function)     Putting on/taking off regular lower body clothing 1 - Total   Bathing 1 - Total   Toileting 1 - Total   Putting on/taking off regular upper body clothing 2 - A Lot   Help for taking care of personal grooming 2 - A Lot   Eating meals 2 - A Lot   AM-PAC ADL Score 9      ASSESSMENT AND PLAN     Progress Summary  Ot tx completed for auth.  Pt presents with on-going deficits in sitting balance, endurance/activity tolerance, coordination, cognition and functional strength limiting return to PLOF.  Pt required assit of 1-2 for bed mobility and Cl S-min A for static sitting balance.  D for LB dressomg .  Issued foam  for utensils per RN and patient difficulty grasping utensils.    Patient/Family Therapy Goal Statement: To go to therapy    OT Plan      Flowsheet Row Most Recent Value   Rehab Potential fair, will monitor progress closely at 02/09/2025 1200   Therapy Frequency 3 times/wk at 02/09/2025 1200   Planned Therapy Interventions activity tolerance training, functional balance retraining, transfer/mobility retraining, occupation/activity based interventions, BADL retraining at 02/09/2025 1200            OT Discharge Recommendations      Flowsheet Row Most Recent Value   OT Recommended Discharge Disposition skilled nursing facility at 02/09/2025 1200   Anticipated Equipment Needs if Discharged Home (OT) commode, 3-in-1, dressing equipment at 02/09/2025 1200                 OT  Goals      Flowsheet Row Most Recent Value   Bed Mobility Goal 1    Activity/Assistive Device sit to supine, supine to sit, rolling to left, rolling to right at 02/04/2025 1403   [place] minimum assist (75% or more patient effort) at 02/04/2025 1403   Time Frame by discharge at 02/04/2025 1403   Progress/Outcome goal ongoing at 02/04/2025 1403   Transfer Goal 1    Activity/Assistive Device commode, 3-in-1, toilet at 02/04/2025 1403   [place] moderate assist (50-74% patient effort) at 02/04/2025 1403   Time Frame by discharge at 02/04/2025 1403   Progress/Outcome goal ongoing at 02/04/2025 1403   Dressing Goal 1    Activity/Adaptive Equipment lower body dressing at 02/04/2025 1403   [place] minimum assist (75% or more patient effort) at 02/04/2025 1403   Time Frame by discharge at 02/04/2025 1403   Progress/Outcome goal ongoing at 02/04/2025 1403   Toileting Goal 1    Activity/Assistive Device adjust/manage clothing, perform perineal hygiene at 02/04/2025 1403   [place] minimum assist (75% or more patient effort) at 02/04/2025 1403   Time Frame by discharge at 02/04/2025 1403   Progress/Outcome goal ongoing at 02/04/2025 1403

## 2025-02-09 NOTE — PLAN OF CARE
Care Coordination Discharge Plan Note     Discharge Needs Assessment  Concerns to be Addressed: care coordination/care conferences, discharge planning  Current Discharge Risk:      Anticipated Discharge Plan  Anticipated Discharge Disposition: skilled nursing facility  Type of Skilled Nursing Care Services: PT, OT, nursing      Patient Choice  Offered/Gave Vendor List: yes  Patient and/or patient guardian/advocate was made aware of their right to choose a provider. A list of eligible providers was presented and reviewed with the patient and/or patient guardian/advocate in written and/or verbal form. The list delineates providers in the patients desired geographic area who are participating in the Medicare program and/or providers contracted with the patients primary insurance. The Medicare list and quality ratings were obtained from the Medicare.gov [medicare.gov] website.    ---------------------------------------------------------------------------------------------------------------------    Interdisciplinary Discharge Plan Review:  Participants:physician, nursing    Concerns Comments: Chart reviewed.  Received call from Natal[facility] and [facility].  Pt needs updated therapy notes for auth to be started and therapy dept aware.  PLAN: [place] [facility] pending insurance auth.  [facility]    Discharge Plan:   Disposition/Destination:   /    Discharge Facility:    Community Resources:      Discharge Transportation:  Is Out of Hospital DNR needed at Discharge: no  Does patient need discharge transport?

## 2025-02-09 NOTE — PROGRESS NOTES
Hospital Medicine     Daily Progress Note       SUBJECTIVE   Patient seen and examined at bedside.  Multiple complaints.  Neck is stiff.  Having back pain.  Having abdominal pain.  Asked for her Ambien tonight.     OBJECTIVE   Vital signs in last 24 hours:  Temp:  [36.3 °C (97.3 °F)-36.7 °C (98.1 °F)] 36.3 °C (97.3 °F)  Heart Rate:  [75-97] 94  Resp:  [18-20] 18  BP: ()/(50-67) 98/54  No intake or output data in the 24 hours ending 02/09/25 1343    General: no acute distress, awake and alert  HEENT: extraocular movements intact  Cardio: regular rate and rhythm  Pulm: clear to auscultation bilaterally, no wheezing  Abdominal: soft, nontender, nondistended  MSK: grossly intact, ROM intact, no edema  Skin: normal color and turgor  Neuro: Awake and alert, oriented x3, no focal deficits     LINES, DRAINS, AIRWAYS, WOUNDS   Peripheral IV (Adult) 02/03/25 Anterior;Right Forearm (Active)   Number of days: 6          LABS, IMAGING, TELEMETRY   Results from last 7 days   Lab Units 02/08/25  1216 02/07/25  1512 02/06/25  1105   WBC K/uL 2.26* 2.28* 2.70*   HEMOGLOBIN g/dL 9.9* 9.3* 10.2*   HEMATOCRIT % 29.5* 27.1* 29.6*   PLATELETS K/uL 111* 95* 81*      Results from last 7 days   Lab Units 02/08/25  1216 02/07/25  2157 02/07/25  1512   SODIUM mEQ/L 143 142 141   POTASSIUM mEQ/L 3.6 3.0* 2.8*   CHLORIDE mEQ/L 115* 114* 114*   CO2 mEQ/L 22 22 21   BUN mg/dL 2* 2* 3*   CREATININE mg/dL 0.4* 0.4* 0.5*   GLUCOSE mg/dL 96 101* 85   CALCIUM mg/dL 8.1* 8.0* 7.7*        ULTRASOUND ABDOMEN LIMITED    Result Date: 2/6/2025  IMPRESSION: Unremarkable right upper quadrant ultrasound noting surgical absence of the gallbladder    CT LUMBAR SPINE WITHOUT IV CONTRAST    Result Date: 2/2/2025  IMPRESSION: No acute osseous abnormalities or significant degenerative change    CT THORACIC SPINE WITHOUT IV CONTRAST    Result Date: 2/2/2025  IMPRESSION: Mild multilevel thoracic spondylosis without compression deformity or  destructive change    CT ABDOMEN PELVIS WITHOUT IV CONTRAST    Result Date: 1/27/2025  IMPRESSION: 1.  Concentric prominence the walls the urinary bladder.  Cystitis cannot be excluded 2.  There is high density material present within the colon presumably representing ingested product.  There is no CT evidence of a bowel obstruction. The patient is status post Mary-en-Y gastric surgery. 3.  Uterine fibroid 4.  See comments for additional findings     X-RAY CHEST 2 VIEWS    Result Date: 1/27/2025  IMPRESSION: No acute cardiopulmonary process.       ASSESSMENT AND PLAN     Assessment and Plan     # UTI (urinary tract infection)  -completed five days of ceftriaxone  -ucx was positive for klebsiella and proteus, the cultures and sensitivities are appreciated  -repeat urine culture is negative  -CT abdomen is negative for any acute pathology  -no reason to continue antibiotic therapy, as she has completed antibiotics.     # Depression  - psych input noted  - continue home: LORazepam tablet po BID  - continue home: QUEtiapine tablet 100 mg po daily  - continue home: fluoxetine 40 mg po daily  - continue home: trazodone 100 mg po daily     # Anxiety  -see section on depression.     # Eating disorder  - reports she has not had solid food intake since [place]  - supervise feeds  - psych consult appreciated: she would benefit from placement at a rehab/eating disorder clinic but her difficulty with ambulation may preclude her from this.     # Hypothyroidism  - TSH: 2.12 (2/7/25), free T4: 0.6 (8/16/24)  - continued on synthroid  -since her last function was checked more than 3 months ago ill order a repeat TSH.     # PTSD (post-traumatic stress disorder)  -see section on anxiety and depression.     # Insomnia     # At risk for polypharmacy     # Chronic pain disorder  -stop tylenol and use tramadol as needed for severe pain.     # Hyponatremia  # -hypovolemic hyponatremia  -hyponatremia is now resolved  -no indication for  free water restriction  -encourage oral solute intake  -improved s/p IV fluids  -monitor the bmp     # Thrombocytopenia (CMS/HCC)  -hematology consult appreciated  -platelets appear to be improving for now after antibiotics have been stopped  -peripheral smear appreciated  -DIC panel is negative.     # Lower abdominal pain  -CT abdomen is negative for colitis, negative for bowel obstruction on this admission  -lower abdominal pain is now resolved, possibly had nausea and abdominal discomfort from hypokalemia which we are treating.     # Elevated liver enzymes  -US right upper quadrant appreciated, negative for biliary dilatation, neg for hepatic steatosis.  -GI consult appreciated: stop acetaminophen for pain control  -monitor LFTs  -hepatitis panel is negative.     # Hypokalemia  -encourage oral intake  -monitor bmp  -replace potassium  -check mag level, since this was low replace magnesium.  -monitor the bmp  -hold off on reintroducing florinef as this can promote further hypokalemia.  -cardiac monitoring while there is hypokalemia.  -hypokalemia slightly improving, recheck level today. May need standing replacement orders on discharge.     # Hypotension  -ensure MAP is greater than 65 points.  -possibly related to poor po intake  -cortisol level is not low, but was taken in the afternoon so it may not be accurate, can recheck this if hypotension persists.  -TSH level WNL  -monitor blood pressure  -allow iv hydration if needed.  -restart midodrine  -avoid florinef for now given the hypokalemia.     # Pancytopenia     # GERD  - currently on: pantoprazole 20 mg po BID     # Anemia: acute on chronic, due to bone marrow suppression, stable, macrocytic     # Hyperlipidemia  - continue home: atorvastatin 20 mg po daily     # Hypocalcemia     # Sepsis with acute organ dysfunction  - SIRS: HR: 92 (2/3/25 12:00), WBC: 2.0 (2/3/25)  - SOFA score: 3 (2/9/25)  source UTI  resolved     # Seizure disorder  - continue home:  LORazepam tablet po BID  - continue home: gabapentin 400 mg po QID  - continue home: topiramate 50 mg po BID       VTE Prophylaxis:  Current anticoagulants:    None      Code Status: Full Code        Estimated Discharge Date: 2/10/2025   Disposition Planning: Medically stable for discharge pending placement       Bunny Chavarria DO  2/9/2025

## 2025-02-09 NOTE — PROGRESS NOTES
Patient: Felipa Cuadra  Location: [place] [address]  MRN:  [id number]  Today's date:  2/9/2025    Attempted to see patient for therapy. Unable due to medical hold (Per RN requesting to hold until after AM meds given. PT will follow-up at a later time as schedule permits.).

## 2025-02-09 NOTE — PROGRESS NOTES
Physical Therapy -  Daily Treatment/Progress Note     Patient: Felipa Cuadra  Location: [address] [address]  MRN: [id number]  Today's date: 2/9/2025    HISTORY OF PRESENT ILLNESS     Felipa is a 44 y.o. female admitted on 2/1/2025 with UTI (urinary tract infection) [N39.0]  Lower abdominal pain [R10.30]  Urinary tract infection with hematuria, site unspecified [N39.0, R31.9]. Principal problem is UTI (urinary tract infection).    Past Medical History  Felipa has a past medical history of Anxiety, Depression, and Eating disorder.    History of Present Illness    Patient was seen in the ED 1 week ago for evaluation of abdominal pain and generalized weakness.  Workup revealed acute cystitis.  She was discharged on a course of Macrobid, which patient reports she has been taking.  She presents today for evaluation of persistent abdominal pain.  Patient reports suprapubic pain that radiates to her back.  She reports it is constant.  She reports she is weak and unable to ambulate.  She reports increased frequency of urination.  She reports chills.  She reports nausea without vomiting.  She reports she has not had solid food intake since Christmas.  She reports she has been drinking a lot of Gatorade.  She reports she is currently having black stool and vaginal bleeding x 3 days.  She reports she rarely gets her period.  She has not noticed any blood clots.  She reports she is feeling more depressed lately.  She denies suicidal ideation.  She reports she smokes 4 cigarettes/day.  She reports she quit alcohol use 4 years ago.  She denies fever, syncope, vomiting, chest pain, shortness of breath, constipation, diarrhea.   PRIOR LEVEL OF FUNCTION AND LIVING ENVIRONMENT     Prior Level of Function      Flowsheet Row Most Recent Value   Dominant Hand right   Ambulation assistive equipment   Transferring assistive equipment   Toileting independent   Bathing assistive equipment and person   Dressing assistive person    Eating independent   IADLs independent   Prior Level of Function Comment Reports assist from HHAs 6 days/week with bathing + dressing. Uses a walker at home   Assistive Device Currently Used at Home walker, standard, grab bar, shower chair             Prior Living Environment      Flowsheet Row Most Recent Value   People in Home alone   Current Living Arrangements home   Home Accessibility elevator accessible   Living Environment Comment Lives alone in apartment with elevator access. Tub shower with SC + GBs.          VITALS AND PAIN     PT Vitals      Date/Time Pulse BP BP Location BP Method Pt Position [facility]   02/09/25 1212 95 115/66 Right upper arm Automatic Sitting K(M   02/09/25 1220 94 98/54 Right upper arm Automatic Lying K(M             Objective   OBJECTIVE     Start time:  1201  End time:  1224  Session Length: 23 min  Mode of Treatment: co-treatment  Reason for co-treatment: Priority for auth, PT focus on mobility    General Observations  Patient received supine, in bed. She was agreeable to therapy, no issues or concerns identified by nurse prior to session.      Precautions: fall       Limitations/Impairments: safety/cognitive      PT Eval and Treat - 02/09/25 1201          Cognition    Orientation Status oriented x 3     Affect/Mental Status flat/blunted affect     Follows Commands follows one-step commands;75-90% accuracy;delayed response/completion;repetition of directions required;verbal cues/prompting required     Cognitive Function executive function deficit;safety deficit     Executive Function Deficit planning/decision-making;organization/sequencing;self-monitoring/self-correction     Safety Deficit judgment;safety precautions follow-through/compliance     Comment, Cognition Pleasant and cooperative, receptive to education, increased time for processing        Bed Mobility    Bed Mobility Activities left;supine to sit;sit to supine     [place] moderate assist (50-74% patient effort);maximum  assist (25-49% patient effort);1 person assist;2 person assist     Safety/Cues increased time to complete;verbal cues;hand placement;technique     Assistive Device bed rails;head of bed elevated     Comment Mod A x1 supine>sit, Max A x2 sit>supine        Mobility Belt    Mobility Belt Used During Session no - patient did not mobilize out of bed or stand        Sit/Stand Transfer    Paron not tested     Transfer Comments Pt declined STS trial        Balance    Static Sitting Balance mild impairment;sitting, edge of bed     Dynamic Sitting Balance mild impairment;sitting, edge of bed     Sit to Stand Dynamic Balance unable to perform activity     Static Standing Balance unable to perform activity     Dynamic Standing Balance unable to perform activity     Comment, Balance CS<>Min A sitting EOB, multiple episodes of clight LOB requiring Min A to correct, sitting EOB x5 min        Lower Extremity (Therapeutic Exercise)    Exercise Position/Type seated;AAROM (active assistive range of motion);AROM (active range of motion)     General Exercise ankle pumps;LAQ (long arc quad)     Range of Motion Exercises bilateral     Reps and Sets x10 APs AROM, x5 R/L LAQ AAROM        Impairments/Safety Issues    Impairments Affecting Function balance;strength;cognition;coordination;endurance/activity tolerance;postural/trunk control     Cognitive Impairments, Safety/Performance problem-solving/reasoning;safety precaution awareness     Functional Endurance Fair (-)     Safety Issues Affecting Function insight into deficits/self-awareness;problem-solving;sequencing abilities;safety precaution awareness;judgment                                              Education Documentation  Joint Mobility/Strength, taught by Keri Banuelos, PT at 2/9/2025  1:31 PM.  Learner: Patient  Readiness: Acceptance  Method: Explanation  Response: Verbalizes Understanding  Comment: P POC, d/c recs, use of call bell, staff assist for safety w/  "mobility    Fall Prevention, taught by Keri Banuelos, PT at 2/9/2025  1:31 PM.  Learner: Patient  Readiness: Acceptance  Method: Explanation  Response: Verbalizes Understanding  Comment: P POC, d/c recs, use of call bell, staff assist for safety w/ mobility    Call Light Use, taught by Keri Banuelos, PT at 2/9/2025  1:31 PM.  Learner: Patient  Readiness: Acceptance  Method: Explanation  Response: Verbalizes Understanding  Comment: P POC, d/c recs, use of call bell, staff assist for safety w/ mobility        Session Outcome  Patient in bed, reclined at end of session, bed alarm on, all needs met, call light in reach, personal items in reach. Nursing notified about patient's performance, patient's position, and patient's response to therapy/activity.    AM-PAC - Mobility (Current Function)     Turning form your back to your side while in flat bed without using bedrails 2 - A Lot   Moving from lying on your back to sitting on the side of a flat bed without using bedrails 2 - A Lot   Moving to and from a bed to a chair 1 - Total   Standing up from a chair using your arms 2 - A Lot   To walk in a hospital room 1 - Total   Climbing 3-5 steps with a railing 1 - Total   AM-PAC Mobility Score 9      ASSESSMENT AND PLAN     Progress Summary  Pt seen for PT treatment session. [facility] 9/24. Pt req Mod A x1 for supine<>sit and Max A x2 for sit>supine. Min A<>CS for sitting balance EOB. Continued PT services to address deficits in strength/balance/endurance/coordination and to maximize functional ind/safety. Recommend d/c to SNF once medically stable.    Patient/Family Therapy Goals Statement: \"go to rehab, skilled nursing home\"    PT Plan      Flowsheet Row Most Recent Value   Rehab Potential fair, will monitor progress closely at 02/06/2025 1520   Therapy Frequency 3 times/wk at 02/06/2025 1520   Planned Therapy Interventions postural re-education, balance training, strengthening, motor coordination training, bed mobility " training, transfer training, gait training, patient/family education at 02/06/2025 1520            PT Discharge Recommendations      Flowsheet Row Most Recent Value   PT Recommended Discharge Disposition skilled nursing facility at 02/06/2025 1520   Anticipated Equipment Needs if Discharged Home (PT) none at 02/06/2025 1520                 PT Goals      Flowsheet Row Most Recent Value   Bed Mobility Goal 1    Activity/Assistive Device sit to supine/supine to sit at 02/04/2025 1402   [place] modified independence at 02/04/2025 1402   Time Frame by discharge at 02/04/2025 1402   Progress/Outcome goal ongoing at 02/04/2025 1402   Transfer Goal 1    Activity/Assistive Device sit-to-stand/stand-to-sit, bed-to-chair/chair-to-bed, walker, front-wheeled at 02/04/2025 1402   [place] supervision required at 02/04/2025 1402   Time Frame by discharge at 02/04/2025 1402   Progress/Outcome goal ongoing at 02/04/2025 1402   Gait Training Goal 1    Activity/Assistive Device gait (walking locomotion), assistive device use, walker, front-wheeled at 02/04/2025 1402   [place] supervision required at 02/04/2025 1402   Distance 25 ft at 02/04/2025 1402   Time Frame by discharge at 02/04/2025 1402   Progress/Outcome goal ongoing at 02/04/2025 1402

## 2025-02-10 ENCOUNTER — TELEPHONE (OUTPATIENT)
Dept: HEMATOLOGY/ONCOLOGY | Facility: CLINIC | Age: 45
End: 2025-02-10
Payer: COMMERCIAL

## 2025-02-10 LAB
ALBUMIN SERPL-MCNC: 3.3 G/DL (ref 3.5–5.7)
ALP SERPL-CCNC: 342 IU/L (ref 34–125)
ALT SERPL-CCNC: 245 IU/L (ref 7–52)
ANION GAP SERPL CALC-SCNC: 4 MEQ/L (ref 3–15)
AST SERPL-CCNC: 65 IU/L (ref 13–39)
BASOPHILS # BLD: 0.01 K/UL (ref 0.01–0.1)
BASOPHILS NFR BLD: 0.3 %
BILIRUB SERPL-MCNC: 0.7 MG/DL (ref 0.3–1.2)
BUN SERPL-MCNC: 2 MG/DL (ref 7–25)
CALCIUM SERPL-MCNC: 8.6 MG/DL (ref 8.6–10.3)
CHLORIDE SERPL-SCNC: 111 MEQ/L (ref 98–107)
CO2 SERPL-SCNC: 30 MEQ/L (ref 21–31)
CREAT SERPL-MCNC: 0.5 MG/DL (ref 0.6–1.2)
DIFFERENTIAL METHOD BLD: ABNORMAL
EGFRCR SERPLBLD CKD-EPI 2021: >60 ML/MIN/1.73M*2
EOSINOPHIL # BLD: 0.16 K/UL (ref 0.04–0.36)
EOSINOPHIL NFR BLD: 4.4 %
ERYTHROCYTE [DISTWIDTH] IN BLOOD BY AUTOMATED COUNT: 16.8 % (ref 11.7–14.4)
GLUCOSE BLD-MCNC: 104 MG/DL (ref 70–99)
GLUCOSE BLD-MCNC: 112 MG/DL (ref 70–99)
GLUCOSE BLD-MCNC: 87 MG/DL (ref 70–99)
GLUCOSE BLD-MCNC: 90 MG/DL (ref 70–99)
GLUCOSE SERPL-MCNC: 79 MG/DL (ref 70–99)
HCT VFR BLD AUTO: 32.4 % (ref 35–45)
HGB BLD-MCNC: 10.6 G/DL (ref 11.8–15.7)
IMM GRANULOCYTES # BLD AUTO: 0.04 K/UL (ref 0–0.08)
IMM GRANULOCYTES NFR BLD AUTO: 1.1 %
LYMPHOCYTES # BLD: 1.9 K/UL (ref 1.2–3.5)
LYMPHOCYTES NFR BLD: 52.5 %
MCH RBC QN AUTO: 35.1 PG (ref 28–33.2)
MCHC RBC AUTO-ENTMCNC: 32.7 G/DL (ref 32.2–35.5)
MCV RBC AUTO: 107.3 FL (ref 83–98)
MONOCYTES # BLD: 0.33 K/UL (ref 0.28–0.8)
MONOCYTES NFR BLD: 9.1 %
NEUTROPHILS # BLD: 1.18 K/UL (ref 1.7–7)
NEUTS SEG NFR BLD: 32.6 %
NRBC BLD-RTO: 0 %
PLATELET # BLD AUTO: 178 K/UL (ref 150–369)
PMV BLD AUTO: 11.9 FL (ref 9.4–12.3)
POCT TEST: ABNORMAL
POCT TEST: ABNORMAL
POCT TEST: NORMAL
POCT TEST: NORMAL
POTASSIUM SERPL-SCNC: 4.3 MEQ/L (ref 3.5–5.1)
PROT SERPL-MCNC: 4.7 G/DL (ref 6–8.2)
RBC # BLD AUTO: 3.02 M/UL (ref 3.93–5.22)
SODIUM SERPL-SCNC: 145 MEQ/L (ref 136–145)
WBC # BLD AUTO: 3.62 K/UL (ref 3.8–10.5)

## 2025-02-10 PROCEDURE — 63700000 HC SELF-ADMINISTRABLE DRUG: Performed by: INTERNAL MEDICINE

## 2025-02-10 PROCEDURE — 63700000 HC SELF-ADMINISTRABLE DRUG: Performed by: PHYSICIAN ASSISTANT

## 2025-02-10 PROCEDURE — 85025 COMPLETE CBC W/AUTO DIFF WBC: CPT | Performed by: INTERNAL MEDICINE

## 2025-02-10 PROCEDURE — 80053 COMPREHEN METABOLIC PANEL: CPT | Performed by: STUDENT IN AN ORGANIZED HEALTH CARE EDUCATION/TRAINING PROGRAM

## 2025-02-10 PROCEDURE — 21400000 HC ROOM AND CARE CCU/INTERMEDIATE

## 2025-02-10 PROCEDURE — 63700000 HC SELF-ADMINISTRABLE DRUG: Performed by: STUDENT IN AN ORGANIZED HEALTH CARE EDUCATION/TRAINING PROGRAM

## 2025-02-10 PROCEDURE — 99231 SBSQ HOSP IP/OBS SF/LOW 25: CPT | Performed by: STUDENT IN AN ORGANIZED HEALTH CARE EDUCATION/TRAINING PROGRAM

## 2025-02-10 PROCEDURE — 36415 COLL VENOUS BLD VENIPUNCTURE: CPT | Performed by: STUDENT IN AN ORGANIZED HEALTH CARE EDUCATION/TRAINING PROGRAM

## 2025-02-10 RX ORDER — OXYCODONE HYDROCHLORIDE 5 MG/1
5 TABLET ORAL EVERY 6 HOURS PRN
Start: 2025-02-10 | End: 2025-02-18 | Stop reason: HOSPADM

## 2025-02-10 RX ORDER — NALOXONE HYDROCHLORIDE 4 MG/.1ML
1 SPRAY NASAL ONCE AS NEEDED
Start: 2025-02-10 | End: 2025-02-18 | Stop reason: HOSPADM

## 2025-02-10 RX ORDER — POTASSIUM CHLORIDE 20 MEQ/1
20 TABLET, EXTENDED RELEASE ORAL DAILY
Start: 2025-02-10 | End: 2025-02-18 | Stop reason: HOSPADM

## 2025-02-10 RX ORDER — LANOLIN ALCOHOL/MO/W.PET/CERES
200 CREAM (GRAM) TOPICAL DAILY
Start: 2025-02-10 | End: 2025-03-12

## 2025-02-10 RX ORDER — TRAMADOL HYDROCHLORIDE 50 MG/1
100 TABLET ORAL EVERY 6 HOURS PRN
Start: 2025-02-10 | End: 2025-02-18 | Stop reason: HOSPADM

## 2025-02-10 RX ADMIN — MIDODRINE HYDROCHLORIDE 10 MG: 5 TABLET ORAL at 12:24

## 2025-02-10 RX ADMIN — GABAPENTIN 400 MG: 400 CAPSULE ORAL at 08:51

## 2025-02-10 RX ADMIN — TRAZODONE HYDROCHLORIDE 100 MG: 100 TABLET ORAL at 22:38

## 2025-02-10 RX ADMIN — TRAMADOL HYDROCHLORIDE 100 MG: 50 TABLET, COATED ORAL at 08:53

## 2025-02-10 RX ADMIN — OXYCODONE HYDROCHLORIDE 5 MG: 5 TABLET ORAL at 20:16

## 2025-02-10 RX ADMIN — LORAZEPAM 0.25 MG: 0.5 TABLET ORAL at 13:23

## 2025-02-10 RX ADMIN — LORAZEPAM 0.25 MG: 0.5 TABLET ORAL at 08:53

## 2025-02-10 RX ADMIN — GABAPENTIN 400 MG: 400 CAPSULE ORAL at 13:24

## 2025-02-10 RX ADMIN — NYSTATIN: 100000 POWDER TOPICAL at 09:06

## 2025-02-10 RX ADMIN — PANTOPRAZOLE SODIUM 20 MG: 20 TABLET, DELAYED RELEASE ORAL at 08:51

## 2025-02-10 RX ADMIN — GABAPENTIN 400 MG: 400 CAPSULE ORAL at 22:38

## 2025-02-10 RX ADMIN — MIDODRINE HYDROCHLORIDE 10 MG: 5 TABLET ORAL at 09:07

## 2025-02-10 RX ADMIN — ATORVASTATIN CALCIUM 20 MG: 20 TABLET, FILM COATED ORAL at 08:52

## 2025-02-10 RX ADMIN — POTASSIUM CHLORIDE 20 MEQ: 1500 TABLET, EXTENDED RELEASE ORAL at 20:16

## 2025-02-10 RX ADMIN — Medication 400 MG: at 08:53

## 2025-02-10 RX ADMIN — FLUOXETINE HYDROCHLORIDE 40 MG: 20 CAPSULE ORAL at 08:51

## 2025-02-10 RX ADMIN — Medication 400 MG: at 20:16

## 2025-02-10 RX ADMIN — ZOLPIDEM TARTRATE 5 MG: 5 TABLET, FILM COATED ORAL at 22:41

## 2025-02-10 RX ADMIN — POTASSIUM CHLORIDE 20 MEQ: 1500 TABLET, EXTENDED RELEASE ORAL at 08:52

## 2025-02-10 RX ADMIN — GABAPENTIN 400 MG: 400 CAPSULE ORAL at 17:33

## 2025-02-10 RX ADMIN — OXYCODONE HYDROCHLORIDE 5 MG: 5 TABLET ORAL at 05:23

## 2025-02-10 RX ADMIN — TOPIRAMATE 50 MG: 100 TABLET, FILM COATED ORAL at 16:31

## 2025-02-10 RX ADMIN — OXYCODONE HYDROCHLORIDE 5 MG: 5 TABLET ORAL at 12:24

## 2025-02-10 RX ADMIN — TRAMADOL HYDROCHLORIDE 100 MG: 50 TABLET, COATED ORAL at 00:22

## 2025-02-10 RX ADMIN — LEVOTHYROXINE SODIUM 50 MCG: 0.05 TABLET ORAL at 07:02

## 2025-02-10 RX ADMIN — MIDODRINE HYDROCHLORIDE 10 MG: 5 TABLET ORAL at 16:31

## 2025-02-10 RX ADMIN — Medication 1 TABLET: at 08:51

## 2025-02-10 RX ADMIN — NYSTATIN: 100000 POWDER TOPICAL at 20:15

## 2025-02-10 RX ADMIN — TOPIRAMATE 50 MG: 100 TABLET, FILM COATED ORAL at 08:52

## 2025-02-10 RX ADMIN — PANTOPRAZOLE SODIUM 20 MG: 20 TABLET, DELAYED RELEASE ORAL at 20:16

## 2025-02-10 RX ADMIN — TRAMADOL HYDROCHLORIDE 100 MG: 50 TABLET, COATED ORAL at 16:39

## 2025-02-10 ASSESSMENT — COGNITIVE AND FUNCTIONAL STATUS - GENERAL
STANDING UP FROM CHAIR USING ARMS: 1 - TOTAL
CLIMB 3 TO 5 STEPS WITH RAILING: 1 - TOTAL
CLIMB 3 TO 5 STEPS WITH RAILING: 1 - TOTAL
WALKING IN HOSPITAL ROOM: 1 - TOTAL
MOVING TO AND FROM BED TO CHAIR: 1 - TOTAL
STANDING UP FROM CHAIR USING ARMS: 1 - TOTAL
MOVING TO AND FROM BED TO CHAIR: 1 - TOTAL
WALKING IN HOSPITAL ROOM: 1 - TOTAL

## 2025-02-10 NOTE — PLAN OF CARE
Care Coordination Discharge Plan Note     Discharge Needs Assessment  Concerns to be Addressed: care coordination/care conferences, discharge planning  Current Discharge Risk:      Anticipated Discharge Plan  Anticipated Discharge Disposition: skilled nursing facility  Type of Skilled Nursing Care Services: PT, OT, nursing      Patient Choice  Offered/Gave Vendor List: yes  Patient and/or patient guardian/advocate was made aware of their right to choose a provider. A list of eligible providers was presented and reviewed with the patient and/or patient guardian/advocate in written and/or verbal form. The list delineates providers in the patients desired geographic area who are participating in the Medicare program and/or providers contracted with the patients primary insurance. The Medicare list and quality ratings were obtained from the Medicare.gov [medicare.gov] website.    ---------------------------------------------------------------------------------------------------------------------    Interdisciplinary Discharge Plan Review:  Participants:     Concerns Comments: DARLIN reached out to Natalia from [facility] for an update on insurance auth. DARLIN will continue to follow.    Addendum 0941: Natalia updated that management was reviewing.    Addendum 1206: DARLIN reached out to [facility] for an update, awaiting response.     Addendum 1340: Natalia updated [facility] that [facility] is unable to accept pt. [facility] updated [facility]. RAMSESN reached out to Cecil with [facility] to see if any [facility] facilities can accept pt.    Addendum 1525: [facility] is reviewing pt.     Addendum 1634: RAMSESN reached out to [facility] to see if [facility] is able to accept pt.    Discharge Plan:   Disposition/Destination:   /    Discharge Facility:    Community Resources:      Discharge Transportation:  Is Out of Hospital DNR needed at Discharge: no  Does patient need discharge transport?

## 2025-02-10 NOTE — PROGRESS NOTES
Hospital Medicine     Daily Progress Note       SUBJECTIVE   Patient seen and examined at bedside.  Continues with multiple complaints which are the same as previous     OBJECTIVE   Vital signs in last 24 hours:  Temp:  [36.1 °C (97 °F)-36.6 °C (97.8 °F)] 36.5 °C (97.7 °F)  Heart Rate:  [84-94] 89  Resp:  [18] 18  BP: ()/(54-61) 90/60  No intake or output data in the 24 hours ending 02/10/25 1339    General: no acute distress, awake and alert  HEENT: extraocular movements intact  Cardio: regular rate and rhythm  Pulm: clear to auscultation bilaterally, no wheezing  Abdominal: soft, nontender, nondistended  MSK: grossly intact, ROM intact, no edema  Skin: normal color and turgor  Neuro: Awake and alert, oriented x3, no focal deficits     LINES, DRAINS, AIRWAYS, WOUNDS   Peripheral IV (Adult) 02/03/25 Anterior;Right Forearm (Active)   Number of days: 6          LABS, IMAGING, TELEMETRY   Results from last 7 days   Lab Units 02/10/25  0415 02/08/25  1216 02/07/25  1512   WBC K/uL 3.62* 2.26* 2.28*   HEMOGLOBIN g/dL 10.6* 9.9* 9.3*   HEMATOCRIT % 32.4* 29.5* 27.1*   PLATELETS K/uL 178 111* 95*      Results from last 7 days   Lab Units 02/10/25  0415 02/08/25  1216 02/07/25  2157   SODIUM mEQ/L 145 143 142   POTASSIUM mEQ/L 4.3 3.6 3.0*   CHLORIDE mEQ/L 111* 115* 114*   CO2 mEQ/L 30 22 22   BUN mg/dL 2* 2* 2*   CREATININE mg/dL 0.5* 0.4* 0.4*   GLUCOSE mg/dL 79 96 101*   CALCIUM mg/dL 8.6 8.1* 8.0*        ULTRASOUND ABDOMEN LIMITED    Result Date: 2/6/2025  IMPRESSION: Unremarkable right upper quadrant ultrasound noting surgical absence of the gallbladder    CT LUMBAR SPINE WITHOUT IV CONTRAST    Result Date: 2/2/2025  IMPRESSION: No acute osseous abnormalities or significant degenerative change    CT THORACIC SPINE WITHOUT IV CONTRAST    Result Date: 2/2/2025  IMPRESSION: Mild multilevel thoracic spondylosis without compression deformity or destructive change    CT ABDOMEN PELVIS WITHOUT IV  CONTRAST    Result Date: 1/27/2025  IMPRESSION: 1.  Concentric prominence the walls the urinary bladder.  Cystitis cannot be excluded 2.  There is high density material present within the colon presumably representing ingested product.  There is no CT evidence of a bowel obstruction. The patient is status post Mary-en-Y gastric surgery. 3.  Uterine fibroid 4.  See comments for additional findings     X-RAY CHEST 2 VIEWS    Result Date: 1/27/2025  IMPRESSION: No acute cardiopulmonary process.       ASSESSMENT AND PLAN     Assessment and Plan     # UTI (urinary tract infection)  -completed five days of ceftriaxone  -ucx was positive for klebsiella and proteus, the cultures and sensitivities are appreciated  -repeat urine culture is negative  -CT abdomen is negative for any acute pathology  -no reason to continue antibiotic therapy, as she has completed antibiotics.     # Depression  - psych input noted  - continue home: LORazepam tablet po BID  - continue home: fluoxetine 40 mg po daily  - continue home: trazodone 100 mg po daily     # Anxiety  -see section on depression.     # Eating disorder  - reports she has not had solid food intake since [place]  - supervise feeds  - psych consult appreciated: she would benefit from placement at a rehab/eating disorder clinic but her difficulty with ambulation may preclude her from this.     # Hypothyroidism  - TSH: 2.12 (2/7/25), free T4: 0.6 (8/16/24)  - continued on synthroid  -since her last function was checked more than 3 months ago ill order a repeat TSH.     # PTSD (post-traumatic stress disorder)  -see section on anxiety and depression.     # Insomnia     # At risk for polypharmacy     # Chronic pain disorder  -stop tylenol and use tramadol as needed for severe pain.     # Hyponatremia  # -hypovolemic hyponatremia  -hyponatremia is now resolved  -no indication for free water restriction  -encourage oral solute intake  -improved s/p IV fluids  -monitor the bmp     #  Thrombocytopenia (CMS/HCC)  -hematology consult appreciated  -platelets appear to be improving for now after antibiotics have been stopped  -peripheral smear appreciated  -DIC panel is negative.  No bleeding     # Lower abdominal pain  -CT abdomen is negative for colitis, negative for bowel obstruction on this admission  -lower abdominal pain is now resolved, possibly had nausea and abdominal discomfort from hypokalemia which we are treating.     # Elevated liver enzymes  -US right upper quadrant appreciated, negative for biliary dilatation, neg for hepatic steatosis.  -GI consult appreciated: stop acetaminophen for pain control  -monitor LFTs  -hepatitis panel is negative.  LFTs improving     # Hypokalemia  -encourage oral intake  -monitor bmp  -replace potassium  -check mag level, since this was low replace magnesium.  -monitor the bmp  -hold off on reintroducing florinef as this can promote further hypokalemia.  -cardiac monitoring while there is hypokalemia.  Improved     # Hypotension  -ensure MAP is greater than 65 points.  -possibly related to poor po intake  -cortisol level is not low, but was taken in the afternoon so it may not be accurate, can recheck this if hypotension persists.  -TSH level WNL  -monitor blood pressure  -allow iv hydration if needed.  -restart midodrine  -avoid florinef for now given the hypokalemia.     # Pancytopenia     # GERD  - currently on: pantoprazole 20 mg po BID     # Anemia: acute on chronic, due to bone marrow suppression, stable, macrocytic     # Hyperlipidemia  - continue home: atorvastatin 20 mg po daily     # Hypocalcemia     # Sepsis with acute organ dysfunction  - SIRS: HR: 92 (2/3/25 12:00), WBC: 2.0 (2/3/25)  - SOFA score: 3 (2/9/25)  source UTI  resolved     # Seizure disorder  - continue home: LORazepam tablet po BID  - continue home: gabapentin 400 mg po QID  - continue home: topiramate 50 mg po BID       VTE Prophylaxis:  Current anticoagulants:    None      Code  Status: Full Code        Estimated Discharge Date: 2/10/2025     Disposition Planning: Medically stable for discharge pending placement       Bunny Chavarria DO  2/10/2025

## 2025-02-10 NOTE — DISCHARGE SUMMARY
Hospital Medicine    Inpatient Discharge Summary        BRIEF OVERVIEW   Patient: Felipa Cuadra  Admission Date: 2025   : 1980 Discharge Date: 2025       PCP: Emily Del Valle CRNP  Disposition: Skilled Nursing Facility - Other     Destination: Skilled Nursing Facility     Attending Provider: Bunny Chavarria DO Attending phys phone: [phone]    Code Status At Discharge: Full Code         ASSESSMENT AND PLAN   Problem List on the Day of Discharge  # Hypotension  -etiology likely polypharmacy  -cosyntropin stim test negative  -Repeat echo EF 65%, no regional wall motion abnormalities  -Continue midodrine, dose uptitrated to 50 mg 3 times daily  -Hold sedating medications  Adjustment to home medications., Ativan and Ambien dose decreased  Gabapentin resumed at home dose as it is for seizures  Continued trazodone at lower dose     # Cystitis  -Urine culture with Klebsiella pneumonia and Proteus mirabilis. Treated with course of ceftriaxone  -CT with no  obstruction  Resolved     # Depression  - psych input noted  - continue home: fluoxetine 40 mg po daily  resume trazodone at lower dose     # Anxiety  see section on depression.     # Eating disorder  - reports she has not had solid food intake since [place]  - supervise feeds  - psych consult appreciated: she would benefit from placement at a rehab/eating disorder clinic but her difficulty with ambulation may preclude her from this.     # Hypothyroidism  - TSH: 2.12 (25), free T4: 0.6 (24)  - continued on synthroid     # PTSD (post-traumatic stress disorder)  # Insomnia  # At risk for polypharmacy  # Chronic pain disorder  Holding sedatives as able due to hypotension  Restarted Ativan at low-dose 0.25 twice daily as needed to avoid withdrawal  Ambien dose decreased to 2.5 mg as needed nightly  Patient taking gabapentin for seizures, so resumed at home dose  Trazodone at lower dose     # Hyponatremia  # -hypovolemic  hyponatremia  -hyponatremia is now resolved     # Thrombocytopenia (CMS/HCC)  -hematology consult appreciated  -platelets appear to be improving for now after antibiotics have been stopped  -peripheral smear appreciated  -DIC panel is negative.  No bleeding     # Hypokalemia  Improved  -hold off on reintroducing florinef as this can promote further hypokalemia.  cardiac monitoring while there is hypokalemia.     # GERD  - currently on: pantoprazole 20 mg po BID     # Anemia: chronic, due to bone marrow suppression, stable, macrocytic  stable  - vitamin B12: 708 (2/3/25), folate: 10.9 (9/29/24)  - reticulocyte index: 0.3 %, mean corpuscular volume: 105 (2/17/25)  - currently on: cyanocobalamin 1000 mcg po daily     # Hyperlipidemia  - continue home: atorvastatin 20 mg po daily     # Seizure disorder  - continue home: topiramate 50 mg po BID  - continue home: gabapentin 400 mg po TID     # Sepsis with acute organ dysfunction     # Hypocalcemia     # Obesity, class I     ::: Stable/Chronic/Resolved :::     # Lower abdominal pain  -CT abdomen is negative for colitis, negative for bowel obstruction on this admission  -lower abdominal pain is now resolved     # Elevated liver enzymes  -US right upper quadrant appreciated, negative for biliary dilatation, neg for hepatic steatosis.  -GI consult appreciated: stop acetaminophen for pain control  -monitor LFTs  hepatitis panel is negative.  LFTs improving  Brief Hospital Course  Ms. Cuadra is a 44-year-old female with PMHx DM, HLD, Mary-en-Y gastric bypass, depression, anxiety, hypothyroid, bipolar, eating disorder.  Presented due to abdominal pain.  Found to have UTI, treated with Macrobid as an outpatient.  Treated with IV antibiotics while here, completed a course.  Found to have multiple electrolyte abnormalities including severe hypokalemia which took several days to replete via oral and IV.  Has many complaints related to pain which appear to be chronic.  Had some  thrombocytopenia and elevated LFTs, felt likely secondary to the medications, improving by time of discharge.  She had persistent low blood pressure and was briefly treated in the ICU with vasopressor medications.  Her midodrine was increased.  Her Ambien and trazodone were decreased.  She was discontinued from prazosin.  Blood pressure now stable.    Exam on Day of Discharge  Temp:  [36.2 °C (97.1 °F)-37.1 °C (98.7 °F)] 37.1 °C (98.7 °F)  Heart Rate:  [64-78] 75  Resp:  [16-18] 18  BP: ()/(59-70) 96/61    General: no acute distress, awake and alert  HEENT: extraocular movements intact  Cardio: regular rate and rhythm  Pulm: clear to auscultation bilaterally, no wheezing  Abdominal: soft, nontender, nondistended  MSK: grossly intact, ROM intact, no edema  Skin: normal color and turgor  Neuro: Awake and alert, oriented x3, no focal deficits       DISCHARGE MEDICATIONS      Medication List        START taking these medications      acetaminophen 325 mg tablet  Commonly known as: TYLENOL  Take 2 tablets (650 mg total) by mouth every 4 (four) hours as needed for pain or fever (specify temp in comments): (temp > 100.4°F / 38°C (If treating new fever, call physician).).  Dose: 650 mg     B-complex with vitamin C tablet  Start taking on: February 19, 2025  Take 1 tablet by mouth daily.  Dose: 1 tablet     cyanocobalamin 1,000 mcg tablet  Commonly known as: VITAMIN B12  Start taking on: February 19, 2025  Take 1 tablet (1,000 mcg total) by mouth daily.  Dose: 1,000 mcg     ibuprofen 400 mg tablet  Commonly known as: MOTRIN  Take 1 tablet (400 mg total) by mouth every 8 (eight) hours as needed for pain (pain unrelieved by Tylenol within 90 minutes) for up to 10 days.  Dose: 400 mg     magnesium oxide 400 mg (241.3 mg magnesium) tablet  Commonly known as: MAG-OX  Take 0.5 tablets (200 mg total) by mouth daily.  Dose: 200 mg     melatonin 5 mg tablet  Take 1 tablet (5 mg total) by mouth nightly.  Dose: 5 mg             CHANGE how you take these medications      gabapentin 400 mg capsule  Commonly known as: NEURONTIN  Take 1 capsule (400 mg total) by mouth 3 (three) times a day.  Dose: 400 mg  What changed: when to take this     midodrine 5 mg tablet  Commonly known as: PROAMATINE  Take 3 tablets (15 mg total) by mouth 3 (three) times a day before meals.  Dose: 15 mg  What changed:   medication strength  how much to take  when to take this  reasons to take this     traZODone 100 mg tablet  Commonly known as: DESYREL  Take 0.5 tablets (50 mg total) by mouth nightly.  Dose: 50 mg  What changed: how much to take     zolpidem 10 mg tablet  Commonly known as: AMBIEN  Take 0.5 tablets (5 mg total) by mouth nightly.  Dose: 5 mg  What changed: how much to take            CONTINUE taking these medications      atorvastatin 20 mg tablet  Commonly known as: LIPITOR  Take 20 mg by mouth every morning.  Dose: 20 mg     FLUoxetine 40 mg capsule  Commonly known as: PROzac  Take 40 mg by mouth daily.  Dose: 40 mg     levothyroxine 50 mcg tablet  Commonly known as: SYNTHROID  Take 50 mcg by mouth daily.  Dose: 50 mcg     LORazepam 0.5 mg tablet  Commonly known as: ATIVAN  Take 0.25 mg by mouth daily and an additional dose as needed (anxiety).  Dose: 0.25 mg     NURTEC ODT 75 mg tablet,disintegrating  Take 75 mg by mouth as needed (migraines).  Dose: 75 mg  Generic drug: rimegepant     omeprazole 20 mg capsule  Commonly known as: PriLOSEC  Take 20 mg by mouth daily before breakfast. For heartburn  Dose: 20 mg     topiramate 50 mg tablet  Commonly known as: TOPAMAX  Take 50 mg by mouth 2 (two) times a day. AM and 2pm  Dose: 50 mg            STOP taking these medications      fludrocortisone 0.1 mg tablet  Commonly known as: FLORINEF     nitrofurantoin (macrocrystal-monohydrate) 100 mg capsule  Commonly known as: MACROBID     prazosin 5 mg capsule  Commonly known as: MINIPRESS                INSTRUCTIONS AND FOLLOW-UP       Important Issues to  Address in Follow-Up  Follow-up with PCP    Scheduled Appointments  Encounter Information    This patient does not currently have any appointments scheduled.         Recommended Follow-up Visits  Follow-Up After Discharge       Emily Del Valle CRNP   Specialty: Family Medicine   Relationship: PCP - General        Schedule an appointment as soon as possible for a visit in 1 week(s)    [facility] [place]   Specialty: Assisted Living Facility        Follow up    Service: SKILLED NURSING            Test Results Pending at Discharge  Pending Inpatient Labs       Order Current Status    [place] Draw Panel In process             LABS AND IMAGING   Pertinent Labs  CHEMISTRIES   Results from last 7 days   Lab Units 02/17/25  0841 02/16/25  1526 02/16/25  0853 02/15/25  1137 02/14/25  0448   SODIUM mEQ/L 142 142 141 139 143   POTASSIUM mEQ/L 3.8 4.0 4.3 4.4 4.5   CHLORIDE mEQ/L 112* 110* 109* 107 110*   CO2 mEQ/L 23 26 27 26 30   BUN mg/dL 10 10 9 7 5*   CREATININE mg/dL 0.5* 0.4* 0.4* 0.4* 0.4*   GLUCOSE mg/dL 82 88 84 84 83   CALCIUM mg/dL 8.3* 8.5* 8.5* 8.9 8.8   EGFR mL/min/1.73m*2 >60.0 >60.0 >60.0 >60.0 >60.0   ANION GAP mEQ/L 7 6 5 6 3   MAGNESIUM mg/dL  --   --   --  1.9  --      CBC RESULTS   Results from last 7 days   Lab Units 02/17/25  0841 02/16/25  0853 02/15/25  1137 02/14/25  0448 02/13/25  0819   WBC K/uL 7.96 5.67 5.72 5.86 4.37   HEMOGLOBIN g/dL 10.9* 10.2* 10.1* 9.4* 9.3*   HEMATOCRIT % 33.7* 32.0* 31.2* 29.3* 29.7*   PLATELETS K/uL 257 275 277 259 203       Pertinent Imaging  X-RAY CHEST 1 VIEW    Result Date: 2/13/2025  IMPRESSION:  No acute cardiopulmonary process. COMPARISON: Chest studies August 2024 through 1/27/2025. COMMENT:  Portable AP upright imaging of the chest 2333 hours. The lungs are less well expanded but clear. No congestive changes. No consolidation or pleural fluid. Normal cardiac size, hilar and mediastinal contours.    ULTRASOUND ABDOMEN LIMITED    Result Date:  2/6/2025  IMPRESSION: Unremarkable right upper quadrant ultrasound noting surgical absence of the gallbladder    CT LUMBAR SPINE WITHOUT IV CONTRAST    Result Date: 2/2/2025  IMPRESSION: No acute osseous abnormalities or significant degenerative change    CT THORACIC SPINE WITHOUT IV CONTRAST    Result Date: 2/2/2025  IMPRESSION: Mild multilevel thoracic spondylosis without compression deformity or destructive change    CT ABDOMEN PELVIS WITHOUT IV CONTRAST    Result Date: 1/27/2025  IMPRESSION: 1.  Concentric prominence the walls the urinary bladder.  Cystitis cannot be excluded 2.  There is high density material present within the colon presumably representing ingested product.  There is no CT evidence of a bowel obstruction. The patient is status post Mary-en-Y gastric surgery. 3.  Uterine fibroid 4.  See comments for additional findings     X-RAY CHEST 2 VIEWS    Result Date: 1/27/2025  IMPRESSION: No acute cardiopulmonary process.    Cardiac Imaging    TRANSTHORACIC ECHO (TTE) COMPLETE 09/30/2024    Interpretation Summary    Left Ventricle: Normal ventricle size. Normal wall thickness. Estimated EF 65%. No regional wall motion abnormalities. Normal diastolic filling pattern for age.    Left Atrium: Normal sized atrium.    Mitral Valve: Normal leaflet structure. Mild regurgitation.    Aorta: Aortic root normal. Ascending aorta normal-sized.    Tricuspid Valve: Normal structure. Moderate regurgitation.    Pulmonic Valve: Grossly normal structure. No regurgitation.    Aortic Valve: Normal structure. Trace regurgitation. No stenosis. Calculated dimensionless index = 0.81.    Right Ventricle: Normal ventricle size. Normal systolic function.    IVC/SVC: Inferior vena cava is <2.1cm. Inferior vena cava collapses >50% during inspiration.    Pericardium: No evidence of pericardial effusion.    Right Atrium: Normal sized atrium.       OTHER SERVICES PROVIDED   Consults During Admission  IP CONSULT TO PSYCHIATRY  IP  CONSULT TO SOCIAL WORK  IP CONSULT TO NUTRITION SERVICES  IP CONSULT TO NEPHROLOGY  IP CONSULT TO BEHAVIORAL HEALTH CRISIS  IP CONSULT TO HEMATOLOGY/ONCOLOGY  IP CONSULT TO GASTROENTEROLOGY    Procedures Performed         Thank you for allowing the Division of Hospital Medicine to care for your patient.        >30 mins spent for coordination/counselling related to discharge plan, including final examination of patient, discussion regarding discharge instructions, reconciliation/prescription of medications, preparation of discharge records, etc.

## 2025-02-11 LAB
GLUCOSE BLD-MCNC: 111 MG/DL (ref 70–99)
GLUCOSE BLD-MCNC: 79 MG/DL (ref 70–99)
GLUCOSE BLD-MCNC: 87 MG/DL (ref 70–99)
POCT TEST: ABNORMAL
POCT TEST: NORMAL
POCT TEST: NORMAL

## 2025-02-11 PROCEDURE — 99232 SBSQ HOSP IP/OBS MODERATE 35: CPT | Performed by: STUDENT IN AN ORGANIZED HEALTH CARE EDUCATION/TRAINING PROGRAM

## 2025-02-11 PROCEDURE — 63700000 HC SELF-ADMINISTRABLE DRUG: Performed by: STUDENT IN AN ORGANIZED HEALTH CARE EDUCATION/TRAINING PROGRAM

## 2025-02-11 PROCEDURE — 97535 SELF CARE MNGMENT TRAINING: CPT | Mod: GO

## 2025-02-11 PROCEDURE — 97530 THERAPEUTIC ACTIVITIES: CPT | Mod: GP

## 2025-02-11 PROCEDURE — 21400000 HC ROOM AND CARE CCU/INTERMEDIATE

## 2025-02-11 PROCEDURE — 63700000 HC SELF-ADMINISTRABLE DRUG: Performed by: INTERNAL MEDICINE

## 2025-02-11 PROCEDURE — 63700000 HC SELF-ADMINISTRABLE DRUG: Performed by: PHYSICIAN ASSISTANT

## 2025-02-11 RX ADMIN — PANTOPRAZOLE SODIUM 20 MG: 20 TABLET, DELAYED RELEASE ORAL at 21:27

## 2025-02-11 RX ADMIN — GABAPENTIN 400 MG: 400 CAPSULE ORAL at 08:50

## 2025-02-11 RX ADMIN — TOPIRAMATE 50 MG: 100 TABLET, FILM COATED ORAL at 17:37

## 2025-02-11 RX ADMIN — OXYCODONE HYDROCHLORIDE 5 MG: 5 TABLET ORAL at 09:21

## 2025-02-11 RX ADMIN — LORAZEPAM 0.25 MG: 0.5 TABLET ORAL at 08:49

## 2025-02-11 RX ADMIN — NYSTATIN: 100000 POWDER TOPICAL at 08:54

## 2025-02-11 RX ADMIN — POTASSIUM CHLORIDE 20 MEQ: 1500 TABLET, EXTENDED RELEASE ORAL at 21:27

## 2025-02-11 RX ADMIN — TRAMADOL HYDROCHLORIDE 100 MG: 50 TABLET, COATED ORAL at 18:22

## 2025-02-11 RX ADMIN — ZOLPIDEM TARTRATE 5 MG: 5 TABLET, FILM COATED ORAL at 21:27

## 2025-02-11 RX ADMIN — FLUOXETINE HYDROCHLORIDE 40 MG: 20 CAPSULE ORAL at 08:49

## 2025-02-11 RX ADMIN — TRAZODONE HYDROCHLORIDE 100 MG: 100 TABLET ORAL at 21:27

## 2025-02-11 RX ADMIN — PANTOPRAZOLE SODIUM 20 MG: 20 TABLET, DELAYED RELEASE ORAL at 08:50

## 2025-02-11 RX ADMIN — Medication 400 MG: at 21:27

## 2025-02-11 RX ADMIN — LEVOTHYROXINE SODIUM 50 MCG: 0.05 TABLET ORAL at 05:34

## 2025-02-11 RX ADMIN — OXYCODONE HYDROCHLORIDE 5 MG: 5 TABLET ORAL at 14:18

## 2025-02-11 RX ADMIN — GABAPENTIN 400 MG: 400 CAPSULE ORAL at 17:37

## 2025-02-11 RX ADMIN — Medication 400 MG: at 08:49

## 2025-02-11 RX ADMIN — ATORVASTATIN CALCIUM 20 MG: 20 TABLET, FILM COATED ORAL at 08:49

## 2025-02-11 RX ADMIN — GABAPENTIN 400 MG: 400 CAPSULE ORAL at 13:57

## 2025-02-11 RX ADMIN — MIDODRINE HYDROCHLORIDE 10 MG: 5 TABLET ORAL at 11:20

## 2025-02-11 RX ADMIN — TOPIRAMATE 50 MG: 100 TABLET, FILM COATED ORAL at 08:49

## 2025-02-11 RX ADMIN — TRAMADOL HYDROCHLORIDE 100 MG: 50 TABLET, COATED ORAL at 01:08

## 2025-02-11 RX ADMIN — MIDODRINE HYDROCHLORIDE 10 MG: 5 TABLET ORAL at 17:36

## 2025-02-11 RX ADMIN — Medication 1 TABLET: at 08:50

## 2025-02-11 RX ADMIN — POTASSIUM CHLORIDE 20 MEQ: 1500 TABLET, EXTENDED RELEASE ORAL at 08:48

## 2025-02-11 RX ADMIN — MIDODRINE HYDROCHLORIDE 10 MG: 5 TABLET ORAL at 08:48

## 2025-02-11 RX ADMIN — LORAZEPAM 0.25 MG: 0.5 TABLET ORAL at 13:55

## 2025-02-11 RX ADMIN — GABAPENTIN 400 MG: 400 CAPSULE ORAL at 21:27

## 2025-02-11 ASSESSMENT — COGNITIVE AND FUNCTIONAL STATUS - GENERAL
WALKING IN HOSPITAL ROOM: 1 - TOTAL
DRESSING REGULAR UPPER BODY CLOTHING: 2 - A LOT
STANDING UP FROM CHAIR USING ARMS: 2 - A LOT
MOVING TO AND FROM BED TO CHAIR: 1 - TOTAL
AFFECT: FLAT/BLUNTED AFFECT
DRESSING REGULAR LOWER BODY CLOTHING: 1 - TOTAL
MOVING TO AND FROM BED TO CHAIR: 2 - A LOT
CLIMB 3 TO 5 STEPS WITH RAILING: 1 - TOTAL
HELP NEEDED FOR BATHING: 1 - TOTAL
CLIMB 3 TO 5 STEPS WITH RAILING: 1 - TOTAL
TOILETING: 1 - TOTAL
WALKING IN HOSPITAL ROOM: 1 - TOTAL
HELP NEEDED FOR PERSONAL GROOMING: 2 - A LOT
CLIMB 3 TO 5 STEPS WITH RAILING: 1 - TOTAL
EATING MEALS: 2 - A LOT
STANDING UP FROM CHAIR USING ARMS: 1 - TOTAL
WALKING IN HOSPITAL ROOM: 1 - TOTAL
AFFECT: FLAT/BLUNTED AFFECT
STANDING UP FROM CHAIR USING ARMS: 1 - TOTAL
MOVING TO AND FROM BED TO CHAIR: 1 - TOTAL

## 2025-02-11 NOTE — PROGRESS NOTES
Physical Therapy -  Daily Treatment/Progress Note     Patient: Felipa Cuadra  Location: [address] [address]  MRN: [id number]  Today's date: 2/11/2025    HISTORY OF PRESENT ILLNESS     Felipa is a 44 y.o. female admitted on 2/1/2025 with UTI (urinary tract infection) [N39.0]  Lower abdominal pain [R10.30]  Urinary tract infection with hematuria, site unspecified [N39.0, R31.9]. Principal problem is UTI (urinary tract infection).    Past Medical History  Felipa has a past medical history of Anxiety, Depression, and Eating disorder.    History of Present Illness    Patient was seen in the ED 1 week ago for evaluation of abdominal pain and generalized weakness.  Workup revealed acute cystitis.  She was discharged on a course of Macrobid, which patient reports she has been taking.  She presents today for evaluation of persistent abdominal pain.  Patient reports suprapubic pain that radiates to her back.  She reports it is constant.  She reports she is weak and unable to ambulate.  She reports increased frequency of urination.  She reports chills.  She reports nausea without vomiting.  She reports she has not had solid food intake since Christmas.  She reports she has been drinking a lot of Gatorade.  She reports she is currently having black stool and vaginal bleeding x 3 days.  She reports she rarely gets her period.  She has not noticed any blood clots.  She reports she is feeling more depressed lately.  She denies suicidal ideation.  She reports she smokes 4 cigarettes/day.  She reports she quit alcohol use 4 years ago.  She denies fever, syncope, vomiting, chest pain, shortness of breath, constipation, diarrhea.   PRIOR LEVEL OF FUNCTION AND LIVING ENVIRONMENT     Prior Level of Function      Flowsheet Row Most Recent Value   Dominant Hand right   Ambulation assistive equipment   Transferring assistive equipment   Toileting independent   Bathing assistive equipment and person   Dressing assistive person    Eating independent   IADLs independent   Prior Level of Function Comment Reports assist from HHAs 6 days/week with bathing + dressing. Uses a walker at home   Assistive Device Currently Used at Home walker, standard, grab bar, shower chair             Prior Living Environment      Flowsheet Row Most Recent Value   People in Home alone   Current Living Arrangements home   Home Accessibility elevator accessible   Living Environment Comment Lives alone in apartment with elevator access. Tub shower with SC + GBs.          VITALS AND PAIN     PT Vitals      Date/Time Pulse HR Source SpO2 Pt Activity O2 Therapy BP BP Location BP Method Pt Position [facility]   02/11/25 1355 101 Monitor 96 % At rest None (Room air) 97/66 Right upper arm Automatic Lying KRP   02/11/25 1401 102 Monitor -- -- -- 94/61 Right upper arm Automatic Sitting KRP          PT Pain      Date/Time Pain Type Rating: Rest Rating: Activity Interventions [facility]   02/11/25 1355 Pain Assessment 7 7 position adjusted KRP             Objective   OBJECTIVE     Start time:  1353  End time:  1410  Session Length: 17 min  Mode of Treatment: co-treatment  Reason for co-treatment: priority for auth, PT addressing mobility    General Observations  Patient received in bed, reclined. She was agreeable to therapy, no issues or concerns identified by nurse prior to session. Pt requesting pain medication, RN aware.    Precautions: fall       Limitations/Impairments: safety/cognitive   Services  Do You Speak a Language Other Than English at Home?: no      PT Eval and Treat - 02/11/25 1353          Cognition    Orientation Status oriented x 3     Affect/Mental Status flat/blunted affect     Follows Commands follows one-step commands;75-90% accuracy;delayed response/completion;verbal cues/prompting required     Cognitive Function safety deficit     Safety Deficit judgment;safety precautions follow-through/compliance     Comment, Cognition Pleasant and cooperative,  receptive to PT education, increased time for processing        Bed Mobility    Bed Mobility Activities left;supine to sit;sit to supine;rolling     [place] close supervision;moderate assist (50-74% patient effort);1 person assist     Safety/Cues increased time to complete;verbal cues;hand placement;technique;sequencing     Assistive Device bed rails;head of bed elevated     Comment close S  supine > sit with increased, cues for use of bed rail.  Mod A x 1 for Le mngt sit > supine.  rolling L <> R with bed rail with min A  for adjustement of pad        Mobility Belt    Mobility Belt Used During Session no - patient did not mobilize out of bed or stand        Sit/Stand Transfer    [place] not tested     Transfer Comments declined.        Balance    Static Sitting Balance mild impairment;sitting, edge of bed     Dynamic Sitting Balance moderate impairment;sitting, edge of bed     Sit to Stand Dynamic Balance not tested     Static Standing Balance not tested     Dynamic Standing Balance not tested     Comment, Balance sitting EOB with occasional need for min -mod A 2/2 ataxic, jerky movements.  Cues for safety awareness and insight into deficits. Performed lateral scooting with mod A to support LEs for control of ataxic movements,  and cues for use of UEs and correct positioning of hands.   tolerated sitting EOB ~ 5 minutes.        Impairments/Safety Issues    Impairments Affecting Function balance;strength;cognition;coordination;endurance/activity tolerance;postural/trunk control     Cognitive Impairments, Safety/Performance problem-solving/reasoning;safety precaution awareness     Functional Endurance Fair (-)     Safety Issues Affecting Function insight into deficits/self-awareness;problem-solving;sequencing abilities;safety precaution awareness;judgment                                              Education Documentation  Joint Mobility/Strength, taught by Bee Jett, PT at 2/11/2025  2:26  "PM.  Learner: Family  Readiness: Acceptance  Method: Explanation  Response: Verbalizes Understanding  Comment: role of PT, safety with mobility, d/c planning, use of call bell, postural awareness        Session Outcome  Patient in bed, reclined at end of session, bed alarm on, all needs met, call light in reach, personal items in reach. Nursing notified about change in vital signs, patient's performance, patient's position, and patient's response to therapy/activity.    AM-PAC - Mobility (Current Function)     Turning form your back to your side while in flat bed without using bedrails 3 - A Little   Moving from lying on your back to sitting on the side of a flat bed without using bedrails 3 - A Little   Moving to and from a bed to a chair 1 - Total   Standing up from a chair using your arms 1 - Total   To walk in a hospital room 1 - Total   Climbing 3-5 steps with a railing 1 - Total   AM-PAC Mobility Score 10      ASSESSMENT AND PLAN     Progress Summary  PT tx completed, [facility] 10/24.  Patient requires min A for rolling, S supine> sit, and mod A for sit > supine.  Min -mod A required at times for sitting balance 2/2 ataxic and uncontrolled jerkey movements.  Patietn cooperative, however limited by pain and fatigue.  Unable to attempt to trial standing today 2/2 pt declining and safety concerns with ataxic movements.  BP remained 90s/60s t/o session, pt asymptomatic, RN aware.  Pt rec SNF at d/c.  PT to continue to follow and progress as tolerated.    Patient/Family Therapy Goals Statement: \"go to rehab, skilled nursing home\"    PT Plan      Flowsheet Row Most Recent Value   Rehab Potential fair, will monitor progress closely at 02/11/2025 1353   Therapy Frequency 3 times/wk at 02/11/2025 1353   Planned Therapy Interventions balance training, bed mobility training, gait training, patient/family education, strengthening, transfer training at 02/11/2025 1353            PT Discharge Recommendations      Flowsheet " Row Most Recent Value   PT Recommended Discharge Disposition skilled nursing facility at 02/11/2025 1353   Anticipated Equipment Needs if Discharged Home (PT) none at 02/11/2025 1353                 PT Goals      Flowsheet Row Most Recent Value   Bed Mobility Goal 1    Activity/Assistive Device sit to supine/supine to sit at 02/04/2025 1402   [place] modified independence at 02/04/2025 1402   Time Frame by discharge at 02/04/2025 1402   Progress/Outcome goal ongoing at 02/04/2025 1402   Transfer Goal 1    Activity/Assistive Device sit-to-stand/stand-to-sit, bed-to-chair/chair-to-bed, walker, front-wheeled at 02/04/2025 1402   [place] supervision required at 02/04/2025 1402   Time Frame by discharge at 02/04/2025 1402   Progress/Outcome goal ongoing at 02/04/2025 1402   Gait Training Goal 1    Activity/Assistive Device gait (walking locomotion), assistive device use, walker, front-wheeled at 02/04/2025 1402   [place] supervision required at 02/04/2025 1402   Distance 25 ft at 02/04/2025 1402   Time Frame by discharge at 02/04/2025 1402   Progress/Outcome goal ongoing at 02/04/2025 1402

## 2025-02-11 NOTE — PLAN OF CARE
Plan of Care Review  Plan of Care Reviewed With: patient  Progress: no change  Outcome Evaluation: AAOX4, flat affect, pt ring all bell often, used bed pan for urination, prn tramadol and oxy for back pain, bed alarm active, and in lowest position.

## 2025-02-11 NOTE — PROGRESS NOTES
[facility]     Daily Progress Note       SUBJECTIVE   Pt seen and examined. D/W Nurse, no acute events   Patient reports feeling okay, complains of chronic pain in his neck and lower back, no new complaints  Denied fever, chills, nausea, vomiting, GI/ complaints  Discussed with case management, awaiting Auth for SNF     OBJECTIVE   Vital signs in last 24 hours:  Temp:  [36.4 °C (97.5 °F)-36.5 °C (97.7 °F)] 36.4 °C (97.6 °F)  Heart Rate:  [] 102  Resp:  [18] 18  BP: ()/(52-66) 94/61    Intake/Output Summary (Last 24 hours) at 2/11/2025 1607  Last data filed at 2/10/2025 2000  Gross per 24 hour   Intake 310 ml   Output --   Net 310 ml       Physical Exam  General: Pt Alert and Interactive, No Acute Distress  HEENT: PERRLA, EOMI, Supple  Lungs: Clear to Auscultation B/L, No Wheeze/Rhonchi  CVS: S1 S2 heard, No Murmurs  Abdomen: Soft, Non tender, Non distended, BS heard  CNS: AAOX3, No FND  Psychiatric: Calm and Cooperative    LINES, DRAINS, AIRWAYS, WOUNDS   Peripheral IV (Adult) 02/03/25 Anterior;Right Forearm (Active)   Number of days: 8          LABS, IMAGING, TELEMETRY     CBC Results         02/10/25 02/08/25 02/07/25     0415 1216 1512    WBC 3.62 2.26 2.28    RBC 3.02 2.86 2.65    HGB 10.6 9.9 9.3    HCT 32.4 29.5 27.1    .3 103.1 102.3    MCH 35.1 34.6 35.1    MCHC 32.7 33.6 34.3     111 95           Comment for PLT at 0415 on 02/10/25: ALL RESULTS HAVE BEEN RECHECKED    Comment for PLT at 1512 on 02/07/25: ALL RESULTS HAVE BEEN RECHECKEDSPECIMEN QUALITY CHECKEDRESULTS OBTAINED AFTER VORTEXING TO ELIMINATE PLT CLUMPS           CMP Results         02/10/25 02/08/25 02/07/25     0415 1216 2157     143 142    K 4.3 3.6 3.0    Cl 111 115 114    CO2 30 22 22    Glucose 79 96 101    BUN 2 2 2    Creatinine 0.5 0.4 0.4    Calcium 8.6 8.1 8.0    Anion Gap 4 6 6    AST 65 87 --     357 --    Albumin 3.3 3.2 --    EGFR >60.0 >60.0 >60.0           Comment for  EGFR at 0415 on 02/10/25: Calculation based on the Chronic Kidney Disease Epidemiology Collaboration (CKD-EPI) equation refit without adjustment for race.    Comment for EGFR at 1216 on 02/08/25: Calculation based on the Chronic Kidney Disease Epidemiology Collaboration (CKD-EPI) equation refit without adjustment for race.    Comment for EGFR at 2157 on 02/07/25: Calculation based on the Chronic Kidney Disease Epidemiology Collaboration (CKD-EPI) equation refit without adjustment for race.                    ASSESSMENT AND PLAN     # UTI (urinary tract infection)  -completed five days of ceftriaxone  -ucx was positive for klebsiella and proteus, the cultures and sensitivities are appreciated  -repeat urine culture is negative  -CT abdomen is negative for any acute pathology  -no reason to continue antibiotic therapy, as she has completed antibiotics.     # Depression  - psych input noted  - continue home: LORazepam tablet po BID  - continue home: fluoxetine 40 mg po daily  - continue home: trazodone 100 mg po daily     # Anxiety  -see section on depression.     # Eating disorder  - reports she has not had solid food intake since [place]  - supervise feeds  - psych consult appreciated: she would benefit from placement at a rehab/eating disorder clinic but her difficulty with ambulation may preclude her from this.     # Hypothyroidism  - TSH: 2.12 (2/7/25), free T4: 0.6 (8/16/24)  - continued on synthroid       # PTSD (post-traumatic stress disorder)  -see section on anxiety and depression.     # Insomnia     # At risk for polypharmacy     # Chronic pain disorder  -stop tylenol and use tramadol as needed for severe pain.     # Hyponatremia  # -hypovolemic hyponatremia  -hyponatremia is now resolved  -no indication for free water restriction  -encourage oral solute intake  -improved s/p IV fluids  -monitor the bmp     # Thrombocytopenia (CMS/[facility])  -hematology consult appreciated  -platelets appear to be improving for  now after antibiotics have been stopped  -peripheral smear appreciated  -DIC panel is negative.  No bleeding     # Lower abdominal pain  -CT abdomen is negative for colitis, negative for bowel obstruction on this admission  -lower abdominal pain is now resolved, possibly had nausea and abdominal discomfort from hypokalemia which we are treating.     # Elevated liver enzymes  -US right upper quadrant appreciated, negative for biliary dilatation, neg for hepatic steatosis.  -GI consult appreciated: stop acetaminophen for pain control  -monitor LFTs  -hepatitis panel is negative.  LFTs improving     # Hypokalemia  -encourage oral intake  -monitor bmp  -replace potassium  -check mag level, since this was low replace magnesium.  -monitor the bmp  -hold off on reintroducing florinef as this can promote further hypokalemia.  -cardiac monitoring while there is hypokalemia.  Improved     # Hypotension  -ensure MAP is greater than 65 points.  -possibly related to poor po intake  -cortisol level is not low, but was taken in the afternoon so it may not be accurate, can recheck this if hypotension persists.  -TSH level WNL  -monitor blood pressure  -allow iv hydration if needed.  -restart midodrine  -avoid florinef for now given the hypokalemia.     # Pancytopenia     # GERD  - currently on: pantoprazole 20 mg po BID     # Anemia: acute on chronic, due to bone marrow suppression, stable, macrocytic     # Hyperlipidemia  - continue home: atorvastatin 20 mg po daily     # Hypocalcemia     # Sepsis with acute organ dysfunction  - SIRS: HR: 92 (2/3/25 12:00), WBC: 2.0 (2/3/25)  - SOFA score: 3 (2/9/25)  source UTI  resolved     # Seizure disorder  - continue home: LORazepam tablet po BID  - continue home: gabapentin 400 mg po QID  - continue home: topiramate 50 mg po BID       VTE Prophylaxis:  Current anticoagulants:    None      Code Status: Full Code        Estimated Discharge Date: 2/12/2025   Disposition Planning: Plan for DC  to [facility] tomorrow, awaiting Alton Best MD  2/11/2025

## 2025-02-11 NOTE — PLAN OF CARE
Care Coordination Discharge Plan Note     Discharge Needs Assessment  Concerns to be Addressed: care coordination/care conferences, discharge planning  Current Discharge Risk:      Anticipated Discharge Plan  Anticipated Discharge Disposition: skilled nursing facility  Type of Skilled Nursing Care Services: PT, OT, nursing      Patient Choice  Offered/Gave Vendor List: yes  Patient and/or patient guardian/advocate was made aware of their right to choose a provider. A list of eligible providers was presented and reviewed with the patient and/or patient guardian/advocate in written and/or verbal form. The list delineates providers in the patients desired geographic area who are participating in the Medicare program and/or providers contracted with the patients primary insurance. The Medicare list and quality ratings were obtained from the Medicare.gov [medicare.gov] website.    ---------------------------------------------------------------------------------------------------------------------    Interdisciplinary Discharge Plan Review:  Participants:     Concerns Comments: Pt was accepted to [facility] and pt is agreeable. CCRN provided pt with medicare.gov printout on [facility]. Insurance auth started. CCRN will continue to follow.    Discharge Plan:   Disposition/Destination:   /    Discharge Facility:   Destination - Admitted Since 2/1/2025       Service Provider Services Address Phone Fax Patient Preferred Last Updated    [facility] [address] [place] [phone] -- Venita Alvarez, RN 2/11/2025 4423          Community Resources:      Discharge Transportation:  Is Out of Hospital DNR needed at Discharge: no  Does patient need discharge transport?

## 2025-02-11 NOTE — PROGRESS NOTES
Occupational Therapy -  Daily Treatment/Progress Note     Patient: Felipa Cuadra  Location: [address] [address]  MRN: [id number]  Today's date: 2/11/2025    HISTORY OF PRESENT ILLNESS     Felipa is a 44 y.o. female admitted on 2/1/2025 with UTI (urinary tract infection) [N39.0]  Lower abdominal pain [R10.30]  Urinary tract infection with hematuria, site unspecified [N39.0, R31.9]. Principal problem is UTI (urinary tract infection).    Past Medical History  Felipa has a past medical history of Anxiety, Depression, and Eating disorder.    History of Present Illness    Patient was seen in the ED 1 week ago for evaluation of abdominal pain and generalized weakness.  Workup revealed acute cystitis.  She was discharged on a course of Macrobid, which patient reports she has been taking.  She presents today for evaluation of persistent abdominal pain.  Patient reports suprapubic pain that radiates to her back.  She reports it is constant.  She reports she is weak and unable to ambulate.  She reports increased frequency of urination.  She reports chills.  She reports nausea without vomiting.  She reports she has not had solid food intake since Christmas.  She reports she has been drinking a lot of Gatorade.  She reports she is currently having black stool and vaginal bleeding x 3 days.  She reports she rarely gets her period.  She has not noticed any blood clots.  She reports she is feeling more depressed lately.  She denies suicidal ideation.  She reports she smokes 4 cigarettes/day.  She reports she quit alcohol use 4 years ago.  She denies fever, syncope, vomiting, chest pain, shortness of breath, constipation, diarrhea.   PRIOR LEVEL OF FUNCTION AND LIVING ENVIRONMENT     Prior Level of Function      Flowsheet Row Most Recent Value   Dominant Hand right   Ambulation assistive equipment   Transferring assistive equipment   Toileting independent   Bathing assistive equipment and person   Dressing assistive  person   Eating independent   IADLs independent   Prior Level of Function Comment Reports assist from HHAs 6 days/week with bathing + dressing. Uses a walker at home   Assistive Device Currently Used at Home walker, standard, grab bar, shower chair             Prior Living Environment      Flowsheet Row Most Recent Value   People in Home alone   Current Living Arrangements home   Home Accessibility elevator accessible   Living Environment Comment Lives alone in apartment with elevator access. Tub shower with SC + GBs.          Occupational Profile      Flowsheet Row Most Recent Value   Successful Occupations Diminished ADL status s/p UTI   Performance Patterns Likes to read, likes to write fanfiction   Environmental Supports and Barriers HHAs available to assist 6 days/week          VITALS AND PAIN     OT Vitals      Date/Time Pulse HR Source SpO2 Pt Activity O2 Therapy BP BP Location BP Method Pt Position [facility]   02/11/25 1355 101 Monitor 96 % At rest None (Room air) 97/66 Right upper arm Automatic Lying KRP   02/11/25 1401 102 Monitor -- -- -- 94/61 Right upper arm Automatic Sitting KRP          OT Pain      Date/Time Pain Type Rating: Rest Rating: Activity Interventions [facility]   02/11/25 1355 Pain Assessment 7 7 position adjusted KRP             Objective   OBJECTIVE     Start time:  1352  End time:  1409  Session Length: 17 min  Mode of Treatment: co-treatment  Reason for co-treatment: priority for auth, OT focus on ADL    General Observations  Patient received supine, in bed. She was agreeable to therapy, no issues or concerns identified by nurse prior to session. watching TV on ipad in bed, requesting pain medication from RN    Precautions: fall       Limitations/Impairments: safety/cognitive   Services  Do You Speak a Language Other Than English at Home?: no      OT Eval and Treat - 02/11/25 1352          Cognition    Orientation Status oriented x 3     Affect/Mental Status flat/blunted affect      Follows Commands follows one-step commands;75-90% accuracy;delayed response/completion;repetition of directions required;verbal cues/prompting required     Cognitive Function executive function deficit;safety deficit     Attention Deficit focused/sustained attention     Executive Function Deficit planning/decision-making;organization/sequencing;self-monitoring/self-correction     Safety Deficit judgment;safety precautions follow-through/compliance     Comment, Cognition increased time for information processing.        Bed Mobility    Bed Mobility Activities left;supine to sit;sit to supine     [place] moderate assist (50-74% patient effort);close supervision     Safety/Cues increased time to complete;verbal cues;technique;sequencing     Assistive Device bed rails;head of bed elevated     Comment close sup sup > sit, mod A x1 sit > supine at LEs. scooting at EOB with mod A at LEs        Mobility Belt    Mobility Belt Used During Session no - patient did not mobilize out of bed or stand        Sit/Stand Transfer    Transfer Comments pt declined STS        Lower Body Dressing    Tasks don;socks     [place] dependent (less than 25% patient effort)     Position supine     Adaptive Equipment none        Toileting    Tasks adjust/manage clothing     [place] dependent (less than 25% patient effort)     Position supine     Setup Assistance --        Balance    Static Sitting Balance mild impairment;sitting, edge of bed     Dynamic Sitting Balance moderate impairment;sitting, edge of bed     Sit to Stand Dynamic Balance unable to perform activity;not tested     Static Standing Balance unable to perform activity;not tested     Dynamic Standing Balance unable to perform activity;not tested     Balance Interventions occupation based/functional task     Comment, Balance close sup with episodes of min A at EOB, mild LOB with ataxic movements, EOB sitting x~5 minutes        Impairments/Safety Issues    Impairments  Affecting Function balance;strength;cognition;coordination;endurance/activity tolerance;postural/trunk control     Cognitive Impairments, Safety/Performance problem-solving/reasoning;safety precaution awareness     Functional Endurance fair (-)     Safety Issues Affecting Function insight into deficits/self-awareness;problem-solving;sequencing abilities;safety precaution awareness;judgment                                              Education Documentation  Unresolved/Worsening Symptoms, taught by Bhumika Dubon OT at 2/11/2025  2:22 PM.  Learner: Patient  Readiness: Acceptance  Method: Explanation  Response: Verbalizes Understanding  Comment: role of OT, OT POC, safety, dc recommendations    Rehabilitation Therapy, taught by Bhumika Dubon OT at 2/11/2025  2:22 PM.  Learner: Patient  Readiness: Acceptance  Method: Explanation  Response: Verbalizes Understanding  Comment: role of OT, OT POC, safety, dc recommendations    Home Safety, taught by Bhumika Dubon OT at 2/11/2025  2:22 PM.  Learner: Patient  Readiness: Acceptance  Method: Explanation  Response: Verbalizes Understanding  Comment: role of OT, OT POC, safety, dc recommendations    Self-Care, taught by Bhumika Dubon OT at 2/11/2025  2:22 PM.  Learner: Patient  Readiness: Acceptance  Method: Explanation  Response: Verbalizes Understanding  Comment: role of OT, OT POC, safety, dc recommendations    Signs/Symptoms, taught by Bhumika Dubon OT at 2/11/2025  2:22 PM.  Learner: Patient  Readiness: Acceptance  Method: Explanation  Response: Verbalizes Understanding  Comment: role of OT, OT POC, safety, dc recommendations    Risk Factors, taught by Bhumika Dubon OT at 2/11/2025  2:22 PM.  Learner: Patient  Readiness: Acceptance  Method: Explanation  Response: Verbalizes Understanding  Comment: role of OT, OT POC, safety, dc recommendations        Session Outcome  Patient reclined, in bed at end of  session, bed alarm on, all needs met, call light in reach, personal items in reach. Nursing notified about change in vital signs, patient's performance, patient's position, and patient's response to therapy/activity.    AM-PAC - ADL (Current Function)     Putting on/taking off regular lower body clothing 1 - Total   Bathing 1 - Total   Toileting 1 - Total   Putting on/taking off regular upper body clothing 2 - A Lot   Help for taking care of personal grooming 2 - A Lot   Eating meals 2 - A Lot   AM-PAC ADL Score 9      ASSESSMENT AND PLAN     Progress Summary   OT Treat performed.  ADL AMPAC 9.  Patient performed rolling min A, sup > sit with close sup and sit > sup with mod A and LB dress/toileting with total A. Patient presents with impairments limiting ability to safely perform functional tasks.  Recommend patient discharge to SNF when medically cleared. Patient making progress toward treatment goals, will benefit from continued skilled OT services to maximize functional independence, reduce risk of falls and burden of care.       Patient/Family Therapy Goal Statement: To go to therapy    OT Plan      Flowsheet Row Most Recent Value   Rehab Potential fair, will monitor progress closely at 02/11/2025 1352   Therapy Frequency 3 times/wk at 02/11/2025 1352   Planned Therapy Interventions activity tolerance training, functional balance retraining, transfer/mobility retraining, occupation/activity based interventions, BADL retraining at 02/11/2025 1352            OT Discharge Recommendations      Flowsheet Row Most Recent Value   OT Recommended Discharge Disposition skilled nursing facility at 02/11/2025 1352   Anticipated Equipment Needs if Discharged Home (OT) commode, 3-in-1, dressing equipment at 02/11/2025 1352                 OT Goals      Flowsheet Row Most Recent Value   Bed Mobility Goal 1    Activity/Assistive Device sit to supine, supine to sit, rolling to left, rolling to right at 02/04/2025 1403    [place] minimum assist (75% or more patient effort) at 02/04/2025 1403   Time Frame by discharge at 02/04/2025 1403   Progress/Outcome goal ongoing at 02/04/2025 1403   Transfer Goal 1    Activity/Assistive Device commode, 3-in-1, toilet at 02/04/2025 1403   [place] moderate assist (50-74% patient effort) at 02/04/2025 1403   Time Frame by discharge at 02/04/2025 1403   Progress/Outcome goal ongoing at 02/04/2025 1403   Dressing Goal 1    Activity/Adaptive Equipment lower body dressing at 02/04/2025 1403   [place] minimum assist (75% or more patient effort) at 02/04/2025 1403   Time Frame by discharge at 02/04/2025 1403   Progress/Outcome goal ongoing at 02/04/2025 1403   Toileting Goal 1    Activity/Assistive Device adjust/manage clothing, perform perineal hygiene at 02/04/2025 1403   [place] minimum assist (75% or more patient effort) at 02/04/2025 1403   Time Frame by discharge at 02/04/2025 1403   Progress/Outcome goal ongoing at 02/04/2025 1403

## 2025-02-12 ENCOUNTER — APPOINTMENT (INPATIENT)
Dept: RADIOLOGY | Facility: HOSPITAL | Age: 45
DRG: 690 | End: 2025-02-12
Attending: STUDENT IN AN ORGANIZED HEALTH CARE EDUCATION/TRAINING PROGRAM
Payer: COMMERCIAL

## 2025-02-12 LAB
ANION GAP SERPL CALC-SCNC: 5 MEQ/L (ref 3–15)
ANISOCYTOSIS BLD QL SMEAR: ABNORMAL
BASOPHILS # BLD: 0.01 K/UL (ref 0.01–0.1)
BASOPHILS # BLD: 0.01 K/UL (ref 0.01–0.1)
BASOPHILS NFR BLD: 0.3 %
BASOPHILS NFR BLD: 0.3 %
BUN SERPL-MCNC: 5 MG/DL (ref 7–25)
CALCIUM SERPL-MCNC: 8.3 MG/DL (ref 8.6–10.3)
CHLORIDE SERPL-SCNC: 109 MEQ/L (ref 98–107)
CO2 SERPL-SCNC: 27 MEQ/L (ref 21–31)
CREAT SERPL-MCNC: 0.5 MG/DL (ref 0.6–1.2)
DACRYOCYTES BLD QL SMEAR: ABNORMAL
DIFFERENTIAL METHOD BLD: ABNORMAL
DIFFERENTIAL METHOD BLD: ABNORMAL
EGFRCR SERPLBLD CKD-EPI 2021: >60 ML/MIN/1.73M*2
EOSINOPHIL # BLD: 0.18 K/UL (ref 0.04–0.36)
EOSINOPHIL # BLD: 0.2 K/UL (ref 0.04–0.36)
EOSINOPHIL NFR BLD: 4.6 %
EOSINOPHIL NFR BLD: 5.4 %
ERYTHROCYTE [DISTWIDTH] IN BLOOD BY AUTOMATED COUNT: 15.9 % (ref 11.7–14.4)
ERYTHROCYTE [DISTWIDTH] IN BLOOD BY AUTOMATED COUNT: 15.9 % (ref 11.7–14.4)
GLUCOSE BLD-MCNC: 117 MG/DL (ref 70–99)
GLUCOSE SERPL-MCNC: 183 MG/DL (ref 70–99)
HCT VFR BLD AUTO: 30.2 % (ref 35–45)
HCT VFR BLD AUTO: 30.3 % (ref 35–45)
HGB BLD-MCNC: 9.5 G/DL (ref 11.8–15.7)
HGB BLD-MCNC: 9.6 G/DL (ref 11.8–15.7)
IMM GRANULOCYTES # BLD AUTO: 0.01 K/UL (ref 0–0.08)
IMM GRANULOCYTES # BLD AUTO: 0.01 K/UL (ref 0–0.08)
IMM GRANULOCYTES NFR BLD AUTO: 0.3 %
IMM GRANULOCYTES NFR BLD AUTO: 0.3 %
LYMPHOCYTES # BLD: 1.18 K/UL (ref 1.2–3.5)
LYMPHOCYTES # BLD: 1.24 K/UL (ref 1.2–3.5)
LYMPHOCYTES NFR BLD: 31.7 %
LYMPHOCYTES NFR BLD: 31.8 %
MACROCYTES BLD QL SMEAR: ABNORMAL
MCH RBC QN AUTO: 34.5 PG (ref 28–33.2)
MCH RBC QN AUTO: 34.7 PG (ref 28–33.2)
MCHC RBC AUTO-ENTMCNC: 31.4 G/DL (ref 32.2–35.5)
MCHC RBC AUTO-ENTMCNC: 31.8 G/DL (ref 32.2–35.5)
MCV RBC AUTO: 109 FL (ref 83–98)
MCV RBC AUTO: 110.2 FL (ref 83–98)
MONOCYTES # BLD: 0.21 K/UL (ref 0.28–0.8)
MONOCYTES # BLD: 0.25 K/UL (ref 0.28–0.8)
MONOCYTES NFR BLD: 5.6 %
MONOCYTES NFR BLD: 6.4 %
NEUTROPHILS # BLD: 2.11 K/UL (ref 1.7–7)
NEUTROPHILS # BLD: 2.21 K/UL (ref 1.7–7)
NEUTS SEG NFR BLD: 56.6 %
NEUTS SEG NFR BLD: 56.7 %
NRBC BLD-RTO: 0 %
NRBC BLD-RTO: 0 %
PLAT MORPH BLD: NORMAL
PLATELET # BLD AUTO: 204 K/UL (ref 150–369)
PLATELET # BLD AUTO: 215 K/UL (ref 150–369)
PLATELET # BLD EST: ABNORMAL 10*3/UL
PMV BLD AUTO: 11.4 FL (ref 9.4–12.3)
PMV BLD AUTO: 11.9 FL (ref 9.4–12.3)
POCT TEST: ABNORMAL
POLYCHROMASIA BLD QL SMEAR: ABNORMAL
POTASSIUM SERPL-SCNC: 3.9 MEQ/L (ref 3.5–5.1)
RBC # BLD AUTO: 2.75 M/UL (ref 3.93–5.22)
RBC # BLD AUTO: 2.77 M/UL (ref 3.93–5.22)
SODIUM SERPL-SCNC: 141 MEQ/L (ref 136–145)
WBC # BLD AUTO: 3.72 K/UL (ref 3.8–10.5)
WBC # BLD AUTO: 3.9 K/UL (ref 3.8–10.5)

## 2025-02-12 PROCEDURE — 63600000 HC DRUGS/DETAIL CODE: Mod: JZ | Performed by: STUDENT IN AN ORGANIZED HEALTH CARE EDUCATION/TRAINING PROGRAM

## 2025-02-12 PROCEDURE — P9045 ALBUMIN (HUMAN), 5%, 250 ML: HCPCS | Mod: JZ | Performed by: STUDENT IN AN ORGANIZED HEALTH CARE EDUCATION/TRAINING PROGRAM

## 2025-02-12 PROCEDURE — 63700000 HC SELF-ADMINISTRABLE DRUG: Performed by: STUDENT IN AN ORGANIZED HEALTH CARE EDUCATION/TRAINING PROGRAM

## 2025-02-12 PROCEDURE — 63700000 HC SELF-ADMINISTRABLE DRUG: Performed by: INTERNAL MEDICINE

## 2025-02-12 PROCEDURE — 99232 SBSQ HOSP IP/OBS MODERATE 35: CPT | Performed by: STUDENT IN AN ORGANIZED HEALTH CARE EDUCATION/TRAINING PROGRAM

## 2025-02-12 PROCEDURE — 85025 COMPLETE CBC W/AUTO DIFF WBC: CPT | Performed by: INTERNAL MEDICINE

## 2025-02-12 PROCEDURE — 97530 THERAPEUTIC ACTIVITIES: CPT | Mod: GP

## 2025-02-12 PROCEDURE — 25800000 HC PHARMACY IV SOLUTIONS

## 2025-02-12 PROCEDURE — 63700000 HC SELF-ADMINISTRABLE DRUG: Performed by: PHYSICIAN ASSISTANT

## 2025-02-12 PROCEDURE — 71045 X-RAY EXAM CHEST 1 VIEW: CPT

## 2025-02-12 PROCEDURE — 21400000 HC ROOM AND CARE CCU/INTERMEDIATE

## 2025-02-12 PROCEDURE — 36415 COLL VENOUS BLD VENIPUNCTURE: CPT | Performed by: INTERNAL MEDICINE

## 2025-02-12 PROCEDURE — 80048 BASIC METABOLIC PNL TOTAL CA: CPT | Performed by: STUDENT IN AN ORGANIZED HEALTH CARE EDUCATION/TRAINING PROGRAM

## 2025-02-12 PROCEDURE — 97535 SELF CARE MNGMENT TRAINING: CPT | Mod: GO

## 2025-02-12 RX ORDER — ALBUMIN HUMAN 50 G/1000ML
12.5 SOLUTION INTRAVENOUS ONCE
Status: COMPLETED | OUTPATIENT
Start: 2025-02-13 | End: 2025-02-13

## 2025-02-12 RX ORDER — LANOLIN ALCOHOL/MO/W.PET/CERES
1000 CREAM (GRAM) TOPICAL DAILY
Status: DISCONTINUED | OUTPATIENT
Start: 2025-02-12 | End: 2025-02-18 | Stop reason: HOSPADM

## 2025-02-12 RX ADMIN — OXYCODONE HYDROCHLORIDE 5 MG: 5 TABLET ORAL at 04:10

## 2025-02-12 RX ADMIN — GABAPENTIN 400 MG: 400 CAPSULE ORAL at 21:37

## 2025-02-12 RX ADMIN — GABAPENTIN 400 MG: 400 CAPSULE ORAL at 09:10

## 2025-02-12 RX ADMIN — LORAZEPAM 0.25 MG: 0.5 TABLET ORAL at 09:11

## 2025-02-12 RX ADMIN — ATORVASTATIN CALCIUM 20 MG: 20 TABLET, FILM COATED ORAL at 09:09

## 2025-02-12 RX ADMIN — CYANOCOBALAMIN TAB 1000 MCG 1000 MCG: 1000 TAB at 17:04

## 2025-02-12 RX ADMIN — MIDODRINE HYDROCHLORIDE 10 MG: 5 TABLET ORAL at 13:00

## 2025-02-12 RX ADMIN — PANTOPRAZOLE SODIUM 20 MG: 20 TABLET, DELAYED RELEASE ORAL at 09:10

## 2025-02-12 RX ADMIN — ALBUMIN (HUMAN) 12.5 G: 12.5 SOLUTION INTRAVENOUS at 23:42

## 2025-02-12 RX ADMIN — MIDODRINE HYDROCHLORIDE 10 MG: 5 TABLET ORAL at 09:09

## 2025-02-12 RX ADMIN — SODIUM CHLORIDE 500 ML: 9 INJECTION, SOLUTION INTRAVENOUS at 21:40

## 2025-02-12 RX ADMIN — GABAPENTIN 400 MG: 400 CAPSULE ORAL at 14:50

## 2025-02-12 RX ADMIN — PRAZOSIN HYDROCHLORIDE 5 MG: 1 CAPSULE ORAL at 21:37

## 2025-02-12 RX ADMIN — FLUOXETINE HYDROCHLORIDE 40 MG: 20 CAPSULE ORAL at 09:10

## 2025-02-12 RX ADMIN — GABAPENTIN 400 MG: 400 CAPSULE ORAL at 18:07

## 2025-02-12 RX ADMIN — ZOLPIDEM TARTRATE 5 MG: 5 TABLET, FILM COATED ORAL at 21:37

## 2025-02-12 RX ADMIN — LORAZEPAM 0.25 MG: 0.5 TABLET ORAL at 14:49

## 2025-02-12 RX ADMIN — POTASSIUM CHLORIDE 20 MEQ: 1500 TABLET, EXTENDED RELEASE ORAL at 09:09

## 2025-02-12 RX ADMIN — TOPIRAMATE 50 MG: 100 TABLET, FILM COATED ORAL at 17:04

## 2025-02-12 RX ADMIN — POTASSIUM CHLORIDE 20 MEQ: 1500 TABLET, EXTENDED RELEASE ORAL at 21:36

## 2025-02-12 RX ADMIN — TRAZODONE HYDROCHLORIDE 100 MG: 100 TABLET ORAL at 21:36

## 2025-02-12 RX ADMIN — Medication 1 TABLET: at 09:10

## 2025-02-12 RX ADMIN — PANTOPRAZOLE SODIUM 20 MG: 20 TABLET, DELAYED RELEASE ORAL at 21:38

## 2025-02-12 RX ADMIN — TRAMADOL HYDROCHLORIDE 100 MG: 50 TABLET, COATED ORAL at 17:05

## 2025-02-12 RX ADMIN — MIDODRINE HYDROCHLORIDE 10 MG: 5 TABLET ORAL at 17:04

## 2025-02-12 RX ADMIN — TOPIRAMATE 50 MG: 100 TABLET, FILM COATED ORAL at 09:11

## 2025-02-12 RX ADMIN — OXYCODONE HYDROCHLORIDE 5 MG: 5 TABLET ORAL at 21:38

## 2025-02-12 RX ADMIN — OXYCODONE HYDROCHLORIDE 5 MG: 5 TABLET ORAL at 13:04

## 2025-02-12 RX ADMIN — Medication 400 MG: at 21:38

## 2025-02-12 RX ADMIN — NYSTATIN: 100000 POWDER TOPICAL at 09:21

## 2025-02-12 RX ADMIN — LEVOTHYROXINE SODIUM 50 MCG: 0.05 TABLET ORAL at 05:21

## 2025-02-12 RX ADMIN — TRAMADOL HYDROCHLORIDE 100 MG: 50 TABLET, COATED ORAL at 09:14

## 2025-02-12 RX ADMIN — NYSTATIN: 100000 POWDER TOPICAL at 21:45

## 2025-02-12 RX ADMIN — Medication 400 MG: at 09:09

## 2025-02-12 ASSESSMENT — COGNITIVE AND FUNCTIONAL STATUS - GENERAL
DRESSING REGULAR UPPER BODY CLOTHING: 2 - A LOT
CLIMB 3 TO 5 STEPS WITH RAILING: 1 - TOTAL
TOILETING: 1 - TOTAL
WALKING IN HOSPITAL ROOM: 1 - TOTAL
HELP NEEDED FOR BATHING: 1 - TOTAL
EATING MEALS: 2 - A LOT
MOVING TO AND FROM BED TO CHAIR: 2 - A LOT
AFFECT: FLAT/BLUNTED AFFECT
HELP NEEDED FOR PERSONAL GROOMING: 2 - A LOT
DRESSING REGULAR LOWER BODY CLOTHING: 1 - TOTAL
AFFECT: FLAT/BLUNTED AFFECT
STANDING UP FROM CHAIR USING ARMS: 2 - A LOT
WALKING IN HOSPITAL ROOM: 1 - TOTAL
MOVING TO AND FROM BED TO CHAIR: 1 - TOTAL
STANDING UP FROM CHAIR USING ARMS: 1 - TOTAL
CLIMB 3 TO 5 STEPS WITH RAILING: 1 - TOTAL

## 2025-02-12 NOTE — PROGRESS NOTES
Nutrition Status Classification: Mild nutritional compromise     Clinical Course: Patient is a 44 y.o. female who was admitted on 2/1/2025 with a diagnosis of UTI (urinary tract infection) [N39.0]  Lower abdominal pain [R10.30]  Urinary tract infection with hematuria, site unspecified [N39.0, R31.9].   Past Medical History:   Diagnosis Date    Anxiety     Depression     Eating disorder      No past surgical history on file.    Nutrition Interventions/Recommendations:     Continue regular diet     - soft menu options prn 2/2 missing teeth              - monitor and record % meal intake   Continue Ensure Clear (240 kcals, 8 g protein, 52 g CHO each) increase to TID             - - monitor for increased BG levels; d/c if >200 mg/dL  Trend labs/lytes, treat/replete prn               - POC glucose checks with goal: </=140-180 mg/dL  Weekly blind weight   Monitor GI function     Active Diet Orders (From admission, onward)       Start        02/08/25 1138  Adult Diet Regular; RD/LDN may adjust order  Diet effective now        Question Answer Comment   Diet Texture Regular    Delegation of Authority. Diet orders written by PA/CRNPs may not be adjusted by RD/LDNs. RD/LDN may adjust order           02/06/25 1028  Dietary nutrition supplements  Once        Question Answer Comment   Select Supplement (PH): Ensure Clear Gonzalez    Meal period Breakfast Lunch Dinner           02/01/25 1358  Supervise feeds  Until discontinued                            Reason for Assessment  Reason For Assessment: per organizational policy  Diagnosis: infection/sepsis (UTI)    [facility] Nutrition Screen Tool  Has patient lost weight without trying?: 0-->No  Has patient been eating poorly due to decreased appetite?: 1-->Yes  [facility] Nutrition Screen Score: 1    Nutrition/Diet History  Typical Food/Fluid Intake: from home with aide 6 days per week from 9A-3P per pt  Diet Prior to Admission: regular  Appetite Prior to Admission: Poor-0-25% (limited intake of  "solid foods >/=1 month (mostly water and gatorade))  Intake (%): 25%  Food Allergies: fish/shellfish, other (see comments) (aspartame - anaphylaxis)  Factors Affecting Nutritional Intake: eating disorder(s)    Physical Findings  Overall Physical Appearance: overweight  Gastrointestinal: constipation  Last Bowel Movement: 02/10/25  Oral/Mouth Cavity: other (see comments) (missing teeth)  Skin: intact, edema (+1 B/L feet; arms)    Last Bowel Movement: 02/10/25    Nutrition Order  Nutrition Order: meets nutritional requirements  Nutrition Order Comments: Regular  Current TF/TPN Regimen: none    Anthropometrics  Height: 165.1 cm (5' 5\")      Admit Weight  Admit Weight Method: measured  Admit Weight: 76.7 kg (169 lb)    Current Weight  Weight Method: Bed scale  Weight: 82.6 kg (182 lb)    Ideal Body Weight (IBW)  Ideal Body Weight (IBW) (kg): 57.29  % Ideal Body Weight: 134.4    Usual Body Weight (UBW)  Usual Body Weight: 97.5 kg (215 lb) (10/3/24 per EMR)  % Usual Body Weight: 80.47  Weight Loss: other (see comments)  Time Frame: Other (comments) (42 lb / 19.5% weight loss in 4 months - severe)    Body Mass Index (BMI)  BMI (Calculated): 30.3  BMI Assessment: BMI 25-29.9: overweight         Labs/Procedures/Meds  Lab Results Reviewed: reviewed      Results from last 7 days   Lab Units 02/10/25  0415 02/08/25  1216 02/07/25  2157   SODIUM mEQ/L 145 143 142   POTASSIUM mEQ/L 4.3 3.6 3.0*   CHLORIDE mEQ/L 111* 115* 114*   CO2 mEQ/L 30 22 22   BUN mg/dL 2* 2* 2*   CREATININE mg/dL 0.5* 0.4* 0.4*   GLUCOSE mg/dL 79 96 101*   CALCIUM mg/dL 8.6 8.1* 8.0*      Results from last 7 days   Lab Units 02/10/25  0415 02/08/25  1216 02/07/25  1512   ALK PHOS IU/L 342* 326* 340*   BILIRUBIN TOTAL mg/dL 0.7 0.5 0.4   BILIRUBIN DIRECT mg/dL  --  0.2 0.1   ALBUMIN g/dL 3.3* 3.2* 3.0*   ALT IU/L 245* 357* 434*   AST IU/L 65* 87* 89*     Results from last 7 days   Lab Units 02/07/25  1512   HEMOGLOBIN A1C % 5.5      Lab Results   Component " Value Date    HGBA1C 5.5 02/07/2025     Lab Results   Component Value Date    XGEQXRTR85 708 02/03/2025     Lab Results   Component Value Date    CALCIUM 8.6 02/10/2025    PHOS 3.0 02/08/2025     Results from last 7 days   Lab Units 02/10/25  0415 02/08/25  1216 02/07/25  1512   WBC K/uL 3.62* 2.26* 2.28*   HEMOGLOBIN g/dL 10.6* 9.9* 9.3*   HEMATOCRIT % 32.4* 29.5* 27.1*   PLATELETS K/uL 178 111* 95*               Results from last 7 days   Lab Units 02/08/25  1216 02/07/25  1512 02/06/25 2051   MAGNESIUM mg/dL 1.9 2.0 1.6*          Diagnostic Tests/Procedures  Diagnostic Test/Procedure Reviewed: reviewed, pertinent    Medications  Pertinent Medications Reviewed: reviewed   atorvastatin  20 mg oral q AM    B-complex with vitamin C  1 tablet oral Daily    FLUoxetine  40 mg oral Daily    gabapentin  400 mg oral QID    insulin lispro U-100  3-5 Units subcutaneous With meals & nightly    levothyroxine  50 mcg oral Daily (6:30a)    LORazepam  0.25 mg oral BID    magnesium oxide  400 mg oral BID    midodrine  10 mg oral TID AC    nystatin   Topical BID    pantoprazole  20 mg oral BID    potassium  20 mEq oral BID    prazosin  5 mg oral Nightly    topiramate  50 mg oral BID    traZODone  100 mg oral Nightly         Weights (last 7 days)       Date/Time Weight    02/12/25 1000 82.6 kg (182 lb)          2/3/2025 173 lb 78.472 kg Bed scale   2/1/2025 169 lb 12.1 oz 77 kg Stated;Bed scale   1/27/2025 212 lb 1.3 oz 96.2 kg Stated   10/3/2024 215 lb 13.3 oz 97.9 kg Bed scale   9/30/2024 201 lb 91.173 kg -   9/28/2024 201 lb 8 oz 91.4 kg -   8/21/2024 235 lb 3.7 oz 106.7 kg Bed scale   8/19/2024 229 lb 8 oz 104.1 kg -   8/18/2024 220 lb 14.4 oz 100.2 kg Bed scale   8/17/2024 207 lb 14.3 oz 94.3 kg Bed scale   8/16/2024 207 lb 0.2 oz 93.9 kg Bed scale   8/15/2024 204 lb 5.9 oz 92.7 kg Bed scale     15.4% x 4 months - severe if accurate       Clinical Comments:     Pt visited in f/u. 43 y/o female with PMHx significant for  depression, anxiety, bipolar, PTSD, ETOH abuse (4 years ago per chart) and DM2. Presented with abd pain, admitted on 2/1 with UTI. Treated for UTI and electrolyte abnormalities. Stable for dc over the past few days, pending placement at rehab (vs ED treatment given need for physical rehab). Continues on Regular diet. Hx shellfish/ aspartame allergy noted.       Patient sleeping soundly on visit. Multiple cans of coke open at bedside. Discussed with RN who reports she is eating meals, not just drinking fluids- patient has endorsed to multiple providers that she has not had solid food since [place]. No flowsheet dx over the past few days, intakes 25% of most documented meals 2/2-7. Bed scale weight was 182 lbs today, Notable variances this admit. Current weight suggests loss of 15.4% x 4 months - severe if accurate. No significant fat/ muscle wasting appreciated- though view limited with blankets. No new labs to review. Will follow at mild risk given medical stability for dc and improvement in intakes of solids.         Date: 02/12/25  Signature: MONICO Root

## 2025-02-12 NOTE — PROGRESS NOTES
Hospital Medicine     Daily Progress Note       SUBJECTIVE   Pt seen and examined. D/W Nurse, no acute events   Patient reports feeling okay, complains of chronic pain in his neck and lower back, no new complaints. Reports Neuropathy   Denied fever, chills, nausea, vomiting, GI/ complaints  Discussed with case management, awaiting Auth for SNF     OBJECTIVE   Vital signs in last 24 hours:  Temp:  [36.1 °C (97 °F)-36.9 °C (98.5 °F)] 36.4 °C (97.5 °F)  Heart Rate:  [] 100  Resp:  [16-18] 16  BP: ()/(50-61) 92/59    Intake/Output Summary (Last 24 hours) at 2/12/2025 1527  Last data filed at 2/12/2025 0800  Gross per 24 hour   Intake 495 ml   Output --   Net 495 ml       Physical Exam  General: Pt Alert and Interactive, No Acute Distress  HEENT: PERRLA, EOMI, Supple  Lungs: Clear to Auscultation B/L, No Wheeze/Rhonchi  CVS: S1 S2 heard, No Murmurs  Abdomen: Soft, Non tender, Non distended, BS heard  CNS: AAOX3, No FND  Psychiatric: Calm and Cooperative    LINES, DRAINS, AIRWAYS, WOUNDS   Peripheral IV (Adult) 02/03/25 Anterior;Right Forearm (Active)   Number of days: 8          LABS, IMAGING, TELEMETRY     CBC Results     Lab Results   Component Value Date    WBC 3.72 (L) 02/12/2025    WBC 3.90 02/12/2025    HGB 9.6 (L) 02/12/2025    HGB 9.5 (L) 02/12/2025    HCT 30.2 (L) 02/12/2025    HCT 30.3 (L) 02/12/2025    .0 (H) 02/12/2025    .2 (H) 02/12/2025     02/12/2025     02/12/2025      Lab Results   Component Value Date    GLUCOSE 183 (H) 02/12/2025    CALCIUM 8.3 (L) 02/12/2025     02/12/2025    K 3.9 02/12/2025    CO2 27 02/12/2025     (H) 02/12/2025    BUN 5 (L) 02/12/2025    CREATININE 0.5 (L) 02/12/2025              02/10/25 02/08/25 02/07/25     0415 1216 1512    WBC 3.62 2.26 2.28    RBC 3.02 2.86 2.65    HGB 10.6 9.9 9.3    HCT 32.4 29.5 27.1    .3 103.1 102.3    MCH 35.1 34.6 35.1    MCHC 32.7 33.6 34.3     111 95           Comment  for PLT at 0415 on 02/10/25: ALL RESULTS HAVE BEEN RECHECKED    Comment for PLT at 1512 on 02/07/25: ALL RESULTS HAVE BEEN RECHECKEDSPECIMEN QUALITY CHECKEDRESULTS OBTAINED AFTER VORTEXING TO ELIMINATE PLT CLUMPS           CMP Results         02/10/25 02/08/25 02/07/25     0415 1216 2157     143 142    K 4.3 3.6 3.0    Cl 111 115 114    CO2 30 22 22    Glucose 79 96 101    BUN 2 2 2    Creatinine 0.5 0.4 0.4    Calcium 8.6 8.1 8.0    Anion Gap 4 6 6    AST 65 87 --     357 --    Albumin 3.3 3.2 --    EGFR >60.0 >60.0 >60.0           Comment for EGFR at 0415 on 02/10/25: Calculation based on the Chronic Kidney Disease Epidemiology Collaboration (CKD-EPI) equation refit without adjustment for race.    Comment for EGFR at 1216 on 02/08/25: Calculation based on the Chronic Kidney Disease Epidemiology Collaboration (CKD-EPI) equation refit without adjustment for race.    Comment for EGFR at 2157 on 02/07/25: Calculation based on the Chronic Kidney Disease Epidemiology Collaboration (CKD-EPI) equation refit without adjustment for race.                    ASSESSMENT AND PLAN     # UTI (urinary tract infection)  -completed five days of ceftriaxone  -ucx was positive for klebsiella and proteus, the cultures and sensitivities are appreciated  -repeat urine culture is negative  -CT abdomen is negative for any acute pathology  -no reason to continue antibiotic therapy, as she has completed antibiotics.     # Depression  - psych input noted  - continue home: LORazepam tablet po BID  - continue home: fluoxetine 40 mg po daily  - continue home: trazodone 100 mg po daily     # Anxiety  -see section on depression.     # Eating disorder  - reports she has not had solid food intake since [place]  - supervise feeds  - psych consult appreciated: she would benefit from placement at a rehab/eating disorder clinic but her difficulty with ambulation may preclude her from this.     # Hypothyroidism  - TSH: 2.12 (2/7/25), free  T4: 0.6 (8/16/24)  - continued on synthroid       # PTSD (post-traumatic stress disorder)  -see section on anxiety and depression.     # Insomnia     # At risk for polypharmacy     # Chronic pain disorder  -stop tylenol and use tramadol as needed for severe pain.     # Hyponatremia  # -hypovolemic hyponatremia  -hyponatremia is now resolved  -no indication for free water restriction  -encourage oral solute intake  -improved s/p IV fluids  -monitor the bmp     # Thrombocytopenia (CMS/HCC)  -hematology consult appreciated  -platelets appear to be improving for now after antibiotics have been stopped  -peripheral smear appreciated  -DIC panel is negative.  No bleeding     # Lower abdominal pain  -CT abdomen is negative for colitis, negative for bowel obstruction on this admission  -lower abdominal pain is now resolved, possibly had nausea and abdominal discomfort from hypokalemia which we are treating.     # Elevated liver enzymes  -US right upper quadrant appreciated, negative for biliary dilatation, neg for hepatic steatosis.  -GI consult appreciated: stop acetaminophen for pain control  -monitor LFTs  -hepatitis panel is negative.  LFTs improving     # Hypokalemia  -encourage oral intake  -monitor bmp  -replace potassium  -check mag level, since this was low replace magnesium.  -monitor the bmp  -hold off on reintroducing florinef as this can promote further hypokalemia.  -cardiac monitoring while there is hypokalemia.  Improved     # Hypotension  -ensure MAP is greater than 65 points.  -possibly related to poor po intake  -cortisol level is not low, but was taken in the afternoon so it may not be accurate, can recheck this if hypotension persists.  -TSH level WNL  -monitor blood pressure  -allow iv hydration if needed.  -restart midodrine  -avoid florinef for now given the hypokalemia.     # Pancytopenia     # GERD  - currently on: pantoprazole 20 mg po BID     # Anemia: acute on chronic, due to bone marrow  suppression, stable, macrocytic  stable     # Hyperlipidemia  - continue home: atorvastatin 20 mg po daily     # Hypocalcemia     # Sepsis with acute organ dysfunction  - SIRS: HR: 92 (2/3/25 12:00), WBC: 2.0 (2/3/25)  - SOFA score: 3 (2/9/25)  source UTI  resolved     # Seizure disorder  - continue home: LORazepam tablet po BID  - continue home: gabapentin 400 mg po QID  - continue home: topiramate 50 mg po BID       VTE Prophylaxis:  Current anticoagulants:    None      Code Status: Full Code        Estimated Discharge Date: 2/13/2025   Disposition Planning: Plan for DC to SNF tomorrow, awaiting Authorization       Lilly Nagy MD  2/12/2025

## 2025-02-12 NOTE — PLAN OF CARE
Problem: Adult Inpatient Plan of Care  Goal: Plan of Care Review  Outcome: Progressing  Flowsheets (Taken 2/12/2025 1659)  Outcome Evaluation: AAOX4 C/O of pain managed with PRN meds. Patient incontinent Fall precautions in place and call light within patient reach.  Goal: Patient-Specific Goal (Individualized)  2/12/2025 1659 by Ana M Bishop RN  Outcome: Progressing  2/12/2025 1658 by Ana M Bishop RN  Flowsheets (Taken 2/12/2025 1658)  Patient/Family-Specific Goals (Include Timeframe): Pain and infection management  Goal: Absence of Hospital-Acquired Illness or Injury  Outcome: Progressing  Goal: Optimal Comfort and Wellbeing  Outcome: Progressing  Goal: Readiness for Transition of Care  Outcome: Progressing     Problem: Fall Injury Risk  Goal: Absence of Fall and Fall-Related Injury  Outcome: Progressing     Problem: Skin Injury Risk Increased  Goal: Skin Health and Integrity  Outcome: Progressing     Problem: Self-Care Deficit  Goal: Improved Ability to Complete Activities of Daily Living  Outcome: Progressing     Problem: Mobility Impairment  Goal: Optimal Mobility  Outcome: Progressing     Problem: Oral Intake Inadequate  Goal: Improved Oral Intake  Outcome: Progressing     Problem: Violence Risk or Actual  Goal: Anger and Impulse Control  Outcome: Progressing   Plan of Care Review  Outcome Evaluation: AAOX4 C/O of pain managed with PRN meds. Patient incontinent Fall precautions in place and call light within patient reach.

## 2025-02-12 NOTE — PLAN OF CARE
Plan of Care Review  Plan of Care Reviewed With: patient  Progress: improving  Outcome Evaluation: AAOX4, flat affect, pt rings call bell often, used bed pan for urination, prn ambien given for sleep, pt seemed content when talking about discharge to SNF, call bell in reach, bed alarm active.

## 2025-02-12 NOTE — PLAN OF CARE
Problem: Adult Inpatient Plan of Care  Goal: Plan of Care Review  2/12/2025 1829 by Ana M Bishop RN  Outcome: Progressing  Flowsheets (Taken 2/12/2025 1829)  Plan of Care Reviewed With: patient  2/12/2025 1659 by Ana M Bishop RN  Outcome: Progressing  Flowsheets (Taken 2/12/2025 1659)  Outcome Evaluation: AAOX4 C/O of pain managed with PRN meds. Patient incontinent Fall precautions in place and call light within patient reach.  Goal: Patient-Specific Goal (Individualized)  2/12/2025 1829 by Ana M Bishop RN  Outcome: Progressing  2/12/2025 1659 by Ana M Bishop RN  Outcome: Progressing  2/12/2025 1658 by Ana M Bishop RN  Flowsheets (Taken 2/12/2025 1658)  Patient/Family-Specific Goals (Include Timeframe): Pain and infection management  Goal: Absence of Hospital-Acquired Illness or Injury  2/12/2025 1829 by Ana M Bishop RN  Outcome: Progressing  2/12/2025 1659 by Ana M Bishop RN  Outcome: Progressing  Goal: Optimal Comfort and Wellbeing  2/12/2025 1829 by Ana M Bishop RN  Outcome: Progressing  2/12/2025 1659 by Ana M Bishop RN  Outcome: Progressing  Goal: Readiness for Transition of Care  2/12/2025 1829 by Ana M Bishop RN  Outcome: Progressing  2/12/2025 1659 by Ana M Bishop RN  Outcome: Progressing     Problem: Fall Injury Risk  Goal: Absence of Fall and Fall-Related Injury  2/12/2025 1829 by Ana M Bishop RN  Outcome: Progressing  2/12/2025 1659 by Ana M Bishop RN  Outcome: Progressing     Problem: Skin Injury Risk Increased  Goal: Skin Health and Integrity  2/12/2025 1829 by Ana M Bishop RN  Outcome: Progressing  2/12/2025 1659 by Ana M Bishop RN  Outcome: Progressing     Problem: Self-Care Deficit  Goal: Improved Ability to Complete Activities of Daily Living  2/12/2025 1829 by Ana M Bishop RN  Outcome: Progressing  2/12/2025 1659 by Ana M Bishop, RN  Outcome: Progressing     Problem: Mobility Impairment  Goal: Optimal  Mobility  2/12/2025 1829 by Ana M Bishop RN  Outcome: Progressing  2/12/2025 1659 by Ana M Bishop RN  Outcome: Progressing     Problem: Oral Intake Inadequate  Goal: Improved Oral Intake  2/12/2025 1829 by Ana M Bishop RN  Outcome: Progressing  2/12/2025 1659 by Ana M Bishop RN  Outcome: Progressing     Problem: Violence Risk or Actual  Goal: Anger and Impulse Control  2/12/2025 1829 by Ana M Bishop RN  Outcome: Progressing  2/12/2025 1659 by Ana M Bishop RN  Outcome: Progressing   Plan of Care Review  Plan of Care Reviewed With: patient  Outcome Evaluation: AAOX4 C/O of pain managed with PRN meds. Patient incontinent Fall precautions in place and call light within patient reach.

## 2025-02-12 NOTE — PROGRESS NOTES
Physical Therapy -  Daily Treatment/Progress Note     Patient: Felipa Cuadra  Location: [address] [address]  MRN: [id number]  Today's date: 2/12/2025    HISTORY OF PRESENT ILLNESS     Felipa is a 44 y.o. female admitted on 2/1/2025 with UTI (urinary tract infection) [N39.0]  Lower abdominal pain [R10.30]  Urinary tract infection with hematuria, site unspecified [N39.0, R31.9]. Principal problem is UTI (urinary tract infection).    Past Medical History  Felipa has a past medical history of Anxiety, Depression, and Eating disorder.    History of Present Illness    Patient was seen in the ED 1 week ago for evaluation of abdominal pain and generalized weakness.  Workup revealed acute cystitis.  She was discharged on a course of Macrobid, which patient reports she has been taking.  She presents today for evaluation of persistent abdominal pain.  Patient reports suprapubic pain that radiates to her back.  She reports it is constant.  She reports she is weak and unable to ambulate.  She reports increased frequency of urination.  She reports chills.  She reports nausea without vomiting.  She reports she has not had solid food intake since Christmas.  She reports she has been drinking a lot of Gatorade.  She reports she is currently having black stool and vaginal bleeding x 3 days.  She reports she rarely gets her period.  She has not noticed any blood clots.  She reports she is feeling more depressed lately.  She denies suicidal ideation.  She reports she smokes 4 cigarettes/day.  She reports she quit alcohol use 4 years ago.  She denies fever, syncope, vomiting, chest pain, shortness of breath, constipation, diarrhea.   PRIOR LEVEL OF FUNCTION AND LIVING ENVIRONMENT     Prior Level of Function      Flowsheet Row Most Recent Value   Dominant Hand right   Ambulation assistive equipment   Transferring assistive equipment   Toileting independent   Bathing assistive equipment and person   Dressing assistive person    Eating independent   IADLs independent   Prior Level of Function Comment Reports assist from HHAs 6 days/week with bathing + dressing. Uses a walker at home   Assistive Device Currently Used at Home walker, standard, grab bar, shower chair             Prior Living Environment      Flowsheet Row Most Recent Value   People in Home alone   Current Living Arrangements home   Home Accessibility elevator accessible   Living Environment Comment Lives alone in apartment with elevator access. Tub shower with SC + GBs.          VITALS AND PAIN     PT Vitals      Date/Time Pulse HR Source BP BP Location BP Method Pt Position [facility]   02/12/25 1358 100 Monitor 92/59 Left upper arm Automatic Sitting [facility]          PT Pain      Date/Time Pain Type Side/Orientation Location Rating: Rest Rating: Activity Interventions [facility]   02/12/25 1358 Pain Assessment lower back 8 8 position adjusted [facility]             Objective   OBJECTIVE     Start time:  1358  End time:  1412  Session Length: 14 min  Mode of Treatment: co-treatment  Reason for co-treatment: Priority for auth per CM. PT focus on mobility    General Observations  Patient received supine, in bed. She was no issues or concerns identified by nurse prior to session, agreeable to therapy.      Precautions: fall       Limitations/Impairments: safety/cognitive      PT Eval and Treat - 02/12/25 1358          Cognition    Orientation Status oriented x 3     Affect/Mental Status flat/blunted affect     Behavioral Issues overwhelmed easily     Follows Commands follows one-step commands;75-90% accuracy;increased processing time needed;repetition of directions required;verbal cues/prompting required     Cognitive Function safety deficit;attention deficit;executive function deficit     Attention Deficit minimal deficit;focused/sustained attention     Executive Function Deficit moderate deficit;organization/sequencing;information processing;planning/decision-making;self-monitoring/self-correction      Safety Deficit moderate deficit;problem-solving;insight into deficits/self-awareness;at risk behavior observed     Comment, Cognition Anxious with mobility and reports fear of falling. Appears to be demonstrating self limiting behaviors which may be the cause of some of her functional deficits. However patient is cooperative and motivated to participate in therapy        Bed Mobility    Bed Mobility Activities left;supine to sit;sit to supine     [place] dependent (less than 25% patient effort);2 person assist     Safety/Cues increased time to complete;verbal cues;technique     Assistive Device head of bed elevated;bed rails     Comment TotalAx2 for trunk and LE management. Appears to demonstrate self limiting behaviors causing the amount of assist. Appears to have ataxic like movements with poor coordination        Mobility Belt    Mobility Belt Used During Session no - patient refused        Sit/Stand Transfer    Surface edge of bed     [place] dependent (less than 25% patient effort);2 person assist     Safety/Cues increased time to complete;verbal cues;technique;hand placement     Assistive Device none     Transfer Comments HHAx2 and TotalAx2 for lift assist into stance. Does fairly well to maintain stance however when asked to initiated side steps towards HOB patient with B knee buckling needing significant assist to prevent fall and was lowered down to sitting. Poor correction and self monitoring        Gait Training    [place], Gait not tested     Comment Not safe to progress at this time        Balance    Static Sitting Balance mild impairment;sitting, edge of bed     Dynamic Sitting Balance moderate impairment;sitting, edge of bed     Sit to Stand Dynamic Balance severe impairment;supported     Static Standing Balance severe impairment;supported     Dynamic Standing Balance not tested;unable to perform activity     Comment, Balance Min-Mod to correct lateral swaying sitting EOB. DEPx2 in  stance        Impairments/Safety Issues    Impairments Affecting Function balance;strength;cognition;coordination;endurance/activity tolerance;postural/trunk control     Cognitive Impairments, Safety/Performance problem-solving/reasoning;safety precaution awareness;awareness, need for assistance;insight into deficits/self-awareness;judgment;sequencing abilities     Functional Endurance Fair (-), self limiting     Safety Issues Affecting Function insight into deficits/self-awareness;problem-solving;sequencing abilities;safety precaution awareness;judgment;awareness of need for assistance                                              Session Outcome  Patient reclined, in bed at end of session, bed alarm on, all needs met, call light in reach, personal items in reach. Nursing notified about patient's response to therapy/activity, patient's position, and patient's performance.    AM-PAC - Mobility (Current Function)     Turning form your back to your side while in flat bed without using bedrails 2 - A Lot   Moving from lying on your back to sitting on the side of a flat bed without using bedrails 1 - Total   Moving to and from a bed to a chair 1 - Total   Standing up from a chair using your arms 1 - Total   To walk in a hospital room 1 - Total   Climbing 3-5 steps with a railing 1 - Total   AM-PAC Mobility Score 7      ASSESSMENT AND PLAN     Progress Summary  Patient seen for PT evaluation this PM. Completes supine <> sit with TotalAx2 for LE and trunk management. Appears to have ataxic like movement with BLE and BUE. Min-ModA to maintain static sitting balance EOB. Performs STS with Total Ax2 using BHHA. Once upright does fairly well maintaining balance however with attempt to side step demonstrates B knee buckling and poor correction needing assist to correct. Further mobility deferred for safety. Patient with noted self limiting tendencies along with deficits in strength, balance, and endurance. Continue skilled PT  "to maximize IND. Recommend d/c to SNF once medically able. [facility] 7    Patient/Family Therapy Goals Statement: \"go to rehab, skilled nursing home\"    PT Plan      Flowsheet Row Most Recent Value   Rehab Potential fair, will monitor progress closely at 02/11/2025 1353   Therapy Frequency 3 times/wk at 02/11/2025 1353   Planned Therapy Interventions balance training, bed mobility training, gait training, patient/family education, strengthening, transfer training at 02/12/2025 1358            PT Discharge Recommendations      Flowsheet Row Most Recent Value   PT Recommended Discharge Disposition skilled nursing facility at 02/12/2025 1358   Anticipated Equipment Needs if Discharged Home (PT) none at 02/11/2025 1353                 PT Goals      Flowsheet Row Most Recent Value   Bed Mobility Goal 1    Activity/Assistive Device sit to supine/supine to sit at 02/04/2025 1402   [address] modified independence at 02/04/2025 1402   Time Frame by discharge at 02/04/2025 1402   Progress/Outcome goal ongoing at 02/04/2025 1402   Transfer Goal 1    Activity/Assistive Device sit-to-stand/stand-to-sit, bed-to-chair/chair-to-bed, walker, front-wheeled at 02/04/2025 1402   [address] supervision required at 02/04/2025 1402   Time Frame by discharge at 02/04/2025 1402   Progress/Outcome goal ongoing at 02/04/2025 1402   Gait Training Goal 1    Activity/Assistive Device gait (walking locomotion), assistive device use, walker, front-wheeled at 02/04/2025 1402   [address] supervision required at 02/04/2025 1402   Distance 25 ft at 02/04/2025 1402   Time Frame by discharge at 02/04/2025 1402   Progress/Outcome goal ongoing at 02/04/2025 1402          "

## 2025-02-12 NOTE — PROGRESS NOTES
Occupational Therapy -  Daily Treatment/Progress Note     Patient: Felipa Cuadra  Location: [address] [address]  MRN: [id number]  Today's date: 2/12/2025    HISTORY OF PRESENT ILLNESS     Felipa is a 44 y.o. female admitted on 2/1/2025 with UTI (urinary tract infection) [N39.0]  Lower abdominal pain [R10.30]  Urinary tract infection with hematuria, site unspecified [N39.0, R31.9]. Principal problem is UTI (urinary tract infection).    Past Medical History  Felipa has a past medical history of Anxiety, Depression, and Eating disorder.    History of Present Illness    Patient was seen in the ED 1 week ago for evaluation of abdominal pain and generalized weakness.  Workup revealed acute cystitis.  She was discharged on a course of Macrobid, which patient reports she has been taking.  She presents today for evaluation of persistent abdominal pain.  Patient reports suprapubic pain that radiates to her back.  She reports it is constant.  She reports she is weak and unable to ambulate.  She reports increased frequency of urination.  She reports chills.  She reports nausea without vomiting.  She reports she has not had solid food intake since Christmas.  She reports she has been drinking a lot of Gatorade.  She reports she is currently having black stool and vaginal bleeding x 3 days.  She reports she rarely gets her period.  She has not noticed any blood clots.  She reports she is feeling more depressed lately.  She denies suicidal ideation.  She reports she smokes 4 cigarettes/day.  She reports she quit alcohol use 4 years ago.  She denies fever, syncope, vomiting, chest pain, shortness of breath, constipation, diarrhea.   PRIOR LEVEL OF FUNCTION AND LIVING ENVIRONMENT     Prior Level of Function      Flowsheet Row Most Recent Value   Dominant Hand right   Ambulation assistive equipment   Transferring assistive equipment   Toileting independent   Bathing assistive equipment and person   Dressing assistive  person   Eating independent   IADLs independent   Prior Level of Function Comment Reports assist from HHAs 6 days/week with bathing + dressing. Uses a walker at home   Assistive Device Currently Used at Home walker, standard, grab bar, shower chair             Prior Living Environment      Flowsheet Row Most Recent Value   People in Home alone   Current Living Arrangements home   Home Accessibility elevator accessible   Living Environment Comment Lives alone in apartment with elevator access. Tub shower with SC + GBs.          Occupational Profile      Flowsheet Row Most Recent Value   Successful Occupations Diminished ADL status s/p UTI   Performance Patterns Likes to read, likes to write fanfiction   Environmental Supports and Barriers HHAs available to assist 6 days/week          VITALS AND PAIN     OT Vitals      Date/Time Pulse HR Source BP BP Location BP Method Pt Position [facility]   02/12/25 1358 100 Monitor 92/59 Left upper arm Automatic Sitting [facility]          OT Pain      Date/Time Pain Type Side/Orientation Location Rating: Rest Rating: Activity Interventions [facility]   02/12/25 1358 Pain Assessment back lower 8 8 position adjusted [facility]             Objective   OBJECTIVE     Start time:  1357  End time:  1412  Session Length: 15 min  Mode of Treatment: co-treatment  Reason for co-treatment: Priorirty for auth, skilled Ax2, OT assessing ADLs, decreased activity tolerance.    General Observations  Patient received supine, in bed. She was agreeable to therapy, no issues or concerns identified by nurse prior to session.      Precautions: fall       Limitations/Impairments: safety/cognitive      OT Eval and Treat - 02/12/25 1357          Cognition    Orientation Status oriented x 3     Affect/Mental Status flat/blunted affect     Behavioral Issues overwhelmed easily     Follows Commands follows one-step commands;75-90% accuracy;delayed response/completion;verbal cues/prompting required;repetition of directions  required;increased processing time needed;physical/tactile prompts required     Cognitive Function safety deficit;executive function deficit;attention deficit     Attention Deficit focused/sustained attention     Executive Function Deficit moderate deficit;information processing;insight/awareness of deficits;judgment;problem-solving/reasoning;self-monitoring/self-correction     Safety Deficit moderate deficit;judgment;problem-solving;safety precautions awareness;awareness of need for assistance;insight into deficits/self-awareness;at risk behavior observed     Comment, Cognition Cooperative. Easily overwhelmed and anxious with movement. Increased time for processing. Self limiting throughout.        Bed Mobility    Bed Mobility Activities supine to sit;sit to supine     [place] dependent (less than 25% patient effort);2 person assist     Safety/Cues increased time to complete;moderate;verbal cues;hand placement;technique;sequencing     Assistive Device bed rails;head of bed elevated     Comment Depx2 due to self limiting behaviors.        Mobility Belt    Mobility Belt Used During Session no - patient refused        Sit/Stand Transfer    Surface edge of bed     [place] dependent (less than 25% patient effort);2 person assist     Safety/Cues increased time to complete;moderate;verbal cues;hand placement;technique     Assistive Device none     Transfer Comments Ax2 fromEOB for lift. Knees nuckling when asked to take step. DEPx2 for lowering to EOB. No initiation to assist with correcting.        Upper Body Dressing    Tasks don;doff;hospital gown     [place] moderate assist (50-74% patient effort)     Safety/Cues increased time to complete     Position edge of bed sitting     Adaptive Equipment none     Comment Assist for balance sitting EOB and for managing gown.        Lower Body Dressing    Tasks don;doff;socks     [place] dependent (less than 25% patient effort)     Position supine     Adaptive  Equipment none        Toileting    [place] dependent (less than 25% patient effort)     Position supine     Adaptive Equipment bedpan     Comment Using bed pan at start of session. DEP for toileting needs        Balance    Static Sitting Balance mild impairment;sitting, edge of bed     Dynamic Sitting Balance moderate impairment;sitting, edge of bed     Sit to Stand Dynamic Balance severe impairment;supported     Static Standing Balance severe impairment;supported     Dynamic Standing Balance unable to perform activity     Comment, Balance Sitting EOB with min-mod A. DEPx2 for standing EOB. Ataxic jerky movements noted.        Impairments/Safety Issues    Impairments Affecting Function balance;strength;cognition;coordination;endurance/activity tolerance;postural/trunk control     Cognitive Impairments, Safety/Performance problem-solving/reasoning;safety precaution awareness;awareness, need for assistance;insight into deficits/self-awareness;judgment;sequencing abilities     Functional Endurance Fair(-)     Safety Issues Affecting Function insight into deficits/self-awareness;problem-solving;sequencing abilities;safety precaution awareness;judgment;awareness of need for assistance                                              Education Documentation  Rehabilitation Therapy, taught by Karena Dasilva OT at 2/12/2025  2:32 PM.  Learner: Patient  Readiness: Acceptance  Method: Explanation  Response: Verbalizes Understanding, Needs Reinforcement  Comment: Educated on OT role, safe ADL techniques, safe transfer techniques, and use of call bell for fall prevention.    Home Safety, taught by Karena Dasilva OT at 2/12/2025  2:32 PM.  Learner: Patient  Readiness: Acceptance  Method: Explanation  Response: Verbalizes Understanding, Needs Reinforcement  Comment: Educated on OT role, safe ADL techniques, safe transfer techniques, and use of call bell for fall prevention.    Self-Care, taught by Karena Dasilva  Deborah OT at 2/12/2025  2:32 PM.  Learner: Patient  Readiness: Acceptance  Method: Explanation  Response: Verbalizes Understanding, Needs Reinforcement  Comment: Educated on OT role, safe ADL techniques, safe transfer techniques, and use of call bell for fall prevention.    Fall Prevention, taught by Karena Dasilva OT at 2/12/2025  2:32 PM.  Learner: Patient  Readiness: Acceptance  Method: Explanation  Response: Verbalizes Understanding, Needs Reinforcement  Comment: Educated on OT role, safe ADL techniques, safe transfer techniques, and use of call bell for fall prevention.    Call Light Use, taught by Karena Dasilva OT at 2/12/2025  2:32 PM.  Learner: Patient  Readiness: Acceptance  Method: Explanation  Response: Verbalizes Understanding, Needs Reinforcement  Comment: Educated on OT role, safe ADL techniques, safe transfer techniques, and use of call bell for fall prevention.        Session Outcome  Patient reclined, in bed at end of session, bed alarm on, all needs met, call light in reach, personal items in reach. Nursing notified about patient's performance, patient's position, and patient's response to therapy/activity.    AM-PAC - ADL (Current Function)     Putting on/taking off regular lower body clothing 1 - Total   Bathing 1 - Total   Toileting 1 - Total   Putting on/taking off regular upper body clothing 2 - A Lot   Help for taking care of personal grooming 2 - A Lot   Eating meals 2 - A Lot   AM-PAC ADL Score 9      ASSESSMENT AND PLAN     Progress Summary  OT tx completed. [facility] 9. DEP x2 for bed mobility and STS at EOB. DEP for toileting, DEP for LB dressing, and MOD A for UB dressing. Limited by impaired balance, coordination, strength, and cognition. Would benefit from continued OT to maximize IND. Recommend d/c to SNF.    Patient/Family Therapy Goal Statement: To go to therapy    OT Plan      Flowsheet Row Most Recent Value   Rehab Potential fair, will monitor progress closely at  02/11/2025 1352   Therapy Frequency 3 times/wk at 02/11/2025 1352   Planned Therapy Interventions activity tolerance training, functional balance retraining, transfer/mobility retraining, occupation/activity based interventions, BADL retraining at 02/11/2025 1352            OT Discharge Recommendations      Flowsheet Row Most Recent Value   OT Recommended Discharge Disposition skilled nursing facility at 02/12/2025 1357   Anticipated Equipment Needs if Discharged Home (OT) commode, 3-in-1, dressing equipment at 02/11/2025 1352                 OT Goals      Flowsheet Row Most Recent Value   Bed Mobility Goal 1    Activity/Assistive Device sit to supine, supine to sit, rolling to left, rolling to right at 02/04/2025 1403   [place] minimum assist (75% or more patient effort) at 02/04/2025 1403   Time Frame by discharge at 02/04/2025 1403   Progress/Outcome goal ongoing at 02/04/2025 1403   Transfer Goal 1    Activity/Assistive Device commode, 3-in-1, toilet at 02/04/2025 1403   [place] moderate assist (50-74% patient effort) at 02/04/2025 1403   Time Frame by discharge at 02/04/2025 1403   Progress/Outcome goal ongoing at 02/04/2025 1403   Dressing Goal 1    Activity/Adaptive Equipment lower body dressing at 02/04/2025 1403   [place] minimum assist (75% or more patient effort) at 02/04/2025 1403   Time Frame by discharge at 02/04/2025 1403   Progress/Outcome goal ongoing at 02/04/2025 1403   Toileting Goal 1    Activity/Assistive Device adjust/manage clothing, perform perineal hygiene at 02/04/2025 1403   [place] minimum assist (75% or more patient effort) at 02/04/2025 1403   Time Frame by discharge at 02/04/2025 1403   Progress/Outcome goal ongoing at 02/04/2025 1403

## 2025-02-12 NOTE — PLAN OF CARE
Care Coordination Discharge Plan Note     Discharge Needs Assessment  Concerns to be Addressed: care coordination/care conferences, discharge planning  Current Discharge Risk:      Anticipated Discharge Plan  Anticipated Discharge Disposition: skilled nursing facility  Type of Skilled Nursing Care Services: PT, OT, nursing      Patient Choice  Offered/Gave Vendor List: yes  Patient and/or patient guardian/advocate was made aware of their right to choose a provider. A list of eligible providers was presented and reviewed with the patient and/or patient guardian/advocate in written and/or verbal form. The list delineates providers in the patients desired geographic area who are participating in the Medicare program and/or providers contracted with the patients primary insurance. The Medicare list and quality ratings were obtained from the Medicare.gov [medicare.gov] website.    ---------------------------------------------------------------------------------------------------------------------    Interdisciplinary Discharge Plan Review:  Participants:     Concerns Comments: CCRN updated by Lou from [facility] that pt insurance is requesting to see if pt can participate more in therapy. CCRN updated pt and requested therapy to see pt. CCRN will continue to follow.    Addendum 1459: PT/OT notes are in, CCRN updated Lou at [facility]. [facility] [place] [facility] and RN.    Discharge Plan:   Disposition/Destination:   /    Discharge Facility:   Destination - Admitted Since 2/1/2025       Service Provider Services Address Phone Fax Patient Preferred Last Updated    [facility] [address] [address] [phone] -- Venita Alvarez, RN 2/11/2025 [id number]          Community Resources:      Discharge Transportation:  Is Out of Hospital DNR needed at Discharge: no  Does patient need discharge transport?

## 2025-02-13 ENCOUNTER — APPOINTMENT (INPATIENT)
Dept: CARDIOLOGY | Facility: HOSPITAL | Age: 45
DRG: 690 | End: 2025-02-13
Attending: INTERNAL MEDICINE
Payer: COMMERCIAL

## 2025-02-13 LAB
ANION GAP SERPL CALC-SCNC: 6 MEQ/L (ref 3–15)
AORTIC ROOT ANNULUS: 2.6 CM
ASCENDING AORTA: 2.9 CM
AV REG PEAK VEL: 3.92 M/S
AV REGURGITATION PRESSURE HALF TIME: 412 MS
BSA FOR ECHO PROCEDURE: 1.88 M2
BUN SERPL-MCNC: 4 MG/DL (ref 7–25)
CALCIUM SERPL-MCNC: 8 MG/DL (ref 8.6–10.3)
CHLORIDE SERPL-SCNC: 108 MEQ/L (ref 98–107)
CO2 SERPL-SCNC: 29 MEQ/L (ref 21–31)
CORTIS SERPL-MCNC: 18.6 UG/DL
CORTIS SERPL-MCNC: 19.6 UG/DL
CORTIS SERPL-MCNC: 2.3 UG/DL
CREAT SERPL-MCNC: 0.4 MG/DL (ref 0.6–1.2)
E WAVE DECELERATION TIME: 204 MS
E/A RATIO: 1.4
E/E' RATIO: 8.3
E/LAT E' RATIO: 7
EDV (BP): 122 CM3
EF (A4C): 66.4 %
EF A2C: 72.6 %
EGFRCR SERPLBLD CKD-EPI 2021: >60 ML/MIN/1.73M*2
EJECTION FRACTION: 69.9 %
ERYTHROCYTE [DISTWIDTH] IN BLOOD BY AUTOMATED COUNT: 15.7 % (ref 11.7–14.4)
EST RIGHT VENT SYSTOLIC PRESSURE BY TRICUSPID REGURGITATION JET: 33 MMHG
ESV (BP): 32.8 CM3
FRACTIONAL SHORTENING: 37.5 %
GLUCOSE SERPL-MCNC: 92 MG/DL (ref 70–99)
HCT VFR BLD AUTO: 29.7 % (ref 35–45)
HGB BLD-MCNC: 9.3 G/DL (ref 11.8–15.7)
INTERVENTRICULAR SEPTUM: 0.67 CM
LA ESV (BP): 46.4 CM3
LA ESV INDEX (A2C): 25.64 CM3/M2
LA ESV INDEX (BP): 24.68 CM3/M2
LA/AORTA RATIO: 0.92
LAAS-AP2: 16.7 CM2
LAAS-AP4: 16.8 CM2
LACTATE SERPL-SCNC: 1.1 MMOL/L (ref 0.4–2)
LAD 2D: 2.4 CM
LALD A4C: 4.75 CM
LALD A4C: 5.39 CM
LAV-S: 48.2 CM3
LEFT ATRIUM VOLUME INDEX: 21.12 CM3/M2
LEFT ATRIUM VOLUME: 39.7 CM3
LEFT INTERNAL DIMENSION IN SYSTOLE: 2.9 CM (ref 2.67–4.04)
LEFT VENTRICLE DIASTOLIC VOLUME INDEX: 50.48 CM3/M2
LEFT VENTRICLE DIASTOLIC VOLUME: 94.9 CM3
LEFT VENTRICLE SYSTOLIC VOLUME INDEX: 16.97 CM3/M2
LEFT VENTRICLE SYSTOLIC VOLUME: 31.9 CM3
LEFT VENTRICULAR INTERNAL DIMENSION IN DIASTOLE: 4.64 CM (ref 4.53–6.28)
LEFT VENTRICULAR POSTERIOR WALL IN END DIASTOLE: 0.72 CM (ref 0.59–1.1)
LV ESV (APICAL 2 CHAMBER): 33.4 CM3
LVAD-AP2: 33.2 CM2
LVAD-AP4: 29.1 CM2
LVAS-AP2: 15.1 CM2
LVAS-AP4: 15.2 CM2
LVEDVI(BP): 64.89 CM3/M2
LVESVI(A2C): 17.77 CM3/M2
LVESVI(BP): 17.45 CM3/M2
LVLD-AP2: 7.54 CM
LVLD-AP4: 7.37 CM
LVLS-AP2: 6.01 CM
LVLS-AP4: 6.17 CM
MCH RBC QN AUTO: 34.2 PG (ref 28–33.2)
MCHC RBC AUTO-ENTMCNC: 31.3 G/DL (ref 32.2–35.5)
MCV RBC AUTO: 109.2 FL (ref 83–98)
MV E'TISSUE VEL-LAT: 0.14 M/S
MV E'TISSUE VEL-MED: 0.12 M/S
MV PEAK A VEL: 0.73 M/S
MV PEAK E VEL: 0.99 M/S
MV STENOSIS PRESSURE HALF TIME: 60 MS
MV VALVE AREA P 1/2 METHOD: 3.67 CM2
PISA AR MAX VEL: 3.89 M/S
PISA AR MAX VEL: 3.94 M/S
PLATELET # BLD AUTO: 203 K/UL (ref 150–369)
PMV BLD AUTO: 11.6 FL (ref 9.4–12.3)
POSTERIOR WALL: 0.72 CM
POTASSIUM SERPL-SCNC: 4.4 MEQ/L (ref 3.5–5.1)
RAP: 5 MMHG
RBC # BLD AUTO: 2.72 M/UL (ref 3.93–5.22)
SEPTAL TISSUE DOPPLER FREE WALL LATE DIA VELOCITY (APICAL 4 CHAMBER VIEW): 0.12 M/S
SODIUM SERPL-SCNC: 143 MEQ/L (ref 136–145)
TAPSE: 2.93 CM
TR MAX PG: 28.3 MMHG
TRICUSPID VALVE PEAK REGURGITATION VELOCITY: 2.66 M/S
WBC # BLD AUTO: 4.37 K/UL (ref 3.8–10.5)
Z-SCORE OF LEFT VENTRICULAR DIMENSION IN END DIASTOLE: -1.39
Z-SCORE OF LEFT VENTRICULAR DIMENSION IN END SYSTOLE: -0.99
Z-SCORE OF LEFT VENTRICULAR POSTERIOR WALL IN END DIASTOLE: -0.59

## 2025-02-13 PROCEDURE — 83605 ASSAY OF LACTIC ACID: CPT | Performed by: STUDENT IN AN ORGANIZED HEALTH CARE EDUCATION/TRAINING PROGRAM

## 2025-02-13 PROCEDURE — 82533 TOTAL CORTISOL: CPT | Performed by: NURSE PRACTITIONER

## 2025-02-13 PROCEDURE — 63700000 HC SELF-ADMINISTRABLE DRUG: Performed by: INTERNAL MEDICINE

## 2025-02-13 PROCEDURE — 36415 COLL VENOUS BLD VENIPUNCTURE: CPT | Performed by: STUDENT IN AN ORGANIZED HEALTH CARE EDUCATION/TRAINING PROGRAM

## 2025-02-13 PROCEDURE — 63700000 HC SELF-ADMINISTRABLE DRUG: Performed by: NURSE PRACTITIONER

## 2025-02-13 PROCEDURE — 63700000 HC SELF-ADMINISTRABLE DRUG: Performed by: STUDENT IN AN ORGANIZED HEALTH CARE EDUCATION/TRAINING PROGRAM

## 2025-02-13 PROCEDURE — 25000000 HC PHARMACY GENERAL: Performed by: STUDENT IN AN ORGANIZED HEALTH CARE EDUCATION/TRAINING PROGRAM

## 2025-02-13 PROCEDURE — 63600000 HC DRUGS/DETAIL CODE: Mod: JZ | Performed by: NURSE PRACTITIONER

## 2025-02-13 PROCEDURE — 63700000 HC SELF-ADMINISTRABLE DRUG: Performed by: PHYSICIAN ASSISTANT

## 2025-02-13 PROCEDURE — 25000000 HC PHARMACY GENERAL: Performed by: INTERNAL MEDICINE

## 2025-02-13 PROCEDURE — 82533 TOTAL CORTISOL: CPT | Performed by: STUDENT IN AN ORGANIZED HEALTH CARE EDUCATION/TRAINING PROGRAM

## 2025-02-13 PROCEDURE — 25500000 HC DRUGS/INCIDENT RAD: Mod: JZ | Performed by: INTERNAL MEDICINE

## 2025-02-13 PROCEDURE — 80048 BASIC METABOLIC PNL TOTAL CA: CPT | Performed by: NURSE PRACTITIONER

## 2025-02-13 PROCEDURE — 85027 COMPLETE CBC AUTOMATED: CPT | Performed by: NURSE PRACTITIONER

## 2025-02-13 PROCEDURE — 20000000 HC ROOM AND CARE ICU

## 2025-02-13 PROCEDURE — 87040 BLOOD CULTURE FOR BACTERIA: CPT | Performed by: STUDENT IN AN ORGANIZED HEALTH CARE EDUCATION/TRAINING PROGRAM

## 2025-02-13 PROCEDURE — 63600000 HC DRUGS/DETAIL CODE: Mod: JZ | Performed by: STUDENT IN AN ORGANIZED HEALTH CARE EDUCATION/TRAINING PROGRAM

## 2025-02-13 PROCEDURE — C8929 TTE W OR WO FOL WCON,DOPPLER: HCPCS

## 2025-02-13 RX ORDER — COSYNTROPIN 0.25 MG/ML
0.25 INJECTION, POWDER, FOR SOLUTION INTRAMUSCULAR; INTRAVENOUS ONCE
Status: COMPLETED | OUTPATIENT
Start: 2025-02-13 | End: 2025-02-13

## 2025-02-13 RX ORDER — ACETAMINOPHEN 325 MG/1
650 TABLET ORAL EVERY 4 HOURS PRN
Status: DISCONTINUED | OUTPATIENT
Start: 2025-02-13 | End: 2025-02-18 | Stop reason: HOSPADM

## 2025-02-13 RX ORDER — NOREPINEPHRINE BITARTRATE 0.02 MG/ML
0-90 INJECTION, SOLUTION INTRAVENOUS
Status: DISCONTINUED | OUTPATIENT
Start: 2025-02-13 | End: 2025-02-14

## 2025-02-13 RX ORDER — MIDODRINE HYDROCHLORIDE 5 MG/1
5 TABLET ORAL ONCE
Status: COMPLETED | OUTPATIENT
Start: 2025-02-13 | End: 2025-02-13

## 2025-02-13 RX ADMIN — MIDODRINE HYDROCHLORIDE 5 MG: 5 TABLET ORAL at 00:32

## 2025-02-13 RX ADMIN — TOPIRAMATE 50 MG: 100 TABLET, FILM COATED ORAL at 08:48

## 2025-02-13 RX ADMIN — OXYCODONE HYDROCHLORIDE 5 MG: 5 TABLET ORAL at 04:43

## 2025-02-13 RX ADMIN — SODIUM CHLORIDE: 9 INJECTION INTRAMUSCULAR; INTRAVENOUS; SUBCUTANEOUS at 12:29

## 2025-02-13 RX ADMIN — MIDODRINE HYDROCHLORIDE 10 MG: 5 TABLET ORAL at 08:48

## 2025-02-13 RX ADMIN — Medication 400 MG: at 20:49

## 2025-02-13 RX ADMIN — PANTOPRAZOLE SODIUM 20 MG: 20 TABLET, DELAYED RELEASE ORAL at 08:47

## 2025-02-13 RX ADMIN — MIDODRINE HYDROCHLORIDE 10 MG: 5 TABLET ORAL at 11:39

## 2025-02-13 RX ADMIN — COSYNTROPIN 0.25 MG: 0.25 INJECTION, POWDER, LYOPHILIZED, FOR SOLUTION INTRAVENOUS at 10:26

## 2025-02-13 RX ADMIN — Medication 1 TABLET: at 08:47

## 2025-02-13 RX ADMIN — NOREPINEPHRINE BITARTRATE 10 MCG/MIN: 0.02 INJECTION, SOLUTION INTRAVENOUS at 16:14

## 2025-02-13 RX ADMIN — POTASSIUM CHLORIDE 20 MEQ: 1500 TABLET, EXTENDED RELEASE ORAL at 08:47

## 2025-02-13 RX ADMIN — FLUOXETINE HYDROCHLORIDE 40 MG: 20 CAPSULE ORAL at 08:48

## 2025-02-13 RX ADMIN — ACETAMINOPHEN 650 MG: 325 TABLET, FILM COATED ORAL at 16:19

## 2025-02-13 RX ADMIN — PANTOPRAZOLE SODIUM 20 MG: 20 TABLET, DELAYED RELEASE ORAL at 20:49

## 2025-02-13 RX ADMIN — ACETAMINOPHEN 650 MG: 325 TABLET, FILM COATED ORAL at 10:30

## 2025-02-13 RX ADMIN — Medication 400 MG: at 08:48

## 2025-02-13 RX ADMIN — MIDODRINE HYDROCHLORIDE 10 MG: 5 TABLET ORAL at 16:15

## 2025-02-13 RX ADMIN — NOREPINEPHRINE BITARTRATE 10 MCG/MIN: 0.02 INJECTION, SOLUTION INTRAVENOUS at 10:02

## 2025-02-13 RX ADMIN — SODIUM CHLORIDE, POTASSIUM CHLORIDE, SODIUM LACTATE AND CALCIUM CHLORIDE 1000 ML: 600; 310; 30; 20 INJECTION, SOLUTION INTRAVENOUS at 02:04

## 2025-02-13 RX ADMIN — CYANOCOBALAMIN TAB 1000 MCG 1000 MCG: 1000 TAB at 08:47

## 2025-02-13 RX ADMIN — TOPIRAMATE 50 MG: 100 TABLET, FILM COATED ORAL at 16:15

## 2025-02-13 RX ADMIN — POTASSIUM CHLORIDE 20 MEQ: 1500 TABLET, EXTENDED RELEASE ORAL at 20:49

## 2025-02-13 RX ADMIN — LEVOTHYROXINE SODIUM 50 MCG: 0.05 TABLET ORAL at 04:43

## 2025-02-13 RX ADMIN — SODIUM CHLORIDE, POTASSIUM CHLORIDE, SODIUM LACTATE AND CALCIUM CHLORIDE 1000 ML: 600; 310; 30; 20 INJECTION, SOLUTION INTRAVENOUS at 00:24

## 2025-02-13 RX ADMIN — ATORVASTATIN CALCIUM 20 MG: 20 TABLET, FILM COATED ORAL at 08:47

## 2025-02-13 RX ADMIN — NOREPINEPHRINE BITARTRATE 2 MCG/MIN: 0.02 INJECTION, SOLUTION INTRAVENOUS at 01:41

## 2025-02-13 ASSESSMENT — COGNITIVE AND FUNCTIONAL STATUS - GENERAL
MOVING TO AND FROM BED TO CHAIR: 2 - A LOT
WALKING IN HOSPITAL ROOM: 1 - TOTAL
CLIMB 3 TO 5 STEPS WITH RAILING: 1 - TOTAL
CLIMB 3 TO 5 STEPS WITH RAILING: 1 - TOTAL
WALKING IN HOSPITAL ROOM: 1 - TOTAL
STANDING UP FROM CHAIR USING ARMS: 2 - A LOT
MOVING TO AND FROM BED TO CHAIR: 2 - A LOT
STANDING UP FROM CHAIR USING ARMS: 2 - A LOT

## 2025-02-13 NOTE — PLAN OF CARE
Care Coordination Discharge Plan Note     Discharge Needs Assessment  Concerns to be Addressed: care coordination/care conferences, discharge planning  Current Discharge Risk: lives alone, physical impairment    Anticipated Discharge Plan  Anticipated Discharge Disposition: skilled nursing facility  Type of Skilled Nursing Care Services: PT, OT, nursing      Patient Choice  Offered/Gave Vendor List: yes  Patient and/or patient guardian/advocate was made aware of their right to choose a provider. A list of eligible providers was presented and reviewed with the patient and/or patient guardian/advocate in written and/or verbal form. The list delineates providers in the patients desired geographic area who are participating in the Medicare program and/or providers contracted with the patients primary insurance. The Medicare list and quality ratings were obtained from the Medicare.gov [medicare.gov] website.    ---------------------------------------------------------------------------------------------------------------------    Interdisciplinary Discharge Plan Review:  Participants:advanced practice provider, nursing, physical therapy, social work/services    Concerns Comments: chart reviewed & sw updated by case management on status. patient was transferred to [facility] . plan was for skilled nurisng but paitent needed to show progress in therpay. sw secured chat for new pt/ot evals to be placed. plan was for skilled nursing when stable.    Discharge Plan:   Disposition/Destination:   /    Discharge Facility:   Destination - Admitted Since 2/1/2025       Service Provider Services Address Phone Fax Patient Preferred Last Updated    [facility] [address] [address] [phone] -- Venita Alvarez, RN 2/11/2025 0480          Community Resources:      Discharge Transportation:  Is Out of Hospital DNR needed at Discharge: no  Does patient  need discharge transport?

## 2025-02-13 NOTE — CONSULTS
Pulmonology and Critical Care Consult Note    HISTORY OF PRESENT ILLNESS      Felipa Cuadra is a 44 y.o. female with a history of chronic hypotension for which she takes midodrine as needed for when her systolic blood pressure is less than 90 mmHg    She presented with cystitis    Urine culture revealed Klebsiella pneumonia and Proteus mirabilis.  She was treated with a course of ceftriaxone    CAT scan imaging January 27 revealed no genitourinary obstruction    Overnight she developed hypotension requiring Levophed    This morning she has really no complaints she is on 10 mcg of Levophed      Past Medical History:   Diagnosis Date    Anxiety     Depression     Eating disorder      No past surgical history on file.  Social History     Socioeconomic History    Marital status: Single     Spouse name: None    Number of children: None    Years of education: None    Highest education level: None   Tobacco Use    Smoking status: Every Day     Current packs/day: 0.25     Average packs/day: 0.3 packs/day for 20.5 years (5.1 ttl pk-yrs)     Types: Cigarettes     Start date: 8/16/2004    Smokeless tobacco: Current   Substance and Sexual Activity    Alcohol use: Not Currently    Drug use: Yes     Types: Marijuana     Social Drivers of Health     Food Insecurity: No Food Insecurity (2/1/2025)    Hunger Vital Sign     Worried About Running Out of Food in the Last Year: Never true     Ran Out of Food in the Last Year: Never true   Transportation Needs: No Transportation Needs (2/3/2025)    PRAPARE - Transportation     Lack of Transportation (Medical): No     Lack of Transportation (Non-Medical): No   Housing Stability: Unknown (2/3/2025)    Housing Stability Vital Sign     Unable to Pay for Housing in the Last Year: No     Homeless in the Last Year: No     No family history on file.    Allergies:   Aspartame, Bee venom protein (honey bee), Buspirone, Diphenhydramine, Haloperidol, Iodine, Penicillins, Sertraline,  Diclofenac sodium, Hydroxyzine, Shellfish containing products, Sudafed cold-allergy, and Tetracyclines     atorvastatin  20 mg oral q AM    B-complex with vitamin C  1 tablet oral Daily    cosyntropin  0.25 mg intravenous Once    cyanocobalamin  1,000 mcg oral Daily    FLUoxetine  40 mg oral Daily    [Provider Managed Hold] gabapentin  400 mg oral QID    levothyroxine  50 mcg oral Daily (6:30a)    LORazepam  0.25 mg oral BID    magnesium oxide  400 mg oral BID    midodrine  10 mg oral TID AC    nystatin   Topical BID    pantoprazole  20 mg oral BID    potassium  20 mEq oral BID    [Provider Managed Hold] prazosin  5 mg oral Nightly    topiramate  50 mg oral BID    traZODone  100 mg oral Nightly        Medication List        START taking these medications      magnesium oxide 400 mg (241.3 mg magnesium) tablet  Commonly known as: MAG-OX  Take 0.5 tablets (200 mg total) by mouth daily.  Dose: 200 mg     naloxone 4 mg/actuation spray,non-aerosol nasal spray  Commonly known as: NARCAN  Administer 1 spray (4 mg total) into affected nostril(s) once as needed for opioid reversal for up to 1 dose.  Dose: 1 spray     oxyCODONE 5 mg immediate release tablet  Commonly known as: ROXICODONE  Take 1 tablet (5 mg total) by mouth every 6 (six) hours as needed for pain for up to 5 days.  Dose: 5 mg     potassium chloride 20 mEq CR tablet  Commonly known as: KLOR-CON M  Take 1 tablet (20 mEq total) by mouth daily for 5 days.  Dose: 20 mEq     traMADoL 50 mg tablet  Commonly known as: ULTRAM  Take 2 tablets (100 mg total) by mouth every 6 (six) hours as needed for pain for up to 5 days.  Dose: 100 mg            CONTINUE taking these medications      atorvastatin 20 mg tablet  Commonly known as: LIPITOR  Take 20 mg by mouth every morning.  Dose: 20 mg     FLUoxetine 40 mg capsule  Commonly known as: PROzac  Take 40 mg by mouth daily.  Dose: 40 mg     gabapentin 400 mg capsule  Commonly known as: NEURONTIN  Take 400 mg by mouth 4 (four)  times a day.  Dose: 400 mg     levothyroxine 50 mcg tablet  Commonly known as: SYNTHROID  Take 50 mcg by mouth daily.  Dose: 50 mcg     LORazepam 0.5 mg tablet  Commonly known as: ATIVAN  Take 0.25 mg by mouth daily and an additional dose as needed (anxiety).  Dose: 0.25 mg     midodrine 10 mg tablet  Commonly known as: PROAMATINE  Take 1 tablet (10 mg total) by mouth 3 (three) times a day as needed (for systolic blood pressure less than 90).  Dose: 10 mg     NURTEC ODT 75 mg tablet,disintegrating  Take 75 mg by mouth as needed (migraines).  Dose: 75 mg  Generic drug: rimegepant     omeprazole 20 mg capsule  Commonly known as: PriLOSEC  Take 20 mg by mouth daily before breakfast. For heartburn  Dose: 20 mg     prazosin 5 mg capsule  Commonly known as: MINIPRESS  Take 5 mg by mouth nightly.  Dose: 5 mg     topiramate 50 mg tablet  Commonly known as: TOPAMAX  Take 50 mg by mouth 2 (two) times a day. AM and 2pm  Dose: 50 mg     traZODone 100 mg tablet  Commonly known as: DESYREL  Take 100 mg by mouth nightly.  Dose: 100 mg     zolpidem 10 mg tablet  Commonly known as: AMBIEN  Take 10 mg by mouth nightly.  Dose: 10 mg            STOP taking these medications      fludrocortisone 0.1 mg tablet  Commonly known as: FLORINEF     nitrofurantoin (macrocrystal-monohydrate) 100 mg capsule  Commonly known as: MACROBID              Review of Systems:  All other systems reviewed and negative except as noted in the HPI.    Objective     Vital Signs for the last 24 hours:  Temp:  [36.4 °C (97.5 °F)-36.8 °C (98.2 °F)] 36.5 °C (97.7 °F)  Heart Rate:  [] 99  Resp:  [11-29] 12  BP: ()/(8-75) 95/51  SpO2:  [85 %-100 %] 100 %    Oxygen:  Oxygen Therapy: Supplemental oxygen  O2 Delivery Method: Nasal cannula     O2 Flow Rate (L/min): 2 L/min     Labs:   Lab Results   Component Value Date    GLUCOSE 92 02/13/2025    CALCIUM 8.0 (L) 02/13/2025     02/13/2025    K 4.4 02/13/2025    CO2 29 02/13/2025     (H) 02/13/2025     BUN 4 (L) 02/13/2025    CREATININE 0.4 (L) 02/13/2025     Lab Results   Component Value Date    WBC 4.37 02/13/2025    HGB 9.3 (L) 02/13/2025    HCT 29.7 (L) 02/13/2025    .2 (H) 02/13/2025     02/13/2025     Lab Results   Component Value Date    ALBUMIN 3.3 (L) 02/10/2025    BILITOT 0.7 02/10/2025    ALKPHOS 342 (H) 02/10/2025    BILIDIR 0.2 02/08/2025    AST 65 (H) 02/10/2025     (H) 02/10/2025    PROTEIN 4.7 (L) 02/10/2025       Micro:   Microbiology Results       Procedure Component Value Units Date/Time    Urine culture Urine, Clean Catch [[id number]]  (Normal) Collected: 02/02/25 1515    Specimen: Urine, Clean Catch Updated: 02/03/25 1449     Urine Culture No Growth (Limit of detection 1000 cfu/mL)    Blood Culture Blood, Venous [[id number]]  (Normal) Collected: 02/01/25 1419    Specimen: Blood, Venous Updated: 02/05/25 2001     Culture No growth at 96 hours    Blood Culture Blood, Venous [[id number]]  (Normal) Collected: 02/01/25 1419    Specimen: Blood, Venous Updated: 02/05/25 2001     Culture No growth at 96 hours    Urine culture Urine, Clean Catch [[id number]]  (Abnormal)  (Susceptibility) Collected: 02/01/25 1215    Specimen: Urine, Clean Catch Updated: 02/04/25 0851     Urine Culture **Positive Culture**      >=100,000 cfu/mL Klebsiella pneumoniae ssp pneumoniae      >=100,000 cfu/mL Proteus mirabilis    Urine culture Urine, Clean Catch [[id number]] Collected: 01/27/25 2044    Specimen: Urine, Clean Catch Updated: 01/29/25 1036     Urine Culture Probable contaminants, suggest recollection      >=100,000 cfu/mL Mixed Growth    SARS-COV-2 (COVID-19)/ FLU A/B, AND RSV, PCR Nasopharynx [[id number]]  (Normal) Collected: 01/27/25 1729    Specimen: Nasopharyngeal Swab from Nasopharynx Updated: 01/27/25 1826     SARS-CoV-2 (COVID-19) Negative     Influenza A Negative     Influenza B Negative     Respiratory Syncytial Virus Negative    Narrative:      Testing performed using real-time PCR for  "detection of COVID-19. EUA approved validation studies performed on site.             Imaging:  ULTRASOUND ABDOMEN LIMITED    Result Date: 2/6/2025  IMPRESSION: Unremarkable right upper quadrant ultrasound noting surgical absence of the gallbladder    CT LUMBAR SPINE WITHOUT IV CONTRAST    Result Date: 2/2/2025  IMPRESSION: No acute osseous abnormalities or significant degenerative change    CT THORACIC SPINE WITHOUT IV CONTRAST    Result Date: 2/2/2025  IMPRESSION: Mild multilevel thoracic spondylosis without compression deformity or destructive change    CT ABDOMEN PELVIS WITHOUT IV CONTRAST    Result Date: 1/27/2025  IMPRESSION: 1.  Concentric prominence the walls the urinary bladder.  Cystitis cannot be excluded 2.  There is high density material present within the colon presumably representing ingested product.  There is no CT evidence of a bowel obstruction. The patient is status post Mary-en-Y gastric surgery. 3.  Uterine fibroid 4.  See comments for additional findings     X-RAY CHEST 2 VIEWS    Result Date: 1/27/2025  IMPRESSION: No acute cardiopulmonary process.      IMAGING PERSONALLY REVIEWED      Physical Exam:  General: Alert awake and oriented ×3.  No acute distress.    HEENT: Mucous membranes are moist.     Cardiovascular: Heart rate is regular with regular rhythm. No rub No gallop. S1S2 intact  Lungs: Clear to auscultation, no wheezes rales rhonchi rub   Abdomen: Soft nontender nondistended with low pitched bowel sounds  Extremities: Warm and well perfused.  No clubbing cyanosis or edema      Assessment   Acute on chronic hypotension-vasopressor dependent right now (\"Shock\") m.  Etiology not entirely clear.  History of Mary-en-Y gastric bypass with a eating disorder and psychiatric history.  Rule out nutritional cardiomyopathy.  Rule out adrenal insufficiency  Plan   At this time we will continue norepinephrine for hemodynamic support.  We will continue midodrine.  We will proceed with a cosyntropin " stim test as well as check echocardiogram to rule out cardiomyopathy her last echocardiogram was September 2024.  Critical care time 30 minutes      Landry Tamez DO

## 2025-02-13 NOTE — PLAN OF CARE
Earlier this evening, I was informed that the patient's nurse that the patient had become hypotensive.  Despite multiple fluid boluses and albumin, the patient remained hypotensive with a concerning low MAP. The patient ultimately required initiation of a norepinephrine infusion and transfer to the ICU. This was discussed with Dr. Gaines and additional signout will be provided to the patient's daytime [facility] physician to inform them of this development.    Dr. Tyron Johnson, DO  [facility]

## 2025-02-13 NOTE — NURSING NOTE
Patient noted to be hypotensive at shift handoff. Pt expressing severe pain despite low BP. MD notified, bolus hung and prn pain meds given. Approx 2245, pt BP noted as 79/42. MD notified, albumin and 1L LR given. Pt BP briefly responsive before dipping back down. One time order midodrine given per MD. Pt BP not responding to boluses and medications. Pt placed on IV Levo w/ ICU RNs present in the room. Patient to be transferred to ICU for further levophed titration.

## 2025-02-14 LAB
ANION GAP SERPL CALC-SCNC: 3 MEQ/L (ref 3–15)
BASOPHILS # BLD: 0.01 K/UL (ref 0.01–0.1)
BASOPHILS NFR BLD: 0.2 %
BUN SERPL-MCNC: 5 MG/DL (ref 7–25)
CALCIUM SERPL-MCNC: 8.8 MG/DL (ref 8.6–10.3)
CHLORIDE SERPL-SCNC: 110 MEQ/L (ref 98–107)
CO2 SERPL-SCNC: 30 MEQ/L (ref 21–31)
CREAT SERPL-MCNC: 0.4 MG/DL (ref 0.6–1.2)
DIFFERENTIAL METHOD BLD: ABNORMAL
EGFRCR SERPLBLD CKD-EPI 2021: >60 ML/MIN/1.73M*2
EOSINOPHIL # BLD: 0.2 K/UL (ref 0.04–0.36)
EOSINOPHIL NFR BLD: 3.4 %
ERYTHROCYTE [DISTWIDTH] IN BLOOD BY AUTOMATED COUNT: 15.4 % (ref 11.7–14.4)
GLUCOSE SERPL-MCNC: 83 MG/DL (ref 70–99)
HCT VFR BLD AUTO: 29.3 % (ref 35–45)
HGB BLD-MCNC: 9.4 G/DL (ref 11.8–15.7)
IMM GRANULOCYTES # BLD AUTO: 0.02 K/UL (ref 0–0.08)
IMM GRANULOCYTES NFR BLD AUTO: 0.3 %
LYMPHOCYTES # BLD: 1.74 K/UL (ref 1.2–3.5)
LYMPHOCYTES NFR BLD: 29.7 %
MCH RBC QN AUTO: 34.6 PG (ref 28–33.2)
MCHC RBC AUTO-ENTMCNC: 32.1 G/DL (ref 32.2–35.5)
MCV RBC AUTO: 107.7 FL (ref 83–98)
MONOCYTES # BLD: 0.44 K/UL (ref 0.28–0.8)
MONOCYTES NFR BLD: 7.5 %
NEUTROPHILS # BLD: 3.45 K/UL (ref 1.7–7)
NEUTS SEG NFR BLD: 58.9 %
NRBC BLD-RTO: 0 %
PLATELET # BLD AUTO: 259 K/UL (ref 150–369)
PMV BLD AUTO: 11.8 FL (ref 9.4–12.3)
POTASSIUM SERPL-SCNC: 4.5 MEQ/L (ref 3.5–5.1)
RBC # BLD AUTO: 2.72 M/UL (ref 3.93–5.22)
SODIUM SERPL-SCNC: 143 MEQ/L (ref 136–145)
WBC # BLD AUTO: 5.86 K/UL (ref 3.8–10.5)

## 2025-02-14 PROCEDURE — 63700000 HC SELF-ADMINISTRABLE DRUG: Performed by: NURSE PRACTITIONER

## 2025-02-14 PROCEDURE — 63700000 HC SELF-ADMINISTRABLE DRUG: Performed by: STUDENT IN AN ORGANIZED HEALTH CARE EDUCATION/TRAINING PROGRAM

## 2025-02-14 PROCEDURE — 97530 THERAPEUTIC ACTIVITIES: CPT | Mod: GO

## 2025-02-14 PROCEDURE — 80048 BASIC METABOLIC PNL TOTAL CA: CPT | Performed by: NURSE PRACTITIONER

## 2025-02-14 PROCEDURE — 36415 COLL VENOUS BLD VENIPUNCTURE: CPT | Performed by: NURSE PRACTITIONER

## 2025-02-14 PROCEDURE — 63700000 HC SELF-ADMINISTRABLE DRUG: Performed by: INTERNAL MEDICINE

## 2025-02-14 PROCEDURE — 63700000 HC SELF-ADMINISTRABLE DRUG: Performed by: PHYSICIAN ASSISTANT

## 2025-02-14 PROCEDURE — 97164 PT RE-EVAL EST PLAN CARE: CPT | Mod: GP

## 2025-02-14 PROCEDURE — 20600000 HC ROOM AND CARE INTERMEDIATE/TELEMETRY

## 2025-02-14 PROCEDURE — 85025 COMPLETE CBC W/AUTO DIFF WBC: CPT | Performed by: NURSE PRACTITIONER

## 2025-02-14 RX ORDER — ACETAMINOPHEN 500 MG
5 TABLET ORAL NIGHTLY
Status: DISCONTINUED | OUTPATIENT
Start: 2025-02-14 | End: 2025-02-18 | Stop reason: HOSPADM

## 2025-02-14 RX ADMIN — MIDODRINE HYDROCHLORIDE 10 MG: 5 TABLET ORAL at 06:46

## 2025-02-14 RX ADMIN — MIDODRINE HYDROCHLORIDE 10 MG: 5 TABLET ORAL at 16:14

## 2025-02-14 RX ADMIN — PANTOPRAZOLE SODIUM 20 MG: 20 TABLET, DELAYED RELEASE ORAL at 08:22

## 2025-02-14 RX ADMIN — MIDODRINE HYDROCHLORIDE 10 MG: 5 TABLET ORAL at 10:33

## 2025-02-14 RX ADMIN — Medication 400 MG: at 08:22

## 2025-02-14 RX ADMIN — ACETAMINOPHEN 650 MG: 325 TABLET, FILM COATED ORAL at 14:25

## 2025-02-14 RX ADMIN — Medication 400 MG: at 20:02

## 2025-02-14 RX ADMIN — ATORVASTATIN CALCIUM 20 MG: 20 TABLET, FILM COATED ORAL at 08:22

## 2025-02-14 RX ADMIN — LEVOTHYROXINE SODIUM 50 MCG: 0.05 TABLET ORAL at 06:46

## 2025-02-14 RX ADMIN — TOPIRAMATE 50 MG: 100 TABLET, FILM COATED ORAL at 16:14

## 2025-02-14 RX ADMIN — TOPIRAMATE 50 MG: 100 TABLET, FILM COATED ORAL at 08:22

## 2025-02-14 RX ADMIN — Medication 5 MG: at 23:37

## 2025-02-14 RX ADMIN — ACETAMINOPHEN 650 MG: 325 TABLET, FILM COATED ORAL at 20:01

## 2025-02-14 RX ADMIN — Medication 1 TABLET: at 08:22

## 2025-02-14 RX ADMIN — FLUOXETINE HYDROCHLORIDE 40 MG: 20 CAPSULE ORAL at 08:22

## 2025-02-14 RX ADMIN — PANTOPRAZOLE SODIUM 20 MG: 20 TABLET, DELAYED RELEASE ORAL at 20:02

## 2025-02-14 ASSESSMENT — COGNITIVE AND FUNCTIONAL STATUS - GENERAL
STANDING UP FROM CHAIR USING ARMS: 2 - A LOT
AFFECT: FLAT/BLUNTED AFFECT
MOVING TO AND FROM BED TO CHAIR: 1 - TOTAL
MOVING TO AND FROM BED TO CHAIR: 1 - TOTAL
AFFECT: FLAT/BLUNTED AFFECT
WALKING IN HOSPITAL ROOM: 1 - TOTAL
TOILETING: 1 - TOTAL
CLIMB 3 TO 5 STEPS WITH RAILING: 1 - TOTAL
DRESSING REGULAR UPPER BODY CLOTHING: 2 - A LOT
STANDING UP FROM CHAIR USING ARMS: 1 - TOTAL
WALKING IN HOSPITAL ROOM: 1 - TOTAL
DRESSING REGULAR LOWER BODY CLOTHING: 1 - TOTAL
EATING MEALS: 2 - A LOT
HELP NEEDED FOR PERSONAL GROOMING: 2 - A LOT
CLIMB 3 TO 5 STEPS WITH RAILING: 1 - TOTAL
STANDING UP FROM CHAIR USING ARMS: 1 - TOTAL
HELP NEEDED FOR BATHING: 1 - TOTAL
CLIMB 3 TO 5 STEPS WITH RAILING: 1 - TOTAL
WALKING IN HOSPITAL ROOM: 1 - TOTAL
MOVING TO AND FROM BED TO CHAIR: 2 - A LOT

## 2025-02-14 NOTE — PLAN OF CARE
Care Coordination Discharge Plan Note     Discharge Needs Assessment  Concerns to be Addressed: care coordination/care conferences, discharge planning  Current Discharge Risk: lives alone, physical impairment    Anticipated Discharge Plan  Anticipated Discharge Disposition: skilled nursing facility  Type of Skilled Nursing Care Services: PT, OT, nursing      Patient Choice  Offered/Gave Vendor List: yes  Patient and/or patient guardian/advocate was made aware of their right to choose a provider. A list of eligible providers was presented and reviewed with the patient and/or patient guardian/advocate in written and/or verbal form. The list delineates providers in the patients desired geographic area who are participating in the Medicare program and/or providers contracted with the patients primary insurance. The Medicare list and quality ratings were obtained from the Medicare.gov [medicare.gov] website.    ---------------------------------------------------------------------------------------------------------------------    Interdisciplinary Discharge Plan Review:  Participants:advanced practice provider, nursing, physical therapy, social work/services, patient    Concerns Comments: chart reviewed & patient will be for downgrade. nica does have new pt/ot orders & was seen. eufemia's ampac is 9. sw stopped by nica's room 2x's sleeping & listening to music in room. [facility]    Discharge Plan:   Disposition/Destination:   /    Discharge Facility:   Destination - Admitted Since 2/1/2025       Service Provider Services Address Phone Fax Patient Preferred Last Updated    [facility] Skilled Nursing [address] [place] [address] [phone] -- Venita Alvarez, RN 2/11/2025 8051          Community Resources:      Discharge Transportation:  Is Out of Hospital DNR needed at Discharge: no  Does patient  need discharge transport?

## 2025-02-14 NOTE — PROGRESS NOTES
Occupational Therapy -  Re-Evaluation     Patient: Felipa Cuadra  Location: [facility]  MRN: [id number]  Today's date: 2/14/2025    HISTORY OF PRESENT ILLNESS     Felipa is a 44 y.o. female admitted on 2/1/2025 with UTI (urinary tract infection) [N39.0]  Lower abdominal pain [R10.30]  Urinary tract infection with hematuria, site unspecified [N39.0, R31.9]. Principal problem is UTI (urinary tract infection).    Past Medical History  Felipa has a past medical history of Anxiety, Depression, and Eating disorder.    History of Present Illness    Patient was seen in the ED 1 week ago for evaluation of abdominal pain and generalized weakness.  Workup revealed acute cystitis.  She was discharged on a course of Macrobid, which patient reports she has been taking.  She presents today for evaluation of persistent abdominal pain.  Patient reports suprapubic pain that radiates to her back.  She reports it is constant.  She reports she is weak and unable to ambulate.  She reports increased frequency of urination.  She reports chills.  She reports nausea without vomiting.  She reports she has not had solid food intake since Christmas.  She reports she has been drinking a lot of Gatorade.  She reports she is currently having black stool and vaginal bleeding x 3 days.  She reports she rarely gets her period.  She has not noticed any blood clots.  She reports she is feeling more depressed lately.  She denies suicidal ideation.  She reports she smokes 4 cigarettes/day.  She reports she quit alcohol use 4 years ago.  She denies fever, syncope, vomiting, chest pain, shortness of breath, constipation, diarrhea.     2/13 -  patient hypotensive with a concerning low MAP, required initiation of a norepinephrine infusion and transfer to the ICU.   PRIOR LEVEL OF FUNCTION AND LIVING ENVIRONMENT     Prior Level of Function      Flowsheet Row Most Recent Value   Dominant Hand right   Ambulation assistive  equipment   Transferring assistive equipment   Toileting independent   Bathing assistive equipment and person   Dressing assistive person   Eating independent   IADLs independent   Prior Level of Function Comment Reports assist from HHAs 6 days/week with bathing + dressing. Uses a walker at home   Assistive Device Currently Used at Home walker, standard, grab bar, shower chair             Prior Living Environment      Flowsheet Row Most Recent Value   People in Home alone   Current Living Arrangements home   Home Accessibility elevator accessible   Living Environment Comment Lives alone in apartment with elevator access. Tub shower with SC + GBs.          Occupational Profile      Flowsheet Row Most Recent Value   Successful Occupations Diminished ADL status s/p UTI   Performance Patterns Likes to read, likes to write fanfiction   Environmental Supports and Barriers HHAs available to assist 6 days/week          VITALS AND PAIN     OT Vitals      Date/Time Pulse HR Source Resp SpO2 O2 Therapy BP MAP BP Location BP Method Pt Position [facility]   02/14/25 1230 -- -- 20 -- -- 99/60 74 mmHg -- -- -- DMF   02/14/25 1230 74 Monitor -- 100 % None (Room air) -- -- Right forearm Automatic Lying AK   02/14/25 1240 77 Monitor -- 100 % None (Room air) 110/57 -- Right forearm Automatic Sitting AK          OT Pain      Date/Time Pain Type Side/Orientation Rating: Rest Rating: Activity Interventions [facility]   02/14/25 1230 Pain Assessment neck +back 4 - moderate pain 4 - moderate pain care clustered AK             Objective   OBJECTIVE     Start time:  1230  End time:  1245  Session Length: 15 min  Mode of Treatment: co-treatment  Reason for co-treatment: d/c planning    General Observations  Patient received reclined, in bed. She was agreeable to therapy, no issues or concerns identified by nurse prior to session. Awake, alert, NAD    Precautions: fall       Limitations/Impairments: safety/cognitive   Services  Do You Speak a  Language Other Than English at Home?: no      OT Eval and Treat - 02/14/25 1230          Cognition    Orientation Status oriented x 3     Affect/Mental Status flat/blunted affect     Behavioral Issues overwhelmed easily     Follows Commands follows one-step commands;75-90% accuracy;increased processing time needed;repetition of directions required;verbal cues/prompting required     Cognitive Function safety deficit;executive function deficit     Attention Deficit minimal deficit;focused/sustained attention     Comment, Attention Pt focused and alert this session.  No VC required     Executive Function Deficit moderate deficit;organization/sequencing;information processing;planning/decision-making;self-monitoring/self-correction     Safety Deficit moderate deficit;problem-solving;insight into deficits/self-awareness     Comment, Cognition Cooperative, anxious w/ mobility, self limiting d/t reports of fear of falling        Vision Assessment/Intervention    Vision Assessment corrective lenses full-time        Hearing Assessment    Hearing Status WFL        Sensory Assessment    Sensory Assessment sensation intact, upper extremities        Upper Extremity Assessment    UE Assessment ROM and Strength WFL        Motor Skills    Motor Skills neuro-muscular function     Neuromuscular Function upper extremity;tremor, resting        Bed Mobility    Bed Mobility Activities left;supine to sit     [place] moderate assist (50-74% patient effort);1 person assist;maximum assist (25-49% patient effort);2 person assist     Safety/Cues increased time to complete;verbal cues;hand placement;proper use of assistive device     Assistive Device head of bed elevated;bed rails;draw sheet     Comment Min A rolling, Mod Ax1 OOB, Mod Ax2 return to supine. jerky/spontaneous movements in UE/LE while seated EOB        Mobility Belt    Mobility Belt Used During Session no - patient refused        Sit/Stand Transfer    Surface edge of bed      [place] maximum assist (25-49% patient effort);2 person assist     Safety/Cues increased time to complete     Transfer Comments b/l LE blocking; Unable to achieve upright posture        Lower Body Dressing    Tasks don;doff;socks     [place] dependent (less than 25% patient effort)     Position edge of bed sitting     Adaptive Equipment none        Balance    Static Sitting Balance mild impairment;sitting, edge of bed     Dynamic Sitting Balance moderate impairment;sitting, edge of bed     Sit to Stand Dynamic Balance severe impairment;supported;standing     Static Standing Balance unable to perform activity     Dynamic Standing Balance unable to perform activity        Impairments/Safety Issues    Impairments Affecting Function balance;strength;cognition;coordination;endurance/activity tolerance;postural/trunk control     Cognitive Impairments, Safety/Performance problem-solving/reasoning;safety precaution awareness;awareness, need for assistance;insight into deficits/self-awareness;judgment;sequencing abilities     Functional Endurance Fair -, self limiting     Safety Issues Affecting Function insight into deficits/self-awareness;problem-solving;sequencing abilities;safety precaution awareness;judgment;awareness of need for assistance                                              Education Documentation  Treatment Plan, taught by Clark Miranda OT at 2/14/2025  4:25 PM.  Learner: Patient  Readiness: Acceptance  Method: Explanation  Response: No Evidence of Learning  Comment: Role of OT, tx technique, energy conservation, OT POC        Session Outcome  Patient reclined, in bed at end of session, bed alarm on, all needs met, call light in reach, personal items in reach. Nursing notified about patient's performance, patient's position, and patient's response to therapy/activity.    AM-PAC - ADL (Current Function)     Putting on/taking off regular lower body clothing 1 - Total   Bathing 1 - Total    Toileting 1 - Total   Putting on/taking off regular upper body clothing 2 - A Lot   Help for taking care of personal grooming 2 - A Lot   Eating meals 2 - A Lot   AM-PAC ADL Score 9      ASSESSMENT AND PLAN     Progress Summary  OT IE completed ([facility] 9). Pt limited by decreased balance, endurance, and strength, increased anxiety and fear of falling sitting EOB, decreased insight into deficits and safety awareness, and self limiting behaviors. Currently mod A for supine to sit, Max Ax2 for sit to supine, Min A for rolling, Dep for LB dressing. benefits from continued OT to MAX functional IND and ensure safety. Rec SNF    Patient/Family Therapy Goal Statement: To get stronger    OT Plan      Flowsheet Row Most Recent Value   Rehab Potential fair, will monitor progress closely at 02/14/2025 1230   Therapy Frequency 3 times/wk at 02/14/2025 1230   Planned Therapy Interventions activity tolerance training, functional balance retraining, transfer/mobility retraining, occupation/activity based interventions, BADL retraining at 02/14/2025 1230            OT Discharge Recommendations      Flowsheet Row Most Recent Value   OT Recommended Discharge Disposition skilled nursing facility at 02/14/2025 1230   Anticipated Equipment Needs if Discharged Home (OT) commode, 3-in-1, dressing equipment at 02/14/2025 1230                 OT Goals      Flowsheet Row Most Recent Value   Bed Mobility Goal 1    Activity/Assistive Device bed mobility activities, all at 02/14/2025 1230   [place] minimum assist (75% or more patient effort) at 02/14/2025 1230   Time Frame by discharge at 02/14/2025 1230   Progress/Outcome goal ongoing at 02/14/2025 1230   Transfer Goal 1    Activity/Assistive Device all transfers at 02/14/2025 1230   [place] minimum assist (75% or more patient effort) at 02/14/2025 1230   Time Frame by discharge at 02/14/2025 1230   Progress/Outcome goal ongoing at 02/14/2025 1230   Bathing Goal 1     Activity/Assistive Device bathing skills, all at 02/14/2025 1230   [place] minimum assist (75% or more patient effort) at 02/14/2025 1230   Time Frame by discharge at 02/14/2025 1230   Progress/Outcome goal ongoing at 02/14/2025 1230   Dressing Goal 1    Activity/Adaptive Equipment lower body dressing at 02/04/2025 1403   [place] minimum assist (75% or more patient effort) at 02/04/2025 1403   Time Frame by discharge at 02/04/2025 1403   Progress/Outcome goal ongoing at 02/04/2025 1403   Toileting Goal 1    Activity/Assistive Device toileting skills, all at 02/14/2025 1230   [place] minimum assist (75% or more patient effort) at 02/14/2025 1230   Time Frame by discharge at 02/14/2025 1230   Progress/Outcome goal ongoing at 02/14/2025 1230

## 2025-02-14 NOTE — PLAN OF CARE
Problem: Adult Inpatient Plan of Care  Goal: Plan of Care Review  Outcome: Progressing  Flowsheets (Taken 2/14/2025 8397)  Progress: improving  Outcome Evaluation: OT IE completed  Plan of Care Reviewed With: patient     Problem: Self-Care Deficit  Goal: Improved Ability to Complete Activities of Daily Living  Outcome: Progressing

## 2025-02-14 NOTE — NURSING NOTE
Report given to LILA Knight. Pt transferred to PCU [facility]019 in bed with chart and all personal belongings.

## 2025-02-14 NOTE — PROGRESS NOTES
Critical Care/Pulmonary  Daily Progress Note        Subjective    Interval History:   Pressors turned off this morning  Afebrile overnight  On room air     Objective       Allergies: Aspartame, Bee venom protein (honey bee), Buspirone, Diphenhydramine, Haloperidol, Iodine, Penicillins, Sertraline, Diclofenac sodium, Hydroxyzine, Shellfish containing products, Sudafed cold-allergy, and Tetracyclines    CURRENT MEDS  Current Facility-Administered Medications   Medication Dose Route Frequency Provider Last Rate Last Admin    acetaminophen (TYLENOL) tablet 650 mg  650 mg oral q4h PRN Jeane Carlson CRNP   650 mg at 02/13/25 1619    atorvastatin (LIPITOR) tablet 20 mg  20 mg oral q AM Milly Roberson PA C   20 mg at 02/14/25 0822    B-complex with vitamin C tablet 1 tablet  1 tablet oral Daily Julian Alberto MD   1 tablet at 02/14/25 0822    cyanocobalamin (VITAMIN B12) tablet 1,000 mcg  1,000 mcg oral Daily Lilly Nagy MD   1,000 mcg at 02/13/25 0847    glucose chewable tablet 16-32 g of dextrose  16-32 g of dextrose oral PRN Milly Roberson PA C        Or    dextrose 40 % oral gel 15-30 g of dextrose  15-30 g of dextrose oral PRN Milly Roberson PA C        Or    glucagon (GLUCAGEN) injection 1 mg  1 mg intramuscular PRN Milly Roberson PA C        Or    dextrose 50 % in water (D50) injection 12.5 g  25 mL intravenous PRN Milly Roberson PA C   12.5 g at 02/04/25 0750    FLUoxetine (PROzac) capsule 40 mg  40 mg oral Daily Milly Roberson PA C   40 mg at 02/14/25 0822    [Provider Managed Hold] gabapentin (NEURONTIN) capsule 400 mg  400 mg oral QID Milly Roberson PA C   400 mg at 02/12/25 2137    levothyroxine (SYNTHROID) tablet 50 mcg  50 mcg oral Daily (6:30a) Milly Roberson PA C   50 mcg at 02/14/25 0646    magnesium oxide (MAG-OX) tablet 400 mg  400 mg oral BID Bunny Chavarria DO   400 mg at 02/14/25 0822    magnesium sulfate IVPB 1g in 100 mL NSS/D5W/SWFI  1 g  intravenous PRN Yony Simpsonf, DO        Or    magnesium sulfate IVPB 2g in 50 mL NSS/D5W/SWFI  2 g intravenous PRN BoYony ramosf, DO   Stopped at 02/07/25 0113    midodrine (PROAMATINE) tablet 10 mg  10 mg oral TID AC Yony Simpsonf, DO   10 mg at 02/14/25 0646    nystatin (MYCOSTATIN) 100,000 unit/gram topical powder   Topical BID Milly Roberson PA C   Given at 02/12/25 2145    pantoprazole (PROTONIX) tablet,delayed release (DR/EC) 20 mg  20 mg oral BID Milly Roberson PA C   20 mg at 02/14/25 0822    potassium chloride (KLOR-CON M) ER tablet (particles/crystals) 20 mEq  20 mEq oral PRN Yony Simpsonf, DO   20 mEq at 02/08/25 1308    potassium chloride (KLOR-CON M) ER tablet (particles/crystals) 40 mEq  40 mEq oral PRN Yony Simpsonf, DO   40 mEq at 02/07/25 2248    potassium chloride 20 mEq in 100 mL IVPB  (premix)  20 mEq intravenous PRN BozaYony palomaresf, DO        And    potassium chloride 20 mEq in 100 mL IVPB  (premix)  20 mEq intravenous PRN BoYony ramosf, DO        topiramate (TOPAMAX) tablet 50 mg  50 mg oral BID Milly Roberson PA C   50 mg at 02/14/25 0822    [Provider Managed Hold] traZODone (DESYREL) tablet 100 mg  100 mg oral Nightly Milly Roberson PA C   100 mg at 02/12/25 2136    [Provider Managed Hold] zolpidem (AMBIEN) tablet 5 mg  5 mg oral Nightly PRN Bunny Chavarria DO   5 mg at 02/12/25 2137       VITAL SIGNS  Vitals:    02/14/25 0830 02/14/25 0840 02/14/25 0850 02/14/25 0900   BP: (!) 101/59 (!) 91/53 (!) 100/55 (!) 95/54   BP Location:       Pulse:    73   Resp:    (!) 11   Temp:       TempSrc:       SpO2:    96%   Weight:       Height:           VENTILATOR SETTINGS       Oxygen:  Oxygen Therapy: None (Room air)  O2 Delivery Method: Nasal cannula     O2 Flow Rate (L/min): 2 L/min     INTAKE/OUTPUT    Intake/Output Summary (Last 24 hours) at 2/14/2025 0944  Last data filed at 2/14/2025 0830  Gross per 24 hour   Intake 444.51 ml   Output 700 ml   Net -255.49 ml       Lines,  Drains, Airways, Wounds:  Peripheral IV (Adult) 02/13/25 Anterior;Distal;Left;Upper Arm (Active)   Number of days: 1       Peripheral IV (Adult) 02/13/25 Left Antecubital (Active)   Number of days: 1         Physical Exam:  Physical Exam  Vitals and nursing note reviewed.   Constitutional:       Appearance: She is ill-appearing.   HENT:      Head: Atraumatic.      Mouth/Throat:      Mouth: Mucous membranes are moist.   Eyes:      Extraocular Movements: Extraocular movements intact.      Pupils: Pupils are equal, round, and reactive to light.   Cardiovascular:      Rate and Rhythm: Normal rate and regular rhythm.      Pulses: Normal pulses.      Heart sounds: Normal heart sounds.   Pulmonary:      Effort: Pulmonary effort is normal.      Breath sounds: Normal breath sounds.   Abdominal:      General: Bowel sounds are normal.      Palpations: Abdomen is soft.   Skin:     General: Skin is warm and dry.      Capillary Refill: Capillary refill takes less than 2 seconds.   Neurological:      General: No focal deficit present.      Mental Status: Mental status is at baseline.          Labs:  See Labs Below:  ABG    CBC  Results from last 7 days   Lab Units 02/14/25 0448 02/13/25  0819 02/12/25  1325   WBC K/uL 5.86 4.37 3.90  3.72*   RBC M/uL 2.72* 2.72* 2.75*  2.77*   HEMOGLOBIN g/dL 9.4* 9.3* 9.5*  9.6*   HEMATOCRIT % 29.3* 29.7* 30.3*  30.2*   MCV fL 107.7* 109.2* 110.2*  109.0*   MCH pg 34.6* 34.2* 34.5*  34.7*   MCHC g/dL 32.1* 31.3* 31.4*  31.8*   PLATELETS K/uL 259 203 204  215   RDW % 15.4* 15.7* 15.9*  15.9*   MPV fL 11.8 11.6 11.9  11.4     BMP  Results from last 7 days   Lab Units 02/14/25  0448 02/13/25  0441 02/12/25  1325 02/10/25  0415 02/08/25  1216 02/07/25  2157 02/07/25  1512   SODIUM mEQ/L 143 143 141 145 143   < > 141   POTASSIUM mEQ/L 4.5 4.4 3.9 4.3 3.6   < > 2.8*   CHLORIDE mEQ/L 110* 108* 109* 111* 115*   < > 114*   CO2 mEQ/L 30 29 27 30 22   < > 21   BUN mg/dL 5* 4* 5* 2* 2*   < > 3*    CREATININE mg/dL 0.4* 0.4* 0.5* 0.5* 0.4*   < > 0.5*   CALCIUM mg/dL 8.8 8.0* 8.3* 8.6 8.1*   < > 7.7*   ALBUMIN g/dL  --   --   --  3.3* 3.2*  --  3.0*   BILIRUBIN TOTAL mg/dL  --   --   --  0.7 0.5  --  0.4   ALK PHOS IU/L  --   --   --  342* 326*  --  340*   ALT IU/L  --   --   --  245* 357*  --  434*   AST IU/L  --   --   --  65* 87*  --  89*   GLUCOSE mg/dL 83 92 183* 79 96   < > 85    < > = values in this interval not displayed.     Coag  Results from last 7 days   Lab Units 02/07/25  1512   PROTIME sec 15.2*   INR  1.2   PTT sec 31       Imaging:   X-RAY CHEST 1 VIEW    Result Date: 2/13/2025  IMPRESSION:  No acute cardiopulmonary process. COMPARISON: Chest studies August 2024 through 1/27/2025. COMMENT:  Portable AP upright imaging of the chest 2333 hours. The lungs are less well expanded but clear. No congestive changes. No consolidation or pleural fluid. Normal cardiac size, hilar and mediastinal contours.    ULTRASOUND ABDOMEN LIMITED    Result Date: 2/6/2025  IMPRESSION: Unremarkable right upper quadrant ultrasound noting surgical absence of the gallbladder    CT LUMBAR SPINE WITHOUT IV CONTRAST    Result Date: 2/2/2025  IMPRESSION: No acute osseous abnormalities or significant degenerative change    CT THORACIC SPINE WITHOUT IV CONTRAST    Result Date: 2/2/2025  IMPRESSION: Mild multilevel thoracic spondylosis without compression deformity or destructive change    CT ABDOMEN PELVIS WITHOUT IV CONTRAST    Result Date: 1/27/2025  IMPRESSION: 1.  Concentric prominence the walls the urinary bladder.  Cystitis cannot be excluded 2.  There is high density material present within the colon presumably representing ingested product.  There is no CT evidence of a bowel obstruction. The patient is status post Mary-en-Y gastric surgery. 3.  Uterine fibroid 4.  See comments for additional findings     X-RAY CHEST 2 VIEWS    Result Date: 1/27/2025  IMPRESSION: No acute cardiopulmonary process.      Micro:    Microbiology Results       Procedure Component Value Units Date/Time    Urine culture Urine, Clean Catch [[id number]]  (Normal) Collected: 02/02/25 1515    Specimen: Urine, Clean Catch Updated: 02/03/25 1449     Urine Culture No Growth (Limit of detection 1000 cfu/mL)    Blood Culture Blood, Venous [[id number]]  (Normal) Collected: 02/01/25 1419    Specimen: Blood, Venous Updated: 02/05/25 2001     Culture No growth at 96 hours    Blood Culture Blood, Venous [[id number]]  (Normal) Collected: 02/01/25 1419    Specimen: Blood, Venous Updated: 02/05/25 2001     Culture No growth at 96 hours    Urine culture Urine, Clean Catch [[id number]]  (Abnormal)  (Susceptibility) Collected: 02/01/25 1215    Specimen: Urine, Clean Catch Updated: 02/04/25 0851     Urine Culture **Positive Culture**      >=100,000 cfu/mL Klebsiella pneumoniae ssp pneumoniae      >=100,000 cfu/mL Proteus mirabilis    Urine culture Urine, Clean Catch [[id number]] Collected: 01/27/25 2044    Specimen: Urine, Clean Catch Updated: 01/29/25 1036     Urine Culture Probable contaminants, suggest recollection      >=100,000 cfu/mL Mixed Growth    SARS-COV-2 (COVID-19)/ FLU A/B, AND RSV, PCR Nasopharynx [[id number]]  (Normal) Collected: 01/27/25 1729    Specimen: Nasopharyngeal Swab from Nasopharynx Updated: 01/27/25 1826     SARS-CoV-2 (COVID-19) Negative     Influenza A Negative     Influenza B Negative     Respiratory Syncytial Virus Negative    Narrative:      Testing performed using real-time PCR for detection of COVID-19. EUA approved validation studies performed on site.             ECG/Telemetry  I have independently reviewed the telemetry. No events for the last 24 hours.         ASSESSMENT    45 y/o F who presented with cystitis. Became hypotensive on the floor and transferred to ICU for pressors     PLAN   #Hypotension  -etiology likely polypharmacy  -cosyntropin stim test  negative  -Repeat echo EF 65%, no regional wall motion abnormalities  -Maintain  MAP >60, now off pressors, stopped on rounds  -Continue midodrine  -Hold sedating medications    #Cystitis  -Urine culture with Klebsiella pneumonia and Proteus mirabilis. Treated with course of ceftriaxone  -CT with no  obstruction    VTE Prophylaxis Plan: SCDs  GI Prophylaxis Plan: protonix  Lines/Drains/Airway: PIV x2  Fluids/Electrolytes/Nutrition: diet     Disposition Planning: downgrade to PCU if remains off pressors    CODE STATUS  Full Code       The case was reviewed this morning at the patient's beside multidisciplinary rounds with the patient's nurse, dietician, pharmacist, respiratory therapist, physical therapist and critical care nurse coordinator. The patient's clinical status with regards to diagnosis, treatment plans including disposition of any IV, arterial lines, [place] and tubes were discussed, as well as dietary, respiratory therapy and mobilization needs.     This case was discussed with consultants.    Total critical time spent managing 30 minutes.    This note was scribed by MIRLANDE Roldan in my presence on my behalf.       MIRLANDE Roldna  2/14/2025  9:44 AM

## 2025-02-14 NOTE — PLAN OF CARE
Problem: Adult Inpatient Plan of Care  Goal: Plan of Care Review  Outcome: Progressing  Flowsheets (Taken 2/14/2025 1321)  Progress: improving  Outcome Evaluation: PT re-eval complete  Plan of Care Reviewed With: patient

## 2025-02-15 LAB
ANION GAP SERPL CALC-SCNC: 6 MEQ/L (ref 3–15)
BASOPHILS # BLD: 0.02 K/UL (ref 0.01–0.1)
BASOPHILS NFR BLD: 0.3 %
BUN SERPL-MCNC: 7 MG/DL (ref 7–25)
CALCIUM SERPL-MCNC: 8.9 MG/DL (ref 8.6–10.3)
CHLORIDE SERPL-SCNC: 107 MEQ/L (ref 98–107)
CO2 SERPL-SCNC: 26 MEQ/L (ref 21–31)
CREAT SERPL-MCNC: 0.4 MG/DL (ref 0.6–1.2)
DIFFERENTIAL METHOD BLD: ABNORMAL
EGFRCR SERPLBLD CKD-EPI 2021: >60 ML/MIN/1.73M*2
EOSINOPHIL # BLD: 0.16 K/UL (ref 0.04–0.36)
EOSINOPHIL NFR BLD: 2.8 %
ERYTHROCYTE [DISTWIDTH] IN BLOOD BY AUTOMATED COUNT: 15.3 % (ref 11.7–14.4)
GLUCOSE SERPL-MCNC: 84 MG/DL (ref 70–99)
HCT VFR BLD AUTO: 31.2 % (ref 35–45)
HGB BLD-MCNC: 10.1 G/DL (ref 11.8–15.7)
IMM GRANULOCYTES # BLD AUTO: 0.01 K/UL (ref 0–0.08)
IMM GRANULOCYTES NFR BLD AUTO: 0.2 %
LYMPHOCYTES # BLD: 1.27 K/UL (ref 1.2–3.5)
LYMPHOCYTES NFR BLD: 22.2 %
MAGNESIUM SERPL-MCNC: 1.9 MG/DL (ref 1.8–2.5)
MCH RBC QN AUTO: 34.4 PG (ref 28–33.2)
MCHC RBC AUTO-ENTMCNC: 32.4 G/DL (ref 32.2–35.5)
MCV RBC AUTO: 106.1 FL (ref 83–98)
MONOCYTES # BLD: 0.32 K/UL (ref 0.28–0.8)
MONOCYTES NFR BLD: 5.6 %
NEUTROPHILS # BLD: 3.94 K/UL (ref 1.7–7)
NEUTS SEG NFR BLD: 68.9 %
NRBC BLD-RTO: 0 %
PLATELET # BLD AUTO: 277 K/UL (ref 150–369)
PMV BLD AUTO: 12.2 FL (ref 9.4–12.3)
POTASSIUM SERPL-SCNC: 4.4 MEQ/L (ref 3.5–5.1)
RBC # BLD AUTO: 2.94 M/UL (ref 3.93–5.22)
SODIUM SERPL-SCNC: 139 MEQ/L (ref 136–145)
WBC # BLD AUTO: 5.72 K/UL (ref 3.8–10.5)

## 2025-02-15 PROCEDURE — 63700000 HC SELF-ADMINISTRABLE DRUG: Performed by: STUDENT IN AN ORGANIZED HEALTH CARE EDUCATION/TRAINING PROGRAM

## 2025-02-15 PROCEDURE — 63700000 HC SELF-ADMINISTRABLE DRUG: Performed by: NURSE PRACTITIONER

## 2025-02-15 PROCEDURE — 99232 SBSQ HOSP IP/OBS MODERATE 35: CPT | Performed by: STUDENT IN AN ORGANIZED HEALTH CARE EDUCATION/TRAINING PROGRAM

## 2025-02-15 PROCEDURE — 80048 BASIC METABOLIC PNL TOTAL CA: CPT | Performed by: NURSE PRACTITIONER

## 2025-02-15 PROCEDURE — 63700000 HC SELF-ADMINISTRABLE DRUG: Performed by: PHYSICIAN ASSISTANT

## 2025-02-15 PROCEDURE — 63700000 HC SELF-ADMINISTRABLE DRUG: Performed by: INTERNAL MEDICINE

## 2025-02-15 PROCEDURE — 83735 ASSAY OF MAGNESIUM: CPT | Performed by: STUDENT IN AN ORGANIZED HEALTH CARE EDUCATION/TRAINING PROGRAM

## 2025-02-15 PROCEDURE — 36415 COLL VENOUS BLD VENIPUNCTURE: CPT | Performed by: NURSE PRACTITIONER

## 2025-02-15 PROCEDURE — 85025 COMPLETE CBC W/AUTO DIFF WBC: CPT | Performed by: NURSE PRACTITIONER

## 2025-02-15 PROCEDURE — 21400000 HC ROOM AND CARE CCU/INTERMEDIATE

## 2025-02-15 RX ORDER — LORAZEPAM 0.5 MG/1
0.25 TABLET ORAL EVERY 12 HOURS PRN
Status: DISCONTINUED | OUTPATIENT
Start: 2025-02-15 | End: 2025-02-18 | Stop reason: HOSPADM

## 2025-02-15 RX ORDER — LORAZEPAM 0.5 MG/1
0.25 TABLET ORAL 2 TIMES DAILY
Status: DISCONTINUED | OUTPATIENT
Start: 2025-02-15 | End: 2025-02-15

## 2025-02-15 RX ORDER — GABAPENTIN 100 MG/1
100 CAPSULE ORAL 3 TIMES DAILY
Status: DISCONTINUED | OUTPATIENT
Start: 2025-02-15 | End: 2025-02-16

## 2025-02-15 RX ADMIN — PANTOPRAZOLE SODIUM 20 MG: 20 TABLET, DELAYED RELEASE ORAL at 20:21

## 2025-02-15 RX ADMIN — LEVOTHYROXINE SODIUM 50 MCG: 0.05 TABLET ORAL at 06:28

## 2025-02-15 RX ADMIN — ATORVASTATIN CALCIUM 20 MG: 20 TABLET, FILM COATED ORAL at 07:57

## 2025-02-15 RX ADMIN — TOPIRAMATE 50 MG: 100 TABLET, FILM COATED ORAL at 07:56

## 2025-02-15 RX ADMIN — Medication 1 TABLET: at 07:57

## 2025-02-15 RX ADMIN — Medication 400 MG: at 07:57

## 2025-02-15 RX ADMIN — PANTOPRAZOLE SODIUM 20 MG: 20 TABLET, DELAYED RELEASE ORAL at 07:57

## 2025-02-15 RX ADMIN — GABAPENTIN 100 MG: 100 CAPSULE ORAL at 20:21

## 2025-02-15 RX ADMIN — ACETAMINOPHEN 650 MG: 325 TABLET, FILM COATED ORAL at 20:21

## 2025-02-15 RX ADMIN — CYANOCOBALAMIN TAB 1000 MCG 1000 MCG: 1000 TAB at 07:57

## 2025-02-15 RX ADMIN — FLUOXETINE HYDROCHLORIDE 40 MG: 20 CAPSULE ORAL at 07:57

## 2025-02-15 RX ADMIN — Medication 400 MG: at 20:21

## 2025-02-15 RX ADMIN — LORAZEPAM 0.25 MG: 0.5 TABLET ORAL at 11:53

## 2025-02-15 RX ADMIN — MIDODRINE HYDROCHLORIDE 10 MG: 5 TABLET ORAL at 07:56

## 2025-02-15 RX ADMIN — Medication 5 MG: at 20:21

## 2025-02-15 RX ADMIN — TOPIRAMATE 50 MG: 100 TABLET, FILM COATED ORAL at 15:46

## 2025-02-15 ASSESSMENT — COGNITIVE AND FUNCTIONAL STATUS - GENERAL
WALKING IN HOSPITAL ROOM: 1 - TOTAL
STANDING UP FROM CHAIR USING ARMS: 1 - TOTAL
CLIMB 3 TO 5 STEPS WITH RAILING: 1 - TOTAL
MOVING TO AND FROM BED TO CHAIR: 1 - TOTAL

## 2025-02-15 NOTE — PLAN OF CARE
Problem: Adult Inpatient Plan of Care  Goal: Plan of Care Review  Outcome: Progressing  Flowsheets (Taken 2/15/2025 3937)  Progress: no change  Outcome Evaluation: Patient alert and oriented, oxygen stable on room air. BPs maintained with a goal of MAP >60. Reports difficulty sleeping, prn melatonin ordered. Patient re-educated that she cannot have sedating medications. Prn tylenol administered for head and back pain. Call bell in reach. Plan of care ongoing.  Plan of Care Reviewed With: patient  Goal: Patient-Specific Goal (Individualized)  Outcome: Progressing  Goal: Absence of Hospital-Acquired Illness or Injury  Outcome: Progressing  Goal: Optimal Comfort and Wellbeing  Outcome: Progressing  Goal: Readiness for Transition of Care  Outcome: Progressing     Problem: Fall Injury Risk  Goal: Absence of Fall and Fall-Related Injury  Outcome: Progressing     Problem: Skin Injury Risk Increased  Goal: Skin Health and Integrity  Outcome: Progressing     Problem: Self-Care Deficit  Goal: Improved Ability to Complete Activities of Daily Living  Outcome: Progressing     Problem: Self-Care Deficit  Goal: Improved Ability to Complete Activities of Daily Living  Outcome: Progressing     Problem: Mobility Impairment  Goal: Optimal Mobility  Outcome: Progressing     Problem: Oral Intake Inadequate  Goal: Improved Oral Intake  Outcome: Progressing     Problem: Violence Risk or Actual  Goal: Anger and Impulse Control  Outcome: Progressing   Plan of Care Review  Plan of Care Reviewed With: patient  Progress: no change  Outcome Evaluation: Patient alert and oriented, oxygen stable on room air. BPs maintained with a goal of MAP >60. Reports difficulty sleeping, prn melatonin ordered. Patient re-educated that she cannot have sedating medications. Prn tylenol administered for head and back pain. Call bell in reach. Plan of care ongoing.

## 2025-02-15 NOTE — NURSING NOTE
Pt rcvd from ICU, vss as documented, SR on tele mon, tele verified, pt resting in bed, call bell in reach, bed alarm on.

## 2025-02-15 NOTE — PROGRESS NOTES
Hospital Medicine     Daily Progress Note       SUBJECTIVE   Pt seen and examined. D/W Nurse, no acute events   Patient reports feeling horrible as all her home medications were held  Complains of pain everywhere, reports feeling anxious  Denied fever, chills, chest pain, shortness of breath, GI/ complaints  Will monitor blood pressure and slowly introduce Ativan and gabapentin at lower dose to avoid withdrawal     OBJECTIVE   Vital signs in last 24 hours:  Temp:  [36.4 °C (97.5 °F)-36.9 °C (98.4 °F)] 36.9 °C (98.4 °F)  Heart Rate:  [67-85] 71  Resp:  [15-19] 15  BP: ()/(51-67) 116/58    Intake/Output Summary (Last 24 hours) at 2/15/2025 1535  Last data filed at 2/15/2025 1200  Gross per 24 hour   Intake 720 ml   Output 300 ml   Net 420 ml       Physical Exam  General: Pt Alert and Interactive, No Acute Distress  HEENT: PERRLA, EOMI, Supple  Lungs: Clear to Auscultation B/L, No Wheeze/Rhonchi  CVS: S1 S2 heard, No Murmurs  Abdomen: Soft, Non tender, Non distended, BS heard  CNS: AAOX3,   Psychiatric: Calm and Cooperative    LINES, DRAINS, AIRWAYS, WOUNDS   Peripheral IV (Adult) 02/13/25 Anterior;Distal;Left;Upper Arm (Active)   Number of days: 2       Peripheral IV (Adult) 02/13/25 Left Antecubital (Active)   Number of days: 2          LABS, IMAGING, TELEMETRY     CBC Results         02/15/25 02/14/25 02/13/25     1137 0448 0819    WBC 5.72 5.86 4.37    RBC 2.94 2.72 2.72    HGB 10.1 9.4 9.3    HCT 31.2 29.3 29.7    .1 107.7 109.2    MCH 34.4 34.6 34.2    MCHC 32.4 32.1 31.3     259 203           CMP Results         02/15/25 02/14/25 02/13/25     1137 0448 0441     143 143    K 4.4 4.5 4.4    Cl 107 110 108    CO2 26 30 29    Glucose 84 83 92    BUN 7 5 4    Creatinine 0.4 0.4 0.4    Calcium 8.9 8.8 8.0    Anion Gap 6 3 6    EGFR >60.0 >60.0 >60.0           Comment for EGFR at 1137 on 02/15/25: Calculation based on the Chronic Kidney Disease Epidemiology Collaboration  (CKD-EPI) equation refit without adjustment for race.    Comment for EGFR at 0448 on 02/14/25: Calculation based on the Chronic Kidney Disease Epidemiology Collaboration (CKD-EPI) equation refit without adjustment for race.    Comment for EGFR at 0441 on 02/13/25: Calculation based on the Chronic Kidney Disease Epidemiology Collaboration (CKD-EPI) equation refit without adjustment for race.                    ASSESSMENT AND PLAN     #Hypotension  -etiology likely polypharmacy  -cosyntropin stim test  negative  -Repeat echo EF 65%, no regional wall motion abnormalities  -Maintain MAP >60, now off pressors, stopped on rounds  -Continue midodrine  -Hold sedating medications    #Cystitis  -Urine culture with Klebsiella pneumonia and Proteus mirabilis. Treated with course of ceftriaxone  -CT with no  obstruction    # Depression  - psych input noted  - continue home: LORazepam tablet po BID  - continue home: fluoxetine 40 mg po daily  - continue home: trazodone 100 mg po daily     # Anxiety  see section on depression.     # Eating disorder  - reports she has not had solid food intake since [place]  - supervise feeds  - psych consult appreciated: she would benefit from placement at a rehab/eating disorder clinic but her difficulty with ambulation may preclude her from this.     # Hypothyroidism  - TSH: 2.12 (2/7/25), free T4: 0.6 (8/16/24)  - continued on synthroid        # PTSD (post-traumatic stress disorder)   # Insomnia  # At risk for polypharmacy  # Chronic pain disorder  Holding sedatives as able due to hypotension  Restarted Ativan at low-dose 0.25 twice daily as needed to avoid withdrawal  Gabapentin dose decreased to 100 mg 3 times daily    # Hyponatremia  # -hypovolemic hyponatremia  -hyponatremia is now resolved       # Thrombocytopenia (CMS/[facility])  -hematology consult appreciated  -platelets appear to be improving for now after antibiotics have been stopped  -peripheral smear appreciated  -DIC panel is  negative.  No bleeding     # Lower abdominal pain  -CT abdomen is negative for colitis, negative for bowel obstruction on this admission  -lower abdominal pain is now resolved, possibly had nausea and abdominal discomfort from hypokalemia which we are treating.     # Elevated liver enzymes  -US right upper quadrant appreciated, negative for biliary dilatation, neg for hepatic steatosis.  -GI consult appreciated: stop acetaminophen for pain control  -monitor LFTs  hepatitis panel is negative.  LFTs improving     # Hypokalemia  -encourage oral intake  -monitor bmp  -replace potassium  -hold off on reintroducing florinef as this can promote further hypokalemia.  cardiac monitoring while there is hypokalemia.  Improved        # Pancytopenia     # GERD  - currently on: pantoprazole 20 mg po BID     # Anemia: acute on chronic, due to bone marrow suppression, stable, macrocytic  stable     # Hyperlipidemia  - continue home: atorvastatin 20 mg po daily     # Seizure disorder  - continue home: LORazepam tablet po BID  - continue home: gabapentin 400 mg po QID  - continue home: topiramate 50 mg po BID     VTE Prophylaxis:  Current anticoagulants:    None      Code Status: Full Code        Estimated Discharge Date: 2/17/2025   Disposition Planning: Monitor blood pressure       Kiki Best MD  2/15/2025

## 2025-02-16 LAB
ALBUMIN SERPL-MCNC: 3.2 G/DL (ref 3.5–5.7)
ALP SERPL-CCNC: 196 IU/L (ref 34–125)
ALT SERPL-CCNC: 49 IU/L (ref 7–52)
ANION GAP SERPL CALC-SCNC: 5 MEQ/L (ref 3–15)
ANION GAP SERPL CALC-SCNC: 6 MEQ/L (ref 3–15)
AST SERPL-CCNC: 44 IU/L (ref 13–39)
BASOPHILS # BLD: 0.01 K/UL (ref 0.01–0.1)
BASOPHILS NFR BLD: 0.2 %
BILIRUB SERPL-MCNC: 0.8 MG/DL (ref 0.3–1.2)
BUN SERPL-MCNC: 10 MG/DL (ref 7–25)
BUN SERPL-MCNC: 9 MG/DL (ref 7–25)
CALCIUM SERPL-MCNC: 8.5 MG/DL (ref 8.6–10.3)
CALCIUM SERPL-MCNC: 8.5 MG/DL (ref 8.6–10.3)
CHLORIDE SERPL-SCNC: 109 MEQ/L (ref 98–107)
CHLORIDE SERPL-SCNC: 110 MEQ/L (ref 98–107)
CO2 SERPL-SCNC: 26 MEQ/L (ref 21–31)
CO2 SERPL-SCNC: 27 MEQ/L (ref 21–31)
CREAT SERPL-MCNC: 0.4 MG/DL (ref 0.6–1.2)
CREAT SERPL-MCNC: 0.4 MG/DL (ref 0.6–1.2)
DIFFERENTIAL METHOD BLD: ABNORMAL
EGFRCR SERPLBLD CKD-EPI 2021: >60 ML/MIN/1.73M*2
EGFRCR SERPLBLD CKD-EPI 2021: >60 ML/MIN/1.73M*2
EOSINOPHIL # BLD: 0.18 K/UL (ref 0.04–0.36)
EOSINOPHIL NFR BLD: 3.2 %
ERYTHROCYTE [DISTWIDTH] IN BLOOD BY AUTOMATED COUNT: 15.3 % (ref 11.7–14.4)
GLUCOSE SERPL-MCNC: 84 MG/DL (ref 70–99)
GLUCOSE SERPL-MCNC: 88 MG/DL (ref 70–99)
HCT VFR BLD AUTO: 32 % (ref 35–45)
HGB BLD-MCNC: 10.2 G/DL (ref 11.8–15.7)
IMM GRANULOCYTES # BLD AUTO: 0.02 K/UL (ref 0–0.08)
IMM GRANULOCYTES NFR BLD AUTO: 0.4 %
LYMPHOCYTES # BLD: 1.31 K/UL (ref 1.2–3.5)
LYMPHOCYTES NFR BLD: 23.1 %
MCH RBC QN AUTO: 33.8 PG (ref 28–33.2)
MCHC RBC AUTO-ENTMCNC: 31.9 G/DL (ref 32.2–35.5)
MCV RBC AUTO: 106 FL (ref 83–98)
MONOCYTES # BLD: 0.41 K/UL (ref 0.28–0.8)
MONOCYTES NFR BLD: 7.2 %
NEUTROPHILS # BLD: 3.74 K/UL (ref 1.7–7)
NEUTS SEG NFR BLD: 65.9 %
NRBC BLD-RTO: 0 %
PLATELET # BLD AUTO: 275 K/UL (ref 150–369)
PMV BLD AUTO: 11.8 FL (ref 9.4–12.3)
POTASSIUM SERPL-SCNC: 4 MEQ/L (ref 3.5–5.1)
POTASSIUM SERPL-SCNC: 4.3 MEQ/L (ref 3.5–5.1)
PROT SERPL-MCNC: 4.5 G/DL (ref 6–8.2)
RBC # BLD AUTO: 3.02 M/UL (ref 3.93–5.22)
SODIUM SERPL-SCNC: 141 MEQ/L (ref 136–145)
SODIUM SERPL-SCNC: 142 MEQ/L (ref 136–145)
WBC # BLD AUTO: 5.67 K/UL (ref 3.8–10.5)

## 2025-02-16 PROCEDURE — 63700000 HC SELF-ADMINISTRABLE DRUG: Performed by: STUDENT IN AN ORGANIZED HEALTH CARE EDUCATION/TRAINING PROGRAM

## 2025-02-16 PROCEDURE — 63700000 HC SELF-ADMINISTRABLE DRUG

## 2025-02-16 PROCEDURE — 80053 COMPREHEN METABOLIC PANEL: CPT | Performed by: STUDENT IN AN ORGANIZED HEALTH CARE EDUCATION/TRAINING PROGRAM

## 2025-02-16 PROCEDURE — 21400000 HC ROOM AND CARE CCU/INTERMEDIATE

## 2025-02-16 PROCEDURE — 85025 COMPLETE CBC W/AUTO DIFF WBC: CPT | Performed by: NURSE PRACTITIONER

## 2025-02-16 PROCEDURE — 36415 COLL VENOUS BLD VENIPUNCTURE: CPT | Performed by: NURSE PRACTITIONER

## 2025-02-16 PROCEDURE — 99232 SBSQ HOSP IP/OBS MODERATE 35: CPT | Performed by: STUDENT IN AN ORGANIZED HEALTH CARE EDUCATION/TRAINING PROGRAM

## 2025-02-16 PROCEDURE — 63700000 HC SELF-ADMINISTRABLE DRUG: Performed by: PHYSICIAN ASSISTANT

## 2025-02-16 PROCEDURE — 63700000 HC SELF-ADMINISTRABLE DRUG: Performed by: NURSE PRACTITIONER

## 2025-02-16 PROCEDURE — 80048 BASIC METABOLIC PNL TOTAL CA: CPT | Performed by: NURSE PRACTITIONER

## 2025-02-16 PROCEDURE — 63700000 HC SELF-ADMINISTRABLE DRUG: Performed by: INTERNAL MEDICINE

## 2025-02-16 RX ORDER — GABAPENTIN 400 MG/1
400 CAPSULE ORAL 3 TIMES DAILY
Status: DISCONTINUED | OUTPATIENT
Start: 2025-02-16 | End: 2025-02-18 | Stop reason: HOSPADM

## 2025-02-16 RX ORDER — ZOLPIDEM TARTRATE 5 MG/1
2.5 TABLET ORAL NIGHTLY PRN
Status: DISCONTINUED | OUTPATIENT
Start: 2025-02-16 | End: 2025-02-18 | Stop reason: HOSPADM

## 2025-02-16 RX ADMIN — Medication 5 MG: at 20:25

## 2025-02-16 RX ADMIN — MIDODRINE HYDROCHLORIDE 10 MG: 5 TABLET ORAL at 06:43

## 2025-02-16 RX ADMIN — ACETAMINOPHEN 650 MG: 325 TABLET, FILM COATED ORAL at 09:37

## 2025-02-16 RX ADMIN — CYANOCOBALAMIN TAB 1000 MCG 1000 MCG: 1000 TAB at 09:34

## 2025-02-16 RX ADMIN — Medication 1 TABLET: at 09:33

## 2025-02-16 RX ADMIN — GABAPENTIN 400 MG: 400 CAPSULE ORAL at 20:25

## 2025-02-16 RX ADMIN — GABAPENTIN 100 MG: 100 CAPSULE ORAL at 09:33

## 2025-02-16 RX ADMIN — GABAPENTIN 100 MG: 100 CAPSULE ORAL at 13:17

## 2025-02-16 RX ADMIN — FLUOXETINE HYDROCHLORIDE 40 MG: 20 CAPSULE ORAL at 09:34

## 2025-02-16 RX ADMIN — PANTOPRAZOLE SODIUM 20 MG: 20 TABLET, DELAYED RELEASE ORAL at 09:34

## 2025-02-16 RX ADMIN — ATORVASTATIN CALCIUM 20 MG: 20 TABLET, FILM COATED ORAL at 09:34

## 2025-02-16 RX ADMIN — LORAZEPAM 0.25 MG: 0.5 TABLET ORAL at 00:06

## 2025-02-16 RX ADMIN — TOPIRAMATE 50 MG: 100 TABLET, FILM COATED ORAL at 15:19

## 2025-02-16 RX ADMIN — LORAZEPAM 0.25 MG: 0.5 TABLET ORAL at 15:22

## 2025-02-16 RX ADMIN — Medication 400 MG: at 20:25

## 2025-02-16 RX ADMIN — ZOLPIDEM TARTRATE 2.5 MG: 5 TABLET, FILM COATED ORAL at 21:18

## 2025-02-16 RX ADMIN — MIDODRINE HYDROCHLORIDE 10 MG: 5 TABLET ORAL at 11:56

## 2025-02-16 RX ADMIN — ACETAMINOPHEN 650 MG: 325 TABLET, FILM COATED ORAL at 15:22

## 2025-02-16 RX ADMIN — Medication 400 MG: at 09:33

## 2025-02-16 RX ADMIN — LEVOTHYROXINE SODIUM 50 MCG: 0.05 TABLET ORAL at 06:43

## 2025-02-16 RX ADMIN — PANTOPRAZOLE SODIUM 20 MG: 20 TABLET, DELAYED RELEASE ORAL at 20:25

## 2025-02-16 RX ADMIN — TOPIRAMATE 50 MG: 100 TABLET, FILM COATED ORAL at 09:33

## 2025-02-16 ASSESSMENT — COGNITIVE AND FUNCTIONAL STATUS - GENERAL
CLIMB 3 TO 5 STEPS WITH RAILING: 1 - TOTAL
WALKING IN HOSPITAL ROOM: 1 - TOTAL
STANDING UP FROM CHAIR USING ARMS: 1 - TOTAL
STANDING UP FROM CHAIR USING ARMS: 1 - TOTAL
MOVING TO AND FROM BED TO CHAIR: 1 - TOTAL
CLIMB 3 TO 5 STEPS WITH RAILING: 1 - TOTAL
MOVING TO AND FROM BED TO CHAIR: 1 - TOTAL
WALKING IN HOSPITAL ROOM: 1 - TOTAL

## 2025-02-16 NOTE — PLAN OF CARE
Care Coordination Discharge Plan Note     Discharge Needs Assessment  Concerns to be Addressed: care coordination/care conferences, discharge planning  Current Discharge Risk: lives alone, physical impairment    Anticipated Discharge Plan  Anticipated Discharge Disposition: skilled nursing facility  Type of Skilled Nursing Care Services: PT, OT, nursing    Patient Choice  Offered/Gave Vendor List: yes  Patient and/or patient guardian/advocate was made aware of their right to choose a provider. A list of eligible providers was presented and reviewed with the patient and/or patient guardian/advocate in written and/or verbal form. The list delineates providers in the patients desired geographic area who are participating in the Medicare program and/or providers contracted with the patients primary insurance. The Medicare list and quality ratings were obtained from the Medicare.gov [medicare.gov] website.    ---------------------------------------------------------------------------------------------------------------------  Concerns Comments: Spoke with [facility], pt is medically stable for discharge.  Secure chat sent to therapy to see pt in the AM, updated notes needed for [facility] auth.  Follow up needed with [facility] to make sure they have a bed available.    Discharge Plan:   Disposition/Destination:   /    Discharge Facility:   Destination - Admitted Since 2/1/2025       Service Provider Services Address Phone Fax Patient Preferred Last Updated    [facility] [address] [place] [id number] [phone] -- Venita Alvarez, RN 2/11/2025 4538          Community Resources:      Discharge Transportation:  Is Out of Hospital DNR needed at Discharge: no  Does patient need discharge transport? Yes

## 2025-02-16 NOTE — PROGRESS NOTES
Hospital Medicine     Daily Progress Note       SUBJECTIVE   Pt seen and examined. D/W Nurse, no acute events   Patient complains of pain all over, asking for home medications  Reports feeling anxious and unable to sleep at night  No complaints otherwise     OBJECTIVE   Vital signs in last 24 hours:  Temp:  [36.4 °C (97.6 °F)-37.1 °C (98.7 °F)] 36.7 °C (98 °F)  Heart Rate:  [62-96] 96  Resp:  [16-18] 17  BP: ()/(62-74) 92/74    Intake/Output Summary (Last 24 hours) at 2/16/2025 1500  Last data filed at 2/16/2025 1200  Gross per 24 hour   Intake 0 ml   Output 300 ml   Net -300 ml       Physical Exam  General: Pt Alert and Interactive, No Acute Distress  HEENT: PERRLA, EOMI, Supple  Lungs: Clear to Auscultation B/L, No Wheeze/Rhonchi  CVS: S1 S2 heard, No Murmurs  Abdomen: Soft, Non tender, Non distended, BS heard  CNS: AAOX3,   Psychiatric: Calm and Cooperative    LINES, DRAINS, AIRWAYS, WOUNDS   Peripheral IV (Adult) 02/13/25 Left Antecubital (Active)   Number of days: 3          LABS, IMAGING, TELEMETRY     CBC Results         02/16/25 02/15/25 02/14/25     0853 1137 0448    WBC 5.67 5.72 5.86    RBC 3.02 2.94 2.72    HGB 10.2 10.1 9.4    HCT 32.0 31.2 29.3    .0 106.1 107.7    MCH 33.8 34.4 34.6    MCHC 31.9 32.4 32.1     277 259           CMP Results         02/16/25 02/15/25 02/14/25     0853 1137 0448     139 143    K 4.3 4.4 4.5    Cl 109 107 110    CO2 27 26 30    Glucose 84 84 83    BUN 9 7 5    Creatinine 0.4 0.4 0.4    Calcium 8.5 8.9 8.8    Anion Gap 5 6 3    EGFR >60.0 >60.0 >60.0           Comment for EGFR at 0853 on 02/16/25: Calculation based on the Chronic Kidney Disease Epidemiology Collaboration (CKD-EPI) equation refit without adjustment for race.    Comment for EGFR at 1137 on 02/15/25: Calculation based on the Chronic Kidney Disease Epidemiology Collaboration (CKD-EPI) equation refit without adjustment for race.    Comment for EGFR at 0448 on 02/14/25:  Calculation based on the Chronic Kidney Disease Epidemiology Collaboration (CKD-EPI) equation refit without adjustment for race.                   ASSESSMENT AND PLAN       #Hypotension  -etiology likely polypharmacy  -cosyntropin stim test  negative  -Repeat echo EF 65%, no regional wall motion abnormalities  -Maintain MAP >60, now off pressors, stopped on rounds  -Continue midodrine  -Hold sedating medications  Adjustment to home medications.,  Ativan and Ambien dose decreased  Gabapentin resumed at home dose as it is for seizures     #Cystitis  -Urine culture with Klebsiella pneumonia and Proteus mirabilis. Treated with course of ceftriaxone  -CT with no  obstruction     # Depression  - psych input noted  - continue home: LORazepam tablet po BID  - continue home: fluoxetine 40 mg po daily  - Hold  trazodone 100 mg po daily     # Anxiety  see section on depression.     # Eating disorder  - reports she has not had solid food intake since [place]  - supervise feeds  - psych consult appreciated: she would benefit from placement at a rehab/eating disorder clinic but her difficulty with ambulation may preclude her from this.     # Hypothyroidism  - TSH: 2.12 (2/7/25), free T4: 0.6 (8/16/24)  - continued on synthroid    # PTSD (post-traumatic stress disorder)   # Insomnia  # At risk for polypharmacy  # Chronic pain disorder  Holding sedatives as able due to hypotension  Restarted Ativan at low-dose 0.25 twice daily as needed to avoid withdrawal  Ambien dose decreased to 2.5 mg as needed nightly   Patient taking gabapentin for seizures, so resumed at home dose       # Hyponatremia  # -hypovolemic hyponatremia  -hyponatremia is now resolved        # Thrombocytopenia (CMS/HCC)  -hematology consult appreciated  -platelets appear to be improving for now after antibiotics have been stopped  -peripheral smear appreciated  -DIC panel is negative.  No bleeding     # Lower abdominal pain  -CT abdomen is negative for colitis,  negative for bowel obstruction on this admission  -lower abdominal pain is now resolved     # Elevated liver enzymes  -US right upper quadrant appreciated, negative for biliary dilatation, neg for hepatic steatosis.  -GI consult appreciated: stop acetaminophen for pain control  -monitor LFTs  hepatitis panel is negative.  LFTs improving  Now patient back on Tylenol for pain.  Monitor LFTs     # Hypokalemia  Improved  -hold off on reintroducing florinef as this can promote further hypokalemia.  cardiac monitoring while there is hypokalemia.    # Pancytopenia     # GERD  - currently on: pantoprazole 20 mg po BID     # Anemia: acute on chronic, due to bone marrow suppression, stable, macrocytic  stable     # Hyperlipidemia  - continue home: atorvastatin 20 mg po daily     # Seizure disorder  - continue home: LORazepam tablet po BID  - continue home: gabapentin 400 mg po QID  - continue home: topiramate 50 mg po BID       VTE Prophylaxis:  Current anticoagulants:    None      Code Status: Full Code        Estimated Discharge Date: 2/17/2025   Disposition Planning: Medically stable for discharge.  Needs Auth for SNF       Kiki Best MD  2/16/2025

## 2025-02-16 NOTE — PLAN OF CARE
Plan of Care Review  Plan of Care Reviewed With: patient  Progress: no change  Outcome Evaluation: Patient with reports of neck, head, and back pain that is relieved with prn tylenol. Patien frustrated with wanting her usual sleep medication. Reassurance provided and plan of care reviewed with patient. Vital's stable. SR on monitor.

## 2025-02-16 NOTE — PLAN OF CARE
Plan of Care Review  Plan of Care Reviewed With: patient  Progress: no change  Outcome Evaluation: Patient wih complaints of pain, adm tylenol. q2 hr turns. ativan 0.25 adm.

## 2025-02-17 LAB
ALBUMIN SERPL-MCNC: 3.3 G/DL (ref 3.5–5.7)
ALP SERPL-CCNC: 191 IU/L (ref 34–125)
ALT SERPL-CCNC: 43 IU/L (ref 7–52)
ANION GAP SERPL CALC-SCNC: 7 MEQ/L (ref 3–15)
AST SERPL-CCNC: 33 IU/L (ref 13–39)
BACTERIA BLD CULT: NORMAL
BACTERIA BLD CULT: NORMAL
BASOPHILS # BLD: 0.02 K/UL (ref 0.01–0.1)
BASOPHILS NFR BLD: 0.3 %
BILIRUB SERPL-MCNC: 1 MG/DL (ref 0.3–1.2)
BUN SERPL-MCNC: 10 MG/DL (ref 7–25)
CALCIUM SERPL-MCNC: 8.3 MG/DL (ref 8.6–10.3)
CHLORIDE SERPL-SCNC: 112 MEQ/L (ref 98–107)
CO2 SERPL-SCNC: 23 MEQ/L (ref 21–31)
CREAT SERPL-MCNC: 0.5 MG/DL (ref 0.6–1.2)
DIFFERENTIAL METHOD BLD: ABNORMAL
EGFRCR SERPLBLD CKD-EPI 2021: >60 ML/MIN/1.73M*2
EOSINOPHIL # BLD: 0.22 K/UL (ref 0.04–0.36)
EOSINOPHIL NFR BLD: 2.8 %
ERYTHROCYTE [DISTWIDTH] IN BLOOD BY AUTOMATED COUNT: 15.4 % (ref 11.7–14.4)
GLUCOSE SERPL-MCNC: 82 MG/DL (ref 70–99)
HCT VFR BLD AUTO: 33.7 % (ref 35–45)
HGB BLD-MCNC: 10.9 G/DL (ref 11.8–15.7)
IMM GRANULOCYTES # BLD AUTO: 0.03 K/UL (ref 0–0.08)
IMM GRANULOCYTES NFR BLD AUTO: 0.4 %
LYMPHOCYTES # BLD: 2.09 K/UL (ref 1.2–3.5)
LYMPHOCYTES NFR BLD: 26.3 %
MCH RBC QN AUTO: 33.9 PG (ref 28–33.2)
MCHC RBC AUTO-ENTMCNC: 32.3 G/DL (ref 32.2–35.5)
MCV RBC AUTO: 104.7 FL (ref 83–98)
MONOCYTES # BLD: 0.56 K/UL (ref 0.28–0.8)
MONOCYTES NFR BLD: 7 %
NEUTROPHILS # BLD: 5.04 K/UL (ref 1.7–7)
NEUTS SEG NFR BLD: 63.2 %
NRBC BLD-RTO: 0 %
PLATELET # BLD AUTO: 257 K/UL (ref 150–369)
PMV BLD AUTO: 11.7 FL (ref 9.4–12.3)
POTASSIUM SERPL-SCNC: 3.8 MEQ/L (ref 3.5–5.1)
PROT SERPL-MCNC: 4.7 G/DL (ref 6–8.2)
RBC # BLD AUTO: 3.22 M/UL (ref 3.93–5.22)
SODIUM SERPL-SCNC: 142 MEQ/L (ref 136–145)
WBC # BLD AUTO: 7.96 K/UL (ref 3.8–10.5)

## 2025-02-17 PROCEDURE — 85025 COMPLETE CBC W/AUTO DIFF WBC: CPT | Performed by: NURSE PRACTITIONER

## 2025-02-17 PROCEDURE — 63700000 HC SELF-ADMINISTRABLE DRUG: Performed by: STUDENT IN AN ORGANIZED HEALTH CARE EDUCATION/TRAINING PROGRAM

## 2025-02-17 PROCEDURE — 63700000 HC SELF-ADMINISTRABLE DRUG

## 2025-02-17 PROCEDURE — 97535 SELF CARE MNGMENT TRAINING: CPT | Mod: GO

## 2025-02-17 PROCEDURE — 99231 SBSQ HOSP IP/OBS SF/LOW 25: CPT | Performed by: STUDENT IN AN ORGANIZED HEALTH CARE EDUCATION/TRAINING PROGRAM

## 2025-02-17 PROCEDURE — 21400000 HC ROOM AND CARE CCU/INTERMEDIATE

## 2025-02-17 PROCEDURE — 80053 COMPREHEN METABOLIC PANEL: CPT | Performed by: STUDENT IN AN ORGANIZED HEALTH CARE EDUCATION/TRAINING PROGRAM

## 2025-02-17 PROCEDURE — 63700000 HC SELF-ADMINISTRABLE DRUG: Performed by: PHYSICIAN ASSISTANT

## 2025-02-17 PROCEDURE — 36415 COLL VENOUS BLD VENIPUNCTURE: CPT | Performed by: STUDENT IN AN ORGANIZED HEALTH CARE EDUCATION/TRAINING PROGRAM

## 2025-02-17 PROCEDURE — 63700000 HC SELF-ADMINISTRABLE DRUG: Performed by: NURSE PRACTITIONER

## 2025-02-17 PROCEDURE — 97530 THERAPEUTIC ACTIVITIES: CPT | Mod: GP,CQ

## 2025-02-17 PROCEDURE — 63700000 HC SELF-ADMINISTRABLE DRUG: Performed by: INTERNAL MEDICINE

## 2025-02-17 RX ORDER — MIDODRINE HYDROCHLORIDE 5 MG/1
15 TABLET ORAL
Status: DISCONTINUED | OUTPATIENT
Start: 2025-02-18 | End: 2025-02-18 | Stop reason: HOSPADM

## 2025-02-17 RX ORDER — TRAZODONE HYDROCHLORIDE 50 MG/1
50 TABLET ORAL NIGHTLY
Status: DISCONTINUED | OUTPATIENT
Start: 2025-02-17 | End: 2025-02-18 | Stop reason: HOSPADM

## 2025-02-17 RX ORDER — IBUPROFEN 400 MG/1
400 TABLET ORAL EVERY 8 HOURS PRN
Status: DISCONTINUED | OUTPATIENT
Start: 2025-02-17 | End: 2025-02-18 | Stop reason: HOSPADM

## 2025-02-17 RX ADMIN — MIDODRINE HYDROCHLORIDE 10 MG: 5 TABLET ORAL at 12:12

## 2025-02-17 RX ADMIN — FLUOXETINE HYDROCHLORIDE 40 MG: 20 CAPSULE ORAL at 09:11

## 2025-02-17 RX ADMIN — Medication 5 MG: at 20:19

## 2025-02-17 RX ADMIN — CYANOCOBALAMIN TAB 1000 MCG 1000 MCG: 1000 TAB at 09:10

## 2025-02-17 RX ADMIN — ATORVASTATIN CALCIUM 20 MG: 20 TABLET, FILM COATED ORAL at 09:10

## 2025-02-17 RX ADMIN — ZOLPIDEM TARTRATE 2.5 MG: 5 TABLET, FILM COATED ORAL at 21:02

## 2025-02-17 RX ADMIN — MIDODRINE HYDROCHLORIDE 10 MG: 5 TABLET ORAL at 06:30

## 2025-02-17 RX ADMIN — ACETAMINOPHEN 650 MG: 325 TABLET, FILM COATED ORAL at 20:18

## 2025-02-17 RX ADMIN — LORAZEPAM 0.25 MG: 0.5 TABLET ORAL at 09:16

## 2025-02-17 RX ADMIN — TRAZODONE HYDROCHLORIDE 50 MG: 50 TABLET ORAL at 21:02

## 2025-02-17 RX ADMIN — Medication 1 TABLET: at 09:10

## 2025-02-17 RX ADMIN — TOPIRAMATE 50 MG: 100 TABLET, FILM COATED ORAL at 09:10

## 2025-02-17 RX ADMIN — GABAPENTIN 400 MG: 400 CAPSULE ORAL at 14:55

## 2025-02-17 RX ADMIN — ACETAMINOPHEN 650 MG: 325 TABLET, FILM COATED ORAL at 09:16

## 2025-02-17 RX ADMIN — MIDODRINE HYDROCHLORIDE 10 MG: 5 TABLET ORAL at 16:30

## 2025-02-17 RX ADMIN — ACETAMINOPHEN 650 MG: 325 TABLET, FILM COATED ORAL at 14:55

## 2025-02-17 RX ADMIN — Medication 400 MG: at 20:19

## 2025-02-17 RX ADMIN — PANTOPRAZOLE SODIUM 20 MG: 20 TABLET, DELAYED RELEASE ORAL at 20:19

## 2025-02-17 RX ADMIN — PANTOPRAZOLE SODIUM 20 MG: 20 TABLET, DELAYED RELEASE ORAL at 09:11

## 2025-02-17 RX ADMIN — Medication 400 MG: at 09:11

## 2025-02-17 RX ADMIN — LEVOTHYROXINE SODIUM 50 MCG: 0.05 TABLET ORAL at 06:22

## 2025-02-17 RX ADMIN — TOPIRAMATE 50 MG: 100 TABLET, FILM COATED ORAL at 14:55

## 2025-02-17 RX ADMIN — GABAPENTIN 400 MG: 400 CAPSULE ORAL at 20:19

## 2025-02-17 RX ADMIN — GABAPENTIN 400 MG: 400 CAPSULE ORAL at 09:10

## 2025-02-17 ASSESSMENT — COGNITIVE AND FUNCTIONAL STATUS - GENERAL
HELP NEEDED FOR PERSONAL GROOMING: 2 - A LOT
EATING MEALS: 2 - A LOT
AFFECT: FLAT/BLUNTED AFFECT
DRESSING REGULAR UPPER BODY CLOTHING: 2 - A LOT
TOILETING: 1 - TOTAL
CLIMB 3 TO 5 STEPS WITH RAILING: 1 - TOTAL
DRESSING REGULAR LOWER BODY CLOTHING: 1 - TOTAL
WALKING IN HOSPITAL ROOM: 1 - TOTAL
STANDING UP FROM CHAIR USING ARMS: 2 - A LOT
STANDING UP FROM CHAIR USING ARMS: 1 - TOTAL
CLIMB 3 TO 5 STEPS WITH RAILING: 1 - TOTAL
MOVING TO AND FROM BED TO CHAIR: 1 - TOTAL
WALKING IN HOSPITAL ROOM: 1 - TOTAL
HELP NEEDED FOR BATHING: 2 - A LOT
AFFECT: FLAT/BLUNTED AFFECT
MOVING TO AND FROM BED TO CHAIR: 1 - TOTAL

## 2025-02-17 NOTE — PLAN OF CARE
Plan of Care Review  Plan of Care Reviewed With: patient  Progress: no change  Outcome Evaluation: Patient slept throughout the night, denies of any pain/discomfort. Vital signs stable. SR on monitor, no events overnight. Nurse with place pillow behind upper back after patient self-repositions.

## 2025-02-17 NOTE — PROGRESS NOTES
Hospital Medicine     Daily Progress Note       SUBJECTIVE   Patient seen and examined at bedside.  Continues to have pain all over.  Asked for trazodone to be resumed.  No other acute complaints.     OBJECTIVE   Vital signs in last 24 hours:  Temp:  [36.3 °C (97.4 °F)-37.1 °C (98.8 °F)] 36.8 °C (98.2 °F)  Heart Rate:  [65-86] 72  Resp:  [16-18] 16  BP: ()/(64-71) 98/64    Intake/Output Summary (Last 24 hours) at 2/17/2025 1408  Last data filed at 2/16/2025 1700  Gross per 24 hour   Intake 0 ml   Output --   Net 0 ml       General: no acute distress, awake and alert  HEENT: extraocular movements intact  Cardio: regular rate and rhythm  Pulm: clear to auscultation bilaterally, no wheezing  Abdominal: soft, nontender, nondistended  MSK: grossly intact, ROM intact, no edema  Skin: normal color and turgor  Neuro: Awake and alert, oriented x3, no focal deficits     LINES, DRAINS, AIRWAYS, WOUNDS   Peripheral IV (Adult) 02/13/25 Left Antecubital (Active)   Number of days: 4          LABS, IMAGING, TELEMETRY   Results from last 7 days   Lab Units 02/17/25  0841 02/16/25  0853 02/15/25  1137   WBC K/uL 7.96 5.67 5.72   HEMOGLOBIN g/dL 10.9* 10.2* 10.1*   HEMATOCRIT % 33.7* 32.0* 31.2*   PLATELETS K/uL 257 275 277      Results from last 7 days   Lab Units 02/17/25  0841 02/16/25  1526 02/16/25  0853   SODIUM mEQ/L 142 142 141   POTASSIUM mEQ/L 3.8 4.0 4.3   CHLORIDE mEQ/L 112* 110* 109*   CO2 mEQ/L 23 26 27   BUN mg/dL 10 10 9   CREATININE mg/dL 0.5* 0.4* 0.4*   GLUCOSE mg/dL 82 88 84   CALCIUM mg/dL 8.3* 8.5* 8.5*        X-RAY CHEST 1 VIEW    Result Date: 2/13/2025  IMPRESSION:  No acute cardiopulmonary process. COMPARISON: Chest studies August 2024 through 1/27/2025. COMMENT:  Portable AP upright imaging of the chest 2333 hours. The lungs are less well expanded but clear. No congestive changes. No consolidation or pleural fluid. Normal cardiac size, hilar and mediastinal contours.    ULTRASOUND ABDOMEN  LIMITED    Result Date: 2/6/2025  IMPRESSION: Unremarkable right upper quadrant ultrasound noting surgical absence of the gallbladder    CT LUMBAR SPINE WITHOUT IV CONTRAST    Result Date: 2/2/2025  IMPRESSION: No acute osseous abnormalities or significant degenerative change    CT THORACIC SPINE WITHOUT IV CONTRAST    Result Date: 2/2/2025  IMPRESSION: Mild multilevel thoracic spondylosis without compression deformity or destructive change    CT ABDOMEN PELVIS WITHOUT IV CONTRAST    Result Date: 1/27/2025  IMPRESSION: 1.  Concentric prominence the walls the urinary bladder.  Cystitis cannot be excluded 2.  There is high density material present within the colon presumably representing ingested product.  There is no CT evidence of a bowel obstruction. The patient is status post Mary-en-Y gastric surgery. 3.  Uterine fibroid 4.  See comments for additional findings     X-RAY CHEST 2 VIEWS    Result Date: 1/27/2025  IMPRESSION: No acute cardiopulmonary process.       ASSESSMENT AND PLAN     # Hypotension  -etiology likely polypharmacy  -cosyntropin stim test negative  -Repeat echo EF 65%, no regional wall motion abnormalities  -Continue midodrine  -Hold sedating medications  Adjustment to home medications., Ativan and Ambien dose decreased  Gabapentin resumed at home dose as it is for seizures  Continued trazodone at lower dose     # Cystitis  -Urine culture with Klebsiella pneumonia and Proteus mirabilis. Treated with course of ceftriaxone  -CT with no  obstruction  Resolved     # Depression  - psych input noted  - continue home: fluoxetine 40 mg po daily  resume trazodone at lower dose     # Anxiety  see section on depression.     # Eating disorder  - reports she has not had solid food intake since [place]  - supervise feeds  - psych consult appreciated: she would benefit from placement at a rehab/eating disorder clinic but her difficulty with ambulation may preclude her from this.     # Hypothyroidism  - TSH:  2.12 (2/7/25), free T4: 0.6 (8/16/24)  - continued on synthroid     # PTSD (post-traumatic stress disorder)  # Insomnia  # At risk for polypharmacy  # Chronic pain disorder  Holding sedatives as able due to hypotension  Restarted Ativan at low-dose 0.25 twice daily as needed to avoid withdrawal  Ambien dose decreased to 2.5 mg as needed nightly  Patient taking gabapentin for seizures, so resumed at home dose  Trazodone at lower dose     # Hyponatremia  # -hypovolemic hyponatremia  -hyponatremia is now resolved     # Thrombocytopenia (CMS/HCC)  -hematology consult appreciated  -platelets appear to be improving for now after antibiotics have been stopped  -peripheral smear appreciated  -DIC panel is negative.  No bleeding     # Hypokalemia  Improved  -hold off on reintroducing florinef as this can promote further hypokalemia.  cardiac monitoring while there is hypokalemia.     # GERD  - currently on: pantoprazole 20 mg po BID     # Anemia: chronic, due to bone marrow suppression, stable, macrocytic  stable  - vitamin B12: 708 (2/3/25), folate: 10.9 (9/29/24)  - reticulocyte index: 0.3 %, mean corpuscular volume: 105 (2/17/25)  - currently on: cyanocobalamin 1000 mcg po daily     # Hyperlipidemia  - continue home: atorvastatin 20 mg po daily     # Seizure disorder  - continue home: topiramate 50 mg po BID  - continue home: gabapentin 400 mg po TID     # Sepsis with acute organ dysfunction     # Hypocalcemia     # Obesity, class I     ::: Stable/Chronic/Resolved :::     # Lower abdominal pain  -CT abdomen is negative for colitis, negative for bowel obstruction on this admission  -lower abdominal pain is now resolved     # Elevated liver enzymes  -US right upper quadrant appreciated, negative for biliary dilatation, neg for hepatic steatosis.  -GI consult appreciated: stop acetaminophen for pain control  -monitor LFTs  hepatitis panel is negative.  LFTs improving       VTE Prophylaxis:  Current  anticoagulants:    None      Code Status: Full Code        Estimated Discharge Date: 2/17/2025     Disposition Planning: Medically stable for discharge       Bunny Chavarria DO  2/17/2025

## 2025-02-17 NOTE — PROGRESS NOTES
Occupational Therapy -  Daily Treatment/Progress Note     Patient: Felipa Cuadra  Location: [place] [address]  MRN: [id number]  Today's date: 2/17/2025    HISTORY OF PRESENT ILLNESS     Felipa is a 44 y.o. female admitted on 2/1/2025 with UTI (urinary tract infection) [N39.0]  Lower abdominal pain [R10.30]  Urinary tract infection with hematuria, site unspecified [N39.0, R31.9]. Principal problem is UTI (urinary tract infection).    Past Medical History  Felipa has a past medical history of Anxiety, Depression, and Eating disorder.    History of Present Illness    Patient was seen in the ED 1 week ago for evaluation of abdominal pain and generalized weakness.  Workup revealed acute cystitis.  She was discharged on a course of Macrobid, which patient reports she has been taking.  She presents today for evaluation of persistent abdominal pain.  Patient reports suprapubic pain that radiates to her back.  She reports it is constant.  She reports she is weak and unable to ambulate.  She reports increased frequency of urination.  She reports chills.  She reports nausea without vomiting.  She reports she has not had solid food intake since Christmas.  She reports she has been drinking a lot of Gatorade.  She reports she is currently having black stool and vaginal bleeding x 3 days.  She reports she rarely gets her period.  She has not noticed any blood clots.  She reports she is feeling more depressed lately.  She denies suicidal ideation.  She reports she smokes 4 cigarettes/day.  She reports she quit alcohol use 4 years ago.  She denies fever, syncope, vomiting, chest pain, shortness of breath, constipation, diarrhea.     2/13 -  patient hypotensive with a concerning low MAP, required initiation of a norepinephrine infusion and transfer to the ICU.   PRIOR LEVEL OF FUNCTION AND LIVING ENVIRONMENT     Prior Level of Function      Flowsheet Row Most Recent Value   Dominant Hand right   Ambulation assistive  equipment   Transferring assistive equipment   Toileting independent   Bathing assistive equipment and person   Dressing assistive person   Eating independent   IADLs independent   Prior Level of Function Comment Reports assist from HHAs 6 days/week with bathing + dressing. Uses a walker at home   Assistive Device Currently Used at Home walker, standard, grab bar, shower chair             Prior Living Environment      Flowsheet Row Most Recent Value   People in Home alone   Current Living Arrangements home   Home Accessibility elevator accessible   Living Environment Comment Lives alone in apartment with elevator access. Tub shower with SC + GBs.          Occupational Profile      Flowsheet Row Most Recent Value   Successful Occupations Diminished ADL status s/p UTI   Performance Patterns Likes to read, likes to write fanfiction   Environmental Supports and Barriers HHAs available to assist 6 days/week          VITALS AND PAIN     OT Vitals      Date/Time Pulse HR Source SpO2 Pt Activity O2 Therapy BP BP Location BP Method Pt Position [facility]   02/17/25 0924 72 Monitor 99 % At rest None (Room air) 104/65 Right upper arm Automatic Lying TER          OT Pain      Date/Time Pain Type Location Rating: Rest Rating: Activity Interventions [facility]   02/17/25 0924 Pain Assessment generalized 8 - severe pain 8 - severe pain care clustered TER             Objective   OBJECTIVE     Start time:  0922  End time:  0938  Session Length: 16 min  Mode of Treatment: co-treatment  Reason for co-treatment: auth notes    General Observations  Patient received upright, in bed. She was no issues or concerns identified by nurse prior to session, agreeable to therapy. waching [employer] on her iPad    Precautions: fall       Limitations/Impairments: safety/cognitive      OT Eval and Treat - 02/17/25 0922          Cognition    Orientation Status oriented x 3     Affect/Mental Status flat/blunted affect     Follows Commands follows one-step  commands;75-90% accuracy;repetition of directions required;delayed response/completion;increased processing time needed     Attention Deficit minimal deficit     Executive Function Deficit self-monitoring/self-correction;insight/awareness of deficits;moderate deficit     Safety Deficit moderate deficit;awareness of need for assistance;safety precautions awareness     Comment, Cognition reporting functional deficits that do not correlate to strength/ROM observed        Upper Extremity Strength    Hand, Left (Strength) 3     Hand, Right (Strength) 3        Bed Mobility    Bed Mobility Activities supine to sit;sit to supine     [place] close supervision;dependent (less than 25% patient effort);moderate assist (50-74% patient effort);2 person assist     Assistive Device head of bed elevated;bed rails     Comment first trial with inc time CS level with ataxic movement observed, once at EOB with impulsive movement and poor safety awareness w/ fwd flexed posture moving hips off the EOB and DEP x2 to recover and safely return to supine. second trial MOD A x2 for supin>sit transition from flat bed. when boosting back to bed noted to bring BLE to chest with knees flexed        Mobility Belt    Mobility Belt Used During Session yes        Sit/Stand Transfer    Surface edge of bed     [place] maximum assist (25-49% patient effort);2 person assist     Transfer Comments b/l HHA and assist at pelvis / anteriorly with iminal standing time and poor control of BLE in stance        Lower Body Dressing    Tasks don;socks;doff     [place] dependent (less than 25% patient effort)     Position edge of bed sitting        Grooming    [place] dependent (less than 25% patient effort)     Position edge of bed sitting     Comment  strength noted 3/5 this date however pt states she is unable to hold brush        Balance    Static Sitting Balance mild impairment;sitting, edge of bed     Dynamic Sitting Balance moderate  impairment;sitting, edge of bed     Sit to Stand Dynamic Balance severe impairment;supported     Static Standing Balance severe impairment;supported     Dynamic Standing Balance not tested     Comment, Balance ataxic movements and impulsive requires MIN-MAX A for balance recovery at periods        Impairments/Safety Issues    Impairments Affecting Function balance;strength;cognition;coordination;endurance/activity tolerance;postural/trunk control;grasp;motor control;pain     Cognitive Impairments, Safety/Performance problem-solving/reasoning;safety precaution awareness;awareness, need for assistance;insight into deficits/self-awareness;judgment;sequencing abilities;impulsivity     Functional Endurance fair     Safety Issues Affecting Function impulsivity;judgment;safety precaution awareness;safety precautions follow-through/compliance     Comment, Safety Issues/Impairments impulsive and fast paced movements  ; falls risk                                                 Session Outcome  Patient reclined, in bed at end of session, personal items in reach, chair alarm on. Nursing notified about patient's performance and patient's position.    AM-PAC - ADL (Current Function)     Putting on/taking off regular lower body clothing 1 - Total   Bathing 2 - A Lot   Toileting 1 - Total   Putting on/taking off regular upper body clothing 2 - A Lot   Help for taking care of personal grooming 2 - A Lot   Eating meals 2 - A Lot   AM-PAC ADL Score 10      ASSESSMENT AND PLAN     Progress Summary  OT f/u tx complete. AMPAC 10. Pt continues to have self reported deficits that do not align with functional movements observed. Pt is impulsive and requires Ax2 for safety due to impulsive/fast paced movements. Pt is CS/MOD A x2 for bed mobiltiy, see above. Pt DEP for LBD and grooming stating she cannot perform although  strength 3/5 this date. Rec cont OT POC    Patient/Family Therapy Goal Statement: to walk again    OT Plan       Flowsheet Row Most Recent Value   Rehab Potential fair, will monitor progress closely at 02/14/2025 1230   Therapy Frequency 3 times/wk at 02/14/2025 1230   Planned Therapy Interventions activity tolerance training, functional balance retraining, transfer/mobility retraining, occupation/activity based interventions, BADL retraining at 02/14/2025 1230            OT Discharge Recommendations      Flowsheet Row Most Recent Value   OT Recommended Discharge Disposition skilled nursing facility at 02/17/2025 0922   Anticipated Equipment Needs if Discharged Home (OT) commode, 3-in-1, dressing equipment at 02/14/2025 1230                 OT Goals      Flowsheet Row Most Recent Value   Bed Mobility Goal 1    Activity/Assistive Device bed mobility activities, all at 02/14/2025 1230   [place] minimum assist (75% or more patient effort) at 02/14/2025 1230   Time Frame by discharge at 02/14/2025 1230   Progress/Outcome goal ongoing at 02/14/2025 1230   Transfer Goal 1    Activity/Assistive Device all transfers at 02/14/2025 1230   [place] minimum assist (75% or more patient effort) at 02/14/2025 1230   Time Frame by discharge at 02/14/2025 1230   Progress/Outcome goal ongoing at 02/14/2025 1230   Bathing Goal 1    Activity/Assistive Device bathing skills, all at 02/14/2025 1230   [place] minimum assist (75% or more patient effort) at 02/14/2025 1230   Time Frame by discharge at 02/14/2025 1230   Progress/Outcome goal ongoing at 02/14/2025 1230   Dressing Goal 1    Activity/Adaptive Equipment lower body dressing at 02/04/2025 1403   [place] minimum assist (75% or more patient effort) at 02/04/2025 1403   Time Frame by discharge at 02/04/2025 1403   Progress/Outcome goal ongoing at 02/04/2025 1403   Toileting Goal 1    Activity/Assistive Device toileting skills, all at 02/14/2025 1230   [place] minimum assist (75% or more patient effort) at 02/14/2025 1230   Time Frame by discharge at 02/14/2025 1230    Progress/Outcome goal ongoing at 02/14/2025 1231

## 2025-02-17 NOTE — PROGRESS NOTES
Physical Therapy -  Daily Treatment/Progress Note     Patient: Felipa Cuadra  Location: [place] [address]  MRN: [id number]  Today's date: 2/17/2025    HISTORY OF PRESENT ILLNESS     Felipa is a 44 y.o. female admitted on 2/1/2025 with UTI (urinary tract infection) [N39.0]  Lower abdominal pain [R10.30]  Urinary tract infection with hematuria, site unspecified [N39.0, R31.9]. Principal problem is UTI (urinary tract infection).    Past Medical History  Felipa has a past medical history of Anxiety, Depression, and Eating disorder.    History of Present Illness    Patient was seen in the ED 1 week ago for evaluation of abdominal pain and generalized weakness.  Workup revealed acute cystitis.  She was discharged on a course of Macrobid, which patient reports she has been taking.  She presents today for evaluation of persistent abdominal pain.  Patient reports suprapubic pain that radiates to her back.  She reports it is constant.  She reports she is weak and unable to ambulate.  She reports increased frequency of urination.  She reports chills.  She reports nausea without vomiting.  She reports she has not had solid food intake since Christmas.  She reports she has been drinking a lot of Gatorade.  She reports she is currently having black stool and vaginal bleeding x 3 days.  She reports she rarely gets her period.  She has not noticed any blood clots.  She reports she is feeling more depressed lately.  She denies suicidal ideation.  She reports she smokes 4 cigarettes/day.  She reports she quit alcohol use 4 years ago.  She denies fever, syncope, vomiting, chest pain, shortness of breath, constipation, diarrhea.     2/13 -  patient hypotensive with a concerning low MAP, required initiation of a norepinephrine infusion and transfer to the ICU.   PRIOR LEVEL OF FUNCTION AND LIVING ENVIRONMENT     Prior Level of Function      Flowsheet Row Most Recent Value   Dominant Hand right   Ambulation assistive  equipment   Transferring assistive equipment   Toileting independent   Bathing assistive equipment and person   Dressing assistive person   Eating independent   IADLs independent   Prior Level of Function Comment Reports assist from HHAs 6 days/week with bathing + dressing. Uses a walker at home   Assistive Device Currently Used at Home walker, standard, grab bar, shower chair             Prior Living Environment      Flowsheet Row Most Recent Value   People in Home alone   Current Living Arrangements home   Home Accessibility elevator accessible   Living Environment Comment Lives alone in apartment with elevator access. Tub shower with SC + GBs.          VITALS AND PAIN     PT Vitals      Date/Time Pulse HR Source SpO2 Pt Activity O2 Therapy BP BP Location BP Method Pt Position [facility]   02/17/25 0924 72 Monitor 99 % At rest None (Room air) 104/65 Right upper arm Automatic Lying TER          PT Pain      Date/Time Pain Type Side/Orientation Rating: Rest Rating: Activity Interventions [facility]   02/17/25 0924 Pain Assessment generalized 8 - severe pain 8 - severe pain care clustered TER             Objective   OBJECTIVE     Start time:  0924  End time:  0943  Session Length: 19 min  Mode of Treatment: co-treatment  Reason for co-treatment: per request of CM for auth    General Observations  Patient received reclined, in bed. She was agreeable to therapy, no issues or concerns identified by nurse prior to session.      Precautions: fall       Limitations/Impairments: safety/cognitive      PT Eval and Treat - 02/17/25 0924          Cognition    Orientation Status oriented x 3     Affect/Mental Status flat/blunted affect     Follows Commands follows one-step commands;75-90% accuracy;increased processing time needed;repetition of directions required;verbal cues/prompting required     Safety Deficit moderate deficit;problem-solving;insight into deficits/self-awareness;judgment        Bed Mobility    Bed Mobility Activities  left;supine to sit;sit to supine     [place] close supervision;minimum assist (75% or more patient effort);dependent (less than 25% patient effort);moderate assist (50-74% patient effort);2 person assist     Safety/Cues increased time to complete;hand placement;sequencing;technique     Assistive Device bed rails;head of bed elevated     Comment Bed moblity completed x2. First trial: OOB to the L with increased time to complete due to ataxic movement. Close supervision required no physical assist needed. Pt was asked to scoot to EOB to plant feet on floor. Pt quickly grabbed with both hands the L bed rail and thrusted and turned herself towards EOB slipping off of her R hip and landing prone between the bed and the therapists. Pt DID NOT touch the floor or hit her head. Pt was dependently brought back to supine in bed. Pt given a therapeutic rest break and pt was willing to trial bed mobility again. Second trial: OOB to the L with [place] x2, assist needed with advancing BLE off side of the bed and trunk control, assisted needed due to fatigue and ataxia. Pt returned supine controled with modA x2 and boosted to HOB. Asked pt to lie hooklying and assist with boost. When therapists asked pt to push through her legs she brought both legs up to her chest with knees flexed.        Mobility Belt    Mobility Belt Used During Session yes        Sit/Stand Transfer    Surface edge of bed     [place] maximum assist (25-49% patient effort);2 person assist     Safety/Cues increased time to complete;technique;sequencing;hand placement;initiation;maintaining center of gravity over base of support;impulsivity     Assistive Device other (see comments)   B/L HHA    Transfer Comments Multiple attempts at STS transfers with B/L feet and knees blocked with anterior approach transfer. On the final trial of STS pt able to clear buttocks from the bed but continued to demonstrate a signficant posterior lean and difficulty controlling  R leg positioning despite blocked R foot and knee. Multiple attempts from patient to stand without assist despite verbal and tactile cues to not attempt to stand without assist or before everyone was safely in place to attempt the transfer.        Gait Training    [place], Gait not tested     Comment Unsafe to attempt at this time. Unable to pass Egress Test.        Balance    Static Sitting Balance mild impairment;sitting, edge of bed;supported;unsupported     Dynamic Sitting Balance moderate impairment;sitting, edge of bed;supported;unsupported     Sit to Stand Dynamic Balance severe impairment;supported     Static Standing Balance severe impairment;supported     Dynamic Standing Balance not tested     Comment, Balance Mod-severe balance deficits with ataxic movement in extremities and trunk. Ataxic movement noted in both initiation of movement and at rest, but lessens when patient is distracted. Pt able to clear buttocks from the bed when standing. Pt has decreased insight into current level of function and thrusted herself with too much momentum when trying to advance to EOB landing prone supported by therapists and was dependently returned supine. Undetermined if ataxic contributed to the force production of the momentum to get to the the EOB.        Lower Extremity (Therapeutic Exercise)    Exercise Position/Type seated     General Exercise LAQ (long arc quad)     Range of Motion Exercises bilateral     Reps and Sets x10     Comment Supported balance with LAQ due to trunk ataxia and posterior lean.        Impairments/Safety Issues    Impairments Affecting Function balance;strength;cognition;coordination;endurance/activity tolerance;postural/trunk control;grasp;motor control;pain     Cognitive Impairments, Safety/Performance problem-solving/reasoning;safety precaution awareness;awareness, need for assistance;insight into deficits/self-awareness;judgment;sequencing abilities;impulsivity     Functional  "Endurance Fair, motivated but limited by deconditioning.                                              Education Documentation  Joint Mobility/Strength, taught by Alyssa Green PTA at 2/17/2025 11:21 AM.  Learner: Patient  Readiness: Acceptance  Method: Explanation  Response: Needs Reinforcement  Comment: Bed mobility, transfers, safety        Session Outcome  Patient reclined, in bed at end of session, bed alarm on, all needs met, call light in reach, personal items in reach. Nursing notified about patient's performance, patient's position, and patient's response to therapy/activity. (pt benefits from fall prevention while in bed due to unpredictable ataxic movements.)    AM-PAC - Mobility (Current Function)     Turning form your back to your side while in flat bed without using bedrails 3 - A Little   Moving from lying on your back to sitting on the side of a flat bed without using bedrails 3 - A Little   Moving to and from a bed to a chair 1 - Total   Standing up from a chair using your arms 2 - A Lot   To walk in a hospital room 1 - Total   Climbing 3-5 steps with a railing 1 - Total   AM-PAC Mobility Score 11      ASSESSMENT AND PLAN     Progress Summary  Pt requires varying level of assist with bed mobility and maxA for STS at EOB. Moderate-severe balance deficits. Ataxic with movement and at rest. Able to clear buttocks from EOB today but unable to sustain stand due to fatigue. Pt would benefit from continued skilled PT services to improve balance, strength, endurance, and mobility. Please see note above for full details of patient's performance.    Patient/Family Therapy Goals Statement: \"go to rehab, skilled nursing home\"    PT Plan      Flowsheet Row Most Recent Value   Rehab Potential fair, will monitor progress closely at 02/17/2025 0924   Therapy Frequency 3 times/wk at 02/17/2025 0924   Planned Therapy Interventions balance training, bed mobility training, gait training, patient/family " education, strengthening, transfer training, neuromuscular re-education, postural re-education at 02/17/2025 0924            PT Discharge Recommendations      Flowsheet Row Most Recent Value   PT Recommended Discharge Disposition skilled nursing facility at 02/17/2025 0924   Anticipated Equipment Needs if Discharged Home (PT) none at 02/17/2025 0924                 PT Goals      Flowsheet Row Most Recent Value   Bed Mobility Goal 1    Activity/Assistive Device sit to supine/supine to sit at 02/04/2025 1402   [place] modified independence at 02/04/2025 1402   Time Frame by discharge at 02/04/2025 1402   Progress/Outcome goal ongoing at 02/04/2025 1402   Transfer Goal 1    Activity/Assistive Device sit-to-stand/stand-to-sit, bed-to-chair/chair-to-bed, walker, front-wheeled at 02/04/2025 1402   [place] supervision required at 02/04/2025 1402   Time Frame by discharge at 02/04/2025 1402   Progress/Outcome goal ongoing at 02/04/2025 1402   Gait Training Goal 1    Activity/Assistive Device gait (walking locomotion), assistive device use, walker, front-wheeled at 02/04/2025 1402   [place] supervision required at 02/04/2025 1402   Distance 25 ft at 02/04/2025 1402   Time Frame by discharge at 02/04/2025 1402   Progress/Outcome goal ongoing at 02/04/2025 1402

## 2025-02-18 VITALS
HEART RATE: 68 BPM | TEMPERATURE: 98.6 F | RESPIRATION RATE: 18 BRPM | SYSTOLIC BLOOD PRESSURE: 109 MMHG | WEIGHT: 182 LBS | BODY MASS INDEX: 30.32 KG/M2 | OXYGEN SATURATION: 100 % | HEIGHT: 65 IN | DIASTOLIC BLOOD PRESSURE: 66 MMHG

## 2025-02-18 PROCEDURE — 63700000 HC SELF-ADMINISTRABLE DRUG: Performed by: STUDENT IN AN ORGANIZED HEALTH CARE EDUCATION/TRAINING PROGRAM

## 2025-02-18 PROCEDURE — 99239 HOSP IP/OBS DSCHRG MGMT >30: CPT | Performed by: STUDENT IN AN ORGANIZED HEALTH CARE EDUCATION/TRAINING PROGRAM

## 2025-02-18 PROCEDURE — 63700000 HC SELF-ADMINISTRABLE DRUG: Performed by: NURSE PRACTITIONER

## 2025-02-18 PROCEDURE — 63700000 HC SELF-ADMINISTRABLE DRUG: Performed by: INTERNAL MEDICINE

## 2025-02-18 PROCEDURE — 63700000 HC SELF-ADMINISTRABLE DRUG: Performed by: PHYSICIAN ASSISTANT

## 2025-02-18 RX ORDER — B-COMPLEX WITH VITAMIN C
1 TABLET ORAL DAILY
Start: 2025-02-19 | End: 2025-03-21

## 2025-02-18 RX ORDER — GABAPENTIN 400 MG/1
400 CAPSULE ORAL 3 TIMES DAILY
Start: 2025-02-18 | End: 2025-03-20

## 2025-02-18 RX ORDER — ACETAMINOPHEN 500 MG
5 TABLET ORAL NIGHTLY
Start: 2025-02-18 | End: 2025-03-20

## 2025-02-18 RX ORDER — LANOLIN ALCOHOL/MO/W.PET/CERES
1000 CREAM (GRAM) TOPICAL DAILY
Start: 2025-02-19 | End: 2025-03-21

## 2025-02-18 RX ORDER — IBUPROFEN 400 MG/1
400 TABLET ORAL EVERY 8 HOURS PRN
Start: 2025-02-18 | End: 2025-02-28

## 2025-02-18 RX ORDER — ZOLPIDEM TARTRATE 10 MG/1
5 TABLET ORAL NIGHTLY
Start: 2025-02-18 | End: 2025-03-20

## 2025-02-18 RX ORDER — TRAZODONE HYDROCHLORIDE 100 MG/1
50 TABLET ORAL NIGHTLY
Start: 2025-02-18 | End: 2025-03-20

## 2025-02-18 RX ORDER — ACETAMINOPHEN 325 MG/1
650 TABLET ORAL EVERY 4 HOURS PRN
Start: 2025-02-18 | End: 2025-03-20

## 2025-02-18 RX ORDER — MIDODRINE HYDROCHLORIDE 5 MG/1
15 TABLET ORAL
Start: 2025-02-18 | End: 2025-03-20

## 2025-02-18 RX ADMIN — GABAPENTIN 400 MG: 400 CAPSULE ORAL at 15:07

## 2025-02-18 RX ADMIN — ATORVASTATIN CALCIUM 20 MG: 20 TABLET, FILM COATED ORAL at 08:59

## 2025-02-18 RX ADMIN — LEVOTHYROXINE SODIUM 50 MCG: 0.05 TABLET ORAL at 05:19

## 2025-02-18 RX ADMIN — FLUOXETINE HYDROCHLORIDE 40 MG: 20 CAPSULE ORAL at 08:59

## 2025-02-18 RX ADMIN — TOPIRAMATE 50 MG: 100 TABLET, FILM COATED ORAL at 15:07

## 2025-02-18 RX ADMIN — CYANOCOBALAMIN TAB 1000 MCG 1000 MCG: 1000 TAB at 08:59

## 2025-02-18 RX ADMIN — IBUPROFEN 400 MG: 400 TABLET, FILM COATED ORAL at 17:58

## 2025-02-18 RX ADMIN — MIDODRINE HYDROCHLORIDE 15 MG: 5 TABLET ORAL at 13:04

## 2025-02-18 RX ADMIN — Medication 400 MG: at 09:35

## 2025-02-18 RX ADMIN — IBUPROFEN 400 MG: 400 TABLET, FILM COATED ORAL at 09:35

## 2025-02-18 RX ADMIN — PANTOPRAZOLE SODIUM 20 MG: 20 TABLET, DELAYED RELEASE ORAL at 08:59

## 2025-02-18 RX ADMIN — LORAZEPAM 0.25 MG: 0.5 TABLET ORAL at 09:35

## 2025-02-18 RX ADMIN — Medication 1 TABLET: at 08:59

## 2025-02-18 RX ADMIN — MIDODRINE HYDROCHLORIDE 15 MG: 5 TABLET ORAL at 08:59

## 2025-02-18 RX ADMIN — GABAPENTIN 400 MG: 400 CAPSULE ORAL at 08:59

## 2025-02-18 RX ADMIN — TOPIRAMATE 50 MG: 100 TABLET, FILM COATED ORAL at 08:59

## 2025-02-18 RX ADMIN — ACETAMINOPHEN 650 MG: 325 TABLET, FILM COATED ORAL at 05:21

## 2025-02-18 RX ADMIN — MIDODRINE HYDROCHLORIDE 15 MG: 5 TABLET ORAL at 17:19

## 2025-02-18 ASSESSMENT — COGNITIVE AND FUNCTIONAL STATUS - GENERAL
MOVING TO AND FROM BED TO CHAIR: 1 - TOTAL
STANDING UP FROM CHAIR USING ARMS: 2 - A LOT
WALKING IN HOSPITAL ROOM: 1 - TOTAL
CLIMB 3 TO 5 STEPS WITH RAILING: 1 - TOTAL

## 2025-02-18 NOTE — PLAN OF CARE
Care Coordination Discharge Plan Note     Discharge Needs Assessment  Concerns to be Addressed: care coordination/care conferences, discharge planning  Current Discharge Risk: lives alone, physical impairment    Anticipated Discharge Plan  Anticipated Discharge Disposition: skilled nursing facility  Type of Skilled Nursing Care Services: PT, OT, nursing      Patient Choice  Offered/Gave Vendor List: yes  Patient and/or patient guardian/advocate was made aware of their right to choose a provider. A list of eligible providers was presented and reviewed with the patient and/or patient guardian/advocate in written and/or verbal form. The list delineates providers in the patients desired geographic area who are participating in the Medicare program and/or providers contracted with the patients primary insurance. The Medicare list and quality ratings were obtained from the Medicare.gov [medicare.gov] website.    ---------------------------------------------------------------------------------------------------------------------    Interdisciplinary Discharge Plan Review:  Participants:     Concerns Comments: chart reviewed & patient will be for downgrade. [facility] does have new pt/ot orders & was seen. eufemia's ampac is 9. sw stopped by nica's room 2x's sleeping & listening to music in room. plan was for [facility] pending progression in therapy.    Discharge Plan:   Disposition/Destination:   /    Discharge Facility:    Community Resources:      Discharge Transportation:  Is Out of Hospital DNR needed at Discharge: no  Does patient need discharge transport? Yes        Care Coordination Discharge Plan Note     Discharge Needs Assessment  Concerns to be Addressed: care coordination/care conferences, discharge planning  Current Discharge Risk: lives alone, physical impairment    Anticipated Discharge Plan  Anticipated Discharge Disposition: skilled nursing facility  Type of Skilled Nursing Care Services: PT, OT,  nursing      Patient Choice  Offered/Gave Vendor List: yes  Patient and/or patient guardian/advocate was made aware of their right to choose a provider. A list of eligible providers was presented and reviewed with the patient and/or patient guardian/advocate in written and/or verbal form. The list delineates providers in the patients desired geographic area who are participating in the Medicare program and/or providers contracted with the patients primary insurance. The Medicare list and quality ratings were obtained from the Medicare.gov [medicare.gov] website.    ---------------------------------------------------------------------------------------------------------------------    Interdisciplinary Discharge Plan Review:  Participants:     Concerns Comments: chart reviewed & patient will be for downgrade. [facility] does have new pt/ot orders & was seen. eufemia's ampac is 9. sw stopped by [facility]'s room 2x's sleeping & listening to music in room. plan was for [facility] pending progression in therapy.    Discharge Plan:   Disposition/Destination:   /    Discharge Facility:    Community Resources:      Discharge Transportation:  Is Out of Hospital DNR needed at Discharge: no  Does patient need discharge transport? Yes

## 2025-02-18 NOTE — PLAN OF CARE
Plan of Care Review  Plan of Care Reviewed With: patient  Progress: improving  Outcome Evaluation: PRN Tylenol given for c/o back and neck pain with some relief noted. Remains SR on tele. Assisted with bedpan, perineal care, and repositioning as requested. Call bell within patient's reach.

## 2025-02-18 NOTE — PROGRESS NOTES
Nutrition Note    Nutrition Status Classification: Mild nutritional compromise     Clinical Course: Patient is a 44 y.o. female who was admitted on 2/1/2025 with a diagnosis of UTI (urinary tract infection) [N39.0]  Lower abdominal pain [R10.30]  Urinary tract infection with hematuria, site unspecified [N39.0, R31.9].   Past Medical History:   Diagnosis Date    Anxiety     Depression     Eating disorder      No past surgical history on file.    Nutrition Interventions/Recommendations:   Continue regular diet     - soft menu options prn 2/2 missing teeth              - monitor and record % meal intake   Continue Ensure Clear (240 kcals, 8 g protein, 52 g CHO each) TID  - monitor for increased BG levels; d/c if >200 mg/dL  Trend labs/lytes, treat/replete prn               - POC glucose checks with goal: </=140-180 mg/dL  Weekly blind weight   Monitor GI function   - reports ongoing nausea, last BM 2/17  6. Consider daily MVI with iron       Active Diet Orders (From admission, onward)       Start        02/08/25 1138  Adult Diet Regular; RD/LDN may adjust order  Diet effective now        Question Answer Comment   Diet Texture Regular    Delegation of Authority. Diet orders written by PA/CRNPs may not be adjusted by RD/LDNs. RD/LDN may adjust order           02/06/25 1028  Dietary nutrition supplements  Once        Question Answer Comment   Select Supplement (PH): Ensure Clear Gonzalez    Meal period Breakfast Lunch Dinner           02/01/25 1358  Supervise feeds  Until discontinued                          Reason for Assessment  Reason For Assessment: per organizational policy (f/u)  Diagnosis: infection/sepsis (UTI)    MST Nutrition Screen Tool  Has patient lost weight without trying?: 0-->No  Has patient been eating poorly due to decreased appetite?: 1-->Yes  MST Nutrition Screen Score: 1    Nutrition/Diet History  Typical Food/Fluid Intake: from home with aide 6 days per week from 9A-3P per pt  Diet Prior to Admission:  "regular  Appetite Prior to Admission: Poor-0-25% (limited intake of solid foods >/=1 month (mostly water and gatorade))  Intake (%): 25%  Food Allergies: fish/shellfish, other (see comments) (aspartame - anaphylaxis)  Factors Affecting Nutritional Intake: eating disorder(s)    Physical Findings  Overall Physical Appearance: overweight  Gastrointestinal: nausea  Last Bowel Movement: 02/17/25  Oral/Mouth Cavity: other (see comments) (missing teeth)  Skin: other (see comments) (cuts/excoriation)    Last Bowel Movement: 02/17/25    Nutrition Order  Nutrition Order: meets nutritional requirements  Nutrition Order Comments: Regular  Current TF/TPN Regimen: none    Anthropometrics  Height: 165.1 cm (5' 5\")    Admit Weight  Admit Weight Method: measured  Admit Weight: 76.7 kg (169 lb)    Current Weight  Weight Method: Bed scale  Weight: 82.6 kg (182 lb)    Ideal Body Weight (IBW)  Ideal Body Weight (IBW) (kg): 57.29  % Ideal Body Weight: 134.4    Usual Body Weight (UBW)  Usual Body Weight: 97.5 kg (215 lb) (10/3/24 per EMR)  % Usual Body Weight: 80.47  Weight Loss: other (see comments)  Time Frame: Other (comments) (42 lb / 19.5% weight loss in 4 months - severe)    Body Mass Index (BMI)  BMI (Calculated): 30.3  BMI Assessment: BMI 30-34.9: obesity grade I         Labs/Procedures/Meds  Lab Results Reviewed: reviewed, pertinent  Lab Results Comments: Cl 112 Cr 0.5 Alk Phos 191    Results from last 7 days   Lab Units 02/17/25  0841 02/16/25  1526 02/16/25  0853   SODIUM mEQ/L 142 142 141   POTASSIUM mEQ/L 3.8 4.0 4.3   CHLORIDE mEQ/L 112* 110* 109*   CO2 mEQ/L 23 26 27   BUN mg/dL 10 10 9   CREATININE mg/dL 0.5* 0.4* 0.4*   GLUCOSE mg/dL 82 88 84   CALCIUM mg/dL 8.3* 8.5* 8.5*      Results from last 7 days   Lab Units 02/17/25  0841 02/16/25  1526   ALK PHOS IU/L 191* 196*   BILIRUBIN TOTAL mg/dL 1.0 0.8   ALBUMIN g/dL 3.3* 3.2*   ALT IU/L 43 49   AST IU/L 33 44*     Lab Results   Component Value Date    HGBA1C 5.5 02/07/2025 "     Lab Results   Component Value Date    LXRJONOR22 708 02/03/2025     Lab Results   Component Value Date    CALCIUM 8.3 (L) 02/17/2025    PHOS 3.0 02/08/2025     Results from last 7 days   Lab Units 02/17/25  0841 02/16/25  0853 02/15/25  1137   WBC K/uL 7.96 5.67 5.72   HEMOGLOBIN g/dL 10.9* 10.2* 10.1*   HEMATOCRIT % 33.7* 32.0* 31.2*   PLATELETS K/uL 257 275 277      Results from last 7 days   Lab Units 02/15/25  1137   MAGNESIUM mg/dL 1.9      Diagnostic Tests/Procedures  Diagnostic Test/Procedure Reviewed: reviewed, pertinent    Medications  Pertinent Medications Reviewed: reviewed, pertinent  Pertinent Medications Comments: lipitor, B-complex with C, B12, synthroid, mag-ox, protonix   atorvastatin  20 mg oral q AM    B-complex with vitamin C  1 tablet oral Daily    cyanocobalamin  1,000 mcg oral Daily    FLUoxetine  40 mg oral Daily    gabapentin  400 mg oral TID    levothyroxine  50 mcg oral Daily (6:30a)    magnesium oxide  400 mg oral BID    melatonin  5 mg oral Nightly    midodrine  15 mg oral TID AC    nystatin   Topical BID    pantoprazole  20 mg oral BID    topiramate  50 mg oral BID    traZODone  50 mg oral Nightly     Weights (last 7 days)       Date/Time Weight    02/12/25 1000 82.6 kg (182 lb)          2/3/2025 173 lb 78.472 kg Bed scale   2/1/2025 169 lb 12.1 oz 77 kg Stated;Bed scale   1/27/2025 212 lb 1.3 oz 96.2 kg Stated   10/3/2024 215 lb 13.3 oz 97.9 kg Bed scale   9/30/2024 201 lb 91.173 kg -   9/28/2024 201 lb 8 oz 91.4 kg -   8/21/2024 235 lb 3.7 oz 106.7 kg Bed scale   8/19/2024 229 lb 8 oz 104.1 kg -   8/18/2024 220 lb 14.4 oz 100.2 kg Bed scale   8/17/2024 207 lb 14.3 oz 94.3 kg Bed scale   8/16/2024 207 lb 0.2 oz 93.9 kg Bed scale   8/15/2024 204 lb 5.9 oz 92.7 kg Bed scale       Clinical Comments:  Pt visited in f/u. 43 y/o female with PMHx significant for depression, anxiety, bipolar, PTSD, ETOH abuse (4 years ago per chart) and DM2. Presented with abd pain, admitted on 2/1 with  UTI. Treated for UTI and electrolyte abnormalities. Had episode of hypotension with brief upgrade to ICU 2/13.  Stable for dc, pending placement at rehab (vs ED treatment given need for physical rehab). Continues on Regular diet. Hx shellfish/ aspartame allergy noted.      Chart reviewed, discussed with RN and pt seen at bedside.  RN reports pt did well with meals over past 24 hrs.  Had burger for lunch yesterday, chicken noodle soup for dinner, sandwich for night shift, and decent amount of eggs, slice of manzanares and ensure for breakfast today. Pt reports the same.  Pt endorses frequent nausea, but states she is unsure if it's psychological or psychological. Was seen by psychology for depression/anxiety and eating d/o.  Pt states she takes ativan before eating at home to help promote po intake. Discussed importance of small frequent meals with gastric bypass hx and starting with bland foods when experiencing nausea, pt receptive.  States she had bariatric surgery in Sept 2007, but had to have pouch redone in April 2017 d/t complications from eating disorder.  Pt reports consuming at least 2 Ensure clear/day, sometimes doesn't have at breakfast but wishes to continue receiving TID.  Last BM 2/17.  Most recent bed scale weight was 182 lbs, Notable variances this admit. Current weight suggests loss of 15.4% x 4 months - severe if accurate. Today bed scale read 182.2 lb with several blankets/pillows; recommend blind weights as able.  Labs/meds reviewed.  Last POC glucose check 2/12. Will continue to follow per policy.      Date: 02/18/25  Signature: MONICO Lopez

## 2025-02-18 NOTE — PLAN OF CARE
Care Coordination Discharge Plan Note     Discharge Needs Assessment  Concerns to be Addressed: care coordination/care conferences, discharge planning  Current Discharge Risk: lives alone, physical impairment    Anticipated Discharge Plan  Anticipated Discharge Disposition: skilled nursing facility  Type of Skilled Nursing Care Services: PT, OT, nursing      Patient Choice  Offered/Gave Vendor List: yes  Patient and/or patient guardian/advocate was made aware of their right to choose a provider. A list of eligible providers was presented and reviewed with the patient and/or patient guardian/advocate in written and/or verbal form. The list delineates providers in the patients desired geographic area who are participating in the Medicare program and/or providers contracted with the patients primary insurance. The Medicare list and quality ratings were obtained from the Medicare.gov [medicare.gov] website.    ---------------------------------------------------------------------------------------------------------------------    Interdisciplinary Discharge Plan Review:  Participants:     Concerns Comments: Sent out 10 more referrals for SNF placement. At this time there is no accepting facilities. Vera met with pt at bedside and went over d/c options. Pt does have an aid 9am-3pm Mon-Saturday. Vera will continue to work on d/c planning     Discharge Plan:   Disposition/Destination:   /  SNF   Discharge Facility:    Community Resources:      Discharge Transportation:  Is Out of Hospital DNR needed at Discharge: no  Does patient need discharge transport? Yes

## 2025-02-18 NOTE — PLAN OF CARE
Care Coordination Discharge Plan Note     Discharge Needs Assessment  Concerns to be Addressed: care coordination/care conferences, discharge planning  Current Discharge Risk: lives alone, physical impairment    Anticipated Discharge Plan  Anticipated Discharge Disposition: skilled nursing facility  Type of Skilled Nursing Care Services: PT, OT, nursing      Patient Choice  Offered/Gave Vendor List: yes  Patient and/or patient guardian/advocate was made aware of their right to choose a provider. A list of eligible providers was presented and reviewed with the patient and/or patient guardian/advocate in written and/or verbal form. The list delineates providers in the patients desired geographic area who are participating in the Medicare program and/or providers contracted with the patients primary insurance. The Medicare list and quality ratings were obtained from the Medicare.gov [medicare.gov] website.    ---------------------------------------------------------------------------------------------------------------------    Interdisciplinary Discharge Plan Review:  Participants:     Discharge Plan:   Disposition/Destination: Skilled Nursing Facility - Other  / Skilled Nursing Facility  Discharge Facility:   Destination - Admitted Since 2/1/2025    No services have been selected for the patient.       Community Resources:      Discharge Transportation:  Is Out of Hospital DNR needed at Discharge: no  Does patient need discharge transport? Yes        Care Coordination Discharge Plan Summary      Discharge Needs Assessment    Concerns to be Addressed: care coordination/care conferences, discharge planning  Current Discharge Risk: lives alone, physical impairment  Anticipated Changes Related to Illness: inability to care for self    Discharge Plan Summary    Patient Choice  Offered/Gave Vendor List: yes  Patient and/or patient guardian/advocate was made aware of their right to choose a provider. A list of eligible  providers was presented and reviewed with the patient and/or patient guardian/advocate in written and/or verbal form. The list delineates providers in the patients desired geographic area who are participating in the Medicare program and/or providers contracted with the patients primary insurance. The Medicare list and quality ratings were obtained from the Medicare.gov [medicare.gov] website.    Concerns / Comments: Pt is medically stable for d/c. [facility]. Transport set up via BLS at 5:30pm. Trip# [id number]. RN# [phone]. Raquel notified. Raquel obtained insurance auth. Pt in agreement.      Discharge Plan:  Disposition/Destination: Skilled Nursing Facility - Other  / Skilled Nursing Facility  Destination - Admitted Since 2/1/2025    No services have been selected for the patient.         Connection to Community  Not applicable  Community Resources:      Discharge Transportation:  Is Out of Hospital DNR needed at Discharge: no  Does patient need discharge transport? Yes  BLS

## 2025-02-18 NOTE — PROGRESS NOTES
Hospital Medicine     Daily Progress Note       SUBJECTIVE   Patient seen and examined at bedside.  States she slept a little yesterday.  Asking for Ambien to be increased.  We discussed waiting to monitor blood pressure today  Discussed with social work, patient has difficult placement     OBJECTIVE   Vital signs in last 24 hours:  Temp:  [36.2 °C (97.1 °F)-36.8 °C (98.3 °F)] 36.4 °C (97.5 °F)  Heart Rate:  [64-78] 78  Resp:  [16-18] 18  BP: ()/(59-70) 106/69    Intake/Output Summary (Last 24 hours) at 2/18/2025 1125  Last data filed at 2/18/2025 0800  Gross per 24 hour   Intake 700 ml   Output --   Net 700 ml       General: no acute distress, awake and alert  HEENT: extraocular movements intact  Cardio: regular rate and rhythm  Pulm: clear to auscultation bilaterally, no wheezing  Abdominal: soft, nontender, nondistended  MSK: grossly intact, ROM intact, no edema  Skin: normal color and turgor  Neuro: Awake and alert, oriented x3, no focal deficits     LINES, DRAINS, AIRWAYS, WOUNDS   Peripheral IV (Adult) 02/13/25 Left Antecubital (Active)   Number of days: 4          LABS, IMAGING, TELEMETRY   Results from last 7 days   Lab Units 02/17/25  0841 02/16/25  0853 02/15/25  1137   WBC K/uL 7.96 5.67 5.72   HEMOGLOBIN g/dL 10.9* 10.2* 10.1*   HEMATOCRIT % 33.7* 32.0* 31.2*   PLATELETS K/uL 257 275 277      Results from last 7 days   Lab Units 02/17/25  0841 02/16/25  1526 02/16/25  0853   SODIUM mEQ/L 142 142 141   POTASSIUM mEQ/L 3.8 4.0 4.3   CHLORIDE mEQ/L 112* 110* 109*   CO2 mEQ/L 23 26 27   BUN mg/dL 10 10 9   CREATININE mg/dL 0.5* 0.4* 0.4*   GLUCOSE mg/dL 82 88 84   CALCIUM mg/dL 8.3* 8.5* 8.5*        X-RAY CHEST 1 VIEW    Result Date: 2/13/2025  IMPRESSION:  No acute cardiopulmonary process. COMPARISON: Chest studies August 2024 through 1/27/2025. COMMENT:  Portable AP upright imaging of the chest 2333 hours. The lungs are less well expanded but clear. No congestive changes. No  consolidation or pleural fluid. Normal cardiac size, hilar and mediastinal contours.    ULTRASOUND ABDOMEN LIMITED    Result Date: 2/6/2025  IMPRESSION: Unremarkable right upper quadrant ultrasound noting surgical absence of the gallbladder    CT LUMBAR SPINE WITHOUT IV CONTRAST    Result Date: 2/2/2025  IMPRESSION: No acute osseous abnormalities or significant degenerative change    CT THORACIC SPINE WITHOUT IV CONTRAST    Result Date: 2/2/2025  IMPRESSION: Mild multilevel thoracic spondylosis without compression deformity or destructive change    CT ABDOMEN PELVIS WITHOUT IV CONTRAST    Result Date: 1/27/2025  IMPRESSION: 1.  Concentric prominence the walls the urinary bladder.  Cystitis cannot be excluded 2.  There is high density material present within the colon presumably representing ingested product.  There is no CT evidence of a bowel obstruction. The patient is status post Mary-en-Y gastric surgery. 3.  Uterine fibroid 4.  See comments for additional findings     X-RAY CHEST 2 VIEWS    Result Date: 1/27/2025  IMPRESSION: No acute cardiopulmonary process.       ASSESSMENT AND PLAN     # Hypotension  -etiology likely polypharmacy  -cosyntropin stim test negative  -Repeat echo EF 65%, no regional wall motion abnormalities  -Continue midodrine  -Hold sedating medications  Adjustment to home medications., Ativan and Ambien dose decreased  Gabapentin resumed at home dose as it is for seizures  Continued trazodone at lower dose     # Cystitis  -Urine culture with Klebsiella pneumonia and Proteus mirabilis. Treated with course of ceftriaxone  -CT with no  obstruction  Resolved     # Depression  - psych input noted  - continue home: fluoxetine 40 mg po daily  resume trazodone at lower dose     # Anxiety  see section on depression.     # Eating disorder  - reports she has not had solid food intake since [place]  - supervise feeds  - psych consult appreciated: she would benefit from placement at a rehab/eating  disorder clinic but her difficulty with ambulation may preclude her from this.     # Hypothyroidism  - TSH: 2.12 (2/7/25), free T4: 0.6 (8/16/24)  - continued on synthroid     # PTSD (post-traumatic stress disorder)  # Insomnia  # At risk for polypharmacy  # Chronic pain disorder  Holding sedatives as able due to hypotension  Restarted Ativan at low-dose 0.25 twice daily as needed to avoid withdrawal  Ambien dose decreased to 2.5 mg as needed nightly  Patient taking gabapentin for seizures, so resumed at home dose  Trazodone at lower dose     # Hyponatremia  # -hypovolemic hyponatremia  -hyponatremia is now resolved     # Thrombocytopenia (CMS/HCC)  -hematology consult appreciated  -platelets appear to be improving for now after antibiotics have been stopped  -peripheral smear appreciated  -DIC panel is negative.  No bleeding     # Hypokalemia  Improved  -hold off on reintroducing florinef as this can promote further hypokalemia.  cardiac monitoring while there is hypokalemia.     # GERD  - currently on: pantoprazole 20 mg po BID     # Anemia: chronic, due to bone marrow suppression, stable, macrocytic  stable  - vitamin B12: 708 (2/3/25), folate: 10.9 (9/29/24)  - reticulocyte index: 0.3 %, mean corpuscular volume: 105 (2/17/25)  - currently on: cyanocobalamin 1000 mcg po daily     # Hyperlipidemia  - continue home: atorvastatin 20 mg po daily     # Seizure disorder  - continue home: topiramate 50 mg po BID  - continue home: gabapentin 400 mg po TID     # Sepsis with acute organ dysfunction     # Hypocalcemia     # Obesity, class I     ::: Stable/Chronic/Resolved :::     # Lower abdominal pain  -CT abdomen is negative for colitis, negative for bowel obstruction on this admission  -lower abdominal pain is now resolved     # Elevated liver enzymes  -US right upper quadrant appreciated, negative for biliary dilatation, neg for hepatic steatosis.  -GI consult appreciated: stop acetaminophen for pain control  -monitor  LFTs  hepatitis panel is negative.  LFTs improving       VTE Prophylaxis:  Current anticoagulants:    None      Code Status: Full Code        Estimated Discharge Date: 2/18/2025       Disposition Planning: Medically stable for discharge       Bunny Chavarria DO  2/18/2025

## 2025-02-19 NOTE — PLAN OF CARE
Plan of Care Review  Plan of Care Reviewed With: patient  Progress: improving  Outcome Evaluation: Pt discharged to [facility]. Report given to Nursing supervisor. Pt's iv and tele box removed. Pt's belongings were taken with patient by kwadwo.

## 2025-02-21 NOTE — PROGRESS NOTES
Jailene updated by jatin that they needed a note for exceptional admission. DIMA wrote note & jailene faxed note to Jatin for her records.

## 2025-02-21 NOTE — PROGRESS NOTES
Addendum to DC summary dated 2/18/2025.    Patient stable for DC to a skilled nursing facility and will require less than a 30 day stay.      Brennan Tinoco, DO  2/21/205